# Patient Record
Sex: FEMALE | Race: WHITE | NOT HISPANIC OR LATINO | URBAN - METROPOLITAN AREA
[De-identification: names, ages, dates, MRNs, and addresses within clinical notes are randomized per-mention and may not be internally consistent; named-entity substitution may affect disease eponyms.]

---

## 2017-03-13 DIAGNOSIS — K21.00 GASTROESOPHAGEAL REFLUX DISEASE WITH ESOPHAGITIS: ICD-10-CM

## 2017-03-13 RX ORDER — PANTOPRAZOLE SODIUM 40 MG/1
TABLET, DELAYED RELEASE ORAL
Qty: 30 TABLET | Refills: 5 | Status: SHIPPED | OUTPATIENT
Start: 2017-03-13 | End: 2018-03-14 | Stop reason: SDUPTHER

## 2017-05-24 ENCOUNTER — OFFICE (OUTPATIENT)
Dept: URBAN - METROPOLITAN AREA CLINIC 64 | Facility: CLINIC | Age: 62
End: 2017-05-24

## 2017-05-24 VITALS
WEIGHT: 188 LBS | HEART RATE: 88 BPM | HEIGHT: 64 IN | DIASTOLIC BLOOD PRESSURE: 88 MMHG | SYSTOLIC BLOOD PRESSURE: 165 MMHG

## 2017-05-24 DIAGNOSIS — Z86.010 PERSONAL HISTORY OF COLONIC POLYPS: ICD-10-CM

## 2017-05-24 DIAGNOSIS — K59.00 CONSTIPATION, UNSPECIFIED: ICD-10-CM

## 2017-05-24 DIAGNOSIS — K21.9 GASTRO-ESOPHAGEAL REFLUX DISEASE WITHOUT ESOPHAGITIS: ICD-10-CM

## 2017-05-24 PROCEDURE — 99213 OFFICE O/P EST LOW 20 MIN: CPT | Performed by: INTERNAL MEDICINE

## 2017-05-24 RX ORDER — POLYETHYLENE GLYCOL 3350 17 G/17G
POWDER, FOR SOLUTION ORAL
Qty: 1 | Refills: 11 | Status: ACTIVE
Start: 2017-05-24

## 2017-05-24 RX ORDER — CALCIUM CARBONATE/VITAMIN D3 600 MG-10
TABLET ORAL
Qty: 60 | Refills: 3 | Status: ACTIVE
Start: 2017-05-24

## 2017-05-24 RX ORDER — ONDANSETRON HYDROCHLORIDE 4 MG/1
TABLET, ORALLY DISINTEGRATING ORAL
Qty: 20 | Refills: 3 | Status: ACTIVE
Start: 2017-05-24

## 2017-09-11 ENCOUNTER — OFFICE VISIT (OUTPATIENT)
Dept: ENDOCRINOLOGY | Age: 62
End: 2017-09-11

## 2017-09-11 VITALS
DIASTOLIC BLOOD PRESSURE: 78 MMHG | OXYGEN SATURATION: 96 % | SYSTOLIC BLOOD PRESSURE: 142 MMHG | HEIGHT: 64 IN | BODY MASS INDEX: 32.03 KG/M2 | HEART RATE: 73 BPM | WEIGHT: 187.6 LBS

## 2017-09-11 DIAGNOSIS — M81.0 OSTEOPOROSIS: ICD-10-CM

## 2017-09-11 DIAGNOSIS — E11.9 TYPE 2 DIABETES MELLITUS WITHOUT COMPLICATION, WITHOUT LONG-TERM CURRENT USE OF INSULIN (HCC): Primary | ICD-10-CM

## 2017-09-11 DIAGNOSIS — Z86.2 HISTORY OF ANEMIA: ICD-10-CM

## 2017-09-11 DIAGNOSIS — I10 ESSENTIAL HYPERTENSION: ICD-10-CM

## 2017-09-11 DIAGNOSIS — K76.0 HEPATIC STEATOSIS: ICD-10-CM

## 2017-09-11 DIAGNOSIS — E11.42 TYPE 2 DIABETES MELLITUS WITH PERIPHERAL NEUROPATHY (HCC): ICD-10-CM

## 2017-09-11 DIAGNOSIS — E78.5 HYPERLIPIDEMIA, UNSPECIFIED HYPERLIPIDEMIA TYPE: ICD-10-CM

## 2017-09-11 PROCEDURE — 99214 OFFICE O/P EST MOD 30 MIN: CPT | Performed by: INTERNAL MEDICINE

## 2017-09-11 NOTE — PROGRESS NOTES
Subjective   Marge Mcghee is a 62 y.o. female.     HPI Comments: F/u for dm2, hyperlipidemia, hypertension/ testing bs  2-3 times week  / last dm eye exam 3/3/17 with dr Hinton / last dm foot exam today with dr Gilliam     Diabetes   Associated symptoms include fatigue.   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        She has known diabetes mellitus since 1970. She has been on Glucovance 1.25/250 mg  2 tablets a day and Victoza 1.2 mg/day. . - 200.   She has few hypoglycemic episodes.  She is not compliant with her diet. She eats mostly salads.  She has been seen at the diabetic program at [facility].  Her last meal was 11 AM.       Her last eye examination was in . She has a cataract on the left eye and retinopathy in the right eye.  She has received intraocular injections on the right eye from Dr. Hinton.  She has burning sensation and tingling in her feet which has improved with Cymbalta. She has used Neurontin and Lyrica in the past which caused blurred vision. Creatine clearance done in 2/15 was 95 mL per minute. 24-hour urine protein was low.      She has hyperlipidemia that has not been taking Lipitor 20 mg per day regularly.  She takes it about 3 times a week.  She denies myalgia.      She has hypertension and has been on Lasix 20 mg once a day and Diovan 160 mg once a day. She has a right adrenal mass. She had a follow-up MRI in 2012 which showed a right adrenal adenoma measuring 3.7 cm which is unchanged from 2009. She has mild hepatic steatosis on MRI. Lab studies done in 2015: Normal 24 urine VMA, metanephrines, and catecholamines. Plasma renin is 14.11 ng per mL per hour. Aldosterone was normal.      She is  6 para 1. She had total hysterectomy and bilateral salpingo-oophorectomy in her 40s. She was never on estrogen replacement therapy. She had a follow-up bone density in  and PCP [facility] which showed  "osteoporosis.  She is on Os-Addison 500+ D one tablet once a day.  Elham Singer NP is considering using Prolia.    She has been complaining of right sided lower back pain for 3 months.    She was hospitalized at [place] [address] for erosive esophagitis.  She has been taking Protonix 40 mg once a day and Carafate as needed.  She has a history of anemia.  She denies melena or hematochezia.  She is following with GI Dr. Nicole.    The following portions of the patient's history were reviewed and updated as appropriate: allergies, current medications, past family history, past medical history, past social history, past surgical history and problem list.    Review of Systems   Constitutional: Positive for fatigue.   HENT: Negative.    Eyes: Negative.    Respiratory: Negative.    Cardiovascular: Negative.    Gastrointestinal: Negative.    Endocrine: Negative.    Genitourinary: Negative.    Musculoskeletal: Negative.    Skin: Negative.    Allergic/Immunologic: Negative.    Neurological: Positive for numbness ( feet ).   Hematological: Negative.    Psychiatric/Behavioral: Negative.        Objective      Vitals:    09/11/17 1527   BP: 142/78   BP Location: Right arm   Patient Position: Sitting   Cuff Size: Large Adult   Pulse: 73   SpO2: 96%   Weight: 187 lb 9.6 oz (85.1 kg)   Height: 64\" (162.6 cm)     Physical Exam   Constitutional: She is oriented to person, place, and time. She appears well-developed and well-nourished. No distress.   HENT:   Head: Normocephalic.   Nose: Nose normal.   Mouth/Throat: No oropharyngeal exudate.   Eyes: Conjunctivae and EOM are normal. Right eye exhibits no discharge. Left eye exhibits no discharge. No scleral icterus.   Neck: Neck supple. No JVD present. No tracheal deviation present. No thyromegaly present.   Cardiovascular: Normal rate, regular rhythm, normal heart sounds and intact distal pulses.  Exam reveals no gallop and no friction rub.    No murmur heard.  Pulmonary/Chest: Effort " normal and breath sounds normal. No respiratory distress. She has no wheezes. She has no rales. She exhibits no tenderness.   Abdominal: Soft. Bowel sounds are normal. She exhibits no distension and no mass. There is no tenderness.   Musculoskeletal: Normal range of motion. She exhibits no edema, tenderness or deformity.   Lymphadenopathy:     She has no cervical adenopathy.   Neurological: She is alert and oriented to person, place, and time. She has normal reflexes. She displays normal reflexes. Coordination normal.   Intact light touch   Skin: Skin is warm and dry. No rash noted. No erythema. No pallor.   Psychiatric: She has a normal mood and affect. Her behavior is normal.     Hospital Outpatient Visit on 02/03/2015   Component Date Value Ref Range Status   • Creatinine, Arterial 02/03/2015 0.8  0.6 - 1.3 mg/dL Final     Assessment/Plan   Marge was seen today for diabetes, hyperlipidemia and hypertension.    Diagnoses and all orders for this visit:    Type 2 diabetes mellitus without complication, without long-term current use of insulin  -     Comprehensive Metabolic Panel  -     Hemoglobin A1c  -     TSH  -     T4, Free  -     Microalbumin / Creatinine Urine Ratio    Type 2 diabetes mellitus with peripheral neuropathy    Hyperlipidemia, unspecified hyperlipidemia type  -     Lipid Panel  -     TSH  -     T4, Free    Hepatic steatosis    Essential hypertension    Osteoporosis  -     Vitamin D 25 Hydroxy  -     Osteocalcin  -     C-Telopeptide, Serum    History of anemia  -     CBC & Differential  -     Iron Profile  -     Vitamin B12  -     Folate      Continue Glucovance and Victoza.  Consider increasing Victoza 1.8 mg once a day.  Check lipid profile.  Take Lipitor regularly.  Will defer blood pressure control to primary care provider  Check vitamin D, osteocalcin C-telopeptide.  Check CBC.   Flu vaccine next month.   Advised to get shingles vaccine.     Send copy of my notes and labs to Elham Singer NP and  Dr. Gaspar.     RTC 4 mos.

## 2017-09-12 LAB
GLUCOSE UR QL: NORMAL
KETONES UR QL STRIP: NORMAL
LEUKOCYTE ESTERASE UR QL STRIP: NORMAL
Lab: NORMAL
PH UR STRIP: NORMAL [PH]
PROT UR QL STRIP: NORMAL
SP GR UR: NORMAL
SPECIMEN STATUS: NORMAL

## 2017-09-14 LAB
25(OH)D3+25(OH)D2 SERPL-MCNC: 15.3 NG/ML (ref 30–100)
ALBUMIN SERPL-MCNC: 4 G/DL (ref 3.6–4.8)
ALBUMIN/CREAT UR: 13 MG/G CREAT (ref 0–30)
ALBUMIN/GLOB SERPL: 1.5 {RATIO} (ref 1.2–2.2)
ALP SERPL-CCNC: 67 IU/L (ref 39–117)
ALT SERPL-CCNC: 13 IU/L (ref 0–32)
APPEARANCE UR: CLEAR
AST SERPL-CCNC: 19 IU/L (ref 0–40)
BASOPHILS # BLD AUTO: 0.1 X10E3/UL (ref 0–0.2)
BASOPHILS NFR BLD AUTO: 1 %
BILIRUB SERPL-MCNC: 0.2 MG/DL (ref 0–1.2)
BILIRUB UR QL STRIP: NEGATIVE
BUN SERPL-MCNC: 23 MG/DL (ref 8–27)
BUN/CREAT SERPL: 26 (ref 12–28)
CALCIUM SERPL-MCNC: 9.4 MG/DL (ref 8.7–10.3)
CHLORIDE SERPL-SCNC: 97 MMOL/L (ref 96–106)
CHOLEST SERPL-MCNC: 231 MG/DL (ref 100–199)
CO2 SERPL-SCNC: 24 MMOL/L (ref 18–29)
COLLAGEN CTX SERPL-MCNC: 71 PG/ML
COLOR UR: YELLOW
CREAT SERPL-MCNC: 0.87 MG/DL (ref 0.57–1)
CREAT UR-MCNC: 42.3 MG/DL
EOSINOPHIL # BLD AUTO: 0.3 X10E3/UL (ref 0–0.4)
EOSINOPHIL NFR BLD AUTO: 4 %
ERYTHROCYTE [DISTWIDTH] IN BLOOD BY AUTOMATED COUNT: 14.2 % (ref 12.3–15.4)
FOLATE SERPL-MCNC: 7.9 NG/ML
GLOBULIN SER CALC-MCNC: 2.7 G/DL (ref 1.5–4.5)
GLUCOSE SERPL-MCNC: 239 MG/DL (ref 65–99)
GLUCOSE UR QL: ABNORMAL
HBA1C MFR BLD: 9.4 % (ref 4.8–5.6)
HCT VFR BLD AUTO: 37.1 % (ref 34–46.6)
HDLC SERPL-MCNC: 45 MG/DL
HGB BLD-MCNC: 11.8 G/DL (ref 11.1–15.9)
HGB UR QL STRIP: NEGATIVE
IMM GRANULOCYTES # BLD: 0 X10E3/UL (ref 0–0.1)
IMM GRANULOCYTES NFR BLD: 0 %
IRON SATN MFR SERPL: 23 % (ref 15–55)
IRON SERPL-MCNC: 62 UG/DL (ref 27–139)
KETONES UR QL STRIP: NEGATIVE
LDLC SERPL CALC-MCNC: 129 MG/DL (ref 0–99)
LEUKOCYTE ESTERASE UR QL STRIP: NEGATIVE
LYMPHOCYTES # BLD AUTO: 3.4 X10E3/UL (ref 0.7–3.1)
LYMPHOCYTES NFR BLD AUTO: 40 %
MCH RBC QN AUTO: 28 PG (ref 26.6–33)
MCHC RBC AUTO-ENTMCNC: 31.8 G/DL (ref 31.5–35.7)
MCV RBC AUTO: 88 FL (ref 79–97)
MICRO URNS: ABNORMAL
MICROALBUMIN UR-MCNC: 5.5 UG/ML
MONOCYTES # BLD AUTO: 0.9 X10E3/UL (ref 0.1–0.9)
MONOCYTES NFR BLD AUTO: 10 %
NEUTROPHILS # BLD AUTO: 3.8 X10E3/UL (ref 1.4–7)
NEUTROPHILS NFR BLD AUTO: 45 %
NITRITE UR QL STRIP: NEGATIVE
PH UR STRIP: 6 [PH] (ref 5–7.5)
PLATELET # BLD AUTO: 270 X10E3/UL (ref 150–379)
POTASSIUM SERPL-SCNC: 4.2 MMOL/L (ref 3.5–5.2)
PROT SERPL-MCNC: 6.7 G/DL (ref 6–8.5)
PROT UR QL STRIP: NEGATIVE
RBC # BLD AUTO: 4.21 X10E6/UL (ref 3.77–5.28)
SODIUM SERPL-SCNC: 138 MMOL/L (ref 134–144)
SP GR UR: 1.01 (ref 1–1.03)
SPECIMEN STATUS: NORMAL
T4 FREE SERPL-MCNC: 1.08 NG/DL (ref 0.82–1.77)
TIBC SERPL-MCNC: 272 UG/DL (ref 250–450)
TRIGL SERPL-MCNC: 283 MG/DL (ref 0–149)
TSH SERPL DL<=0.005 MIU/L-ACNC: 2.68 UIU/ML (ref 0.45–4.5)
UIBC SERPL-MCNC: 210 UG/DL (ref 118–369)
UROBILINOGEN UR STRIP-MCNC: 0.2 MG/DL (ref 0.2–1)
VIT B12 SERPL-MCNC: 412 PG/ML (ref 211–946)
VLDLC SERPL CALC-MCNC: 57 MG/DL (ref 5–40)
WBC # BLD AUTO: 8.4 X10E3/UL (ref 3.4–10.8)

## 2017-09-15 RX ORDER — LIRAGLUTIDE 6 MG/ML
INJECTION SUBCUTANEOUS
Refills: 2
Start: 2017-09-15 | End: 2017-10-05 | Stop reason: SDUPTHER

## 2017-09-15 RX ORDER — GLYBURIDE-METFORMIN HYDROCHLORIDE 5; 500 MG/1; MG/1
1 TABLET ORAL 2 TIMES DAILY WITH MEALS
Refills: 2
Start: 2017-09-15 | End: 2019-01-25

## 2017-10-05 RX ORDER — LIRAGLUTIDE 6 MG/ML
INJECTION SUBCUTANEOUS
Qty: 27 ML | Refills: 3 | Status: SHIPPED | OUTPATIENT
Start: 2017-10-05 | End: 2019-01-25

## 2017-10-19 ENCOUNTER — OFFICE (OUTPATIENT)
Dept: URBAN - METROPOLITAN AREA CLINIC 64 | Facility: CLINIC | Age: 62
End: 2017-10-19

## 2017-10-19 VITALS
SYSTOLIC BLOOD PRESSURE: 135 MMHG | WEIGHT: 186 LBS | HEART RATE: 80 BPM | HEIGHT: 64 IN | DIASTOLIC BLOOD PRESSURE: 85 MMHG

## 2017-10-19 DIAGNOSIS — B96.81 HELICOBACTER PYLORI [H. PYLORI] AS THE CAUSE OF DISEASES CLA: ICD-10-CM

## 2017-10-19 DIAGNOSIS — K59.00 CONSTIPATION, UNSPECIFIED: ICD-10-CM

## 2017-10-19 DIAGNOSIS — Z86.010 PERSONAL HISTORY OF COLONIC POLYPS: ICD-10-CM

## 2017-10-19 DIAGNOSIS — K21.9 GASTRO-ESOPHAGEAL REFLUX DISEASE WITHOUT ESOPHAGITIS: ICD-10-CM

## 2017-10-19 DIAGNOSIS — R13.10 DYSPHAGIA, UNSPECIFIED: ICD-10-CM

## 2017-10-19 PROCEDURE — 99213 OFFICE O/P EST LOW 20 MIN: CPT | Performed by: INTERNAL MEDICINE

## 2017-10-19 RX ORDER — ONDANSETRON HYDROCHLORIDE 4 MG/1
8 TABLET, ORALLY DISINTEGRATING ORAL
Qty: 30 | Refills: 0 | Status: ACTIVE
Start: 2017-10-19

## 2018-02-23 ENCOUNTER — HOSPITAL ENCOUNTER (OUTPATIENT)
Dept: PREOP | Facility: HOSPITAL | Age: 63
Setting detail: HOSPITAL OUTPATIENT SURGERY
Discharge: HOME | End: 2018-02-23
Attending: INTERNAL MEDICINE | Admitting: INTERNAL MEDICINE

## 2018-02-23 ENCOUNTER — ON CAMPUS - OUTPATIENT (OUTPATIENT)
Dept: URBAN - METROPOLITAN AREA HOSPITAL 85 | Facility: HOSPITAL | Age: 63
End: 2018-02-23

## 2018-02-23 DIAGNOSIS — K44.9 DIAPHRAGMATIC HERNIA WITHOUT OBSTRUCTION OR GANGRENE: ICD-10-CM

## 2018-02-23 DIAGNOSIS — K59.00 CONSTIPATION, UNSPECIFIED: ICD-10-CM

## 2018-02-23 DIAGNOSIS — K64.4 RESIDUAL HEMORRHOIDAL SKIN TAGS: ICD-10-CM

## 2018-02-23 DIAGNOSIS — K21.9 GASTRO-ESOPHAGEAL REFLUX DISEASE WITHOUT ESOPHAGITIS: ICD-10-CM

## 2018-02-23 DIAGNOSIS — K64.8 OTHER HEMORRHOIDS: ICD-10-CM

## 2018-02-23 DIAGNOSIS — R11.0 NAUSEA: ICD-10-CM

## 2018-02-23 DIAGNOSIS — D12.2 BENIGN NEOPLASM OF ASCENDING COLON: ICD-10-CM

## 2018-02-23 DIAGNOSIS — R13.10 DYSPHAGIA, UNSPECIFIED: ICD-10-CM

## 2018-02-23 DIAGNOSIS — K22.2 ESOPHAGEAL OBSTRUCTION: ICD-10-CM

## 2018-02-23 DIAGNOSIS — Z86.010 PERSONAL HISTORY OF COLONIC POLYPS: ICD-10-CM

## 2018-02-23 LAB
GLUCOSE BLD-MCNC: 139 MG/DL (ref 70–105)
GLUCOSE BLD-MCNC: 150 MG/DL (ref 70–105)

## 2018-02-23 PROCEDURE — 43450 DILATE ESOPHAGUS 1/MULT PASS: CPT | Performed by: INTERNAL MEDICINE

## 2018-02-23 PROCEDURE — 43235 EGD DIAGNOSTIC BRUSH WASH: CPT | Performed by: INTERNAL MEDICINE

## 2018-02-23 PROCEDURE — 45380 COLONOSCOPY AND BIOPSY: CPT | Performed by: INTERNAL MEDICINE

## 2018-03-14 DIAGNOSIS — K21.00 GASTROESOPHAGEAL REFLUX DISEASE WITH ESOPHAGITIS: ICD-10-CM

## 2018-03-14 RX ORDER — PANTOPRAZOLE SODIUM 40 MG/1
TABLET, DELAYED RELEASE ORAL
Qty: 30 TABLET | Refills: 5 | Status: SHIPPED | OUTPATIENT
Start: 2018-03-14 | End: 2019-01-25

## 2019-01-25 ENCOUNTER — OFFICE VISIT (OUTPATIENT)
Dept: ENDOCRINOLOGY | Age: 64
End: 2019-01-25

## 2019-01-25 VITALS
BODY MASS INDEX: 29.5 KG/M2 | OXYGEN SATURATION: 98 % | DIASTOLIC BLOOD PRESSURE: 66 MMHG | HEIGHT: 64 IN | HEART RATE: 98 BPM | WEIGHT: 172.8 LBS | SYSTOLIC BLOOD PRESSURE: 124 MMHG

## 2019-01-25 DIAGNOSIS — K21.00 GASTROESOPHAGEAL REFLUX DISEASE WITH ESOPHAGITIS: ICD-10-CM

## 2019-01-25 DIAGNOSIS — K76.0 HEPATIC STEATOSIS: ICD-10-CM

## 2019-01-25 DIAGNOSIS — M81.0 OSTEOPOROSIS, UNSPECIFIED OSTEOPOROSIS TYPE, UNSPECIFIED PATHOLOGICAL FRACTURE PRESENCE: ICD-10-CM

## 2019-01-25 DIAGNOSIS — I10 ESSENTIAL HYPERTENSION: ICD-10-CM

## 2019-01-25 DIAGNOSIS — E55.9 VITAMIN D DEFICIENCY: ICD-10-CM

## 2019-01-25 DIAGNOSIS — E27.8 ADRENAL NODULE (HCC): ICD-10-CM

## 2019-01-25 DIAGNOSIS — E53.8 VITAMIN B12 DEFICIENCY: ICD-10-CM

## 2019-01-25 DIAGNOSIS — E11.42 TYPE 2 DIABETES MELLITUS WITH PERIPHERAL NEUROPATHY (HCC): ICD-10-CM

## 2019-01-25 DIAGNOSIS — E11.9 TYPE 2 DIABETES MELLITUS WITHOUT COMPLICATION, WITHOUT LONG-TERM CURRENT USE OF INSULIN (HCC): Primary | ICD-10-CM

## 2019-01-25 DIAGNOSIS — E78.5 HYPERLIPIDEMIA, UNSPECIFIED HYPERLIPIDEMIA TYPE: ICD-10-CM

## 2019-01-25 PROCEDURE — 99214 OFFICE O/P EST MOD 30 MIN: CPT | Performed by: INTERNAL MEDICINE

## 2019-01-25 RX ORDER — BIOTIN 10 MG
1 TABLET ORAL
COMMUNITY
End: 2021-09-08

## 2019-01-25 RX ORDER — ONDANSETRON 4 MG/1
4 TABLET, FILM COATED ORAL EVERY 8 HOURS PRN
Qty: 20 TABLET | Refills: 0 | Status: SHIPPED | OUTPATIENT
Start: 2019-01-25 | End: 2019-06-14 | Stop reason: ALTCHOICE

## 2019-01-25 NOTE — PROGRESS NOTES
Subjective   Marge Mcghee is a 63 y.o. female.     F/u for dm 2, hyperlipidemia., hypertension/ testing bs 2-3 times week / last dm eye exam 1/9/19 with dr Hinton/ last dm foot exam today with dr Alonzo / flu vaccine @ work and pneumonia       Diabetes   She presents for her follow-up diabetic visit. She has type 2 diabetes mellitus. Associated symptoms include fatigue.   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 She has been on metformin 500 mg BID and Trulicity 1.5 mg weekly.  Victoza was discontinued because of formulary.  BS .   She checks her blood sugars 1-2 times a week.   She has few hypoglycemic episodes.  She is not compliant with her diet.  She has been seen at the diabetic program at [facility].  she has lost 15 pounds since September 2017.  Her last meal was 11 AM.       Her last eye examination was in 12/18. She has retinopathy and had previous retinal laser treatment.  She has received intraocular injections on the both eyes from Dr. Hinton.  She had left cataract surgery in December 2018.  She has burning sensation and tingling in her feet which has improved with Cymbalta. She has used Neurontin and Lyrica in the past which caused blurred vision. Creatinine clearance done in 2/15 was 95 mL per minute. 24-hour urine protein was low.      She has hyperlipidemia and has been taking Lipitor 20 mg per day regularly.  She denies myalgia.      She has hypertension and has been on Lasix 20 mg once a day and Diovan 160 mg BID.     She has a right adrenal mass. She had a follow-up MRI in January 2012 which showed a right adrenal adenoma measuring 3.7 cm which is unchanged from July 2009. She has mild hepatic steatosis on MRI. Lab studies done in February 2015: Normal 24 urine VMA, metanephrines, and catecholamines. Plasma renin is 14.11 ng per mL per hour. Aldosterone was normal.    She  "had CT of the abdomen and pelvis done at [facility] in 2018 which showed a 4 cm right adrenal mass with attenuation value is consistent with an adenoma.  She has mild hepatic steatosis.    She denies headaches, diaphoresis, palpitations, weakness, easy bruising, and increased increased facial or body hair.      She is  6 para 1. She had total hysterectomy and bilateral salpingo-oophorectomy in her 40s. She was never on estrogen replacement therapy. She had a follow-up bone density in  which showed osteoporosis.  She is on Os-Addison 500+ D one tablet once a day.  She will start on Prolia.     She was hospitalized at [facility]  for erosive esophagitis.  Her last EGD was in 2018.  She has been taking Dexilant once a day and Carafate as needed.  She has a history of anemia.  She denies melena or hematochezia.  She is following with GI Dr. Nicole.    She has vitamin D deficiency and is on vitamin D 1000 units per day.  She has vitamin B12 deficiency and takes sublingual vitamin B12     The following portions of the patient's history were reviewed and updated as appropriate: allergies, current medications, past family history, past medical history, past social history, past surgical history and problem list.    Review of Systems   Constitutional: Positive for fatigue.   HENT: Negative.    Eyes: Negative.    Respiratory: Negative.    Cardiovascular: Negative.    Gastrointestinal: Negative.    Endocrine: Negative.    Genitourinary: Negative.    Musculoskeletal: Positive for back pain (right side ).   Skin: Negative.    Allergic/Immunologic: Negative.    Neurological: Negative.    Hematological: Negative.    Psychiatric/Behavioral: Negative.        Objective      Vitals:    19 1304   BP: 124/66   BP Location: Right arm   Patient Position: Sitting   Cuff Size: Large Adult   Pulse: 98   SpO2: 98%   Weight: 78.4 kg (172 lb 12.8 oz)   Height: 162.6 cm (64\")     Physical Exam   Constitutional: " She is oriented to person, place, and time. She appears well-developed and well-nourished. No distress.   HENT:   Head: Normocephalic.   Nose: Nose normal.   Mouth/Throat: No oropharyngeal exudate.   Eyes: Conjunctivae and EOM are normal. Right eye exhibits no discharge. Left eye exhibits no discharge. No scleral icterus.   Neck: Normal range of motion. No JVD present. No tracheal deviation present. No thyromegaly present.   Cardiovascular: Normal rate, regular rhythm, normal heart sounds and intact distal pulses. Exam reveals no gallop and no friction rub.   No murmur heard.  Pulmonary/Chest: Effort normal and breath sounds normal. No stridor. No respiratory distress. She has no wheezes. She has no rales. She exhibits no tenderness.   Abdominal: Soft. Bowel sounds are normal. She exhibits no distension and no mass. There is no tenderness. There is no rebound and no guarding.   No prominent striae   Musculoskeletal: She exhibits no edema, tenderness or deformity.   Lymphadenopathy:     She has no cervical adenopathy.   Neurological: She is alert and oriented to person, place, and time. She displays normal reflexes.   Intact light touch in both lower extremities   Skin: Skin is warm and dry. No rash noted. No erythema. No pallor.   Psychiatric: She has a normal mood and affect. Her behavior is normal.     Office Visit on 09/11/2017   Component Date Value Ref Range Status   • Glucose 09/11/2017 239* 65 - 99 mg/dL Final   • BUN 09/11/2017 23  8 - 27 mg/dL Final   • Creatinine 09/11/2017 0.87  0.57 - 1.00 mg/dL Final   • eGFR Non  Am 09/11/2017 71  >59 mL/min/1.73 Final   • eGFR African Am 09/11/2017 81  >59 mL/min/1.73 Final   • BUN/Creatinine Ratio 09/11/2017 26  12 - 28 Final   • Sodium 09/11/2017 138  134 - 144 mmol/L Final   • Potassium 09/11/2017 4.2  3.5 - 5.2 mmol/L Final   • Chloride 09/11/2017 97  96 - 106 mmol/L Final   • Total CO2 09/11/2017 24  18 - 29 mmol/L Final   • Calcium 09/11/2017 9.4  8.7 -  10.3 mg/dL Final   • Total Protein 09/11/2017 6.7  6.0 - 8.5 g/dL Final   • Albumin 09/11/2017 4.0  3.6 - 4.8 g/dL Final   • Globulin 09/11/2017 2.7  1.5 - 4.5 g/dL Final   • A/G Ratio 09/11/2017 1.5  1.2 - 2.2 Final   • Total Bilirubin 09/11/2017 0.2  0.0 - 1.2 mg/dL Final   • Alkaline Phosphatase 09/11/2017 67  39 - 117 IU/L Final   • AST (SGOT) 09/11/2017 19  0 - 40 IU/L Final   • ALT (SGPT) 09/11/2017 13  0 - 32 IU/L Final   • Total Cholesterol 09/11/2017 231* 100 - 199 mg/dL Final   • Triglycerides 09/11/2017 283* 0 - 149 mg/dL Final   • HDL Cholesterol 09/11/2017 45  >39 mg/dL Final   • VLDL Cholesterol 09/11/2017 57* 5 - 40 mg/dL Final   • LDL Cholesterol  09/11/2017 129* 0 - 99 mg/dL Final   • Hemoglobin A1C 09/11/2017 9.4* 4.8 - 5.6 % Final    Comment:          Pre-diabetes: 5.7 - 6.4           Diabetes: >6.4           Glycemic control for adults with diabetes: <7.0     • TSH 09/11/2017 2.680  0.450 - 4.500 uIU/mL Final   • Free T4 09/11/2017 1.08  0.82 - 1.77 ng/dL Final   • WBC 09/11/2017 8.4  3.4 - 10.8 x10E3/uL Final   • RBC 09/11/2017 4.21  3.77 - 5.28 x10E6/uL Final   • Hemoglobin 09/11/2017 11.8  11.1 - 15.9 g/dL Final   • Hematocrit 09/11/2017 37.1  34.0 - 46.6 % Final   • MCV 09/11/2017 88  79 - 97 fL Final   • MCH 09/11/2017 28.0  26.6 - 33.0 pg Final   • MCHC 09/11/2017 31.8  31.5 - 35.7 g/dL Final   • RDW 09/11/2017 14.2  12.3 - 15.4 % Final   • Platelets 09/11/2017 270  150 - 379 x10E3/uL Final   • Neutrophil Rel % 09/11/2017 45  % Final   • Lymphocyte Rel % 09/11/2017 40  % Final   • Monocyte Rel % 09/11/2017 10  % Final   • Eosinophil Rel % 09/11/2017 4  % Final   • Basophil Rel % 09/11/2017 1  % Final   • Neutrophils Absolute 09/11/2017 3.8  1.4 - 7.0 x10E3/uL Final   • Lymphocytes Absolute 09/11/2017 3.4* 0.7 - 3.1 x10E3/uL Final   • Monocytes Absolute 09/11/2017 0.9  0.1 - 0.9 x10E3/uL Final   • Eosinophils Absolute 09/11/2017 0.3  0.0 - 0.4 x10E3/uL Final   • Basophils Absolute 09/11/2017  0.1  0.0 - 0.2 x10E3/uL Final   • Immature Granulocyte Rel % 09/11/2017 0  % Final   • Immature Grans Absolute 09/11/2017 0.0  0.0 - 0.1 x10E3/uL Final   • TIBC 09/11/2017 272  250 - 450 ug/dL Final   • UIBC 09/11/2017 210  118 - 369 ug/dL Final   • Iron 09/11/2017 62  27 - 139 ug/dL Final   • Iron Saturation 09/11/2017 23  15 - 55 % Final   • 25 Hydroxy, Vitamin D 09/11/2017 15.3* 30.0 - 100.0 ng/mL Final    Comment: Vitamin D deficiency has been defined by the [place] of  [employer]icine and an Endocrine Society practice guideline as a  level of serum 25-OH vitamin D less than 20 ng/mL (1,2).  The Endocrine Society went on to further define vitamin D  insufficiency as a level between 21 and 29 ng/mL (2).  1. IOM ([place] [employer]). 2010. Dietary reference     intakes for calcium and D. [place]: The     National Academies Press.  2. Torrey MF, Keily NC, Estelle RUANO, et al.     Evaluation, treatment, and prevention of vitamin D     deficiency: an Endocrine Society clinical practice     guideline. JCEM. 2011 Jul; 96(7):1911-30.     • Creatinine, Urine 09/11/2017 42.3  Not Estab. mg/dL Final   • Microalbumin, Urine 09/11/2017 5.5  Not Estab. ug/mL Final   • Microalbumin/Creatinine Ratio Urine 09/11/2017 13.0  0.0 - 30.0 mg/g creat Final   • C-Telopeptides 09/11/2017 71  pg/mL Final    Comment: Reference Range:  Premenopausal Women: 34 - 635  Postmenopausal Women: 34 - 1037     • Vitamin B-12 09/11/2017 412  211 - 946 pg/mL Final   • Folate 09/11/2017 7.9  >3.0 ng/mL Final    Comment: A serum folate concentration of less than 3.1 ng/mL is  considered to represent clinical deficiency.     • Specific Gravity, UA 09/11/2017 CANCELED   Final-Edited    Comment: Test not performed    Result canceled by the ancillary     • pH, UA 09/11/2017 CANCELED   Final-Edited    Comment: Test not performed    Result canceled by the ancillary     • Leukocytes, UA 09/11/2017 CANCELED   Final-Edited    Comment: Please refer  to the following specimen for additional lab results.  [id number]    Result canceled by the ancillary     • Protein 09/11/2017 CANCELED   Final-Edited    Comment: Test not performed    Result canceled by the ancillary     • Glucose, UA 09/11/2017 CANCELED   Final-Edited    Comment: Test not performed    Result canceled by the ancillary     • Ketones 09/11/2017 CANCELED   Final-Edited    Comment: Test not performed    Result canceled by the ancillary     • Specimen Status 09/11/2017 CANCELED   Final-Edited    Comment: Please refer to the following specimen for additional lab results.        TEST:  [id number]  Urinalysis, Routine  [id number]    Result canceled by the ancillary     • Please note 09/11/2017 Comment   Final    Comment: The date and/or time of collection was not indicated on the  requisition as required by state and federal law.  The date of  receipt of the specimen was used as the collection date if not  supplied.     • Specific Gravity, UA 09/11/2017 1.013  1.005 - 1.030 Final   • pH, UA 09/11/2017 6.0  5.0 - 7.5 Final   • Color, UA 09/11/2017 Yellow  Yellow Final   • Appearance, UA 09/11/2017 Clear  Clear Final   • Leukocytes, UA 09/11/2017 Negative  Negative Final   • Protein 09/11/2017 Negative  Negative/Trace Final   • Glucose, UA 09/11/2017 2+* Negative Final   • Ketones 09/11/2017 Negative  Negative Final   • Blood, UA 09/11/2017 Negative  Negative Final   • Bilirubin, UA 09/11/2017 Negative  Negative Final   • Urobilinogen, UA 09/11/2017 0.2  0.2 - 1.0 mg/dL Final   • Nitrite, UA 09/11/2017 Negative  Negative Final   • Microscopic Examination 09/11/2017 Comment   Final    Microscopic not indicated and not performed.   • Specimen Status 09/11/2017 CANCELED   Final-Edited    Comment: [facility] specimen for the following  test(s) and is providing the patient with recollection instructions.        TEST:  [id number]  Osteocalcin, Serum    Result canceled by the  ancillary       Assessment/Plan   Marge was seen today for diabetes, hyperlipidemia and hypertension.    Diagnoses and all orders for this visit:    Type 2 diabetes mellitus without complication, without long-term current use of insulin (CMS/[facility])  -     Comprehensive Metabolic Panel  -     Lipid Panel  -     Hemoglobin A1c  -     TSH  -     T4, Free  -     Urinalysis With Culture If Indicated - Urine, Clean Catch  -     Microalbumin / Creatinine Urine Ratio - Urine, Clean Catch    Type 2 diabetes mellitus with peripheral neuropathy (CMS/HCC)  -     Comprehensive Metabolic Panel  -     Lipid Panel  -     Hemoglobin A1c  -     TSH  -     T4, Free  -     Urinalysis With Culture If Indicated - Urine, Clean Catch  -     Microalbumin / Creatinine Urine Ratio - Urine, Clean Catch    Essential hypertension  -     Comprehensive Metabolic Panel    Hyperlipidemia, unspecified hyperlipidemia type  -     Comprehensive Metabolic Panel  -     Lipid Panel  -     TSH  -     T4, Free    Hepatic steatosis  -     Comprehensive Metabolic Panel    Gastroesophageal reflux disease with esophagitis  -     Comprehensive Metabolic Panel    Vitamin D deficiency  -     Comprehensive Metabolic Panel  -     Vitamin D 25 Hydroxy    Adrenal nodule (CMS/HCC)  -     Comprehensive Metabolic Panel    Osteoporosis, unspecified osteoporosis type, unspecified pathological fracture presence  -     Comprehensive Metabolic Panel  -     Vitamin D 25 Hydroxy    Vitamin B12 deficiency  -     Comprehensive Metabolic Panel  -     Intrinsic Factor Ab  -     Vitamin B12  -     CBC & Differential    Other orders  -     ondansetron (ZOFRAN) 4 MG tablet; Take 1 tablet by mouth Every 8 (Eight) Hours As Needed for Nausea or Vomiting.      Continue metformin and Trulicity.  Consider discontinuing Trulicity if acid reflux does not improve with Dexilant.  Continue Cymbalta.  Continue Lipitor 20 mg per day.  Continue Lasix and Diovan.  Continue vitamin D 1000 units per  day.  Continue Os-Addison 500+ D one tablet once a day.  Adjust follow-up bone density.  Continue vitamin B12.  Appointment with Dr. Kaplan wit regards to adrenal nodule..    Send copy of my note to Traci Townsend NP and Dr. Kaplan.    RTC 4 mos

## 2019-01-27 LAB
25(OH)D3+25(OH)D2 SERPL-MCNC: 18.8 NG/ML (ref 30–100)
ALBUMIN SERPL-MCNC: 4.3 G/DL (ref 3.5–5.2)
ALBUMIN/CREAT UR: <5.1 MG/G CREAT (ref 0–30)
ALBUMIN/GLOB SERPL: 1.6 G/DL
ALP SERPL-CCNC: 56 U/L (ref 39–117)
ALT SERPL-CCNC: 16 U/L (ref 1–33)
AST SERPL-CCNC: 18 U/L (ref 1–32)
BASOPHILS # BLD AUTO: 0.09 10*3/MM3 (ref 0–0.2)
BASOPHILS NFR BLD AUTO: 0.9 % (ref 0–1.5)
BILIRUB SERPL-MCNC: 0.2 MG/DL (ref 0.1–1.2)
BUN SERPL-MCNC: 25 MG/DL (ref 8–23)
BUN/CREAT SERPL: 22.1 (ref 7–25)
CALCIUM SERPL-MCNC: 10.1 MG/DL (ref 8.6–10.5)
CHLORIDE SERPL-SCNC: 96 MMOL/L (ref 98–107)
CHOLEST SERPL-MCNC: 263 MG/DL (ref 0–200)
CO2 SERPL-SCNC: 30.7 MMOL/L (ref 22–29)
CREAT SERPL-MCNC: 1.13 MG/DL (ref 0.57–1)
CREAT UR-MCNC: 59 MG/DL
EOSINOPHIL # BLD AUTO: 0.3 10*3/MM3 (ref 0–0.7)
EOSINOPHIL NFR BLD AUTO: 3.1 % (ref 0.3–6.2)
ERYTHROCYTE [DISTWIDTH] IN BLOOD BY AUTOMATED COUNT: 13.2 % (ref 11.7–13)
GLOBULIN SER CALC-MCNC: 2.7 GM/DL
GLUCOSE SERPL-MCNC: 252 MG/DL (ref 65–99)
HBA1C MFR BLD: 10.4 % (ref 4.8–5.6)
HCT VFR BLD AUTO: 40.9 % (ref 35.6–45.5)
HDLC SERPL-MCNC: 49 MG/DL (ref 40–60)
HGB BLD-MCNC: 12.9 G/DL (ref 11.9–15.5)
IF BLOCK AB SER-ACNC: 1 AU/ML (ref 0–1.1)
IMM GRANULOCYTES # BLD AUTO: 0.01 10*3/MM3 (ref 0–0.03)
IMM GRANULOCYTES NFR BLD AUTO: 0.1 % (ref 0–0.5)
INTERPRETATION: NORMAL
LDLC SERPL CALC-MCNC: 150 MG/DL (ref 0–100)
LYMPHOCYTES # BLD AUTO: 3.8 10*3/MM3 (ref 0.9–4.8)
LYMPHOCYTES NFR BLD AUTO: 39.4 % (ref 19.6–45.3)
Lab: NORMAL
MCH RBC QN AUTO: 29.1 PG (ref 26.9–32)
MCHC RBC AUTO-ENTMCNC: 31.5 G/DL (ref 32.4–36.3)
MCV RBC AUTO: 92.3 FL (ref 80.5–98.2)
MICROALBUMIN UR-MCNC: <3 UG/ML
MONOCYTES # BLD AUTO: 0.77 10*3/MM3 (ref 0.2–1.2)
MONOCYTES NFR BLD AUTO: 8 % (ref 5–12)
NEUTROPHILS # BLD AUTO: 4.69 10*3/MM3 (ref 1.9–8.1)
NEUTROPHILS NFR BLD AUTO: 48.6 % (ref 42.7–76)
PLATELET # BLD AUTO: 303 10*3/MM3 (ref 140–500)
POTASSIUM SERPL-SCNC: 4.2 MMOL/L (ref 3.5–5.2)
PROT SERPL-MCNC: 7 G/DL (ref 6–8.5)
RBC # BLD AUTO: 4.43 10*6/MM3 (ref 3.9–5.2)
SODIUM SERPL-SCNC: 141 MMOL/L (ref 136–145)
T4 FREE SERPL-MCNC: 1.15 NG/DL (ref 0.93–1.7)
TRIGL SERPL-MCNC: 320 MG/DL (ref 0–150)
TSH SERPL DL<=0.005 MIU/L-ACNC: 2.4 MIU/ML (ref 0.27–4.2)
VIT B12 SERPL-MCNC: 1826 PG/ML (ref 211–946)
VLDLC SERPL CALC-MCNC: 64 MG/DL (ref 5–40)
WBC # BLD AUTO: 9.65 10*3/MM3 (ref 4.5–10.7)

## 2019-01-28 RX ORDER — ATORVASTATIN CALCIUM 40 MG/1
40 TABLET, FILM COATED ORAL NIGHTLY
Qty: 90 TABLET | Refills: 1 | Status: SHIPPED | OUTPATIENT
Start: 2019-01-28 | End: 2019-08-07

## 2019-06-13 NOTE — PROGRESS NOTES
Subjective   Marge Mcghee is a 64 y.o. female.     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 She has been on metformin 1000 mg BID and Trulicity 1.5 mg weekly.  Victoza was discontinued because of formulary.  BS .   She checks her blood sugars 2-3 times a week.  -130.   She denies hypoglycemic episodes.  She has lost 2 pounds since January 2019.   She has been seen at the diabetic program at [facility].  she has lost 15 pounds since September 2017.  Her last meal was 11 AM.       Her last eye examination was in 5/19. She has retinopathy and had previous retinal laser treatment.  She will have vitrectomy next week on her right eye.  She has received intraocular injections on the both eyes from Dr. Hinton.  She had left cataract surgery in December 2018.  She has burning sensation and tingling in her feet which has improved with Cymbalta. She has used Neurontin and Lyrica in the past which caused blurred vision. Creatinine clearance done in 2/15 was 95 mL per minute. 24-hour urine protein was low.      She has hyperlipidemia and has been taking Lipitor 40 mg per day regularly.  She denies myalgia.      She has hypertension and has been on Lasix 20 mg once a day and losartan 50 mg/day.      She has a right adrenal mass. She had a follow-up MRI in January 2012 which showed a right adrenal adenoma measuring 3.7 cm which is unchanged from July 2009. She has mild hepatic steatosis on MRI. Lab studies done in February 2015: Normal 24 urine VMA, metanephrines, and catecholamines. Plasma renin is 14.11 ng per mL per hour. Aldosterone was normal.       She had CT of the abdomen and pelvis done at [facility] in August 2018 which showed a 4 cm right adrenal mass with attenuation value is  consistent with an adenoma.  She has mild hepatic steatosis.  He follows with Dr. Ireland.     She denies headaches, diaphoresis, palpitations, weakness, easy bruising, and increased increased facial or body hair.      She is  6 para 1. She had total hysterectomy and bilateral salpingo-oophorectomy in her 40s. She was never on estrogen replacement therapy. She had a follow-up bone density in  which showed osteoporosis.  She is on Os-Addison 500+ D one tablet once a day.  She is on Prolia.  Osteoporosis is being managed by her PCP Barbara Levin NP     She was hospitalized at [facility]  for erosive esophagitis.  Her last EGD was in 2018.  She is on Carafate as needed.  She has stopped using Dexilant because of cost.  She is having intermittent nausea and vomiting, not improved with Zofran.  She has a history of anemia.  She denies melena or hematochezia.  She will have an EGD and colonoscopy to be done by Dr. Gray in 2019.  She denies melena, hematemesis, or hematochezia.     She has vitamin D deficiency and is on unknown dose of vitamin D   She has vitamin B12 deficiency and takes sublingual vitamin B12    She will have left breast biopsy in  at [facility].       The following portions of the patient's history were reviewed and updated as appropriate: allergies, current medications, past family history, past medical history, past social history, past surgical history and problem list.    Review of Systems   Constitutional: Negative.    Eyes: Negative.    Respiratory: Negative.    Cardiovascular: Negative.    Gastrointestinal: Positive for abdominal distention (bloating ), diarrhea and nausea.   Endocrine: Negative.    Genitourinary: Negative.    Musculoskeletal: Negative.    Skin: Negative.    Allergic/Immunologic: Negative.    Neurological: Positive for headaches.   Hematological: Negative.    Psychiatric/Behavioral: Negative.        Objective      Vitals:    19 1347   BP: 134/62  "  BP Location: Right arm   Patient Position: Sitting   Cuff Size: Large Adult   Pulse: 98   SpO2: 98%   Weight: 77.2 kg (170 lb 3.2 oz)   Height: 162.6 cm (64.02\")     Physical Exam   Constitutional: She is oriented to person, place, and time. She appears well-developed and well-nourished. No distress.   HENT:   Head: Normocephalic.   Right Ear: External ear normal.   Left Ear: External ear normal.   Nose: Nose normal.   Mouth/Throat: Oropharynx is clear and moist. No oropharyngeal exudate.   Eyes: Conjunctivae and EOM are normal. Right eye exhibits no discharge. Left eye exhibits no discharge. No scleral icterus.   Neck: Neck supple. No JVD present. No tracheal deviation present. No thyromegaly present.   Cardiovascular: Normal rate, regular rhythm, normal heart sounds and intact distal pulses. Exam reveals no gallop and no friction rub.   No murmur heard.  Pulmonary/Chest: Effort normal and breath sounds normal. No stridor. No respiratory distress. She has no wheezes. She has no rales. She exhibits no tenderness.   Abdominal: She exhibits no distension and no mass. There is no tenderness. There is no rebound and no guarding.   Musculoskeletal: Normal range of motion. She exhibits no edema, tenderness or deformity.   Lymphadenopathy:     She has no cervical adenopathy.   Neurological: She is alert and oriented to person, place, and time. She displays normal reflexes. No cranial nerve deficit or sensory deficit. She exhibits normal muscle tone. Coordination normal.   Intact light touch   Skin: Skin is warm and dry. No erythema. No pallor.   Psychiatric: She has a normal mood and affect. Her behavior is normal.     Office Visit on 01/25/2019   Component Date Value Ref Range Status   • Glucose 01/25/2019 252* 65 - 99 mg/dL Final   • BUN 01/25/2019 25* 8 - 23 mg/dL Final   • Creatinine 01/25/2019 1.13* 0.57 - 1.00 mg/dL Final   • eGFR Non African Am 01/25/2019 48* >60 mL/min/1.73 Final   • eGFR African Am 01/25/2019 " 59* >60 mL/min/1.73 Final   • BUN/Creatinine Ratio 01/25/2019 22.1  7.0 - 25.0 Final   • Sodium 01/25/2019 141  136 - 145 mmol/L Final   • Potassium 01/25/2019 4.2  3.5 - 5.2 mmol/L Final   • Chloride 01/25/2019 96* 98 - 107 mmol/L Final   • Total CO2 01/25/2019 30.7* 22.0 - 29.0 mmol/L Final   • Calcium 01/25/2019 10.1  8.6 - 10.5 mg/dL Final   • Total Protein 01/25/2019 7.0  6.0 - 8.5 g/dL Final   • Albumin 01/25/2019 4.30  3.50 - 5.20 g/dL Final   • Globulin 01/25/2019 2.7  gm/dL Final   • A/G Ratio 01/25/2019 1.6  g/dL Final   • Total Bilirubin 01/25/2019 0.2  0.1 - 1.2 mg/dL Final   • Alkaline Phosphatase 01/25/2019 56  39 - 117 U/L Final   • AST (SGOT) 01/25/2019 18  1 - 32 U/L Final   • ALT (SGPT) 01/25/2019 16  1 - 33 U/L Final   • Total Cholesterol 01/25/2019 263* 0 - 200 mg/dL Final   • Triglycerides 01/25/2019 320* 0 - 150 mg/dL Final   • HDL Cholesterol 01/25/2019 49  40 - 60 mg/dL Final   • VLDL Cholesterol 01/25/2019 64* 5 - 40 mg/dL Final   • LDL Cholesterol  01/25/2019 150* 0 - 100 mg/dL Final   • Hemoglobin A1C 01/25/2019 10.40* 4.80 - 5.60 % Final    Comment: Hemoglobin A1C Ranges:  Increased Risk for Diabetes  5.7% to 6.4%  Diabetes                     >= 6.5%  Diabetic Goal                < 7.0%     • TSH 01/25/2019 2.400  0.270 - 4.200 mIU/mL Final   • Free T4 01/25/2019 1.15  0.93 - 1.70 ng/dL Final   • Creatinine, Urine 01/25/2019 59.0  Not Estab. mg/dL Final   • Microalbumin, Urine 01/25/2019 <3.0  Not Estab. ug/mL Final   • Microalbumin/Creatinine Ratio 01/25/2019 <5.1  0.0 - 30.0 mg/g creat Final    Comment:                      Normal:                0.0 -  30.0                       Albuminuria:          31.0 - 300.0                       Clinical albuminuria:       >300.0     • Intrinsic Factor Antibodies 01/25/2019 1.0  0.0 - 1.1 AU/mL Final   • 25 Hydroxy, Vitamin D 01/25/2019 18.8* 30.0 - 100.0 ng/ml Final    Comment: Reference Range for Total Vitamin D 25(OH)  Deficiency <20.0  ng/mL  Insufficiency 21-29 ng/mL  Sufficiency  ng/mL  Toxicity >100 ng/ml     • Vitamin B-12 01/25/2019 1,826* 211 - 946 pg/mL Final   • WBC 01/25/2019 9.65  4.50 - 10.70 10*3/mm3 Final   • RBC 01/25/2019 4.43  3.90 - 5.20 10*6/mm3 Final   • Hemoglobin 01/25/2019 12.9  11.9 - 15.5 g/dL Final   • Hematocrit 01/25/2019 40.9  35.6 - 45.5 % Final   • MCV 01/25/2019 92.3  80.5 - 98.2 fL Final   • MCH 01/25/2019 29.1  26.9 - 32.0 pg Final   • MCHC 01/25/2019 31.5* 32.4 - 36.3 g/dL Final   • RDW 01/25/2019 13.2* 11.7 - 13.0 % Final   • Platelets 01/25/2019 303  140 - 500 10*3/mm3 Final   • Neutrophil Rel % 01/25/2019 48.6  42.7 - 76.0 % Final   • Lymphocyte Rel % 01/25/2019 39.4  19.6 - 45.3 % Final   • Monocyte Rel % 01/25/2019 8.0  5.0 - 12.0 % Final   • Eosinophil Rel % 01/25/2019 3.1  0.3 - 6.2 % Final   • Basophil Rel % 01/25/2019 0.9  0.0 - 1.5 % Final   • Neutrophils Absolute 01/25/2019 4.69  1.90 - 8.10 10*3/mm3 Final   • Lymphocytes Absolute 01/25/2019 3.80  0.90 - 4.80 10*3/mm3 Final   • Monocytes Absolute 01/25/2019 0.77  0.20 - 1.20 10*3/mm3 Final   • Eosinophils Absolute 01/25/2019 0.30  0.00 - 0.70 10*3/mm3 Final   • Basophils Absolute 01/25/2019 0.09  0.00 - 0.20 10*3/mm3 Final   • Immature Granulocyte Rel % 01/25/2019 0.1  0.0 - 0.5 % Final   • Immature Grans Absolute 01/25/2019 0.01  0.00 - 0.03 10*3/mm3 Final   • Interpretation 01/25/2019 Note   Final    Supplemental report is available.   • PDF Image 01/25/2019 Not applicable   Final     Assessment/Plan   Marge was seen today for diabetes, hyperlipidemia and hypertension.    Diagnoses and all orders for this visit:    Type 2 diabetes mellitus without complication, without long-term current use of insulin (CMS/[facility])  -     Comprehensive Metabolic Panel  -     Hemoglobin A1c    Essential hypertension  -     Comprehensive Metabolic Panel    Hyperlipidemia, unspecified hyperlipidemia type  -     Comprehensive Metabolic Panel  -     Lipid  Panel    Hepatic steatosis  -     Comprehensive Metabolic Panel    Gastroesophageal reflux disease with esophagitis    Vitamin D deficiency  -     Vitamin D 25 Hydroxy    Age-related osteoporosis without current pathological fracture  -     Comprehensive Metabolic Panel  -     Vitamin D 25 Hydroxy    Vitamin B12 deficiency  -     Vitamin B12  -     CBC & Differential    Other orders  -     pantoprazole (PROTONIX) 40 MG EC tablet; Take 1 tablet by mouth Daily.      Discontinue Trulicity which may be contributing to her nausea and vomiting and reflux esophagitis.  Continue Metformin 1000 mg twice a day.  Continue Cymbalta.  Continue Lipitor 40 mg/day.  Continue Lasix and losartan.  Start Protonix 40 mg/day.  Continue Carafate  Continue Os-Addison 500+ D1 tablet once a day.  Continue vitamin D supplements.  Continue Prolia every 6 months per PCP.  Suggests follow-up bone density.    Copy of my note sent to Barbara Townsend NP, Dr. Hinton, and Dr. Gray.    RTC 4 mos

## 2019-06-14 ENCOUNTER — OFFICE VISIT (OUTPATIENT)
Dept: ENDOCRINOLOGY | Age: 64
End: 2019-06-14

## 2019-06-14 VITALS
HEIGHT: 64 IN | BODY MASS INDEX: 29.06 KG/M2 | DIASTOLIC BLOOD PRESSURE: 62 MMHG | HEART RATE: 98 BPM | SYSTOLIC BLOOD PRESSURE: 134 MMHG | OXYGEN SATURATION: 98 % | WEIGHT: 170.2 LBS

## 2019-06-14 DIAGNOSIS — E55.9 VITAMIN D DEFICIENCY: ICD-10-CM

## 2019-06-14 DIAGNOSIS — K76.0 HEPATIC STEATOSIS: ICD-10-CM

## 2019-06-14 DIAGNOSIS — E53.8 VITAMIN B12 DEFICIENCY: ICD-10-CM

## 2019-06-14 DIAGNOSIS — I10 ESSENTIAL HYPERTENSION: ICD-10-CM

## 2019-06-14 DIAGNOSIS — M81.0 AGE-RELATED OSTEOPOROSIS WITHOUT CURRENT PATHOLOGICAL FRACTURE: ICD-10-CM

## 2019-06-14 DIAGNOSIS — E11.9 TYPE 2 DIABETES MELLITUS WITHOUT COMPLICATION, WITHOUT LONG-TERM CURRENT USE OF INSULIN (HCC): Primary | ICD-10-CM

## 2019-06-14 DIAGNOSIS — K21.00 GASTROESOPHAGEAL REFLUX DISEASE WITH ESOPHAGITIS: ICD-10-CM

## 2019-06-14 DIAGNOSIS — E78.5 HYPERLIPIDEMIA, UNSPECIFIED HYPERLIPIDEMIA TYPE: ICD-10-CM

## 2019-06-14 PROCEDURE — 99214 OFFICE O/P EST MOD 30 MIN: CPT | Performed by: INTERNAL MEDICINE

## 2019-06-14 RX ORDER — PROMETHAZINE HYDROCHLORIDE 25 MG/1
25 TABLET ORAL EVERY 6 HOURS PRN
COMMUNITY
Start: 2019-06-14 | End: 2020-04-13 | Stop reason: HOSPADM

## 2019-06-14 RX ORDER — OMEPRAZOLE 40 MG/1
40 CAPSULE, DELAYED RELEASE ORAL DAILY
COMMUNITY
Start: 2019-03-05 | End: 2019-06-14 | Stop reason: ALTCHOICE

## 2019-06-14 RX ORDER — PANTOPRAZOLE SODIUM 40 MG/1
40 TABLET, DELAYED RELEASE ORAL DAILY
Qty: 90 TABLET | Refills: 1 | Status: SHIPPED | OUTPATIENT
Start: 2019-06-14 | End: 2021-06-03

## 2019-06-14 RX ORDER — NEOMYCIN SULFATE, POLYMYXIN B SULFATE, AND DEXAMETHASONE 3.5; 10000; 1 MG/G; [USP'U]/G; MG/G
OINTMENT OPHTHALMIC
COMMUNITY
Start: 2019-06-13 | End: 2019-06-14 | Stop reason: ALTCHOICE

## 2019-06-14 RX ORDER — LOSARTAN POTASSIUM 50 MG/1
50 TABLET ORAL DAILY
COMMUNITY
End: 2019-10-18 | Stop reason: SDUPTHER

## 2019-06-14 RX ORDER — SUCRALFATE 1 G/1
1 TABLET ORAL 2 TIMES DAILY PRN
COMMUNITY
End: 2021-06-03

## 2019-06-15 LAB
BASOPHILS # BLD AUTO: 0.09 10*3/MM3 (ref 0–0.2)
BASOPHILS NFR BLD AUTO: 1.1 % (ref 0–1.5)
EOSINOPHIL # BLD AUTO: 0.3 10*3/MM3 (ref 0–0.4)
EOSINOPHIL NFR BLD AUTO: 3.5 % (ref 0.3–6.2)
ERYTHROCYTE [DISTWIDTH] IN BLOOD BY AUTOMATED COUNT: 12.7 % (ref 12.3–15.4)
HCT VFR BLD AUTO: 39.2 % (ref 34–46.6)
HGB BLD-MCNC: 12.1 G/DL (ref 12–15.9)
IMM GRANULOCYTES # BLD AUTO: 0.03 10*3/MM3 (ref 0–0.05)
IMM GRANULOCYTES NFR BLD AUTO: 0.4 % (ref 0–0.5)
LYMPHOCYTES # BLD AUTO: 3.2 10*3/MM3 (ref 0.7–3.1)
LYMPHOCYTES NFR BLD AUTO: 37.8 % (ref 19.6–45.3)
MCH RBC QN AUTO: 28.6 PG (ref 26.6–33)
MCHC RBC AUTO-ENTMCNC: 30.9 G/DL (ref 31.5–35.7)
MCV RBC AUTO: 92.7 FL (ref 79–97)
MONOCYTES # BLD AUTO: 0.79 10*3/MM3 (ref 0.1–0.9)
MONOCYTES NFR BLD AUTO: 9.3 % (ref 5–12)
NEUTROPHILS # BLD AUTO: 4.05 10*3/MM3 (ref 1.7–7)
NEUTROPHILS NFR BLD AUTO: 47.9 % (ref 42.7–76)
NRBC BLD AUTO-RTO: 0 /100 WBC (ref 0–0.2)
PLATELET # BLD AUTO: 280 10*3/MM3 (ref 140–450)
RBC # BLD AUTO: 4.23 10*6/MM3 (ref 3.77–5.28)
WBC # BLD AUTO: 8.46 10*3/MM3 (ref 3.4–10.8)

## 2019-08-07 RX ORDER — GLIMEPIRIDE 2 MG/1
2 TABLET ORAL EVERY MORNING
Qty: 90 TABLET | Refills: 1 | Status: SHIPPED | OUTPATIENT
Start: 2019-08-07 | End: 2020-03-26 | Stop reason: HOSPADM

## 2019-08-07 RX ORDER — ATORVASTATIN CALCIUM 80 MG/1
80 TABLET, FILM COATED ORAL NIGHTLY
Qty: 90 TABLET | Refills: 1 | Status: SHIPPED | OUTPATIENT
Start: 2019-08-07 | End: 2020-07-22 | Stop reason: SDUPTHER

## 2019-10-18 ENCOUNTER — OFFICE VISIT (OUTPATIENT)
Dept: ENDOCRINOLOGY | Age: 64
End: 2019-10-18

## 2019-10-18 VITALS
BODY MASS INDEX: 30.77 KG/M2 | SYSTOLIC BLOOD PRESSURE: 160 MMHG | HEART RATE: 86 BPM | WEIGHT: 180.2 LBS | OXYGEN SATURATION: 98 % | HEIGHT: 64 IN | DIASTOLIC BLOOD PRESSURE: 80 MMHG

## 2019-10-18 DIAGNOSIS — E11.9 TYPE 2 DIABETES MELLITUS WITHOUT COMPLICATION, WITHOUT LONG-TERM CURRENT USE OF INSULIN (HCC): Primary | ICD-10-CM

## 2019-10-18 DIAGNOSIS — E11.42 TYPE 2 DIABETES MELLITUS WITH PERIPHERAL NEUROPATHY (HCC): ICD-10-CM

## 2019-10-18 DIAGNOSIS — E55.9 VITAMIN D DEFICIENCY: ICD-10-CM

## 2019-10-18 DIAGNOSIS — K76.0 HEPATIC STEATOSIS: ICD-10-CM

## 2019-10-18 DIAGNOSIS — E78.5 HYPERLIPIDEMIA, UNSPECIFIED HYPERLIPIDEMIA TYPE: ICD-10-CM

## 2019-10-18 DIAGNOSIS — K21.00 GASTROESOPHAGEAL REFLUX DISEASE WITH ESOPHAGITIS: ICD-10-CM

## 2019-10-18 DIAGNOSIS — M81.0 AGE-RELATED OSTEOPOROSIS WITHOUT CURRENT PATHOLOGICAL FRACTURE: ICD-10-CM

## 2019-10-18 DIAGNOSIS — E04.1 THYROID NODULE: ICD-10-CM

## 2019-10-18 DIAGNOSIS — I10 ESSENTIAL HYPERTENSION: ICD-10-CM

## 2019-10-18 PROCEDURE — 99214 OFFICE O/P EST MOD 30 MIN: CPT | Performed by: INTERNAL MEDICINE

## 2019-10-18 RX ORDER — OXYCODONE HYDROCHLORIDE AND ACETAMINOPHEN 5; 325 MG/1; MG/1
TABLET ORAL
Refills: 0 | COMMUNITY
Start: 2019-07-11 | End: 2019-10-18

## 2019-10-18 RX ORDER — ERGOCALCIFEROL 1.25 MG/1
50000 CAPSULE ORAL WEEKLY
COMMUNITY
End: 2021-09-08

## 2019-10-18 RX ORDER — ALBUTEROL SULFATE 90 UG/1
1-2 AEROSOL, METERED RESPIRATORY (INHALATION)
COMMUNITY
Start: 2019-09-30 | End: 2019-10-30

## 2019-10-18 RX ORDER — LOSARTAN POTASSIUM 100 MG/1
100 TABLET ORAL DAILY
Qty: 90 TABLET | Refills: 2 | Status: SHIPPED | OUTPATIENT
Start: 2019-10-18 | End: 2021-06-09 | Stop reason: SDUPTHER

## 2019-10-18 NOTE — PROGRESS NOTES
Subjective   Marge Mcghee is a 64 y.o. female.     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 She has been on metformin 1000 mg BID and glimepiride 2 mg once a day.  Trulicity was discontinued in June 2019 because she was having nausea, vomiting and increased reflux symptoms.  She has gained 10 pounds since.  Victoza was discontinued because of formulary.  She has ran out of strips for 1 month.  She denies hypoglycemic episodes.  She has lost 2 pounds since January 2019.  Her last meal was 1 PM.       Her last eye examination was in 5/19. She has retinopathy and had previous retinal laser treatment.  She had vitrectomy in the right eye..  She is getting intraocular injections on the both eyes from Dr. Hinton.  She had left cataract surgery in December 2018.  She has burning sensation and tingling in her feet which has improved with Cymbalta. She has used Neurontin and Lyrica in the past which caused blurred vision. Creatinine clearance done in 2/15 was 95 mL per minute. 24-hour urine protein was low.      She has hyperlipidemia and has been taking Lipitor 80 mg per day regularly.  She denies myalgia.      She has hypertension and has been on Lasix 20 mg once a day and losartan 50 mg/day.      She has a right adrenal mass. She had a follow-up MRI in January 2012 which showed a right adrenal adenoma measuring 3.7 cm which is unchanged from July 2009. She has mild hepatic steatosis on MRI. Lab studies done in February 2015: Normal 24 urine VMA, metanephrines, and catecholamines. Plasma renin is 14.11 ng per mL per hour. Aldosterone was normal.       She had CT of the abdomen and pelvis done at [facility] in August 2018 which showed a 4 cm right adrenal mass with attenuation value  is consistent with an adenoma.  She has mild hepatic steatosis.  He follows with Dr. Ireland.     She denies headaches, diaphoresis, palpitations, weakness, easy bruising, and increased increased facial or body hair.      She is  6 para 1. She had total hysterectomy and bilateral salpingo-oophorectomy in her 40s. She was never on estrogen replacement therapy. She had a follow-up bone density in  which showed osteoporosis.  She is on Os-Addison 500+ D one tablet once a day.  She is on Prolia.  Osteoporosis is being managed by her PCP Traci Townsend NP     She was hospitalized at [facility] 2016 for erosive esophagitis.  She is on Protonix 40 mg/day and Carafate as needed.  She has stopped using Dexilant because of cost.  She is no longer nauseated or vomiting.  She has a history of anemia.  She denies melena or hematochezia.  She denies melena, hematemesis, or hematochezia.  She had colonoscopy and EGD done by Dr. Gray in  but does not know the results.  She had a hyperplastic polyp removed from her descending colon..  She was advised repeat colonoscopy in .  EGD was normal.     She has vitamin D deficiency and is on vit D from PCP.  25 hydroxy vitamin D done in 2019 was low at 19 ng/mL.  She has vitamin B12 deficiency and takes sublingual vitamin B12.  Vitamin B12 done in 2019 is normal at 806 pg/mL.    She had a thyroid ultrasound done at the [facility] in 2019 and was told that she has an 0.9 cm mixed cystic and solid nodule.    She has squamous cell carcinoma of the skin removed on the right side of the neck in .    The following portions of the patient's history were reviewed and updated as appropriate: allergies, current medications, past family history, past medical history, past social history, past surgical history and problem list.    Review of Systems   Constitutional: Positive for fatigue. Negative for appetite change and fever.   HENT: Negative.   "  Eyes: Negative.    Respiratory: Negative for chest tightness and shortness of breath.    Cardiovascular: Negative for chest pain, palpitations and leg swelling.   Gastrointestinal: Negative.    Endocrine: Negative for cold intolerance, heat intolerance, polydipsia, polyphagia and polyuria.   Genitourinary: Negative for dysuria, hematuria and urgency.   Musculoskeletal: Negative for back pain, myalgias and neck pain.   Neurological: Negative for dizziness, seizures, weakness and numbness.   Hematological: Does not bruise/bleed easily.     ROS reviewed again  Objective      Vitals:    10/18/19 1445 10/18/19 1603   BP: (!) 182/90 160/80   BP Location: Right arm    Patient Position: Sitting    Cuff Size: Large Adult    Pulse: 86    SpO2: 98%    Weight: 81.7 kg (180 lb 3.2 oz)    Height: 162.6 cm (64.02\")      Physical Exam   Constitutional: She is oriented to person, place, and time. She appears well-developed and well-nourished. No distress.   HENT:   Head: Normocephalic.   Right Ear: External ear normal.   Left Ear: External ear normal.   Nose: Nose normal.   Mouth/Throat: Oropharynx is clear and moist. No oropharyngeal exudate.   Eyes: Conjunctivae and EOM are normal. Right eye exhibits no discharge. Left eye exhibits no discharge. No scleral icterus.   Neck: Neck supple. No JVD present. No tracheal deviation present. No thyromegaly present.   Cardiovascular: Normal rate, regular rhythm, normal heart sounds and intact distal pulses. Exam reveals no gallop and no friction rub.   No murmur heard.  Pulmonary/Chest: Effort normal and breath sounds normal. No stridor. No respiratory distress. She has no wheezes. She has no rales. She exhibits no tenderness.   Abdominal: Soft. Bowel sounds are normal. She exhibits no distension and no mass. There is no tenderness. There is no guarding.   Musculoskeletal: Normal range of motion. She exhibits no edema, tenderness or deformity.   Lymphadenopathy:     She has no cervical " adenopathy.   Neurological: She is alert and oriented to person, place, and time. She displays normal reflexes.   Skin: Skin is warm and dry. No erythema. No pallor.   Psychiatric: She has a normal mood and affect. Her behavior is normal.     Office Visit on 06/14/2019   Component Date Value Ref Range Status   • WBC 06/14/2019 8.46  3.40 - 10.80 10*3/mm3 Final   • RBC 06/14/2019 4.23  3.77 - 5.28 10*6/mm3 Final   • Hemoglobin 06/14/2019 12.1  12.0 - 15.9 g/dL Final   • Hematocrit 06/14/2019 39.2  34.0 - 46.6 % Final   • MCV 06/14/2019 92.7  79.0 - 97.0 fL Final   • MCH 06/14/2019 28.6  26.6 - 33.0 pg Final   • MCHC 06/14/2019 30.9* 31.5 - 35.7 g/dL Final   • RDW 06/14/2019 12.7  12.3 - 15.4 % Final   • Platelets 06/14/2019 280  140 - 450 10*3/mm3 Final   • Neutrophil Rel % 06/14/2019 47.9  42.7 - 76.0 % Final   • Lymphocyte Rel % 06/14/2019 37.8  19.6 - 45.3 % Final   • Monocyte Rel % 06/14/2019 9.3  5.0 - 12.0 % Final   • Eosinophil Rel % 06/14/2019 3.5  0.3 - 6.2 % Final   • Basophil Rel % 06/14/2019 1.1  0.0 - 1.5 % Final   • Neutrophils Absolute 06/14/2019 4.05  1.70 - 7.00 10*3/mm3 Final   • Lymphocytes Absolute 06/14/2019 3.20* 0.70 - 3.10 10*3/mm3 Final   • Monocytes Absolute 06/14/2019 0.79  0.10 - 0.90 10*3/mm3 Final   • Eosinophils Absolute 06/14/2019 0.30  0.00 - 0.40 10*3/mm3 Final   • Basophils Absolute 06/14/2019 0.09  0.00 - 0.20 10*3/mm3 Final   • Immature Granulocyte Rel % 06/14/2019 0.4  0.0 - 0.5 % Final   • Immature Grans Absolute 06/14/2019 0.03  0.00 - 0.05 10*3/mm3 Final   • nRBC 06/14/2019 0.0  0.0 - 0.2 /100 WBC Final     Assessment/Plan   Marge was seen today for diabetes, hyperlipidemia and hypertension.    Diagnoses and all orders for this visit:    Type 2 diabetes mellitus without complication, without long-term current use of insulin (CMS/[facility])    Type 2 diabetes mellitus with peripheral neuropathy (CMS/[facility])    Essential hypertension    Hyperlipidemia, unspecified hyperlipidemia  type    Hepatic steatosis    Gastroesophageal reflux disease with esophagitis    Vitamin D deficiency    Age-related osteoporosis without current pathological fracture    Thyroid nodule      Continue metformin 1000 mg twice daily and glimepiride 2 mg/day.  Continue Lipitor 80 mg/day.  Continue no concentrated sweet low-fat diet.  Increase losartan to 100 mg/day.  Continue Lasix 20 mg/day.  BP checks at home and follow-up with Traci Townsend NP for blood pressure management  Repeat thyroid ultrasound after February 2019.  Check vitamin D levels  Continue Prolia per PCP.  Continue Protonix 40 mg/day and Carafate as needed.  Follow-up with Dr. Gray    Copy of my note sent to Traci Townsend NP and and Dr. Gray    RTC 4 mos

## 2019-10-19 LAB
25(OH)D3+25(OH)D2 SERPL-MCNC: 26.4 NG/ML (ref 30–100)
ALBUMIN SERPL-MCNC: 4.6 G/DL (ref 3.5–5.2)
ALBUMIN/GLOB SERPL: 1.6 G/DL
ALP SERPL-CCNC: 52 U/L (ref 39–117)
ALT SERPL-CCNC: 11 U/L (ref 1–33)
AST SERPL-CCNC: 20 U/L (ref 1–32)
BILIRUB SERPL-MCNC: 0.3 MG/DL (ref 0.2–1.2)
BUN SERPL-MCNC: 20 MG/DL (ref 8–23)
BUN/CREAT SERPL: 21.3 (ref 7–25)
CALCIUM SERPL-MCNC: 9.3 MG/DL (ref 8.6–10.5)
CHLORIDE SERPL-SCNC: 102 MMOL/L (ref 98–107)
CHOLEST SERPL-MCNC: 211 MG/DL (ref 0–200)
CO2 SERPL-SCNC: 27.9 MMOL/L (ref 22–29)
CREAT SERPL-MCNC: 0.94 MG/DL (ref 0.57–1)
GLOBULIN SER CALC-MCNC: 2.8 GM/DL
GLUCOSE SERPL-MCNC: 85 MG/DL (ref 65–99)
HBA1C MFR BLD: 10 % (ref 4.8–5.6)
HDLC SERPL-MCNC: 70 MG/DL (ref 40–60)
INTERPRETATION: NORMAL
LDLC SERPL CALC-MCNC: 109 MG/DL (ref 0–100)
Lab: NORMAL
POTASSIUM SERPL-SCNC: 4 MMOL/L (ref 3.5–5.2)
PROT SERPL-MCNC: 7.4 G/DL (ref 6–8.5)
SODIUM SERPL-SCNC: 141 MMOL/L (ref 136–145)
T4 FREE SERPL-MCNC: 1 NG/DL (ref 0.93–1.7)
TRIGL SERPL-MCNC: 158 MG/DL (ref 0–150)
TSH SERPL DL<=0.005 MIU/L-ACNC: 1.1 UIU/ML (ref 0.27–4.2)
VLDLC SERPL CALC-MCNC: 31.6 MG/DL

## 2019-10-22 RX ORDER — CHOLECALCIFEROL (VITAMIN D3) 25 MCG
1000 CAPSULE ORAL DAILY
Qty: 60 CAPSULE
Start: 2019-10-22 | End: 2021-09-08

## 2020-03-13 ENCOUNTER — OFFICE VISIT (OUTPATIENT)
Dept: ENDOCRINOLOGY | Age: 65
End: 2020-03-13

## 2020-03-13 VITALS
SYSTOLIC BLOOD PRESSURE: 170 MMHG | WEIGHT: 179.6 LBS | BODY MASS INDEX: 30.66 KG/M2 | DIASTOLIC BLOOD PRESSURE: 94 MMHG | HEIGHT: 64 IN | HEART RATE: 82 BPM | OXYGEN SATURATION: 97 %

## 2020-03-13 DIAGNOSIS — R20.0 NUMBNESS AND TINGLING OF RIGHT FACE: ICD-10-CM

## 2020-03-13 DIAGNOSIS — E11.42 TYPE 2 DIABETES MELLITUS WITH PERIPHERAL NEUROPATHY (HCC): ICD-10-CM

## 2020-03-13 DIAGNOSIS — M81.0 AGE-RELATED OSTEOPOROSIS WITHOUT CURRENT PATHOLOGICAL FRACTURE: ICD-10-CM

## 2020-03-13 DIAGNOSIS — R20.2 NUMBNESS AND TINGLING OF RIGHT FACE: ICD-10-CM

## 2020-03-13 DIAGNOSIS — K21.00 GASTROESOPHAGEAL REFLUX DISEASE WITH ESOPHAGITIS: ICD-10-CM

## 2020-03-13 DIAGNOSIS — E04.1 THYROID NODULE: ICD-10-CM

## 2020-03-13 DIAGNOSIS — I10 ESSENTIAL HYPERTENSION: ICD-10-CM

## 2020-03-13 DIAGNOSIS — E55.9 VITAMIN D DEFICIENCY: ICD-10-CM

## 2020-03-13 DIAGNOSIS — E11.9 TYPE 2 DIABETES MELLITUS WITHOUT COMPLICATION, WITHOUT LONG-TERM CURRENT USE OF INSULIN (HCC): Primary | ICD-10-CM

## 2020-03-13 DIAGNOSIS — K76.0 HEPATIC STEATOSIS: ICD-10-CM

## 2020-03-13 DIAGNOSIS — E78.5 HYPERLIPIDEMIA, UNSPECIFIED HYPERLIPIDEMIA TYPE: ICD-10-CM

## 2020-03-13 PROCEDURE — 99214 OFFICE O/P EST MOD 30 MIN: CPT | Performed by: INTERNAL MEDICINE

## 2020-03-13 RX ORDER — AZELASTINE 1 MG/ML
1-2 SPRAY, METERED NASAL
COMMUNITY
Start: 2019-10-22 | End: 2020-03-13

## 2020-03-13 RX ORDER — ALBUTEROL SULFATE 90 UG/1
1-2 AEROSOL, METERED RESPIRATORY (INHALATION)
COMMUNITY
Start: 2019-09-30 | End: 2020-03-13

## 2020-03-13 RX ORDER — OMEPRAZOLE 40 MG/1
40 CAPSULE, DELAYED RELEASE ORAL DAILY
COMMUNITY
Start: 2020-02-10 | End: 2020-03-26 | Stop reason: HOSPADM

## 2020-03-13 RX ORDER — LANCETS 33 GAUGE
EACH MISCELLANEOUS
Qty: 100 EACH | Refills: 1 | Status: ON HOLD | OUTPATIENT
Start: 2020-03-13 | End: 2020-03-23

## 2020-03-13 NOTE — PROGRESS NOTES
Subjective   Marge Mcghee is a 64 y.o. female.     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 She has been on metformin 1000 mg BID, Jardiance 10 mg/day and glimepiride 4 mg once a day.  Trulicity was discontinued in June 2019 because she was having nausea, vomiting and increased reflux symptoms.  She has gained 10 pounds since.  Victoza was discontinued because of formulary.  She checks her blood sugars once a day.  -140.  She denies hypoglycemic episodes.  She has no significant weight change since October 2019.  Her last meal was at 6:30 AM.      Her last eye examination was in 8/19. She has retinopathy and had previous retinal laser treatment.  She had vitrectomy in the right eye..  She is getting intraocular injections on the both eyes from Dr. Hinton.  She had left cataract surgery in December 2018.  She has burning sensation and tingling in her feet which has improved with Cymbalta. She has used Neurontin and Lyrica in the past which caused blurred vision. Creatinine clearance done in 2/15 was 95 mL per minute. 24-hour urine protein was low.      She has hyperlipidemia and has been taking Lipitor 80 mg per day regularly.  She denies myalgia.      She has hypertension and has been on Lasix 20 mg once a day and losartan 100 mg/day.  She did not take losartan last night.   She has a right adrenal mass. She had a follow-up MRI in January 2012 which showed a right adrenal adenoma measuring 3.7 cm which is unchanged from July 2009. She has mild hepatic steatosis on MRI. Lab studies done in February 2015: Normal 24 urine VMA, metanephrines, and catecholamines. Plasma renin is 14.11 ng per mL per hour. Aldosterone was normal.       She had CT of the abdomen and pelvis done at [facility] in 2018 which showed a 4 cm right adrenal mass with attenuation value is consistent with an adenoma.  She has mild hepatic steatosis.  He follows with Dr. Ireland.     She denies headaches, diaphoresis, palpitations, weakness, easy bruising, and increased increased facial or body hair.      She is  6 para 1. She had total hysterectomy and bilateral salpingo-oophorectomy in her 40s. She was never on estrogen replacement therapy. She had a follow-up bone density in  which showed osteoporosis.  She is on Os-Addison 500+ D one tablet once a day and vit D weekly.  She is on Prolia and her last dose was in .  Osteoporosis is being managed by her PCP Traci Townsend NP     She was hospitalized at [place] [address] for erosive esophagitis.  She is on Protonix 40 mg/day and Carafate as needed.  She has stopped using Dexilant because of cost.  She is no longer nauseated or vomiting.  She denies melena or hematochezia or heartburn.  She had colonoscopy and EGD done by Dr. Gray in  but does not know the results.  She had a hyperplastic polyp removed from her descending colon..  She was advised repeat colonoscopy in .  EGD was normal.     She has vitamin B12 deficiency and takes sublingual vitamin B12.  Vitamin B12 done in 2019 is normal at 806 pg/mL.     She had a thyroid ultrasound done at the [facility] in 2019 and was told that she has an 0.9 cm mixed cystic and solid nodule.     She has squamous cell carcinoma of the skin removed on the right side of the neck in .    She had an episode of tongue and right sided facial numbness 5 days ago which lasted 30 minutes and resolve spontaneously.  She had sweats and vomited once during that episode.         The following portions of the patient's history were reviewed and updated as appropriate: allergies, current medications, past family history, past medical history, past social history, past surgical history  "and problem list.    Review of Systems   Constitutional: Negative.    HENT: Negative.    Eyes: Negative.    Respiratory: Negative.    Cardiovascular: Negative.    Gastrointestinal: Negative.    Endocrine: Negative.    Genitourinary: Negative.    Musculoskeletal: Negative.    Neurological: Negative for dizziness and numbness.        Intact light touch on face and lower extremities   Hematological: Negative for adenopathy. Does not bruise/bleed easily.     Objective      Vitals:    03/13/20 1117   BP: 170/94   BP Location: Right arm   Patient Position: Sitting   Cuff Size: Large Adult   Pulse: 82   SpO2: 97%   Weight: 81.5 kg (179 lb 9.6 oz)   Height: 162.6 cm (64.02\")     Physical Exam   Constitutional: She is oriented to person, place, and time. She appears well-developed and well-nourished.   HENT:   Head: Normocephalic.   Right Ear: External ear normal.   Left Ear: External ear normal.   Mouth/Throat: Oropharynx is clear and moist. No oropharyngeal exudate.   Eyes: Conjunctivae and EOM are normal. Right eye exhibits no discharge. Left eye exhibits no discharge. No scleral icterus.   Neck: Neck supple. No JVD present. No tracheal deviation present. No thyromegaly present.   Cardiovascular: Normal rate, regular rhythm, normal heart sounds and intact distal pulses. Exam reveals no gallop and no friction rub.   No murmur heard.  Pulmonary/Chest: Effort normal and breath sounds normal. No stridor. No respiratory distress. She has no wheezes. She has no rales.   Abdominal: Soft. Bowel sounds are normal. She exhibits no distension. There is no tenderness. There is no guarding.   Musculoskeletal: Normal range of motion.   No plantar ulcers.   Lymphadenopathy:     She has no cervical adenopathy.   Neurological: She is alert and oriented to person, place, and time.   Intact light touch in face and lower extremities     Office Visit on 10/18/2019   Component Date Value Ref Range Status   • Glucose 10/18/2019 85  65 - 99 " mg/dL Final   • BUN 10/18/2019 20  8 - 23 mg/dL Final   • Creatinine 10/18/2019 0.94  0.57 - 1.00 mg/dL Final   • eGFR Non African Am 10/18/2019 60* >60 mL/min/1.73 Final   • eGFR African Am 10/18/2019 72  >60 mL/min/1.73 Final   • BUN/Creatinine Ratio 10/18/2019 21.3  7.0 - 25.0 Final   • Sodium 10/18/2019 141  136 - 145 mmol/L Final   • Potassium 10/18/2019 4.0  3.5 - 5.2 mmol/L Final   • Chloride 10/18/2019 102  98 - 107 mmol/L Final   • Total CO2 10/18/2019 27.9  22.0 - 29.0 mmol/L Final   • Calcium 10/18/2019 9.3  8.6 - 10.5 mg/dL Final   • Total Protein 10/18/2019 7.4  6.0 - 8.5 g/dL Final   • Albumin 10/18/2019 4.60  3.50 - 5.20 g/dL Final   • Globulin 10/18/2019 2.8  gm/dL Final   • A/G Ratio 10/18/2019 1.6  g/dL Final   • Total Bilirubin 10/18/2019 0.3  0.2 - 1.2 mg/dL Final   • Alkaline Phosphatase 10/18/2019 52  39 - 117 U/L Final   • AST (SGOT) 10/18/2019 20  1 - 32 U/L Final   • ALT (SGPT) 10/18/2019 11  1 - 33 U/L Final   • Total Cholesterol 10/18/2019 211* 0 - 200 mg/dL Final   • Triglycerides 10/18/2019 158* 0 - 150 mg/dL Final   • HDL Cholesterol 10/18/2019 70* 40 - 60 mg/dL Final   • VLDL Cholesterol 10/18/2019 31.6  mg/dL Final   • LDL Cholesterol  10/18/2019 109* 0 - 100 mg/dL Final   • Hemoglobin A1C 10/18/2019 10.00* 4.80 - 5.60 % Final    Comment: Hemoglobin A1C Ranges:  Increased Risk for Diabetes  5.7% to 6.4%  Diabetes                     >= 6.5%  Diabetic Goal                < 7.0%     • TSH 10/18/2019 1.100  0.270 - 4.200 uIU/mL Final   • Free T4 10/18/2019 1.00  0.93 - 1.70 ng/dL Final   • 25 Hydroxy, Vitamin D 10/18/2019 26.4* 30.0 - 100.0 ng/ml Final    Comment: Reference Range for Total Vitamin D 25(OH)  Deficiency <20.0 ng/mL  Insufficiency 21-29 ng/mL  Sufficiency  ng/mL  Toxicity >100 ng/ml     • Interpretation 10/18/2019 Note   Final    Supplemental report is available.   • PDF Image 10/18/2019 Not applicable   Final     Assessment/Plan   Marge was seen today for diabetes,  hyperlipidemia and hypertension.    Diagnoses and all orders for this visit:    Type 2 diabetes mellitus without complication, without long-term current use of insulin (CMS/[facility])  -     Comprehensive Metabolic Panel  -     Lipid Panel  -     Hemoglobin A1c  -     Urinalysis With Microscopic If Indicated (No Culture) - Urine, Clean Catch  -     Microalbumin / Creatinine Urine Ratio - Urine, Clean Catch    Type 2 diabetes mellitus with peripheral neuropathy (CMS/HCC)  -     Comprehensive Metabolic Panel  -     Lipid Panel  -     Hemoglobin A1c  -     Urinalysis With Microscopic If Indicated (No Culture) - Urine, Clean Catch  -     Microalbumin / Creatinine Urine Ratio - Urine, Clean Catch    Essential hypertension  -     Comprehensive Metabolic Panel    Hyperlipidemia, unspecified hyperlipidemia type  -     Comprehensive Metabolic Panel  -     Lipid Panel    Hepatic steatosis  -     Comprehensive Metabolic Panel    Gastroesophageal reflux disease with esophagitis    Vitamin D deficiency  -     Comprehensive Metabolic Panel    Thyroid nodule  -     Comprehensive Metabolic Panel  -     TSH  -     US Thyroid    Age-related osteoporosis without current pathological fracture  -     Comprehensive Metabolic Panel    Numbness and tingling of right face  -     Duplex Carotid Ultrasound CAR    Other orders  -     glucose blood (ONE TOUCH ULTRA TEST) test strip; Dx code E11.9 testing bs 1 x day  -     ONETOUCH DELICA LANCETS 33G misc; Testing bs 1 x day      Continue metformin, glimepiride, and Jardiance.  Continue Lipitor 80 mg/day.  Take Lasix and losartan regularly.  Schedule carotid ultrasound and thyroid ultrasound.  Continue calcium and vitamin D and Prolia per PCP.  Continue Protonix and Carafate.  Continue vitamin B12.    Copy of my note sent to Barbara Townsend NP    RTC 4 mos.

## 2020-03-14 LAB
25(OH)D3+25(OH)D2 SERPL-MCNC: 17.4 NG/ML (ref 30–100)
ALBUMIN SERPL-MCNC: 4.3 G/DL (ref 3.5–5.2)
ALBUMIN/CREAT UR: 65 MG/G CREAT (ref 0–29)
ALBUMIN/GLOB SERPL: 1.5 G/DL
ALP SERPL-CCNC: 57 U/L (ref 39–117)
ALT SERPL-CCNC: 16 U/L (ref 1–33)
APPEARANCE UR: CLEAR
AST SERPL-CCNC: 16 U/L (ref 1–32)
BILIRUB SERPL-MCNC: 0.4 MG/DL (ref 0.2–1.2)
BILIRUB UR QL STRIP: NEGATIVE
BUN SERPL-MCNC: 18 MG/DL (ref 8–23)
BUN/CREAT SERPL: 19.6 (ref 7–25)
CALCIUM SERPL-MCNC: 9.3 MG/DL (ref 8.6–10.5)
CHLORIDE SERPL-SCNC: 95 MMOL/L (ref 98–107)
CHOLEST SERPL-MCNC: 285 MG/DL (ref 0–200)
CO2 SERPL-SCNC: 26.1 MMOL/L (ref 22–29)
COLOR UR: YELLOW
CREAT SERPL-MCNC: 0.92 MG/DL (ref 0.57–1)
CREAT UR-MCNC: 34.9 MG/DL
GLOBULIN SER CALC-MCNC: 2.9 GM/DL
GLUCOSE SERPL-MCNC: 271 MG/DL (ref 65–99)
GLUCOSE UR QL: ABNORMAL
HBA1C MFR BLD: 12.4 % (ref 4.8–5.6)
HDLC SERPL-MCNC: 57 MG/DL (ref 40–60)
HGB UR QL STRIP: NEGATIVE
INTERPRETATION: NORMAL
KETONES UR QL STRIP: NEGATIVE
LDLC SERPL CALC-MCNC: 173 MG/DL (ref 0–100)
LEUKOCYTE ESTERASE UR QL STRIP: NEGATIVE
Lab: NORMAL
MICROALBUMIN UR-MCNC: 22.8 UG/ML
NITRITE UR QL STRIP: NEGATIVE
PH UR STRIP: 5.5 [PH] (ref 5–8)
POTASSIUM SERPL-SCNC: 4 MMOL/L (ref 3.5–5.2)
PROT SERPL-MCNC: 7.2 G/DL (ref 6–8.5)
PROT UR QL STRIP: NEGATIVE
SODIUM SERPL-SCNC: 133 MMOL/L (ref 136–145)
SP GR UR: ABNORMAL (ref 1–1.03)
TRIGL SERPL-MCNC: 274 MG/DL (ref 0–150)
TSH SERPL DL<=0.005 MIU/L-ACNC: 1.46 UIU/ML (ref 0.27–4.2)
UROBILINOGEN UR STRIP-MCNC: ABNORMAL MG/DL
VIT B12 SERPL-MCNC: 1044 PG/ML (ref 211–946)
VLDLC SERPL CALC-MCNC: 54.8 MG/DL

## 2020-03-20 ENCOUNTER — HOSPITAL ENCOUNTER (OUTPATIENT)
Dept: ULTRASOUND IMAGING | Facility: HOSPITAL | Age: 65
Discharge: HOME | End: 2020-03-20
Admitting: INTERNAL MEDICINE

## 2020-03-20 ENCOUNTER — HOSPITAL ENCOUNTER (OUTPATIENT)
Dept: CARDIOLOGY | Facility: HOSPITAL | Age: 65
Discharge: HOME | End: 2020-03-20

## 2020-03-20 DIAGNOSIS — E04.2 MULTIPLE THYROID NODULES: Primary | ICD-10-CM

## 2020-03-20 LAB
BH CV XLRA MEAS LEFT DIST CCA EDV: -16.8 CM/SEC
BH CV XLRA MEAS LEFT DIST CCA PSV: -65.9 CM/SEC
BH CV XLRA MEAS LEFT DIST ICA EDV: -15 CM/SEC
BH CV XLRA MEAS LEFT DIST ICA PSV: -47.4 CM/SEC
BH CV XLRA MEAS LEFT ICA/CCA RATIO: 0.7
BH CV XLRA MEAS LEFT PROX CCA EDV: 14 CM/SEC
BH CV XLRA MEAS LEFT PROX CCA PSV: 71.5 CM/SEC
BH CV XLRA MEAS LEFT PROX ECA PSV: -99.5 CM/SEC
BH CV XLRA MEAS LEFT PROX ICA EDV: -17.6 CM/SEC
BH CV XLRA MEAS LEFT PROX ICA PSV: -39.5 CM/SEC
BH CV XLRA MEAS LEFT PROX SCLA PSV: -109 CM/SEC
BH CV XLRA MEAS LEFT VERTEBRAL A PSV: -49.9 CM/SEC
BH CV XLRA MEAS RIGHT DIST CCA EDV: -16.8 CM/SEC
BH CV XLRA MEAS RIGHT DIST CCA PSV: -62.7 CM/SEC
BH CV XLRA MEAS RIGHT DIST ICA EDV: -27.3 CM/SEC
BH CV XLRA MEAS RIGHT DIST ICA PSV: -88.2 CM/SEC
BH CV XLRA MEAS RIGHT ICA/CCA RATIO: 1.4
BH CV XLRA MEAS RIGHT PROX CCA EDV: -13.7 CM/SEC
BH CV XLRA MEAS RIGHT PROX CCA PSV: -65.2 CM/SEC
BH CV XLRA MEAS RIGHT PROX ECA PSV: -103 CM/SEC
BH CV XLRA MEAS RIGHT PROX ICA EDV: -19.9 CM/SEC
BH CV XLRA MEAS RIGHT PROX ICA PSV: -66.5 CM/SEC
BH CV XLRA MEAS RIGHT PROX SCLA PSV: -120 CM/SEC
BH CV XLRA MEAS RIGHT VERTEBRAL A PSV: -50.9 CM/SEC

## 2020-03-20 PROCEDURE — 93880 EXTRACRANIAL BILAT STUDY: CPT

## 2020-03-20 PROCEDURE — 76536 US EXAM OF HEAD AND NECK: CPT

## 2020-03-23 ENCOUNTER — APPOINTMENT (OUTPATIENT)
Dept: CT IMAGING | Facility: HOSPITAL | Age: 65
End: 2020-03-23

## 2020-03-23 ENCOUNTER — HOSPITAL ENCOUNTER (INPATIENT)
Facility: HOSPITAL | Age: 65
LOS: 1 days | Discharge: HOME | End: 2020-03-26
Attending: INTERNAL MEDICINE | Admitting: INTERNAL MEDICINE

## 2020-03-23 DIAGNOSIS — R10.9 ABDOMINAL PAIN, UNSPECIFIED ABDOMINAL LOCATION: ICD-10-CM

## 2020-03-23 DIAGNOSIS — R73.9 HYPERGLYCEMIA: ICD-10-CM

## 2020-03-23 DIAGNOSIS — N39.0 URINARY TRACT INFECTION WITHOUT HEMATURIA, SITE UNSPECIFIED: Primary | ICD-10-CM

## 2020-03-23 DIAGNOSIS — N17.9 AKI (ACUTE KIDNEY INJURY) (HCC): ICD-10-CM

## 2020-03-23 PROBLEM — E04.1 THYROID NODULE: Chronic | Status: ACTIVE | Noted: 2019-10-18

## 2020-03-23 PROBLEM — E86.0 DEHYDRATION: Status: ACTIVE | Noted: 2020-03-23

## 2020-03-23 PROBLEM — E83.42 HYPOMAGNESEMIA: Status: ACTIVE | Noted: 2020-03-23

## 2020-03-23 PROBLEM — R11.10 VOMITING: Status: ACTIVE | Noted: 2020-03-23

## 2020-03-23 PROBLEM — E66.811 CLASS 1 OBESITY IN ADULT: Chronic | Status: ACTIVE | Noted: 2020-03-23

## 2020-03-23 PROBLEM — E66.9 CLASS 1 OBESITY IN ADULT: Chronic | Status: ACTIVE | Noted: 2020-03-23

## 2020-03-23 PROBLEM — E55.9 VITAMIN D DEFICIENCY: Chronic | Status: ACTIVE | Noted: 2019-01-25

## 2020-03-23 PROBLEM — E04.1 THYROID NODULE: Status: RESOLVED | Noted: 2019-10-18 | Resolved: 2020-03-23

## 2020-03-23 LAB
ACETONE BLD QL: NEGATIVE
ALBUMIN SERPL-MCNC: 3.8 G/DL (ref 3.5–5.2)
ALBUMIN/GLOB SERPL: 1.3 G/DL
ALP SERPL-CCNC: 55 U/L (ref 39–117)
ALT SERPL W P-5'-P-CCNC: 12 U/L (ref 1–33)
ANION GAP SERPL CALCULATED.3IONS-SCNC: 14 MMOL/L (ref 5–15)
AST SERPL-CCNC: 16 U/L (ref 1–32)
BACTERIA UR QL AUTO: ABNORMAL /HPF
BACTERIA UR QL AUTO: ABNORMAL /HPF
BASOPHILS # BLD AUTO: 0 10*3/MM3 (ref 0–0.2)
BASOPHILS NFR BLD AUTO: 0.8 % (ref 0–1.5)
BILIRUB SERPL-MCNC: 0.3 MG/DL (ref 0.2–1.2)
BILIRUB UR QL STRIP: NEGATIVE
BILIRUB UR QL STRIP: NEGATIVE
BUN BLD-MCNC: 28 MG/DL (ref 8–23)
BUN/CREAT SERPL: 16.4 (ref 7–25)
CALCIUM SPEC-SCNC: 8.4 MG/DL (ref 8.6–10.5)
CHLORIDE SERPL-SCNC: 98 MMOL/L (ref 98–107)
CLARITY UR: ABNORMAL
CLARITY UR: CLEAR
CO2 SERPL-SCNC: 22 MMOL/L (ref 22–29)
COLOR UR: YELLOW
COLOR UR: YELLOW
CREAT BLD-MCNC: 1.71 MG/DL (ref 0.57–1)
DEPRECATED RDW RBC AUTO: 44.2 FL (ref 37–54)
EOSINOPHIL # BLD AUTO: 0.2 10*3/MM3 (ref 0–0.4)
EOSINOPHIL NFR BLD AUTO: 3.2 % (ref 0.3–6.2)
ERYTHROCYTE [DISTWIDTH] IN BLOOD BY AUTOMATED COUNT: 14.1 % (ref 12.3–15.4)
GFR SERPL CREATININE-BSD FRML MDRD: 30 ML/MIN/1.73
GLOBULIN UR ELPH-MCNC: 2.9 GM/DL
GLUCOSE BLD-MCNC: 514 MG/DL (ref 65–99)
GLUCOSE BLDC GLUCOMTR-MCNC: 124 MG/DL (ref 70–105)
GLUCOSE BLDC GLUCOMTR-MCNC: 220 MG/DL (ref 70–105)
GLUCOSE BLDC GLUCOMTR-MCNC: 300 MG/DL (ref 70–105)
GLUCOSE BLDC GLUCOMTR-MCNC: 304 MG/DL (ref 70–105)
GLUCOSE BLDC GLUCOMTR-MCNC: 98 MG/DL (ref 70–105)
GLUCOSE UR STRIP-MCNC: ABNORMAL MG/DL
GLUCOSE UR STRIP-MCNC: ABNORMAL MG/DL
HBA1C MFR BLD: 12.1 % (ref 3.5–5.6)
HCT VFR BLD AUTO: 36.8 % (ref 34–46.6)
HGB BLD-MCNC: 12.1 G/DL (ref 12–15.9)
HGB UR QL STRIP.AUTO: ABNORMAL
HGB UR QL STRIP.AUTO: NEGATIVE
HOLD SPECIMEN: NORMAL
HYALINE CASTS UR QL AUTO: ABNORMAL /LPF
HYALINE CASTS UR QL AUTO: ABNORMAL /LPF
KETONES UR QL STRIP: NEGATIVE
KETONES UR QL STRIP: NEGATIVE
LEUKOCYTE ESTERASE UR QL STRIP.AUTO: ABNORMAL
LEUKOCYTE ESTERASE UR QL STRIP.AUTO: NEGATIVE
LIPASE SERPL-CCNC: 56 U/L (ref 13–60)
LYMPHOCYTES # BLD AUTO: 1.5 10*3/MM3 (ref 0.7–3.1)
LYMPHOCYTES NFR BLD AUTO: 28 % (ref 19.6–45.3)
MAGNESIUM SERPL-MCNC: 1.5 MG/DL (ref 1.6–2.4)
MCH RBC QN AUTO: 29.1 PG (ref 26.6–33)
MCHC RBC AUTO-ENTMCNC: 33 G/DL (ref 31.5–35.7)
MCV RBC AUTO: 88.1 FL (ref 79–97)
MONOCYTES # BLD AUTO: 0.8 10*3/MM3 (ref 0.1–0.9)
MONOCYTES NFR BLD AUTO: 14 % (ref 5–12)
NEUTROPHILS # BLD AUTO: 3 10*3/MM3 (ref 1.7–7)
NEUTROPHILS NFR BLD AUTO: 54 % (ref 42.7–76)
NITRITE UR QL STRIP: NEGATIVE
NITRITE UR QL STRIP: NEGATIVE
NRBC BLD AUTO-RTO: 0 /100 WBC (ref 0–0.2)
OSMOLALITY SERPL: 306 MOSM/KG (ref 280–300)
PH UR STRIP.AUTO: <=5 [PH] (ref 5–8)
PH UR STRIP.AUTO: <=5 [PH] (ref 5–8)
PHOSPHATE SERPL-MCNC: 2.9 MG/DL (ref 2.5–4.5)
PLATELET # BLD AUTO: 207 10*3/MM3 (ref 140–450)
PMV BLD AUTO: 7.1 FL (ref 6–12)
POTASSIUM BLD-SCNC: 4 MMOL/L (ref 3.5–5.2)
PROT SERPL-MCNC: 6.7 G/DL (ref 6–8.5)
PROT UR QL STRIP: NEGATIVE
PROT UR QL STRIP: NEGATIVE
RBC # BLD AUTO: 4.18 10*6/MM3 (ref 3.77–5.28)
RBC # UR: ABNORMAL /HPF
RBC # UR: ABNORMAL /HPF
REF LAB TEST METHOD: ABNORMAL
REF LAB TEST METHOD: ABNORMAL
SODIUM BLD-SCNC: 134 MMOL/L (ref 136–145)
SP GR UR STRIP: 1.01 (ref 1–1.03)
SP GR UR STRIP: 1.02 (ref 1–1.03)
SQUAMOUS #/AREA URNS HPF: ABNORMAL /HPF
SQUAMOUS #/AREA URNS HPF: ABNORMAL /HPF
UROBILINOGEN UR QL STRIP: ABNORMAL
UROBILINOGEN UR QL STRIP: ABNORMAL
WBC NRBC COR # BLD: 5.5 10*3/MM3 (ref 3.4–10.8)
WBC UR QL AUTO: ABNORMAL /HPF
WBC UR QL AUTO: ABNORMAL /HPF
WHOLE BLOOD HOLD SPECIMEN: NORMAL

## 2020-03-23 PROCEDURE — 74176 CT ABD & PELVIS W/O CONTRAST: CPT

## 2020-03-23 PROCEDURE — 83735 ASSAY OF MAGNESIUM: CPT | Performed by: PHYSICIAN ASSISTANT

## 2020-03-23 PROCEDURE — 82962 GLUCOSE BLOOD TEST: CPT

## 2020-03-23 PROCEDURE — 83930 ASSAY OF BLOOD OSMOLALITY: CPT | Performed by: PHYSICIAN ASSISTANT

## 2020-03-23 PROCEDURE — G0378 HOSPITAL OBSERVATION PER HR: HCPCS

## 2020-03-23 PROCEDURE — 25010000002 CEFTRIAXONE PER 250 MG: Performed by: EMERGENCY MEDICINE

## 2020-03-23 PROCEDURE — 25010000002 ENOXAPARIN PER 10 MG: Performed by: STUDENT IN AN ORGANIZED HEALTH CARE EDUCATION/TRAINING PROGRAM

## 2020-03-23 PROCEDURE — 99220 PR INITIAL OBSERVATION CARE/DAY 70 MINUTES: CPT | Performed by: INTERNAL MEDICINE

## 2020-03-23 PROCEDURE — 84100 ASSAY OF PHOSPHORUS: CPT | Performed by: PHYSICIAN ASSISTANT

## 2020-03-23 PROCEDURE — 87086 URINE CULTURE/COLONY COUNT: CPT | Performed by: INTERNAL MEDICINE

## 2020-03-23 PROCEDURE — 81001 URINALYSIS AUTO W/SCOPE: CPT | Performed by: PHYSICIAN ASSISTANT

## 2020-03-23 PROCEDURE — 81001 URINALYSIS AUTO W/SCOPE: CPT | Performed by: STUDENT IN AN ORGANIZED HEALTH CARE EDUCATION/TRAINING PROGRAM

## 2020-03-23 PROCEDURE — 83036 HEMOGLOBIN GLYCOSYLATED A1C: CPT | Performed by: STUDENT IN AN ORGANIZED HEALTH CARE EDUCATION/TRAINING PROGRAM

## 2020-03-23 PROCEDURE — 99284 EMERGENCY DEPT VISIT MOD MDM: CPT

## 2020-03-23 PROCEDURE — 83690 ASSAY OF LIPASE: CPT | Performed by: PHYSICIAN ASSISTANT

## 2020-03-23 PROCEDURE — 82009 KETONE BODYS QUAL: CPT | Performed by: PHYSICIAN ASSISTANT

## 2020-03-23 PROCEDURE — 25010000002 ONDANSETRON PER 1 MG: Performed by: PHYSICIAN ASSISTANT

## 2020-03-23 PROCEDURE — 63710000001 INSULIN LISPRO (HUMAN) PER 5 UNITS: Performed by: STUDENT IN AN ORGANIZED HEALTH CARE EDUCATION/TRAINING PROGRAM

## 2020-03-23 PROCEDURE — 85025 COMPLETE CBC W/AUTO DIFF WBC: CPT | Performed by: PHYSICIAN ASSISTANT

## 2020-03-23 PROCEDURE — 25010000002 MORPHINE PER 10 MG: Performed by: EMERGENCY MEDICINE

## 2020-03-23 PROCEDURE — 63710000001 INSULIN REGULAR HUMAN PER 5 UNITS: Performed by: PHYSICIAN ASSISTANT

## 2020-03-23 PROCEDURE — 63710000001 PROMETHAZINE PER 25 MG: Performed by: STUDENT IN AN ORGANIZED HEALTH CARE EDUCATION/TRAINING PROGRAM

## 2020-03-23 PROCEDURE — 80053 COMPREHEN METABOLIC PANEL: CPT | Performed by: PHYSICIAN ASSISTANT

## 2020-03-23 PROCEDURE — 25010000002 ONDANSETRON PER 1 MG: Performed by: EMERGENCY MEDICINE

## 2020-03-23 RX ORDER — PROMETHAZINE HYDROCHLORIDE 25 MG/1
25 TABLET ORAL EVERY 6 HOURS PRN
Status: DISCONTINUED | OUTPATIENT
Start: 2020-03-23 | End: 2020-03-26 | Stop reason: HOSPADM

## 2020-03-23 RX ORDER — MAGNESIUM SULFATE HEPTAHYDRATE 40 MG/ML
2 INJECTION, SOLUTION INTRAVENOUS AS NEEDED
Status: DISCONTINUED | OUTPATIENT
Start: 2020-03-23 | End: 2020-03-26 | Stop reason: HOSPADM

## 2020-03-23 RX ORDER — ALUMINA, MAGNESIA, AND SIMETHICONE 2400; 2400; 240 MG/30ML; MG/30ML; MG/30ML
15 SUSPENSION ORAL EVERY 6 HOURS PRN
Status: DISCONTINUED | OUTPATIENT
Start: 2020-03-23 | End: 2020-03-26 | Stop reason: HOSPADM

## 2020-03-23 RX ORDER — SODIUM CHLORIDE 0.9 % (FLUSH) 0.9 %
10 SYRINGE (ML) INJECTION EVERY 12 HOURS SCHEDULED
Status: DISCONTINUED | OUTPATIENT
Start: 2020-03-23 | End: 2020-03-26 | Stop reason: HOSPADM

## 2020-03-23 RX ORDER — MAGNESIUM SULFATE 1 G/100ML
1 INJECTION INTRAVENOUS AS NEEDED
Status: DISCONTINUED | OUTPATIENT
Start: 2020-03-23 | End: 2020-03-26 | Stop reason: HOSPADM

## 2020-03-23 RX ORDER — SUCRALFATE 1 G/1
1 TABLET ORAL
Status: DISCONTINUED | OUTPATIENT
Start: 2020-03-23 | End: 2020-03-26 | Stop reason: HOSPADM

## 2020-03-23 RX ORDER — PANTOPRAZOLE SODIUM 40 MG/1
40 TABLET, DELAYED RELEASE ORAL EVERY MORNING
Status: DISCONTINUED | OUTPATIENT
Start: 2020-03-24 | End: 2020-03-26 | Stop reason: HOSPADM

## 2020-03-23 RX ORDER — POTASSIUM CHLORIDE 20 MEQ/1
40 TABLET, EXTENDED RELEASE ORAL AS NEEDED
Status: DISCONTINUED | OUTPATIENT
Start: 2020-03-23 | End: 2020-03-26 | Stop reason: HOSPADM

## 2020-03-23 RX ORDER — SODIUM CHLORIDE 0.9 % (FLUSH) 0.9 %
10 SYRINGE (ML) INJECTION AS NEEDED
Status: DISCONTINUED | OUTPATIENT
Start: 2020-03-23 | End: 2020-03-26 | Stop reason: HOSPADM

## 2020-03-23 RX ORDER — ONDANSETRON 2 MG/ML
4 INJECTION INTRAMUSCULAR; INTRAVENOUS EVERY 6 HOURS PRN
Status: DISCONTINUED | OUTPATIENT
Start: 2020-03-23 | End: 2020-03-26 | Stop reason: HOSPADM

## 2020-03-23 RX ORDER — ATORVASTATIN CALCIUM 40 MG/1
80 TABLET, FILM COATED ORAL NIGHTLY
Status: DISCONTINUED | OUTPATIENT
Start: 2020-03-23 | End: 2020-03-26 | Stop reason: HOSPADM

## 2020-03-23 RX ORDER — MORPHINE SULFATE 4 MG/ML
2 INJECTION, SOLUTION INTRAMUSCULAR; INTRAVENOUS ONCE
Status: COMPLETED | OUTPATIENT
Start: 2020-03-23 | End: 2020-03-23

## 2020-03-23 RX ORDER — ONDANSETRON 2 MG/ML
4 INJECTION INTRAMUSCULAR; INTRAVENOUS ONCE
Status: COMPLETED | OUTPATIENT
Start: 2020-03-23 | End: 2020-03-23

## 2020-03-23 RX ORDER — SODIUM CHLORIDE 9 MG/ML
100 INJECTION, SOLUTION INTRAVENOUS CONTINUOUS
Status: DISCONTINUED | OUTPATIENT
Start: 2020-03-23 | End: 2020-03-26 | Stop reason: HOSPADM

## 2020-03-23 RX ORDER — MULTIVITAMIN,THERAPEUTIC
1 TABLET ORAL DAILY
Status: DISCONTINUED | OUTPATIENT
Start: 2020-03-24 | End: 2020-03-26 | Stop reason: HOSPADM

## 2020-03-23 RX ORDER — ACETAMINOPHEN 160 MG/5ML
650 SOLUTION ORAL EVERY 4 HOURS PRN
Status: DISCONTINUED | OUTPATIENT
Start: 2020-03-23 | End: 2020-03-26 | Stop reason: HOSPADM

## 2020-03-23 RX ORDER — NICOTINE POLACRILEX 4 MG
15 LOZENGE BUCCAL
Status: DISCONTINUED | OUTPATIENT
Start: 2020-03-23 | End: 2020-03-26 | Stop reason: HOSPADM

## 2020-03-23 RX ORDER — LANOLIN ALCOHOL/MO/W.PET/CERES
1000 CREAM (GRAM) TOPICAL DAILY
Status: DISCONTINUED | OUTPATIENT
Start: 2020-03-23 | End: 2020-03-26 | Stop reason: HOSPADM

## 2020-03-23 RX ORDER — AMLODIPINE BESYLATE 5 MG/1
5 TABLET ORAL
Status: DISCONTINUED | OUTPATIENT
Start: 2020-03-23 | End: 2020-03-23

## 2020-03-23 RX ORDER — BISACODYL 10 MG
10 SUPPOSITORY, RECTAL RECTAL DAILY PRN
Status: DISCONTINUED | OUTPATIENT
Start: 2020-03-23 | End: 2020-03-26 | Stop reason: HOSPADM

## 2020-03-23 RX ORDER — DULOXETIN HYDROCHLORIDE 30 MG/1
60 CAPSULE, DELAYED RELEASE ORAL DAILY
Status: DISCONTINUED | OUTPATIENT
Start: 2020-03-23 | End: 2020-03-26 | Stop reason: HOSPADM

## 2020-03-23 RX ORDER — DEXTROSE MONOHYDRATE 25 G/50ML
25 INJECTION, SOLUTION INTRAVENOUS
Status: DISCONTINUED | OUTPATIENT
Start: 2020-03-23 | End: 2020-03-26 | Stop reason: HOSPADM

## 2020-03-23 RX ORDER — ACETAMINOPHEN 650 MG/1
650 SUPPOSITORY RECTAL EVERY 4 HOURS PRN
Status: DISCONTINUED | OUTPATIENT
Start: 2020-03-23 | End: 2020-03-26 | Stop reason: HOSPADM

## 2020-03-23 RX ORDER — LIDOCAINE 50 MG/G
1 PATCH TOPICAL NIGHTLY
Status: DISCONTINUED | OUTPATIENT
Start: 2020-03-23 | End: 2020-03-26 | Stop reason: HOSPADM

## 2020-03-23 RX ORDER — ACETAMINOPHEN 325 MG/1
650 TABLET ORAL EVERY 4 HOURS PRN
Status: DISCONTINUED | OUTPATIENT
Start: 2020-03-23 | End: 2020-03-26 | Stop reason: HOSPADM

## 2020-03-23 RX ORDER — ONDANSETRON 4 MG/1
4 TABLET, FILM COATED ORAL EVERY 6 HOURS PRN
Status: DISCONTINUED | OUTPATIENT
Start: 2020-03-23 | End: 2020-03-26 | Stop reason: HOSPADM

## 2020-03-23 RX ORDER — CHOLECALCIFEROL (VITAMIN D3) 125 MCG
5 CAPSULE ORAL NIGHTLY PRN
Status: DISCONTINUED | OUTPATIENT
Start: 2020-03-23 | End: 2020-03-26 | Stop reason: HOSPADM

## 2020-03-23 RX ADMIN — SODIUM CHLORIDE 1000 ML: 900 INJECTION, SOLUTION INTRAVENOUS at 08:15

## 2020-03-23 RX ADMIN — LIDOCAINE 1 PATCH: 50 PATCH TOPICAL at 23:08

## 2020-03-23 RX ADMIN — METOPROLOL TARTRATE 12.5 MG: 25 TABLET, FILM COATED ORAL at 16:47

## 2020-03-23 RX ADMIN — MORPHINE SULFATE 2 MG: 4 INJECTION INTRAVENOUS at 08:15

## 2020-03-23 RX ADMIN — INSULIN HUMAN 8 UNITS: 100 INJECTION, SOLUTION PARENTERAL at 10:10

## 2020-03-23 RX ADMIN — KETAMINE HYDROCHLORIDE 20 MG: 50 INJECTION, SOLUTION INTRAMUSCULAR; INTRAVENOUS at 14:16

## 2020-03-23 RX ADMIN — WATER 1 G: 1000 INJECTION, SOLUTION INTRAVENOUS at 10:30

## 2020-03-23 RX ADMIN — INSULIN LISPRO 4 UNITS: 100 INJECTION, SOLUTION INTRAVENOUS; SUBCUTANEOUS at 13:08

## 2020-03-23 RX ADMIN — PROMETHAZINE HYDROCHLORIDE 25 MG: 25 TABLET ORAL at 14:26

## 2020-03-23 RX ADMIN — SODIUM CHLORIDE 100 ML/HR: 900 INJECTION, SOLUTION INTRAVENOUS at 23:10

## 2020-03-23 RX ADMIN — Medication 10 ML: at 20:40

## 2020-03-23 RX ADMIN — MORPHINE SULFATE 2 MG: 4 INJECTION INTRAVENOUS at 10:29

## 2020-03-23 RX ADMIN — SUCRALFATE 1 G: 1 TABLET ORAL at 16:47

## 2020-03-23 RX ADMIN — PROMETHAZINE HYDROCHLORIDE 25 MG: 25 TABLET ORAL at 22:20

## 2020-03-23 RX ADMIN — ONDANSETRON 4 MG: 2 INJECTION INTRAMUSCULAR; INTRAVENOUS at 08:15

## 2020-03-23 RX ADMIN — ENOXAPARIN SODIUM 40 MG: 40 INJECTION SUBCUTANEOUS at 14:26

## 2020-03-23 RX ADMIN — ONDANSETRON 4 MG: 2 INJECTION INTRAMUSCULAR; INTRAVENOUS at 10:29

## 2020-03-23 RX ADMIN — SODIUM CHLORIDE 100 ML/HR: 900 INJECTION, SOLUTION INTRAVENOUS at 13:09

## 2020-03-23 RX ADMIN — ATORVASTATIN CALCIUM 80 MG: 40 TABLET, FILM COATED ORAL at 20:40

## 2020-03-23 RX ADMIN — Medication 10 ML: at 13:41

## 2020-03-23 RX ADMIN — PHENOL 5 SPRAY: 1.5 LIQUID ORAL at 17:05

## 2020-03-24 LAB
ANION GAP SERPL CALCULATED.3IONS-SCNC: 11 MMOL/L (ref 5–15)
B PERT DNA SPEC QL NAA+PROBE: NOT DETECTED
BASOPHILS # BLD AUTO: 0.1 10*3/MM3 (ref 0–0.2)
BASOPHILS NFR BLD AUTO: 1.1 % (ref 0–1.5)
BUN BLD-MCNC: 14 MG/DL (ref 8–23)
BUN/CREAT SERPL: 16.9 (ref 7–25)
C PNEUM DNA NPH QL NAA+NON-PROBE: NOT DETECTED
CALCIUM SPEC-SCNC: 7.9 MG/DL (ref 8.6–10.5)
CHLORIDE SERPL-SCNC: 107 MMOL/L (ref 98–107)
CK SERPL-CCNC: 73 U/L (ref 20–180)
CO2 SERPL-SCNC: 24 MMOL/L (ref 22–29)
CREAT BLD-MCNC: 0.83 MG/DL (ref 0.57–1)
DEPRECATED RDW RBC AUTO: 41.6 FL (ref 37–54)
EOSINOPHIL # BLD AUTO: 0.2 10*3/MM3 (ref 0–0.4)
EOSINOPHIL NFR BLD AUTO: 5 % (ref 0.3–6.2)
ERYTHROCYTE [DISTWIDTH] IN BLOOD BY AUTOMATED COUNT: 13.6 % (ref 12.3–15.4)
FLUAV H1 2009 PAND RNA NPH QL NAA+PROBE: NOT DETECTED
FLUAV H1 HA GENE NPH QL NAA+PROBE: NOT DETECTED
FLUAV H3 RNA NPH QL NAA+PROBE: NOT DETECTED
FLUAV SUBTYP SPEC NAA+PROBE: NOT DETECTED
FLUBV RNA ISLT QL NAA+PROBE: NOT DETECTED
GFR SERPL CREATININE-BSD FRML MDRD: 69 ML/MIN/1.73
GLUCOSE BLD-MCNC: 127 MG/DL (ref 65–99)
GLUCOSE BLDC GLUCOMTR-MCNC: 119 MG/DL (ref 70–105)
GLUCOSE BLDC GLUCOMTR-MCNC: 148 MG/DL (ref 70–105)
GLUCOSE BLDC GLUCOMTR-MCNC: 150 MG/DL (ref 70–105)
GLUCOSE BLDC GLUCOMTR-MCNC: 197 MG/DL (ref 70–105)
HADV DNA SPEC NAA+PROBE: NOT DETECTED
HCOV 229E RNA SPEC QL NAA+PROBE: NOT DETECTED
HCOV HKU1 RNA SPEC QL NAA+PROBE: NOT DETECTED
HCOV NL63 RNA SPEC QL NAA+PROBE: NOT DETECTED
HCOV OC43 RNA SPEC QL NAA+PROBE: NOT DETECTED
HCT VFR BLD AUTO: 35.9 % (ref 34–46.6)
HGB BLD-MCNC: 11.9 G/DL (ref 12–15.9)
HMPV RNA NPH QL NAA+NON-PROBE: NOT DETECTED
HPIV1 RNA SPEC QL NAA+PROBE: NOT DETECTED
HPIV2 RNA SPEC QL NAA+PROBE: NOT DETECTED
HPIV3 RNA NPH QL NAA+PROBE: NOT DETECTED
HPIV4 P GENE NPH QL NAA+PROBE: NOT DETECTED
LYMPHOCYTES # BLD AUTO: 2.4 10*3/MM3 (ref 0.7–3.1)
LYMPHOCYTES NFR BLD AUTO: 48.7 % (ref 19.6–45.3)
M PNEUMO IGG SER IA-ACNC: NOT DETECTED
MAGNESIUM SERPL-MCNC: 1.6 MG/DL (ref 1.6–2.4)
MCH RBC QN AUTO: 28.6 PG (ref 26.6–33)
MCHC RBC AUTO-ENTMCNC: 33.1 G/DL (ref 31.5–35.7)
MCV RBC AUTO: 86.4 FL (ref 79–97)
MONOCYTES # BLD AUTO: 0.7 10*3/MM3 (ref 0.1–0.9)
MONOCYTES NFR BLD AUTO: 14.4 % (ref 5–12)
NEUTROPHILS # BLD AUTO: 1.5 10*3/MM3 (ref 1.7–7)
NEUTROPHILS NFR BLD AUTO: 30.8 % (ref 42.7–76)
NRBC BLD AUTO-RTO: 0.2 /100 WBC (ref 0–0.2)
PLATELET # BLD AUTO: 194 10*3/MM3 (ref 140–450)
PMV BLD AUTO: 7 FL (ref 6–12)
POTASSIUM BLD-SCNC: 3.6 MMOL/L (ref 3.5–5.2)
RBC # BLD AUTO: 4.15 10*6/MM3 (ref 3.77–5.28)
RHINOVIRUS RNA SPEC NAA+PROBE: NOT DETECTED
RSV RNA NPH QL NAA+NON-PROBE: NOT DETECTED
SODIUM BLD-SCNC: 142 MMOL/L (ref 136–145)
WBC NRBC COR # BLD: 4.9 10*3/MM3 (ref 3.4–10.8)

## 2020-03-24 PROCEDURE — U0002 COVID-19 LAB TEST NON-CDC: HCPCS | Performed by: INTERNAL MEDICINE

## 2020-03-24 PROCEDURE — 25010000002 ENOXAPARIN PER 10 MG: Performed by: STUDENT IN AN ORGANIZED HEALTH CARE EDUCATION/TRAINING PROGRAM

## 2020-03-24 PROCEDURE — 82550 ASSAY OF CK (CPK): CPT | Performed by: INTERNAL MEDICINE

## 2020-03-24 PROCEDURE — 82962 GLUCOSE BLOOD TEST: CPT

## 2020-03-24 PROCEDURE — 80048 BASIC METABOLIC PNL TOTAL CA: CPT | Performed by: STUDENT IN AN ORGANIZED HEALTH CARE EDUCATION/TRAINING PROGRAM

## 2020-03-24 PROCEDURE — 83735 ASSAY OF MAGNESIUM: CPT | Performed by: STUDENT IN AN ORGANIZED HEALTH CARE EDUCATION/TRAINING PROGRAM

## 2020-03-24 PROCEDURE — 63710000001 PROMETHAZINE PER 25 MG: Performed by: STUDENT IN AN ORGANIZED HEALTH CARE EDUCATION/TRAINING PROGRAM

## 2020-03-24 PROCEDURE — G0378 HOSPITAL OBSERVATION PER HR: HCPCS

## 2020-03-24 PROCEDURE — 63710000001 INSULIN LISPRO (HUMAN) PER 5 UNITS: Performed by: STUDENT IN AN ORGANIZED HEALTH CARE EDUCATION/TRAINING PROGRAM

## 2020-03-24 PROCEDURE — 99226 PR SBSQ OBSERVATION CARE/DAY 35 MINUTES: CPT | Performed by: INTERNAL MEDICINE

## 2020-03-24 PROCEDURE — 25010000002 CEFTRIAXONE PER 250 MG: Performed by: STUDENT IN AN ORGANIZED HEALTH CARE EDUCATION/TRAINING PROGRAM

## 2020-03-24 PROCEDURE — 0099U HC BIOFIRE FILMARRAY RESP PANEL 1: CPT | Performed by: INTERNAL MEDICINE

## 2020-03-24 PROCEDURE — 85025 COMPLETE CBC W/AUTO DIFF WBC: CPT | Performed by: STUDENT IN AN ORGANIZED HEALTH CARE EDUCATION/TRAINING PROGRAM

## 2020-03-24 RX ORDER — LOSARTAN POTASSIUM 50 MG/1
50 TABLET ORAL
Status: DISCONTINUED | OUTPATIENT
Start: 2020-03-24 | End: 2020-03-26

## 2020-03-24 RX ADMIN — INSULIN LISPRO 2 UNITS: 100 INJECTION, SOLUTION INTRAVENOUS; SUBCUTANEOUS at 12:52

## 2020-03-24 RX ADMIN — CYANOCOBALAMIN TAB 1000 MCG 1000 MCG: 1000 TAB at 08:36

## 2020-03-24 RX ADMIN — SUCRALFATE 1 G: 1 TABLET ORAL at 08:37

## 2020-03-24 RX ADMIN — CEFTRIAXONE SODIUM 1 G: 1 INJECTION, POWDER, FOR SOLUTION INTRAMUSCULAR; INTRAVENOUS at 10:46

## 2020-03-24 RX ADMIN — THERA TABS 1 TABLET: TAB at 08:36

## 2020-03-24 RX ADMIN — SUCRALFATE 1 G: 1 TABLET ORAL at 10:46

## 2020-03-24 RX ADMIN — SUCRALFATE 1 G: 1 TABLET ORAL at 16:23

## 2020-03-24 RX ADMIN — PROMETHAZINE HYDROCHLORIDE 25 MG: 25 TABLET ORAL at 22:39

## 2020-03-24 RX ADMIN — LOSARTAN POTASSIUM 50 MG: 50 TABLET, FILM COATED ORAL at 14:04

## 2020-03-24 RX ADMIN — MELATONIN TAB 5 MG 5 MG: 5 TAB at 22:39

## 2020-03-24 RX ADMIN — METOPROLOL TARTRATE 12.5 MG: 25 TABLET, FILM COATED ORAL at 08:37

## 2020-03-24 RX ADMIN — LIDOCAINE 1 PATCH: 50 PATCH TOPICAL at 22:40

## 2020-03-24 RX ADMIN — ATORVASTATIN CALCIUM 80 MG: 40 TABLET, FILM COATED ORAL at 22:39

## 2020-03-24 RX ADMIN — ENOXAPARIN SODIUM 40 MG: 40 INJECTION SUBCUTANEOUS at 16:19

## 2020-03-24 RX ADMIN — PANTOPRAZOLE SODIUM 40 MG: 40 TABLET, DELAYED RELEASE ORAL at 08:36

## 2020-03-24 RX ADMIN — PROMETHAZINE HYDROCHLORIDE 25 MG: 25 TABLET ORAL at 16:22

## 2020-03-24 RX ADMIN — KETAMINE HYDROCHLORIDE 20 MG: 50 INJECTION, SOLUTION INTRAMUSCULAR; INTRAVENOUS at 16:17

## 2020-03-24 RX ADMIN — INSULIN LISPRO 2 UNITS: 100 INJECTION, SOLUTION INTRAVENOUS; SUBCUTANEOUS at 22:39

## 2020-03-24 RX ADMIN — DULOXETINE HYDROCHLORIDE 60 MG: 30 CAPSULE, DELAYED RELEASE ORAL at 08:37

## 2020-03-24 RX ADMIN — Medication 10 ML: at 08:38

## 2020-03-24 RX ADMIN — METOPROLOL TARTRATE 12.5 MG: 25 TABLET, FILM COATED ORAL at 22:39

## 2020-03-24 RX ADMIN — SODIUM CHLORIDE 100 ML/HR: 900 INJECTION, SOLUTION INTRAVENOUS at 16:20

## 2020-03-25 ENCOUNTER — INPATIENT HOSPITAL (OUTPATIENT)
Dept: URBAN - METROPOLITAN AREA HOSPITAL 84 | Facility: HOSPITAL | Age: 65
End: 2020-03-25

## 2020-03-25 ENCOUNTER — APPOINTMENT (OUTPATIENT)
Dept: GENERAL RADIOLOGY | Facility: HOSPITAL | Age: 65
End: 2020-03-25

## 2020-03-25 DIAGNOSIS — N17.9 ACUTE KIDNEY FAILURE, UNSPECIFIED: ICD-10-CM

## 2020-03-25 DIAGNOSIS — K21.0 GASTRO-ESOPHAGEAL REFLUX DISEASE WITH ESOPHAGITIS: ICD-10-CM

## 2020-03-25 DIAGNOSIS — R11.0 NAUSEA: ICD-10-CM

## 2020-03-25 DIAGNOSIS — R10.9 UNSPECIFIED ABDOMINAL PAIN: ICD-10-CM

## 2020-03-25 LAB
D DIMER PPP FEU-MCNC: 0.43 MCGFEU/ML (ref 0.17–0.59)
GLUCOSE BLDC GLUCOMTR-MCNC: 132 MG/DL (ref 70–105)
GLUCOSE BLDC GLUCOMTR-MCNC: 141 MG/DL (ref 70–105)
GLUCOSE BLDC GLUCOMTR-MCNC: 147 MG/DL (ref 70–105)
GLUCOSE BLDC GLUCOMTR-MCNC: 189 MG/DL (ref 70–105)
GLUCOSE BLDC GLUCOMTR-MCNC: 208 MG/DL (ref 70–105)
MAGNESIUM SERPL-MCNC: 1.6 MG/DL (ref 1.6–2.4)
POTASSIUM BLD-SCNC: 3.6 MMOL/L (ref 3.5–5.2)
TROPONIN T SERPL-MCNC: <0.01 NG/ML (ref 0–0.03)

## 2020-03-25 PROCEDURE — G0378 HOSPITAL OBSERVATION PER HR: HCPCS

## 2020-03-25 PROCEDURE — 85379 FIBRIN DEGRADATION QUANT: CPT | Performed by: INTERNAL MEDICINE

## 2020-03-25 PROCEDURE — 99226 PR SBSQ OBSERVATION CARE/DAY 35 MINUTES: CPT | Performed by: INTERNAL MEDICINE

## 2020-03-25 PROCEDURE — 83735 ASSAY OF MAGNESIUM: CPT | Performed by: STUDENT IN AN ORGANIZED HEALTH CARE EDUCATION/TRAINING PROGRAM

## 2020-03-25 PROCEDURE — 99231 SBSQ HOSP IP/OBS SF/LOW 25: CPT | Performed by: INTERNAL MEDICINE

## 2020-03-25 PROCEDURE — 84132 ASSAY OF SERUM POTASSIUM: CPT | Performed by: STUDENT IN AN ORGANIZED HEALTH CARE EDUCATION/TRAINING PROGRAM

## 2020-03-25 PROCEDURE — 82962 GLUCOSE BLOOD TEST: CPT

## 2020-03-25 PROCEDURE — 25010000002 CEFTRIAXONE PER 250 MG: Performed by: STUDENT IN AN ORGANIZED HEALTH CARE EDUCATION/TRAINING PROGRAM

## 2020-03-25 PROCEDURE — 63710000001 PROMETHAZINE PER 25 MG: Performed by: STUDENT IN AN ORGANIZED HEALTH CARE EDUCATION/TRAINING PROGRAM

## 2020-03-25 PROCEDURE — 63710000001 INSULIN LISPRO (HUMAN) PER 5 UNITS: Performed by: STUDENT IN AN ORGANIZED HEALTH CARE EDUCATION/TRAINING PROGRAM

## 2020-03-25 PROCEDURE — 84484 ASSAY OF TROPONIN QUANT: CPT | Performed by: INTERNAL MEDICINE

## 2020-03-25 PROCEDURE — 25010000002 ENOXAPARIN PER 10 MG: Performed by: STUDENT IN AN ORGANIZED HEALTH CARE EDUCATION/TRAINING PROGRAM

## 2020-03-25 PROCEDURE — 71045 X-RAY EXAM CHEST 1 VIEW: CPT

## 2020-03-25 RX ADMIN — ENOXAPARIN SODIUM 40 MG: 40 INJECTION SUBCUTANEOUS at 15:24

## 2020-03-25 RX ADMIN — LIDOCAINE 1 PATCH: 50 PATCH TOPICAL at 21:36

## 2020-03-25 RX ADMIN — INSULIN LISPRO 4 UNITS: 100 INJECTION, SOLUTION INTRAVENOUS; SUBCUTANEOUS at 12:37

## 2020-03-25 RX ADMIN — SODIUM CHLORIDE 100 ML/HR: 900 INJECTION, SOLUTION INTRAVENOUS at 10:59

## 2020-03-25 RX ADMIN — ACETAMINOPHEN 650 MG: 325 TABLET ORAL at 09:15

## 2020-03-25 RX ADMIN — DULOXETINE HYDROCHLORIDE 60 MG: 30 CAPSULE, DELAYED RELEASE ORAL at 09:13

## 2020-03-25 RX ADMIN — PANTOPRAZOLE SODIUM 40 MG: 40 TABLET, DELAYED RELEASE ORAL at 06:06

## 2020-03-25 RX ADMIN — SUCRALFATE 1 G: 1 TABLET ORAL at 10:59

## 2020-03-25 RX ADMIN — Medication 10 ML: at 22:12

## 2020-03-25 RX ADMIN — METOPROLOL TARTRATE 12.5 MG: 25 TABLET, FILM COATED ORAL at 21:37

## 2020-03-25 RX ADMIN — KETAMINE HYDROCHLORIDE 20 MG: 50 INJECTION, SOLUTION INTRAMUSCULAR; INTRAVENOUS at 09:00

## 2020-03-25 RX ADMIN — PROMETHAZINE HYDROCHLORIDE 25 MG: 25 TABLET ORAL at 09:15

## 2020-03-25 RX ADMIN — LOSARTAN POTASSIUM 50 MG: 50 TABLET, FILM COATED ORAL at 09:13

## 2020-03-25 RX ADMIN — CYANOCOBALAMIN TAB 1000 MCG 1000 MCG: 1000 TAB at 09:13

## 2020-03-25 RX ADMIN — SODIUM CHLORIDE 100 ML/HR: 900 INJECTION, SOLUTION INTRAVENOUS at 21:42

## 2020-03-25 RX ADMIN — SUCRALFATE 1 G: 1 TABLET ORAL at 17:40

## 2020-03-25 RX ADMIN — ATORVASTATIN CALCIUM 80 MG: 40 TABLET, FILM COATED ORAL at 21:37

## 2020-03-25 RX ADMIN — CEFTRIAXONE SODIUM 1 G: 1 INJECTION, POWDER, FOR SOLUTION INTRAMUSCULAR; INTRAVENOUS at 10:59

## 2020-03-25 RX ADMIN — SUCRALFATE 1 G: 1 TABLET ORAL at 06:05

## 2020-03-25 RX ADMIN — METOPROLOL TARTRATE 12.5 MG: 25 TABLET, FILM COATED ORAL at 09:13

## 2020-03-25 RX ADMIN — Medication 10 ML: at 09:12

## 2020-03-25 RX ADMIN — THERA TABS 1 TABLET: TAB at 09:13

## 2020-03-26 VITALS
HEART RATE: 66 BPM | SYSTOLIC BLOOD PRESSURE: 163 MMHG | WEIGHT: 173.6 LBS | OXYGEN SATURATION: 95 % | RESPIRATION RATE: 16 BRPM | BODY MASS INDEX: 29.64 KG/M2 | TEMPERATURE: 97.5 F | HEIGHT: 64 IN | DIASTOLIC BLOOD PRESSURE: 98 MMHG

## 2020-03-26 LAB
BACTERIA SPEC AEROBE CULT: NORMAL
GLUCOSE BLDC GLUCOMTR-MCNC: 131 MG/DL (ref 70–105)
MAGNESIUM SERPL-MCNC: 1.6 MG/DL (ref 1.6–2.4)
POTASSIUM BLD-SCNC: 3.6 MMOL/L (ref 3.5–5.2)

## 2020-03-26 PROCEDURE — 84132 ASSAY OF SERUM POTASSIUM: CPT | Performed by: STUDENT IN AN ORGANIZED HEALTH CARE EDUCATION/TRAINING PROGRAM

## 2020-03-26 PROCEDURE — 99239 HOSP IP/OBS DSCHRG MGMT >30: CPT | Performed by: INTERNAL MEDICINE

## 2020-03-26 PROCEDURE — 82962 GLUCOSE BLOOD TEST: CPT

## 2020-03-26 PROCEDURE — 83735 ASSAY OF MAGNESIUM: CPT | Performed by: STUDENT IN AN ORGANIZED HEALTH CARE EDUCATION/TRAINING PROGRAM

## 2020-03-26 RX ORDER — LOSARTAN POTASSIUM 50 MG/1
100 TABLET ORAL
Status: DISCONTINUED | OUTPATIENT
Start: 2020-03-27 | End: 2020-03-26 | Stop reason: HOSPADM

## 2020-03-26 RX ORDER — ISOPROPYL ALCOHOL 700 MG/ML
1 CLOTH TOPICAL DAILY
Qty: 100 EACH | Refills: 0 | Status: SHIPPED | OUTPATIENT
Start: 2020-03-26 | End: 2020-04-12

## 2020-03-26 RX ORDER — CEFDINIR 300 MG/1
300 CAPSULE ORAL 2 TIMES DAILY
Qty: 6 CAPSULE | Refills: 0 | Status: SHIPPED | OUTPATIENT
Start: 2020-03-26 | End: 2020-03-29

## 2020-03-26 RX ORDER — SACCHAROMYCES BOULARDII 250 MG
250 CAPSULE ORAL 2 TIMES DAILY
Qty: 14 CAPSULE | Refills: 0 | Status: SHIPPED | OUTPATIENT
Start: 2020-03-26 | End: 2020-04-02

## 2020-03-26 RX ORDER — LANCETS 30 GAUGE
1 EACH MISCELLANEOUS DAILY
Qty: 100 EACH | Refills: 0 | Status: SHIPPED | OUTPATIENT
Start: 2020-03-26 | End: 2020-04-12

## 2020-03-26 RX ADMIN — LOSARTAN POTASSIUM 50 MG: 50 TABLET, FILM COATED ORAL at 08:28

## 2020-03-26 RX ADMIN — DULOXETINE HYDROCHLORIDE 60 MG: 30 CAPSULE, DELAYED RELEASE ORAL at 08:29

## 2020-03-26 RX ADMIN — THERA TABS 1 TABLET: TAB at 08:29

## 2020-03-26 RX ADMIN — METOPROLOL TARTRATE 12.5 MG: 25 TABLET, FILM COATED ORAL at 08:28

## 2020-03-26 RX ADMIN — Medication 10 ML: at 08:29

## 2020-03-26 RX ADMIN — SUCRALFATE 1 G: 1 TABLET ORAL at 08:29

## 2020-03-26 RX ADMIN — CYANOCOBALAMIN TAB 1000 MCG 1000 MCG: 1000 TAB at 08:29

## 2020-03-26 RX ADMIN — ACETAMINOPHEN 650 MG: 325 TABLET ORAL at 04:41

## 2020-03-26 RX ADMIN — PANTOPRAZOLE SODIUM 40 MG: 40 TABLET, DELAYED RELEASE ORAL at 08:28

## 2020-03-27 ENCOUNTER — READMISSION MANAGEMENT (OUTPATIENT)
Dept: CALL CENTER | Facility: HOSPITAL | Age: 65
End: 2020-03-27

## 2020-03-27 LAB
REF LAB TEST METHOD: NORMAL
SARS-COV-2 RNA RESP QL NAA+PROBE: NOT DETECTED

## 2020-03-27 NOTE — OUTREACH NOTE
Prep Survey      Responses   [facility] patient discharged from?  [facility]   Is LACE score < 7 ?  No   Eligibility  Readm Mgmt   Discharge diagnosis  Acute right flank pain, UTI, REINA, Hyperglycemia, vomiting, dehydration, Obesity, thyroid nodule, Vit D def. , DM II with peripheral neuropathy, osteoporosis, essential HTN, GERd with esophagitis, HLD   Does the patient have one of the following disease processes/diagnoses(primary or secondary)?  Other   Does the patient have Home health ordered?  No   Is there a DME ordered?  No   Comments regarding appointments  Pt to schedule F/U appointments   Prep survey completed?  Yes          Ivelisse Bella RN

## 2020-03-31 ENCOUNTER — READMISSION MANAGEMENT (OUTPATIENT)
Dept: CALL CENTER | Facility: HOSPITAL | Age: 65
End: 2020-03-31

## 2020-03-31 NOTE — OUTREACH NOTE
Medical Week 1 Survey      Responses   [facility] patient discharged from?  [facility]   Does the patient have one of the following disease processes/diagnoses(primary or secondary)?  Other   Is there a successful TCM telephone encounter documented?  No   Week 1 attempt successful?  No   Revoke  Decline to participate          Karina Sheikh LPN

## 2020-04-03 RX ORDER — GLIMEPIRIDE 4 MG/1
4 TABLET ORAL 2 TIMES DAILY
Qty: 180 TABLET | Refills: 1 | Status: SHIPPED | OUTPATIENT
Start: 2020-04-03 | End: 2020-07-22 | Stop reason: SDUPTHER

## 2020-04-12 ENCOUNTER — HOSPITAL ENCOUNTER (OUTPATIENT)
Facility: HOSPITAL | Age: 65
Setting detail: OBSERVATION
Discharge: HOME | End: 2020-04-13
Attending: EMERGENCY MEDICINE | Admitting: EMERGENCY MEDICINE

## 2020-04-12 ENCOUNTER — APPOINTMENT (OUTPATIENT)
Dept: GENERAL RADIOLOGY | Facility: HOSPITAL | Age: 65
End: 2020-04-12

## 2020-04-12 ENCOUNTER — APPOINTMENT (OUTPATIENT)
Dept: CT IMAGING | Facility: HOSPITAL | Age: 65
End: 2020-04-12

## 2020-04-12 DIAGNOSIS — M54.2 NECK PAIN: ICD-10-CM

## 2020-04-12 DIAGNOSIS — G43.109 COMPLICATED MIGRAINE: ICD-10-CM

## 2020-04-12 DIAGNOSIS — R47.02 DYSPHASIA: ICD-10-CM

## 2020-04-12 DIAGNOSIS — R51.9 ACUTE NONINTRACTABLE HEADACHE, UNSPECIFIED HEADACHE TYPE: Primary | ICD-10-CM

## 2020-04-12 PROBLEM — R06.00 DYSPNEA: Status: ACTIVE | Noted: 2020-04-12

## 2020-04-12 PROBLEM — R47.9 DIFFICULTY SPEAKING: Status: ACTIVE | Noted: 2020-04-12

## 2020-04-12 LAB
ABO GROUP BLD: NORMAL
ALBUMIN SERPL-MCNC: 4.1 G/DL (ref 3.5–5.2)
ALBUMIN/GLOB SERPL: 1.3 G/DL
ALP SERPL-CCNC: 50 U/L (ref 39–117)
ALT SERPL W P-5'-P-CCNC: 13 U/L (ref 1–33)
ANION GAP SERPL CALCULATED.3IONS-SCNC: 14 MMOL/L (ref 5–15)
APTT PPP: 25.4 SECONDS (ref 24–31)
AST SERPL-CCNC: 25 U/L (ref 1–32)
BASOPHILS # BLD AUTO: 0.1 10*3/MM3 (ref 0–0.2)
BASOPHILS NFR BLD AUTO: 1.1 % (ref 0–1.5)
BILIRUB SERPL-MCNC: 0.4 MG/DL (ref 0.2–1.2)
BLD GP AB SCN SERPL QL: NEGATIVE
BUN BLD-MCNC: 19 MG/DL (ref 8–23)
BUN/CREAT SERPL: 18.3 (ref 7–25)
CA-I SERPL ISE-MCNC: 1.21 MMOL/L (ref 1.2–1.3)
CALCIUM SPEC-SCNC: 9.5 MG/DL (ref 8.6–10.5)
CHLORIDE SERPL-SCNC: 100 MMOL/L (ref 98–107)
CHOLEST SERPL-MCNC: 177 MG/DL (ref 0–200)
CO2 SERPL-SCNC: 24 MMOL/L (ref 22–29)
CREAT BLD-MCNC: 1.04 MG/DL (ref 0.57–1)
DEPRECATED RDW RBC AUTO: 41.1 FL (ref 37–54)
EOSINOPHIL # BLD AUTO: 0.2 10*3/MM3 (ref 0–0.4)
EOSINOPHIL NFR BLD AUTO: 2.6 % (ref 0.3–6.2)
ERYTHROCYTE [DISTWIDTH] IN BLOOD BY AUTOMATED COUNT: 13.6 % (ref 12.3–15.4)
GFR SERPL CREATININE-BSD FRML MDRD: 53 ML/MIN/1.73
GLOBULIN UR ELPH-MCNC: 3.1 GM/DL
GLUCOSE BLD-MCNC: 119 MG/DL (ref 65–99)
GLUCOSE BLDC GLUCOMTR-MCNC: 120 MG/DL (ref 70–105)
GLUCOSE BLDC GLUCOMTR-MCNC: 124 MG/DL (ref 70–105)
GLUCOSE BLDC GLUCOMTR-MCNC: 173 MG/DL (ref 70–105)
GLUCOSE BLDC GLUCOMTR-MCNC: 60 MG/DL (ref 70–105)
GLUCOSE BLDC GLUCOMTR-MCNC: 84 MG/DL (ref 70–105)
GLUCOSE BLDC GLUCOMTR-MCNC: 99 MG/DL (ref 70–105)
HCT VFR BLD AUTO: 40.6 % (ref 34–46.6)
HDLC SERPL-MCNC: 51 MG/DL (ref 40–60)
HGB BLD-MCNC: 13.5 G/DL (ref 12–15.9)
HOLD SPECIMEN: NORMAL
HOLD SPECIMEN: NORMAL
INR PPP: 1.09 (ref 0.9–1.1)
LDLC SERPL CALC-MCNC: 90 MG/DL (ref 0–100)
LDLC/HDLC SERPL: 1.77 {RATIO}
LYMPHOCYTES # BLD AUTO: 2.7 10*3/MM3 (ref 0.7–3.1)
LYMPHOCYTES NFR BLD AUTO: 30.7 % (ref 19.6–45.3)
MAGNESIUM SERPL-MCNC: 1.5 MG/DL (ref 1.6–2.4)
MCH RBC QN AUTO: 28.5 PG (ref 26.6–33)
MCHC RBC AUTO-ENTMCNC: 33.2 G/DL (ref 31.5–35.7)
MCV RBC AUTO: 85.9 FL (ref 79–97)
MONOCYTES # BLD AUTO: 0.7 10*3/MM3 (ref 0.1–0.9)
MONOCYTES NFR BLD AUTO: 7.4 % (ref 5–12)
NEUTROPHILS # BLD AUTO: 5.1 10*3/MM3 (ref 1.7–7)
NEUTROPHILS NFR BLD AUTO: 58.2 % (ref 42.7–76)
NRBC BLD AUTO-RTO: 0.2 /100 WBC (ref 0–0.2)
PLATELET # BLD AUTO: 313 10*3/MM3 (ref 140–450)
PMV BLD AUTO: 7.1 FL (ref 6–12)
POTASSIUM BLD-SCNC: 4.1 MMOL/L (ref 3.5–5.2)
PROT SERPL-MCNC: 7.2 G/DL (ref 6–8.5)
PROTHROMBIN TIME: 11.2 SECONDS (ref 9.6–11.7)
RBC # BLD AUTO: 4.72 10*6/MM3 (ref 3.77–5.28)
RH BLD: POSITIVE
SARS-COV-2 RNA RESP QL NAA+PROBE: NOT DETECTED
SODIUM BLD-SCNC: 138 MMOL/L (ref 136–145)
T&S EXPIRATION DATE: NORMAL
TRIGL SERPL-MCNC: 179 MG/DL (ref 0–150)
TROPONIN T SERPL-MCNC: <0.01 NG/ML (ref 0–0.03)
TSH SERPL DL<=0.05 MIU/L-ACNC: 0.74 UIU/ML (ref 0.27–4.2)
VIT B12 BLD-MCNC: 782 PG/ML (ref 211–946)
VLDLC SERPL-MCNC: 35.8 MG/DL
WBC NRBC COR # BLD: 8.8 10*3/MM3 (ref 3.4–10.8)
WHOLE BLOOD HOLD SPECIMEN: NORMAL
WHOLE BLOOD HOLD SPECIMEN: NORMAL

## 2020-04-12 PROCEDURE — G0378 HOSPITAL OBSERVATION PER HR: HCPCS

## 2020-04-12 PROCEDURE — 71045 X-RAY EXAM CHEST 1 VIEW: CPT

## 2020-04-12 PROCEDURE — 96375 TX/PRO/DX INJ NEW DRUG ADDON: CPT

## 2020-04-12 PROCEDURE — 83036 HEMOGLOBIN GLYCOSYLATED A1C: CPT | Performed by: NURSE PRACTITIONER

## 2020-04-12 PROCEDURE — 0042T HC CT CEREBRAL PERFUSION W/WO CONTRAST: CPT

## 2020-04-12 PROCEDURE — 86901 BLOOD TYPING SEROLOGIC RH(D): CPT

## 2020-04-12 PROCEDURE — 25010000002 ONDANSETRON PER 1 MG: Performed by: EMERGENCY MEDICINE

## 2020-04-12 PROCEDURE — 93005 ELECTROCARDIOGRAM TRACING: CPT | Performed by: EMERGENCY MEDICINE

## 2020-04-12 PROCEDURE — 82962 GLUCOSE BLOOD TEST: CPT

## 2020-04-12 PROCEDURE — 86850 RBC ANTIBODY SCREEN: CPT | Performed by: EMERGENCY MEDICINE

## 2020-04-12 PROCEDURE — 99220 PR INITIAL OBSERVATION CARE/DAY 70 MINUTES: CPT | Performed by: INTERNAL MEDICINE

## 2020-04-12 PROCEDURE — 86900 BLOOD TYPING SEROLOGIC ABO: CPT

## 2020-04-12 PROCEDURE — 83735 ASSAY OF MAGNESIUM: CPT | Performed by: INTERNAL MEDICINE

## 2020-04-12 PROCEDURE — 84484 ASSAY OF TROPONIN QUANT: CPT | Performed by: EMERGENCY MEDICINE

## 2020-04-12 PROCEDURE — 99284 EMERGENCY DEPT VISIT MOD MDM: CPT

## 2020-04-12 PROCEDURE — 25010000002 KETOROLAC TROMETHAMINE PER 15 MG: Performed by: EMERGENCY MEDICINE

## 2020-04-12 PROCEDURE — 85610 PROTHROMBIN TIME: CPT | Performed by: EMERGENCY MEDICINE

## 2020-04-12 PROCEDURE — 82330 ASSAY OF CALCIUM: CPT | Performed by: INTERNAL MEDICINE

## 2020-04-12 PROCEDURE — 84443 ASSAY THYROID STIM HORMONE: CPT | Performed by: NURSE PRACTITIONER

## 2020-04-12 PROCEDURE — 70450 CT HEAD/BRAIN W/O DYE: CPT

## 2020-04-12 PROCEDURE — 85025 COMPLETE CBC W/AUTO DIFF WBC: CPT | Performed by: EMERGENCY MEDICINE

## 2020-04-12 PROCEDURE — 86901 BLOOD TYPING SEROLOGIC RH(D): CPT | Performed by: EMERGENCY MEDICINE

## 2020-04-12 PROCEDURE — 86900 BLOOD TYPING SEROLOGIC ABO: CPT | Performed by: EMERGENCY MEDICINE

## 2020-04-12 PROCEDURE — 85730 THROMBOPLASTIN TIME PARTIAL: CPT | Performed by: EMERGENCY MEDICINE

## 2020-04-12 PROCEDURE — 87635 SARS-COV-2 COVID-19 AMP PRB: CPT | Performed by: NURSE PRACTITIONER

## 2020-04-12 PROCEDURE — 70498 CT ANGIOGRAPHY NECK: CPT

## 2020-04-12 PROCEDURE — 82607 VITAMIN B-12: CPT | Performed by: PSYCHIATRY & NEUROLOGY

## 2020-04-12 PROCEDURE — 96374 THER/PROPH/DIAG INJ IV PUSH: CPT

## 2020-04-12 PROCEDURE — 80061 LIPID PANEL: CPT | Performed by: NURSE PRACTITIONER

## 2020-04-12 PROCEDURE — 70496 CT ANGIOGRAPHY HEAD: CPT

## 2020-04-12 PROCEDURE — 96361 HYDRATE IV INFUSION ADD-ON: CPT

## 2020-04-12 PROCEDURE — 0 IOPAMIDOL PER 1 ML: Performed by: EMERGENCY MEDICINE

## 2020-04-12 PROCEDURE — 92610 EVALUATE SWALLOWING FUNCTION: CPT

## 2020-04-12 PROCEDURE — 96376 TX/PRO/DX INJ SAME DRUG ADON: CPT

## 2020-04-12 PROCEDURE — 99214 OFFICE O/P EST MOD 30 MIN: CPT | Performed by: NURSE PRACTITIONER

## 2020-04-12 PROCEDURE — 80053 COMPREHEN METABOLIC PANEL: CPT | Performed by: EMERGENCY MEDICINE

## 2020-04-12 PROCEDURE — 25010000002 ONDANSETRON PER 1 MG: Performed by: NURSE PRACTITIONER

## 2020-04-12 RX ORDER — LOSARTAN POTASSIUM 50 MG/1
100 TABLET ORAL DAILY
Status: DISCONTINUED | OUTPATIENT
Start: 2020-04-12 | End: 2020-04-13 | Stop reason: HOSPADM

## 2020-04-12 RX ORDER — DULOXETIN HYDROCHLORIDE 30 MG/1
60 CAPSULE, DELAYED RELEASE ORAL DAILY
Status: DISCONTINUED | OUTPATIENT
Start: 2020-04-12 | End: 2020-04-13 | Stop reason: HOSPADM

## 2020-04-12 RX ORDER — CLOPIDOGREL BISULFATE 75 MG/1
75 TABLET ORAL DAILY
Status: DISCONTINUED | OUTPATIENT
Start: 2020-04-12 | End: 2020-04-13

## 2020-04-12 RX ORDER — SODIUM CHLORIDE 0.9 % (FLUSH) 0.9 %
10 SYRINGE (ML) INJECTION AS NEEDED
Status: DISCONTINUED | OUTPATIENT
Start: 2020-04-12 | End: 2020-04-13 | Stop reason: HOSPADM

## 2020-04-12 RX ORDER — GLIPIZIDE 5 MG/1
5 TABLET ORAL
Status: DISCONTINUED | OUTPATIENT
Start: 2020-04-13 | End: 2020-04-13 | Stop reason: HOSPADM

## 2020-04-12 RX ORDER — POTASSIUM CHLORIDE 20 MEQ/1
20 TABLET, EXTENDED RELEASE ORAL DAILY
Status: DISCONTINUED | OUTPATIENT
Start: 2020-04-12 | End: 2020-04-13 | Stop reason: HOSPADM

## 2020-04-12 RX ORDER — MULTIVITAMIN,THERAPEUTIC
1 TABLET ORAL DAILY
Status: DISCONTINUED | OUTPATIENT
Start: 2020-04-12 | End: 2020-04-13 | Stop reason: HOSPADM

## 2020-04-12 RX ORDER — BISACODYL 10 MG
10 SUPPOSITORY, RECTAL RECTAL DAILY PRN
Status: DISCONTINUED | OUTPATIENT
Start: 2020-04-12 | End: 2020-04-13 | Stop reason: HOSPADM

## 2020-04-12 RX ORDER — SODIUM CHLORIDE 9 MG/ML
75 INJECTION, SOLUTION INTRAVENOUS CONTINUOUS
Status: DISCONTINUED | OUTPATIENT
Start: 2020-04-12 | End: 2020-04-13 | Stop reason: HOSPADM

## 2020-04-12 RX ORDER — SUCRALFATE 1 G/1
1 TABLET ORAL
Status: DISCONTINUED | OUTPATIENT
Start: 2020-04-12 | End: 2020-04-13 | Stop reason: HOSPADM

## 2020-04-12 RX ORDER — NICOTINE POLACRILEX 4 MG
15 LOZENGE BUCCAL
Status: DISCONTINUED | OUTPATIENT
Start: 2020-04-12 | End: 2020-04-13 | Stop reason: HOSPADM

## 2020-04-12 RX ORDER — ASPIRIN 325 MG
325 TABLET ORAL DAILY
Status: DISCONTINUED | OUTPATIENT
Start: 2020-04-12 | End: 2020-04-13

## 2020-04-12 RX ORDER — GLIPIZIDE 5 MG/1
10 TABLET ORAL
Status: DISCONTINUED | OUTPATIENT
Start: 2020-04-12 | End: 2020-04-12

## 2020-04-12 RX ORDER — PANTOPRAZOLE SODIUM 40 MG/1
40 TABLET, DELAYED RELEASE ORAL
Status: DISCONTINUED | OUTPATIENT
Start: 2020-04-12 | End: 2020-04-13 | Stop reason: HOSPADM

## 2020-04-12 RX ORDER — ONDANSETRON 2 MG/ML
4 INJECTION INTRAMUSCULAR; INTRAVENOUS EVERY 6 HOURS PRN
Status: DISCONTINUED | OUTPATIENT
Start: 2020-04-12 | End: 2020-04-13 | Stop reason: HOSPADM

## 2020-04-12 RX ORDER — MORPHINE SULFATE 4 MG/ML
2 INJECTION, SOLUTION INTRAMUSCULAR; INTRAVENOUS ONCE
Status: COMPLETED | OUTPATIENT
Start: 2020-04-12 | End: 2020-04-13

## 2020-04-12 RX ORDER — DEXTROSE MONOHYDRATE 25 G/50ML
25 INJECTION, SOLUTION INTRAVENOUS
Status: DISCONTINUED | OUTPATIENT
Start: 2020-04-12 | End: 2020-04-13 | Stop reason: HOSPADM

## 2020-04-12 RX ORDER — KETOROLAC TROMETHAMINE 15 MG/ML
15 INJECTION, SOLUTION INTRAMUSCULAR; INTRAVENOUS ONCE
Status: COMPLETED | OUTPATIENT
Start: 2020-04-12 | End: 2020-04-12

## 2020-04-12 RX ORDER — ATORVASTATIN CALCIUM 40 MG/1
80 TABLET, FILM COATED ORAL NIGHTLY
Status: DISCONTINUED | OUTPATIENT
Start: 2020-04-12 | End: 2020-04-13 | Stop reason: HOSPADM

## 2020-04-12 RX ORDER — ONDANSETRON 2 MG/ML
4 INJECTION INTRAMUSCULAR; INTRAVENOUS ONCE
Status: COMPLETED | OUTPATIENT
Start: 2020-04-12 | End: 2020-04-12

## 2020-04-12 RX ORDER — ASPIRIN 300 MG/1
300 SUPPOSITORY RECTAL DAILY
Status: DISCONTINUED | OUTPATIENT
Start: 2020-04-12 | End: 2020-04-13

## 2020-04-12 RX ORDER — SODIUM CHLORIDE 0.9 % (FLUSH) 0.9 %
10 SYRINGE (ML) INJECTION EVERY 12 HOURS SCHEDULED
Status: DISCONTINUED | OUTPATIENT
Start: 2020-04-12 | End: 2020-04-13 | Stop reason: HOSPADM

## 2020-04-12 RX ORDER — LANOLIN ALCOHOL/MO/W.PET/CERES
1000 CREAM (GRAM) TOPICAL DAILY
Status: DISCONTINUED | OUTPATIENT
Start: 2020-04-12 | End: 2020-04-13 | Stop reason: HOSPADM

## 2020-04-12 RX ORDER — PROMETHAZINE HYDROCHLORIDE 25 MG/1
25 TABLET ORAL EVERY 6 HOURS PRN
Status: DISCONTINUED | OUTPATIENT
Start: 2020-04-12 | End: 2020-04-13 | Stop reason: HOSPADM

## 2020-04-12 RX ADMIN — ONDANSETRON 4 MG: 2 INJECTION INTRAMUSCULAR; INTRAVENOUS at 22:22

## 2020-04-12 RX ADMIN — DEXTROSE MONOHYDRATE 25 G: 500 INJECTION PARENTERAL at 15:48

## 2020-04-12 RX ADMIN — Medication 10 ML: at 09:52

## 2020-04-12 RX ADMIN — KETOROLAC TROMETHAMINE 15 MG: 15 INJECTION, SOLUTION INTRAMUSCULAR; INTRAVENOUS at 03:29

## 2020-04-12 RX ADMIN — IOPAMIDOL 150 ML: 755 INJECTION, SOLUTION INTRAVENOUS at 02:32

## 2020-04-12 RX ADMIN — ASPIRIN 300 MG: 300 SUPPOSITORY RECTAL at 09:50

## 2020-04-12 RX ADMIN — ONDANSETRON 4 MG: 2 INJECTION INTRAMUSCULAR; INTRAVENOUS at 03:29

## 2020-04-12 RX ADMIN — SODIUM CHLORIDE 75 ML/HR: 900 INJECTION, SOLUTION INTRAVENOUS at 21:57

## 2020-04-12 NOTE — THERAPY EVALUATION
Acute Care - Speech Language Pathology   Swallow Initial Evaluation  [facility]     Patient Name: Marge Mcghee  : 1955  MRN: [id number]  Today's Date: 2020               Admit Date: 2020    Visit Dx:     ICD-10-CM ICD-9-CM   1. Acute nonintractable headache, unspecified headache type R51 784.0   2. Neck pain M54.2 723.1   3. Dysphasia R47.02 784.59     Patient Active Problem List   Diagnosis   • Type 2 diabetes mellitus with peripheral neuropathy (CMS/HCC)   • Age-related osteoporosis without current pathological fracture   • Essential hypertension   • Hyperlipidemia   • Hepatic steatosis   • Gastroesophageal reflux disease with esophagitis   • Adrenal nodule (CMS/HCC)   • Vitamin D deficiency   • Thyroid nodule   • Urinary tract infection without hematuria   • REINA (acute kidney injury) (CMS/HCC)   • Hyperglycemia   • Acute right flank pain   • Vomiting   • Hypomagnesemia   • Dehydration   • Class 1 obesity in adult   • Acute nonintractable headache   • Difficulty speaking   • Dyspnea     Past Medical History:   Diagnosis Date   • Adrenal nodule (CMS/HCC) 2016    FINDINGS: Mild hepatic steatosis may be present. Cholecystectomy. No biliary ductal dilatation. Negative pancreas, spleen, left adrenal gland, and kidneys. The right adrenal presumed adenoma has increased slightly, measuring 3 x 4 cm in diameter,  compared to 2 x 3.5 cm on the previous exam. The attenuation values are consistent with an adenoma. Done [facility] in 2018   • Age-related osteoporosis without current pathological fracture 2016   • Essential hypertension 2016   • Gastroesophageal reflux disease with esophagitis 2016   • Hepatic steatosis 2016   • Hyperlipidemia 2016   • Thyroid nodule 10/18/2019   • Type 2 diabetes mellitus with peripheral neuropathy (CMS/HCC) 2016   • Vitamin D deficiency 2019     Past Surgical History:   Procedure Laterality Date   • BREAST  BIOPSY Left     7/2019   • CATARACT EXTRACTION WITH INTRAOCULAR LENS IMPLANT Bilateral    • CHOLECYSTECTOMY     • COLONOSCOPY     • KNEE ARTHROSCOPY Bilateral    • TOTAL ABDOMINAL HYSTERECTOMY     • UPPER GASTROINTESTINAL ENDOSCOPY  08/2016        SWALLOW EVALUATION (last 72 hours)      SLP Adult Swallow Evaluation     Row Name 04/12/20 1500          Document Type  evaluation  -CP    Subjective Information  complains of;weakness headache  -CP    Patient Observations  alert;cooperative  -CP    Patient/Family Observations  Pt alert but c/oheadache. Her voice was noted to be weak. Pt able to follow all commands and answer questions appropriately  -CP    Patient Effort  adequate  -CP    Comment  Gloves, gown, eye shield and mask worn for duration of session  due to increased risk of pt coughing and expectorating secretions.   -CP          Patient Profile Reviewed  yes  -CP    Pertinent History Of Current Problem  Pt admitted with L sided weakness and intractible headache. Pt being w/u for CVA. pt failed the swallow screen.  -CP    Current Method of Nutrition  NPO  -CP    Prior Level of Function-Communication  WFL  -CP    Prior Level of Function-Swallowing  regular textures;thin liquids;safe, efficient swallowing in all situations  -CP    Plans/Goals Discussed with  patient  -CP    Barriers to Rehab  none identified  -CP          Additional Documentation  Pain Scale: Numbers Pre/Post-Treatment (Group)  -CP          Pain Scale: Numbers, Pretreatment  4/10  -CP    Pain Scale: Numbers, Post-Treatment  4/10  -CP    Pre/Post Treatment Pain Comment  Headache, RN aware  -CP          Dentition Assessment  natural, present and adequate  -CP    Secretion Management  WNL/WFL  -CP    Mucosal Quality  dry  -CP          Oral Motor General Assessment  generalized oral motor weakness;oral labial or buccal impairment  -CP    Oral Labial or Buccal Impairment, Detail, Cranial Nerve VII (Facial):  left labial droop;other (see comments)  during smile only, no obvious facial droop)  -CP    Lingual Impairment, Detail. Cranial Nerves IX, XII (Glossopharyngeal and Hypoglossal)  reduced lingual ROM;reduced strength;bilaterally  -CP          Respiratory Support Currently in Use  room air  -CP    Eating/Swallowing Skills  fed by SLP  -CP    Positioning During Eating  upright 90 degree;upright in bed  -CP    Utensils Used  spoon  -CP    Consistencies Trialed  thin liquids;pureed  -CP          Clinical Swallow Evaluation Summary  Pt seen fo clinical swallow eval. Pt given trials of ice chips, water by spoon and applesauce only. Pt exhibited weak swallow with increaing wet vocal quality and cough after each water trial. Pt had multiple weak swallows with suspected pharyngeal residue. pt c/o feeling globus sensation in the mid-throat area. It is rec that pt be NPO for now. Further eval of swallow with VFSS was rec prior to pt initiating PO. ST will follow up with recs from [facility].   -CP          SLP Swallowing Diagnosis  moderate;oral dysfunction;suspected pharyngeal dysfunction  -CP    Functional Impact  risk of aspiration/pneumonia;risk of malnutrition  -CP    Rehab Potential/Prognosis, Swallowing  good, to achieve stated therapy goals  -CP    Swallow Criteria for Skilled Therapeutic Interventions Met  demonstrates skilled criteria  -CP          Therapy Frequency (Swallow)  PRN  -CP    Predicted Duration Therapy Intervention (Days)  until discharge  -CP    SLP Diet Recommendation  NPO  -CP    Recommended Diagnostics  reassess via [facility] ([facility]  -CP    SLP Rec. for Method of Medication Administration  meds via alternate route  -CP          Oral Nutrition/Hydration Goal Selection (SLP)  oral nutrition/hydration, SLP goal 1;oral nutrition/hydration, SLP goal 2  -CP          Oral Nutrition/Hydration Goal 1, SLP  Pt will have instrumental swallow eval with 24-48 hours  -CP    Time Frame (Oral Nutrition/Hydration Goal 1, SLP)  1 day;2 days  -CP          Oral  Nutrition/Hydration Goal 2, SLP  Pt will tolerate L/R diet/liquids with no complications of aspiration.   -CP    Time Frame (Oral Nutrition/Hydration Goal 2, SLP)  by discharge  -CP      User Key  (r) = Recorded By, (t) = Taken By, (c) = Cosigned By    Initials Name Effective Dates    Sandra Gleason SLP 03/01/19 -           EDUCATION  The patient has been educated in the following areas:   NPO rationale.    SLP Recommendation and Plan  SLP Swallowing Diagnosis: moderate, oral dysfunction, suspected pharyngeal dysfunction  SLP Diet Recommendation: NPO     SLP Rec. for Method of Medication Administration: meds via alternate route        Recommended Diagnostics: reassess via VFSS ([facility])  Swallow Criteria for Skilled Therapeutic Interventions Met: demonstrates skilled criteria     Rehab Potential/Prognosis, Swallowing: good, to achieve stated therapy goals  Therapy Frequency (Swallow): PRN  Predicted Duration Therapy Intervention (Days): until discharge            SLP GOALS     Row Name 04/12/20 1500             Oral Nutrition/Hydration Goal 1 (SLP)    Oral Nutrition/Hydration Goal 1, SLP  Pt will have instrumental swallow eval with 24-48 hours  -CP      Time Frame (Oral Nutrition/Hydration Goal 1, SLP)  1 day;2 days  -CP         Oral Nutrition/Hydration Goal 2 (SLP)    Oral Nutrition/Hydration Goal 2, SLP  Pt will tolerate L/R diet/liquids with no complications of aspiration.   -CP      Time Frame (Oral Nutrition/Hydration Goal 2, SLP)  by discharge  -CP        User Key  (r) = Recorded By, (t) = Taken By, (c) = Cosigned By    Initials Name Provider Type    Sandra Gleason, SLP Speech and Language Pathologist             Time Calculation:                URIEL Burton  4/12/2020

## 2020-04-12 NOTE — PLAN OF CARE
Problem: Patient Care Overview  Goal: Plan of Care Review  Outcome: Ongoing (interventions implemented as appropriate)  Note:   Pt seen fo clinical swallow eval. Pt given trials of ice chips, water by spoon and applesauce only. Pt exhibited weak swallow with increaing wet vocal quality and cough after each water trial. Pt had multiple weak swallows with suspected pharyngeal residue. pt c/o feeling globus sensation in the mid-throat area. It is rec that pt be NPO for now. Further eval of swallow with [facility] was rec prior to pt initiating PO. ST will follow up with recs from [facility].

## 2020-04-12 NOTE — PLAN OF CARE
Note:   Pt transferred to [facility] around 1600 from PCU after covid resulted negative. NPO, video swallow tomorrow 4/13. Vitals stable. Plan of care discussed. Will continue to monitor.

## 2020-04-12 NOTE — ED PROVIDER NOTES
Subjective   65-year-old female presents with severe left-sided headache radiating to left anterior neck down left upper extremity with associated difficulty speaking.  History is gathered from patient and  over the phone.   states patient cooks supper and they had a good meal and then developed the above-mentioned symptoms acutely at 8 PM.  Patient attempted to lay down and take a nap and took an oxycodone 5/325 mg at 11 PM.  Patient symptoms were not improving per  and encouraged her to come to the hospital.  Symptoms are moderate to severe, no clear aggravating alleviating factors.  Patient has had some vomiting and diarrhea for the past several days but otherwise is felt well and denies any fever, cough, congestion, chest pain or shortness of air.          Review of Systems   Gastrointestinal: Positive for diarrhea, nausea and vomiting.   Musculoskeletal:        As per HPI, otherwise negative   Neurological: Positive for headaches.        As per HPI, otherwise negative   All other systems reviewed and are negative.      Past Medical History:   Diagnosis Date   • Adrenal nodule (CMS/HCC) 11/16/2016    FINDINGS: Mild hepatic steatosis may be present. Cholecystectomy. No biliary ductal dilatation. Negative pancreas, spleen, left adrenal gland, and kidneys. The right adrenal presumed adenoma has increased slightly, measuring 3 x 4 cm in diameter,  compared to 2 x 3.5 cm on the previous exam. The attenuation values are consistent with an adenoma. Done at [facility] in August 2018   • Age-related osteoporosis without current pathological fracture 11/16/2016   • Essential hypertension 11/16/2016   • Gastroesophageal reflux disease with esophagitis 11/16/2016   • Hepatic steatosis 11/16/2016   • Hyperlipidemia 11/16/2016   • Thyroid nodule 10/18/2019   • Type 2 diabetes mellitus with peripheral neuropathy (CMS/HCC) 11/16/2016   • Vitamin D deficiency 1/25/2019       Allergies   Allergen  Reactions   • Zofran [Ondansetron Hcl] Nausea And Vomiting     Does the opposite of its purpose   • Prochlorperazine Unknown - Low Severity   • Hydromorphone Rash   • Meperidine Itching and Swelling   • Prochlorperazine Maleate Unknown - Low Severity       Past Surgical History:   Procedure Laterality Date   • BREAST BIOPSY Left     7/2019   • CATARACT EXTRACTION WITH INTRAOCULAR LENS IMPLANT Bilateral    • CHOLECYSTECTOMY     • COLONOSCOPY     • KNEE ARTHROSCOPY Bilateral    • TOTAL ABDOMINAL HYSTERECTOMY     • UPPER GASTROINTESTINAL ENDOSCOPY  08/2016       Family History   Problem Relation Age of Onset   • Diabetes Mother    • COPD Mother    • Hypertension Mother    • Kidney disease Mother    • Obesity Mother    • Thyroid disease Mother    • Diabetes Sister    • Diabetes Sister    • Diabetes Sister    • Diabetes Sister        Social History     Socioeconomic History   • Marital status:      Spouse name: Not on file   • Number of children: Not on file   • Years of education: Not on file   • Highest education level: Not on file   Tobacco Use   • Smoking status: Never Smoker   • Smokeless tobacco: Never Used   Substance and Sexual Activity   • Alcohol use: Yes     Comment: socially    • Drug use: Never   • Sexual activity: Defer           Objective   Physical Exam   Constitutional: She is oriented to person, place, and time. She appears well-developed and well-nourished.   HENT:   Head: Normocephalic and atraumatic.   Mouth/Throat: Oropharynx is clear and moist.   Eyes: Pupils are equal, round, and reactive to light. Conjunctivae and EOM are normal.   Neck:   Left lateral neck pain with full range of motion, no meningismus, no carotid bruit, carotid symmetric bilaterally   Cardiovascular: Normal rate, regular rhythm, normal heart sounds and intact distal pulses.   Pulmonary/Chest: Effort normal and breath sounds normal.   Abdominal: Soft. Bowel sounds are normal. She exhibits no distension. There is no  tenderness.   Musculoskeletal: Normal range of motion. She exhibits no edema or tenderness.   Neurological: She is alert and oriented to person, place, and time.   Cranial nerves intact with exception of subjective decreased touch left facial.  No pronator drift, 4-5 strength left upper extremity, subjective decreased touch left upper extremity, remainder of extremities with intact sensation and motor.  Patient's speech is clear discernible and appropriate   Skin: Capillary refill takes less than 2 seconds.   Psychiatric: She has a normal mood and affect. Her behavior is normal.       Procedures           ED Course                                           MDM  Number of Diagnoses or Management Options  Acute nonintractable headache, unspecified headache type:   Dysphasia:   Neck pain:   Diagnosis management comments: Code stroke was initiated, case discussed with on-call neurologist, Dr. Moran, patient's window was thought to be 2 hours but when onset of symptoms were clarified to be at 8 PM, patient is deemed not to be a TPA candidate, work-up is ongoing.    Results for orders placed or performed during the hospital encounter of 04/12/20  -Comprehensive Metabolic Panel       Result                      Value             Ref Range           Glucose                     119 (H)           65 - 99 mg/dL       BUN                         19                8 - 23 mg/dL        Creatinine                  1.04 (H)          0.57 - 1.00 *       Sodium                      138               136 - 145 mm*       Potassium                   4.1               3.5 - 5.2 mm*       Chloride                    100               98 - 107 mmo*       CO2                         24.0              22.0 - 29.0 *       Calcium                     9.5               8.6 - 10.5 m*       Total Protein               7.2               6.0 - 8.5 g/*       Albumin                     4.10              3.50 - 5.20 *       ALT (SGPT)                   13                1 - 33 U/L          AST (SGOT)                  25                1 - 32 U/L          Alkaline Phosphatase        50                39 - 117 U/L        Total Bilirubin             0.4               0.2 - 1.2 mg*       eGFR Non  Amer       53 (L)            >60 mL/min/1*       Globulin                    3.1               gm/dL               A/G Ratio                   1.3               g/dL                BUN/Creatinine Ratio        18.3              7.0 - 25.0          Anion Gap                   14.0              5.0 - 15.0 m*  -Protime-INR       Result                      Value             Ref Range           Protime                     11.2              9.6 - 11.7 S*       INR                         1.09              0.90 - 1.10    -aPTT       Result                      Value             Ref Range           PTT                         25.4              24.0 - 31.0 *  -Troponin       Result                      Value             Ref Range           Troponin T                  <0.010            0.000 - 0.03*  -CBC Auto Differential       Result                      Value             Ref Range           WBC                         8.80              3.40 - 10.80*       RBC                         4.72              3.77 - 5.28 *       Hemoglobin                  13.5              12.0 - 15.9 *       Hematocrit                  40.6              34.0 - 46.6 %       MCV                         85.9              79.0 - 97.0 *       MCH                         28.5              26.6 - 33.0 *       MCHC                        33.2              31.5 - 35.7 *       RDW                         13.6              12.3 - 15.4 %       RDW-SD                      41.1              37.0 - 54.0 *       MPV                         7.1               6.0 - 12.0 fL       Platelets                   313               140 - 450 10*       Neutrophil %                58.2              42.7 - 76.0 %        Lymphocyte %                30.7              19.6 - 45.3 %       Monocyte %                  7.4               5.0 - 12.0 %        Eosinophil %                2.6               0.3 - 6.2 %         Basophil %                  1.1               0.0 - 1.5 %         Neutrophils, Absolute       5.10              1.70 - 7.00 *       Lymphocytes, Absolute       2.70              0.70 - 3.10 *       Monocytes, Absolute         0.70              0.10 - 0.90 *       Eosinophils, Absolute       0.20              0.00 - 0.40 *       Basophils, Absolute         0.10              0.00 - 0.20 *       nRBC                        0.2               0.0 - 0.2 /1*  -POC Glucose Once       Result                      Value             Ref Range           Glucose                     120 (H)           70 - 105 mg/*  -Light Blue Top       Result                      Value             Ref Range           Extra Tube                                                    hold for add-on  -Green Top (Gel)       Result                      Value             Ref Range           Extra Tube                                                    Hold for add-ons.  -Lavender Top       Result                      Value             Ref Range           Extra Tube                                                    hold for add-on  -Gold Top - SST       Result                      Value             Ref Range           Extra Tube                                                    Hold for add-ons.  Ct Angiogram Head    Result Date: 4/12/2020  1. Atherosclerosis. Minor stenosis proximal left P1 segment. No other significant stenosis or occlusion of the major arteries of the head and neck. Artifact on earlier noncontrast CT 2. MRI is more sensitive for the detection of acute nonhemorrhagic infarct. 3. Coronary artery disease. Report called to DR. GOLDMAN at 4/12/2020 2:51 AM Electronically signed by:  Donald Martini M.D.  4/12/2020 12:57 AM    Ct Angiogram  Neck    Result Date: 4/12/2020  1. Atherosclerosis. Minor stenosis proximal left P1 segment. No other significant stenosis or occlusion of the major arteries of the head and neck. Artifact on earlier noncontrast CT 2. MRI is more sensitive for the detection of acute nonhemorrhagic infarct. 3. Coronary artery disease. Report called to DR. GOLDMAN at 4/12/2020 2:51 AM Electronically signed by:  Donald Martini M.D.  4/12/2020 12:57 AM    Xr Chest 1 View    Result Date: 4/12/2020  No acute infiltrate is appreciated.  Electronically Signed By-Dr. Gonsalo Mahoney MD On:4/12/2020 2:57 AM This report was finalized on [id number] by Dr. Gonsalo Mahoney MD.    Ct Head Without Contrast Stroke Protocol    Result Date: 4/12/2020  1. Question slight hyperdensity of the distal left M1/proximal M2 segments. Artifact versus thrombus. Recommend CTA head. 2. No acute intracranial hemorrhage. MRI is more sensitive for the detection of acute nonhemorrhagic infarct. 2. Minor changes small vessel ischemic disease of indeterminate age, presumably mostly chronic. Volume loss. Atherosclerosis. Report called to DR. GOLDMAN at 4/12/2020 2:10 AM Electronically signed by:  Donald Martini M.D.  4/12/2020 12:15 AM    Ct Cerebral Perfusion With & Without Contrast    Result Date: 4/12/2020  1. Unremarkable.  Electronically signed by:  Donald Martini M.D.  4/12/2020 12:47 AM      Reevaluated, exam unchanged.  Findings on CT CTA CT perfusion discussed with Dr. Coley, agrees with Toradol for pain, requests MRI to rule out acute stroke.  Patient outside of the window for TPA and no significant stenosis or aneurysm or dissection on CTA.  No fever, cough, shortness of air to support diagnostic suspicion of coronavirus infection.       Amount and/or Complexity of Data Reviewed  Clinical lab tests: reviewed  Tests in the radiology section of CPT®: reviewed    Risk of Complications, Morbidity, and/or Mortality  Presenting problems:  high  Diagnostic procedures: high  Management options: high    Critical Care  Total time providing critical care: 30-74 minutes    Patient Progress  Patient progress: stable      Final diagnoses:   Acute nonintractable headache, unspecified headache type   Neck pain   Dysphasia            Mikhail Barbour MD  04/12/20 0332

## 2020-04-12 NOTE — PROGRESS NOTES
Case Management Readmission Assessment Note       Case Management Readmission Assessment (all recorded)      Readmission Interview     Row Name 04/12/20 0352             Readmission Indications    Is this hospitalization related to the prior hospital diagnosis?  No      What was the reason you were admitted?  Tonight came in for Headache and neurologic symptoms      Row Name 04/12/20 0352             Recommendation for rehospitalization    Did you speak with your physician prior to coming to the hospital  -- Spouse called EMS      Who recommended you return to the hospital?  -- Spouse      Did you seek care elsewhere prior to coming to the hospital?  No      Row Name 04/12/20 0352             Follow up appointment    Do you have a PCP?  -- Unknown      Row Name 04/12/20 0352             Medications    Did you have newly prescribed medications at discharge?  -- Unknown      Row Name 04/12/20 0352             Index discharge location/services    Where did you go upon discharge?  Home      Do you have supportive family or friends in the home?  Yes Lives with spouse      Row Name 04/12/20 0352             Discharge Readiness    On a scale of 1-5 (5 being well prepared), how ready were you for discharge  -- Patient unable to give info. GCS 13 and NIH of 4 Dysphasia.

## 2020-04-12 NOTE — CONSULTS
Primary Care Provider: Provider, No Known     Consult requested by: Dr. Barbour    Reason for Consultation: Neurological evaluation, stroke evaluation    History taken from: patient chart RN    Chief complaint: Left-sided headache and difficulty speaking       SUBJECTIVE:    History of present illness:  65 year old female presented to ED with complaints of left side parietal moderate to severe headache that radiated to the left side of her posterior neck and down her left arm with paresthesia. She is awake , alert and good historian.She reports she had difficulty speaking and in answering she is hesitant before she speaks but then answers appropriately. She initially was reported to have symptoms at 8 pm tonight and tried to sleep and awakened at  11 pm and  advised her to come to ED. Code stroke was called in ED .     Portions of the above HPI have been copied from previous encounters and edited as appropriate.     Per Dr. Pierce, no complaints of headache at this time. Patient feels that she continues to have difficulty with her speech. No slurred speech of visual changes.     Review of Systems   Constitutional: Negative for fatigue.   Eyes: Negative for visual disturbance.   Cardiovascular: Negative.    Gastrointestinal: Positive for diarrhea and nausea.   Neurological: Positive for speech difficulty and headaches. Negative for dizziness, tremors, seizures, syncope, facial asymmetry, weakness, light-headedness and numbness.          PATIENT HISTORY:  Past Medical History:   Diagnosis Date   • Adrenal nodule (CMS/HCC) 11/16/2016    FINDINGS: Mild hepatic steatosis may be present. Cholecystectomy. No biliary ductal dilatation. Negative pancreas, spleen, left adrenal gland, and kidneys. The right adrenal presumed adenoma has increased slightly, measuring 3 x 4 cm in diameter,  compared to 2 x 3.5 cm on the previous exam. The attenuation values are consistent with an adenoma. Done at [facility] in  August 2018   • Age-related osteoporosis without current pathological fracture 11/16/2016   • Essential hypertension 11/16/2016   • Gastroesophageal reflux disease with esophagitis 11/16/2016   • Hepatic steatosis 11/16/2016   • Hyperlipidemia 11/16/2016   • Thyroid nodule 10/18/2019   • Type 2 diabetes mellitus with peripheral neuropathy (CMS/HCC) 11/16/2016   • Vitamin D deficiency 1/25/2019   , Past Surgical History:   Procedure Laterality Date   • BREAST BIOPSY Left     7/2019   • CATARACT EXTRACTION WITH INTRAOCULAR LENS IMPLANT Bilateral    • CHOLECYSTECTOMY     • COLONOSCOPY     • KNEE ARTHROSCOPY Bilateral    • TOTAL ABDOMINAL HYSTERECTOMY     • UPPER GASTROINTESTINAL ENDOSCOPY  08/2016   , Family History   Problem Relation Age of Onset   • Diabetes Mother    • COPD Mother    • Hypertension Mother    • Kidney disease Mother    • Obesity Mother    • Thyroid disease Mother    • Diabetes Sister    • Diabetes Sister    • Diabetes Sister    • Diabetes Sister    , Social History     Tobacco Use   • Smoking status: Never Smoker   • Smokeless tobacco: Never Used   Substance Use Topics   • Alcohol use: Yes     Comment: socially    • Drug use: Never   , Medications Prior to Admission   Medication Sig Dispense Refill Last Dose   • atorvastatin (LIPITOR) 80 MG tablet Take 1 tablet by mouth Every Night. 90 tablet 1 Taking   • Cholecalciferol (VITAMIN D-3) 25 MCG (1000 UT) capsule Take 1,000 Units by mouth Daily. 60 capsule  Taking   • Cyanocobalamin (VITAMIN B-12) 3000 MCG sublingual tablet Place 1 tablet/day under the tongue.   Taking   • denosumab (PROLIA) 60 MG/ML solution syringe Inject 60 mg under the skin into the appropriate area as directed Every 6 (Six) Months. Nov 2019   Unknown at Unknown time   • DULoxetine (CYMBALTA) 60 MG capsule Take 60 mg by mouth Daily.   Taking   • glimepiride (AMARYL) 4 MG tablet Take 1 tablet by mouth 2 (Two) Times a Day. 180 tablet 1    • losartan (COZAAR) 100 MG tablet Take 1 tablet  by mouth Daily. 90 tablet 2 Taking   • metFORMIN (GLUCOPHAGE) 1000 MG tablet Take 1 tablet by mouth 2 (Two) Times a Day With Meals. 180 tablet 1 Taking   • Multiple Vitamin (MULTI-VITAMIN PO) Take 1 tablet by mouth Daily.   Taking   • pantoprazole (PROTONIX) 40 MG EC tablet Take 1 tablet by mouth Daily. 90 tablet 1 Unknown at Unknown time   • potassium chloride (K-DUR,KLOR-CON) 20 MEQ CR tablet Take 20 mEq by mouth Daily.   Taking   • promethazine (PHENERGAN) 25 MG tablet Take 25 mg by mouth Every 6 (Six) Hours As Needed for Nausea or Vomiting.   Taking   • sucralfate (CARAFATE) 1 g tablet Take 1 g by mouth 2 (Two) Times a Day.   Unknown at Unknown time   • vitamin D (ERGOCALCIFEROL) 10531 units capsule capsule Take 50,000 Units by mouth 1 (One) Time Per Week. Monday   Unknown at Unknown time   , Scheduled Meds:    aspirin 325 mg Oral Daily   Or      aspirin 300 mg Rectal Daily   atorvastatin 80 mg Oral Nightly   DULoxetine 60 mg Oral Daily   glipizide 10 mg Oral QAM AC   insulin lispro 0-9 Units Subcutaneous 4x Daily With Meals & Nightly   losartan 100 mg Oral Daily   pantoprazole 40 mg Oral Q AM   potassium chloride 20 mEq Oral Daily   sodium chloride 10 mL Intravenous Q12H   sucralfate 1 g Oral BID AC   Thera 1 tablet Oral Daily   vitamin B-12 1,000 mcg Oral Daily   vitamin D3 5,000 Units Oral Daily   , Continuous Infusions:    sodium chloride 75 mL/hr Last Rate: 75 mL/hr (04/12/20 0738)   , PRN Meds:  bisacodyl  •  dextrose  •  dextrose  •  glucagon (human recombinant)  •  insulin lispro **AND** insulin lispro  •  ondansetron  •  promethazine  •  sodium chloride  •  sodium chloride, Allergies:  Zofran [ondansetron hcl]; Prochlorperazine; Hydromorphone; Meperidine; and Prochlorperazine maleate    ________________________________________________________        OBJECTIVE:    Due to patient's pending Covid test and limited PPE during the Covid 19 pandemic,  exam is witnessed outside the room using Dr. Pierce's exam.   Patient is awake and alert, oriented x3.  No complaints of headache.  Slight drift on the left lower extremity. No dysarthria. Pt states she has difficulty with her words but no obvious aphasia.  Detailed exam to follow pending Covid testing.     ________________________________________________________   RESULTS REVIEW:    VITAL SIGNS:   Temp:  [98.5 °F (36.9 °C)-98.7 °F (37.1 °C)] 98.7 °F (37.1 °C)  Heart Rate:  [78-92] 82  Resp:  [12-18] 12  BP: (143-169)/(77-93) 143/89     LABS:  WBC   Date Value Ref Range Status   04/12/2020 8.80 3.40 - 10.80 10*3/mm3 Final     RBC   Date Value Ref Range Status   04/12/2020 4.72 3.77 - 5.28 10*6/mm3 Final     Hemoglobin   Date Value Ref Range Status   04/12/2020 13.5 12.0 - 15.9 g/dL Final     Hematocrit   Date Value Ref Range Status   04/12/2020 40.6 34.0 - 46.6 % Final     MCV   Date Value Ref Range Status   04/12/2020 85.9 79.0 - 97.0 fL Final     MCH   Date Value Ref Range Status   04/12/2020 28.5 26.6 - 33.0 pg Final     MCHC   Date Value Ref Range Status   04/12/2020 33.2 31.5 - 35.7 g/dL Final     RDW   Date Value Ref Range Status   04/12/2020 13.6 12.3 - 15.4 % Final     RDW-SD   Date Value Ref Range Status   04/12/2020 41.1 37.0 - 54.0 fl Final     MPV   Date Value Ref Range Status   04/12/2020 7.1 6.0 - 12.0 fL Final     Platelets   Date Value Ref Range Status   04/12/2020 313 140 - 450 10*3/mm3 Final     Neutrophil %   Date Value Ref Range Status   04/12/2020 58.2 42.7 - 76.0 % Final     Lymphocyte %   Date Value Ref Range Status   04/12/2020 30.7 19.6 - 45.3 % Final     Monocyte %   Date Value Ref Range Status   04/12/2020 7.4 5.0 - 12.0 % Final     Eosinophil %   Date Value Ref Range Status   04/12/2020 2.6 0.3 - 6.2 % Final     Basophil %   Date Value Ref Range Status   04/12/2020 1.1 0.0 - 1.5 % Final     Neutrophils, Absolute   Date Value Ref Range Status   04/12/2020 5.10 1.70 - 7.00 10*3/mm3 Final     Lymphocytes, Absolute   Date Value Ref Range Status    04/12/2020 2.70 0.70 - 3.10 10*3/mm3 Final     Monocytes, Absolute   Date Value Ref Range Status   04/12/2020 0.70 0.10 - 0.90 10*3/mm3 Final     Eosinophils, Absolute   Date Value Ref Range Status   04/12/2020 0.20 0.00 - 0.40 10*3/mm3 Final     Basophils, Absolute   Date Value Ref Range Status   04/12/2020 0.10 0.00 - 0.20 10*3/mm3 Final     nRBC   Date Value Ref Range Status   04/12/2020 0.2 0.0 - 0.2 /100 WBC Final     Glucose   Date Value Ref Range Status   04/12/2020 119 (H) 65 - 99 mg/dL Final     BUN   Date Value Ref Range Status   04/12/2020 19 8 - 23 mg/dL Final     Creatinine   Date Value Ref Range Status   04/12/2020 1.04 (H) 0.57 - 1.00 mg/dL Final     Sodium   Date Value Ref Range Status   04/12/2020 138 136 - 145 mmol/L Final     Potassium   Date Value Ref Range Status   04/12/2020 4.1 3.5 - 5.2 mmol/L Final     Chloride   Date Value Ref Range Status   04/12/2020 100 98 - 107 mmol/L Final     CO2   Date Value Ref Range Status   04/12/2020 24.0 22.0 - 29.0 mmol/L Final     Calcium   Date Value Ref Range Status   04/12/2020 9.5 8.6 - 10.5 mg/dL Final     Total Protein   Date Value Ref Range Status   04/12/2020 7.2 6.0 - 8.5 g/dL Final     Albumin   Date Value Ref Range Status   04/12/2020 4.10 3.50 - 5.20 g/dL Final     ALT (SGPT)   Date Value Ref Range Status   04/12/2020 13 1 - 33 U/L Final     AST (SGOT)   Date Value Ref Range Status   04/12/2020 25 1 - 32 U/L Final     Comment:     Specimen hemolyzed.  Results may be affected.     Alkaline Phosphatase   Date Value Ref Range Status   04/12/2020 50 39 - 117 U/L Final     Total Bilirubin   Date Value Ref Range Status   04/12/2020 0.4 0.2 - 1.2 mg/dL Final     eGFR Non  Amer   Date Value Ref Range Status   04/12/2020 53 (L) >60 mL/min/1.73 Final     BUN/Creatinine Ratio   Date Value Ref Range Status   04/12/2020 18.3 7.0 - 25.0 Final     Anion Gap   Date Value Ref Range Status   04/12/2020 14.0 5.0 - 15.0 mmol/L Final       Lab Results    Component Value Date    TSH 0.738 04/12/2020    LDL 90 04/12/2020    HGBA1C 12.1 (H) 03/23/2020    FIAYCGVQ38 1,044 (H) 03/13/2020         IMAGING STUDIES:  Ct Angiogram Head    Result Date: 4/12/2020  1. Atherosclerosis. Minor stenosis proximal left P1 segment. No other significant stenosis or occlusion of the major arteries of the head and neck. Artifact on earlier noncontrast CT 2. MRI is more sensitive for the detection of acute nonhemorrhagic infarct. 3. Coronary artery disease. Report called to DR. GOLDMAN at 4/12/2020 2:51 AM Electronically signed by:  Donald Martini M.D.  4/12/2020 12:57 AM    Ct Angiogram Neck    Result Date: 4/12/2020  1. Atherosclerosis. Minor stenosis proximal left P1 segment. No other significant stenosis or occlusion of the major arteries of the head and neck. Artifact on earlier noncontrast CT 2. MRI is more sensitive for the detection of acute nonhemorrhagic infarct. 3. Coronary artery disease. Report called to DR. GOLDMAN at 4/12/2020 2:51 AM Electronically signed by:  Donald Martini M.D.  4/12/2020 12:57 AM    Xr Chest 1 View    Result Date: 4/12/2020  No acute infiltrate is appreciated.  Electronically Signed By-Dr. Gonsalo Mahoney MD On:4/12/2020 2:57 AM This report was finalized on [id number] by Dr. Gonsalo Mahoney MD.    Ct Head Without Contrast Stroke Protocol    Result Date: 4/12/2020  1. Question slight hyperdensity of the distal left M1/proximal M2 segments. Artifact versus thrombus. Recommend CTA head. 2. No acute intracranial hemorrhage. MRI is more sensitive for the detection of acute nonhemorrhagic infarct. 2. Minor changes small vessel ischemic disease of indeterminate age, presumably mostly chronic. Volume loss. Atherosclerosis. Report called to DR. GOLDMAN at 4/12/2020 2:10 AM Electronically signed by:  Donald Martini M.D.  4/12/2020 12:15 AM    Ct Cerebral Perfusion With & Without Contrast    Result Date: 4/12/2020  1. Unremarkable.   Electronically signed by:  Donald Martini M.D.  4/12/2020 12:47 AM      I reviewed the patient's new clinical results.      ________________________________________________________     PROBLEM LIST:    Acute nonintractable headache    Difficulty speaking    Dyspnea        Assessment/Plan   ASSESSMENT/PLAN:    1. Left facial droop and difficulty with speech. Stroke work up pending. Covid rule out pending.   - CT head: No acute hemorrhage or acute findings.  - MRI brain once Covid test is negative   - CTA head and neck: Atherosclerosis involving the left proximal M1 segment, no other significant stenosis or occlusion.  - Echo pending  - EKG: Sinus rhythm, rate 82, TN interval 171  - Labs: A1C: 12.1, B12: P, LDL:  90, TSH: 0738  - Antithrombotics: Patient on ASA at home, refuses TN dose here in hospital until ST clears here. Will add Plavix 75 mg daily once able to take PO meds.   - Statin: Lipitor 80  - PT/OT/ST as appropriate, Neuro checks per protocol, DVT prophylaxis, Stroke education    2. Headache, resolved  - Tylenol as needed     3. Recent dry cough, diarrhea, nausea  -Covid test pending  -Management per primary    4. Type 2 Diabetes Mellitus, uncontrolled  - A1C: 12.1  - Strict glycemic control, SSI, diabetic diet, diabetes educator    5. Hypertension  - Continue home medications  - BP goal 130/90, avoid hypotension    6. Vitamin B12 deficiency  -Continue sublingual tablet home dose    7. Modification of stroke risk factors:   - Blood pressure should be less than 130/80 outpatient, HbA1c less than 6.5, LDL less than 70; b12>500 and smoking cessation if applicable. We would be grateful if the primary team / primary care physician would keep a close watch on the above targets.  - Stroke education  - Follow up with neurologist of choice      I discussed the patient's findings and my recommendations with nursing staff and primary care team.     Nidia Uribe, APRN  04/12/20  08:55

## 2020-04-12 NOTE — PLAN OF CARE
Patient is resting, patient received stroke booklet, discussed:  Types of Strokes, Risk factors, and additional testing such as CT, MRI, ECHO, Carotid Dopplers

## 2020-04-12 NOTE — ED NOTES
Patient states [facility]WK 1.5 hours ago. States started having difficulty getting words out then started to have HA to left side that radiated down whole left side of body.      Liliana Wynne RN  04/12/20 [id number]

## 2020-04-12 NOTE — H&P
"      [facility]      Patient Name: Marge Mcghee  : 1955  MRN: [id number]  Primary Care Physician: Traci Townsend APRN  Date of admission: 2020    Patient Care Team:  Traci Townsend APRN as PCP - General (Nurse Practitioner)  Drake Hinton MD PhD as Consulting Physician (Ophthalmology)  Fausto Gilliam MD as Consulting Physician (Endocrinology)  Saloni Ch MD (Ophthalmology)  Mikhail Kaplan Jr., MD as Consulting Physician (Urology)          Subjective   History Present Illness     Chief Complaint:   Chief Complaint   Patient presents with   • Headache                65 year old female presented to ED with complaints of left side parietal moderate to severe headache that radiated to the left side of her posterior neck and down her left arm with paresthesia. She is awake , alert and good historian.She reports she had difficulty speaking and in answering she is hesitant before she speaks but then answers appropriately. She initially was reported to have symptoms at 8 pm tonight and tried to sleep and awakened at  11 pm and  advised her to come to ED. Code stroke was called in ED . CT head per radiology :      \"1. Unremarkable.\"    CTA head and neck per radiology:  \"IMPRESSION:  1. Question slight hyperdensity of the distal left M1/proximal M2 segments. Artifact versus thrombus. Recommend CTA head.  2. No acute intracranial hemorrhage. MRI is more sensitive for the detection of acute nonhemorrhagic infarct.  2. Minor changes small vessel ischemic disease of indeterminate age, presumably mostly chronic. Volume loss. Atherosclerosis.\"    Dr Moran of neurology was consulted from ED and it was determined she was past the window for TPa but she will be admitted for MRI in am .     She also complains of \" feeling like shit\". She reports weakness, diarrhea for 2 days, dyspnea and dry cough\" She denies fever and is afebrile here. 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 She reports \" they are very sick\" Review of records shows she was admitted here 3/23/20 for UTI REINA, Flank pain and possible gastroenteritis. She had a Covid-19 test on 3/24/20 which resulted as negative but has been back to work since that test was performed. She also had an abnormal CT abdomen at that time which showed stranding of mesenteric lymph nodes and was advised to follow up with GI for evaluation for possible lymphoma. She has a PMH of diabetes mellitus 2 well controlled , osteoporosis, essential hypertension  with BP on admission of 157/93, HLD and GERD. Troponin was negative . Given dyspnea and cough and possible exposure to Covid -19 ( she reports she wore appropriate PPE), an additional Covid-19 test has been ordered and pending . She will be further evaluated by neurology as well. UA ordered and pending .         Review of Systems   Constitution: Positive for malaise/fatigue.   Eyes: Negative.    Cardiovascular: Negative.    Respiratory: Positive for cough and shortness of breath.    Endocrine: Negative.    Hematologic/Lymphatic: Negative.    Skin: Negative.    Musculoskeletal: Positive for back pain.   Gastrointestinal: Positive for diarrhea.   Genitourinary: Positive for flank pain.   Neurological: Positive for aphonia, headaches, paresthesias and weakness.   Psychiatric/Behavioral: Negative.    Allergic/Immunologic: Negative.            Personal History     Past Medical History:   Past Medical History:   Diagnosis Date   • Adrenal nodule (CMS/HCC) 11/16/2016    FINDINGS: Mild hepatic steatosis may be present. Cholecystectomy. No biliary ductal dilatation. Negative pancreas, spleen, left adrenal gland, and kidneys. The right adrenal presumed adenoma has increased slightly, measuring 3 x 4 cm in diameter,  compared to 2 x 3.5 cm on the previous exam. The attenuation values are consistent with an adenoma. Done at [facility] in August 2018   • Age-related osteoporosis without current pathological fracture 11/16/2016   • Essential hypertension 11/16/2016   • Gastroesophageal reflux disease with esophagitis 11/16/2016   • Hepatic steatosis 11/16/2016   • Hyperlipidemia 11/16/2016   • Thyroid nodule 10/18/2019   • Type 2 diabetes mellitus with peripheral neuropathy (CMS/HCC) 11/16/2016   • Vitamin D deficiency 1/25/2019       Surgical History:      Past Surgical History:   Procedure Laterality Date   • BREAST BIOPSY Left     7/2019   • CATARACT EXTRACTION WITH INTRAOCULAR LENS IMPLANT Bilateral    • CHOLECYSTECTOMY     • COLONOSCOPY     • KNEE ARTHROSCOPY Bilateral    • TOTAL ABDOMINAL HYSTERECTOMY     • UPPER GASTROINTESTINAL ENDOSCOPY  08/2016           Family History: family history includes COPD in her mother; Diabetes in her mother, sister, sister, sister, and sister; Hypertension in her mother; Kidney disease in her mother; Obesity in her mother; Thyroid disease in her mother. Otherwise pertinent FHx was reviewed and unremarkable.     Social History:  reports that she has never smoked. She has never used smokeless tobacco. She reports that she drinks alcohol. She reports that she does not use drugs.      Medications:  Prior to Admission medications    Medication Sig Start Date End Date Taking? Authorizing Provider   atorvastatin (LIPITOR) 80 MG tablet Take 1 tablet by mouth Every Night. 8/7/19  Yes Fausto Gilliam MD   Cholecalciferol (VITAMIN D-3) 25 MCG (1000 UT) capsule Take 1,000 Units by mouth Daily. 10/22/19  Yes Fausto Gilliam MD   Cyanocobalamin (VITAMIN B-12) 3000 MCG sublingual tablet Place 1 tablet/day under the tongue.   Yes ProviderBi MD   denosumab (PROLIA) 60 MG/ML solution syringe Inject 60 mg under the skin into the appropriate area as directed Every 6 (Six) Months. Nov 2019   Yes ProviderBi MD   DULoxetine (CYMBALTA) 60 MG capsule Take 60 mg by mouth Daily. 8/26/16  Yes  Bi Haque MD   glimepiride (AMARYL) 4 MG tablet Take 1 tablet by mouth 2 (Two) Times a Day. 4/3/20 4/3/21 Yes Fausto Gilliam MD   losartan (COZAAR) 100 MG tablet Take 1 tablet by mouth Daily. 10/18/19  Yes Fausto Gilliam MD   metFORMIN (GLUCOPHAGE) 1000 MG tablet Take 1 tablet by mouth 2 (Two) Times a Day With Meals. 10/4/19  Yes Fausto Gilliam MD   Multiple Vitamin (MULTI-VITAMIN PO) Take 1 tablet by mouth Daily.   Yes Bi Haque MD   pantoprazole (PROTONIX) 40 MG EC tablet Take 1 tablet by mouth Daily. 6/14/19  Yes Fausto Gilliam MD   potassium chloride (K-DUR,KLOR-CON) 20 MEQ CR tablet Take 20 mEq by mouth Daily.   Yes Bi Haque MD   promethazine (PHENERGAN) 25 MG tablet Take 25 mg by mouth Every 6 (Six) Hours As Needed for Nausea or Vomiting. 6/14/19  Yes Bi Haque MD   sucralfate (CARAFATE) 1 g tablet Take 1 g by mouth 2 (Two) Times a Day.   Yes iB Haque MD   vitamin D (ERGOCALCIFEROL) 41694 units capsule capsule Take 50,000 Units by mouth 1 (One) Time Per Week. Monday   Yes Bi Haque MD   furosemide (LASIX) 20 MG tablet Take 20 mg by mouth Daily. 1/13/15 4/12/20  Bi Haque MD   glucose blood (OneTouch Verio) test strip 1 each by Other route Daily. Use as instructed 3/26/20 4/12/20  Zafar Pierce MD   Isopropyl Alcohol (ALCOHOL WIPES) 70 % misc Apply 1 each topically Daily. 3/26/20 4/12/20  Zafar Pierce MD   Lancets (ONETOUCH DELICA PLUS [id number]) misc 1 each Daily. 3/26/20 4/12/20  Zafar Pierce MD       Allergies:    Allergies   Allergen Reactions   • Zofran [Ondansetron Hcl] Nausea And Vomiting     Does the opposite of its purpose   • Prochlorperazine Unknown - Low Severity   • Hydromorphone Rash   • Meperidine Itching and Swelling   • Prochlorperazine Maleate Unknown - Low Severity       Objective   Objective     Vital Signs  Temp:  [98.5 °F (36.9  °C)] 98.5 °F (36.9 °C)  Heart Rate:  [78-92] 78  Resp:  [18] 18  BP: (157-169)/(77-93) 165/77  SpO2:  [96 %-98 %] 96 %  on   ;      Body mass index is 28.29 kg/m².    Physical Exam   Constitutional: She is oriented to person, place, and time. She appears well-developed and well-nourished.   HENT:   Head: Normocephalic and atraumatic.   Eyes: EOM are normal.   Neck: Normal range of motion. Neck supple.   Cardiovascular: Normal rate, regular rhythm and normal heart sounds.   Pulmonary/Chest: Effort normal and breath sounds normal.   Abdominal: Soft. Bowel sounds are normal.   Genitourinary:   Genitourinary Comments: deferred   Musculoskeletal: Normal range of motion.   Neurological: She is alert and oriented to person, place, and time.   Skin: Skin is warm and dry.   Psychiatric: She has a normal mood and affect. Her behavior is normal. Judgment and thought content normal.   Vitals reviewed.      Results Review:  I have personally reviewed most recent radiology images and interpretations and agree with findings, most notably: CTA, CT.    Results from last 7 days   Lab Units 04/12/20  0156   WBC 10*3/mm3 8.80   HEMOGLOBIN g/dL 13.5   HEMATOCRIT % 40.6   PLATELETS 10*3/mm3 313   INR  1.09     Results from last 7 days   Lab Units 04/12/20  0156   SODIUM mmol/L 138   POTASSIUM mmol/L 4.1   CHLORIDE mmol/L 100   CO2 mmol/L 24.0   BUN mg/dL 19   CREATININE mg/dL 1.04*   GLUCOSE mg/dL 119*   CALCIUM mg/dL 9.5   ALT (SGPT) U/L 13   AST (SGOT) U/L 25   TROPONIN T ng/mL <0.010     Estimated Creatinine Clearance: 53.9 mL/min (A) (by C-G formula based on SCr of 1.04 mg/dL (H)).  Brief Urine Lab Results  (Last result in the past 365 days)      Color   Clarity   Blood   Leuk Est   Nitrite   Protein   CREAT   Urine HCG        03/23/20 1620 Yellow Clear Large (3+) Negative Negative Negative               Microbiology Results (last 10 days)     ** No results found for the last 240 hours. **          ECG/EMG Results (most recent)      Procedure Component Value Units Date/Time    ECG 12 Lead [[id number]] Collected:  04/12/20 0229     Updated:  04/12/20 0231    Narrative:       HEART RATE= 82  bpm  RR Interval= 728  ms  TX Interval= 171  ms  P Horizontal Axis= 0  deg  P Front Axis= 36  deg  QRSD Interval= 84  ms  QT Interval= 379  ms  QRS Axis= -17  deg  T Wave Axis= 38  deg  - ABNORMAL ECG -  Sinus rhythm  Inferior infarct, old  Electronically Signed By:   Date and Time of Study: 2020-04-12 02:29:32          Results for orders placed in visit on 03/13/20   Duplex Carotid Ultrasound CAR    Narrative · Right internal carotid artery is normal.  · Left internal carotid artery is normal.               Ct Angiogram Head    Result Date: 4/12/2020  1. Atherosclerosis. Minor stenosis proximal left P1 segment. No other significant stenosis or occlusion of the major arteries of the head and neck. Artifact on earlier noncontrast CT 2. MRI is more sensitive for the detection of acute nonhemorrhagic infarct. 3. Coronary artery disease. Report called to DR. GOLDMAN at 4/12/2020 2:51 AM Electronically signed by:  Donald Martini M.D.  4/12/2020 12:57 AM    Ct Angiogram Neck    Result Date: 4/12/2020  1. Atherosclerosis. Minor stenosis proximal left P1 segment. No other significant stenosis or occlusion of the major arteries of the head and neck. Artifact on earlier noncontrast CT 2. MRI is more sensitive for the detection of acute nonhemorrhagic infarct. 3. Coronary artery disease. Report called to DR. GOLDMAN at 4/12/2020 2:51 AM Electronically signed by:  Donald Martini M.D.  4/12/2020 12:57 AM    Xr Chest 1 View    Result Date: 4/12/2020  No acute infiltrate is appreciated.  Electronically Signed By-Dr. Gonsalo Mahoney MD On:4/12/2020 2:57 AM This report was finalized on [id number] by Dr. Gonsalo Mahoney MD.    Ct Head Without Contrast Stroke Protocol    Result Date: 4/12/2020  1. Question slight hyperdensity of the distal left M1/proximal M2  "segments. Artifact versus thrombus. Recommend CTA head. 2. No acute intracranial hemorrhage. MRI is more sensitive for the detection of acute nonhemorrhagic infarct. 2. Minor changes small vessel ischemic disease of indeterminate age, presumably mostly chronic. Volume loss. Atherosclerosis. Report called to DR. GOLDMAN at 4/12/2020 2:10 AM Electronically signed by:  Donald Martini M.D.  4/12/2020 12:15 AM    Ct Cerebral Perfusion With & Without Contrast    Result Date: 4/12/2020  1. Unremarkable.  Electronically signed by:  Donald Martini M.D.  4/12/2020 12:47 AM        Estimated Creatinine Clearance: 53.9 mL/min (A) (by C-G formula based on SCr of 1.04 mg/dL (H)).    Assessment/Plan   Assessment/Plan       Active Hospital Problems    Diagnosis  POA   • Acute nonintractable headache [R51]  Yes   • Difficulty speaking [R47.9]  Unknown   • Dyspnea [R06.00]  Unknown      Resolved Hospital Problems   No resolved problems to display.     Acute non intractable left sided parietal headache/ left posterior neck pain, left arm paresthesia , troponin negative, denies chest pain - difficulty \" finding words\", R/O TIA CVA- neurology consulted from ED past window for TPa, CTA head \" question slight hyperdensity distal left [id number] M2 segment\"= neurology following, MRI in am , stroke protocol orders, on statin and aspirin     Dyspnea, dry cough, afebrile but reports exposure to Covid-19 patients at work - given symptoms- Covid-19 in house test ordered. Covid-19 test on 3/23/20 was negative     Diarrhea- maybe secondary to above or recent diagnosis 3/23/20 of gastroenteritis, wbc normal , IVF, continue to monitor     Mild renal injury Cr 1.04- IVF and monitor renal function , continue Lasix for now unless deteriorates     Essential HTN- moderate control on losartan  And Lasix , add IV prn meds if needed, monitor     HLD- on statin     Diabetes Mellitus 2- controlled- on glimepiride, hold metformin for now for " diagnostics, add ssi prn and accuchecks achs     Depression - on Cymbalta    GERD- on PPI, Carafate , on phenergan     Osteoporosis - on Prolia not ordered     Diet supp- on K, Vit D3, B12,    Obesity BMI 28- encourage lifestyle management       VTE Prophylaxis -   Mechanical Order History:      Ordered        04/12/20 0331  Place Sequential Compression Device  Once         04/12/20 0331  Maintain Sequential Compression Device  Continuous                 Pharmalogical Order History:     None          CODE STATUS:    Code Status and Medical Interventions:   Ordered at: 04/12/20 0331     Code Status:    CPR     Medical Interventions (Level of Support Prior to Arrest):    Full       This patient has been examined wearing 95 Mask and gloves as patient was not considered for Covid-19 testing until history and physical .     I discussed the patient's findings and my recommendations with patient.        Electronically signed by CANDIDO Garcia, 04/12/20, 3:32 AM.  [facility] Hospitalist Team

## 2020-04-13 ENCOUNTER — APPOINTMENT (OUTPATIENT)
Dept: CARDIOLOGY | Facility: HOSPITAL | Age: 65
End: 2020-04-13

## 2020-04-13 ENCOUNTER — APPOINTMENT (OUTPATIENT)
Dept: MRI IMAGING | Facility: HOSPITAL | Age: 65
End: 2020-04-13

## 2020-04-13 ENCOUNTER — APPOINTMENT (OUTPATIENT)
Dept: GENERAL RADIOLOGY | Facility: HOSPITAL | Age: 65
End: 2020-04-13

## 2020-04-13 ENCOUNTER — READMISSION MANAGEMENT (OUTPATIENT)
Dept: CALL CENTER | Facility: HOSPITAL | Age: 65
End: 2020-04-13

## 2020-04-13 VITALS
TEMPERATURE: 98.5 F | WEIGHT: 172.84 LBS | HEIGHT: 67 IN | HEART RATE: 88 BPM | RESPIRATION RATE: 15 BRPM | OXYGEN SATURATION: 95 % | BODY MASS INDEX: 27.13 KG/M2 | DIASTOLIC BLOOD PRESSURE: 56 MMHG | SYSTOLIC BLOOD PRESSURE: 148 MMHG

## 2020-04-13 PROBLEM — R06.00 DYSPNEA: Status: RESOLVED | Noted: 2020-04-12 | Resolved: 2020-04-13

## 2020-04-13 PROBLEM — Z79.899 POLYPHARMACY: Chronic | Status: ACTIVE | Noted: 2020-04-13

## 2020-04-13 PROBLEM — R47.9 DIFFICULTY SPEAKING: Status: RESOLVED | Noted: 2020-04-12 | Resolved: 2020-04-13

## 2020-04-13 PROBLEM — G43.109 COMPLICATED MIGRAINE: Status: ACTIVE | Noted: 2020-04-12

## 2020-04-13 PROBLEM — M54.81 OCCIPITAL NEURALGIA OF LEFT SIDE: Status: ACTIVE | Noted: 2020-04-13

## 2020-04-13 LAB
ANION GAP SERPL CALCULATED.3IONS-SCNC: 10 MMOL/L (ref 5–15)
BASOPHILS # BLD AUTO: 0.1 10*3/MM3 (ref 0–0.2)
BASOPHILS NFR BLD AUTO: 1.3 % (ref 0–1.5)
BH CV ECHO MEAS - ACS: 1.8 CM
BH CV ECHO MEAS - AO MAX PG (FULL): 5 MMHG
BH CV ECHO MEAS - AO MAX PG: 7.5 MMHG
BH CV ECHO MEAS - AO MEAN PG (FULL): 2.6 MMHG
BH CV ECHO MEAS - AO MEAN PG: 3.9 MMHG
BH CV ECHO MEAS - AO ROOT AREA (BSA CORRECTED): 1.6
BH CV ECHO MEAS - AO ROOT AREA: 7 CM^2
BH CV ECHO MEAS - AO ROOT DIAM: 3 CM
BH CV ECHO MEAS - AO V2 MAX: 136.6 CM/SEC
BH CV ECHO MEAS - AO V2 MEAN: 94.3 CM/SEC
BH CV ECHO MEAS - AO V2 VTI: 27.5 CM
BH CV ECHO MEAS - AORTIC HR: 206.1 BPM
BH CV ECHO MEAS - AORTIC R-R: 0.29 SEC
BH CV ECHO MEAS - ASC AORTA: 3.1 CM
BH CV ECHO MEAS - AVA(I,A): 2.3 CM^2
BH CV ECHO MEAS - AVA(I,D): 2.3 CM^2
BH CV ECHO MEAS - AVA(V,A): 2.1 CM^2
BH CV ECHO MEAS - AVA(V,D): 2.1 CM^2
BH CV ECHO MEAS - BSA(HAYCOCK): 1.9 M^2
BH CV ECHO MEAS - BSA: 1.9 M^2
BH CV ECHO MEAS - BZI_BMI: 26.9 KILOGRAMS/M^2
BH CV ECHO MEAS - BZI_METRIC_HEIGHT: 170.2 CM
BH CV ECHO MEAS - BZI_METRIC_WEIGHT: 78 KG
BH CV ECHO MEAS - CI(AO): 21 L/MIN/M^2
BH CV ECHO MEAS - CI(LVOT): 6.9 L/MIN/M^2
BH CV ECHO MEAS - CO(AO): 39.7 L/MIN
BH CV ECHO MEAS - CO(LVOT): 13.1 L/MIN
BH CV ECHO MEAS - EDV(CUBED): 21.1 ML
BH CV ECHO MEAS - EDV(MOD-SP2): 55.1 ML
BH CV ECHO MEAS - EDV(MOD-SP4): 75.1 ML
BH CV ECHO MEAS - EDV(TEICH): 28.6 ML
BH CV ECHO MEAS - EF(CUBED): 69.4 %
BH CV ECHO MEAS - EF(MOD-BP): 60 %
BH CV ECHO MEAS - EF(MOD-SP2): 62.7 %
BH CV ECHO MEAS - EF(MOD-SP4): 57.9 %
BH CV ECHO MEAS - EF(TEICH): 62.9 %
BH CV ECHO MEAS - ESV(CUBED): 6.5 ML
BH CV ECHO MEAS - ESV(MOD-SP2): 20.5 ML
BH CV ECHO MEAS - ESV(MOD-SP4): 31.6 ML
BH CV ECHO MEAS - ESV(TEICH): 10.6 ML
BH CV ECHO MEAS - FS: 32.6 %
BH CV ECHO MEAS - IVS/LVPW: 1.1
BH CV ECHO MEAS - IVSD: 1.6 CM
BH CV ECHO MEAS - LA DIMENSION(2D): 2.8 CM
BH CV ECHO MEAS - LV DIASTOLIC VOL/BSA (35-75): 39.6 ML/M^2
BH CV ECHO MEAS - LV MASS(C)D: 148.7 GRAMS
BH CV ECHO MEAS - LV MASS(C)DI: 78.4 GRAMS/M^2
BH CV ECHO MEAS - LV MAX PG: 2.5 MMHG
BH CV ECHO MEAS - LV MEAN PG: 1.3 MMHG
BH CV ECHO MEAS - LV SYSTOLIC VOL/BSA (12-30): 16.7 ML/M^2
BH CV ECHO MEAS - LV V1 MAX: 78.6 CM/SEC
BH CV ECHO MEAS - LV V1 MEAN: 54.7 CM/SEC
BH CV ECHO MEAS - LV V1 VTI: 17.5 CM
BH CV ECHO MEAS - LVIDD: 2.8 CM
BH CV ECHO MEAS - LVIDS: 1.9 CM
BH CV ECHO MEAS - LVOT AREA: 3.6 CM^2
BH CV ECHO MEAS - LVOT DIAM: 2.2 CM
BH CV ECHO MEAS - LVPWD: 1.5 CM
BH CV ECHO MEAS - MV A MAX VEL: 80.1 CM/SEC
BH CV ECHO MEAS - MV DEC SLOPE: 220.1 CM/SEC^2
BH CV ECHO MEAS - MV DEC TIME: 0.27 SEC
BH CV ECHO MEAS - MV E MAX VEL: 58.7 CM/SEC
BH CV ECHO MEAS - MV E/A: 0.73
BH CV ECHO MEAS - MV MAX PG: 3.4 MMHG
BH CV ECHO MEAS - MV MEAN PG: 1.2 MMHG
BH CV ECHO MEAS - MV V2 MAX: 92.6 CM/SEC
BH CV ECHO MEAS - MV V2 MEAN: 49.2 CM/SEC
BH CV ECHO MEAS - MV V2 VTI: 23.9 CM
BH CV ECHO MEAS - MVA(VTI): 2.7 CM^2
BH CV ECHO MEAS - PA ACC TIME: 0.06 SEC
BH CV ECHO MEAS - PA MAX PG (FULL): 0.33 MMHG
BH CV ECHO MEAS - PA MAX PG: 1.7 MMHG
BH CV ECHO MEAS - PA PR(ACCEL): 51.6 MMHG
BH CV ECHO MEAS - PA V2 MAX: 64.4 CM/SEC
BH CV ECHO MEAS - PVA(V,A): 5.3 CM^2
BH CV ECHO MEAS - PVA(V,D): 5.3 CM^2
BH CV ECHO MEAS - QP/QS: 1.2
BH CV ECHO MEAS - RAP SYSTOLE: 8 MMHG
BH CV ECHO MEAS - RV MAX PG: 1.3 MMHG
BH CV ECHO MEAS - RV MEAN PG: 0.74 MMHG
BH CV ECHO MEAS - RV V1 MAX: 57.6 CM/SEC
BH CV ECHO MEAS - RV V1 MEAN: 41.3 CM/SEC
BH CV ECHO MEAS - RV V1 VTI: 12.6 CM
BH CV ECHO MEAS - RVDD: 2.9 CM
BH CV ECHO MEAS - RVOT AREA: 5.9 CM^2
BH CV ECHO MEAS - RVOT DIAM: 2.7 CM
BH CV ECHO MEAS - RVSP: 24.2 MMHG
BH CV ECHO MEAS - SI(AO): 101.7 ML/M^2
BH CV ECHO MEAS - SI(CUBED): 7.7 ML/M^2
BH CV ECHO MEAS - SI(LVOT): 33.6 ML/M^2
BH CV ECHO MEAS - SI(MOD-SP2): 18.2 ML/M^2
BH CV ECHO MEAS - SI(MOD-SP4): 22.9 ML/M^2
BH CV ECHO MEAS - SI(TEICH): 9.5 ML/M^2
BH CV ECHO MEAS - SV(AO): 192.8 ML
BH CV ECHO MEAS - SV(CUBED): 14.6 ML
BH CV ECHO MEAS - SV(LVOT): 63.7 ML
BH CV ECHO MEAS - SV(MOD-SP2): 34.5 ML
BH CV ECHO MEAS - SV(MOD-SP4): 43.5 ML
BH CV ECHO MEAS - SV(RVOT): 74.2 ML
BH CV ECHO MEAS - SV(TEICH): 18 ML
BH CV ECHO MEAS - TR MAX VEL: 201.1 CM/SEC
BUN BLD-MCNC: 15 MG/DL (ref 8–23)
BUN/CREAT SERPL: 19 (ref 7–25)
CALCIUM SPEC-SCNC: 8.3 MG/DL (ref 8.6–10.5)
CHLORIDE SERPL-SCNC: 105 MMOL/L (ref 98–107)
CO2 SERPL-SCNC: 27 MMOL/L (ref 22–29)
CREAT BLD-MCNC: 0.79 MG/DL (ref 0.57–1)
DEPRECATED RDW RBC AUTO: 41.1 FL (ref 37–54)
EOSINOPHIL # BLD AUTO: 0.2 10*3/MM3 (ref 0–0.4)
EOSINOPHIL NFR BLD AUTO: 4.1 % (ref 0.3–6.2)
ERYTHROCYTE [DISTWIDTH] IN BLOOD BY AUTOMATED COUNT: 13.5 % (ref 12.3–15.4)
GFR SERPL CREATININE-BSD FRML MDRD: 73 ML/MIN/1.73
GLUCOSE BLD-MCNC: 107 MG/DL (ref 65–99)
GLUCOSE BLDC GLUCOMTR-MCNC: 161 MG/DL (ref 70–105)
GLUCOSE BLDC GLUCOMTR-MCNC: 49 MG/DL (ref 70–105)
GLUCOSE BLDC GLUCOMTR-MCNC: 73 MG/DL (ref 70–105)
GLUCOSE BLDC GLUCOMTR-MCNC: 89 MG/DL (ref 70–105)
HBA1C MFR BLD: 10.9 % (ref 3.5–5.6)
HCT VFR BLD AUTO: 34.6 % (ref 34–46.6)
HGB BLD-MCNC: 11.9 G/DL (ref 12–15.9)
LYMPHOCYTES # BLD AUTO: 2.8 10*3/MM3 (ref 0.7–3.1)
LYMPHOCYTES NFR BLD AUTO: 48.7 % (ref 19.6–45.3)
MCH RBC QN AUTO: 29.7 PG (ref 26.6–33)
MCHC RBC AUTO-ENTMCNC: 34.5 G/DL (ref 31.5–35.7)
MCV RBC AUTO: 86.1 FL (ref 79–97)
MONOCYTES # BLD AUTO: 0.6 10*3/MM3 (ref 0.1–0.9)
MONOCYTES NFR BLD AUTO: 10.5 % (ref 5–12)
NEUTROPHILS # BLD AUTO: 2 10*3/MM3 (ref 1.7–7)
NEUTROPHILS NFR BLD AUTO: 35.4 % (ref 42.7–76)
NRBC BLD AUTO-RTO: 0.1 /100 WBC (ref 0–0.2)
PLATELET # BLD AUTO: 259 10*3/MM3 (ref 140–450)
PMV BLD AUTO: 7.3 FL (ref 6–12)
POTASSIUM BLD-SCNC: 3.6 MMOL/L (ref 3.5–5.2)
RBC # BLD AUTO: 4.02 10*6/MM3 (ref 3.77–5.28)
SODIUM BLD-SCNC: 142 MMOL/L (ref 136–145)
WBC NRBC COR # BLD: 5.7 10*3/MM3 (ref 3.4–10.8)

## 2020-04-13 PROCEDURE — 80048 BASIC METABOLIC PNL TOTAL CA: CPT | Performed by: INTERNAL MEDICINE

## 2020-04-13 PROCEDURE — 82962 GLUCOSE BLOOD TEST: CPT

## 2020-04-13 PROCEDURE — 25010000002 KETOROLAC TROMETHAMINE PER 15 MG: Performed by: NURSE PRACTITIONER

## 2020-04-13 PROCEDURE — 92611 MOTION FLUOROSCOPY/SWALLOW: CPT

## 2020-04-13 PROCEDURE — 93306 TTE W/DOPPLER COMPLETE: CPT | Performed by: INTERNAL MEDICINE

## 2020-04-13 PROCEDURE — 25010000002 MORPHINE PER 10 MG: Performed by: NURSE PRACTITIONER

## 2020-04-13 PROCEDURE — G0378 HOSPITAL OBSERVATION PER HR: HCPCS

## 2020-04-13 PROCEDURE — 85025 COMPLETE CBC W/AUTO DIFF WBC: CPT | Performed by: INTERNAL MEDICINE

## 2020-04-13 PROCEDURE — 97161 PT EVAL LOW COMPLEX 20 MIN: CPT

## 2020-04-13 PROCEDURE — A9579 GAD-BASE MR CONTRAST NOS,1ML: HCPCS | Performed by: INTERNAL MEDICINE

## 2020-04-13 PROCEDURE — 96361 HYDRATE IV INFUSION ADD-ON: CPT

## 2020-04-13 PROCEDURE — 70553 MRI BRAIN STEM W/O & W/DYE: CPT

## 2020-04-13 PROCEDURE — 99213 OFFICE O/P EST LOW 20 MIN: CPT | Performed by: NURSE PRACTITIONER

## 2020-04-13 PROCEDURE — 99217 PR OBSERVATION CARE DISCHARGE MANAGEMENT: CPT | Performed by: INTERNAL MEDICINE

## 2020-04-13 PROCEDURE — 96375 TX/PRO/DX INJ NEW DRUG ADDON: CPT

## 2020-04-13 PROCEDURE — 96376 TX/PRO/DX INJ SAME DRUG ADON: CPT

## 2020-04-13 PROCEDURE — 74230 X-RAY XM SWLNG FUNCJ C+: CPT

## 2020-04-13 PROCEDURE — 25010000002 GADOTERIDOL PER 1 ML: Performed by: INTERNAL MEDICINE

## 2020-04-13 PROCEDURE — 93306 TTE W/DOPPLER COMPLETE: CPT

## 2020-04-13 RX ORDER — BACLOFEN 10 MG/1
10 TABLET ORAL 2 TIMES DAILY PRN
Status: DISCONTINUED | OUTPATIENT
Start: 2020-04-13 | End: 2020-04-13 | Stop reason: HOSPADM

## 2020-04-13 RX ORDER — BUTALBITAL, ACETAMINOPHEN AND CAFFEINE 50; 325; 40 MG/1; MG/1; MG/1
1 TABLET ORAL 3 TIMES DAILY PRN
Status: DISCONTINUED | OUTPATIENT
Start: 2020-04-13 | End: 2020-04-13 | Stop reason: HOSPADM

## 2020-04-13 RX ORDER — DEXTROSE AND SODIUM CHLORIDE 5; .45 G/100ML; G/100ML
50 INJECTION, SOLUTION INTRAVENOUS CONTINUOUS
Status: DISCONTINUED | OUTPATIENT
Start: 2020-04-13 | End: 2020-04-13 | Stop reason: HOSPADM

## 2020-04-13 RX ORDER — ASPIRIN 81 MG/1
81 TABLET, CHEWABLE ORAL DAILY
Status: DISCONTINUED | OUTPATIENT
Start: 2020-04-13 | End: 2020-04-13 | Stop reason: HOSPADM

## 2020-04-13 RX ORDER — BACLOFEN 10 MG/1
10 TABLET ORAL 2 TIMES DAILY PRN
Qty: 30 TABLET | Refills: 0 | Status: SHIPPED | OUTPATIENT
Start: 2020-04-13 | End: 2020-04-28

## 2020-04-13 RX ORDER — KETOROLAC TROMETHAMINE 30 MG/ML
30 INJECTION, SOLUTION INTRAMUSCULAR; INTRAVENOUS ONCE
Status: COMPLETED | OUTPATIENT
Start: 2020-04-13 | End: 2020-04-13

## 2020-04-13 RX ORDER — ASPIRIN 81 MG/1
81 TABLET, CHEWABLE ORAL DAILY
Start: 2020-04-14 | End: 2020-11-14 | Stop reason: HOSPADM

## 2020-04-13 RX ORDER — LANCETS 33 GAUGE
1 EACH MISCELLANEOUS
Qty: 100 EACH | Refills: 2 | Status: SHIPPED | OUTPATIENT
Start: 2020-04-13 | End: 2021-05-02

## 2020-04-13 RX ORDER — BUTALBITAL, ACETAMINOPHEN AND CAFFEINE 50; 325; 40 MG/1; MG/1; MG/1
1 TABLET ORAL 3 TIMES DAILY PRN
Qty: 20 TABLET | Refills: 0 | Status: SHIPPED | OUTPATIENT
Start: 2020-04-13 | End: 2020-11-06

## 2020-04-13 RX ORDER — MORPHINE SULFATE 4 MG/ML
1 INJECTION, SOLUTION INTRAMUSCULAR; INTRAVENOUS ONCE
Status: COMPLETED | OUTPATIENT
Start: 2020-04-13 | End: 2020-04-13

## 2020-04-13 RX ORDER — ACETAMINOPHEN 500 MG
500 TABLET ORAL EVERY 6 HOURS PRN
Status: DISCONTINUED | OUTPATIENT
Start: 2020-04-13 | End: 2020-04-13 | Stop reason: HOSPADM

## 2020-04-13 RX ADMIN — BARIUM SULFATE 50 ML: 400 SUSPENSION ORAL at 12:15

## 2020-04-13 RX ADMIN — ASPIRIN 81 MG 81 MG: 81 TABLET ORAL at 13:28

## 2020-04-13 RX ADMIN — GADOTERIDOL 16 ML: 279.3 INJECTION, SOLUTION INTRAVENOUS at 10:30

## 2020-04-13 RX ADMIN — MORPHINE SULFATE 1 MG: 4 INJECTION INTRAVENOUS at 06:25

## 2020-04-13 RX ADMIN — KETOROLAC TROMETHAMINE 30 MG: 30 INJECTION, SOLUTION INTRAMUSCULAR at 13:28

## 2020-04-13 RX ADMIN — DEXTROSE MONOHYDRATE 25 G: 500 INJECTION PARENTERAL at 00:35

## 2020-04-13 RX ADMIN — BARIUM SULFATE 135 ML: 980 POWDER, FOR SUSPENSION ORAL at 12:15

## 2020-04-13 RX ADMIN — MORPHINE SULFATE 2 MG: 4 INJECTION INTRAVENOUS at 00:35

## 2020-04-13 RX ADMIN — DEXTROSE AND SODIUM CHLORIDE 50 ML/HR: 5; 450 INJECTION, SOLUTION INTRAVENOUS at 01:18

## 2020-04-13 NOTE — THERAPY EVALUATION
Patient Name: Marge Mcghee  : 1955    MRN: [id number]                              Today's Date: 2020       Admit Date: 2020    Visit Dx:     ICD-10-CM ICD-9-CM   1. Acute nonintractable headache, unspecified headache type R51 784.0   2. Neck pain M54.2 723.1   3. Dysphasia R47.02 784.59   4. Complicated migraine G43.109 346.00     Patient Active Problem List   Diagnosis   • Type 2 diabetes mellitus with peripheral neuropathy (CMS/HCC)   • Age-related osteoporosis without current pathological fracture   • Essential hypertension   • Hyperlipidemia   • Hepatic steatosis   • Gastroesophageal reflux disease with esophagitis   • Adrenal nodule (CMS/HCC)   • Vitamin D deficiency   • Thyroid nodule   • Urinary tract infection without hematuria   • REINA (acute kidney injury) (CMS/HCC)   • Hyperglycemia   • Acute right flank pain   • Vomiting   • Hypomagnesemia   • Dehydration   • Class 1 obesity in adult   • Complicated migraine   • Occipital neuralgia of left side   • Polypharmacy     Past Medical History:   Diagnosis Date   • Adrenal nodule (CMS/HCC) 2016    FINDINGS: Mild hepatic steatosis may be present. Cholecystectomy. No biliary ductal dilatation. Negative pancreas, spleen, left adrenal gland, and kidneys. The right adrenal presumed adenoma has increased slightly, measuring 3 x 4 cm in diameter,  compared to 2 x 3.5 cm on the previous exam. The attenuation values are consistent with an adenoma. Done at [facility] in 2018   • Age-related osteoporosis without current pathological fracture 2016   • Essential hypertension 2016   • Gastroesophageal reflux disease with esophagitis 2016   • Hepatic steatosis 2016   • Hyperlipidemia 2016   • Thyroid nodule 10/18/2019   • Type 2 diabetes mellitus with peripheral neuropathy (CMS/HCC) 2016   • Vitamin D deficiency 2019     Past Surgical History:   Procedure Laterality Date   • BREAST BIOPSY Left      7/2019   • CATARACT EXTRACTION WITH INTRAOCULAR LENS IMPLANT Bilateral    • CHOLECYSTECTOMY     • COLONOSCOPY     • KNEE ARTHROSCOPY Bilateral    • TOTAL ABDOMINAL HYSTERECTOMY     • UPPER GASTROINTESTINAL ENDOSCOPY  08/2016     General Information     Row Name 04/13/20 1519          PT Evaluation Time/Intention    Document Type  evaluation  -CM     Mode of Treatment  physical therapy  -     Row Name 04/13/20 1519          General Information    Patient Profile Reviewed?  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  -CM     Existing Precautions/Restrictions  no known precautions/restrictions  -CM     Barriers to Rehab  none identified  -CM     Row Name 04/13/20 1519          Relationship/Environment    Lives With  spouse spouse works; pt reports she plans to return to work on 4/17/2020.  -CM     Row Name 04/13/20 1519          Resource/Environmental Concerns    Current Living Arrangements  home/apartment/condo  -     Row Name 04/13/20 1519          Home Main Entrance    Number of Stairs, Main Entrance  three  -CM     Stair Railings, Main Entrance  railings safe and in good condition  -     Row Name 04/13/20 1519          Stairs Within Home, Primary    Stairs, Within Home, Primary  one flight of stairs to upstairs bed/bath  -     Number of Stairs, Within Home, Primary  three;ten  -CM     Stair Railings, Within Home, Primary  railings safe and in good condition  -     Row Name 04/13/20 1519          Cognitive Assessment/Intervention- PT/OT    Orientation Status (Cognition)  oriented x 4  -CM     Row Name 04/13/20 1519          Safety Issues, Functional Mobility    Impairments Affecting Function (Mobility)  strength;pain  -CM       User Key  (r) = Recorded By, (t) = Taken By, (c) = Cosigned By    Initials Name Provider Type    CM Ebony Gomez, PT Physical Therapist        Mobility      Row Name 04/13/20 1523          Bed Mobility Assessment/Treatment    Bed Mobility Assessment/Treatment  bed mobility (all) activities  -CM     [place] Level (Bed Mobility)  independent  -CM     Row Name 04/13/20 1523          Bed-Chair Transfer    Bed-Chair [place] (Transfers)  not tested  -CM     Row Name 04/13/20 1523          Sit-Stand Transfer    Sit-Stand [place] (Transfers)  independent no loss of balance w/ coming to standing  -CM     Row Name 04/13/20 1523          Gait/Stairs Assessment/Training    Gait/Stairs Assessment/Training  gait/ambulation independence  -CM     [place] Level (Gait)  independent  -CM     Pattern (Gait)  swing-through  -CM     Negotiation (Stairs)  stairs independence  -CM     [place] Level (Stairs)  conditional independence;other (see comments) uses one handrail  -CM       User Key  (r) = Recorded By, (t) = Taken By, (c) = Cosigned By    Initials Name Provider Type    Ebony Fernandez, PT Physical Therapist        Obj/Interventions     Row Name 04/13/20 1527          General ROM    GENERAL ROM COMMENTS  wfl; has decreased ROM in L shoulder of 50%; reports hx of rotator cuff injury  -     Row Name 04/13/20 1527          MMT (Manual Muscle Testing)    General MMT Comments  RUE & RLE wnl; LUE 3+/5; LLE 4-/5; some breakaway weakness noted; has L sided facial droop (mild) w/ no activity on L w/ smile; has R tongue deviation  -CM     Row Name 04/13/20 1527          Static Sitting Balance    Level of [place] (Unsupported Sitting, Static Balance)  independent  -CM     Sitting Position (Unsupported Sitting, Static Balance)  sitting on edge of bed  -CM     Time Able to Maintain Position (Unsupported Sitting, Static Balance)  more than 5 minutes  -CM     Row Name 04/13/20 1527          Dynamic Sitting Balance    Level of [place], Reaches Outside Midline (Sitting, Dynamic Balance)  independent  -CM     Sitting Position, Reaches Outside Midline (Sitting,  Dynamic Balance)  sitting on edge of bed  -CM     Row Name 04/13/20 1527          Static Standing Balance    Level of [place] (Supported Standing, Static Balance)  independent  -CM     Row Name 04/13/20 1527          Dynamic Standing Balance    Level of [place], Reaches Outside Midline (Standing, Dynamic Balance)  independent  -CM       User Key  (r) = Recorded By, (t) = Taken By, (c) = Cosigned By    Initials Name Provider Type    Ebony Fernandez, PT Physical Therapist        Goals/Plan    No documentation.       Clinical Impression     Row Name 04/13/20 1529          Pain Scale: Numbers Pre/Post-Treatment    Pain Scale: Numbers, Pretreatment  6/10  -CM     Pain Scale: Numbers, Post-Treatment  6/10  -CM     Pre/Post Treatment Pain Comment  headache  -CM     Row Name 04/13/20 1529          Plan of Care Review    Plan of Care Reviewed With  patient  -CM     Outcome Summary  66 yo female adm for onset of L sided weakness, facial droop, paresthesias, headache, cervical pain. MRI (-) for acute changes. Pt has decreased strength on L side. Works 3 jobs. Neurology dx as complicated migraine. Recommend home w/ OP PT (possible telehealth). Reviewed cva signs/symptoms w/ pt, and gave handout for this. Recommended to pt to use meditation apps on phone/computer to assist in stress management. Pt amenable to this. Hospital d/c pending.  -     Row Name 04/13/20 1536 04/13/20 1529       Physical Therapy Clinical Impression    Patient/Family Goals Statement (PT Clinical Impression)  PPE WORN: GLOVES, MASK  -  --    Criteria for Skilled Interventions Met (PT Clinical Impression)  --  other (see comments);yes;treatment indicated hospital d/c pending  -    Row Name 04/13/20 1529          Vital Signs    Recovery Time  vital signs wnl throughout rx  -CM     Row Name 04/13/20 1529          Positioning and Restraints    Pre-Treatment Position  in bed  -CM     Post Treatment Position  chair  -CM     In Chair  notified  nsg;sitting;call light within reach;encouraged to call for assist  -CM       User Key  (r) = Recorded By, (t) = Taken By, (c) = Cosigned By    Initials Name Provider Type    Ebony Fernandez, PT Physical Therapist        Outcome Measures     Row Name 04/13/20 1535          Modified [place] Scale    Pre-Stroke Modified [place] Scale  0 - No Symptoms at all.  -CM     Modified [place] Scale  2 - Slight disability.  Unable to carry out all previous activities but able to look after own affairs without assistance.  -CM     Row Name 04/13/20 1535          Functional Assessment    Outcome Measure Options  Modified [place]  -CM       User Key  (r) = Recorded By, (t) = Taken By, (c) = Cosigned By    Initials Name Provider Type    Ebony Fernandez, PT Physical Therapist          PT Recommendation and Plan     Outcome Summary/Treatment Plan (PT)  Anticipated Discharge Disposition (PT): home with OP services(telehealth)  Plan of Care Reviewed With: patient  Outcome Summary: 64 yo female adm for onset of L sided weakness, facial droop, paresthesias, headache, cervical pain. MRI (-) for acute changes. Pt has decreased strength on L side. Works 3 jobs. Neurology dx as complicated migraine. Recommend home w/ OP PT (possible telehealth). Reviewed cva signs/symptoms w/ pt, and gave handout for this. Recommended to pt to use meditation apps on phone/computer to assist in stress management. Pt amenable to this. Hospital d/c pending.     Time Calculation:   PT Charges     Row Name 04/13/20 1536             Time Calculation    Start Time  1443  -CM      Stop Time  1515  -CM      Time Calculation (min)  32 min  -CM      PT Received On  04/13/20  -CM         Time Calculation- PT    Total Timed Code Minutes- PT  0 minute(s)  -CM        User Key  (r) = Recorded By, (t) = Taken By, (c) = Cosigned By    Initials Name Provider Type    Ebony Fernandez, PT Physical Therapist        Therapy Charges for Today     Code Description Service  Date Service Provider Modifiers Qty    71391915690  PT EVAL LOW COMPLEXITY 3 4/13/2020 Ebony Gomez, PT GP 1          PT G-Codes  Outcome Measure Options: Modified [place]  Modified [place] Scale: 2 - Slight disability.  Unable to carry out all previous activities but able to look after own affairs without assistance.    Ebony Gomez, PT  4/13/2020

## 2020-04-13 NOTE — MBS/VFSS/FEES
Acute Care - Speech Language Pathology   Swallow Initial Evaluation  [place]     Patient Name: Marge Mcghee  : 1955  MRN: [id number]  Today's Date: 2020               Admit Date: 2020    Visit Dx:     ICD-10-CM ICD-9-CM   1. Acute nonintractable headache, unspecified headache type R51 784.0   2. Neck pain M54.2 723.1   3. Dysphasia R47.02 784.59   4. Complicated migraine G43.109 346.00     Patient Active Problem List   Diagnosis   • Type 2 diabetes mellitus with peripheral neuropathy (CMS/HCC)   • Age-related osteoporosis without current pathological fracture   • Essential hypertension   • Hyperlipidemia   • Hepatic steatosis   • Gastroesophageal reflux disease with esophagitis   • Adrenal nodule (CMS/HCC)   • Vitamin D deficiency   • Thyroid nodule   • Urinary tract infection without hematuria   • REINA (acute kidney injury) (CMS/HCC)   • Hyperglycemia   • Acute right flank pain   • Vomiting   • Hypomagnesemia   • Dehydration   • Class 1 obesity in adult   • Complicated migraine   • Occipital neuralgia of left side   • Polypharmacy     Past Medical History:   Diagnosis Date   • Adrenal nodule (CMS/HCC) 2016    FINDINGS: Mild hepatic steatosis may be present. Cholecystectomy. No biliary ductal dilatation. Negative pancreas, spleen, left adrenal gland, and kidneys. The right adrenal presumed adenoma has increased slightly, measuring 3 x 4 cm in diameter,  compared to 2 x 3.5 cm on the previous exam. The attenuation values are consistent with an adenoma. Done at [facility] in 2018   • Age-related osteoporosis without current pathological fracture 2016   • Essential hypertension 2016   • Gastroesophageal reflux disease with esophagitis 2016   • Hepatic steatosis 2016   • Hyperlipidemia 2016   • Thyroid nodule 10/18/2019   • Type 2 diabetes mellitus with peripheral neuropathy (CMS/HCC) 2016   • Vitamin D deficiency 2019     Past Surgical  History:   Procedure Laterality Date   • BREAST BIOPSY Left     7/2019   • CATARACT EXTRACTION WITH INTRAOCULAR LENS IMPLANT Bilateral    • CHOLECYSTECTOMY     • COLONOSCOPY     • KNEE ARTHROSCOPY Bilateral    • TOTAL ABDOMINAL HYSTERECTOMY     • UPPER GASTROINTESTINAL ENDOSCOPY  08/2016        SWALLOW EVALUATION (last 72 hours)      SLP Adult Swallow Evaluation     Row Name 04/13/20 1400       Rehab Evaluation    Document Type  re-evaluation  -MM    Subjective Information  no complaints  -MM    Patient Observations  alert;cooperative;poorly cooperative  -MM    Patient/Family Observations  --    Patient Effort  good  -MM    Comment  Patient seen with mask and gloves on.  Patient participated in VFSS.    -MM       General Information    Patient Profile Reviewed  yes  -MM    Pertinent History Of Current Problem  presented to ED with complaints of left side parietal moderate to severe headache that radiated to the left side of her posterior neck and down her left arm with paresthesia. She is awake , alert and good historian.She reports she had difficulty speaking and in answering she is hesitant before she speaks but then answers appropriately.  Patients speech issues have since resolved.  CT and MRI are both negative for acute stroke.    -MM    Current Method of Nutrition  NPO  -MM    Prior Level of Function-Communication  WFL  -MM    Prior Level of Function-Swallowing  no diet consistency restrictions  -MM    Plans/Goals Discussed with  patient  -MM    Barriers to Rehab  none identified  -MM          Oral Motor and Function    Dentition Assessment  natural, present and adequate  -MM    Secretion Management  WNL/WFL  -MM    Mucosal Quality  --       Oral Musculature and Cranial Nerve Assessment    Oral Motor General Assessment  generalized oral motor weakness;oral labial or buccal impairment  -MM    Oral Labial or Buccal Impairment, Detail, Cranial Nerve VII (Facial):  left labial droop;other (see comments) noted when  smiling  -MM    Lingual Impairment, Detail. Cranial Nerves IX, XII (Glossopharyngeal and Hypoglossal)  reduced lingual ROM;reduced strength;bilaterally  -MM       General Eating/Swallowing Observations    Respiratory Support Currently in Use  room air  -MM    Eating/Swallowing Skills  self-fed  -MM    Positioning During Eating  upright 90 degree;upright in chair  -MM    Utensils Used  --    Consistencies Trialed  --       Respiratory    Respiratory Status  WFL  -MM       Clinical Swallow Eval    Clinical Swallow Evaluation Summary  --       MBS/VFSS    Utensils Used  spoon;cup;straw  -MM    Consistencies Trialed  regular textures;pureed;thin liquids;nectar/syrup-thick liquids  -MM       MBS/VFSS Interpretation    Oral Prep Phase  WFL  -MM    Oral Transit Phase  WFL  -MM    Oral Residue  WFL  -MM    VFSS Summary  Patient given NTL by spoon 1x, by cup 2x, thin by cup 3x, thin by straw for consecutive sips 1x, applesauce 2x, peaches 2x and cracker 1x.  Patient presents with functional oral and pharyngeal stages of swallow.  Mastication is timely with formation of cohesive bolus formation prior to timely swallow.  No penetration, aspiration or pharyngeal residue noted with any consistency.  Recommend a regular and thin liquid diet  -MM       Initiation of Pharyngeal Swallow    Initiation of Pharyngeal Swallow  WFL  -MM    Pharyngeal Phase  functional pharyngeal phase of swallowing  -MM       Clinical Impression    SLP Swallowing Diagnosis  functional oral phase;functional pharyngeal phase  -MM    Functional Impact  no impact on function  -MM    Rehab Potential/Prognosis, Swallowing  good, to achieve stated therapy goals  -MM    Swallow Criteria for Skilled Therapeutic Interventions Met  demonstrates skilled criteria  -MM       Recommendations    Therapy Frequency (Swallow)  PRN  -MM    Predicted Duration Therapy Intervention (Days)  until discharge  -MM    SLP Diet Recommendation  regular textures;thin liquids  -MM     Recommended Diagnostics  --    SLP Rec. for Method of Medication Administration  meds whole;with thin liquids  -MM       Swallow Goals (SLP)    Oral Nutrition/Hydration Goal Selection (SLP)  oral nutrition/hydration, SLP goal 1;oral nutrition/hydration, SLP goal 2;oral nutrition/hydration, SLP goal (free text)  -MM       Oral Nutrition/Hydration Goal 1 (SLP)    Oral Nutrition/Hydration Goal 1, SLP  Pt will have instrumental swallow eval with 24-48 hours  -MM    Time Frame (Oral Nutrition/Hydration Goal 1, SLP)  1 day;2 days  -MM    Barriers (Oral Nutrition/Hydration Goal 1, SLP)  see above note  -MM    Progress/Outcomes (Oral Nutrition/Hydration Goal 1, SLP)  goal met  -MM       Oral Nutrition/Hydration Goal 2 (SLP)    Oral Nutrition/Hydration Goal 2, SLP  Pt will tolerate L/R diet/liquids with no complications of aspiration.   -MM    Time Frame (Oral Nutrition/Hydration Goal 2, SLP)  by discharge  -MM    Barriers (Oral Nutrition/Hydration Goal 2, SLP)  Patient to initiate a regular diet  -MM       Oral Nutrition/Hydration Goal (SLP)    Oral Nutrition/Hydration Goal, SLP  Patient will be seen at a meal within 24-48 hours to assess tolerance of current diet with further recommendations to be given as indicated  -MM      User Key  (r) = Recorded By, (t) = Taken By, (c) = Cosigned By    Initials Name Effective Dates    CP Sandra Enriquez, URIEL 03/01/19 -     MM Paola Byrd, URIEL 03/01/19 -           EDUCATION  The patient has been educated in the following areas:   safe swallow strategies.    SLP Recommendation and Plan  SLP Swallowing Diagnosis: functional oral phase, functional pharyngeal phase  SLP Diet Recommendation: regular textures, thin liquids     SLP Rec. for Method of Medication Administration: meds whole, with thin liquids           Swallow Criteria for Skilled Therapeutic Interventions Met: demonstrates skilled criteria     Rehab Potential/Prognosis, Swallowing: good, to achieve stated therapy  goals  Therapy Frequency (Swallow): PRN  Predicted Duration Therapy Intervention (Days): until discharge            SLP GOALS     Row Name 04/13/20 1400 04/12/20 1500          Oral Nutrition/Hydration Goal 1 (SLP)    Oral Nutrition/Hydration Goal 1, SLP  Pt will have instrumental swallow eval with 24-48 hours  -MM  Pt will have instrumental swallow eval with 24-48 hours  -CP     Time Frame (Oral Nutrition/Hydration Goal 1, SLP)  1 day;2 days  -MM  1 day;2 days  -CP     Barriers (Oral Nutrition/Hydration Goal 1, SLP)  see above note  -MM  --     Progress/Outcomes (Oral Nutrition/Hydration Goal 1, SLP)  goal met  -MM  --        Oral Nutrition/Hydration Goal 2 (SLP)    Oral Nutrition/Hydration Goal 2, SLP  Pt will tolerate L/R diet/liquids with no complications of aspiration.   -MM  Pt will tolerate L/R diet/liquids with no complications of aspiration.   -CP     Time Frame (Oral Nutrition/Hydration Goal 2, SLP)  by discharge  -MM  by discharge  -CP     Barriers (Oral Nutrition/Hydration Goal 2, SLP)  Patient to initiate a regular diet  -MM  --        Oral Nutrition/Hydration Goal (SLP)    Oral Nutrition/Hydration Goal, SLP  Patient will be seen at a meal within 24-48 hours to assess tolerance of current diet with further recommendations to be given as indicated  -MM  --       User Key  (r) = Recorded By, (t) = Taken By, (c) = Cosigned By    Initials Name Provider Type    CP Sandra Enriquez, SLP Speech and Language Pathologist    Paola Varma SLP Speech and Language Pathologist             Time Calculation:       Therapy Charges for Today     Code Description Service Date Service Provider Modifiers Qty    14292437861 HC ST MOTION FLUORO EVAL SWALLOW 5 4/13/2020 Paola Byrd SLP GN 1               URIEL العلي  4/13/2020

## 2020-04-13 NOTE — PLAN OF CARE
Note:   Patients MRI came back negative for stroke. Patient has been started on new Migraine medications possible discharge today will continue to monitor.

## 2020-04-13 NOTE — PROGRESS NOTES
Case Management Discharge Note      Final Note: Discharged prior to being seen per CM                      Final Discharge Disposition Code: 01 - home or self-care

## 2020-04-13 NOTE — PROGRESS NOTES
LOS: 0 days     Chief Complaint:  Left-sided headache and difficulty speaking       SUBJECTIVE:  History taken from: patient chart RN    Interval History: 65 year old female presented to ED with complaints of left side parietal moderate to severe headache that radiated to the left side of her posterior neck and down her left arm with paresthesia. She is awake , alert and good historian.She reports she had difficulty speaking and in answering she is hesitant before she speaks but then answers appropriately. She initially was reported to have symptoms at 8 pm tonight and tried to sleep and awakened at  11 pm and  advised her to come to ED. Code stroke was called in ED .   - Portions of the above HPI were copied from previous encounters and edited as appropriate.    Patient Complaints: Pt continues to c/o a headache which is more bifrontal today. She also c/o some tenderness and pain in the left occipital region. Pt feels her left-sided weakness has improved, but has not resolved.     Review of Systems   Constitutional: Negative for chills, diaphoresis and fatigue.   Eyes: Positive for photophobia. Negative for visual disturbance.   Neurological: Positive for speech difficulty and weakness. Negative for dizziness, tremors, seizures, syncope, facial asymmetry, light-headedness, numbness and headaches.   All other systems reviewed and are negative.         Pertinent PMH:  has a past medical history of Adrenal nodule (CMS/HCC) (11/16/2016), Age-related osteoporosis without current pathological fracture (11/16/2016), Essential hypertension (11/16/2016), Gastroesophageal reflux disease with esophagitis (11/16/2016), Hepatic steatosis (11/16/2016), Hyperlipidemia (11/16/2016), Thyroid nodule (10/18/2019), Type 2 diabetes mellitus with peripheral neuropathy (CMS/HCC) (11/16/2016), and Vitamin D deficiency (1/25/2019).   ________________________________________________     OBJECTIVE:  Pt is awake and alert. Appears  uncomfortable, lying in dark room with eyes covered. Functional weakness observed during exam.       Neurologic Exam    PHYSICAL EXAM:    CONSTITUTIONAL: The patient is in no apparent distress, bright awake and alert. There is no shortness of breath.     HEENT: There is no tenderness over the temporal arteries bilaterally. Normocephalic, atraumatic. TMJ open symmetrically without tenderness.    NECK: ROM normal, supple. Occipital tenderness on the left.     RESPIRATORY: Breathing unlabored, Breath sounds are normal    CARDIAC: Regular rate and rhythm.     EXTREMITIES:  No clubbing, cyanosis or edema.    PSYCHIATRIC: Mood/affect normal, judgement normal, appropriate    NEUROLOGICAL:    Cognition:   Fully oriented.  Fund of knowledge excellent.  Concentration and attention normal.   Language normal with normal comprehension, fluent speech, intact repetition and naming.   Short and long term memory appears intact    Cranial nerves;    II - pupils bilaterally equal reacting to light,  No new Visual field deficits;  Fundoscopic exam- Not able to be done, non-dilated exam  III,IV,VI: EOMI with no diplopia  V: Normal facial sensations  VII: No facial asymmetry,  VIII: No New hearing abnormality  IX, X, XI: normal gag and shoulder shrug;  XII: tongue is in the midline.    Sensory:  Intact to light touch in all extremities.     Motor: Strength 5/5 bilaterally upper and lower extremities. Giveway weakness on the left, resolves on repeated attempts with encouragement. No involuntary movements present. Normal tone and bulk.  Deep tendon reflexes: 1/4 and symmetrical in biceps, brachioradialis, triceps, bilateral 1/4 knees and ankles. Both plantars are upgoing    Cerebellar: Finger to nose and mirror movements normal bilaterally.    Gait and balance: normal      ________________________________________________   RESULTS REVIEW    VITAL SIGNS:  Temp:  [97.4 °F (36.3 °C)-98.4 °F (36.9 °C)] 97.9 °F (36.6 °C)  Heart Rate:  [61-79]  66  Resp:  [10-12] 11  BP: (133-162)/(62-84) 162/84    LABS:   Lab Results   Component Value Date    WBC 5.70 04/13/2020    HGB 11.9 (L) 04/13/2020    HCT 34.6 04/13/2020    MCV 86.1 04/13/2020     04/13/2020     Lab Results   Component Value Date    GLUCOSE 107 (H) 04/13/2020    BUN 15 04/13/2020    CREATININE 0.79 04/13/2020    EGFRIFNONA 73 04/13/2020    EGFRIFAFRI 74 03/13/2020    BCR 19.0 04/13/2020    K 3.6 04/13/2020    CO2 27.0 04/13/2020    CALCIUM 8.3 (L) 04/13/2020    PROTENTOTREF 7.2 03/13/2020    ALBUMIN 4.10 04/12/2020    LABIL2 1.5 03/13/2020    AST 25 04/12/2020    ALT 13 04/12/2020       Lab Results   Component Value Date    TSH 0.738 04/12/2020    LDL 90 04/12/2020    HGBA1C 10.9 (H) 04/12/2020    ZEPIAUEV24 782 04/12/2020         IMAGING STUDIES:  Ct Angiogram Head    Result Date: 4/12/2020  1. Atherosclerosis. Minor stenosis proximal left P1 segment. No other significant stenosis or occlusion of the major arteries of the head and neck. Artifact on earlier noncontrast CT 2. MRI is more sensitive for the detection of acute nonhemorrhagic infarct. 3. Coronary artery disease. Report called to DR. GOLDMAN at 4/12/2020 2:51 AM Electronically signed by:  Donald Martini M.D.  4/12/2020 12:57 AM    Ct Angiogram Neck    Result Date: 4/12/2020  1. Atherosclerosis. Minor stenosis proximal left P1 segment. No other significant stenosis or occlusion of the major arteries of the head and neck. Artifact on earlier noncontrast CT 2. MRI is more sensitive for the detection of acute nonhemorrhagic infarct. 3. Coronary artery disease. Report called to DR. GOLDMAN at 4/12/2020 2:51 AM Electronically signed by:  Donald Martini M.D.  4/12/2020 12:57 AM    Mri Brain With & Without Contrast    Result Date: 4/13/2020   1. No acute intracranial pathology. 2. Changes of mild microvascular ischemic disease   Electronically Signed By-Blair Gonsalves On:4/13/2020 11:32 AM This report was finalized on  [id number] by  Blair Gonsalves, .    Xr Chest 1 View    Result Date: 4/12/2020  No acute infiltrate is appreciated.  Electronically Signed By-Dr. Gonsalo Mahoney MD On:4/12/2020 2:57 AM This report was finalized on [id number] by Dr. Gonsalo Mahoney MD.    Ct Head Without Contrast Stroke Protocol    Result Date: 4/12/2020  1. Question slight hyperdensity of the distal left M1/proximal M2 segments. Artifact versus thrombus. Recommend CTA head. 2. No acute intracranial hemorrhage. MRI is more sensitive for the detection of acute nonhemorrhagic infarct. 2. Minor changes small vessel ischemic disease of indeterminate age, presumably mostly chronic. Volume loss. Atherosclerosis. Report called to DR. GOLDMAN at 4/12/2020 2:10 AM Electronically signed by:  Donald Martini M.D.  4/12/2020 12:15 AM    Ct Cerebral Perfusion With & Without Contrast    Result Date: 4/12/2020  1. Unremarkable.  Electronically signed by:  Donald Martini M.D.  4/12/2020 12:47 AM      I reviewed the patient's new clinical results.    ________________________________________________      PROBLEM LIST:    Complicated migraine    Difficulty speaking    Dyspnea    Occipital neuralgia of left side        Assessment/Plan   ASSESSMENT/PLAN:  1. Headache with left sided weakness and difficulty with speech, Probable complicated migraine. No acute stroke. Symptoms persistent >24 hours is not consistent with TIA.   - MRI brain: No acute intracranial pathology. Changes of mild microvascular ischemic disease. Images personally reviewed, no acute stroke appreciated.   - Echo: Report pending  - Labs: A1C: 10.9, B12: 782, LDL:  90, TSH: 0.738  - Antithrombotics: Patient on ASA at home, continue.   - Statin: Lipitor 80  - Pt reports allergy to compazine  - Toradol x1 dose now  - Fioricet TID PRN    2. Occipital neuralgia on the left  - Pt sees pain management outpt. Recommend to discuss occipital nerve block in the future if pain becomes more severe  -  Baclofen 10mg BID PRN  - Recommend massage therapy and therapeutic stretching.      3. Recent dry cough, diarrhea, nausea  - Covid-19 negative  -Management per primary      **Please refer to previous notes for further details and recommendations.     If headache resolves, okay to discharge from neuro standpoint.     I discussed the patients findings and my recommendations with patient, nursing staff and consulting provider    Manju Bloom, CANDIDO  04/13/20  13:02

## 2020-04-13 NOTE — CONSULTS
"Diabetes Education  Assessment/Teaching    Patient Name:  Marge Mcghee  YOB: 1955  MRN: [id number]  Admit Date:  4/12/2020      Assessment Date:  4/13/2020    Most Recent Value   General Information    Referral From:  A1c [Received consult due to pt's A1c >7%. 4/12/20 A1c result 10.9%]   Height  170.2 cm (67\")   Height Method  Stated   Weight  78.4 kg (172 lb 13.5 oz)   Weight Method  Bed scale   Pregnancy Assessment   Diabetes History   What type of diabetes do you have?  Type 2   Length of Diabetes Diagnosis  -- [Pt states was dx at age 18 years.]   Current DM knowledge  good   Have you had diabetes education/teaching in the past?  yes   When and where was your diabetes education?  Pt received diabetes education during past visit to [facility] on 3/23/2020. A1c on that date was 12.1%. Pt sees endocrinologist routinely.    Do you test your blood sugar at home?  yes   Frequency of checks  Pt states has been checking 3 times/day   Meter type  One Touch Verio Flex   Who performs the test?  self   Typical readings  <150 mg/dl since d/c from hospital in March 2020   How would you rate your diabetes control?  good   Education Preferences   What areas of diabetes would you like to learn about?  avoiding high blood sugar, diabetes complications, testing my blood sugar at home   Nutrition Information   Assessment Topics   Problem Solving - Assessment  Needs education   Reducing Risk - Assessment  Needs education   Monitoring - Assessment  Needs education   DM Goals   Problem Solving - Goal  Today   Reducing Risk - Goal  Today   Monitoring - Goal  Today            Most Recent Value   DM Education Needs   Meter  Has own   Meter Type  Accuchek   Frequency of Testing  3 times a day   Blood Glucose Target Range  Discussed pt's A1c result this adm of 10.9%. This is a 1.2% decrease from last A1c of 12.1% on 3/23/2020. Pt states she has been following meal plan and bs has been <150 mg/dl. Reviewed healthy bs range and " healthy A1c target.    Medication  Oral [Pt takes glimepiride 4 mg bid and metformin 1000 mg bid. Pt states she takes her meds as prescribed.]   Problem Solving  Hyperglycemia, Signs, Symptoms, Treatment   Reducing Risks  A1C testing   Motivation  Engaged   Teaching Method  Explanation, Discussion   Patient Response  Verbalized understanding            Other Comments:  Education done per phone conversation. Pt was seen by diabetes educator during past visit to [facility] on 3/23/2020. Pt states she has been following meal plan since she has been at home and has not been working 3 jobs. She states she will try to continue with this meal plan when she returns to work. Reviewed goal A1c of <7% and explained if bs continues running <150, she should get A1c result <7% on next A1c check. Discussed importance of notifying her endocrinologist if bs begins running >150 mg/dl. Pt states she is needing rx for the One Touch Verio test strips and the One Touch Delica Plus lancets. Rxs started in Epic. Pt states she has been using the One Touch Verio Flex bs meter given to her during last education session but she purchased her own test strips. She states rxs were not sent to pharmacy. Pt states she does not need any additional diabetes education at this time.         Electronically signed by:  Zoe Ma RN  04/13/20 14:01

## 2020-04-13 NOTE — SIGNIFICANT NOTE
Pt's MRI negative. Vitals stable. Will d/c home this afternoon. Family will provide transportation. PT is recommending outpatient rehab at this time.

## 2020-04-13 NOTE — PLAN OF CARE
Problem: Patient Care Overview  Goal: Plan of Care Review  Outcome: Ongoing (interventions implemented as appropriate)  Flowsheets (Taken 4/13/2020 0401)  Progress: no change  Plan of Care Reviewed With: patient  Note:   Patient's vitals have been stable during shift.  NIH was an 8.  Patient had an episode of hypoglycemia and now has D5 with half normal saline running.  No complaints per patient.  All needs met. RN will continue to monitor.

## 2020-04-13 NOTE — DISCHARGE SUMMARY
"  Date of Admission: 4/12/2020    Date of Discharge:  4/13/2020    Length of stay:  LOS: 0 days     Patient was examined with relevant and adequate PPE keeping in mind the current coronavirus pandemic.      Presenting Problem/History of Present Illness   Present on Admission:  • Complicated migraine  • Type 2 diabetes mellitus with peripheral neuropathy (CMS/HCC)  • Age-related osteoporosis without current pathological fracture  • Essential hypertension  • Hyperlipidemia        Hospital Course    Chief Complaint   Patient presents with   • Headache       Marge Mcghee 65 y.o. female.    65 year old female presented to ED with complaints of left side parietal moderate to severe headache that radiated to the left side of her posterior neck and down her left arm with paresthesia. She is awake , alert and good historian.She reports she had difficulty speaking and in answering she is hesitant before she speaks but then answers appropriately. She initially was reported to have symptoms at 8 pm tonight and tried to sleep and awakened at  11 pm and  advised her to come to ED. Code stroke was called in ED . CT head per radiology :      \"1. Unremarkable.\"     CTA head and neck per radiology:  \"IMPRESSION:  1. Question slight hyperdensity of the distal left M1/proximal M2 segments. Artifact versus thrombus. Recommend CTA head.  2. No acute intracranial hemorrhage. MRI is more sensitive for the detection of acute nonhemorrhagic infarct.  2. Minor changes small vessel ischemic disease of indeterminate age, presumably mostly chronic. Volume loss. Atherosclerosis.\"     Dr Moran of neurology was consulted from ED and it was determined she was past the window for TPa but she will be admitted for MRI in am .      She also complains of \" feeling like shit\". She reports weakness, diarrhea for 2 days, dyspnea and dry cough\" She denies fever and is afebrile here. 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 She reports \" they are very sick\" Review of records shows she was admitted here 3/23/20 for UTI REINA, Flank pain and possible gastroenteritis. She had a Covid-19 test on 3/24/20 which resulted as negative but has been back to work since that test was performed. She also had an abnormal CT abdomen at that time which showed stranding of mesenteric lymph nodes and was advised to follow up with GI for evaluation for possible lymphoma. She has a PMH of diabetes mellitus 2 well controlled , osteoporosis, essential hypertension  with BP on admission of 157/93, HLD and GERD. Troponin was negative . Given dyspnea and cough and possible exposure to Covid -19 ( she reports she wore appropriate PPE), an additional Covid-19 test has been ordered and pending . She will be further evaluated by neurology as well. UA ordered and pending .    Patient seen in consult by neurology.  Extensive work-up negative for any ischemic stroke.  Or any other neurologic deficit.  Patient appears anxious does not want to go home today.  Examination patient tainted by her anxiety and large subjective component.  Her perception that something is really wrong.  Detailed work-up has been done and she was here 3 weeks ago as well.  States works 3 jobs.  Defer to the PCP to eliminate unnecessary medications.  Polypharmacy  Diabetes mellitus is completely uncontrolled.  I would advise her to follow-up with endocrinology or risk serious adverse events including cardiovascular and neurologic.  She needs to be on insulin.      Review of Systems   Constitution: Negative.   HENT: Negative.    Eyes: Negative.    Cardiovascular: Negative.    Respiratory: Negative.    Endocrine: Negative.    Hematologic/Lymphatic: Negative.    Skin: Negative.    Musculoskeletal: Negative.    Gastrointestinal: Positive for abdominal pain.   Genitourinary: Negative.    Neurological: Negative.    Psychiatric/Behavioral: Negative.    Allergic/Immunologic: Negative.  "   All other systems reviewed and are negative.      Family History   Problem Relation Age of Onset   • Diabetes Mother    • COPD Mother    • Hypertension Mother    • Kidney disease Mother    • Obesity Mother    • Thyroid disease Mother    • Diabetes Sister    • Diabetes Sister    • Diabetes Sister    • Diabetes Sister         Past Medical History:   Diagnosis Date   • Adrenal nodule (CMS/HCC) 11/16/2016    FINDINGS: Mild hepatic steatosis may be present. Cholecystectomy. No biliary ductal dilatation. Negative pancreas, spleen, left adrenal gland, and kidneys. The right adrenal presumed adenoma has increased slightly, measuring 3 x 4 cm in diameter,  compared to 2 x 3.5 cm on the previous exam. The attenuation values are consistent with an adenoma. Done at [facility] in August 2018   • Age-related osteoporosis without current pathological fracture 11/16/2016   • Essential hypertension 11/16/2016   • Gastroesophageal reflux disease with esophagitis 11/16/2016   • Hepatic steatosis 11/16/2016   • Hyperlipidemia 11/16/2016   • Thyroid nodule 10/18/2019   • Type 2 diabetes mellitus with peripheral neuropathy (CMS/HCC) 11/16/2016   • Vitamin D deficiency 1/25/2019       Past Surgical History:   Procedure Laterality Date   • BREAST BIOPSY Left     7/2019   • CATARACT EXTRACTION WITH INTRAOCULAR LENS IMPLANT Bilateral    • CHOLECYSTECTOMY     • COLONOSCOPY     • KNEE ARTHROSCOPY Bilateral    • TOTAL ABDOMINAL HYSTERECTOMY     • UPPER GASTROINTESTINAL ENDOSCOPY  08/2016       Social History     Socioeconomic History   • Marital status:      Spouse name: Not on file   • Number of children: Not on file   • Years of education: Not on file   • Highest education level: Not on file   Tobacco Use   • Smoking status: Never Smoker   • Smokeless tobacco: Never Used   Substance and Sexual Activity   • Alcohol use: Yes     Comment: socially    • Drug use: Never   • Sexual activity: Defer       Vital Signs  Temp:  [97.4 °F  (36.3 °C)-98.5 °F (36.9 °C)] 98.5 °F (36.9 °C)  Heart Rate:  [61-88] 88  Resp:  [10-15] 15  BP: (133-162)/(56-84) 148/56    Physical Exam:  Physical Exam   Constitutional: She is oriented to person, place, and time. She appears well-developed and well-nourished. No distress.   HENT:   Head: Normocephalic and atraumatic.   Right Ear: External ear normal.   Left Ear: External ear normal.   Nose: Nose normal.   Mouth/Throat: Oropharynx is clear and moist. No oropharyngeal exudate.   Eyes: Pupils are equal, round, and reactive to light. Conjunctivae and EOM are normal. Right eye exhibits no discharge. Left eye exhibits no discharge. No scleral icterus.   Neck: Normal range of motion. No JVD present. No tracheal deviation present. No thyromegaly present.   Cardiovascular: Normal rate, regular rhythm, normal heart sounds and intact distal pulses. Exam reveals no gallop and no friction rub.   No murmur heard.  Pulmonary/Chest: Effort normal and breath sounds normal. No stridor. No respiratory distress. She has no wheezes. She has no rales. She exhibits no tenderness.   Abdominal: Soft. Bowel sounds are normal. She exhibits no distension and no mass. There is no tenderness. There is no rebound and no guarding. No hernia.   Musculoskeletal: Normal range of motion. She exhibits no edema, tenderness or deformity.   Lymphadenopathy:     She has no cervical adenopathy.   Neurological: She is alert and oriented to person, place, and time. No cranial nerve deficit or sensory deficit. She exhibits normal muscle tone. Coordination normal.   No deficit but tongue deviation to the right appears intentional.  Completely normal swallowing and speaking.   Skin: Skin is warm and dry. No rash noted. She is not diaphoretic. No erythema.   Psychiatric: She has a normal mood and affect. Her behavior is normal.   Nursing note and vitals reviewed.              Discharge Diagnosis:     Active Hospital Problems    Diagnosis  POA   • **Complicated  migraine [G43.109]  Yes     Priority: High   • Type 2 diabetes mellitus with peripheral neuropathy (CMS/HCC) [E11.42]  Yes     Priority: Medium   • Occipital neuralgia of left side [M54.81]  Clinically Undetermined   • Polypharmacy [Z79.899]  Not Applicable   • Age-related osteoporosis without current pathological fracture [M81.0]  Yes   • Essential hypertension [I10]  Yes   • Hyperlipidemia [E78.5]  Yes      Resolved Hospital Problems    Diagnosis Date Resolved POA   • Difficulty speaking [R47.9] 04/13/2020 Unknown   • Dyspnea [R06.00] 04/13/2020 Unknown       Estimated Creatinine Clearance: 73.6 mL/min (by C-G formula based on SCr of 0.79 mg/dL).    Discharge Disposition    Destination      Coordination has not been started for this encounter.      Durable Medical Equipment      Coordination has not been started for this encounter.      Dialysis/Infusion      Coordination has not been started for this encounter.      Home Medical Care      Coordination has not been started for this encounter.      Therapy      Coordination has not been started for this encounter.      Community Resources      Coordination has not been started for this encounter.              PT Recommendation and Plan          Home or Self Care           Discharge Medications      New Medications      Instructions Start Date   aspirin 81 MG chewable tablet   81 mg, Oral, Daily   Start Date:  April 14, 2020     baclofen 10 MG tablet  Commonly known as:  LIORESAL   10 mg, Oral, 2 Times Daily PRN      butalbital-acetaminophen-caffeine -40 MG per tablet  Commonly known as:  FIORICET, ESGIC   1 tablet, Oral, 3 Times Daily PRN      glucose blood test strip  Commonly known as:  OneTouch Verio   1 each, Other, Daily With Breakfast, Lunch & Dinner, Dx: E11.65. Use as instructed      OneTouch Delica Plus [id number] misc   1 each, Does not apply, 3 Times Daily Before Meals, Dx: E11.65         Continue These Medications      Instructions Start Date    atorvastatin 80 MG tablet  Commonly known as:  LIPITOR   80 mg, Oral, Nightly      Carafate 1 g tablet  Generic drug:  sucralfate   1 g, Oral, 2 Times Daily      denosumab 60 MG/ML solution syringe  Commonly known as:  PROLIA   60 mg, Subcutaneous, Every 6 Months, Nov 2019      DULoxetine 60 MG capsule  Commonly known as:  CYMBALTA   60 mg, Oral, Daily      glimepiride 4 MG tablet  Commonly known as:  Amaryl   4 mg, Oral, 2 Times Daily      losartan 100 MG tablet  Commonly known as:  COZAAR   100 mg, Oral, Daily      metFORMIN 1000 MG tablet  Commonly known as:  GLUCOPHAGE   1,000 mg, Oral, 2 Times Daily With Meals      MULTI-VITAMIN PO   1 tablet, Oral, Daily      pantoprazole 40 MG EC tablet  Commonly known as:  PROTONIX   40 mg, Oral, Daily      potassium chloride 20 MEQ CR tablet  Commonly known as:  K-DUR,KLOR-CON   20 mEq, Oral, Daily      Vitamin B-12 3000 MCG sublingual tablet   1 tablet/day, Sublingual      vitamin D 1.25 MG (57780 UT) capsule capsule  Commonly known as:  ERGOCALCIFEROL   50,000 Units, Oral, Weekly, Monday      Vitamin D-3 25 MCG (1000 UT) capsule   1,000 Units, Oral, Daily         Stop These Medications    promethazine 25 MG tablet  Commonly known as:  PHENERGAN          Discharge medications personally reviewed by me and [facility] rec done by me personally.  04/13/20, 3:03 PM        Consults:   Consults     Date and Time Order Name Status Description    4/12/2020 0331 Inpatient Neurology Consult Stroke      4/12/2020 0313 Inpatient Hospitalist Consult Completed     4/12/2020 0200 Inpatient Neurology Consult Stroke Completed     4/12/2020 0200 Inpatient Neurology Consult Stroke Completed     3/25/2020 1043 Inpatient Gastroenterology Consult Completed     3/23/2020 1017 Hospitalist (on-call MD unless specified) Completed           Procedures Performed:         Pertinent Test Results:   Results from last 7 days   Lab Units 04/13/20  0459 04/12/20  0156   WBC 10*3/mm3 5.70 8.80   HEMOGLOBIN g/dL  11.9* 13.5   HEMATOCRIT % 34.6 40.6   MCV fL 86.1 85.9   MCH pg 29.7 28.5   PLATELETS 10*3/mm3 259 313     Results from last 7 days   Lab Units 04/13/20  0459 04/12/20  0156   SODIUM mmol/L 142 138   POTASSIUM mmol/L 3.6 4.1   CHLORIDE mmol/L 105 100   CO2 mmol/L 27.0 24.0   BUN mg/dL 15 19   CREATININE mg/dL 0.79 1.04*   CALCIUM mg/dL 8.3* 9.5   BILIRUBIN mg/dL  --  0.4   ALK PHOS U/L  --  50   ALT (SGPT) U/L  --  13   AST (SGOT) U/L  --  25   GLUCOSE mg/dL 107* 119*     Results from last 7 days   Lab Units 04/12/20  1318   MAGNESIUM mg/dL 1.5*     Lab Results   Component Value Date    CALCIUM 8.3 (L) 04/13/2020    PHOS 2.9 03/23/2020     Hemoglobin A1C   Date Value Ref Range Status   04/12/2020 10.9 (H) 3.5 - 5.6 % Final     Lab Results   Component Value Date    CHOL 177 04/12/2020    CHLPL 285 (H) 03/13/2020    TRIG 179 (H) 04/12/2020    HDL 51 04/12/2020    LDL 90 04/12/2020     Lab Results   Component Value Date    LIPASE 56 03/23/2020           No results found for: INTRAOP, PREDX, FINALDX, COMDX  COVID19   Date Value Ref Range Status   04/12/2020 Not Detected Not Detected Final        Microbiology Results (last 10 days)     Procedure Component Value - Date/Time    SARS-CoV-2, PCR (IN-HOUSE), NP Swab in Transport Media - Swab, Nasopharynx [[id number]]  (Normal) Collected:  04/12/20 0437    Lab Status:  Final result Specimen:  Swab from Nasopharynx Updated:  04/12/20 1344     COVID19 Not Detected          ECG/EMG Results (most recent)     Procedure Component Value Units Date/Time    ECG 12 Lead [[id number]] Collected:  04/12/20 0229     Updated:  04/12/20 0231    Narrative:       HEART RATE= 82  bpm  RR Interval= 728  ms  DC Interval= 171  ms  P Horizontal Axis= 0  deg  P Front Axis= 36  deg  QRSD Interval= 84  ms  QT Interval= 379  ms  QRS Axis= -17  deg  T Wave Axis= 38  deg  - ABNORMAL ECG -  Sinus rhythm  Inferior infarct, old  Electronically Signed By:   Date and Time of Study: 2020-04-12 02:29:32    Adult  Transthoracic Echo Complete W/ Cont if Necessary Per Protocol (With Agitated Saline) [[id number]] Collected:  04/13/20 0915     Updated:  04/13/20 1010     BSA 1.9 m^2      RVIDd 2.9 cm      IVSd 1.6 cm      LVIDd 2.8 cm      LVIDs 1.9 cm      LVPWd 1.5 cm      IVS/LVPW 1.1     FS 32.6 %      EDV(Teich) 28.6 ml      ESV(Teich) 10.6 ml      EF(Teich) 62.9 %      EDV(cubed) 21.1 ml      ESV(cubed) 6.5 ml      EF(cubed) 69.4 %      LV mass(C)d 148.7 grams      LV mass(C)dI 78.4 grams/m^2      SV(Teich) 18.0 ml      SI(Teich) 9.5 ml/m^2      SV(cubed) 14.6 ml      SI(cubed) 7.7 ml/m^2      Ao root diam 3.0 cm      Ao root area 7.0 cm^2      ACS 1.8 cm      asc Aorta Diam 3.1 cm      LVOT diam 2.2 cm      LVOT area 3.6 cm^2      RVOT diam 2.7 cm      RVOT area 5.9 cm^2      EDV(MOD-sp4) 75.1 ml      ESV(MOD-sp4) 31.6 ml      EF(MOD-sp4) 57.9 %      EDV(MOD-sp2) 55.1 ml      ESV(MOD-sp2) 20.5 ml      EF(MOD-sp2) 62.7 %      SV(MOD-sp4) 43.5 ml      SI(MOD-sp4) 22.9 ml/m^2      SV(MOD-sp2) 34.5 ml      SI(MOD-sp2) 18.2 ml/m^2      Ao root area (BSA corrected) 1.6     LV Osorio Vol (BSA corrected) 39.6 ml/m^2      LV Sys Vol (BSA corrected) 16.7 ml/m^2      Aortic R-R 0.29 sec      Aortic .1 BPM      MV E max juan 58.7 cm/sec      MV A max juan 80.1 cm/sec      MV E/A 0.73     MV V2 max 92.6 cm/sec      MV max PG 3.4 mmHg      MV V2 mean 49.2 cm/sec      MV mean PG 1.2 mmHg      MV V2 VTI 23.9 cm      MVA(VTI) 2.7 cm^2      MV dec slope 220.1 cm/sec^2      MV dec time 0.27 sec      Ao pk juan 136.6 cm/sec      Ao max PG 7.5 mmHg      Ao max PG (full) 5.0 mmHg      Ao V2 mean 94.3 cm/sec      Ao mean PG 3.9 mmHg      Ao mean PG (full) 2.6 mmHg      Ao V2 VTI 27.5 cm      GLORIA(I,A) 2.3 cm^2      GLORIA(I,D) 2.3 cm^2      GLORIA(V,A) 2.1 cm^2      GLORIA(V,D) 2.1 cm^2      LV V1 max PG 2.5 mmHg      LV V1 mean PG 1.3 mmHg      LV V1 max 78.6 cm/sec      LV V1 mean 54.7 cm/sec      LV V1 VTI 17.5 cm      CO(Ao) 39.7 l/min      CI(Ao)  21.0 l/min/m^2      SV(Ao) 192.8 ml      SI(Ao) 101.7 ml/m^2      CO(LVOT) 13.1 l/min      CI(LVOT) 6.9 l/min/m^2      SV(LVOT) 63.7 ml      SV(RVOT) 74.2 ml      SI(LVOT) 33.6 ml/m^2      PA V2 max 64.4 cm/sec      PA max PG 1.7 mmHg      PA max PG (full) 0.33 mmHg       CV ECHO JAY - PVA(V,A) 5.3 cm^2       CV ECHO JAY - PVA(V,D) 5.3 cm^2      PA acc time 0.06 sec      RV V1 max PG 1.3 mmHg      RV V1 mean PG 0.74 mmHg      RV V1 max 57.6 cm/sec      RV V1 mean 41.3 cm/sec      RV V1 VTI 12.6 cm      TR max juan 201.1 cm/sec      RVSP(TR) 24.2 mmHg      RAP systole 8.0 mmHg      PA pr(Accel) 51.6 mmHg      Qp/Qs 1.2      CV ECHO JAY - BZI_BMI 26.9 kilograms/m^2       CV ECHO JAY - BSA(HAYCOCK) 1.9 m^2       CV ECHO JAY - BZI_METRIC_WEIGHT 78.0 kg       CV ECHO JAY - BZI_METRIC_HEIGHT 170.2 cm      EF(MOD-bp) 60.0 %      LA dimension(2D) 2.8 cm           Results for orders placed in visit on 03/13/20   Duplex Carotid Ultrasound CAR    Narrative · Right internal carotid artery is normal.  · Left internal carotid artery is normal.               Ct Angiogram Head    Result Date: 4/12/2020  1. Atherosclerosis. Minor stenosis proximal left P1 segment. No other significant stenosis or occlusion of the major arteries of the head and neck. Artifact on earlier noncontrast CT 2. MRI is more sensitive for the detection of acute nonhemorrhagic infarct. 3. Coronary artery disease. Report called to DR. GOLDMAN at 4/12/2020 2:51 AM Electronically signed by:  Donald Martini M.D.  4/12/2020 12:57 AM    Ct Angiogram Neck    Result Date: 4/12/2020  1. Atherosclerosis. Minor stenosis proximal left P1 segment. No other significant stenosis or occlusion of the major arteries of the head and neck. Artifact on earlier noncontrast CT 2. MRI is more sensitive for the detection of acute nonhemorrhagic infarct. 3. Coronary artery disease. Report called to DR. GOLDMAN at 4/12/2020 2:51 AM Electronically signed by:  Donald  Tad Martini M.D.  4/12/2020 12:57 AM    Mri Brain With & Without Contrast    Result Date: 4/13/2020   1. No acute intracranial pathology. 2. Changes of mild microvascular ischemic disease   Electronically Signed By-Blair Gonsalves On:4/13/2020 11:32 AM This report was finalized on [id number] by  Blair Gonsalves, .    Fl Video Swallow With Speech Single Contrast    Result Date: 4/13/2020  No evidence of aspiration or penetration.  Electronically Signed By-Blair Gonsalves On:4/13/2020 2:46 PM This report was finalized on [id number] by  Blair Gonsalves, .    Xr Chest 1 View    Result Date: 4/12/2020  No acute infiltrate is appreciated.  Electronically Signed By-Dr. Gonsalo Mahoney MD On:4/12/2020 2:57 AM This report was finalized on [id number] by Dr. Gonsalo Mahoney MD.    Ct Head Without Contrast Stroke Protocol    Result Date: 4/12/2020  1. Question slight hyperdensity of the distal left M1/proximal M2 segments. Artifact versus thrombus. Recommend CTA head. 2. No acute intracranial hemorrhage. MRI is more sensitive for the detection of acute nonhemorrhagic infarct. 2. Minor changes small vessel ischemic disease of indeterminate age, presumably mostly chronic. Volume loss. Atherosclerosis. Report called to DR. GOLDMAN at 4/12/2020 2:10 AM Electronically signed by:  Donald Martini M.D.  4/12/2020 12:15 AM    Ct Cerebral Perfusion With & Without Contrast    Result Date: 4/12/2020  1. Unremarkable.  Electronically signed by:  Donald Martini M.D.  4/12/2020 12:47 AM      Xrays, labs reviewed personally by me.  04/13/20  3:03 PM      Condition on Discharge:    Stable    Discharge Diet:   Dietary Orders (From admission, onward)     Start     Ordered    04/13/20 1136  Diet Regular, Diabetic/Consistent Carbs; Diabetic - Consistent Carb  Diet Effective Now     Question Answer Comment   Diet / Texture / Consistency Regular    Diet / Texture / Consistency Diabetic/Consistent Carbs    Select Type: Diabetic -  Consistent Carb        04/13/20 1135                Activity at Discharge:   Activity Instructions     Activity as Tolerated            Follow-up Appointments  Future Appointments   Date Time Provider Department Center   5/5/2020  1:00 PM [facility] US 1 [facility] US [facility]   7/22/2020 10:00 AM Fausto Gilliam MD [facility] None     Additional Instructions for the Follow-ups that You Need to Schedule     Discharge Follow-up with PCP   As directed       Currently Documented PCP:    Traci Townsend APRN    PCP Phone Number:    [phone]     Follow Up Details:  If no PCP, call MD finder at [phone]         Discharge Follow-up with Specified Provider: Gastroenterology; 1 Month   As directed      To:  Gastroenterology    Follow Up:  1 Month    Follow Up Details:  Follow-up for mesenteric lymphadenopathy         Discharge Follow-up with Specified Provider: Neurology; 1 Month   As directed      To:  Neurology    Follow Up:  1 Month    Follow Up Details:  Migraine               Test Results Pending at Discharge       Risk for Readmission (LACE) Score: 6 (4/13/2020  6:00 AM)          Ishmael Nails MD  04/13/20  15:10

## 2020-04-13 NOTE — OUTREACH NOTE
Prep Survey      Responses   [facility] facility patient discharged from?  [facility]   Is LACE score < 7 ?  No   Eligibility  Readm Mgmt   Discharge diagnosis  neg CovidComplicated migraine   COVID-19 Test Status  Negative   Does the patient have one of the following disease processes/diagnoses(primary or secondary)?  Other   Does the patient have Home health ordered?  No   Is there a DME ordered?  No   Prep survey completed?  Yes          Iris Finch RN

## 2020-04-13 NOTE — PLAN OF CARE
Patient seen for a VFSS on this date.  Patient given NTL by spoon 1x, by cup 2x, thin by cup 3x, thin by straw for consecutive sips 1x, applesauce 2x, peaches 2x and cracker 1x.  Patient presents with functional oral and pharyngeal stages of swallow.  Mastication is timely with formation of cohesive bolus formation prior to timely swallow.  No penetration, aspiration or pharyngeal residue noted with any consistency.  Recommend a regular and thin liquid diet

## 2020-04-14 ENCOUNTER — READMISSION MANAGEMENT (OUTPATIENT)
Dept: CALL CENTER | Facility: HOSPITAL | Age: 65
End: 2020-04-14

## 2020-04-14 NOTE — OUTREACH NOTE
Medical Week 1 Survey      Responses   [facility] patient discharged from?  [facility]   COVID-19 Test Status  Negative   Does the patient have one of the following disease processes/diagnoses(primary or secondary)?  Other   Is there a successful TCM telephone encounter documented?  No   Week 1 attempt successful?  No   Unsuccessful attempts  Attempt 1          Sunshine Covington RN

## 2020-04-15 ENCOUNTER — READMISSION MANAGEMENT (OUTPATIENT)
Dept: CALL CENTER | Facility: HOSPITAL | Age: 65
End: 2020-04-15

## 2020-04-15 NOTE — OUTREACH NOTE
Medical Week 1 Survey      Responses   [facility] patient discharged from?  [facility]   COVID-19 Test Status  Negative   Does the patient have one of the following disease processes/diagnoses(primary or secondary)?  Other   Is there a successful TCM telephone encounter documented?  No   Week 1 attempt successful?  No   Unsuccessful attempts  Attempt 2          Maikol Holland RN

## 2020-04-16 ENCOUNTER — READMISSION MANAGEMENT (OUTPATIENT)
Dept: CALL CENTER | Facility: HOSPITAL | Age: 65
End: 2020-04-16

## 2020-04-16 NOTE — OUTREACH NOTE
Medical Week 1 Survey      Responses   [facility] patient discharged from?  [facility]   COVID-19 Test Status  Negative   Does the patient have one of the following disease processes/diagnoses(primary or secondary)?  Other   Is there a successful TCM telephone encounter documented?  No   Week 1 attempt successful?  No   Unsuccessful attempts  Attempt 3          Sherrie Martinez RN

## 2020-04-22 ENCOUNTER — READMISSION MANAGEMENT (OUTPATIENT)
Dept: CALL CENTER | Facility: HOSPITAL | Age: 65
End: 2020-04-22

## 2020-04-22 NOTE — OUTREACH NOTE
Medical Week 2 Survey      Responses   [facility] patient discharged from?  [facility]   COVID-19 Test Status  Negative   Does the patient have one of the following disease processes/diagnoses(primary or secondary)?  Other   Week 2 attempt successful?  No   Unsuccessful attempts  Attempt 1          Kyung Flowers LPN

## 2020-04-23 ENCOUNTER — READMISSION MANAGEMENT (OUTPATIENT)
Dept: CALL CENTER | Facility: HOSPITAL | Age: 65
End: 2020-04-23

## 2020-04-24 ENCOUNTER — READMISSION MANAGEMENT (OUTPATIENT)
Dept: CALL CENTER | Facility: HOSPITAL | Age: 65
End: 2020-04-24

## 2020-04-24 NOTE — OUTREACH NOTE
Medical Week 2 Survey      Responses   [facility] [place] patient discharged from?  [facility]   COVID-19 Test Status  Negative   Does the patient have one of the following disease processes/diagnoses(primary or secondary)?  Other   Week 2 attempt successful?  No   Unsuccessful attempts  Attempt 2   Rescheduled  Revoked   Revoke  Decline to participate          Shanita Nichole, RN

## 2020-05-05 ENCOUNTER — HOSPITAL ENCOUNTER (OUTPATIENT)
Dept: ULTRASOUND IMAGING | Facility: HOSPITAL | Age: 65
End: 2020-05-05

## 2020-05-08 ENCOUNTER — HOSPITAL ENCOUNTER (OUTPATIENT)
Dept: ULTRASOUND IMAGING | Facility: HOSPITAL | Age: 65
Discharge: HOME | End: 2020-05-08
Admitting: RADIOLOGY

## 2020-05-08 DIAGNOSIS — E04.2 MULTIPLE THYROID NODULES: ICD-10-CM

## 2020-05-08 PROCEDURE — 25010000003 LIDOCAINE 1 % SOLUTION: Performed by: INTERNAL MEDICINE

## 2020-05-08 PROCEDURE — 88173 CYTOPATH EVAL FNA REPORT: CPT | Performed by: INTERNAL MEDICINE

## 2020-05-08 PROCEDURE — 88305 TISSUE EXAM BY PATHOLOGIST: CPT | Performed by: INTERNAL MEDICINE

## 2020-05-08 RX ORDER — LIDOCAINE HYDROCHLORIDE 10 MG/ML
5 INJECTION, SOLUTION INFILTRATION; PERINEURAL ONCE
Status: COMPLETED | OUTPATIENT
Start: 2020-05-08 | End: 2020-05-08

## 2020-05-08 RX ADMIN — LIDOCAINE HYDROCHLORIDE 5 ML: 10 INJECTION, SOLUTION INFILTRATION; PERINEURAL at 14:00

## 2020-05-11 LAB
LAB AP CASE REPORT: NORMAL
PATH REPORT.FINAL DX SPEC: NORMAL
PATH REPORT.GROSS SPEC: NORMAL

## 2020-06-18 PROBLEM — E11.3513 PROLIFERATIVE DIABETIC RETINOPATHY OF BOTH EYES WITH MACULAR EDEMA ASSOCIATED WITH TYPE 2 DIABETES MELLITUS: Status: ACTIVE | Noted: 2020-06-18

## 2020-07-14 NOTE — PROGRESS NOTES
Subjective   Marge Mcghee is a 65 y.o. female.     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 She has been on metformin 1000 mg BID, and glimepiride 4 mg BID.  She was taken off Jardiance 10 mg in April 2020 when she was admitted for pyelonephritis.  Trulicity was discontinued in June 2019 because she was having nausea, vomiting and increased reflux symptoms.  She has no weight change since March 2020.  Victoza was discontinued because of formulary.  She checks her blood sugars once a day.  BS  since she went off Jardiance.  She has few hypoglycemic episodes.  Her last meal was at 5:30 AM.      Her last eye examination was in 7/20. She has retinopathy and had previous retinal laser treatment.  She had vitrectomy in the right eye..  She is getting intraocular injections on the both eyes from Dr. Hinton.  She had left cataract surgery in December 2018.  She has burning sensation and tingling in her feet which has improved with Cymbalta. She has used Neurontin and Lyrica in the past which caused blurred vision. Creatinine clearance done in 2/15 was 95 mL per minute. 24-hour urine protein was low.      She has hyperlipidemia and has been taking Lipitor 80 mg per day regularly.  She misses a dose about once a week.  She denies myalgia.      She has hypertension and has been on Lasix 20 mg once a day and losartan 100 mg/day.     She has a right adrenal mass. She had a follow-up MRI in January 2012 which showed a right adrenal adenoma measuring 3.7 cm which is unchanged from July 2009. She has mild hepatic steatosis on MRI. Lab studies done in February 2015: Normal 24 urine VMA, metanephrines, and catecholamines. Plasma renin is 14.11 ng per mL per hour. Aldosterone was normal.       She had CT of the  abdomen and pelvis done at [facility] in 2018 which showed a 4 cm right adrenal mass with attenuation value is consistent with an adenoma.  She has mild hepatic steatosis.  He follows with Dr. Ireland.     She denies headaches, diaphoresis, palpitations, weakness, easy bruising, and increased increased facial or body hair.      She is  6 para 1. She had total hysterectomy and bilateral salpingo-oophorectomy in her 40s. She was never on estrogen replacement therapy. She had a follow-up bone density in  which showed osteoporosis.  She is on Os-Addison 500+ D one tablet once a day and vit D weekly.  She is on Prolia and her last dose was in .  Osteoporosis is being managed by her PCP Traci Townsend NP     She was hospitalized at [facility]  for erosive esophagitis.  She is on Protonix 40 mg/day and Carafate as needed.  She has stopped using Dexilant because of cost.  She is no longer nauseated or vomiting.  She denies melena or hematochezia or heartburn.  She had colonoscopy and EGD done by Dr. Gray in .  She had a hyperplastic polyp removed from her descending colon..  She was advised repeat colonoscopy in .  EGD was normal.     She has vitamin B12 deficiency and takes sublingual vitamin B12.  Vitamin B12 done in 2019 is normal at 806 pg/mL.     She had a thyroid ultrasound done at the [facility] in 2019 and was told that she has an 0.9 cm mixed cystic and solid nodule.  She had a fine-needle biopsy of left dominant solid nodule done at [place] in May 2020 which was read as benign follicular nodule.     .  The following portions of the patient's history were reviewed and updated as appropriate: allergies, current medications, past family history, past medical history, past social history, past surgical history and problem list.    Review of Systems   Constitutional: Negative.  Negative for chills, fatigue and fever.   HENT: Negative.    Eyes: Negative.  "   Respiratory: Negative.  Negative for chest tightness, shortness of breath and wheezing.    Cardiovascular: Negative.  Negative for chest pain, palpitations and leg swelling.   Gastrointestinal: Negative.  Negative for abdominal distention, abdominal pain, anal bleeding, blood in stool, constipation and diarrhea.   Endocrine: Negative.  Negative for cold intolerance, heat intolerance and polyuria.   Genitourinary: Negative.  Negative for difficulty urinating, frequency and urgency.   Musculoskeletal: Negative.  Negative for back pain, joint swelling and neck pain.   Skin: Negative.  Negative for color change, rash and wound.   Neurological: Negative.  Negative for dizziness, tremors, seizures, light-headedness, numbness and headaches.   Hematological: Negative.  Negative for adenopathy. Does not bruise/bleed easily.   Psychiatric/Behavioral: Negative.  Negative for agitation and confusion. The patient is not nervous/anxious.      Objective      Vitals:    07/22/20 1010   BP: 102/60   BP Location: Right arm   Patient Position: Sitting   Cuff Size: Thigh Adult   Pulse: 78   SpO2: 98%   Weight: 81.2 kg (179 lb)   Height: 170.2 cm (67.01\")     Physical Exam   Constitutional: She is oriented to person, place, and time. She appears well-developed and well-nourished. No distress.   HENT:   Head: Normocephalic.   Right Ear: External ear normal.   Left Ear: External ear normal.   Mouth/Throat: No oropharyngeal exudate.   Eyes: Conjunctivae and EOM are normal. Right eye exhibits no discharge. Left eye exhibits no discharge. No scleral icterus.   Neck: Normal range of motion. No JVD present. No tracheal deviation present. No thyromegaly present.   Cardiovascular: Normal rate, regular rhythm, normal heart sounds and intact distal pulses. Exam reveals no gallop and no friction rub.   No murmur heard.  Pulmonary/Chest: Effort normal and breath sounds normal. No stridor. No respiratory distress. She has no wheezes. She has no " rales.   Abdominal: Soft. Bowel sounds are normal. She exhibits no distension and no mass. There is no tenderness. There is no guarding.   Musculoskeletal: Normal range of motion. She exhibits no edema, tenderness or deformity.   Lymphadenopathy:     She has no cervical adenopathy.   Neurological: She is alert and oriented to person, place, and time. She displays normal reflexes.   Skin: Skin is warm and dry.   Psychiatric: She has a normal mood and affect. Her behavior is normal.     Hospital Outpatient Visit on 05/08/2020   Component Date Value Ref Range Status   • Case Report 05/08/2020    Final                    Value:Medical Cytology Report                           Case: [id number]                                  Authorizing Provider:  Paolo Thompson MD         Collected:           05/08/2020 01:15 PM          Ordering Location:     [facility] Received:            05/08/2020 01:21 PM                                 SOUND                                                                        Pathologist:           Maikol May MD                                                            Specimen:    Thyroid                                                                                   • Final Diagnosis 05/08/2020    Final                    Value:This result contains rich text formatting which cannot be displayed here.   • Gross Description 05/08/2020    Final                    Value:This result contains rich text formatting which cannot be displayed here.     Assessment/Plan   Marge was seen today for diabetes, hyperlipidemia and hypertension.    Diagnoses and all orders for this visit:    Type 2 diabetes mellitus with peripheral neuropathy (CMS/HCC)  -     Comprehensive Metabolic Panel  -     Lipid Panel  -     Hemoglobin A1c    Thyroid nodule    Essential hypertension  -     Comprehensive Metabolic Panel    Hyperlipidemia, unspecified hyperlipidemia type  -     Comprehensive  Metabolic Panel  -     Lipid Panel    Hepatic steatosis  -     Comprehensive Metabolic Panel    Age-related osteoporosis without current pathological fracture  -     Comprehensive Metabolic Panel    Adrenal nodule (CMS/HCC)  -     Comprehensive Metabolic Panel    Other orders  -     metFORMIN (GLUCOPHAGE) 1000 MG tablet; Take 1 tablet by mouth 2 (Two) Times a Day With Meals.  -     glimepiride (AMARYL) 4 MG tablet; Take 1 tablet by mouth 2 (Two) Times a Day.  -     DULoxetine (CYMBALTA) 60 MG capsule; Take 1 capsule by mouth Daily.  -     atorvastatin (LIPITOR) 80 MG tablet; Take 1 tablet by mouth Every Night.      Continue glimepiride, and metformin.  Consider DPP 4 inhibitors and GLP-1 agonist  Follow-up with Dr. Hinton as scheduled.  Continue Cymbalta  Continue Lipitor 80 mg/day.  Continue losartan 100 mg/day and Lasix 20 mg once a day.  Will defer treatment of osteoporosis to Traci Townsend NP  Flu vaccine this fall    Copy of my note sent to Traci Townsend NP,  Dr. Hinton, and Dr. Gaspar    RTC 4 mos

## 2020-07-22 ENCOUNTER — OFFICE VISIT (OUTPATIENT)
Dept: ENDOCRINOLOGY | Age: 65
End: 2020-07-22

## 2020-07-22 VITALS
SYSTOLIC BLOOD PRESSURE: 102 MMHG | HEART RATE: 78 BPM | BODY MASS INDEX: 28.09 KG/M2 | HEIGHT: 67 IN | DIASTOLIC BLOOD PRESSURE: 60 MMHG | OXYGEN SATURATION: 98 % | WEIGHT: 179 LBS

## 2020-07-22 DIAGNOSIS — M81.0 AGE-RELATED OSTEOPOROSIS WITHOUT CURRENT PATHOLOGICAL FRACTURE: Chronic | ICD-10-CM

## 2020-07-22 DIAGNOSIS — K76.0 HEPATIC STEATOSIS: Chronic | ICD-10-CM

## 2020-07-22 DIAGNOSIS — E11.42 TYPE 2 DIABETES MELLITUS WITH PERIPHERAL NEUROPATHY (HCC): Primary | Chronic | ICD-10-CM

## 2020-07-22 DIAGNOSIS — E27.8 ADRENAL NODULE (HCC): ICD-10-CM

## 2020-07-22 DIAGNOSIS — E78.5 HYPERLIPIDEMIA, UNSPECIFIED HYPERLIPIDEMIA TYPE: Chronic | ICD-10-CM

## 2020-07-22 DIAGNOSIS — E04.1 THYROID NODULE: ICD-10-CM

## 2020-07-22 DIAGNOSIS — I10 ESSENTIAL HYPERTENSION: Chronic | ICD-10-CM

## 2020-07-22 PROCEDURE — 99214 OFFICE O/P EST MOD 30 MIN: CPT | Performed by: INTERNAL MEDICINE

## 2020-07-22 RX ORDER — EMPAGLIFLOZIN 10 MG/1
TABLET, FILM COATED ORAL
COMMUNITY
Start: 2020-04-24 | End: 2020-07-22

## 2020-07-22 RX ORDER — PIMECROLIMUS 10 MG/G
CREAM TOPICAL
COMMUNITY
Start: 2020-05-18 | End: 2020-07-22

## 2020-07-22 RX ORDER — DULOXETIN HYDROCHLORIDE 60 MG/1
60 CAPSULE, DELAYED RELEASE ORAL DAILY
Qty: 90 CAPSULE | Refills: 1 | Status: SHIPPED | OUTPATIENT
Start: 2020-07-22 | End: 2021-07-12

## 2020-07-22 RX ORDER — GLIMEPIRIDE 4 MG/1
4 TABLET ORAL 2 TIMES DAILY
Qty: 180 TABLET | Refills: 1 | Status: SHIPPED | OUTPATIENT
Start: 2020-07-22 | End: 2021-05-11 | Stop reason: HOSPADM

## 2020-07-22 RX ORDER — ATORVASTATIN CALCIUM 80 MG/1
80 TABLET, FILM COATED ORAL NIGHTLY
Qty: 90 TABLET | Refills: 1 | Status: SHIPPED | OUTPATIENT
Start: 2020-07-22 | End: 2021-06-03 | Stop reason: SDUPTHER

## 2020-07-22 RX ORDER — ROPINIROLE 0.25 MG/1
0.25 TABLET, FILM COATED ORAL EVERY EVENING
COMMUNITY
Start: 2020-05-13 | End: 2021-06-03

## 2020-07-22 RX ORDER — FUROSEMIDE 20 MG/1
20 TABLET ORAL DAILY
COMMUNITY
End: 2021-05-11 | Stop reason: HOSPADM

## 2020-07-23 LAB
ALBUMIN SERPL-MCNC: 4.1 G/DL (ref 3.5–5.2)
ALBUMIN/GLOB SERPL: 1.4 G/DL
ALP SERPL-CCNC: 53 U/L (ref 39–117)
ALT SERPL-CCNC: 7 U/L (ref 1–33)
AST SERPL-CCNC: 13 U/L (ref 1–32)
BILIRUB SERPL-MCNC: 0.3 MG/DL (ref 0–1.2)
BUN SERPL-MCNC: 31 MG/DL (ref 8–23)
BUN/CREAT SERPL: 30.4 (ref 7–25)
CALCIUM SERPL-MCNC: 9.9 MG/DL (ref 8.6–10.5)
CHLORIDE SERPL-SCNC: 101 MMOL/L (ref 98–107)
CHOLEST SERPL-MCNC: 266 MG/DL (ref 0–200)
CO2 SERPL-SCNC: 28 MMOL/L (ref 22–29)
CREAT SERPL-MCNC: 1.02 MG/DL (ref 0.57–1)
GLOBULIN SER CALC-MCNC: 2.9 GM/DL
GLUCOSE SERPL-MCNC: 266 MG/DL (ref 65–99)
HBA1C MFR BLD: 7.9 % (ref 4.8–5.6)
HDLC SERPL-MCNC: 47 MG/DL (ref 40–60)
INTERPRETATION: NORMAL
LDLC SERPL CALC-MCNC: 140 MG/DL (ref 0–100)
Lab: NORMAL
POTASSIUM SERPL-SCNC: 4.5 MMOL/L (ref 3.5–5.2)
PROT SERPL-MCNC: 7 G/DL (ref 6–8.5)
SODIUM SERPL-SCNC: 140 MMOL/L (ref 136–145)
TRIGL SERPL-MCNC: 396 MG/DL (ref 0–150)
VLDLC SERPL CALC-MCNC: 79.2 MG/DL

## 2020-08-05 ENCOUNTER — HOSPITAL ENCOUNTER (EMERGENCY)
Facility: HOSPITAL | Age: 65
Discharge: HOME | End: 2020-08-05
Admitting: EMERGENCY MEDICINE

## 2020-08-05 ENCOUNTER — APPOINTMENT (OUTPATIENT)
Dept: GENERAL RADIOLOGY | Facility: HOSPITAL | Age: 65
End: 2020-08-05

## 2020-08-05 VITALS
WEIGHT: 179 LBS | HEART RATE: 82 BPM | BODY MASS INDEX: 31.71 KG/M2 | OXYGEN SATURATION: 100 % | RESPIRATION RATE: 16 BRPM | TEMPERATURE: 97.6 F | SYSTOLIC BLOOD PRESSURE: 172 MMHG | DIASTOLIC BLOOD PRESSURE: 90 MMHG | HEIGHT: 63 IN

## 2020-08-05 DIAGNOSIS — S92.241A DISPLACED FRACTURE OF MEDIAL CUNEIFORM OF RIGHT FOOT, INITIAL ENCOUNTER FOR CLOSED FRACTURE: Primary | ICD-10-CM

## 2020-08-05 LAB — GLUCOSE BLDC GLUCOMTR-MCNC: 53 MG/DL (ref 70–105)

## 2020-08-05 PROCEDURE — 73630 X-RAY EXAM OF FOOT: CPT

## 2020-08-05 PROCEDURE — 82962 GLUCOSE BLOOD TEST: CPT

## 2020-08-05 PROCEDURE — 99283 EMERGENCY DEPT VISIT LOW MDM: CPT

## 2020-08-05 RX ORDER — HYDROCODONE BITARTRATE AND ACETAMINOPHEN 5; 325 MG/1; MG/1
1 TABLET ORAL EVERY 4 HOURS PRN
Qty: 10 TABLET | Refills: 0 | Status: SHIPPED | OUTPATIENT
Start: 2020-08-05 | End: 2020-11-14 | Stop reason: HOSPADM

## 2020-08-05 RX ORDER — HYDROCODONE BITARTRATE AND ACETAMINOPHEN 5; 325 MG/1; MG/1
1 TABLET ORAL ONCE AS NEEDED
Status: DISCONTINUED | OUTPATIENT
Start: 2020-08-05 | End: 2020-08-05 | Stop reason: HOSPADM

## 2020-08-05 RX ADMIN — HYDROCODONE BITARTRATE AND ACETAMINOPHEN 1 TABLET: 5; 325 TABLET ORAL at 19:38

## 2020-08-05 NOTE — ED PROVIDER NOTES
Subjective   Patient is a 65-year-old white female with history of diabetes, hypertension presents today with complaints of right foot pain.  She states she was walking down the sidewalk.  She states she stepped up onto the curb and twisted her right foot.  She reports immediate pain to the medial aspect of the right foot.  She also complains of swelling to the area.  She denies any numbness tingling or weakness distal to the injury.  She denies any other injury or complaint.          Review of Systems   Musculoskeletal:        Right foot pain and swelling   Neurological: Negative for weakness and numbness.       Past Medical History:   Diagnosis Date   • Adrenal nodule (CMS/HCC) 11/16/2016    FINDINGS: Mild hepatic steatosis may be present. Cholecystectomy. No biliary ductal dilatation. Negative pancreas, spleen, left adrenal gland, and kidneys. The right adrenal presumed adenoma has increased slightly, measuring 3 x 4 cm in diameter,  compared to 2 x 3.5 cm on the previous exam. The attenuation values are consistent with an adenoma. Done at [facility] in August 2018   • Age-related osteoporosis without current pathological fracture 11/16/2016   • Essential hypertension 11/16/2016   • Gastroesophageal reflux disease with esophagitis 11/16/2016   • Hepatic steatosis 11/16/2016   • Hyperlipidemia 11/16/2016   • Squamous cell skin cancer 2014    Excised by Dr. James   • Thyroid nodule 10/18/2019   • Type 2 diabetes mellitus with peripheral neuropathy (CMS/HCC) 11/16/2016   • Vitamin D deficiency 1/25/2019       Allergies   Allergen Reactions   • Zofran [Ondansetron Hcl] Nausea And Vomiting     Does the opposite of its purpose   • Prochlorperazine Unknown - Low Severity   • Hydromorphone Rash   • Meperidine Itching and Swelling   • Prochlorperazine Maleate Unknown - Low Severity       Past Surgical History:   Procedure Laterality Date   • BREAST BIOPSY Left     7/2019   • CATARACT EXTRACTION WITH INTRAOCULAR  LENS IMPLANT Bilateral    • CHOLECYSTECTOMY     • COLONOSCOPY     • KNEE ARTHROSCOPY Bilateral    • TOTAL ABDOMINAL HYSTERECTOMY     • UPPER GASTROINTESTINAL ENDOSCOPY  08/2016   • US GUIDED FINE NEEDLE ASPIRATION  5/8/2020       Family History   Problem Relation Age of Onset   • Diabetes Mother    • COPD Mother    • Hypertension Mother    • Kidney disease Mother    • Obesity Mother    • Thyroid disease Mother    • Diabetes Sister    • Diabetes Sister    • Diabetes Sister    • Diabetes Sister        Social History     Socioeconomic History   • Marital status:      Spouse name: Not on file   • Number of children: Not on file   • Years of education: Not on file   • Highest education level: Not on file   Tobacco Use   • Smoking status: Never Smoker   • Smokeless tobacco: Never Used   Substance and Sexual Activity   • Alcohol use: Yes     Comment: socially    • Drug use: Never   • Sexual activity: Defer           Objective   Physical Exam  Vital signs and triage nurse note reviewed.  Constitutional: Awake, alert; well-developed and well-nourished. No acute distress is noted.  Cardiovascular: Regular rate and rhythm  Pulmonary: Respiratory effort regular nonlabored  Musculoskeletal: Independent range of motion of all extremities.  Tenderness over the dorsal medial aspect of the right foot.  There is mild edema at the site.  There is no ecchymosis or erythema.  No open wound.  No crepitus.  Distal neurovascular motor intact.  Neuro: Alert oriented x3, speech is clear and appropriate, GCS 15.    Skin: Flesh tone, warm, dry, intact; no erythematous or petechial rash or lesion.      Procedures           ED Course            Labs Reviewed   POCT GLUCOSE FINGERSTICK - Abnormal; Notable for the following components:       Result Value    Glucose 53 (*)     All other components within normal limits     Xr Foot 3+ View Right    Result Date: 8/5/2020  Mildly displaced fracture of the medial cortex of the medial cuneiform.   Electronically Signed By-Agnieszka Lujan On:8/5/2020 7:08 PM This report was finalized on [id number] by  Agnieszka Lujan, .    Medications   HYDROcodone-acetaminophen (NORCO) 5-325 MG per tablet 1 tablet (has no administration in time range)                                       MDM  Number of Diagnoses or Management Options  Displaced fracture of medial cuneiform of right foot, initial encounter for closed fracture:   Diagnosis management comments: Comorbidities: Hypertension, diabetes  Differentials: Fracture, dislocation, sprain;this list is not all inclusive and does not constitute the entirety of considered causes  Discussion with provider:  Radiology interpretation: X-rays reviewed by me and interpreted by radiologist: As above  Lab interpretation: Labs viewed by me significant for: Not warranted    Patient had x-rays obtained.  She was given 1 Norco for pain.    The patient had a cam walker boot splint applied.  Distal motor, sensory and vascular status intact after application. Crutches were provided to the patient. Education was provided to the patient regarding splint care, crutch walking use, as well as safety when climbing stairs, inclines and declines. Instructed on cautions of bearing weight on axilla to avoid neural damage. The patient voices understanding of these precautions as given.    Diagnosis and treatment plan discussed with patient.  Patient agreeable to plan.   I discussed findings with patient who voices understanding of discharge instructions, signs and symptoms requiring return to ED; discharged improved and in stable condition with follow up for re-evaluation.  Prescription for Norco.  Inspect reviewed.           Amount and/or Complexity of Data Reviewed  Clinical lab tests: reviewed and ordered  Tests in the radiology section of CPT®: reviewed and ordered    Patient Progress  Patient progress: stable      Final diagnoses:   Displaced fracture of medial cuneiform of right foot, initial  encounter for closed fracture            Felicity Rose, APRN  08/05/20 1926

## 2020-08-05 NOTE — DISCHARGE INSTRUCTIONS
Wear boot and use crutches until follow-up with orthopedics.  Take pain medication as prescribed.  Use caution as it can make you drowsy.  Elevate your right foot as much as possible.  Ice 20 minutes at a time several times a day for the next 2 days.  Follow-up with orthopedic specialist or podiatrist.  Call tomorrow for appointment.  Return for new or worsening symptoms.

## 2020-08-20 ENCOUNTER — TRANSCRIBE ORDERS (OUTPATIENT)
Dept: ADMINISTRATIVE | Facility: HOSPITAL | Age: 65
End: 2020-08-20

## 2020-08-20 DIAGNOSIS — M79.671 RIGHT FOOT PAIN: Primary | ICD-10-CM

## 2020-08-24 ENCOUNTER — HOSPITAL ENCOUNTER (OUTPATIENT)
Dept: CT IMAGING | Facility: HOSPITAL | Age: 65
Discharge: HOME | End: 2020-08-24
Admitting: SPECIALIST

## 2020-08-24 DIAGNOSIS — M79.671 RIGHT FOOT PAIN: ICD-10-CM

## 2020-08-24 PROCEDURE — 73700 CT LOWER EXTREMITY W/O DYE: CPT

## 2020-09-28 ENCOUNTER — TRANSCRIBE ORDERS (OUTPATIENT)
Dept: ADMINISTRATIVE | Facility: HOSPITAL | Age: 65
End: 2020-09-28

## 2020-09-28 DIAGNOSIS — M79.671 RIGHT FOOT PAIN: Primary | ICD-10-CM

## 2020-10-02 ENCOUNTER — HOSPITAL ENCOUNTER (OUTPATIENT)
Dept: CT IMAGING | Facility: HOSPITAL | Age: 65
Discharge: HOME | End: 2020-10-02
Admitting: SPECIALIST

## 2020-10-02 DIAGNOSIS — M79.671 RIGHT FOOT PAIN: ICD-10-CM

## 2020-10-02 PROCEDURE — 73700 CT LOWER EXTREMITY W/O DYE: CPT

## 2020-11-02 ENCOUNTER — TRANSCRIBE ORDERS (OUTPATIENT)
Dept: PREADMISSION TESTING | Facility: HOSPITAL | Age: 65
End: 2020-11-02

## 2020-11-02 DIAGNOSIS — Z01.818 OTHER SPECIFIED PRE-OPERATIVE EXAMINATION: Primary | ICD-10-CM

## 2020-11-06 ENCOUNTER — APPOINTMENT (OUTPATIENT)
Dept: PREADMISSION TESTING | Facility: HOSPITAL | Age: 65
End: 2020-11-06

## 2020-11-06 VITALS
OXYGEN SATURATION: 98 % | RESPIRATION RATE: 16 BRPM | DIASTOLIC BLOOD PRESSURE: 83 MMHG | SYSTOLIC BLOOD PRESSURE: 155 MMHG | TEMPERATURE: 97.9 F | HEIGHT: 64 IN | HEART RATE: 105 BPM | BODY MASS INDEX: 30.05 KG/M2 | WEIGHT: 176 LBS

## 2020-11-06 LAB — QT INTERVAL: 367 MS

## 2020-11-06 PROCEDURE — 93005 ELECTROCARDIOGRAM TRACING: CPT

## 2020-11-06 PROCEDURE — 93010 ELECTROCARDIOGRAM REPORT: CPT | Performed by: INTERNAL MEDICINE

## 2020-11-06 NOTE — DISCHARGE INSTRUCTIONS
Take the following medications the morning of surgery: HYDROCODONE      If you are on prescription narcotic pain medication to control your pain you may also take that medication the morning of surgery.    General Instructions:  • Do not eat solid food after midnight the night before surgery.  • You may drink clear liquids day of surgery but must stop at least one hour before your hospital arrival time.  • It is beneficial for you to have a clear drink that contains carbohydrates the day of surgery.  We suggest a 12 to 20 ounce bottle of Gatorade or Powerade for non-diabetic patients or a 12 to 20 ounce bottle of G2 or Powerade Zero for diabetic patients.     Clear liquids are liquids you can see through.  Nothing red in color.     Plain water                               Sports drinks  Sodas                                   Gelatin (Jell-O)  Fruit juices without pulp such as white grape juice and apple juice  Popsicles that contain no fruit or yogurt  Tea or coffee (no cream or milk added)  Gatorade / Powerade  G2 / Powerade Zero    • Bring any papers given to you in the doctor’s office.  • Wear clean comfortable clothes.  • Do not wear contact lenses, false eyelashes or make-up.  Bring a case for your glasses.   • Remove all piercings.  Leave jewelry and any other valuables at home.  • The Pre-Admission Testing nurse will instruct you to bring medications if unable to obtain an accurate list in Pre-Admission Testing.            Preventing a Surgical Site Infection:  • For 2 to 3 days before surgery, avoid shaving with a razor because the razor can irritate skin and make it easier to develop an infection.    • Any areas of open skin can increase the risk of a post-operative wound infection by allowing bacteria to enter and travel throughout the body.  Notify your surgeon if you have any skin wounds / rashes even if it is not near the expected surgical site.  The area will need assessed to determine if surgery  should be delayed until it is healed.  • The night prior to surgery shower using a fresh bar of anti-bacterial soap (such as Dial) and clean washcloth.  Sleep in a clean bed with clean clothing.  Do not allow pets to sleep with you.  • Shower on the morning of surgery using a fresh bar of anti-bacterial soap (such as Dial) and clean washcloth.  Dry with a clean towel and dress in clean clothing.  • Ask your surgeon if you will be receiving antibiotics prior to surgery.  • Make sure you, your family, and all healthcare providers clean their hands with soap and water or an alcohol based hand  before caring for you or your wound.    Day of surgery: 11/13/2020. 1 PM ARRIVAL TIME . [facility].     Your arrival time is approximately two hours before your scheduled surgery time.  Upon arrival, a Pre-op nurse and Anesthesiologist will review your health history, obtain vital signs, and answer questions you may have.  The only belongings needed at this time will be a list of your home medications and if applicable your C-PAP/BI-PAP machine.  If you are staying overnight your family can leave the rest of your belongings in the car and bring them to your room later.  A Pre-op nurse will start an IV and you may receive medication in preparation for surgery, including something to help you relax.  While you are in surgery your family should notify the waiting room  if they leave the waiting room area and provide a contact phone number.    Please be aware that surgery does come with discomfort.  We want to make every effort to control your discomfort so please discuss any uncontrolled symptoms with your nurse.   Your doctor will most likely have prescribed pain medications.      If you are going home after surgery you will receive individualized written care instructions before being discharged.  A responsible adult must drive you to and from the hospital on the day of your surgery and stay with you for 24  hours.    If you are staying overnight following surgery, you will be transported to your hospital room following the recovery period.  [facility] has all private rooms.    If you have any questions please call Pre-Admission Testing at [phone].  Deductibles and co-payments are collected on the day of service. Please be prepared to pay the required co-pay, deductible or deposit on the day of service as defined by your plan.    Patient Education for Self-Quarantine Process    Following your COVID testing, we strongly recommend that you do not leave your home after you have been tested for COVID except to get medical care. This includes not going to work, school or to public areas.  If this is not possible for you to do please limit your activities to only required outings.  Be sure to wear a mask when you are with other people, practice social distancing and wash your hands frequently.      The following items provide additional details to keep you safe.  • Wash your hands with soap and water frequently for at least 20 seconds.   • Avoid touching your eyes, nose and mouth with unwashed hands.  • Do not share anything - utensils, towels, food from the same bowl.   • Have your own utensils, drinking glass, dishes, towels and bedding.   • Do not have visitors.   • Do use FaceTime to stay in touch with family and friends.  • You should stay in a specific room away from others if possible.   • Stay at least 6 feet away from others in the home if you cannot have a dedicated room to yourself.   • Do not snuggle with your pet. While the CDC says there is no evidence that pets can spread COVID-19 or be infected from humans, it is probably best to avoid “petting, snuggling, being kissed or licked and sharing food (during self-quarantine)”, according to the CDC.   • Sanitize household surfaces daily. Include all high touch areas (door handles, light switches, phones, countertops, etc.)  • Do not share a  bathroom with others, if possible.   • Wear a mask around others in your home if you are unable to stay in a separate room or 6 feet apart. If  you are unable to wear a mask, have your family member wear a mask if they must be within 6 feet of you.   Call your surgeon immediately if you experience any of the following symptoms:  • Sore Throat  • Shortness of Breath or difficulty breathing  • Cough  • Chills  • Body soreness or muscle pain  • Headache  • Fever  • New loss of taste or smell  • Do not arrive for your surgery ill.  Your procedure will need to be rescheduled to another time.  You will need to call your physician before the day of surgery to avoid any unnecessary exposure to hospital staff as well as other patients.

## 2020-11-11 ENCOUNTER — PREP FOR SURGERY (OUTPATIENT)
Dept: OTHER | Facility: HOSPITAL | Age: 65
End: 2020-11-11

## 2020-11-11 ENCOUNTER — LAB (OUTPATIENT)
Dept: LAB | Facility: HOSPITAL | Age: 65
End: 2020-11-11

## 2020-11-11 DIAGNOSIS — S93.326A LISFRANC DISLOCATION: Primary | ICD-10-CM

## 2020-11-11 DIAGNOSIS — Z01.818 OTHER SPECIFIED PRE-OPERATIVE EXAMINATION: ICD-10-CM

## 2020-11-11 PROCEDURE — U0004 COV-19 TEST NON-CDC HGH THRU: HCPCS

## 2020-11-11 PROCEDURE — C9803 HOPD COVID-19 SPEC COLLECT: HCPCS

## 2020-11-11 RX ORDER — CEFAZOLIN SODIUM 2 G/100ML
2 INJECTION, SOLUTION INTRAVENOUS ONCE
Status: CANCELLED | OUTPATIENT
Start: 2020-11-13 | End: 2020-11-11

## 2020-11-12 LAB — SARS-COV-2 RNA RESP QL NAA+PROBE: NOT DETECTED

## 2020-11-13 ENCOUNTER — HOSPITAL ENCOUNTER (OUTPATIENT)
Facility: HOSPITAL | Age: 65
Discharge: NURSING, REHAB OR LONG-TERM CARE | End: 2020-11-16
Attending: ORTHOPAEDIC SURGERY | Admitting: ORTHOPAEDIC SURGERY

## 2020-11-13 ENCOUNTER — ANESTHESIA (OUTPATIENT)
Dept: PERIOP | Facility: HOSPITAL | Age: 65
End: 2020-11-13

## 2020-11-13 ENCOUNTER — ANESTHESIA EVENT (OUTPATIENT)
Dept: PERIOP | Facility: HOSPITAL | Age: 65
End: 2020-11-13

## 2020-11-13 ENCOUNTER — APPOINTMENT (OUTPATIENT)
Dept: GENERAL RADIOLOGY | Facility: HOSPITAL | Age: 65
End: 2020-11-13

## 2020-11-13 DIAGNOSIS — S93.326A LISFRANC DISLOCATION: ICD-10-CM

## 2020-11-13 LAB — GLUCOSE BLDC GLUCOMTR-MCNC: 150 MG/DL (ref 70–130)

## 2020-11-13 PROCEDURE — C1769 GUIDE WIRE: HCPCS | Performed by: ORTHOPAEDIC SURGERY

## 2020-11-13 PROCEDURE — C1713 ANCHOR/SCREW BN/BN,TIS/BN: HCPCS | Performed by: ORTHOPAEDIC SURGERY

## 2020-11-13 PROCEDURE — 82962 GLUCOSE BLOOD TEST: CPT

## 2020-11-13 PROCEDURE — 73630 X-RAY EXAM OF FOOT: CPT

## 2020-11-13 PROCEDURE — 25010000002 FENTANYL CITRATE (PF) 100 MCG/2ML SOLUTION: Performed by: STUDENT IN AN ORGANIZED HEALTH CARE EDUCATION/TRAINING PROGRAM

## 2020-11-13 PROCEDURE — 63710000001 PROMETHAZINE PER 12.5 MG: Performed by: ORTHOPAEDIC SURGERY

## 2020-11-13 PROCEDURE — 25010000002 ROPIVACAINE PER 1 MG: Performed by: STUDENT IN AN ORGANIZED HEALTH CARE EDUCATION/TRAINING PROGRAM

## 2020-11-13 PROCEDURE — 25010000002 PROPOFOL 10 MG/ML EMULSION: Performed by: NURSE ANESTHETIST, CERTIFIED REGISTERED

## 2020-11-13 PROCEDURE — 25010000003 CEFAZOLIN IN DEXTROSE 2-4 GM/100ML-% SOLUTION: Performed by: ORTHOPAEDIC SURGERY

## 2020-11-13 PROCEDURE — 76000 FLUOROSCOPY <1 HR PHYS/QHP: CPT

## 2020-11-13 PROCEDURE — 25010000003 CEFAZOLIN IN DEXTROSE 2-4 GM/100ML-% SOLUTION: Performed by: NURSE PRACTITIONER

## 2020-11-13 PROCEDURE — 25010000002 DIPHENHYDRAMINE PER 50 MG: Performed by: STUDENT IN AN ORGANIZED HEALTH CARE EDUCATION/TRAINING PROGRAM

## 2020-11-13 PROCEDURE — 25010000002 MIDAZOLAM PER 1 MG: Performed by: STUDENT IN AN ORGANIZED HEALTH CARE EDUCATION/TRAINING PROGRAM

## 2020-11-13 PROCEDURE — 25010000002 DEXAMETHASONE PER 1 MG: Performed by: STUDENT IN AN ORGANIZED HEALTH CARE EDUCATION/TRAINING PROGRAM

## 2020-11-13 PROCEDURE — 25010000002 PHENYLEPHRINE PER 1 ML: Performed by: STUDENT IN AN ORGANIZED HEALTH CARE EDUCATION/TRAINING PROGRAM

## 2020-11-13 DEVICE — IMPLANTABLE DEVICE: Type: IMPLANTABLE DEVICE | Site: FOOT | Status: FUNCTIONAL

## 2020-11-13 DEVICE — SCREW, HEADED, 4.0 X 30MM
Type: IMPLANTABLE DEVICE | Site: FOOT | Status: FUNCTIONAL
Brand: MEDLINE UNITE

## 2020-11-13 DEVICE — STPL BONE DYNACLIP PRELD NITNL 18X18X18MM 1P/U STRL: Type: IMPLANTABLE DEVICE | Site: FOOT | Status: FUNCTIONAL

## 2020-11-13 DEVICE — STPL BONE DYNACLIP PRELD NITNL 20X18X18MM STRL: Type: IMPLANTABLE DEVICE | Site: FOOT | Status: FUNCTIONAL

## 2020-11-13 DEVICE — INJ BONE AUG 3CC: Type: IMPLANTABLE DEVICE | Site: FOOT | Status: FUNCTIONAL

## 2020-11-13 DEVICE — SCREW, HEADED, 4.0 X 34MM
Type: IMPLANTABLE DEVICE | Site: FOOT | Status: FUNCTIONAL
Brand: MEDLINE UNITE

## 2020-11-13 DEVICE — ALLOGRFT FIBR OSTEOAMP SELECT 2.5CC: Type: IMPLANTABLE DEVICE | Site: FOOT | Status: FUNCTIONAL

## 2020-11-13 RX ORDER — DIPHENHYDRAMINE HCL 25 MG
25 CAPSULE ORAL
Status: DISCONTINUED | OUTPATIENT
Start: 2020-11-13 | End: 2020-11-13

## 2020-11-13 RX ORDER — NALOXONE HCL 0.4 MG/ML
0.4 VIAL (ML) INJECTION
Status: DISCONTINUED | OUTPATIENT
Start: 2020-11-13 | End: 2020-11-16 | Stop reason: HOSPADM

## 2020-11-13 RX ORDER — ROPIVACAINE HYDROCHLORIDE 5 MG/ML
INJECTION, SOLUTION EPIDURAL; INFILTRATION; PERINEURAL
Status: COMPLETED | OUTPATIENT
Start: 2020-11-13 | End: 2020-11-13

## 2020-11-13 RX ORDER — OXYCODONE AND ACETAMINOPHEN 7.5; 325 MG/1; MG/1
1 TABLET ORAL EVERY 4 HOURS PRN
Qty: 40 TABLET | Refills: 0 | Status: SHIPPED | OUTPATIENT
Start: 2020-11-13 | End: 2020-12-31 | Stop reason: HOSPADM

## 2020-11-13 RX ORDER — OXYCODONE AND ACETAMINOPHEN 7.5; 325 MG/1; MG/1
2 TABLET ORAL EVERY 4 HOURS PRN
Status: DISCONTINUED | OUTPATIENT
Start: 2020-11-13 | End: 2020-11-16 | Stop reason: HOSPADM

## 2020-11-13 RX ORDER — EPHEDRINE SULFATE 50 MG/ML
5 INJECTION, SOLUTION INTRAVENOUS ONCE AS NEEDED
Status: DISCONTINUED | OUTPATIENT
Start: 2020-11-13 | End: 2020-11-13

## 2020-11-13 RX ORDER — GLIPIZIDE 10 MG/1
10 TABLET ORAL
Status: DISCONTINUED | OUTPATIENT
Start: 2020-11-14 | End: 2020-11-16 | Stop reason: HOSPADM

## 2020-11-13 RX ORDER — PROMETHAZINE HYDROCHLORIDE 12.5 MG/1
12.5 TABLET ORAL EVERY 6 HOURS PRN
Status: DISCONTINUED | OUTPATIENT
Start: 2020-11-13 | End: 2020-11-16 | Stop reason: HOSPADM

## 2020-11-13 RX ORDER — PANTOPRAZOLE SODIUM 40 MG/1
40 TABLET, DELAYED RELEASE ORAL DAILY PRN
Status: DISCONTINUED | OUTPATIENT
Start: 2020-11-13 | End: 2020-11-16 | Stop reason: HOSPADM

## 2020-11-13 RX ORDER — LOSARTAN POTASSIUM 100 MG/1
100 TABLET ORAL DAILY
Status: DISCONTINUED | OUTPATIENT
Start: 2020-11-13 | End: 2020-11-16 | Stop reason: HOSPADM

## 2020-11-13 RX ORDER — DIPHENHYDRAMINE HYDROCHLORIDE 50 MG/ML
12.5 INJECTION INTRAMUSCULAR; INTRAVENOUS
Status: DISCONTINUED | OUTPATIENT
Start: 2020-11-13 | End: 2020-11-13

## 2020-11-13 RX ORDER — GLYCOPYRROLATE 0.2 MG/ML
INJECTION INTRAMUSCULAR; INTRAVENOUS AS NEEDED
Status: DISCONTINUED | OUTPATIENT
Start: 2020-11-13 | End: 2020-11-13 | Stop reason: SURG

## 2020-11-13 RX ORDER — FENTANYL CITRATE 50 UG/ML
50 INJECTION, SOLUTION INTRAMUSCULAR; INTRAVENOUS
Status: DISCONTINUED | OUTPATIENT
Start: 2020-11-13 | End: 2020-11-13 | Stop reason: HOSPADM

## 2020-11-13 RX ORDER — DULOXETIN HYDROCHLORIDE 60 MG/1
60 CAPSULE, DELAYED RELEASE ORAL EVERY EVENING
Status: DISCONTINUED | OUTPATIENT
Start: 2020-11-13 | End: 2020-11-16 | Stop reason: HOSPADM

## 2020-11-13 RX ORDER — DEXAMETHASONE SODIUM PHOSPHATE 10 MG/ML
INJECTION INTRAMUSCULAR; INTRAVENOUS AS NEEDED
Status: DISCONTINUED | OUTPATIENT
Start: 2020-11-13 | End: 2020-11-13

## 2020-11-13 RX ORDER — PROPOFOL 10 MG/ML
VIAL (ML) INTRAVENOUS AS NEEDED
Status: DISCONTINUED | OUTPATIENT
Start: 2020-11-13 | End: 2020-11-13 | Stop reason: SURG

## 2020-11-13 RX ORDER — OXYCODONE AND ACETAMINOPHEN 7.5; 325 MG/1; MG/1
1 TABLET ORAL ONCE AS NEEDED
Status: DISCONTINUED | OUTPATIENT
Start: 2020-11-13 | End: 2020-11-13

## 2020-11-13 RX ORDER — MIDAZOLAM HYDROCHLORIDE 1 MG/ML
1 INJECTION INTRAMUSCULAR; INTRAVENOUS
Status: DISCONTINUED | OUTPATIENT
Start: 2020-11-13 | End: 2020-11-13 | Stop reason: HOSPADM

## 2020-11-13 RX ORDER — MAGNESIUM HYDROXIDE 1200 MG/15ML
LIQUID ORAL AS NEEDED
Status: DISCONTINUED | OUTPATIENT
Start: 2020-11-13 | End: 2020-11-13 | Stop reason: HOSPADM

## 2020-11-13 RX ORDER — LABETALOL HYDROCHLORIDE 5 MG/ML
5 INJECTION, SOLUTION INTRAVENOUS
Status: DISCONTINUED | OUTPATIENT
Start: 2020-11-13 | End: 2020-11-13

## 2020-11-13 RX ORDER — DIPHENHYDRAMINE HYDROCHLORIDE 50 MG/ML
INJECTION INTRAMUSCULAR; INTRAVENOUS AS NEEDED
Status: DISCONTINUED | OUTPATIENT
Start: 2020-11-13 | End: 2020-11-13 | Stop reason: SURG

## 2020-11-13 RX ORDER — HYDROCODONE BITARTRATE AND ACETAMINOPHEN 7.5; 325 MG/1; MG/1
1 TABLET ORAL ONCE AS NEEDED
Status: DISCONTINUED | OUTPATIENT
Start: 2020-11-13 | End: 2020-11-13

## 2020-11-13 RX ORDER — HYDRALAZINE HYDROCHLORIDE 20 MG/ML
5 INJECTION INTRAMUSCULAR; INTRAVENOUS
Status: DISCONTINUED | OUTPATIENT
Start: 2020-11-13 | End: 2020-11-13

## 2020-11-13 RX ORDER — CEFAZOLIN SODIUM 2 G/100ML
2 INJECTION, SOLUTION INTRAVENOUS EVERY 8 HOURS
Status: COMPLETED | OUTPATIENT
Start: 2020-11-13 | End: 2020-11-14

## 2020-11-13 RX ORDER — ASPIRIN 325 MG
325 TABLET, DELAYED RELEASE (ENTERIC COATED) ORAL DAILY
Status: DISCONTINUED | OUTPATIENT
Start: 2020-11-14 | End: 2020-11-16 | Stop reason: HOSPADM

## 2020-11-13 RX ORDER — CEFAZOLIN SODIUM 2 G/100ML
2 INJECTION, SOLUTION INTRAVENOUS ONCE
Status: COMPLETED | OUTPATIENT
Start: 2020-11-13 | End: 2020-11-13

## 2020-11-13 RX ORDER — SODIUM CHLORIDE 0.9 % (FLUSH) 0.9 %
10 SYRINGE (ML) INJECTION EVERY 12 HOURS SCHEDULED
Status: DISCONTINUED | OUTPATIENT
Start: 2020-11-13 | End: 2020-11-16 | Stop reason: HOSPADM

## 2020-11-13 RX ORDER — LIDOCAINE HYDROCHLORIDE 20 MG/ML
INJECTION, SOLUTION INFILTRATION; PERINEURAL AS NEEDED
Status: DISCONTINUED | OUTPATIENT
Start: 2020-11-13 | End: 2020-11-13 | Stop reason: SURG

## 2020-11-13 RX ORDER — SODIUM CHLORIDE 0.9 % (FLUSH) 0.9 %
3 SYRINGE (ML) INJECTION EVERY 12 HOURS SCHEDULED
Status: DISCONTINUED | OUTPATIENT
Start: 2020-11-13 | End: 2020-11-13 | Stop reason: HOSPADM

## 2020-11-13 RX ORDER — GLYCOPYRROLATE 0.2 MG/ML
INJECTION INTRAMUSCULAR; INTRAVENOUS AS NEEDED
Status: DISCONTINUED | OUTPATIENT
Start: 2020-11-13 | End: 2020-11-13

## 2020-11-13 RX ORDER — DEXAMETHASONE SODIUM PHOSPHATE 10 MG/ML
INJECTION INTRAMUSCULAR; INTRAVENOUS AS NEEDED
Status: DISCONTINUED | OUTPATIENT
Start: 2020-11-13 | End: 2020-11-13 | Stop reason: SURG

## 2020-11-13 RX ORDER — OXYCODONE AND ACETAMINOPHEN 7.5; 325 MG/1; MG/1
1 TABLET ORAL EVERY 4 HOURS PRN
Status: DISCONTINUED | OUTPATIENT
Start: 2020-11-13 | End: 2020-11-16 | Stop reason: HOSPADM

## 2020-11-13 RX ORDER — SODIUM CHLORIDE, SODIUM LACTATE, POTASSIUM CHLORIDE, CALCIUM CHLORIDE 600; 310; 30; 20 MG/100ML; MG/100ML; MG/100ML; MG/100ML
9 INJECTION, SOLUTION INTRAVENOUS CONTINUOUS
Status: DISCONTINUED | OUTPATIENT
Start: 2020-11-13 | End: 2020-11-16 | Stop reason: HOSPADM

## 2020-11-13 RX ORDER — NALOXONE HCL 0.4 MG/ML
0.2 VIAL (ML) INJECTION AS NEEDED
Status: DISCONTINUED | OUTPATIENT
Start: 2020-11-13 | End: 2020-11-13

## 2020-11-13 RX ORDER — ONDANSETRON 2 MG/ML
4 INJECTION INTRAMUSCULAR; INTRAVENOUS ONCE AS NEEDED
Status: DISCONTINUED | OUTPATIENT
Start: 2020-11-13 | End: 2020-11-13

## 2020-11-13 RX ORDER — ROPINIROLE 0.5 MG/1
0.25 TABLET, FILM COATED ORAL EVERY EVENING
Status: DISCONTINUED | OUTPATIENT
Start: 2020-11-13 | End: 2020-11-16 | Stop reason: HOSPADM

## 2020-11-13 RX ORDER — ONDANSETRON 4 MG/1
4 TABLET, FILM COATED ORAL EVERY 8 HOURS PRN
Qty: 20 TABLET | Refills: 0 | Status: SHIPPED | OUTPATIENT
Start: 2020-11-13 | End: 2021-05-02

## 2020-11-13 RX ORDER — ONDANSETRON 2 MG/ML
4 INJECTION INTRAMUSCULAR; INTRAVENOUS EVERY 6 HOURS PRN
Status: DISCONTINUED | OUTPATIENT
Start: 2020-11-13 | End: 2020-11-14

## 2020-11-13 RX ORDER — LIDOCAINE HYDROCHLORIDE 10 MG/ML
0.5 INJECTION, SOLUTION EPIDURAL; INFILTRATION; INTRACAUDAL; PERINEURAL ONCE AS NEEDED
Status: DISCONTINUED | OUTPATIENT
Start: 2020-11-13 | End: 2020-11-13 | Stop reason: HOSPADM

## 2020-11-13 RX ORDER — POTASSIUM CHLORIDE 750 MG/1
20 TABLET, FILM COATED, EXTENDED RELEASE ORAL DAILY
Status: DISCONTINUED | OUTPATIENT
Start: 2020-11-13 | End: 2020-11-16 | Stop reason: HOSPADM

## 2020-11-13 RX ORDER — ASPIRIN 325 MG
325 TABLET, DELAYED RELEASE (ENTERIC COATED) ORAL DAILY
Qty: 30 TABLET | Refills: 0 | Status: SHIPPED | OUTPATIENT
Start: 2020-11-14 | End: 2021-05-11 | Stop reason: HOSPADM

## 2020-11-13 RX ORDER — FUROSEMIDE 20 MG/1
20 TABLET ORAL DAILY
Status: DISCONTINUED | OUTPATIENT
Start: 2020-11-13 | End: 2020-11-16 | Stop reason: HOSPADM

## 2020-11-13 RX ORDER — FENTANYL CITRATE 50 UG/ML
50 INJECTION, SOLUTION INTRAMUSCULAR; INTRAVENOUS
Status: DISCONTINUED | OUTPATIENT
Start: 2020-11-13 | End: 2020-11-13

## 2020-11-13 RX ORDER — SODIUM CHLORIDE 0.9 % (FLUSH) 0.9 %
10 SYRINGE (ML) INJECTION AS NEEDED
Status: DISCONTINUED | OUTPATIENT
Start: 2020-11-13 | End: 2020-11-16 | Stop reason: HOSPADM

## 2020-11-13 RX ORDER — HYDROMORPHONE HYDROCHLORIDE 1 MG/ML
0.5 INJECTION, SOLUTION INTRAMUSCULAR; INTRAVENOUS; SUBCUTANEOUS
Status: DISCONTINUED | OUTPATIENT
Start: 2020-11-13 | End: 2020-11-13

## 2020-11-13 RX ORDER — SODIUM CHLORIDE 0.9 % (FLUSH) 0.9 %
3-10 SYRINGE (ML) INJECTION AS NEEDED
Status: DISCONTINUED | OUTPATIENT
Start: 2020-11-13 | End: 2020-11-13 | Stop reason: HOSPADM

## 2020-11-13 RX ORDER — FLUMAZENIL 0.1 MG/ML
0.2 INJECTION INTRAVENOUS AS NEEDED
Status: DISCONTINUED | OUTPATIENT
Start: 2020-11-13 | End: 2020-11-13

## 2020-11-13 RX ADMIN — DIPHENHYDRAMINE HYDROCHLORIDE 6 MG: 50 INJECTION INTRAMUSCULAR; INTRAVENOUS at 16:16

## 2020-11-13 RX ADMIN — DULOXETINE HYDROCHLORIDE 60 MG: 60 CAPSULE, DELAYED RELEASE ORAL at 22:22

## 2020-11-13 RX ADMIN — CEFAZOLIN SODIUM 2 G: 2 INJECTION, SOLUTION INTRAVENOUS at 22:26

## 2020-11-13 RX ADMIN — METFORMIN HYDROCHLORIDE 1000 MG: 1000 TABLET ORAL at 22:21

## 2020-11-13 RX ADMIN — FENTANYL CITRATE 50 MCG: 50 INJECTION, SOLUTION INTRAMUSCULAR; INTRAVENOUS at 14:14

## 2020-11-13 RX ADMIN — PHENYLEPHRINE HYDROCHLORIDE 100 MCG: 10 INJECTION INTRAVENOUS at 16:43

## 2020-11-13 RX ADMIN — PHENYLEPHRINE HYDROCHLORIDE 100 MCG: 10 INJECTION INTRAVENOUS at 16:49

## 2020-11-13 RX ADMIN — PROPOFOL 170 MG: 10 INJECTION, EMULSION INTRAVENOUS at 15:20

## 2020-11-13 RX ADMIN — SODIUM CHLORIDE, PRESERVATIVE FREE 10 ML: 5 INJECTION INTRAVENOUS at 22:22

## 2020-11-13 RX ADMIN — OXYCODONE HYDROCHLORIDE AND ACETAMINOPHEN 1 TABLET: 7.5; 325 TABLET ORAL at 23:08

## 2020-11-13 RX ADMIN — DEXAMETHASONE SODIUM PHOSPHATE 8 MG: 10 INJECTION INTRAMUSCULAR; INTRAVENOUS at 16:05

## 2020-11-13 RX ADMIN — PHENYLEPHRINE HYDROCHLORIDE 100 MCG: 10 INJECTION INTRAVENOUS at 16:16

## 2020-11-13 RX ADMIN — MIDAZOLAM 2 MG: 1 INJECTION INTRAMUSCULAR; INTRAVENOUS at 14:14

## 2020-11-13 RX ADMIN — PHENYLEPHRINE HYDROCHLORIDE 100 MCG: 10 INJECTION INTRAVENOUS at 16:19

## 2020-11-13 RX ADMIN — CEFAZOLIN SODIUM 2 G: 2 INJECTION, SOLUTION INTRAVENOUS at 15:23

## 2020-11-13 RX ADMIN — PROMETHAZINE HYDROCHLORIDE 12.5 MG: 12.5 TABLET ORAL at 23:08

## 2020-11-13 RX ADMIN — GLYCOPYRROLATE 0.2 MG: 0.2 INJECTION INTRAMUSCULAR; INTRAVENOUS at 16:05

## 2020-11-13 RX ADMIN — SODIUM CHLORIDE, POTASSIUM CHLORIDE, SODIUM LACTATE AND CALCIUM CHLORIDE 9 ML/HR: 600; 310; 30; 20 INJECTION, SOLUTION INTRAVENOUS at 14:36

## 2020-11-13 RX ADMIN — LIDOCAINE HYDROCHLORIDE 60 MG: 20 INJECTION, SOLUTION INFILTRATION; PERINEURAL at 15:20

## 2020-11-13 RX ADMIN — LOSARTAN POTASSIUM 100 MG: 100 TABLET, FILM COATED ORAL at 22:21

## 2020-11-13 RX ADMIN — ROPIVACAINE HYDROCHLORIDE 50 ML: 5 INJECTION, SOLUTION EPIDURAL; INFILTRATION; PERINEURAL at 14:30

## 2020-11-13 RX ADMIN — PHENYLEPHRINE HYDROCHLORIDE 100 MCG: 10 INJECTION INTRAVENOUS at 16:33

## 2020-11-13 RX ADMIN — ROPINIROLE HYDROCHLORIDE 0.25 MG: 0.5 TABLET, FILM COATED ORAL at 22:21

## 2020-11-13 NOTE — ANESTHESIA POSTPROCEDURE EVALUATION
Patient: Marge Mcghee    Procedure Summary     Date: 11/13/20 Room / Location:  [facility]  /  [facility]    Anesthesia Start: 1513 Anesthesia Stop: 1710    Procedure: RIGHT OPEN TREATMENT LISFRANC INJURY, OPEN REDUCTION INTERNAL FIXATION MEDIAL/MIDDLE CUNEIFORM FRACTURE AND 2ND/3RD METATARSAL FRACTURE 1ST 2ND POSSIBLE 3RD TARSOMETATARSAL ARTHRODESIS INTERCUNEIFORM ARTHRODESIS CALCANEAL BONE GRAFT (Right Foot) Diagnosis:     Surgeon: Mikhail Banks Jr., MD Provider: Nelson Yi MD    Anesthesia Type: general with block ASA Status: 3          Anesthesia Type: general with block    Vitals  Vitals Value Taken Time   /87 11/13/20 1711   Temp     Pulse 84 11/13/20 1715   Resp     SpO2 100 % 11/13/20 1715   Vitals shown include unvalidated device data.        Post Anesthesia Care and Evaluation    Patient location during evaluation: bedside  Patient participation: complete - patient participated  Level of consciousness: awake and alert  Pain management: adequate  Airway patency: patent  Anesthetic complications: No anesthetic complications  PONV Status: controlled  Cardiovascular status: blood pressure returned to baseline and acceptable  Respiratory status: acceptable  Hydration status: acceptable

## 2020-11-13 NOTE — DISCHARGE INSTRUCTIONS
"Leave splint in place, clean and dry, until follow up    No weightbearing on operative leg    Elevate as much as possible (\"toes above the nose\")    Try to get up and move around at least once per hour while awake to help minimize risk of blood clots    Follow up in 2-3 weeks with Dr. Banks at the [facility] (call [phone] for appointment)    "

## 2020-11-13 NOTE — PLAN OF CARE
Goal Outcome Evaluation:  Plan of Care Reviewed With: patient  Progress: improving  Outcome Summary: S/P R foot surgery. VSS, bp increased. Denies pain, block in effect. Sleeping between care. Splint c/d/i. DTV 0110. Discussed bp med and monitoring r/t HTN. Plans to d/c home tomorrow with assist from family.

## 2020-11-13 NOTE — ANESTHESIA PREPROCEDURE EVALUATION
Anesthesia Evaluation     Patient summary reviewed and Nursing notes reviewed   no history of anesthetic complications:  NPO Solid Status: > 8 hours  NPO Liquid Status: > 2 hours           Airway   Mallampati: II  TM distance: >3 FB  Neck ROM: full  No difficulty expected  Dental - normal exam     Pulmonary     breath sounds clear to auscultation  Cardiovascular   Exercise tolerance: good (4-7 METS)    ECG reviewed  Rhythm: regular  Rate: normal    (+) hypertension, hyperlipidemia,     ROS comment: Echo reviewed    Neuro/Psych  GI/Hepatic/Renal/Endo    (+) obesity,   liver disease fatty liver disease, renal disease CRI, diabetes mellitus,     Musculoskeletal     Abdominal    Substance History      OB/GYN          Other                        Anesthesia Plan    ASA 3     general with block     intravenous induction     Anesthetic plan, all risks, benefits, and alternatives have been provided, discussed and informed consent has been obtained with: patient.    Plan discussed with CRNA.

## 2020-11-13 NOTE — ANESTHESIA PROCEDURE NOTES
Peripheral Block    Pre-sedation assessment completed: 11/13/2020 2:13 PM    Patient reassessed immediately prior to procedure    Patient location during procedure: pre-op  Start time: 11/13/2020 2:14 PM  Stop time: 11/13/2020 2:26 PM  Reason for block: at surgeon's request and post-op pain management  Performed by  Anesthesiologist: Nelson Yi MD  Preanesthetic Checklist  Completed: patient identified, site marked, surgical consent, pre-op evaluation, timeout performed, IV checked, risks and benefits discussed and monitors and equipment checked  Prep:  Pt Position: supine  Sterile barriers:mask, gloves and cap  Prep: ChloraPrep  Patient monitoring: blood pressure monitoring, continuous pulse oximetry and EKG  Procedure  Sedation:yes  Performed under: local infiltration  Guidance:ultrasound guided  ULTRASOUND INTERPRETATION. Using ultrasound guidance a 21 G gauge needle was placed in close proximity to the nerve, at which point, under ultrasound guidance anesthetic was injected in the area of the nerve and spread of the anesthesia was seen on ultrasound in close proximity thereto.  There were no abnormalities seen on ultrasound; a digital image was taken; and the patient tolerated the procedure with no complications. Images:still images obtained, printed/placed on chart    Laterality:right  Block Type:popliteal and adductor canal block  Injection Technique:single-shot  Needle Type:echogenic and Tuohy  Needle Gauge:21 G  Resistance on Injection: none    Medications Used: ropivacaine (NAROPIN) 0.5 % injection, 50 mL      Post Assessment  Injection Assessment: negative aspiration for heme, no paresthesia on injection and incremental injection  Patient Tolerance:comfortable throughout block  Complications:no

## 2020-11-14 LAB — GLUCOSE BLDC GLUCOMTR-MCNC: 269 MG/DL (ref 70–130)

## 2020-11-14 PROCEDURE — 97162 PT EVAL MOD COMPLEX 30 MIN: CPT | Performed by: PHYSICAL THERAPIST

## 2020-11-14 PROCEDURE — 97116 GAIT TRAINING THERAPY: CPT | Performed by: PHYSICAL THERAPIST

## 2020-11-14 PROCEDURE — 94799 UNLISTED PULMONARY SVC/PX: CPT

## 2020-11-14 PROCEDURE — G0378 HOSPITAL OBSERVATION PER HR: HCPCS

## 2020-11-14 PROCEDURE — 25010000003 CEFAZOLIN IN DEXTROSE 2-4 GM/100ML-% SOLUTION: Performed by: ORTHOPAEDIC SURGERY

## 2020-11-14 PROCEDURE — 63710000001 PROMETHAZINE PER 12.5 MG: Performed by: ORTHOPAEDIC SURGERY

## 2020-11-14 PROCEDURE — 82962 GLUCOSE BLOOD TEST: CPT

## 2020-11-14 RX ORDER — PROMETHAZINE HYDROCHLORIDE 12.5 MG/1
12.5 TABLET ORAL EVERY 6 HOURS PRN
Qty: 20 TABLET | Refills: 0 | Status: SHIPPED | OUTPATIENT
Start: 2020-11-14 | End: 2021-07-12

## 2020-11-14 RX ADMIN — CEFAZOLIN SODIUM 2 G: 2 INJECTION, SOLUTION INTRAVENOUS at 17:28

## 2020-11-14 RX ADMIN — LOSARTAN POTASSIUM 100 MG: 100 TABLET, FILM COATED ORAL at 08:52

## 2020-11-14 RX ADMIN — OXYCODONE HYDROCHLORIDE AND ACETAMINOPHEN 1 TABLET: 7.5; 325 TABLET ORAL at 20:15

## 2020-11-14 RX ADMIN — FUROSEMIDE 20 MG: 20 TABLET ORAL at 08:52

## 2020-11-14 RX ADMIN — PROMETHAZINE HYDROCHLORIDE 12.5 MG: 12.5 TABLET ORAL at 06:55

## 2020-11-14 RX ADMIN — METFORMIN HYDROCHLORIDE 1000 MG: 1000 TABLET ORAL at 08:53

## 2020-11-14 RX ADMIN — POTASSIUM CHLORIDE 20 MEQ: 750 TABLET, EXTENDED RELEASE ORAL at 08:52

## 2020-11-14 RX ADMIN — GLIPIZIDE 10 MG: 10 TABLET ORAL at 08:53

## 2020-11-14 RX ADMIN — METFORMIN HYDROCHLORIDE 1000 MG: 1000 TABLET ORAL at 17:49

## 2020-11-14 RX ADMIN — CEFAZOLIN SODIUM 2 G: 2 INJECTION, SOLUTION INTRAVENOUS at 05:48

## 2020-11-14 RX ADMIN — ASPIRIN 325 MG: 325 TABLET, COATED ORAL at 08:53

## 2020-11-14 RX ADMIN — SODIUM CHLORIDE, PRESERVATIVE FREE 10 ML: 5 INJECTION INTRAVENOUS at 08:56

## 2020-11-14 RX ADMIN — PROMETHAZINE HYDROCHLORIDE 12.5 MG: 12.5 TABLET ORAL at 20:15

## 2020-11-14 RX ADMIN — DULOXETINE HYDROCHLORIDE 60 MG: 60 CAPSULE, DELAYED RELEASE ORAL at 22:16

## 2020-11-14 RX ADMIN — OXYCODONE HYDROCHLORIDE AND ACETAMINOPHEN 1 TABLET: 7.5; 325 TABLET ORAL at 06:55

## 2020-11-14 RX ADMIN — ROPINIROLE HYDROCHLORIDE 0.25 MG: 0.5 TABLET, FILM COATED ORAL at 17:49

## 2020-11-14 RX ADMIN — PROMETHAZINE HYDROCHLORIDE 12.5 MG: 12.5 TABLET ORAL at 14:35

## 2020-11-14 RX ADMIN — OXYCODONE HYDROCHLORIDE AND ACETAMINOPHEN 1 TABLET: 7.5; 325 TABLET ORAL at 14:35

## 2020-11-14 NOTE — OP NOTE
Procedure Note    Marge Mcghee  11/13/2020    Pre-op Diagnosis:   1.  Right midfoot chronic Lisfranc fracture dislocation  2.  Right first, second, third tarsometatarsal joint subluxation/dislocation  3.  Right intercuneiform instability/subluxation  4.  Right medial and middle cuneiform fractures  5.  Right second and third metatarsal base fractures       Post-Op Diagnosis Codes:  Same    Procedure:  1.  Open treatment of tarsometatarsal joint subluxation/dislocation, first, second, third tarsometatarsal joints  2.  Open reduction internal fixation, right medial intercuneiform joint subluxation/dislocation  3.  Primary arthrodesis, first tarsometatarsal joint   4.  Primary arthrodesis, second and third tarsometatarsal joints, separate incision   5.  Open reduction internal fixation, right medial and middle cuneiform fractures  6.  Open reduction internal fixation, right second and third metatarsal base fractures  7.  Calcaneal bone graft, large, separate incision      Surgeon(s):  Mikhail Banks Jr., MD    Assistants:  None    Anesthesia: Regional plus LMA    Estimated Blood Loss: minimal    Specimens:                None     Drains: * No LDAs found *    Complications:   None apparent    Disposition: Stable to PACU for recovery    Indications for procedure:  The patient is a pleasant 65-year-old female who suffered a severe closed right midfoot fracture dislocation earlier this year.  This was managed nonoperatively by another provider.  She had persistent pain and CT showed subluxation/instability throughout the midfoot as well as delayed healing of multiple fractures of the cuneiforms and metatarsals.  Operative intervention was recommended.  The risks/benefits of surgery, including pain, infection, wound healing problems, need for future procedures, mal/nonunion, DVT/PE, cardiac event, and/or death were discussed, and the patient elected to proceed with surgery.    Procedure in detail:  The correct patient  was identified in preoperative holding.  All risks and benefits of surgery were again discussed in detail, and the patient agreed to proceed with surgery.  The operative extremity was confirmed and marked.  Operative consent reviewed and confirmed to be signed.    At this time, the patient was wheeled to the operative theatre and placed supine on the OR table.  Anesthesia was induced smoothly by our anesthesia colleagues.  The right lower extremity was prepped and draped in standard sterile fashion.  Appropriate presurgical timeout was performed, confirming correct patient, correct extremity, correct procedure, availability of sterile instruments/implants, and the administration of intravenous antibiotics within one hour of skin incision.      Fluoroscopy is used on AP, oblique, and lateral views of the foot along with manual applied adduction-abduction, rotational, and piano key stress to identify instability patterns to the midfoot injury.  There was obvious gross instability of tarsometatarsal joints 1 through 3 as well as the medial intercuneiform joint.  Fluoroscopy was then used to accurately candace out the intended incisions. A short, longitudinal, dorsal incision is made centered over the first tarsometatarsal joint.  Dissection was carried down bluntly taking care to avoid protect the EHL and neurovascular structures.  First tarsometatarsal joint is exposed. Joint inspected with a Breckenridge elevator with gross instability noted at the 1st TMT and medial intercuneiform joints.     A joint distractor is placed at the first tarsometatarsal joint, which was then carefully prepared for arthrodesis via residual cartilage removal and exposure of subchondral bone via drilling technique. Care is taken to prepare the entire joint from plantar to dorsal.      A small oblique incision was made at the lateral heel and dissection carried down to the lateral wall of the calcaneus, avoiding any branches of the sural nerve.  A  significant amount of cancellous, calcaneal bone graft was harvested via a unicortical window with the AcCanonicald bone graft harvesting reamer system.  A piece of gelfoam sponge was placed in the harvest site of the calcaneus and this incision was closed with 3.0 Biosyn in the subcutaneous tissue and interrupted Nylon stitches in the skin.     Bone graft was then mixed with Augment and Osteoamp and placed at the first tarsometatarsal joint, which was subluxed and unstable.  Joint distractor removed.  The unstable first tarsometatarsal joint was carefully reduced and pinned.  I then placed a Medshape Forte nitinol 4-legcompression staple with excellent compression and stability at the arthrodesis site.   A wire from the Medline 4.0 mm headed partially-threaded screw set was placed in retrograde fashion from the medial base of the first metatarsal across the first tarsometatarsal joint and into the middle cuneiform.  Ideal wire placement was confirmed on multiple views of fluoroscopy.  This was measured, drilled, and appropriate length 4.0 mm headed partially-threaded screw placed with excellent compression.  The first tarsometatarsal joint remained well reduced without recurrent subluxation.    Attention was then turned to the medial cuneiform fracture and intercuneiform joint dislocation/instability.  The medial cuneiform fracture was reduced with a small periarticular clamp.  The unstable intercuneiform joint was also reduced and held with a clamp.  At this time, a wire from the Medline 4.0 mm headed partially-threaded screw set was placed across the medial cuneiform fracture and across the unstable intercuneiform joint.  This was measured, drilled, and appropriate length screw placed across the unstable intercuneiform joint from the medial cuneiform to the middle cuneiform with excellent maintained stability and reduction at the intercuneiform joint.  The medial cuneiform fracture remained well reduced.    A second,  separate longitudinal incision was made centered over the second and third metatarsal bases, making sure to leave an adequate skin bridge from the medial incision.   Dissection was carried down to the middle midfoot column, taking care to avoid and protect any relevant tendinous and neurovascular structures.  The second and third tarsometatarsal joints were exposed which were unstable and subluxed laterally       A joint distractor is placed at the second tarsometatarsal joint, which was dislocated/unstable.  The joint was then carefully prepared for arthrodesis via residual cartilage removal and exposure of subchondral bone via drilling technique. Care is taken to prepare the entire joint from plantar to dorsal.     Bone graft was then placed at the second tarsometatarsal joint.     I used a large bone reduction clamp around the base of the second metatarsal and percutaneously at the medial cuneiform to reduce the dislocated second tarsometatarsal joint.  Fluoroscopy confirmed excellent reduction of the base of the second metatarsal and reduction of the Lisfranc interval.  I then placed a wire from the Medline 4.0 mm cannulated screw set from the base of the second metatarsal to the medial cuneiform.  This was then measured, drilled, and appropriate length screw placed with excellent purchase.  The Lisfranc interval remained reduced, and the base of the second metatarsal remained well aligned on the middle cuneiform on all views.        I then confirmed that the second metatarsal base fracture and the middle cuneiform fracture were both reduced and in good position.   I then placed a Medshape Dynaclip nitinol 2-leg [id number] staple with excellent compression and stability at the second TMT arthrodesis site.        A joint distractor is placed at the third tarsometatarsal joint, which was grossly unstable.  The joint was then carefully prepared for arthrodesis via residual cartilage removal and exposure of subchondral  "bone via drilling technique. Care is taken to prepare the entire joint from plantar to dorsal.       Bone graft was then placed at the third tarsometatarsal joint.  The unstable third tarsometatarsal joint was carefully reduced and pinned. I then confirmed that the third metatarsal base fracture was reduced and in good position  I then placed a Dynaclip nitinol 2-leg Elite [id number] staple with excellent compression and stability at the arthrodesis site.          Stress exam on direct inspection and fluoroscopy reveals no residual instability patterns. Final fluoroscopic AP, oblique, and lateral views of the foot reveal anatomic reduction and appropriate hardware placement/positioning without complication. All wounds are thoroughly irrigated out and closed in layered fashion with 000 Vicryl and then Nylon on the skin. Sterile dressings applied and a well-padded short leg splint was placed with the ankle/foot in neutral position. Tourniquet was deflated and brisk capillary refill returned to the toes.  The patient was awoken from anesthesia without apparent complication and taken to PACU for recovery     Postoperative Plan:  Weightbearing status: Non-weightbearing in the splint  DVT Prophylaxis: Aspirin 325mg daily;  Patient will be instructed to elevate as much as possible (\"toes above the nose\") and to get up and move around at least once per hour while awake to help minimize risk of blood clots.            Mikhail Banks Jr, MD     Date: 11/13/2020  Time: 19:05 EST        "

## 2020-11-14 NOTE — THERAPY EVALUATION
Patient Name: Marge Mcghee  : 1955    MRN: [id number]                              Today's Date: 2020       Admit Date: 2020    Visit Dx:     ICD-10-CM ICD-9-CM   1. Lisfranc dislocation  S93.326A 838.03     Patient Active Problem List   Diagnosis   • Type 2 diabetes mellitus with peripheral neuropathy (CMS/HCC)   • Age-related osteoporosis without current pathological fracture   • Essential hypertension   • Hyperlipidemia   • Hepatic steatosis   • Gastroesophageal reflux disease with esophagitis   • Adrenal nodule (CMS/HCC)   • Vitamin D deficiency   • Thyroid nodule   • Urinary tract infection without hematuria   • REINA (acute kidney injury) (CMS/HCC)   • Hyperglycemia   • Acute right flank pain   • Vomiting   • Hypomagnesemia   • Dehydration   • Class 1 obesity in adult   • Complicated migraine   • Occipital neuralgia of left side   • Polypharmacy   • 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 Cholecystectomy. No biliary ductal dilatation. Negative pancreas, spleen, left adrenal gland, and kidneys. The right adrenal presumed adenoma has increased slightly, measuring 3 x 4 cm in diameter,  compared to 2 x 3.5 cm on the previous exam. The attenuation values are consistent with an adenoma. [facility] in 2018   • Age-related osteoporosis without current pathological fracture 2016   • Arthritis    • Essential hypertension 2016   • Gastroesophageal reflux disease with esophagitis 2016   • Hepatic steatosis 2016   • History of anemia    • History of sepsis     FROM UTI   • Hyperlipidemia 2016   • Lisfranc's dislocation     LEFT WITH FRACTURES NONHEALING FOOT   • RLS (restless legs syndrome)    • Squamous cell skin  cancer 2014    Excised by Dr. James   • Thyroid nodule 10/18/2019    BENIGN   • Type 2 diabetes mellitus with peripheral neuropathy (CMS/HCC) 11/16/2016   • Vitamin D deficiency 1/25/2019     Past Surgical History:   Procedure Laterality Date   • BREAST BIOPSY Left     7/2019. BENIGN   • CATARACT EXTRACTION WITH INTRAOCULAR LENS IMPLANT Bilateral    • CHOLECYSTECTOMY     • COLONOSCOPY     • KNEE ARTHROSCOPY Bilateral    • TOTAL ABDOMINAL HYSTERECTOMY     • UPPER GASTROINTESTINAL ENDOSCOPY  08/2016   • US GUIDED FINE NEEDLE ASPIRATION  5/8/2020     General Information     Row Name 11/14/20 1135          Physical Therapy Time and Intention    Document Type  evaluation  -     Mode of Treatment  individual therapy;physical therapy  -     Row Name 11/14/20 1135          General Information    Patient Profile Reviewed  yes  -     Prior Level of Function  independent:;min assist:;all household mobility;community mobility;gait;transfer;bed mobility  -     Existing Precautions/Restrictions  fall;non-weight bearing NWB RLE  -     Row Name 11/14/20 1135          Living Environment    Lives With  child(nara), adult  -     Row Name 11/14/20 1135          Cognition    Orientation Status (Cognition)  oriented x 4  -     Row Name 11/14/20 1135          Safety Issues, Functional Mobility    Impairments Affecting Function (Mobility)  balance;strength;pain;endurance/activity tolerance  -       User Key  (r) = Recorded By, (t) = Taken By, (c) = Cosigned By    Initials Name Provider Type     Robert Garg, PT DPT Physical Therapist        Mobility     Row Name 11/14/20 1136          Bed Mobility    Bed Mobility  bed mobility (all) activities  -     All Activities, [place] (Bed Mobility)  contact guard assist  -     Assistive Device (Bed Mobility)  head of bed elevated  -     Row Name 11/14/20 1136          Transfers    Comment (Transfers)  maintained NWB  -     Row Name 11/14/20 1136          Sit-Stand  Transfer    Sit-Stand [place] (Transfers)  contact guard  -LC     Assistive Device (Sit-Stand Transfers)  walker, front-wheeled  -LC     Row Name 11/14/20 1136          Gait/Stairs (Locomotion)    [place] Level (Gait)  contact guard  -LC     Assistive Device (Gait)  walker, front-wheeled  -LC     Distance in Feet (Gait)  8, hopping on LLE to maintain RLE NWB  -LC     Deviations/Abnormal Patterns (Gait)  left sided deviations;antalgic;other (see comments) hopping  -LC     Row Name 11/14/20 1136          Mobility    Extremity Weight-bearing Status  (S) right lower extremity  -LC     Right Lower Extremity (Weight-bearing Status)  (S) non weight-bearing (NWB)  -LC       User Key  (r) = Recorded By, (t) = Taken By, (c) = Cosigned By    Initials Name Provider Type    Robert Payne, PT DPT Physical Therapist        Obj/Interventions     Row Name 11/14/20 1137          Range of Motion Comprehensive    Comment, General Range of Motion  BLE ArOM WFL except R ankle and foot impaired 75% due to pain  -LC     Row Name 11/14/20 1137          Strength Comprehensive (MMT)    Comment, General Manual Muscle Testing (MMT) Assessment  LLE MMT grossly 4+/5; RLE MMT grossly 3+/5 due to pain  -LC     Row Name 11/14/20 1137          Balance    Balance Assessment  sitting static balance;standing static balance;sitting dynamic balance;standing dynamic balance  -LC     Static Sitting Balance  WNL  -LC     Dynamic Sitting Balance  WNL  -LC     Static Standing Balance  mild impairment  -LC     Dynamic Standing Balance  mild impairment  -LC       User Key  (r) = Recorded By, (t) = Taken By, (c) = Cosigned By    Initials Name Provider Type    Robert Payne, PT DPT Physical Therapist        Goals/Plan     Row Name 11/14/20 1140          Bed Mobility Goal 1 (PT)    Activity/Assistive Device (Bed Mobility Goal 1, PT)  bed mobility activities, all  -     [place] Level/Cues Needed (Bed Mobility Goal 1, PT)  independent  -      Time Frame (Bed Mobility Goal 1, PT)  1 week  -     Row Name 11/14/20 1140          Transfer Goal 1 (PT)    Activity/Assistive Device (Transfer Goal 1, PT)  transfers, all;walker, rolling  -LC     [place] Level/Cues Needed (Transfer Goal 1, PT)  supervision required  -LC     Time Frame (Transfer Goal 1, PT)  1 week  -     Row Name 11/14/20 1140          Gait Training Goal 1 (PT)    Activity/Assistive Device (Gait Training Goal 1, PT)  gait (walking locomotion);walker, rolling  -LC     [place] Level (Gait Training Goal 1, PT)  standby assist  -LC     Distance (Gait Training Goal 1, PT)  50  -LC     Time Frame (Gait Training Goal 1, PT)  1 week  -       User Key  (r) = Recorded By, (t) = Taken By, (c) = Cosigned By    Initials Name Provider Type    Robert Payne, PT DPT Physical Therapist        Clinical Impression     Row Name 11/14/20 1138          Pain    Additional Documentation  Pain Scale: Numbers Pre/Post-Treatment (Group)  -     Row Name 11/14/20 1138          Pain Scale: Numbers Pre/Post-Treatment    Pretreatment Pain Rating  5/10  -LC     Posttreatment Pain Rating  5/10  -     Pain Location - Side  Right  -LC     Pain Location  foot  -LC     Pain Intervention(s)  Medication (See MAR);MD notified (Comment)  -     Row Name 11/14/20 7439          Plan of Care Review    Plan of Care Reviewed With  patient  -LC     Outcome Summary  PT EVAL completed. Pt AO x 4. Pt NWB RLE. Pt transferred supine to sit with HOB elevated and CGA x 1. Pt transferred sit to stand with CGA x 1 and RW. Pt ambulated 8 ft by hopping on LLE and maintaining RLE NWB with CGA x 1 and RW. Returned and transferred sit to supine with CGA x 1. Pt requires skilled therapy at this time due to decreased strength, decreased transfers, and decreased ambulation. Recommend DC home with assist.  -     Row Name 11/14/20 9974          Therapy Assessment/Plan (PT)    Patient/Family Therapy Goals Statement (PT)  home with  assist  -     Rehab Potential (PT)  good, to achieve stated therapy goals  -     Criteria for Skilled Interventions Met (PT)  yes;skilled treatment is necessary  -     Predicted Duration of Therapy Intervention (PT)  1 week  -     Row Name 11/14/20 1138          Vital Signs    O2 Delivery Pre Treatment  room air  -LC     O2 Delivery Intra Treatment  room air  -LC     O2 Delivery Post Treatment  room air  -LC     Pre Patient Position  Supine  -LC     Intra Patient Position  Standing  -LC     Post Patient Position  Supine  -LC     Row Name 11/14/20 1138          Positioning and Restraints    Pre-Treatment Position  in bed  -LC     Post Treatment Position  bed  -LC     In Bed  notified nsg;supine;call light within reach;encouraged to call for assist;exit alarm on  -       User Key  (r) = Recorded By, (t) = Taken By, (c) = Cosigned By    Initials Name Provider Type    Robert Payne, PT DPT Physical Therapist        Outcome Measures     Row Name 11/14/20 1141          How much help from another person do you currently need...    Turning from your back to your side while in flat bed without using bedrails?  4  -LC     Moving from lying on back to sitting on the side of a flat bed without bedrails?  3  -LC     Moving to and from a bed to a chair (including a wheelchair)?  3  -LC     Standing up from a chair using your arms (e.g., wheelchair, bedside chair)?  3  -LC     Climbing 3-5 steps with a railing?  2  -LC     To walk in hospital room?  3  -LC     AM-PAC 6 Clicks Score (PT)  18  -     Row Name 11/14/20 1141          Functional Assessment    Outcome Measure Options  AM-PAC 6 Clicks Basic Mobility (PT)  -       User Key  (r) = Recorded By, (t) = Taken By, (c) = Cosigned By    Initials Name Provider Type    Robert Payne, PT DPT Physical Therapist        Physical Therapy Education                 Title: PT OT SLP Therapies (Done)     Topic: Physical Therapy (Done)     Point: Mobility training  (Done)     Learning Progress Summary           Patient Acceptance, E,D, VU,DU by  at 11/14/2020 1141    Comment: NWB during transfers and ambulation                               User Key     Initials Effective Dates Name Provider Type Discipline     07/02/20 -  Robert Garg, PT DPT Physical Therapist PT              PT Recommendation and Plan     Plan of Care Reviewed With: patient  Outcome Summary: PT EVAL completed. Pt AO x 4. Pt NWB RLE. Pt transferred supine to sit with HOB elevated and CGA x 1. Pt transferred sit to stand with CGA x 1 and RW. Pt ambulated 8 ft by hopping on LLE and maintaining RLE NWB with CGA x 1 and RW. Returned and transferred sit to supine with CGA x 1. Pt requires skilled therapy at this time due to decreased strength, decreased transfers, and decreased ambulation. Recommend DC home with assist.     Time Calculation:   PT Charges     Row Name 11/14/20 1142             Time Calculation    Start Time  1115  -      Stop Time  1145  -      Time Calculation (min)  30 min  -      PT Received On  11/14/20  -      PT - Next Appointment  11/15/20  -      PT Goal Re-Cert Due Date  11/21/20  -         Time Calculation- PT    Total Timed Code Minutes- PT  30 minute(s)  -         Timed Charges    59263 - Gait Training Minutes   15  -        User Key  (r) = Recorded By, (t) = Taken By, (c) = Cosigned By    Initials Name Provider Type     Robert Garg, PT DPT Physical Therapist        Therapy Charges for Today     Code Description Service Date Service Provider Modifiers Qty    88262888873 HC GAIT TRAINING EA 15 MIN 11/14/2020 Robert Garg, PT DPT GP 1    39335023087 HC PT EVAL MOD COMPLEXITY 1 11/14/2020 Robert Garg, PT DPT GP 1          PT G-Codes  Outcome Measure Options: AM-PAC 6 Clicks Basic Mobility (PT)  AM-PAC 6 Clicks Score (PT): 18    NEHAL ZavalaT  11/14/2020

## 2020-11-14 NOTE — PLAN OF CARE
PT EVAL completed. Pt AO x 4. Pt NWB RLE. Pt transferred supine to sit with HOB elevated and CGA x 1. Pt transferred sit to stand with CGA x 1 and RW. Pt ambulated 8 ft by hopping on LLE and maintaining RLE NWB with CGA x 1 and RW. Returned and transferred sit to supine with CGA x 1. Pt requires skilled therapy at this time due to decreased strength, decreased transfers, and decreased ambulation. Recommend DC home with assist.    PPE: Patient was placed in face mask in first look. Patient was wearing facemask when I entered the room and throughout our encounter. I wore full protective equipment throughout this patient encounter including a face mask, and gloves. Hand hygiene was performed before donning protective equipment and after removal when leaving the room.

## 2020-11-14 NOTE — PLAN OF CARE
Goal Outcome Evaluation:  Plan of Care Reviewed With: patient  Progress: improving  Outcome Summary: Pt POD1 Right foot ORIF, VSS, afebrile, pain controlled with po pain medication, continue to reinforce to pt of NWB status, worked with theapy today, medicated for n/v, [facility].

## 2020-11-14 NOTE — PLAN OF CARE
Goal Outcome Evaluation:  Plan of Care Reviewed With: patient  Progress: improving  Outcome Summary: VSS during shift. BP decreased after home BP medication was given. Block still in effect. Numbness to RLE and can not wiggle toes. Toes are warm to touch with good cap refill. Splint in place. NWB. Voiding per BSC.

## 2020-11-14 NOTE — PROGRESS NOTES
"Orthopaedic Surgery  Daily Progress Note    /83 (BP Location: Left arm, Patient Position: Lying)   Pulse 92   Temp 97 °F (36.1 °C) (Oral)   Resp 16   Ht 162.6 cm (64.02\")   Wt 79.8 kg (175 lb 14.8 oz)   SpO2 96%   BMI 30.18 kg/m²     Lab Results (last 24 hours)     Procedure Component Value Units Date/Time    POC Glucose Once [[id number]]  (Abnormal) Collected: 11/13/20 1348    Specimen: Blood Updated: 11/13/20 1349     Glucose 150 mg/dL           Imaging Results (Last 24 Hours)     Procedure Component Value Units Date/Time    XR Foot 3+ View Right [[id number]] Collected: 11/13/20 1726     Updated: 11/13/20 1846    Narrative:      THREE-VIEW PORTABLE RIGHT FOOT IN OR     HISTORY: Localization imaging during surgical fusion.     FINDINGS: Imaging in the operating room was performed at the time of  surgical fusion at the 1st and 2nd tarsometatarsal joints with screws  and staples. Three images were obtained and the fluoroscopy time  measures 48 seconds.     This report was finalized on 11/13/2020 6:43 PM by Dr. Bryon Georges M.D.       FL C Arm During Surgery [[id number]] Resulted: 11/13/20 1706     Updated: 11/13/20 1706    Narrative:      This procedure was auto-finalized with no dictation required.          Patient Care Team:  Traci Townsend APRN as PCP - General (Nurse Practitioner)  Drake Hinton MD PhD as Consulting Physician (Ophthalmology)  Fausto Gilliam MD as Consulting Physician (Endocrinology)  Saloni Ch MD (Ophthalmology)  Mikhail Kaplan Jr., MD as Consulting Physician (Urology)    SUBJECTIVE  Pain controlled. Nerve block still in effect. Voiding without difficulty per BSC. Complains of post-op N/V and diarrhea, which she believes is related to post-op ABX. PT to eval.    PHYSICAL EXAM  Resting in NAD  Splint clean, dry, intact  Toes warm, perfused, brisk capillary refill  Unable to flexes/extends toes s/p nerve block  SILT over right knee and thigh; no sensation " over right toes s/p nerve block  No pain with passive stretch           Lisfranc dislocation      PLAN / DISPOSITION:  POD 1 s/p ORIF Lisfranc injury, doing well  1. Pain control: Orals  2.  Antibiotics: Periop Ancef to end today  3.  PT: NWB in splint; PT to eval  4. DVT: ASA 325mg daily plus ALIRIO/SCDs, mobilization  5. Post-op N/V/diarrhea: continue with Phenergan PRN. Will continue to monitor now that post-op ABX are complete. If no relief in symptoms by late afternoon, will consult [facility].  6. Dispo: Patient request discharge tomorrow due to post-op N/V and diarrhea. Follow up 2-3 weeks with me at [facility] ([phone] to make appointment)        Raven Cota, CANDIDO  11/14/20  09:44 EST

## 2020-11-15 LAB
GLUCOSE BLDC GLUCOMTR-MCNC: 163 MG/DL (ref 70–130)
GLUCOSE BLDC GLUCOMTR-MCNC: 47 MG/DL (ref 70–130)
GLUCOSE BLDC GLUCOMTR-MCNC: 54 MG/DL (ref 70–130)
GLUCOSE BLDC GLUCOMTR-MCNC: 83 MG/DL (ref 70–130)

## 2020-11-15 PROCEDURE — 82962 GLUCOSE BLOOD TEST: CPT

## 2020-11-15 PROCEDURE — G0378 HOSPITAL OBSERVATION PER HR: HCPCS

## 2020-11-15 PROCEDURE — 63710000001 PROMETHAZINE PER 12.5 MG: Performed by: ORTHOPAEDIC SURGERY

## 2020-11-15 PROCEDURE — 97116 GAIT TRAINING THERAPY: CPT | Performed by: PHYSICAL THERAPIST

## 2020-11-15 RX ORDER — SULFAMETHOXAZOLE AND TRIMETHOPRIM 400; 80 MG/1; MG/1
1 TABLET ORAL EVERY 12 HOURS SCHEDULED
Status: DISCONTINUED | OUTPATIENT
Start: 2020-11-15 | End: 2020-11-16 | Stop reason: DRUGHIGH

## 2020-11-15 RX ORDER — SULFAMETHOXAZOLE AND TRIMETHOPRIM 400; 80 MG/1; MG/1
1 TABLET ORAL EVERY 12 HOURS SCHEDULED
Qty: 14 TABLET | Refills: 0 | Status: SHIPPED | OUTPATIENT
Start: 2020-11-15 | End: 2020-11-16 | Stop reason: HOSPADM

## 2020-11-15 RX ADMIN — METFORMIN HYDROCHLORIDE 1000 MG: 1000 TABLET ORAL at 18:23

## 2020-11-15 RX ADMIN — PROMETHAZINE HYDROCHLORIDE 12.5 MG: 12.5 TABLET ORAL at 20:42

## 2020-11-15 RX ADMIN — OXYCODONE HYDROCHLORIDE AND ACETAMINOPHEN 2 TABLET: 7.5; 325 TABLET ORAL at 12:36

## 2020-11-15 RX ADMIN — ROPINIROLE HYDROCHLORIDE 0.25 MG: 0.5 TABLET, FILM COATED ORAL at 20:42

## 2020-11-15 RX ADMIN — PROMETHAZINE HYDROCHLORIDE 12.5 MG: 12.5 TABLET ORAL at 04:08

## 2020-11-15 RX ADMIN — OXYCODONE HYDROCHLORIDE AND ACETAMINOPHEN 2 TABLET: 7.5; 325 TABLET ORAL at 16:49

## 2020-11-15 RX ADMIN — SULFAMETHOXAZOLE AND TRIMETHOPRIM 1 TABLET: 400; 80 TABLET ORAL at 20:42

## 2020-11-15 RX ADMIN — FUROSEMIDE 20 MG: 20 TABLET ORAL at 11:31

## 2020-11-15 RX ADMIN — POTASSIUM CHLORIDE 20 MEQ: 750 TABLET, EXTENDED RELEASE ORAL at 11:31

## 2020-11-15 RX ADMIN — PROMETHAZINE HYDROCHLORIDE 12.5 MG: 12.5 TABLET ORAL at 10:54

## 2020-11-15 RX ADMIN — ASPIRIN 325 MG: 325 TABLET, COATED ORAL at 11:31

## 2020-11-15 RX ADMIN — OXYCODONE HYDROCHLORIDE AND ACETAMINOPHEN 2 TABLET: 7.5; 325 TABLET ORAL at 08:35

## 2020-11-15 RX ADMIN — SODIUM CHLORIDE, PRESERVATIVE FREE 10 ML: 5 INJECTION INTRAVENOUS at 22:16

## 2020-11-15 RX ADMIN — LOSARTAN POTASSIUM 100 MG: 100 TABLET, FILM COATED ORAL at 11:31

## 2020-11-15 RX ADMIN — GLIPIZIDE 10 MG: 10 TABLET ORAL at 06:33

## 2020-11-15 RX ADMIN — SULFAMETHOXAZOLE AND TRIMETHOPRIM 1 TABLET: 400; 80 TABLET ORAL at 12:36

## 2020-11-15 RX ADMIN — OXYCODONE HYDROCHLORIDE AND ACETAMINOPHEN 2 TABLET: 7.5; 325 TABLET ORAL at 20:42

## 2020-11-15 RX ADMIN — OXYCODONE HYDROCHLORIDE AND ACETAMINOPHEN 2 TABLET: 7.5; 325 TABLET ORAL at 04:08

## 2020-11-15 RX ADMIN — DULOXETINE HYDROCHLORIDE 60 MG: 60 CAPSULE, DELAYED RELEASE ORAL at 20:42

## 2020-11-15 NOTE — PLAN OF CARE
Goal Outcome Evaluation:  Plan of Care Reviewed With: patient  Progress: no change  Outcome Summary: Percocet for pain and phenergan for nausea. IVFs at kvo. RLE elevated on pillows and with ice packs. SCD to left leg. Up with assist, bed alarm for safety. Slept on and off. VSS. Will continue to monitor.

## 2020-11-15 NOTE — PROGRESS NOTES
Discharge Planning Assessment  [facility]     Patient Name: Marge Mcghee  MRN: [id number]  Today's Date: 11/15/2020    Admit Date: 11/13/2020    Discharge Needs Assessment     Row Name 11/15/20 1633       Living Environment    Lives With  spouse    Current Living Arrangements  home/apartment/condo    Primary Care Provided by  self    Provides Primary Care For  no one    Family Caregiver if Needed  spouse    Quality of Family Relationships  helpful;involved;supportive    Able to Return to Prior Arrangements  yes       Transition Planning    Patient/Family Anticipates Transition to  inpatient rehabilitation facility    Patient/Family Anticipated Services at Transition      Transportation Anticipated  health plan transportation       Discharge Needs Assessment    Readmission Within the Last 30 Days  no previous admission in last 30 days    Equipment Currently Used at Home  other (see comments) crutches, scooter    Outpatient/Agency/Support Group Needs  skilled nursing facility    Discharge Facility/Level of Care Needs  nursing facility, skilled    Provided Post Acute Provider List?  Yes    Post Acute Provider List  Nursing Home SNF    Provided Post Acute Provider Quality & Resource List?  Yes    Post Acute Provider Quality and Resource List  Nursing Home SNF    Delivered To  Patient        Discharge Plan     Row Name 11/15/20 7548       Plan    Plan  SNF -- Referrals Pending    Patient/Family in Agreement with Plan  yes    Plan Comments  Spoke with the patient, verified current information and CCP role explained. Patient lives with her /Tino and states she has family support at home. She's IADL and used both crutches and a scooter PTA. She has no history with SNF/RH/HH. Patient plans to d/c to a SNF. CMS Compare SNF List was reviewed with the patient who requests referrals to [facility] SNFs. Referrals sent in [facility]. Plan is for the patient to d/c to a SNF, and pt will need  a wheelchair van to transport at d/c. Await SNF determinations. CCP following.        Continued Care and Services - Admitted Since 11/13/2020     Destination     Service Provider Request Status Selected Services Address Phone Fax    [facility]  Pending - [facility] N[facility] [address] [place] [address] [phone]    [facility]  Pending - [facility] N[facility]A [address] [place] [address] [phone]              Expected Discharge Date and Time     Expected Discharge Date Expected Discharge Time    Nov 15, 2020         Demographic Summary     Row Name 11/15/20 1708       General Information    Admission Type  observation    Reason for Consult  discharge planning    Preferred Language  English     Used During This Interaction  no       Contact Information    Permission Granted to Share Info With  ;family/designee        Functional Status     Row Name 11/15/20 1708       Functional Status    Usual Activity Tolerance  moderate    Current Activity Tolerance  moderate       Functional Status, IADL    Medications  assistive equipment    Meal Preparation  assistive equipment    Housekeeping  assistive equipment    Laundry  assistive equipment    Shopping  assistive equipment       Mental Status Summary    Recent Changes in Mental Status/Cognitive Functioning  no changes        Psychosocial     Row Name 11/15/20 1709       Intellectual Performance WDL    Level of Consciousness  Alert       Coping/Stress    Patient Personal Strengths  able to adapt    Sources of Support  spouse    Reaction to Health Status  accepting    Understanding of Condition and Treatment  adequate understanding of medical condition       Developmental Stage (Eriksson's)    Developmental Stage  Stage 8 (65 years-death/Late Adulthood) Integrity vs. Despair        Abuse/Neglect    No documentation.       Legal    No documentation.       Substance Abuse    No documentation.        Patient Forms    No documentation.           Barbara Riley RN

## 2020-11-15 NOTE — THERAPY TREATMENT NOTE
Patient Name: Marge Mcghee  : 1955    MRN: [id number]                              Today's Date: 11/15/2020       Admit Date: 2020    Visit Dx:     ICD-10-CM ICD-9-CM   1. Lisfranc dislocation  S93.326A 838.03     Patient Active Problem List   Diagnosis   • Type 2 diabetes mellitus with peripheral neuropathy (CMS/HCC)   • Age-related osteoporosis without current pathological fracture   • Essential hypertension   • Hyperlipidemia   • Hepatic steatosis   • Gastroesophageal reflux disease with esophagitis   • Adrenal nodule (CMS/HCC)   • Vitamin D deficiency   • Thyroid nodule   • Urinary tract infection without hematuria   • REINA (acute kidney injury) (CMS/HCC)   • Hyperglycemia   • Acute right flank pain   • Vomiting   • Hypomagnesemia   • Dehydration   • Class 1 obesity in adult   • Complicated migraine   • Occipital neuralgia of left side   • Polypharmacy   • 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 Cholecystectomy. No biliary ductal dilatation. Negative pancreas, spleen, left adrenal gland, and kidneys. The right adrenal presumed adenoma has increased slightly, measuring 3 x 4 cm in diameter,  compared to 2 x 3.5 cm on the previous exam. The attenuation values are consistent with an adenoma. Done at [facility] in 2018   • Age-related osteoporosis without current pathological fracture 2016   • Arthritis    • Essential hypertension 2016   • Gastroesophageal reflux disease with esophagitis 2016   • Hepatic steatosis 2016   • History of anemia    • History of sepsis     FROM UTI   • Hyperlipidemia 2016   • Lisfranc's dislocation     LEFT WITH FRACTURES NONHEALING FOOT   • RLS (restless legs syndrome)    • Squamous cell skin  cancer 2014    Excised by Dr. James   • Thyroid nodule 10/18/2019    BENIGN   • Type 2 diabetes mellitus with peripheral neuropathy (CMS/HCC) 11/16/2016   • Vitamin D deficiency 1/25/2019     Past Surgical History:   Procedure Laterality Date   • BREAST BIOPSY Left     7/2019. BENIGN   • CATARACT EXTRACTION WITH INTRAOCULAR LENS IMPLANT Bilateral    • CHOLECYSTECTOMY     • COLONOSCOPY     • KNEE ARTHROSCOPY Bilateral    • TOTAL ABDOMINAL HYSTERECTOMY     • UPPER GASTROINTESTINAL ENDOSCOPY  08/2016   • US GUIDED FINE NEEDLE ASPIRATION  5/8/2020     General Information     Row Name 11/15/20 0944          Physical Therapy Time and Intention    Document Type  therapy note (daily note)  -     Mode of Treatment  individual therapy;physical therapy  -       User Key  (r) = Recorded By, (t) = Taken By, (c) = Cosigned By    Initials Name Provider Type    Stephania Johnson, PT Physical Therapist        Mobility     Row Name 11/15/20 0944          Bed Mobility    Bed Mobility  bed mobility (all) activities  -     All Activities, [place] (Bed Mobility)  modified independence  -     Assistive Device (Bed Mobility)  bed rails  -     Row Name 11/15/20 0944          Sit-Stand Transfer    Sit-Stand [place] (Transfers)  contact guard  -     Assistive Device (Sit-Stand Transfers)  walker, front-wheeled  -KH     Row Name 11/15/20 0944          Gait/Stairs (Locomotion)    [place] Level (Gait)  contact guard  -     Assistive Device (Gait)  walker, front-wheeled  -MARY     Distance in Feet (Gait)  5ft x 3, heavy cueing needed to remain NWB today  -     Deviations/Abnormal Patterns (Gait)  left sided deviations;antalgic;other (see comments)  -     [place] Level (Stairs)  minimum assist (75% patient effort)  -     Assistive Device (Stairs)  walker, front-wheeled  -KH     Handrail Location (Stairs)  none  -     Number of Steps (Stairs)  2  -     Ascending Technique (Stairs)  step-to-step   -     Descending Technique (Stairs)  step-to-step  -     Comment (Gait/Stairs)  difficulty maintaining NWB so not able to complete 4 stairs  -     Row Name 11/15/20 0944          Mobility    Extremity Weight-bearing Status  right lower extremity  -     Right Lower Extremity (Weight-bearing Status)  non weight-bearing (NWB)  -       User Key  (r) = Recorded By, (t) = Taken By, (c) = Cosigned By    Initials Name Provider Type    Stephania Johnson, PT Physical Therapist        Obj/Interventions    No documentation.       Goals/Plan    No documentation.       Clinical Impression     Row Name 11/15/20 0946          Pain Scale: Numbers Pre/Post-Treatment    Pretreatment Pain Rating  5/10  -KH     Posttreatment Pain Rating  7/10  -     Pain Location - Side  Right  -     Pain Location  foot  -     Pain Intervention(s)  Cold applied;Repositioned  -     Row Name 11/15/20 0946          Plan of Care Review    Outcome Summary  Pt reports incrased pain today as block has worn off. Pt hopped 5ft x3. She fatigues quickly and had difficulty maintaing NWB after the second 5 ft. She attempted stairs, but was unable to maintain NWB when ascending stairs. She did well descending stairs. She reports she has a w/c at home and  can pull her up the stairs backwards. Pt has a knee scooter at home she intends to use for mobility. If  is capable of pulling her up the stairs in the wheelchait then she is safe to d/c home.  -     Row Name 11/15/20 0946          Positioning and Restraints    Pre-Treatment Position  in bed  -     Post Treatment Position  bed  -KH     In Bed  notified nsg;supine;call light within reach;encouraged to call for assist;exit alarm on  -       User Key  (r) = Recorded By, (t) = Taken By, (c) = Cosigned By    Initials Name Provider Type    Stephania Johnson, PT Physical Therapist        Outcome Measures     Row Name 11/15/20 0959          How much help from another  person do you currently need...    Turning from your back to your side while in flat bed without using bedrails?  4  -KH     Moving from lying on back to sitting on the side of a flat bed without bedrails?  4  -KH     Moving to and from a bed to a chair (including a wheelchair)?  3  -KH     Standing up from a chair using your arms (e.g., wheelchair, bedside chair)?  3  -KH     Climbing 3-5 steps with a railing?  3  -KH     To walk in hospital room?  3  -KH     AM-PAC 6 Clicks Score (PT)  20  -KH     Row Name 11/15/20 0950          Functional Assessment    Outcome Measure Options  AM-PAC 6 Clicks Basic Mobility (PT)  -       User Key  (r) = Recorded By, (t) = Taken By, (c) = Cosigned By    Initials Name Provider Type    Stephania Johnson, NEHAL Physical Therapist        Physical Therapy Education                 Title: PT OT SLP Therapies (Done)     Topic: Physical Therapy (Done)     Point: Mobility training (Done)     Learning Progress Summary           Patient Acceptance, E,TB, VU by MARY at 11/15/2020 0950    Acceptance, E,D, VU,DU by SOCORRO at 11/14/2020 1141    Comment: NWB during transfers and ambulation                               User Key     Initials Effective Dates Name Provider Type Discipline    MARY 02/05/19 -  Stephania Serrano, PT Physical Therapist PT    SOCORRO 07/02/20 -  Robert Garg, PT DPT Physical Therapist PT              PT Recommendation and Plan     Outcome Summary: Pt reports incrased pain today as block has worn off. Pt hopped 5ft x3. She fatigues quickly and had difficulty maintaing NWB after the second 5 ft. She attempted stairs, but was unable to maintain NWB when ascending stairs. She did well descending stairs. She reports she has a w/c at home and  can pull her up the stairs backwards. Pt has a knee scooter at home she intends to use for mobility. If  is capable of pulling her up the stairs in the wheelchait then she is safe to d/c home.     Time Calculation:   PT  Charges     Row Name 11/15/20 0951             Time Calculation    Start Time  0920  -      Stop Time  0945  -      Time Calculation (min)  25 min  -         Time Calculation- PT    Total Timed Code Minutes- PT  25 minute(s)  -MARY        User Key  (r) = Recorded By, (t) = Taken By, (c) = Cosigned By    Initials Name Provider Type    Stephania Johnson, PT Physical Therapist        Therapy Charges for Today     Code Description Service Date Service Provider Modifiers Qty    29742087152 HC GAIT TRAINING EA 15 MIN 11/15/2020 Stephania Serrano, PT GP 2          PT G-Codes  Outcome Measure Options: AM-PAC 6 Clicks Basic Mobility (PT)  AM-PAC 6 Clicks Score (PT): 20    Stephania Serrano, PT  11/15/2020

## 2020-11-15 NOTE — PROGRESS NOTES
Orthopaedic & Hand Surgery  Daily Progress Note  Dr. Miguel Gonzalez  [phone]    DEMOGRAPHICS:   · Marge Mcghee   · Age:65 y.o.   · MRN:[id number]  · Admitted: 11/13/2020    CC: Right ankle pain    PROCEDURE: 2 Days Post-Op s/p urinary tract infection treatment RIGHT OPEN TREATMENT LISFRANC INJURY, OPEN REDUCTION INTERNAL FIXATION MEDIAL/MIDDLE CUNEIFORM FRACTURE AND 2ND/3RD METATARSAL FRACTURE 1ST 2ND POSSIBLE 3RD TARSOMETATARSAL ARTHRODESIS INTERCUNEIFORM ARTHRODESIS CALCANEAL BONE GRAFT     HOSPITAL PROGRESS  · Patient Issues: Notes some nausea from the narcotic pain medicine otherwise denies fevers, chills, chest pain shortness of breath.  Notes that the block is worn off and the pain is starting to be more severe but is currently controlled with ice, elevation and narcotic pain medicine.  Notes that she has some symptoms of a urinary tract infection.  Has been previously hospitalized with sepsis for this if it waits too long.  · Ambulation/Activity: Ambulating minimally with PT/Nursing.  They note that she would require the assistance of her  pulling her up the stairs with a wheelchair to maintain nonweightbearing.  Currently she does not think that he will be able to manage this with her.  She states she does not feel safe to return home under these conditions.    VITALS:  Vitals:    11/15/20 0459 11/15/20 0527 11/15/20 0528 11/15/20 0825   BP: 150/78   102/65   BP Location: Right arm   Right arm   Patient Position: Lying   Lying   Pulse: 79   87   Resp: 18      Temp: 97.4 °F (36.3 °C)      TempSrc: Oral      SpO2: 96% (!) 88% 96% 97%   Weight:       Height:           PHYSICAL EXAM:  · LUNGS: Equal chest rise, no shortness of air  · CARDIOVASCULAR: brisk capillary refill intact  · WOUND: Dressings clean, dry, and intact  · EXTREMITY: Operative Limb  · Pulses: brisk capillary refill intact  · Sensation: Intact to light touch in the toes  · Motor: Fires EHL/FHL    LABS:   No results found  for: WBC, HGB, PLT  Lab Results   Component Value Date    GLUCOSE 107 (H) 04/13/2020    CALCIUM 9.8 11/03/2020     11/03/2020    K 3.9 11/03/2020    CO2 27 11/03/2020     11/03/2020    BUN 26 (H) 11/03/2020    CREATININE 1.1 11/03/2020    EGFRIFAFRI 66 07/22/2020    EGFRIFNONA 54 (L) 07/22/2020    BCR 23.0 (H) 11/03/2020    ANIONGAP 10.0 04/13/2020     ASSESSMENT: Patient is a 65 y.o. female who is 2 Days Post-Op s/p Procedure(s):  RIGHT OPEN TREATMENT LISFRANC INJURY, OPEN REDUCTION INTERNAL FIXATION MEDIAL/MIDDLE CUNEIFORM FRACTURE AND 2ND/3RD METATARSAL FRACTURE 1ST 2ND POSSIBLE 3RD TARSOMETATARSAL ARTHRODESIS INTERCUNEIFORM ARTHRODESIS CALCANEAL BONE GRAFT     Pain is controlled, however, somewhat slow to mobilize.  May require inpatient rehab if she is not able to adequately maintain nonweightbearing at home with the assistance of her  can provide.  We would like for her to stay an additional day to get this sorted out.  In addition, she has some concerns for UTI.  We will get a urinary analysis and start Bactrim.    PLAN:   · Weight Bearing: Right Lower Extremity: Non Weight Bearing  · Labs: Above lab values reviewed. Plan: Urinalysis with reflex  · PT/OT: For mobilization and strengthening while maintaining nonweightbearing precautions  · Initiate Bactrim for suspected UTI after urinalysis obtained  · DVT PPX: ASA 325mg Daily for 30 days  · Dispo: Acute, will hold for 1 more day to see how she does with therapy.  She may require rehab, versus home with home health if she is able to better navigate stairs.    Miguel Gonzalez MD, PhD  Orthopaedic & Hand Surgery  [facility]  [phone] - [place] Office  [phone] - [place] Office

## 2020-11-15 NOTE — DISCHARGE PLACEMENT REQUEST
"Mawxell Moreno (65 y.o. Female)     Date of Birth Social Security Number Address Home Phone MRN    1955  [address] [address] [phone] [id number]    Jewish Marital Status          None        Admission Date Admission Type Admitting Provider Attending Provider Department, Room/Bed    11/13/20 Elective Mikhail Banks Jr., MD Lewis, John S Jr., MD [address], P682/1    Discharge Date Discharge Disposition Discharge Destination         Home or Self Care              Attending Provider: Mikhail Banks Jr., MD    Allergies: Zofran [Ondansetron Hcl], Prochlorperazine, Meperidine, Prochlorperazine Maleate    Isolation: None   Infection: None   Code Status: CPR    Ht: 162.6 cm (64.02\")   Wt: 79.8 kg (175 lb 14.8 oz)    Admission Cmt: None   Principal Problem: None                Active Insurance as of 11/13/2020     Primary Coverage     Payor Plan Insurance Group Employer/Plan Group    MEDICARE MEDICARE A & B      Payor Plan Address Payor Plan Phone Number Payor Plan Fax Number Effective Dates    [address] [phone]  3/1/2018 - None Entered    [facility] [address]       Subscriber Name Subscriber Birth Date Member ID       MAXWELL MORENO VINEET 1955 [id number]           Secondary Coverage     Payor Plan Insurance Group Employer/Plan Group    G[employer] LIFE [employer] SUP      Payor Plan Address Payor Plan Phone Number Payor Plan Fax Number Effective Dates    [address]   1/1/2019 - None Entered    [facility] [address]       Subscriber Name Subscriber Birth Date Member ID       MAXWELL MORENO VINEET 1955 [id number]                 Emergency Contacts      (Rel.) Home Phone Work Phone Mobile Phone    Tonia Rockwell (Friend) [phone] -- --    SILVIO MORENO (Spouse) -- -- [phone]              "

## 2020-11-15 NOTE — PLAN OF CARE
Problem: Adult Inpatient Plan of Care  Goal: Plan of Care Review  Recent Flowsheet Documentation  Taken 11/15/2020 0946 by Stephania Serrano, PT  Outcome Summary: Pt reports incrased pain today as block has worn off. Pt hopped 5ft x3. She fatigues quickly and had difficulty maintaing NWB after the second 5 ft. She attempted stairs, but was unable to maintain NWB when ascending stairs. She did well descending stairs. She reports she has a w/c at home and  can pull her up the stairs backwards. Pt has a knee scooter at home she intends to use for mobility. If  is capable of pulling her up the stairs in the wheelchait then she is safe to d/c home.

## 2020-11-16 VITALS
OXYGEN SATURATION: 94 % | RESPIRATION RATE: 18 BRPM | SYSTOLIC BLOOD PRESSURE: 118 MMHG | BODY MASS INDEX: 30.04 KG/M2 | WEIGHT: 175.93 LBS | HEIGHT: 64 IN | TEMPERATURE: 97.7 F | DIASTOLIC BLOOD PRESSURE: 73 MMHG | HEART RATE: 92 BPM

## 2020-11-16 LAB
GLUCOSE BLDC GLUCOMTR-MCNC: 63 MG/DL (ref 70–130)
GLUCOSE BLDC GLUCOMTR-MCNC: 64 MG/DL (ref 70–130)
GLUCOSE BLDC GLUCOMTR-MCNC: 81 MG/DL (ref 70–130)

## 2020-11-16 PROCEDURE — G0378 HOSPITAL OBSERVATION PER HR: HCPCS

## 2020-11-16 PROCEDURE — 82962 GLUCOSE BLOOD TEST: CPT

## 2020-11-16 PROCEDURE — 63710000001 PROMETHAZINE PER 12.5 MG: Performed by: ORTHOPAEDIC SURGERY

## 2020-11-16 RX ORDER — SULFAMETHOXAZOLE AND TRIMETHOPRIM 800; 160 MG/1; MG/1
1 TABLET ORAL EVERY 12 HOURS SCHEDULED
Qty: 6 TABLET | Refills: 0 | Status: SHIPPED | OUTPATIENT
Start: 2020-11-16 | End: 2020-11-19

## 2020-11-16 RX ORDER — SULFAMETHOXAZOLE AND TRIMETHOPRIM 800; 160 MG/1; MG/1
1 TABLET ORAL EVERY 12 HOURS SCHEDULED
Status: DISCONTINUED | OUTPATIENT
Start: 2020-11-16 | End: 2020-11-16 | Stop reason: HOSPADM

## 2020-11-16 RX ADMIN — SODIUM CHLORIDE, PRESERVATIVE FREE 10 ML: 5 INJECTION INTRAVENOUS at 09:23

## 2020-11-16 RX ADMIN — OXYCODONE HYDROCHLORIDE AND ACETAMINOPHEN 1 TABLET: 7.5; 325 TABLET ORAL at 16:40

## 2020-11-16 RX ADMIN — POTASSIUM CHLORIDE 20 MEQ: 750 TABLET, EXTENDED RELEASE ORAL at 09:21

## 2020-11-16 RX ADMIN — OXYCODONE HYDROCHLORIDE AND ACETAMINOPHEN 2 TABLET: 7.5; 325 TABLET ORAL at 09:20

## 2020-11-16 RX ADMIN — OXYCODONE HYDROCHLORIDE AND ACETAMINOPHEN 2 TABLET: 7.5; 325 TABLET ORAL at 05:21

## 2020-11-16 RX ADMIN — ASPIRIN 325 MG: 325 TABLET, COATED ORAL at 09:21

## 2020-11-16 RX ADMIN — OXYCODONE HYDROCHLORIDE AND ACETAMINOPHEN 1 TABLET: 7.5; 325 TABLET ORAL at 16:44

## 2020-11-16 RX ADMIN — FUROSEMIDE 20 MG: 20 TABLET ORAL at 09:21

## 2020-11-16 RX ADMIN — OXYCODONE HYDROCHLORIDE AND ACETAMINOPHEN 2 TABLET: 7.5; 325 TABLET ORAL at 00:59

## 2020-11-16 RX ADMIN — SULFAMETHOXAZOLE AND TRIMETHOPRIM 1 TABLET: 400; 80 TABLET ORAL at 09:21

## 2020-11-16 RX ADMIN — PROMETHAZINE HYDROCHLORIDE 12.5 MG: 12.5 TABLET ORAL at 16:44

## 2020-11-16 RX ADMIN — LOSARTAN POTASSIUM 100 MG: 100 TABLET, FILM COATED ORAL at 09:20

## 2020-11-16 RX ADMIN — PROMETHAZINE HYDROCHLORIDE 12.5 MG: 12.5 TABLET ORAL at 05:23

## 2020-11-16 NOTE — PROGRESS NOTES
Continued Stay Note  [facility]     Patient Name: Marge Mcghee  MRN: [id number]  Today's Date: 11/16/2020    Admit Date: 11/13/2020    Discharge Plan     Row Name 11/16/20 1253       Plan    Plan  [facility]    Plan Comments  Received VM from Edvin with [facility] stating can accpet patient at [facility]. Spoke with patient to inform of 2 accepting facilities. Patient called and spoke with spouse. Patient wanting to go to [facility]. Will inform Edvin with [facility]    Row Name 11/16/20 1230       Plan    Plan  [facility]    Plan Comments  Transportation scheduled with [facility] for 5pm Auth # [id number]. DC summary faxed Packedt given to RN    Row Name 11/16/20 1223       Plan    Plan  [facility]    Plan Comments  Per Emilia with [facility] they are able to accept and bed available today.  Inform patient who is in agreement.    Row Name 11/16/20 1155       Plan    Plan  Pending    Plan Comments  Met with patient who requested [facility]. Referral placed in EPIC. Informend Emilia with [facility] of referral and DC today. Stated she would review and inform CCP of decision.    Row Name 11/16/20 1149       Plan    Plan  Pending    Plan Comments  Per Nupur with [facility] no subacute beds available.    Row Name 11/16/20 1141       Plan    Plan  Pending    Plan Comments  Spoke to Edvin with[facility] who stated he is still waiting to see if bed available. No return call from [facility] with [facility]. Met with patient and [place] [address]. Patient requested [facility]. Referral placed in EPIC. Informed Nupur with [facility] of referral and DC today. Stated she would review and inform CCP of decision.    Row Name 11/16/20 1005       Plan    Plan  Pending    Plan Comments  Spoke with Edvin with [facility] regarding referral to [facility]. Stated he is checking on bed availbality. Will inform CCP        Discharge Codes    No documentation.       Expected Discharge Date and Time     Expected  Discharge Date Expected Discharge Time    Nov 16, 2020             Cindy Mccord RN

## 2020-11-16 NOTE — PROGRESS NOTES
Continued Stay Note  [facility]     Patient Name: Marge Mcghee  MRN: [id number]  Today's Date: 11/16/2020    Admit Date: 11/13/2020    Discharge Plan     Row Name 11/16/20 1005       Plan    Plan  Pending    Plan Comments  Spoke with Edvin with [facility] regarding referral to [facility]. Stated he is checking on bed availbality. Will inform CCP    Row Name 11/16/20 0926       Plan    Plan  Pending    Plan Comments  Called [facility] at [phone] and spoke with Nkechi who stated admission is not in today and that the DON was in meeting .Took name and number. Awaitnig retunrn call.    Row Name 11/16/20 0814       Plan    Plan  Pending    Plan Comments  VM left for Edvin with [facility] regarding referral to [facility].        Discharge Codes    No documentation.       Expected Discharge Date and Time     Expected Discharge Date Expected Discharge Time    Nov 16, 2020             Cindy Mccord RN

## 2020-11-16 NOTE — PROGRESS NOTES
Continued Stay Note  [facility]     Patient Name: Marge Mcghee  MRN: [id number]  Today's Date: 11/16/2020    Admit Date: 11/13/2020    Discharge Plan     Row Name 11/16/20 0924       Plan    Plan  Pending    Plan Comments  Called Hug[facility]av[facility] Kim at [phone] and spoke with Nkechi who stated admission is not in today and that the DON was in meeting .Took name and number. Awaitnig retunrn call.    Row Name 11/16/20 0814       Plan    Plan  Pending    Plan Comments  VM left for Edvin with [facility] regarding referral to [facility].        Discharge Codes    No documentation.       Expected Discharge Date and Time     Expected Discharge Date Expected Discharge Time    Nov 16, 2020             Cindy Mccord RN

## 2020-11-16 NOTE — PROGRESS NOTES
Continued Stay Note  [facility]     Patient Name: Marge Mcghee  MRN: [id number]  Today's Date: 11/16/2020    Admit Date: 11/13/2020    Discharge Plan     Row Name 11/16/20 0814       Plan    Plan  Pending    Plan Comments   left for Edvin with [facility] regarding referral to [facility].        Discharge Codes    No documentation.       Expected Discharge Date and Time     Expected Discharge Date Expected Discharge Time    Nov 16, 2020             Cindy Mccord RN

## 2020-11-16 NOTE — PROGRESS NOTES
Continued Stay Note  [facility]     Patient Name: Marge Mcghee  MRN: [id number]  Today's Date: 11/16/2020    Admit Date: 11/13/2020    Discharge Plan     Row Name 11/16/20 1141       Plan    Plan  Pending    Plan Comments  Spoke to Edvin with [facility] who stated he is still waiting to see if bed available. No return call from DON with [facility]. Met with patient and reviewed medicare.gov for [address]. Patient requested [facility]. Referral placed in EPIC. Informed Nupur with [facility] of referral and DC today. Stated she would review and inform CCP of decision.    Row Name 11/16/20 1005       Plan    Plan  Pending    Plan Comments  Spoke with Edvin with [facility] regarding referral to [facility]. Stated he is checking on bed availbality. Will inform CCP    Row Name 11/16/20 0632       Plan    Plan  Pending    Plan Comments  Called [facility] at [phone] and spoke with Nkechi who stated admission is not in today and that the DON was in meeting .Took name and number. Awaitnig retunrn call.        Discharge Codes    No documentation.       Expected Discharge Date and Time     Expected Discharge Date Expected Discharge Time    Nov 16, 2020             Cindy Mccord, RN

## 2020-11-16 NOTE — DISCHARGE SUMMARY
Orthopedic Discharge Summary      Patient: Marge Mcghee      YOB: 1955    Medical Record Number: [id number]    Attending Physician: Mikhail Banks Jr., MD  Consulting Physician(s):   Date of Admission: 11/13/2020 12:50 PM  Date of Discharge: 11/16/2020      Patient Active Problem List   Diagnosis   • Type 2 diabetes mellitus with peripheral neuropathy (CMS/HCC)   • Age-related osteoporosis without current pathological fracture   • Essential hypertension   • Hyperlipidemia   • Hepatic steatosis   • Gastroesophageal reflux disease with esophagitis   • Adrenal nodule (CMS/HCC)   • Vitamin D deficiency   • Thyroid nodule   • Urinary tract infection without hematuria   • REINA (acute kidney injury) (CMS/HCC)   • Hyperglycemia   • Acute right flank pain   • Vomiting   • Hypomagnesemia   • Dehydration   • Class 1 obesity in adult   • Complicated migraine   • Occipital neuralgia of left side   • Polypharmacy   • 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 (CMS/HCC)   • Lisfranc dislocation     Status Post: LEFT OPEN TREATMENT LISFRANC INJURY, OPEN REDUCTION INTERNAL FIXATION MEDIAL/MIDDLE CUNEIFORM FRACTURE AND 2ND/3RD METATARSAL FRACTURE 1ST 2ND POSSIBLE 3RD TRASOMETATARSAL SRTHRODESIS INTERCUNEIFORM ARTHRODESIS CALCANEAL BONE GRAFT      Allergies   Allergen Reactions   • Zofran [Ondansetron Hcl] Nausea And Vomiting     Does the opposite of its purpose   • Prochlorperazine Other (See Comments)     CAUSED SEIZURE   • Meperidine Itching and Swelling   • Prochlorperazine Maleate Other (See Comments)     CAUSES SEIZURE       Current Medications:     Discharge Medications      New Medications      Instructions Start Date   aspirin  MG tablet  Replaces: aspirin 81 MG chewable tablet   325 mg, Oral, Daily      ondansetron 4 MG tablet  Commonly known as: Zofran   4 mg, Oral, Every 8 Hours PRN      oxyCODONE-acetaminophen 7.5-325 MG per  tablet  Commonly known as: Percocet   1 tablet, Oral, Every 4 Hours PRN      promethazine 12.5 MG tablet  Commonly known as: PHENERGAN   12.5 mg, Oral, Every 6 Hours PRN      sulfamethoxazole-trimethoprim 400-80 MG tablet  Commonly known as: BACTRIM,SEPTRA   1 tablet, Oral, Every 12 Hours Scheduled         Changes to Medications      Instructions Start Date   DULoxetine 60 MG capsule  Commonly known as: CYMBALTA  What changed: when to take this   60 mg, Oral, Daily      pantoprazole 40 MG EC tablet  Commonly known as: PROTONIX  What changed:   · when to take this  · reasons to take this   40 mg, Oral, Daily      SITagliptin 100 MG tablet  Commonly known as: Januvia  What changed: additional instructions   100 mg, Oral, Daily         Continue These Medications      Instructions Start Date   atorvastatin 80 MG tablet  Commonly known as: LIPITOR   80 mg, Oral, Nightly      Carafate 1 g tablet  Generic drug: sucralfate   1 g, Oral, 2 Times Daily PRN      denosumab 60 MG/ML solution syringe  Commonly known as: PROLIA   60 mg, Subcutaneous, Every 6 Months, Nov 2019/ May 2020      furosemide 20 MG tablet  Commonly known as: LASIX   20 mg, Oral, Daily      glimepiride 4 MG tablet  Commonly known as: AMARYL   4 mg, Oral, 2 Times Daily      glucose blood test strip  Commonly known as: OneTouch Verio   1 each, Other, Daily With Breakfast, Lunch & Dinner, Dx: E11.65. Use as instructed      losartan 100 MG tablet  Commonly known as: COZAAR   100 mg, Oral, Daily      metFORMIN 1000 MG tablet  Commonly known as: GLUCOPHAGE   1,000 mg, Oral, 2 Times Daily With Meals      multivitamin tablet tablet  Commonly known as: THERAGRAN   1 tablet, Oral, Daily      OneTouch Delica Plus Imbuco18Y misc   1 each, Does not apply, 3 Times Daily Before Meals, Dx: E11.65      potassium chloride 20 MEQ CR tablet  Commonly known as: K-DUR,KLOR-CON   20 mEq, Oral, Daily      rOPINIRole 0.25 MG tablet  Commonly known as: REQUIP   0.25 mg, Oral, Every  Evening      Vitamin B-12 3000 MCG sublingual tablet   1 tablet/day, Sublingual      vitamin D 1.25 MG (47950 UT) capsule capsule  Commonly known as: ERGOCALCIFEROL   50,000 Units, Oral, Weekly, Monday      Vitamin D-3 25 MCG (1000 UT) capsule   1,000 Units, Oral, Daily         Stop These Medications    aspirin 81 MG chewable tablet  Replaced by: aspirin  MG tablet     HYDROcodone-acetaminophen 5-325 MG per tablet  Commonly known as: NORCO                Past Medical History:   Diagnosis Date   • Adrenal nodule (CMS/HCC) 11/16/2016    FINDINGS: Mild hepatic steatosis may be present. Cholecystectomy. No biliary ductal dilatation. Negative pancreas, spleen, left adrenal gland, and kidneys. The right adrenal presumed adenoma has increased slightly, measuring 3 x 4 cm in diameter,  compared to 2 x 3.5 cm on the previous exam. The attenuation values are consistent with an adenoma. Done at [facility] in August 2018   • Age-related osteoporosis without current pathological fracture 11/16/2016   • Arthritis    • Essential hypertension 11/16/2016   • Gastroesophageal reflux disease with esophagitis 11/16/2016   • Hepatic steatosis 11/16/2016   • History of anemia    • History of sepsis     FROM UTI   • Hyperlipidemia 11/16/2016   • Lisfranc's dislocation     LEFT WITH FRACTURES NONHEALING FOOT   • RLS (restless legs syndrome)    • Squamous cell skin cancer 2014    Excised by Dr. James   • Thyroid nodule 10/18/2019    BENIGN   • Type 2 diabetes mellitus with peripheral neuropathy (CMS/HCC) 11/16/2016   • Vitamin D deficiency 1/25/2019     Past Surgical History:   Procedure Laterality Date   • BREAST BIOPSY Left     7/2019. BENIGN   • CATARACT EXTRACTION WITH INTRAOCULAR LENS IMPLANT Bilateral    • CHOLECYSTECTOMY     • COLONOSCOPY     • KNEE ARTHROSCOPY Bilateral    • TOTAL ABDOMINAL HYSTERECTOMY     • UPPER GASTROINTESTINAL ENDOSCOPY  08/2016   • US GUIDED FINE NEEDLE ASPIRATION  5/8/2020     Social History      Occupational History   • Not on file   Tobacco Use   • Smoking status: Never Smoker   • Smokeless tobacco: Never Used   Substance and Sexual Activity   • Alcohol use: Yes     Comment: socially    • Drug use: Never   • Sexual activity: Defer      Social History     Social History Narrative   • Not on file     Family History   Problem Relation Age of Onset   • Diabetes Mother    • COPD Mother    • Hypertension Mother    • Kidney disease Mother    • Obesity Mother    • Thyroid disease Mother    • Diabetes Sister    • Diabetes Sister    • Diabetes Sister    • Diabetes Sister    • Malig Hyperthermia Neg Hx          Physical Exam: 65 y.o. female  General Appearance:    Alert, cooperative, in no acute distress                      Vitals:    11/15/20 1054 11/15/20 1732 11/15/20 2115 11/16/20 0517   BP: 132/81 111/63 119/71 141/80   BP Location: Right arm Right arm Right arm Right arm   Patient Position: Lying Sitting Sitting Sitting   Pulse: 83 91 82 87   Resp: 18 18 18 18   Temp: 97.1 °F (36.2 °C) 97.5 °F (36.4 °C) 97.3 °F (36.3 °C) 98 °F (36.7 °C)   TempSrc: Oral Oral Oral Oral   SpO2: 96% 99% 98% 98%   Weight:       Height:                On the day of discharge:     Physical Exam:  Resting in NAD  Splint clean, dry, intact  Toes warm, perfused, brisk capillary refill  Able to flexes/extends toes s/p nerve block  SILT over right knee and thigh; light sensation over right toes; baseline from neuropathy  No pain with passive stretch      POD 3 s/p ORIF Lisfranc injury, doing well  1. Pain control: Orals  2.  Antibiotics: Periop ABX completed; PO Bactrim for UIT  3.  PT: NWB in splint; PT to eval  4. DVT: ASA 325mg daily plus ALIRIO/SCDs, mobilization  5. Dispo: Discharge today to home or rehab. Patient requesting rehab placement. Follow up 2-3 weeks with me at [facility] ([phone] to make appointment)    Leave splint in place, clean and dry, until follow up    No weightbearing on operative  "leg    Elevate as much as possible (\"toes above the nose\")    Try to get up and move around at least once per hour while awake to help minimize risk of blood clots    Follow up in 2-3 weeks with Dr. Banks at the [facility] (call [phone] for appointment)                                                                       Hospital Course:  65 y.o. female admitted to [facility] [place], operated by [facility] to services of Mikhail Banks Jr., MD with 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]  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] on 11/13/2020 and underwent LEFT OPEN TREATMENT LISFRANC INJURY, OPEN REDUCTION INTERNAL FIXATION MEDIAL/MIDDLE CUNEIFORM FRACTURE AND 2ND/3RD METATARSAL FRACTURE 1ST 2ND POSSIBLE 3RD TRASOMETATARSAL SRTHRODESIS INTERCUNEIFORM ARTHRODESIS CALCANEAL BONE GRAFT  Per Mikhail Banks Jr., MD. Antibiotic prophylaxis were per SCIP protocol.  For full details regarding this procedure, please refer to the operative note.    The patient tolerated this procedure well and was transferred to the floor postoperatively.  Pain was initially controlled with oral pain medication with IV for breakthrough. Aspirin 325mg daily was initiated for DVT propylaxis.  The patient underwent mobilization therapy with physical therapy remaining NWB on the operative extremity.  Opioids were titrated to achieve appropriate pain management to allow for participation in mobilization exercises. Vital signs are now stable. The splint remains intact without signs or symptoms of infection. Operative extremity neurovascular status remains intact. No transfusions of blood products were necessary postoperatively.    Appropriate education re: incision care, activity levels, medications, and follow up visits was completed and all questions were answered.  The patient was tolerating a regular diet, pain was controlled with oral pain medication, and cleared by physical therapy.    The patient is now deemed stable for discharge to rehab " "today.      DIAGNOSTIC TESTS:     Admission on 11/13/2020   Component Date Value Ref Range Status   • Glucose 11/13/2020 150* 70 - 130 mg/dL Final   • Glucose 11/14/2020 269* 70 - 130 mg/dL Final   • Glucose 11/15/2020 47* 70 - 130 mg/dL Final   • Glucose 11/15/2020 54* 70 - 130 mg/dL Final   • Glucose 11/15/2020 83  70 - 130 mg/dL Final   • Glucose 11/15/2020 163* 70 - 130 mg/dL Final   • Glucose 11/16/2020 64* 70 - 130 mg/dL Final   • Glucose 11/16/2020 63* 70 - 130 mg/dL Final       No results found.    Discharge and Follow up Instructions:     Remain NWB on the operative extremity.    Keep splint clean and dry at all times.    Elevate the extremity as much as possible (\"toes above the level of the nose\").    Try to get up at least once per hour while awake to help prevent blood clots.    Follow up in 2-3 weeks with Dr. Banks at [facility] ([phone]).    Date: 11/16/2020    Raven Cota, APRN      Time: Discharge 30 min       "

## 2020-11-16 NOTE — PLAN OF CARE
Goal Outcome Evaluation:  Plan of Care Reviewed With: patient  Progress: improving  Outcome Summary: VSS. Percocet given Q4H  for pain. Up with assist to the BR with walker, voided freely. RLE elevated. Slept at long intervals. IV line reinserted.

## 2020-11-16 NOTE — PLAN OF CARE
Goal Outcome Evaluation:  Plan of Care Reviewed With: patient  Progress: no change  Outcome Summary: VSS. IV fluids at [facility]. Complained of pain to right foot, Percocet given. Need urinalysis, started on Bactrim today for suspected UTI. Up with walker. Fall precautions maintained. Waiting for possible rehab placement. Phenergan given for nausea.

## 2020-11-16 NOTE — PROGRESS NOTES
Continued Stay Note  [facility]     Patient Name: Marge Mcghee  MRN: [id number]  Today's Date: 11/16/2020    Admit Date: 11/13/2020    Discharge Plan     Row Name 11/16/20 1155       Plan    Plan  Pending    Plan Comments  Met with patient who requested [facility]. Referral placed in EPIC. Informend Emilia with [facility] of referral and DC today. Stated she would review and inform CCP of decision.    Row Name 11/16/20 1149       Plan    Plan  Pending    Plan Comments  Per Nupur with [facility] no subacute beds available.    Row Name 11/16/20 1141       Plan    Plan  Pending    Plan Comments  Spoke to Edvin with[facility] who stated he is still waiting to see if bed available. No return call from DON with [facility]. Met with patient and [place] [address]. Patient requested [facility]. Referral placed in EPIC. Informed Nupur with [facility] of referral and DC today. Stated she would review and inform CCP of decision.    Row Name 11/16/20 1005       Plan    Plan  Pending    Plan Comments  Spoke with Edvin with [facility] regarding referral to [facility]. Stated he is checking on bed availbality. Will inform CCP    Row Name 11/16/20 1462       Plan    Plan  Pending    Plan Comments  Called [facility] at [phone] and spoke with Nkechi who stated admission is not in today and that the ANA LAURA was in meeting .Took name and number. Awaitnig retunrn call.        Discharge Codes    No documentation.       Expected Discharge Date and Time     Expected Discharge Date Expected Discharge Time    Nov 16, 2020             Cindy Mccord RN

## 2020-11-16 NOTE — PROGRESS NOTES
Continued Stay Note  [facility]     Patient Name: Marge Mcghee  MRN: [id number]  Today's Date: 11/16/2020    Admit Date: 11/13/2020    Discharge Plan     Row Name 11/16/20 1253       Plan    Plan  [facility]    Plan Comments  Received VM from Edvin with [facility] stating can accpet patient at [place] [facility]. Spoke with patient to inform of 2 accepting facilities. Patient called and spoke with spouse. Patient wanting to go to [facility]. Will inform Edvin with [facility]    Row Name 11/16/20 1223       Plan    Plan  [facility]    Plan Comments  Per Emilia with [facility] they are able to accept and bed available today.  Inform patient who is in agreement.    Row Name 11/16/20 1155       Plan    Plan  Pending    Plan Comments  Met with patient who requested [facility]. Referral placed in EPIC. Informend Emilia with [facility] of referral and DC today. Stated she would review and inform CCP of decision.    Row Name 11/16/20 1149       Plan    Plan  Pending    Plan Comments  Per Nupur with [facility] no subacute beds available.    Row Name 11/16/20 1141       Plan    Plan  Pending    Plan Comments  Spoke to Edvin with[facility] who stated he is still waiting to see if bed available. No return call from [facility] with [facility]. Met with patient and reviewed medicare.gov fro [address]. Patient requested [facility]. Referral placed in EPIC. Informed Nupur with [facility] of referral and DC today. Stated she would review and inform CCP of decision.    Row Name 11/16/20 1005       Plan    Plan  Pending    Plan Comments  Spoke with Edvin with [facility] regarding referral to [place]. Stated he is checking on bed availbality. Will inform CCP        Discharge Codes    No documentation.       Expected Discharge Date and Time     Expected Discharge Date Expected Discharge Time    Nov 16, 2020             Cindy Mccord RN

## 2020-11-16 NOTE — PROGRESS NOTES
Case Management Discharge Note      Final Note: Discharged to [facility]. Cindy Mccord RN    Provided Post Acute Provider List?: Yes  Post Acute Provider List: Nursing Home(SNF)  Provided Post Acute Provider Quality & Resource List?: Yes  Post Acute Provider Quality and Resource List: Nursing Home(SNF)  Delivered To: Patient    Selected Continued Care - Discharged on 11/16/2020 Admission date: 11/13/2020 - Discharge disposition: Skilled Nursing Facility (DC - External)    Destination Coordination complete    Service Provider Selected Services Address Phone Fax    Count includes the [facility]  Skilled Nursing [address] [facility] [address] [phone]         Transportation Services  W/C Van: [facility]    Final Discharge Disposition Code: 03 - skilled nursing facility (SNF)

## 2020-11-16 NOTE — PROGRESS NOTES
Continued Stay Note  [facility]     Patient Name: Marge Mcghee  MRN: [id number]  Today's Date: 11/16/2020    Admit Date: 11/13/2020    Discharge Plan     Row Name 11/16/20 1223       Plan    Plan  [facility]    Plan Comments  Per Emilia with [facility] they are able to accept and bed available today.  Inform patient who is in agreement.    Row Name 11/16/20 1155       Plan    Plan  Pending    Plan Comments  Met with patient who requested [facility]. Referral placed in EPIC. Informend Emilia with [facility] of referral and DC today. Stated she would review and inform CCP of decision.    Row Name 11/16/20 1149       Plan    Plan  Pending    Plan Comments  Per Nupur with [facility] no subacute beds available.    Row Name 11/16/20 1141       Plan    Plan  Pending    Plan Comments  Spoke to Edvin with[facility] who stated he is still waiting to see if bed available. No return call from [facility] with [facility]. Met with patient and reviewed [place] [address]. Patient requested [facility]. Referral placed in EPIC. Informed Nupur with [facility] of referral and DC today. Stated she would review and inform CCP of decision.    Row Name 11/16/20 1005       Plan    Plan  Pending    Plan Comments  Spoke with Edvin with [facility] regarding referral to [facility]. Stated he is checking on bed availbality. Will inform CCP        Discharge Codes    No documentation.       Expected Discharge Date and Time     Expected Discharge Date Expected Discharge Time    Nov 16, 2020             Cindy Mccord RN

## 2020-11-16 NOTE — PROGRESS NOTES
Continued Stay Note  [facility]     Patient Name: Marge Mcghee  MRN: [id number]  Today's Date: 11/16/2020    Admit Date: 11/13/2020    Discharge Plan     Row Name 11/16/20 1253       Plan    Plan  [facility]    Plan Comments  Transportation schedulded with [facility] van for 5pm. DC summary faxed and packet given to RN. Updated patient    Row Name 11/16/20 1223       Plan    Plan  [facility]    Plan Comments  Per Emilia with [facility] they are able to accept and bed available today.  Inform patient who is in agreement.    Row Name 11/16/20 1155       Plan    Plan  Pending    Plan Comments  Met with patient who requested [facility]. Referral placed in EPIC. Informend Emilia with [facility] of referral and DC today. Stated she would review and inform CCP of decision.    Row Name 11/16/20 1149       Plan    Plan  Pending    Plan Comments  Per Nupur with [facility] no subacute beds available.    Row Name 11/16/20 1141       Plan    Plan  Pending    Plan Comments  Spoke to Edvin with[facility] who stated he is still waiting to see if bed available. No return call from [facility] with [facility]. Met with patient and [place] [address]. Patient requested [facility]. Referral placed in EPIC. Informed Nupur with [facility] of referral and DC today. Stated she would review and inform CCP of decision.    Row Name 11/16/20 1005       Plan    Plan  Pending    Plan Comments  Spoke with Edvin with [facility] regarding referral to [facility]. Stated he is checking on bed availbality. Will inform CCP        Discharge Codes    No documentation.       Expected Discharge Date and Time     Expected Discharge Date Expected Discharge Time    Nov 16, 2020             Cindy Mccord, RN

## 2020-11-16 NOTE — PROGRESS NOTES
Continued Stay Note  [facility]     Patient Name: Marge Mcghee  MRN: [id number]  Today's Date: 11/16/2020    Admit Date: 11/13/2020    Discharge Plan     Row Name 11/16/20 1149       Plan    Plan  Pending    Plan Comments  Per Nupur with [facility] no subacute beds available.    Row Name 11/16/20 1141       Plan    Plan  Pending    Plan Comments  Spoke to Edvin with[facility] who stated he is still waiting to see if bed available. No return call from DON with [facility]. Met with patient and reviewed [place]are.gov fro [address]. Patient requested [facility]. Referral placed in EPIC. Informed Nupur with [facility] of referral and DC today. Stated she would review and inform CCP of decision.    Row Name 11/16/20 1005       Plan    Plan  Pending    Plan Comments  Spoke with Edvin with [facility] regarding referral to [facility]. Stated he is checking on bed availbality. Will inform CCP    Row Name 11/16/20 5418       Plan    Plan  Pending    Plan Comments  Called [facility] at [phone] and spoke with Nkechi who stated admission is not in today and that the DON was in meeting .Took name and number. Awaitnig retunrn call.        Discharge Codes    No documentation.       Expected Discharge Date and Time     Expected Discharge Date Expected Discharge Time    Nov 16, 2020             Cindy Mccord RN

## 2020-12-13 ENCOUNTER — LAB REQUISITION (OUTPATIENT)
Dept: LAB | Facility: HOSPITAL | Age: 65
End: 2020-12-13

## 2020-12-13 DIAGNOSIS — Z00.00 ENCOUNTER FOR GENERAL ADULT MEDICAL EXAMINATION WITHOUT ABNORMAL FINDINGS: ICD-10-CM

## 2020-12-13 LAB
ANION GAP SERPL CALCULATED.3IONS-SCNC: 9 MMOL/L (ref 5–15)
BASOPHILS # BLD AUTO: 0.1 10*3/MM3 (ref 0–0.2)
BASOPHILS NFR BLD AUTO: 1 % (ref 0–1.5)
BUN SERPL-MCNC: 18 MG/DL (ref 8–23)
BUN/CREAT SERPL: 17.5 (ref 7–25)
CALCIUM SPEC-SCNC: 9 MG/DL (ref 8.6–10.5)
CHLORIDE SERPL-SCNC: 103 MMOL/L (ref 98–107)
CO2 SERPL-SCNC: 29 MMOL/L (ref 22–29)
CREAT SERPL-MCNC: 1.03 MG/DL (ref 0.57–1)
DEPRECATED RDW RBC AUTO: 42 FL (ref 37–54)
EOSINOPHIL # BLD AUTO: 0.7 10*3/MM3 (ref 0–0.4)
EOSINOPHIL NFR BLD AUTO: 7.7 % (ref 0.3–6.2)
ERYTHROCYTE [DISTWIDTH] IN BLOOD BY AUTOMATED COUNT: 13.5 % (ref 12.3–15.4)
GFR SERPL CREATININE-BSD FRML MDRD: 53 ML/MIN/1.73
GLUCOSE SERPL-MCNC: 82 MG/DL (ref 65–99)
HCT VFR BLD AUTO: 31.8 % (ref 34–46.6)
HGB BLD-MCNC: 10.5 G/DL (ref 12–15.9)
LYMPHOCYTES # BLD AUTO: 2.8 10*3/MM3 (ref 0.7–3.1)
LYMPHOCYTES NFR BLD AUTO: 31.5 % (ref 19.6–45.3)
MCH RBC QN AUTO: 29.3 PG (ref 26.6–33)
MCHC RBC AUTO-ENTMCNC: 32.9 G/DL (ref 31.5–35.7)
MCV RBC AUTO: 88.8 FL (ref 79–97)
MONOCYTES # BLD AUTO: 0.6 10*3/MM3 (ref 0.1–0.9)
MONOCYTES NFR BLD AUTO: 6.8 % (ref 5–12)
NEUTROPHILS NFR BLD AUTO: 4.8 10*3/MM3 (ref 1.7–7)
NEUTROPHILS NFR BLD AUTO: 53 % (ref 42.7–76)
NRBC BLD AUTO-RTO: 0 /100 WBC (ref 0–0.2)
PLATELET # BLD AUTO: 306 10*3/MM3 (ref 140–450)
PMV BLD AUTO: 6.7 FL (ref 6–12)
POTASSIUM SERPL-SCNC: 4.3 MMOL/L (ref 3.5–5.2)
RBC # BLD AUTO: 3.58 10*6/MM3 (ref 3.77–5.28)
SODIUM SERPL-SCNC: 141 MMOL/L (ref 136–145)
WBC # BLD AUTO: 9 10*3/MM3 (ref 3.4–10.8)

## 2020-12-13 PROCEDURE — 85025 COMPLETE CBC W/AUTO DIFF WBC: CPT | Performed by: FAMILY MEDICINE

## 2020-12-13 PROCEDURE — 80048 BASIC METABOLIC PNL TOTAL CA: CPT | Performed by: FAMILY MEDICINE

## 2020-12-14 ENCOUNTER — TRANSCRIBE ORDERS (OUTPATIENT)
Dept: ADMINISTRATIVE | Facility: HOSPITAL | Age: 65
End: 2020-12-14

## 2020-12-14 DIAGNOSIS — S93.324D LISFRANC'S DISLOCATION, RIGHT, SUBSEQUENT ENCOUNTER: Primary | ICD-10-CM

## 2020-12-14 DIAGNOSIS — M79.671 FOOT PAIN, RIGHT: ICD-10-CM

## 2020-12-16 ENCOUNTER — HOSPITAL ENCOUNTER (OUTPATIENT)
Dept: CT IMAGING | Facility: HOSPITAL | Age: 65
End: 2020-12-16

## 2020-12-18 ENCOUNTER — APPOINTMENT (OUTPATIENT)
Dept: CT IMAGING | Facility: HOSPITAL | Age: 65
End: 2020-12-18

## 2020-12-28 ENCOUNTER — HOSPITAL ENCOUNTER (INPATIENT)
Facility: HOSPITAL | Age: 65
LOS: 3 days | Discharge: NURSING, REHAB OR LONG-TERM CARE | End: 2020-12-31
Attending: ORTHOPAEDIC SURGERY | Admitting: ORTHOPAEDIC SURGERY

## 2020-12-28 ENCOUNTER — APPOINTMENT (OUTPATIENT)
Dept: CARDIOLOGY | Facility: HOSPITAL | Age: 65
End: 2020-12-28

## 2020-12-28 DIAGNOSIS — E11.3513 PROLIFERATIVE DIABETIC RETINOPATHY OF BOTH EYES WITH MACULAR EDEMA ASSOCIATED WITH TYPE 2 DIABETES MELLITUS (HCC): ICD-10-CM

## 2020-12-28 DIAGNOSIS — T81.89XA DELAYED SURGICAL WOUND HEALING, INITIAL ENCOUNTER: Primary | ICD-10-CM

## 2020-12-28 LAB
ALBUMIN SERPL-MCNC: 4.1 G/DL (ref 3.5–5.2)
ALBUMIN/GLOB SERPL: 1.2 G/DL
ALP SERPL-CCNC: 88 U/L (ref 39–117)
ALT SERPL W P-5'-P-CCNC: 15 U/L (ref 1–33)
ANION GAP SERPL CALCULATED.3IONS-SCNC: 10.9 MMOL/L (ref 5–15)
AST SERPL-CCNC: 23 U/L (ref 1–32)
BASOPHILS # BLD AUTO: 0.13 10*3/MM3 (ref 0–0.2)
BASOPHILS NFR BLD AUTO: 1.2 % (ref 0–1.5)
BH CV LOWER VASCULAR LEFT COMMON FEMORAL AUGMENT: NORMAL
BH CV LOWER VASCULAR LEFT COMMON FEMORAL COMPETENT: NORMAL
BH CV LOWER VASCULAR LEFT COMMON FEMORAL COMPRESS: NORMAL
BH CV LOWER VASCULAR LEFT COMMON FEMORAL PHASIC: NORMAL
BH CV LOWER VASCULAR LEFT COMMON FEMORAL SPONT: NORMAL
BH CV LOWER VASCULAR RIGHT COMMON FEMORAL AUGMENT: NORMAL
BH CV LOWER VASCULAR RIGHT COMMON FEMORAL COMPETENT: NORMAL
BH CV LOWER VASCULAR RIGHT COMMON FEMORAL COMPRESS: NORMAL
BH CV LOWER VASCULAR RIGHT COMMON FEMORAL PHASIC: NORMAL
BH CV LOWER VASCULAR RIGHT COMMON FEMORAL SPONT: NORMAL
BH CV LOWER VASCULAR RIGHT DISTAL FEMORAL COMPRESS: NORMAL
BH CV LOWER VASCULAR RIGHT GASTRONEMIUS COMPRESS: NORMAL
BH CV LOWER VASCULAR RIGHT GREATER SAPH AK COMPRESS: NORMAL
BH CV LOWER VASCULAR RIGHT GREATER SAPH BK COMPRESS: NORMAL
BH CV LOWER VASCULAR RIGHT LESSER SAPH COMPRESS: NORMAL
BH CV LOWER VASCULAR RIGHT MID FEMORAL AUGMENT: NORMAL
BH CV LOWER VASCULAR RIGHT MID FEMORAL COMPETENT: NORMAL
BH CV LOWER VASCULAR RIGHT MID FEMORAL COMPRESS: NORMAL
BH CV LOWER VASCULAR RIGHT MID FEMORAL PHASIC: NORMAL
BH CV LOWER VASCULAR RIGHT MID FEMORAL SPONT: NORMAL
BH CV LOWER VASCULAR RIGHT PERONEAL COMPRESS: NORMAL
BH CV LOWER VASCULAR RIGHT POPLITEAL AUGMENT: NORMAL
BH CV LOWER VASCULAR RIGHT POPLITEAL COMPETENT: NORMAL
BH CV LOWER VASCULAR RIGHT POPLITEAL COMPRESS: NORMAL
BH CV LOWER VASCULAR RIGHT POPLITEAL PHASIC: NORMAL
BH CV LOWER VASCULAR RIGHT POPLITEAL SPONT: NORMAL
BH CV LOWER VASCULAR RIGHT POSTERIOR TIBIAL COMPRESS: NORMAL
BH CV LOWER VASCULAR RIGHT PROFUNDA FEMORAL COMPRESS: NORMAL
BH CV LOWER VASCULAR RIGHT PROXIMAL FEMORAL COMPRESS: NORMAL
BH CV LOWER VASCULAR RIGHT SAPHENOFEMORAL JUNCTION COMPRESS: NORMAL
BILIRUB SERPL-MCNC: 0.2 MG/DL (ref 0–1.2)
BUN SERPL-MCNC: 26 MG/DL (ref 8–23)
BUN/CREAT SERPL: 23.2 (ref 7–25)
CALCIUM SPEC-SCNC: 9.4 MG/DL (ref 8.6–10.5)
CHLORIDE SERPL-SCNC: 102 MMOL/L (ref 98–107)
CO2 SERPL-SCNC: 27.1 MMOL/L (ref 22–29)
CREAT SERPL-MCNC: 1.12 MG/DL (ref 0.57–1)
CRP SERPL-MCNC: 0.09 MG/DL (ref 0–0.5)
DEPRECATED RDW RBC AUTO: 43.9 FL (ref 37–54)
EOSINOPHIL # BLD AUTO: 1.03 10*3/MM3 (ref 0–0.4)
EOSINOPHIL NFR BLD AUTO: 9.3 % (ref 0.3–6.2)
ERYTHROCYTE [DISTWIDTH] IN BLOOD BY AUTOMATED COUNT: 13.1 % (ref 12.3–15.4)
ERYTHROCYTE [SEDIMENTATION RATE] IN BLOOD: 52 MM/HR (ref 0–30)
GFR SERPL CREATININE-BSD FRML MDRD: 49 ML/MIN/1.73
GLOBULIN UR ELPH-MCNC: 3.4 GM/DL
GLUCOSE BLDC GLUCOMTR-MCNC: 156 MG/DL (ref 70–130)
GLUCOSE BLDC GLUCOMTR-MCNC: 88 MG/DL (ref 70–130)
GLUCOSE SERPL-MCNC: 162 MG/DL (ref 65–99)
HCT VFR BLD AUTO: 36 % (ref 34–46.6)
HGB BLD-MCNC: 11.4 G/DL (ref 12–15.9)
IMM GRANULOCYTES # BLD AUTO: 0.03 10*3/MM3 (ref 0–0.05)
IMM GRANULOCYTES NFR BLD AUTO: 0.3 % (ref 0–0.5)
LYMPHOCYTES # BLD AUTO: 3.26 10*3/MM3 (ref 0.7–3.1)
LYMPHOCYTES NFR BLD AUTO: 29.6 % (ref 19.6–45.3)
MCH RBC QN AUTO: 29 PG (ref 26.6–33)
MCHC RBC AUTO-ENTMCNC: 31.7 G/DL (ref 31.5–35.7)
MCV RBC AUTO: 91.6 FL (ref 79–97)
MONOCYTES # BLD AUTO: 0.83 10*3/MM3 (ref 0.1–0.9)
MONOCYTES NFR BLD AUTO: 7.5 % (ref 5–12)
NEUTROPHILS NFR BLD AUTO: 5.74 10*3/MM3 (ref 1.7–7)
NEUTROPHILS NFR BLD AUTO: 52.1 % (ref 42.7–76)
NRBC BLD AUTO-RTO: 0 /100 WBC (ref 0–0.2)
PLATELET # BLD AUTO: 268 10*3/MM3 (ref 140–450)
PMV BLD AUTO: 9 FL (ref 6–12)
POTASSIUM SERPL-SCNC: 4.3 MMOL/L (ref 3.5–5.2)
PROT SERPL-MCNC: 7.5 G/DL (ref 6–8.5)
RBC # BLD AUTO: 3.93 10*6/MM3 (ref 3.77–5.28)
SARS-COV-2 RNA RESP QL NAA+PROBE: NOT DETECTED
SODIUM SERPL-SCNC: 140 MMOL/L (ref 136–145)
WBC # BLD AUTO: 11.02 10*3/MM3 (ref 3.4–10.8)

## 2020-12-28 PROCEDURE — 25010000002 HYDROMORPHONE PER 4 MG: Performed by: NURSE PRACTITIONER

## 2020-12-28 PROCEDURE — 25010000002 PIPERACILLIN SOD-TAZOBACTAM PER 1 G: Performed by: NURSE PRACTITIONER

## 2020-12-28 PROCEDURE — 82962 GLUCOSE BLOOD TEST: CPT

## 2020-12-28 PROCEDURE — 86140 C-REACTIVE PROTEIN: CPT | Performed by: NURSE PRACTITIONER

## 2020-12-28 PROCEDURE — 85025 COMPLETE CBC W/AUTO DIFF WBC: CPT | Performed by: NURSE PRACTITIONER

## 2020-12-28 PROCEDURE — 85652 RBC SED RATE AUTOMATED: CPT | Performed by: NURSE PRACTITIONER

## 2020-12-28 PROCEDURE — 25010000002 VANCOMYCIN 10 G RECONSTITUTED SOLUTION: Performed by: NURSE PRACTITIONER

## 2020-12-28 PROCEDURE — 80053 COMPREHEN METABOLIC PANEL: CPT | Performed by: NURSE PRACTITIONER

## 2020-12-28 PROCEDURE — 93971 EXTREMITY STUDY: CPT

## 2020-12-28 PROCEDURE — U0003 INFECTIOUS AGENT DETECTION BY NUCLEIC ACID (DNA OR RNA); SEVERE ACUTE RESPIRATORY SYNDROME CORONAVIRUS 2 (SARS-COV-2) (CORONAVIRUS DISEASE [COVID-19]), AMPLIFIED PROBE TECHNIQUE, MAKING USE OF HIGH THROUGHPUT TECHNOLOGIES AS DESCRIBED BY CMS-2020-01-R: HCPCS | Performed by: NURSE PRACTITIONER

## 2020-12-28 PROCEDURE — 63710000001 PROMETHAZINE PER 12.5 MG: Performed by: ORTHOPAEDIC SURGERY

## 2020-12-28 RX ORDER — OXYCODONE HYDROCHLORIDE AND ACETAMINOPHEN 5; 325 MG/1; MG/1
2 TABLET ORAL EVERY 4 HOURS PRN
Status: DISCONTINUED | OUTPATIENT
Start: 2020-12-28 | End: 2020-12-31 | Stop reason: HOSPADM

## 2020-12-28 RX ORDER — ACETAMINOPHEN 325 MG/1
325 TABLET ORAL EVERY 6 HOURS PRN
Status: DISCONTINUED | OUTPATIENT
Start: 2020-12-28 | End: 2020-12-31 | Stop reason: HOSPADM

## 2020-12-28 RX ORDER — POTASSIUM CHLORIDE 750 MG/1
20 TABLET, FILM COATED, EXTENDED RELEASE ORAL DAILY
Status: DISCONTINUED | OUTPATIENT
Start: 2020-12-29 | End: 2020-12-31 | Stop reason: HOSPADM

## 2020-12-28 RX ORDER — PROMETHAZINE HYDROCHLORIDE 25 MG/1
25 SUPPOSITORY RECTAL EVERY 4 HOURS PRN
Status: DISCONTINUED | OUTPATIENT
Start: 2020-12-28 | End: 2020-12-28

## 2020-12-28 RX ORDER — ROPINIROLE 0.5 MG/1
0.25 TABLET, FILM COATED ORAL EVERY EVENING
Status: DISCONTINUED | OUTPATIENT
Start: 2020-12-28 | End: 2020-12-31 | Stop reason: HOSPADM

## 2020-12-28 RX ORDER — FUROSEMIDE 20 MG/1
20 TABLET ORAL DAILY
Status: DISCONTINUED | OUTPATIENT
Start: 2020-12-29 | End: 2020-12-31 | Stop reason: HOSPADM

## 2020-12-28 RX ORDER — ASPIRIN 325 MG
325 TABLET, DELAYED RELEASE (ENTERIC COATED) ORAL DAILY
Status: DISCONTINUED | OUTPATIENT
Start: 2020-12-28 | End: 2020-12-31 | Stop reason: HOSPADM

## 2020-12-28 RX ORDER — DIPHENOXYLATE HYDROCHLORIDE AND ATROPINE SULFATE 2.5; .025 MG/1; MG/1
1 TABLET ORAL DAILY
Status: DISCONTINUED | OUTPATIENT
Start: 2020-12-29 | End: 2020-12-31 | Stop reason: HOSPADM

## 2020-12-28 RX ORDER — PANTOPRAZOLE SODIUM 40 MG/1
40 TABLET, DELAYED RELEASE ORAL DAILY
Status: DISCONTINUED | OUTPATIENT
Start: 2020-12-28 | End: 2020-12-31 | Stop reason: HOSPADM

## 2020-12-28 RX ORDER — ATORVASTATIN CALCIUM 20 MG/1
80 TABLET, FILM COATED ORAL NIGHTLY
Status: DISCONTINUED | OUTPATIENT
Start: 2020-12-28 | End: 2020-12-31 | Stop reason: HOSPADM

## 2020-12-28 RX ORDER — DULOXETIN HYDROCHLORIDE 60 MG/1
60 CAPSULE, DELAYED RELEASE ORAL DAILY
Status: DISCONTINUED | OUTPATIENT
Start: 2020-12-28 | End: 2020-12-31 | Stop reason: HOSPADM

## 2020-12-28 RX ORDER — HYDROMORPHONE HYDROCHLORIDE 1 MG/ML
0.5 INJECTION, SOLUTION INTRAMUSCULAR; INTRAVENOUS; SUBCUTANEOUS
Status: DISCONTINUED | OUTPATIENT
Start: 2020-12-28 | End: 2020-12-31 | Stop reason: HOSPADM

## 2020-12-28 RX ORDER — GLIPIZIDE 5 MG/1
5 TABLET ORAL
Status: DISCONTINUED | OUTPATIENT
Start: 2020-12-29 | End: 2020-12-31 | Stop reason: HOSPADM

## 2020-12-28 RX ORDER — SUCRALFATE 1 G/1
1 TABLET ORAL 2 TIMES DAILY PRN
Status: DISCONTINUED | OUTPATIENT
Start: 2020-12-28 | End: 2020-12-31 | Stop reason: HOSPADM

## 2020-12-28 RX ORDER — LOSARTAN POTASSIUM 100 MG/1
100 TABLET ORAL DAILY
Status: DISCONTINUED | OUTPATIENT
Start: 2020-12-29 | End: 2020-12-31 | Stop reason: HOSPADM

## 2020-12-28 RX ORDER — PROMETHAZINE HYDROCHLORIDE 12.5 MG/1
12.5 TABLET ORAL EVERY 6 HOURS PRN
Status: DISCONTINUED | OUTPATIENT
Start: 2020-12-28 | End: 2020-12-31 | Stop reason: HOSPADM

## 2020-12-28 RX ORDER — OXYCODONE HYDROCHLORIDE AND ACETAMINOPHEN 5; 325 MG/1; MG/1
1 TABLET ORAL EVERY 4 HOURS PRN
Status: DISCONTINUED | OUTPATIENT
Start: 2020-12-28 | End: 2020-12-31 | Stop reason: HOSPADM

## 2020-12-28 RX ORDER — ERGOCALCIFEROL 1.25 MG/1
50000 CAPSULE ORAL WEEKLY
Status: DISCONTINUED | OUTPATIENT
Start: 2020-12-28 | End: 2020-12-31 | Stop reason: HOSPADM

## 2020-12-28 RX ADMIN — DULOXETINE HYDROCHLORIDE 60 MG: 60 CAPSULE, DELAYED RELEASE ORAL at 20:46

## 2020-12-28 RX ADMIN — OXYCODONE HYDROCHLORIDE AND ACETAMINOPHEN 2 TABLET: 5; 325 TABLET ORAL at 17:12

## 2020-12-28 RX ADMIN — TAZOBACTAM SODIUM AND PIPERACILLIN SODIUM 3.38 G: 375; 3 INJECTION, SOLUTION INTRAVENOUS at 18:15

## 2020-12-28 RX ADMIN — ROPINIROLE HYDROCHLORIDE 0.25 MG: 0.5 TABLET, FILM COATED ORAL at 20:48

## 2020-12-28 RX ADMIN — VANCOMYCIN HYDROCHLORIDE 1500 MG: 10 INJECTION, POWDER, LYOPHILIZED, FOR SOLUTION INTRAVENOUS at 20:56

## 2020-12-28 RX ADMIN — PROMETHAZINE HYDROCHLORIDE 12.5 MG: 12.5 TABLET ORAL at 17:12

## 2020-12-28 RX ADMIN — LINAGLIPTIN 5 MG: 5 TABLET, FILM COATED ORAL at 20:48

## 2020-12-28 RX ADMIN — ATORVASTATIN CALCIUM 80 MG: 20 TABLET, FILM COATED ORAL at 20:46

## 2020-12-28 RX ADMIN — OXYCODONE HYDROCHLORIDE AND ACETAMINOPHEN 2 TABLET: 5; 325 TABLET ORAL at 22:22

## 2020-12-28 RX ADMIN — ASPIRIN 325 MG: 325 TABLET, COATED ORAL at 17:12

## 2020-12-28 RX ADMIN — TAZOBACTAM SODIUM AND PIPERACILLIN SODIUM 3.38 G: 375; 3 INJECTION, SOLUTION INTRAVENOUS at 20:49

## 2020-12-28 RX ADMIN — METFORMIN HYDROCHLORIDE 1000 MG: 1000 TABLET ORAL at 20:46

## 2020-12-28 RX ADMIN — PANTOPRAZOLE SODIUM 40 MG: 40 TABLET, DELAYED RELEASE ORAL at 20:48

## 2020-12-28 RX ADMIN — ERGOCALCIFEROL 50000 UNITS: 1.25 CAPSULE ORAL at 20:51

## 2020-12-28 RX ADMIN — HYDROMORPHONE HYDROCHLORIDE 0.5 MG: 1 INJECTION, SOLUTION INTRAMUSCULAR; INTRAVENOUS; SUBCUTANEOUS at 20:42

## 2020-12-28 RX ADMIN — HYDROMORPHONE HYDROCHLORIDE 0.5 MG: 1 INJECTION, SOLUTION INTRAMUSCULAR; INTRAVENOUS; SUBCUTANEOUS at 18:41

## 2020-12-29 ENCOUNTER — APPOINTMENT (OUTPATIENT)
Dept: CT IMAGING | Facility: HOSPITAL | Age: 65
End: 2020-12-29

## 2020-12-29 LAB
CREAT SERPL-MCNC: 0.96 MG/DL (ref 0.57–1)
GFR SERPL CREATININE-BSD FRML MDRD: 58 ML/MIN/1.73
GLUCOSE BLDC GLUCOMTR-MCNC: 125 MG/DL (ref 70–130)
GLUCOSE BLDC GLUCOMTR-MCNC: 167 MG/DL (ref 70–130)
GLUCOSE BLDC GLUCOMTR-MCNC: 54 MG/DL (ref 70–130)
GLUCOSE BLDC GLUCOMTR-MCNC: 74 MG/DL (ref 70–130)
GLUCOSE BLDC GLUCOMTR-MCNC: 81 MG/DL (ref 70–130)
GLUCOSE BLDC GLUCOMTR-MCNC: 93 MG/DL (ref 70–130)

## 2020-12-29 PROCEDURE — 25010000002 VANCOMYCIN 750 MG RECONSTITUTED SOLUTION: Performed by: NURSE PRACTITIONER

## 2020-12-29 PROCEDURE — 25010000002 HYDROMORPHONE PER 4 MG: Performed by: NURSE PRACTITIONER

## 2020-12-29 PROCEDURE — 73700 CT LOWER EXTREMITY W/O DYE: CPT

## 2020-12-29 PROCEDURE — 25010000002 PIPERACILLIN SOD-TAZOBACTAM PER 1 G: Performed by: NURSE PRACTITIONER

## 2020-12-29 PROCEDURE — 82565 ASSAY OF CREATININE: CPT | Performed by: NURSE PRACTITIONER

## 2020-12-29 PROCEDURE — 63710000001 PROMETHAZINE PER 12.5 MG: Performed by: ORTHOPAEDIC SURGERY

## 2020-12-29 PROCEDURE — 82962 GLUCOSE BLOOD TEST: CPT

## 2020-12-29 RX ADMIN — DULOXETINE HYDROCHLORIDE 60 MG: 60 CAPSULE, DELAYED RELEASE ORAL at 08:50

## 2020-12-29 RX ADMIN — OXYCODONE HYDROCHLORIDE AND ACETAMINOPHEN 2 TABLET: 5; 325 TABLET ORAL at 22:18

## 2020-12-29 RX ADMIN — TAZOBACTAM SODIUM AND PIPERACILLIN SODIUM 3.38 G: 375; 3 INJECTION, SOLUTION INTRAVENOUS at 05:37

## 2020-12-29 RX ADMIN — SODIUM CHLORIDE 750 MG: 900 INJECTION, SOLUTION INTRAVENOUS at 08:51

## 2020-12-29 RX ADMIN — OXYCODONE HYDROCHLORIDE AND ACETAMINOPHEN 2 TABLET: 5; 325 TABLET ORAL at 02:59

## 2020-12-29 RX ADMIN — HYDROMORPHONE HYDROCHLORIDE 0.5 MG: 1 INJECTION, SOLUTION INTRAMUSCULAR; INTRAVENOUS; SUBCUTANEOUS at 15:46

## 2020-12-29 RX ADMIN — OXYCODONE HYDROCHLORIDE AND ACETAMINOPHEN 2 TABLET: 5; 325 TABLET ORAL at 18:11

## 2020-12-29 RX ADMIN — LOSARTAN POTASSIUM 100 MG: 100 TABLET, FILM COATED ORAL at 08:50

## 2020-12-29 RX ADMIN — Medication 1 TABLET: at 08:50

## 2020-12-29 RX ADMIN — FUROSEMIDE 20 MG: 20 TABLET ORAL at 08:50

## 2020-12-29 RX ADMIN — TAZOBACTAM SODIUM AND PIPERACILLIN SODIUM 3.38 G: 375; 3 INJECTION, SOLUTION INTRAVENOUS at 20:30

## 2020-12-29 RX ADMIN — ATORVASTATIN CALCIUM 80 MG: 20 TABLET, FILM COATED ORAL at 20:28

## 2020-12-29 RX ADMIN — PANTOPRAZOLE SODIUM 40 MG: 40 TABLET, DELAYED RELEASE ORAL at 08:50

## 2020-12-29 RX ADMIN — SODIUM CHLORIDE 750 MG: 900 INJECTION, SOLUTION INTRAVENOUS at 18:10

## 2020-12-29 RX ADMIN — POTASSIUM CHLORIDE 20 MEQ: 750 TABLET, EXTENDED RELEASE ORAL at 08:50

## 2020-12-29 RX ADMIN — PROMETHAZINE HYDROCHLORIDE 12.5 MG: 12.5 TABLET ORAL at 18:18

## 2020-12-29 RX ADMIN — TAZOBACTAM SODIUM AND PIPERACILLIN SODIUM 3.38 G: 375; 3 INJECTION, SOLUTION INTRAVENOUS at 13:13

## 2020-12-29 RX ADMIN — OXYCODONE HYDROCHLORIDE AND ACETAMINOPHEN 2 TABLET: 5; 325 TABLET ORAL at 12:51

## 2020-12-29 RX ADMIN — PROMETHAZINE HYDROCHLORIDE 12.5 MG: 12.5 TABLET ORAL at 08:59

## 2020-12-29 RX ADMIN — ROPINIROLE HYDROCHLORIDE 0.25 MG: 0.5 TABLET, FILM COATED ORAL at 18:12

## 2020-12-29 RX ADMIN — ASPIRIN 325 MG: 325 TABLET, COATED ORAL at 08:50

## 2020-12-29 RX ADMIN — OXYCODONE HYDROCHLORIDE AND ACETAMINOPHEN 1 TABLET: 5; 325 TABLET ORAL at 08:50

## 2020-12-30 ENCOUNTER — APPOINTMENT (OUTPATIENT)
Dept: GENERAL RADIOLOGY | Facility: HOSPITAL | Age: 65
End: 2020-12-30

## 2020-12-30 LAB
CREAT SERPL-MCNC: 0.88 MG/DL (ref 0.57–1)
GFR SERPL CREATININE-BSD FRML MDRD: 64 ML/MIN/1.73
GLUCOSE BLDC GLUCOMTR-MCNC: 170 MG/DL (ref 70–130)
GLUCOSE BLDC GLUCOMTR-MCNC: 212 MG/DL (ref 70–130)
GLUCOSE BLDC GLUCOMTR-MCNC: 91 MG/DL (ref 70–130)
VANCOMYCIN TROUGH SERPL-MCNC: 12.3 MCG/ML (ref 5–20)

## 2020-12-30 PROCEDURE — 80202 ASSAY OF VANCOMYCIN: CPT | Performed by: NURSE PRACTITIONER

## 2020-12-30 PROCEDURE — 82962 GLUCOSE BLOOD TEST: CPT

## 2020-12-30 PROCEDURE — 25010000002 HYDROMORPHONE PER 4 MG: Performed by: NURSE PRACTITIONER

## 2020-12-30 PROCEDURE — 25010000002 PIPERACILLIN SOD-TAZOBACTAM PER 1 G: Performed by: NURSE PRACTITIONER

## 2020-12-30 PROCEDURE — 82565 ASSAY OF CREATININE: CPT | Performed by: NURSE PRACTITIONER

## 2020-12-30 PROCEDURE — 25010000002 VANCOMYCIN 750 MG RECONSTITUTED SOLUTION: Performed by: NURSE PRACTITIONER

## 2020-12-30 PROCEDURE — 71046 X-RAY EXAM CHEST 2 VIEWS: CPT

## 2020-12-30 PROCEDURE — 63710000001 PROMETHAZINE PER 12.5 MG: Performed by: ORTHOPAEDIC SURGERY

## 2020-12-30 RX ORDER — BISACODYL 10 MG
10 SUPPOSITORY, RECTAL RECTAL DAILY PRN
Status: DISCONTINUED | OUTPATIENT
Start: 2020-12-30 | End: 2020-12-31 | Stop reason: HOSPADM

## 2020-12-30 RX ORDER — POLYETHYLENE GLYCOL 3350 17 G/17G
17 POWDER, FOR SOLUTION ORAL DAILY
Status: DISCONTINUED | OUTPATIENT
Start: 2020-12-30 | End: 2020-12-31 | Stop reason: HOSPADM

## 2020-12-30 RX ORDER — DOCUSATE SODIUM 100 MG/1
100 CAPSULE, LIQUID FILLED ORAL 2 TIMES DAILY
Status: DISCONTINUED | OUTPATIENT
Start: 2020-12-30 | End: 2020-12-31 | Stop reason: HOSPADM

## 2020-12-30 RX ADMIN — TAZOBACTAM SODIUM AND PIPERACILLIN SODIUM 3.38 G: 375; 3 INJECTION, SOLUTION INTRAVENOUS at 21:34

## 2020-12-30 RX ADMIN — DOCUSATE SODIUM 100 MG: 100 CAPSULE, LIQUID FILLED ORAL at 21:34

## 2020-12-30 RX ADMIN — DOCUSATE SODIUM 100 MG: 100 CAPSULE, LIQUID FILLED ORAL at 08:58

## 2020-12-30 RX ADMIN — FUROSEMIDE 20 MG: 20 TABLET ORAL at 08:58

## 2020-12-30 RX ADMIN — LOSARTAN POTASSIUM 100 MG: 100 TABLET, FILM COATED ORAL at 08:58

## 2020-12-30 RX ADMIN — ATORVASTATIN CALCIUM 80 MG: 20 TABLET, FILM COATED ORAL at 21:34

## 2020-12-30 RX ADMIN — DULOXETINE HYDROCHLORIDE 60 MG: 60 CAPSULE, DELAYED RELEASE ORAL at 08:58

## 2020-12-30 RX ADMIN — OXYCODONE HYDROCHLORIDE AND ACETAMINOPHEN 2 TABLET: 5; 325 TABLET ORAL at 13:08

## 2020-12-30 RX ADMIN — GLIPIZIDE 5 MG: 5 TABLET ORAL at 06:41

## 2020-12-30 RX ADMIN — TAZOBACTAM SODIUM AND PIPERACILLIN SODIUM 3.38 G: 375; 3 INJECTION, SOLUTION INTRAVENOUS at 13:08

## 2020-12-30 RX ADMIN — METFORMIN HYDROCHLORIDE 1000 MG: 1000 TABLET ORAL at 18:47

## 2020-12-30 RX ADMIN — SODIUM CHLORIDE 750 MG: 900 INJECTION, SOLUTION INTRAVENOUS at 18:47

## 2020-12-30 RX ADMIN — Medication 1 TABLET: at 08:58

## 2020-12-30 RX ADMIN — PROMETHAZINE HYDROCHLORIDE 12.5 MG: 12.5 TABLET ORAL at 21:34

## 2020-12-30 RX ADMIN — OXYCODONE HYDROCHLORIDE AND ACETAMINOPHEN 2 TABLET: 5; 325 TABLET ORAL at 04:28

## 2020-12-30 RX ADMIN — ASPIRIN 325 MG: 325 TABLET, COATED ORAL at 09:03

## 2020-12-30 RX ADMIN — TAZOBACTAM SODIUM AND PIPERACILLIN SODIUM 3.38 G: 375; 3 INJECTION, SOLUTION INTRAVENOUS at 04:30

## 2020-12-30 RX ADMIN — OXYCODONE HYDROCHLORIDE AND ACETAMINOPHEN 2 TABLET: 5; 325 TABLET ORAL at 22:13

## 2020-12-30 RX ADMIN — OXYCODONE HYDROCHLORIDE AND ACETAMINOPHEN 2 TABLET: 5; 325 TABLET ORAL at 08:58

## 2020-12-30 RX ADMIN — SODIUM CHLORIDE 750 MG: 900 INJECTION, SOLUTION INTRAVENOUS at 09:03

## 2020-12-30 RX ADMIN — POTASSIUM CHLORIDE 20 MEQ: 750 TABLET, EXTENDED RELEASE ORAL at 08:58

## 2020-12-30 RX ADMIN — PROMETHAZINE HYDROCHLORIDE 12.5 MG: 12.5 TABLET ORAL at 13:08

## 2020-12-30 RX ADMIN — HYDROMORPHONE HYDROCHLORIDE 0.5 MG: 1 INJECTION, SOLUTION INTRAMUSCULAR; INTRAVENOUS; SUBCUTANEOUS at 20:34

## 2020-12-30 RX ADMIN — PANTOPRAZOLE SODIUM 40 MG: 40 TABLET, DELAYED RELEASE ORAL at 08:58

## 2020-12-30 RX ADMIN — OXYCODONE HYDROCHLORIDE AND ACETAMINOPHEN 2 TABLET: 5; 325 TABLET ORAL at 17:47

## 2020-12-30 RX ADMIN — ROPINIROLE HYDROCHLORIDE 0.25 MG: 0.5 TABLET, FILM COATED ORAL at 17:46

## 2020-12-30 RX ADMIN — PROMETHAZINE HYDROCHLORIDE 12.5 MG: 12.5 TABLET ORAL at 00:39

## 2020-12-30 RX ADMIN — PROMETHAZINE HYDROCHLORIDE 12.5 MG: 12.5 TABLET ORAL at 06:40

## 2020-12-30 RX ADMIN — POLYETHYLENE GLYCOL 3350 17 G: 17 POWDER, FOR SOLUTION ORAL at 08:58

## 2020-12-31 ENCOUNTER — READMISSION MANAGEMENT (OUTPATIENT)
Dept: CALL CENTER | Facility: HOSPITAL | Age: 65
End: 2020-12-31

## 2020-12-31 VITALS
RESPIRATION RATE: 18 BRPM | BODY MASS INDEX: 29.88 KG/M2 | HEART RATE: 88 BPM | TEMPERATURE: 98.1 F | SYSTOLIC BLOOD PRESSURE: 159 MMHG | DIASTOLIC BLOOD PRESSURE: 88 MMHG | HEIGHT: 64 IN | OXYGEN SATURATION: 93 % | WEIGHT: 175 LBS

## 2020-12-31 LAB
CREAT SERPL-MCNC: 0.92 MG/DL (ref 0.57–1)
GFR SERPL CREATININE-BSD FRML MDRD: 61 ML/MIN/1.73
GLUCOSE BLDC GLUCOMTR-MCNC: 140 MG/DL (ref 70–130)
GLUCOSE BLDC GLUCOMTR-MCNC: 158 MG/DL (ref 70–130)
GLUCOSE BLDC GLUCOMTR-MCNC: 55 MG/DL (ref 70–130)
GLUCOSE BLDC GLUCOMTR-MCNC: 71 MG/DL (ref 70–130)

## 2020-12-31 PROCEDURE — 25010000002 PIPERACILLIN SOD-TAZOBACTAM PER 1 G: Performed by: NURSE PRACTITIONER

## 2020-12-31 PROCEDURE — 82962 GLUCOSE BLOOD TEST: CPT

## 2020-12-31 PROCEDURE — 25010000002 HYDROMORPHONE PER 4 MG: Performed by: NURSE PRACTITIONER

## 2020-12-31 PROCEDURE — 82565 ASSAY OF CREATININE: CPT | Performed by: NURSE PRACTITIONER

## 2020-12-31 PROCEDURE — 63710000001 PROMETHAZINE PER 12.5 MG: Performed by: ORTHOPAEDIC SURGERY

## 2020-12-31 PROCEDURE — 25010000002 VANCOMYCIN 750 MG RECONSTITUTED SOLUTION: Performed by: NURSE PRACTITIONER

## 2020-12-31 RX ORDER — OXYCODONE HYDROCHLORIDE AND ACETAMINOPHEN 5; 325 MG/1; MG/1
1 TABLET ORAL EVERY 4 HOURS PRN
Qty: 40 TABLET | Refills: 0 | Status: SHIPPED | OUTPATIENT
Start: 2020-12-31 | End: 2021-01-12 | Stop reason: HOSPADM

## 2020-12-31 RX ORDER — SULFAMETHOXAZOLE AND TRIMETHOPRIM 800; 160 MG/1; MG/1
1 TABLET ORAL 2 TIMES DAILY
Qty: 14 TABLET | Refills: 0 | Status: SHIPPED | OUTPATIENT
Start: 2020-12-31 | End: 2021-01-12 | Stop reason: HOSPADM

## 2020-12-31 RX ADMIN — DULOXETINE HYDROCHLORIDE 60 MG: 60 CAPSULE, DELAYED RELEASE ORAL at 08:15

## 2020-12-31 RX ADMIN — PANTOPRAZOLE SODIUM 40 MG: 40 TABLET, DELAYED RELEASE ORAL at 08:15

## 2020-12-31 RX ADMIN — GLIPIZIDE 5 MG: 5 TABLET ORAL at 06:51

## 2020-12-31 RX ADMIN — HYDROMORPHONE HYDROCHLORIDE 0.5 MG: 1 INJECTION, SOLUTION INTRAMUSCULAR; INTRAVENOUS; SUBCUTANEOUS at 06:47

## 2020-12-31 RX ADMIN — OXYCODONE HYDROCHLORIDE AND ACETAMINOPHEN 2 TABLET: 5; 325 TABLET ORAL at 02:49

## 2020-12-31 RX ADMIN — PROMETHAZINE HYDROCHLORIDE 12.5 MG: 12.5 TABLET ORAL at 09:49

## 2020-12-31 RX ADMIN — ASPIRIN 325 MG: 325 TABLET, COATED ORAL at 08:15

## 2020-12-31 RX ADMIN — LOSARTAN POTASSIUM 100 MG: 100 TABLET, FILM COATED ORAL at 08:15

## 2020-12-31 RX ADMIN — FUROSEMIDE 20 MG: 20 TABLET ORAL at 08:15

## 2020-12-31 RX ADMIN — METFORMIN HYDROCHLORIDE 1000 MG: 1000 TABLET ORAL at 08:15

## 2020-12-31 RX ADMIN — DOCUSATE SODIUM 100 MG: 100 CAPSULE, LIQUID FILLED ORAL at 08:15

## 2020-12-31 RX ADMIN — POLYETHYLENE GLYCOL 3350 17 G: 17 POWDER, FOR SOLUTION ORAL at 08:14

## 2020-12-31 RX ADMIN — PROMETHAZINE HYDROCHLORIDE 12.5 MG: 12.5 TABLET ORAL at 03:41

## 2020-12-31 RX ADMIN — SODIUM CHLORIDE 750 MG: 900 INJECTION, SOLUTION INTRAVENOUS at 06:24

## 2020-12-31 RX ADMIN — Medication 1 TABLET: at 08:15

## 2020-12-31 RX ADMIN — TAZOBACTAM SODIUM AND PIPERACILLIN SODIUM 3.38 G: 375; 3 INJECTION, SOLUTION INTRAVENOUS at 05:19

## 2020-12-31 RX ADMIN — OXYCODONE HYDROCHLORIDE AND ACETAMINOPHEN 2 TABLET: 5; 325 TABLET ORAL at 08:49

## 2020-12-31 RX ADMIN — LINAGLIPTIN 5 MG: 5 TABLET, FILM COATED ORAL at 08:15

## 2020-12-31 RX ADMIN — POTASSIUM CHLORIDE 20 MEQ: 750 TABLET, EXTENDED RELEASE ORAL at 08:14

## 2020-12-31 NOTE — OUTREACH NOTE
Prep Survey      Responses   [place] facility patient discharged from?  [facility]   Is LACE score < 7 ?  No   Emergency Room discharge w/ pulse ox?  No   Eligibility  Not Eligible   What are the reasons patient is not eligible?  [facility], [facility]   Does the patient have one of the following disease processes/diagnoses(primary or secondary)?  Other   Prep survey completed?  Yes          Iris Finch RN

## 2021-01-08 ENCOUNTER — ANESTHESIA (OUTPATIENT)
Dept: PERIOP | Facility: HOSPITAL | Age: 66
End: 2021-01-08

## 2021-01-08 ENCOUNTER — ANESTHESIA EVENT (OUTPATIENT)
Dept: PERIOP | Facility: HOSPITAL | Age: 66
End: 2021-01-08

## 2021-01-08 ENCOUNTER — HOSPITAL ENCOUNTER (INPATIENT)
Facility: HOSPITAL | Age: 66
LOS: 4 days | Discharge: NURSING, REHAB OR LONG-TERM CARE | End: 2021-01-12
Attending: ORTHOPAEDIC SURGERY | Admitting: ORTHOPAEDIC SURGERY

## 2021-01-08 DIAGNOSIS — L08.9 RIGHT FOOT INFECTION: ICD-10-CM

## 2021-01-08 LAB
ANION GAP SERPL CALCULATED.3IONS-SCNC: 9.2 MMOL/L (ref 5–15)
BUN SERPL-MCNC: 22 MG/DL (ref 8–23)
BUN/CREAT SERPL: 19.6 (ref 7–25)
CALCIUM SPEC-SCNC: 8.9 MG/DL (ref 8.6–10.5)
CHLORIDE SERPL-SCNC: 101 MMOL/L (ref 98–107)
CO2 SERPL-SCNC: 25.8 MMOL/L (ref 22–29)
CREAT SERPL-MCNC: 1.12 MG/DL (ref 0.57–1)
CRP SERPL-MCNC: 1.17 MG/DL (ref 0–0.5)
DEPRECATED RDW RBC AUTO: 42.4 FL (ref 37–54)
ERYTHROCYTE [DISTWIDTH] IN BLOOD BY AUTOMATED COUNT: 13 % (ref 12.3–15.4)
ERYTHROCYTE [SEDIMENTATION RATE] IN BLOOD: 60 MM/HR (ref 0–30)
GFR SERPL CREATININE-BSD FRML MDRD: 49 ML/MIN/1.73
GLUCOSE BLDC GLUCOMTR-MCNC: 126 MG/DL (ref 70–130)
GLUCOSE BLDC GLUCOMTR-MCNC: 86 MG/DL (ref 70–130)
GLUCOSE BLDC GLUCOMTR-MCNC: 86 MG/DL (ref 70–130)
GLUCOSE SERPL-MCNC: 130 MG/DL (ref 65–99)
HBA1C MFR BLD: 7.1 % (ref 4.8–5.6)
HCT VFR BLD AUTO: 32.7 % (ref 34–46.6)
HGB BLD-MCNC: 10.7 G/DL (ref 12–15.9)
MCH RBC QN AUTO: 29.6 PG (ref 26.6–33)
MCHC RBC AUTO-ENTMCNC: 32.7 G/DL (ref 31.5–35.7)
MCV RBC AUTO: 90.3 FL (ref 79–97)
PLATELET # BLD AUTO: 259 10*3/MM3 (ref 140–450)
PMV BLD AUTO: 8.8 FL (ref 6–12)
POTASSIUM SERPL-SCNC: 4.6 MMOL/L (ref 3.5–5.2)
RBC # BLD AUTO: 3.62 10*6/MM3 (ref 3.77–5.28)
SARS-COV-2 RNA RESP QL NAA+PROBE: NOT DETECTED
SODIUM SERPL-SCNC: 136 MMOL/L (ref 136–145)
WBC # BLD AUTO: 8.68 10*3/MM3 (ref 3.4–10.8)

## 2021-01-08 PROCEDURE — 0SPK04Z REMOVAL OF INTERNAL FIXATION DEVICE FROM RIGHT TARSOMETATARSAL JOINT, OPEN APPROACH: ICD-10-PCS | Performed by: ORTHOPAEDIC SURGERY

## 2021-01-08 PROCEDURE — 0QDN0ZZ EXTRACTION OF RIGHT METATARSAL, OPEN APPROACH: ICD-10-PCS | Performed by: ORTHOPAEDIC SURGERY

## 2021-01-08 PROCEDURE — 82962 GLUCOSE BLOOD TEST: CPT

## 2021-01-08 PROCEDURE — 25010000002 VANCOMYCIN 1 G RECONSTITUTED SOLUTION: Performed by: ORTHOPAEDIC SURGERY

## 2021-01-08 PROCEDURE — 87176 TISSUE HOMOGENIZATION CULTR: CPT | Performed by: ORTHOPAEDIC SURGERY

## 2021-01-08 PROCEDURE — 25010000002 FENTANYL CITRATE (PF) 100 MCG/2ML SOLUTION: Performed by: ANESTHESIOLOGY

## 2021-01-08 PROCEDURE — 76942 ECHO GUIDE FOR BIOPSY: CPT | Performed by: ORTHOPAEDIC SURGERY

## 2021-01-08 PROCEDURE — 25010000002 PIPERACILLIN SOD-TAZOBACTAM PER 1 G: Performed by: ORTHOPAEDIC SURGERY

## 2021-01-08 PROCEDURE — U0003 INFECTIOUS AGENT DETECTION BY NUCLEIC ACID (DNA OR RNA); SEVERE ACUTE RESPIRATORY SYNDROME CORONAVIRUS 2 (SARS-COV-2) (CORONAVIRUS DISEASE [COVID-19]), AMPLIFIED PROBE TECHNIQUE, MAKING USE OF HIGH THROUGHPUT TECHNOLOGIES AS DESCRIBED BY CMS-2020-01-R: HCPCS | Performed by: ORTHOPAEDIC SURGERY

## 2021-01-08 PROCEDURE — 25010000002 FENTANYL CITRATE (PF) 100 MCG/2ML SOLUTION: Performed by: NURSE ANESTHETIST, CERTIFIED REGISTERED

## 2021-01-08 PROCEDURE — 0HBMXZZ EXCISION OF RIGHT FOOT SKIN, EXTERNAL APPROACH: ICD-10-PCS | Performed by: ORTHOPAEDIC SURGERY

## 2021-01-08 PROCEDURE — 25010000002 ROPIVACAINE PER 1 MG: Performed by: ANESTHESIOLOGY

## 2021-01-08 PROCEDURE — 87070 CULTURE OTHR SPECIMN AEROBIC: CPT | Performed by: ORTHOPAEDIC SURGERY

## 2021-01-08 PROCEDURE — 87205 SMEAR GRAM STAIN: CPT | Performed by: ORTHOPAEDIC SURGERY

## 2021-01-08 PROCEDURE — 25010000002 HYDROMORPHONE PER 4 MG: Performed by: NURSE ANESTHETIST, CERTIFIED REGISTERED

## 2021-01-08 PROCEDURE — 63710000001 PROMETHAZINE PER 25 MG: Performed by: ANESTHESIOLOGY

## 2021-01-08 PROCEDURE — 25010000003 CEFAZOLIN IN DEXTROSE 2-4 GM/100ML-% SOLUTION: Performed by: NURSE ANESTHETIST, CERTIFIED REGISTERED

## 2021-01-08 PROCEDURE — 25010000002 PROPOFOL 10 MG/ML EMULSION: Performed by: NURSE ANESTHETIST, CERTIFIED REGISTERED

## 2021-01-08 PROCEDURE — 25010000002 MIDAZOLAM PER 1 MG: Performed by: ANESTHESIOLOGY

## 2021-01-08 PROCEDURE — 86140 C-REACTIVE PROTEIN: CPT | Performed by: ORTHOPAEDIC SURGERY

## 2021-01-08 PROCEDURE — 85652 RBC SED RATE AUTOMATED: CPT | Performed by: ORTHOPAEDIC SURGERY

## 2021-01-08 PROCEDURE — 85027 COMPLETE CBC AUTOMATED: CPT | Performed by: ORTHOPAEDIC SURGERY

## 2021-01-08 PROCEDURE — 83036 HEMOGLOBIN GLYCOSYLATED A1C: CPT | Performed by: ORTHOPAEDIC SURGERY

## 2021-01-08 PROCEDURE — 80048 BASIC METABOLIC PNL TOTAL CA: CPT | Performed by: ORTHOPAEDIC SURGERY

## 2021-01-08 RX ORDER — GLIPIZIDE 10 MG/1
10 TABLET ORAL
Status: DISCONTINUED | OUTPATIENT
Start: 2021-01-09 | End: 2021-01-12 | Stop reason: HOSPADM

## 2021-01-08 RX ORDER — ONDANSETRON 2 MG/ML
4 INJECTION INTRAMUSCULAR; INTRAVENOUS EVERY 6 HOURS PRN
Status: DISCONTINUED | OUTPATIENT
Start: 2021-01-08 | End: 2021-01-12 | Stop reason: HOSPADM

## 2021-01-08 RX ORDER — OXYCODONE AND ACETAMINOPHEN 7.5; 325 MG/1; MG/1
1 TABLET ORAL EVERY 4 HOURS PRN
Status: DISCONTINUED | OUTPATIENT
Start: 2021-01-08 | End: 2021-01-12 | Stop reason: HOSPADM

## 2021-01-08 RX ORDER — ASPIRIN 325 MG
325 TABLET, DELAYED RELEASE (ENTERIC COATED) ORAL DAILY
Status: DISCONTINUED | OUTPATIENT
Start: 2021-01-08 | End: 2021-01-12 | Stop reason: HOSPADM

## 2021-01-08 RX ORDER — SUCRALFATE 1 G/1
1 TABLET ORAL 2 TIMES DAILY PRN
Status: DISCONTINUED | OUTPATIENT
Start: 2021-01-08 | End: 2021-01-12 | Stop reason: HOSPADM

## 2021-01-08 RX ORDER — NALOXONE HCL 0.4 MG/ML
0.4 VIAL (ML) INJECTION
Status: DISCONTINUED | OUTPATIENT
Start: 2021-01-08 | End: 2021-01-12 | Stop reason: HOSPADM

## 2021-01-08 RX ORDER — ROPINIROLE 0.5 MG/1
0.25 TABLET, FILM COATED ORAL EVERY EVENING
Status: DISCONTINUED | OUTPATIENT
Start: 2021-01-08 | End: 2021-01-12 | Stop reason: HOSPADM

## 2021-01-08 RX ORDER — PROPOFOL 10 MG/ML
VIAL (ML) INTRAVENOUS AS NEEDED
Status: DISCONTINUED | OUTPATIENT
Start: 2021-01-08 | End: 2021-01-08 | Stop reason: SURG

## 2021-01-08 RX ORDER — SODIUM CHLORIDE 0.9 % (FLUSH) 0.9 %
3-10 SYRINGE (ML) INJECTION AS NEEDED
Status: DISCONTINUED | OUTPATIENT
Start: 2021-01-08 | End: 2021-01-08 | Stop reason: HOSPADM

## 2021-01-08 RX ORDER — NALOXONE HCL 0.4 MG/ML
0.2 VIAL (ML) INJECTION AS NEEDED
Status: DISCONTINUED | OUTPATIENT
Start: 2021-01-08 | End: 2021-01-08 | Stop reason: HOSPADM

## 2021-01-08 RX ORDER — VANCOMYCIN HYDROCHLORIDE 1 G/20ML
INJECTION, POWDER, LYOPHILIZED, FOR SOLUTION INTRAVENOUS AS NEEDED
Status: DISCONTINUED | OUTPATIENT
Start: 2021-01-08 | End: 2021-01-08 | Stop reason: HOSPADM

## 2021-01-08 RX ORDER — FUROSEMIDE 20 MG/1
20 TABLET ORAL DAILY
Status: DISCONTINUED | OUTPATIENT
Start: 2021-01-08 | End: 2021-01-12 | Stop reason: HOSPADM

## 2021-01-08 RX ORDER — FENTANYL CITRATE 50 UG/ML
INJECTION, SOLUTION INTRAMUSCULAR; INTRAVENOUS
Status: COMPLETED | OUTPATIENT
Start: 2021-01-08 | End: 2021-01-08

## 2021-01-08 RX ORDER — SODIUM CHLORIDE 0.9 % (FLUSH) 0.9 %
3 SYRINGE (ML) INJECTION EVERY 12 HOURS SCHEDULED
Status: DISCONTINUED | OUTPATIENT
Start: 2021-01-08 | End: 2021-01-08 | Stop reason: HOSPADM

## 2021-01-08 RX ORDER — SODIUM CHLORIDE 0.9 % (FLUSH) 0.9 %
10 SYRINGE (ML) INJECTION AS NEEDED
Status: DISCONTINUED | OUTPATIENT
Start: 2021-01-08 | End: 2021-01-12 | Stop reason: HOSPADM

## 2021-01-08 RX ORDER — ROPIVACAINE HYDROCHLORIDE 5 MG/ML
INJECTION, SOLUTION EPIDURAL; INFILTRATION; PERINEURAL
Status: COMPLETED | OUTPATIENT
Start: 2021-01-08 | End: 2021-01-08

## 2021-01-08 RX ORDER — PROMETHAZINE HYDROCHLORIDE 25 MG/1
25 TABLET ORAL ONCE AS NEEDED
Status: COMPLETED | OUTPATIENT
Start: 2021-01-08 | End: 2021-01-08

## 2021-01-08 RX ORDER — ACETAMINOPHEN 650 MG
TABLET, EXTENDED RELEASE ORAL AS NEEDED
Status: DISCONTINUED | OUTPATIENT
Start: 2021-01-08 | End: 2021-01-08 | Stop reason: HOSPADM

## 2021-01-08 RX ORDER — AMOXICILLIN 250 MG
2 CAPSULE ORAL 2 TIMES DAILY
Status: DISCONTINUED | OUTPATIENT
Start: 2021-01-08 | End: 2021-01-11

## 2021-01-08 RX ORDER — CEFAZOLIN SODIUM 2 G/100ML
INJECTION, SOLUTION INTRAVENOUS AS NEEDED
Status: DISCONTINUED | OUTPATIENT
Start: 2021-01-08 | End: 2021-01-08 | Stop reason: SURG

## 2021-01-08 RX ORDER — HYDROCODONE BITARTRATE AND ACETAMINOPHEN 7.5; 325 MG/1; MG/1
1 TABLET ORAL ONCE AS NEEDED
Status: DISCONTINUED | OUTPATIENT
Start: 2021-01-08 | End: 2021-01-08 | Stop reason: HOSPADM

## 2021-01-08 RX ORDER — LIDOCAINE HYDROCHLORIDE 10 MG/ML
0.5 INJECTION, SOLUTION EPIDURAL; INFILTRATION; INTRACAUDAL; PERINEURAL ONCE AS NEEDED
Status: DISCONTINUED | OUTPATIENT
Start: 2021-01-08 | End: 2021-01-08 | Stop reason: HOSPADM

## 2021-01-08 RX ORDER — DIPHENHYDRAMINE HCL 25 MG
25 CAPSULE ORAL
Status: DISCONTINUED | OUTPATIENT
Start: 2021-01-08 | End: 2021-01-08 | Stop reason: HOSPADM

## 2021-01-08 RX ORDER — PANTOPRAZOLE SODIUM 40 MG/1
40 TABLET, DELAYED RELEASE ORAL DAILY PRN
Status: DISCONTINUED | OUTPATIENT
Start: 2021-01-08 | End: 2021-01-12 | Stop reason: HOSPADM

## 2021-01-08 RX ORDER — SODIUM CHLORIDE 0.9 % (FLUSH) 0.9 %
10 SYRINGE (ML) INJECTION EVERY 12 HOURS SCHEDULED
Status: DISCONTINUED | OUTPATIENT
Start: 2021-01-08 | End: 2021-01-12 | Stop reason: HOSPADM

## 2021-01-08 RX ORDER — FENTANYL CITRATE 50 UG/ML
50 INJECTION, SOLUTION INTRAMUSCULAR; INTRAVENOUS
Status: COMPLETED | OUTPATIENT
Start: 2021-01-08 | End: 2021-01-08

## 2021-01-08 RX ORDER — FAMOTIDINE 10 MG/ML
20 INJECTION, SOLUTION INTRAVENOUS ONCE
Status: COMPLETED | OUTPATIENT
Start: 2021-01-08 | End: 2021-01-08

## 2021-01-08 RX ORDER — FENTANYL CITRATE 50 UG/ML
INJECTION, SOLUTION INTRAMUSCULAR; INTRAVENOUS AS NEEDED
Status: DISCONTINUED | OUTPATIENT
Start: 2021-01-08 | End: 2021-01-08 | Stop reason: SURG

## 2021-01-08 RX ORDER — FENTANYL CITRATE 50 UG/ML
50 INJECTION, SOLUTION INTRAMUSCULAR; INTRAVENOUS
Status: DISCONTINUED | OUTPATIENT
Start: 2021-01-08 | End: 2021-01-08 | Stop reason: HOSPADM

## 2021-01-08 RX ORDER — DIPHENHYDRAMINE HYDROCHLORIDE 50 MG/ML
12.5 INJECTION INTRAMUSCULAR; INTRAVENOUS
Status: DISCONTINUED | OUTPATIENT
Start: 2021-01-08 | End: 2021-01-08 | Stop reason: HOSPADM

## 2021-01-08 RX ORDER — FLUMAZENIL 0.1 MG/ML
0.2 INJECTION INTRAVENOUS AS NEEDED
Status: DISCONTINUED | OUTPATIENT
Start: 2021-01-08 | End: 2021-01-08 | Stop reason: HOSPADM

## 2021-01-08 RX ORDER — ATORVASTATIN CALCIUM 20 MG/1
80 TABLET, FILM COATED ORAL NIGHTLY
Status: DISCONTINUED | OUTPATIENT
Start: 2021-01-08 | End: 2021-01-12 | Stop reason: HOSPADM

## 2021-01-08 RX ORDER — SODIUM CHLORIDE, SODIUM LACTATE, POTASSIUM CHLORIDE, CALCIUM CHLORIDE 600; 310; 30; 20 MG/100ML; MG/100ML; MG/100ML; MG/100ML
100 INJECTION, SOLUTION INTRAVENOUS CONTINUOUS
Status: DISCONTINUED | OUTPATIENT
Start: 2021-01-08 | End: 2021-01-12 | Stop reason: HOSPADM

## 2021-01-08 RX ORDER — LOSARTAN POTASSIUM 100 MG/1
100 TABLET ORAL DAILY
Status: DISCONTINUED | OUTPATIENT
Start: 2021-01-08 | End: 2021-01-12 | Stop reason: HOSPADM

## 2021-01-08 RX ORDER — LIDOCAINE HYDROCHLORIDE 20 MG/ML
INJECTION, SOLUTION INFILTRATION; PERINEURAL AS NEEDED
Status: DISCONTINUED | OUTPATIENT
Start: 2021-01-08 | End: 2021-01-08 | Stop reason: SURG

## 2021-01-08 RX ORDER — OXYCODONE AND ACETAMINOPHEN 7.5; 325 MG/1; MG/1
2 TABLET ORAL EVERY 4 HOURS PRN
Status: DISCONTINUED | OUTPATIENT
Start: 2021-01-08 | End: 2021-01-12 | Stop reason: HOSPADM

## 2021-01-08 RX ORDER — LABETALOL HYDROCHLORIDE 5 MG/ML
5 INJECTION, SOLUTION INTRAVENOUS
Status: DISCONTINUED | OUTPATIENT
Start: 2021-01-08 | End: 2021-01-08 | Stop reason: HOSPADM

## 2021-01-08 RX ORDER — MAGNESIUM HYDROXIDE 1200 MG/15ML
LIQUID ORAL AS NEEDED
Status: DISCONTINUED | OUTPATIENT
Start: 2021-01-08 | End: 2021-01-08 | Stop reason: HOSPADM

## 2021-01-08 RX ORDER — MIDAZOLAM HYDROCHLORIDE 1 MG/ML
1 INJECTION INTRAMUSCULAR; INTRAVENOUS
Status: DISCONTINUED | OUTPATIENT
Start: 2021-01-08 | End: 2021-01-08 | Stop reason: HOSPADM

## 2021-01-08 RX ORDER — DULOXETIN HYDROCHLORIDE 60 MG/1
60 CAPSULE, DELAYED RELEASE ORAL EVERY EVENING
Status: DISCONTINUED | OUTPATIENT
Start: 2021-01-08 | End: 2021-01-12 | Stop reason: HOSPADM

## 2021-01-08 RX ORDER — HYDROMORPHONE HYDROCHLORIDE 1 MG/ML
0.5 INJECTION, SOLUTION INTRAMUSCULAR; INTRAVENOUS; SUBCUTANEOUS
Status: DISCONTINUED | OUTPATIENT
Start: 2021-01-08 | End: 2021-01-08 | Stop reason: HOSPADM

## 2021-01-08 RX ORDER — EPHEDRINE SULFATE 50 MG/ML
5 INJECTION, SOLUTION INTRAVENOUS ONCE AS NEEDED
Status: DISCONTINUED | OUTPATIENT
Start: 2021-01-08 | End: 2021-01-08 | Stop reason: HOSPADM

## 2021-01-08 RX ORDER — POTASSIUM CHLORIDE 750 MG/1
20 TABLET, FILM COATED, EXTENDED RELEASE ORAL DAILY
Status: DISCONTINUED | OUTPATIENT
Start: 2021-01-08 | End: 2021-01-12 | Stop reason: HOSPADM

## 2021-01-08 RX ORDER — OXYCODONE AND ACETAMINOPHEN 7.5; 325 MG/1; MG/1
1 TABLET ORAL ONCE AS NEEDED
Status: COMPLETED | OUTPATIENT
Start: 2021-01-08 | End: 2021-01-08

## 2021-01-08 RX ORDER — SODIUM CHLORIDE, SODIUM LACTATE, POTASSIUM CHLORIDE, CALCIUM CHLORIDE 600; 310; 30; 20 MG/100ML; MG/100ML; MG/100ML; MG/100ML
9 INJECTION, SOLUTION INTRAVENOUS CONTINUOUS
Status: DISCONTINUED | OUTPATIENT
Start: 2021-01-08 | End: 2021-01-12 | Stop reason: HOSPADM

## 2021-01-08 RX ADMIN — FAMOTIDINE 20 MG: 10 INJECTION INTRAVENOUS at 14:57

## 2021-01-08 RX ADMIN — ROPIVACAINE HYDROCHLORIDE 30 ML: 5 INJECTION, SOLUTION EPIDURAL; INFILTRATION; PERINEURAL at 15:25

## 2021-01-08 RX ADMIN — POTASSIUM CHLORIDE 20 MEQ: 750 TABLET, EXTENDED RELEASE ORAL at 21:11

## 2021-01-08 RX ADMIN — CEFAZOLIN SODIUM 2 G: 2 INJECTION, SOLUTION INTRAVENOUS at 16:30

## 2021-01-08 RX ADMIN — FENTANYL CITRATE 50 MCG: 50 INJECTION INTRAMUSCULAR; INTRAVENOUS at 17:08

## 2021-01-08 RX ADMIN — FENTANYL CITRATE 50 MCG: 50 INJECTION INTRAMUSCULAR; INTRAVENOUS at 15:22

## 2021-01-08 RX ADMIN — FENTANYL CITRATE 50 MCG: 50 INJECTION INTRAMUSCULAR; INTRAVENOUS at 16:24

## 2021-01-08 RX ADMIN — PROPOFOL 200 MG: 10 INJECTION, EMULSION INTRAVENOUS at 16:28

## 2021-01-08 RX ADMIN — FENTANYL CITRATE 50 MCG: 50 INJECTION, SOLUTION INTRAMUSCULAR; INTRAVENOUS at 15:14

## 2021-01-08 RX ADMIN — HYDROMORPHONE HYDROCHLORIDE 0.5 MG: 1 INJECTION, SOLUTION INTRAMUSCULAR; INTRAVENOUS; SUBCUTANEOUS at 18:05

## 2021-01-08 RX ADMIN — FENTANYL CITRATE 50 MCG: 50 INJECTION, SOLUTION INTRAMUSCULAR; INTRAVENOUS at 14:57

## 2021-01-08 RX ADMIN — OXYCODONE HYDROCHLORIDE AND ACETAMINOPHEN 1 TABLET: 7.5; 325 TABLET ORAL at 18:15

## 2021-01-08 RX ADMIN — LINAGLIPTIN 5 MG: 5 TABLET, FILM COATED ORAL at 21:11

## 2021-01-08 RX ADMIN — SODIUM CHLORIDE, PRESERVATIVE FREE 10 ML: 5 INJECTION INTRAVENOUS at 21:21

## 2021-01-08 RX ADMIN — ROPINIROLE HYDROCHLORIDE 0.25 MG: 0.5 TABLET, FILM COATED ORAL at 21:13

## 2021-01-08 RX ADMIN — MIDAZOLAM 2 MG: 1 INJECTION INTRAMUSCULAR; INTRAVENOUS at 15:14

## 2021-01-08 RX ADMIN — DULOXETINE HYDROCHLORIDE 60 MG: 60 CAPSULE, DELAYED RELEASE ORAL at 21:12

## 2021-01-08 RX ADMIN — LABETALOL HYDROCHLORIDE 5 MG: 5 INJECTION, SOLUTION INTRAVENOUS at 17:51

## 2021-01-08 RX ADMIN — SODIUM CHLORIDE, POTASSIUM CHLORIDE, SODIUM LACTATE AND CALCIUM CHLORIDE 9 ML/HR: 600; 310; 30; 20 INJECTION, SOLUTION INTRAVENOUS at 14:57

## 2021-01-08 RX ADMIN — SODIUM CHLORIDE, POTASSIUM CHLORIDE, SODIUM LACTATE AND CALCIUM CHLORIDE 100 ML/HR: 600; 310; 30; 20 INJECTION, SOLUTION INTRAVENOUS at 17:49

## 2021-01-08 RX ADMIN — ATORVASTATIN CALCIUM 80 MG: 20 TABLET, FILM COATED ORAL at 21:13

## 2021-01-08 RX ADMIN — ASPIRIN 325 MG: 325 TABLET, COATED ORAL at 21:14

## 2021-01-08 RX ADMIN — LOSARTAN POTASSIUM 100 MG: 100 TABLET, FILM COATED ORAL at 21:14

## 2021-01-08 RX ADMIN — ROPIVACAINE HYDROCHLORIDE 20 ML: 5 INJECTION, SOLUTION EPIDURAL; INFILTRATION; PERINEURAL at 15:25

## 2021-01-08 RX ADMIN — TAZOBACTAM SODIUM AND PIPERACILLIN SODIUM 3.38 G: 375; 3 INJECTION, SOLUTION INTRAVENOUS at 20:04

## 2021-01-08 RX ADMIN — DOCUSATE SODIUM 50MG AND SENNOSIDES 8.6MG 2 TABLET: 8.6; 5 TABLET, FILM COATED ORAL at 21:12

## 2021-01-08 RX ADMIN — PROMETHAZINE HYDROCHLORIDE 25 MG: 25 TABLET ORAL at 18:05

## 2021-01-08 RX ADMIN — LIDOCAINE HYDROCHLORIDE 50 MG: 20 INJECTION, SOLUTION INFILTRATION; PERINEURAL at 16:28

## 2021-01-08 RX ADMIN — METFORMIN HYDROCHLORIDE 1000 MG: 1000 TABLET ORAL at 21:11

## 2021-01-08 NOTE — ANESTHESIA PROCEDURE NOTES
Peripheral Block    Pre-sedation assessment completed: 1/8/2021 3:15 PM    Patient reassessed immediately prior to procedure    Patient location during procedure: holding area  Start time: 1/8/2021 3:15 PM  Stop time: 1/8/2021 3:25 PM  Reason for block: at surgeon's request and post-op pain management  Performed by  Anesthesiologist: Porfirio Hollins MD  Preanesthetic Checklist  Completed: patient identified, site marked, surgical consent, pre-op evaluation, timeout performed, IV checked, risks and benefits discussed and monitors and equipment checked  Prep:  Pt Position: supine  Sterile barriers:cap, gloves, mask and alcohol skin prep  Prep: ChloraPrep  Patient monitoring: blood pressure monitoring, continuous pulse oximetry and EKG  Procedure  Sedation:yes  Performed under: local infiltration  Guidance:ultrasound guided  ULTRASOUND INTERPRETATION.  Using ultrasound guidance a 22 G (22G needle for placement of 3cc 2%lido to site prior to start of procedure.) gauge needle was placed in close proximity to the femoral nerve, at which point, under ultrasound guidance anesthetic was injected in the area of the nerve and spread of the anesthesia was seen on ultrasound in close proximity thereto.  There were no abnormalities seen on ultrasound; a digital image was taken; and the patient tolerated the procedure with no complications. Images:still images obtained, printed/placed on chart    Laterality:right  Block Type:adductor canal block  Injection Technique:single-shot  Needle Type:echogenic  Needle Gauge:22 G  Resistance on Injection: none    Medications Used: ropivacaine (NAROPIN) 0.5 % injection, 20 mL  Med admintered at 1/8/2021 3:25 PM      Post Assessment  Injection Assessment: negative aspiration for heme, no paresthesia on injection and incremental injection  Patient Tolerance:comfortable throughout block  Complications:no

## 2021-01-08 NOTE — ADDENDUM NOTE
Addendum  created 01/08/21 1840 by Brian Crenshaw MD    Attestation recorded in Intraprocedure, Intraprocedure Attestations filed

## 2021-01-08 NOTE — PERIOPERATIVE NURSING NOTE
Called [facility] [place] and spoke with Alysha Shin RN to verify patient medications.

## 2021-01-08 NOTE — ANESTHESIA PROCEDURE NOTES
Peripheral Block    Pre-sedation assessment completed: 1/8/2021 3:15 PM    Patient reassessed immediately prior to procedure    Patient location during procedure: holding area  Start time: 1/8/2021 3:15 PM  Stop time: 1/8/2021 3:25 PM  Reason for block: at surgeon's request and post-op pain management  Performed by  Anesthesiologist: Porfirio Hollins MD  Preanesthetic Checklist  Completed: patient identified, site marked, surgical consent, pre-op evaluation, timeout performed, IV checked, risks and benefits discussed and monitors and equipment checked  Prep:  Pt Position: left lateral decubitus  Sterile barriers:cap, gloves, mask and alcohol skin prep  Prep: ChloraPrep  Patient monitoring: blood pressure monitoring, continuous pulse oximetry and EKG  Procedure  Sedation:yes  Performed under: local infiltration  Guidance:ultrasound guided  ULTRASOUND INTERPRETATION.  Using ultrasound guidance a 22 G (22G needle for placement of 3cc 2%lido to site prior to start of procedure.) gauge needle was placed in close proximity to the sciatic nerve, at which point, under ultrasound guidance anesthetic was injected in the area of the nerve and spread of the anesthesia was seen on ultrasound in close proximity thereto.  There were no abnormalities seen on ultrasound; a digital image was taken; and the patient tolerated the procedure with no complications. Images:still images obtained, printed/placed on chart    Laterality:right  Block Type:sciatic  Injection Technique:single-shot  Needle Type:echogenic  Needle Gauge:22 G  Resistance on Injection: none    Medications Used: ropivacaine (NAROPIN) 0.5 % injection, 30 mL  Med admintered at 1/8/2021 3:25 PM      Post Assessment  Injection Assessment: negative aspiration for heme, no paresthesia on injection and incremental injection  Patient Tolerance:comfortable throughout block  Complications:no

## 2021-01-08 NOTE — H&P
Patient Care Team:  Traci Townsend APRN as PCP - General (Nurse Practitioner)  Drake Hinton MD PhD as Consulting Physician (Ophthalmology)  Fausto Gilliam MD as Consulting Physician (Endocrinology)  Saloni Ch MD (Ophthalmology)  Mikhail Kaplan Jr., MD as Consulting Physician (Urology)    Chief complaint right foot wound dehiscence    Subjective     History of Present Illness     The patient is a pleasant 65-year-old female who is approximately 6 weeks status post right foot arthrodesis.  She has had delayed wound healing and local cellulitis.  She presents today for planned formal irrigation, debridement, possible closure.  Please refer to office H&P from yesterday for full details.  No significant change in history, exam, plan.    Review of Systems   Review of Systems:  Constitutional: Negative  HENT: Negative  Eyes: Negative  Respiratory: Negative; no shortness of breath  Cardiovascular: Negative; no current chest pain  Gastrointestinal: Negative  : Negative  Skin: Negative except as listed in HPI  Neurological: Negative, no numbness or deficits  Hematological: Negative  Muscoloskeletal: Per HPI                   Past History:  Medical History: has a past medical history of Adrenal nodule (CMS/HCC) (11/16/2016), Age-related osteoporosis without current pathological fracture (11/16/2016), Arthritis, Delayed surgical wound healing, Essential hypertension (11/16/2016), Gastroesophageal reflux disease with esophagitis (11/16/2016), Hepatic steatosis (11/16/2016), History of anemia, History of sepsis, Hyperlipidemia (11/16/2016), Lisfranc's dislocation, RLS (restless legs syndrome), Squamous cell skin cancer (2014), Thyroid nodule (10/18/2019), Type 2 diabetes mellitus with peripheral neuropathy (CMS/HCC) (11/16/2016), and Vitamin D deficiency (1/25/2019).   Surgical History: has a past surgical history that includes Knee arthroscopy (Bilateral); Cholecystectomy; Colonoscopy; Total  abdominal hysterectomy; Upper gastrointestinal endoscopy (08/2016); Cataract extraction w/  intraocular lens implant (Bilateral); US Guided Fine Needle Aspiration (5/8/2020); Breast biopsy (Left); and Foot Fusion (Right, 11/13/2020).   Family History: family history includes COPD in her mother; Diabetes in her mother, sister, sister, sister, and sister; Hypertension in her mother; Kidney disease in her mother; Obesity in her mother; Thyroid disease in her mother.   Social History: reports that she has never smoked. She has never used smokeless tobacco. She reports current alcohol use. She reports that she does not use drugs.    Medications Prior to Admission   Medication Sig Dispense Refill Last Dose   • aspirin  MG tablet Take 1 tablet by mouth Daily. 30 tablet 0 1/7/2021 at 0700   • atorvastatin (LIPITOR) 80 MG tablet Take 1 tablet by mouth Every Night. 90 tablet 1 1/7/2021 at 2000   • Cholecalciferol (VITAMIN D-3) 25 MCG (1000 UT) capsule Take 1,000 Units by mouth Daily. 60 capsule  1/7/2021 at 0800   • Cyanocobalamin (VITAMIN B-12) 3000 MCG sublingual tablet Place 1 tablet/day under the tongue.   1/7/2021 at 0800   • DULoxetine (CYMBALTA) 60 MG capsule Take 1 capsule by mouth Daily. (Patient taking differently: Take 60 mg by mouth Every Evening.) 90 capsule 1 1/7/2021 at 0800   • furosemide (LASIX) 20 MG tablet Take 20 mg by mouth Daily.   1/7/2021 at 0800   • glimepiride (AMARYL) 4 MG tablet Take 1 tablet by mouth 2 (Two) Times a Day. 180 tablet 1 1/7/2021 at 0800   • glucose blood (OneTouch Verio) test strip 1 each by Other route Daily With Breakfast, Lunch & Dinner. Dx: E11.65. Use as instructed 90 each 2    • Lancets (ONETOUCH DELICA PLUS [id number]) misc 1 each 3 (Three) Times a Day Before Meals. Dx: E11.65 100 each 2    • linagliptin (TRADJENTA) 5 MG tablet tablet Take 5 mg by mouth Daily.   1/7/2021 at 0800   • losartan (COZAAR) 100 MG tablet Take 1 tablet by mouth Daily. 90 tablet 2 1/7/2021 at 0700    • metFORMIN (GLUCOPHAGE) 1000 MG tablet Take 1 tablet by mouth 2 (Two) Times a Day With Meals. 180 tablet 1 2021 at 0700   • Multiple Vitamin (MULTI-VITAMIN PO) Take 1 tablet by mouth Daily.   2021 at 0800   • ondansetron (Zofran) 4 MG tablet Take 1 tablet by mouth Every 8 (Eight) Hours As Needed for Nausea or Vomiting. 20 tablet 0    • oxyCODONE-acetaminophen (Percocet) 5-325 MG per tablet Take 1 tablet by mouth Every 4 (Four) Hours As Needed for Moderate Pain  (1 tab pain 3-6/10, 2 tab pain 7-10/10). 40 tablet 0 2021 at 0800   • pantoprazole (PROTONIX) 40 MG EC tablet Take 1 tablet by mouth Daily. (Patient taking differently: Take 40 mg by mouth Daily As Needed (S/S OF REFLUX).) 90 tablet 1 2021 at 0800   • potassium chloride (K-DUR,KLOR-CON) 20 MEQ CR tablet Take 20 mEq by mouth Daily.   2021 at 0800   • promethazine (PHENERGAN) 12.5 MG tablet Take 1 tablet by mouth Every 6 (Six) Hours As Needed for Nausea or Vomiting. 20 tablet 0 2021 at 0800   • rOPINIRole (REQUIP) 0.25 MG tablet Take 0.25 mg by mouth Every Evening.   2021 at 2000   • sucralfate (CARAFATE) 1 g tablet Take 1 g by mouth 2 (Two) Times a Day As Needed (S/S REFLUX).   2021 at    • [] sulfamethoxazole-trimethoprim (Bactrim DS) 800-160 MG per tablet Take 1 tablet by mouth 2 (Two) Times a Day for 7 days. 14 tablet 0    • vitamin D (ERGOCALCIFEROL) 80954 units capsule capsule Take 50,000 Units by mouth 1 (One) Time Per Week. 2021 at 0800   • denosumab (PROLIA) 60 MG/ML solution syringe Inject 60 mg under the skin into the appropriate area as directed Every 6 (Six) Months. 2019/ May 2020   More than a month at Unknown time      Allergies: Zofran [ondansetron hcl], Prochlorperazine, Meperidine, and Prochlorperazine maleate    Objective      Vital Signs  Temp:  [98.1 °F (36.7 °C)] 98.1 °F (36.7 °C)  Heart Rate:  [87-94] 91  Resp:  [20] 20  BP: (123-171)/() 171/119    Physical  Exam  Physical Exam:  Vital signs reviewed.  Constitutional:  Appears well-developed, well nourished.  HEENT:  Head: normocephalic, atraumatic  Eyes: EOMI, grossly normal.  No scleral icterus.  Neck: Normal range of motion.  Supple, no tracheal deviation.  Cardiovascular: Regular rate.  Pulmonary: Effort normal, symmetric chest expansion, no labored breathing.  Abdominal: Soft, non distended  Neurological: No gross deficits, oriented to person, place, and time.  Skin: Warm and dry  Psychiatric: Normal mood and affect  Musculoskeletal:  Right foot: Mild wound dehiscence, dorsal medial foot.      Active ankle dorsiflexion and plantarflexion.  Sensation is intact to light touch in sural, saphenous, deep and superficial peroneal, tibial nerve distribution.  Toes are warm and well perfused with brisk capillary refill.        Assessment/Plan     Right foot postoperative wound dehiscence    Assessment & Plan    The patient is a pleasant 65-year-old female who is approximately 6 weeks status post right foot arthrodesis.  She has had delayed wound healing and local cellulitis.  She presents today for planned formal irrigation, debridement, possible closure.  Please refer to office H&P from yesterday for full details.  No significant change in history, exam, plan.    The risks/benefits of surgery, including pain, infection, wound healing problems, need for future procedures, mal/nonunion, DVT/PE, cardiac event, and/or death were discussed, and the patient elected to proceed with surgery.      Mikhail Banks Jr, MD  01/08/21  15:21 EST

## 2021-01-08 NOTE — ANESTHESIA PROCEDURE NOTES
Airway  Urgency: elective    Date/Time: 1/8/2021 4:29 PM  Airway not difficult    General Information and Staff    Patient location during procedure: OR  Anesthesiologist: Brian Crenshaw MD  CRNA: Barbara Lucero CRNA    Indications and Patient Condition  Indications for airway management: airway protection    Preoxygenated: yes  MILS maintained throughout  Mask difficulty assessment: 0 - not attempted    Final Airway Details  Final airway type: supraglottic airway      Successful airway: classic  Size 4    Number of attempts at approach: 1  Assessment: lips, teeth, and gum same as pre-op    Additional Comments  Placed with ease. Vent with ease. Teeth as pre-op. Secured to face.

## 2021-01-08 NOTE — OP NOTE
INCISION AND DRAINAGE LOWER EXTREMITY  Progress Note    Marge Mcghee  1/8/2021    Pre-op Diagnosis:   1.  Right foot postoperative wound dehiscence  2.  Retained hardware, right foot        Post-Op Diagnosis Codes:  Same    Procedure:  1.  Right foot irrigation, debridement, removal of hardware  2.  Complex secondary closure, postoperative wound dehiscence, right foot    Surgeon(s):  Mikhail Banks Jr., MD    Anesthesia: Regional    Estimated Blood Loss: minimal    Specimens:                Specimens     ID Source Type Tests Collected By Collected At Frozen?      1 Foot, Right Wound · WOUND CULTURE   Mikhail Banks Jr., MD 1/8/21 1645      Description: SWAB OF RIGHT SCREW TRACT    2 Foot, Right Tissue · TISSUE / BONE CULTURE   Mikhail Banks Jr., MD 1/8/21 1646      Description: RIGHT FOOT TISSUE          Drains: * No LDAs found *    Complications:   None apparent    Disposition:  Stable to PACU for recovery    Indications for procedure:  The patient is a very pleasant 65-year-old female who has had  persistently delayed wound healing and late wound dehiscence following complex right midfoot arthrodesis for prior trauma.  The above listed procedures were recommended.  The risks/benefits of surgery, including pain, infection, wound healing problems, need for future procedures, mal/nonunion, DVT/PE, cardiac event, and/or death were discussed, and the patient elected to proceed with surgery.    Procedure in detail:    The correct patient was identified in preoperative holding.  All risks and benefits of surgery were again discussed in detail, and the patient agreed to proceed with surgery.  The operative extremity was confirmed and marked by myself.  Operative consent reviewed and confirmed to be signed by the patient and myself.    At this time, the patient was wheeled to the operative theatre and placed supine on the OR table.  ALIRIO/SCD placed on nonoperative leg. Anesthesia was induced smoothly by our anesthesia  colleagues.   Well padded nonsterile tourniquet placed on the upper thigh. The right lower extremity was prepped and draped in standard sterile fashion.  Appropriate presurgical timeout was performed, confirming correct patient, correct extremity, correct procedure, availability of sterile instruments/implants, and the administration of intravenous antibiotics in the form of Ancef within one hour of skin incision.    At this time, the foot was exsanguinated with an Esmarch.    The foot was examined.  Her prior surgical wound had dehisced a width of 1 cm along the length of approximately 3 cm.  Her incision was extended both proximally and distally.  The dehisced portion was sharply ellipsed out.  The granulation tissue in the center was sharply debrided with a knife and a rongeur.    The retrograde 4.0 mm headed cannulated screw  was fairly prominent in the center of this wound.  I thus felt it prudent to remove the screw.  This was removed with the appropriate screwdriver without difficulty.    I used a swab to obtain a culture from the screw tract.    The wound bed was examined.  There was no gross purulence.  There did appear to be a covering of soft tissue over the dorsal staple at the first tarsometatarsal joint.  CT 8 days ago showed only partial bony healing at the first TMT arthrodesis, I thus elected to leave the staple in place.    I used a curette to aggressively debride the dorsal aspect of the first metatarsal.   A sample was sent for culture.  Any infected or nonviable appearing tissue was sharply debrided.  The EHL tendon was exposed.  It appeared to be healthy and intact.    Once I was satisfied all nonviable tissue had been removed, the wound was copiously irrigated with sterile saline impregnated with bacitracin.  Dried vancomycin was placed directly in the wound bed.  Attention was then turned to complex secondary closure of the dehisced wound.  I used a knife to sharply debride the skin edges  back to healthy bleeding margin.  I used a fresh knife blade to carefully develop local flaps both medially and laterally to ensure a tension-free closure.  The wound was then closed carefully in layers using 3-0 antibiotic coated Vicryl and 3-0 nylon.    Xeroform, sterile gauze and a well-padded soft dressing were applied.  Tourniquet let down.  Brisk capillary refill returned to all toes.  Patient was then placed in a well-padded short leg plaster splint with the ankle in neutral position.  Patient was awoken from anesthesia without apparent complication and taken to PACU for recovery.        Mikhail Banks Jr, MD     Date: 1/8/2021  Time: 17:16 EST

## 2021-01-08 NOTE — ANESTHESIA PREPROCEDURE EVALUATION
Anesthesia Evaluation     Patient summary reviewed and Nursing notes reviewed   no history of anesthetic complications:  NPO Solid Status: > 8 hours  NPO Liquid Status: > 2 hours           Airway   Mallampati: II  TM distance: >3 FB  Neck ROM: full  no difficulty expected  Dental - normal exam     Pulmonary - negative pulmonary ROS and normal exam   (-) COPD, asthma, not a smoker, lung cancer  Cardiovascular - normal exam  Exercise tolerance: good (4-7 METS)    ECG reviewed  Rhythm: regular  Rate: normal    (+) hypertension well controlled 2 medications or greater, hyperlipidemia,   (-) valvular problems/murmurs, past MI, CAD, dysrhythmias, angina, CHF, cardiac stents, CABG      Neuro/Psych  (+) headaches, numbness,     (-) seizures, TIA, CVA  GI/Hepatic/Renal/Endo    (+) obesity,   liver disease fatty liver disease, renal disease, diabetes mellitus type 2 poorly controlled,     Musculoskeletal     (+) neck pain,   Abdominal  - normal exam   Substance History - negative use     OB/GYN          Other   arthritis,    history of cancer remission                    Anesthesia Plan    ASA 3     general with block   (GA w/ AC and SNB for POPC.)  intravenous induction     Anesthetic plan, all risks, benefits, and alternatives have been provided, discussed and informed consent has been obtained with: patient.    Plan discussed with CRNA and attending.

## 2021-01-08 NOTE — ANESTHESIA POSTPROCEDURE EVALUATION
"Patient: Marge Mcghee    Procedure Summary     Date: 01/08/21 Room / Location: [facility] OR [address] [place] [address] MAIN OR    Anesthesia Start: 1618 Anesthesia Stop: 1717    Procedure: RIGHT IRRIGATION AND DEBRIDEMENT OF FOOT WITH SECONDARY CLOSURE AND HARDWARE REMOVAL (Right ) Diagnosis:     Surgeon: Mikhail Banks Jr., MD Provider: Brian Crenshaw MD    Anesthesia Type: general with block ASA Status: 3          Anesthesia Type: general with block    Vitals  Vitals Value Taken Time   /89 01/08/21 1815   Temp 36.8 °C (98.2 °F) 01/08/21 1712   Pulse 77 01/08/21 1820   Resp 16 01/08/21 1745   SpO2 98 % 01/08/21 1820   Vitals shown include unvalidated device data.        Post Anesthesia Care and Evaluation    Patient location during evaluation: bedside  Patient participation: complete - patient participated  Level of consciousness: awake and alert  Pain score: 0  Pain management: adequate  Airway patency: patent  Anesthetic complications: No anesthetic complications  PONV Status: none  Cardiovascular status: acceptable and hemodynamically stable  Respiratory status: acceptable and spontaneous ventilation  Hydration status: acceptable    Comments: BP (!) 184/94   Pulse 83   Temp 36.8 °C (98.2 °F) (Oral)   Resp 16   Ht 162.6 cm (64\")   Wt 85 kg (187 lb 6.3 oz)   SpO2 97%   BMI 32.17 kg/m²         "

## 2021-01-09 ENCOUNTER — APPOINTMENT (OUTPATIENT)
Dept: CT IMAGING | Facility: HOSPITAL | Age: 66
End: 2021-01-09

## 2021-01-09 LAB
GLUCOSE BLDC GLUCOMTR-MCNC: 103 MG/DL (ref 70–130)
GLUCOSE BLDC GLUCOMTR-MCNC: 129 MG/DL (ref 70–130)
GLUCOSE BLDC GLUCOMTR-MCNC: 144 MG/DL (ref 70–130)
GLUCOSE BLDC GLUCOMTR-MCNC: 91 MG/DL (ref 70–130)

## 2021-01-09 PROCEDURE — 93010 ELECTROCARDIOGRAM REPORT: CPT | Performed by: INTERNAL MEDICINE

## 2021-01-09 PROCEDURE — 25010000002 VANCOMYCIN 750 MG RECONSTITUTED SOLUTION: Performed by: ORTHOPAEDIC SURGERY

## 2021-01-09 PROCEDURE — 0 IOPAMIDOL PER 1 ML: Performed by: ORTHOPAEDIC SURGERY

## 2021-01-09 PROCEDURE — 71275 CT ANGIOGRAPHY CHEST: CPT

## 2021-01-09 PROCEDURE — 93005 ELECTROCARDIOGRAM TRACING: CPT | Performed by: ORTHOPAEDIC SURGERY

## 2021-01-09 PROCEDURE — 25010000002 PIPERACILLIN SOD-TAZOBACTAM PER 1 G: Performed by: ORTHOPAEDIC SURGERY

## 2021-01-09 PROCEDURE — 82962 GLUCOSE BLOOD TEST: CPT

## 2021-01-09 RX ADMIN — SODIUM CHLORIDE, PRESERVATIVE FREE 10 ML: 5 INJECTION INTRAVENOUS at 20:24

## 2021-01-09 RX ADMIN — TAZOBACTAM SODIUM AND PIPERACILLIN SODIUM 3.38 G: 375; 3 INJECTION, SOLUTION INTRAVENOUS at 02:10

## 2021-01-09 RX ADMIN — DULOXETINE HYDROCHLORIDE 60 MG: 60 CAPSULE, DELAYED RELEASE ORAL at 17:15

## 2021-01-09 RX ADMIN — OXYCODONE HYDROCHLORIDE AND ACETAMINOPHEN 2 TABLET: 7.5; 325 TABLET ORAL at 21:19

## 2021-01-09 RX ADMIN — METFORMIN HYDROCHLORIDE 1000 MG: 1000 TABLET ORAL at 08:58

## 2021-01-09 RX ADMIN — ASPIRIN 325 MG: 325 TABLET, COATED ORAL at 08:58

## 2021-01-09 RX ADMIN — TAZOBACTAM SODIUM AND PIPERACILLIN SODIUM 3.38 G: 375; 3 INJECTION, SOLUTION INTRAVENOUS at 20:18

## 2021-01-09 RX ADMIN — ROPINIROLE HYDROCHLORIDE 0.25 MG: 0.5 TABLET, FILM COATED ORAL at 17:15

## 2021-01-09 RX ADMIN — POTASSIUM CHLORIDE 20 MEQ: 750 TABLET, EXTENDED RELEASE ORAL at 08:59

## 2021-01-09 RX ADMIN — IOPAMIDOL 95 ML: 755 INJECTION, SOLUTION INTRAVENOUS at 19:29

## 2021-01-09 RX ADMIN — ATORVASTATIN CALCIUM 80 MG: 20 TABLET, FILM COATED ORAL at 20:27

## 2021-01-09 RX ADMIN — SODIUM CHLORIDE, PRESERVATIVE FREE 10 ML: 5 INJECTION INTRAVENOUS at 08:59

## 2021-01-09 RX ADMIN — SODIUM CHLORIDE 750 MG: 900 INJECTION, SOLUTION INTRAVENOUS at 17:16

## 2021-01-09 RX ADMIN — GLIPIZIDE 10 MG: 10 TABLET ORAL at 08:58

## 2021-01-09 RX ADMIN — SODIUM CHLORIDE 750 MG: 900 INJECTION, SOLUTION INTRAVENOUS at 05:37

## 2021-01-09 RX ADMIN — TAZOBACTAM SODIUM AND PIPERACILLIN SODIUM 3.38 G: 375; 3 INJECTION, SOLUTION INTRAVENOUS at 11:08

## 2021-01-09 RX ADMIN — DOCUSATE SODIUM 50MG AND SENNOSIDES 8.6MG 2 TABLET: 8.6; 5 TABLET, FILM COATED ORAL at 08:58

## 2021-01-09 RX ADMIN — METFORMIN HYDROCHLORIDE 1000 MG: 1000 TABLET ORAL at 17:15

## 2021-01-09 NOTE — PLAN OF CARE
Goal Outcome Evaluation:  Plan of Care Reviewed With: patient  Progress: improving  Outcome Summary: POD#1 OF I&D OF RIGHT FOOT AND HARDWARE REMOVAL . VSS.  NUMBNESS TO RIGHT FOOT AT THIS TIME. ZERO COMPLAIN  OF PAIN.ACE WRAP IN PLACE. NWB TO RIGHT FOOT.  VOIDING FINE PER [facility]. BM X1. EDUCATION PROVIDED ON BLOOD SUGAR MONITORING. PATIENT VERBALIZED UNDERSTANDING.

## 2021-01-09 NOTE — CONSULTS
CONSULT NOTE    Infectious Diseases - Giancarlo Moreira MD  [facility]       Patient Identification:  Name: Marge Mcghee  Age: 65 y.o.  Sex: female  :  1955  MRN: [id number]             Date of Consultation: 2021      Primary Care Physician: Traci Townsend APRN                               Requesting Physician: Dr. Banks  Reason for Consultation: Right foot subsequent infected hardware status post I&D removal of hardware and secondary closure.    Impression: Patient is a 65-year-old female who fractured her right foot when she twisted her foot while walking.  This occurred in 2020.  Since then patient was initially attempted to be managed conservatively with a cast but after being in the cast for some time her pain and discomfort got worse and repeat x-rays suggested further deviation of the fracture component in the foot along with nonhealing.  Patient was then subsequently referred to Dr. Banks and she underwent right open treatment for Lisfranc injury consisting of open reduction internal fixation of medial/middle cuneiform and second and third metatarsal fracture along with arthrodesis of first second and third metatarsal and cuneiform with containing bone graft.  This was performed on 2020.  Patient recalls having uneventful postoperative course and couple weeks later had her stitches removed.  According to the patient few days later the wound dehisced with redness around it resulting in prescription of antibiotics that she took for 2 weeks.  It resulted in no significant improvement and eventually required her to be hospitalized for 3 days and was treated with empiric IV antibiotic therapy.  She was noted to have improvement in in the erythema of the right foot and was discharged home on Bactrim.  Despite these interventions her wound remained dehisced with continued local cellulitis and was brought in today for formal debridement, removal of hardware and complex  closure.  Patient denies any systemic symptoms of fever and chills.  Infectious disease service is consulted.  Throughout this timeframe from November 2020 till now there is no obvious pathogen documented contributing to this continued wound dehiscence.  This presentation is consistent with:  1-postop right foot surgical site infection following complex fracture repair and corrective surgery in November 2020 presenting as wound dehiscence and cellulitis with transient response with oral antibiotics and IV antibiotics of short duration and likely associated with  2-deep infection with probable infected hardware and contiguous focus osteomyelitis.  3-other diagnoses include   diabetes mellitus with peripheral neuropathy  Osteoporosis  Hypertension      Recommendations/Discussions:  At this juncture I agree with the care plan consisting of empiric broad-spectrum antibiotics directed towards pathogens that can cause smoldering low-grade surgical site infection with involvement of hardware and not responding to antibiotic treatment.  This represents could range from Corynebacterium species to propionobacterium species to coag negative staph.  Would recommend vancomycin and Zosyn for now while awaiting operative cultures.  Explained to the patient that the operative cultures are negative then the selection of antibiotic would be empiric coverage for the pathogens listed above.  Duration of antibiotic treatment would be 6 weeks from last definitive surgery with close monitoring of antibiotic toxicity and side effects.  Thank you Dr. Banks for letting me be the part of your patient care please see above impression and recommendations.      History of Present Illness:   Patient is a 65-year-old female who fractured her right foot when she twisted her foot while walking.  This occurred in August 2020.  Since then patient was initially attempted to be managed conservatively with a cast but after being in the cast for some time  her pain and discomfort got worse and repeat x-rays suggested further deviation of the fracture component in the foot along with nonhealing.  Patient was then subsequently referred to Dr. Banks and she underwent right open treatment for Lisfranc injury consisting of open reduction internal fixation of medial/middle cuneiform and second and third metatarsal fracture along with arthrodesis of first second and third metatarsal and cuneiform with containing bone graft.  This was performed on 11/13/2020.  Patient recalls having uneventful postoperative course and couple weeks later had her stitches removed.  According to the patient few days later the wound dehisced with redness around it resulting in prescription of antibiotics that she took for 2 weeks.  It resulted in no significant improvement and eventually required her to be hospitalized for 3 days and was treated with empiric IV antibiotic therapy.  She was noted to have improvement in in the erythema of the right foot and was discharged home on Bactrim.  Despite these interventions her wound remained dehisced with continued local cellulitis and was brought in today for formal debridement, removal of hardware and complex closure.  Patient denies any systemic symptoms of fever and chills.  Infectious disease service is consulted.  Throughout this timeframe from November 2020 till now there is no obvious pathogen documented contributing to this continued wound dehiscence.      Past Medical History:  Past Medical History:   Diagnosis Date   • Adrenal nodule (CMS/HCC) 11/16/2016    FINDINGS: Mild hepatic steatosis may be present. Cholecystectomy. No biliary ductal dilatation. Negative pancreas, spleen, left adrenal gland, and kidneys. The right adrenal presumed adenoma has increased slightly, measuring 3 x 4 cm in diameter,  compared to 2 x 3.5 cm on the previous exam. The attenuation values are consistent with an adenoma. [facility] 2018   •  Age-related osteoporosis without current pathological fracture 11/16/2016   • Arthritis    • Delayed surgical wound healing    • Essential hypertension 11/16/2016   • Gastroesophageal reflux disease with esophagitis 11/16/2016   • Hepatic steatosis 11/16/2016   • History of anemia    • History of sepsis     FROM UTI   • Hyperlipidemia 11/16/2016   • Lisfranc's dislocation     LEFT WITH FRACTURES NONHEALING FOOT   • RLS (restless legs syndrome)    • Squamous cell skin cancer 2014    Excised by Dr. James   • Thyroid nodule 10/18/2019    BENIGN   • Type 2 diabetes mellitus with peripheral neuropathy (CMS/HCC) 11/16/2016   • Vitamin D deficiency 1/25/2019     Past Surgical History:  Past Surgical History:   Procedure Laterality Date   • BREAST BIOPSY Left     7/2019. BENIGN   • CATARACT EXTRACTION WITH INTRAOCULAR LENS IMPLANT Bilateral    • CHOLECYSTECTOMY     • COLONOSCOPY     • FOOT FUSION Right 11/13/2020    Procedure: RIGHT OPEN TREATMENT LISFRANC INJURY, OPEN REDUCTION INTERNAL FIXATION MEDIAL/MIDDLE CUNEIFORM FRACTURE AND 2ND/3RD METATARSAL FRACTURE 1ST 2ND POSSIBLE 3RD TARSOMETATARSAL ARTHRODESIS INTERCUNEIFORM ARTHRODESIS CALCANEAL BONE GRAFT;  Surgeon: Mikhail Banks Jr., MD;  Location: [facility] OR [facility];  Service: Orthopedics;  Laterality: Right;   • KNEE ARTHROSCOPY Bilateral    • TOTAL ABDOMINAL HYSTERECTOMY     • UPPER GASTROINTESTINAL ENDOSCOPY  08/2016   • US GUIDED FINE NEEDLE ASPIRATION  5/8/2020      Home Meds:  Medications Prior to Admission   Medication Sig Dispense Refill Last Dose   • aspirin  MG tablet Take 1 tablet by mouth Daily. 30 tablet 0 1/7/2021 at 0700   • atorvastatin (LIPITOR) 80 MG tablet Take 1 tablet by mouth Every Night. 90 tablet 1 1/7/2021 at 2000   • Cholecalciferol (VITAMIN D-3) 25 MCG (1000 UT) capsule Take 1,000 Units by mouth Daily. 60 capsule  1/7/2021 at 0800   • Cyanocobalamin (VITAMIN B-12) 3000 MCG sublingual tablet Place 1 tablet/day under the tongue.   1/7/2021 at  0800   • DULoxetine (CYMBALTA) 60 MG capsule Take 1 capsule by mouth Daily. (Patient taking differently: Take 60 mg by mouth Every Evening.) 90 capsule 1 1/7/2021 at 0800   • furosemide (LASIX) 20 MG tablet Take 20 mg by mouth Daily.   1/7/2021 at 0800   • glimepiride (AMARYL) 4 MG tablet Take 1 tablet by mouth 2 (Two) Times a Day. 180 tablet 1 1/7/2021 at 0800   • glucose blood (OneTouch Verio) test strip 1 each by Other route Daily With Breakfast, Lunch & Dinner. Dx: E11.65. Use as instructed 90 each 2    • Lancets (ONETOUCH DELICA PLUS [id number]) misc 1 each 3 (Three) Times a Day Before Meals. Dx: E11.65 100 each 2    • linagliptin (TRADJENTA) 5 MG tablet tablet Take 5 mg by mouth Daily.   1/7/2021 at 0800   • losartan (COZAAR) 100 MG tablet Take 1 tablet by mouth Daily. 90 tablet 2 1/7/2021 at 0700   • metFORMIN (GLUCOPHAGE) 1000 MG tablet Take 1 tablet by mouth 2 (Two) Times a Day With Meals. 180 tablet 1 1/7/2021 at 0700   • Multiple Vitamin (MULTI-VITAMIN PO) Take 1 tablet by mouth Daily.   1/7/2021 at 0800   • ondansetron (Zofran) 4 MG tablet Take 1 tablet by mouth Every 8 (Eight) Hours As Needed for Nausea or Vomiting. 20 tablet 0    • oxyCODONE-acetaminophen (Percocet) 5-325 MG per tablet Take 1 tablet by mouth Every 4 (Four) Hours As Needed for Moderate Pain  (1 tab pain 3-6/10, 2 tab pain 7-10/10). 40 tablet 0 1/8/2021 at 0800   • pantoprazole (PROTONIX) 40 MG EC tablet Take 1 tablet by mouth Daily. (Patient taking differently: Take 40 mg by mouth Daily As Needed (S/S OF REFLUX).) 90 tablet 1 1/7/2021 at 0800   • potassium chloride (K-DUR,KLOR-CON) 20 MEQ CR tablet Take 20 mEq by mouth Daily.   1/7/2021 at 0800   • promethazine (PHENERGAN) 12.5 MG tablet Take 1 tablet by mouth Every 6 (Six) Hours As Needed for Nausea or Vomiting. 20 tablet 0 1/8/2021 at 0800   • rOPINIRole (REQUIP) 0.25 MG tablet Take 0.25 mg by mouth Every Evening.   1/7/2021 at 2000   • sucralfate (CARAFATE) 1 g tablet Take 1 g by  mouth 2 (Two) Times a Day As Needed (S/S REFLUX).   2021 at 2000   • [] sulfamethoxazole-trimethoprim (Bactrim DS) 800-160 MG per tablet Take 1 tablet by mouth 2 (Two) Times a Day for 7 days. 14 tablet 0    • vitamin D (ERGOCALCIFEROL) 66900 units capsule capsule Take 50,000 Units by mouth 1 (One) Time Per Week. 2021 at 0800   • denosumab (PROLIA) 60 MG/ML solution syringe Inject 60 mg under the skin into the appropriate area as directed Every 6 (Six) Months. 2019/ May 2020   More than a month at Unknown time     Current Meds:     Current Facility-Administered Medications:   •  aspirin EC tablet 325 mg, 325 mg, Oral, Daily, Mikhail Banks Jr., MD  •  atorvastatin (LIPITOR) tablet 80 mg, 80 mg, Oral, Nightly, Mikhail Banks Jr., MD  •  DULoxetine (CYMBALTA) DR capsule 60 mg, 60 mg, Oral, Q PM, Mikhail Banks Jr., MD  •  furosemide (LASIX) tablet 20 mg, 20 mg, Oral, Daily, Mikhail Banks Jr., MD  •  [START ON 2021] glipizide (GLUCOTROL) tablet 10 mg, 10 mg, Oral, QAM AC, Mikhail Banks Jr., MD  •  HYDROmorphone (DILAUDID) injection 1 mg, 1 mg, Intravenous, Q4H PRN **AND** naloxone (NARCAN) injection 0.4 mg, 0.4 mg, Intravenous, Q5 Min PRN, Mikhail Banks Jr., MD  •  lactated ringers infusion, 9 mL/hr, Intravenous, Continuous, Mikhail Banks Jr., MD, Last Rate: 9 mL/hr at 21 1457, Currently Infusing at 21 1620  •  lactated ringers infusion, 100 mL/hr, Intravenous, Continuous, Mikhail Banks Jr., MD, Last Rate: 100 mL/hr at 21 1749, 100 mL/hr at 21 1749  •  linagliptin (TRADJENTA) tablet 5 mg, 5 mg, Oral, Daily, Mikhail Banks Jr., MD  •  losartan (COZAAR) tablet 100 mg, 100 mg, Oral, Daily, Mikhail Banks Jr., MD  •  magnesium hydroxide (MILK OF MAGNESIA) suspension 2400 mg/10mL 10 mL, 10 mL, Oral, Daily PRN, Mikhail Banks Jr., MD  •  metFORMIN (GLUCOPHAGE) tablet 1,000 mg, 1,000 mg, Oral, BID With Meals, Mikhail Banks Jr., MD  •  ondansetron (ZOFRAN) injection 4 mg, 4 mg,  Intravenous, Q6H PRN, Mikhail Banks Jr., MD  •  oxyCODONE-acetaminophen (PERCOCET) 7.5-325 MG per tablet 1 tablet, 1 tablet, Oral, Q4H PRN **OR** oxyCODONE-acetaminophen (PERCOCET) 7.5-325 MG per tablet 2 tablet, 2 tablet, Oral, Q4H PRN, Mikhail Banks Jr., MD  •  pantoprazole (PROTONIX) EC tablet 40 mg, 40 mg, Oral, Daily PRN, Mikhail Banks Jr., MD  •  Pharmacy to dose vancomycin, , Does not apply, Continuous PRN, Mikhail Banks Jr., MD  •  piperacillin-tazobactam (ZOSYN) 3.375 g in iso-osmotic dextrose 50 ml (premix), 3.375 g, Intravenous, Once, Mikhail Banks Jr., MD, 3.375 g at 01/08/21 2004  •  [START ON 1/9/2021] piperacillin-tazobactam (ZOSYN) 3.375 g in iso-osmotic dextrose 50 ml (premix), 3.375 g, Intravenous, Q8H, Mikhail Banks Jr., MD  •  potassium chloride (K-DUR,KLOR-CON) ER tablet 20 mEq, 20 mEq, Oral, Daily, Mikhail Banks Jr., MD  •  rOPINIRole (REQUIP) tablet 0.25 mg, 0.25 mg, Oral, Q PM, Mikhail Banks Jr., MD  •  sennosides-docusate (PERICOLACE) 8.6-50 MG per tablet 2 tablet, 2 tablet, Oral, BID, Mikhail Banks Jr., MD  •  sodium chloride 0.9 % flush 10 mL, 10 mL, Intravenous, Q12H, Mikhail Banks Jr., MD  •  sodium chloride 0.9 % flush 10 mL, 10 mL, Intravenous, PRN, Mikhail Banks Jr., MD  •  sucralfate (CARAFATE) tablet 1 g, 1 g, Oral, BID PRN, Mikhail Banks Jr., MD  •  [START ON 1/9/2021] vancomycin 750 mg/250 mL 0.9% NS add-vantage, 10 mg/kg, Intravenous, Q12H, Mikhail Banks Jr., MD  Allergies:  Allergies   Allergen Reactions   • Zofran [Ondansetron Hcl] Nausea And Vomiting     Does the opposite of its purpose   • Prochlorperazine Other (See Comments)     CAUSED SEIZURE   • Meperidine Itching and Swelling   • Prochlorperazine Maleate Other (See Comments)     CAUSES SEIZURE     Social History:   Social History     Tobacco Use   • Smoking status: Never Smoker   • Smokeless tobacco: Never Used   Substance Use Topics   • Alcohol use: Yes     Comment: socially       Family History:  Family History  "  Problem Relation Age of Onset   • Diabetes Mother    • COPD Mother    • Hypertension Mother    • Kidney disease Mother    • Obesity Mother    • Thyroid disease Mother    • Diabetes Sister    • Diabetes Sister    • Diabetes Sister    • Diabetes Sister    • Malig Hyperthermia Neg Hx           Review of Systems  See history of present illness and past medical history.    Constitutional: Remarkable for no fever or chills  Cardiovascular: Remarkable for no chest pain or shortness of breath  GI: Remarkable for no nausea vomiting or diarrhea  : Remarkable for no burning in urination frequency urgency  Musculoskeletal: Remarkable for what has been described in the history of presenting illness  Neurological: Remarkable for no loss of consciousness or continence.      Vitals:   /63 (BP Location: Left arm, Patient Position: Lying)   Pulse 83   Temp 98.1 °F (36.7 °C) (Skin)   Resp 16   Ht 162.6 cm (64\")   Wt 85 kg (187 lb 6.3 oz)   SpO2 96%   BMI 32.17 kg/m²   I/O:     Intake/Output Summary (Last 24 hours) at 1/8/2021 2012  Last data filed at 1/8/2021 1800  Gross per 24 hour   Intake 1000 ml   Output --   Net 1000 ml     Exam:  Patient is examined using the personal protective equipment as per guidelines from infection control for this particular patient as enacted.  Hand washing was performed before and after patient interaction.  General Appearance:    Alert, cooperative, no distress, appears stated age   Head:    Normocephalic, without obvious abnormality, atraumatic   Eyes:    PERRL, conjunctivae/corneas clear, EOM's intact, both eyes   Ears:    Normal external ear canals, both ears   Nose:   Nares normal, septum midline, mucosa normal, no drainage    or sinus tenderness   Throat:   Lips, tongue, gums normal; oral mucosa pink and moist   Neck:   Supple, symmetrical, trachea midline, no adenopathy;     thyroid:  no enlargement/tenderness/nodules; no carotid    bruit or JVD   Back:     Symmetric, no " curvature, ROM normal, no CVA tenderness   Lungs:     Clear to auscultation bilaterally, respirations unlabored   Chest Wall:    No tenderness or deformity    Heart:    Regular rate and rhythm, S1 and S2 normal, no murmur, rub   or gallop   Abdomen:     Soft, non-tender, bowel sounds active all four quadrants,     no masses, no hepatomegaly, no splenomegaly   Extremities:  Right foot and leg dressed after surgery.   Pulses:   Pulses palpable in all extremities; symmetric all extremities   Skin:   Skin color normal, Skin is warm and dry,  no rashes or palpable lesions   Neurologic:  Grossly nonfocal       Data Review:    I reviewed the patient's new clinical results.  Results from last 7 days   Lab Units 01/08/21  1422   WBC 10*3/mm3 8.68   HEMOGLOBIN g/dL 10.7*   PLATELETS 10*3/mm3 259     Results from last 7 days   Lab Units 01/08/21  1422   SODIUM mmol/L 136   POTASSIUM mmol/L 4.6   CHLORIDE mmol/L 101   CO2 mmol/L 25.8   BUN mg/dL 22   CREATININE mg/dL 1.12*   CALCIUM mg/dL 8.9   GLUCOSE mg/dL 130*     Microbiology Results (last 10 days)     Procedure Component Value - Date/Time    COVID PRE-OP / PRE-PROCEDURE SCREENING ORDER (NO ISOLATION) - Swab, Nasopharynx [[id number]]  (Normal) Collected: 01/08/21 1328    Lab Status: Final result Specimen: Swab from Nasopharynx Updated: 01/08/21 1432    Narrative:      The following orders were created for panel order COVID PRE-OP / PRE-PROCEDURE SCREENING ORDER (NO ISOLATION) - Swab, Nasopharynx.  Procedure                               Abnormality         Status                     ---------                               -----------         ------                     COVID-19,[facility]...[[id number]]  Normal              Final result                 Please view results for these tests on the individual orders.    COVID-19,[facility] CEPHEID, NP SWAB IN TRANSPORT MEDIA 8-12 HR TAT - Swab, Nasopharynx [[id number]]  (Normal) Collected: 01/08/21 1328    Lab Status:  Final result Specimen: Swab from Nasopharynx Updated: 01/08/21 1432     COVID19 Not Detected    Narrative:      Fact sheet for providers: https://www.fda.gov/media/020190/download     Fact sheet for patients: https://www.fda.gov/media/205998/download            Assessment:  Active Hospital Problems    Diagnosis  POA   • Right foot infection [L08.9]  Yes      Resolved Hospital Problems   No resolved problems to display.         Plan:  See above  Giancarlo Galicia MD   1/8/2021  20:12 EST    Much of this encounter note is an electronic transcription/translation of spoken language to printed text. The electronic translation of spoken language may permit erroneous, or at times, nonsensical words or phrases to be inadvertently transcribed; Although I have reviewed the note for such errors, some may still exist

## 2021-01-09 NOTE — PROGRESS NOTES
"Orthopaedic Surgery  Daily Progress Note    /73 (BP Location: Left arm, Patient Position: Lying)   Pulse 90   Temp 96.6 °F (35.9 °C) (Skin)   Resp 16   Ht 162.6 cm (64\")   Wt 85 kg (187 lb 6.3 oz)   SpO2 93%   BMI 32.17 kg/m²     Lab Results (last 24 hours)     Procedure Component Value Units Date/Time    POC Glucose Once [[id number]]  (Normal) Collected: 01/09/21 0624    Specimen: Blood Updated: 01/09/21 0630     Glucose 129 mg/dL     Tissue / Bone Culture - Tissue, Foot, Right [[id number]] Collected: 01/08/21 1646    Specimen: Tissue from Foot, Right Updated: 01/09/21 0105     Gram Stain No WBCs or organisms seen    Wound Culture - Wound, Foot, Right [[id number]] Collected: 01/08/21 1645    Specimen: Wound from Foot, Right Updated: 01/09/21 0103     Gram Stain No WBCs or organisms seen    Hemoglobin A1c [[id number]]  (Abnormal) Collected: 01/08/21 2036    Specimen: Blood Updated: 01/08/21 2234     Hemoglobin A1C 7.10 %     Narrative:      Hemoglobin A1C Ranges:    Increased Risk for Diabetes  5.7% to 6.4%  Diabetes                     >= 6.5%  Diabetic Goal                < 7.0%    Sedimentation Rate [[id number]]  (Abnormal) Collected: 01/08/21 2036    Specimen: Blood Updated: 01/08/21 2202     Sed Rate 60 mm/hr     POC Glucose Once [[id number]]  (Normal) Collected: 01/08/21 2121    Specimen: Blood Updated: 01/08/21 2122     Glucose 86 mg/dL     C-reactive Protein [[id number]]  (Abnormal) Collected: 01/08/21 1422    Specimen: Blood Updated: 01/08/21 1929     C-Reactive Protein 1.17 mg/dL     POC Glucose Once [[id number]]  (Normal) Collected: 01/08/21 1720    Specimen: Blood Updated: 01/08/21 1723     Glucose 86 mg/dL     Basic Metabolic Panel [[id number]]  (Abnormal) Collected: 01/08/21 1422    Specimen: Blood Updated: 01/08/21 1455     Glucose 130 mg/dL      BUN 22 mg/dL      Creatinine 1.12 mg/dL      Sodium 136 mmol/L      Potassium 4.6 mmol/L      Chloride 101 mmol/L      CO2 25.8 mmol/L      Calcium 8.9 " mg/dL      eGFR Non African Amer 49 mL/min/1.73      BUN/Creatinine Ratio 19.6     Anion Gap 9.2 mmol/L     Narrative:      GFR Normal >60  Chronic Kidney Disease <60  Kidney Failure <15      CBC (No Diff) [[id number]]  (Abnormal) Collected: 01/08/21 1422    Specimen: Blood Updated: 01/08/21 1435     WBC 8.68 10*3/mm3      RBC 3.62 10*6/mm3      Hemoglobin 10.7 g/dL      Hematocrit 32.7 %      MCV 90.3 fL      MCH 29.6 pg      MCHC 32.7 g/dL      RDW 13.0 %      RDW-SD 42.4 fl      MPV 8.8 fL      Platelets 259 10*3/mm3     COVID PRE-OP / PRE-PROCEDURE SCREENING ORDER (NO ISOLATION) - Swab, Nasopharynx [[id number]]  (Normal) Collected: 01/08/21 1328    Specimen: Swab from Nasopharynx Updated: 01/08/21 1432    Narrative:      The following orders were created for panel order COVID PRE-OP / PRE-PROCEDURE SCREENING ORDER (NO ISOLATION) - Swab, Nasopharynx.  Procedure                               Abnormality         Status                     ---------                               -----------         ------                     COVID-19,[facility]...[[id number]]  Normal              Final result                 Please view results for these tests on the individual orders.    COVID-19,[facility] CEPHEID, NP SWAB IN TRANSPORT MEDIA 8-12 HR TAT - Swab, Nasopharynx [[id number]]  (Normal) Collected: 01/08/21 1328    Specimen: Swab from Nasopharynx Updated: 01/08/21 1432     COVID19 Not Detected    Narrative:      Fact sheet for providers: https://www.fda.gov/media/261778/download     Fact sheet for patients: https://www.fda.gov/media/461727/download    POC Glucose Once [[id number]]  (Normal) Collected: 01/08/21 1306    Specimen: Blood Updated: 01/08/21 1307     Glucose 126 mg/dL           Imaging Results (Last 24 Hours)     ** No results found for the last 24 hours. **          Patient Care Team:  Traci Townsend APRN as PCP - General (Nurse Practitioner)  Drake Hinton MD PhD as Consulting Physician  (Ophthalmology)  Fausto Gilliam MD as Consulting Physician (Endocrinology)  Saloni Ch MD (Ophthalmology)  Mikhail Kaplan Jr., MD as Consulting Physician (Urology)    SUBJECTIVE  Pain controlled.  Nerve block still in effect.  Voiding without difficulty.  Tolerating diet.  Denies any nausea, vomiting, diarrhea, rash from the antibiotics.  Compliant with nonweightbearing restrictions.  Operative cultures showing no growth to date. Dr. Galicia following.     PHYSICAL EXAM  Resting in NAD  Splint clean, dry, intact  Toes warm, perfused, brisk capillary refill  Unable to flexes/extends toes s/p nerve block  Decreased over toes s/p nerve block; SILT over right thigh and knee  No pain with passive stretch           Right foot infection      PLAN / DISPOSITION:  POD 1 s/p I&D right foot wound, doing well  1. Pain control: Orals  2.  Antibiotics: Currently on Zosyn and vancomycin per infectious disease.  Operative cultures currently negative.    3. PT: NWB in splint; PT to still eval  4. DVT: ASA 325mg daily plus ALIRIO/SCDs, mobilization  5. Dispo: Pending, follow up 2-3 weeks with me at [facility] ([phone] to make appointment)    Thank you Dr. Galicia for the consultation and recommendations.      CANDIDO Sylvester  01/09/21  09:29 EST

## 2021-01-09 NOTE — PROGRESS NOTES
[facility]  Clinical Pharmacy Department     Vancomycin Pharmacokinetic Note    Marge Mcghee is a 65 y.o. female who is on day 1 of pharmacy to dose vancomycin for bone and/or joint infection.    Target level: AUC24 400-600  Consulting Provider:  Dr Mikhail Banks Jr  Planned Duration of Therapy: 3 days  Other Antimicrobials: Zosyn    Allergies  Allergies as of 01/07/2021 - Reviewed 01/07/2021   Allergen Reaction Noted   • Zofran [ondansetron hcl] Nausea And Vomiting 04/12/2020   • Prochlorperazine Other (See Comments) 11/16/2016   • Meperidine Itching and Swelling 11/16/2016   • Prochlorperazine maleate Other (See Comments) 11/16/2016       Microbiology:  Microbiology Results (last 10 days)       Procedure Component Value - Date/Time    COVID PRE-OP / PRE-PROCEDURE SCREENING ORDER (NO ISOLATION) - Swab, Nasopharynx [[id number]]  (Normal) Collected: 01/08/21 1328    Lab Status: Final result Specimen: Swab from Nasopharynx Updated: 01/08/21 1432    Narrative:      The following orders were created for panel order COVID PRE-OP / PRE-PROCEDURE SCREENING ORDER (NO ISOLATION) - Swab, Nasopharynx.  Procedure                               Abnormality         Status                     ---------                               -----------         ------                     COVID-19,[facility]...[[id number]]  Normal              Final result                 Please view results for these tests on the individual orders.    COVID-19,[facility] CEPHEID, NP SWAB IN TRANSPORT MEDIA 8-12 HR TAT - Swab, Nasopharynx [[id number]]  (Normal) Collected: 01/08/21 1328    Lab Status: Final result Specimen: Swab from Nasopharynx Updated: 01/08/21 1432     COVID19 Not Detected    Narrative:      Fact sheet for providers: https://www.fda.gov/media/775032/download     Fact sheet for patients: https://www.fda.gov/media/354038/download            Radiology/Imaging:  Radiology Results (last 21 days)    Procedure Component Value Units  Date/Time   Peripheral Block [[id number]] Carlos as Reviewed   Order Status: Completed Resulted: 01/08/21 1529    Updated: 01/08/21 1529   Narrative:     Porfirio Hollins MD     1/8/2021  3:29 PM   Peripheral Block     Pre-sedation assessment completed: 1/8/2021 3:15 PM     Patient reassessed immediately prior to procedure     Patient location during procedure: holding area   Start time: 1/8/2021 3:15 PM   Stop time: 1/8/2021 3:25 PM   Reason for block: at surgeon's request and post-op pain management   Performed by   Anesthesiologist: Porfirio Hollins MD   Preanesthetic Checklist   Completed: patient identified, site marked, surgical consent, pre-op   evaluation, timeout performed, IV checked, risks and benefits discussed   and monitors and equipment checked   Prep:   Pt Position: left lateral decubitus   Sterile barriers:cap, gloves, mask and alcohol skin prep   Prep: ChloraPrep   Patient monitoring: blood pressure monitoring, continuous pulse oximetry   and EKG   Procedure   Sedation:yes   Performed under: local infiltration   Guidance:ultrasound guided   ULTRASOUND INTERPRETATION.  Using ultrasound guidance a 22 G (22G needle   for placement of 3cc 2%lido to site prior to start of procedure.) gauge   needle was placed in close proximity to the sciatic nerve, at which point,   under ultrasound guidance anesthetic was injected in the area of the nerve   and spread of the anesthesia was seen on ultrasound in close proximity   thereto.  There were no abnormalities seen on ultrasound; a digital image   was taken; and the patient tolerated the procedure with no complications.   Images:still images obtained, printed/placed on chart     Laterality:right   Block Type:sciatic   Injection Technique:single-shot   Needle Type:echogenic   Needle Gauge:22 G   Resistance on Injection: none     Medications Used: ropivacaine (NAROPIN) 0.5 % injection, 30 mL   Med admintered at 1/8/2021 3:25 PM       Post Assessment   Injection  Assessment: negative aspiration for heme, no paresthesia on   injection and incremental injection   Patient Tolerance:comfortable throughout block   Complications:no    Peripheral Block [[id number]] Carlos as Reviewed   Order Status: Completed Resulted: 01/08/21 1528    Updated: 01/08/21 1528   Narrative:     Porfirio Hollins MD     1/8/2021  3:28 PM   Peripheral Block     Pre-sedation assessment completed: 1/8/2021 3:15 PM     Patient reassessed immediately prior to procedure     Patient location during procedure: holding area   Start time: 1/8/2021 3:15 PM   Stop time: 1/8/2021 3:25 PM   Reason for block: at surgeon's request and post-op pain management   Performed by   Anesthesiologist: Porfirio Hollins MD   Preanesthetic Checklist   Completed: patient identified, site marked, surgical consent, pre-op   evaluation, timeout performed, IV checked, risks and benefits discussed   and monitors and equipment checked   Prep:   Pt Position: supine   Sterile barriers:cap, gloves, mask and alcohol skin prep   Prep: ChloraPrep   Patient monitoring: blood pressure monitoring, continuous pulse oximetry   and EKG   Procedure   Sedation:yes   Performed under: local infiltration   Guidance:ultrasound guided   ULTRASOUND INTERPRETATION.  Using ultrasound guidance a 22 G (22G needle   for placement of 3cc 2%lido to site prior to start of procedure.) gauge   needle was placed in close proximity to the femoral nerve, at which point,   under ultrasound guidance anesthetic was injected in the area of the nerve   and spread of the anesthesia was seen on ultrasound in close proximity   thereto.  There were no abnormalities seen on ultrasound; a digital image   was taken; and the patient tolerated the procedure with no complications.   Images:still images obtained, printed/placed on chart     Laterality:right   Block Type:adductor canal block   Injection Technique:single-shot   Needle Type:echogenic   Needle Gauge:22 G   Resistance on  Injection: none     Medications Used: ropivacaine (NAROPIN) 0.5 % injection, 20 mL   Med admintered at 1/8/2021 3:25 PM       Post Assessment   Injection Assessment: negative aspiration for heme, no paresthesia on   injection and incremental injection   Patient Tolerance:comfortable throughout block   Complications:no    XR Chest PA & Lateral [[id number]] Carlos as Reviewed   Order Status: Completed Collected: 12/30/20 1231    Updated: 12/30/20 1235   Narrative:     XR CHEST PA AND LATERAL-       HISTORY: Female who is 65 years-old,  cough       TECHNIQUE: Frontal and lateral views of the chest       COMPARISON:  image from CT from 05/27/2011       FINDINGS: Heart, mediastinum and pulmonary vasculature are unremarkable.   No focal pulmonary consolidation, pleural effusion, or pneumothorax. Old   granulomatous disease is apparent. No acute osseous process.       Impression:     No focal pulmonary consolidation. Follow-up/further   evaluation can be obtained as clinical indications persist.       This report was finalized on 12/30/2020 12:32 PM by Dr. Zafar Romero M.D.       CT Lower Extremity Right Without Contrast [[id number]] Carlos as Reviewed   Order Status: Completed Collected: 12/29/20 1006    Updated: 12/29/20 1111   Narrative:     CT RIGHT FOOT WITHOUT CONTRAST       HISTORY: Right foot arthrodesis, pain and redness. Evaluate for abscess.       TECHNIQUE: CT includes axial imaging from the distal tibial shaft   through the toes, and this data was reconstructed in coronal and   sagittal planes.       FINDINGS: There has been midfoot fusion with placement of 3 dorsal   staples transfixing 1st, 2nd, 3rd TMT joints. Three cannulated screws   transfix Lisfranc joint, 1st TMT joint, medial/middle cuneiform   articulation. There is associated beam hardening artifact limiting   evaluation. There appears to be partial bony fusion at the 1st TMT   joint. There is bony overgrowth along the plantar aspect of the  1st TMT   joint with spur formation. The proximal prong of the medial dorsal   staple extends into the medial cuneiform ossification that does not   appear to be fused with the distal aspect of the medial cuneiform and   there is bony deformity of the medial cuneiform. There are small bone   fragments extending plantar to the middle cuneiform and 2nd TMT joint.   There is potential bony fusion along the dorsal aspect of the   arthrodesis, though evaluation of this is limited by the degree of beam   hardening artifact. There is a 5 mm calcification dorsal lateral to the   base of the 3rd metatarsal. Generalized edema is present in the   subcutaneous fat about the midfoot and forefoot. There is a calcaneal   donor site. Degenerative plantar calcaneal spur is present.       Impression:     Midfoot arthrodesis with staples and cannulated screws transfixing 1st,   2nd, 3rd TMT joints, Lisfranc joint, medial-middle cuneiform   articulation. Evaluation for degree of fusion is very limited due to   beam hardening artifact though there is potential fusion dorsally. There   appears to be fusion along the dorsal aspect of the 1st TMT joint,   though there are chronic bone fragments or foci of ossification along   the plantar distal margin of the medial cuneiform and middle cuneiform.   There is a question of abscess and evaluation for abscess or infection   is very limited, though there is no CT evidence for drainable   collection. There is generalized edema within the subcutaneous fat about   the foot and this could be associated with cellulitis.       Radiation dose reduction techniques were utilized, including automated   exposure control and exposure modulation based on body size.       This report was finalized on 12/29/2020 11:08 AM by Dr. William Emanuel M.D.       Duplex Venous Lower Extremity - Right CAR [[id number]] Carlos as Reviewed   Order Status: Completed Resulted: 12/28/20 1713    Updated: 12/28/20 1714     "Right Common Femoral Spont Y    Right Common Femoral Phasic Y    Right Common Femoral Augment Y    Right Common Femoral Competent Y    Right Common Femoral Compress C    Right Saphenofemoral Junction Compress C    Right Profunda Femoral Compress C    Right Proximal Femoral Compress C    Right Mid Femoral Spont Y    Right Mid Femoral Phasic Y    Right Mid Femoral Augment Y    Right Mid Femoral Competent Y    Right Mid Femoral Compress C    Right Distal Femoral Compress C    Right Popliteal Spont Y    Right Popliteal Phasic Y    Right Popliteal Augment Y    Right Popliteal Competent Y    Right Popliteal Compress C    Right Posterior Tibial Compress C    Right Peroneal Compress C    Right GastronemiusSoleal Compress C    Right Greater Saph AK Compress C    Right Greater Saph BK Compress C    Right Lesser Saph Compress C    Left Common Femoral Spont Y    Left Common Femoral Phasic Y    Left Common Femoral Augment Y    Left Common Femoral Competent Y    Left Common Femoral Compress C   Narrative:     · Normal right lower extremity venous duplex scan.           Vitals/Labs/I&O  162.6 cm (64\")  85 kg (187 lb 6.3 oz)  Body mass index is 32.17 kg/m².   [unfilled]    Results from last 7 days   Lab Units 01/08/21  1422   WBC 10*3/mm3 8.68                           Estimated Creatinine Clearance: 51.4 mL/min (A) (by C-G formula based on SCr of 1.12 mg/dL (H)).  Results from last 7 days   Lab Units 01/08/21  1422   BUN mg/dL 22   CREATININE mg/dL 1.12*     Intake & Output (last 3 days)         01/06 0701 - 01/07 0700 01/07 0701 - 01/08 0700 01/08 0701 - 01/09 0700    P.O.   100    I.V. (mL/kg)   900 (10.6)    Total Intake(mL/kg)   1000 (11.8)    Net   +1000                   Vancomycin Dosing and Level History:  Last Dose Received in the ED/Outside Facility: 1000 mg  Is Patient on Dialysis or Renal Replacement: no  Inpatient Dosing History (date/time): 1/8/21 at 1704                  Assessment/Plan:    Assessment:  Bayesian " "analysis of the most recent level(s) using nooked provides the following patient-specific pharmacokinetic parameters:   CL: 2.74 L/h   Vd: 70.9 L   T1/2: 18.3 h  If the predicted trough on this regimen is not within what was previously considered a \"target trough range\", the AUC24 (a stronger predictor of vancomycin efficacy) is predicted to achieve the accepted target of 400-600 mg*h/L. To best balance efficacy and toxicity, we will be targeting AUC24 in this patient rather than steady-state trough levels.     Initiating the regimen of 750 mg (10mg/kg) IV every 12 hours gives a predicted steady-state trough of 17.7 mg/L and AUC24 of 515 mg*h/L based upon population pharmacokinetics and this patient's specific parameters.    Recommendations/Plan:  -Initiate vancomycin 750 mg (10 mg/kg) IV every 12 hours   -Obtain vancomycin level on 1/10/21 at 1630 prior to the 5th dose or sooner if acute changes   -Continue to monitor SCr    Thank you for involving pharmacy in this patient's care. Please contact pharmacy with any questions or concerns.                           Jac Wilhelm [facility]  Clinical Pharmacist  01/08/21 19:22 EST        "

## 2021-01-09 NOTE — PLAN OF CARE
Problem: Adult Inpatient Plan of Care  Goal: Plan of Care Review  Outcome: Ongoing, Progressing  Flowsheets (Taken 1/9/2021 1732)  Progress: improving  Plan of Care Reviewed With: patient   Goal Outcome Evaluation:  Plan of Care Reviewed With: patient  Progress: improving       Pt hasn't had any pain or nausea all day. She had a block to her right leg/foot. Pt is compliant most of the time when up out of bed that she doesn't put any weight on her foot. She uses the walker and hops on her left leg. She is still getting IV vanc and zosyn. Pt has complained of mild chest pain that radiates to her back. STAT ekg ordered, results pending. Educated pain that if she doesn't stay off her foot that she is going to make things worse for herself. VSS, Will continue to monitor.

## 2021-01-10 ENCOUNTER — APPOINTMENT (OUTPATIENT)
Dept: CARDIOLOGY | Facility: HOSPITAL | Age: 66
End: 2021-01-10

## 2021-01-10 LAB
BH CV ECHO MEAS - ACS: 1.8 CM
BH CV ECHO MEAS - AO MAX PG (FULL): 1.5 MMHG
BH CV ECHO MEAS - AO MAX PG: 4 MMHG
BH CV ECHO MEAS - AO MEAN PG (FULL): 1 MMHG
BH CV ECHO MEAS - AO MEAN PG: 2 MMHG
BH CV ECHO MEAS - AO ROOT AREA (BSA CORRECTED): 1.4
BH CV ECHO MEAS - AO ROOT AREA: 5.7 CM^2
BH CV ECHO MEAS - AO ROOT DIAM: 2.7 CM
BH CV ECHO MEAS - AO V2 MAX: 100 CM/SEC
BH CV ECHO MEAS - AO V2 MEAN: 59.7 CM/SEC
BH CV ECHO MEAS - AO V2 VTI: 20.6 CM
BH CV ECHO MEAS - ASC AORTA: 3.2 CM
BH CV ECHO MEAS - AVA(I,A): 2.7 CM^2
BH CV ECHO MEAS - AVA(I,D): 2.7 CM^2
BH CV ECHO MEAS - AVA(V,A): 2.7 CM^2
BH CV ECHO MEAS - AVA(V,D): 2.7 CM^2
BH CV ECHO MEAS - BSA(HAYCOCK): 2 M^2
BH CV ECHO MEAS - BSA: 1.9 M^2
BH CV ECHO MEAS - BZI_BMI: 32.1 KILOGRAMS/M^2
BH CV ECHO MEAS - BZI_METRIC_HEIGHT: 162.6 CM
BH CV ECHO MEAS - BZI_METRIC_WEIGHT: 84.8 KG
BH CV ECHO MEAS - EDV(CUBED): 68.9 ML
BH CV ECHO MEAS - EDV(MOD-SP2): 55 ML
BH CV ECHO MEAS - EDV(MOD-SP4): 67 ML
BH CV ECHO MEAS - EDV(TEICH): 74.2 ML
BH CV ECHO MEAS - EF(CUBED): 74.5 %
BH CV ECHO MEAS - EF(MOD-BP): 59.5 %
BH CV ECHO MEAS - EF(MOD-SP2): 61.8 %
BH CV ECHO MEAS - EF(MOD-SP4): 56.7 %
BH CV ECHO MEAS - EF(TEICH): 66.8 %
BH CV ECHO MEAS - ESV(CUBED): 17.6 ML
BH CV ECHO MEAS - ESV(MOD-SP2): 21 ML
BH CV ECHO MEAS - ESV(MOD-SP4): 29 ML
BH CV ECHO MEAS - ESV(TEICH): 24.6 ML
BH CV ECHO MEAS - FS: 36.6 %
BH CV ECHO MEAS - IVS/LVPW: 1
BH CV ECHO MEAS - IVSD: 0.9 CM
BH CV ECHO MEAS - LAT PEAK E' VEL: 4.2 CM/SEC
BH CV ECHO MEAS - LV DIASTOLIC VOL/BSA (35-75): 35.2 ML/M^2
BH CV ECHO MEAS - LV MASS(C)D: 114.1 GRAMS
BH CV ECHO MEAS - LV MASS(C)DI: 60 GRAMS/M^2
BH CV ECHO MEAS - LV MAX PG: 2.5 MMHG
BH CV ECHO MEAS - LV MEAN PG: 1 MMHG
BH CV ECHO MEAS - LV SYSTOLIC VOL/BSA (12-30): 15.3 ML/M^2
BH CV ECHO MEAS - LV V1 MAX: 78.3 CM/SEC
BH CV ECHO MEAS - LV V1 MEAN: 52.6 CM/SEC
BH CV ECHO MEAS - LV V1 VTI: 16.1 CM
BH CV ECHO MEAS - LVIDD: 4.1 CM
BH CV ECHO MEAS - LVIDS: 2.6 CM
BH CV ECHO MEAS - LVLD AP2: 7.4 CM
BH CV ECHO MEAS - LVLD AP4: 7.4 CM
BH CV ECHO MEAS - LVLS AP2: 6.8 CM
BH CV ECHO MEAS - LVLS AP4: 6.7 CM
BH CV ECHO MEAS - LVOT AREA (M): 3.5 CM^2
BH CV ECHO MEAS - LVOT AREA: 3.5 CM^2
BH CV ECHO MEAS - LVOT DIAM: 2.1 CM
BH CV ECHO MEAS - LVPWD: 0.9 CM
BH CV ECHO MEAS - MED PEAK E' VEL: 4.5 CM/SEC
BH CV ECHO MEAS - MV A DUR: 0.16 SEC
BH CV ECHO MEAS - MV A MAX VEL: 102 CM/SEC
BH CV ECHO MEAS - MV DEC SLOPE: 409 CM/SEC^2
BH CV ECHO MEAS - MV DEC TIME: 227 SEC
BH CV ECHO MEAS - MV E MAX VEL: 66 CM/SEC
BH CV ECHO MEAS - MV E/A: 0.65
BH CV ECHO MEAS - MV MAX PG: 3.9 MMHG
BH CV ECHO MEAS - MV MEAN PG: 1 MMHG
BH CV ECHO MEAS - MV P1/2T MAX VEL: 79.6 CM/SEC
BH CV ECHO MEAS - MV P1/2T: 57 MSEC
BH CV ECHO MEAS - MV V2 MAX: 99.2 CM/SEC
BH CV ECHO MEAS - MV V2 MEAN: 53.6 CM/SEC
BH CV ECHO MEAS - MV V2 VTI: 22 CM
BH CV ECHO MEAS - MVA P1/2T LCG: 2.8 CM^2
BH CV ECHO MEAS - MVA(P1/2T): 3.9 CM^2
BH CV ECHO MEAS - MVA(VTI): 2.5 CM^2
BH CV ECHO MEAS - PULM A REVS DUR: 0.13 SEC
BH CV ECHO MEAS - PULM A REVS VEL: 20.9 CM/SEC
BH CV ECHO MEAS - PULM DIAS VEL: 36.2 CM/SEC
BH CV ECHO MEAS - PULM S/D: 1.1
BH CV ECHO MEAS - PULM SYS VEL: 40.5 CM/SEC
BH CV ECHO MEAS - RAP SYSTOLE: 3 MMHG
BH CV ECHO MEAS - SI(AO): 62 ML/M^2
BH CV ECHO MEAS - SI(CUBED): 27 ML/M^2
BH CV ECHO MEAS - SI(LVOT): 29.3 ML/M^2
BH CV ECHO MEAS - SI(MOD-SP2): 17.9 ML/M^2
BH CV ECHO MEAS - SI(MOD-SP4): 20 ML/M^2
BH CV ECHO MEAS - SI(TEICH): 26.1 ML/M^2
BH CV ECHO MEAS - SV(AO): 117.9 ML
BH CV ECHO MEAS - SV(CUBED): 51.3 ML
BH CV ECHO MEAS - SV(LVOT): 55.8 ML
BH CV ECHO MEAS - SV(MOD-SP2): 34 ML
BH CV ECHO MEAS - SV(MOD-SP4): 38 ML
BH CV ECHO MEAS - SV(TEICH): 49.6 ML
BH CV ECHO MEAS - TAPSE (>1.6): 1.7 CM
BH CV ECHO MEASUREMENTS AVERAGE E/E' RATIO: 15.17
BH CV VAS BP RIGHT ARM: NORMAL MMHG
BH CV XLRA - RV BASE: 2.8 CM
BH CV XLRA - RV LENGTH: 5.8 CM
BH CV XLRA - RV MID: 2.2 CM
BH CV XLRA - TDI S': 12.9 CM/SEC
CREAT SERPL-MCNC: 0.89 MG/DL (ref 0.57–1)
GFR SERPL CREATININE-BSD FRML MDRD: 63 ML/MIN/1.73
GLUCOSE BLDC GLUCOMTR-MCNC: 111 MG/DL (ref 70–130)
LEFT ATRIUM VOLUME INDEX: 17 ML/M2
MAXIMAL PREDICTED HEART RATE: 153 BPM
QT INTERVAL: 336 MS
STRESS TARGET HR: 130 BPM
TROPONIN T SERPL-MCNC: 0.01 NG/ML (ref 0–0.03)

## 2021-01-10 PROCEDURE — 63710000001 PROMETHAZINE PER 12.5 MG: Performed by: ORTHOPAEDIC SURGERY

## 2021-01-10 PROCEDURE — 82962 GLUCOSE BLOOD TEST: CPT

## 2021-01-10 PROCEDURE — 25010000002 PIPERACILLIN SOD-TAZOBACTAM PER 1 G: Performed by: ORTHOPAEDIC SURGERY

## 2021-01-10 PROCEDURE — 25010000002 VANCOMYCIN 750 MG RECONSTITUTED SOLUTION: Performed by: ORTHOPAEDIC SURGERY

## 2021-01-10 PROCEDURE — 99222 1ST HOSP IP/OBS MODERATE 55: CPT | Performed by: INTERNAL MEDICINE

## 2021-01-10 PROCEDURE — 82565 ASSAY OF CREATININE: CPT | Performed by: ORTHOPAEDIC SURGERY

## 2021-01-10 PROCEDURE — 84484 ASSAY OF TROPONIN QUANT: CPT | Performed by: INTERNAL MEDICINE

## 2021-01-10 PROCEDURE — 93306 TTE W/DOPPLER COMPLETE: CPT

## 2021-01-10 PROCEDURE — 93306 TTE W/DOPPLER COMPLETE: CPT | Performed by: INTERNAL MEDICINE

## 2021-01-10 RX ORDER — PROMETHAZINE HYDROCHLORIDE 12.5 MG/1
12.5 TABLET ORAL EVERY 6 HOURS PRN
Status: DISCONTINUED | OUTPATIENT
Start: 2021-01-10 | End: 2021-01-12 | Stop reason: HOSPADM

## 2021-01-10 RX ADMIN — TAZOBACTAM SODIUM AND PIPERACILLIN SODIUM 3.38 G: 375; 3 INJECTION, SOLUTION INTRAVENOUS at 19:06

## 2021-01-10 RX ADMIN — TAZOBACTAM SODIUM AND PIPERACILLIN SODIUM 3.38 G: 375; 3 INJECTION, SOLUTION INTRAVENOUS at 09:15

## 2021-01-10 RX ADMIN — OXYCODONE HYDROCHLORIDE AND ACETAMINOPHEN 2 TABLET: 7.5; 325 TABLET ORAL at 05:13

## 2021-01-10 RX ADMIN — PROMETHAZINE HYDROCHLORIDE 12.5 MG: 12.5 TABLET ORAL at 23:06

## 2021-01-10 RX ADMIN — OXYCODONE HYDROCHLORIDE AND ACETAMINOPHEN 2 TABLET: 7.5; 325 TABLET ORAL at 15:50

## 2021-01-10 RX ADMIN — LINAGLIPTIN 5 MG: 5 TABLET, FILM COATED ORAL at 09:08

## 2021-01-10 RX ADMIN — DOCUSATE SODIUM 50MG AND SENNOSIDES 8.6MG 2 TABLET: 8.6; 5 TABLET, FILM COATED ORAL at 21:59

## 2021-01-10 RX ADMIN — PROMETHAZINE HYDROCHLORIDE 12.5 MG: 12.5 TABLET ORAL at 16:48

## 2021-01-10 RX ADMIN — PROMETHAZINE HYDROCHLORIDE 12.5 MG: 12.5 TABLET ORAL at 09:13

## 2021-01-10 RX ADMIN — DULOXETINE HYDROCHLORIDE 60 MG: 60 CAPSULE, DELAYED RELEASE ORAL at 16:49

## 2021-01-10 RX ADMIN — SODIUM CHLORIDE 750 MG: 900 INJECTION, SOLUTION INTRAVENOUS at 05:09

## 2021-01-10 RX ADMIN — ASPIRIN 325 MG: 325 TABLET, COATED ORAL at 09:07

## 2021-01-10 RX ADMIN — SODIUM CHLORIDE 750 MG: 900 INJECTION, SOLUTION INTRAVENOUS at 16:49

## 2021-01-10 RX ADMIN — METOPROLOL TARTRATE 12.5 MG: 25 TABLET, FILM COATED ORAL at 09:08

## 2021-01-10 RX ADMIN — METOPROLOL TARTRATE 12.5 MG: 25 TABLET, FILM COATED ORAL at 21:59

## 2021-01-10 RX ADMIN — SODIUM CHLORIDE, PRESERVATIVE FREE 10 ML: 5 INJECTION INTRAVENOUS at 09:09

## 2021-01-10 RX ADMIN — POTASSIUM CHLORIDE 20 MEQ: 750 TABLET, EXTENDED RELEASE ORAL at 09:08

## 2021-01-10 RX ADMIN — ROPINIROLE HYDROCHLORIDE 0.25 MG: 0.5 TABLET, FILM COATED ORAL at 16:48

## 2021-01-10 RX ADMIN — OXYCODONE HYDROCHLORIDE AND ACETAMINOPHEN 2 TABLET: 7.5; 325 TABLET ORAL at 23:06

## 2021-01-10 RX ADMIN — LOSARTAN POTASSIUM 100 MG: 100 TABLET, FILM COATED ORAL at 09:08

## 2021-01-10 RX ADMIN — OXYCODONE HYDROCHLORIDE AND ACETAMINOPHEN 2 TABLET: 7.5; 325 TABLET ORAL at 09:08

## 2021-01-10 RX ADMIN — FUROSEMIDE 20 MG: 20 TABLET ORAL at 09:08

## 2021-01-10 RX ADMIN — GLIPIZIDE 10 MG: 10 TABLET ORAL at 09:08

## 2021-01-10 RX ADMIN — ATORVASTATIN CALCIUM 80 MG: 20 TABLET, FILM COATED ORAL at 21:59

## 2021-01-10 RX ADMIN — OXYCODONE HYDROCHLORIDE AND ACETAMINOPHEN 2 TABLET: 7.5; 325 TABLET ORAL at 01:10

## 2021-01-10 RX ADMIN — SODIUM CHLORIDE 750 MG: 900 INJECTION, SOLUTION INTRAVENOUS at 16:48

## 2021-01-10 RX ADMIN — TAZOBACTAM SODIUM AND PIPERACILLIN SODIUM 3.38 G: 375; 3 INJECTION, SOLUTION INTRAVENOUS at 01:09

## 2021-01-10 NOTE — PROGRESS NOTES
"  Infectious Diseases Progress Note    Giancarlo Galicia MD     [facility]  Los: 1 day  Patient Identification:  Name: Marge Mcghee  Age: 65 y.o.  Sex: female  :  1955  MRN: [id number]         Primary Care Physician: Traci Townsend APRN            Subjective: Feels about the same, frustrated  Interval History: See consultation note    Objective:    Scheduled Meds:aspirin EC, 325 mg, Oral, Daily  atorvastatin, 80 mg, Oral, Nightly  DULoxetine, 60 mg, Oral, Q PM  furosemide, 20 mg, Oral, Daily  glipizide, 10 mg, Oral, QAM AC  linagliptin, 5 mg, Oral, Daily  losartan, 100 mg, Oral, Daily  metFORMIN, 1,000 mg, Oral, BID With Meals  piperacillin-tazobactam, 3.375 g, Intravenous, Q8H  potassium chloride, 20 mEq, Oral, Daily  rOPINIRole, 0.25 mg, Oral, Q PM  senna-docusate sodium, 2 tablet, Oral, BID  sodium chloride, 10 mL, Intravenous, Q12H  vancomycin, 10 mg/kg, Intravenous, Q12H      Continuous Infusions:lactated ringers, 9 mL/hr, Last Rate: 9 mL/hr (21 1457)  lactated ringers, 100 mL/hr, Last Rate: 100 mL/hr (21 1749)  Pharmacy to dose vancomycin,         Vital signs in last 24 hours:  Temp:  [96.6 °F (35.9 °C)-98.1 °F (36.7 °C)] 97.9 °F (36.6 °C)  Heart Rate:  [86-97] 95  Resp:  [16] 16  BP: (135-187)/(68-98) 171/85    Intake/Output:    Intake/Output Summary (Last 24 hours) at 2021  Last data filed at 2021 1715  Gross per 24 hour   Intake 1286 ml   Output 1200 ml   Net 86 ml       Exam:  /85 (BP Location: Left arm, Patient Position: Lying)   Pulse 95   Temp 97.9 °F (36.6 °C) (Oral)   Resp 16   Ht 162.6 cm (64\")   Wt 85 kg (187 lb 6.3 oz)   SpO2 96%   BMI 32.17 kg/m²   Patient is examined using the personal protective equipment as per guidelines from infection control for this particular patient as enacted.  Hand washing was performed before and after patient interaction.  General Appearance:    Alert, cooperative, no distress, AAOx3                         "  Head:    Normocephalic, without obvious abnormality, atraumatic                           Eyes:    PERRL, conjunctivae/corneas clear, EOM's intact, both eyes                         Throat:   Lips, tongue, gums normal; oral mucosa pink and moist                           Neck:   Supple, symmetrical, trachea midline, no JVD                         Lungs:    Clear to auscultation bilaterally, respirations unlabored                 Chest Wall:    No tenderness or deformity                          Heart:    Regular rate and rhythm, S1 and S2 normal, no murmur, no rub                                         or gallop                  Abdomen:     Soft, non-tender, bowel sounds active, no masses, no                                                        organomegaly                  Extremities: Right foot dressed                        Pulses:   Pulses palpable in all extremities                            Skin:   Skin is warm and dry,  no rashes or palpable lesions                  Neurologic: Grossly nonfocal       Data Review:    I reviewed the patient's new clinical results.  Results from last 7 days   Lab Units 01/08/21  1422   WBC 10*3/mm3 8.68   HEMOGLOBIN g/dL 10.7*   PLATELETS 10*3/mm3 259     Results from last 7 days   Lab Units 01/08/21  1422   SODIUM mmol/L 136   POTASSIUM mmol/L 4.6   CHLORIDE mmol/L 101   CO2 mmol/L 25.8   BUN mg/dL 22   CREATININE mg/dL 1.12*   CALCIUM mg/dL 8.9   GLUCOSE mg/dL 130*     Microbiology Results (last 10 days)     Procedure Component Value - Date/Time    Tissue / Bone Culture - Tissue, Foot, Right [[id number]] Collected: 01/08/21 1646    Lab Status: Preliminary result Specimen: Tissue from Foot, Right Updated: 01/09/21 1016     Tissue Culture No growth     Gram Stain No WBCs or organisms seen    Wound Culture - Wound, Foot, Right [[id number]] Collected: 01/08/21 1645    Lab Status: Preliminary result Specimen: Wound from Foot, Right Updated: 01/09/21 1016     Wound Culture No  growth     Gram Stain No WBCs or organisms seen    COVID PRE-OP / PRE-PROCEDURE SCREENING ORDER (NO ISOLATION) - Swab, Nasopharynx [[id number]]  (Normal) Collected: 01/08/21 1328    Lab Status: Final result Specimen: Swab from Nasopharynx Updated: 01/08/21 1432    Narrative:      The following orders were created for panel order COVID PRE-OP / PRE-PROCEDURE SCREENING ORDER (NO ISOLATION) - Swab, Nasopharynx.  Procedure                               Abnormality         Status                     ---------                               -----------         ------                     COVID-19,[facility]...[[id number]]  Normal              Final result                 Please view results for these tests on the individual orders.    COVID-19,[facility] CEPHEID, NP SWAB IN TRANSPORT MEDIA 8-12 HR TAT - Swab, Nasopharynx [[id number]]  (Normal) Collected: 01/08/21 1328    Lab Status: Final result Specimen: Swab from Nasopharynx Updated: 01/08/21 1432     COVID19 Not Detected    Narrative:      Fact sheet for providers: https://www.fda.gov/media/962173/download     Fact sheet for patients: https://www.fda.gov/media/287039/download            Assessment:    Right foot infection  1-postop right foot surgical site infection following complex fracture repair and corrective surgery in November 2020 presenting as wound dehiscence and cellulitis with transient response with oral antibiotics and IV antibiotics of short duration and likely associated with  2-deep infection with probable infected hardware and contiguous focus osteomyelitis.  3-other diagnoses include   diabetes mellitus with peripheral neuropathy  Osteoporosis  Hypertension        Recommendations/Discussions:  · Continue empiric broad-spectrum antibiotics directed towards pathogens that can cause smoldering low-grade surgical site infection with involvement of hardware and not responding to antibiotic treatment.  This represents could range from Corynebacterium  species to propionobacterium species to coag negative staph.  · Would recommend vancomycin and Zosyn for now while awaiting operative cultures.  · Explained to the patient that if operative cultures are negative then the selection of antibiotic would be empiric coverage for the pathogens listed above.  · Duration of antibiotic treatment would be 6 weeks from last definitive surgery with close monitoring of antibiotic toxicity and side effects.      Giancarlo Galicia MD  1/9/2021  21:20 EST    Much of this encounter note is an electronic transcription/translation of spoken language to printed text. The electronic translation of spoken language may permit erroneous, or at times, nonsensical words or phrases to be inadvertently transcribed; Although I have reviewed the note for such errors, some may still exist

## 2021-01-10 NOTE — PROGRESS NOTES
"  Infectious Diseases Progress Note    Giancarlo Galicia MD     [facility]  [place]: 2 days  Patient Identification:  Name: Marge Mcghee  Age: 65 y.o.  Sex: female  :  1955  MRN: [id number]         Primary Care Physician: Traci Townsend APRN            Subjective: Has nausea chest pain is somewhat better.  Interval History: See consultation note    Objective:    Scheduled Meds:aspirin EC, 325 mg, Oral, Daily  atorvastatin, 80 mg, Oral, Nightly  DULoxetine, 60 mg, Oral, Q PM  furosemide, 20 mg, Oral, Daily  glipizide, 10 mg, Oral, QAM AC  linagliptin, 5 mg, Oral, Daily  losartan, 100 mg, Oral, Daily  metFORMIN, 1,000 mg, Oral, BID With Meals  metoprolol tartrate, 12.5 mg, Oral, Q12H  piperacillin-tazobactam, 3.375 g, Intravenous, Q8H  potassium chloride, 20 mEq, Oral, Daily  rOPINIRole, 0.25 mg, Oral, Q PM  senna-docusate sodium, 2 tablet, Oral, BID  sodium chloride, 10 mL, Intravenous, Q12H  vancomycin, 10 mg/kg, Intravenous, Q12H      Continuous Infusions:lactated ringers, 9 mL/hr, Last Rate: 9 mL/hr (21 1457)  lactated ringers, 100 mL/hr, Last Rate: 100 mL/hr (21 1749)  Pharmacy to dose vancomycin,         Vital signs in last 24 hours:  Temp:  [96.9 °F (36.1 °C)-99.1 °F (37.3 °C)] 97.1 °F (36.2 °C)  Heart Rate:  [83-97] 83  Resp:  [16] 16  BP: (140-187)/(69-98) 156/85    Intake/Output:    Intake/Output Summary (Last 24 hours) at 1/10/2021 0971  Last data filed at 1/10/2021 0509  Gross per 24 hour   Intake 853 ml   Output 300 ml   Net 553 ml       Exam:  /85 (BP Location: Left arm, Patient Position: Lying)   Pulse 83   Temp 97.1 °F (36.2 °C) (Skin)   Resp 16   Ht 162.6 cm (64\")   Wt 85 kg (187 lb 6.3 oz)   SpO2 98%   BMI 32.17 kg/m²   Patient is examined using the personal protective equipment as per guidelines from infection control for this particular patient as enacted.  Hand washing was performed before and after patient interaction.  General Appearance:    Alert, " cooperative, no distress, AAOx3                          Head:    Normocephalic, without obvious abnormality, atraumatic                           Eyes:    PERRL, conjunctivae/corneas clear, EOM's intact, both eyes                         Throat:   Lips, tongue, gums normal; oral mucosa pink and moist                           Neck:   Supple, symmetrical, trachea midline, no JVD                         Lungs:    Clear to auscultation bilaterally, respirations unlabored                 Chest Wall:    No tenderness or deformity                          Heart:    Regular rate and rhythm, S1 and S2 normal, no murmur, no rub                                         or gallop                  Abdomen:     Soft, non-tender, bowel sounds active, no masses, no                                                        organomegaly                  Extremities: Right foot dressed                        Pulses:   Pulses palpable in all extremities                            Skin:   Skin is warm and dry,  no rashes or palpable lesions                  Neurologic: Grossly nonfocal       Data Review:    I reviewed the patient's new clinical results.  Results from last 7 days   Lab Units 01/08/21  1422   WBC 10*3/mm3 8.68   HEMOGLOBIN g/dL 10.7*   PLATELETS 10*3/mm3 259     Results from last 7 days   Lab Units 01/10/21  0519 01/08/21  1422   SODIUM mmol/L  --  136   POTASSIUM mmol/L  --  4.6   CHLORIDE mmol/L  --  101   CO2 mmol/L  --  25.8   BUN mg/dL  --  22   CREATININE mg/dL 0.89 1.12*   CALCIUM mg/dL  --  8.9   GLUCOSE mg/dL  --  130*     Microbiology Results (last 10 days)     Procedure Component Value - Date/Time    Tissue / Bone Culture - Tissue, Foot, Right [[id number]] Collected: 01/08/21 1646    Lab Status: Preliminary result Specimen: Tissue from Foot, Right Updated: 01/10/21 0852     Tissue Culture No growth at 2 days     Gram Stain No WBCs or organisms seen    Wound Culture - Wound, Foot, Right [[id number]] Collected:  01/08/21 1645    Lab Status: Preliminary result Specimen: Wound from Foot, Right Updated: 01/10/21 0852     Wound Culture No growth at 2 days     Gram Stain No WBCs or organisms seen    COVID PRE-OP / PRE-PROCEDURE SCREENING ORDER (NO ISOLATION) - Swab, Nasopharynx [[id number]]  (Normal) Collected: 01/08/21 1328    Lab Status: Final result Specimen: Swab from Nasopharynx Updated: 01/08/21 1432    Narrative:      The following orders were created for panel order COVID PRE-OP / PRE-PROCEDURE SCREENING ORDER (NO ISOLATION) - Swab, Nasopharynx.  Procedure                               Abnormality         Status                     ---------                               -----------         ------                     COVID-19,[facility]...[[id number]]  Normal              Final result                 Please view results for these tests on the individual orders.    COVID-19,[facility] CEPHEID, NP SWAB IN TRANSPORT MEDIA 8-12 HR TAT - Swab, Nasopharynx [[id number]]  (Normal) Collected: 01/08/21 1328    Lab Status: Final result Specimen: Swab from Nasopharynx Updated: 01/08/21 1432     COVID19 Not Detected    Narrative:      Fact sheet for providers: https://www.fda.gov/media/028547/download     Fact sheet for patients: https://www.fda.gov/media/922654/download            Assessment:    Right foot infection  1-postop right foot surgical site infection following complex fracture repair and corrective surgery in November 2020 presenting as wound dehiscence and cellulitis with transient response with oral antibiotics and IV antibiotics of short duration and likely associated with  2-deep infection with probable infected hardware and contiguous focus osteomyelitis.  3-other diagnoses include   diabetes mellitus with peripheral neuropathy  Osteoporosis  Hypertension        Recommendations/Discussions:  · Continue empiric broad-spectrum antibiotics directed towards pathogens that can cause smoldering low-grade surgical  site infection with involvement of hardware and not responding to antibiotic treatment.  This represents could range from Corynebacterium species to propionobacterium species to coag negative staph.  · Would recommend vancomycin and Zosyn for now while awaiting operative cultures.  · Explained to the patient that if operative cultures are negative then the selection of antibiotic would be empiric coverage for the pathogens listed above.  · Duration of antibiotic treatment would be 6 weeks from last definitive surgery with close monitoring of antibiotic toxicity and side effects.      Giancarlo Galicia MD  1/10/2021  09:45 EST    Much of this encounter note is an electronic transcription/translation of spoken language to printed text. The electronic translation of spoken language may permit erroneous, or at times, nonsensical words or phrases to be inadvertently transcribed; Although I have reviewed the note for such errors, some may still exist

## 2021-01-10 NOTE — PROGRESS NOTES
"  Orthopaedic Surgery   Daily Progress Note  Dr. Jac Boone Sr  [phone]  DEMOGRAPHICS:   · Marge Mcghee   · Age:65 y.o.   · MRN:[id number]  · Admitted: 1/8/2021    PROCEDURE: 2 Days Post-Op s/p Procedure(s):  RIGHT IRRIGATION AND DEBRIDEMENT OF FOOT WITH SECONDARY CLOSURE AND HARDWARE REMOVAL     /85 (BP Location: Left arm, Patient Position: Lying)   Pulse 83   Temp 97.1 °F (36.2 °C) (Skin)   Resp 16   Ht 162.6 cm (64\")   Wt 85 kg (187 lb 6.3 oz)   SpO2 98%   BMI 32.17 kg/m²     Lab Results (last 24 hours)     Procedure Component Value Units Date/Time    Tissue / Bone Culture - Tissue, Foot, Right [[id number]] Collected: 01/08/21 1646    Specimen: Tissue from Foot, Right Updated: 01/10/21 0852     Tissue Culture No growth at 2 days     Gram Stain No WBCs or organisms seen    Wound Culture - Wound, Foot, Right [[id number]] Collected: 01/08/21 1645    Specimen: Wound from Foot, Right Updated: 01/10/21 0852     Wound Culture No growth at 2 days     Gram Stain No WBCs or organisms seen    Troponin [[id number]]  (Normal) Collected: 01/10/21 0519    Specimen: Blood Updated: 01/10/21 0740     Troponin T 0.013 ng/mL     Narrative:      Troponin T Reference Range:  <= 0.03 ng/mL-   Negative for AMI  >0.03 ng/mL-     Abnormal for myocardial necrosis.  Clinicians would have to utilize clinical acumen, EKG, Troponin and serial changes to determine if it is an Acute Myocardial Infarction or myocardial injury due to an underlying chronic condition.       Results may be falsely decreased if patient taking Biotin.      Creatinine, Serum [[id number]]  (Normal) Collected: 01/10/21 0519    Specimen: Blood Updated: 01/10/21 0619     Creatinine 0.89 mg/dL      eGFR Non African Amer 63 mL/min/1.73     Narrative:      GFR Normal >60  Chronic Kidney Disease <60  Kidney Failure <15      POC Glucose Once [[id number]]  (Normal) Collected: 01/10/21 0610    Specimen: Blood Updated: 01/10/21 0612     Glucose 111 mg/dL     " POC Glucose Once [[id number]]  (Abnormal) Collected: 01/09/21 2117    Specimen: Blood Updated: 01/09/21 2119     Glucose 144 mg/dL     POC Glucose Once [[id number]]  (Normal) Collected: 01/09/21 1607    Specimen: Blood Updated: 01/09/21 1609     Glucose 103 mg/dL     POC Glucose Once [[id number]]  (Normal) Collected: 01/09/21 1119    Specimen: Blood Updated: 01/09/21 1121     Glucose 91 mg/dL           Imaging Results (Last 24 Hours)     Procedure Component Value Units Date/Time    CT Angiogram Chest [[id number]] Collected: 01/09/21 1943     Updated: 01/1955    Narrative:      CT ANGIOGRAM OF THE CHEST     HISTORY: Cough     COMPARISON: None available.     TECHNIQUE: Axial CT imaging was obtained through the thorax. IV contrast  was administered. Three-D reformatted images were obtained.     FINDINGS:  No acute pulmonary thromboembolus is seen. There are coronary artery  calcifications. The thoracic aorta is normal in caliber. There is no  evidence of dissection. There is mild atelectasis. Mediastinal lymph  nodes do not appear pathologically enlarged. Images through the upper  abdomen demonstrate a right adrenal nodule, which has been previously  characterized as an adenoma. The thyroid gland and trachea appear within  normal limits. There is a small hiatal hernia. No acute osseous  abnormalities are seen.       Impression:      No acute intrathoracic findings.     Radiation dose reduction techniques were utilized, including automated  exposure control and exposure modulation based on body size.     This report was finalized on 1/9/2021 7:50 PM by Dr. Cheryl Giles M.D.             Patient Care Team:  Traci Townsend APRN as PCP - General (Nurse Practitioner)  Drake Hinton MD PhD as Consulting Physician (Ophthalmology)  Fausto Gilliam MD as Consulting Physician (Endocrinology)  Saloni Ch MD (Ophthalmology)  Mikhail Kaplan Jr., MD as Consulting Physician (Urology)    SUBJECTIVE  She  had chest pain yesterday.  This is resolved now.  Nausea with pain medication    PHYSICAL EXAM  Dressing intact     ASSESSMENT: Patient is a 65 y.o. female who is 2 Days Post-Op s/p Procedure(s):  RIGHT IRRIGATION AND DEBRIDEMENT OF FOOT WITH SECONDARY CLOSURE AND HARDWARE REMOVAL     PLAN / DISPOSITION:  Her CT to rule out pulmonary embolus was negative.  Cardiology saw her and there is evidently no cardiac cause of her chest pain.  Phenergan for nausea  Continue with IV antibiotics.    Jac Boone Sr, MD  Orthopaedic Surgery  [facility]  [phone]

## 2021-01-10 NOTE — CONSULTS
[facility] Consult Note       Encounter Date:01/10/21  Patient:Marge Mcghee  :1955  MRN:[id number]    Date of Admission: 2021  Date of Encounter Visit: 01/10/21  Encounter Provider: Darci Holt MD  Referring Provider: Mikhail Banks Jr., MD  Place of Service: [facility]  Patient Care Team:  Traci Townsend APRN as PCP - General (Nurse Practitioner)  Drake Hinton MD PhD as Consulting Physician (Ophthalmology)  Fausto Gilliam MD as Consulting Physician (Endocrinology)  Saloni Ch MD (Ophthalmology)  Mikhail Kaplan Jr., MD as Consulting Physician (Urology)      Consulted for: Chest pain     Chief Complaint: Incision and drainage lower extremity      History of Presenting Illness:       Ms. Mcghee is a 65 y.o. woman with a history of hypertension, diabetes, hyperlipidemia, and GERD who was admitted to the hospital on 2021 for incision and drainage of right lower extremity. She had a right foot arthrodesis six weeks ago with delayed wound healing and local cellulitis.     Yesterday she started to complain of chest ache that was across her chest and radiating to her back.  This would last for 3 to 5 minutes.  Typically it would resolve on its own.  There are no inciting or alleviating factors.  She did describe some associated diaphoresis with these episodes.  She had a few of these throughout the day yesterday and into the evening.  She has had no further episodes.  An EKG was performed during 1 of these episodes and she was noted to have sinus tachycardia at 101 bpm but no acute changes.  She has never had any prior chest pain and overall has been fairly healthy up until her orthopedic issues.  She did have a CTA yesterday which excluded pulmonary emboli.      Review of Systems:  Review of Systems   Constitution: Positive for diaphoresis.   HENT: Negative.    Eyes: Negative.    Cardiovascular: Positive for chest pain.   Endocrine:  Negative.    Hematologic/Lymphatic: Negative.    Skin: Negative.    Musculoskeletal: Positive for joint pain.   Gastrointestinal: Negative.    Genitourinary: Negative.    Neurological: Negative.    Psychiatric/Behavioral: Negative.    Allergic/Immunologic: Negative.        Medications:    Current Facility-Administered Medications:   •  aspirin EC tablet 325 mg, 325 mg, Oral, Daily, Mikhail Banks Jr., MD, 325 mg at 01/09/21 0858  •  atorvastatin (LIPITOR) tablet 80 mg, 80 mg, Oral, Nightly, Mikhail Banks Jr., MD, 80 mg at 01/09/21 2027  •  DULoxetine (CYMBALTA) DR capsule 60 mg, 60 mg, Oral, Q PM, Mikhail Banks Jr., MD, 60 mg at 01/09/21 1715  •  furosemide (LASIX) tablet 20 mg, 20 mg, Oral, Daily, Mikhail Banks Jr., MD  •  glipizide (GLUCOTROL) tablet 10 mg, 10 mg, Oral, QAM AC, Mikhail Banks Jr., MD, 10 mg at 01/09/21 0858  •  HYDROmorphone (DILAUDID) injection 1 mg, 1 mg, Intravenous, Q4H PRN **AND** naloxone (NARCAN) injection 0.4 mg, 0.4 mg, Intravenous, Q5 Min PRN, Mikhail Banks Jr., MD  •  lactated ringers infusion, 9 mL/hr, Intravenous, Continuous, Mikhail Banks Jr., MD, Last Rate: 9 mL/hr at 01/08/21 1457, Currently Infusing at 01/08/21 1620  •  lactated ringers infusion, 100 mL/hr, Intravenous, Continuous, Mikhial Banks Jr., MD, Last Rate: 100 mL/hr at 01/08/21 1749, 100 mL/hr at 01/08/21 1749  •  linagliptin (TRADJENTA) tablet 5 mg, 5 mg, Oral, Daily, Mikhail Banks Jr., MD, 5 mg at 01/08/21 2111  •  losartan (COZAAR) tablet 100 mg, 100 mg, Oral, Daily, Mikhail Banks Jr., MD, 100 mg at 01/08/21 2114  •  magnesium hydroxide (MILK OF MAGNESIA) suspension 2400 mg/10mL 10 mL, 10 mL, Oral, Daily PRN, Mikhail Banks Jr., MD  •  metFORMIN (GLUCOPHAGE) tablet 1,000 mg, 1,000 mg, Oral, BID With Meals, Mikhail Banks Jr., MD, 1,000 mg at 01/09/21 1715  •  ondansetron (ZOFRAN) injection 4 mg, 4 mg, Intravenous, Q6H PRN, Mikhail Banks Jr., MD  •  oxyCODONE-acetaminophen (PERCOCET) 7.5-325 MG per tablet 1 tablet, 1  tablet, Oral, Q4H PRN **OR** oxyCODONE-acetaminophen (PERCOCET) 7.5-325 MG per tablet 2 tablet, 2 tablet, Oral, Q4H PRN, Mikhail Banks Jr., MD, 2 tablet at 01/10/21 0513  •  pantoprazole (PROTONIX) EC tablet 40 mg, 40 mg, Oral, Daily PRN, Mikhail Banks Jr., MD  •  Pharmacy to dose vancomycin, , Does not apply, Continuous PRN, Mikhail Banks Jr., MD  •  piperacillin-tazobactam (ZOSYN) 3.375 g in iso-osmotic dextrose 50 ml (premix), 3.375 g, Intravenous, Q8H, Mikhail Banks Jr., MD, 3.375 g at 01/10/21 0109  •  potassium chloride (K-DUR,KLOR-CON) ER tablet 20 mEq, 20 mEq, Oral, Daily, Mikhail Banks Jr., MD, 20 mEq at 01/09/21 0859  •  rOPINIRole (REQUIP) tablet 0.25 mg, 0.25 mg, Oral, Q PM, Mikhail Banks Jr., MD, 0.25 mg at 01/09/21 1715  •  sennosides-docusate (PERICOLACE) 8.6-50 MG per tablet 2 tablet, 2 tablet, Oral, BID, Mikhail Banks Jr., MD, 2 tablet at 01/09/21 0858  •  sodium chloride 0.9 % flush 10 mL, 10 mL, Intravenous, Q12H, Mikhail Banks Jr., MD, 10 mL at 01/09/21 2024  •  sodium chloride 0.9 % flush 10 mL, 10 mL, Intravenous, PRN, Mikhail Banks Jr., MD  •  sucralfate (CARAFATE) tablet 1 g, 1 g, Oral, BID PRN, Mikhail Banks Jr., MD  •  vancomycin 750 mg/250 mL 0.9% NS add-vantage, 10 mg/kg, Intravenous, Q12H, Mikhail Banks Jr., MD, 750 mg at 01/10/21 0509    Allergies   Allergen Reactions   • Zofran [Ondansetron Hcl] Nausea And Vomiting     Does the opposite of its purpose   • Prochlorperazine Other (See Comments)     CAUSED SEIZURE   • Meperidine Itching and Swelling   • Prochlorperazine Maleate Other (See Comments)     CAUSES SEIZURE       Past Medical History:   Diagnosis Date   • Adrenal nodule (CMS/HCC) 11/16/2016    FINDINGS: Mild hepatic steatosis may be present. Cholecystectomy. No biliary ductal dilatation. Negative pancreas, spleen, left adrenal gland, and kidneys. The right adrenal presumed adenoma has increased slightly, measuring 3 x 4 cm in diameter,  compared to 2 x 3.5 cm on the previous  exam. The attenuation values are consistent with an adenoma. Done at [facility] in August 2018   • Age-related osteoporosis without current pathological fracture 11/16/2016   • Arthritis    • Delayed surgical wound healing    • Essential hypertension 11/16/2016   • Gastroesophageal reflux disease with esophagitis 11/16/2016   • Hepatic steatosis 11/16/2016   • History of anemia    • History of sepsis     FROM UTI   • Hyperlipidemia 11/16/2016   • Lisfranc's dislocation     LEFT WITH FRACTURES NONHEALING FOOT   • RLS (restless legs syndrome)    • Squamous cell skin cancer 2014    Excised by Dr. James   • Thyroid nodule 10/18/2019    BENIGN   • Type 2 diabetes mellitus with peripheral neuropathy (CMS/HCC) 11/16/2016   • Vitamin D deficiency 1/25/2019       Past Surgical History:   Procedure Laterality Date   • BREAST BIOPSY Left     7/2019. BENIGN   • CATARACT EXTRACTION WITH INTRAOCULAR LENS IMPLANT Bilateral    • CHOLECYSTECTOMY     • COLONOSCOPY     • FOOT FUSION Right 11/13/2020    Procedure: RIGHT OPEN TREATMENT LISFRANC INJURY, OPEN REDUCTION INTERNAL FIXATION MEDIAL/MIDDLE CUNEIFORM FRACTURE AND 2ND/3RD METATARSAL FRACTURE 1ST 2ND POSSIBLE 3RD TARSOMETATARSAL ARTHRODESIS INTERCUNEIFORM ARTHRODESIS CALCANEAL BONE GRAFT;  Surgeon: Mikhail Banks Jr., MD;  Location: [facility];  Service: Orthopedics;  Laterality: Right;   • KNEE ARTHROSCOPY Bilateral    • TOTAL ABDOMINAL HYSTERECTOMY     • UPPER GASTROINTESTINAL ENDOSCOPY  08/2016   • US GUIDED FINE NEEDLE ASPIRATION  5/8/2020       Social History     Socioeconomic History   • Marital status:      Spouse name: Not on file   • Number of children: Not on file   • Years of education: Not on file   • Highest education level: Not on file   Tobacco Use   • Smoking status: Never Smoker   • Smokeless tobacco: Never Used   Substance and Sexual Activity   • Alcohol use: Yes     Comment: socially    • Drug use: Never   • Sexual activity: Defer       Family  History   Problem Relation Age of Onset   • Diabetes Mother    • COPD Mother    • Hypertension Mother    • Kidney disease Mother    • Obesity Mother    • Thyroid disease Mother    • Diabetes Sister    • Diabetes Sister    • Diabetes Sister    • Diabetes Sister    • Malig Hyperthermia Neg Hx        The following portions of the patient's history were reviewed and updated as appropriate: allergies, current medications, past family history, past medical history, past social history, past surgical history and problem list.         Objective:      Temp:  [96.6 °F (35.9 °C)-99.1 °F (37.3 °C)] 96.9 °F (36.1 °C)  Heart Rate:  [88-97] 88  Resp:  [16] 16  BP: (140-187)/(69-98) 176/82     Intake/Output Summary (Last 24 hours) at 1/10/2021 0544  Last data filed at 1/10/2021 0509  Gross per 24 hour   Intake 1889 ml   Output 700 ml   Net 1189 ml     Body mass index is 32.17 kg/m².      01/08/21  1355   Weight: 85 kg (187 lb 6.3 oz)           Physical Exam:  Constitutional: Well appearing, well developed, no acute distress   HENT: Oropharynx clear and membrane moist  Eyes: Normal conjunctiva, no sclera icterus.  Neck: Supple, no carotid bruit bilaterally.  Cardiovascular: Regular and Tachycardia rate and rhythm, No Murmur, No bilateral lower extremity edema.  Pulmonary: Normal respiratory effort, normal lung sounds, no wheezing.  Abdominal: Soft, nontender, no hepatosplenomegaly, liver is non-pulsatile.  Neurological: Alert and orient x 3.   Skin: Warm, dry, no ecchymosis, no rash.  Right leg wrapped in Ace bandage  Psych: Appropriate mood and affect. Normal judgment and insight.         Lab Review:   Results from last 7 days   Lab Units 01/08/21  1422   SODIUM mmol/L 136   POTASSIUM mmol/L 4.6   CHLORIDE mmol/L 101   CO2 mmol/L 25.8   BUN mg/dL 22   CREATININE mg/dL 1.12*   GLUCOSE mg/dL 130*   CALCIUM mg/dL 8.9         Results from last 7 days   Lab Units 01/08/21  1422   WBC 10*3/mm3 8.68   HEMOGLOBIN g/dL 10.7*   HEMATOCRIT %  32.7*   PLATELETS 10*3/mm3 259                   Invalid input(s): LDLCALC               EKG 1/9/2021      Prior EKG 11/6/2020      I reviewed her EKGs above which demonstrate no acute ischemic changes and borderline tachycardia.    Echo 4/13/2020:  · Normal LV size and contractility EF of 60 to 65%  · Normal RV size  · Normal atrial size  · Aortic valve, mitral valve, tricuspid valve appears structurally normal, no significant regurgitation seen.  · No pericardial effusion seen.  · Proximal aorta appears normal in size.    Stress Test 10/22/2012:  · Asymptomatic stress test without ST segment evidence for ischemia.       Assessment:           Right foot infection         Plan:       Ms. Mcghee is a 65 y.o. woman with a history of hypertension, diabetes, hyperlipidemia, and GERD who was admitted to the hospital on 1/8/2021 for incision and drainage of right lower extremity. She had a right foot arthrodesis six weeks ago with delayed wound healing and local cellulitis who developed chest pain yesterday.  Overall her chest pain is somewhat atypical given that it occurred at rest.  She was not having any issues when walking with a walker.  She was wondering if using her walker more might be giving her some musculoskeletal type chest pain.  Her pain is not reproducible on palpation.  General relatively hard to pin down the exact nature of her symptoms but would recommend that we continue to trend out troponins today as her add-on troponin this morning was indeterminate.  I would recommend an echocardiogram and start a low-dose beta-blocker to help out with her heart rate.  If she continues to have any recurrent symptoms or for troponins become significant then I think further ischemic evaluation is needed.  If she is feeling better with initiating beta-blocker therapy then I would continue to monitor her clinically.      Chest pain:  · Unclear etiology  · We will trend troponin today  · If troponin rises or recurrent  chest pain would consider further ischemic evaluation with stress testing or cardiac catheterization  · Will order echocardiogram  · We will monitor response to starting beta-blocker    Hypertension:  · Continue current medication regimen and monitor response to starting metoprolol    Mixed hyperlipidemia:  · Continue statin therapy        Thank you for allowing me to participate in the care of Marge Mcghee. Feel free to contact me directly with any further questions or concerns.    Darci Holt MD  [facility]  01/10/21  05:44 EST

## 2021-01-10 NOTE — PLAN OF CARE
Goal Outcome Evaluation:  Plan of Care Reviewed With: patient  Progress: improving  Outcome Summary: POD#2 OF RIGHT FOOT I&D PLUS HARDWARE REMOVAL. VSS. PO PAIN MEDICATION FOR PAIN. ACE WRAP TO RIGHT FOOT.  NWD TO RIGHT FOOT. VOIDING FINE PER BRP.  COMPLAIN OF CHEST PAIN. EKG AND CHEST CT DONE. CARDIOLOGY CONSULTED. NUMBNESS TO RIGHT FOOT. EDUCATION PROVIDED ON BP AND BLOOD SUGAR MONITORING. PATIENT VERBALIZED UNDERSTANDING.

## 2021-01-10 NOTE — PLAN OF CARE
Problem: Adult Inpatient Plan of Care  Goal: Plan of Care Review  Outcome: Ongoing, Progressing  Flowsheets (Taken 1/10/2021 1439)  Progress: improving  Plan of Care Reviewed With: patient   Goal Outcome Evaluation:  Plan of Care Reviewed With: patient  Progress: improving       Pt hasn't complained of any chest pain today. Pt states she thinks it might have been the way she was using her walker and the muscles in her chest or possibly anxiety, after she was educated of different signs and symptoms of anxiety. Pt has started to feel pain in her right foot and has been requesting pain medication and phenergan. Pt states she takes phenergan at home to help her sleep. Pt has hopped to the bathroom multiple times a day. Pt has kept her right foot propped up with ice on/off most of the day. She is still getting IV antibiotics along with some IV fluids. Pt was started on metoprolol and losartan and her metformin was held due to possible contrast being used during the next few days pending on what the ECHO says. Pt blood pressure and heart rate are slightly better, will continue to monitor.

## 2021-01-11 LAB
BACTERIA SPEC AEROBE CULT: NORMAL
BACTERIA SPEC AEROBE CULT: NORMAL
CREAT SERPL-MCNC: 0.95 MG/DL (ref 0.57–1)
GFR SERPL CREATININE-BSD FRML MDRD: 59 ML/MIN/1.73
GRAM STN SPEC: NORMAL
GRAM STN SPEC: NORMAL
TROPONIN T SERPL-MCNC: 0.02 NG/ML (ref 0–0.03)
VANCOMYCIN TROUGH SERPL-MCNC: 6 MCG/ML (ref 5–20)

## 2021-01-11 PROCEDURE — 99232 SBSQ HOSP IP/OBS MODERATE 35: CPT | Performed by: NURSE PRACTITIONER

## 2021-01-11 PROCEDURE — 25010000002 VANCOMYCIN: Performed by: INTERNAL MEDICINE

## 2021-01-11 PROCEDURE — 84484 ASSAY OF TROPONIN QUANT: CPT | Performed by: INTERNAL MEDICINE

## 2021-01-11 PROCEDURE — 63710000001 PROMETHAZINE PER 12.5 MG: Performed by: ORTHOPAEDIC SURGERY

## 2021-01-11 PROCEDURE — 80202 ASSAY OF VANCOMYCIN: CPT | Performed by: ORTHOPAEDIC SURGERY

## 2021-01-11 PROCEDURE — 25010000002 VANCOMYCIN 10 G RECONSTITUTED SOLUTION: Performed by: INTERNAL MEDICINE

## 2021-01-11 PROCEDURE — 82565 ASSAY OF CREATININE: CPT | Performed by: ORTHOPAEDIC SURGERY

## 2021-01-11 PROCEDURE — 25010000002 PIPERACILLIN SOD-TAZOBACTAM PER 1 G: Performed by: INTERNAL MEDICINE

## 2021-01-11 PROCEDURE — 25010000002 PIPERACILLIN SOD-TAZOBACTAM PER 1 G: Performed by: ORTHOPAEDIC SURGERY

## 2021-01-11 PROCEDURE — 97110 THERAPEUTIC EXERCISES: CPT

## 2021-01-11 PROCEDURE — 97161 PT EVAL LOW COMPLEX 20 MIN: CPT

## 2021-01-11 RX ORDER — OXYCODONE HYDROCHLORIDE AND ACETAMINOPHEN 5; 325 MG/1; MG/1
1 TABLET ORAL EVERY 4 HOURS PRN
Qty: 40 TABLET | Refills: 0 | Status: SHIPPED | OUTPATIENT
Start: 2021-01-11 | End: 2021-05-02

## 2021-01-11 RX ORDER — SACCHAROMYCES BOULARDII 250 MG
250 CAPSULE ORAL 2 TIMES DAILY
Status: DISCONTINUED | OUTPATIENT
Start: 2021-01-11 | End: 2021-01-12 | Stop reason: HOSPADM

## 2021-01-11 RX ADMIN — Medication 250 MG: at 21:15

## 2021-01-11 RX ADMIN — SODIUM CHLORIDE, PRESERVATIVE FREE 10 ML: 5 INJECTION INTRAVENOUS at 21:37

## 2021-01-11 RX ADMIN — TAZOBACTAM SODIUM AND PIPERACILLIN SODIUM 3.38 G: 375; 3 INJECTION, SOLUTION INTRAVENOUS at 18:24

## 2021-01-11 RX ADMIN — METOPROLOL TARTRATE 12.5 MG: 25 TABLET, FILM COATED ORAL at 08:00

## 2021-01-11 RX ADMIN — TAZOBACTAM SODIUM AND PIPERACILLIN SODIUM 3.38 G: 375; 3 INJECTION, SOLUTION INTRAVENOUS at 10:51

## 2021-01-11 RX ADMIN — METOPROLOL TARTRATE 25 MG: 25 TABLET, FILM COATED ORAL at 22:57

## 2021-01-11 RX ADMIN — LINAGLIPTIN 5 MG: 5 TABLET, FILM COATED ORAL at 08:01

## 2021-01-11 RX ADMIN — FUROSEMIDE 20 MG: 20 TABLET ORAL at 08:01

## 2021-01-11 RX ADMIN — SODIUM CHLORIDE, POTASSIUM CHLORIDE, SODIUM LACTATE AND CALCIUM CHLORIDE 100 ML/HR: 600; 310; 30; 20 INJECTION, SOLUTION INTRAVENOUS at 21:36

## 2021-01-11 RX ADMIN — TAZOBACTAM SODIUM AND PIPERACILLIN SODIUM 3.38 G: 375; 3 INJECTION, SOLUTION INTRAVENOUS at 02:11

## 2021-01-11 RX ADMIN — PROMETHAZINE HYDROCHLORIDE 12.5 MG: 12.5 TABLET ORAL at 08:00

## 2021-01-11 RX ADMIN — LOSARTAN POTASSIUM 100 MG: 100 TABLET, FILM COATED ORAL at 08:01

## 2021-01-11 RX ADMIN — GLIPIZIDE 10 MG: 10 TABLET ORAL at 08:00

## 2021-01-11 RX ADMIN — DULOXETINE HYDROCHLORIDE 60 MG: 60 CAPSULE, DELAYED RELEASE ORAL at 18:24

## 2021-01-11 RX ADMIN — PROMETHAZINE HYDROCHLORIDE 12.5 MG: 12.5 TABLET ORAL at 18:24

## 2021-01-11 RX ADMIN — VANCOMYCIN HYDROCHLORIDE 1250 MG: 10 INJECTION, POWDER, LYOPHILIZED, FOR SOLUTION INTRAVENOUS at 23:39

## 2021-01-11 RX ADMIN — OXYCODONE HYDROCHLORIDE AND ACETAMINOPHEN 2 TABLET: 7.5; 325 TABLET ORAL at 12:08

## 2021-01-11 RX ADMIN — VANCOMYCIN HYDROCHLORIDE 1750 MG: 1 INJECTION, POWDER, LYOPHILIZED, FOR SOLUTION INTRAVENOUS at 09:38

## 2021-01-11 RX ADMIN — OXYCODONE HYDROCHLORIDE AND ACETAMINOPHEN 2 TABLET: 7.5; 325 TABLET ORAL at 18:24

## 2021-01-11 RX ADMIN — ASPIRIN 325 MG: 325 TABLET, COATED ORAL at 08:01

## 2021-01-11 RX ADMIN — OXYCODONE HYDROCHLORIDE AND ACETAMINOPHEN 2 TABLET: 7.5; 325 TABLET ORAL at 08:00

## 2021-01-11 RX ADMIN — POTASSIUM CHLORIDE 20 MEQ: 750 TABLET, EXTENDED RELEASE ORAL at 08:00

## 2021-01-11 RX ADMIN — ATORVASTATIN CALCIUM 80 MG: 20 TABLET, FILM COATED ORAL at 21:12

## 2021-01-11 RX ADMIN — METOPROLOL TARTRATE 12.5 MG: 25 TABLET ORAL at 10:51

## 2021-01-11 RX ADMIN — ROPINIROLE HYDROCHLORIDE 0.25 MG: 0.5 TABLET, FILM COATED ORAL at 18:24

## 2021-01-11 NOTE — THERAPY EVALUATION
Patient Name: Marge Mcghee  : 1955    MRN: [id number]                              Today's Date: 2021       Admit Date: 2021    Visit Dx:     ICD-10-CM ICD-9-CM   1. Right foot infection  L08.9 686.9     Patient Active Problem List   Diagnosis   • Type 2 diabetes mellitus with peripheral neuropathy (CMS/HCC)   • Age-related osteoporosis without current pathological fracture   • Essential hypertension   • Hyperlipidemia   • Hepatic steatosis   • Gastroesophageal reflux disease with esophagitis   • Adrenal nodule (CMS/HCC)   • Vitamin D deficiency   • Thyroid nodule   • Urinary tract infection without hematuria   • REINA (acute kidney injury) (CMS/HCC)   • Hyperglycemia   • Acute right flank pain   • Vomiting   • Hypomagnesemia   • Dehydration   • Class 1 obesity in adult   • Complicated migraine   • Occipital neuralgia of left side   • Polypharmacy   • 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 Cholecystectomy. No biliary ductal dilatation. Negative pancreas, spleen, left adrenal gland, and kidneys. The right adrenal presumed adenoma has increased slightly, measuring 3 x 4 cm in diameter,  compared to 2 x 3.5 cm on the previous exam. The attenuation values are consistent with an adenoma. [facility] in 2018   • Age-related osteoporosis without current pathological fracture 2016   • Arthritis    • Delayed surgical wound healing    • Essential hypertension 2016   • Gastroesophageal reflux disease with esophagitis 2016   • Hepatic steatosis 2016   • History of anemia    • History of sepsis     FROM UTI   • Hyperlipidemia 2016   • Lisfranc's dislocation     LEFT  WITH FRACTURES NONHEALING FOOT   • RLS (restless legs syndrome)    • Squamous cell skin cancer 2014    Excised by Dr. James   • Thyroid nodule 10/18/2019    BENIGN   • Type 2 diabetes mellitus with peripheral neuropathy (CMS/HCC) 11/16/2016   • Vitamin D deficiency 1/25/2019     Past Surgical History:   Procedure Laterality Date   • BREAST BIOPSY Left     7/2019. BENIGN   • CATARACT EXTRACTION WITH INTRAOCULAR LENS IMPLANT Bilateral    • CHOLECYSTECTOMY     • COLONOSCOPY     • FOOT FUSION Right 11/13/2020    Procedure: RIGHT OPEN TREATMENT LISFRANC INJURY, OPEN REDUCTION INTERNAL FIXATION MEDIAL/MIDDLE CUNEIFORM FRACTURE AND 2ND/3RD METATARSAL FRACTURE 1ST 2ND POSSIBLE 3RD TARSOMETATARSAL ARTHRODESIS INTERCUNEIFORM ARTHRODESIS CALCANEAL BONE GRAFT;  Surgeon: Mikhail Banks Jr., MD;  Location: [facility];  Service: Orthopedics;  Laterality: Right;   • KNEE ARTHROSCOPY Bilateral    • TOTAL ABDOMINAL HYSTERECTOMY     • UPPER GASTROINTESTINAL ENDOSCOPY  08/2016   • US GUIDED FINE NEEDLE ASPIRATION  5/8/2020     General Information     Row Name 01/11/21 0843          Physical Therapy Time and Intention    Document Type  evaluation  -EE     Mode of Treatment  individual therapy;physical therapy  -EE     Row Name 01/11/21 0843          General Information    Prior Level of Function  independent:;min assist:;bed mobility;transfer;w/c or scooter admitted from SNF, has been NWB R LE since original surgery  -EE     Existing Precautions/Restrictions  fall;non-weight bearing;right  -EE     Barriers to Rehab  none identified  -EE     Row Name 01/11/21 0843          Living Environment    Lives With  other (see comments) has been in SNF for 6 months, however, lives at home with spouse at baseline  -EE     Row Name 01/11/21 0843          Home Main Entrance    Number of Stairs, Main Entrance  two  -EE     Row Name 01/11/21 0843          Cognition    Orientation Status (Cognition)  oriented x 3  -EE     Row Name 01/11/21 0843           Safety Issues, Functional Mobility    Impairments Affecting Function (Mobility)  endurance/activity tolerance;balance;strength;pain  -EE       User Key  (r) = Recorded By, (t) = Taken By, (c) = Cosigned By    Initials Name Provider Type    EE Rere Chacon PT Physical Therapist        Mobility     Row Name 01/11/21 0844          Bed Mobility    Bed Mobility  supine-sit;sit-supine;scooting/bridging  -EE     Scooting/Bridging [place] (Bed Mobility)  modified independence  -EE     Supine-Sit [place] (Bed Mobility)  modified independence  -EE     Sit-Supine [place] (Bed Mobility)  modified independence  -EE     Assistive Device (Bed Mobility)  bed rails;head of bed elevated  -EE     Row Name 01/11/21 0844          Sit-Stand Transfer    Sit-Stand [place] (Transfers)  contact guard;verbal cues  -EE     Assistive Device (Sit-Stand Transfers)  walker, front-wheeled  -EE     Row Name 01/11/21 0844          Gait/Stairs (Locomotion)    [place] Level (Gait)  contact guard;verbal cues  -EE     Assistive Device (Gait)  walker, front-wheeled  -EE     Distance in Feet (Gait)  30'  -EE     Deviations/Abnormal Patterns (Gait)  [place] decreased  -EE     Bilateral Gait Deviations  forward flexed posture  -EE     Comment (Gait/Stairs)  One standing rest break due to fatigue, pt requires vc's to keep R foot off floor while resting  -EE     Row Name 01/11/21 0844          Mobility    Extremity Weight-bearing Status  right lower extremity  -EE     Right Lower Extremity (Weight-bearing Status)  non weight-bearing (NWB)  -EE       User Key  (r) = Recorded By, (t) = Taken By, (c) = Cosigned By    Initials Name Provider Type    EE Rere Chacon PT Physical Therapist        Obj/Interventions     Row Name 01/11/21 0845          Range of Motion Comprehensive    General Range of Motion  other (see comments)  -EE     Comment, General Range of Motion  R foot/ankle deferred due to splint, all other LE ROM WFL  -EE     Row  Name 01/11/21 0845          Strength Comprehensive (MMT)    General Manual Muscle Testing (MMT) Assessment  lower extremity strength deficits identified  -EE     Comment, General Manual Muscle Testing (MMT) Assessment  R hip/knee grossly 3+/5, L LE grossly 4/5  -EE     Row Name 01/11/21 0845          Motor Skills    Therapeutic Exercise  -- Seated B LAQ x 10 reps, L ankle pumps x 10 reps. Supine R SLR and R hip abd/add x 10 reps  -EE     Row Name 01/11/21 0845          Balance    Balance Assessment  sitting static balance;sitting dynamic balance;standing static balance;standing dynamic balance  -EE     Static Sitting Balance  WNL;sitting, edge of bed  -EE     Dynamic Sitting Balance  WNL;sitting, edge of bed  -EE     Static Standing Balance  mild impairment;standing CGA with rwx  -EE     Dynamic Standing Balance  mild impairment;standing CGA with rwx  -EE       User Key  (r) = Recorded By, (t) = Taken By, (c) = Cosigned By    Initials Name Provider Type    EE Rere Chacon, PT Physical Therapist        Goals/Plan     Row Name 01/11/21 0848          Transfer Goal 1 (PT)    Activity/Assistive Device (Transfer Goal 1, PT)  sit-to-stand/stand-to-sit;bed-to-chair/chair-to-bed;walker, rolling  -EE     [place] Level/Cues Needed (Transfer Goal 1, PT)  standby assist  -EE     Time Frame (Transfer Goal 1, PT)  5 days  -EE     Progress/Outcome (Transfer Goal 1, PT)  goal ongoing  -EE     Row Name 01/11/21 0848          Gait Training Goal 1 (PT)    Activity/Assistive Device (Gait Training Goal 1, PT)  gait (walking locomotion);walker, rolling  -EE     [place] Level (Gait Training Goal 1, PT)  standby assist  -EE     Distance (Gait Training Goal 1, PT)  50'  -EE     Time Frame (Gait Training Goal 1, PT)  5 days  -EE     Progress/Outcome (Gait Training Goal 1, PT)  goal ongoing  -EE       User Key  (r) = Recorded By, (t) = Taken By, (c) = Cosigned By    Initials Name Provider Type    EE Rere Chacon, PT Physical  Therapist        Clinical Impression     Row Name 01/11/21 0846          Pain    Additional Documentation  Pain Scale: Numbers Pre/Post-Treatment (Group)  -EE     Row Name 01/11/21 0846          Pain Scale: Numbers Pre/Post-Treatment    Pretreatment Pain Rating  4/10  -EE     Posttreatment Pain Rating  5/10  -EE     Pain Location - Side  Right  -EE     Pain Location - Orientation  distal  -EE     Pain Location  foot  -EE     Pain Intervention(s)  Repositioned;Rest  -EE     Row Name 01/11/21 0846          Plan of Care Review    Outcome Summary  Pt is a 66 yo female who presents s/p I&D of R foot with removal of hardware. Upon exam, pt demonstrates post op pain, LE weakness, impaired balance, and decreased endurance limiting mobility. Pt is NWB R LE, able to maintain with occasional cues. Pt able to ambulate short distance with CGA and rwx. Completed LE ther ex with supervision. Pt will continue to benefit from PT to address impairments and increase independence with functional mobility. Pt plans to return to SNF for continued rehab at [place].  -EE     Row Name 01/11/21 0846          Therapy Assessment/Plan (PT)    Rehab Potential (PT)  good, to achieve stated therapy goals  -EE     Criteria for Skilled Interventions Met (PT)  yes;meets criteria;skilled treatment is necessary  -EE     Row Name 01/11/21 0846          Positioning and Restraints    Pre-Treatment Position  in bed  -EE     Post Treatment Position  bed  -EE     In Bed  fowlers;call light within reach;encouraged to call for assist;exit alarm on;RLE elevated;notified nsg  -EE       User Key  (r) = Recorded By, (t) = Taken By, (c) = Cosigned By    Initials Name Provider Type    EE Rere Chacon, PT Physical Therapist        Outcome Measures     Row Name 01/11/21 0849          How much help from another person do you currently need...    Turning from your back to your side while in flat bed without using bedrails?  4  -EE     Moving from lying on back to sitting on  the side of a flat bed without bedrails?  4  -EE     Moving to and from a bed to a chair (including a wheelchair)?  3  -EE     Standing up from a chair using your arms (e.g., wheelchair, bedside chair)?  3  -EE     Climbing 3-5 steps with a railing?  2  -EE     To walk in hospital room?  3  -EE     AM-PAC 6 Clicks Score (PT)  19  -EE     Row Name 01/11/21 0849          Functional Assessment    Outcome Measure Options  AM-PAC 6 Clicks Basic Mobility (PT)  -EE       User Key  (r) = Recorded By, (t) = Taken By, (c) = Cosigned By    Initials Name Provider Type    EE Rere Chacon, NEHAL Physical Therapist        Physical Therapy Education                 Title: PT OT SLP Therapies (Done)     Topic: Physical Therapy (Done)     Point: Mobility training (Done)     Learning Progress Summary           Patient Acceptance, E,TB, VU,NR by EE at 1/11/2021 0849                   Point: Home exercise program (Done)     Learning Progress Summary           Patient Acceptance, E,TB, VU,NR by EE at 1/11/2021 0849                   Point: Body mechanics (Done)     Learning Progress Summary           Patient Acceptance, E,TB, VU,NR by EE at 1/11/2021 0849                   Point: Precautions (Done)     Learning Progress Summary           Patient Acceptance, E,TB, VU,NR by EE at 1/11/2021 0849                               User Key     Initials Effective Dates Name Provider Type Discipline    EE 04/03/18 -  Rere Chacon, NEHAL Physical Therapist PT              PT Recommendation and Plan  Planned Therapy Interventions (PT): balance training, bed mobility training, gait training, home exercise program, patient/family education, ROM (range of motion), strengthening, transfer training  Outcome Summary: Pt is a 66 yo female who presents s/p I&D of R foot with removal of hardware. Upon exam, pt demonstrates post op pain, LE weakness, impaired balance, and decreased endurance limiting mobility. Pt is NWB R LE, able to maintain with occasional cues.  Pt able to ambulate short distance with CGA and rwx. Completed LE ther ex with supervision. Pt will continue to benefit from PT to address impairments and increase independence with functional mobility. Pt plans to return to SNF for continued rehab at [place].     Time Calculation:   PT Charges     Row Name 01/11/21 0849             Time Calculation    Start Time  0831  -EE      Stop Time  0842  -EE      Time Calculation (min)  11 min  -EE      PT Received On  01/11/21  -EE      PT - Next Appointment  01/12/21  -EE      PT Goal Re-Cert Due Date  01/16/21  -EE         Time Calculation- PT    Total Timed Code Minutes- PT  8 minute(s)  -EE        User Key  (r) = Recorded By, (t) = Taken By, (c) = Cosigned By    Initials Name Provider Type    Rere Can PT Physical Therapist        Therapy Charges for Today     Code Description Service Date Service Provider Modifiers Qty    36727890540 HC PT EVAL LOW COMPLEXITY 1 1/11/2021 Rere Chacon, PT GP 1    10444185352 HC PT THER PROC EA 15 MIN 1/11/2021 Rere Chacon, PT GP 1          PT G-Codes  Outcome Measure Options: AM-PAC 6 Clicks Basic Mobility (PT)  AM-PAC 6 Clicks Score (PT): 19     Patient was intermittently wearing a face mask during this therapy encounter. Therapist used appropriate personal protective equipment including eye protection, mask, and gloves.  Mask used was standard procedure mask. Appropriate PPE was worn during the entire therapy session. Hand hygiene was completed before and after therapy session. Patient is not in enhanced droplet precautions.       Rere Chacon PT  1/11/2021

## 2021-01-11 NOTE — PROGRESS NOTES
Hospital Follow Up    LOS:  LOS: 3 days   Patient Name: Marge Mcghee  Age/Sex: 65 y.o. female  : 1955  MRN: [id number]    Day of Service: 21   Length of Stay: 3  Encounter Provider: CANDIDO Dela Cruz  Place of Service: [facility] CARDIOLOGY  Patient Care Team:  Traci Townsend APRN as PCP - General (Nurse Practitioner)  Drake Hinton MD PhD as Consulting Physician (Ophthalmology)  Fausto Gilliam MD as Consulting Physician (Endocrinology)  Saloni Ch MD (Ophthalmology)  Mikhail Kaplan Jr., MD as Consulting Physician (Urology)    Subjective:     Chief Complaint: chest pain    Interval History: Complains of chest pain overnight since yesterday described as a tightness across the chest.    Objective:     Objective:  Temp:  [97.1 °F (36.2 °C)-98.4 °F (36.9 °C)] 97.1 °F (36.2 °C)  Heart Rate:  [73-83] 76  Resp:  [16] 16  BP: (128-186)/(75-89) 186/88     Intake/Output Summary (Last 24 hours) at 2021 1047  Last data filed at 2021 0708  Gross per 24 hour   Intake 600 ml   Output --   Net 600 ml     Body mass index is 32.17 kg/m².      21  1355   Weight: 85 kg (187 lb 6.3 oz)     Weight change:     Physical Exam:   General Appearance:    Awake alert and oriented in no acute distress.   Color:  Skin:  Neuro:  HEENT:    Lungs:     Pink  Warm and dry  No focal, motor or sensory deficits  Neck supple, pupils equal, round and reactive. No JVD, No Bruit  Clear to auscultation,respirations regular, even and                  unlabored    Heart:    Regular rate and rhythm, S1 and S2, no murmur, no gallop, no rub. No edema, DP/PT pulses are 2+   Chest Wall:    No abnormalities observed   Abdomen:     Normal bowel sounds, no masses, no organomegaly, soft        non-tender, non-distended, no guarding, no ascites noted   Extremities:   Moves all extremities well, no edema, no cyanosis, no redness       Lab Review:   Results from last 7 days    Lab Units 01/11/21  0515 01/10/21  0519 01/08/21  1422   SODIUM mmol/L  --   --  136   POTASSIUM mmol/L  --   --  4.6   CHLORIDE mmol/L  --   --  101   CO2 mmol/L  --   --  25.8   BUN mg/dL  --   --  22   CREATININE mg/dL 0.95 0.89 1.12*   GLUCOSE mg/dL  --   --  130*   CALCIUM mg/dL  --   --  8.9     Results from last 7 days   Lab Units 01/11/21  0515 01/10/21  0519   TROPONIN T ng/mL 0.018 0.013     Results from last 7 days   Lab Units 01/08/21  1422   WBC 10*3/mm3 8.68   HEMOGLOBIN g/dL 10.7*   HEMATOCRIT % 32.7*   PLATELETS 10*3/mm3 259                   Invalid input(s): LDLCALC          I reviewed the patient's new clinical results.  I personally viewed and interpreted the patient's EKG  Current Medications:   Scheduled Meds:aspirin EC, 325 mg, Oral, Daily  atorvastatin, 80 mg, Oral, Nightly  DULoxetine, 60 mg, Oral, Q PM  furosemide, 20 mg, Oral, Daily  glipizide, 10 mg, Oral, QAM AC  linagliptin, 5 mg, Oral, Daily  losartan, 100 mg, Oral, Daily  metFORMIN, 1,000 mg, Oral, BID With Meals  metoprolol tartrate, 12.5 mg, Oral, Once  metoprolol tartrate, 25 mg, Oral, Q12H  piperacillin-tazobactam, 3.375 g, Intravenous, Q8H  potassium chloride, 20 mEq, Oral, Daily  rOPINIRole, 0.25 mg, Oral, Q PM  senna-docusate sodium, 2 tablet, Oral, BID  sodium chloride, 10 mL, Intravenous, Q12H  vancomycin, 1,250 mg, Intravenous, Q12H  vancomycin, 20 mg/kg, Intravenous, Once      Continuous Infusions:lactated ringers, 9 mL/hr, Last Rate: 9 mL/hr (01/08/21 1457)  lactated ringers, 100 mL/hr, Last Rate: 100 mL/hr (01/08/21 1869)  Pharmacy to dose vancomycin,         Allergies:  Allergies   Allergen Reactions   • Zofran [Ondansetron Hcl] Nausea And Vomiting     Does the opposite of its purpose   • Prochlorperazine Other (See Comments)     CAUSED SEIZURE   • Meperidine Itching and Swelling   • Prochlorperazine Maleate Other (See Comments)     CAUSES SEIZURE       Assessment:       Right foot infection  1. Chest pain- unclear  etiology  2. Essential HTN  3. Mixed HLD  4. Right foot infection with complex fracture repair/osteomyelitis  5. Diabetes mellitus- neuropathy      Plan:   No bump in troponin of clinical significance.  We will plan on stress test tomorrow.  Echocardiogram was stable without any significant LV dysfunction or wall motion abnormality.        CANDIDO Dela Cruz  01/11/21  10:47 EST  Electronically signed by CANDIDO Dela Cruz, 01/11/21, 10:47 AM EST.

## 2021-01-11 NOTE — DISCHARGE INSTRUCTIONS
"Leave splint in place, clean and dry, until follow up    No weightbearing on operative leg    Elevate as much as possible (\"toes above the nose\")    Try to get up and move around at least once per hour while awake to help minimize risk of blood clots    Follow up in 2-3 weeks with Dr. Banks at the [facility] (call [phone] for appointment)    "

## 2021-01-11 NOTE — PROGRESS NOTES
"Orthopaedic Surgery  Daily Progress Note    /76 (BP Location: Right arm, Patient Position: Lying)   Pulse 73   Temp 97.3 °F (36.3 °C) (Skin)   Resp 16   Ht 162.6 cm (64\")   Wt 85 kg (187 lb 6.3 oz)   SpO2 97%   BMI 32.17 kg/m²     Lab Results (last 24 hours)     Procedure Component Value Units Date/Time    Creatinine, Serum [[id number]] Collected: 01/11/21 0515    Specimen: Blood Updated: 01/11/21 0624    Troponin [[id number]] Collected: 01/11/21 0515    Specimen: Blood Updated: 01/11/21 0624    Vancomycin, Trough Vancomycin dose due at 0600. Please make sure Vancomycin IV is not infusing before drawing the vancomycin level. Hold vancomycin dose if level is >21 mcg/mL. [[id number]] Collected: 01/11/21 0515    Specimen: Blood Updated: 01/11/21 0623    Wound Culture - Wound, Foot, Right [[id number]] Collected: 01/08/21 1645    Specimen: Wound from Foot, Right Updated: 01/11/21 0621     Wound Culture No growth at 3 days     Gram Stain No WBCs or organisms seen    Tissue / Bone Culture - Tissue, Foot, Right [[id number]] Collected: 01/08/21 1646    Specimen: Tissue from Foot, Right Updated: 01/11/21 0621     Tissue Culture No growth at 3 days     Gram Stain No WBCs or organisms seen    Troponin [[id number]]  (Normal) Collected: 01/10/21 0519    Specimen: Blood Updated: 01/10/21 0740     Troponin T 0.013 ng/mL     Narrative:      Troponin T Reference Range:  <= 0.03 ng/mL-   Negative for AMI  >0.03 ng/mL-     Abnormal for myocardial necrosis.  Clinicians would have to utilize clinical acumen, EKG, Troponin and serial changes to determine if it is an Acute Myocardial Infarction or myocardial injury due to an underlying chronic condition.       Results may be falsely decreased if patient taking Biotin.            Imaging Results (Last 24 Hours)     ** No results found for the last 24 hours. **          Patient Care Team:  Traci Townsend APRN as PCP - General (Nurse Practitioner)  Drake Hinton MD PhD as " Consulting Physician (Ophthalmology)  Fausto Gilliam MD as Consulting Physician (Endocrinology)  Saloni Ch MD (Ophthalmology)  Mikhail Kaplan Jr., MD as Consulting Physician (Urology)    SUBJECTIVE   patient had some chest pain over the weekend.  Troponins were negative.  Cardiology following.    PHYSICAL EXAM  Resting in NAD  Splint clean, dry, intact  Toes warm, perfused, brisk capillary refill  Flexes/extends toes   SILT over toes  No pain with passive stretch           Right foot infection      PLAN / DISPOSITION:  POD  3 status post I&D with closure of right foot, doing well.    Operative cultures showed no growth, final.    However I agree with Dr. Galicia patient would benefit from 6 weeks of empiric antibiotics given the hardware in place.    1. Pain control: Orals  2.  Antibiotics: Continue IV Vanco/Zosyn per infectious disease  3.  PT: NWB in splint  4. DVT: ASA 325mg daily plus ALIRIO/SCDs, mobilization  5. Dispo:  To rehab as soon as final antibiotic plan in place, follow up 2-3 weeks with me at [facility] ([phone] to make appointment)        Mikhail Banks Jr, MD  01/11/21  06:41 EST

## 2021-01-11 NOTE — PROGRESS NOTES
Continued Stay Note  [facility]     Patient Name: Marge Mcghee  MRN: [id number]  Today's Date: 1/11/2021    Admit Date: 1/8/2021    Discharge Plan     Row Name 01/11/21 1555       Plan    Plan  [facility]    Patient/Family in Agreement with Plan  yes    Plan Comments  Cardiology's plans for a stress test tomorrow noted. Spoke with Fabio who states she can accept the patient tomorrow and the pt's 1/8/2021 negative Covid-19 test is appropriate for d/c tomorrow as well. Pt will need a wheelchair van to transport. No other needs identified.        Discharge Codes    No documentation.       Expected Discharge Date and Time     Expected Discharge Date Expected Discharge Time    Jan 11, 2021             Barbara Riley RN

## 2021-01-11 NOTE — PLAN OF CARE
Goal Outcome Evaluation:  Plan of Care Reviewed With: patient  Progress: improving  Outcome Summary: Pt is a post op day 3 of an I&D with debridement of rt foot. Dressing is clean dry and intact. Pt continues with the cast and ACE wrap. Only top of toes visible. No feeling to toes as of this time. Toes are warn to the touch. Pt continues with IV antibiotics. Tolerating well. Pt continues with PO pain meds that provide relief. Pt is NWB tothe RLE, but is not always compliant. Pt taking phenergan with pain meds to prevent nausea. Pt educated on the importance of monitoring blood pressures related to comorbidity of HTN. Pt voiced understanding. Pt is resting at this time, will continue to monitor.

## 2021-01-11 NOTE — PROGRESS NOTES
Continued Stay Note  [facility]     Patient Name: Marge Mcghee  MRN: [id number]  Today's Date: 1/11/2021    Admit Date: 1/8/2021    Discharge Plan     Row Name 01/11/21 1004       Plan    Plan Comments  Spoke with Fabio and updated her that the patient may require IV antibiotics at d/c. Patient is currently recieving Vancomycin and Zosyn. Fabio is agreeable and will await ID's final d/c IV antbiotic plan. CCP offered to fax ID's IV antbiotic d/c orders, but Fabio states that is not necessary since she has access to [facility]. Await ID's plan.    Row Name 01/11/21 0921       Plan    Plan  [facility] -- Accepted    Patient/Family in Agreement with Plan  yes    Plan Comments  Spoke with the patient, verified currenti information and CCP role explained. Patient states she has good family support. She arrived to [facility] from [facility]. She's IADL, but uses nursing staff at [facility] to assist with some ADLs. She uses a walker, and has no history with HH. Patient plans to return to [facility]. Referral sent in [facility]. Spoke with Fabio who stated she has accepted the patient, the patient's 1/8/2021 negative Covid-19 test is appropriate for d/c, and that the patient is a bedhold therefore she may return to [facility] anytime. Spoke with the patient's nurse/EDGARDO Tadeo who states the patient will likely need IV antibiotics at d/c. Called and left a message for Fabio to update her on d/c plans. Await ID's plan today and Fabio's call back. Patient will need a wheelchair van to transport her at d/c. CCP following.        Discharge Codes    No documentation.       Expected Discharge Date and Time     Expected Discharge Date Expected Discharge Time    Jan 11, 2021             Barbara Riley RN

## 2021-01-11 NOTE — PROGRESS NOTES
Vancomycin Pharmacokinetic Note    Marge Mcghee is a 65 y.o. female who is on day 3 pharmacy to dose vancomycin for postop right foot surgical site infection with involvement of hardware.  Target level: AUC24 400-600  Consulting Provider: Mikhail Banks Jr., MD  Current Vancomycin Dose: 750 mg (~9 mg/kg) IV every 12 hours   Other Antimicrobials: Zosyn    Allergies  Allergies as of 01/07/2021 - Reviewed 01/07/2021   Allergen Reaction Noted   • Zofran [ondansetron hcl] Nausea And Vomiting 04/12/2020   • Prochlorperazine Other (See Comments) 11/16/2016   • Meperidine Itching and Swelling 11/16/2016   • Prochlorperazine maleate Other (See Comments) 11/16/2016       Microbiology  Microbiology Results (last 10 days)     Procedure Component Value - Date/Time    Tissue / Bone Culture - Tissue, Foot, Right [[id number]] Collected: 01/08/21 1646    Lab Status: Final result Specimen: Tissue from Foot, Right Updated: 01/11/21 0621     Tissue Culture No growth at 3 days     Gram Stain No WBCs or organisms seen    Wound Culture - Wound, Foot, Right [[id number]] Collected: 01/08/21 1645    Lab Status: Final result Specimen: Wound from Foot, Right Updated: 01/11/21 0621     Wound Culture No growth at 3 days     Gram Stain No WBCs or organisms seen    COVID PRE-OP / PRE-PROCEDURE SCREENING ORDER (NO ISOLATION) - Swab, Nasopharynx [[id number]]  (Normal) Collected: 01/08/21 1328    Lab Status: Final result Specimen: Swab from Nasopharynx Updated: 01/08/21 1432    Narrative:      The following orders were created for panel order COVID PRE-OP / PRE-PROCEDURE SCREENING ORDER (NO ISOLATION) - Swab, Nasopharynx.  Procedure                               Abnormality         Status                     ---------                               -----------         ------                     COVID-19,[facility]...[[id number]]  Normal              Final result                 Please view results for these tests on the individual orders.     "COVID-19, [facility] CEPHEID, NP SWAB IN TRANSPORT MEDIA 8-12 HR TAT - Swab, Nasopharynx [[id number]]  (Normal) Collected: 01/08/21 1328    Lab Status: Final result Specimen: Swab from Nasopharynx Updated: 01/08/21 1432     COVID19 Not Detected    Narrative:      Fact sheet for providers: https://www.fda.gov/media/846298/download     Fact sheet for patients: https://www.fda.gov/media/604811/download        Vitals/Labs/I&O  162.6 cm (64\")  85 kg (187 lb 6.3 oz)  Body mass index is 32.17 kg/m².   Temp:  [97.3 °F (36.3 °C)-98.4 °F (36.9 °C)] 97.3 °F (36.3 °C)  Heart Rate:  [73-83] 75  Resp:  [16] 16  BP: (128-165)/(75-89) 165/89    Results from last 7 days   Lab Units 01/08/21  1422   WBC 10*3/mm3 8.68     Results from last 7 days   Lab Units 01/08/21  1422   CRP mg/dL 1.17*       Results from last 7 days   Lab Units 01/08/21  2036   SED RATE mm/hr 60*      Estimated Creatinine Clearance: 60.6 mL/min (by C-G formula based on SCr of 0.95 mg/dL).  Results from last 7 days   Lab Units 01/11/21  0515 01/10/21  0519 01/08/21  1422   BUN mg/dL  --   --  22   CREATININE mg/dL 0.95 0.89 1.12*     Intake & Output (last 3 days)       01/08 0701 - 01/09 0700 01/09 0701 - 01/10 0700 01/10 0701 - 01/11 0700 01/11 0701 - 01/12 0700    P.O. 1060  240     I.V. (mL/kg) 1456 (17.1) 603 (7.1)      IV Piggyback  250      Total Intake(mL/kg) 2516 (29.6) 853 (10) 240 (2.8)     Urine (mL/kg/hr) 900 700 (0.3)      Stool 0       Total Output 900 700      Net +1616 +153 +240             Urine Unmeasured Occurrence  1 x 3 x 1 x    Stool Unmeasured Occurrence 1 x             Vancomycin Dosing & Level History  750 mg (~9 mg/kg) IV every 12 hours   01/09 0537   01/09 1716   01/10 0509   01/10 1648    Trough 01/11 0515: 6 mg/L (~12.5h after last dose)           Results from last 7 days   Lab Units 01/11/21  0515   VANCOMYCIN TR mcg/mL 6.00     Assessment:  Bayesian analysis of the most recent level(s) using [employer] provides the following " "patient-specific pharmacokinetic parameters:   CL: 4.86 L/h   Vd: 52.5 L   T1/2: 7.79 h   If the predicted trough on this regimen is not within what was previously considered a \"target trough range\", the AUC24 (a stronger predictor of vancomycin efficacy) is predicted to achieve the accepted target of 400-600 mg*h/L. To best balance efficacy and toxicity, we will be targeting AUC24 in this patient rather than steady-state trough levels.     Increasing the regimen to 1250 mg (15 mg/kg) IV every 12 hours gives a predicted steady-state trough of 14.1 mg/L and AUC24 of 511 mg*h/L.    This is NOT including the initial vancomycin 1000 mg order since this was for the antibiotic beads given in OR and not an IV vancomycin dose.    Recommendations/Plan:  - Give loading dose of vancomycin 1750 mg (~20 mg/kg) IV x1 now and increase vancomycin maintenance regimen to 1250 mg (15 mg/kg) IV every 12 hours  - Patient due to discharge today. Would recommend checking vancomycin trough 30 minutes prior to either the evening dose on Tues 01/12 (4th overall dose) or the morning dose on Wed 01/13 (5th overall dose).  - Continue to monitor SCr daily until therapeutic steady-state vancomycin trough is achieved, then every 48 hours thereafter.     Thank you for involving pharmacy in this patient's care. Please contact pharmacy with any questions or concerns.           Lee Rodriguez, Reese, JULIETH, BCPS  01/11/21 07:51 EST    "

## 2021-01-11 NOTE — PROGRESS NOTES
Discharge Planning Assessment  [facility]     Patient Name: Marge Mcghee  MRN: [id number]  Today's Date: 1/11/2021    Admit Date: 1/8/2021    Discharge Needs Assessment     Row Name 01/11/21 0920       Living Environment    Lives With  spouse    Unique Family Situation  Staying at [facility].    Current Living Arrangements  home/apartment/condo    Primary Care Provided by  self    Provides Primary Care For  no one    Family Caregiver if Needed  friend(s);spouse    Quality of Family Relationships  helpful    Able to Return to Prior Arrangements  yes       Resource/Environmental Concerns    Transportation Concerns  car, none       Transition Planning    Patient/Family Anticipates Transition to  inpatient rehabilitation facility    Patient/Family Anticipated Services at Transition      Transportation Anticipated  health plan transportation       Discharge Needs Assessment    Readmission Within the Last 30 Days  no previous admission in last 30 days    Equipment Currently Used at Home  walker, rolling    Discharge Facility/Level of Care Needs  nursing facility, skilled    Provided Post Acute Provider List?  Refused    Refused Provider List Comment  Requests [facility].        Discharge Plan     Row Name 01/11/21 0921       Plan    Plan  [facility] -- Accepted    Patient/Family in Agreement with Plan  yes    Plan Comments  Spoke with the patient, verified currenti information and CCP role explained. Patient states she has good family support. She arrived to [facility] from [facility]. She's IADL, but uses nursing staff at [facility] to assist with some ADLs. She uses a walker, and has no history with . Patient plans to return to [facility]. Referral sent in [employer]. Spoke with Fabio who stated she has accepted the patient, the patient's 1/8/2021 negative Covid-19 test is appropriate for d/c, and that the patient is a  bedhold therefore she may return to [facility] anytime. Spoke with the patient's nurse/EDGARDO Tadeo who states the patient will likely need IV antibiotics at d/c. Called and left a message for Fabio to update her on d/c plans. Await ID's plan today and Fabio's call back. Patient will need a wheelchair van to transport her at d/c. CCP following.        Continued Care and Services - Admitted Since 1/8/2021     Destination     Service Provider Request Status Selected Services Address Phone Fax Patient Preferred    [facility]  Pending - Request Sent N/A [address] [facility] [place] [address] [phone] --            Selected Continued Care - Prior Encounters Includes selections from prior encounters from 10/10/2020 to 1/11/2021    Discharged on 12/31/2020 Admission date: 12/28/2020 - Discharge disposition: Skilled Nursing Facility (DC - External)    Destination     Service Provider Selected Services Address Phone Fax Patient Preferred    [facility]  Skilled Nursing [address] [facility] [place] [address] [phone] --                Discharged on 11/16/2020 Admission date: 11/13/2020 - Discharge disposition: Skilled Nursing Facility (DC - External)    Destination     Service Provider Selected Services Address Phone Fax Patient Preferred    [facility]  Skilled Nursing [address] [facility] [place] [address] [phone] --                    Expected Discharge Date and Time     Expected Discharge Date Expected Discharge Time    Jan 11, 2021         Demographic Summary     Row Name 01/11/21 0918       General Information    Admission Type  inpatient    Reason for Consult  discharge planning    Preferred Language  English     Used During This Interaction  no       Contact Information    Permission Granted to Share Info With  ;family/designee        Functional Status      Row Name 01/11/21 0920       Functional Status    Usual Activity Tolerance  moderate    Current Activity Tolerance  moderate       Functional Status, IADL    Medications  independent    Meal Preparation  assistive equipment    Housekeeping  assistive equipment    Laundry  assistive equipment    Shopping  assistive equipment       Mental Status Summary    Recent Changes in Mental Status/Cognitive Functioning  no changes        Psychosocial     Row Name 01/11/21 0920       Intellectual Performance WDL    Level of Consciousness  Alert       Coping/Stress    Patient Personal Strengths  able to adapt    Sources of Support  spouse    Reaction to Health Status  accepting    Understanding of Condition and Treatment  adequate understanding of medical condition       Developmental Stage (Eriksson's)    Developmental Stage  Stage 8 (65 years-death/Late Adulthood) Integrity vs. Despair        Abuse/Neglect    No documentation.       Legal    No documentation.       Substance Abuse    No documentation.       Patient Forms    No documentation.           Barbaar Riley RN

## 2021-01-11 NOTE — DISCHARGE PLACEMENT REQUEST
"Maxwell Moreno (65 y.o. Female)     Date of Birth Social Security Number Address Home Phone MRN    1955  [id number] [address] [phone] [id number]    Mu-ism Marital Status          None        Admission Date Admission Type Admitting Provider Attending Provider Department, Room/Bed    1/8/21 Elective Mikhail Banks Jr., MD Lewis, John S Jr., MD [address], P889/1    Discharge Date Discharge Disposition Discharge Destination         Home or Self Care              Attending Provider: Mikhail Banks Jr., MD    Allergies: Zofran [Ondansetron Hcl], Prochlorperazine, Meperidine, Prochlorperazine Maleate    Isolation: None   Infection: None   Code Status: CPR    Ht: 162.6 cm (64\")   Wt: 85 kg (187 lb 6.3 oz)    Admission Cmt: None   Principal Problem: None                Active Insurance as of 1/8/2021     Primary Coverage     Payor Plan Insurance Group Employer/Plan Group    MEDICARE MEDICARE A & B      Payor Plan Address Payor Plan Phone Number Payor Plan Fax Number Effective Dates    [address] [phone]  3/1/2018 - None Entered    [facility] [id number]       Subscriber Name Subscriber Birth Date Member ID       MAXWELL MORENO VINEET 1955 [id number]           Secondary Coverage     Payor Plan Insurance Group Employer/Plan Group    GPM LIFE G[employer] LIFE MC SUP      Payor Plan Address Payor Plan Phone Number Payor Plan Fax Number Effective Dates    [address]   1/1/2019 - None Entered    [facility] [id number]       Subscriber Name Subscriber Birth Date Member ID       MAXWELL MORENO VINEET 1955 [id number]                 Emergency Contacts      (Rel.) Home Phone Work Phone Mobile Phone    Tonia Rockwell (Friend) [phone] -- --    SILVIO MORENO (Spouse) -- -- [phone]              "

## 2021-01-12 ENCOUNTER — APPOINTMENT (OUTPATIENT)
Dept: NUCLEAR MEDICINE | Facility: HOSPITAL | Age: 66
End: 2021-01-12

## 2021-01-12 ENCOUNTER — READMISSION MANAGEMENT (OUTPATIENT)
Dept: CALL CENTER | Facility: HOSPITAL | Age: 66
End: 2021-01-12

## 2021-01-12 VITALS
SYSTOLIC BLOOD PRESSURE: 112 MMHG | BODY MASS INDEX: 31.99 KG/M2 | HEIGHT: 64 IN | DIASTOLIC BLOOD PRESSURE: 67 MMHG | TEMPERATURE: 97.1 F | WEIGHT: 187.39 LBS | RESPIRATION RATE: 16 BRPM | HEART RATE: 70 BPM | OXYGEN SATURATION: 95 %

## 2021-01-12 LAB
BH CV STRESS COMMENTS STAGE 1: NORMAL
BH CV STRESS DOSE REGADENOSON STAGE 1: 0.4
BH CV STRESS DURATION MIN STAGE 1: 0
BH CV STRESS DURATION SEC STAGE 1: 10
BH CV STRESS PROTOCOL 1: NORMAL
BH CV STRESS RECOVERY BP: NORMAL MMHG
BH CV STRESS RECOVERY HR: 84 BPM
BH CV STRESS STAGE 1: 1
CREAT SERPL-MCNC: 0.94 MG/DL (ref 0.57–1)
GFR SERPL CREATININE-BSD FRML MDRD: 59 ML/MIN/1.73
LV EF NUC BP: 70 %
MAXIMAL PREDICTED HEART RATE: 153 BPM
PERCENT MAX PREDICTED HR: 55.56 %
STRESS BASELINE BP: NORMAL MMHG
STRESS BASELINE HR: 70 BPM
STRESS PERCENT HR: 65 %
STRESS POST PEAK BP: NORMAL MMHG
STRESS POST PEAK HR: 85 BPM
STRESS TARGET HR: 130 BPM

## 2021-01-12 PROCEDURE — 93017 CV STRESS TEST TRACING ONLY: CPT

## 2021-01-12 PROCEDURE — C1751 CATH, INF, PER/CENT/MIDLINE: HCPCS

## 2021-01-12 PROCEDURE — 78452 HT MUSCLE IMAGE SPECT MULT: CPT | Performed by: INTERNAL MEDICINE

## 2021-01-12 PROCEDURE — A9500 TC99M SESTAMIBI: HCPCS | Performed by: ORTHOPAEDIC SURGERY

## 2021-01-12 PROCEDURE — 82565 ASSAY OF CREATININE: CPT | Performed by: ORTHOPAEDIC SURGERY

## 2021-01-12 PROCEDURE — 25010000002 REGADENOSON 0.4 MG/5ML SOLUTION: Performed by: ORTHOPAEDIC SURGERY

## 2021-01-12 PROCEDURE — 93016 CV STRESS TEST SUPVJ ONLY: CPT | Performed by: INTERNAL MEDICINE

## 2021-01-12 PROCEDURE — 25010000002 HYDROMORPHONE 1 MG/ML SOLUTION: Performed by: ORTHOPAEDIC SURGERY

## 2021-01-12 PROCEDURE — 25010000002 PIPERACILLIN SOD-TAZOBACTAM PER 1 G: Performed by: INTERNAL MEDICINE

## 2021-01-12 PROCEDURE — 78452 HT MUSCLE IMAGE SPECT MULT: CPT

## 2021-01-12 PROCEDURE — 0 TECHNETIUM SESTAMIBI: Performed by: ORTHOPAEDIC SURGERY

## 2021-01-12 PROCEDURE — 63710000001 PROMETHAZINE PER 12.5 MG: Performed by: ORTHOPAEDIC SURGERY

## 2021-01-12 PROCEDURE — 93018 CV STRESS TEST I&R ONLY: CPT | Performed by: INTERNAL MEDICINE

## 2021-01-12 PROCEDURE — 25010000002 VANCOMYCIN 10 G RECONSTITUTED SOLUTION: Performed by: INTERNAL MEDICINE

## 2021-01-12 RX ORDER — SODIUM CHLORIDE 0.9 % (FLUSH) 0.9 %
20 SYRINGE (ML) INJECTION AS NEEDED
Status: DISCONTINUED | OUTPATIENT
Start: 2021-01-12 | End: 2021-01-12 | Stop reason: HOSPADM

## 2021-01-12 RX ORDER — SODIUM CHLORIDE 0.9 % (FLUSH) 0.9 %
10 SYRINGE (ML) INJECTION EVERY 12 HOURS SCHEDULED
Status: DISCONTINUED | OUTPATIENT
Start: 2021-01-12 | End: 2021-01-12 | Stop reason: HOSPADM

## 2021-01-12 RX ORDER — SACCHAROMYCES BOULARDII 250 MG
250 CAPSULE ORAL 2 TIMES DAILY
Qty: 84 CAPSULE | Refills: 0 | Status: SHIPPED | OUTPATIENT
Start: 2021-01-12 | End: 2021-05-02

## 2021-01-12 RX ORDER — SODIUM CHLORIDE 0.9 % (FLUSH) 0.9 %
10 SYRINGE (ML) INJECTION AS NEEDED
Status: DISCONTINUED | OUTPATIENT
Start: 2021-01-12 | End: 2021-01-12 | Stop reason: HOSPADM

## 2021-01-12 RX ADMIN — OXYCODONE HYDROCHLORIDE AND ACETAMINOPHEN 2 TABLET: 7.5; 325 TABLET ORAL at 00:27

## 2021-01-12 RX ADMIN — TAZOBACTAM SODIUM AND PIPERACILLIN SODIUM 3.38 G: 375; 3 INJECTION, SOLUTION INTRAVENOUS at 13:14

## 2021-01-12 RX ADMIN — PROMETHAZINE HYDROCHLORIDE 12.5 MG: 12.5 TABLET ORAL at 17:38

## 2021-01-12 RX ADMIN — HYDROMORPHONE HYDROCHLORIDE 1 MG: 1 INJECTION, SOLUTION INTRAMUSCULAR; INTRAVENOUS; SUBCUTANEOUS at 11:34

## 2021-01-12 RX ADMIN — PROMETHAZINE HYDROCHLORIDE 12.5 MG: 12.5 TABLET ORAL at 00:26

## 2021-01-12 RX ADMIN — TECHNETIUM TC 99M SESTAMIBI 1 DOSE: 1 INJECTION INTRAVENOUS at 11:15

## 2021-01-12 RX ADMIN — SODIUM CHLORIDE, PRESERVATIVE FREE 10 ML: 5 INJECTION INTRAVENOUS at 13:22

## 2021-01-12 RX ADMIN — Medication 250 MG: at 13:15

## 2021-01-12 RX ADMIN — PROMETHAZINE HYDROCHLORIDE 12.5 MG: 12.5 TABLET ORAL at 13:21

## 2021-01-12 RX ADMIN — TAZOBACTAM SODIUM AND PIPERACILLIN SODIUM 3.38 G: 375; 3 INJECTION, SOLUTION INTRAVENOUS at 01:56

## 2021-01-12 RX ADMIN — OXYCODONE HYDROCHLORIDE AND ACETAMINOPHEN 2 TABLET: 7.5; 325 TABLET ORAL at 06:27

## 2021-01-12 RX ADMIN — REGADENOSON 0.4 MG: 0.08 INJECTION, SOLUTION INTRAVENOUS at 11:15

## 2021-01-12 RX ADMIN — ASPIRIN 325 MG: 325 TABLET, COATED ORAL at 13:15

## 2021-01-12 RX ADMIN — LINAGLIPTIN 5 MG: 5 TABLET, FILM COATED ORAL at 13:15

## 2021-01-12 RX ADMIN — FUROSEMIDE 20 MG: 20 TABLET ORAL at 13:15

## 2021-01-12 RX ADMIN — LOSARTAN POTASSIUM 100 MG: 100 TABLET, FILM COATED ORAL at 13:14

## 2021-01-12 RX ADMIN — TECHNETIUM TC 99M SESTAMIBI 1 DOSE: 1 INJECTION INTRAVENOUS at 06:25

## 2021-01-12 RX ADMIN — GLIPIZIDE 10 MG: 10 TABLET ORAL at 13:15

## 2021-01-12 RX ADMIN — PROMETHAZINE HYDROCHLORIDE 12.5 MG: 12.5 TABLET ORAL at 07:01

## 2021-01-12 RX ADMIN — VANCOMYCIN HYDROCHLORIDE 1250 MG: 10 INJECTION, POWDER, LYOPHILIZED, FOR SOLUTION INTRAVENOUS at 15:37

## 2021-01-12 RX ADMIN — OXYCODONE HYDROCHLORIDE AND ACETAMINOPHEN 2 TABLET: 7.5; 325 TABLET ORAL at 13:16

## 2021-01-12 RX ADMIN — OXYCODONE HYDROCHLORIDE AND ACETAMINOPHEN 2 TABLET: 7.5; 325 TABLET ORAL at 17:37

## 2021-01-12 RX ADMIN — METOPROLOL TARTRATE 25 MG: 25 TABLET, FILM COATED ORAL at 13:15

## 2021-01-12 RX ADMIN — POTASSIUM CHLORIDE 20 MEQ: 750 TABLET, EXTENDED RELEASE ORAL at 13:16

## 2021-01-12 NOTE — NURSING NOTE
Spoke with a nurse for the cardiologist who states that stress test was normal and patient is OK for discharge from their standpoint. May follow up in office if CP continues.

## 2021-01-12 NOTE — PROGRESS NOTES
"  Infectious Diseases Progress Note    Giancarlo Galicia MD     [facility]  Los: 3 days  Patient Identification:  Name: Marge Mcghee  Age: 65 y.o.  Sex: female  :  1955  MRN: [id number]         Primary Care Physician: Traci Townsend APRN            Subjective: No significant change. Just  frustrated.  Going for stress test in the morning.  Interval History: See consultation note    Objective:    Scheduled Meds:aspirin EC, 325 mg, Oral, Daily  atorvastatin, 80 mg, Oral, Nightly  DULoxetine, 60 mg, Oral, Q PM  furosemide, 20 mg, Oral, Daily  glipizide, 10 mg, Oral, QAM AC  linagliptin, 5 mg, Oral, Daily  losartan, 100 mg, Oral, Daily  metFORMIN, 1,000 mg, Oral, BID With Meals  metoprolol tartrate, 25 mg, Oral, Q12H  piperacillin-tazobactam, 3.375 g, Intravenous, Q8H  potassium chloride, 20 mEq, Oral, Daily  rOPINIRole, 0.25 mg, Oral, Q PM  saccharomyces boulardii, 250 mg, Oral, BID  sodium chloride, 10 mL, Intravenous, Q12H  vancomycin, 1,250 mg, Intravenous, Q12H      Continuous Infusions:lactated ringers, 9 mL/hr, Last Rate: 9 mL/hr (21 1457)  lactated ringers, 100 mL/hr, Last Rate: 100 mL/hr (216)  Pharmacy to dose vancomycin,         Vital signs in last 24 hours:  Temp:  [97.1 °F (36.2 °C)-99.1 °F (37.3 °C)] 98 °F (36.7 °C)  Heart Rate:  [65-76] 65  Resp:  [16] 16  BP: (135-186)/(74-89) 135/74    Intake/Output:    Intake/Output Summary (Last 24 hours) at 2021 2334  Last data filed at 2021 1516  Gross per 24 hour   Intake 720 ml   Output --   Net 720 ml       Exam:  /74 (BP Location: Right arm, Patient Position: Lying)   Pulse 65   Temp 98 °F (36.7 °C) (Oral)   Resp 16   Ht 162.6 cm (64\")   Wt 85 kg (187 lb 6.3 oz)   SpO2 94%   BMI 32.17 kg/m²   Patient is examined using the personal protective equipment as per guidelines from infection control for this particular patient as enacted.  Hand washing was performed before and after patient " interaction.  General Appearance:    Alert, cooperative, no distress, AAOx3                          Head:    Normocephalic, without obvious abnormality, atraumatic                           Eyes:    PERRL, conjunctivae/corneas clear, EOM's intact, both eyes                         Throat:   Lips, tongue, gums normal; oral mucosa pink and moist                           Neck:   Supple, symmetrical, trachea midline, no JVD                         Lungs:    Clear to auscultation bilaterally, respirations unlabored                 Chest Wall:    No tenderness or deformity                          Heart:    Regular rate and rhythm, S1 and S2 normal, no murmur, no rub                                         or gallop                  Abdomen:     Soft, non-tender, bowel sounds active, no masses, no                                                        organomegaly                  Extremities: Right foot dressed                        Pulses:   Pulses palpable in all extremities                            Skin:   Skin is warm and dry,  no rashes or palpable lesions                  Neurologic: Grossly nonfocal       Data Review:    I reviewed the patient's new clinical results.  Results from last 7 days   Lab Units 01/08/21  1422   WBC 10*3/mm3 8.68   HEMOGLOBIN g/dL 10.7*   PLATELETS 10*3/mm3 259     Results from last 7 days   Lab Units 01/11/21  0515 01/10/21  0519 01/08/21  1422   SODIUM mmol/L  --   --  136   POTASSIUM mmol/L  --   --  4.6   CHLORIDE mmol/L  --   --  101   CO2 mmol/L  --   --  25.8   BUN mg/dL  --   --  22   CREATININE mg/dL 0.95 0.89 1.12*   CALCIUM mg/dL  --   --  8.9   GLUCOSE mg/dL  --   --  130*     Microbiology Results (last 10 days)     Procedure Component Value - Date/Time    Tissue / Bone Culture - Tissue, Foot, Right [[id number]] Collected: 01/08/21 1646    Lab Status: Final result Specimen: Tissue from Foot, Right Updated: 01/11/21 0621     Tissue Culture No growth at 3 days     Gram Stain  No WBCs or organisms seen    Wound Culture - Wound, Foot, Right [[id number]] Collected: 01/08/21 1645    Lab Status: Final result Specimen: Wound from Foot, Right Updated: 01/11/21 0621     Wound Culture No growth at 3 days     Gram Stain No WBCs or organisms seen    COVID PRE-OP / PRE-PROCEDURE SCREENING ORDER (NO ISOLATION) - Swab, Nasopharynx [[id number]]  (Normal) Collected: 01/08/21 1328    Lab Status: Final result Specimen: Swab from Nasopharynx Updated: 01/08/21 1432    Narrative:      The following orders were created for panel order COVID PRE-OP / PRE-PROCEDURE SCREENING ORDER (NO ISOLATION) - Swab, Nasopharynx.  Procedure                               Abnormality         Status                     ---------                               -----------         ------                     COVID-19,[facility] IN[facility]HOUSE...[[id number]]  Normal              Final result                 Please view results for these tests on the individual orders.    COVID-19,[facility]HOU[facility] CEPHEID, NP SWAB IN TRANSPORT MEDIA 8-12 HR TAT - Swab, Nasopharynx [[id number]]  (Normal) Collected: 01/08/21 1328    Lab Status: Final result Specimen: Swab from Nasopharynx Updated: 01/08/21 1432     COVID19 Not Detected    Narrative:      Fact sheet for providers: https://www.fda.gov/media/791265/download     Fact sheet for patients: https://www.fda.gov/media/693490/download            Assessment:    Right foot infection  1-postop right foot surgical site infection following complex fracture repair and corrective surgery in November 2020 presenting as wound dehiscence and cellulitis with transient response with oral antibiotics and IV antibiotics of short duration and likely associated with  2-deep infection with probable infected hardware and contiguous focus osteomyelitis.  3-other diagnoses include   diabetes mellitus with peripheral neuropathy  Osteoporosis  Hypertension        Recommendations/Discussions:  · Continue empiric broad-spectrum  antibiotics directed towards pathogens that can cause smoldering low-grade surgical site infection with involvement of hardware and not responding to antibiotic treatment.  This represents could range from Corynebacterium species to propionobacterium species to coag negative staph.  · Would recommend vancomycin and Zosyn for now while awaiting operative cultures.  · Explained to the patient that if operative cultures are negative then the selection of antibiotic would be empiric coverage for the pathogens listed above.  · Duration of antibiotic treatment would be 6 weeks from last definitive surgery with close monitoring of antibiotic toxicity and side effects.  · PICC line ordered and will be placed in the morning.  · On request CCP for arrangement of outpatient antibiotics with plans to check labs on a weekly basis for monitoring of toxicity and antibiotic effectiveness.      Giancarlo Galicia MD  1/11/2021  23:34 EST    Much of this encounter note is an electronic transcription/translation of spoken language to printed text. The electronic translation of spoken language may permit erroneous, or at times, nonsensical words or phrases to be inadvertently transcribed; Although I have reviewed the note for such errors, some may still exist

## 2021-01-12 NOTE — PLAN OF CARE
Goal Outcome Evaluation:  Plan of Care Reviewed With: patient  Progress: improving  Outcome Summary: good pain control with PO, ambulating assist of 1 with walker, NWB RE, plan for stress test in am, PICC line tomorrow, DC [facility] tomorrow, NVI, educated on bp meds and monitoring

## 2021-01-12 NOTE — PLAN OF CARE
Goal Outcome Evaluation:  Plan of Care Reviewed With: patient  Progress: improving  Outcome Summary: pt aox4, pain well controlled, ambulated with walker to bathroom, NWB right leg/foot, NPO at midnight initiated for stress test today, plan to place picc today as well.vitals stable.

## 2021-01-12 NOTE — PROGRESS NOTES
Continued Stay Note  [facility]     Patient Name: Marge Mcghee  MRN: [id number]  Today's Date: 1/12/2021    Admit Date: 1/8/2021    Discharge Plan     Row Name 01/12/21 1546       Plan    Plan  [facility] -- Accepted    Patient/Family in Agreement with Plan  yes    Plan Comments  Spoke with Conrad who is agreeable to both the d/c plan & 1800 transport time today, and states the patient's 1/8/2021 negative Covid-19 test is appropriate for d/c today. Faxed Conrad Galicia's d/c IV Antbiotic/Lab orders to the fax number provided by Emilia: [phone]. Updated the patient and nurse/EDGARDO Tadeo who are both agreeable with the d/c plan. The patient asked CCP not to call her /Tino since she updated him already. No other needs identified.    Final Discharge Disposition Code  03 - skilled nursing facility (SNF)    Final Note  Pt to d/c to [facility]. [facility] to transport.    Row Name 01/12/21 1342       Plan    Plan  [facility] -- Accepted    Patient/Family in Agreement with Plan  yes    Plan Comments  Spoke with Emilia Fan/Sohan Hackett who has accepted the patient, has a SNF bed for her today and IV antibiotics (Vancomycin & Zosyn) are ok. Spoke with Ivelisse/Geno Gonzales and scheduled a [facility] Van (with elevated leg extender) to transport the at 1800 (confirmation #: [id number]). Conrad will call back with her fax # for CCP to fax Dr. Galicia's IV antibiotic/lab orders.        Discharge Codes    No documentation.       Expected Discharge Date and Time     Expected Discharge Date Expected Discharge Time    Jan 11, 2021             Barbara Riley RN

## 2021-01-12 NOTE — PROGRESS NOTES
"Orthopaedic Surgery  Daily Progress Note    /85 (BP Location: Right arm, Patient Position: Lying)   Pulse 65   Temp 98 °F (36.7 °C) (Oral)   Resp 16   Ht 162.6 cm (64\")   Wt 85 kg (187 lb 6.3 oz)   SpO2 97%   BMI 32.17 kg/m²     Lab Results (last 24 hours)     Procedure Component Value Units Date/Time    Creatinine, Serum [[id number]] Collected: 01/12/21 0514    Specimen: Blood Updated: 01/12/21 0708          Imaging Results (Last 24 Hours)     ** No results found for the last 24 hours. **          Patient Care Team:  Traci Townsend APRN as PCP - General (Nurse Practitioner)  Drake Hinton MD PhD as Consulting Physician (Ophthalmology)  Fausto Gilliam MD as Consulting Physician (Endocrinology)  Saloni Ch MD (Ophthalmology)  Mikhail Kaplan Jr., MD as Consulting Physician (Urology)    SUBJECTIVE  Pain controlled. Tolerating diet. Voiding without difficulty.  Tolerating IV antibiotics without nausea, vomiting, diarrhea or rash.  Was having some chest pain over the weekend.  Cardiology consult did a cardiac workup has been negative so far.  Patient is going for a stress test today.    PHYSICAL EXAM  Resting in NAD  Splint clean, dry, intact  Toes warm, perfused, brisk capillary refill  Flexes/extends toes   SILT over toes  No pain with passive stretch           Right foot infection      PLAN / DISPOSITION:  POD 3 s/p I&D right foot wound, doing well  1. Pain control: Orals  2. Antibiotics: Periop Ancef to end today  3. PT: NWB in splint  4. DVT: ASA 325mg daily plus ALIRIO/SCDs, mobilization  5. Dispo: to rehab when ok ID and cardio, follow up 2-3 weeks with me at [facility] ([phone] to make appointment)        CANDIDO Sylvester  01/12/21  08:04 EST      "

## 2021-01-12 NOTE — SIGNIFICANT NOTE
Tip in SVC.       01/12/21 1546   PICC Single Lumen 01/12/21 Right Basilic   Placement Date/Time: 01/12/21 1545   Hand Hygiene Completed: Yes  Size (Fr): 4  Description (optional): LOT# [id number] exp 09/30/21  Length (cm): 39 cm  Orientation: Right  Location: Basilic  Site Prep: Chlorhexidine isopropyl alcohol  All 5 Sterile Ba...   Site Assessment Clean;Dry;Intact   #1 Lumen Status Blood return noted;Capped;Flushed;Normal saline locked   Length candace (cm) 0 cm   Extremity Circumference (cm) 32 cm   Dressing Type Border Dressing;Transparent;Antimicrobial dressing/disc;Securing device   Dressing Status Clean;Dry;Intact   Dressing Intervention New dressing   Liquid Adhesive Contraindicated (comment)   Dressing Change Due 01/19/21

## 2021-01-12 NOTE — NURSING NOTE
Iv team consulted to place a picc line for 8 weeks IV ABT, Nilda primary RN spoke with pt and she is anxious and requested to wait until tomorrow. Recommended that she may need to have something ordered to help with her anxiety 30 min prior to picc line being placed due to Shwetha the day time RN mentioned pt is very anxious about picc placement as well. Iv team will follow up with line placement tomorrow.

## 2021-01-12 NOTE — PROGRESS NOTES
Vancomycin Pharmacokinetic Note     Marge Mcghee is a 65 y.o. female who is on day 4 pharmacy to dose vancomycin for postop right foot surgical site infection with involvement of hardware.  Target level: AUC24 400-600  Consulting Provider: Mikhail Banks Jr., MD  Current Vancomycin Dose: 750 mg (~9 mg/kg) IV every 12 hours   Other Antimicrobials: Zosyn     Allergies        Allergies as of 01/07/2021 - Reviewed 01/07/2021   Allergen Reaction Noted    Zofran [ondansetron hcl] Nausea And Vomiting 04/12/2020    Prochlorperazine Other (See Comments) 11/16/2016    Meperidine Itching and Swelling 11/16/2016    Prochlorperazine maleate Other (See Comments) 11/16/2016         Microbiology           Microbiology Results (last 10 days)      Procedure Component Value - Date/Time     Tissue / Bone Culture - Tissue, Foot, Right [[id number]] Collected: 01/08/21 1646     Lab Status: Final result Specimen: Tissue from Foot, Right Updated: 01/11/21 0621       Tissue Culture No growth at 3 days       Gram Stain No WBCs or organisms seen     Wound Culture - Wound, Foot, Right [[id number]] Collected: 01/08/21 1645     Lab Status: Final result Specimen: Wound from Foot, Right Updated: 01/11/21 0621       Wound Culture No growth at 3 days       Gram Stain No WBCs or organisms seen     COVID PRE-OP / PRE-PROCEDURE SCREENING ORDER (NO ISOLATION) - Swab, Nasopharynx [[id number]]  (Normal) Collected: 01/08/21 1328     Lab Status: Final result Specimen: Swab from Nasopharynx Updated: 01/08/21 1432     Narrative:       The following orders were created for panel order COVID PRE-OP / PRE-PROCEDURE SCREENING ORDER (NO ISOLATION) - Swab, Nasopharynx.  Procedure                               Abnormality         Status                     ---------                               -----------         ------                     COVID-19, [facility]...[[id number]]  Normal              Final result                  Please view results for these tests  "on the individual orders.     COVID-19, [facility] CEPHEID, NP SWAB IN TRANSPORT MEDIA 8-12 HR TAT - Swab, Nasopharynx [[id number]]  (Normal) Collected: 01/08/21 1328     Lab Status: Final result Specimen: Swab from Nasopharynx Updated: 01/08/21 1432       COVID19 Not Detected     Narrative:       Fact sheet for providers: https://www.fda.gov/media/424903/download      Fact sheet for patients: https://www.fda.gov/media/388882/download          Vitals/Labs/I&O  162.6 cm (64\")  85 kg (187 lb 6.3 oz)  Body mass index is 32.17 kg/m².   Temp:  [97.3 °F (36.3 °C)-98.4 °F (36.9 °C)] 97.3 °F (36.3 °C)  Heart Rate:  [73-83] 75  Resp:  [16] 16  BP: (128-165)/(75-89) 165/89          Results from last 7 days   Lab Units 01/08/21  1422   WBC 10*3/mm3 8.68           Results from last 7 days   Lab Units 01/08/21  1422   CRP mg/dL 1.17*            Results from last 7 days   Lab Units 01/08/21  2036   SED RATE mm/hr 60*      Estimated Creatinine Clearance: 60.6 mL/min (by C-G formula based on SCr of 0.95 mg/dL).         Results from last 7 days   Lab Units 01/11/21  0515 01/10/21  0519 01/08/21  1422   BUN mg/dL  --   --  22   CREATININE mg/dL 0.95 0.89 1.12*               Intake & Output (last 3 days)        01/08 0701 - 01/09 0700 01/09 0701 - 01/10 0700 01/10 0701 - 01/11 0700 01/11 0701 - 01/12 0700     P.O. 1060   240       I.V. (mL/kg) 1456 (17.1) 603 (7.1)         IV Piggyback   250         Total Intake(mL/kg) 2516 (29.6) 853 (10) 240 (2.8)       Urine (mL/kg/hr) 900 700 (0.3)         Stool 0           Total Output 900 700         Net +1616 +153 +240                     Urine Unmeasured Occurrence   1 x 3 x 1 x     Stool Unmeasured Occurrence 1 x                   Vancomycin Dosing & Level History  750 mg (~9 mg/kg) IV every 12 hours              01/09 0537              01/09 1716              01/10 0509              01/10 1648                          Trough 01/11 0515: 6 mg/L (~12.5h after last dose)              " "  Results from last 7 days   Lab Units 01/11/21  0515   VANCOMYCIN TR mcg/mL 6.00      Assessment:  Bayesian analysis of the most recent level(s) using [address] provides the following patient-specific pharmacokinetic parameters:              CL:       4.86 L/h              Vd:       52.5 L              T1/2:    7.79 h   If the predicted trough on this regimen is not within what was previously considered a \"target trough range\", the AUC24 (a stronger predictor of vancomycin efficacy) is predicted to achieve the accepted target of 400-600 mg*h/L. To best balance efficacy and toxicity, we will be targeting AUC24 in this patient rather than steady-state trough levels.      Increasing the regimen to 1250 mg (15 mg/kg) IV every 12 hours gives a predicted steady-state trough of 14.1 mg/L and AUC24 of 511 mg*h/L.     This is NOT including the initial vancomycin 1000 mg order since this was for the antibiotic beads given in OR and not an IV vancomycin dose.     Recommendations/Plan:  Patient currently is on 1250 mg (15 mg/kg) IV every 12 hours  Patient is in NUC MED this am therefore scheduled administration time was missed. I will adjust the time for next dose due to be at 1200. Will check trough level tomorrow prior to the 1200 dose.  Continue to monitor SCr daily until therapeutic steady-state vancomycin trough is achieved, then every 48 hours thereafter.      Thank you for involving pharmacy in this patient's care. Please contact pharmacy with any questions or concerns.            Gonsalo Ayers [facility]  "

## 2021-01-12 NOTE — PROGRESS NOTES
Continued Stay Note  [facility]     Patient Name: Marge Mcghee  MRN: [id number]  Today's Date: 1/12/2021    Admit Date: 1/8/2021    Discharge Plan     Row Name 01/12/21 1342       Plan    Plan  [facility] -- Accepted    Patient/Family in Agreement with Plan  yes    Plan Comments  Spoke with [facility] who has [facility] the patient, has a SNF bed for her today and IV antibiotics (Vancomycin & Zosyn) are ok. Spoke with Nita Gonzales and scheduled a [facility] Wheelchair Van (with elevated leg extender) to transport the at 1800 (confirmation #: [id number]). Conrad will call back with her fax # for CCP to fax Dr. Galicia's IV antibiotic/lab orders.        Discharge Codes    No documentation.       Expected Discharge Date and Time     Expected Discharge Date Expected Discharge Time    Jan 11, 2021             Barbara Riley RN

## 2021-01-12 NOTE — PROGRESS NOTES
"  Infectious Diseases Progress Note    Giancarlo Galicia MD     [facility]  [place]: 4 days  Patient Identification:  Name: Marge Mcghee  Age: 65 y.o.  Sex: female  :  1955  MRN: [id number]         Primary Care Physician: Traci Townsend APRN            Subjective: No new complaints tired from the stress test.  Interval History: See consultation note    Objective:    Scheduled Meds:aspirin EC, 325 mg, Oral, Daily  atorvastatin, 80 mg, Oral, Nightly  DULoxetine, 60 mg, Oral, Q PM  furosemide, 20 mg, Oral, Daily  glipizide, 10 mg, Oral, QAM AC  linagliptin, 5 mg, Oral, Daily  losartan, 100 mg, Oral, Daily  metFORMIN, 1,000 mg, Oral, BID With Meals  metoprolol tartrate, 25 mg, Oral, Q12H  piperacillin-tazobactam, 3.375 g, Intravenous, Q8H  potassium chloride, 20 mEq, Oral, Daily  rOPINIRole, 0.25 mg, Oral, Q PM  saccharomyces boulardii, 250 mg, Oral, BID  sodium chloride, 10 mL, Intravenous, Q12H  vancomycin, 1,250 mg, Intravenous, Q12H      Continuous Infusions:lactated ringers, 9 mL/hr, Last Rate: 9 mL/hr (21 1457)  lactated ringers, 100 mL/hr, Last Rate: 100 mL/hr (21)  Pharmacy to dose vancomycin,         Vital signs in last 24 hours:  Temp:  [97.7 °F (36.5 °C)-99.1 °F (37.3 °C)] 98 °F (36.7 °C)  Heart Rate:  [65-71] 65  Resp:  [16] 16  BP: (135-172)/(74-86) 150/85    Intake/Output:    Intake/Output Summary (Last 24 hours) at 2021 1232  Last data filed at 2021 0900  Gross per 24 hour   Intake 850 ml   Output --   Net 850 ml       Exam:  /85 (BP Location: Right arm, Patient Position: Lying)   Pulse 65   Temp 98 °F (36.7 °C) (Oral)   Resp 16   Ht 162.6 cm (64\")   Wt 85 kg (187 lb 6.3 oz)   SpO2 97%   BMI 32.17 kg/m²   Patient is examined using the personal protective equipment as per guidelines from infection control for this particular patient as enacted.  Hand washing was performed before and after patient interaction.  General Appearance:    Alert, " cooperative, no distress, AAOx3                          Head:    Normocephalic, without obvious abnormality, atraumatic                           Eyes:    PERRL, conjunctivae/corneas clear, EOM's intact, both eyes                         Throat:   Lips, tongue, gums normal; oral mucosa pink and moist                           Neck:   Supple, symmetrical, trachea midline, no JVD                         Lungs:    Clear to auscultation bilaterally, respirations unlabored                 Chest Wall:    No tenderness or deformity                          Heart:    Regular rate and rhythm, S1 and S2 normal, no murmur, no rub                                         or gallop                  Abdomen:     Soft, non-tender, bowel sounds active, no masses, no                                                        organomegaly                  Extremities: Right foot dressed                        Pulses:   Pulses palpable in all extremities                            Skin:   Skin is warm and dry,  no rashes or palpable lesions                  Neurologic: Grossly nonfocal       Data Review:    I reviewed the patient's new clinical results.  Results from last 7 days   Lab Units 01/08/21  1422   WBC 10*3/mm3 8.68   HEMOGLOBIN g/dL 10.7*   PLATELETS 10*3/mm3 259     Results from last 7 days   Lab Units 01/12/21  0514 01/11/21  0515 01/10/21  0519 01/08/21  1422   SODIUM mmol/L  --   --   --  136   POTASSIUM mmol/L  --   --   --  4.6   CHLORIDE mmol/L  --   --   --  101   CO2 mmol/L  --   --   --  25.8   BUN mg/dL  --   --   --  22   CREATININE mg/dL 0.94 0.95 0.89 1.12*   CALCIUM mg/dL  --   --   --  8.9   GLUCOSE mg/dL  --   --   --  130*     Microbiology Results (last 10 days)     Procedure Component Value - Date/Time    Tissue / Bone Culture - Tissue, Foot, Right [[id number]] Collected: 01/08/21 1646    Lab Status: Final result Specimen: Tissue from Foot, Right Updated: 01/11/21 0621     Tissue Culture No growth at 3 days      Gram Stain No WBCs or organisms seen    Wound Culture - Wound, Foot, Right [[id number]] Collected: 01/08/21 1645    Lab Status: Final result Specimen: Wound from Foot, Right Updated: 01/11/21 0621     Wound Culture No growth at 3 days     Gram Stain No WBCs or organisms seen    COVID PRE-OP / PRE-PROCEDURE SCREENING ORDER (NO ISOLATION) - Swab, Nasopharynx [[id number]]  (Normal) Collected: 01/08/21 1328    Lab Status: Final result Specimen: Swab from Nasopharynx Updated: 01/08/21 1432    Narrative:      The following orders were created for panel order COVID PRE-OP / PRE-PROCEDURE SCREENING ORDER (NO ISOLATION) - Swab, Nasopharynx.  Procedure                               Abnormality         Status                     ---------                               -----------         ------                     COVID-19,[facility]...[[id number]]  Normal              Final result                 Please view results for these tests on the individual orders.    COVID-19,[facility]HOU[facility] CEPHEID, NP SWAB IN TRANSPORT MEDIA 8-12 HR TAT - Swab, Nasopharynx [[id number]]  (Normal) Collected: 01/08/21 1328    Lab Status: Final result Specimen: Swab from Nasopharynx Updated: 01/08/21 1432     COVID19 Not Detected    Narrative:      Fact sheet for providers: https://www.fda.gov/media/843547/download     Fact sheet for patients: https://www.fda.gov/media/653474/download            Assessment:    Right foot infection  1-postop right foot surgical site infection following complex fracture repair and corrective surgery in November 2020 presenting as wound dehiscence and cellulitis with transient response with oral antibiotics and IV antibiotics of short duration and likely associated with  2-deep infection with probable infected hardware and contiguous focus osteomyelitis.  3-other diagnoses include   diabetes mellitus with peripheral neuropathy  Osteoporosis  Hypertension        Recommendations/Discussions:  · See CCP orders.  · PICC  line later today.      Giancarlo Galicia MD  1/12/2021  12:32 EST    Much of this encounter note is an electronic transcription/translation of spoken language to printed text. The electronic translation of spoken language may permit erroneous, or at times, nonsensical words or phrases to be inadvertently transcribed; Although I have reviewed the note for such errors, some may still exist

## 2021-01-13 NOTE — OUTREACH NOTE
Prep Survey      Responses   [facility] facility patient discharged from?  [place]   Is LACE score < 7 ?  No   Emergency Room discharge w/ pulse ox?  No   Eligibility  Not Eligible   What are the reasons patient is not eligible?  [facility] [ [facility]]   Does the patient have one of the following disease processes/diagnoses(primary or secondary)?  Other   Prep survey completed?  Yes          Inga Cornelius RN

## 2021-04-06 ENCOUNTER — OFFICE VISIT (OUTPATIENT)
Dept: ENDOCRINOLOGY | Age: 66
End: 2021-04-06

## 2021-04-06 DIAGNOSIS — Z53.21 PATIENT LEFT WITHOUT BEING SEEN: Primary | ICD-10-CM

## 2021-05-01 ENCOUNTER — HOSPITAL ENCOUNTER (INPATIENT)
Facility: HOSPITAL | Age: 66
LOS: 9 days | Discharge: NURSING, REHAB OR LONG-TERM CARE | End: 2021-05-11
Attending: EMERGENCY MEDICINE | Admitting: INTERNAL MEDICINE

## 2021-05-01 ENCOUNTER — APPOINTMENT (OUTPATIENT)
Dept: GENERAL RADIOLOGY | Facility: HOSPITAL | Age: 66
End: 2021-05-01

## 2021-05-01 DIAGNOSIS — I20.8 STABLE ANGINA PECTORIS (HCC): ICD-10-CM

## 2021-05-01 DIAGNOSIS — R73.9 HYPERGLYCEMIA: ICD-10-CM

## 2021-05-01 DIAGNOSIS — Z95.1 S/P CABG X 3: ICD-10-CM

## 2021-05-01 DIAGNOSIS — R07.9 CHEST PAIN, UNSPECIFIED TYPE: Primary | ICD-10-CM

## 2021-05-01 LAB
BASOPHILS # BLD AUTO: 0.1 10*3/MM3 (ref 0–0.2)
BASOPHILS NFR BLD AUTO: 1.2 % (ref 0–1.5)
BILIRUB UR QL STRIP: NEGATIVE
CLARITY UR: CLEAR
COLOR UR: YELLOW
D DIMER PPP FEU-MCNC: 0.6 MG/L (FEU) (ref 0–0.59)
DEPRECATED RDW RBC AUTO: 42.4 FL (ref 37–54)
EOSINOPHIL # BLD AUTO: 0.2 10*3/MM3 (ref 0–0.4)
EOSINOPHIL NFR BLD AUTO: 2.1 % (ref 0.3–6.2)
ERYTHROCYTE [DISTWIDTH] IN BLOOD BY AUTOMATED COUNT: 13.7 % (ref 12.3–15.4)
GLUCOSE UR STRIP-MCNC: ABNORMAL MG/DL
HCT VFR BLD AUTO: 33.8 % (ref 34–46.6)
HGB BLD-MCNC: 11.2 G/DL (ref 12–15.9)
HGB UR QL STRIP.AUTO: NEGATIVE
INR PPP: 0.96 (ref 0.93–1.1)
KETONES UR QL STRIP: NEGATIVE
LEUKOCYTE ESTERASE UR QL STRIP.AUTO: NEGATIVE
LYMPHOCYTES # BLD AUTO: 2.7 10*3/MM3 (ref 0.7–3.1)
LYMPHOCYTES NFR BLD AUTO: 32 % (ref 19.6–45.3)
MCH RBC QN AUTO: 29.6 PG (ref 26.6–33)
MCHC RBC AUTO-ENTMCNC: 33.2 G/DL (ref 31.5–35.7)
MCV RBC AUTO: 89.1 FL (ref 79–97)
MONOCYTES # BLD AUTO: 0.9 10*3/MM3 (ref 0.1–0.9)
MONOCYTES NFR BLD AUTO: 10.9 % (ref 5–12)
NEUTROPHILS NFR BLD AUTO: 4.5 10*3/MM3 (ref 1.7–7)
NEUTROPHILS NFR BLD AUTO: 53.8 % (ref 42.7–76)
NITRITE UR QL STRIP: NEGATIVE
NRBC BLD AUTO-RTO: 0.1 /100 WBC (ref 0–0.2)
PH UR STRIP.AUTO: 5.5 [PH] (ref 5–8)
PLATELET # BLD AUTO: 255 10*3/MM3 (ref 140–450)
PMV BLD AUTO: 7.4 FL (ref 6–12)
PROT UR QL STRIP: NEGATIVE
PROTHROMBIN TIME: 10.6 SECONDS (ref 9.6–11.7)
QT INTERVAL: 403 MS
RBC # BLD AUTO: 3.79 10*6/MM3 (ref 3.77–5.28)
SP GR UR STRIP: 1.03 (ref 1–1.03)
UROBILINOGEN UR QL STRIP: ABNORMAL
WBC # BLD AUTO: 8.4 10*3/MM3 (ref 3.4–10.8)

## 2021-05-01 PROCEDURE — 84484 ASSAY OF TROPONIN QUANT: CPT | Performed by: EMERGENCY MEDICINE

## 2021-05-01 PROCEDURE — 85025 COMPLETE CBC W/AUTO DIFF WBC: CPT | Performed by: EMERGENCY MEDICINE

## 2021-05-01 PROCEDURE — 99285 EMERGENCY DEPT VISIT HI MDM: CPT

## 2021-05-01 PROCEDURE — 81003 URINALYSIS AUTO W/O SCOPE: CPT | Performed by: EMERGENCY MEDICINE

## 2021-05-01 PROCEDURE — 85379 FIBRIN DEGRADATION QUANT: CPT | Performed by: EMERGENCY MEDICINE

## 2021-05-01 PROCEDURE — 71045 X-RAY EXAM CHEST 1 VIEW: CPT

## 2021-05-01 PROCEDURE — 85730 THROMBOPLASTIN TIME PARTIAL: CPT | Performed by: EMERGENCY MEDICINE

## 2021-05-01 PROCEDURE — 93005 ELECTROCARDIOGRAM TRACING: CPT | Performed by: EMERGENCY MEDICINE

## 2021-05-01 PROCEDURE — 85610 PROTHROMBIN TIME: CPT | Performed by: EMERGENCY MEDICINE

## 2021-05-01 PROCEDURE — 80053 COMPREHEN METABOLIC PANEL: CPT | Performed by: EMERGENCY MEDICINE

## 2021-05-01 RX ORDER — NITROGLYCERIN 0.4 MG/1
0.4 TABLET SUBLINGUAL
Status: DISCONTINUED | OUTPATIENT
Start: 2021-05-01 | End: 2021-05-02 | Stop reason: SDUPTHER

## 2021-05-01 RX ORDER — ASPIRIN 81 MG/1
324 TABLET, CHEWABLE ORAL ONCE
Status: COMPLETED | OUTPATIENT
Start: 2021-05-01 | End: 2021-05-01

## 2021-05-01 RX ADMIN — ASPIRIN 324 MG: 81 TABLET, CHEWABLE ORAL at 23:30

## 2021-05-02 ENCOUNTER — APPOINTMENT (OUTPATIENT)
Dept: CT IMAGING | Facility: HOSPITAL | Age: 66
End: 2021-05-02

## 2021-05-02 ENCOUNTER — APPOINTMENT (OUTPATIENT)
Dept: NUCLEAR MEDICINE | Facility: HOSPITAL | Age: 66
End: 2021-05-02

## 2021-05-02 ENCOUNTER — ANESTHESIA EVENT (OUTPATIENT)
Dept: PERIOP | Facility: HOSPITAL | Age: 66
End: 2021-05-02

## 2021-05-02 PROBLEM — E66.9 OBESITY (BMI 30-39.9): Status: ACTIVE | Noted: 2020-03-23

## 2021-05-02 PROBLEM — I20.8 STABLE ANGINA PECTORIS (HCC): Status: ACTIVE | Noted: 2021-05-01

## 2021-05-02 PROBLEM — R07.9 CHEST PAIN: Status: ACTIVE | Noted: 2021-05-02

## 2021-05-02 PROBLEM — K21.9 GERD (GASTROESOPHAGEAL REFLUX DISEASE): Status: ACTIVE | Noted: 2021-05-02

## 2021-05-02 PROBLEM — I20.89 STABLE ANGINA PECTORIS: Status: ACTIVE | Noted: 2021-05-01

## 2021-05-02 LAB
ABO GROUP BLD: NORMAL
ALBUMIN SERPL-MCNC: 3.7 G/DL (ref 3.5–5.2)
ALBUMIN/GLOB SERPL: 1.5 G/DL
ALP SERPL-CCNC: 84 U/L (ref 39–117)
ALT SERPL W P-5'-P-CCNC: 14 U/L (ref 1–33)
ANION GAP SERPL CALCULATED.3IONS-SCNC: 12 MMOL/L (ref 5–15)
ANION GAP SERPL CALCULATED.3IONS-SCNC: 13 MMOL/L (ref 5–15)
APTT PPP: 24 SECONDS (ref 61–76.5)
APTT PPP: <20 SECONDS (ref 24–31)
ARTERIAL PATENCY WRIST A: POSITIVE
AST SERPL-CCNC: 20 U/L (ref 1–32)
ATMOSPHERIC PRESS: ABNORMAL MM[HG]
BASE EXCESS BLDA CALC-SCNC: -2 MMOL/L (ref 0–3)
BASOPHILS # BLD AUTO: 0.1 10*3/MM3 (ref 0–0.2)
BASOPHILS # BLD AUTO: 0.1 10*3/MM3 (ref 0–0.2)
BASOPHILS NFR BLD AUTO: 1.2 % (ref 0–1.5)
BASOPHILS NFR BLD AUTO: 1.3 % (ref 0–1.5)
BDY SITE: ABNORMAL
BH CV REST NUCLEAR ISOTOPE DOSE: 7.2 MCI
BH CV STRESS BP STAGE 1: NORMAL
BH CV STRESS BP STAGE 2: NORMAL
BH CV STRESS BP STAGE 3: NORMAL
BH CV STRESS BP STAGE 4: NORMAL
BH CV STRESS BP STAGE 5: NORMAL
BH CV STRESS BP STAGE 6: NORMAL
BH CV STRESS COMMENTS STAGE 1: NORMAL
BH CV STRESS COMMENTS STAGE 2: NORMAL
BH CV STRESS DOSE REGADENOSON STAGE 1: 0.4
BH CV STRESS DURATION MIN STAGE 1: 0
BH CV STRESS DURATION MIN STAGE 2: 4
BH CV STRESS DURATION SEC STAGE 1: 10
BH CV STRESS DURATION SEC STAGE 2: 0
BH CV STRESS HR STAGE 1: 69
BH CV STRESS HR STAGE 2: 86
BH CV STRESS HR STAGE 3: 91
BH CV STRESS HR STAGE 4: 90
BH CV STRESS HR STAGE 5: 89
BH CV STRESS HR STAGE 6: 89
BH CV STRESS NUCLEAR ISOTOPE DOSE: 21.9 MCI
BH CV STRESS PROTOCOL 1: NORMAL
BH CV STRESS RECOVERY BP: NORMAL MMHG
BH CV STRESS RECOVERY HR: 86 BPM
BH CV STRESS STAGE 1: 1
BH CV STRESS STAGE 2: 2
BH CV STRESS STAGE 3: 3
BH CV STRESS STAGE 4: 4
BH CV STRESS STAGE 5: 5
BH CV STRESS STAGE 6: 6
BILIRUB SERPL-MCNC: 0.2 MG/DL (ref 0–1.2)
BLD GP AB SCN SERPL QL: NEGATIVE
BUN SERPL-MCNC: 25 MG/DL (ref 8–23)
BUN SERPL-MCNC: 25 MG/DL (ref 8–23)
BUN/CREAT SERPL: 24.3 (ref 7–25)
BUN/CREAT SERPL: 26.3 (ref 7–25)
CALCIUM SPEC-SCNC: 8.8 MG/DL (ref 8.6–10.5)
CALCIUM SPEC-SCNC: 8.9 MG/DL (ref 8.6–10.5)
CHLORIDE SERPL-SCNC: 101 MMOL/L (ref 98–107)
CHLORIDE SERPL-SCNC: 95 MMOL/L (ref 98–107)
CLOSE TME COLL+ADP + EPINEP PNL BLD: 94 % (ref 86–100)
CO2 BLDA-SCNC: 24.4 MMOL/L (ref 22–29)
CO2 SERPL-SCNC: 22 MMOL/L (ref 22–29)
CO2 SERPL-SCNC: 23 MMOL/L (ref 22–29)
CREAT SERPL-MCNC: 0.95 MG/DL (ref 0.57–1)
CREAT SERPL-MCNC: 1.03 MG/DL (ref 0.57–1)
DEPRECATED RDW RBC AUTO: 43.3 FL (ref 37–54)
DEPRECATED RDW RBC AUTO: 43.3 FL (ref 37–54)
EOSINOPHIL # BLD AUTO: 0.2 10*3/MM3 (ref 0–0.4)
EOSINOPHIL # BLD AUTO: 0.3 10*3/MM3 (ref 0–0.4)
EOSINOPHIL NFR BLD AUTO: 2.8 % (ref 0.3–6.2)
EOSINOPHIL NFR BLD AUTO: 3.3 % (ref 0.3–6.2)
ERYTHROCYTE [DISTWIDTH] IN BLOOD BY AUTOMATED COUNT: 13.7 % (ref 12.3–15.4)
ERYTHROCYTE [DISTWIDTH] IN BLOOD BY AUTOMATED COUNT: 14 % (ref 12.3–15.4)
GFR SERPL CREATININE-BSD FRML MDRD: 53 ML/MIN/1.73
GFR SERPL CREATININE-BSD FRML MDRD: 58 ML/MIN/1.73
GLOBULIN UR ELPH-MCNC: 2.5 GM/DL
GLUCOSE BLDC GLUCOMTR-MCNC: 257 MG/DL (ref 70–105)
GLUCOSE BLDC GLUCOMTR-MCNC: 268 MG/DL (ref 70–105)
GLUCOSE BLDC GLUCOMTR-MCNC: 271 MG/DL (ref 70–105)
GLUCOSE BLDC GLUCOMTR-MCNC: 273 MG/DL (ref 70–105)
GLUCOSE BLDC GLUCOMTR-MCNC: 291 MG/DL (ref 70–105)
GLUCOSE SERPL-MCNC: 284 MG/DL (ref 65–99)
GLUCOSE SERPL-MCNC: 533 MG/DL (ref 65–99)
HCO3 BLDA-SCNC: 23.2 MMOL/L (ref 21–28)
HCT VFR BLD AUTO: 29.3 % (ref 34–46.6)
HCT VFR BLD AUTO: 30.1 % (ref 34–46.6)
HEMODILUTION: NO
HGB BLD-MCNC: 10.1 G/DL (ref 12–15.9)
HGB BLD-MCNC: 9.8 G/DL (ref 12–15.9)
HOLD SPECIMEN: NORMAL
INR PPP: 0.99 (ref 0.93–1.1)
LV EF NUC BP: 63 %
LYMPHOCYTES # BLD AUTO: 2.5 10*3/MM3 (ref 0.7–3.1)
LYMPHOCYTES # BLD AUTO: 2.9 10*3/MM3 (ref 0.7–3.1)
LYMPHOCYTES NFR BLD AUTO: 33 % (ref 19.6–45.3)
LYMPHOCYTES NFR BLD AUTO: 34.8 % (ref 19.6–45.3)
MAXIMAL PREDICTED HEART RATE: 152 BPM
MCH RBC QN AUTO: 29.6 PG (ref 26.6–33)
MCH RBC QN AUTO: 29.9 PG (ref 26.6–33)
MCHC RBC AUTO-ENTMCNC: 33.5 G/DL (ref 31.5–35.7)
MCHC RBC AUTO-ENTMCNC: 33.6 G/DL (ref 31.5–35.7)
MCV RBC AUTO: 88.3 FL (ref 79–97)
MCV RBC AUTO: 88.9 FL (ref 79–97)
MODALITY: ABNORMAL
MONOCYTES # BLD AUTO: 0.7 10*3/MM3 (ref 0.1–0.9)
MONOCYTES # BLD AUTO: 0.9 10*3/MM3 (ref 0.1–0.9)
MONOCYTES NFR BLD AUTO: 10.6 % (ref 5–12)
MONOCYTES NFR BLD AUTO: 9.5 % (ref 5–12)
NEUTROPHILS NFR BLD AUTO: 4 10*3/MM3 (ref 1.7–7)
NEUTROPHILS NFR BLD AUTO: 4.2 10*3/MM3 (ref 1.7–7)
NEUTROPHILS NFR BLD AUTO: 50 % (ref 42.7–76)
NEUTROPHILS NFR BLD AUTO: 53.5 % (ref 42.7–76)
NRBC BLD AUTO-RTO: 0.1 /100 WBC (ref 0–0.2)
NRBC BLD AUTO-RTO: 0.3 /100 WBC (ref 0–0.2)
PCO2 BLDA: 40.4 MM HG (ref 35–48)
PERCENT MAX PREDICTED HR: 59.87 %
PH BLDA: 7.37 PH UNITS (ref 7.35–7.45)
PLATELET # BLD AUTO: 212 10*3/MM3 (ref 140–450)
PLATELET # BLD AUTO: 232 10*3/MM3 (ref 140–450)
PMV BLD AUTO: 7.2 FL (ref 6–12)
PMV BLD AUTO: 7.3 FL (ref 6–12)
PO2 BLDA: 75.7 MM HG (ref 83–108)
POTASSIUM SERPL-SCNC: 3.9 MMOL/L (ref 3.5–5.2)
POTASSIUM SERPL-SCNC: 4.3 MMOL/L (ref 3.5–5.2)
PROT SERPL-MCNC: 6.2 G/DL (ref 6–8.5)
PROTHROMBIN TIME: 10.9 SECONDS (ref 9.6–11.7)
QT INTERVAL: 345 MS
QT INTERVAL: 412 MS
QT INTERVAL: 438 MS
QT INTERVAL: 440 MS
QT INTERVAL: 479 MS
RBC # BLD AUTO: 3.32 10*6/MM3 (ref 3.77–5.28)
RBC # BLD AUTO: 3.39 10*6/MM3 (ref 3.77–5.28)
RH BLD: POSITIVE
SAO2 % BLDCOA: 94.5 % (ref 94–98)
SARS-COV-2 RNA PNL SPEC NAA+PROBE: NOT DETECTED
SODIUM SERPL-SCNC: 131 MMOL/L (ref 136–145)
SODIUM SERPL-SCNC: 135 MMOL/L (ref 136–145)
STRESS BASELINE BP: NORMAL MMHG
STRESS BASELINE HR: 72 BPM
STRESS PERCENT HR: 70 %
STRESS POST PEAK BP: NORMAL MMHG
STRESS POST PEAK HR: 91 BPM
STRESS TARGET HR: 129 BPM
T&S EXPIRATION DATE: NORMAL
TROPONIN T SERPL-MCNC: <0.01 NG/ML (ref 0–0.03)
TROPONIN T SERPL-MCNC: <0.01 NG/ML (ref 0–0.03)
WBC # BLD AUTO: 7.5 10*3/MM3 (ref 3.4–10.8)
WBC # BLD AUTO: 8.4 10*3/MM3 (ref 3.4–10.8)

## 2021-05-02 PROCEDURE — 93318 ECHO TRANSESOPHAGEAL INTRAOP: CPT | Performed by: ANESTHESIOLOGY

## 2021-05-02 PROCEDURE — C1713 ANCHOR/SCREW BN/BN,TIS/BN: HCPCS | Performed by: THORACIC SURGERY (CARDIOTHORACIC VASCULAR SURGERY)

## 2021-05-02 PROCEDURE — 82803 BLOOD GASES ANY COMBINATION: CPT

## 2021-05-02 PROCEDURE — 93017 CV STRESS TEST TRACING ONLY: CPT

## 2021-05-02 PROCEDURE — 84484 ASSAY OF TROPONIN QUANT: CPT | Performed by: NURSE PRACTITIONER

## 2021-05-02 PROCEDURE — 63710000001 INSULIN LISPRO (HUMAN) PER 5 UNITS: Performed by: INTERNAL MEDICINE

## 2021-05-02 PROCEDURE — 84132 ASSAY OF SERUM POTASSIUM: CPT

## 2021-05-02 PROCEDURE — 25010000002 HYDRALAZINE PER 20 MG: Performed by: INTERNAL MEDICINE

## 2021-05-02 PROCEDURE — C1751 CATH, INF, PER/CENT/MIDLINE: HCPCS | Performed by: ANESTHESIOLOGY

## 2021-05-02 PROCEDURE — 25010000002 FENTANYL CITRATE (PF) 100 MCG/2ML SOLUTION: Performed by: INTERNAL MEDICINE

## 2021-05-02 PROCEDURE — 85025 COMPLETE CBC W/AUTO DIFF WBC: CPT | Performed by: INTERNAL MEDICINE

## 2021-05-02 PROCEDURE — 86900 BLOOD TYPING SEROLOGIC ABO: CPT | Performed by: THORACIC SURGERY (CARDIOTHORACIC VASCULAR SURGERY)

## 2021-05-02 PROCEDURE — 4A023N7 MEASUREMENT OF CARDIAC SAMPLING AND PRESSURE, LEFT HEART, PERCUTANEOUS APPROACH: ICD-10-PCS | Performed by: INTERNAL MEDICINE

## 2021-05-02 PROCEDURE — B2111ZZ FLUOROSCOPY OF MULTIPLE CORONARY ARTERIES USING LOW OSMOLAR CONTRAST: ICD-10-PCS | Performed by: INTERNAL MEDICINE

## 2021-05-02 PROCEDURE — A9500 TC99M SESTAMIBI: HCPCS | Performed by: INTERNAL MEDICINE

## 2021-05-02 PROCEDURE — C1889 IMPLANT/INSERT DEVICE, NOC: HCPCS | Performed by: THORACIC SURGERY (CARDIOTHORACIC VASCULAR SURGERY)

## 2021-05-02 PROCEDURE — 25010000002 HEPARIN (PORCINE) 25000-0.45 UT/250ML-% SOLUTION: Performed by: INTERNAL MEDICINE

## 2021-05-02 PROCEDURE — 0 IOPAMIDOL PER 1 ML: Performed by: INTERNAL MEDICINE

## 2021-05-02 PROCEDURE — 33533 CABG ARTERIAL SINGLE: CPT | Performed by: THORACIC SURGERY (CARDIOTHORACIC VASCULAR SURGERY)

## 2021-05-02 PROCEDURE — 85347 COAGULATION TIME ACTIVATED: CPT

## 2021-05-02 PROCEDURE — 71275 CT ANGIOGRAPHY CHEST: CPT

## 2021-05-02 PROCEDURE — 86923 COMPATIBILITY TEST ELECTRIC: CPT

## 2021-05-02 PROCEDURE — 93458 L HRT ARTERY/VENTRICLE ANGIO: CPT | Performed by: INTERNAL MEDICINE

## 2021-05-02 PROCEDURE — A4648 IMPLANTABLE TISSUE MARKER: HCPCS | Performed by: THORACIC SURGERY (CARDIOTHORACIC VASCULAR SURGERY)

## 2021-05-02 PROCEDURE — 86900 BLOOD TYPING SEROLOGIC ABO: CPT

## 2021-05-02 PROCEDURE — 25010000002 DIPHENHYDRAMINE PER 50 MG: Performed by: INTERNAL MEDICINE

## 2021-05-02 PROCEDURE — 36600 WITHDRAWAL OF ARTERIAL BLOOD: CPT

## 2021-05-02 PROCEDURE — 93016 CV STRESS TEST SUPVJ ONLY: CPT | Performed by: INTERNAL MEDICINE

## 2021-05-02 PROCEDURE — 33518 CABG ARTERY-VEIN TWO: CPT | Performed by: PHYSICIAN ASSISTANT

## 2021-05-02 PROCEDURE — C1769 GUIDE WIRE: HCPCS | Performed by: INTERNAL MEDICINE

## 2021-05-02 PROCEDURE — 82330 ASSAY OF CALCIUM: CPT

## 2021-05-02 PROCEDURE — 25010000002 MORPHINE PER 10 MG: Performed by: INTERNAL MEDICINE

## 2021-05-02 PROCEDURE — 93005 ELECTROCARDIOGRAM TRACING: CPT | Performed by: NURSE PRACTITIONER

## 2021-05-02 PROCEDURE — 84295 ASSAY OF SERUM SODIUM: CPT

## 2021-05-02 PROCEDURE — 0 TECHNETIUM SESTAMIBI: Performed by: INTERNAL MEDICINE

## 2021-05-02 PROCEDURE — 25010000002 HEPARIN (PORCINE) PER 1000 UNITS: Performed by: INTERNAL MEDICINE

## 2021-05-02 PROCEDURE — 86901 BLOOD TYPING SEROLOGIC RH(D): CPT | Performed by: THORACIC SURGERY (CARDIOTHORACIC VASCULAR SURGERY)

## 2021-05-02 PROCEDURE — 33508 ENDOSCOPIC VEIN HARVEST: CPT | Performed by: THORACIC SURGERY (CARDIOTHORACIC VASCULAR SURGERY)

## 2021-05-02 PROCEDURE — 33533 CABG ARTERIAL SINGLE: CPT | Performed by: PHYSICIAN ASSISTANT

## 2021-05-02 PROCEDURE — 80048 BASIC METABOLIC PNL TOTAL CA: CPT | Performed by: NURSE PRACTITIONER

## 2021-05-02 PROCEDURE — 85014 HEMATOCRIT: CPT

## 2021-05-02 PROCEDURE — U0003 INFECTIOUS AGENT DETECTION BY NUCLEIC ACID (DNA OR RNA); SEVERE ACUTE RESPIRATORY SYNDROME CORONAVIRUS 2 (SARS-COV-2) (CORONAVIRUS DISEASE [COVID-19]), AMPLIFIED PROBE TECHNIQUE, MAKING USE OF HIGH THROUGHPUT TECHNOLOGIES AS DESCRIBED BY CMS-2020-01-R: HCPCS | Performed by: EMERGENCY MEDICINE

## 2021-05-02 PROCEDURE — 25010000002 MORPHINE PER 10 MG

## 2021-05-02 PROCEDURE — 86850 RBC ANTIBODY SCREEN: CPT | Performed by: THORACIC SURGERY (CARDIOTHORACIC VASCULAR SURGERY)

## 2021-05-02 PROCEDURE — 25010000002 PAPAVERINE PER 60 MG: Performed by: THORACIC SURGERY (CARDIOTHORACIC VASCULAR SURGERY)

## 2021-05-02 PROCEDURE — 78452 HT MUSCLE IMAGE SPECT MULT: CPT | Performed by: INTERNAL MEDICINE

## 2021-05-02 PROCEDURE — 99152 MOD SED SAME PHYS/QHP 5/>YRS: CPT | Performed by: INTERNAL MEDICINE

## 2021-05-02 PROCEDURE — 93005 ELECTROCARDIOGRAM TRACING: CPT | Performed by: INTERNAL MEDICINE

## 2021-05-02 PROCEDURE — 021109W BYPASS CORONARY ARTERY, TWO ARTERIES FROM AORTA WITH AUTOLOGOUS VENOUS TISSUE, OPEN APPROACH: ICD-10-PCS | Performed by: THORACIC SURGERY (CARDIOTHORACIC VASCULAR SURGERY)

## 2021-05-02 PROCEDURE — 85576 BLOOD PLATELET AGGREGATION: CPT | Performed by: THORACIC SURGERY (CARDIOTHORACIC VASCULAR SURGERY)

## 2021-05-02 PROCEDURE — 25010000002 MIDAZOLAM PER 1 MG: Performed by: ANESTHESIOLOGY

## 2021-05-02 PROCEDURE — 82962 GLUCOSE BLOOD TEST: CPT

## 2021-05-02 PROCEDURE — 5A1221Z PERFORMANCE OF CARDIAC OUTPUT, CONTINUOUS: ICD-10-PCS | Performed by: THORACIC SURGERY (CARDIOTHORACIC VASCULAR SURGERY)

## 2021-05-02 PROCEDURE — 02100Z9 BYPASS CORONARY ARTERY, ONE ARTERY FROM LEFT INTERNAL MAMMARY, OPEN APPROACH: ICD-10-PCS | Performed by: THORACIC SURGERY (CARDIOTHORACIC VASCULAR SURGERY)

## 2021-05-02 PROCEDURE — 63710000001 INSULIN GLARGINE PER 5 UNITS: Performed by: INTERNAL MEDICINE

## 2021-05-02 PROCEDURE — 25010000002 MIDAZOLAM PER 1 MG: Performed by: INTERNAL MEDICINE

## 2021-05-02 PROCEDURE — 93018 CV STRESS TEST I&R ONLY: CPT | Performed by: INTERNAL MEDICINE

## 2021-05-02 PROCEDURE — P9016 RBC LEUKOCYTES REDUCED: HCPCS

## 2021-05-02 PROCEDURE — 33518 CABG ARTERY-VEIN TWO: CPT | Performed by: THORACIC SURGERY (CARDIOTHORACIC VASCULAR SURGERY)

## 2021-05-02 PROCEDURE — 36430 TRANSFUSION BLD/BLD COMPNT: CPT

## 2021-05-02 PROCEDURE — 85730 THROMBOPLASTIN TIME PARTIAL: CPT | Performed by: INTERNAL MEDICINE

## 2021-05-02 PROCEDURE — B2151ZZ FLUOROSCOPY OF LEFT HEART USING LOW OSMOLAR CONTRAST: ICD-10-PCS | Performed by: INTERNAL MEDICINE

## 2021-05-02 PROCEDURE — 78452 HT MUSCLE IMAGE SPECT MULT: CPT

## 2021-05-02 PROCEDURE — 25010000002 REGADENOSON 0.4 MG/5ML SOLUTION: Performed by: INTERNAL MEDICINE

## 2021-05-02 PROCEDURE — 0 IOPAMIDOL PER 1 ML: Performed by: EMERGENCY MEDICINE

## 2021-05-02 PROCEDURE — 25010000002 HEPARIN (PORCINE) PER 1000 UNITS: Performed by: ANESTHESIOLOGY

## 2021-05-02 PROCEDURE — 85610 PROTHROMBIN TIME: CPT | Performed by: INTERNAL MEDICINE

## 2021-05-02 PROCEDURE — 99222 1ST HOSP IP/OBS MODERATE 55: CPT | Performed by: INTERNAL MEDICINE

## 2021-05-02 PROCEDURE — 33967 INSERT I-AORT PERCUT DEVICE: CPT | Performed by: INTERNAL MEDICINE

## 2021-05-02 PROCEDURE — 82947 ASSAY GLUCOSE BLOOD QUANT: CPT

## 2021-05-02 PROCEDURE — 63710000001 PROMETHAZINE PER 12.5 MG: Performed by: INTERNAL MEDICINE

## 2021-05-02 PROCEDURE — C1894 INTRO/SHEATH, NON-LASER: HCPCS | Performed by: INTERNAL MEDICINE

## 2021-05-02 PROCEDURE — 25010000002 KETOROLAC TROMETHAMINE PER 15 MG: Performed by: NURSE PRACTITIONER

## 2021-05-02 PROCEDURE — 63710000001 INSULIN REGULAR HUMAN PER 5 UNITS: Performed by: EMERGENCY MEDICINE

## 2021-05-02 PROCEDURE — 06BQ4ZZ EXCISION OF LEFT SAPHENOUS VEIN, PERCUTANEOUS ENDOSCOPIC APPROACH: ICD-10-PCS | Performed by: THORACIC SURGERY (CARDIOTHORACIC VASCULAR SURGERY)

## 2021-05-02 PROCEDURE — 25010000002 HEPARIN (PORCINE) PER 1000 UNITS: Performed by: THORACIC SURGERY (CARDIOTHORACIC VASCULAR SURGERY)

## 2021-05-02 PROCEDURE — 99220 PR INITIAL OBSERVATION CARE/DAY 70 MINUTES: CPT | Performed by: INTERNAL MEDICINE

## 2021-05-02 PROCEDURE — 33508 ENDOSCOPIC VEIN HARVEST: CPT | Performed by: PHYSICIAN ASSISTANT

## 2021-05-02 PROCEDURE — U0005 INFEC AGEN DETEC AMPLI PROBE: HCPCS | Performed by: EMERGENCY MEDICINE

## 2021-05-02 PROCEDURE — 85025 COMPLETE CBC W/AUTO DIFF WBC: CPT | Performed by: NURSE PRACTITIONER

## 2021-05-02 PROCEDURE — 63710000001 INSULIN REGULAR HUMAN PER 5 UNITS: Performed by: ANESTHESIOLOGY

## 2021-05-02 PROCEDURE — 99153 MOD SED SAME PHYS/QHP EA: CPT | Performed by: INTERNAL MEDICINE

## 2021-05-02 PROCEDURE — 5A02210 ASSISTANCE WITH CARDIAC OUTPUT USING BALLOON PUMP, CONTINUOUS: ICD-10-PCS | Performed by: INTERNAL MEDICINE

## 2021-05-02 DEVICE — DEV CONTRL TISS STRATAFIX SPIRAL MNCRYL UD 3/0 PLS 30CM: Type: IMPLANTABLE DEVICE | Site: CHEST | Status: FUNCTIONAL

## 2021-05-02 DEVICE — DEV CONTRL TISS STRATAFIXSPIRALMNCRYL PLSPS2 REV3/0 15CM: Type: IMPLANTABLE DEVICE | Site: LEG | Status: FUNCTIONAL

## 2021-05-02 DEVICE — SS SUTURE, 3 PER SLEEVE
Type: IMPLANTABLE DEVICE | Site: STERNUM | Status: FUNCTIONAL
Brand: MYO/WIRE II

## 2021-05-02 DEVICE — SS SUTURE, 4 PER SLEEVE
Type: IMPLANTABLE DEVICE | Site: STERNUM | Status: FUNCTIONAL
Brand: MYO/WIRE II

## 2021-05-02 RX ORDER — SUCRALFATE 1 G/1
1 TABLET ORAL 2 TIMES DAILY PRN
Status: DISCONTINUED | OUTPATIENT
Start: 2021-05-02 | End: 2021-05-11 | Stop reason: HOSPADM

## 2021-05-02 RX ORDER — DIPHENOXYLATE HYDROCHLORIDE AND ATROPINE SULFATE 2.5; .025 MG/1; MG/1
1 TABLET ORAL DAILY
Status: DISCONTINUED | OUTPATIENT
Start: 2021-05-02 | End: 2021-05-11 | Stop reason: HOSPADM

## 2021-05-02 RX ORDER — INSULIN LISPRO 100 [IU]/ML
0-7 INJECTION, SOLUTION INTRAVENOUS; SUBCUTANEOUS AS NEEDED
Status: DISCONTINUED | OUTPATIENT
Start: 2021-05-02 | End: 2021-05-04

## 2021-05-02 RX ORDER — ONDANSETRON 4 MG/1
4 TABLET, FILM COATED ORAL EVERY 6 HOURS PRN
Status: DISCONTINUED | OUTPATIENT
Start: 2021-05-02 | End: 2021-05-02

## 2021-05-02 RX ORDER — LIDOCAINE HYDROCHLORIDE 20 MG/ML
INJECTION, SOLUTION INFILTRATION; PERINEURAL AS NEEDED
Status: DISCONTINUED | OUTPATIENT
Start: 2021-05-02 | End: 2021-05-02 | Stop reason: HOSPADM

## 2021-05-02 RX ORDER — ATORVASTATIN CALCIUM 40 MG/1
80 TABLET, FILM COATED ORAL NIGHTLY
Status: DISCONTINUED | OUTPATIENT
Start: 2021-05-02 | End: 2021-05-03

## 2021-05-02 RX ORDER — LABETALOL HYDROCHLORIDE 5 MG/ML
20 INJECTION, SOLUTION INTRAVENOUS ONCE
Status: COMPLETED | OUTPATIENT
Start: 2021-05-02 | End: 2021-05-02

## 2021-05-02 RX ORDER — KETOROLAC TROMETHAMINE 15 MG/ML
15 INJECTION, SOLUTION INTRAMUSCULAR; INTRAVENOUS EVERY 6 HOURS PRN
Status: DISCONTINUED | OUTPATIENT
Start: 2021-05-02 | End: 2021-05-02

## 2021-05-02 RX ORDER — LABETALOL 200 MG/1
200 TABLET, FILM COATED ORAL ONCE
Status: COMPLETED | OUTPATIENT
Start: 2021-05-02 | End: 2021-05-02

## 2021-05-02 RX ORDER — HYDRALAZINE HYDROCHLORIDE 20 MG/ML
10 INJECTION INTRAMUSCULAR; INTRAVENOUS EVERY 4 HOURS PRN
Status: DISCONTINUED | OUTPATIENT
Start: 2021-05-02 | End: 2021-05-03

## 2021-05-02 RX ORDER — PANTOPRAZOLE SODIUM 40 MG/1
40 TABLET, DELAYED RELEASE ORAL DAILY PRN
Status: DISCONTINUED | OUTPATIENT
Start: 2021-05-02 | End: 2021-05-11 | Stop reason: HOSPADM

## 2021-05-02 RX ORDER — METOPROLOL TARTRATE 5 MG/5ML
5 INJECTION INTRAVENOUS ONCE
Status: COMPLETED | OUTPATIENT
Start: 2021-05-02 | End: 2021-05-02

## 2021-05-02 RX ORDER — ACETAMINOPHEN 650 MG/1
650 SUPPOSITORY RECTAL EVERY 4 HOURS PRN
Status: DISCONTINUED | OUTPATIENT
Start: 2021-05-02 | End: 2021-05-03 | Stop reason: SDUPTHER

## 2021-05-02 RX ORDER — FENTANYL CITRATE 50 UG/ML
INJECTION, SOLUTION INTRAMUSCULAR; INTRAVENOUS AS NEEDED
Status: DISCONTINUED | OUTPATIENT
Start: 2021-05-02 | End: 2021-05-03 | Stop reason: SURG

## 2021-05-02 RX ORDER — FUROSEMIDE 20 MG/1
20 TABLET ORAL DAILY
Status: DISCONTINUED | OUTPATIENT
Start: 2021-05-02 | End: 2021-05-04

## 2021-05-02 RX ORDER — ALBUMIN, HUMAN INJ 5% 5 %
SOLUTION INTRAVENOUS
Status: DISPENSED
Start: 2021-05-02 | End: 2021-05-03

## 2021-05-02 RX ORDER — MORPHINE SULFATE 4 MG/ML
2 INJECTION, SOLUTION INTRAMUSCULAR; INTRAVENOUS ONCE
Status: COMPLETED | OUTPATIENT
Start: 2021-05-02 | End: 2021-05-02

## 2021-05-02 RX ORDER — POTASSIUM CHLORIDE 7.45 MG/ML
INJECTION INTRAVENOUS
Status: COMPLETED
Start: 2021-05-02 | End: 2021-05-03

## 2021-05-02 RX ORDER — POTASSIUM CHLORIDE 20 MEQ/1
20 TABLET, EXTENDED RELEASE ORAL DAILY
Status: DISCONTINUED | OUTPATIENT
Start: 2021-05-02 | End: 2021-05-06

## 2021-05-02 RX ORDER — AMINOCAPROIC ACID 250 MG/ML
INJECTION, SOLUTION INTRAVENOUS AS NEEDED
Status: DISCONTINUED | OUTPATIENT
Start: 2021-05-02 | End: 2021-05-03 | Stop reason: SURG

## 2021-05-02 RX ORDER — MIDAZOLAM HYDROCHLORIDE 1 MG/ML
INJECTION INTRAMUSCULAR; INTRAVENOUS AS NEEDED
Status: DISCONTINUED | OUTPATIENT
Start: 2021-05-02 | End: 2021-05-03 | Stop reason: SURG

## 2021-05-02 RX ORDER — NITROGLYCERIN 20 MG/100ML
5-200 INJECTION INTRAVENOUS
Status: DISCONTINUED | OUTPATIENT
Start: 2021-05-02 | End: 2021-05-03

## 2021-05-02 RX ORDER — MORPHINE SULFATE 4 MG/ML
INJECTION, SOLUTION INTRAMUSCULAR; INTRAVENOUS
Status: COMPLETED
Start: 2021-05-02 | End: 2021-05-02

## 2021-05-02 RX ORDER — NITROGLYCERIN 0.4 MG/1
0.4 TABLET SUBLINGUAL
Status: DISCONTINUED | OUTPATIENT
Start: 2021-05-02 | End: 2021-05-03

## 2021-05-02 RX ORDER — INSULIN LISPRO 100 [IU]/ML
0-7 INJECTION, SOLUTION INTRAVENOUS; SUBCUTANEOUS EVERY 6 HOURS SCHEDULED
Status: DISCONTINUED | OUTPATIENT
Start: 2021-05-02 | End: 2021-05-04

## 2021-05-02 RX ORDER — SODIUM CHLORIDE 0.9 % (FLUSH) 0.9 %
10 SYRINGE (ML) INJECTION AS NEEDED
Status: DISCONTINUED | OUTPATIENT
Start: 2021-05-02 | End: 2021-05-11 | Stop reason: HOSPADM

## 2021-05-02 RX ORDER — ACETAMINOPHEN 325 MG/1
650 TABLET ORAL EVERY 4 HOURS PRN
Status: DISCONTINUED | OUTPATIENT
Start: 2021-05-02 | End: 2021-05-03 | Stop reason: SDUPTHER

## 2021-05-02 RX ORDER — SODIUM CHLORIDE 9 MG/ML
250 INJECTION, SOLUTION INTRAVENOUS ONCE AS NEEDED
Status: DISCONTINUED | OUTPATIENT
Start: 2021-05-02 | End: 2021-05-05

## 2021-05-02 RX ORDER — LABETALOL 200 MG/1
200 TABLET, FILM COATED ORAL EVERY 8 HOURS SCHEDULED
Status: DISCONTINUED | OUTPATIENT
Start: 2021-05-02 | End: 2021-05-03

## 2021-05-02 RX ORDER — ONDANSETRON 2 MG/ML
4 INJECTION INTRAMUSCULAR; INTRAVENOUS EVERY 6 HOURS PRN
Status: DISCONTINUED | OUTPATIENT
Start: 2021-05-02 | End: 2021-05-02

## 2021-05-02 RX ORDER — SODIUM CHLORIDE 9 MG/ML
INJECTION, SOLUTION INTRAVENOUS CONTINUOUS PRN
Status: DISCONTINUED | OUTPATIENT
Start: 2021-05-02 | End: 2021-05-03 | Stop reason: SURG

## 2021-05-02 RX ORDER — PROMETHAZINE HYDROCHLORIDE 12.5 MG/1
12.5 TABLET ORAL ONCE
Status: COMPLETED | OUTPATIENT
Start: 2021-05-02 | End: 2021-05-02

## 2021-05-02 RX ORDER — MAGNESIUM SULFATE 1 G/100ML
1 INJECTION INTRAVENOUS AS NEEDED
Status: DISCONTINUED | OUTPATIENT
Start: 2021-05-02 | End: 2021-05-03

## 2021-05-02 RX ORDER — LORAZEPAM 0.5 MG/1
0.5 TABLET ORAL EVERY 6 HOURS PRN
Status: DISCONTINUED | OUTPATIENT
Start: 2021-05-02 | End: 2021-05-02

## 2021-05-02 RX ORDER — ACETAMINOPHEN 160 MG/5ML
650 SOLUTION ORAL EVERY 4 HOURS PRN
Status: DISCONTINUED | OUTPATIENT
Start: 2021-05-02 | End: 2021-05-03 | Stop reason: SDUPTHER

## 2021-05-02 RX ORDER — MORPHINE SULFATE 4 MG/ML
4 INJECTION, SOLUTION INTRAMUSCULAR; INTRAVENOUS
Status: DISCONTINUED | OUTPATIENT
Start: 2021-05-02 | End: 2021-05-03

## 2021-05-02 RX ORDER — HEPARIN SODIUM 10000 [USP'U]/100ML
12 INJECTION, SOLUTION INTRAVENOUS
Status: DISCONTINUED | OUTPATIENT
Start: 2021-05-02 | End: 2021-05-03

## 2021-05-02 RX ORDER — LOSARTAN POTASSIUM 50 MG/1
100 TABLET ORAL DAILY
Status: DISCONTINUED | OUTPATIENT
Start: 2021-05-02 | End: 2021-05-03

## 2021-05-02 RX ORDER — ASPIRIN 325 MG
325 TABLET, DELAYED RELEASE (ENTERIC COATED) ORAL DAILY
Status: DISCONTINUED | OUTPATIENT
Start: 2021-05-02 | End: 2021-05-02

## 2021-05-02 RX ORDER — CEFAZOLIN SODIUM 1 G/3ML
INJECTION, POWDER, FOR SOLUTION INTRAMUSCULAR; INTRAVENOUS AS NEEDED
Status: DISCONTINUED | OUTPATIENT
Start: 2021-05-02 | End: 2021-05-03 | Stop reason: SURG

## 2021-05-02 RX ORDER — CARVEDILOL 3.12 MG/1
3.12 TABLET ORAL
Status: ACTIVE | OUTPATIENT
Start: 2021-05-03 | End: 2021-05-04

## 2021-05-02 RX ORDER — SODIUM CHLORIDE 0.9 % (FLUSH) 0.9 %
10 SYRINGE (ML) INJECTION EVERY 12 HOURS SCHEDULED
Status: DISCONTINUED | OUTPATIENT
Start: 2021-05-02 | End: 2021-05-11 | Stop reason: HOSPADM

## 2021-05-02 RX ORDER — NICOTINE POLACRILEX 4 MG
15 LOZENGE BUCCAL
Status: DISCONTINUED | OUTPATIENT
Start: 2021-05-02 | End: 2021-05-05

## 2021-05-02 RX ORDER — HEPARIN SODIUM 1000 [USP'U]/ML
2000 INJECTION, SOLUTION INTRAVENOUS; SUBCUTANEOUS ONCE
Status: CANCELLED | OUTPATIENT
Start: 2021-05-02 | End: 2021-05-02

## 2021-05-02 RX ORDER — ETOMIDATE 2 MG/ML
INJECTION INTRAVENOUS AS NEEDED
Status: DISCONTINUED | OUTPATIENT
Start: 2021-05-02 | End: 2021-05-03 | Stop reason: SURG

## 2021-05-02 RX ORDER — ASPIRIN 81 MG/1
81 TABLET ORAL DAILY
Status: DISCONTINUED | OUTPATIENT
Start: 2021-05-03 | End: 2021-05-03 | Stop reason: SDUPTHER

## 2021-05-02 RX ORDER — CHOLECALCIFEROL (VITAMIN D3) 125 MCG
5 CAPSULE ORAL NIGHTLY PRN
Status: DISCONTINUED | OUTPATIENT
Start: 2021-05-02 | End: 2021-05-11 | Stop reason: HOSPADM

## 2021-05-02 RX ORDER — POTASSIUM CHLORIDE 7.45 MG/ML
10 INJECTION INTRAVENOUS
Status: DISCONTINUED | OUTPATIENT
Start: 2021-05-02 | End: 2021-05-11 | Stop reason: HOSPADM

## 2021-05-02 RX ORDER — SODIUM CHLORIDE 0.9 % (FLUSH) 0.9 %
10 SYRINGE (ML) INJECTION AS NEEDED
Status: DISCONTINUED | OUTPATIENT
Start: 2021-05-02 | End: 2021-05-03 | Stop reason: HOSPADM

## 2021-05-02 RX ORDER — INSULIN GLARGINE 100 [IU]/ML
15 INJECTION, SOLUTION SUBCUTANEOUS EVERY MORNING
Status: DISCONTINUED | OUTPATIENT
Start: 2021-05-02 | End: 2021-05-03

## 2021-05-02 RX ORDER — CHLORHEXIDINE GLUCONATE 0.12 MG/ML
15 RINSE ORAL ONCE
Status: DISCONTINUED | OUTPATIENT
Start: 2021-05-03 | End: 2021-05-03

## 2021-05-02 RX ORDER — POTASSIUM CHLORIDE 1.5 G/1.77G
40 POWDER, FOR SOLUTION ORAL AS NEEDED
Status: DISCONTINUED | OUTPATIENT
Start: 2021-05-02 | End: 2021-05-11 | Stop reason: HOSPADM

## 2021-05-02 RX ORDER — METOPROLOL TARTRATE 5 MG/5ML
INJECTION INTRAVENOUS
Status: COMPLETED
Start: 2021-05-02 | End: 2021-05-02

## 2021-05-02 RX ORDER — CHLORHEXIDINE GLUCONATE 0.12 MG/ML
15 RINSE ORAL EVERY 12 HOURS SCHEDULED
Status: COMPLETED | OUTPATIENT
Start: 2021-05-02 | End: 2021-05-03

## 2021-05-02 RX ORDER — HYDRALAZINE HYDROCHLORIDE 20 MG/ML
10 INJECTION INTRAMUSCULAR; INTRAVENOUS ONCE
Status: COMPLETED | OUTPATIENT
Start: 2021-05-02 | End: 2021-05-02

## 2021-05-02 RX ORDER — DIPHENHYDRAMINE HYDROCHLORIDE 50 MG/ML
INJECTION INTRAMUSCULAR; INTRAVENOUS AS NEEDED
Status: DISCONTINUED | OUTPATIENT
Start: 2021-05-02 | End: 2021-05-02 | Stop reason: HOSPADM

## 2021-05-02 RX ORDER — HEPARIN SODIUM 1000 [USP'U]/ML
INJECTION, SOLUTION INTRAVENOUS; SUBCUTANEOUS AS NEEDED
Status: DISCONTINUED | OUTPATIENT
Start: 2021-05-02 | End: 2021-05-02 | Stop reason: HOSPADM

## 2021-05-02 RX ORDER — LORAZEPAM 0.5 MG/1
0.5 TABLET ORAL EVERY 6 HOURS PRN
Status: DISCONTINUED | OUTPATIENT
Start: 2021-05-02 | End: 2021-05-03

## 2021-05-02 RX ORDER — CHLORHEXIDINE GLUCONATE 500 MG/1
1 CLOTH TOPICAL EVERY 12 HOURS
Status: COMPLETED | OUTPATIENT
Start: 2021-05-02 | End: 2021-05-03

## 2021-05-02 RX ORDER — ROPINIROLE 0.25 MG/1
0.25 TABLET, FILM COATED ORAL EVERY EVENING
Status: DISCONTINUED | OUTPATIENT
Start: 2021-05-02 | End: 2021-05-11 | Stop reason: HOSPADM

## 2021-05-02 RX ORDER — MAGNESIUM SULFATE HEPTAHYDRATE 40 MG/ML
2 INJECTION, SOLUTION INTRAVENOUS AS NEEDED
Status: DISCONTINUED | OUTPATIENT
Start: 2021-05-02 | End: 2021-05-03

## 2021-05-02 RX ORDER — DEXTROSE MONOHYDRATE 25 G/50ML
25 INJECTION, SOLUTION INTRAVENOUS
Status: DISCONTINUED | OUTPATIENT
Start: 2021-05-02 | End: 2021-05-05

## 2021-05-02 RX ORDER — POTASSIUM CHLORIDE 20 MEQ/1
40 TABLET, EXTENDED RELEASE ORAL AS NEEDED
Status: DISCONTINUED | OUTPATIENT
Start: 2021-05-02 | End: 2021-05-11 | Stop reason: HOSPADM

## 2021-05-02 RX ORDER — METOPROLOL SUCCINATE 25 MG/1
25 TABLET, EXTENDED RELEASE ORAL
Status: DISCONTINUED | OUTPATIENT
Start: 2021-05-02 | End: 2021-05-03

## 2021-05-02 RX ORDER — LORAZEPAM 2 MG/ML
0.5 INJECTION INTRAMUSCULAR EVERY 6 HOURS PRN
Status: DISCONTINUED | OUTPATIENT
Start: 2021-05-02 | End: 2021-05-03

## 2021-05-02 RX ORDER — DIPHENHYDRAMINE HCL 25 MG
25 CAPSULE ORAL NIGHTLY PRN
Status: DISCONTINUED | OUTPATIENT
Start: 2021-05-02 | End: 2021-05-11 | Stop reason: HOSPADM

## 2021-05-02 RX ORDER — FENTANYL CITRATE 50 UG/ML
INJECTION, SOLUTION INTRAMUSCULAR; INTRAVENOUS AS NEEDED
Status: DISCONTINUED | OUTPATIENT
Start: 2021-05-02 | End: 2021-05-02 | Stop reason: HOSPADM

## 2021-05-02 RX ORDER — MAGNESIUM HYDROXIDE 1200 MG/15ML
LIQUID ORAL AS NEEDED
Status: DISCONTINUED | OUTPATIENT
Start: 2021-05-02 | End: 2021-05-03 | Stop reason: HOSPADM

## 2021-05-02 RX ORDER — PHENYLEPHRINE HCL IN 0.9% NACL 1 MG/10 ML
SYRINGE (ML) INTRAVENOUS AS NEEDED
Status: DISCONTINUED | OUTPATIENT
Start: 2021-05-02 | End: 2021-05-03 | Stop reason: SURG

## 2021-05-02 RX ORDER — ALPRAZOLAM 0.25 MG/1
0.25 TABLET ORAL EVERY 8 HOURS PRN
Status: DISPENSED | OUTPATIENT
Start: 2021-05-02 | End: 2021-05-09

## 2021-05-02 RX ORDER — MIDAZOLAM HYDROCHLORIDE 1 MG/ML
INJECTION INTRAMUSCULAR; INTRAVENOUS AS NEEDED
Status: DISCONTINUED | OUTPATIENT
Start: 2021-05-02 | End: 2021-05-02 | Stop reason: HOSPADM

## 2021-05-02 RX ORDER — NITROGLYCERIN 0.4 MG/1
0.4 TABLET SUBLINGUAL
Status: DISCONTINUED | OUTPATIENT
Start: 2021-05-02 | End: 2021-05-02 | Stop reason: SDUPTHER

## 2021-05-02 RX ORDER — HEPARIN SODIUM 1000 [USP'U]/ML
INJECTION, SOLUTION INTRAVENOUS; SUBCUTANEOUS AS NEEDED
Status: DISCONTINUED | OUTPATIENT
Start: 2021-05-02 | End: 2021-05-03 | Stop reason: SURG

## 2021-05-02 RX ORDER — TEMAZEPAM 15 MG/1
15 CAPSULE ORAL NIGHTLY PRN
Status: DISCONTINUED | OUTPATIENT
Start: 2021-05-02 | End: 2021-05-03

## 2021-05-02 RX ORDER — LABETALOL HYDROCHLORIDE 5 MG/ML
INJECTION, SOLUTION INTRAVENOUS
Status: COMPLETED
Start: 2021-05-02 | End: 2021-05-02

## 2021-05-02 RX ORDER — ALUMINA, MAGNESIA, AND SIMETHICONE 2400; 2400; 240 MG/30ML; MG/30ML; MG/30ML
15 SUSPENSION ORAL EVERY 6 HOURS PRN
Status: DISCONTINUED | OUTPATIENT
Start: 2021-05-02 | End: 2021-05-05

## 2021-05-02 RX ORDER — DULOXETIN HYDROCHLORIDE 30 MG/1
60 CAPSULE, DELAYED RELEASE ORAL EVERY EVENING
Status: DISCONTINUED | OUTPATIENT
Start: 2021-05-02 | End: 2021-05-11 | Stop reason: HOSPADM

## 2021-05-02 RX ORDER — ACETAMINOPHEN 325 MG/1
650 TABLET ORAL EVERY 4 HOURS PRN
Status: DISCONTINUED | OUTPATIENT
Start: 2021-05-02 | End: 2021-05-04 | Stop reason: SDUPTHER

## 2021-05-02 RX ORDER — SODIUM CHLORIDE 0.9 % (FLUSH) 0.9 %
10 SYRINGE (ML) INJECTION EVERY 12 HOURS SCHEDULED
Status: DISCONTINUED | OUTPATIENT
Start: 2021-05-02 | End: 2021-05-03 | Stop reason: HOSPADM

## 2021-05-02 RX ORDER — CHLORHEXIDINE GLUCONATE 500 MG/1
1 CLOTH TOPICAL EVERY 12 HOURS PRN
Status: DISCONTINUED | OUTPATIENT
Start: 2021-05-02 | End: 2021-05-03

## 2021-05-02 RX ORDER — ROCURONIUM BROMIDE 10 MG/ML
INJECTION, SOLUTION INTRAVENOUS AS NEEDED
Status: DISCONTINUED | OUTPATIENT
Start: 2021-05-02 | End: 2021-05-03 | Stop reason: SURG

## 2021-05-02 RX ADMIN — IOPAMIDOL 100 ML: 755 INJECTION, SOLUTION INTRAVENOUS at 00:43

## 2021-05-02 RX ADMIN — Medication 200 MCG: at 22:49

## 2021-05-02 RX ADMIN — INSULIN LISPRO 4 UNITS: 100 INJECTION, SOLUTION INTRAVENOUS; SUBCUTANEOUS at 19:21

## 2021-05-02 RX ADMIN — ROCURONIUM BROMIDE 100 MG: 10 INJECTION INTRAVENOUS at 22:28

## 2021-05-02 RX ADMIN — FENTANYL CITRATE 200 MCG: 0.05 INJECTION, SOLUTION INTRAMUSCULAR; INTRAVENOUS at 23:04

## 2021-05-02 RX ADMIN — FUROSEMIDE 20 MG: 20 TABLET ORAL at 16:02

## 2021-05-02 RX ADMIN — SODIUM CHLORIDE 2 UNITS/HR: 900 INJECTION INTRAVENOUS at 22:55

## 2021-05-02 RX ADMIN — LABETALOL HYDROCHLORIDE 20 MG: 5 INJECTION, SOLUTION INTRAVENOUS at 18:52

## 2021-05-02 RX ADMIN — NITROGLYCERIN 0.4 MG: 0.4 TABLET SUBLINGUAL at 02:03

## 2021-05-02 RX ADMIN — ROPINIROLE HYDROCHLORIDE 0.25 MG: 0.25 TABLET, FILM COATED ORAL at 16:02

## 2021-05-02 RX ADMIN — Medication 100 MCG: at 22:55

## 2021-05-02 RX ADMIN — NITROGLYCERIN 1 INCH: 20 OINTMENT TOPICAL at 00:03

## 2021-05-02 RX ADMIN — INSULIN GLARGINE 15 UNITS: 100 INJECTION, SOLUTION SUBCUTANEOUS at 09:58

## 2021-05-02 RX ADMIN — TECHNETIUM TC 99M SESTAMIBI 1 DOSE: 1 INJECTION INTRAVENOUS at 12:32

## 2021-05-02 RX ADMIN — Medication 100 MCG: at 22:53

## 2021-05-02 RX ADMIN — NITROGLYCERIN 5 MCG/MIN: 20 INJECTION INTRAVENOUS at 15:42

## 2021-05-02 RX ADMIN — DULOXETINE HYDROCHLORIDE 60 MG: 30 CAPSULE, DELAYED RELEASE ORAL at 16:02

## 2021-05-02 RX ADMIN — NITROGLYCERIN 0.4 MG: 0.4 TABLET SUBLINGUAL at 00:53

## 2021-05-02 RX ADMIN — HYDRALAZINE HYDROCHLORIDE 10 MG: 20 INJECTION INTRAMUSCULAR; INTRAVENOUS at 17:16

## 2021-05-02 RX ADMIN — CHLORHEXIDINE GLUCONATE 1 APPLICATION: 500 CLOTH TOPICAL at 19:23

## 2021-05-02 RX ADMIN — Medication 10 ML: at 09:14

## 2021-05-02 RX ADMIN — TECHNETIUM TC 99M SESTAMIBI 1 DOSE: 1 INJECTION INTRAVENOUS at 10:15

## 2021-05-02 RX ADMIN — HEPARIN SODIUM 27000 UNITS: 1000 INJECTION, SOLUTION INTRAVENOUS; SUBCUTANEOUS at 22:54

## 2021-05-02 RX ADMIN — CHLORHEXIDINE GLUCONATE 15 ML: 1.2 SOLUTION ORAL at 19:21

## 2021-05-02 RX ADMIN — MORPHINE SULFATE 4 MG: 4 INJECTION INTRAVENOUS at 20:12

## 2021-05-02 RX ADMIN — LOSARTAN POTASSIUM 100 MG: 50 TABLET, FILM COATED ORAL at 11:33

## 2021-05-02 RX ADMIN — MORPHINE SULFATE 2 MG: 4 INJECTION INTRAVENOUS at 18:48

## 2021-05-02 RX ADMIN — MORPHINE SULFATE 2 MG: 4 INJECTION INTRAVENOUS at 18:49

## 2021-05-02 RX ADMIN — POTASSIUM CHLORIDE 20 MEQ: 1500 TABLET, EXTENDED RELEASE ORAL at 09:14

## 2021-05-02 RX ADMIN — ETOMIDATE 20 MG: 40 INJECTION, SOLUTION INTRAVENOUS at 22:28

## 2021-05-02 RX ADMIN — LABETALOL 20 MG/4 ML (5 MG/ML) INTRAVENOUS SYRINGE 20 MG: at 15:42

## 2021-05-02 RX ADMIN — MUPIROCIN 1 APPLICATION: 20 OINTMENT TOPICAL at 19:21

## 2021-05-02 RX ADMIN — CEFAZOLIN 2 G: 1 INJECTION, POWDER, FOR SOLUTION INTRAMUSCULAR; INTRAVENOUS at 22:16

## 2021-05-02 RX ADMIN — LABETALOL 20 MG/4 ML (5 MG/ML) INTRAVENOUS SYRINGE 20 MG: at 18:52

## 2021-05-02 RX ADMIN — FENTANYL CITRATE 50 MCG: 0.05 INJECTION, SOLUTION INTRAMUSCULAR; INTRAVENOUS at 22:17

## 2021-05-02 RX ADMIN — ROCURONIUM BROMIDE 30 MG: 10 INJECTION INTRAVENOUS at 23:46

## 2021-05-02 RX ADMIN — Medication 10 ML: at 20:12

## 2021-05-02 RX ADMIN — EPHEDRINE SULFATE 5 MG: 50 INJECTION INTRAVENOUS at 23:32

## 2021-05-02 RX ADMIN — FENTANYL CITRATE 250 MCG: 0.05 INJECTION, SOLUTION INTRAMUSCULAR; INTRAVENOUS at 22:35

## 2021-05-02 RX ADMIN — ASPIRIN 325 MG: 325 TABLET, COATED ORAL at 09:14

## 2021-05-02 RX ADMIN — MORPHINE SULFATE 2 MG: 4 INJECTION INTRAVENOUS at 18:51

## 2021-05-02 RX ADMIN — REGADENOSON 0.4 MG: 0.08 INJECTION, SOLUTION INTRAVENOUS at 12:31

## 2021-05-02 RX ADMIN — PROMETHAZINE HYDROCHLORIDE 12.5 MG: 12.5 TABLET ORAL at 19:21

## 2021-05-02 RX ADMIN — SODIUM CHLORIDE: 0.9 INJECTION, SOLUTION INTRAVENOUS at 21:42

## 2021-05-02 RX ADMIN — LABETALOL HYDROCHLORIDE 200 MG: 200 TABLET, FILM COATED ORAL at 17:15

## 2021-05-02 RX ADMIN — METOPROLOL TARTRATE 5 MG: 5 INJECTION INTRAVENOUS at 18:50

## 2021-05-02 RX ADMIN — SODIUM CHLORIDE 1000 ML: 9 INJECTION, SOLUTION INTRAVENOUS at 00:49

## 2021-05-02 RX ADMIN — AMINOCAPROIC ACID 10 G: 250 INJECTION, SOLUTION INTRAVENOUS at 22:23

## 2021-05-02 RX ADMIN — FENTANYL CITRATE 100 MCG: 0.05 INJECTION, SOLUTION INTRAMUSCULAR; INTRAVENOUS at 23:52

## 2021-05-02 RX ADMIN — INSULIN LISPRO 4 UNITS: 100 INJECTION, SOLUTION INTRAVENOUS; SUBCUTANEOUS at 11:33

## 2021-05-02 RX ADMIN — THERA TABS 1 TABLET: TAB at 09:14

## 2021-05-02 RX ADMIN — INSULIN HUMAN 8 UNITS: 100 INJECTION, SOLUTION PARENTERAL at 00:49

## 2021-05-02 RX ADMIN — KETOROLAC TROMETHAMINE 15 MG: 15 INJECTION, SOLUTION INTRAMUSCULAR; INTRAVENOUS at 05:40

## 2021-05-02 RX ADMIN — HYDRALAZINE HYDROCHLORIDE 10 MG: 20 INJECTION INTRAMUSCULAR; INTRAVENOUS at 19:47

## 2021-05-02 RX ADMIN — ACETAMINOPHEN 650 MG: 325 TABLET, FILM COATED ORAL at 10:47

## 2021-05-02 RX ADMIN — MIDAZOLAM 2 MG: 1 INJECTION INTRAMUSCULAR; INTRAVENOUS at 22:08

## 2021-05-02 RX ADMIN — MIDAZOLAM 2 MG: 1 INJECTION INTRAMUSCULAR; INTRAVENOUS at 22:19

## 2021-05-02 RX ADMIN — Medication 5 MG: at 04:33

## 2021-05-02 RX ADMIN — FENTANYL CITRATE 100 MCG: 0.05 INJECTION, SOLUTION INTRAMUSCULAR; INTRAVENOUS at 22:28

## 2021-05-02 RX ADMIN — SUCRALFATE 1 G: 1 TABLET ORAL at 04:30

## 2021-05-02 RX ADMIN — FENTANYL CITRATE 50 MCG: 0.05 INJECTION, SOLUTION INTRAMUSCULAR; INTRAVENOUS at 22:20

## 2021-05-02 RX ADMIN — MORPHINE SULFATE 2 MG: 4 INJECTION, SOLUTION INTRAMUSCULAR; INTRAVENOUS at 18:48

## 2021-05-02 RX ADMIN — HEPARIN SODIUM 12 UNITS/KG/HR: 10000 INJECTION, SOLUTION INTRAVENOUS at 19:47

## 2021-05-02 RX ADMIN — FENTANYL CITRATE 250 MCG: 0.05 INJECTION, SOLUTION INTRAMUSCULAR; INTRAVENOUS at 23:06

## 2021-05-02 NOTE — ANESTHESIA PREPROCEDURE EVALUATION
Anesthesia Evaluation     Patient summary reviewed and Nursing notes reviewed   no history of anesthetic complications:  NPO Solid Status: > 8 hours  NPO Liquid Status: > 2 hours           Airway   Mallampati: II  TM distance: >3 FB  Neck ROM: full  no difficulty expected  Dental - normal exam     Pulmonary - negative pulmonary ROS and normal exam   (-) COPD, asthma, not a smoker, lung cancer  Cardiovascular - normal exam  Exercise tolerance: good (4-7 METS)    ECG reviewed  Rhythm: regular  Rate: normal    (+) hypertension well controlled 2 medications or greater, CAD, CABG, hyperlipidemia,   (-) valvular problems/murmurs, past MI, dysrhythmias, angina, CHF, cardiac stents      Neuro/Psych  (+) headaches, numbness,     (-) seizures, TIA, CVA  GI/Hepatic/Renal/Endo    (+) obesity,   liver disease fatty liver disease, renal disease, diabetes mellitus type 2 poorly controlled,     Musculoskeletal     (+) neck pain,   Abdominal  - normal exam   Substance History - negative use     OB/GYN          Other   arthritis,    history of cancer remission    ROS/Med Hx Other: IABP and multivessel disease.       I was consulted today by Dr. Esteban after the patient was admitted with chest pain through the emergency room.  She had a CTA of the chest at that time that was negative.  She had a stress test that was positive and a catheterization was performed.  This shows a tubular left main stenosis about 90%.  There is no thrombus.  The right has a 60% lesion.  She has been having chest discomfort for several days and I think is probably been going on longer than that.  She had a wound repaired at [facility] by Dr. Mikhail Banks in January with a sciatic block and tolerated this well.  This was a wound dehiscence of the right foot and no organisms were grown at the time of surgery.  It is well-healed now.    I am seeing her now in the intensive care unit she has an intra-aortic balloon pump placed in the right groin.  She is  pain-free.  We will plan urgent CABG in the morning with probably a four-vessel bypass.  I discussed all this with the patient.  Her family is not here at this time.  She understands and wishes to proceed.  She had to dye loads today and has a history of acute kidney injury before.  She says he has an intolerance to Lopressor which causes a cough.  We will plan surgery in the morning.  She is on heparin and a balloon pump tonight.           Anesthesia Plan    ASA 4     general   (GA w/ PA cath, Art line and EMILIA probe placement and monitoring.)  intravenous induction     Anesthetic plan, all risks, benefits, and alternatives have been provided, discussed and informed consent has been obtained with: patient.

## 2021-05-03 ENCOUNTER — ANESTHESIA (OUTPATIENT)
Dept: PERIOP | Facility: HOSPITAL | Age: 66
End: 2021-05-03

## 2021-05-03 ENCOUNTER — APPOINTMENT (OUTPATIENT)
Dept: GENERAL RADIOLOGY | Facility: HOSPITAL | Age: 66
End: 2021-05-03

## 2021-05-03 ENCOUNTER — APPOINTMENT (OUTPATIENT)
Dept: CARDIOLOGY | Facility: HOSPITAL | Age: 66
End: 2021-05-03

## 2021-05-03 LAB
ACT BLD: 114 SECONDS (ref 89–137)
ACT BLD: 131 SECONDS (ref 89–137)
ACT BLD: 444 SECONDS (ref 89–137)
ACT BLD: 461 SECONDS (ref 89–137)
ACT BLD: 472 SECONDS (ref 89–137)
ALBUMIN SERPL-MCNC: 3.8 G/DL (ref 3.5–5.2)
ALBUMIN SERPL-MCNC: 4 G/DL (ref 3.5–5.2)
ANION GAP SERPL CALCULATED.3IONS-SCNC: 12 MMOL/L (ref 5–15)
ANION GAP SERPL CALCULATED.3IONS-SCNC: 13 MMOL/L (ref 5–15)
APTT PPP: 27.3 SECONDS (ref 24–31)
APTT PPP: 27.6 SECONDS (ref 61–76.5)
ARTERIAL PATENCY WRIST A: ABNORMAL
ATMOSPHERIC PRESS: ABNORMAL MM[HG]
BASE DEFICIT: ABNORMAL
BASE EXCESS BLDA CALC-SCNC: -2.9 MMOL/L (ref 0–3)
BASE EXCESS BLDA CALC-SCNC: -2.9 MMOL/L (ref 0–3)
BASE EXCESS BLDA CALC-SCNC: -3.1 MMOL/L (ref 0–3)
BASE EXCESS BLDA CALC-SCNC: -3.2 MMOL/L (ref 0–3)
BASE EXCESS BLDA CALC-SCNC: -3.3 MMOL/L (ref 0–3)
BASE EXCESS BLDA CALC-SCNC: -3.3 MMOL/L (ref 0–3)
BASE EXCESS BLDA CALC-SCNC: -3.4 MMOL/L (ref 0–3)
BASE EXCESS BLDA CALC-SCNC: -3.4 MMOL/L (ref 0–3)
BASE EXCESS BLDA CALC-SCNC: -3.5 MMOL/L (ref 0–3)
BASE EXCESS BLDA CALC-SCNC: -3.5 MMOL/L (ref 0–3)
BASE EXCESS BLDA CALC-SCNC: -3.8 MMOL/L (ref 0–3)
BASE EXCESS BLDA CALC-SCNC: -4.1 MMOL/L (ref 0–3)
BASE EXCESS BLDA CALC-SCNC: 0 MMOL/L (ref 0–3)
BASE EXCESS BLDA CALC-SCNC: 3 MMOL/L (ref 0–3)
BASE EXCESS BLDA CALC-SCNC: <0 MMOL/L (ref 0–3)
BASE EXCESS BLDA CALC-SCNC: <0 MMOL/L (ref 0–3)
BASE EXCESS BLDV CALC-SCNC: ABNORMAL MMOL/L
BASOPHILS # BLD AUTO: 0.1 10*3/MM3 (ref 0–0.2)
BASOPHILS NFR BLD AUTO: 0.6 % (ref 0–1.5)
BDY SITE: ABNORMAL
BUN SERPL-MCNC: 21 MG/DL (ref 8–23)
BUN SERPL-MCNC: 23 MG/DL (ref 8–23)
BUN/CREAT SERPL: 16.3 (ref 7–25)
BUN/CREAT SERPL: 17.2 (ref 7–25)
CA-I BLDA-SCNC: 0.72 MMOL/L (ref 1.15–1.33)
CA-I BLDA-SCNC: 0.91 MMOL/L (ref 1.15–1.33)
CA-I BLDA-SCNC: 1.04 MMOL/L (ref 1.12–1.32)
CA-I BLDA-SCNC: 1.06 MMOL/L (ref 1.15–1.33)
CA-I BLDA-SCNC: 1.07 MMOL/L (ref 1.15–1.33)
CA-I BLDA-SCNC: 1.09 MMOL/L (ref 1.12–1.32)
CA-I BLDA-SCNC: 1.09 MMOL/L (ref 1.12–1.32)
CA-I BLDA-SCNC: 1.11 MMOL/L (ref 1.15–1.33)
CA-I BLDA-SCNC: 1.19 MMOL/L (ref 1.15–1.33)
CA-I BLDA-SCNC: 1.23 MMOL/L (ref 1.12–1.32)
CA-I BLDA-SCNC: 1.26 MMOL/L (ref 1.15–1.33)
CA-I BLDA-SCNC: 1.26 MMOL/L (ref 1.15–1.33)
CA-I BLDA-SCNC: 1.27 MMOL/L (ref 1.15–1.33)
CA-I BLDA-SCNC: 1.28 MMOL/L (ref 1.15–1.33)
CA-I BLDA-SCNC: 1.29 MMOL/L (ref 1.15–1.33)
CA-I BLDA-SCNC: 1.3 MMOL/L (ref 1.12–1.32)
CA-I BLDA-SCNC: 1.3 MMOL/L (ref 1.15–1.33)
CA-I SERPL ISE-MCNC: 1.23 MMOL/L (ref 1.2–1.3)
CALCIUM SPEC-SCNC: 8.9 MG/DL (ref 8.6–10.5)
CALCIUM SPEC-SCNC: 9.4 MG/DL (ref 8.6–10.5)
CHLORIDE SERPL-SCNC: 107 MMOL/L (ref 98–107)
CHLORIDE SERPL-SCNC: 107 MMOL/L (ref 98–107)
CHOLEST SERPL-MCNC: 172 MG/DL (ref 0–200)
CO2 BLDA-SCNC: 20.4 MMOL/L (ref 22–29)
CO2 BLDA-SCNC: 20.7 MMOL/L (ref 22–29)
CO2 BLDA-SCNC: 20.7 MMOL/L (ref 22–29)
CO2 BLDA-SCNC: 21.6 MMOL/L (ref 22–29)
CO2 BLDA-SCNC: 22.6 MMOL/L (ref 22–29)
CO2 BLDA-SCNC: 22.6 MMOL/L (ref 22–29)
CO2 BLDA-SCNC: 23.2 MMOL/L (ref 22–29)
CO2 BLDA-SCNC: 23.4 MMOL/L (ref 22–29)
CO2 BLDA-SCNC: 23.5 MMOL/L (ref 22–29)
CO2 BLDA-SCNC: 23.8 MMOL/L (ref 22–29)
CO2 BLDA-SCNC: 25 MMOL/L (ref 23–27)
CO2 BLDA-SCNC: 25.5 MMOL/L (ref 22–29)
CO2 BLDA-SCNC: 26 MMOL/L (ref 22–29)
CO2 BLDA-SCNC: 27 MMOL/L (ref 23–27)
CO2 BLDA-SCNC: 27 MMOL/L (ref 23–27)
CO2 BLDA-SCNC: 31 MMOL/L (ref 23–27)
CO2 CONTENT VENOUS: ABNORMAL
CO2 SERPL-SCNC: 21 MMOL/L (ref 22–29)
CO2 SERPL-SCNC: 21 MMOL/L (ref 22–29)
CREAT SERPL-MCNC: 1.29 MG/DL (ref 0.57–1)
CREAT SERPL-MCNC: 1.34 MG/DL (ref 0.57–1)
DEPRECATED RDW RBC AUTO: 42.9 FL (ref 37–54)
DEPRECATED RDW RBC AUTO: 43.3 FL (ref 37–54)
EOSINOPHIL # BLD AUTO: 0.2 10*3/MM3 (ref 0–0.4)
EOSINOPHIL NFR BLD AUTO: 1.8 % (ref 0.3–6.2)
ERYTHROCYTE [DISTWIDTH] IN BLOOD BY AUTOMATED COUNT: 13.9 % (ref 12.3–15.4)
ERYTHROCYTE [DISTWIDTH] IN BLOOD BY AUTOMATED COUNT: 14.1 % (ref 12.3–15.4)
FIBRINOGEN PPP-MCNC: 226 MG/DL (ref 210–450)
GFR SERPL CREATININE-BSD FRML MDRD: 39 ML/MIN/1.73
GFR SERPL CREATININE-BSD FRML MDRD: 41 ML/MIN/1.73
GLUCOSE BLDC GLUCOMTR-MCNC: 106 MG/DL (ref 70–105)
GLUCOSE BLDC GLUCOMTR-MCNC: 109 MG/DL (ref 70–105)
GLUCOSE BLDC GLUCOMTR-MCNC: 117 MG/DL (ref 70–105)
GLUCOSE BLDC GLUCOMTR-MCNC: 120 MG/DL (ref 70–105)
GLUCOSE BLDC GLUCOMTR-MCNC: 121 MG/DL (ref 70–105)
GLUCOSE BLDC GLUCOMTR-MCNC: 122 MG/DL (ref 70–105)
GLUCOSE BLDC GLUCOMTR-MCNC: 124 MG/DL (ref 70–105)
GLUCOSE BLDC GLUCOMTR-MCNC: 124 MG/DL (ref 70–105)
GLUCOSE BLDC GLUCOMTR-MCNC: 124 MG/DL (ref 74–100)
GLUCOSE BLDC GLUCOMTR-MCNC: 124 MG/DL (ref 74–100)
GLUCOSE BLDC GLUCOMTR-MCNC: 125 MG/DL (ref 70–105)
GLUCOSE BLDC GLUCOMTR-MCNC: 127 MG/DL (ref 70–105)
GLUCOSE BLDC GLUCOMTR-MCNC: 129 MG/DL (ref 70–105)
GLUCOSE BLDC GLUCOMTR-MCNC: 129 MG/DL (ref 74–100)
GLUCOSE BLDC GLUCOMTR-MCNC: 129 MG/DL (ref 74–100)
GLUCOSE BLDC GLUCOMTR-MCNC: 130 MG/DL (ref 70–105)
GLUCOSE BLDC GLUCOMTR-MCNC: 131 MG/DL (ref 74–100)
GLUCOSE BLDC GLUCOMTR-MCNC: 131 MG/DL (ref 74–100)
GLUCOSE BLDC GLUCOMTR-MCNC: 132 MG/DL (ref 74–100)
GLUCOSE BLDC GLUCOMTR-MCNC: 132 MG/DL (ref 74–100)
GLUCOSE BLDC GLUCOMTR-MCNC: 135 MG/DL (ref 74–100)
GLUCOSE BLDC GLUCOMTR-MCNC: 135 MG/DL (ref 74–100)
GLUCOSE BLDC GLUCOMTR-MCNC: 136 MG/DL (ref 70–105)
GLUCOSE BLDC GLUCOMTR-MCNC: 142 MG/DL (ref 74–100)
GLUCOSE BLDC GLUCOMTR-MCNC: 142 MG/DL (ref 74–100)
GLUCOSE BLDC GLUCOMTR-MCNC: 143 MG/DL (ref 74–100)
GLUCOSE BLDC GLUCOMTR-MCNC: 143 MG/DL (ref 74–100)
GLUCOSE BLDC GLUCOMTR-MCNC: 144 MG/DL (ref 74–100)
GLUCOSE BLDC GLUCOMTR-MCNC: 144 MG/DL (ref 74–100)
GLUCOSE BLDC GLUCOMTR-MCNC: 152 MG/DL (ref 70–105)
GLUCOSE BLDC GLUCOMTR-MCNC: 153 MG/DL (ref 74–100)
GLUCOSE BLDC GLUCOMTR-MCNC: 153 MG/DL (ref 74–100)
GLUCOSE BLDC GLUCOMTR-MCNC: 158 MG/DL (ref 74–100)
GLUCOSE BLDC GLUCOMTR-MCNC: 158 MG/DL (ref 74–100)
GLUCOSE BLDC GLUCOMTR-MCNC: 164 MG/DL (ref 74–100)
GLUCOSE BLDC GLUCOMTR-MCNC: 164 MG/DL (ref 74–100)
GLUCOSE BLDC GLUCOMTR-MCNC: 169 MG/DL (ref 74–100)
GLUCOSE BLDC GLUCOMTR-MCNC: 169 MG/DL (ref 74–100)
GLUCOSE BLDC GLUCOMTR-MCNC: 191 MG/DL (ref 70–105)
GLUCOSE BLDC GLUCOMTR-MCNC: 193 MG/DL (ref 70–105)
GLUCOSE BLDC GLUCOMTR-MCNC: 195 MG/DL (ref 70–105)
GLUCOSE BLDC GLUCOMTR-MCNC: 197 MG/DL (ref 70–105)
GLUCOSE BLDC GLUCOMTR-MCNC: 248 MG/DL (ref 70–105)
GLUCOSE SERPL-MCNC: 131 MG/DL (ref 65–99)
GLUCOSE SERPL-MCNC: 151 MG/DL (ref 65–99)
HCO3 BLDA-SCNC: 19.5 MMOL/L (ref 21–28)
HCO3 BLDA-SCNC: 19.8 MMOL/L (ref 21–28)
HCO3 BLDA-SCNC: 19.8 MMOL/L (ref 21–28)
HCO3 BLDA-SCNC: 20.6 MMOL/L (ref 21–28)
HCO3 BLDA-SCNC: 21.4 MMOL/L (ref 21–28)
HCO3 BLDA-SCNC: 21.6 MMOL/L (ref 21–28)
HCO3 BLDA-SCNC: 21.9 MMOL/L (ref 21–28)
HCO3 BLDA-SCNC: 22.2 MMOL/L (ref 21–28)
HCO3 BLDA-SCNC: 22.3 MMOL/L (ref 21–28)
HCO3 BLDA-SCNC: 22.5 MMOL/L (ref 21–28)
HCO3 BLDA-SCNC: 23.1 MMOL/L (ref 22–26)
HCO3 BLDA-SCNC: 24 MMOL/L (ref 21–28)
HCO3 BLDA-SCNC: 24.4 MMOL/L (ref 21–28)
HCO3 BLDA-SCNC: 25.4 MMOL/L (ref 22–26)
HCO3 BLDA-SCNC: 25.4 MMOL/L (ref 22–26)
HCO3 BLDA-SCNC: 29 MMOL/L (ref 22–26)
HCO3 BLDV-SCNC: 23.4 MMOL/L (ref 23–28)
HCT VFR BLD AUTO: 30.8 % (ref 34–46.6)
HCT VFR BLD AUTO: 32.7 % (ref 34–46.6)
HCT VFR BLDA CALC: 21 % (ref 38–51)
HCT VFR BLDA CALC: 23 % (ref 38–51)
HCT VFR BLDA CALC: 25 % (ref 38–51)
HCT VFR BLDA CALC: 26 % (ref 38–51)
HCT VFR BLDA CALC: 26 % (ref 38–51)
HCT VFR BLDA CALC: 32 % (ref 38–51)
HCT VFR BLDA CALC: 33 % (ref 38–51)
HCT VFR BLDA CALC: 34 % (ref 38–51)
HCT VFR BLDA CALC: 34 % (ref 38–51)
HDLC SERPL-MCNC: 37 MG/DL (ref 40–60)
HEMODILUTION: YES
HGB BLD-MCNC: 10.4 G/DL (ref 12–15.9)
HGB BLD-MCNC: 11.1 G/DL (ref 12–15.9)
HGB BLDA-MCNC: 10.9 G/DL (ref 12–17)
HGB BLDA-MCNC: 11 G/DL (ref 12–17)
HGB BLDA-MCNC: 11 G/DL (ref 12–17)
HGB BLDA-MCNC: 11.1 G/DL (ref 12–17)
HGB BLDA-MCNC: 11.1 G/DL (ref 12–17)
HGB BLDA-MCNC: 11.2 G/DL (ref 12–17)
HGB BLDA-MCNC: 11.2 G/DL (ref 12–17)
HGB BLDA-MCNC: 11.3 G/DL (ref 12–17)
HGB BLDA-MCNC: 11.4 G/DL (ref 12–17)
HGB BLDA-MCNC: 11.6 G/DL (ref 12–17)
HGB BLDA-MCNC: 7.1 G/DL (ref 12–17)
HGB BLDA-MCNC: 7.8 G/DL (ref 12–17)
HGB BLDA-MCNC: 8.5 G/DL (ref 12–17)
HGB BLDA-MCNC: 8.8 G/DL (ref 12–17)
HGB BLDA-MCNC: 8.8 G/DL (ref 12–17)
INHALED O2 CONCENTRATION: 40 %
INHALED O2 CONCENTRATION: 50 %
INHALED O2 CONCENTRATION: 70 %
INR PPP: 1.09 (ref 0.93–1.1)
LDLC SERPL CALC-MCNC: 105 MG/DL (ref 0–100)
LDLC/HDLC SERPL: 2.74 {RATIO}
LYMPHOCYTES # BLD AUTO: 2.4 10*3/MM3 (ref 0.7–3.1)
LYMPHOCYTES NFR BLD AUTO: 21.3 % (ref 19.6–45.3)
MAGNESIUM SERPL-MCNC: 2.5 MG/DL (ref 1.6–2.4)
MAGNESIUM SERPL-MCNC: 2.6 MG/DL (ref 1.6–2.4)
MCH RBC QN AUTO: 29.7 PG (ref 26.6–33)
MCH RBC QN AUTO: 29.8 PG (ref 26.6–33)
MCHC RBC AUTO-ENTMCNC: 33.9 G/DL (ref 31.5–35.7)
MCHC RBC AUTO-ENTMCNC: 34 G/DL (ref 31.5–35.7)
MCV RBC AUTO: 87.4 FL (ref 79–97)
MCV RBC AUTO: 88.1 FL (ref 79–97)
MODALITY: ABNORMAL
MONOCYTES # BLD AUTO: 0.7 10*3/MM3 (ref 0.1–0.9)
MONOCYTES NFR BLD AUTO: 6.1 % (ref 5–12)
NEUTROPHILS NFR BLD AUTO: 7.7 10*3/MM3 (ref 1.7–7)
NEUTROPHILS NFR BLD AUTO: 70.2 % (ref 42.7–76)
NRBC BLD AUTO-RTO: 0.1 /100 WBC (ref 0–0.2)
PCO2 BLDA: 28.3 MM HG (ref 35–48)
PCO2 BLDA: 28.6 MM HG (ref 35–48)
PCO2 BLDA: 29.1 MM HG (ref 35–48)
PCO2 BLDA: 32.3 MM HG (ref 35–48)
PCO2 BLDA: 35.4 MM HG (ref 35–48)
PCO2 BLDA: 37.4 MM HG (ref 35–48)
PCO2 BLDA: 39.8 MM HG (ref 35–48)
PCO2 BLDA: 41.8 MM HG (ref 35–48)
PCO2 BLDA: 42.4 MM HG (ref 35–48)
PCO2 BLDA: 44.7 MM HG (ref 35–48)
PCO2 BLDA: 46.2 MM HG (ref 35–45)
PCO2 BLDA: 51.2 MM HG (ref 35–45)
PCO2 BLDA: 51.6 MM HG (ref 35–48)
PCO2 BLDA: 52.8 MM HG (ref 35–45)
PCO2 BLDA: 52.9 MM HG (ref 35–48)
PCO2 BLDA: 55.5 MM HG (ref 35–45)
PCO2 BLDV: 51.1 MM HG (ref 41–51)
PEEP RESPIRATORY: 0 CM[H2O]
PEEP RESPIRATORY: 0 CM[H2O]
PEEP RESPIRATORY: 5 CM[H2O]
PEEP RESPIRATORY: 5 CM[H2O]
PEEP RESPIRATORY: 8 CM[H2O]
PH BLDA: 7.26 PH UNITS (ref 7.35–7.45)
PH BLDA: 7.27 PH UNITS (ref 7.35–7.45)
PH BLDA: 7.28 PH UNITS (ref 7.35–7.45)
PH BLDA: 7.29 PH UNITS (ref 7.35–7.45)
PH BLDA: 7.31 PH UNITS (ref 7.35–7.45)
PH BLDA: 7.32 PH UNITS (ref 7.35–7.45)
PH BLDA: 7.33 PH UNITS (ref 7.35–7.45)
PH BLDA: 7.33 PH UNITS (ref 7.35–7.45)
PH BLDA: 7.35 PH UNITS (ref 7.35–7.45)
PH BLDA: 7.35 PH UNITS (ref 7.35–7.45)
PH BLDA: 7.37 PH UNITS (ref 7.35–7.45)
PH BLDA: 7.39 PH UNITS (ref 7.35–7.45)
PH BLDA: 7.41 PH UNITS (ref 7.35–7.45)
PH BLDA: 7.44 PH UNITS (ref 7.35–7.45)
PH BLDA: 7.45 PH UNITS (ref 7.35–7.45)
PH BLDA: 7.45 PH UNITS (ref 7.35–7.45)
PH BLDV: 7.27 PH UNITS (ref 7.31–7.41)
PHOSPHATE SERPL-MCNC: 2.2 MG/DL (ref 2.5–4.5)
PHOSPHATE SERPL-MCNC: 3.8 MG/DL (ref 2.5–4.5)
PLATELET # BLD AUTO: 133 10*3/MM3 (ref 140–450)
PLATELET # BLD AUTO: 146 10*3/MM3 (ref 140–450)
PMV BLD AUTO: 7.2 FL (ref 6–12)
PMV BLD AUTO: 7.2 FL (ref 6–12)
PO2 BLDA: 304 MM HG (ref 80–105)
PO2 BLDA: 459 MM HG (ref 80–105)
PO2 BLDA: 464 MM HG (ref 80–105)
PO2 BLDA: 494 MM HG (ref 80–105)
PO2 BLDA: 70.6 MM HG (ref 83–108)
PO2 BLDA: 71.8 MM HG (ref 83–108)
PO2 BLDA: 71.8 MM HG (ref 83–108)
PO2 BLDA: 72.6 MM HG (ref 83–108)
PO2 BLDA: 73.9 MM HG (ref 83–108)
PO2 BLDA: 75.1 MM HG (ref 83–108)
PO2 BLDA: 75.8 MM HG (ref 83–108)
PO2 BLDA: 77 MM HG (ref 83–108)
PO2 BLDA: 78 MM HG (ref 83–108)
PO2 BLDA: 92.1 MM HG (ref 83–108)
PO2 BLDA: 92.9 MM HG (ref 83–108)
PO2 BLDA: 93.9 MM HG (ref 83–108)
PO2 BLDV: 48 MM HG (ref 35–42)
POTASSIUM BLDA-SCNC: 3 MMOL/L (ref 3.5–4.5)
POTASSIUM BLDA-SCNC: 3.1 MMOL/L (ref 3.5–4.5)
POTASSIUM BLDA-SCNC: 3.1 MMOL/L (ref 3.5–4.5)
POTASSIUM BLDA-SCNC: 3.3 MMOL/L (ref 3.5–4.5)
POTASSIUM BLDA-SCNC: 3.4 MMOL/L (ref 3.5–4.5)
POTASSIUM BLDA-SCNC: 3.7 MMOL/L (ref 3.5–4.5)
POTASSIUM BLDA-SCNC: 3.8 MMOL/L (ref 3.5–4.5)
POTASSIUM BLDA-SCNC: 3.9 MMOL/L (ref 3.5–4.9)
POTASSIUM BLDA-SCNC: 3.9 MMOL/L (ref 3.5–4.9)
POTASSIUM BLDA-SCNC: 4 MMOL/L (ref 3.5–4.9)
POTASSIUM BLDA-SCNC: 4.5 MMOL/L (ref 3.5–4.9)
POTASSIUM BLDA-SCNC: 5 MMOL/L (ref 3.5–4.9)
POTASSIUM SERPL-SCNC: 3.4 MMOL/L (ref 3.5–5.2)
POTASSIUM SERPL-SCNC: 3.6 MMOL/L (ref 3.5–5.2)
POTASSIUM SERPL-SCNC: 3.9 MMOL/L (ref 3.5–5.2)
PROTHROMBIN TIME: 11.9 SECONDS (ref 9.6–11.7)
PSV: 10 CMH2O
PSV: 10 CMH2O
PSV: 12 CMH2O
QT INTERVAL: 466 MS
RBC # BLD AUTO: 3.49 10*6/MM3 (ref 3.77–5.28)
RBC # BLD AUTO: 3.75 10*6/MM3 (ref 3.77–5.28)
RESPIRATORY RATE: 12
RESPIRATORY RATE: 14
RESPIRATORY RATE: 16
RESPIRATORY RATE: 18
SAO2 % BLDCOA: 100 % (ref 95–98)
SAO2 % BLDCOA: 93.5 % (ref 94–98)
SAO2 % BLDCOA: 93.6 % (ref 94–98)
SAO2 % BLDCOA: 93.8 % (ref 94–98)
SAO2 % BLDCOA: 94 % (ref 94–98)
SAO2 % BLDCOA: 94.3 % (ref 94–98)
SAO2 % BLDCOA: 94.5 % (ref 94–98)
SAO2 % BLDCOA: 95.1 % (ref 94–98)
SAO2 % BLDCOA: 95.4 % (ref 94–98)
SAO2 % BLDCOA: 95.4 % (ref 94–98)
SAO2 % BLDCOA: 95.7 % (ref 94–98)
SAO2 % BLDCOA: 96 % (ref 94–98)
SAO2 % BLDCOA: 97.7 % (ref 94–98)
SAO2 % BLDCOV: 25 % (ref 95–99)
SODIUM BLD-SCNC: 135 MMOL/L (ref 138–146)
SODIUM BLD-SCNC: 136 MMOL/L (ref 138–146)
SODIUM BLD-SCNC: 136 MMOL/L (ref 138–146)
SODIUM BLD-SCNC: 137 MMOL/L (ref 138–146)
SODIUM BLD-SCNC: 138 MMOL/L (ref 138–146)
SODIUM BLD-SCNC: 138 MMOL/L (ref 138–146)
SODIUM BLD-SCNC: 140 MMOL/L (ref 138–146)
SODIUM BLD-SCNC: 141 MMOL/L (ref 138–146)
SODIUM BLD-SCNC: 142 MMOL/L (ref 138–146)
SODIUM BLD-SCNC: 143 MMOL/L (ref 138–146)
SODIUM BLD-SCNC: 143 MMOL/L (ref 138–146)
SODIUM SERPL-SCNC: 140 MMOL/L (ref 136–145)
SODIUM SERPL-SCNC: 141 MMOL/L (ref 136–145)
TRIGL SERPL-MCNC: 168 MG/DL (ref 0–150)
VENTILATOR MODE: ABNORMAL
VLDLC SERPL-MCNC: 30 MG/DL (ref 5–40)
VT ON VENT VENT: 500 ML
VT ON VENT VENT: 550 ML
VT ON VENT VENT: 650 ML
WBC # BLD AUTO: 11 10*3/MM3 (ref 3.4–10.8)
WBC # BLD AUTO: 11.4 10*3/MM3 (ref 3.4–10.8)

## 2021-05-03 PROCEDURE — 84295 ASSAY OF SERUM SODIUM: CPT

## 2021-05-03 PROCEDURE — 25010000003 POTASSIUM CHLORIDE 10 MEQ/100ML SOLUTION: Performed by: THORACIC SURGERY (CARDIOTHORACIC VASCULAR SURGERY)

## 2021-05-03 PROCEDURE — 85384 FIBRINOGEN ACTIVITY: CPT | Performed by: THORACIC SURGERY (CARDIOTHORACIC VASCULAR SURGERY)

## 2021-05-03 PROCEDURE — 25010000002 MAGNESIUM SULFATE PER 500 MG OF MAGNESIUM: Performed by: ANESTHESIOLOGY

## 2021-05-03 PROCEDURE — 85347 COAGULATION TIME ACTIVATED: CPT

## 2021-05-03 PROCEDURE — 94799 UNLISTED PULMONARY SVC/PX: CPT

## 2021-05-03 PROCEDURE — 80061 LIPID PANEL: CPT | Performed by: THORACIC SURGERY (CARDIOTHORACIC VASCULAR SURGERY)

## 2021-05-03 PROCEDURE — 82330 ASSAY OF CALCIUM: CPT | Performed by: THORACIC SURGERY (CARDIOTHORACIC VASCULAR SURGERY)

## 2021-05-03 PROCEDURE — 82962 GLUCOSE BLOOD TEST: CPT

## 2021-05-03 PROCEDURE — 85027 COMPLETE CBC AUTOMATED: CPT | Performed by: THORACIC SURGERY (CARDIOTHORACIC VASCULAR SURGERY)

## 2021-05-03 PROCEDURE — 63710000001 INSULIN REGULAR HUMAN PER 5 UNITS: Performed by: THORACIC SURGERY (CARDIOTHORACIC VASCULAR SURGERY)

## 2021-05-03 PROCEDURE — 82330 ASSAY OF CALCIUM: CPT

## 2021-05-03 PROCEDURE — 80051 ELECTROLYTE PANEL: CPT

## 2021-05-03 PROCEDURE — 82803 BLOOD GASES ANY COMBINATION: CPT

## 2021-05-03 PROCEDURE — 25010000002 MAGNESIUM SULFATE IN D5W 1G/100ML (PREMIX) 1-5 GM/100ML-% SOLUTION: Performed by: THORACIC SURGERY (CARDIOTHORACIC VASCULAR SURGERY)

## 2021-05-03 PROCEDURE — 25010000002 METOCLOPRAMIDE PER 10 MG: Performed by: THORACIC SURGERY (CARDIOTHORACIC VASCULAR SURGERY)

## 2021-05-03 PROCEDURE — 25010000003 POTASSIUM CHLORIDE 10 MEQ/100ML SOLUTION

## 2021-05-03 PROCEDURE — 99232 SBSQ HOSP IP/OBS MODERATE 35: CPT | Performed by: INTERNAL MEDICINE

## 2021-05-03 PROCEDURE — 25010000002 MORPHINE PER 10 MG: Performed by: THORACIC SURGERY (CARDIOTHORACIC VASCULAR SURGERY)

## 2021-05-03 PROCEDURE — 80069 RENAL FUNCTION PANEL: CPT | Performed by: THORACIC SURGERY (CARDIOTHORACIC VASCULAR SURGERY)

## 2021-05-03 PROCEDURE — 82947 ASSAY GLUCOSE BLOOD QUANT: CPT

## 2021-05-03 PROCEDURE — 85025 COMPLETE CBC W/AUTO DIFF WBC: CPT | Performed by: THORACIC SURGERY (CARDIOTHORACIC VASCULAR SURGERY)

## 2021-05-03 PROCEDURE — 71045 X-RAY EXAM CHEST 1 VIEW: CPT

## 2021-05-03 PROCEDURE — 99024 POSTOP FOLLOW-UP VISIT: CPT | Performed by: NURSE PRACTITIONER

## 2021-05-03 PROCEDURE — 85610 PROTHROMBIN TIME: CPT | Performed by: THORACIC SURGERY (CARDIOTHORACIC VASCULAR SURGERY)

## 2021-05-03 PROCEDURE — 25010000003 CEFAZOLIN PER 500 MG: Performed by: ANESTHESIOLOGY

## 2021-05-03 PROCEDURE — 85018 HEMOGLOBIN: CPT

## 2021-05-03 PROCEDURE — 84132 ASSAY OF SERUM POTASSIUM: CPT

## 2021-05-03 PROCEDURE — 83735 ASSAY OF MAGNESIUM: CPT | Performed by: THORACIC SURGERY (CARDIOTHORACIC VASCULAR SURGERY)

## 2021-05-03 PROCEDURE — 85014 HEMATOCRIT: CPT

## 2021-05-03 PROCEDURE — 85730 THROMBOPLASTIN TIME PARTIAL: CPT | Performed by: THORACIC SURGERY (CARDIOTHORACIC VASCULAR SURGERY)

## 2021-05-03 PROCEDURE — 25010000002 CEFAZOLIN PER 500 MG: Performed by: THORACIC SURGERY (CARDIOTHORACIC VASCULAR SURGERY)

## 2021-05-03 PROCEDURE — 84132 ASSAY OF SERUM POTASSIUM: CPT | Performed by: THORACIC SURGERY (CARDIOTHORACIC VASCULAR SURGERY)

## 2021-05-03 PROCEDURE — 94002 VENT MGMT INPAT INIT DAY: CPT

## 2021-05-03 PROCEDURE — 25010000002 FENTANYL CITRATE (PF) 250 MCG/5ML SOLUTION: Performed by: ANESTHESIOLOGY

## 2021-05-03 RX ORDER — CYCLOBENZAPRINE HCL 10 MG
10 TABLET ORAL EVERY 8 HOURS PRN
Status: DISCONTINUED | OUTPATIENT
Start: 2021-05-04 | End: 2021-05-04

## 2021-05-03 RX ORDER — PHENYLEPHRINE HCL IN 0.9% NACL 0.5 MG/5ML
.2-3 SYRINGE (ML) INTRAVENOUS CONTINUOUS PRN
Status: DISCONTINUED | OUTPATIENT
Start: 2021-05-03 | End: 2021-05-03

## 2021-05-03 RX ORDER — ACETAMINOPHEN 325 MG/1
650 TABLET ORAL EVERY 4 HOURS
Status: COMPLETED | OUTPATIENT
Start: 2021-05-03 | End: 2021-05-03

## 2021-05-03 RX ORDER — MILRINONE LACTATE 0.2 MG/ML
.25-.75 INJECTION, SOLUTION INTRAVENOUS CONTINUOUS PRN
Status: DISCONTINUED | OUTPATIENT
Start: 2021-05-03 | End: 2021-05-03

## 2021-05-03 RX ORDER — DOPAMINE HYDROCHLORIDE 160 MG/100ML
2-20 INJECTION, SOLUTION INTRAVENOUS CONTINUOUS PRN
Status: DISCONTINUED | OUTPATIENT
Start: 2021-05-03 | End: 2021-05-03

## 2021-05-03 RX ORDER — NOREPINEPHRINE BIT/0.9 % NACL 8 MG/250ML
.02-.3 INFUSION BOTTLE (ML) INTRAVENOUS CONTINUOUS PRN
Status: DISCONTINUED | OUTPATIENT
Start: 2021-05-03 | End: 2021-05-03

## 2021-05-03 RX ORDER — MORPHINE SULFATE 4 MG/ML
4 INJECTION, SOLUTION INTRAMUSCULAR; INTRAVENOUS
Status: DISCONTINUED | OUTPATIENT
Start: 2021-05-03 | End: 2021-05-03

## 2021-05-03 RX ORDER — HYDROCODONE BITARTRATE AND ACETAMINOPHEN 10; 325 MG/1; MG/1
1 TABLET ORAL EVERY 4 HOURS PRN
Status: DISCONTINUED | OUTPATIENT
Start: 2021-05-03 | End: 2021-05-04

## 2021-05-03 RX ORDER — MAGNESIUM SULFATE HEPTAHYDRATE 40 MG/ML
2 INJECTION, SOLUTION INTRAVENOUS AS NEEDED
Status: DISCONTINUED | OUTPATIENT
Start: 2021-05-03 | End: 2021-05-11 | Stop reason: HOSPADM

## 2021-05-03 RX ORDER — OXYCODONE HYDROCHLORIDE 5 MG/1
10 TABLET ORAL EVERY 4 HOURS PRN
Status: DISCONTINUED | OUTPATIENT
Start: 2021-05-03 | End: 2021-05-03

## 2021-05-03 RX ORDER — DEXMEDETOMIDINE HYDROCHLORIDE 4 UG/ML
.2-1.5 INJECTION, SOLUTION INTRAVENOUS
Status: DISCONTINUED | OUTPATIENT
Start: 2021-05-03 | End: 2021-05-04

## 2021-05-03 RX ORDER — EPHEDRINE SULFATE 50 MG/ML
INJECTION INTRAVENOUS AS NEEDED
Status: DISCONTINUED | OUTPATIENT
Start: 2021-05-02 | End: 2021-05-03 | Stop reason: SURG

## 2021-05-03 RX ORDER — ATORVASTATIN CALCIUM 40 MG/1
40 TABLET, FILM COATED ORAL NIGHTLY
Status: DISCONTINUED | OUTPATIENT
Start: 2021-05-03 | End: 2021-05-03

## 2021-05-03 RX ORDER — MAGNESIUM SULFATE HEPTAHYDRATE 40 MG/ML
4 INJECTION, SOLUTION INTRAVENOUS AS NEEDED
Status: DISCONTINUED | OUTPATIENT
Start: 2021-05-03 | End: 2021-05-11 | Stop reason: HOSPADM

## 2021-05-03 RX ORDER — POLYETHYLENE GLYCOL 3350 17 G/17G
17 POWDER, FOR SOLUTION ORAL DAILY PRN
Status: DISCONTINUED | OUTPATIENT
Start: 2021-05-03 | End: 2021-05-05

## 2021-05-03 RX ORDER — BISACODYL 10 MG
10 SUPPOSITORY, RECTAL RECTAL DAILY PRN
Status: DISCONTINUED | OUTPATIENT
Start: 2021-05-04 | End: 2021-05-11 | Stop reason: HOSPADM

## 2021-05-03 RX ORDER — NITROGLYCERIN 20 MG/100ML
5-200 INJECTION INTRAVENOUS
Status: DISCONTINUED | OUTPATIENT
Start: 2021-05-03 | End: 2021-05-03

## 2021-05-03 RX ORDER — MORPHINE SULFATE 4 MG/ML
1 INJECTION, SOLUTION INTRAMUSCULAR; INTRAVENOUS EVERY 4 HOURS PRN
Status: DISCONTINUED | OUTPATIENT
Start: 2021-05-03 | End: 2021-05-04

## 2021-05-03 RX ORDER — POTASSIUM CHLORIDE 7.45 MG/ML
10 INJECTION INTRAVENOUS
Status: DISCONTINUED | OUTPATIENT
Start: 2021-05-03 | End: 2021-05-11 | Stop reason: HOSPADM

## 2021-05-03 RX ORDER — SODIUM CHLORIDE 9 MG/ML
30 INJECTION, SOLUTION INTRAVENOUS CONTINUOUS PRN
Status: DISCONTINUED | OUTPATIENT
Start: 2021-05-03 | End: 2021-05-05

## 2021-05-03 RX ORDER — ACETAMINOPHEN 325 MG/1
650 TABLET ORAL EVERY 4 HOURS PRN
Status: DISCONTINUED | OUTPATIENT
Start: 2021-05-03 | End: 2021-05-11 | Stop reason: HOSPADM

## 2021-05-03 RX ORDER — AMOXICILLIN 250 MG
2 CAPSULE ORAL 2 TIMES DAILY
Status: DISCONTINUED | OUTPATIENT
Start: 2021-05-03 | End: 2021-05-11 | Stop reason: HOSPADM

## 2021-05-03 RX ORDER — ACETAMINOPHEN 650 MG/1
650 SUPPOSITORY RECTAL EVERY 4 HOURS PRN
Status: DISCONTINUED | OUTPATIENT
Start: 2021-05-03 | End: 2021-05-11 | Stop reason: HOSPADM

## 2021-05-03 RX ORDER — PANTOPRAZOLE SODIUM 40 MG/1
40 TABLET, DELAYED RELEASE ORAL EVERY MORNING
Status: DISCONTINUED | OUTPATIENT
Start: 2021-05-04 | End: 2021-05-03

## 2021-05-03 RX ORDER — ACETAMINOPHEN 160 MG/5ML
650 SOLUTION ORAL EVERY 4 HOURS
Status: COMPLETED | OUTPATIENT
Start: 2021-05-03 | End: 2021-05-03

## 2021-05-03 RX ORDER — ALBUMIN, HUMAN INJ 5% 5 %
1500 SOLUTION INTRAVENOUS AS NEEDED
Status: ACTIVE | OUTPATIENT
Start: 2021-05-03 | End: 2021-05-04

## 2021-05-03 RX ORDER — INSULIN GLARGINE 100 [IU]/ML
15 INJECTION, SOLUTION SUBCUTANEOUS EVERY MORNING
Status: DISCONTINUED | OUTPATIENT
Start: 2021-05-05 | End: 2021-05-06

## 2021-05-03 RX ORDER — PANTOPRAZOLE SODIUM 40 MG/10ML
40 INJECTION, POWDER, LYOPHILIZED, FOR SOLUTION INTRAVENOUS ONCE
Status: COMPLETED | OUTPATIENT
Start: 2021-05-03 | End: 2021-05-03

## 2021-05-03 RX ORDER — NICARDIPINE HYDROCHLORIDE 2.5 MG/ML
INJECTION INTRAVENOUS AS NEEDED
Status: DISCONTINUED | OUTPATIENT
Start: 2021-05-03 | End: 2021-05-03 | Stop reason: SURG

## 2021-05-03 RX ORDER — ASPIRIN 81 MG/1
81 TABLET ORAL DAILY
Status: DISCONTINUED | OUTPATIENT
Start: 2021-05-04 | End: 2021-05-11 | Stop reason: HOSPADM

## 2021-05-03 RX ORDER — MAGNESIUM SULFATE 1 G/100ML
1 INJECTION INTRAVENOUS EVERY 8 HOURS
Status: COMPLETED | OUTPATIENT
Start: 2021-05-03 | End: 2021-05-04

## 2021-05-03 RX ORDER — METOCLOPRAMIDE HYDROCHLORIDE 5 MG/ML
10 INJECTION INTRAMUSCULAR; INTRAVENOUS EVERY 6 HOURS
Status: COMPLETED | OUTPATIENT
Start: 2021-05-03 | End: 2021-05-03

## 2021-05-03 RX ORDER — ACETAMINOPHEN 650 MG/1
650 SUPPOSITORY RECTAL EVERY 4 HOURS
Status: COMPLETED | OUTPATIENT
Start: 2021-05-03 | End: 2021-05-03

## 2021-05-03 RX ORDER — CHLORHEXIDINE GLUCONATE 0.12 MG/ML
15 RINSE ORAL EVERY 12 HOURS
Status: DISCONTINUED | OUTPATIENT
Start: 2021-05-03 | End: 2021-05-11 | Stop reason: HOSPADM

## 2021-05-03 RX ORDER — BISACODYL 5 MG/1
10 TABLET, DELAYED RELEASE ORAL DAILY PRN
Status: DISCONTINUED | OUTPATIENT
Start: 2021-05-03 | End: 2021-05-06

## 2021-05-03 RX ORDER — NITROGLYCERIN 10 MG/100ML
INJECTION INTRAVENOUS CONTINUOUS PRN
Status: DISCONTINUED | OUTPATIENT
Start: 2021-05-03 | End: 2021-05-03 | Stop reason: SURG

## 2021-05-03 RX ORDER — ONDANSETRON 2 MG/ML
4 INJECTION INTRAMUSCULAR; INTRAVENOUS EVERY 6 HOURS PRN
Status: DISCONTINUED | OUTPATIENT
Start: 2021-05-03 | End: 2021-05-11 | Stop reason: HOSPADM

## 2021-05-03 RX ORDER — ACETAMINOPHEN 160 MG/5ML
650 SOLUTION ORAL EVERY 4 HOURS PRN
Status: DISCONTINUED | OUTPATIENT
Start: 2021-05-03 | End: 2021-05-11 | Stop reason: HOSPADM

## 2021-05-03 RX ORDER — SODIUM CHLORIDE 0.9 % (FLUSH) 0.9 %
30 SYRINGE (ML) INJECTION ONCE AS NEEDED
Status: DISCONTINUED | OUTPATIENT
Start: 2021-05-03 | End: 2021-05-05

## 2021-05-03 RX ORDER — NALOXONE HCL 0.4 MG/ML
0.4 VIAL (ML) INJECTION
Status: DISCONTINUED | OUTPATIENT
Start: 2021-05-03 | End: 2021-05-04

## 2021-05-03 RX ADMIN — Medication 10 ML: at 08:08

## 2021-05-03 RX ADMIN — FENTANYL CITRATE 100 MCG: 0.05 INJECTION, SOLUTION INTRAMUSCULAR; INTRAVENOUS at 02:02

## 2021-05-03 RX ADMIN — MORPHINE SULFATE 1 MG: 4 INJECTION INTRAVENOUS at 11:28

## 2021-05-03 RX ADMIN — NICARDIPINE HYDROCHLORIDE 1 MG: 2.5 INJECTION INTRAVENOUS at 01:23

## 2021-05-03 RX ADMIN — MAGNESIUM SULFATE HEPTAHYDRATE 2 G: 500 INJECTION, SOLUTION INTRAMUSCULAR; INTRAVENOUS at 00:39

## 2021-05-03 RX ADMIN — NITROGLYCERIN 20 MCG/MIN: 20 INJECTION INTRAVENOUS at 02:42

## 2021-05-03 RX ADMIN — FENTANYL CITRATE 100 MCG: 0.05 INJECTION, SOLUTION INTRAMUSCULAR; INTRAVENOUS at 01:23

## 2021-05-03 RX ADMIN — DEXMEDETOMIDINE HYDROCHLORIDE 1 MCG/KG/HR: 100 INJECTION, SOLUTION INTRAVENOUS at 04:20

## 2021-05-03 RX ADMIN — HYDROCODONE BITARTRATE AND ACETAMINOPHEN 1 TABLET: 10; 325 TABLET ORAL at 15:28

## 2021-05-03 RX ADMIN — ACETAMINOPHEN ORAL SOLUTION 650 MG: 650 SOLUTION ORAL at 03:21

## 2021-05-03 RX ADMIN — MUPIROCIN 1 APPLICATION: 20 OINTMENT TOPICAL at 05:03

## 2021-05-03 RX ADMIN — SODIUM CHLORIDE 10 MG/HR: 9 INJECTION, SOLUTION INTRAVENOUS at 02:36

## 2021-05-03 RX ADMIN — SODIUM CHLORIDE 2.4 UNITS/HR: 9 INJECTION, SOLUTION INTRAVENOUS at 21:56

## 2021-05-03 RX ADMIN — NITROGLYCERIN 5 MCG/KG/MIN: 10 INJECTION INTRAVENOUS at 01:41

## 2021-05-03 RX ADMIN — POTASSIUM CHLORIDE 10 MEQ: 7.46 INJECTION, SOLUTION INTRAVENOUS at 03:15

## 2021-05-03 RX ADMIN — METOPROLOL TARTRATE 12.5 MG: 25 TABLET, FILM COATED ORAL at 21:56

## 2021-05-03 RX ADMIN — MAGNESIUM SULFATE HEPTAHYDRATE 1 G: 1 INJECTION, SOLUTION INTRAVENOUS at 08:04

## 2021-05-03 RX ADMIN — CEFAZOLIN 2 G: 1 INJECTION, POWDER, FOR SOLUTION INTRAMUSCULAR; INTRAVENOUS at 00:47

## 2021-05-03 RX ADMIN — ROPINIROLE HYDROCHLORIDE 0.25 MG: 0.25 TABLET, FILM COATED ORAL at 21:55

## 2021-05-03 RX ADMIN — Medication 10 ML: at 21:58

## 2021-05-03 RX ADMIN — AMINOCAPROIC ACID 10 G: 250 INJECTION, SOLUTION INTRAVENOUS at 00:53

## 2021-05-03 RX ADMIN — HYDROCODONE BITARTRATE AND ACETAMINOPHEN 1 TABLET: 10; 325 TABLET ORAL at 11:22

## 2021-05-03 RX ADMIN — POTASSIUM CHLORIDE 10 MEQ: 7.46 INJECTION, SOLUTION INTRAVENOUS at 04:21

## 2021-05-03 RX ADMIN — METOCLOPRAMIDE HYDROCHLORIDE 10 MG: 5 INJECTION INTRAMUSCULAR; INTRAVENOUS at 15:28

## 2021-05-03 RX ADMIN — ROCURONIUM BROMIDE 20 MG: 10 INJECTION INTRAVENOUS at 01:28

## 2021-05-03 RX ADMIN — SODIUM CHLORIDE 5 MG/HR: 9 INJECTION, SOLUTION INTRAVENOUS at 21:56

## 2021-05-03 RX ADMIN — SODIUM CHLORIDE 2 UNITS/HR: 9 INJECTION, SOLUTION INTRAVENOUS at 02:43

## 2021-05-03 RX ADMIN — HYDROCODONE BITARTRATE AND ACETAMINOPHEN 1 TABLET: 10; 325 TABLET ORAL at 21:56

## 2021-05-03 RX ADMIN — METOCLOPRAMIDE HYDROCHLORIDE 10 MG: 5 INJECTION INTRAMUSCULAR; INTRAVENOUS at 03:21

## 2021-05-03 RX ADMIN — DULOXETINE HYDROCHLORIDE 60 MG: 30 CAPSULE, DELAYED RELEASE ORAL at 21:55

## 2021-05-03 RX ADMIN — MORPHINE SULFATE 4 MG: 4 INJECTION INTRAVENOUS at 06:56

## 2021-05-03 RX ADMIN — MUPIROCIN 1 APPLICATION: 20 OINTMENT TOPICAL at 21:54

## 2021-05-03 RX ADMIN — METOCLOPRAMIDE HYDROCHLORIDE 10 MG: 5 INJECTION INTRAMUSCULAR; INTRAVENOUS at 21:55

## 2021-05-03 RX ADMIN — CEFAZOLIN SODIUM 2 G: 10 INJECTION, POWDER, FOR SOLUTION INTRAVENOUS at 17:03

## 2021-05-03 RX ADMIN — DOCUSATE SODIUM 50 MG AND SENNOSIDES 8.6 MG 2 TABLET: 8.6; 5 TABLET, FILM COATED ORAL at 21:56

## 2021-05-03 RX ADMIN — ACETAMINOPHEN 325 MG: 325 TABLET, FILM COATED ORAL at 11:22

## 2021-05-03 RX ADMIN — MUPIROCIN: 20 OINTMENT TOPICAL at 09:34

## 2021-05-03 RX ADMIN — MAGNESIUM SULFATE HEPTAHYDRATE 1 G: 1 INJECTION, SOLUTION INTRAVENOUS at 16:14

## 2021-05-03 RX ADMIN — METOCLOPRAMIDE HYDROCHLORIDE 10 MG: 5 INJECTION INTRAMUSCULAR; INTRAVENOUS at 12:35

## 2021-05-03 RX ADMIN — ACETAMINOPHEN 650 MG: 325 TABLET, FILM COATED ORAL at 09:35

## 2021-05-03 RX ADMIN — CHLORHEXIDINE GLUCONATE 15 ML: 1.2 SOLUTION ORAL at 05:03

## 2021-05-03 RX ADMIN — DEXMEDETOMIDINE HYDROCHLORIDE 0.4 MCG/KG/HR: 100 INJECTION, SOLUTION INTRAVENOUS at 00:27

## 2021-05-03 RX ADMIN — SODIUM BICARBONATE 50 MEQ: 84 INJECTION, SOLUTION INTRAVENOUS at 20:30

## 2021-05-03 RX ADMIN — SODIUM CHLORIDE 5 MG/HR: 9 INJECTION, SOLUTION INTRAVENOUS at 07:31

## 2021-05-03 RX ADMIN — ACETAMINOPHEN 325 MG: 325 TABLET, FILM COATED ORAL at 17:04

## 2021-05-03 RX ADMIN — CEFAZOLIN SODIUM 2 G: 10 INJECTION, POWDER, FOR SOLUTION INTRAVENOUS at 08:04

## 2021-05-03 RX ADMIN — POTASSIUM CHLORIDE 10 MEQ: 7.46 INJECTION, SOLUTION INTRAVENOUS at 05:34

## 2021-05-03 RX ADMIN — ALPRAZOLAM 0.25 MG: 0.25 TABLET ORAL at 15:28

## 2021-05-03 RX ADMIN — MORPHINE SULFATE 1 MG: 4 INJECTION INTRAVENOUS at 23:50

## 2021-05-03 RX ADMIN — SODIUM CHLORIDE 5 MG/HR: 9 INJECTION, SOLUTION INTRAVENOUS at 06:16

## 2021-05-03 RX ADMIN — CHLORHEXIDINE GLUCONATE 15 ML: 1.2 SOLUTION ORAL at 08:05

## 2021-05-03 RX ADMIN — DEXMEDETOMIDINE HYDROCHLORIDE 1 MCG/KG/HR: 100 INJECTION, SOLUTION INTRAVENOUS at 02:42

## 2021-05-03 RX ADMIN — CHLORHEXIDINE GLUCONATE 15 ML: 1.2 SOLUTION ORAL at 21:55

## 2021-05-03 RX ADMIN — CHLORHEXIDINE GLUCONATE 1 APPLICATION: 500 CLOTH TOPICAL at 05:01

## 2021-05-03 RX ADMIN — POTASSIUM CHLORIDE 10 MEQ: 7.46 INJECTION, SOLUTION INTRAVENOUS at 06:37

## 2021-05-03 RX ADMIN — PANTOPRAZOLE SODIUM 40 MG: 40 INJECTION, POWDER, FOR SOLUTION INTRAVENOUS at 03:20

## 2021-05-03 NOTE — ANESTHESIA PROCEDURE NOTES
Central Line      Patient reassessed immediately prior to procedure    Patient location during procedure: OR  Indications: central pressure monitoring and vascular access  Staff  Anesthesiologist: Donte Puente MD  Preanesthetic Checklist  Completed: patient identified, IV checked, site marked, risks and benefits discussed, surgical consent, monitors and equipment checked, pre-op evaluation and timeout performed  Central Line Prep  Sterile Tech:cap, gloves, gown, mask and sterile barriers  Prep: chloraprep  Patient monitoring: blood pressure monitoring, continuous pulse oximetry and EKG  Central Line Procedure  Laterality:right  Location:internal jugular  Catheter Type:Cordis  Catheter Size:9 Fr  Guidance:landmark technique and palpation techniqueImages: still images not obtained  Assessment  Post procedure:biopatch applied, line sutured and occlusive dressing applied  Assessement:blood return through all ports  Complications:no  Patient Tolerance:patient tolerated the procedure well with no apparent complications

## 2021-05-03 NOTE — ANESTHESIA PROCEDURE NOTES
Central Line      Patient reassessed immediately prior to procedure    Patient location during procedure: OR  Indications: central pressure monitoring  Staff  Anesthesiologist: Donte Puente MD  Preanesthetic Checklist  Completed: patient identified, IV checked, site marked, risks and benefits discussed, surgical consent, monitors and equipment checked, pre-op evaluation and timeout performed  Central Line Prep  Sterile Tech:gloves, cap, gown, mask and sterile barriers  Prep: chloraprep  Patient monitoring: blood pressure monitoring, continuous pulse oximetry and EKG  Central Line Procedure  Laterality:right  Catheter Type:[place]  Assessment  Post procedure:biopatch applied, line sutured and occlusive dressing applied  Assessement:blood return through all ports, free fluid flow and chest x-ray ordered  Complications:no  Patient Tolerance:patient tolerated the procedure well with no apparent complications

## 2021-05-03 NOTE — ANESTHESIA PROCEDURE NOTES
Airway  Urgency: elective    Date/Time: 5/2/2021 10:31 PM  Airway not difficult    General Information and Staff    Patient location during procedure: OR  Anesthesiologist: Donte Puente MD    Indications and Patient Condition  Indications for airway management: airway protection and cardiovascular instability    Preoxygenated: yes  MILS not maintained throughout  Mask difficulty assessment: 1 - vent by mask    Final Airway Details  Final airway type: endotracheal airway      Successful airway: ETT  Cuffed: yes   Successful intubation technique: video laryngoscopy  Facilitating devices/methods: intubating stylet  Endotracheal tube insertion site: oral  Blade: Glidescope  ETT size (mm): 7.0  Cormack-Lehane Classification: grade IIa - partial view of glottis  Placement verified by: capnometry   Measured from: gums  ETT/EBT to gums (cm): 22  Number of attempts at approach: 2  Assessment: lips, teeth, and gum same as pre-op and atraumatic intubation

## 2021-05-03 NOTE — ANESTHESIA POSTPROCEDURE EVALUATION
Patient: Marge Mcghee    Procedure Summary     Date: 05/02/21 Room / Location:  [facility] 02 /  [facility]    Anesthesia Start: 2142 Anesthesia Stop: 05/03/21 0221    Procedures:       CORONARY ARTERY BYPASS WITH INTERNAL MAMMARY ARTERY GRAFT (N/A Chest)      TRANSESOPHAGEAL ECHOCARDIOGRAM (N/A Chest) Diagnosis:       Stable angina pectoris (CMS/HCC)      (Stable angina pectoris (CMS/HCC) [I20.8])    Surgeons: Jr Rocael Alexander MD Provider: Donte Puente MD    Anesthesia Type: general ASA Status: 4          Anesthesia Type: general    Vitals  Vitals Value Taken Time   /35 05/02/21 2154   Temp     Pulse 80 05/03/21 0231   Resp     SpO2 99 % 05/03/21 0231   Vitals shown include unvalidated device data.        Post Anesthesia Care and Evaluation    Patient location during evaluation: ICU  Patient participation: complete - patient cannot participate  Level of consciousness: obtunded/minimal responses  Pain scale: See nurse's notes for pain score.  Pain management: adequate  Airway patency: patent  Anesthetic complications: No anesthetic complications  PONV Status: none  Cardiovascular status: acceptable  Respiratory status: acceptable and ventilator  Hydration status: acceptable    Comments: Patient seen and examined postoperatively; vital signs stable; SpO2 greater than or equal to 90%; cardiopulmonary status stable; nausea/vomiting adequately controlled; pain adequately controlled; no apparent anesthesia complications; patient discharged from anesthesia care when discharge criteria were met

## 2021-05-03 NOTE — ANESTHESIA PROCEDURE NOTES
Preanesthesia Checklist:  Patient identified, IV assessed, risks and benefits discussed, monitors and equipment assessed, procedure being performed at surgeon's request and anesthesia consent obtained.    General Procedure Information  EMILIA Placed for monitoring purposes only -- This is not a diagnostic EMILIA

## 2021-05-03 NOTE — ANESTHESIA PROCEDURE NOTES
Arterial Line      Patient reassessed immediately prior to procedure    Patient location during procedure: OR   Line placed for hemodynamic monitoring.  Performed By   Anesthesiologist: Donte Puente MD  Preanesthetic Checklist  Completed: patient identified, IV checked, site marked, risks and benefits discussed, surgical consent, monitors and equipment checked, pre-op evaluation and timeout performed  Arterial Line Prep   Sterile Tech: cap, gloves and sterile barriers  Prep: ChloraPrep  Patient monitoring: EKG, continuous pulse oximetry and blood pressure monitoring  Arterial Line Procedure   Laterality:left  Location:  radial artery  Catheter size: 20 G   Guidance: landmark technique and palpation technique  Number of attempts: 1  Successful placement: yes  Post Assessment   Dressing Type: wrist guard applied, secured with tape and occlusive dressing applied.   Complications no  Circ/Move/Sens Assessment: normal and unchanged.   Patient Tolerance: patient tolerated the procedure well with no apparent complications

## 2021-05-04 ENCOUNTER — APPOINTMENT (OUTPATIENT)
Dept: GENERAL RADIOLOGY | Facility: HOSPITAL | Age: 66
End: 2021-05-04

## 2021-05-04 ENCOUNTER — APPOINTMENT (OUTPATIENT)
Dept: CARDIOLOGY | Facility: HOSPITAL | Age: 66
End: 2021-05-04

## 2021-05-04 ENCOUNTER — APPOINTMENT (OUTPATIENT)
Dept: CT IMAGING | Facility: HOSPITAL | Age: 66
End: 2021-05-04

## 2021-05-04 LAB
ALBUMIN SERPL-MCNC: 3.7 G/DL (ref 3.5–5.2)
ANION GAP SERPL CALCULATED.3IONS-SCNC: 10 MMOL/L (ref 5–15)
APTT PPP: 28.8 SECONDS (ref 61–76.5)
ARTERIAL PATENCY WRIST A: POSITIVE
ATMOSPHERIC PRESS: ABNORMAL MM[HG]
BASE EXCESS BLDA CALC-SCNC: -3.8 MMOL/L (ref 0–3)
BDY SITE: ABNORMAL
BH BB BLOOD EXPIRATION DATE: NORMAL
BH BB BLOOD EXPIRATION DATE: NORMAL
BH BB BLOOD TYPE BARCODE: 6200
BH BB BLOOD TYPE BARCODE: 6200
BH BB DISPENSE STATUS: NORMAL
BH BB DISPENSE STATUS: NORMAL
BH BB PRODUCT CODE: NORMAL
BH BB PRODUCT CODE: NORMAL
BH BB UNIT NUMBER: NORMAL
BH BB UNIT NUMBER: NORMAL
BUN SERPL-MCNC: 24 MG/DL (ref 8–23)
BUN/CREAT SERPL: 19.8 (ref 7–25)
CA-I SERPL ISE-MCNC: 1.25 MMOL/L (ref 1.2–1.3)
CALCIUM SPEC-SCNC: 9.1 MG/DL (ref 8.6–10.5)
CHLORIDE SERPL-SCNC: 105 MMOL/L (ref 98–107)
CO2 BLDA-SCNC: 23.4 MMOL/L (ref 22–29)
CO2 SERPL-SCNC: 23 MMOL/L (ref 22–29)
CREAT SERPL-MCNC: 1.21 MG/DL (ref 0.57–1)
CROSSMATCH INTERPRETATION: NORMAL
CROSSMATCH INTERPRETATION: NORMAL
DEPRECATED RDW RBC AUTO: 45.5 FL (ref 37–54)
ERYTHROCYTE [DISTWIDTH] IN BLOOD BY AUTOMATED COUNT: 14.6 % (ref 12.3–15.4)
GFR SERPL CREATININE-BSD FRML MDRD: 44 ML/MIN/1.73
GLUCOSE BLDC GLUCOMTR-MCNC: 107 MG/DL (ref 70–105)
GLUCOSE BLDC GLUCOMTR-MCNC: 116 MG/DL (ref 70–105)
GLUCOSE BLDC GLUCOMTR-MCNC: 119 MG/DL (ref 70–105)
GLUCOSE BLDC GLUCOMTR-MCNC: 119 MG/DL (ref 70–105)
GLUCOSE BLDC GLUCOMTR-MCNC: 124 MG/DL (ref 70–105)
GLUCOSE BLDC GLUCOMTR-MCNC: 131 MG/DL (ref 70–105)
GLUCOSE BLDC GLUCOMTR-MCNC: 140 MG/DL (ref 70–105)
GLUCOSE BLDC GLUCOMTR-MCNC: 144 MG/DL (ref 70–105)
GLUCOSE BLDC GLUCOMTR-MCNC: 150 MG/DL (ref 70–105)
GLUCOSE BLDC GLUCOMTR-MCNC: 151 MG/DL (ref 70–105)
GLUCOSE BLDC GLUCOMTR-MCNC: 151 MG/DL (ref 70–105)
GLUCOSE SERPL-MCNC: 129 MG/DL (ref 65–99)
HCO3 BLDA-SCNC: 22.1 MMOL/L (ref 21–28)
HCT VFR BLD AUTO: 35.1 % (ref 34–46.6)
HEMODILUTION: NO
HGB BLD-MCNC: 11.8 G/DL (ref 12–15.9)
INHALED O2 CONCENTRATION: <21 %
INR PPP: 1.04 (ref 0.93–1.1)
MAGNESIUM SERPL-MCNC: 2.9 MG/DL (ref 1.6–2.4)
MCH RBC QN AUTO: 30.2 PG (ref 26.6–33)
MCHC RBC AUTO-ENTMCNC: 33.6 G/DL (ref 31.5–35.7)
MCV RBC AUTO: 89.9 FL (ref 79–97)
MODALITY: ABNORMAL
PCO2 BLDA: 42.8 MM HG (ref 35–48)
PH BLDA: 7.32 PH UNITS (ref 7.35–7.45)
PHOSPHATE SERPL-MCNC: 5.4 MG/DL (ref 2.5–4.5)
PLATELET # BLD AUTO: 123 10*3/MM3 (ref 140–450)
PMV BLD AUTO: 7.7 FL (ref 6–12)
PO2 BLDA: 82.1 MM HG (ref 83–108)
POTASSIUM SERPL-SCNC: 4 MMOL/L (ref 3.5–5.2)
PROTHROMBIN TIME: 11.4 SECONDS (ref 9.6–11.7)
RBC # BLD AUTO: 3.9 10*6/MM3 (ref 3.77–5.28)
SAO2 % BLDCOA: 95 % (ref 94–98)
SODIUM SERPL-SCNC: 138 MMOL/L (ref 136–145)
UNIT  ABO: NORMAL
UNIT  ABO: NORMAL
UNIT  RH: NORMAL
UNIT  RH: NORMAL
WBC # BLD AUTO: 13.6 10*3/MM3 (ref 3.4–10.8)

## 2021-05-04 PROCEDURE — 25010000002 ONDANSETRON PER 1 MG: Performed by: THORACIC SURGERY (CARDIOTHORACIC VASCULAR SURGERY)

## 2021-05-04 PROCEDURE — 82803 BLOOD GASES ANY COMBINATION: CPT

## 2021-05-04 PROCEDURE — 83735 ASSAY OF MAGNESIUM: CPT | Performed by: THORACIC SURGERY (CARDIOTHORACIC VASCULAR SURGERY)

## 2021-05-04 PROCEDURE — 94799 UNLISTED PULMONARY SVC/PX: CPT

## 2021-05-04 PROCEDURE — 85730 THROMBOPLASTIN TIME PARTIAL: CPT | Performed by: THORACIC SURGERY (CARDIOTHORACIC VASCULAR SURGERY)

## 2021-05-04 PROCEDURE — 93880 EXTRACRANIAL BILAT STUDY: CPT

## 2021-05-04 PROCEDURE — 97163 PT EVAL HIGH COMPLEX 45 MIN: CPT

## 2021-05-04 PROCEDURE — 25010000002 MAGNESIUM SULFATE IN D5W 1G/100ML (PREMIX) 1-5 GM/100ML-% SOLUTION: Performed by: THORACIC SURGERY (CARDIOTHORACIC VASCULAR SURGERY)

## 2021-05-04 PROCEDURE — 36600 WITHDRAWAL OF ARTERIAL BLOOD: CPT

## 2021-05-04 PROCEDURE — 25010000002 CEFAZOLIN PER 500 MG: Performed by: THORACIC SURGERY (CARDIOTHORACIC VASCULAR SURGERY)

## 2021-05-04 PROCEDURE — 82330 ASSAY OF CALCIUM: CPT | Performed by: THORACIC SURGERY (CARDIOTHORACIC VASCULAR SURGERY)

## 2021-05-04 PROCEDURE — 85610 PROTHROMBIN TIME: CPT | Performed by: THORACIC SURGERY (CARDIOTHORACIC VASCULAR SURGERY)

## 2021-05-04 PROCEDURE — 97530 THERAPEUTIC ACTIVITIES: CPT

## 2021-05-04 PROCEDURE — 71045 X-RAY EXAM CHEST 1 VIEW: CPT

## 2021-05-04 PROCEDURE — 85027 COMPLETE CBC AUTOMATED: CPT | Performed by: NURSE PRACTITIONER

## 2021-05-04 PROCEDURE — 99232 SBSQ HOSP IP/OBS MODERATE 35: CPT | Performed by: INTERNAL MEDICINE

## 2021-05-04 PROCEDURE — 93005 ELECTROCARDIOGRAM TRACING: CPT | Performed by: THORACIC SURGERY (CARDIOTHORACIC VASCULAR SURGERY)

## 2021-05-04 PROCEDURE — 25010000002 FUROSEMIDE PER 20 MG: Performed by: NURSE PRACTITIONER

## 2021-05-04 PROCEDURE — 25010000002 ENOXAPARIN PER 10 MG: Performed by: THORACIC SURGERY (CARDIOTHORACIC VASCULAR SURGERY)

## 2021-05-04 PROCEDURE — 82962 GLUCOSE BLOOD TEST: CPT

## 2021-05-04 PROCEDURE — 70450 CT HEAD/BRAIN W/O DYE: CPT

## 2021-05-04 PROCEDURE — 80069 RENAL FUNCTION PANEL: CPT | Performed by: THORACIC SURGERY (CARDIOTHORACIC VASCULAR SURGERY)

## 2021-05-04 RX ORDER — TRAMADOL HYDROCHLORIDE 50 MG/1
50 TABLET ORAL EVERY 6 HOURS PRN
Status: DISCONTINUED | OUTPATIENT
Start: 2021-05-04 | End: 2021-05-11 | Stop reason: HOSPADM

## 2021-05-04 RX ORDER — NALOXONE HCL 0.4 MG/ML
0.4 VIAL (ML) INJECTION
Status: DISCONTINUED | OUTPATIENT
Start: 2021-05-04 | End: 2021-05-11 | Stop reason: HOSPADM

## 2021-05-04 RX ORDER — FUROSEMIDE 10 MG/ML
40 INJECTION INTRAMUSCULAR; INTRAVENOUS ONCE
Status: COMPLETED | OUTPATIENT
Start: 2021-05-04 | End: 2021-05-04

## 2021-05-04 RX ORDER — INSULIN LISPRO 100 [IU]/ML
0-7 INJECTION, SOLUTION INTRAVENOUS; SUBCUTANEOUS AS NEEDED
Status: DISCONTINUED | OUTPATIENT
Start: 2021-05-04 | End: 2021-05-05

## 2021-05-04 RX ORDER — POTASSIUM CHLORIDE 20 MEQ/1
20 TABLET, EXTENDED RELEASE ORAL ONCE
Status: COMPLETED | OUTPATIENT
Start: 2021-05-04 | End: 2021-05-04

## 2021-05-04 RX ORDER — SCOLOPAMINE TRANSDERMAL SYSTEM 1 MG/1
1 PATCH, EXTENDED RELEASE TRANSDERMAL
Status: DISCONTINUED | OUTPATIENT
Start: 2021-05-04 | End: 2021-05-11 | Stop reason: HOSPADM

## 2021-05-04 RX ORDER — INSULIN LISPRO 100 [IU]/ML
0-7 INJECTION, SOLUTION INTRAVENOUS; SUBCUTANEOUS
Status: DISCONTINUED | OUTPATIENT
Start: 2021-05-04 | End: 2021-05-05

## 2021-05-04 RX ORDER — AMLODIPINE BESYLATE 5 MG/1
5 TABLET ORAL
Status: DISCONTINUED | OUTPATIENT
Start: 2021-05-04 | End: 2021-05-11 | Stop reason: HOSPADM

## 2021-05-04 RX ORDER — MORPHINE SULFATE 4 MG/ML
2 INJECTION, SOLUTION INTRAMUSCULAR; INTRAVENOUS EVERY 4 HOURS PRN
Status: DISCONTINUED | OUTPATIENT
Start: 2021-05-04 | End: 2021-05-05

## 2021-05-04 RX ORDER — HYDROCODONE BITARTRATE AND ACETAMINOPHEN 5; 325 MG/1; MG/1
1 TABLET ORAL EVERY 4 HOURS PRN
Status: DISCONTINUED | OUTPATIENT
Start: 2021-05-04 | End: 2021-05-11 | Stop reason: HOSPADM

## 2021-05-04 RX ORDER — LOSARTAN POTASSIUM 25 MG/1
25 TABLET ORAL
Status: DISCONTINUED | OUTPATIENT
Start: 2021-05-04 | End: 2021-05-04

## 2021-05-04 RX ORDER — CYCLOBENZAPRINE HCL 10 MG
5 TABLET ORAL EVERY 8 HOURS PRN
Status: DISCONTINUED | OUTPATIENT
Start: 2021-05-04 | End: 2021-05-11 | Stop reason: HOSPADM

## 2021-05-04 RX ADMIN — DULOXETINE HYDROCHLORIDE 60 MG: 30 CAPSULE, DELAYED RELEASE ORAL at 16:00

## 2021-05-04 RX ADMIN — ENOXAPARIN SODIUM 40 MG: 40 INJECTION SUBCUTANEOUS at 15:58

## 2021-05-04 RX ADMIN — SODIUM CHLORIDE 5 MG/HR: 9 INJECTION, SOLUTION INTRAVENOUS at 05:26

## 2021-05-04 RX ADMIN — POTASSIUM CHLORIDE 20 MEQ: 1500 TABLET, EXTENDED RELEASE ORAL at 10:29

## 2021-05-04 RX ADMIN — ACETAMINOPHEN 650 MG: 325 TABLET, FILM COATED ORAL at 07:34

## 2021-05-04 RX ADMIN — MUPIROCIN 1 APPLICATION: 20 OINTMENT TOPICAL at 06:15

## 2021-05-04 RX ADMIN — MAGNESIUM SULFATE HEPTAHYDRATE 1 G: 1 INJECTION, SOLUTION INTRAVENOUS at 00:37

## 2021-05-04 RX ADMIN — Medication 10 ML: at 09:15

## 2021-05-04 RX ADMIN — CEFAZOLIN SODIUM 2 G: 10 INJECTION, POWDER, FOR SOLUTION INTRAVENOUS at 16:00

## 2021-05-04 RX ADMIN — THERA TABS 1 TABLET: TAB at 10:36

## 2021-05-04 RX ADMIN — ASPIRIN 81 MG: 81 TABLET, COATED ORAL at 09:14

## 2021-05-04 RX ADMIN — ONDANSETRON 4 MG: 2 INJECTION INTRAMUSCULAR; INTRAVENOUS at 09:15

## 2021-05-04 RX ADMIN — HYDROCODONE BITARTRATE AND ACETAMINOPHEN 1 TABLET: 10; 325 TABLET ORAL at 02:17

## 2021-05-04 RX ADMIN — METOPROLOL TARTRATE 25 MG: 25 TABLET, FILM COATED ORAL at 21:57

## 2021-05-04 RX ADMIN — AMLODIPINE BESYLATE 5 MG: 5 TABLET ORAL at 10:29

## 2021-05-04 RX ADMIN — CHLORHEXIDINE GLUCONATE 15 ML: 1.2 SOLUTION ORAL at 09:15

## 2021-05-04 RX ADMIN — ALPRAZOLAM 0.25 MG: 0.25 TABLET ORAL at 00:36

## 2021-05-04 RX ADMIN — ROPINIROLE HYDROCHLORIDE 0.25 MG: 0.25 TABLET, FILM COATED ORAL at 16:01

## 2021-05-04 RX ADMIN — METOPROLOL TARTRATE 25 MG: 25 TABLET, FILM COATED ORAL at 10:29

## 2021-05-04 RX ADMIN — CHLORHEXIDINE GLUCONATE 15 ML: 1.2 SOLUTION ORAL at 21:57

## 2021-05-04 RX ADMIN — FUROSEMIDE 40 MG: 10 INJECTION, SOLUTION INTRAMUSCULAR; INTRAVENOUS at 10:29

## 2021-05-04 RX ADMIN — Medication 10 ML: at 21:57

## 2021-05-04 RX ADMIN — DOCUSATE SODIUM 50 MG AND SENNOSIDES 8.6 MG 2 TABLET: 8.6; 5 TABLET, FILM COATED ORAL at 21:57

## 2021-05-04 RX ADMIN — MUPIROCIN 1 APPLICATION: 20 OINTMENT TOPICAL at 18:39

## 2021-05-04 RX ADMIN — CEFAZOLIN SODIUM 2 G: 10 INJECTION, POWDER, FOR SOLUTION INTRAVENOUS at 09:15

## 2021-05-04 RX ADMIN — SCOPALAMINE 1 PATCH: 1 PATCH, EXTENDED RELEASE TRANSDERMAL at 10:29

## 2021-05-04 RX ADMIN — CEFAZOLIN SODIUM 2 G: 10 INJECTION, POWDER, FOR SOLUTION INTRAVENOUS at 00:36

## 2021-05-04 RX ADMIN — CYCLOBENZAPRINE HYDROCHLORIDE 5 MG: 10 TABLET, FILM COATED ORAL at 12:18

## 2021-05-04 RX ADMIN — DOCUSATE SODIUM 50 MG AND SENNOSIDES 8.6 MG 2 TABLET: 8.6; 5 TABLET, FILM COATED ORAL at 09:14

## 2021-05-05 ENCOUNTER — APPOINTMENT (OUTPATIENT)
Dept: GENERAL RADIOLOGY | Facility: HOSPITAL | Age: 66
End: 2021-05-05

## 2021-05-05 LAB
ANION GAP SERPL CALCULATED.3IONS-SCNC: 11 MMOL/L (ref 5–15)
APTT PPP: 32.8 SECONDS (ref 61–76.5)
BH CV XLRA MEAS LEFT BULB PSV: -58.1 CM/SEC
BH CV XLRA MEAS LEFT CCA RATIO VEL: 69.4 CM/SEC
BH CV XLRA MEAS LEFT DIST CCA EDV: -12.3 CM/SEC
BH CV XLRA MEAS LEFT DIST CCA PSV: -66.3 CM/SEC
BH CV XLRA MEAS LEFT DIST ICA EDV: 20.6 CM/SEC
BH CV XLRA MEAS LEFT DIST ICA PSV: 62.3 CM/SEC
BH CV XLRA MEAS LEFT ICA RATIO VEL: 62.3 CM/SEC
BH CV XLRA MEAS LEFT ICA/CCA RATIO: 0.9
BH CV XLRA MEAS LEFT PROX CCA EDV: 14.9 CM/SEC
BH CV XLRA MEAS LEFT PROX CCA PSV: 69.4 CM/SEC
BH CV XLRA MEAS LEFT PROX ECA PSV: -102 CM/SEC
BH CV XLRA MEAS LEFT PROX ICA EDV: -18.9 CM/SEC
BH CV XLRA MEAS LEFT PROX ICA PSV: -59.3 CM/SEC
BH CV XLRA MEAS LEFT PROX SCLA PSV: 102 CM/SEC
BH CV XLRA MEAS LEFT VERTEBRAL A PSV: -59.3 CM/SEC
BH CV XLRA MEAS RIGHT BULB PSV: -56 CM/SEC
BH CV XLRA MEAS RIGHT CCA RATIO VEL: 56.2 CM/SEC
BH CV XLRA MEAS RIGHT DIST CCA EDV: -15.4 CM/SEC
BH CV XLRA MEAS RIGHT DIST CCA PSV: -49.2 CM/SEC
BH CV XLRA MEAS RIGHT ICA RATIO VEL: -68 CM/SEC
BH CV XLRA MEAS RIGHT ICA/CCA RATIO: -1.2
BH CV XLRA MEAS RIGHT PROX CCA EDV: 12.3 CM/SEC
BH CV XLRA MEAS RIGHT PROX CCA PSV: 56.2 CM/SEC
BH CV XLRA MEAS RIGHT PROX ECA PSV: -114 CM/SEC
BH CV XLRA MEAS RIGHT PROX ICA EDV: -19.2 CM/SEC
BH CV XLRA MEAS RIGHT PROX ICA PSV: -68 CM/SEC
BH CV XLRA MEAS RIGHT PROX SCLA PSV: 105 CM/SEC
BH CV XLRA MEAS RIGHT VERTEBRAL A PSV: -44.3 CM/SEC
BUN SERPL-MCNC: 27 MG/DL (ref 8–23)
BUN/CREAT SERPL: 24.5 (ref 7–25)
CALCIUM SPEC-SCNC: 9.2 MG/DL (ref 8.6–10.5)
CHLORIDE SERPL-SCNC: 99 MMOL/L (ref 98–107)
CO2 SERPL-SCNC: 22 MMOL/L (ref 22–29)
CREAT SERPL-MCNC: 1.1 MG/DL (ref 0.57–1)
DEPRECATED RDW RBC AUTO: 45.9 FL (ref 37–54)
ERYTHROCYTE [DISTWIDTH] IN BLOOD BY AUTOMATED COUNT: 14.6 % (ref 12.3–15.4)
GFR SERPL CREATININE-BSD FRML MDRD: 49 ML/MIN/1.73
GLUCOSE BLDC GLUCOMTR-MCNC: 102 MG/DL (ref 70–105)
GLUCOSE BLDC GLUCOMTR-MCNC: 105 MG/DL (ref 70–105)
GLUCOSE BLDC GLUCOMTR-MCNC: 113 MG/DL (ref 70–105)
GLUCOSE BLDC GLUCOMTR-MCNC: 115 MG/DL (ref 70–105)
GLUCOSE BLDC GLUCOMTR-MCNC: 130 MG/DL (ref 70–105)
GLUCOSE BLDC GLUCOMTR-MCNC: 273 MG/DL (ref 70–105)
GLUCOSE BLDC GLUCOMTR-MCNC: 289 MG/DL (ref 70–105)
GLUCOSE BLDC GLUCOMTR-MCNC: 329 MG/DL (ref 70–105)
GLUCOSE BLDC GLUCOMTR-MCNC: 368 MG/DL (ref 70–105)
GLUCOSE SERPL-MCNC: 109 MG/DL (ref 65–99)
HCT VFR BLD AUTO: 35.8 % (ref 34–46.6)
HGB BLD-MCNC: 11.8 G/DL (ref 12–15.9)
MAXIMAL PREDICTED HEART RATE: 152 BPM
MCH RBC QN AUTO: 29.5 PG (ref 26.6–33)
MCHC RBC AUTO-ENTMCNC: 33 G/DL (ref 31.5–35.7)
MCV RBC AUTO: 89.5 FL (ref 79–97)
PLATELET # BLD AUTO: 140 10*3/MM3 (ref 140–450)
PMV BLD AUTO: 8 FL (ref 6–12)
POTASSIUM SERPL-SCNC: 4 MMOL/L (ref 3.5–5.2)
RBC # BLD AUTO: 3.99 10*6/MM3 (ref 3.77–5.28)
RIGHT ARM BP: NORMAL MMHG
SODIUM SERPL-SCNC: 132 MMOL/L (ref 136–145)
STRESS TARGET HR: 129 BPM
WBC # BLD AUTO: 12.3 10*3/MM3 (ref 3.4–10.8)

## 2021-05-05 PROCEDURE — 93005 ELECTROCARDIOGRAM TRACING: CPT | Performed by: NURSE PRACTITIONER

## 2021-05-05 PROCEDURE — 85027 COMPLETE CBC AUTOMATED: CPT | Performed by: THORACIC SURGERY (CARDIOTHORACIC VASCULAR SURGERY)

## 2021-05-05 PROCEDURE — 82962 GLUCOSE BLOOD TEST: CPT

## 2021-05-05 PROCEDURE — 85730 THROMBOPLASTIN TIME PARTIAL: CPT | Performed by: THORACIC SURGERY (CARDIOTHORACIC VASCULAR SURGERY)

## 2021-05-05 PROCEDURE — 99024 POSTOP FOLLOW-UP VISIT: CPT | Performed by: THORACIC SURGERY (CARDIOTHORACIC VASCULAR SURGERY)

## 2021-05-05 PROCEDURE — 93005 ELECTROCARDIOGRAM TRACING: CPT | Performed by: THORACIC SURGERY (CARDIOTHORACIC VASCULAR SURGERY)

## 2021-05-05 PROCEDURE — 25010000002 FUROSEMIDE PER 20 MG: Performed by: NURSE PRACTITIONER

## 2021-05-05 PROCEDURE — 71045 X-RAY EXAM CHEST 1 VIEW: CPT

## 2021-05-05 PROCEDURE — 63710000001 INSULIN LISPRO (HUMAN) PER 5 UNITS: Performed by: INTERNAL MEDICINE

## 2021-05-05 PROCEDURE — 97116 GAIT TRAINING THERAPY: CPT

## 2021-05-05 PROCEDURE — 93010 ELECTROCARDIOGRAM REPORT: CPT | Performed by: INTERNAL MEDICINE

## 2021-05-05 PROCEDURE — 99232 SBSQ HOSP IP/OBS MODERATE 35: CPT | Performed by: INTERNAL MEDICINE

## 2021-05-05 PROCEDURE — 94799 UNLISTED PULMONARY SVC/PX: CPT

## 2021-05-05 PROCEDURE — 25010000002 ENOXAPARIN PER 10 MG: Performed by: THORACIC SURGERY (CARDIOTHORACIC VASCULAR SURGERY)

## 2021-05-05 PROCEDURE — 97535 SELF CARE MNGMENT TRAINING: CPT

## 2021-05-05 PROCEDURE — 97166 OT EVAL MOD COMPLEX 45 MIN: CPT

## 2021-05-05 PROCEDURE — 80048 BASIC METABOLIC PNL TOTAL CA: CPT | Performed by: THORACIC SURGERY (CARDIOTHORACIC VASCULAR SURGERY)

## 2021-05-05 PROCEDURE — 63710000001 INSULIN GLARGINE PER 5 UNITS: Performed by: NURSE PRACTITIONER

## 2021-05-05 PROCEDURE — 63710000001 INSULIN LISPRO (HUMAN) PER 5 UNITS: Performed by: THORACIC SURGERY (CARDIOTHORACIC VASCULAR SURGERY)

## 2021-05-05 RX ORDER — INSULIN LISPRO 100 [IU]/ML
0-24 INJECTION, SOLUTION INTRAVENOUS; SUBCUTANEOUS
Status: DISCONTINUED | OUTPATIENT
Start: 2021-05-06 | End: 2021-05-06

## 2021-05-05 RX ORDER — INSULIN LISPRO 100 [IU]/ML
0-24 INJECTION, SOLUTION INTRAVENOUS; SUBCUTANEOUS
Status: DISCONTINUED | OUTPATIENT
Start: 2021-05-05 | End: 2021-05-05

## 2021-05-05 RX ORDER — INSULIN LISPRO 100 [IU]/ML
0-24 INJECTION, SOLUTION INTRAVENOUS; SUBCUTANEOUS AS NEEDED
Status: DISCONTINUED | OUTPATIENT
Start: 2021-05-05 | End: 2021-05-05

## 2021-05-05 RX ORDER — GUAIFENESIN 600 MG/1
600 TABLET, EXTENDED RELEASE ORAL EVERY 12 HOURS SCHEDULED
Status: DISCONTINUED | OUTPATIENT
Start: 2021-05-05 | End: 2021-05-07

## 2021-05-05 RX ORDER — POTASSIUM CHLORIDE 20 MEQ/1
20 TABLET, EXTENDED RELEASE ORAL ONCE
Status: COMPLETED | OUTPATIENT
Start: 2021-05-05 | End: 2021-05-05

## 2021-05-05 RX ORDER — NICOTINE POLACRILEX 4 MG
15 LOZENGE BUCCAL
Status: DISCONTINUED | OUTPATIENT
Start: 2021-05-05 | End: 2021-05-11 | Stop reason: HOSPADM

## 2021-05-05 RX ORDER — POLYETHYLENE GLYCOL 3350 17 G/17G
17 POWDER, FOR SOLUTION ORAL 2 TIMES DAILY
Status: DISCONTINUED | OUTPATIENT
Start: 2021-05-05 | End: 2021-05-11 | Stop reason: HOSPADM

## 2021-05-05 RX ORDER — DEXTROSE MONOHYDRATE 25 G/50ML
25 INJECTION, SOLUTION INTRAVENOUS
Status: DISCONTINUED | OUTPATIENT
Start: 2021-05-05 | End: 2021-05-11 | Stop reason: HOSPADM

## 2021-05-05 RX ORDER — INSULIN LISPRO 100 [IU]/ML
0-24 INJECTION, SOLUTION INTRAVENOUS; SUBCUTANEOUS
Status: DISCONTINUED | OUTPATIENT
Start: 2021-05-06 | End: 2021-05-05

## 2021-05-05 RX ORDER — FUROSEMIDE 10 MG/ML
40 INJECTION INTRAMUSCULAR; INTRAVENOUS ONCE
Status: COMPLETED | OUTPATIENT
Start: 2021-05-05 | End: 2021-05-05

## 2021-05-05 RX ADMIN — ENOXAPARIN SODIUM 40 MG: 40 INJECTION SUBCUTANEOUS at 17:21

## 2021-05-05 RX ADMIN — CHLORHEXIDINE GLUCONATE 15 ML: 1.2 SOLUTION ORAL at 08:01

## 2021-05-05 RX ADMIN — FUROSEMIDE 40 MG: 10 INJECTION, SOLUTION INTRAMUSCULAR; INTRAVENOUS at 08:54

## 2021-05-05 RX ADMIN — METOPROLOL TARTRATE 25 MG: 25 TABLET, FILM COATED ORAL at 08:01

## 2021-05-05 RX ADMIN — POTASSIUM CHLORIDE 20 MEQ: 1500 TABLET, EXTENDED RELEASE ORAL at 08:01

## 2021-05-05 RX ADMIN — INSULIN GLARGINE 15 UNITS: 100 INJECTION, SOLUTION SUBCUTANEOUS at 08:00

## 2021-05-05 RX ADMIN — GUAIFENESIN 600 MG: 600 TABLET, EXTENDED RELEASE ORAL at 21:42

## 2021-05-05 RX ADMIN — ALPRAZOLAM 0.25 MG: 0.25 TABLET ORAL at 02:05

## 2021-05-05 RX ADMIN — ROPINIROLE HYDROCHLORIDE 0.25 MG: 0.25 TABLET, FILM COATED ORAL at 17:22

## 2021-05-05 RX ADMIN — GUAIFENESIN 600 MG: 600 TABLET, EXTENDED RELEASE ORAL at 08:54

## 2021-05-05 RX ADMIN — INSULIN LISPRO 10 UNITS: 100 INJECTION, SOLUTION INTRAVENOUS; SUBCUTANEOUS at 18:39

## 2021-05-05 RX ADMIN — POLYETHYLENE GLYCOL 3350 17 G: 17 POWDER, FOR SOLUTION ORAL at 08:58

## 2021-05-05 RX ADMIN — HYDROCODONE BITARTRATE AND ACETAMINOPHEN 1 TABLET: 5; 325 TABLET ORAL at 08:01

## 2021-05-05 RX ADMIN — CHLORHEXIDINE GLUCONATE 15 ML: 1.2 SOLUTION ORAL at 21:43

## 2021-05-05 RX ADMIN — THERA TABS 1 TABLET: TAB at 08:01

## 2021-05-05 RX ADMIN — ACETAMINOPHEN 650 MG: 325 TABLET, FILM COATED ORAL at 02:05

## 2021-05-05 RX ADMIN — DULOXETINE HYDROCHLORIDE 60 MG: 30 CAPSULE, DELAYED RELEASE ORAL at 17:21

## 2021-05-05 RX ADMIN — HYDROCODONE BITARTRATE AND ACETAMINOPHEN 1 TABLET: 5; 325 TABLET ORAL at 21:42

## 2021-05-05 RX ADMIN — ACETAMINOPHEN 325 MG: 325 TABLET, FILM COATED ORAL at 08:02

## 2021-05-05 RX ADMIN — INSULIN LISPRO 4 UNITS: 100 INJECTION, SOLUTION INTRAVENOUS; SUBCUTANEOUS at 12:17

## 2021-05-05 RX ADMIN — AMLODIPINE BESYLATE 5 MG: 5 TABLET ORAL at 08:02

## 2021-05-05 RX ADMIN — MUPIROCIN 1 APPLICATION: 20 OINTMENT TOPICAL at 17:21

## 2021-05-05 RX ADMIN — MUPIROCIN 1 APPLICATION: 20 OINTMENT TOPICAL at 05:32

## 2021-05-05 RX ADMIN — METOPROLOL TARTRATE 25 MG: 25 TABLET, FILM COATED ORAL at 21:43

## 2021-05-05 RX ADMIN — Medication 10 ML: at 21:47

## 2021-05-05 RX ADMIN — Medication 10 ML: at 08:02

## 2021-05-05 RX ADMIN — INSULIN LISPRO 10 UNITS: 100 INJECTION, SOLUTION INTRAVENOUS; SUBCUTANEOUS at 17:42

## 2021-05-05 RX ADMIN — ASPIRIN 81 MG: 81 TABLET, COATED ORAL at 08:02

## 2021-05-05 RX ADMIN — DOCUSATE SODIUM 50 MG AND SENNOSIDES 8.6 MG 2 TABLET: 8.6; 5 TABLET, FILM COATED ORAL at 08:01

## 2021-05-05 RX ADMIN — HYDROCODONE BITARTRATE AND ACETAMINOPHEN 1 TABLET: 5; 325 TABLET ORAL at 17:22

## 2021-05-05 RX ADMIN — POTASSIUM CHLORIDE 20 MEQ: 1500 TABLET, EXTENDED RELEASE ORAL at 08:54

## 2021-05-06 ENCOUNTER — APPOINTMENT (OUTPATIENT)
Dept: GENERAL RADIOLOGY | Facility: HOSPITAL | Age: 66
End: 2021-05-06

## 2021-05-06 LAB
ANION GAP SERPL CALCULATED.3IONS-SCNC: 11 MMOL/L (ref 5–15)
BUN SERPL-MCNC: 25 MG/DL (ref 8–23)
BUN/CREAT SERPL: 28.1 (ref 7–25)
CALCIUM SPEC-SCNC: 8.9 MG/DL (ref 8.6–10.5)
CHLORIDE SERPL-SCNC: 98 MMOL/L (ref 98–107)
CO2 SERPL-SCNC: 22 MMOL/L (ref 22–29)
CREAT SERPL-MCNC: 0.89 MG/DL (ref 0.57–1)
DEPRECATED RDW RBC AUTO: 45.1 FL (ref 37–54)
ERYTHROCYTE [DISTWIDTH] IN BLOOD BY AUTOMATED COUNT: 14.3 % (ref 12.3–15.4)
GFR SERPL CREATININE-BSD FRML MDRD: 63 ML/MIN/1.73
GLUCOSE BLDC GLUCOMTR-MCNC: 217 MG/DL (ref 70–105)
GLUCOSE BLDC GLUCOMTR-MCNC: 218 MG/DL (ref 70–105)
GLUCOSE BLDC GLUCOMTR-MCNC: 291 MG/DL (ref 70–105)
GLUCOSE BLDC GLUCOMTR-MCNC: 326 MG/DL (ref 70–105)
GLUCOSE SERPL-MCNC: 222 MG/DL (ref 65–99)
HCT VFR BLD AUTO: 35.8 % (ref 34–46.6)
HGB BLD-MCNC: 12.2 G/DL (ref 12–15.9)
MCH RBC QN AUTO: 30.7 PG (ref 26.6–33)
MCHC RBC AUTO-ENTMCNC: 34 G/DL (ref 31.5–35.7)
MCV RBC AUTO: 90.3 FL (ref 79–97)
PLATELET # BLD AUTO: 168 10*3/MM3 (ref 140–450)
PMV BLD AUTO: 7.8 FL (ref 6–12)
POTASSIUM SERPL-SCNC: 4.2 MMOL/L (ref 3.5–5.2)
RBC # BLD AUTO: 3.96 10*6/MM3 (ref 3.77–5.28)
SODIUM SERPL-SCNC: 131 MMOL/L (ref 136–145)
WBC # BLD AUTO: 10 10*3/MM3 (ref 3.4–10.8)

## 2021-05-06 PROCEDURE — 99024 POSTOP FOLLOW-UP VISIT: CPT | Performed by: NURSE PRACTITIONER

## 2021-05-06 PROCEDURE — 71045 X-RAY EXAM CHEST 1 VIEW: CPT

## 2021-05-06 PROCEDURE — 97535 SELF CARE MNGMENT TRAINING: CPT

## 2021-05-06 PROCEDURE — 63710000001 INSULIN GLARGINE PER 5 UNITS: Performed by: NURSE PRACTITIONER

## 2021-05-06 PROCEDURE — 63710000001 INSULIN LISPRO (HUMAN) PER 5 UNITS: Performed by: NURSE PRACTITIONER

## 2021-05-06 PROCEDURE — 99232 SBSQ HOSP IP/OBS MODERATE 35: CPT | Performed by: INTERNAL MEDICINE

## 2021-05-06 PROCEDURE — 97116 GAIT TRAINING THERAPY: CPT

## 2021-05-06 PROCEDURE — 85027 COMPLETE CBC AUTOMATED: CPT | Performed by: THORACIC SURGERY (CARDIOTHORACIC VASCULAR SURGERY)

## 2021-05-06 PROCEDURE — 82962 GLUCOSE BLOOD TEST: CPT

## 2021-05-06 PROCEDURE — 80048 BASIC METABOLIC PNL TOTAL CA: CPT | Performed by: THORACIC SURGERY (CARDIOTHORACIC VASCULAR SURGERY)

## 2021-05-06 PROCEDURE — 94799 UNLISTED PULMONARY SVC/PX: CPT

## 2021-05-06 PROCEDURE — 25010000002 ENOXAPARIN PER 10 MG: Performed by: THORACIC SURGERY (CARDIOTHORACIC VASCULAR SURGERY)

## 2021-05-06 PROCEDURE — 25010000002 HYDRALAZINE PER 20 MG: Performed by: THORACIC SURGERY (CARDIOTHORACIC VASCULAR SURGERY)

## 2021-05-06 RX ORDER — INSULIN LISPRO 100 [IU]/ML
0-24 INJECTION, SOLUTION INTRAVENOUS; SUBCUTANEOUS AS NEEDED
Status: DISCONTINUED | OUTPATIENT
Start: 2021-05-06 | End: 2021-05-10

## 2021-05-06 RX ORDER — ATORVASTATIN CALCIUM 20 MG/1
20 TABLET, FILM COATED ORAL NIGHTLY
Status: DISCONTINUED | OUTPATIENT
Start: 2021-05-06 | End: 2021-05-11 | Stop reason: HOSPADM

## 2021-05-06 RX ORDER — POTASSIUM CHLORIDE 750 MG/1
10 TABLET, FILM COATED, EXTENDED RELEASE ORAL DAILY
Status: DISCONTINUED | OUTPATIENT
Start: 2021-05-07 | End: 2021-05-07

## 2021-05-06 RX ORDER — HYDRALAZINE HYDROCHLORIDE 20 MG/ML
20 INJECTION INTRAMUSCULAR; INTRAVENOUS ONCE
Status: COMPLETED | OUTPATIENT
Start: 2021-05-06 | End: 2021-05-06

## 2021-05-06 RX ORDER — FUROSEMIDE 40 MG/1
40 TABLET ORAL DAILY
Status: DISCONTINUED | OUTPATIENT
Start: 2021-05-06 | End: 2021-05-07

## 2021-05-06 RX ORDER — INSULIN GLARGINE 100 [IU]/ML
15 INJECTION, SOLUTION SUBCUTANEOUS EVERY 12 HOURS SCHEDULED
Status: DISCONTINUED | OUTPATIENT
Start: 2021-05-06 | End: 2021-05-10

## 2021-05-06 RX ORDER — LOSARTAN POTASSIUM 50 MG/1
100 TABLET ORAL
Status: DISCONTINUED | OUTPATIENT
Start: 2021-05-06 | End: 2021-05-11 | Stop reason: HOSPADM

## 2021-05-06 RX ORDER — ACETAMINOPHEN 650 MG
TABLET, EXTENDED RELEASE ORAL 2 TIMES DAILY
Status: DISCONTINUED | OUTPATIENT
Start: 2021-05-06 | End: 2021-05-11 | Stop reason: HOSPADM

## 2021-05-06 RX ORDER — INSULIN LISPRO 100 [IU]/ML
0-24 INJECTION, SOLUTION INTRAVENOUS; SUBCUTANEOUS
Status: DISCONTINUED | OUTPATIENT
Start: 2021-05-06 | End: 2021-05-10

## 2021-05-06 RX ADMIN — MUPIROCIN 1 APPLICATION: 20 OINTMENT TOPICAL at 17:26

## 2021-05-06 RX ADMIN — MUPIROCIN 1 APPLICATION: 20 OINTMENT TOPICAL at 20:22

## 2021-05-06 RX ADMIN — HYDROCODONE BITARTRATE AND ACETAMINOPHEN 1 TABLET: 5; 325 TABLET ORAL at 17:26

## 2021-05-06 RX ADMIN — ATORVASTATIN CALCIUM 20 MG: 20 TABLET, FILM COATED ORAL at 20:22

## 2021-05-06 RX ADMIN — THERA TABS 1 TABLET: TAB at 08:15

## 2021-05-06 RX ADMIN — ROPINIROLE HYDROCHLORIDE 0.25 MG: 0.25 TABLET, FILM COATED ORAL at 17:26

## 2021-05-06 RX ADMIN — INSULIN LISPRO 16 UNITS: 100 INJECTION, SOLUTION INTRAVENOUS; SUBCUTANEOUS at 20:18

## 2021-05-06 RX ADMIN — INSULIN LISPRO 8 UNITS: 100 INJECTION, SOLUTION INTRAVENOUS; SUBCUTANEOUS at 11:50

## 2021-05-06 RX ADMIN — CHLORHEXIDINE GLUCONATE 15 ML: 1.2 SOLUTION ORAL at 20:23

## 2021-05-06 RX ADMIN — INSULIN LISPRO 12 UNITS: 100 INJECTION, SOLUTION INTRAVENOUS; SUBCUTANEOUS at 17:26

## 2021-05-06 RX ADMIN — POLYETHYLENE GLYCOL 3350 17 G: 17 POWDER, FOR SOLUTION ORAL at 20:22

## 2021-05-06 RX ADMIN — ASPIRIN 81 MG: 81 TABLET, COATED ORAL at 08:15

## 2021-05-06 RX ADMIN — GUAIFENESIN 600 MG: 600 TABLET, EXTENDED RELEASE ORAL at 08:15

## 2021-05-06 RX ADMIN — HYDRALAZINE HYDROCHLORIDE 20 MG: 20 INJECTION INTRAMUSCULAR; INTRAVENOUS at 00:10

## 2021-05-06 RX ADMIN — INSULIN LISPRO 8 UNITS: 100 INJECTION, SOLUTION INTRAVENOUS; SUBCUTANEOUS at 08:16

## 2021-05-06 RX ADMIN — Medication 10 ML: at 20:23

## 2021-05-06 RX ADMIN — DULOXETINE HYDROCHLORIDE 60 MG: 30 CAPSULE, DELAYED RELEASE ORAL at 17:26

## 2021-05-06 RX ADMIN — FUROSEMIDE 40 MG: 40 TABLET ORAL at 08:55

## 2021-05-06 RX ADMIN — GUAIFENESIN 600 MG: 600 TABLET, EXTENDED RELEASE ORAL at 20:22

## 2021-05-06 RX ADMIN — DOCUSATE SODIUM 50 MG AND SENNOSIDES 8.6 MG 2 TABLET: 8.6; 5 TABLET, FILM COATED ORAL at 20:19

## 2021-05-06 RX ADMIN — DOCUSATE SODIUM 50 MG AND SENNOSIDES 8.6 MG 2 TABLET: 8.6; 5 TABLET, FILM COATED ORAL at 08:15

## 2021-05-06 RX ADMIN — POVIDONE-IODINE: 10 SOLUTION TOPICAL at 10:53

## 2021-05-06 RX ADMIN — METOPROLOL TARTRATE 37.5 MG: 25 TABLET, FILM COATED ORAL at 20:21

## 2021-05-06 RX ADMIN — CHLORHEXIDINE GLUCONATE 15 ML: 1.2 SOLUTION ORAL at 08:15

## 2021-05-06 RX ADMIN — LOSARTAN POTASSIUM 100 MG: 50 TABLET, FILM COATED ORAL at 07:42

## 2021-05-06 RX ADMIN — INSULIN GLARGINE 15 UNITS: 100 INJECTION, SOLUTION SUBCUTANEOUS at 20:24

## 2021-05-06 RX ADMIN — HYDROCODONE BITARTRATE AND ACETAMINOPHEN 1 TABLET: 5; 325 TABLET ORAL at 11:51

## 2021-05-06 RX ADMIN — ENOXAPARIN SODIUM 40 MG: 40 INJECTION SUBCUTANEOUS at 17:26

## 2021-05-06 RX ADMIN — METOPROLOL TARTRATE 25 MG: 25 TABLET, FILM COATED ORAL at 08:16

## 2021-05-06 RX ADMIN — HYDROCODONE BITARTRATE AND ACETAMINOPHEN 1 TABLET: 5; 325 TABLET ORAL at 07:42

## 2021-05-06 RX ADMIN — POVIDONE-IODINE 1 APPLICATION: 10 SOLUTION TOPICAL at 20:23

## 2021-05-06 RX ADMIN — Medication 10 ML: at 08:17

## 2021-05-06 RX ADMIN — POTASSIUM CHLORIDE 20 MEQ: 1500 TABLET, EXTENDED RELEASE ORAL at 08:15

## 2021-05-06 RX ADMIN — TRAMADOL HYDROCHLORIDE 50 MG: 50 TABLET, FILM COATED ORAL at 08:55

## 2021-05-06 RX ADMIN — METOPROLOL TARTRATE 12.5 MG: 25 TABLET, FILM COATED ORAL at 08:55

## 2021-05-06 RX ADMIN — MUPIROCIN 1 APPLICATION: 20 OINTMENT TOPICAL at 06:01

## 2021-05-06 RX ADMIN — ACETAMINOPHEN 325 MG: 325 TABLET, FILM COATED ORAL at 10:53

## 2021-05-06 RX ADMIN — AMLODIPINE BESYLATE 5 MG: 5 TABLET ORAL at 08:16

## 2021-05-06 RX ADMIN — INSULIN GLARGINE 15 UNITS: 100 INJECTION, SOLUTION SUBCUTANEOUS at 06:01

## 2021-05-07 ENCOUNTER — APPOINTMENT (OUTPATIENT)
Dept: GENERAL RADIOLOGY | Facility: HOSPITAL | Age: 66
End: 2021-05-07

## 2021-05-07 LAB
ANION GAP SERPL CALCULATED.3IONS-SCNC: 10 MMOL/L (ref 5–15)
BUN SERPL-MCNC: 25 MG/DL (ref 8–23)
BUN/CREAT SERPL: 23.4 (ref 7–25)
CALCIUM SPEC-SCNC: 8.9 MG/DL (ref 8.6–10.5)
CHLORIDE SERPL-SCNC: 97 MMOL/L (ref 98–107)
CO2 SERPL-SCNC: 25 MMOL/L (ref 22–29)
CREAT SERPL-MCNC: 1.07 MG/DL (ref 0.57–1)
DEPRECATED RDW RBC AUTO: 45.5 FL (ref 37–54)
ERYTHROCYTE [DISTWIDTH] IN BLOOD BY AUTOMATED COUNT: 14.4 % (ref 12.3–15.4)
GFR SERPL CREATININE-BSD FRML MDRD: 51 ML/MIN/1.73
GLUCOSE BLDC GLUCOMTR-MCNC: 163 MG/DL (ref 70–105)
GLUCOSE BLDC GLUCOMTR-MCNC: 167 MG/DL (ref 70–105)
GLUCOSE BLDC GLUCOMTR-MCNC: 314 MG/DL (ref 70–105)
GLUCOSE BLDC GLUCOMTR-MCNC: 360 MG/DL (ref 70–105)
GLUCOSE BLDC GLUCOMTR-MCNC: 363 MG/DL (ref 70–105)
GLUCOSE SERPL-MCNC: 134 MG/DL (ref 65–99)
HCT VFR BLD AUTO: 35.5 % (ref 34–46.6)
HGB BLD-MCNC: 11.6 G/DL (ref 12–15.9)
MCH RBC QN AUTO: 29.5 PG (ref 26.6–33)
MCHC RBC AUTO-ENTMCNC: 32.7 G/DL (ref 31.5–35.7)
MCV RBC AUTO: 90.2 FL (ref 79–97)
PLATELET # BLD AUTO: 227 10*3/MM3 (ref 140–450)
PMV BLD AUTO: 7 FL (ref 6–12)
POTASSIUM SERPL-SCNC: 4.2 MMOL/L (ref 3.5–5.2)
RBC # BLD AUTO: 3.94 10*6/MM3 (ref 3.77–5.28)
SODIUM SERPL-SCNC: 132 MMOL/L (ref 136–145)
WBC # BLD AUTO: 9 10*3/MM3 (ref 3.4–10.8)

## 2021-05-07 PROCEDURE — 97530 THERAPEUTIC ACTIVITIES: CPT

## 2021-05-07 PROCEDURE — 63710000001 INSULIN LISPRO (HUMAN) PER 5 UNITS: Performed by: NURSE PRACTITIONER

## 2021-05-07 PROCEDURE — 97535 SELF CARE MNGMENT TRAINING: CPT

## 2021-05-07 PROCEDURE — 87070 CULTURE OTHR SPECIMN AEROBIC: CPT | Performed by: NURSE PRACTITIONER

## 2021-05-07 PROCEDURE — 94799 UNLISTED PULMONARY SVC/PX: CPT

## 2021-05-07 PROCEDURE — 85027 COMPLETE CBC AUTOMATED: CPT | Performed by: THORACIC SURGERY (CARDIOTHORACIC VASCULAR SURGERY)

## 2021-05-07 PROCEDURE — 99232 SBSQ HOSP IP/OBS MODERATE 35: CPT | Performed by: INTERNAL MEDICINE

## 2021-05-07 PROCEDURE — 63710000001 INSULIN GLARGINE PER 5 UNITS: Performed by: NURSE PRACTITIONER

## 2021-05-07 PROCEDURE — 80048 BASIC METABOLIC PNL TOTAL CA: CPT | Performed by: THORACIC SURGERY (CARDIOTHORACIC VASCULAR SURGERY)

## 2021-05-07 PROCEDURE — 99024 POSTOP FOLLOW-UP VISIT: CPT | Performed by: NURSE PRACTITIONER

## 2021-05-07 PROCEDURE — 94640 AIRWAY INHALATION TREATMENT: CPT

## 2021-05-07 PROCEDURE — 87205 SMEAR GRAM STAIN: CPT | Performed by: NURSE PRACTITIONER

## 2021-05-07 PROCEDURE — 25010000002 ENOXAPARIN PER 10 MG: Performed by: THORACIC SURGERY (CARDIOTHORACIC VASCULAR SURGERY)

## 2021-05-07 PROCEDURE — 82962 GLUCOSE BLOOD TEST: CPT

## 2021-05-07 PROCEDURE — 97116 GAIT TRAINING THERAPY: CPT

## 2021-05-07 PROCEDURE — 71046 X-RAY EXAM CHEST 2 VIEWS: CPT

## 2021-05-07 RX ORDER — CEFDINIR 300 MG/1
300 CAPSULE ORAL EVERY 12 HOURS SCHEDULED
Status: DISCONTINUED | OUTPATIENT
Start: 2021-05-07 | End: 2021-05-11 | Stop reason: HOSPADM

## 2021-05-07 RX ORDER — GUAIFENESIN 600 MG/1
600 TABLET, EXTENDED RELEASE ORAL ONCE
Status: COMPLETED | OUTPATIENT
Start: 2021-05-07 | End: 2021-05-07

## 2021-05-07 RX ORDER — FUROSEMIDE 40 MG/1
40 TABLET ORAL
Status: DISCONTINUED | OUTPATIENT
Start: 2021-05-08 | End: 2021-05-11 | Stop reason: HOSPADM

## 2021-05-07 RX ORDER — METOPROLOL TARTRATE 5 MG/5ML
5 INJECTION INTRAVENOUS EVERY 6 HOURS PRN
Status: CANCELLED | OUTPATIENT
Start: 2021-05-07

## 2021-05-07 RX ORDER — POTASSIUM CHLORIDE 750 MG/1
10 TABLET, FILM COATED, EXTENDED RELEASE ORAL 2 TIMES DAILY WITH MEALS
Status: DISCONTINUED | OUTPATIENT
Start: 2021-05-07 | End: 2021-05-11 | Stop reason: HOSPADM

## 2021-05-07 RX ORDER — IPRATROPIUM BROMIDE AND ALBUTEROL SULFATE 2.5; .5 MG/3ML; MG/3ML
3 SOLUTION RESPIRATORY (INHALATION)
Status: DISCONTINUED | OUTPATIENT
Start: 2021-05-07 | End: 2021-05-11

## 2021-05-07 RX ORDER — BUMETANIDE 0.25 MG/ML
1 INJECTION INTRAMUSCULAR; INTRAVENOUS ONCE
Status: COMPLETED | OUTPATIENT
Start: 2021-05-07 | End: 2021-05-07

## 2021-05-07 RX ORDER — GUAIFENESIN 600 MG/1
1200 TABLET, EXTENDED RELEASE ORAL EVERY 12 HOURS SCHEDULED
Status: DISCONTINUED | OUTPATIENT
Start: 2021-05-07 | End: 2021-05-11 | Stop reason: HOSPADM

## 2021-05-07 RX ORDER — IPRATROPIUM BROMIDE AND ALBUTEROL SULFATE 2.5; .5 MG/3ML; MG/3ML
3 SOLUTION RESPIRATORY (INHALATION) EVERY 4 HOURS PRN
Status: DISCONTINUED | OUTPATIENT
Start: 2021-05-07 | End: 2021-05-11 | Stop reason: HOSPADM

## 2021-05-07 RX ORDER — POTASSIUM CHLORIDE 20 MEQ/1
20 TABLET, EXTENDED RELEASE ORAL ONCE
Status: COMPLETED | OUTPATIENT
Start: 2021-05-07 | End: 2021-05-07

## 2021-05-07 RX ADMIN — HYDROCODONE BITARTRATE AND ACETAMINOPHEN 1 TABLET: 5; 325 TABLET ORAL at 03:56

## 2021-05-07 RX ADMIN — MUPIROCIN 1 APPLICATION: 20 OINTMENT TOPICAL at 17:01

## 2021-05-07 RX ADMIN — METOPROLOL TARTRATE 37.5 MG: 25 TABLET, FILM COATED ORAL at 18:49

## 2021-05-07 RX ADMIN — INSULIN GLARGINE 15 UNITS: 100 INJECTION, SOLUTION SUBCUTANEOUS at 08:11

## 2021-05-07 RX ADMIN — GUAIFENESIN 1200 MG: 600 TABLET, EXTENDED RELEASE ORAL at 20:29

## 2021-05-07 RX ADMIN — INSULIN GLARGINE 15 UNITS: 100 INJECTION, SOLUTION SUBCUTANEOUS at 20:30

## 2021-05-07 RX ADMIN — METOPROLOL TARTRATE 37.5 MG: 25 TABLET, FILM COATED ORAL at 08:09

## 2021-05-07 RX ADMIN — GUAIFENESIN 600 MG: 600 TABLET, EXTENDED RELEASE ORAL at 08:09

## 2021-05-07 RX ADMIN — INSULIN LISPRO 4 UNITS: 100 INJECTION, SOLUTION INTRAVENOUS; SUBCUTANEOUS at 11:53

## 2021-05-07 RX ADMIN — POTASSIUM CHLORIDE 10 MEQ: 750 TABLET, EXTENDED RELEASE ORAL at 08:09

## 2021-05-07 RX ADMIN — IPRATROPIUM BROMIDE AND ALBUTEROL SULFATE 3 ML: 2.5; .5 SOLUTION RESPIRATORY (INHALATION) at 19:12

## 2021-05-07 RX ADMIN — LINAGLIPTIN 5 MG: 5 TABLET, FILM COATED ORAL at 11:52

## 2021-05-07 RX ADMIN — AMLODIPINE BESYLATE 5 MG: 5 TABLET ORAL at 08:09

## 2021-05-07 RX ADMIN — DULOXETINE HYDROCHLORIDE 60 MG: 30 CAPSULE, DELAYED RELEASE ORAL at 17:00

## 2021-05-07 RX ADMIN — IPRATROPIUM BROMIDE AND ALBUTEROL SULFATE 3 ML: 2.5; .5 SOLUTION RESPIRATORY (INHALATION) at 13:16

## 2021-05-07 RX ADMIN — Medication 10 ML: at 20:29

## 2021-05-07 RX ADMIN — ROPINIROLE HYDROCHLORIDE 0.25 MG: 0.25 TABLET, FILM COATED ORAL at 17:00

## 2021-05-07 RX ADMIN — POTASSIUM CHLORIDE 20 MEQ: 1500 TABLET, EXTENDED RELEASE ORAL at 15:16

## 2021-05-07 RX ADMIN — INSULIN LISPRO 20 UNITS: 100 INJECTION, SOLUTION INTRAVENOUS; SUBCUTANEOUS at 20:34

## 2021-05-07 RX ADMIN — Medication 10 ML: at 08:11

## 2021-05-07 RX ADMIN — POVIDONE-IODINE: 10 SOLUTION TOPICAL at 08:10

## 2021-05-07 RX ADMIN — INSULIN LISPRO 4 UNITS: 100 INJECTION, SOLUTION INTRAVENOUS; SUBCUTANEOUS at 08:16

## 2021-05-07 RX ADMIN — HYDROCODONE BITARTRATE AND ACETAMINOPHEN 1 TABLET: 5; 325 TABLET ORAL at 23:59

## 2021-05-07 RX ADMIN — HYDROCODONE BITARTRATE AND ACETAMINOPHEN 1 TABLET: 5; 325 TABLET ORAL at 11:52

## 2021-05-07 RX ADMIN — DOCUSATE SODIUM 50 MG AND SENNOSIDES 8.6 MG 2 TABLET: 8.6; 5 TABLET, FILM COATED ORAL at 08:09

## 2021-05-07 RX ADMIN — GUAIFENESIN 600 MG: 600 TABLET, EXTENDED RELEASE ORAL at 11:52

## 2021-05-07 RX ADMIN — BUMETANIDE 1 MG: 0.25 INJECTION INTRAMUSCULAR; INTRAVENOUS at 15:16

## 2021-05-07 RX ADMIN — POVIDONE-IODINE: 10 SOLUTION TOPICAL at 20:35

## 2021-05-07 RX ADMIN — CEFDINIR 300 MG: 300 CAPSULE ORAL at 20:29

## 2021-05-07 RX ADMIN — ATORVASTATIN CALCIUM 20 MG: 20 TABLET, FILM COATED ORAL at 20:29

## 2021-05-07 RX ADMIN — ENOXAPARIN SODIUM 40 MG: 40 INJECTION SUBCUTANEOUS at 15:16

## 2021-05-07 RX ADMIN — LOSARTAN POTASSIUM 100 MG: 50 TABLET, FILM COATED ORAL at 08:10

## 2021-05-07 RX ADMIN — HYDROCODONE BITARTRATE AND ACETAMINOPHEN 1 TABLET: 5; 325 TABLET ORAL at 20:35

## 2021-05-07 RX ADMIN — POTASSIUM CHLORIDE 10 MEQ: 750 TABLET, EXTENDED RELEASE ORAL at 17:00

## 2021-05-07 RX ADMIN — FUROSEMIDE 40 MG: 40 TABLET ORAL at 08:10

## 2021-05-07 RX ADMIN — MUPIROCIN 1 APPLICATION: 20 OINTMENT TOPICAL at 06:22

## 2021-05-07 RX ADMIN — THERA TABS 1 TABLET: TAB at 08:10

## 2021-05-07 RX ADMIN — CHLORHEXIDINE GLUCONATE 15 ML: 1.2 SOLUTION ORAL at 08:10

## 2021-05-07 RX ADMIN — HYDROCODONE BITARTRATE AND ACETAMINOPHEN 1 TABLET: 5; 325 TABLET ORAL at 08:09

## 2021-05-07 RX ADMIN — ASPIRIN 81 MG: 81 TABLET, COATED ORAL at 08:10

## 2021-05-07 RX ADMIN — DOCUSATE SODIUM 50 MG AND SENNOSIDES 8.6 MG 2 TABLET: 8.6; 5 TABLET, FILM COATED ORAL at 20:29

## 2021-05-07 RX ADMIN — SCOPALAMINE 1 PATCH: 1 PATCH, EXTENDED RELEASE TRANSDERMAL at 10:02

## 2021-05-07 RX ADMIN — HYDROCODONE BITARTRATE AND ACETAMINOPHEN 1 TABLET: 5; 325 TABLET ORAL at 00:02

## 2021-05-07 RX ADMIN — HYDROCODONE BITARTRATE AND ACETAMINOPHEN 1 TABLET: 5; 325 TABLET ORAL at 17:00

## 2021-05-07 RX ADMIN — INSULIN LISPRO 16 UNITS: 100 INJECTION, SOLUTION INTRAVENOUS; SUBCUTANEOUS at 17:00

## 2021-05-07 RX ADMIN — IPRATROPIUM BROMIDE AND ALBUTEROL SULFATE 3 ML: 2.5; .5 SOLUTION RESPIRATORY (INHALATION) at 15:49

## 2021-05-08 LAB
ANION GAP SERPL CALCULATED.3IONS-SCNC: 9 MMOL/L (ref 5–15)
BUN SERPL-MCNC: 20 MG/DL (ref 8–23)
BUN/CREAT SERPL: 22 (ref 7–25)
CALCIUM SPEC-SCNC: 9.4 MG/DL (ref 8.6–10.5)
CHLORIDE SERPL-SCNC: 101 MMOL/L (ref 98–107)
CO2 SERPL-SCNC: 27 MMOL/L (ref 22–29)
CREAT SERPL-MCNC: 0.91 MG/DL (ref 0.57–1)
GFR SERPL CREATININE-BSD FRML MDRD: 61 ML/MIN/1.73
GLUCOSE BLDC GLUCOMTR-MCNC: 125 MG/DL (ref 70–105)
GLUCOSE BLDC GLUCOMTR-MCNC: 169 MG/DL (ref 70–105)
GLUCOSE BLDC GLUCOMTR-MCNC: 333 MG/DL (ref 70–105)
GLUCOSE BLDC GLUCOMTR-MCNC: 356 MG/DL (ref 70–105)
GLUCOSE BLDC GLUCOMTR-MCNC: 358 MG/DL (ref 70–105)
GLUCOSE SERPL-MCNC: 130 MG/DL (ref 65–99)
POTASSIUM SERPL-SCNC: 4 MMOL/L (ref 3.5–5.2)
SODIUM SERPL-SCNC: 137 MMOL/L (ref 136–145)

## 2021-05-08 PROCEDURE — 99232 SBSQ HOSP IP/OBS MODERATE 35: CPT | Performed by: INTERNAL MEDICINE

## 2021-05-08 PROCEDURE — 63710000001 INSULIN GLARGINE PER 5 UNITS: Performed by: NURSE PRACTITIONER

## 2021-05-08 PROCEDURE — 99024 POSTOP FOLLOW-UP VISIT: CPT | Performed by: THORACIC SURGERY (CARDIOTHORACIC VASCULAR SURGERY)

## 2021-05-08 PROCEDURE — 25010000002 ENOXAPARIN PER 10 MG: Performed by: THORACIC SURGERY (CARDIOTHORACIC VASCULAR SURGERY)

## 2021-05-08 PROCEDURE — 63710000001 DIPHENHYDRAMINE PER 50 MG: Performed by: THORACIC SURGERY (CARDIOTHORACIC VASCULAR SURGERY)

## 2021-05-08 PROCEDURE — 94799 UNLISTED PULMONARY SVC/PX: CPT

## 2021-05-08 PROCEDURE — 25010000002 HYDRALAZINE PER 20 MG: Performed by: NURSE PRACTITIONER

## 2021-05-08 PROCEDURE — 82962 GLUCOSE BLOOD TEST: CPT

## 2021-05-08 PROCEDURE — 63710000001 DIPHENHYDRAMINE PER 50 MG: Performed by: NURSE PRACTITIONER

## 2021-05-08 PROCEDURE — 97116 GAIT TRAINING THERAPY: CPT

## 2021-05-08 PROCEDURE — 63710000001 INSULIN LISPRO (HUMAN) PER 5 UNITS: Performed by: NURSE PRACTITIONER

## 2021-05-08 PROCEDURE — 80048 BASIC METABOLIC PNL TOTAL CA: CPT | Performed by: THORACIC SURGERY (CARDIOTHORACIC VASCULAR SURGERY)

## 2021-05-08 RX ORDER — DIPHENHYDRAMINE HCL 25 MG
25 CAPSULE ORAL ONCE
Status: COMPLETED | OUTPATIENT
Start: 2021-05-08 | End: 2021-05-08

## 2021-05-08 RX ORDER — METOPROLOL TARTRATE 50 MG/1
50 TABLET, FILM COATED ORAL EVERY 12 HOURS SCHEDULED
Status: DISCONTINUED | OUTPATIENT
Start: 2021-05-09 | End: 2021-05-10

## 2021-05-08 RX ORDER — HYDRALAZINE HYDROCHLORIDE 20 MG/ML
10 INJECTION INTRAMUSCULAR; INTRAVENOUS EVERY 6 HOURS PRN
Status: DISCONTINUED | OUTPATIENT
Start: 2021-05-08 | End: 2021-05-08

## 2021-05-08 RX ADMIN — POVIDONE-IODINE: 10 SOLUTION TOPICAL at 19:56

## 2021-05-08 RX ADMIN — DIPHENHYDRAMINE HYDROCHLORIDE 25 MG: 25 CAPSULE ORAL at 23:24

## 2021-05-08 RX ADMIN — HYDROCODONE BITARTRATE AND ACETAMINOPHEN 1 TABLET: 5; 325 TABLET ORAL at 04:47

## 2021-05-08 RX ADMIN — ASPIRIN 81 MG: 81 TABLET, COATED ORAL at 08:13

## 2021-05-08 RX ADMIN — HYDROCODONE BITARTRATE AND ACETAMINOPHEN 1 TABLET: 5; 325 TABLET ORAL at 23:29

## 2021-05-08 RX ADMIN — METOPROLOL TARTRATE 12.5 MG: 25 TABLET, FILM COATED ORAL at 20:24

## 2021-05-08 RX ADMIN — MUPIROCIN 1 APPLICATION: 20 OINTMENT TOPICAL at 18:18

## 2021-05-08 RX ADMIN — METOPROLOL TARTRATE 37.5 MG: 25 TABLET, FILM COATED ORAL at 19:57

## 2021-05-08 RX ADMIN — DIPHENHYDRAMINE HYDROCHLORIDE 25 MG: 25 CAPSULE ORAL at 18:17

## 2021-05-08 RX ADMIN — DULOXETINE HYDROCHLORIDE 60 MG: 30 CAPSULE, DELAYED RELEASE ORAL at 17:27

## 2021-05-08 RX ADMIN — POVIDONE-IODINE: 10 SOLUTION TOPICAL at 08:15

## 2021-05-08 RX ADMIN — ENOXAPARIN SODIUM 40 MG: 40 INJECTION SUBCUTANEOUS at 15:24

## 2021-05-08 RX ADMIN — CEFDINIR 300 MG: 300 CAPSULE ORAL at 19:58

## 2021-05-08 RX ADMIN — CHLORHEXIDINE GLUCONATE 15 ML: 1.2 SOLUTION ORAL at 19:57

## 2021-05-08 RX ADMIN — Medication 10 ML: at 19:58

## 2021-05-08 RX ADMIN — HYDROCODONE BITARTRATE AND ACETAMINOPHEN 1 TABLET: 5; 325 TABLET ORAL at 19:58

## 2021-05-08 RX ADMIN — HYDROCODONE BITARTRATE AND ACETAMINOPHEN 1 TABLET: 5; 325 TABLET ORAL at 15:23

## 2021-05-08 RX ADMIN — FUROSEMIDE 40 MG: 40 TABLET ORAL at 08:13

## 2021-05-08 RX ADMIN — INSULIN LISPRO 20 UNITS: 100 INJECTION, SOLUTION INTRAVENOUS; SUBCUTANEOUS at 20:41

## 2021-05-08 RX ADMIN — INSULIN GLARGINE 15 UNITS: 100 INJECTION, SOLUTION SUBCUTANEOUS at 19:58

## 2021-05-08 RX ADMIN — LOSARTAN POTASSIUM 100 MG: 50 TABLET, FILM COATED ORAL at 08:14

## 2021-05-08 RX ADMIN — IPRATROPIUM BROMIDE AND ALBUTEROL SULFATE 3 ML: 2.5; .5 SOLUTION RESPIRATORY (INHALATION) at 07:49

## 2021-05-08 RX ADMIN — Medication 10 ML: at 08:15

## 2021-05-08 RX ADMIN — INSULIN LISPRO 20 UNITS: 100 INJECTION, SOLUTION INTRAVENOUS; SUBCUTANEOUS at 17:26

## 2021-05-08 RX ADMIN — INSULIN GLARGINE 15 UNITS: 100 INJECTION, SOLUTION SUBCUTANEOUS at 08:12

## 2021-05-08 RX ADMIN — GUAIFENESIN 1200 MG: 600 TABLET, EXTENDED RELEASE ORAL at 08:13

## 2021-05-08 RX ADMIN — IPRATROPIUM BROMIDE AND ALBUTEROL SULFATE 3 ML: 2.5; .5 SOLUTION RESPIRATORY (INHALATION) at 11:37

## 2021-05-08 RX ADMIN — POTASSIUM CHLORIDE 10 MEQ: 750 TABLET, EXTENDED RELEASE ORAL at 08:14

## 2021-05-08 RX ADMIN — AMLODIPINE BESYLATE 5 MG: 5 TABLET ORAL at 08:13

## 2021-05-08 RX ADMIN — DOCUSATE SODIUM 50 MG AND SENNOSIDES 8.6 MG 2 TABLET: 8.6; 5 TABLET, FILM COATED ORAL at 08:13

## 2021-05-08 RX ADMIN — ACETAMINOPHEN 650 MG: 325 TABLET, FILM COATED ORAL at 08:50

## 2021-05-08 RX ADMIN — HYDRALAZINE HYDROCHLORIDE 10 MG: 20 INJECTION INTRAMUSCULAR; INTRAVENOUS at 01:09

## 2021-05-08 RX ADMIN — THERA TABS 1 TABLET: TAB at 08:14

## 2021-05-08 RX ADMIN — POLYETHYLENE GLYCOL 3350 17 G: 17 POWDER, FOR SOLUTION ORAL at 19:56

## 2021-05-08 RX ADMIN — CEFDINIR 300 MG: 300 CAPSULE ORAL at 08:14

## 2021-05-08 RX ADMIN — DOCUSATE SODIUM 50 MG AND SENNOSIDES 8.6 MG 2 TABLET: 8.6; 5 TABLET, FILM COATED ORAL at 19:57

## 2021-05-08 RX ADMIN — IPRATROPIUM BROMIDE AND ALBUTEROL SULFATE 3 ML: 2.5; .5 SOLUTION RESPIRATORY (INHALATION) at 15:45

## 2021-05-08 RX ADMIN — INSULIN LISPRO 4 UNITS: 100 INJECTION, SOLUTION INTRAVENOUS; SUBCUTANEOUS at 12:19

## 2021-05-08 RX ADMIN — HYDRALAZINE HYDROCHLORIDE 10 MG: 20 INJECTION INTRAMUSCULAR; INTRAVENOUS at 17:48

## 2021-05-08 RX ADMIN — HYDROCODONE BITARTRATE AND ACETAMINOPHEN 1 TABLET: 5; 325 TABLET ORAL at 08:50

## 2021-05-08 RX ADMIN — ATORVASTATIN CALCIUM 20 MG: 20 TABLET, FILM COATED ORAL at 19:58

## 2021-05-08 RX ADMIN — ROPINIROLE HYDROCHLORIDE 0.25 MG: 0.25 TABLET, FILM COATED ORAL at 17:27

## 2021-05-08 RX ADMIN — LINAGLIPTIN 5 MG: 5 TABLET, FILM COATED ORAL at 08:14

## 2021-05-08 RX ADMIN — CHLORHEXIDINE GLUCONATE 15 ML: 1.2 SOLUTION ORAL at 08:14

## 2021-05-08 RX ADMIN — POTASSIUM CHLORIDE 10 MEQ: 750 TABLET, EXTENDED RELEASE ORAL at 17:26

## 2021-05-08 RX ADMIN — IPRATROPIUM BROMIDE AND ALBUTEROL SULFATE 3 ML: 2.5; .5 SOLUTION RESPIRATORY (INHALATION) at 18:43

## 2021-05-08 RX ADMIN — POLYETHYLENE GLYCOL 3350 17 G: 17 POWDER, FOR SOLUTION ORAL at 08:14

## 2021-05-08 RX ADMIN — METOPROLOL TARTRATE 37.5 MG: 25 TABLET, FILM COATED ORAL at 08:14

## 2021-05-08 RX ADMIN — FUROSEMIDE 40 MG: 40 TABLET ORAL at 17:27

## 2021-05-08 RX ADMIN — MUPIROCIN 1 APPLICATION: 20 OINTMENT TOPICAL at 19:57

## 2021-05-08 RX ADMIN — GUAIFENESIN 1200 MG: 600 TABLET, EXTENDED RELEASE ORAL at 19:58

## 2021-05-08 NOTE — PROGRESS NOTES
ICU Daily Progress Note    Multidisciplinary rounds: No acute events overnight.  Sitter at bedside for patient safety.  Tolerating room air.  Mild OQUENDO.  Postsurgical chest discomfort is well controlled except when coughing.  Reporting cough with thick, green expectoration    Assessment:  S/p Fall in bathroom, no head trauma no PTX  Status post emergency CABG x3 2/2 critical multivessel coronary artery disease      Obstructive sleep apnea  Diabetes mellitus  Hypertension  GERD    Recommendations:  Recommend CPAP use.  Patient can use home machine if she has it  Bronchodilators  Hemodynamic support  Diuresis per CV surgery    Sputum culture pending per CV surgery  Omnicef started empirically 5/7/21 x7 days  Continue Mucinex and duo nebs    DVT ppy lovenox  PUD ppy not indicated    Plan on rehab at discharge at the discretion of primary                     LOS: 6 days         Vital signs for last 24 hours:  Vitals:    05/08/21 1137 05/08/21 1141 05/08/21 1210 05/08/21 1340   BP:   146/53 139/57   Pulse: 77 79 79 79   Resp: 16 16     Temp:       TempSrc:       SpO2: 97% 100% 98% 95%   Weight:       Height:           Intake/Output last 3 shifts:  I/O last 3 completed shifts:  In: 1220 [P.O.:1220]  Out: 2200 [Urine:2200]  Intake/Output this shift:  I/O this shift:  In: 240 [P.O.:240]  Out: -     Vent settings for last 24 hours:       Hemodynamic parameters for last 24 hours:       Radiology  Imaging Results (Last 24 Hours)     ** No results found for the last 24 hours. **          Labs:  Results from last 7 days   Lab Units 05/07/21  0414   WBC 10*3/mm3 9.00   HEMOGLOBIN g/dL 11.6*   HEMATOCRIT % 35.5   PLATELETS 10*3/mm3 227     Results from last 7 days   Lab Units 05/08/21  0407 05/01/21  2313   SODIUM mmol/L 137 131*   POTASSIUM mmol/L 4.0 4.3   CHLORIDE mmol/L 101 95*   CO2 mmol/L 27.0 23.0   BUN mg/dL 20 25*   CREATININE mg/dL 0.91 1.03*   CALCIUM mg/dL 9.4 8.9   BILIRUBIN mg/dL  --  0.2   ALK PHOS U/L  --  84   ALT  (SGPT) U/L  --  14   AST (SGOT) U/L  --  20   GLUCOSE mg/dL 130* 533*     Results from last 7 days   Lab Units 05/04/21  1528   PH, ARTERIAL pH units 7.321*   PO2 ART mm Hg 82.1*   PCO2, ARTERIAL mm Hg 42.8   HCO3 ART mmol/L 22.1     Results from last 7 days   Lab Units 05/04/21  0403 05/03/21  0608 05/03/21  0229   ALBUMIN g/dL 3.70 4.00 3.80     Results from last 7 days   Lab Units 05/02/21  0610 05/01/21  2313   TROPONIN T ng/mL <0.010 <0.010         Results from last 7 days   Lab Units 05/04/21  0403   MAGNESIUM mg/dL 2.9*     Results from last 7 days   Lab Units 05/05/21  0517 05/04/21  0403 05/03/21  0608 05/03/21  0229 05/02/21  1932   INR   --  1.04  --  1.09 0.99   APTT seconds 32.8* 28.8* 27.6* 27.3 24.0*               Meds:   SCHEDULE  amLODIPine, 5 mg, Oral, Q24H  aspirin, 81 mg, Oral, Daily  atorvastatin, 20 mg, Oral, Nightly  cefdinir, 300 mg, Oral, Q12H  chlorhexidine, 15 mL, Mouth/Throat, Q12H  DULoxetine, 60 mg, Oral, Q PM  enoxaparin, 40 mg, Subcutaneous, Daily  furosemide, 40 mg, Oral, BID  guaiFENesin, 1,200 mg, Oral, Q12H  insulin glargine, 15 Units, Subcutaneous, Q12H  insulin lispro, 0-24 Units, Subcutaneous, 4x Daily With Meals & Nightly  ipratropium-albuterol, 3 mL, Nebulization, 4x Daily - RT  linagliptin, 5 mg, Oral, Daily  losartan, 100 mg, Oral, Q24H  metoprolol tartrate, 37.5 mg, Oral, Q12H  multivitamin, 1 tablet, Oral, Daily  mupirocin, 1 application, Each Nare, Q12H  polyethylene glycol, 17 g, Oral, BID  potassium chloride, 10 mEq, Oral, BID With Meals  povidone-iodine, , Topical, BID  rOPINIRole, 0.25 mg, Oral, Q PM  Scopolamine, 1 patch, Transdermal, Q72H  senna-docusate sodium, 2 tablet, Oral, BID  sodium chloride, 10 mL, Intravenous, Q12H      Infusions     PRNs  •  acetaminophen **OR** acetaminophen **OR** acetaminophen  •  ALPRAZolam  •  bisacodyl  •  cyclobenzaprine  •  dextrose  •  dextrose  •  diphenhydrAMINE  •  glucagon (human recombinant)  •  hydrALAZINE  •   HYDROcodone-acetaminophen  •  insulin lispro **AND** insulin lispro  •  ipratropium-albuterol  •  magnesium hydroxide  •  magnesium sulfate **OR** magnesium sulfate **OR** magnesium sulfate  •  melatonin  •  [DISCONTINUED] Morphine **AND** naloxone  •  ondansetron  •  pantoprazole  •  potassium chloride  •  potassium chloride **OR** potassium chloride **OR** potassium chloride  •  potassium chloride **OR** potassium chloride  •  sodium chloride  •  sucralfate  •  traMADol    Physical Exam:  Physical Exam  HENT:      Head: Normocephalic and atraumatic.   Eyes:      Pupils: Pupils are equal, round, and reactive to light.   Cardiovascular:      Rate and Rhythm: Normal rate and regular rhythm.      Heart sounds: No murmur heard.     Pulmonary:      Breath sounds: Rhonchi present. No wheezing or rales.   Abdominal:      General: Bowel sounds are normal. There is no distension.      Palpations: Abdomen is soft.      Tenderness: There is no abdominal tenderness. There is no guarding.   Musculoskeletal:      Right lower leg: No edema.      Left lower leg: No edema.   Skin:     General: Skin is warm and dry.      Comments: Sternal incision well approximated   Neurological:      Mental Status: She is alert and oriented to person, place, and time. Mental status is at baseline.         ROS  Review of Systems   Constitutional: Negative for activity change.   Respiratory: Positive for cough. Negative for shortness of breath.         Cough productive of green secretions  Mild OQUENDO   Gastrointestinal: Negative for abdominal pain, nausea and vomiting.         Critical Care Time greater than: 45 Minutes  Total time spent with patient greater than: 45 Minutes

## 2021-05-08 NOTE — CASE MANAGEMENT/SOCIAL WORK
Discharge Planning Assessment   [facility]     Patient Name: Marge Mcghee  MRN: [id number]  Today's Date: 5/8/2021    Admit Date: 5/1/2021      Discharge Plan     Row Name 05/08/21 1253       Plan    Plan  [facility] bed available Sunday, if medically stable    Plan Comments  [facility] liaison stated a bed is available Sunday, if Pt is medically stable.        Continued Care and Services - Admitted Since 5/1/2021     Destination Coordination complete    Service Provider Request Status Selected Services Address Phone Fax Patient Preferred    [facility]   Selected Inpatient Rehabilitation [facility] [facility] [address] [phone] --              Janet GARVINW, LSW  Weekend   Care Management Dept  Cell [phone]  Weekday Department [phone]

## 2021-05-08 NOTE — PROGRESS NOTES
Postop day #6 CABG  Stable  Has significant chest pain and cough, initial expectoration  Labs noted  Sputum culture sent  On Omnicef  Slow progress, plan for rehab soon

## 2021-05-08 NOTE — PLAN OF CARE
Subjective: Pt agreeable to therapeutic plan of care. Patient stated she just didn't feel well today but agreed to walk in room only.    Objective:     Bed mobility - CGA and vc's for sternal prec  Transfers - CGA and with rolling walker  Ambulation - 40 feet CGA and with rolling walker    Pain: c/o sternal pain charan when coughing.    Education: Provided education on importance of mobility and skilled verbal / tactile cueing throughout intervention.     Assessment: Marge Mcghee presents with functional mobility impairments which indicate the need for skilled intervention. Tolerating session today without incident. Slow moving, sl guarded. No LOB noted but cont to req vc's for sternal prec.  Will continue to follow and progress as tolerated.     Plan/Recommendations:   Pt would benefit from Inpatient Rehabilitation placement at discharge from facility and requires no DME at discharge.   Pt desires Inpatient Rehabilitation placement at discharge. Pt cooperative; agreeable to therapeutic recommendations and plan of care.     Basic Mobility 6-click:  Rollin = Total, A lot = 2, A little = 3; 4 = None  Supine>Sit:   1 = Total, A lot = 2, A little = 3; 4 = None   Sit>Stand with arms:  1 = Total, A lot = 2, A little = 3; 4 = None  Bed>Chair:   1 = Total, A lot = 2, A little = 3; 4 = None  Ambulate in room:  1 = Total, A lot = 2, A little = 3; 4 = None  3-5 Steps with railin = Total, A lot = 2, A little = 3; 4 = None  Score: 17      Post-Tx Position: Up in Chair, Alarms activated and Call light and personal items within reach  PPE: gloves, surgical mask, eyewear protection

## 2021-05-08 NOTE — PROGRESS NOTES
Cardiology Progress Note      Admiting Physician:  Jr Rocael Alexander MD   LOS: 6 days       Reason For Followup:  Coronary artery disease status post CABG      Subjective:    Interval History:  Seen and examined.  Chart labs reviewed.  Patient reporting some right upper chest discomfort with cough and deep breath.  Patient with elevated heart rate and blood pressure.  Had adverse reaction to hydralazine earlier today.    Review of Systems:  A complete review of systems was undertaken with the pertinent cardiovascular findings listed in history of present illness and all other systems being negative.     Assessment & Plan    Impressions:  Coronary artery disease status post three-vessel CABG  Hypertension  Diabetes mellitus  Hyperlipidemia    Recommendations:  Continue with routine postoperative care  Increase activity as tolerated  Monitor rhythm and electrolytes  Increase beta-blocker therapy        Objective:    Medication Review:   Scheduled Meds:amLODIPine, 5 mg, Oral, Q24H  aspirin, 81 mg, Oral, Daily  atorvastatin, 20 mg, Oral, Nightly  cefdinir, 300 mg, Oral, Q12H  chlorhexidine, 15 mL, Mouth/Throat, Q12H  DULoxetine, 60 mg, Oral, Q PM  enoxaparin, 40 mg, Subcutaneous, Daily  furosemide, 40 mg, Oral, BID  guaiFENesin, 1,200 mg, Oral, Q12H  insulin glargine, 15 Units, Subcutaneous, Q12H  insulin lispro, 0-24 Units, Subcutaneous, 4x Daily With Meals & Nightly  ipratropium-albuterol, 3 mL, Nebulization, 4x Daily - RT  linagliptin, 5 mg, Oral, Daily  losartan, 100 mg, Oral, Q24H  metoprolol tartrate, 37.5 mg, Oral, Q12H  multivitamin, 1 tablet, Oral, Daily  mupirocin, 1 application, Each Nare, Q12H  polyethylene glycol, 17 g, Oral, BID  potassium chloride, 10 mEq, Oral, BID With Meals  povidone-iodine, , Topical, BID  rOPINIRole, 0.25 mg, Oral, Q PM  Scopolamine, 1 patch, Transdermal, Q72H  senna-docusate sodium, 2 tablet, Oral, BID  sodium chloride, 10 mL, Intravenous, Q12H      Continuous Infusions:   PRN  Meds:.  acetaminophen **OR** acetaminophen **OR** acetaminophen    ALPRAZolam    bisacodyl    cyclobenzaprine    dextrose    dextrose    diphenhydrAMINE    glucagon (human recombinant)    hydrALAZINE    HYDROcodone-acetaminophen    insulin lispro **AND** insulin lispro    ipratropium-albuterol    magnesium hydroxide    magnesium sulfate **OR** magnesium sulfate **OR** magnesium sulfate    melatonin    [DISCONTINUED] Morphine **AND** naloxone    ondansetron    pantoprazole    potassium chloride    potassium chloride **OR** potassium chloride **OR** potassium chloride    potassium chloride **OR** potassium chloride    sodium chloride    sucralfate    traMADol    Patient Active Problem List   Diagnosis    Type 2 diabetes mellitus with peripheral neuropathy (CMS/HCC)    Age-related osteoporosis without current pathological fracture    Essential hypertension    Mixed hyperlipidemia    Hepatic steatosis    Gastroesophageal reflux disease with esophagitis    Adrenal nodule (CMS/HCC)    Vitamin D deficiency    Thyroid nodule    Urinary tract infection without hematuria    REINA (acute kidney injury) (CMS/HCC)    Hyperglycemia    Acute right flank pain    Vomiting    Hypomagnesemia    Dehydration    Obesity (BMI 30-39.9)    Complicated migraine    Occipital neuralgia of left side    Polypharmacy    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 (CMS/HCC)    Lisfranc dislocation    Delayed surgical wound healing    Right foot infection    Chest pain    GERD (gastroesophageal reflux disease)    Stable angina pectoris (CMS/HCC)         Physical Exam:    General: Alert, cooperative, no distress, appears stated age  Head:  Normocephalic, atraumatic, mucous membranes moist  Eyes:  Conjunctiva/corneas clear, EOM's intact     Neck:  Supple,  no bruit  Lungs: Clear to auscultation bilaterally, no wheezes rhonchi rales are noted  Chest wall: Tender at  incision  Heart::  Regular rate and rhythm, S1 and S2 normal, 1/6 holosystolic murmur.  No rub or gallop  Abdomen: Soft, non-tender, nondistended bowel sounds active  Extremities: No cyanosis, clubbing, trace edema  Pulses: 2+ and symmetric all extremities  Skin:  No rashes or lesions.  Incisions clean dry and intact  Neuro/psych: A&O x3. CN II through XII are grossly intact with appropriate affect    Vital Signs:  Vitals:    05/08/21 1137 05/08/21 1141 05/08/21 1210 05/08/21 1340   BP:   146/53 139/57   Pulse: 77 79 79 79   Resp: 16 16     Temp:       TempSrc:       SpO2: 97% 100% 98% 95%   Weight:       Height:         Wt Readings from Last 1 Encounters:   05/08/21 89.1 kg (196 lb 6.9 oz)       Intake/Output Summary (Last 24 hours) at 5/8/2021 1355  Last data filed at 5/8/2021 1000  Gross per 24 hour   Intake 720 ml   Output 700 ml   Net 20 ml         Results Review:     CBC    Results from last 7 days   Lab Units 05/07/21  0414 05/06/21  0429 05/05/21  0517 05/04/21  1045 05/03/21  2023 05/03/21  1835 05/03/21  1513 05/03/21  0608 05/03/21  0229 05/02/21  1932   WBC 10*3/mm3 9.00 10.00 12.30* 13.60*  --   --   --  11.40* 11.00* 7.50   HEMOGLOBIN g/dL 11.6* 12.2 11.8* 11.8*  --   --   --  11.1* 10.4* 10.1*   HEMOGLOBIN, POC g/dL  --   --   --   --  11.4* 11.6* 11.1*  --   --   --    PLATELETS 10*3/mm3 227 168 140 123*  --   --   --  146 133* 212     Cr Clearance Estimated Creatinine Clearance: 64 mL/min (by C-G formula based on SCr of 0.91 mg/dL).  Coag   Results from last 7 days   Lab Units 05/05/21  0517 05/04/21  0403 05/03/21  0608 05/03/21  0229 05/02/21  1932 05/01/21  2313   INR   --  1.04  --  1.09 0.99 0.96   APTT seconds 32.8* 28.8* 27.6* 27.3 24.0* <20.0*     HbA1C   Lab Results   Component Value Date    HGBA1C 8.0 (H) 04/06/2021    HGBA1C 7.10 (H) 01/08/2021    HGBA1C 7.1 (H) 11/03/2020     Blood Glucose   Glucose   Date/Time Value Ref Range Status   05/08/2021 1135 169 (H) 70 - 105 mg/dL Final      Comment:     Serial Number: [id number]:  [id number]   05/08/2021 0734 125 (H) 70 - 105 mg/dL Final     Comment:     Serial Number: [id number]:  [id number]   05/07/2021 2028 363 (H) 70 - 105 mg/dL Final     Comment:     Serial Number: [id number]:  [id number]   05/07/2021 1955 360 (H) 70 - 105 mg/dL Final     Comment:     Serial Number: [id number]:  [id number]   05/07/2021 1608 314 (H) 70 - 105 mg/dL Final     Comment:     Serial Number: [id number]:  [id number]   05/07/2021 1107 167 (H) 70 - 105 mg/dL Final     Comment:     Serial Number: [id number]:  [id number]   05/07/2021 0738 163 (H) 70 - 105 mg/dL Final     Comment:     Serial Number: [id number]:  [id number]   05/06/2021 1950 326 (H) 70 - 105 mg/dL Final     Comment:     Serial Number: [id number]:  [id number]     Infection   Results from last 7 days   Lab Units 05/07/21  1059   RESPCX  Light growth (2+) Normal Respiratory Tammy: NO S.aureus/MRSA or Pseudomonas aeruginosa     CMP   Results from last 7 days   Lab Units 05/08/21  0407 05/07/21  0414 05/06/21  0429 05/05/21  0517 05/04/21  0403 05/03/21  0759 05/03/21  0608 05/03/21  0229 05/01/21  2313   SODIUM mmol/L 137 132* 131* 132* 138  --  141 140 131*   POTASSIUM mmol/L 4.0 4.2 4.2 4.0 4.0 3.9 3.4* 3.6 4.3   CHLORIDE mmol/L 101 97* 98 99 105  --  107 107 95*   CO2 mmol/L 27.0 25.0 22.0 22.0 23.0  --  21.0* 21.0* 23.0   BUN mg/dL 20 25* 25* 27* 24*  --  23 21 25*   CREATININE mg/dL 0.91 1.07* 0.89 1.10* 1.21*  --  1.34* 1.29* 1.03*   GLUCOSE mg/dL 130* 134* 222* 109* 129*  --  131* 151* 533*   ALBUMIN g/dL  --   --   --   --  3.70  --  4.00 3.80 3.70   BILIRUBIN mg/dL  --   --   --   --   --   --   --   --  0.2   ALK PHOS U/L  --   --   --   --   --   --   --   --  84   AST (SGOT) U/L  --   --   --   --   --   --   --   --  20   ALT (SGPT) U/L  --   --   --   --   --   --   --   --  14     ABG    Results from last 7 days   Lab Units 05/04/21  1528 05/03/21 2023  05/03/21  1835 05/03/21  1513 05/03/21  1122 05/03/21  1010 05/03/21  0850   PH, ARTERIAL pH units 7.321* 7.320* 7.275* 7.272* 7.366 7.309* 7.334*   PCO2, ARTERIAL mm Hg 42.8 42.4 51.6* 52.9* 37.4 44.7 41.8   PO2 ART mm Hg 82.1* 78.0* 93.9 92.1 75.1* 77.0* 73.9*   O2 SATURATION ART % 95.0 94.3 96.0 95.7 94.5 93.8* 93.6*   BASE EXCESS ART mmol/L -3.8* -4.1* -3.2* -2.9* -3.5* -3.8* -3.4*     UA    Results from last 7 days   Lab Units 05/01/21  2337   NITRITE UA  Negative     WILLIE  No results found for: POCMETH, POCAMPHET, POCBARBITUR, POCBENZO, POCCOCAINE, POCOPIATES, POCOXYCODO, POCPHENCYC, POCPROPOXY, POCTHC, POCTRICYC  Lysis Labs   Results from last 7 days   Lab Units 05/08/21  0407 05/07/21  0414 05/06/21  0429 05/05/21  0517 05/04/21  1045 05/04/21  0403 05/03/21 2023 05/03/21  1835 05/03/21  1513 05/03/21  0608 05/03/21  0229 05/02/21  1932 05/01/21  2350 05/01/21  2313   INR   --   --   --   --   --  1.04  --   --   --   --  1.09 0.99  --  0.96   APTT seconds  --   --   --  32.8*  --  28.8*  --   --   --  27.6* 27.3 24.0*  --  <20.0*   FIBRINOGEN mg/dL  --   --   --   --   --   --   --   --   --   --  226  --   --   --    HEMOGLOBIN g/dL  --  11.6* 12.2 11.8* 11.8*  --   --   --   --  11.1* 10.4* 10.1*  --   --    HEMOGLOBIN, POC g/dL  --   --   --   --   --   --  11.4* 11.6* 11.1*  --   --   --    < >  --    PLATELETS 10*3/mm3  --  227 168 140 123*  --   --   --   --  146 133* 212  --   --    CREATININE mg/dL 0.91 1.07* 0.89 1.10*  --  1.21*  --   --   --  1.34* 1.29*  --    < > 1.03*    < > = values in this interval not displayed.     Radiology(recent) XR Chest PA & Lateral    Result Date: 5/7/2021  Stable appearance of small to moderate right and small left pleural effusions with underlying atelectasis and/or pneumonia.   Electronically Signed By-Kacey Antunez MD On:5/7/2021 12:26 PM This report was finalized on [id number] by  Kacey Antunez MD.        Results from last 7 days   Lab Units 05/02/21  0610    TROPONIN T ng/mL <0.010       Imaging Results (Last 24 Hours)       ** No results found for the last 24 hours. **            Cardiac Studies:  Echo- Results for orders placed in visit on 05/02/21    Emergent/Open-Heart Anesthesia EMILIA    Narrative  Preanesthesia Checklist:  Patient identified, IV assessed, risks and benefits discussed, monitors and equipment assessed, procedure being performed at surgeon's request and anesthesia consent obtained.    General Procedure Information  EMILIA Placed for monitoring purposes only -- This is not a diagnostic EMILIA    Stress Myoview-  Cath-        Cassius Ordaz DO  05/08/21  13:55 EDT

## 2021-05-09 LAB
ANION GAP SERPL CALCULATED.3IONS-SCNC: 10 MMOL/L (ref 5–15)
BACTERIA SPEC RESP CULT: NORMAL
BUN SERPL-MCNC: 18 MG/DL (ref 8–23)
BUN/CREAT SERPL: 17 (ref 7–25)
CALCIUM SPEC-SCNC: 9.3 MG/DL (ref 8.6–10.5)
CHLORIDE SERPL-SCNC: 99 MMOL/L (ref 98–107)
CO2 SERPL-SCNC: 29 MMOL/L (ref 22–29)
CREAT SERPL-MCNC: 1.06 MG/DL (ref 0.57–1)
GFR SERPL CREATININE-BSD FRML MDRD: 52 ML/MIN/1.73
GLUCOSE BLDC GLUCOMTR-MCNC: 126 MG/DL (ref 70–105)
GLUCOSE BLDC GLUCOMTR-MCNC: 161 MG/DL (ref 70–105)
GLUCOSE BLDC GLUCOMTR-MCNC: 255 MG/DL (ref 70–105)
GLUCOSE BLDC GLUCOMTR-MCNC: 336 MG/DL (ref 70–105)
GLUCOSE SERPL-MCNC: 95 MG/DL (ref 65–99)
GRAM STN SPEC: NORMAL
GRAM STN SPEC: NORMAL
POTASSIUM SERPL-SCNC: 4.3 MMOL/L (ref 3.5–5.2)
SODIUM SERPL-SCNC: 138 MMOL/L (ref 136–145)

## 2021-05-09 PROCEDURE — 63710000001 INSULIN GLARGINE PER 5 UNITS: Performed by: NURSE PRACTITIONER

## 2021-05-09 PROCEDURE — 94799 UNLISTED PULMONARY SVC/PX: CPT

## 2021-05-09 PROCEDURE — 63710000001 DIPHENHYDRAMINE PER 50 MG: Performed by: THORACIC SURGERY (CARDIOTHORACIC VASCULAR SURGERY)

## 2021-05-09 PROCEDURE — 80048 BASIC METABOLIC PNL TOTAL CA: CPT | Performed by: THORACIC SURGERY (CARDIOTHORACIC VASCULAR SURGERY)

## 2021-05-09 PROCEDURE — 25010000002 ENOXAPARIN PER 10 MG: Performed by: THORACIC SURGERY (CARDIOTHORACIC VASCULAR SURGERY)

## 2021-05-09 PROCEDURE — 99024 POSTOP FOLLOW-UP VISIT: CPT | Performed by: THORACIC SURGERY (CARDIOTHORACIC VASCULAR SURGERY)

## 2021-05-09 PROCEDURE — 82962 GLUCOSE BLOOD TEST: CPT

## 2021-05-09 PROCEDURE — 63710000001 INSULIN LISPRO (HUMAN) PER 5 UNITS: Performed by: NURSE PRACTITIONER

## 2021-05-09 PROCEDURE — 99232 SBSQ HOSP IP/OBS MODERATE 35: CPT | Performed by: INTERNAL MEDICINE

## 2021-05-09 RX ADMIN — AMLODIPINE BESYLATE 5 MG: 5 TABLET ORAL at 08:45

## 2021-05-09 RX ADMIN — METOPROLOL TARTRATE 50 MG: 50 TABLET, FILM COATED ORAL at 21:10

## 2021-05-09 RX ADMIN — HYDROCODONE BITARTRATE AND ACETAMINOPHEN 1 TABLET: 5; 325 TABLET ORAL at 06:41

## 2021-05-09 RX ADMIN — Medication 5 MG: at 23:51

## 2021-05-09 RX ADMIN — MUPIROCIN 1 APPLICATION: 20 OINTMENT TOPICAL at 17:35

## 2021-05-09 RX ADMIN — ACETAMINOPHEN 650 MG: 325 TABLET, FILM COATED ORAL at 17:34

## 2021-05-09 RX ADMIN — DIPHENHYDRAMINE HYDROCHLORIDE 25 MG: 25 CAPSULE ORAL at 21:21

## 2021-05-09 RX ADMIN — IPRATROPIUM BROMIDE AND ALBUTEROL SULFATE 3 ML: 2.5; .5 SOLUTION RESPIRATORY (INHALATION) at 15:15

## 2021-05-09 RX ADMIN — DOCUSATE SODIUM 50 MG AND SENNOSIDES 8.6 MG 2 TABLET: 8.6; 5 TABLET, FILM COATED ORAL at 08:45

## 2021-05-09 RX ADMIN — LOSARTAN POTASSIUM 100 MG: 50 TABLET, FILM COATED ORAL at 08:46

## 2021-05-09 RX ADMIN — HYDROCODONE BITARTRATE AND ACETAMINOPHEN 1 TABLET: 5; 325 TABLET ORAL at 10:44

## 2021-05-09 RX ADMIN — FUROSEMIDE 40 MG: 40 TABLET ORAL at 08:45

## 2021-05-09 RX ADMIN — ATORVASTATIN CALCIUM 20 MG: 20 TABLET, FILM COATED ORAL at 21:10

## 2021-05-09 RX ADMIN — DULOXETINE HYDROCHLORIDE 60 MG: 30 CAPSULE, DELAYED RELEASE ORAL at 16:06

## 2021-05-09 RX ADMIN — POTASSIUM CHLORIDE 10 MEQ: 750 TABLET, EXTENDED RELEASE ORAL at 17:34

## 2021-05-09 RX ADMIN — MUPIROCIN 1 APPLICATION: 20 OINTMENT TOPICAL at 05:12

## 2021-05-09 RX ADMIN — IPRATROPIUM BROMIDE AND ALBUTEROL SULFATE 3 ML: 2.5; .5 SOLUTION RESPIRATORY (INHALATION) at 07:35

## 2021-05-09 RX ADMIN — INSULIN LISPRO 4 UNITS: 100 INJECTION, SOLUTION INTRAVENOUS; SUBCUTANEOUS at 12:37

## 2021-05-09 RX ADMIN — GUAIFENESIN 1200 MG: 600 TABLET, EXTENDED RELEASE ORAL at 08:46

## 2021-05-09 RX ADMIN — ASPIRIN 81 MG: 81 TABLET, COATED ORAL at 08:46

## 2021-05-09 RX ADMIN — DOCUSATE SODIUM 50 MG AND SENNOSIDES 8.6 MG 2 TABLET: 8.6; 5 TABLET, FILM COATED ORAL at 21:10

## 2021-05-09 RX ADMIN — HYDROCODONE BITARTRATE AND ACETAMINOPHEN 1 TABLET: 5; 325 TABLET ORAL at 16:02

## 2021-05-09 RX ADMIN — POLYETHYLENE GLYCOL 3350 17 G: 17 POWDER, FOR SOLUTION ORAL at 21:10

## 2021-05-09 RX ADMIN — ACETAMINOPHEN 650 MG: 325 TABLET, FILM COATED ORAL at 05:17

## 2021-05-09 RX ADMIN — POVIDONE-IODINE: 10 SOLUTION TOPICAL at 08:47

## 2021-05-09 RX ADMIN — POVIDONE-IODINE: 10 SOLUTION TOPICAL at 21:12

## 2021-05-09 RX ADMIN — INSULIN LISPRO 12 UNITS: 100 INJECTION, SOLUTION INTRAVENOUS; SUBCUTANEOUS at 17:30

## 2021-05-09 RX ADMIN — CYCLOBENZAPRINE HYDROCHLORIDE 5 MG: 10 TABLET, FILM COATED ORAL at 17:41

## 2021-05-09 RX ADMIN — POLYETHYLENE GLYCOL 3350 17 G: 17 POWDER, FOR SOLUTION ORAL at 08:46

## 2021-05-09 RX ADMIN — ROPINIROLE HYDROCHLORIDE 0.25 MG: 0.25 TABLET, FILM COATED ORAL at 16:06

## 2021-05-09 RX ADMIN — GUAIFENESIN 1200 MG: 600 TABLET, EXTENDED RELEASE ORAL at 21:10

## 2021-05-09 RX ADMIN — INSULIN GLARGINE 15 UNITS: 100 INJECTION, SOLUTION SUBCUTANEOUS at 21:11

## 2021-05-09 RX ADMIN — CHLORHEXIDINE GLUCONATE 15 ML: 1.2 SOLUTION ORAL at 21:10

## 2021-05-09 RX ADMIN — HYDROCODONE BITARTRATE AND ACETAMINOPHEN 1 TABLET: 5; 325 TABLET ORAL at 21:10

## 2021-05-09 RX ADMIN — IPRATROPIUM BROMIDE AND ALBUTEROL SULFATE 3 ML: 2.5; .5 SOLUTION RESPIRATORY (INHALATION) at 19:53

## 2021-05-09 RX ADMIN — INSULIN GLARGINE 15 UNITS: 100 INJECTION, SOLUTION SUBCUTANEOUS at 08:54

## 2021-05-09 RX ADMIN — CEFDINIR 300 MG: 300 CAPSULE ORAL at 08:45

## 2021-05-09 RX ADMIN — Medication 10 ML: at 08:47

## 2021-05-09 RX ADMIN — FUROSEMIDE 40 MG: 40 TABLET ORAL at 17:35

## 2021-05-09 RX ADMIN — CEFDINIR 300 MG: 300 CAPSULE ORAL at 21:10

## 2021-05-09 RX ADMIN — ENOXAPARIN SODIUM 40 MG: 40 INJECTION SUBCUTANEOUS at 16:05

## 2021-05-09 RX ADMIN — INSULIN LISPRO 12 UNITS: 100 INJECTION, SOLUTION INTRAVENOUS; SUBCUTANEOUS at 21:11

## 2021-05-09 RX ADMIN — THERA TABS 1 TABLET: TAB at 08:47

## 2021-05-09 RX ADMIN — POTASSIUM CHLORIDE 10 MEQ: 750 TABLET, EXTENDED RELEASE ORAL at 08:46

## 2021-05-09 RX ADMIN — HYDROCODONE BITARTRATE AND ACETAMINOPHEN 1 TABLET: 5; 325 TABLET ORAL at 03:06

## 2021-05-09 RX ADMIN — METOPROLOL TARTRATE 50 MG: 50 TABLET, FILM COATED ORAL at 08:45

## 2021-05-09 RX ADMIN — ACETAMINOPHEN 650 MG: 325 TABLET, FILM COATED ORAL at 08:54

## 2021-05-09 RX ADMIN — LINAGLIPTIN 5 MG: 5 TABLET, FILM COATED ORAL at 08:46

## 2021-05-09 RX ADMIN — CHLORHEXIDINE GLUCONATE 15 ML: 1.2 SOLUTION ORAL at 08:46

## 2021-05-09 RX ADMIN — Medication 10 ML: at 21:11

## 2021-05-09 NOTE — PROGRESS NOTES
Postoperative day #7 CABG  Vitals stable  Still coughing greenish sputum, on antibiotics  Complains of upper right chest discomfort with coughing  Lungs are clear, cor is regular, incisions are clean.  Tenderness in the second and third rib anteriorly.  Overall doing well, continue antibiotics per ID, will do a chest x-ray in the morning to rule out rib fracture  Rehab soon

## 2021-05-09 NOTE — PROGRESS NOTES
Cardiology Progress Note      Admiting Physician:  Jr Rocael Alexander MD   LOS: 7 days       Reason For Followup:  Coronary artery disease status post CABG      Subjective:    Interval History:  Seen and examined.  Chart labs reviewed.  Patient reports her breathing is stable but not much improved.  Still reports some chest soreness.  Heart rate better.    Review of Systems:  A complete review of systems was undertaken with the pertinent cardiovascular findings listed in history of present illness and all other systems being negative.     Assessment & Plan    Impressions:  Coronary artery disease status post three-vessel CABG  Hypertension  Diabetes mellitus  Hyperlipidemia    Recommendations:  Continue with routine postoperative care  Increase activity as tolerated  Monitor rhythm and electrolytes  Continue with angiotensin receptor blocker, beta-blocker, aspirin and statin therapy.  Antimicrobials as per admitting service.        Objective:    Medication Review:   Scheduled Meds:amLODIPine, 5 mg, Oral, Q24H  aspirin, 81 mg, Oral, Daily  atorvastatin, 20 mg, Oral, Nightly  cefdinir, 300 mg, Oral, Q12H  chlorhexidine, 15 mL, Mouth/Throat, Q12H  DULoxetine, 60 mg, Oral, Q PM  enoxaparin, 40 mg, Subcutaneous, Daily  furosemide, 40 mg, Oral, BID  guaiFENesin, 1,200 mg, Oral, Q12H  insulin glargine, 15 Units, Subcutaneous, Q12H  insulin lispro, 0-24 Units, Subcutaneous, 4x Daily With Meals & Nightly  ipratropium-albuterol, 3 mL, Nebulization, 4x Daily - RT  linagliptin, 5 mg, Oral, Daily  losartan, 100 mg, Oral, Q24H  metoprolol tartrate, 50 mg, Oral, Q12H  multivitamin, 1 tablet, Oral, Daily  mupirocin, 1 application, Each Nare, Q12H  polyethylene glycol, 17 g, Oral, BID  potassium chloride, 10 mEq, Oral, BID With Meals  povidone-iodine, , Topical, BID  rOPINIRole, 0.25 mg, Oral, Q PM  Scopolamine, 1 patch, Transdermal, Q72H  senna-docusate sodium, 2 tablet, Oral, BID  sodium chloride, 10 mL, Intravenous,  Q12H      Continuous Infusions:   PRN Meds:.  acetaminophen **OR** acetaminophen **OR** acetaminophen    ALPRAZolam    bisacodyl    cyclobenzaprine    dextrose    dextrose    diphenhydrAMINE    glucagon (human recombinant)    HYDROcodone-acetaminophen    insulin lispro **AND** insulin lispro    ipratropium-albuterol    magnesium hydroxide    magnesium sulfate **OR** magnesium sulfate **OR** magnesium sulfate    melatonin    [DISCONTINUED] Morphine **AND** naloxone    ondansetron    pantoprazole    potassium chloride    potassium chloride **OR** potassium chloride **OR** potassium chloride    potassium chloride **OR** potassium chloride    sodium chloride    sucralfate    traMADol    Patient Active Problem List   Diagnosis    Type 2 diabetes mellitus with peripheral neuropathy (CMS/HCC)    Age-related osteoporosis without current pathological fracture    Essential hypertension    Mixed hyperlipidemia    Hepatic steatosis    Gastroesophageal reflux disease with esophagitis    Adrenal nodule (CMS/HCC)    Vitamin D deficiency    Thyroid nodule    Urinary tract infection without hematuria    REINA (acute kidney injury) (CMS/HCC)    Hyperglycemia    Acute right flank pain    Vomiting    Hypomagnesemia    Dehydration    Obesity (BMI 30-39.9)    Complicated migraine    Occipital neuralgia of left side    Polypharmacy    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 (CMS/HCC)    Lisfranc dislocation    Delayed surgical wound healing    Right foot infection    Chest pain    GERD (gastroesophageal reflux disease)    Stable angina pectoris (CMS/HCC)         Physical Exam:    General: Alert, cooperative, no distress, appears stated age  Head:  Normocephalic, atraumatic, mucous membranes moist  Eyes:  Conjunctiva/corneas clear, EOM's intact     Neck:  Supple,  no bruit  Lungs: Clear to auscultation bilaterally, no wheezes rhonchi rales are noted  Chest wall: Tender at  incision  Heart::  Regular rate and rhythm, S1 and S2 normal, 1/6 holosystolic murmur.  No rub or gallop  Abdomen: Soft, non-tender, nondistended bowel sounds active  Extremities: No cyanosis, clubbing, trace edema  Pulses: 2+ and symmetric all extremities  Skin:  No rashes or lesions.  Incisions clean dry and intact  Neuro/psych: A&O x3. CN II through XII are grossly intact with appropriate affect    Vital Signs:  Vitals:    05/09/21 0735 05/09/21 0740 05/09/21 0800 05/09/21 0845   BP:    153/55   BP Location:       Patient Position:       Pulse: 86 85  88   Resp: 18 18     Temp:   98.2 °F (36.8 °C)    TempSrc:   Oral    SpO2: 99% 100%     Weight:       Height:         Wt Readings from Last 1 Encounters:   05/09/21 87.6 kg (193 lb 2 oz)       Intake/Output Summary (Last 24 hours) at 5/9/2021 0949  Last data filed at 5/9/2021 0510  Gross per 24 hour   Intake 960 ml   Output 650 ml   Net 310 ml         Results Review:     CBC    Results from last 7 days   Lab Units 05/07/21  0414 05/06/21  0429 05/05/21  0517 05/04/21  1045 05/03/21  2023 05/03/21  1835 05/03/21  1513 05/03/21  0608 05/03/21  0229 05/02/21  1932   WBC 10*3/mm3 9.00 10.00 12.30* 13.60*  --   --   --  11.40* 11.00* 7.50   HEMOGLOBIN g/dL 11.6* 12.2 11.8* 11.8*  --   --   --  11.1* 10.4* 10.1*   HEMOGLOBIN, POC g/dL  --   --   --   --  11.4* 11.6* 11.1*  --   --   --    PLATELETS 10*3/mm3 227 168 140 123*  --   --   --  146 133* 212     Cr Clearance Estimated Creatinine Clearance: 54.4 mL/min (A) (by C-G formula based on SCr of 1.06 mg/dL (H)).  Coag   Results from last 7 days   Lab Units 05/05/21  0517 05/04/21  0403 05/03/21  0608 05/03/21  0229 05/02/21  1932   INR   --  1.04  --  1.09 0.99   APTT seconds 32.8* 28.8* 27.6* 27.3 24.0*     HbA1C   Lab Results   Component Value Date    HGBA1C 8.0 (H) 04/06/2021    HGBA1C 7.10 (H) 01/08/2021    HGBA1C 7.1 (H) 11/03/2020     Blood Glucose   Glucose   Date/Time Value Ref Range Status   05/09/2021 0829 126  (H) 70 - 105 mg/dL Final     Comment:     Serial Number: [id number]:  [id number]   05/08/2021 2038 356 (H) 70 - 105 mg/dL Final     Comment:     Serial Number: [id number]:  [id number]   05/08/2021 1719 358 (H) 70 - 105 mg/dL Final     Comment:     Serial Number: [id number]:  [id number]   05/08/2021 1631 333 (H) 70 - 105 mg/dL Final     Comment:     Serial Number: [id number]:  [id number]   05/08/2021 1135 169 (H) 70 - 105 mg/dL Final     Comment:     Serial Number: [id number]:  [id number]   05/08/2021 0734 125 (H) 70 - 105 mg/dL Final     Comment:     Serial Number: [id number]:  [id number]   05/07/2021 2028 363 (H) 70 - 105 mg/dL Final     Comment:     Serial Number: [id number]:  [id number]   05/07/2021 1955 360 (H) 70 - 105 mg/dL Final     Comment:     Serial Number: [id number]:  [id number]     Infection   Results from last 7 days   Lab Units 05/07/21  1059   RESPCX  Light growth (2+) Normal Respiratory Tammy: NO S.aureus/MRSA or Pseudomonas aeruginosa     CMP   Results from last 7 days   Lab Units 05/09/21  0304 05/08/21  0407 05/07/21  0414 05/06/21  0429 05/05/21  0517 05/04/21  0403 05/03/21  0759 05/03/21  0608 05/03/21  0229   SODIUM mmol/L 138 137 132* 131* 132* 138  --  141 140   POTASSIUM mmol/L 4.3 4.0 4.2 4.2 4.0 4.0 3.9 3.4* 3.6   CHLORIDE mmol/L 99 101 97* 98 99 105  --  107 107   CO2 mmol/L 29.0 27.0 25.0 22.0 22.0 23.0  --  21.0* 21.0*   BUN mg/dL 18 20 25* 25* 27* 24*  --  23 21   CREATININE mg/dL 1.06* 0.91 1.07* 0.89 1.10* 1.21*  --  1.34* 1.29*   GLUCOSE mg/dL 95 130* 134* 222* 109* 129*  --  131* 151*   ALBUMIN g/dL  --   --   --   --   --  3.70  --  4.00 3.80     ABG    Results from last 7 days   Lab Units 05/04/21  1528 05/03/21  2023 05/03/21  1835 05/03/21  1513 05/03/21  1122 05/03/21  1010 05/03/21  0850   PH, ARTERIAL pH units 7.321* 7.320* 7.275* 7.272* 7.366 7.309* 7.334*   PCO2, ARTERIAL mm Hg 42.8 42.4 51.6* 52.9* 37.4 44.7 41.8   PO2 ART  mm Hg 82.1* 78.0* 93.9 92.1 75.1* 77.0* 73.9*   O2 SATURATION ART % 95.0 94.3 96.0 95.7 94.5 93.8* 93.6*   BASE EXCESS ART mmol/L -3.8* -4.1* -3.2* -2.9* -3.5* -3.8* -3.4*     UA        WILLIE  No results found for: POCMETH, POCAMPHET, POCBARBITUR, POCBENZO, POCCOCAINE, POCOPIATES, POCOXYCODO, POCPHENCYC, POCPROPOXY, POCTHC, POCTRICYC  Lysis Labs   Results from last 7 days   Lab Units 05/09/21  0304 05/08/21  0407 05/07/21  0414 05/06/21  0429 05/05/21  0517 05/04/21  1045 05/04/21  0403 05/03/21  2023 05/03/21  1835 05/03/21  1513 05/03/21  0608 05/03/21  0229 05/02/21  2250 05/02/21  1932   INR   --   --   --   --   --   --  1.04  --   --   --   --  1.09  --  0.99   APTT seconds  --   --   --   --  32.8*  --  28.8*  --   --   --  27.6* 27.3  --  24.0*   FIBRINOGEN mg/dL  --   --   --   --   --   --   --   --   --   --   --  226  --   --    HEMOGLOBIN g/dL  --   --  11.6* 12.2 11.8* 11.8*  --   --   --   --  11.1* 10.4*  --  10.1*   HEMOGLOBIN, POC g/dL  --   --   --   --   --   --   --  11.4* 11.6* 11.1*  --   --   --   --    PLATELETS 10*3/mm3  --   --  227 168 140 123*  --   --   --   --  146 133*  --  212   CREATININE mg/dL 1.06* 0.91 1.07* 0.89 1.10*  --  1.21*  --   --   --  1.34* 1.29*   < >  --     < > = values in this interval not displayed.     Radiology(recent) XR Chest PA & Lateral    Result Date: 5/7/2021  Stable appearance of small to moderate right and small left pleural effusions with underlying atelectasis and/or pneumonia.   Electronically Signed By-Kacey Antunez MD On:5/7/2021 12:26 PM This report was finalized on [id number] by  Kacey Antunez MD.              Imaging Results (Last 24 Hours)       ** No results found for the last 24 hours. **            Cardiac Studies:  Echo- Results for orders placed in visit on 05/02/21    Emergent/Open-Heart Anesthesia EMILIA    Narrative  Preanesthesia Checklist:  Patient identified, IV assessed, risks and benefits discussed, monitors and equipment assessed,  procedure being performed at surgeon's request and anesthesia consent obtained.    General Procedure Information  EMILIA Placed for monitoring purposes only -- This is not a diagnostic EMILIA    Stress Myoview-  Cath-    NOTE: The following portions of the patient's note were reviewed, confirmed and/or updated this visit as appropriate: History of present illness/Interval history, physical examination, assessment & plan, allergies, current medications, past family history, past medical history, past social history, past surgical history and problem list.    Cassius Ordaz DO  05/09/21  09:49 EDT

## 2021-05-09 NOTE — PROGRESS NOTES
ICU Daily Progress Note    Multidisciplinary rounds: No acute events overnight.  Shortness of air improving.  Tolerating room air.  Postsurgical chest discomfort is well controlled except when coughing.  Reporting cough with thick, green expectoration.  Patient reports she is anxious about getting discharged    Assessment:  S/p Fall in bathroom, no head trauma no PTX  Status post emergency CABG x3 2/2 critical multivessel coronary artery disease      Obstructive sleep apnea  Diabetes mellitus  Hypertension  GERD    Recommendations:  Recommend CPAP use.  Patient can use home machine if she has it  Bronchodilators  Hemodynamic support  Diuresis per CV surgery    Sputum culture pending per CV surgery  Omnicef started empirically 5/7/21 x7 days  Continue Mucinex and duo nebs    DVT ppy lovenox  PUD ppy not indicated    Plan on rehab at discharge at the discretion of primary, probably tomorrow                     LOS: 7 days         Vital signs for last 24 hours:  Vitals:    05/09/21 0845 05/09/21 1000 05/09/21 1200 05/09/21 1332   BP: 153/55   148/52   BP Location:       Patient Position:       Pulse: 88 93 75 81   Resp:       Temp:   98.4 °F (36.9 °C)    TempSrc:   Oral    SpO2:  92% 92% 90%   Weight:       Height:           Intake/Output last 3 shifts:  I/O last 3 completed shifts:  In: 960 [P.O.:960]  Out: 1050 [Urine:1050]  Intake/Output this shift:  I/O this shift:  In: 740 [P.O.:720; I.V.:20]  Out: 400 [Urine:400]    Vent settings for last 24 hours:       Hemodynamic parameters for last 24 hours:       Radiology  Imaging Results (Last 24 Hours)     ** No results found for the last 24 hours. **          Labs:  Results from last 7 days   Lab Units 05/07/21  0414   WBC 10*3/mm3 9.00   HEMOGLOBIN g/dL 11.6*   HEMATOCRIT % 35.5   PLATELETS 10*3/mm3 227     Results from last 7 days   Lab Units 05/09/21  0304   SODIUM mmol/L 138   POTASSIUM mmol/L 4.3   CHLORIDE mmol/L 99   CO2 mmol/L 29.0   BUN mg/dL 18   CREATININE  mg/dL 1.06*   CALCIUM mg/dL 9.3   GLUCOSE mg/dL 95     Results from last 7 days   Lab Units 05/04/21  1528   PH, ARTERIAL pH units 7.321*   PO2 ART mm Hg 82.1*   PCO2, ARTERIAL mm Hg 42.8   HCO3 ART mmol/L 22.1     Results from last 7 days   Lab Units 05/04/21  0403 05/03/21  0608 05/03/21  0229   ALBUMIN g/dL 3.70 4.00 3.80             Results from last 7 days   Lab Units 05/04/21  0403   MAGNESIUM mg/dL 2.9*     Results from last 7 days   Lab Units 05/05/21  0517 05/04/21  0403 05/03/21  0608 05/03/21  0229 05/02/21  1932   INR   --  1.04  --  1.09 0.99   APTT seconds 32.8* 28.8* 27.6* 27.3 24.0*               Meds:   SCHEDULE  amLODIPine, 5 mg, Oral, Q24H  aspirin, 81 mg, Oral, Daily  atorvastatin, 20 mg, Oral, Nightly  cefdinir, 300 mg, Oral, Q12H  chlorhexidine, 15 mL, Mouth/Throat, Q12H  DULoxetine, 60 mg, Oral, Q PM  enoxaparin, 40 mg, Subcutaneous, Daily  furosemide, 40 mg, Oral, BID  guaiFENesin, 1,200 mg, Oral, Q12H  insulin glargine, 15 Units, Subcutaneous, Q12H  insulin lispro, 0-24 Units, Subcutaneous, 4x Daily With Meals & Nightly  ipratropium-albuterol, 3 mL, Nebulization, 4x Daily - RT  linagliptin, 5 mg, Oral, Daily  losartan, 100 mg, Oral, Q24H  metoprolol tartrate, 50 mg, Oral, Q12H  multivitamin, 1 tablet, Oral, Daily  mupirocin, 1 application, Each Nare, Q12H  polyethylene glycol, 17 g, Oral, BID  potassium chloride, 10 mEq, Oral, BID With Meals  povidone-iodine, , Topical, BID  rOPINIRole, 0.25 mg, Oral, Q PM  Scopolamine, 1 patch, Transdermal, Q72H  senna-docusate sodium, 2 tablet, Oral, BID  sodium chloride, 10 mL, Intravenous, Q12H      Infusions     PRNs  •  acetaminophen **OR** acetaminophen **OR** acetaminophen  •  ALPRAZolam  •  bisacodyl  •  cyclobenzaprine  •  dextrose  •  dextrose  •  diphenhydrAMINE  •  glucagon (human recombinant)  •  HYDROcodone-acetaminophen  •  insulin lispro **AND** insulin lispro  •  ipratropium-albuterol  •  magnesium hydroxide  •  magnesium sulfate **OR**  magnesium sulfate **OR** magnesium sulfate  •  melatonin  •  [DISCONTINUED] Morphine **AND** naloxone  •  ondansetron  •  pantoprazole  •  potassium chloride  •  potassium chloride **OR** potassium chloride **OR** potassium chloride  •  potassium chloride **OR** potassium chloride  •  sodium chloride  •  sucralfate  •  traMADol    Physical Exam:  Physical Exam  HENT:      Head: Normocephalic and atraumatic.   Eyes:      Pupils: Pupils are equal, round, and reactive to light.   Cardiovascular:      Rate and Rhythm: Normal rate and regular rhythm.      Heart sounds: No murmur heard.     Pulmonary:      Breath sounds: Rhonchi present. No wheezing or rales.   Abdominal:      General: Bowel sounds are normal. There is no distension.      Palpations: Abdomen is soft.      Tenderness: There is no abdominal tenderness. There is no guarding.   Musculoskeletal:      Right lower leg: No edema.      Left lower leg: No edema.   Skin:     General: Skin is warm and dry.      Comments: Sternal incision well approximated   Neurological:      Mental Status: She is alert and oriented to person, place, and time. Mental status is at baseline.         ROS  Review of Systems   Constitutional: Negative for activity change.   Respiratory: Positive for cough. Negative for shortness of breath.         Cough productive of green secretions  Mild OQUENDO   Gastrointestinal: Negative for abdominal pain, nausea and vomiting.         Critical Care Time greater than: 45 Minutes  Total time spent with patient greater than: 45 Minutes

## 2021-05-10 ENCOUNTER — APPOINTMENT (OUTPATIENT)
Dept: GENERAL RADIOLOGY | Facility: HOSPITAL | Age: 66
End: 2021-05-10

## 2021-05-10 ENCOUNTER — APPOINTMENT (OUTPATIENT)
Dept: INTERVENTIONAL RADIOLOGY/VASCULAR | Facility: HOSPITAL | Age: 66
End: 2021-05-10

## 2021-05-10 LAB
ANION GAP SERPL CALCULATED.3IONS-SCNC: 11 MMOL/L (ref 5–15)
BUN SERPL-MCNC: 20 MG/DL (ref 8–23)
BUN/CREAT SERPL: 18.7 (ref 7–25)
CALCIUM SPEC-SCNC: 9.4 MG/DL (ref 8.6–10.5)
CHLORIDE SERPL-SCNC: 101 MMOL/L (ref 98–107)
CO2 SERPL-SCNC: 28 MMOL/L (ref 22–29)
CREAT SERPL-MCNC: 1.07 MG/DL (ref 0.57–1)
DEPRECATED RDW RBC AUTO: 45.1 FL (ref 37–54)
ERYTHROCYTE [DISTWIDTH] IN BLOOD BY AUTOMATED COUNT: 14.4 % (ref 12.3–15.4)
GFR SERPL CREATININE-BSD FRML MDRD: 51 ML/MIN/1.73
GLUCOSE BLDC GLUCOMTR-MCNC: 115 MG/DL (ref 70–105)
GLUCOSE BLDC GLUCOMTR-MCNC: 173 MG/DL (ref 70–105)
GLUCOSE BLDC GLUCOMTR-MCNC: 246 MG/DL (ref 70–105)
GLUCOSE BLDC GLUCOMTR-MCNC: 263 MG/DL (ref 70–105)
GLUCOSE SERPL-MCNC: 109 MG/DL (ref 65–99)
HCT VFR BLD AUTO: 36.7 % (ref 34–46.6)
HGB BLD-MCNC: 12.1 G/DL (ref 12–15.9)
MCH RBC QN AUTO: 29.5 PG (ref 26.6–33)
MCHC RBC AUTO-ENTMCNC: 32.9 G/DL (ref 31.5–35.7)
MCV RBC AUTO: 89.5 FL (ref 79–97)
PLATELET # BLD AUTO: 346 10*3/MM3 (ref 140–450)
PMV BLD AUTO: 6.7 FL (ref 6–12)
POTASSIUM SERPL-SCNC: 4.4 MMOL/L (ref 3.5–5.2)
RBC # BLD AUTO: 4.1 10*6/MM3 (ref 3.77–5.28)
SODIUM SERPL-SCNC: 140 MMOL/L (ref 136–145)
WBC # BLD AUTO: 10.3 10*3/MM3 (ref 3.4–10.8)

## 2021-05-10 PROCEDURE — 94799 UNLISTED PULMONARY SVC/PX: CPT

## 2021-05-10 PROCEDURE — 99024 POSTOP FOLLOW-UP VISIT: CPT | Performed by: NURSE PRACTITIONER

## 2021-05-10 PROCEDURE — 25010000003 LIDOCAINE 1 % SOLUTION: Performed by: RADIOLOGY

## 2021-05-10 PROCEDURE — 94760 N-INVAS EAR/PLS OXIMETRY 1: CPT

## 2021-05-10 PROCEDURE — 71045 X-RAY EXAM CHEST 1 VIEW: CPT

## 2021-05-10 PROCEDURE — 80048 BASIC METABOLIC PNL TOTAL CA: CPT | Performed by: THORACIC SURGERY (CARDIOTHORACIC VASCULAR SURGERY)

## 2021-05-10 PROCEDURE — 63710000001 INSULIN LISPRO (HUMAN) PER 5 UNITS: Performed by: INTERNAL MEDICINE

## 2021-05-10 PROCEDURE — 63710000001 INSULIN GLARGINE PER 5 UNITS: Performed by: INTERNAL MEDICINE

## 2021-05-10 PROCEDURE — 76942 ECHO GUIDE FOR BIOPSY: CPT

## 2021-05-10 PROCEDURE — 63710000001 INSULIN GLARGINE PER 5 UNITS: Performed by: NURSE PRACTITIONER

## 2021-05-10 PROCEDURE — 97110 THERAPEUTIC EXERCISES: CPT

## 2021-05-10 PROCEDURE — 82962 GLUCOSE BLOOD TEST: CPT

## 2021-05-10 PROCEDURE — 97116 GAIT TRAINING THERAPY: CPT

## 2021-05-10 PROCEDURE — 99232 SBSQ HOSP IP/OBS MODERATE 35: CPT | Performed by: INTERNAL MEDICINE

## 2021-05-10 PROCEDURE — C1729 CATH, DRAINAGE: HCPCS

## 2021-05-10 PROCEDURE — 0W993ZZ DRAINAGE OF RIGHT PLEURAL CAVITY, PERCUTANEOUS APPROACH: ICD-10-PCS | Performed by: RADIOLOGY

## 2021-05-10 PROCEDURE — 99222 1ST HOSP IP/OBS MODERATE 55: CPT | Performed by: INTERNAL MEDICINE

## 2021-05-10 PROCEDURE — 85027 COMPLETE CBC AUTOMATED: CPT | Performed by: THORACIC SURGERY (CARDIOTHORACIC VASCULAR SURGERY)

## 2021-05-10 PROCEDURE — 63710000001 INSULIN LISPRO (HUMAN) PER 5 UNITS: Performed by: NURSE PRACTITIONER

## 2021-05-10 RX ORDER — INSULIN LISPRO 100 [IU]/ML
0-12 INJECTION, SOLUTION INTRAVENOUS; SUBCUTANEOUS
Status: DISCONTINUED | OUTPATIENT
Start: 2021-05-10 | End: 2021-05-11 | Stop reason: HOSPADM

## 2021-05-10 RX ORDER — LIDOCAINE HYDROCHLORIDE 10 MG/ML
INJECTION, SOLUTION INFILTRATION; PERINEURAL
Status: COMPLETED | OUTPATIENT
Start: 2021-05-10 | End: 2021-05-10

## 2021-05-10 RX ORDER — METFORMIN HYDROCHLORIDE 500 MG/1
500 TABLET, EXTENDED RELEASE ORAL 2 TIMES DAILY WITH MEALS
Status: DISCONTINUED | OUTPATIENT
Start: 2021-05-10 | End: 2021-05-11 | Stop reason: HOSPADM

## 2021-05-10 RX ORDER — INSULIN GLARGINE 100 [IU]/ML
15 INJECTION, SOLUTION SUBCUTANEOUS NIGHTLY
Status: DISCONTINUED | OUTPATIENT
Start: 2021-05-10 | End: 2021-05-11 | Stop reason: HOSPADM

## 2021-05-10 RX ADMIN — FUROSEMIDE 40 MG: 40 TABLET ORAL at 08:20

## 2021-05-10 RX ADMIN — CEFDINIR 300 MG: 300 CAPSULE ORAL at 21:43

## 2021-05-10 RX ADMIN — MUPIROCIN 1 APPLICATION: 20 OINTMENT TOPICAL at 05:07

## 2021-05-10 RX ADMIN — METFORMIN HYDROCHLORIDE 500 MG: 500 TABLET, EXTENDED RELEASE ORAL at 17:21

## 2021-05-10 RX ADMIN — FUROSEMIDE 40 MG: 40 TABLET ORAL at 17:21

## 2021-05-10 RX ADMIN — GUAIFENESIN 1200 MG: 600 TABLET, EXTENDED RELEASE ORAL at 08:20

## 2021-05-10 RX ADMIN — POLYETHYLENE GLYCOL 3350 17 G: 17 POWDER, FOR SOLUTION ORAL at 08:20

## 2021-05-10 RX ADMIN — HYDROCODONE BITARTRATE AND ACETAMINOPHEN 1 TABLET: 5; 325 TABLET ORAL at 23:42

## 2021-05-10 RX ADMIN — POTASSIUM CHLORIDE 10 MEQ: 750 TABLET, EXTENDED RELEASE ORAL at 08:20

## 2021-05-10 RX ADMIN — POVIDONE-IODINE: 10 SOLUTION TOPICAL at 21:44

## 2021-05-10 RX ADMIN — ASPIRIN 81 MG: 81 TABLET, COATED ORAL at 08:19

## 2021-05-10 RX ADMIN — HYDROCODONE BITARTRATE AND ACETAMINOPHEN 1 TABLET: 5; 325 TABLET ORAL at 01:22

## 2021-05-10 RX ADMIN — INSULIN LISPRO 4 UNITS: 100 INJECTION, SOLUTION INTRAVENOUS; SUBCUTANEOUS at 11:51

## 2021-05-10 RX ADMIN — HYDROCODONE BITARTRATE AND ACETAMINOPHEN 1 TABLET: 5; 325 TABLET ORAL at 12:00

## 2021-05-10 RX ADMIN — ATORVASTATIN CALCIUM 20 MG: 20 TABLET, FILM COATED ORAL at 21:43

## 2021-05-10 RX ADMIN — HYDROCODONE BITARTRATE AND ACETAMINOPHEN 1 TABLET: 5; 325 TABLET ORAL at 05:55

## 2021-05-10 RX ADMIN — Medication 10 ML: at 21:45

## 2021-05-10 RX ADMIN — DOCUSATE SODIUM 50 MG AND SENNOSIDES 8.6 MG 2 TABLET: 8.6; 5 TABLET, FILM COATED ORAL at 21:43

## 2021-05-10 RX ADMIN — THERA TABS 1 TABLET: TAB at 08:19

## 2021-05-10 RX ADMIN — AMLODIPINE BESYLATE 5 MG: 5 TABLET ORAL at 08:20

## 2021-05-10 RX ADMIN — GUAIFENESIN 1200 MG: 600 TABLET, EXTENDED RELEASE ORAL at 21:43

## 2021-05-10 RX ADMIN — MUPIROCIN 1 APPLICATION: 20 OINTMENT TOPICAL at 17:22

## 2021-05-10 RX ADMIN — IPRATROPIUM BROMIDE AND ALBUTEROL SULFATE 3 ML: 2.5; .5 SOLUTION RESPIRATORY (INHALATION) at 06:32

## 2021-05-10 RX ADMIN — CEFDINIR 300 MG: 300 CAPSULE ORAL at 08:19

## 2021-05-10 RX ADMIN — HYDROCODONE BITARTRATE AND ACETAMINOPHEN 1 TABLET: 5; 325 TABLET ORAL at 19:41

## 2021-05-10 RX ADMIN — INSULIN LISPRO 6 UNITS: 100 INJECTION, SOLUTION INTRAVENOUS; SUBCUTANEOUS at 21:44

## 2021-05-10 RX ADMIN — METOPROLOL TARTRATE 50 MG: 50 TABLET, FILM COATED ORAL at 08:20

## 2021-05-10 RX ADMIN — IPRATROPIUM BROMIDE AND ALBUTEROL SULFATE 3 ML: 2.5; .5 SOLUTION RESPIRATORY (INHALATION) at 19:05

## 2021-05-10 RX ADMIN — DOCUSATE SODIUM 50 MG AND SENNOSIDES 8.6 MG 2 TABLET: 8.6; 5 TABLET, FILM COATED ORAL at 08:20

## 2021-05-10 RX ADMIN — INSULIN GLARGINE 15 UNITS: 100 INJECTION, SOLUTION SUBCUTANEOUS at 08:20

## 2021-05-10 RX ADMIN — IPRATROPIUM BROMIDE AND ALBUTEROL SULFATE 3 ML: 2.5; .5 SOLUTION RESPIRATORY (INHALATION) at 14:32

## 2021-05-10 RX ADMIN — ROPINIROLE HYDROCHLORIDE 0.25 MG: 0.25 TABLET, FILM COATED ORAL at 17:21

## 2021-05-10 RX ADMIN — INSULIN LISPRO 4 UNITS: 100 INJECTION, SOLUTION INTRAVENOUS; SUBCUTANEOUS at 17:21

## 2021-05-10 RX ADMIN — POVIDONE-IODINE: 10 SOLUTION TOPICAL at 08:19

## 2021-05-10 RX ADMIN — DULOXETINE HYDROCHLORIDE 60 MG: 30 CAPSULE, DELAYED RELEASE ORAL at 17:21

## 2021-05-10 RX ADMIN — Medication 10 ML: at 08:22

## 2021-05-10 RX ADMIN — INSULIN GLARGINE 15 UNITS: 100 INJECTION, SOLUTION SUBCUTANEOUS at 21:46

## 2021-05-10 RX ADMIN — SCOPALAMINE 1 PATCH: 1 PATCH, EXTENDED RELEASE TRANSDERMAL at 09:30

## 2021-05-10 RX ADMIN — POLYETHYLENE GLYCOL 3350 17 G: 17 POWDER, FOR SOLUTION ORAL at 21:44

## 2021-05-10 RX ADMIN — LINAGLIPTIN 5 MG: 5 TABLET, FILM COATED ORAL at 08:20

## 2021-05-10 RX ADMIN — LOSARTAN POTASSIUM 100 MG: 50 TABLET, FILM COATED ORAL at 08:19

## 2021-05-10 RX ADMIN — POTASSIUM CHLORIDE 10 MEQ: 750 TABLET, EXTENDED RELEASE ORAL at 17:21

## 2021-05-10 RX ADMIN — LIDOCAINE HYDROCHLORIDE 7 ML: 10 INJECTION, SOLUTION INFILTRATION; PERINEURAL at 15:16

## 2021-05-10 RX ADMIN — METOPROLOL TARTRATE 75 MG: 25 TABLET, FILM COATED ORAL at 21:44

## 2021-05-10 NOTE — THERAPY TREATMENT NOTE
Subjective: Pt agreeable to therapeutic plan of care.    Objective:     Bed mobility - Min-A VC for sternal precautions  Transfers - CGA  Ambulation - 65 feet CGA and with rolling walker with multiple standing rest breaks    Pain: 7 VAS sternal incision  Education: Provided education on importance of mobility and skilled verbal / tactile cueing throughout intervention.     Assessment: Marge Mcghee presents with functional mobility impairments which indicate the need for skilled intervention. Tolerating session today without incident. Pt with minimal increase in ambulation distance this date. Pt is self limiting ambulation distance due to fatigue this date. Will continue to follow and progress as tolerated.     Plan/Recommendations:   Pt would benefit from Inpatient Rehabilitation placement at discharge from facility and requires no DME at discharge.   Pt desires Inpatient Rehabilitation placement at discharge. Pt cooperative; agreeable to therapeutic recommendations and plan of care.     Basic Mobility 6-click:  Rollin = Total, A lot = 2, A little = 3; 4 = None  Supine>Sit:   1 = Total, A lot = 2, A little = 3; 4 = None   Sit>Stand with arms:  1 = Total, A lot = 2, A little = 3; 4 = None  Bed>Chair:   1 = Total, A lot = 2, A little = 3; 4 = None  Ambulate in room:  1 = Total, A lot = 2, A little = 3; 4 = None  3-5 Steps with railin = Total, A lot = 2, A little = 3; 4 = None  Score: 16      Post-Tx Position: Up in Chair, Alarms activated and Call light and personal items within reach  PPE: gloves, surgical mask, eyewear protection

## 2021-05-10 NOTE — PROGRESS NOTES
"PULMONARY CRITICAL CARE Progress  NOTE      PATIENT IDENTIFICATION:  Name: Marge Mcghee  MRN: [id number]  :  1955     Age: 66 y.o.  Sex: female    DATE OF Note:  5/10/2021   Referring Physician: David Moya MD                  Subjective:   on RA  no SOB no chest pain, no nausea or vomiting, no change in bowel habit, no dysuria,  no new  skin rash or itching.      Objective:  tMax 24 hrs: Temp (24hrs), Av °F (36.7 °C), Min:97.5 °F (36.4 °C), Max:98.5 °F (36.9 °C)      Vitals Ranges:   Temp:  [97.5 °F (36.4 °C)-98.5 °F (36.9 °C)] 98.5 °F (36.9 °C)  Heart Rate:  [75-97] 79  Resp:  [16-18] 18  BP: (125-174)/(40-77) 132/63    Intake and Output Last 3 Shifts:   I/O last 3 completed shifts:  In: 1100 [P.O.:1080; I.V.:20]  Out: 2400 [Urine:2400]    Exam:  /63   Pulse 79   Temp 98.5 °F (36.9 °C) (Oral)   Resp 18   Ht 162.6 cm (64\")   Wt 87.8 kg (193 lb 9 oz)   SpO2 (!) 88%   Breastfeeding No   BMI 33.23 kg/m²     General Appearance:   AAox3  HEENT:  Normocephalic, without obvious abnormality, Conjunctiva/corneas clear,.  Normal external ear canals, Nares normal, no drainage     Neck:  Supple, symmetrical, trachea midline. No JVD.  Lungs /Chest wall:   Bilateral basal rhonchi, respirations unlabored symmetrical wall movement.     Heart:  Regular rate and rhythm, systolic murmur PMI left sternal border  Abdomen: Soft, non-tender, no masses, no organomegaly.    Extremities: Trace edema no clubbing or Cyanosis        Medications:    Current Facility-Administered Medications:   •  acetaminophen (TYLENOL) tablet 650 mg, 650 mg, Oral, Q4H PRN, 650 mg at 21 1734 **OR** acetaminophen (TYLENOL) 160 MG/5ML solution 650 mg, 650 mg, Oral, Q4H PRN **OR** acetaminophen (TYLENOL) suppository 650 mg, 650 mg, Rectal, Q4H PRN, Jr Rocael Alexander MD  •  amLODIPine (NORVASC) tablet 5 mg, 5 mg, Oral, Q24H, Sariah Rivera, CANDIDO, 5 mg at 05/10/21 0820  •  aspirin EC tablet 81 mg, 81 mg, Oral, Daily, " Jr Rocael Alexander MD, 81 mg at 05/10/21 0819  •  atorvastatin (LIPITOR) tablet 20 mg, 20 mg, Oral, Nightly, Chacho Esteban MD, 20 mg at 05/09/21 2110  •  bisacodyl (DULCOLAX) suppository 10 mg, 10 mg, Rectal, Daily PRN, Jr Rocael Alexander MD  •  cefdinir (OMNICEF) capsule 300 mg, 300 mg, Oral, Q12H, Artem Germanitha SHANTI, APRN, 300 mg at 05/10/21 0819  •  chlorhexidine (PERIDEX) 0.12 % solution 15 mL, 15 mL, Mouth/Throat, Q12H, Jr Rocael Alexander MD, 15 mL at 05/09/21 2110  •  cyclobenzaprine (FLEXERIL) tablet 5 mg, 5 mg, Oral, Q8H PRN, Sariah Rivera, APRN, 5 mg at 05/09/21 1741  •  dextrose (D50W) 25 g/ 50mL Intravenous Solution 25 g, 25 g, Intravenous, Q15 Min PRN, Shanell Tucker MD  •  dextrose (GLUTOSE) oral gel 15 g, 15 g, Oral, Q15 Min PRN, Shanell Tucker MD  •  diphenhydrAMINE (BENADRYL) capsule 25 mg, 25 mg, Oral, Nightly PRN, Jr Rocael Alexander MD, 25 mg at 05/09/21 2121  •  DULoxetine (CYMBALTA) DR capsule 60 mg, 60 mg, Oral, Q PM, Jr Rocael Alexander MD, 60 mg at 05/09/21 1606  •  enoxaparin (LOVENOX) syringe 40 mg, 40 mg, Subcutaneous, Daily, Jr Rocael Alexander MD, 40 mg at 05/09/21 1605  •  furosemide (LASIX) tablet 40 mg, 40 mg, Oral, BID, Monserrat Roula L, APRN, 40 mg at 05/10/21 0820  •  glucagon (human recombinant) (GLUCAGEN DIAGNOSTIC) injection 1 mg, 1 mg, Subcutaneous, Q15 Min PRN, Shanell Tucker MD  •  guaiFENesin (MUCINEX) 12 hr tablet 1,200 mg, 1,200 mg, Oral, Q12H, Roula German APRN, 1,200 mg at 05/10/21 0820  •  HYDROcodone-acetaminophen (NORCO) 5-325 MG per tablet 1 tablet, 1 tablet, Oral, Q4H PRN, Sariah Rivera, APRN, 1 tablet at 05/10/21 0555  •  insulin glargine (LANTUS, SEMGLEE) injection 15 Units, 15 Units, Subcutaneous, Q12H, Roula German, APRN, 15 Units at 05/10/21 0820  •  insulin lispro (ADMELOG) injection 0-24 Units, 0-24 Units, Subcutaneous, 4x Daily With Meals & Nightly, 4 Units at 05/10/21 1151 **AND** insulin lispro  (ADMELOG) injection 0-24 Units, 0-24 Units, Subcutaneous, PRN, Satterly-Vinay, Roula L, APRN  •  ipratropium-albuterol (DUO-NEB) nebulizer solution 3 mL, 3 mL, Nebulization, 4x Daily - RT, Satterly-Vinay, Roula L, APRN, 3 mL at 05/10/21 0632  •  ipratropium-albuterol (DUO-NEB) nebulizer solution 3 mL, 3 mL, Nebulization, Q4H PRN, Satterly-Vinay, Roula L, APRN  •  linagliptin (TRADJENTA) tablet 5 mg, 5 mg, Oral, Daily, Satterly-Vinay, Roula L, APRN, 5 mg at 05/10/21 0820  •  losartan (COZAAR) tablet 100 mg, 100 mg, Oral, Q24H, Rodríguez Ulrich MD, 100 mg at 05/10/21 0819  •  magnesium hydroxide (MILK OF MAGNESIA) suspension 2400 mg/10mL 10 mL, 10 mL, Oral, Daily PRN, Jr Rocael Alexander MD  •  Magnesium Sulfate 2 gram Bolus, followed by 8 gram infusion (total Mg dose 10 grams)- Mg less than or equal to 1mg/dL, 2 g, Intravenous, PRN **OR** Magnesium Sulfate 2 gram / 50mL Infusion (GIVE X 3 BAGS TO EQUAL 6GM TOTAL DOSE) - Mg 1.1 - 1.5 mg/dl, 2 g, Intravenous, PRN **OR** Magnesium Sulfate 4 gram infusion- Mg 1.6-1.9 mg/dL, 4 g, Intravenous, PRN, Jr Rocael Alexander MD  •  melatonin tablet 5 mg, 5 mg, Oral, Nightly PRN, Jr Rocael Alexander MD, 5 mg at 05/09/21 2351  •  metoprolol tartrate (LOPRESSOR) tablet 50 mg, 50 mg, Oral, Q12H, Cassius Ordaz DO, 50 mg at 05/10/21 0820  •  multivitamin (THERAGRAN) tablet 1 tablet, 1 tablet, Oral, Daily, Jr Rocael Alexander MD, 1 tablet at 05/10/21 0819  •  mupirocin (BACTROBAN) 2 % nasal ointment 1 application, 1 application, Each Nare, Q12H, Jr Rocael Alexander MD, 1 application at 05/10/21 0507  •  [DISCONTINUED] Morphine sulfate (PF) injection 2 mg, 2 mg, Intravenous, Q4H PRN **AND** naloxone (NARCAN) injection 0.4 mg, 0.4 mg, Intravenous, Q5 Min PRN, Sariah Rivera APRN  •  ondansetron (ZOFRAN) injection 4 mg, 4 mg, Intravenous, Q6H PRN, Jr Rocael Alexander MD, 4 mg at 05/04/21 0915  •  pantoprazole (PROTONIX) EC tablet 40 mg, 40 mg,  Oral, Daily PRN, Jr Rocael Alexander MD  •  polyethylene glycol (MIRALAX) packet 17 g, 17 g, Oral, BID, Roula German APRN, 17 g at 05/10/21 0820  •  potassium chloride (K-DUR,KLOR-CON) CR tablet 40 mEq, 40 mEq, Oral, PRN, Jr Rocael Alexander MD  •  potassium chloride (K-DUR,KLOR-CON) CR tablet 40 mEq, 40 mEq, Oral, PRN **OR** potassium chloride (KLOR-CON) packet 40 mEq, 40 mEq, Oral, PRN **OR** potassium chloride 10 mEq in 100 mL IVPB, 10 mEq, Intravenous, Q1H PRN, Jr Rocael Alexander MD  •  potassium chloride (K-DUR,KLOR-CON) ER tablet 10 mEq, 10 mEq, Oral, BID With Meals, Roula German, APRN, 10 mEq at 05/10/21 0820  •  potassium chloride 10 mEq in 100 mL IVPB, 10 mEq, Intravenous, Q1H PRN **OR** potassium chloride 10 mEq in 100 mL IVPB, 10 mEq, Intravenous, Q1H PRN, Jr Rocael Alexander MD, Last Rate: 100 mL/hr at 05/03/21 0637, 10 mEq at 05/03/21 0637  •  povidone-iodine (BETADINE) external solution, , Topical, BID, Roula German APRN, Given at 05/10/21 0819  •  rOPINIRole (REQUIP) tablet 0.25 mg, 0.25 mg, Oral, Q PM, Jr Rocael Alexander MD, 0.25 mg at 05/09/21 1606  •  Scopolamine (TRANSDERM-SCOP) 1.5 MG/3DAYS patch 1 patch, 1 patch, Transdermal, Q72H, Sariah Rivera APRN, 1 patch at 05/10/21 0930  •  sennosides-docusate (PERICOLACE) 8.6-50 MG per tablet 2 tablet, 2 tablet, Oral, BID, Jr Rocael Alexander MD, 2 tablet at 05/10/21 0820  •  sodium chloride 0.9 % flush 10 mL, 10 mL, Intravenous, Q12H, Jr Rocael Alexander MD, 10 mL at 05/10/21 0822  •  sodium chloride 0.9 % flush 10 mL, 10 mL, Intravenous, PRN, Jr Rocael Alexander MD  •  sucralfate (CARAFATE) tablet 1 g, 1 g, Oral, BID PRN, Jr Rocael Alexander MD, 1 g at 05/02/21 0430  •  traMADol (ULTRAM) tablet 50 mg, 50 mg, Oral, Q6H PRN, Mi Pacheco APRN, 50 mg at 05/06/21 0855    Data Review:  All labs (24hrs):   Recent Results (from the past 24 hour(s))   POC Glucose Once    Collection Time:  05/09/21 12:28 PM    Specimen: Blood   Result Value Ref Range    Glucose 161 (H) 70 - 105 mg/dL   POC Glucose Once    Collection Time: 05/09/21  5:07 PM    Specimen: Blood   Result Value Ref Range    Glucose 255 (H) 70 - 105 mg/dL   POC Glucose Once    Collection Time: 05/09/21  8:05 PM    Specimen: Blood   Result Value Ref Range    Glucose 336 (H) 70 - 105 mg/dL   Basic Metabolic Panel    Collection Time: 05/10/21  6:28 AM    Specimen: Blood   Result Value Ref Range    Glucose 109 (H) 65 - 99 mg/dL    BUN 20 8 - 23 mg/dL    Creatinine 1.07 (H) 0.57 - 1.00 mg/dL    Sodium 140 136 - 145 mmol/L    Potassium 4.4 3.5 - 5.2 mmol/L    Chloride 101 98 - 107 mmol/L    CO2 28.0 22.0 - 29.0 mmol/L    Calcium 9.4 8.6 - 10.5 mg/dL    eGFR Non African Amer 51 (L) >60 mL/min/1.73    BUN/Creatinine Ratio 18.7 7.0 - 25.0    Anion Gap 11.0 5.0 - 15.0 mmol/L   CBC (No Diff)    Collection Time: 05/10/21  6:28 AM    Specimen: Blood   Result Value Ref Range    WBC 10.30 3.40 - 10.80 10*3/mm3    RBC 4.10 3.77 - 5.28 10*6/mm3    Hemoglobin 12.1 12.0 - 15.9 g/dL    Hematocrit 36.7 34.0 - 46.6 %    MCV 89.5 79.0 - 97.0 fL    MCH 29.5 26.6 - 33.0 pg    MCHC 32.9 31.5 - 35.7 g/dL    RDW 14.4 12.3 - 15.4 %    RDW-SD 45.1 37.0 - 54.0 fl    MPV 6.7 6.0 - 12.0 fL    Platelets 346 140 - 450 10*3/mm3   POC Glucose Once    Collection Time: 05/10/21  7:57 AM    Specimen: Blood   Result Value Ref Range    Glucose 115 (H) 70 - 105 mg/dL   POC Glucose Once    Collection Time: 05/10/21 11:30 AM    Specimen: Blood   Result Value Ref Range    Glucose 173 (H) 70 - 105 mg/dL        Imaging:  XR Chest 1 View  Narrative:    DATE OF EXAM:   5/10/2021 5:06 AM     PROCEDURE:   XR CHEST 1 VW-     INDICATIONS:   PNA; R07.9-Chest pain, unspecified; R73.9-Hyperglycemia, unspecified;  I20.8-Other forms of angina pectoris     COMPARISON:  5/7/2021     TECHNIQUE:   Portable chest radiograph.     FINDINGS:    Sternotomy wires noted. There is opacity at the right lung  base likely  representing small right pleural effusion with right basilar airspace  disease. There is a small left effusion. No pneumothorax. Osseous  structures intact. Screws noted at the left humeral head.     Impression: 1. Moderate right and small left pleural effusion with bibasilar  airspace disease likely atelectasis and/or pneumonia, unchanged.     Electronically Signed By-Jas Salguero MD On:5/10/2021 7:25 AM  This report was finalized on [id number] by  Jas Salguero MD.       ASSESSMENT:  Acute resp distress  Pleural effusion   Chest pain    Type 2 diabetes mellitus with peripheral neuropathy (CMS/HCC)    Age-related osteoporosis without current pathological fracture    Essential hypertension    Mixed hyperlipidemia    Gastroesophageal reflux disease with esophagitis    Obesity (BMI 30-39.9)    GERD (gastroesophageal reflux disease)    Stable angina pectoris (CMS/HCC)     PLAN:  Thoracentesis today  eval for JANET as out pt   Bronchodilator  Inhaled corticosteroids  Electrolytes/ glycemic control  DVT and GI prophylaxis.    Total Critical care time in direct medical management (   ) minutes  David Moya MD. D, ABSM.     5/10/2021  12:23 EDT

## 2021-05-10 NOTE — PLAN OF CARE
Problem: Adult Inpatient Plan of Care  Goal: Plan of Care Review  5/10/2021 0150 by Barbara García RN  Outcome: Ongoing, Progressing  Flowsheets (Taken 5/10/2021 0150)  Outcome Summary: Pt is on room air and no continuous drips. VSS, plan is to go to [facility] tomorrow. Will continue to monitor.  5/10/2021 0150 by Barbara García RN  Outcome: Ongoing, Progressing  Flowsheets (Taken 5/10/2021 0150)  Outcome Summary: Pt is on room air and no continuous drips. VSS, plan is to go to [facility] tomorrow. Will continue to monitor.  Goal: Patient-Specific Goal (Individualized)  5/10/2021 0150 by Barbara García RN  Outcome: Ongoing, Progressing  5/10/2021 0150 by Barbara García RN  Outcome: Ongoing, Progressing  Goal: Absence of Hospital-Acquired Illness or Injury  5/10/2021 0150 by Barbara García RN  Outcome: Ongoing, Progressing  5/10/2021 0150 by Barbara García RN  Outcome: Ongoing, Progressing  Intervention: Identify and Manage Fall Risk  Recent Flowsheet Documentation  Taken 5/10/2021 0100 by Barbara García RN  Safety Promotion/Fall Prevention: safety round/check completed  Taken 5/10/2021 0000 by Barbara García RN  Safety Promotion/Fall Prevention:   activity supervised   assistive device/personal items within reach   clutter free environment maintained   fall prevention program maintained   lighting adjusted   muscle strengthening facilitated   nonskid shoes/slippers when out of bed   room organization consistent   safety round/check completed   toileting scheduled  Taken 5/9/2021 2300 by Barbara García RN  Safety Promotion/Fall Prevention: safety round/check completed  Taken 5/9/2021 2200 by Barbara García RN  Safety Promotion/Fall Prevention: safety round/check completed  Taken 5/9/2021 2100 by Barbara García RN  Safety Promotion/Fall Prevention: safety round/check completed  Taken 5/9/2021 2000 by Barbara García RN  Safety Promotion/Fall Prevention:   activity supervised   assistive  device/personal items within reach   clutter free environment maintained   fall prevention program maintained   lighting adjusted   mobility aid in reach   muscle strengthening facilitated   nonskid shoes/slippers when out of bed   room organization consistent   safety round/check completed   toileting scheduled  Taken 5/9/2021 1900 by Barbara García RN  Safety Promotion/Fall Prevention: safety round/check completed  Intervention: Prevent Skin Injury  Recent Flowsheet Documentation  Taken 5/10/2021 0100 by Barbara García RN  Body Position: position changed independently  Taken 5/10/2021 0000 by Barbara García RN  Skin Protection:   adhesive use limited   electrode sites changed   incontinence pads utilized   pulse oximeter probe site changed   silicone foam dressing in place   skin-to-device areas padded   skin-to-skin areas padded   transparent dressing maintained   tubing/devices free from skin contact  Taken 5/9/2021 2300 by Barbara García RN  Body Position: position changed independently  Taken 5/9/2021 2100 by Barbara García RN  Body Position: other (see comments)  Taken 5/9/2021 2000 by Barbara García RN  Skin Protection:   adhesive use limited   electrode sites changed   incontinence pads utilized   pulse oximeter probe site changed   silicone foam dressing in place   skin-to-device areas padded   skin-to-skin areas padded  Taken 5/9/2021 1900 by Barbara García RN  Body Position: position changed independently  Intervention: Prevent and Manage VTE (venous thromboembolism) Risk  Recent Flowsheet Documentation  Taken 5/9/2021 2000 by Barbara García RN  VTE Prevention/Management: (lovenox)   bilateral   dorsiflexion/plantar flexion performed  Goal: Optimal Comfort and Wellbeing  5/10/2021 0150 by Barbara García RN  Outcome: Ongoing, Progressing  5/10/2021 0150 by Barbara García RN  Outcome: Ongoing, Progressing  Intervention: Provide Person-Centered Care  Recent Flowsheet  Documentation  Taken 5/10/2021 0000 by Barbara García RN  Trust Relationship/Rapport:   care explained   choices provided   emotional support provided   empathic listening provided   questions answered   reassurance provided   thoughts/feelings acknowledged  Taken 5/9/2021 2000 by Barbara García RN  Trust Relationship/Rapport:   care explained   choices provided   emotional support provided   empathic listening provided   questions answered   reassurance provided   thoughts/feelings acknowledged  Goal: Readiness for Transition of Care  5/10/2021 0150 by Barbara García RN  Outcome: Ongoing, Progressing  5/10/2021 0150 by Barbara García RN  Outcome: Ongoing, Progressing     Problem: Diabetes Comorbidity  Goal: Blood Glucose Level Within Desired Range  5/10/2021 0150 by Barbara García RN  Outcome: Ongoing, Progressing  5/10/2021 0150 by Barbara García RN  Outcome: Ongoing, Progressing  Intervention: Maintain Glycemic Control  Recent Flowsheet Documentation  Taken 5/9/2021 2000 by Barbara García RN  Glycemic Management: blood glucose monitoring     Problem: Hypertension Comorbidity  Goal: Blood Pressure in Desired Range  5/10/2021 0150 by Barbara García RN  Outcome: Ongoing, Progressing  5/10/2021 0150 by Barbara García RN  Outcome: Ongoing, Progressing  Intervention: Maintain Hypertension-Management Strategies  Recent Flowsheet Documentation  Taken 5/10/2021 0000 by Barbara García RN  Medication Review/Management: medications reviewed  Taken 5/9/2021 2000 by Barbara García RN  Medication Review/Management: medications reviewed     Problem: Obstructive Sleep Apnea Risk or Actual (Comorbidity Management)  Goal: Unobstructed Breathing During Sleep  5/10/2021 0150 by Barbara García RN  Outcome: Ongoing, Progressing  5/10/2021 0150 by Barbara García RN  Outcome: Ongoing, Progressing     Problem: Pain Chronic (Persistent) (Comorbidity Management)  Goal: Acceptable Pain Control and  Functional Ability  5/10/2021 0150 by Barbara García RN  Outcome: Ongoing, Progressing  5/10/2021 0150 by Barbara García RN  Outcome: Ongoing, Progressing  Intervention: Manage Persistent Pain  Recent Flowsheet Documentation  Taken 5/10/2021 0000 by Barbara García RN  Medication Review/Management: medications reviewed  Taken 5/9/2021 2000 by Barbara García RN  Bowel Elimination Promotion:   adequate fluid intake promoted   ambulation promoted   privacy promoted  Sleep/Rest Enhancement:   consistent schedule promoted   awakenings minimized   regular sleep/rest pattern promoted   relaxation techniques promoted   room darkened  Medication Review/Management: medications reviewed  Intervention: Optimize Psychosocial Wellbeing  Recent Flowsheet Documentation  Taken 5/10/2021 0000 by Barbara García RN  Supportive Measures:   active listening utilized   goal setting facilitated   positive reinforcement provided   self-care encouraged  Diversional Activities: smartphone  Family/Support System Care:   support provided   involvement promoted  Taken 5/9/2021 2000 by Barbara García RN  Supportive Measures:   active listening utilized   positive reinforcement provided   relaxation techniques promoted   self-care encouraged  Diversional Activities: television  Family/Support System Care:   self-care encouraged   support provided     Problem: Skin Injury Risk Increased  Goal: Skin Health and Integrity  5/10/2021 0150 by Barbara García RN  Outcome: Ongoing, Progressing  5/10/2021 0150 by Barbara García RN  Outcome: Ongoing, Progressing  Intervention: Optimize Skin Protection  Recent Flowsheet Documentation  Taken 5/10/2021 0000 by Barbara García RN  Pressure Reduction Techniques:   frequent weight shift encouraged   positioned off wounds   pressure points protected   weight shift assistance provided  Pressure Reduction Devices:   positioning supports utilized   pressure-redistributing mattress  utilized  Skin Protection:   adhesive use limited   electrode sites changed   incontinence pads utilized   pulse oximeter probe site changed   silicone foam dressing in place   skin-to-device areas padded   skin-to-skin areas padded   transparent dressing maintained   tubing/devices free from skin contact  Taken 5/9/2021 2000 by Barbara García RN  Pressure Reduction Techniques:   frequent weight shift encouraged   positioned off wounds   pressure points protected   weight shift assistance provided  Pressure Reduction Devices:   positioning supports utilized   pressure-redistributing mattress utilized  Skin Protection:   adhesive use limited   electrode sites changed   incontinence pads utilized   pulse oximeter probe site changed   silicone foam dressing in place   skin-to-device areas padded   skin-to-skin areas padded     Problem: Fall Injury Risk  Goal: Absence of Fall and Fall-Related Injury  5/10/2021 0150 by Barbara García RN  Outcome: Ongoing, Progressing  5/10/2021 0150 by Barbara García RN  Outcome: Ongoing, Progressing  Intervention: Identify and Manage Contributors to Fall Injury Risk  Recent Flowsheet Documentation  Taken 5/10/2021 0000 by Barbara García RN  Medication Review/Management: medications reviewed  Taken 5/9/2021 2000 by Barbara García RN  Medication Review/Management: medications reviewed  Intervention: Promote Injury-Free Environment  Recent Flowsheet Documentation  Taken 5/10/2021 0100 by Barbara García RN  Safety Promotion/Fall Prevention: safety round/check completed  Taken 5/10/2021 0000 by Barbara García RN  Safety Promotion/Fall Prevention:   activity supervised   assistive device/personal items within reach   clutter free environment maintained   fall prevention program maintained   lighting adjusted   muscle strengthening facilitated   nonskid shoes/slippers when out of bed   room organization consistent   safety round/check completed   toileting scheduled  Taken  5/9/2021 2300 by Barbara García RN  Safety Promotion/Fall Prevention: safety round/check completed  Taken 5/9/2021 2200 by Barbara García RN  Safety Promotion/Fall Prevention: safety round/check completed  Taken 5/9/2021 2100 by Barbara García RN  Safety Promotion/Fall Prevention: safety round/check completed  Taken 5/9/2021 2000 by Barbara García RN  Safety Promotion/Fall Prevention:   activity supervised   assistive device/personal items within reach   clutter free environment maintained   fall prevention program maintained   lighting adjusted   mobility aid in reach   muscle strengthening facilitated   nonskid shoes/slippers when out of bed   room organization consistent   safety round/check completed   toileting scheduled  Taken 5/9/2021 1900 by Barbara García RN  Safety Promotion/Fall Prevention: safety round/check completed     Problem: Activity Intolerance (Cardiovascular Surgery)  Goal: Improved Activity Tolerance  5/10/2021 0150 by Barbara García RN  Outcome: Ongoing, Progressing  5/10/2021 0150 by Barbara García RN  Outcome: Ongoing, Progressing  Intervention: Optimize Tolerance for Activity  Recent Flowsheet Documentation  Taken 5/10/2021 0000 by Barbara García RN  Environmental Support:   calm environment promoted   distractions minimized  Taken 5/9/2021 2000 by Barbara García RN  Environmental Support: calm environment promoted  Goal: Improved Activity Tolerance  5/10/2021 0150 by Barbara García RN  Outcome: Ongoing, Progressing  5/10/2021 0150 by Barbara García RN  Outcome: Ongoing, Progressing  Intervention: Optimize Tolerance for Activity  Recent Flowsheet Documentation  Taken 5/10/2021 0000 by Barbara García RN  Environmental Support:   calm environment promoted   distractions minimized  Taken 5/9/2021 2000 by Barbara García RN  Environmental Support: calm environment promoted     Problem: Adjustment to Surgery (Cardiovascular Surgery)  Goal: Optimal Coping  with Heart Surgery  5/10/2021 0150 by Barbara García RN  Outcome: Ongoing, Progressing  5/10/2021 0150 by Barbara García RN  Outcome: Ongoing, Progressing  Intervention: Support Patient/Family Psychosocial Response to Major Surgery  Recent Flowsheet Documentation  Taken 5/10/2021 0000 by Barbara García RN  Supportive Measures:   active listening utilized   goal setting facilitated   positive reinforcement provided   self-care encouraged  Family/Support System Care:   support provided   involvement promoted  Taken 5/9/2021 2000 by Barbara García RN  Supportive Measures:   active listening utilized   positive reinforcement provided   relaxation techniques promoted   self-care encouraged  Family/Support System Care:   self-care encouraged   support provided  Goal: Optimal Coping with Heart Surgery  5/10/2021 0150 by Barbara García RN  Outcome: Ongoing, Progressing  5/10/2021 0150 by Barbara García RN  Outcome: Ongoing, Progressing  Intervention: Support Patient/Family Psychosocial Response to Major Surgery  Recent Flowsheet Documentation  Taken 5/10/2021 0000 by Barbara García RN  Supportive Measures:   active listening utilized   goal setting facilitated   positive reinforcement provided   self-care encouraged  Family/Support System Care:   support provided   involvement promoted  Taken 5/9/2021 2000 by Barbara García RN  Supportive Measures:   active listening utilized   positive reinforcement provided   relaxation techniques promoted   self-care encouraged  Family/Support System Care:   self-care encouraged   support provided     Problem: Bleeding (Cardiovascular Surgery)  Goal: Absence of Bleeding  5/10/2021 0150 by Barbara García RN  Outcome: Ongoing, Progressing  5/10/2021 0150 by Barbara García RN  Outcome: Ongoing, Progressing  Goal: Absence of Bleeding  5/10/2021 0150 by Barbara García RN  Outcome: Ongoing, Progressing  5/10/2021 0150 by Barbara García RN  Outcome: Ongoing,  Progressing     Problem: Bowel Elimination Impaired (Cardiovascular Surgery)  Goal: Effective Bowel Elimination  5/10/2021 0150 by Barbara García RN  Outcome: Ongoing, Progressing  5/10/2021 0150 by Barbara García RN  Outcome: Ongoing, Progressing  Intervention: Promote Effective Bowel Elimination  Recent Flowsheet Documentation  Taken 5/9/2021 2000 by Barbara García RN  Bowel Elimination Promotion:   adequate fluid intake promoted   ambulation promoted   privacy promoted  Goal: Effective Bowel Elimination  5/10/2021 0150 by Barbara García RN  Outcome: Ongoing, Progressing  5/10/2021 0150 by Barbara García RN  Outcome: Ongoing, Progressing  Intervention: Promote Effective Bowel Elimination  Recent Flowsheet Documentation  Taken 5/9/2021 2000 by Barbara García RN  Bowel Elimination Promotion:   adequate fluid intake promoted   ambulation promoted   privacy promoted     Problem: Cardiac Function Impaired (Cardiovascular Surgery)  Goal: Effective Cardiac Function  5/10/2021 0150 by Barbara García RN  Outcome: Ongoing, Progressing  5/10/2021 0150 by Barbara García RN  Outcome: Ongoing, Progressing  Goal: Effective Cardiac Function  5/10/2021 0150 by Barbara García RN  Outcome: Ongoing, Progressing  5/10/2021 0150 by Barbara García RN  Outcome: Ongoing, Progressing     Problem: Cerebral Tissue Perfusion Risk (Cardiovascular Surgery)  Goal: Effective Cerebral Perfusion  5/10/2021 0150 by Barbara García RN  Outcome: Ongoing, Progressing  5/10/2021 0150 by Barbara García RN  Outcome: Ongoing, Progressing  Intervention: Protect and Optimize Cerebral Perfusion  Recent Flowsheet Documentation  Taken 5/10/2021 0000 by Barbara García RN  Sensory Stimulation Regulation:   care clustered   lighting decreased   quiet environment promoted  Taken 5/9/2021 2000 by Barbara García RN  Glycemic Management: blood glucose monitoring  Sensory Stimulation Regulation: care clustered  Goal: Effective  Cerebral Perfusion  5/10/2021 0150 by Barbara García RN  Outcome: Ongoing, Progressing  5/10/2021 0150 by Barbara García RN  Outcome: Ongoing, Progressing  Intervention: Protect and Optimize Cerebral Perfusion  Recent Flowsheet Documentation  Taken 5/10/2021 0000 by Barbara García RN  Sensory Stimulation Regulation:   care clustered   lighting decreased   quiet environment promoted  Taken 5/9/2021 2000 by Barbara García RN  Glycemic Management: blood glucose monitoring  Sensory Stimulation Regulation: care clustered     Problem: Fluid Imbalance (Cardiovascular Surgery)  Goal: Fluid Balance  5/10/2021 0150 by Barbara García RN  Outcome: Ongoing, Progressing  5/10/2021 0150 by Barbara García RN  Outcome: Ongoing, Progressing  Intervention: Monitor and Manage Fluid Balance  Recent Flowsheet Documentation  Taken 5/9/2021 2000 by Barbara García RN  Fluid/Electrolyte Management: fluids provided  Goal: Fluid Balance  5/10/2021 0150 by Barbara García RN  Outcome: Ongoing, Progressing  5/10/2021 0150 by Barbara García RN  Outcome: Ongoing, Progressing  Intervention: Monitor and Manage Fluid Balance  Recent Flowsheet Documentation  Taken 5/9/2021 2000 by Barbara García RN  Fluid/Electrolyte Management: fluids provided     Problem: Infection (Cardiovascular Surgery)  Goal: Absence of Infection Signs and Symptoms  5/10/2021 0150 by Barbara García RN  Outcome: Ongoing, Progressing  5/10/2021 0150 by Barbara García RN  Outcome: Ongoing, Progressing  Intervention: Prevent or Manage Infection  Recent Flowsheet Documentation  Taken 5/9/2021 2000 by Barbara García RN  Glycemic Management: blood glucose monitoring  Goal: Absence of Infection Signs and Symptoms  5/10/2021 0150 by Barbara García RN  Outcome: Ongoing, Progressing  5/10/2021 0150 by Barbara García RN  Outcome: Ongoing, Progressing  Intervention: Prevent or Manage Infection  Recent Flowsheet Documentation  Taken 5/9/2021 2000 by  Barbara García RN  Glycemic Management: blood glucose monitoring     Problem: Ongoing Anesthesia Effects (Cardiovascular Surgery)  Goal: Anesthesia/Sedation Recovery  5/10/2021 0150 by Barbara García RN  Outcome: Ongoing, Progressing  5/10/2021 0150 by Barbara García RN  Outcome: Ongoing, Progressing  Intervention: Optimize Anesthesia Recovery  Recent Flowsheet Documentation  Taken 5/10/2021 0100 by Barbara García RN  Safety Promotion/Fall Prevention: safety round/check completed  Taken 5/10/2021 0000 by Barbara García RN  Safety Promotion/Fall Prevention:   activity supervised   assistive device/personal items within reach   clutter free environment maintained   fall prevention program maintained   lighting adjusted   muscle strengthening facilitated   nonskid shoes/slippers when out of bed   room organization consistent   safety round/check completed   toileting scheduled  Reorientation Measures: clock in view  Taken 5/9/2021 2300 by Barbara García RN  Safety Promotion/Fall Prevention: safety round/check completed  Taken 5/9/2021 2200 by Barbara García RN  Safety Promotion/Fall Prevention: safety round/check completed  Taken 5/9/2021 2100 by Barbara García RN  Safety Promotion/Fall Prevention: safety round/check completed  Taken 5/9/2021 2000 by Barbara García RN  Safety Promotion/Fall Prevention:   activity supervised   assistive device/personal items within reach   clutter free environment maintained   fall prevention program maintained   lighting adjusted   mobility aid in reach   muscle strengthening facilitated   nonskid shoes/slippers when out of bed   room organization consistent   safety round/check completed   toileting scheduled  Reorientation Measures: clock in view  Taken 5/9/2021 1900 by Barbara García RN  Safety Promotion/Fall Prevention: safety round/check completed  Goal: Anesthesia/Sedation Recovery  5/10/2021 0150 by Barbara García RN  Outcome: Ongoing,  Progressing  5/10/2021 0150 by Barbara García RN  Outcome: Ongoing, Progressing  Intervention: Optimize Anesthesia Recovery  Recent Flowsheet Documentation  Taken 5/10/2021 0100 by Barbara García RN  Safety Promotion/Fall Prevention: safety round/check completed  Taken 5/10/2021 0000 by Barbara García RN  Safety Promotion/Fall Prevention:   activity supervised   assistive device/personal items within reach   clutter free environment maintained   fall prevention program maintained   lighting adjusted   muscle strengthening facilitated   nonskid shoes/slippers when out of bed   room organization consistent   safety round/check completed   toileting scheduled  Reorientation Measures: clock in view  Taken 5/9/2021 2300 by Barbara García RN  Safety Promotion/Fall Prevention: safety round/check completed  Taken 5/9/2021 2200 by Barbara García RN  Safety Promotion/Fall Prevention: safety round/check completed  Taken 5/9/2021 2100 by Barbara García RN  Safety Promotion/Fall Prevention: safety round/check completed  Taken 5/9/2021 2000 by Barbara García RN  Safety Promotion/Fall Prevention:   activity supervised   assistive device/personal items within reach   clutter free environment maintained   fall prevention program maintained   lighting adjusted   mobility aid in reach   muscle strengthening facilitated   nonskid shoes/slippers when out of bed   room organization consistent   safety round/check completed   toileting scheduled  Reorientation Measures: clock in view  Taken 5/9/2021 1900 by Barbara García RN  Safety Promotion/Fall Prevention: safety round/check completed     Problem: Pain (Cardiovascular Surgery)  Goal: Acceptable Pain Control  5/10/2021 0150 by Barbara García RN  Outcome: Ongoing, Progressing  5/10/2021 0150 by Barbara García RN  Outcome: Ongoing, Progressing  Goal: Acceptable Pain Control  5/10/2021 0150 by Barbara García RN  Outcome: Ongoing, Progressing  5/10/2021 0150  by Barbara García RN  Outcome: Ongoing, Progressing     Problem: Postoperative Nausea and Vomiting (Cardiovascular Surgery)  Goal: Nausea and Vomiting Relief  5/10/2021 0150 by Barbara García RN  Outcome: Ongoing, Progressing  5/10/2021 0150 by Barbara García RN  Outcome: Ongoing, Progressing  Goal: Nausea and Vomiting Relief  5/10/2021 0150 by Barbara García RN  Outcome: Ongoing, Progressing  5/10/2021 0150 by Barbara Gracía RN  Outcome: Ongoing, Progressing     Problem: Postoperative Urinary Retention (Cardiovascular Surgery)  Goal: Effective Urinary Elimination  5/10/2021 0150 by Barbara García RN  Outcome: Ongoing, Progressing  5/10/2021 0150 by Barbara García RN  Outcome: Ongoing, Progressing  Intervention: Monitor and Manage Urinary Retention  Recent Flowsheet Documentation  Taken 5/9/2021 2000 by Barbara García RN  Urinary Elimination Promotion:   toileting scheduled   toileting offered  Goal: Effective Urinary Elimination  5/10/2021 0150 by Barbara García RN  Outcome: Ongoing, Progressing  5/10/2021 0150 by Barbara García RN  Outcome: Ongoing, Progressing  Intervention: Monitor and Manage Urinary Retention  Recent Flowsheet Documentation  Taken 5/9/2021 2000 by Barbara García RN  Urinary Elimination Promotion:   toileting scheduled   toileting offered     Problem: Respiratory Compromise (Cardiovascular Surgery)  Goal: Effective Oxygenation and Ventilation  5/10/2021 0150 by Barbara García RN  Outcome: Ongoing, Progressing  5/10/2021 0150 by Barbara García RN  Outcome: Ongoing, Progressing  Intervention: Promote Airway Secretion Clearance  Recent Flowsheet Documentation  Taken 5/9/2021 2000 by Barbara García RN  Cough And Deep Breathing: done independently per patient  Goal: Effective Oxygenation and Ventilation  5/10/2021 0150 by Barbara García RN  Outcome: Ongoing, Progressing  5/10/2021 0150 by Barbara García RN  Outcome: Ongoing, Progressing  Intervention:  Promote Airway Secretion Clearance  Recent Flowsheet Documentation  Taken 5/9/2021 2000 by Barbara García RN  Cough And Deep Breathing: done independently per patient     Problem: Communication Impairment (Mechanical Ventilation, Invasive)  Goal: Effective Communication  5/10/2021 0150 by Barbara García RN  Outcome: Ongoing, Progressing  5/10/2021 0150 by Barbara García RN  Outcome: Ongoing, Progressing  Intervention: Ensure Effective Communication  Recent Flowsheet Documentation  Taken 5/10/2021 0000 by Barbara García RN  Communication Enhancement Strategies: call light answered in person  Taken 5/9/2021 2000 by Barbara García RN  Communication Enhancement Strategies: call light answered in person     Problem: Device-Related Complication Risk (Mechanical Ventilation, Invasive)  Goal: Optimal Device Function  5/10/2021 0150 by Barbara García RN  Outcome: Ongoing, Progressing  5/10/2021 0150 by Barbara García RN  Outcome: Ongoing, Progressing     Problem: Inability to Wean (Mechanical Ventilation, Invasive)  Goal: Mechanical Ventilation Liberation  5/10/2021 0150 by Barbara García RN  Outcome: Ongoing, Progressing  5/10/2021 0150 by Barbara García RN  Outcome: Ongoing, Progressing  Intervention: Promote Extubation and Mechanical Ventilation Liberation  Recent Flowsheet Documentation  Taken 5/10/2021 0000 by Barbara García RN  Environmental Support:   calm environment promoted   distractions minimized  Medication Review/Management: medications reviewed  Taken 5/9/2021 2000 by Barbara García RN  Environmental Support: calm environment promoted  Sleep/Rest Enhancement:   consistent schedule promoted   awakenings minimized   regular sleep/rest pattern promoted   relaxation techniques promoted   room darkened  Medication Review/Management: medications reviewed     Problem: Nutrition Impairment (Mechanical Ventilation, Invasive)  Goal: Optimal Nutrition Delivery  5/10/2021 0150 by Ricky  EDGARDO Jimenez  Outcome: Ongoing, Progressing  5/10/2021 0150 by Barbara García RN  Outcome: Ongoing, Progressing     Problem: Skin and Tissue Injury (Mechanical Ventilation, Invasive)  Goal: Absence of Device-Related Skin and Tissue Injury  5/10/2021 0150 by Barbara García RN  Outcome: Ongoing, Progressing  5/10/2021 0150 by Barbara García RN  Outcome: Ongoing, Progressing  Intervention: Maintain Skin and Tissue Health  Recent Flowsheet Documentation  Taken 5/10/2021 0000 by Barbara García RN  Device Skin Pressure Protection:   absorbent pad utilized/changed   adhesive use limited   positioning supports utilized   pressure points protected   skin-to-skin areas padded   skin-to-device areas padded  Taken 5/9/2021 2000 by Barbara García RN  Device Skin Pressure Protection:   absorbent pad utilized/changed   adhesive use limited   positioning supports utilized   pressure points protected   skin-to-device areas padded   skin-to-skin areas padded     Problem: Ventilator-Induced Lung Injury (Mechanical Ventilation, Invasive)  Goal: Absence of Ventilator-Induced Lung Injury  5/10/2021 0150 by Barbara García RN  Outcome: Ongoing, Progressing  5/10/2021 0150 by Barbara García RN  Outcome: Ongoing, Progressing     Problem: Respiratory Compromise  Goal: Optimal Oxygenation and Ventilation  5/10/2021 0150 by Barbara García RN  Outcome: Ongoing, Progressing  5/10/2021 0150 by Barbara García RN  Outcome: Ongoing, Progressing   Goal Outcome Evaluation:

## 2021-05-10 NOTE — PROGRESS NOTES
LOS: 8 days   Admiting Physician- Jr Rocael Alexander MD    Reason For Followup:    Left main disease  CAD  CABG x3  Hypertension    Subjective     Patient had slight cough.  Complain of shortness of breath.    Objective     Hemodynamics are stable    Review of Systems:   Review of Systems   Constitutional: Negative for chills and fever.   HENT: Negative for ear discharge and nosebleeds.    Eyes: Negative for discharge and redness.   Cardiovascular: Negative for chest pain, orthopnea, palpitations, paroxysmal nocturnal dyspnea and syncope.   Respiratory: Positive for shortness of breath. Negative for cough and wheezing.    Endocrine: Negative for heat intolerance.   Skin: Negative for rash.   Musculoskeletal: Negative for arthritis and myalgias.   Gastrointestinal: Negative for abdominal pain, melena, nausea and vomiting.   Genitourinary: Negative for dysuria and hematuria.   Neurological: Negative for dizziness, light-headedness, numbness and tremors.   Psychiatric/Behavioral: Negative for depression. The patient is not nervous/anxious.          Vital Signs  Vitals:    05/10/21 1643 05/10/21 1726 05/10/21 1905 05/10/21 1909   BP: 155/66      Pulse: 84 89 95 92   Resp:   16 16   Temp:       TempSrc:       SpO2: 94% 96% 94% 93%   Weight:       Height:         Wt Readings from Last 1 Encounters:   05/10/21 87.8 kg (193 lb 9 oz)       Intake/Output Summary (Last 24 hours) at 5/10/2021 1918  Last data filed at 5/10/2021 1704  Gross per 24 hour   Intake 840 ml   Output 3465 ml   Net -2625 ml     Physical Exam:  Constitutional:       Appearance: Well-developed.   Eyes:      General: No scleral icterus.        Right eye: No discharge.   HENT:      Head: Normocephalic and atraumatic.   Neck:      Thyroid: No thyromegaly.      Lymphadenopathy: No cervical adenopathy.   Pulmonary:      Effort: Pulmonary effort is normal. No respiratory distress.      Breath sounds: Decreased breath sounds present. No wheezing. No rales.    Cardiovascular:      Normal rate. Regular rhythm.      No gallop.   Edema:     Peripheral edema absent.   Abdominal:      Tenderness: There is no abdominal tenderness.   Skin:     Findings: No erythema or rash.   Neurological:      Mental Status: Alert and oriented to person, place, and time.         Results Review:   Lab Results (last 24 hours)     Procedure Component Value Units Date/Time    POC Glucose Once [[id number]]  (Abnormal) Collected: 05/10/21 1634    Specimen: Blood Updated: 05/10/21 1647     Glucose 246 mg/dL      Comment: Serial Number: [id number]:  [id number]       POC Glucose Once [[id number]]  (Abnormal) Collected: 05/10/21 1130    Specimen: Blood Updated: 05/10/21 1215     Glucose 173 mg/dL      Comment: Serial Number: [id number]:  [id number]       POC Glucose Once [[id number]]  (Abnormal) Collected: 05/10/21 0757    Specimen: Blood Updated: 05/10/21 0827     Glucose 115 mg/dL      Comment: Serial Number: [id number]:  [id number]       Basic Metabolic Panel [[id number]]  (Abnormal) Collected: 05/10/21 0628    Specimen: Blood Updated: 05/10/21 0716     Glucose 109 mg/dL      BUN 20 mg/dL      Creatinine 1.07 mg/dL      Sodium 140 mmol/L      Potassium 4.4 mmol/L      Chloride 101 mmol/L      CO2 28.0 mmol/L      Calcium 9.4 mg/dL      eGFR Non African Amer 51 mL/min/1.73      BUN/Creatinine Ratio 18.7     Anion Gap 11.0 mmol/L     Narrative:      GFR Normal >60  Chronic Kidney Disease <60  Kidney Failure <15      CBC (No Diff) [[id number]]  (Normal) Collected: 05/10/21 0628    Specimen: Blood Updated: 05/10/21 0656     WBC 10.30 10*3/mm3      RBC 4.10 10*6/mm3      Hemoglobin 12.1 g/dL      Hematocrit 36.7 %      MCV 89.5 fL      MCH 29.5 pg      MCHC 32.9 g/dL      RDW 14.4 %      RDW-SD 45.1 fl      MPV 6.7 fL      Platelets 346 10*3/mm3         Imaging Results (Last 72 Hours)     Procedure Component Value Units Date/Time    XR Chest 1 View [[id number]] Collected: 05/10/21 1538      Updated: 05/10/21 1541    Narrative:      DATE OF EXAM:  5/10/2021 3:35 PM     PROCEDURE:  XR CHEST 1 VW-     INDICATIONS:  s/p right thoracentesis; R07.9-Chest pain, unspecified;  R73.9-Hyperglycemia, unspecified; I20.8-Other forms of angina pectoris     COMPARISON:  05/10/2021 at 5:11 AM     TECHNIQUE:   Single radiographic AP view of the chest was obtained.     FINDINGS:  Examination is performed in expiration. There are postoperative changes  of prior CABG. There is mild right basilar atelectasis. The right  pleural effusion has completely been drained. There is no pneumothorax  identified.        Impression:      Interim drainage of the right-sided pleural effusion with no  pneumothorax. Mild right basilar atelectasis.     Electronically Signed By-Mikhail Mitchell MD On:5/10/2021 3:38 PM  This report was finalized on [id number] by  Mikhail Mitchell MD.    US Thoracentesis [[id number]] Collected: 05/10/21 1536    Specimen: Body Fluid Updated: 05/10/21 1540    Narrative:         DATE OF EXAM:   5/10/2021 3:04 PM     PROCEDURE:   US THORACENTESIS-     INDICATIONS:   Moderate Pleural Effusion; R07.9-Chest pain, unspecified;  R73.9-Hyperglycemia, unspecified; I20.8-Other forms of angina pectoris     COMPARISON:  Chest radiograph dated 05/10/2021 at 5:08 AM     TECHNIQUE:   The procedure, risks and options and alternatives were discussed with  the patient and informed written consent was obtained. The patient was  placed in the seated position and ultrasound performed showing a small  right-sided dependent pleural effusion. The skin was marked prepped and  draped and subsequently anesthetized 1% Xylocaine. A 5 Bulgarian Yueh  needle and catheter was then inserted in the right pleural space. A  total of 315 cc of straw-colored fluid was removed. The catheter was  removed and hemostasis achieved. A dressing was placed over the site.  The procedure was very well tolerated. Postprocedure chest radiograph  shows no pneumothorax           Impression:      IMPRESSION :   Technically successful right thoracentesis under ultrasound guidance     Electronically Signed By-Mikhail Mitchell MD On:5/10/2021 3:38 PM  This report was finalized on [id number] by  Mikhail Mitchell MD.    XR Chest 1 View [[id number]] Collected: 05/10/21 0724     Updated: 05/10/21 0728    Narrative:         DATE OF EXAM:   5/10/2021 5:06 AM     PROCEDURE:   XR CHEST 1 VW-     INDICATIONS:   PNA; R07.9-Chest pain, unspecified; R73.9-Hyperglycemia, unspecified;  I20.8-Other forms of angina pectoris     COMPARISON:  5/7/2021     TECHNIQUE:   Portable chest radiograph.     FINDINGS:    Sternotomy wires noted. There is opacity at the right lung base likely  representing small right pleural effusion with right basilar airspace  disease. There is a small left effusion. No pneumothorax. Osseous  structures intact. Screws noted at the left humeral head.       Impression:      1. Moderate right and small left pleural effusion with bibasilar  airspace disease likely atelectasis and/or pneumonia, unchanged.     Electronically Signed By-Jas Salguero MD On:5/10/2021 7:25 AM  This report was finalized on [id number] by  Jas Salguero MD.        ECG/EMG Results (most recent)     Procedure Component Value Units Date/Time    ECG 12 Lead [[id number]] Collected: 05/02/21 0325     Updated: 05/02/21 0326     QT Interval 412 ms     Narrative:      HEART RATE= 82  bpm  RR Interval= 728  ms  [place] Interval= 176  ms  P Horizontal Axis= -50  deg  P Front Axis= 44  deg  QRSD Interval= 82  ms  QT Interval= 412  ms  QRS Axis= -3  deg  T Wave Axis= 49  deg  - ABNORMAL ECG -  Sinus rhythm  Anteroseptal infarct, age indeterminate  Borderline ST depression, anterolateral leads  Electronically Signed By:   Date and Time of Study: 2021-05-02 03:25:00    ECG 12 Lead [[id number]] Collected: 05/01/21 2339     Updated: 05/02/21 0511     QT Interval 403 ms     Narrative:      HEART RATE= 98  bpm  RR Interval= 616  ms  OH  Interval= 167  ms  P Horizontal Axis= -2  deg  P Front Axis= 52  deg  QRSD Interval= 80  ms  QT Interval= 403  ms  QRS Axis= -3  deg  T Wave Axis= 170  deg  - ABNORMAL ECG -  Sinus rhythm  Atrial premature complex  Nonspecific repol abnormality, diffuse leads  Prolonged QT interval  Electronically Signed By:   Date and Time of Study: 2021-05-01 23:39:21    ECG 12 Lead [[id number]] Collected: 05/01/21 2233     Updated: 05/02/21 0825     QT Interval 345 ms     Narrative:      HEART RATE= 97  bpm  RR Interval= 616  ms  KS Interval= 166  ms  P Horizontal Axis= 9  deg  P Front Axis= 45  deg  QRSD Interval= 77  ms  QT Interval= 345  ms  QRS Axis= -11  deg  T Wave Axis= 110  deg  - ABNORMAL ECG -  Sinus rhythm  Inferior infarct, old  Nonspecific T abnormalities, lateral leads  When compared with ECG of 09-Jan-2021 17:37:56,  No significant change  Electronically Signed By: Mikhail Barbour (Lawson) 02-May-2021 08:23:48  Date and Time of Study: 2021-05-01 22:33:47    ECG 12 Lead [[id number]] Collected: 05/02/21 0920     Updated: 05/02/21 0922     QT Interval 479 ms     Narrative:      HEART RATE= 72  bpm  RR Interval= 828  ms  KS Interval= 173  ms  P Horizontal Axis= -48  deg  P Front Axis= 39  deg  QRSD Interval= 82  ms  QT Interval= 479  ms  QRS Axis= -10  deg  T Wave Axis= 38  deg  - ABNORMAL ECG -  Sinus rhythm  Consider anteroseptal infarct  Prolonged QT interval  Electronically Signed By:   Date and Time of Study: 2021-05-02 09:20:36    ECG 12 Lead [[id number]] Collected: 05/02/21 1804     Updated: 05/02/21 1853     QT Interval 438 ms     Narrative:      HEART RATE= 77  bpm  RR Interval= 780  ms  KS Interval= 173  ms  P Horizontal Axis= 9  deg  P Front Axis= 46  deg  QRSD Interval= 81  ms  QT Interval= 438  ms  QRS Axis= 5  deg  T Wave Axis= -57  deg  - ABNORMAL ECG -  Sinus rhythm  Nonspecific repol abnormality, diffuse leads  Electronically Signed By:   Date and Time of Study: 2021-05-02 18:04:07    ECG 12 Lead [[id number]]  Collected: 05/02/21 1907     Updated: 05/02/21 1910     QT Interval 440 ms     Narrative:      HEART RATE= 72  bpm  RR Interval= 832  ms  [place] Interval= 167  ms  P Horizontal Axis= 5  deg  P Front Axis= 35  deg  QRSD Interval= 82  ms  QT Interval= 440  ms  QRS Axis= -2  deg  T Wave Axis= 51  deg  - ABNORMAL ECG -  Sinus rhythm  Nonspecific repol abnormality, diffuse leads  Electronically Signed By:   Date and Time of Study: 2021-05-02 19:07:22    ECG 12 Lead [[id number]] Collected: 05/03/21 0247     Updated: 05/03/21 0249     QT Interval 466 ms     Narrative:      HEART RATE= 60  bpm  RR Interval= 1004  ms  [place] Interval= 205  ms  P Horizontal Axis= 9  deg  P Front Axis= 3  deg  QRSD Interval= 141  ms  QT Interval= 466  ms  QRS Axis= 86  deg  T Wave Axis= 10  deg  - ABNORMAL ECG -  Sinus rhythm  Ventricular premature complex  Right bundle branch block  Electronically Signed By:   Date and Time of Study: 2021-05-03 02:47:56    ECG 12 Lead [[id number]] Collected: 05/04/21 0230     Updated: 05/04/21 0231     QT Interval 410 ms     Narrative:      HEART RATE= 75  bpm  RR Interval= 800  ms  [place] Interval= 179  ms  P Horizontal Axis= 5  deg  P Front Axis= 26  deg  QRSD Interval= 132  ms  QT Interval= 410  ms  QRS Axis= 66  deg  T Wave Axis= -9  deg  - ABNORMAL ECG -  Sinus rhythm  Right bundle branch block  Electronically Signed By:   Date and Time of Study: 2021-05-04 02:30:15    ECG 12 Lead [[id number]] Collected: 05/05/21 0453     Updated: 05/05/21 0455     QT Interval 405 ms     Narrative:      HEART RATE= 73  bpm  RR Interval= 820  ms  [place] Interval= 168  ms  P Horizontal Axis= -15  deg  P Front Axis= 36  deg  QRSD Interval= 133  ms  QT Interval= 405  ms  QRS Axis= 61  deg  T Wave Axis= -10  deg  - ABNORMAL ECG -  Sinus rhythm  Right bundle branch block  Electronically Signed By:   Date and Time of Study: 2021-05-05 04:53:40    ECG 12 Lead [[id number]] Collected: 05/05/21 1828     Updated: 05/05/21 1832     QT Interval 389 ms      Narrative:      HEART RATE= 80  bpm  RR Interval= 748  ms  [place] Interval= 170  ms  P Horizontal Axis= 5  deg  P Front Axis= 38  deg  QRSD Interval= 130  ms  QT Interval= 389  ms  QRS Axis= 68  deg  T Wave Axis= -4  deg  - ABNORMAL ECG -  Sinus rhythm  Right bundle branch block  Electronically Signed By:   Date and Time of Study: 2021-05-05 18:28:10        CBC    Results from last 7 days   Lab Units 05/10/21  0628 05/07/21  0414 05/06/21  0429 05/05/21  0517 05/04/21  1045 05/03/21 2023   WBC 10*3/mm3 10.30 9.00 10.00 12.30* 13.60*  --    HEMOGLOBIN g/dL 12.1 11.6* 12.2 11.8* 11.8*  --    HEMOGLOBIN, POC g/dL  --   --   --   --   --  11.4*   PLATELETS 10*3/mm3 346 227 168 140 123*  --      BMP   Results from last 7 days   Lab Units 05/10/21  0628 05/09/21  0304 05/08/21  0407 05/07/21  0414 05/06/21  0429 05/05/21  0517 05/04/21  0403   SODIUM mmol/L 140 138 137 132* 131* 132* 138   POTASSIUM mmol/L 4.4 4.3 4.0 4.2 4.2 4.0 4.0   CHLORIDE mmol/L 101 99 101 97* 98 99 105   CO2 mmol/L 28.0 29.0 27.0 25.0 22.0 22.0 23.0   BUN mg/dL 20 18 20 25* 25* 27* 24*   CREATININE mg/dL 1.07* 1.06* 0.91 1.07* 0.89 1.10* 1.21*   GLUCOSE mg/dL 109* 95 130* 134* 222* 109* 129*   MAGNESIUM mg/dL  --   --   --   --   --   --  2.9*   PHOSPHORUS mg/dL  --   --   --   --   --   --  5.4*     CMP   Results from last 7 days   Lab Units 05/10/21  0628 05/09/21  0304 05/08/21  0407 05/07/21  0414 05/06/21  0429 05/05/21  0517 05/04/21  0403   SODIUM mmol/L 140 138 137 132* 131* 132* 138   POTASSIUM mmol/L 4.4 4.3 4.0 4.2 4.2 4.0 4.0   CHLORIDE mmol/L 101 99 101 97* 98 99 105   CO2 mmol/L 28.0 29.0 27.0 25.0 22.0 22.0 23.0   BUN mg/dL 20 18 20 25* 25* 27* 24*   CREATININE mg/dL 1.07* 1.06* 0.91 1.07* 0.89 1.10* 1.21*   GLUCOSE mg/dL 109* 95 130* 134* 222* 109* 129*   ALBUMIN g/dL  --   --   --   --   --   --  3.70     Cardiac Studies:  Echo- Results for orders placed in visit on 05/02/21    Emergent/Open-Heart Anesthesia  EMILIA    Narrative  Preanesthesia Checklist:  Patient identified, IV assessed, risks and benefits discussed, monitors and equipment assessed, procedure being performed at surgeon's request and anesthesia consent obtained.    General Procedure Information  EMILIA Placed for monitoring purposes only -- This is not a diagnostic EMILIA    Stress Myoview-  Cath-      Medication Review:   Scheduled Meds:amLODIPine, 5 mg, Oral, Q24H  aspirin, 81 mg, Oral, Daily  atorvastatin, 20 mg, Oral, Nightly  cefdinir, 300 mg, Oral, Q12H  chlorhexidine, 15 mL, Mouth/Throat, Q12H  DULoxetine, 60 mg, Oral, Q PM  enoxaparin, 40 mg, Subcutaneous, Daily  furosemide, 40 mg, Oral, BID  guaiFENesin, 1,200 mg, Oral, Q12H  insulin glargine, 15 Units, Subcutaneous, Nightly  insulin lispro, 0-12 Units, Subcutaneous, 4x Daily With Meals & Nightly  ipratropium-albuterol, 3 mL, Nebulization, 4x Daily - RT  linagliptin, 5 mg, Oral, Daily  losartan, 100 mg, Oral, Q24H  metFORMIN ER, 500 mg, Oral, BID With Meals  metoprolol tartrate, 75 mg, Oral, Q12H  multivitamin, 1 tablet, Oral, Daily  mupirocin, 1 application, Each Nare, Q12H  polyethylene glycol, 17 g, Oral, BID  potassium chloride, 10 mEq, Oral, BID With Meals  povidone-iodine, , Topical, BID  rOPINIRole, 0.25 mg, Oral, Q PM  Scopolamine, 1 patch, Transdermal, Q72H  senna-docusate sodium, 2 tablet, Oral, BID  sodium chloride, 10 mL, Intravenous, Q12H      Continuous Infusions:   PRN Meds:.•  acetaminophen **OR** acetaminophen **OR** acetaminophen  •  bisacodyl  •  cyclobenzaprine  •  dextrose  •  dextrose  •  diphenhydrAMINE  •  glucagon (human recombinant)  •  HYDROcodone-acetaminophen  •  ipratropium-albuterol  •  magnesium hydroxide  •  magnesium sulfate **OR** magnesium sulfate **OR** magnesium sulfate  •  melatonin  •  [DISCONTINUED] Morphine **AND** naloxone  •  ondansetron  •  pantoprazole  •  potassium chloride  •  potassium chloride **OR** potassium chloride **OR** potassium chloride  •   potassium chloride **OR** potassium chloride  •  sodium chloride  •  sucralfate  •  traMADol      Assessment/Plan   Patient Active Problem List   Diagnosis   • Type 2 diabetes mellitus with peripheral neuropathy (CMS/HCC)   • Age-related osteoporosis without current pathological fracture   • Essential hypertension   • Mixed hyperlipidemia   • Hepatic steatosis   • Gastroesophageal reflux disease with esophagitis   • Adrenal nodule (CMS/HCC)   • Vitamin D deficiency   • Thyroid nodule   • Urinary tract infection without hematuria   • REINA (acute kidney injury) (CMS/HCC)   • Hyperglycemia   • Acute right flank pain   • Vomiting   • Hypomagnesemia   • Dehydration   • Obesity (BMI 30-39.9)   • Complicated migraine   • Occipital neuralgia of left side   • Polypharmacy   • 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  CAD/left main disease:    Patient is feeling much better.  She was able to ambulate.  No shortness of breath.  Patient complain of mild cough.    2.  Hypertension:    Blood pressure is elevated I would recommend to increase Lopressor to 75 mg twice daily    3.  Diabetes mellitus:    Patient has been started on insulin and Tradjenta.  Continue current treatment    4.  Dyslipdemia:  On statin    Continue current Rx and supportive care.   Encouraged IS and activity  CXR moderate right and small left pleural effusion with bibasilar airspace disease  Additional recommendations per Dr. Esteban    Patient is seen and examined and findings are verified.  Patient has mild shortness of breath because of pleural effusion.    Blood pressure is slightly elevated.    Normal S1 and S2.  No pericardial rub or murmur.  Decreased breath sounds on the right lower  base.  No leg edema noted.    Patient underwent right thoracentesis and more than 300 cc of fluid was removed.  I would continue gentle diuresis.  Patient is on Lasix 40 mg twice daily.  Her blood pressure is elevated I would recommend to increase Lopressor to 75 mg twice daily.    Chacho Esteban MD  05/10/21  19:18 EDT

## 2021-05-10 NOTE — PLAN OF CARE
Objective:   Bed mobility - Min-A VC for sternal precautions  Transfers - CGA  Ambulation - 65 feet CGA and with rolling walker with multiple standing rest breaks    Assessment: Marge Mcghee presents with functional mobility impairments which indicate the need for skilled intervention. Tolerating session today without incident. Pt with minimal increase in ambulation distance this date. Pt is self limiting ambulation distance due to fatigue this date. Will continue to follow and progress as tolerated.

## 2021-05-10 NOTE — THERAPY TREATMENT NOTE
Subjective: Pt agreeable to therapeutic plan of care.  Cognition: oriented to Person, Place, Time and Situation    Objective:     Bed Mobility: Min-A and Mod-A  Functional Transfers: N/A or Not attempted.  Functional Ambulation: N/A or Not attempted.    In-bed treatment completed this date due to pt awaiting procedure this PM. OT provided and reviewed Cardiac Rehab HEP. Pt completes 1 set x 10 reps of each exercise with fair understanding. Encouraged to complete 3x/daily to facilitate increased activity tolerance. Pt will require additional education to ensure carryover.     Pain: 4 VAS  Education: Provided education on importance of mobility and skilled verbal / tactile cueing throughout intervention.     Assessment: Marge Mcghee presents with ADL impairments below baseline abilities which indicate the need for continued skilled intervention while inpatient.  OT provided and reviewed Cardiac Rehab HEP. Pt completes 1 set x 10 reps of each exercise with fair understanding. Encouraged to complete 3x/daily to facilitate increased activity tolerance. Pt will require additional education to ensure carryover. Tolerating session today without incident. Will continue to follow and progress as tolerated.     Plan/Recommendations:   Pt would benefit from Inpatient Rehabilitation placement at discharge from facility.   Pt desires Inpatient Rehabilitation placement at discharge. Pt cooperative; agreeable to therapeutic recommendations and plan of care.     Post-Tx Position: Supine with HOB Elevated, Alarms activated and Call light and personal items within reach  PPE: gloves, surgical mask, eyewear protection

## 2021-05-10 NOTE — NURSING NOTE
MD places needle and aspirates fluid.  Catheter connected to vacutainer.  Fluid is clear and sherwin.

## 2021-05-10 NOTE — CONSULTS
Inpatient Endocrine Consult  Consultation requested by Dr. Faulkner for uncontrolled type 2 diabetes  Patient Care Team:  Traci Townsend APRN as PCP - General (Nurse Practitioner)  Drake Hinton MD PhD as Consulting Physician (Ophthalmology)  Fausto Gilliam MD as Consulting Physician (Endocrinology)  Saloni Ch MD (Ophthalmology)  Mikhail Kaplan Jr., MD as Consulting Physician (Urology)    Chief Complaint: Uncontrolled type 2 diabetes    HPI: 66-year-old female with history of type 2 diabetes for long duration, hypertension, hyperlipidemia, CAD status post CABG this admission.  A consultation is requested for diabetes.  Patient tells me that she was at home taking oral agents Metformin and blood sugar controls were okay.  She is eating fairly well at this time.  Currently on Lantus twice a day along with Humalog sliding scale as well as Tradjenta 5 mg p.o. daily.  No new complaints at this time.    Past Medical History:   Diagnosis Date   • Adrenal nodule (CMS/HCC) 11/16/2016    FINDINGS: Mild hepatic steatosis may be present. Cholecystectomy. No biliary ductal dilatation. Negative pancreas, spleen, left adrenal gland, and kidneys. The right adrenal presumed adenoma has increased slightly, measuring 3 x 4 cm in diameter,  compared to 2 x 3.5 cm on the previous exam. The attenuation values are consistent with an adenoma. Done at [facility] in August 2018   • Age-related osteoporosis without current pathological fracture 11/16/2016   • Arthritis    • Delayed surgical wound healing    • Essential hypertension 11/16/2016   • Gastroesophageal reflux disease with esophagitis 11/16/2016   • Hepatic steatosis 11/16/2016   • History of anemia    • History of sepsis     FROM UTI   • Hyperlipidemia 11/16/2016   • Lisfranc's dislocation     LEFT WITH FRACTURES NONHEALING FOOT   • Osteomyelitis of left foot (CMS/HCC) 11/2020   • RLS (restless legs syndrome)    • Squamous cell skin cancer  2014    Excised by Dr. James   • Thyroid nodule 10/18/2019    BENIGN   • Type 2 diabetes mellitus with peripheral neuropathy (CMS/HCC) 11/16/2016   • Vitamin D deficiency 1/25/2019       Social History     Socioeconomic History   • Marital status:      Spouse name: Not on file   • Number of children: Not on file   • Years of education: Not on file   • Highest education level: Not on file   Tobacco Use   • Smoking status: Never Smoker   • Smokeless tobacco: Never Used   Vaping Use   • Vaping Use: Never used   Substance and Sexual Activity   • Alcohol use: Yes     Comment: socially    • Drug use: Never   • Sexual activity: Defer       Family History   Problem Relation Age of Onset   • Diabetes Mother    • COPD Mother    • Hypertension Mother    • Kidney disease Mother    • Obesity Mother    • Thyroid disease Mother    • Diabetes Sister    • Diabetes Sister    • Diabetes Sister    • Diabetes Sister    • Malig Hyperthermia Neg Hx        Allergies   Allergen Reactions   • Zofran [Ondansetron Hcl] Nausea And Vomiting     Does the opposite of its purpose   • Prochlorperazine Other (See Comments)     CAUSED SEIZURE   • Hydralazine Hcl Itching and Rash   • Meperidine Itching and Swelling   • Prochlorperazine Maleate Other (See Comments)     CAUSES SEIZURE       ROS:   Constitutional:  Denies fatigue, tiredness.    Eyes:  Denies change in visual acuity   HENT:  Denies nasal congestion or sore throat   Respiratory: denies cough, shortness of breath.   Cardiovascular:  denies chest pain, edema   GI:  Denies abdominal pain, nausea, vomiting.   :  Denies Polyuria and Polydipsia  Musculoskeletal:  Denies back pain or joint pain   Integument:  Denies dry skin, rash   Neurologic:  Denies headache, focal weakness or sensory changes   Endocrine:  Denies polyuria or polydipsia   Psychiatric:  Denies depression or anxiety      Vitals:    05/10/21 1515   BP: 156/71   Pulse: 87   Resp: 17   Temp:    SpO2: 92%      Body mass  index is 33.23 kg/m².     Physical Exam:  GEN: NAD, conversant  EYES: EOMI, PERRL, no conjunctival erythema  NECK: no thyromegaly, full ROM   CV: RRR, no murmurs/rubs/gallops, no peripheral edema  LUNG: CTAB, no wheezes/rales/ronchi  SKIN: no rashes, no acanthosis  MSK: no deformities, full ROM of all extremities  NEURO: no tremors, DTR normal  PSYCH: AOX3, appropriate mood, affect normal      Results Review:     I reviewed the patient's new clinical results.    Lab Results   Component Value Date    GLUCOSE 109 (H) 05/10/2021    BUN 20 05/10/2021    CREATININE 1.07 (H) 05/10/2021    EGFRIFNONA 51 (L) 05/10/2021    EGFRIFAFRI 66 07/22/2020    BCR 18.7 05/10/2021    K 4.4 05/10/2021    CO2 28.0 05/10/2021    CALCIUM 9.4 05/10/2021    PROTENTOTREF 7.0 07/22/2020    ALBUMIN 3.70 05/04/2021    LABIL2 1.1 04/06/2021    AST 20 05/01/2021    ALT 14 05/01/2021       Lab Results   Component Value Date    HGBA1C 8.0 (H) 04/06/2021    HGBA1C 7.10 (H) 01/08/2021    HGBA1C 7.1 (H) 11/03/2020     Lab Results   Component Value Date    MICROALBUR 22.8 03/13/2020    CREATININE 1.07 (H) 05/10/2021     Results from last 7 days   Lab Units 05/10/21  1130 05/10/21  0757 05/09/21 2005 05/09/21  1707 05/09/21  1228 05/09/21  0829   GLUCOSE mg/dL 173* 115* 336* 255* 161* 126*       Medication Review: Reviewed.       Current Facility-Administered Medications:   •  acetaminophen (TYLENOL) tablet 650 mg, 650 mg, Oral, Q4H PRN, 650 mg at 05/09/21 1734 **OR** acetaminophen (TYLENOL) 160 MG/5ML solution 650 mg, 650 mg, Oral, Q4H PRN **OR** acetaminophen (TYLENOL) suppository 650 mg, 650 mg, Rectal, Q4H PRN, Jr Rocael Alexander MD  •  amLODIPine (NORVASC) tablet 5 mg, 5 mg, Oral, Q24H, Sariah Rivera APRN, 5 mg at 05/10/21 0820  •  aspirin EC tablet 81 mg, 81 mg, Oral, Daily, Jr Rocael Alexander MD, 81 mg at 05/10/21 0819  •  atorvastatin (LIPITOR) tablet 20 mg, 20 mg, Oral, Nightly, Chacho Esteban MD, 20 mg at 05/09/21 2110  •  bisacodyl  (DULCOLAX) suppository 10 mg, 10 mg, Rectal, Daily PRN, Jr Rocael Alexander MD  •  cefdinir (OMNICEF) capsule 300 mg, 300 mg, Oral, Q12H, Monserrat Roula L, APRN, 300 mg at 05/10/21 0819  •  chlorhexidine (PERIDEX) 0.12 % solution 15 mL, 15 mL, Mouth/Throat, Q12H, Jr Rocael Alexander MD, 15 mL at 05/09/21 2110  •  cyclobenzaprine (FLEXERIL) tablet 5 mg, 5 mg, Oral, Q8H PRN, Sariah Rivera, APRN, 5 mg at 05/09/21 1741  •  dextrose (D50W) 25 g/ 50mL Intravenous Solution 25 g, 25 g, Intravenous, Q15 Min PRN, Shanell Tucker MD  •  dextrose (GLUTOSE) oral gel 15 g, 15 g, Oral, Q15 Min PRN, Shanell Tucker MD  •  diphenhydrAMINE (BENADRYL) capsule 25 mg, 25 mg, Oral, Nightly PRN, Jr Rocael Alexander MD, 25 mg at 05/09/21 2121  •  DULoxetine (CYMBALTA) DR capsule 60 mg, 60 mg, Oral, Q PM, Jr Rocael Alexander MD, 60 mg at 05/09/21 1606  •  enoxaparin (LOVENOX) syringe 40 mg, 40 mg, Subcutaneous, Daily, Jr Rocael Alexander MD, 40 mg at 05/09/21 1605  •  furosemide (LASIX) tablet 40 mg, 40 mg, Oral, BID, Monserrat Roula L, APRN, 40 mg at 05/10/21 0820  •  glucagon (human recombinant) (GLUCAGEN DIAGNOSTIC) injection 1 mg, 1 mg, Subcutaneous, Q15 Min PRN, Shanell Tucker MD  •  guaiFENesin (MUCINEX) 12 hr tablet 1,200 mg, 1,200 mg, Oral, Q12H, Satterleobardo-Vinay Roula L, APRN, 1,200 mg at 05/10/21 0820  •  HYDROcodone-acetaminophen (NORCO) 5-325 MG per tablet 1 tablet, 1 tablet, Oral, Q4H PRN, Monserrat, Roula L, APRN, 1 tablet at 05/10/21 1200  •  insulin glargine (LANTUS, SEMGLEE) injection 15 Units, 15 Units, Subcutaneous, Q12H, Odell-Vinay, Roula L, APRN, 15 Units at 05/10/21 0820  •  insulin lispro (ADMELOG) injection 0-24 Units, 0-24 Units, Subcutaneous, 4x Daily With Meals & Nightly, 4 Units at 05/10/21 1151 **AND** insulin lispro (ADMELOG) injection 0-24 Units, 0-24 Units, Subcutaneous, PRN, Odell-Vinay, Roula L, APRN  •  ipratropium-albuterol (DUO-NEB) nebulizer solution 3  mL, 3 mL, Nebulization, 4x Daily - RT, Satterleobardo-Vinay, Roula L, APRN, 3 mL at 05/10/21 1432  •  ipratropium-albuterol (DUO-NEB) nebulizer solution 3 mL, 3 mL, Nebulization, Q4H PRN, Satterly-Vinay, Roula L, APRN  •  linagliptin (TRADJENTA) tablet 5 mg, 5 mg, Oral, Daily, Satterly-Vinay, Roula L, APRN, 5 mg at 05/10/21 0820  •  losartan (COZAAR) tablet 100 mg, 100 mg, Oral, Q24H, Rodríguez Ulrich MD, 100 mg at 05/10/21 0819  •  magnesium hydroxide (MILK OF MAGNESIA) suspension 2400 mg/10mL 10 mL, 10 mL, Oral, Daily PRN, Jr Rocael Alexander MD  •  Magnesium Sulfate 2 gram Bolus, followed by 8 gram infusion (total Mg dose 10 grams)- Mg less than or equal to 1mg/dL, 2 g, Intravenous, PRN **OR** Magnesium Sulfate 2 gram / 50mL Infusion (GIVE X 3 BAGS TO EQUAL 6GM TOTAL DOSE) - Mg 1.1 - 1.5 mg/dl, 2 g, Intravenous, PRN **OR** Magnesium Sulfate 4 gram infusion- Mg 1.6-1.9 mg/dL, 4 g, Intravenous, PRN, Jr Rocael Alexander MD  •  melatonin tablet 5 mg, 5 mg, Oral, Nightly PRN, Jr Rocael Alexander MD, 5 mg at 05/09/21 2351  •  metoprolol tartrate (LOPRESSOR) tablet 50 mg, 50 mg, Oral, Q12H, Cassius Ordaz DO, 50 mg at 05/10/21 0820  •  multivitamin (THERAGRAN) tablet 1 tablet, 1 tablet, Oral, Daily, Jr Rocael Alexander MD, 1 tablet at 05/10/21 0819  •  mupirocin (BACTROBAN) 2 % nasal ointment 1 application, 1 application, Each Nare, Q12H, Jr Rocael Alexander MD, 1 application at 05/10/21 0507  •  [DISCONTINUED] Morphine sulfate (PF) injection 2 mg, 2 mg, Intravenous, Q4H PRN **AND** naloxone (NARCAN) injection 0.4 mg, 0.4 mg, Intravenous, Q5 Min PRN, Sariah Rivera, CANDIDO  •  ondansetron (ZOFRAN) injection 4 mg, 4 mg, Intravenous, Q6H PRN, Jr Rocael Alexander MD, 4 mg at 05/04/21 0915  •  pantoprazole (PROTONIX) EC tablet 40 mg, 40 mg, Oral, Daily PRN, Jr Rocael Alexander MD  •  polyethylene glycol (MIRALAX) packet 17 g, 17 g, Oral, BID, Roula German, APRN, 17 g at  05/10/21 0820  •  potassium chloride (K-DUR,KLOR-CON) CR tablet 40 mEq, 40 mEq, Oral, PRN, Jr Rocael Alexander MD  •  potassium chloride (K-DUR,KLOR-CON) CR tablet 40 mEq, 40 mEq, Oral, PRN **OR** potassium chloride (KLOR-CON) packet 40 mEq, 40 mEq, Oral, PRN **OR** potassium chloride 10 mEq in 100 mL IVPB, 10 mEq, Intravenous, Q1H PRN, Jr Rocael Alexander MD  •  potassium chloride (K-DUR,KLOR-CON) ER tablet 10 mEq, 10 mEq, Oral, BID With Meals, Roula German APRN, 10 mEq at 05/10/21 0820  •  potassium chloride 10 mEq in 100 mL IVPB, 10 mEq, Intravenous, Q1H PRN **OR** potassium chloride 10 mEq in 100 mL IVPB, 10 mEq, Intravenous, Q1H PRN, Jr Rocael Alexander MD, Last Rate: 100 mL/hr at 05/03/21 0637, 10 mEq at 05/03/21 0637  •  povidone-iodine (BETADINE) external solution, , Topical, BID, Roula German APRN, Given at 05/10/21 0819  •  rOPINIRole (REQUIP) tablet 0.25 mg, 0.25 mg, Oral, Q PM, Jr Rocael Alexander MD, 0.25 mg at 05/09/21 1606  •  Scopolamine (TRANSDERM-SCOP) 1.5 MG/3DAYS patch 1 patch, 1 patch, Transdermal, Q72H, Sariah Rivera APRN, 1 patch at 05/10/21 0930  •  sennosides-docusate (PERICOLACE) 8.6-50 MG per tablet 2 tablet, 2 tablet, Oral, BID, Jr Rocael Alexander MD, 2 tablet at 05/10/21 0820  •  sodium chloride 0.9 % flush 10 mL, 10 mL, Intravenous, Q12H, Jr Rocael Alexander MD, 10 mL at 05/10/21 0822  •  sodium chloride 0.9 % flush 10 mL, 10 mL, Intravenous, PRN, Jr Rocael Alexander MD  •  sucralfate (CARAFATE) tablet 1 g, 1 g, Oral, BID PRN, Jr Rocael Alexander MD, 1 g at 05/02/21 0430  •  traMADol (ULTRAM) tablet 50 mg, 50 mg, Oral, Q6H PRN, Roula German APRN, 50 mg at 05/06/21 0855    Assessment/Plan   1.  Diabetes mellitus type 2: Uncontrolled, at this time I will change Lantus to 15 units subcu daily and add Metformin extended release 5 mg twice a day and continue Tradjenta 5 mg p.o. daily.  I will continue Humalog sliding scale.   She does not want to use Lantus on a long-term basis.  If needed we can consider Jardiance as well.  For now I will continue Metformin with Tradjenta and Lantus 1 time a day and follow blood sugars and make further recommendations.  She is advised to continue to work on diet and activity.  2.  Hypertension: Well-controlled  3.  CAD: Status post CABG this admission.  4.  Hyperlipidemia: On atorvastatin.    Thank you very much for the consultation.             Alison Farley MD FACE.

## 2021-05-10 NOTE — PLAN OF CARE
Assessment: Marge Mcghee presents with ADL impairments below baseline abilities which indicate the need for continued skilled intervention while inpatient.  OT provided and reviewed Cardiac Rehab HEP. Pt completes 1 set x 10 reps of each exercise with fair understanding. Encouraged to complete 3x/daily to facilitate increased activity tolerance. Pt will require additional education to ensure carryover. Tolerating session today without incident. Will continue to follow and progress as tolerated.

## 2021-05-10 NOTE — CASE MANAGEMENT/SOCIAL WORK
Continued Stay Note  ELSY Woo     Patient Name: Marge Mcghee  MRN: [id number]  Today's Date: 5/10/2021    Admit Date: 5/1/2021    Discharge Plan     Row Name 05/10/21 1205       Plan    Plan  D/C Plan: [facility] accepted pending bed availability. No PASRR or precert required.    Plan Comments  CM updated [facility] liaison of patient's progress and not d/c ready today. Barrier to D/C: POD#8 CABG, pending thoracentesis, endocrinology consult.          Expected Discharge Date and Time     Expected Discharge Date Expected Discharge Time    May 12, 2021             Terese Mendez

## 2021-05-10 NOTE — CONSULTS
Diabetes Education    Patient Name:  Marge Mcghee  YOB: 1955  MRN: [id number]  Admit Date:  5/1/2021        Follow-up with patient per patient request for review of information from visit on 5/5/2021.  Discussed and reviewed A1c results, A1c target range, and healthy blood sugar range. Patient stated she is recovering from heart surgery and pneumonia.  Discussed with patient the importance of good blood sugar control in the healing process. Also discussed drinking unsweetened beverages and regular exercise are ways to naturally lower blood sugar. Patient has no further questions or concerns related to diabetes at this time.      Electronically signed by:  Ivelisse Owens RN  05/10/21 11:55 EDT

## 2021-05-10 NOTE — PROGRESS NOTES
"S/P POD# 8 ER CABG x3 with LIMA--[place]  EF 55-60% (cath)    Subjective:  Reports she still doesn't feel well--\"I feel like s**t, it hurts when I cough\"--pointing along incision line, sternum stable    Noted increased glucoses over the weekend--endo consulted  Wt up 2.5 kgs  CXR:  Right effusion--remains moderate, left effusion small        Intake/Output Summary (Last 24 hours) at 5/10/2021 1019  Last data filed at 5/10/2021 0851  Gross per 24 hour   Intake 620 ml   Output 1750 ml   Net -1130 ml     Temp:  [97.5 °F (36.4 °C)-98.4 °F (36.9 °C)] 98.1 °F (36.7 °C)  Heart Rate:  [75-97] 88  Resp:  [16-18] 18  BP: (125-174)/(40-77) 166/73      Results from last 7 days   Lab Units 05/10/21  0628 05/07/21  0414 05/04/21  0403   WBC 10*3/mm3 10.30 9.00  --    HEMOGLOBIN g/dL 12.1 11.6*  --    HEMATOCRIT % 36.7 35.5  --    PLATELETS 10*3/mm3 346 227  --    INR   --   --  1.04     Results from last 7 days   Lab Units 05/10/21  0628 05/04/21  0403   CREATININE mg/dL 1.07* 1.21*   POTASSIUM mmol/L 4.4 4.0   SODIUM mmol/L 140 138   MAGNESIUM mg/dL  --  2.9*   PHOSPHORUS mg/dL  --  5.4*       Physical Exam:  Neuro intact, nad, resting in bed   Tele:  SR 80s  Left sided expiratory wheezes, 95% RA  Sternotomy/SVHS healing well, distal sternotomy slight bruising noted  Benign abd, + BM  No edema    Assessment/Plan:  Principal Problem:    Chest pain  Active Problems:    Type 2 diabetes mellitus with peripheral neuropathy (CMS/HCC)    Age-related osteoporosis without current pathological fracture    Essential hypertension    Mixed hyperlipidemia    Gastroesophageal reflux disease with esophagitis    Obesity (BMI 30-39.9)    GERD (gastroesophageal reflux disease)    Stable angina pectoris (CMS/HCC)    CAD, EF 55-60% (cath)--s/p ER CABG x3 with LIMA ([place])  DM, type 2 with peripheral neuropathy--a1c 8 preop, SSI/Lantus  HTN--bb, arb  Dyslipidemia--statin  JANET--will check with pt about CPAP use  GERD with esophagitis--Carafate " prn/ppi preop  Obesity, stage 1--BMI 33.3  Severe right midfoot closed fracture/dislocation (2020)--uses cane to walk per nsg  Postop upper resp infection--Omnicef, sputum culture negative  Postop moderate right pleural effusion--consider thoracentesis, cont diuretics    POD#8.  Improving.  Remains with moderate pleural effusion--d/w Dr. Moya about thoracentesis.  Pt is on asa/statin/bb/arb.  Remains on Lasix po BID. SIR[facility] at discharge.  Endo consulted for hyperglycemia.    Addendum:  IR consulted per pulm for thoracentesis    Routine care--as above  D/w pt, nsg, Dr. Alexander, Dr. Moya, care [facility]  Discharge plan--after fall, her daughter is asking for [facility] referral    Roula German, APRN  5/10/2021  10:19 EDT

## 2021-05-11 ENCOUNTER — APPOINTMENT (OUTPATIENT)
Dept: GENERAL RADIOLOGY | Facility: HOSPITAL | Age: 66
End: 2021-05-11

## 2021-05-11 VITALS
RESPIRATION RATE: 17 BRPM | HEART RATE: 80 BPM | BODY MASS INDEX: 31.99 KG/M2 | DIASTOLIC BLOOD PRESSURE: 65 MMHG | WEIGHT: 187.39 LBS | HEIGHT: 64 IN | OXYGEN SATURATION: 94 % | SYSTOLIC BLOOD PRESSURE: 135 MMHG | TEMPERATURE: 97.6 F

## 2021-05-11 PROBLEM — Z95.1 S/P CABG X 3: Status: ACTIVE | Noted: 2021-05-11

## 2021-05-11 LAB
ANION GAP SERPL CALCULATED.3IONS-SCNC: 10 MMOL/L (ref 5–15)
BUN SERPL-MCNC: 20 MG/DL (ref 8–23)
BUN/CREAT SERPL: 20.8 (ref 7–25)
CALCIUM SPEC-SCNC: 9.6 MG/DL (ref 8.6–10.5)
CHLORIDE SERPL-SCNC: 98 MMOL/L (ref 98–107)
CO2 SERPL-SCNC: 29 MMOL/L (ref 22–29)
CREAT SERPL-MCNC: 0.96 MG/DL (ref 0.57–1)
DEPRECATED RDW RBC AUTO: 43.8 FL (ref 37–54)
ERYTHROCYTE [DISTWIDTH] IN BLOOD BY AUTOMATED COUNT: 13.9 % (ref 12.3–15.4)
GFR SERPL CREATININE-BSD FRML MDRD: 58 ML/MIN/1.73
GLUCOSE BLDC GLUCOMTR-MCNC: 127 MG/DL (ref 70–105)
GLUCOSE BLDC GLUCOMTR-MCNC: 177 MG/DL (ref 70–105)
GLUCOSE SERPL-MCNC: 126 MG/DL (ref 65–99)
HCT VFR BLD AUTO: 37.4 % (ref 34–46.6)
HGB BLD-MCNC: 12.2 G/DL (ref 12–15.9)
MCH RBC QN AUTO: 29.1 PG (ref 26.6–33)
MCHC RBC AUTO-ENTMCNC: 32.5 G/DL (ref 31.5–35.7)
MCV RBC AUTO: 89.8 FL (ref 79–97)
PLATELET # BLD AUTO: 388 10*3/MM3 (ref 140–450)
PMV BLD AUTO: 6.8 FL (ref 6–12)
POTASSIUM SERPL-SCNC: 4.2 MMOL/L (ref 3.5–5.2)
RBC # BLD AUTO: 4.17 10*6/MM3 (ref 3.77–5.28)
SODIUM SERPL-SCNC: 137 MMOL/L (ref 136–145)
WBC # BLD AUTO: 11.8 10*3/MM3 (ref 3.4–10.8)

## 2021-05-11 PROCEDURE — 97110 THERAPEUTIC EXERCISES: CPT

## 2021-05-11 PROCEDURE — 71045 X-RAY EXAM CHEST 1 VIEW: CPT

## 2021-05-11 PROCEDURE — 63710000001 INSULIN LISPRO (HUMAN) PER 5 UNITS: Performed by: INTERNAL MEDICINE

## 2021-05-11 PROCEDURE — 82962 GLUCOSE BLOOD TEST: CPT

## 2021-05-11 PROCEDURE — 97530 THERAPEUTIC ACTIVITIES: CPT

## 2021-05-11 PROCEDURE — 99232 SBSQ HOSP IP/OBS MODERATE 35: CPT | Performed by: INTERNAL MEDICINE

## 2021-05-11 PROCEDURE — 94799 UNLISTED PULMONARY SVC/PX: CPT

## 2021-05-11 PROCEDURE — 85027 COMPLETE CBC AUTOMATED: CPT | Performed by: THORACIC SURGERY (CARDIOTHORACIC VASCULAR SURGERY)

## 2021-05-11 PROCEDURE — 80048 BASIC METABOLIC PNL TOTAL CA: CPT | Performed by: THORACIC SURGERY (CARDIOTHORACIC VASCULAR SURGERY)

## 2021-05-11 RX ORDER — ACETAMINOPHEN 325 MG/1
650 TABLET ORAL EVERY 4 HOURS PRN
Start: 2021-05-11 | End: 2021-07-12

## 2021-05-11 RX ORDER — METFORMIN HYDROCHLORIDE 500 MG/1
500 TABLET, EXTENDED RELEASE ORAL 2 TIMES DAILY WITH MEALS
Qty: 60 TABLET | Refills: 3 | Status: SHIPPED | OUTPATIENT
Start: 2021-05-11 | End: 2022-02-17

## 2021-05-11 RX ORDER — INSULIN LISPRO 100 [IU]/ML
0-12 INJECTION, SOLUTION INTRAVENOUS; SUBCUTANEOUS
Qty: 10 ML | Refills: 12 | Status: SHIPPED | OUTPATIENT
Start: 2021-05-11 | End: 2021-06-03

## 2021-05-11 RX ORDER — ASPIRIN 81 MG/1
81 TABLET ORAL DAILY
Qty: 30 TABLET | Refills: 3 | Status: SHIPPED | OUTPATIENT
Start: 2021-05-12 | End: 2021-06-03 | Stop reason: SDUPTHER

## 2021-05-11 RX ORDER — IPRATROPIUM BROMIDE AND ALBUTEROL SULFATE 2.5; .5 MG/3ML; MG/3ML
3 SOLUTION RESPIRATORY (INHALATION)
Status: DISCONTINUED | OUTPATIENT
Start: 2021-05-11 | End: 2021-05-11 | Stop reason: HOSPADM

## 2021-05-11 RX ORDER — ACETAMINOPHEN 650 MG
TABLET, EXTENDED RELEASE ORAL 2 TIMES DAILY
Qty: 1 EACH | Refills: 0 | Status: SHIPPED | OUTPATIENT
Start: 2021-05-11 | End: 2021-07-12

## 2021-05-11 RX ORDER — FUROSEMIDE 40 MG/1
TABLET ORAL
Qty: 40 TABLET | Refills: 0 | Status: SHIPPED | OUTPATIENT
Start: 2021-05-11 | End: 2021-06-03

## 2021-05-11 RX ORDER — CHOLECALCIFEROL (VITAMIN D3) 125 MCG
5 CAPSULE ORAL NIGHTLY PRN
Start: 2021-05-11 | End: 2021-07-12

## 2021-05-11 RX ORDER — ALBUTEROL SULFATE 90 UG/1
2 AEROSOL, METERED RESPIRATORY (INHALATION) EVERY 4 HOURS PRN
Qty: 8 G | Refills: 3 | Status: SHIPPED | OUTPATIENT
Start: 2021-05-11 | End: 2021-06-03 | Stop reason: SDUPTHER

## 2021-05-11 RX ORDER — METOPROLOL TARTRATE 75 MG/1
75 TABLET, FILM COATED ORAL EVERY 12 HOURS SCHEDULED
Qty: 60 TABLET | Refills: 3 | Status: SHIPPED | OUTPATIENT
Start: 2021-05-11 | End: 2021-06-03 | Stop reason: SDUPTHER

## 2021-05-11 RX ORDER — POTASSIUM CHLORIDE 750 MG/1
TABLET, FILM COATED, EXTENDED RELEASE ORAL
Qty: 40 TABLET | Refills: 0 | Status: SHIPPED | OUTPATIENT
Start: 2021-05-11 | End: 2021-06-03

## 2021-05-11 RX ORDER — AMLODIPINE BESYLATE 5 MG/1
5 TABLET ORAL
Qty: 30 TABLET | Refills: 3 | Status: SHIPPED | OUTPATIENT
Start: 2021-05-12 | End: 2021-06-03

## 2021-05-11 RX ORDER — HYDROCODONE BITARTRATE AND ACETAMINOPHEN 5; 325 MG/1; MG/1
1 TABLET ORAL EVERY 4 HOURS PRN
Qty: 30 TABLET | Refills: 0 | Status: SHIPPED | OUTPATIENT
Start: 2021-05-11 | End: 2021-05-17

## 2021-05-11 RX ORDER — CYCLOBENZAPRINE HCL 5 MG
5 TABLET ORAL EVERY 8 HOURS PRN
Qty: 15 TABLET | Refills: 0 | Status: SHIPPED | OUTPATIENT
Start: 2021-05-11 | End: 2021-07-12

## 2021-05-11 RX ORDER — GUAIFENESIN 600 MG/1
1200 TABLET, EXTENDED RELEASE ORAL EVERY 12 HOURS SCHEDULED
Qty: 28 TABLET | Refills: 0 | Status: SHIPPED | OUTPATIENT
Start: 2021-05-11 | End: 2021-06-03

## 2021-05-11 RX ORDER — CEFDINIR 300 MG/1
300 CAPSULE ORAL EVERY 12 HOURS SCHEDULED
Qty: 6 CAPSULE | Refills: 0 | Status: SHIPPED | OUTPATIENT
Start: 2021-05-11 | End: 2021-05-14

## 2021-05-11 RX ADMIN — HYDROCODONE BITARTRATE AND ACETAMINOPHEN 1 TABLET: 5; 325 TABLET ORAL at 10:32

## 2021-05-11 RX ADMIN — FUROSEMIDE 40 MG: 40 TABLET ORAL at 08:05

## 2021-05-11 RX ADMIN — ASPIRIN 81 MG: 81 TABLET, COATED ORAL at 08:05

## 2021-05-11 RX ADMIN — LINAGLIPTIN 5 MG: 5 TABLET, FILM COATED ORAL at 08:05

## 2021-05-11 RX ADMIN — CHLORHEXIDINE GLUCONATE 15 ML: 1.2 SOLUTION ORAL at 08:05

## 2021-05-11 RX ADMIN — POVIDONE-IODINE: 10 SOLUTION TOPICAL at 08:06

## 2021-05-11 RX ADMIN — HYDROCODONE BITARTRATE AND ACETAMINOPHEN 1 TABLET: 5; 325 TABLET ORAL at 14:07

## 2021-05-11 RX ADMIN — POTASSIUM CHLORIDE 10 MEQ: 750 TABLET, EXTENDED RELEASE ORAL at 08:05

## 2021-05-11 RX ADMIN — THERA TABS 1 TABLET: TAB at 08:04

## 2021-05-11 RX ADMIN — TRAMADOL HYDROCHLORIDE 50 MG: 50 TABLET, FILM COATED ORAL at 08:04

## 2021-05-11 RX ADMIN — POLYETHYLENE GLYCOL 3350 17 G: 17 POWDER, FOR SOLUTION ORAL at 08:05

## 2021-05-11 RX ADMIN — SUCRALFATE 1 G: 1 TABLET ORAL at 12:03

## 2021-05-11 RX ADMIN — MUPIROCIN 1 APPLICATION: 20 OINTMENT TOPICAL at 06:27

## 2021-05-11 RX ADMIN — METFORMIN HYDROCHLORIDE 500 MG: 500 TABLET, EXTENDED RELEASE ORAL at 08:05

## 2021-05-11 RX ADMIN — IPRATROPIUM BROMIDE AND ALBUTEROL SULFATE 3 ML: 2.5; .5 SOLUTION RESPIRATORY (INHALATION) at 11:07

## 2021-05-11 RX ADMIN — GUAIFENESIN 1200 MG: 600 TABLET, EXTENDED RELEASE ORAL at 08:05

## 2021-05-11 RX ADMIN — IPRATROPIUM BROMIDE AND ALBUTEROL SULFATE 3 ML: 2.5; .5 SOLUTION RESPIRATORY (INHALATION) at 06:33

## 2021-05-11 RX ADMIN — AMLODIPINE BESYLATE 5 MG: 5 TABLET ORAL at 08:05

## 2021-05-11 RX ADMIN — CEFDINIR 300 MG: 300 CAPSULE ORAL at 08:05

## 2021-05-11 RX ADMIN — TRAMADOL HYDROCHLORIDE 50 MG: 50 TABLET, FILM COATED ORAL at 13:40

## 2021-05-11 RX ADMIN — HYDROCODONE BITARTRATE AND ACETAMINOPHEN 1 TABLET: 5; 325 TABLET ORAL at 06:27

## 2021-05-11 RX ADMIN — METOPROLOL TARTRATE 75 MG: 25 TABLET, FILM COATED ORAL at 08:05

## 2021-05-11 RX ADMIN — INSULIN LISPRO 2 UNITS: 100 INJECTION, SOLUTION INTRAVENOUS; SUBCUTANEOUS at 11:56

## 2021-05-11 RX ADMIN — DOCUSATE SODIUM 50 MG AND SENNOSIDES 8.6 MG 2 TABLET: 8.6; 5 TABLET, FILM COATED ORAL at 08:05

## 2021-05-11 RX ADMIN — Medication 10 ML: at 08:06

## 2021-05-11 RX ADMIN — LOSARTAN POTASSIUM 100 MG: 50 TABLET, FILM COATED ORAL at 08:05

## 2021-05-11 NOTE — PLAN OF CARE
Objective:   Bed mobility - N/A or Not attempted.  Transfers - CGA  Ambulation - 3x30 feet CGA    Assessment: Marge Mcghee presents with functional mobility impairments which indicate the need for skilled intervention. Tolerating session today without incident. Pt ambulated in room this date with CGA and RWx, requested to stay near bathroom. Pt instructed and assisted with seated therapeutic exercises as well as sit to standing. Pt continues to require consistent cueing for sternal precautions.  Will continue to follow and progress as tolerated.

## 2021-05-11 NOTE — PROGRESS NOTES
Daily Progress Note    Patient Care Team:  Traci Townsend APRN as PCP - General (Nurse Practitioner)  Drake Hinton MD PhD as Consulting Physician (Ophthalmology)  Fausto Gilliam MD as Consulting Physician (Endocrinology)  Saloni Ch MD (Ophthalmology)  Mikhail Kaplan Jr., MD as Consulting Physician (Urology)    Chief Complaint: Follow-up type 2 diabetes    HPI: Patient seen and examined today.  Blood sugars are improving.  Eating well.  Currently on Lantus 15 units subcu nightly with Metformin 500 twice a day and Tradjenta 5 mg p.o. daily.  She would prefer not to take insulin.    ROS:   Constitutional:  Denies fatigue, tiredness.    Eyes:  Denies change in visual acuity   HENT:  Denies nasal congestion or sore throat   Respiratory: denies cough, shortness of breath.   Cardiovascular:  denies chest pain, edema   GI:  Denies abdominal pain, nausea, vomiting.   :  Denies polyuria and polydipsia  Musculoskeletal:  Denies back pain or joint pain   Integument:  Denies rash   Neurologic:  Denies headache, focal weakness or sensory changes   Endocrine:  Denies polyuria or polydipsia   Psychiatric:  Denies depression or anxiety       Vitals:    05/11/21 1200   BP: 135/65   Pulse: 80   Resp: 17   Temp:    SpO2: 94%     Body mass index is 32.17 kg/m².    Physical Exam:  GEN: NAD, conversant  EYES: EOMI, PERRL, no conjunctival erythema  NECK: no thyromegaly, full ROM   CV: RRR, no murmurs/rubs/gallops, no peripheral edema  LUNG: CTAB, no wheezes/rales/ronchi  SKIN: no rashes, no acanthosis  MSK: no deformities, full ROM of all extremities  NEURO: no tremors, DTR normal  PSYCH: AOX3, appropriate mood, affect normal      Results Review:     I reviewed the patient's new clinical results.    Glucose   Date Value Ref Range Status   05/11/2021 126 (H) 65 - 99 mg/dL Final     Sodium   Date Value Ref Range Status   05/11/2021 137 136 - 145 mmol/L Final     Potassium   Date Value Ref Range Status    05/11/2021 4.2 3.5 - 5.2 mmol/L Final     CO2   Date Value Ref Range Status   05/11/2021 29.0 22.0 - 29.0 mmol/L Final     Chloride   Date Value Ref Range Status   05/11/2021 98 98 - 107 mmol/L Final     Anion Gap   Date Value Ref Range Status   05/11/2021 10.0 5.0 - 15.0 mmol/L Final     Creatinine   Date Value Ref Range Status   05/11/2021 0.96 0.57 - 1.00 mg/dL Final     BUN   Date Value Ref Range Status   05/11/2021 20 8 - 23 mg/dL Final     BUN/Creatinine Ratio   Date Value Ref Range Status   05/11/2021 20.8 7.0 - 25.0 Final     Calcium   Date Value Ref Range Status   05/11/2021 9.6 8.6 - 10.5 mg/dL Final     eGFR Non  Amer   Date Value Ref Range Status   05/11/2021 58 (L) >60 mL/min/1.73 Final     Lab Results   Component Value Date    HGBA1C 8.0 (H) 04/06/2021    HGBA1C 7.10 (H) 01/08/2021    HGBA1C 7.1 (H) 11/03/2020     Lab Results   Component Value Date    MICROALBUR 22.8 03/13/2020     Results from last 7 days   Lab Units 05/11/21  1153 05/11/21  0724 05/10/21  2032 05/10/21  1634 05/10/21  1130 05/10/21  0757   GLUCOSE mg/dL 177* 127* 263* 246* 173* 115*             Medication Review: Reviewed.     amLODIPine, 5 mg, Oral, Q24H  aspirin, 81 mg, Oral, Daily  atorvastatin, 20 mg, Oral, Nightly  cefdinir, 300 mg, Oral, Q12H  chlorhexidine, 15 mL, Mouth/Throat, Q12H  DULoxetine, 60 mg, Oral, Q PM  enoxaparin, 40 mg, Subcutaneous, Daily  furosemide, 40 mg, Oral, BID  guaiFENesin, 1,200 mg, Oral, Q12H  insulin glargine, 15 Units, Subcutaneous, Nightly  insulin lispro, 0-12 Units, Subcutaneous, 4x Daily With Meals & Nightly  ipratropium-albuterol, 3 mL, Nebulization, Q6H While Awake - RT  linagliptin, 5 mg, Oral, Daily  losartan, 100 mg, Oral, Q24H  metFORMIN ER, 500 mg, Oral, BID With Meals  metoprolol tartrate, 75 mg, Oral, Q12H  multivitamin, 1 tablet, Oral, Daily  mupirocin, 1 application, Each Nare, Q12H  polyethylene glycol, 17 g, Oral, BID  potassium chloride, 10 mEq, Oral, BID With  Meals  povidone-iodine, , Topical, BID  rOPINIRole, 0.25 mg, Oral, Q PM  Scopolamine, 1 patch, Transdermal, Q72H  senna-docusate sodium, 2 tablet, Oral, BID  sodium chloride, 10 mL, Intravenous, Q12H          Assessment/Plan   1.  Diabetes mellitus type 2: Uncontrolled, blood sugars better.  We will continue current management for now including Lantus with Metformin and Tradjenta.  2.  Hypertension: Well-controlled  3.  CAD status post CABG this admission  4.  Hyperlipidemia: On atorvastatin.             Alison Farley MD. FACE

## 2021-05-11 NOTE — THERAPY TREATMENT NOTE
Subjective: Pt agreeable to therapeutic plan of care.  Cognition: oriented to Person, Place, Time and Situation. Recalls 2/3 sternal precautions and requires max verbal cues for compliance.     Objective:     Bed Mobility: N/A or Not attempted.Up in chair upon OT arrival.  Functional Transfers: Min-A  Functional Ambulation: Min-A    Lower Body Dressing: Mod-A  ADL Position: supported sitting and supported standing  ADL Comments: Max verbal cuing for sternal precautions    Upper Body Dressing: Min-A  ADL Position: supported sitting  ADL Comments: A for heart hugger    OT reviewed Cardiac Rehab HEP. Pt completes 1 set x 10 reps of each exercise with fair understanding. Encouraged to complete 3x/daily to facilitate increased activity tolerance. Pt will require additional education to ensure carryover.     Pain: 7 VAS  Education: Provided education on importance of mobility and skilled verbal / tactile cueing throughout intervention.     Assessment: Marge Mcghee presents with ADL impairments below baseline abilities which indicate the need for continued skilled intervention while inpatient. Pt up in chair upon OT arrival. Recalls 2/3 sternal precautions and requires max verbal cues for compliance. Completes 5 sit<>stand transfers with Min A. Requires Mod A for LB ADLs. Becomes SOB with minimal activity, and requires max encouragement to complete tasks at highest level of independence. Tolerating session today without incident.  Will continue to follow and progress as tolerated.     Plan/Recommendations:   Pt would benefit from Inpatient Rehabilitation placement at discharge from facility.   Pt desires Inpatient Rehabilitation placement at discharge. Pt cooperative; agreeable to therapeutic recommendations and plan of care.     Post-Tx Position: Up in Chair, Alarms activated and Call light and personal items within reach  PPE: gloves, surgical mask, eyewear protection

## 2021-05-11 NOTE — PROGRESS NOTES
LOS: 9 days   Admiting Physician- Jr Rocael Alexander MD    Reason For Followup:    Left main disease  CAD  S/p CABG  Hypertension    Subjective     Patient is chest pain-free.  Patient is feeling better.  Shortness of breath is improved    Objective     Hemodynamics better blood pressure is improved    Review of Systems:   Review of Systems   Constitutional: Negative for chills and fever.   HENT: Negative for ear discharge and nosebleeds.    Eyes: Negative for discharge and redness.   Cardiovascular: Negative for chest pain, orthopnea, palpitations, paroxysmal nocturnal dyspnea and syncope.   Respiratory: Negative for cough, shortness of breath and wheezing.    Endocrine: Negative for heat intolerance.   Skin: Negative for rash.   Musculoskeletal: Negative for arthritis and myalgias.   Gastrointestinal: Negative for abdominal pain, melena, nausea and vomiting.   Genitourinary: Negative for dysuria and hematuria.   Neurological: Negative for dizziness, light-headedness, numbness and tremors.   Psychiatric/Behavioral: Negative for depression. The patient is not nervous/anxious.          Vital Signs  Vitals:    05/11/21 1107 05/11/21 1112 05/11/21 1200 05/11/21 1229   BP:   135/65    BP Location:       Patient Position:       Pulse: 78 78 80    Resp: 18 18 17    Temp:    97.6 °F (36.4 °C)   TempSrc:    Oral   SpO2: 90% 90% 94%    Weight:       Height:         Wt Readings from Last 1 Encounters:   05/11/21 85 kg (187 lb 6.3 oz)       Intake/Output Summary (Last 24 hours) at 5/11/2021 1341  Last data filed at 5/11/2021 1200  Gross per 24 hour   Intake 240 ml   Output 3715 ml   Net -3475 ml     Physical Exam:  Constitutional:       Appearance: Well-developed.   Eyes:      General: No scleral icterus.        Right eye: No discharge.   HENT:      Head: Normocephalic and atraumatic.   Neck:      Thyroid: No thyromegaly.      Lymphadenopathy: No cervical adenopathy.   Pulmonary:      Effort: Pulmonary effort is normal.  No respiratory distress.      Breath sounds: Normal breath sounds. No wheezing. No rales.   Cardiovascular:      Normal rate. Regular rhythm.      No gallop.   Edema:     Peripheral edema absent.   Abdominal:      Tenderness: There is no abdominal tenderness.   Skin:     Findings: No erythema or rash.   Neurological:      Mental Status: Alert and oriented to person, place, and time.         Results Review:   Lab Results (last 24 hours)     Procedure Component Value Units Date/Time    POC Glucose Once [[id number]]  (Abnormal) Collected: 05/11/21 1153    Specimen: Blood Updated: 05/11/21 1210     Glucose 177 mg/dL      Comment: Serial Number: [id number]:  [id number]       POC Glucose Once [[id number]]  (Abnormal) Collected: 05/11/21 0724    Specimen: Blood Updated: 05/11/21 0732     Glucose 127 mg/dL      Comment: Serial Number: [id number]:  [id number]       Basic Metabolic Panel [[id number]]  (Abnormal) Collected: 05/11/21 0324    Specimen: Blood Updated: 05/11/21 0356     Glucose 126 mg/dL      BUN 20 mg/dL      Creatinine 0.96 mg/dL      Sodium 137 mmol/L      Potassium 4.2 mmol/L      Chloride 98 mmol/L      CO2 29.0 mmol/L      Calcium 9.6 mg/dL      eGFR Non African Amer 58 mL/min/1.73      BUN/Creatinine Ratio 20.8     Anion Gap 10.0 mmol/L     Narrative:      GFR Normal >60  Chronic Kidney Disease <60  Kidney Failure <15      CBC (No Diff) [[id number]]  (Abnormal) Collected: 05/11/21 0324    Specimen: Blood Updated: 05/11/21 0344     WBC 11.80 10*3/mm3      RBC 4.17 10*6/mm3      Hemoglobin 12.2 g/dL      Hematocrit 37.4 %      MCV 89.8 fL      MCH 29.1 pg      MCHC 32.5 g/dL      RDW 13.9 %      RDW-SD 43.8 fl      MPV 6.8 fL      Platelets 388 10*3/mm3     POC Glucose Once [[id number]]  (Abnormal) Collected: 05/10/21 2032    Specimen: Blood Updated: 05/10/21 2046     Glucose 263 mg/dL      Comment: Serial Number: [id number]:  [id number]           Imaging Results (Last 72 Hours)     Procedure  Component Value Units Date/Time    XR Chest 1 View [[id number]] Collected: 05/11/21 1023     Updated: 05/11/21 1026    Narrative:      Examination: XR CHEST 1 VW-     Date of Exam: 5/11/2021 10:05 AM     Indication: Postop; R07.9-Chest pain, unspecified; R73.9-Hyperglycemia,  unspecified; I20.8-Other forms of angina pectoris.     Comparison: Radiograph 05/10/2021, 05/07/2021     Technique: 1 view of the chest      Findings:  The lung volumes are low. There is a probable small left-sided pleural  effusion. No pneumothorax. The heart size is enlarged. Median sternotomy  wires are present.. The pulmonary vasculature appears within normal  limits. No acute osseous abnormality identified.       Impression:      Interval improvement in bibasilar airspace disease. There is a probable  small left-sided pleural effusion. The lung volumes are low.     Electronically Signed By-Agnieszka Lujan MD On:5/11/2021 10:24 AM  This report was finalized on [id number] by  Agnieszka Lujan MD.    XR Chest 1 View [[id number]] Collected: 05/10/21 1538     Updated: 05/10/21 1541    Narrative:      DATE OF EXAM:  5/10/2021 3:35 PM     PROCEDURE:  XR CHEST 1 VW-     INDICATIONS:  s/p right thoracentesis; R07.9-Chest pain, unspecified;  R73.9-Hyperglycemia, unspecified; I20.8-Other forms of angina pectoris     COMPARISON:  05/10/2021 at 5:11 AM     TECHNIQUE:   Single radiographic AP view of the chest was obtained.     FINDINGS:  Examination is performed in expiration. There are postoperative changes  of prior CABG. There is mild right basilar atelectasis. The right  pleural effusion has completely been drained. There is no pneumothorax  identified.        Impression:      Interim drainage of the right-sided pleural effusion with no  pneumothorax. Mild right basilar atelectasis.     Electronically Signed By-Mikhail Mitchell MD On:5/10/2021 3:38 PM  This report was finalized on [id number] by  Mikhail Mitchell MD.    US Thoracentesis [[id number]] Collected:  05/10/21 1536    Specimen: Body Fluid Updated: 05/10/21 1540    Narrative:         DATE OF EXAM:   5/10/2021 3:04 PM     PROCEDURE:   US THORACENTESIS-     INDICATIONS:   Moderate Pleural Effusion; R07.9-Chest pain, unspecified;  R73.9-Hyperglycemia, unspecified; I20.8-Other forms of angina pectoris     COMPARISON:  Chest radiograph dated 05/10/2021 at 5:08 AM     TECHNIQUE:   The procedure, risks and options and alternatives were discussed with  the patient and informed written consent was obtained. The patient was  placed in the seated position and ultrasound performed showing a small  right-sided dependent pleural effusion. The skin was marked prepped and  draped and subsequently anesthetized 1% Xylocaine. A 5 [place]ames[place] Yueh  needle and catheter was then inserted in the right pleural space. A  total of 315 cc of straw-colored fluid was removed. The catheter was  removed and hemostasis achieved. A dressing was placed over the site.  The procedure was very well tolerated. Postprocedure chest radiograph  shows no pneumothorax          Impression:      IMPRESSION :   Technically successful right thoracentesis under ultrasound guidance     Electronically Signed By-Mikhail Mitchell MD On:5/10/2021 3:38 PM  This report was finalized on [id number] by  Mikhail Mitchell MD.    XR Chest 1 View [[id number]] Collected: 05/10/21 0724     Updated: 05/10/21 0728    Narrative:         DATE OF EXAM:   5/10/2021 5:06 AM     PROCEDURE:   XR CHEST 1 VW-     INDICATIONS:   PNA; R07.9-Chest pain, unspecified; R73.9-Hyperglycemia, unspecified;  I20.8-Other forms of angina pectoris     COMPARISON:  5/7/2021     TECHNIQUE:   Portable chest radiograph.     FINDINGS:    Sternotomy wires noted. There is opacity at the right lung base likely  representing small right pleural effusion with right basilar airspace  disease. There is a small left effusion. No pneumothorax. Osseous  structures intact. Screws noted at the left humeral head.       Impression:       1. Moderate right and small left pleural effusion with bibasilar  airspace disease likely atelectasis and/or pneumonia, unchanged.     Electronically Signed By-Jas Salguero MD On:5/10/2021 7:25 AM  This report was finalized on [id number] by  Jas Salguero MD.        ECG/EMG Results (most recent)     Procedure Component Value Units Date/Time    ECG 12 Lead [[id number]] Collected: 05/02/21 0325     Updated: 05/02/21 0326     QT Interval 412 ms     Narrative:      HEART RATE= 82  bpm  RR Interval= 728  ms  MO Interval= 176  ms  P Horizontal Axis= -50  deg  P Front Axis= 44  deg  QRSD Interval= 82  ms  QT Interval= 412  ms  QRS Axis= -3  deg  T Wave Axis= 49  deg  - ABNORMAL ECG -  Sinus rhythm  Anteroseptal infarct, age indeterminate  Borderline ST depression, anterolateral leads  Electronically Signed By:   Date and Time of Study: 2021-05-02 03:25:00    ECG 12 Lead [[id number]] Collected: 05/01/21 2339     Updated: 05/02/21 0511     QT Interval 403 ms     Narrative:      HEART RATE= 98  bpm  RR Interval= 616  ms  MO Interval= 167  ms  P Horizontal Axis= -2  deg  P Front Axis= 52  deg  QRSD Interval= 80  ms  QT Interval= 403  ms  QRS Axis= -3  deg  T Wave Axis= 170  deg  - ABNORMAL ECG -  Sinus rhythm  Atrial premature complex  Nonspecific repol abnormality, diffuse leads  Prolonged QT interval  Electronically Signed By:   Date and Time of Study: 2021-05-01 23:39:21    ECG 12 Lead [[id number]] Collected: 05/01/21 2233     Updated: 05/02/21 0825     QT Interval 345 ms     Narrative:      HEART RATE= 97  bpm  RR Interval= 616  ms  MO Interval= 166  ms  P Horizontal Axis= 9  deg  P Front Axis= 45  deg  QRSD Interval= 77  ms  QT Interval= 345  ms  QRS Axis= -11  deg  T Wave Axis= 110  deg  - ABNORMAL ECG -  Sinus rhythm  Inferior infarct, old  Nonspecific T abnormalities, lateral leads  When compared with ECG of 09-Jan-2021 17:37:56,  No significant change  Electronically Signed By: Mikhail Barbour (Lawson) 02-May-2021  08:23:48  Date and Time of Study: 2021-05-01 22:33:47    ECG 12 Lead [[id number]] Collected: 05/02/21 0920     Updated: 05/02/21 0922     QT Interval 479 ms     Narrative:      HEART RATE= 72  bpm  RR Interval= 828  ms  OR Interval= 173  ms  P Horizontal Axis= -48  deg  P Front Axis= 39  deg  QRSD Interval= 82  ms  QT Interval= 479  ms  QRS Axis= -10  deg  T Wave Axis= 38  deg  - ABNORMAL ECG -  Sinus rhythm  Consider anteroseptal infarct  Prolonged QT interval  Electronically Signed By:   Date and Time of Study: 2021-05-02 09:20:36    ECG 12 Lead [[id number]] Collected: 05/02/21 1804     Updated: 05/02/21 1853     QT Interval 438 ms     Narrative:      HEART RATE= 77  bpm  RR Interval= 780  ms  OR Interval= 173  ms  P Horizontal Axis= 9  deg  P Front Axis= 46  deg  QRSD Interval= 81  ms  QT Interval= 438  ms  QRS Axis= 5  deg  T Wave Axis= -57  deg  - ABNORMAL ECG -  Sinus rhythm  Nonspecific repol abnormality, diffuse leads  Electronically Signed By:   Date and Time of Study: 2021-05-02 18:04:07    ECG 12 Lead [[id number]] Collected: 05/02/21 1907     Updated: 05/02/21 1910     QT Interval 440 ms     Narrative:      HEART RATE= 72  bpm  RR Interval= 832  ms  OR Interval= 167  ms  P Horizontal Axis= 5  deg  P Front Axis= 35  deg  QRSD Interval= 82  ms  QT Interval= 440  ms  QRS Axis= -2  deg  T Wave Axis= 51  deg  - ABNORMAL ECG -  Sinus rhythm  Nonspecific repol abnormality, diffuse leads  Electronically Signed By:   Date and Time of Study: 2021-05-02 19:07:22    ECG 12 Lead [[id number]] Collected: 05/03/21 0247     Updated: 05/03/21 0249     QT Interval 466 ms     Narrative:      HEART RATE= 60  bpm  RR Interval= 1004  ms  OR Interval= 205  ms  P Horizontal Axis= 9  deg  P Front Axis= 3  deg  QRSD Interval= 141  ms  QT Interval= 466  ms  QRS Axis= 86  deg  T Wave Axis= 10  deg  - ABNORMAL ECG -  Sinus rhythm  Ventricular premature complex  Right bundle branch block  Electronically Signed By:   Date and Time of Study:  2021-05-03 02:47:56    ECG 12 Lead [[id number]] Collected: 05/04/21 0230     Updated: 05/04/21 0231     QT Interval 410 ms     Narrative:      HEART RATE= 75  bpm  RR Interval= 800  ms  ME Interval= 179  ms  P Horizontal Axis= 5  deg  P Front Axis= 26  deg  QRSD Interval= 132  ms  QT Interval= 410  ms  QRS Axis= 66  deg  T Wave Axis= -9  deg  - ABNORMAL ECG -  Sinus rhythm  Right bundle branch block  Electronically Signed By:   Date and Time of Study: 2021-05-04 02:30:15    ECG 12 Lead [[id number]] Collected: 05/05/21 0453     Updated: 05/05/21 0455     QT Interval 405 ms     Narrative:      HEART RATE= 73  bpm  RR Interval= 820  ms  ME Interval= 168  ms  P Horizontal Axis= -15  deg  P Front Axis= 36  deg  QRSD Interval= 133  ms  QT Interval= 405  ms  QRS Axis= 61  deg  T Wave Axis= -10  deg  - ABNORMAL ECG -  Sinus rhythm  Right bundle branch block  Electronically Signed By:   Date and Time of Study: 2021-05-05 04:53:40    ECG 12 Lead [[id number]] Collected: 05/05/21 1828     Updated: 05/05/21 1832     QT Interval 389 ms     Narrative:      HEART RATE= 80  bpm  RR Interval= 748  ms  ME Interval= 170  ms  P Horizontal Axis= 5  deg  P Front Axis= 38  deg  QRSD Interval= 130  ms  QT Interval= 389  ms  QRS Axis= 68  deg  T Wave Axis= -4  deg  - ABNORMAL ECG -  Sinus rhythm  Right bundle branch block  Electronically Signed By:   Date and Time of Study: 2021-05-05 18:28:10        CBC    Results from last 7 days   Lab Units 05/11/21  0324 05/10/21  0628 05/07/21  0414 05/06/21  0429 05/05/21  0517   WBC 10*3/mm3 11.80* 10.30 9.00 10.00 12.30*   HEMOGLOBIN g/dL 12.2 12.1 11.6* 12.2 11.8*   PLATELETS 10*3/mm3 388 346 227 168 140     BMP   Results from last 7 days   Lab Units 05/11/21  0324 05/10/21  0628 05/09/21  0304 05/08/21  0407 05/07/21  0414 05/06/21  0429 05/05/21  0517   SODIUM mmol/L 137 140 138 137 132* 131* 132*   POTASSIUM mmol/L 4.2 4.4 4.3 4.0 4.2 4.2 4.0   CHLORIDE mmol/L 98 101 99 101 97* 98 99   CO2 mmol/L  29.0 28.0 29.0 27.0 25.0 22.0 22.0   BUN mg/dL 20 20 18 20 25* 25* 27*   CREATININE mg/dL 0.96 1.07* 1.06* 0.91 1.07* 0.89 1.10*   GLUCOSE mg/dL 126* 109* 95 130* 134* 222* 109*     CMP   Results from last 7 days   Lab Units 05/11/21  0324 05/10/21  0628 05/09/21  0304 05/08/21  0407 05/07/21  0414 05/06/21  0429 05/05/21  0517   SODIUM mmol/L 137 140 138 137 132* 131* 132*   POTASSIUM mmol/L 4.2 4.4 4.3 4.0 4.2 4.2 4.0   CHLORIDE mmol/L 98 101 99 101 97* 98 99   CO2 mmol/L 29.0 28.0 29.0 27.0 25.0 22.0 22.0   BUN mg/dL 20 20 18 20 25* 25* 27*   CREATININE mg/dL 0.96 1.07* 1.06* 0.91 1.07* 0.89 1.10*   GLUCOSE mg/dL 126* 109* 95 130* 134* 222* 109*     Cardiac Studies:  Echo- Results for orders placed in visit on 05/02/21    Emergent/Open-Heart Anesthesia EMILIA    Narrative  Preanesthesia Checklist:  Patient identified, IV assessed, risks and benefits discussed, monitors and equipment assessed, procedure being performed at surgeon's request and anesthesia consent obtained.    General Procedure Information  EMILIA Placed for monitoring purposes only -- This is not a diagnostic EMILIA    Stress Myoview-  Cath-      Medication Review:   Scheduled Meds:amLODIPine, 5 mg, Oral, Q24H  aspirin, 81 mg, Oral, Daily  atorvastatin, 20 mg, Oral, Nightly  cefdinir, 300 mg, Oral, Q12H  chlorhexidine, 15 mL, Mouth/Throat, Q12H  DULoxetine, 60 mg, Oral, Q PM  enoxaparin, 40 mg, Subcutaneous, Daily  furosemide, 40 mg, Oral, BID  guaiFENesin, 1,200 mg, Oral, Q12H  insulin glargine, 15 Units, Subcutaneous, Nightly  insulin lispro, 0-12 Units, Subcutaneous, 4x Daily With Meals & Nightly  ipratropium-albuterol, 3 mL, Nebulization, Q6H While Awake - RT  linagliptin, 5 mg, Oral, Daily  losartan, 100 mg, Oral, Q24H  metFORMIN ER, 500 mg, Oral, BID With Meals  metoprolol tartrate, 75 mg, Oral, Q12H  multivitamin, 1 tablet, Oral, Daily  mupirocin, 1 application, Each Nare, Q12H  polyethylene glycol, 17 g, Oral, BID  potassium chloride, 10 mEq, Oral,  BID With Meals  povidone-iodine, , Topical, BID  rOPINIRole, 0.25 mg, Oral, Q PM  Scopolamine, 1 patch, Transdermal, Q72H  senna-docusate sodium, 2 tablet, Oral, BID  sodium chloride, 10 mL, Intravenous, Q12H      Continuous Infusions:   PRN Meds:.•  acetaminophen **OR** acetaminophen **OR** acetaminophen  •  bisacodyl  •  cyclobenzaprine  •  dextrose  •  dextrose  •  diphenhydrAMINE  •  glucagon (human recombinant)  •  HYDROcodone-acetaminophen  •  ipratropium-albuterol  •  magnesium hydroxide  •  magnesium sulfate **OR** magnesium sulfate **OR** magnesium sulfate  •  melatonin  •  [DISCONTINUED] Morphine **AND** naloxone  •  ondansetron  •  pantoprazole  •  potassium chloride  •  potassium chloride **OR** potassium chloride **OR** potassium chloride  •  potassium chloride **OR** potassium chloride  •  sodium chloride  •  sucralfate  •  traMADol      Assessment/Plan   Patient Active Problem List   Diagnosis   • Type 2 diabetes mellitus with peripheral neuropathy (CMS/HCC)   • Age-related osteoporosis without current pathological fracture   • Essential hypertension   • Mixed hyperlipidemia   • Hepatic steatosis   • Gastroesophageal reflux disease with esophagitis   • Adrenal nodule (CMS/HCC)   • Vitamin D deficiency   • Thyroid nodule   • Urinary tract infection without hematuria   • REINA (acute kidney injury) (CMS/HCC)   • Hyperglycemia   • Acute right flank pain   • Vomiting   • Hypomagnesemia   • Dehydration   • Obesity (BMI 30-39.9)   • Complicated migraine   • Occipital neuralgia of left side   • Polypharmacy   • 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  CAD:    Patient underwent cardiac catheterization because of ongoing chest  pain and abnormal stress test.  Patient was found to have critical left main disease.  Patient underwent CABG.    2.  CABG:    Patient underwent CABG x3.  Patient did well.    3.  Pleural effusion:    Patient underwent thoracentesis and after that she is feeling better.  Shortness of breath is improved    4.  Hypertension:    Blood pressure is much better.    Chacho Esteban MD  05/11/21  13:41 EDT

## 2021-05-11 NOTE — PLAN OF CARE
Goal Outcome Evaluation:  Plan of Care Reviewed With: patient  Progress: no change  Outcome Summary: patient on room air; states Norco 5 does not help relief pain, but patient rests easily after medication administered; states she doesn't feel any better or differentafter thoracentesis;VSS; continues to be slightly hypertensive when nearing time for medication administration; afebrile; forgetful at times and is easily reoriented; chest incisions approximated, scabbed over and appearing to be healing well; expected to go to [facility] upon discharge; will continue to monitor.

## 2021-05-11 NOTE — CASE MANAGEMENT/SOCIAL WORK
Discharge Planning Assessment   [place]     Patient Name: Marge Mcghee  MRN: [id number]  Today's Date: 5/11/2021    Admit Date: 5/1/2021      Discharge Plan     Row Name 05/11/21 1158       Plan    Patient/Family in Agreement with Plan  yes    Plan Comments   spoke to patient at bedside wearing mask and goggles and keeping distance greater than 6 feet and spent less than 15 minutes in room. [facility] letter reviewed with patient, verbal consent obtained and copy left at bedside. Patient is agreeable to d/c plan to [facility].    Row Name 05/11/21 1012       Plan    Plan  D/C Plan: [facility] Acute accepted. Bed available 5/11. No PASRR or precert required.    Plan Comments  CM confirmed with [facility] liaison that bed is available today. CM updated bedside RN and BREANN Crain. Barrier to D/C: POD#9 CABG, repeat CXR pending.        Expected Discharge Date and Time     Expected Discharge Date Expected Discharge Time    May 11, 2021           Patient Forms     Row Name 05/11/21 1159       Patient Forms    Important Message from Medicare ([facility])  Delivered 5/11/21    Delivered to  Patient    Method of delivery  In person            Terese Mendez

## 2021-05-11 NOTE — CASE MANAGEMENT/SOCIAL WORK
Case Management Discharge Note      Final Note: [facility]    Provided Post Acute Provider List?: N/A  Provided Post Acute Provider Quality & Resource List?: N/A    Selected Continued Care - Discharged on 5/11/2021 Admission date: 5/1/2021 - Discharge disposition: Rehab Facility or Unit (DC - External)    Destination Coordination complete    Service Provider Selected Services Address Phone Fax Patient [place]    [facility] [address] [address] [phone] --                    Final Discharge Disposition Code: 62 - inpatient rehab facility

## 2021-05-11 NOTE — THERAPY TREATMENT NOTE
Subjective: Pt agreeable to therapeutic plan of care.    Objective:     Bed mobility - N/A or Not attempted.  Transfers - CGA  Ambulation - 3x30 feet CGA    Pain: 6 VAS  Education: Provided education on importance of mobility and skilled verbal / tactile cueing throughout intervention.     Assessment: Marge Mcghee presents with functional mobility impairments which indicate the need for skilled intervention. Tolerating session today without incident. Pt ambulated in room this date with CGA and RWx, requested to stay near bathroom. Pt instructed and assisted with seated therapeutic exercises as well as sit to standing. Pt continues to require consistent cueing for sternal precautions.  Will continue to follow and progress as tolerated.     Plan/Recommendations:   Pt would benefit from Inpatient Rehabilitation placement at discharge from facility and requires no DME at discharge.   Pt desires Inpatient Rehabilitation placement at discharge. Pt cooperative; agreeable to therapeutic recommendations and plan of care.     Basic Mobility 6-click:  Rollin = Total, A lot = 2, A little = 3; 4 = None  Supine>Sit:   1 = Total, A lot = 2, A little = 3; 4 = None   Sit>Stand with arms:  1 = Total, A lot = 2, A little = 3; 4 = None  Bed>Chair:   1 = Total, A lot = 2, A little = 3; 4 = None  Ambulate in room:  1 = Total, A lot = 2, A little = 3; 4 = None  3-5 Steps with railin = Total, A lot = 2, A little = 3; 4 = None  Score: 16    Post-Tx Position: Up in Chair, Alarms activated and Call light and personal items within reach  PPE: gloves, surgical mask, eyewear protection

## 2021-05-11 NOTE — PROGRESS NOTES
"PULMONARY CRITICAL CARE Progress  NOTE      PATIENT IDENTIFICATION:  Name: Marge Mcghee  MRN: [id number]  :  1955     Age: 66 y.o.  Sex: female    DATE OF Note:  2021   Referring Physician: David Moya MD                  Subjective:   S/p thoracentesis 315 cc out  on RA  no SOB no chest pain, no nausea or vomiting, no change in bowel habit, no dysuria,  no new  skin rash or itching.      Objective:  tMax 24 hrs: Temp (24hrs), Av.4 °F (36.9 °C), Min:98.3 °F (36.8 °C), Max:98.5 °F (36.9 °C)      Vitals Ranges:   Temp:  [98.3 °F (36.8 °C)-98.5 °F (36.9 °C)] 98.3 °F (36.8 °C)  Heart Rate:  [] 78  Resp:  [16-20] 20  BP: (132-200)/(55-83) 138/58    Intake and Output Last 3 Shifts:   I/O last 3 completed shifts:  In: 840 [P.O.:840]  Out: 4765 [Urine:4450; Other:315]    Exam:  /58 (BP Location: Right arm, Patient Position: Lying)   Pulse 78   Temp 98.3 °F (36.8 °C) (Oral)   Resp 20   Ht 162.6 cm (64\")   Wt 85 kg (187 lb 6.3 oz)   SpO2 99%   Breastfeeding No   BMI 32.17 kg/m²     General Appearance:   AAox3  HEENT:  Normocephalic, without obvious abnormality, Conjunctiva/corneas clear,.  Normal external ear canals, Nares normal, no drainage     Neck:  Supple, symmetrical, trachea midline. No JVD.  Lungs /Chest wall:   Bilateral basal rhonchi, respirations unlabored symmetrical wall movement.     Heart:  Regular rate and rhythm, systolic murmur PMI left sternal border  Abdomen: Soft, non-tender, no masses, no organomegaly.    Extremities: Trace edema no clubbing or Cyanosis        Medications:    Current Facility-Administered Medications:   •  acetaminophen (TYLENOL) tablet 650 mg, 650 mg, Oral, Q4H PRN, 650 mg at 21 5344 **OR** acetaminophen (TYLENOL) 160 MG/5ML solution 650 mg, 650 mg, Oral, Q4H PRN **OR** acetaminophen (TYLENOL) suppository 650 mg, 650 mg, Rectal, Q4H PRN, Jr Rocael Alexander MD  •  amLODIPine (NORVASC) tablet 5 mg, 5 mg, Oral, Q24H, Sariah Rivera, " APRN, 5 mg at 05/11/21 0805  •  aspirin EC tablet 81 mg, 81 mg, Oral, Daily, Jr Rocael Alexander MD, 81 mg at 05/11/21 0805  •  atorvastatin (LIPITOR) tablet 20 mg, 20 mg, Oral, Nightly, Chacho Esteban MD, 20 mg at 05/10/21 2143  •  bisacodyl (DULCOLAX) suppository 10 mg, 10 mg, Rectal, Daily PRN, Jr Rocael Alexander MD  •  cefdinir (OMNICEF) capsule 300 mg, 300 mg, Oral, Q12H, Monserrat Roula L, APRN, 300 mg at 05/11/21 0805  •  chlorhexidine (PERIDEX) 0.12 % solution 15 mL, 15 mL, Mouth/Throat, Q12H, Jr Rocael Alexander MD, 15 mL at 05/11/21 0805  •  cyclobenzaprine (FLEXERIL) tablet 5 mg, 5 mg, Oral, Q8H PRN, Sariah Rivera, APRN, 5 mg at 05/09/21 1741  •  dextrose (D50W) 25 g/ 50mL Intravenous Solution 25 g, 25 g, Intravenous, Q15 Min PRN, Shanell Tucker MD  •  dextrose (GLUTOSE) oral gel 15 g, 15 g, Oral, Q15 Min PRN, Shanell Tucker MD  •  diphenhydrAMINE (BENADRYL) capsule 25 mg, 25 mg, Oral, Nightly PRN, Jr Rocael Alexander MD, 25 mg at 05/09/21 2121  •  DULoxetine (CYMBALTA) DR capsule 60 mg, 60 mg, Oral, Q PM, Jr Rocael Alexander MD, 60 mg at 05/10/21 1721  •  enoxaparin (LOVENOX) syringe 40 mg, 40 mg, Subcutaneous, Daily, Jr Rocael Alexander MD, 40 mg at 05/09/21 1605  •  furosemide (LASIX) tablet 40 mg, 40 mg, Oral, BID, Satterleobardo-Vinay, Roula L, APRN, 40 mg at 05/11/21 0805  •  glucagon (human recombinant) (GLUCAGEN DIAGNOSTIC) injection 1 mg, 1 mg, Subcutaneous, Q15 Min PRN, DrawShanell MD  •  guaiFENesin (MUCINEX) 12 hr tablet 1,200 mg, 1,200 mg, Oral, Q12H, Roula German APRN, 1,200 mg at 05/11/21 0805  •  HYDROcodone-acetaminophen (NORCO) 5-325 MG per tablet 1 tablet, 1 tablet, Oral, Q4H PRN, Roula German APRN, 1 tablet at 05/11/21 0627  •  insulin glargine (LANTUS, SEMGLEE) injection 15 Units, 15 Units, Subcutaneous, Nightly, Alison Farley MD, 15 Units at 05/10/21 2146  •  insulin lispro (ADMELOG) injection 0-12 Units, 0-12 Units, Subcutaneous, 4x  Daily With Meals & Nightly, 6 Units at 05/10/21 2144 **AND** [DISCONTINUED] insulin lispro (ADMELOG) injection 0-24 Units, 0-24 Units, Subcutaneous, PRN, Satterly-Vinay, Roula L, APRN  •  ipratropium-albuterol (DUO-NEB) nebulizer solution 3 mL, 3 mL, Nebulization, 4x Daily - RT, Satterly-Vinay, Roula L, APRN, 3 mL at 05/11/21 0633  •  ipratropium-albuterol (DUO-NEB) nebulizer solution 3 mL, 3 mL, Nebulization, Q4H PRN, Satterly-Vinay, Roula L, APRN  •  linagliptin (TRADJENTA) tablet 5 mg, 5 mg, Oral, Daily, Satterly-Vinay, Roula L, APRN, 5 mg at 05/11/21 0805  •  losartan (COZAAR) tablet 100 mg, 100 mg, Oral, Q24H, Rodríguez Ulrich MD, 100 mg at 05/11/21 0805  •  magnesium hydroxide (MILK OF MAGNESIA) suspension 2400 mg/10mL 10 mL, 10 mL, Oral, Daily PRN, Jr Rocael Alexander MD  •  Magnesium Sulfate 2 gram Bolus, followed by 8 gram infusion (total Mg dose 10 grams)- Mg less than or equal to 1mg/dL, 2 g, Intravenous, PRN **OR** Magnesium Sulfate 2 gram / 50mL Infusion (GIVE X 3 BAGS TO EQUAL 6GM TOTAL DOSE) - Mg 1.1 - 1.5 mg/dl, 2 g, Intravenous, PRN **OR** Magnesium Sulfate 4 gram infusion- Mg 1.6-1.9 mg/dL, 4 g, Intravenous, PRN, Jr Rocael Alexander MD  •  melatonin tablet 5 mg, 5 mg, Oral, Nightly PRN, Jr Rocael Alexander MD, 5 mg at 05/09/21 2351  •  metFORMIN ER (GLUCOPHAGE-XR) 24 hr tablet 500 mg, 500 mg, Oral, BID With Meals, Alison Farley MD, 500 mg at 05/11/21 0805  •  metoprolol tartrate (LOPRESSOR) tablet 75 mg, 75 mg, Oral, Q12H, Chacho Esteban MD, 75 mg at 05/11/21 0805  •  multivitamin (THERAGRAN) tablet 1 tablet, 1 tablet, Oral, Daily, Jr Rocael Alexander MD, 1 tablet at 05/11/21 0804  •  mupirocin (BACTROBAN) 2 % nasal ointment 1 application, 1 application, Each Nare, Q12H, Jr Rocael Alexander MD, 1 application at 05/11/21 0627  •  [DISCONTINUED] Morphine sulfate (PF) injection 2 mg, 2 mg, Intravenous, Q4H PRN **AND** naloxone (NARCAN) injection 0.4 mg, 0.4 mg, Intravenous,  Q5 Min PRN, Sariah Rivera APRN  •  ondansetron (ZOFRAN) injection 4 mg, 4 mg, Intravenous, Q6H PRN, Jr Rocael Alexander MD, 4 mg at 05/04/21 0915  •  pantoprazole (PROTONIX) EC tablet 40 mg, 40 mg, Oral, Daily PRN, Jr Rocael Alexander MD  •  polyethylene glycol (MIRALAX) packet 17 g, 17 g, Oral, BID, Roula German APRN, 17 g at 05/11/21 0805  •  potassium chloride (K-DUR,KLOR-CON) CR tablet 40 mEq, 40 mEq, Oral, PRN, Jr Rocael Alexander MD  •  potassium chloride (K-DUR,KLOR-CON) CR tablet 40 mEq, 40 mEq, Oral, PRN **OR** potassium chloride (KLOR-CON) packet 40 mEq, 40 mEq, Oral, PRN **OR** potassium chloride 10 mEq in 100 mL IVPB, 10 mEq, Intravenous, Q1H PRN, Jr Rocael Alexander MD  •  potassium chloride (K-DUR,KLOR-CON) ER tablet 10 mEq, 10 mEq, Oral, BID With Meals, Roula German APRN, 10 mEq at 05/11/21 0805  •  potassium chloride 10 mEq in 100 mL IVPB, 10 mEq, Intravenous, Q1H PRN **OR** potassium chloride 10 mEq in 100 mL IVPB, 10 mEq, Intravenous, Q1H PRN, Jr Rocael Alexander MD, Last Rate: 100 mL/hr at 05/03/21 0637, 10 mEq at 05/03/21 0637  •  povidone-iodine (BETADINE) external solution, , Topical, BID, Roula German APRN, Given at 05/11/21 0806  •  rOPINIRole (REQUIP) tablet 0.25 mg, 0.25 mg, Oral, Q PM, Jr Rocael Alexander MD, 0.25 mg at 05/10/21 1721  •  Scopolamine (TRANSDERM-SCOP) 1.5 MG/3DAYS patch 1 patch, 1 patch, Transdermal, Q72H, Sariah Rivera APRN, 1 patch at 05/10/21 0930  •  sennosides-docusate (PERICOLACE) 8.6-50 MG per tablet 2 tablet, 2 tablet, Oral, BID, Jr Rocael Alexander MD, 2 tablet at 05/11/21 0805  •  sodium chloride 0.9 % flush 10 mL, 10 mL, Intravenous, Q12H, Jr Rocael Alexander MD, 10 mL at 05/11/21 0806  •  sodium chloride 0.9 % flush 10 mL, 10 mL, Intravenous, PRN, Jr Rocael Alexander MD  •  sucralfate (CARAFATE) tablet 1 g, 1 g, Oral, BID PRN, Jr Rocael Alexander MD, 1 g at 05/02/21 0430  •  traMADol  (ULTRAM) tablet 50 mg, 50 mg, Oral, Q6H PRN, Satarmand-Vinay, Roula L, APRN, 50 mg at 05/11/21 0804    Data Review:  All labs (24hrs):   Recent Results (from the past 24 hour(s))   POC Glucose Once    Collection Time: 05/10/21 11:30 AM    Specimen: Blood   Result Value Ref Range    Glucose 173 (H) 70 - 105 mg/dL   POC Glucose Once    Collection Time: 05/10/21  4:34 PM    Specimen: Blood   Result Value Ref Range    Glucose 246 (H) 70 - 105 mg/dL   POC Glucose Once    Collection Time: 05/10/21  8:32 PM    Specimen: Blood   Result Value Ref Range    Glucose 263 (H) 70 - 105 mg/dL   Basic Metabolic Panel    Collection Time: 05/11/21  3:24 AM    Specimen: Blood   Result Value Ref Range    Glucose 126 (H) 65 - 99 mg/dL    BUN 20 8 - 23 mg/dL    Creatinine 0.96 0.57 - 1.00 mg/dL    Sodium 137 136 - 145 mmol/L    Potassium 4.2 3.5 - 5.2 mmol/L    Chloride 98 98 - 107 mmol/L    CO2 29.0 22.0 - 29.0 mmol/L    Calcium 9.6 8.6 - 10.5 mg/dL    eGFR Non African Amer 58 (L) >60 mL/min/1.73    BUN/Creatinine Ratio 20.8 7.0 - 25.0    Anion Gap 10.0 5.0 - 15.0 mmol/L   CBC (No Diff)    Collection Time: 05/11/21  3:24 AM    Specimen: Blood   Result Value Ref Range    WBC 11.80 (H) 3.40 - 10.80 10*3/mm3    RBC 4.17 3.77 - 5.28 10*6/mm3    Hemoglobin 12.2 12.0 - 15.9 g/dL    Hematocrit 37.4 34.0 - 46.6 %    MCV 89.8 79.0 - 97.0 fL    MCH 29.1 26.6 - 33.0 pg    MCHC 32.5 31.5 - 35.7 g/dL    RDW 13.9 12.3 - 15.4 %    RDW-SD 43.8 37.0 - 54.0 fl    MPV 6.8 6.0 - 12.0 fL    Platelets 388 140 - 450 10*3/mm3   POC Glucose Once    Collection Time: 05/11/21  7:24 AM    Specimen: Blood   Result Value Ref Range    Glucose 127 (H) 70 - 105 mg/dL        Imaging:  XR Chest 1 View  Narrative: DATE OF EXAM:  5/10/2021 3:35 PM     PROCEDURE:  XR CHEST 1 VW-     INDICATIONS:  s/p right thoracentesis; R07.9-Chest pain, unspecified;  R73.9-Hyperglycemia, unspecified; I20.8-Other forms of angina pectoris     COMPARISON:  05/10/2021 at 5:11 AM      TECHNIQUE:   Single radiographic AP view of the chest was obtained.     FINDINGS:  Examination is performed in expiration. There are postoperative changes  of prior CABG. There is mild right basilar atelectasis. The right  pleural effusion has completely been drained. There is no pneumothorax  identified.      Impression: Interim drainage of the right-sided pleural effusion with no  pneumothorax. Mild right basilar atelectasis.     Electronically Signed By-Mikhail Mitchell MD On:5/10/2021 3:38 PM  This report was finalized on [id number] by  Mikhail Mitchell MD.  US Thoracentesis  Narrative:    DATE OF EXAM:   5/10/2021 3:04 PM     PROCEDURE:   US THORACENTESIS-     INDICATIONS:   Moderate Pleural Effusion; R07.9-Chest pain, unspecified;  R73.9-Hyperglycemia, unspecified; I20.8-Other forms of angina pectoris     COMPARISON:  Chest radiograph dated 05/10/2021 at 5:08 AM     TECHNIQUE:   The procedure, risks and options and alternatives were discussed with  the patient and informed written consent was obtained. The patient was  placed in the seated position and ultrasound performed showing a small  right-sided dependent pleural effusion. The skin was marked prepped and  draped and subsequently anesthetized 1% Xylocaine. A 5 [place] Yueh  needle and catheter was then inserted in the right pleural space. A  total of 315 cc of straw-colored fluid was removed. The catheter was  removed and hemostasis achieved. A dressing was placed over the site.  The procedure was very well tolerated. Postprocedure chest radiograph  shows no pneumothorax        Impression: IMPRESSION :   Technically successful right thoracentesis under ultrasound guidance     Electronically Signed By-Mikhail Mitchell MD On:5/10/2021 3:38 PM  This report was finalized on [id number] by  Mikhail Mitchell MD.  XR Chest 1 View  Narrative:    DATE OF EXAM:   5/10/2021 5:06 AM     PROCEDURE:   XR CHEST 1 VW-     INDICATIONS:   PNA; R07.9-Chest pain, unspecified;  R73.9-Hyperglycemia, unspecified;  I20.8-Other forms of angina pectoris     COMPARISON:  5/7/2021     TECHNIQUE:   Portable chest radiograph.     FINDINGS:    Sternotomy wires noted. There is opacity at the right lung base likely  representing small right pleural effusion with right basilar airspace  disease. There is a small left effusion. No pneumothorax. Osseous  structures intact. Screws noted at the left humeral head.     Impression: 1. Moderate right and small left pleural effusion with bibasilar  airspace disease likely atelectasis and/or pneumonia, unchanged.     Electronically Signed By-Jas Salguero MD On:5/10/2021 7:25 AM  This report was finalized on [id number] by  Jas Salguero MD.       ASSESSMENT:  Acute resp distress  Pleural effusion   Chest pain    Type 2 diabetes mellitus with peripheral neuropathy (CMS/HCC)    Age-related osteoporosis without current pathological fracture    Essential hypertension    Mixed hyperlipidemia    Gastroesophageal reflux disease with esophagitis    Obesity (BMI 30-39.9)    GERD (gastroesophageal reflux disease)    Stable angina pectoris (CMS/HCC)     PLAN:   eval for JANET as out pt   Bronchodilator  Inhaled corticosteroids  Electrolytes/ glycemic control  DVT and GI prophylaxis.    Total Critical care time in direct medical management (   ) minutes  David Moya MD. D, ABSM.     5/11/2021  10:19 EDT

## 2021-05-11 NOTE — CASE MANAGEMENT/SOCIAL WORK
Continued Stay Note   [facility]     Patient Name: Marge Mcghee  MRN: [id number]  Today's Date: 5/11/2021    Admit Date: 5/1/2021    Discharge Plan     Row Name 05/11/21 1012       Plan    Plan  D/C Plan: [facility] Acute accepted. Bed available 5/11. No PASRR or precert required.    Plan Comments  CM confirmed with [facility] liaison that bed is available today. CM updated bedside RN and BREANN Crain. Barrier to D/C: POD#9 CABG, repeat CXR pending.          Expected Discharge Date and Time     Expected Discharge Date Expected Discharge Time    May 11, 2021             Terese Mendez

## 2021-05-11 NOTE — PLAN OF CARE
Marge Mcghee presents with ADL impairments below baseline abilities which indicate the need for continued skilled intervention while inpatient. Pt up in chair upon OT arrival. Recalls 2/3 sternal precautions and requires max verbal cues for compliance. Completes 5 sit<>stand transfers with Min A. Requires Mod A for LB ADLs. Becomes SOB with minimal activity, and requires max encouragement to complete tasks at highest level of independence. Tolerating session today without incident.  Will continue to follow and progress as tolerated.

## 2021-05-25 ENCOUNTER — TELEPHONE (OUTPATIENT)
Dept: CARDIAC REHAB | Facility: HOSPITAL | Age: 66
End: 2021-05-25

## 2021-05-25 NOTE — TELEPHONE ENCOUNTER
F/u call after CABG to determine if patient interested in participating in cardiac rehab. Patient is interested, however had not made f/u appt with [facility] yet. Patient advised that cardiac rehab order would need to be placed by Dr Esteban at follow up visit. Patient plans to call Dr Esteban office today to make f/u appt. Patient currently staying in [place] with family. Not sure which location she would like to go to at this time. Will await CR order

## 2021-06-03 ENCOUNTER — OFFICE VISIT (OUTPATIENT)
Dept: CARDIAC SURGERY | Facility: CLINIC | Age: 66
End: 2021-06-03

## 2021-06-03 VITALS
HEART RATE: 108 BPM | DIASTOLIC BLOOD PRESSURE: 90 MMHG | HEIGHT: 64 IN | SYSTOLIC BLOOD PRESSURE: 150 MMHG | WEIGHT: 182.5 LBS | BODY MASS INDEX: 31.16 KG/M2 | TEMPERATURE: 97.1 F | RESPIRATION RATE: 20 BRPM | OXYGEN SATURATION: 96 %

## 2021-06-03 DIAGNOSIS — Z95.1 S/P CABG X 3: Primary | ICD-10-CM

## 2021-06-03 DIAGNOSIS — Z09 POSTOPERATIVE FOLLOW-UP: ICD-10-CM

## 2021-06-03 PROCEDURE — 99024 POSTOP FOLLOW-UP VISIT: CPT | Performed by: NURSE PRACTITIONER

## 2021-06-03 RX ORDER — NYSTATIN 100000 U/G
CREAM TOPICAL 3 TIMES DAILY
COMMUNITY
Start: 2021-06-02 | End: 2021-06-03

## 2021-06-03 RX ORDER — ALBUTEROL SULFATE 90 UG/1
2 AEROSOL, METERED RESPIRATORY (INHALATION) EVERY 4 HOURS PRN
Qty: 8 G | Refills: 3 | Status: SHIPPED | OUTPATIENT
Start: 2021-06-03 | End: 2021-07-12

## 2021-06-03 RX ORDER — ATORVASTATIN CALCIUM 80 MG/1
80 TABLET, FILM COATED ORAL NIGHTLY
Qty: 90 TABLET | Refills: 1 | Status: SHIPPED | OUTPATIENT
Start: 2021-06-03 | End: 2021-07-12 | Stop reason: SDUPTHER

## 2021-06-03 RX ORDER — FLUCONAZOLE 150 MG/1
150 TABLET ORAL DAILY
COMMUNITY
Start: 2021-06-02 | End: 2021-06-03

## 2021-06-03 RX ORDER — POTASSIUM CHLORIDE 750 MG/1
10 TABLET, EXTENDED RELEASE ORAL
COMMUNITY
Start: 2021-06-02 | End: 2021-06-03

## 2021-06-03 RX ORDER — ASPIRIN 81 MG/1
81 TABLET ORAL DAILY
Qty: 30 TABLET | Refills: 3 | Status: SHIPPED | OUTPATIENT
Start: 2021-06-03 | End: 2022-02-17

## 2021-06-03 NOTE — PROGRESS NOTES
"CARDIOVASCULAR SURGERY FOLLOW-UP PROGRESS NOTE  Chief Complaint: Post-op Follow Up        HPI:   Dear Traci Lazar APRN and colleagues:    It was nice to see Marge Mcghee in follow up today after cardiac surgery.  As you know, she is a 66 y.o. female with CAD, DM II, HLD who underwent CABG at [facility] by Dr. Alexander on 5/2/21. She did well postoperatively and continues to do well. She comes in today complaining of incisional pain and fatigue.  Her activity level has been good. She reports only occasional use of Tylenol for pain and reports pain is mostly soreness. I reviewed her medications and discovered she was only taking Losartan and her diabetic medication regimen. She reports despite being discharged from the hospital on appropriate medical therapy, her rehab only continued a few medications at discharge. I instructed her to resume her aspirin, statin, and beta blocker and sent prescriptions to her preferred pharmacy. Her blood pressure and heart rate were noted to be elevated for today's appointment. She reports monitoring her blood pressure at home and that it is normal higher. I instructed her to continue to monitor her blood pressure and heart rate after resuming her beta blocker. She should keep her scheduled appointment with Dr. Esteban next week and will return for a wound check with us the following week. She agrees to contact our office if any concerns or complaints arise prior to scheduled appointment.    Physical Exam:         /90 (BP Location: Left arm, Patient Position: Sitting, Cuff Size: Adult)   Pulse 108   Temp 97.1 °F (36.2 °C) (Oral)   Resp 20   Ht 162.6 cm (64\")   Wt 82.8 kg (182 lb 8 oz)   SpO2 96%   Breastfeeding No   BMI 31.33 kg/m²   Heart:  regular rate and rhythm, S1, S2 normal, no murmur, click, rub or gallop  Lungs:  clear to auscultation bilaterally  Extremities:  no edema  Incision(s):  mid chest healing well, no significant drainage, no significant " erythema, scabbing at distal end, left leg healing well, no significant drainage, no dehiscence, no significant erythema    Assessment/Plan:     S/P CABG. Overall, she is doing well.    Post-op pleural effusion  Post-op Upper Respiratory Infection    No heavy lifting > 10 pounds for 2 more weeks  Keep incisions clean and dry  OK to drive if not taking narcotic pain medicine  OK to begin cardiac rehab  Follow-up as scheduled with cardiology  Return to clinic in 2 week(s) with no new studies      Thank you for allowing me to participate in the care of your patient.    Regards,  ELOISE MCCLURE, APRN

## 2021-06-04 LAB — QT INTERVAL: 410 MS

## 2021-06-08 NOTE — PROGRESS NOTES
Date of Office Visit: 2021  Encounter Provider: Dr. Chacho Esteban  Place of Service: [facility] CARDIOLOGY [place]  Patient Name: Marge Mcghee  :1955  Traci Townsend APRN    Chief Complaint   Patient presents with   • Hypertension     post heart cath/cartlon/stress/echo   • Chest Pain   • Diabetes     History of Present Illness:    I am pleased to see Mrs. Mcghee in my office today as a follow-up.    As you know, patient is 66-year-old white female whose past medical history significant for hypertension, hyperlipidemia, diabetes mellitus, who came today for follow-up.    In May 2021, patient was admitted at [facility] with symptom of unstable angina.  She ruled out for myocardial infarction.  She underwent stress test which was abnormal and subsequent cardiac catheterization showed high-grade stenosis of left main up to 99%.  Patient underwent urgent CABG x3.  Patient subsequently was discharged home.    Since the discharge, patient is overall feeling better.  No shortness of breath.  No palpitation or recurrence of atrial fibrillation.  Patient complained of dizziness and his blood pressure is noted to be low.  I would recommend to proceed with decreasing dose of losartan.  Patient denies any orthopnea PND no syncope or presyncope.    Patient was recently diagnosed with left main disease and underwent CABG.  Blood pressure is low.  I would recommend decrease losartan to 25 mg daily blood pressure monitoring is recommended.  Patient is advised to call my office with updated list of medication that she is taking at home.  Patient is advised to call my office with blood pressure readings in 1 month.  I would refer the patient to cardiac rehab        Past Medical History:   Diagnosis Date   • Adrenal nodule (CMS/HCC) 2016    FINDINGS: Mild hepatic steatosis may be present. Cholecystectomy. No biliary ductal dilatation. Negative pancreas, spleen, left adrenal gland, and kidneys.  The right adrenal presumed adenoma has increased slightly, measuring 3 x 4 cm in diameter,  compared to 2 x 3.5 cm on the previous exam. The attenuation values are consistent with an adenoma. Done at [facility] in August 2018   • Age-related osteoporosis without current pathological fracture 11/16/2016   • Arthritis    • Delayed surgical wound healing    • Essential hypertension 11/16/2016   • Gastroesophageal reflux disease with esophagitis 11/16/2016   • Hepatic steatosis 11/16/2016   • History of anemia    • History of sepsis     FROM UTI   • Hyperlipidemia 11/16/2016   • Lisfranc's dislocation     LEFT WITH FRACTURES NONHEALING FOOT   • Osteomyelitis of left foot (CMS/HCC) 11/2020   • RLS (restless legs syndrome)    • Squamous cell skin cancer 2014    Excised by Dr. James   • Thyroid nodule 10/18/2019    BENIGN   • Type 2 diabetes mellitus with peripheral neuropathy (CMS/HCC) 11/16/2016   • Vitamin D deficiency 1/25/2019         Past Surgical History:   Procedure Laterality Date   • BREAST BIOPSY Left     7/2019. BENIGN   • CARDIAC CATHETERIZATION Left 5/2/2021    Procedure: Cardiac Catheterization/Vascular Study;  Surgeon: Chacho Esteban MD;  Location: [facility] CATH INVASIVE LOCATION;  Service: Cardiovascular;  Laterality: Left;   • CARDIAC CATHETERIZATION N/A 5/2/2021    Procedure: Intra-Aortic Baloon Pump Insertion;  Surgeon: Chacho Esteban MD;  Location: [facility] CATH INVASIVE LOCATION;  Service: Cardiovascular;  Laterality: N/A;   • CATARACT EXTRACTION WITH INTRAOCULAR LENS IMPLANT Bilateral    • CHOLECYSTECTOMY     • COLONOSCOPY     • CORONARY ARTERY BYPASS GRAFT N/A 5/2/2021    Procedure: CORONARY ARTERY BYPASS WITH INTERNAL MAMMARY ARTERY GRAFT;  Surgeon: Jr Rocael Alexander MD;  Location: [facility] CVOR;  Service: Cardiothoracic;  Laterality: N/A;   • FOOT FUSION Right 11/13/2020    Procedure: RIGHT OPEN TREATMENT LISFRANC INJURY, OPEN REDUCTION INTERNAL FIXATION MEDIAL/MIDDLE CUNEIFORM FRACTURE AND  2ND/3RD METATARSAL FRACTURE 1ST 2ND POSSIBLE 3RD TARSOMETATARSAL ARTHRODESIS INTERCUNEIFORM ARTHRODESIS CALCANEAL BONE GRAFT;  Surgeon: Mikhail Bansk Jr., MD;  Location: [facility] OR [facility];  Service: Orthopedics;  Laterality: Right;   • INCISION AND DRAINAGE LEG Right 1/8/2021    Procedure: RIGHT IRRIGATION AND DEBRIDEMENT OF FOOT WITH SECONDARY CLOSURE AND HARDWARE REMOVAL;  Surgeon: Mikhail Banks Jr., MD;  Location: [facility] MAIN OR;  Service: Orthopedics;  Laterality: Right;   • KNEE ARTHROSCOPY Bilateral    • TOTAL ABDOMINAL HYSTERECTOMY     • TRANSESOPHAGEAL ECHOCARDIOGRAM (EMILIA) N/A 5/2/2021    Procedure: TRANSESOPHAGEAL ECHOCARDIOGRAM;  Surgeon: Jr Rocael Alexander MD;  Location: [facility];  Service: Cardiothoracic;  Laterality: N/A;   • UPPER GASTROINTESTINAL ENDOSCOPY  08/2016   • US GUIDED FINE NEEDLE ASPIRATION  5/8/2020           Current Outpatient Medications:   •  acetaminophen (TYLENOL) 325 MG tablet, Take 2 tablets by mouth Every 4 (Four) Hours As Needed for Mild Pain ., Disp: , Rfl:   •  albuterol sulfate  (90 Base) MCG/ACT inhaler, Inhale 2 puffs Every 4 (Four) Hours As Needed for Wheezing., Disp: 8 g, Rfl: 3  •  aspirin 81 MG EC tablet, Take 1 tablet by mouth Daily., Disp: 30 tablet, Rfl: 3  •  atorvastatin (LIPITOR) 80 MG tablet, Take 1 tablet by mouth Every Night., Disp: 90 tablet, Rfl: 1  •  Cholecalciferol (VITAMIN D-3) 25 MCG (1000 UT) capsule, Take 1,000 Units by mouth Daily., Disp: 60 capsule, Rfl:   •  Cyanocobalamin (VITAMIN B-12) 3000 MCG sublingual tablet, Place 1 tablet/day under the tongue., Disp: , Rfl:   •  cyclobenzaprine (FLEXERIL) 5 MG tablet, Take 1 tablet by mouth Every 8 (Eight) Hours As Needed for Muscle Spasms., Disp: 15 tablet, Rfl: 0  •  denosumab (PROLIA) 60 MG/ML solution syringe, Inject 60 mg under the skin into the appropriate area as directed Every 6 (Six) Months. Nov 2019/ May 2020, Disp: , Rfl:   •  DULoxetine (CYMBALTA) 60 MG capsule, Take 1 capsule by mouth  Daily. (Patient taking differently: Take 60 mg by mouth Every Evening.), Disp: 90 capsule, Rfl: 1  •  linagliptin (TRADJENTA) 5 MG tablet tablet, Take 5 mg by mouth Daily., Disp: , Rfl:   •  losartan (COZAAR) 25 MG tablet, Take 1 tablet by mouth Daily., Disp: 90 tablet, Rfl: 0  •  melatonin 5 MG tablet tablet, Take 1 tablet by mouth At Night As Needed (Sleep)., Disp: , Rfl:   •  metFORMIN ER (GLUCOPHAGE-XR) 500 MG 24 hr tablet, Take 1 tablet by mouth 2 (Two) Times a Day With Meals., Disp: 60 tablet, Rfl: 3  •  metoprolol tartrate (LOPRESSOR) 25 MG tablet, Take 2 tablets by mouth 2 (Two) Times a Day., Disp: 60 tablet, Rfl: 3  •  Multiple Vitamin (MULTI-VITAMIN PO), Take 1 tablet by mouth Daily., Disp: , Rfl:   •  povidone-iodine (BETADINE) 10 % external solution, Apply  topically to the appropriate area as directed 2 (Two) Times a Day. Clean incisions daily then paint with Betadine., Disp: 1 each, Rfl: 0  •  promethazine (PHENERGAN) 12.5 MG tablet, Take 1 tablet by mouth Every 6 (Six) Hours As Needed for Nausea or Vomiting., Disp: 20 tablet, Rfl: 0  •  vitamin D (ERGOCALCIFEROL) 86777 units capsule capsule, Take 50,000 Units by mouth 1 (One) Time Per Week. Monday, Disp: , Rfl:       Social History     Socioeconomic History   • Marital status:      Spouse name: Not on file   • Number of children: Not on file   • Years of education: Not on file   • Highest education level: Not on file   Tobacco Use   • Smoking status: Never Smoker   • Smokeless tobacco: Never Used   Vaping Use   • Vaping Use: Never used   Substance and Sexual Activity   • Alcohol use: Yes     Comment: socially    • Drug use: Never   • Sexual activity: Defer         Review of Systems   Constitutional: Negative for chills and fever.   HENT: Negative for ear discharge and nosebleeds.    Eyes: Negative for discharge and redness.   Cardiovascular: Negative for chest pain, orthopnea, palpitations, paroxysmal nocturnal dyspnea and syncope.  "  Respiratory: Negative for cough, shortness of breath and wheezing.    Endocrine: Negative for heat intolerance.   Skin: Negative for rash.   Musculoskeletal: Negative for arthritis and myalgias.   Gastrointestinal: Negative for abdominal pain, melena, nausea and vomiting.   Genitourinary: Negative for dysuria and hematuria.   Neurological: Positive for dizziness. Negative for light-headedness, numbness and tremors.   Psychiatric/Behavioral: Negative for depression. The patient is not nervous/anxious.        Procedures    Procedures    No orders to display           Objective:    BP 92/58   Pulse 86   Ht 162.6 cm (64.02\")   Wt 78.5 kg (173 lb)   BMI 29.68 kg/m²         Constitutional:       Appearance: Well-developed.   Eyes:      General: No scleral icterus.        Right eye: No discharge.   HENT:      Head: Normocephalic and atraumatic.   Neck:      Thyroid: No thyromegaly.      Lymphadenopathy: No cervical adenopathy.   Pulmonary:      Effort: Pulmonary effort is normal. No respiratory distress.      Breath sounds: Normal breath sounds. No wheezing. No rales.   Cardiovascular:      Normal rate. Regular rhythm.      No gallop.   Abdominal:      Tenderness: There is no abdominal tenderness.   Skin:     Findings: No erythema or rash.   Neurological:      Mental Status: Alert and oriented to person, place, and time.             Assessment:       Diagnosis Plan   1. Type 2 diabetes mellitus with peripheral neuropathy (CMS/HCC)  losartan (COZAAR) 25 MG tablet   2. Essential hypertension  losartan (COZAAR) 25 MG tablet   3. Mixed hyperlipidemia  losartan (COZAAR) 25 MG tablet   4. Unstable angina pectoris (CMS/HCC)  losartan (COZAAR) 25 MG tablet            Plan:       MDM:    1.  Dizziness:    I would recommend that patient should decrease losartan to 25 mg daily.  Blood pressure monitoring is recommended    2.  Hypotension:    Decrease losartan and monitor the blood pressure.  Take precautions against fall.    3. "  Hyperlipidemia:    Continue Lipitor    4.  Diabetes mellitus:    Diet modification and exercise and weight loss emphasized    5.  CAD:    Patient is feeling much better.  No symptom of angina pectoris proceed with cardiac rehab

## 2021-06-09 ENCOUNTER — OFFICE VISIT (OUTPATIENT)
Dept: CARDIOLOGY | Facility: CLINIC | Age: 66
End: 2021-06-09

## 2021-06-09 VITALS
WEIGHT: 173 LBS | DIASTOLIC BLOOD PRESSURE: 58 MMHG | HEART RATE: 86 BPM | SYSTOLIC BLOOD PRESSURE: 92 MMHG | HEIGHT: 64 IN | BODY MASS INDEX: 29.53 KG/M2

## 2021-06-09 DIAGNOSIS — E78.2 MIXED HYPERLIPIDEMIA: ICD-10-CM

## 2021-06-09 DIAGNOSIS — I20.0 UNSTABLE ANGINA PECTORIS (HCC): ICD-10-CM

## 2021-06-09 DIAGNOSIS — E11.42 TYPE 2 DIABETES MELLITUS WITH PERIPHERAL NEUROPATHY (HCC): Primary | Chronic | ICD-10-CM

## 2021-06-09 DIAGNOSIS — I10 ESSENTIAL HYPERTENSION: Chronic | ICD-10-CM

## 2021-06-09 PROCEDURE — 99214 OFFICE O/P EST MOD 30 MIN: CPT | Performed by: INTERNAL MEDICINE

## 2021-06-09 RX ORDER — LOSARTAN POTASSIUM 25 MG/1
25 TABLET ORAL DAILY
Qty: 90 TABLET | Refills: 0 | Status: SHIPPED | OUTPATIENT
Start: 2021-06-09 | End: 2021-09-08

## 2021-06-10 ENCOUNTER — TELEPHONE (OUTPATIENT)
Dept: CARDIOLOGY | Facility: CLINIC | Age: 66
End: 2021-06-10

## 2021-06-10 NOTE — TELEPHONE ENCOUNTER
Her blood pressure is running low  Her last two days were 100/68 86/52  Heart rate 74 and 90  Her  losartan was decreased to 75 mg June 9th She is also taking Metoprolol 1/1/2 tablet 1 x daily

## 2021-06-11 LAB
QT INTERVAL: 389 MS
QT INTERVAL: 405 MS

## 2021-06-14 ENCOUNTER — TELEPHONE (OUTPATIENT)
Dept: CARDIAC REHAB | Facility: HOSPITAL | Age: 66
End: 2021-06-14

## 2021-06-14 DIAGNOSIS — Z95.1 S/P CABG (CORONARY ARTERY BYPASS GRAFT): Primary | ICD-10-CM

## 2021-06-14 NOTE — TELEPHONE ENCOUNTER
Left voice message  Second attempt to reach pt regarding cardiac rehab insurance benefits and which location she would prefer to participate at.

## 2021-07-12 ENCOUNTER — OFFICE VISIT (OUTPATIENT)
Dept: CARDIAC SURGERY | Facility: CLINIC | Age: 66
End: 2021-07-12

## 2021-07-12 VITALS
TEMPERATURE: 97.1 F | SYSTOLIC BLOOD PRESSURE: 161 MMHG | BODY MASS INDEX: 32.43 KG/M2 | OXYGEN SATURATION: 97 % | RESPIRATION RATE: 20 BRPM | DIASTOLIC BLOOD PRESSURE: 93 MMHG | WEIGHT: 183 LBS | HEART RATE: 93 BPM | HEIGHT: 63 IN

## 2021-07-12 DIAGNOSIS — Z09 POSTOPERATIVE FOLLOW-UP: ICD-10-CM

## 2021-07-12 DIAGNOSIS — Z95.1 S/P CABG X 3: Primary | ICD-10-CM

## 2021-07-12 PROCEDURE — 99024 POSTOP FOLLOW-UP VISIT: CPT | Performed by: NURSE PRACTITIONER

## 2021-07-12 RX ORDER — ATORVASTATIN CALCIUM 80 MG/1
80 TABLET, FILM COATED ORAL NIGHTLY
Qty: 90 TABLET | Refills: 1 | Status: SHIPPED | OUTPATIENT
Start: 2021-07-12 | End: 2022-02-17

## 2021-07-12 NOTE — PROGRESS NOTES
"CARDIOVASCULAR SURGERY FOLLOW-UP PROGRESS NOTE  Chief Complaint: Post-op Follow Up        HPI:   Dear Traci Lazar APRN and colleagues:    It was nice to see Marge Mcghee in follow up today after cardiac surgery.  As you know, she is a 66 y.o. female with CAD, DM II, HLD who underwent Emergent CABG at [facility] by Dr. Alexander on 5/2/21. She did well postoperatively and continues to do well. She comes in today complaining of incisional pain when coughing. I explained that this was expected pain that should resolve with time. She should continue to splint her chest when coughing and sneezing. Her surgical incisions are healing well without redness or drainage.  Her activity level has been good. She is not enrolled in cardiac rehab and has no interest in starting. Patient reports she stopped taking her beta blocker and Losartan due to \"low blood pressure and dizziness.\" She occasionally takes her aspirin. Her blood pressure at today's appointment is 161/93. I advised her to restart her Lopressor at 25mg BID and continue to take her blood pressure. She should stop her Losartan for now. I explained the benefits of both aspirin and beta blockers in recovery from cardiac surgery. She was agreeable. I sent prescriptions for Lipitor and Metoprolol to her preferred pharmacy. She denies any additional concerns or complaints. She agrees to contact our office should any arise. We will otherwise follow with her as needed.    Physical Exam:         /93 (BP Location: Left arm, Patient Position: Sitting, Cuff Size: Adult)   Pulse 93   Temp 97.1 °F (36.2 °C) (Oral)   Resp 20   Ht 160 cm (63\")   Wt 83 kg (183 lb)   SpO2 97%   Breastfeeding No   BMI 32.42 kg/m²   Heart:  regular rate and rhythm, S1, S2 normal, no murmur, click, rub or gallop  Lungs:  clear to auscultation bilaterally  Extremities:  no edema  Incision(s):  mid chest healing well, no significant drainage, no dehiscence, no significant erythema, " left leg healing well, no significant drainage, no dehiscence, no significant erythema    Assessment/Plan:     S/P Emergent CABG. Overall, she is doing well.    Post-op pleural effusion  Post-op Upper Respiratory Infection    OK to drive if not taking narcotic pain medicine  OK to begin cardiac rehab  Follow-up as scheduled with cardiology  Follow-up with CT surgery prn    No restrictions of activity.      Thank you for allowing me to participate in the care of your patient.    Regards,  ELOISE MCCLURE, APRN

## 2021-08-30 ENCOUNTER — TRANSCRIBE ORDERS (OUTPATIENT)
Dept: ADMINISTRATIVE | Facility: HOSPITAL | Age: 66
End: 2021-08-30

## 2021-08-30 DIAGNOSIS — Z12.31 SCREENING MAMMOGRAM, ENCOUNTER FOR: Primary | ICD-10-CM

## 2021-09-07 NOTE — PROGRESS NOTES
Date of Office Visit: 2021  Encounter Provider: Dr. Chacho Esteban  Place of Service: [facility] CARDIOLOGY [place]  Patient Name: Marge Mcghee  :1955  Traci Townsend APRN    Chief Complaint   Patient presents with   • Hypertension     3 month follow up    • Hyperlipidemia   • Chest Pain   • Diabetes     History of Present Illness    I am pleased to see Mrs. Mcghee in my office today as a follow-up.    As you know, patient is 66-year-old white female whose past medical history significant for hypertension, hyperlipidemia, diabetes mellitus, who came today for follow-up.    In May 2021, patient was admitted at [facility] with symptom of unstable angina.  She ruled out for myocardial infarction.  She underwent stress test which was abnormal and subsequent cardiac catheterization showed high-grade stenosis of left main up to 99%.  Patient underwent urgent CABG x3.  Patient subsequently was discharged home.    Since the previous visit, overall patient is doing well.  Due to unknown reason, patient has stopped taking losartan and Toprol.  She believes that it is giving her dizziness.  On previous visit his blood pressure was low I advised her to decrease losartan.  At present her blood pressure is very high.  Patient denies any symptom of chest pain.  Patient does have palpitation.  No leg edema noted.    EKG showed sinus rhythm with heart rate of 99 bpm.    I would like to start Toprol-XL 25 mg daily.  I would start losartan 25 mg daily.  I educated the patient about the need of these medicines.        Past Medical History:   Diagnosis Date   • Adrenal nodule (CMS/HCC) 2016    FINDINGS: Mild hepatic steatosis may be present. Cholecystectomy. No biliary ductal dilatation. Negative pancreas, spleen, left adrenal gland, and kidneys. The right adrenal presumed adenoma has increased slightly, measuring 3 x 4 cm in diameter,  compared to 2 x 3.5 cm on the previous exam. The  attenuation values are consistent with an adenoma. Done at [facility] in August 2018   • Age-related osteoporosis without current pathological fracture 11/16/2016   • Arthritis    • Delayed surgical wound healing    • Essential hypertension 11/16/2016   • Gastroesophageal reflux disease with esophagitis 11/16/2016   • Hepatic steatosis 11/16/2016   • History of anemia    • History of sepsis     FROM UTI   • Hyperlipidemia 11/16/2016   • Lisfranc's dislocation     LEFT WITH FRACTURES NONHEALING FOOT   • Osteomyelitis of left foot (CMS/HCC) 11/2020   • RLS (restless legs syndrome)    • Squamous cell skin cancer 2014    Excised by Dr. James   • Thyroid nodule 10/18/2019    BENIGN   • Type 2 diabetes mellitus with peripheral neuropathy (CMS/HCC) 11/16/2016   • Vitamin D deficiency 1/25/2019         Past Surgical History:   Procedure Laterality Date   • BREAST BIOPSY Left     7/2019. BENIGN   • CARDIAC CATHETERIZATION Left 5/2/2021    Procedure: Cardiac Catheterization/Vascular Study;  Surgeon: Chacho Esteban MD;  Location: [facility] CATH INVASIVE LOCATION;  Service: Cardiovascular;  Laterality: Left;   • CARDIAC CATHETERIZATION N/A 5/2/2021    Procedure: Intra-Aortic Baloon Pump Insertion;  Surgeon: Chacho Esteban MD;  Location: [facility] CATH INVASIVE LOCATION;  Service: Cardiovascular;  Laterality: N/A;   • CATARACT EXTRACTION WITH INTRAOCULAR LENS IMPLANT Bilateral    • CHOLECYSTECTOMY     • COLONOSCOPY     • CORONARY ARTERY BYPASS GRAFT N/A 5/2/2021    Procedure: CORONARY ARTERY BYPASS WITH INTERNAL MAMMARY ARTERY GRAFT;  Surgeon: Jr Rocael Alexander MD;  Location: [facility] CVOR;  Service: Cardiothoracic;  Laterality: N/A;   • FOOT FUSION Right 11/13/2020    Procedure: RIGHT OPEN TREATMENT LISFRANC INJURY, OPEN REDUCTION INTERNAL FIXATION MEDIAL/MIDDLE CUNEIFORM FRACTURE AND 2ND/3RD METATARSAL FRACTURE 1ST 2ND POSSIBLE 3RD TARSOMETATARSAL ARTHRODESIS INTERCUNEIFORM ARTHRODESIS CALCANEAL BONE GRAFT;  Surgeon: Darren  Mikhail MALDONADO Jr., MD;  Location: [facility] OR [facility];  Service: Orthopedics;  Laterality: Right;   • INCISION AND DRAINAGE LEG Right 1/8/2021    Procedure: RIGHT IRRIGATION AND DEBRIDEMENT OF FOOT WITH SECONDARY CLOSURE AND HARDWARE REMOVAL;  Surgeon: Mikhail Banks Jr., MD;  Location: [facility] MAIN OR;  Service: Orthopedics;  Laterality: Right;   • KNEE ARTHROSCOPY Bilateral    • TOTAL ABDOMINAL HYSTERECTOMY     • TRANSESOPHAGEAL ECHOCARDIOGRAM (EMILIA) N/A 5/2/2021    Procedure: TRANSESOPHAGEAL ECHOCARDIOGRAM;  Surgeon: Jr Rocael Alexander MD;  Location: [facility];  Service: Cardiothoracic;  Laterality: N/A;   • UPPER GASTROINTESTINAL ENDOSCOPY  08/2016   • US GUIDED FINE NEEDLE ASPIRATION  5/8/2020           Current Outpatient Medications:   •  aspirin 81 MG EC tablet, Take 1 tablet by mouth Daily., Disp: 30 tablet, Rfl: 3  •  atorvastatin (LIPITOR) 80 MG tablet, Take 1 tablet by mouth Every Night., Disp: 90 tablet, Rfl: 1  •  denosumab (PROLIA) 60 MG/ML solution syringe, Inject 60 mg under the skin into the appropriate area as directed Every 6 (Six) Months. Nov 2019/ May 2020, Disp: , Rfl:   •  metFORMIN ER (GLUCOPHAGE-XR) 500 MG 24 hr tablet, Take 1 tablet by mouth 2 (Two) Times a Day With Meals., Disp: 60 tablet, Rfl: 3  •  Multiple Vitamin (MULTI-VITAMIN PO), Take 1 tablet by mouth Daily., Disp: , Rfl:   •  losartan (Cozaar) 25 MG tablet, Take 1 tablet by mouth Daily., Disp: 90 tablet, Rfl: 1  •  metoprolol succinate XL (TOPROL-XL) 25 MG 24 hr tablet, Take 1 tablet by mouth Daily., Disp: 90 tablet, Rfl: 3      Social History     Socioeconomic History   • Marital status:      Spouse name: Not on file   • Number of children: Not on file   • Years of education: Not on file   • Highest education level: Not on file   Tobacco Use   • Smoking status: Never Smoker   • Smokeless tobacco: Never Used   Vaping Use   • Vaping Use: Never used   Substance and Sexual Activity   • Alcohol use: Yes     Comment: socially    • Drug  "use: Never   • Sexual activity: Defer         Review of Systems   Constitutional: Negative for chills and fever.   HENT: Negative for ear discharge and nosebleeds.    Eyes: Negative for discharge and redness.   Cardiovascular: Negative for chest pain, orthopnea, palpitations, paroxysmal nocturnal dyspnea and syncope.   Respiratory: Positive for shortness of breath. Negative for cough and wheezing.    Endocrine: Negative for heat intolerance.   Skin: Negative for rash.   Musculoskeletal: Positive for arthritis and joint pain. Negative for myalgias.   Gastrointestinal: Negative for abdominal pain, melena, nausea and vomiting.   Genitourinary: Negative for dysuria and hematuria.   Neurological: Negative for dizziness, light-headedness, numbness and tremors.   Psychiatric/Behavioral: Negative for depression. The patient is not nervous/anxious.        Procedures      ECG 12 Lead    Date/Time: 9/8/2021 12:30 PM  Performed by: Chacho Esteban MD  Authorized by: Chacho Esteban MD   Comparison: compared with previous ECG   Similar to previous ECG  Rhythm: sinus rhythm  Other findings: poor R wave progression    Clinical impression: normal ECG            ECG 12 Lead    (Results Pending)           Objective:    /100   Pulse 99   Ht 160 cm (62.99\")   Wt 78.9 kg (174 lb)   BMI 30.83 kg/m²         Constitutional:       Appearance: Well-developed.   Eyes:      General: No scleral icterus.        Right eye: No discharge.   HENT:      Head: Normocephalic and atraumatic.   Neck:      Thyroid: No thyromegaly.      Lymphadenopathy: No cervical adenopathy.   Pulmonary:      Effort: Pulmonary effort is normal. No respiratory distress.      Breath sounds: Normal breath sounds. No wheezing. No rales.   Cardiovascular:      Normal rate. Regular rhythm.      No gallop.   Abdominal:      Tenderness: There is no abdominal tenderness.   Skin:     Findings: No erythema or rash.   Neurological:      Mental Status: Alert and oriented to " person, place, and time.             Assessment:       Diagnosis Plan   1. Type 2 diabetes mellitus with peripheral neuropathy (CMS/HCC)  ECG 12 Lead    metoprolol succinate XL (TOPROL-XL) 25 MG 24 hr tablet    losartan (Cozaar) 25 MG tablet   2. Essential hypertension  ECG 12 Lead    metoprolol succinate XL (TOPROL-XL) 25 MG 24 hr tablet    losartan (Cozaar) 25 MG tablet   3. Mixed hyperlipidemia  ECG 12 Lead    metoprolol succinate XL (TOPROL-XL) 25 MG 24 hr tablet    losartan (Cozaar) 25 MG tablet   4. Unstable angina pectoris (CMS/HCC)  ECG 12 Lead    metoprolol succinate XL (TOPROL-XL) 25 MG 24 hr tablet    losartan (Cozaar) 25 MG tablet            Plan:       MDM:    1.  Hypertension:    Her blood pressure is elevated I would recommend to start Toprol-XL 25 mg daily and losartan.    2.  CAD:    Patient is doing well.  No symptom of angina pectoris    3.  Hyperlipidemia:    Patient is on Lipitor.    4.  Diabetes mellitus    Patient is on Metformin

## 2021-09-08 ENCOUNTER — OFFICE VISIT (OUTPATIENT)
Dept: CARDIOLOGY | Facility: CLINIC | Age: 66
End: 2021-09-08

## 2021-09-08 VITALS
HEIGHT: 63 IN | WEIGHT: 174 LBS | BODY MASS INDEX: 30.83 KG/M2 | SYSTOLIC BLOOD PRESSURE: 168 MMHG | DIASTOLIC BLOOD PRESSURE: 100 MMHG | HEART RATE: 99 BPM

## 2021-09-08 DIAGNOSIS — I10 ESSENTIAL HYPERTENSION: Chronic | ICD-10-CM

## 2021-09-08 DIAGNOSIS — E78.2 MIXED HYPERLIPIDEMIA: ICD-10-CM

## 2021-09-08 DIAGNOSIS — I20.0 UNSTABLE ANGINA PECTORIS (HCC): ICD-10-CM

## 2021-09-08 DIAGNOSIS — E11.42 TYPE 2 DIABETES MELLITUS WITH PERIPHERAL NEUROPATHY (HCC): Primary | Chronic | ICD-10-CM

## 2021-09-08 PROCEDURE — 99214 OFFICE O/P EST MOD 30 MIN: CPT | Performed by: INTERNAL MEDICINE

## 2021-09-08 PROCEDURE — 93000 ELECTROCARDIOGRAM COMPLETE: CPT | Performed by: INTERNAL MEDICINE

## 2021-09-08 RX ORDER — LOSARTAN POTASSIUM 25 MG/1
25 TABLET ORAL DAILY
Qty: 90 TABLET | Refills: 1 | Status: SHIPPED | OUTPATIENT
Start: 2021-09-08 | End: 2022-02-17

## 2021-09-08 RX ORDER — METOPROLOL SUCCINATE 25 MG/1
25 TABLET, EXTENDED RELEASE ORAL DAILY
Qty: 90 TABLET | Refills: 3 | Status: SHIPPED | OUTPATIENT
Start: 2021-09-08 | End: 2022-02-17

## 2022-02-17 ENCOUNTER — OFFICE VISIT (OUTPATIENT)
Dept: CARDIOLOGY | Facility: CLINIC | Age: 67
End: 2022-02-17

## 2022-02-17 VITALS
BODY MASS INDEX: 29.06 KG/M2 | HEIGHT: 63 IN | DIASTOLIC BLOOD PRESSURE: 102 MMHG | HEART RATE: 104 BPM | SYSTOLIC BLOOD PRESSURE: 166 MMHG | WEIGHT: 164 LBS

## 2022-02-17 DIAGNOSIS — E78.2 MIXED HYPERLIPIDEMIA: ICD-10-CM

## 2022-02-17 DIAGNOSIS — I20.0 UNSTABLE ANGINA PECTORIS: ICD-10-CM

## 2022-02-17 DIAGNOSIS — I10 ESSENTIAL HYPERTENSION: Primary | Chronic | ICD-10-CM

## 2022-02-17 DIAGNOSIS — E11.42 TYPE 2 DIABETES MELLITUS WITH PERIPHERAL NEUROPATHY: Chronic | ICD-10-CM

## 2022-02-17 PROCEDURE — 93000 ELECTROCARDIOGRAM COMPLETE: CPT | Performed by: INTERNAL MEDICINE

## 2022-02-17 PROCEDURE — 99214 OFFICE O/P EST MOD 30 MIN: CPT | Performed by: INTERNAL MEDICINE

## 2022-02-17 RX ORDER — ATORVASTATIN CALCIUM 40 MG/1
40 TABLET, FILM COATED ORAL DAILY
Qty: 90 TABLET | Refills: 3 | Status: ON HOLD | OUTPATIENT
Start: 2022-02-17 | End: 2022-09-26

## 2022-02-17 RX ORDER — ASPIRIN 81 MG/1
81 TABLET ORAL DAILY
Qty: 200 TABLET | Refills: 2 | Status: SHIPPED | OUTPATIENT
Start: 2022-02-17

## 2022-02-17 NOTE — PROGRESS NOTES
Date of Office Visit: 2022  Encounter Provider: Dr. Chacho Esteban  Place of Service: [facility]ARDIOLOG[facility] [place]  Patient Name: Marge Mcghee  :1955  Traci Townsend APRN    Chief Complaint   Patient presents with   • Hypertension     4 month follow up   • Hyperlipidemia   • Diabetes     History of Present Illness    I am pleased to see Mrs. Mcghee in my office today as a follow-up.    As you know, patient is 66-year-old white female whose past medical history significant for hypertension, hyperlipidemia, diabetes mellitus, who came today for follow-up.    In May 2021, patient was admitted at [facility] with symptom of unstable angina.  She ruled out for myocardial infarction.  She underwent stress test which was abnormal and subsequent cardiac catheterization showed high-grade stenosis of left main up to 99%.  Patient underwent urgent CABG x3.  Patient subsequently was discharged home.    This is 4-month appointment for the patient.  Patient is slightly depressed today.  She lost her sister.  She is grieving.  It is unfortunate that from last few weeks patient has stopped taking all the medication.  Patient denies any symptom of chest pain or tightness or heaviness.  No orthopnea PND no syncope or presyncope.  No leg edema noted.    EKG showed sinus rhythm with nonspecific changes.  Heart rate is 104 bpm    At this stage, I would recommend to start Lopressor 25 mg twice daily.  Aspirin and Lipitor would be started.  Patient is advised to be compliant with the medicine.      Past Medical History:   Diagnosis Date   • Adrenal nodule (HCC) 2016    FINDINGS: Mild hepatic steatosis may be present. Cholecystectomy. No biliary ductal dilatation. Negative pancreas, spleen, left adrenal gland, and kidneys. The right adrenal presumed adenoma has increased slightly, measuring 3 x 4 cm in diameter,  compared to 2 x 3.5 cm on the previous exam. The attenuation values are consistent  with an adenoma. Done at [facility] in August 2018   • Age-related osteoporosis without current pathological fracture 11/16/2016   • Arthritis    • Delayed surgical wound healing    • Essential hypertension 11/16/2016   • Gastroesophageal reflux disease with esophagitis 11/16/2016   • Hepatic steatosis 11/16/2016   • History of anemia    • History of sepsis     FROM UTI   • Hyperlipidemia 11/16/2016   • Lisfranc's dislocation     LEFT WITH FRACTURES NONHEALING FOOT   • Osteomyelitis of left foot (HCC) 11/2020   • RLS (restless legs syndrome)    • Squamous cell skin cancer 2014    Excised by Dr. James   • Thyroid nodule 10/18/2019    BENIGN   • Type 2 diabetes mellitus with peripheral neuropathy (HCC) 11/16/2016   • Vitamin D deficiency 1/25/2019         Past Surgical History:   Procedure Laterality Date   • BREAST BIOPSY Left     7/2019. BENIGN   • CARDIAC CATHETERIZATION Left 5/2/2021    Procedure: Cardiac Catheterization/Vascular Study;  Surgeon: Chacho Esteban MD;  Location: [facility] CATH INVASIVE LOCATION;  Service: Cardiovascular;  Laterality: Left;   • CARDIAC CATHETERIZATION N/A 5/2/2021    Procedure: Intra-Aortic Baloon Pump Insertion;  Surgeon: Chacho Esteban MD;  Location: [facility] CATH INVASIVE LOCATION;  Service: Cardiovascular;  Laterality: N/A;   • CATARACT EXTRACTION WITH INTRAOCULAR LENS IMPLANT Bilateral    • CHOLECYSTECTOMY     • COLONOSCOPY     • CORONARY ARTERY BYPASS GRAFT N/A 5/2/2021    Procedure: CORONARY ARTERY BYPASS WITH INTERNAL MAMMARY ARTERY GRAFT;  Surgeon: Jr Rocael Alexander MD;  Location: [facility];  Service: Cardiothoracic;  Laterality: N/A;   • FOOT FUSION Right 11/13/2020    Procedure: RIGHT OPEN TREATMENT LISFRANC INJURY, OPEN REDUCTION INTERNAL FIXATION MEDIAL/MIDDLE CUNEIFORM FRACTURE AND 2ND/3RD METATARSAL FRACTURE 1ST 2ND POSSIBLE 3RD TARSOMETATARSAL ARTHRODESIS INTERCUNEIFORM ARTHRODESIS CALCANEAL BONE GRAFT;  Surgeon: Mikhail Banks Jr., MD;  Location: [facility] OR [facility] [place];   Service: Orthopedics;  Laterality: Right;   • INCISION AND DRAINAGE LEG Right 1/8/2021    Procedure: RIGHT IRRIGATION AND DEBRIDEMENT OF FOOT WITH SECONDARY CLOSURE AND HARDWARE REMOVAL;  Surgeon: Mikhail Banks Jr., MD;  Location: [facility] OR;  Service: Orthopedics;  Laterality: Right;   • KNEE ARTHROSCOPY Bilateral    • TOTAL ABDOMINAL HYSTERECTOMY     • TRANSESOPHAGEAL ECHOCARDIOGRAM (EMILIA) N/A 5/2/2021    Procedure: TRANSESOPHAGEAL ECHOCARDIOGRAM;  Surgeon: Jr Rocael Alexander MD;  Location: [facility];  Service: Cardiothoracic;  Laterality: N/A;   • UPPER GASTROINTESTINAL ENDOSCOPY  08/2016   • US GUIDED FINE NEEDLE ASPIRATION  5/8/2020           Current Outpatient Medications:   •  aspirin (aspirin) 81 MG EC tablet, Take 1 tablet by mouth Daily., Disp: 200 tablet, Rfl: 2  •  atorvastatin (LIPITOR) 40 MG tablet, Take 1 tablet by mouth Daily., Disp: 90 tablet, Rfl: 3  •  metoprolol tartrate (LOPRESSOR) 25 MG tablet, Take 1 tablet by mouth 2 (Two) Times a Day., Disp: 180 tablet, Rfl: 3      Social History     Socioeconomic History   • Marital status:    Tobacco Use   • Smoking status: Never Smoker   • Smokeless tobacco: Never Used   Vaping Use   • Vaping Use: Never used   Substance and Sexual Activity   • Alcohol use: Yes     Comment: socially    • Drug use: Never   • Sexual activity: Defer         Review of Systems   Constitutional: Negative for chills and fever.   HENT: Negative for ear discharge and nosebleeds.    Eyes: Negative for discharge and redness.   Cardiovascular: Negative for chest pain, orthopnea, palpitations, paroxysmal nocturnal dyspnea and syncope.   Respiratory: Positive for shortness of breath. Negative for cough and wheezing.    Endocrine: Negative for heat intolerance.   Skin: Negative for rash.   Musculoskeletal: Positive for arthritis and joint pain. Negative for myalgias.   Gastrointestinal: Negative for abdominal pain, melena, nausea and vomiting.   Genitourinary: Negative for  "dysuria and hematuria.   Neurological: Negative for dizziness, light-headedness, numbness and tremors.   Psychiatric/Behavioral: Positive for depression. The patient is not nervous/anxious.        Procedures      ECG 12 Lead    Date/Time: 2/17/2022 4:19 PM  Performed by: Chacho Esteban MD  Authorized by: Chacho Esteban MD   Comparison: compared with previous ECG   Similar to previous ECG  Rhythm: sinus rhythm  Q waves: V1 and V2      Clinical impression: abnormal EKG            ECG 12 Lead    (Results Pending)           Objective:    BP (!) 166/102   Pulse 104   Ht 160 cm (62.99\")   Wt 74.4 kg (164 lb)   BMI 29.06 kg/m²         Constitutional:       Appearance: Well-developed.   Eyes:      General: No scleral icterus.        Right eye: No discharge.   HENT:      Head: Normocephalic and atraumatic.   Neck:      Thyroid: No thyromegaly.      Lymphadenopathy: No cervical adenopathy.   Pulmonary:      Effort: Pulmonary effort is normal. No respiratory distress.      Breath sounds: Normal breath sounds. No wheezing. No rales.   Cardiovascular:      Normal rate. Regular rhythm.      No gallop.   Abdominal:      Tenderness: There is no abdominal tenderness.   Skin:     Findings: No erythema or rash.   Neurological:      Mental Status: Alert and oriented to person, place, and time.             Assessment:       Diagnosis Plan   1. Essential hypertension  ECG 12 Lead    metoprolol tartrate (LOPRESSOR) 25 MG tablet    atorvastatin (LIPITOR) 40 MG tablet    aspirin (aspirin) 81 MG EC tablet   2. Mixed hyperlipidemia  ECG 12 Lead    metoprolol tartrate (LOPRESSOR) 25 MG tablet    atorvastatin (LIPITOR) 40 MG tablet    aspirin (aspirin) 81 MG EC tablet   3. Unstable angina pectoris (HCC)  ECG 12 Lead    metoprolol tartrate (LOPRESSOR) 25 MG tablet    atorvastatin (LIPITOR) 40 MG tablet    aspirin (aspirin) 81 MG EC tablet   4. Type 2 diabetes mellitus with peripheral neuropathy (HCC)  ECG 12 Lead    metoprolol tartrate " (LOPRESSOR) 25 MG tablet    atorvastatin (LIPITOR) 40 MG tablet    aspirin (aspirin) 81 MG EC tablet            Plan:       MDM:    1.  CAD:    Patient underwent CABG.  She is doing well.  Unfortunately patient is noncompliant with the medication    2.  Hypertension:    I would restart Lopressor 25 mg twice daily.  Blood pressure monitoring is recommended    3.  Hyperlipidemia:    I will restart Lipitor.  Repeat lipid panel in future    4.  Diabetes mellitus:    Patient was previously on Metformin.  She is not taking her medication.  I advised her to call family physician and start Metformin.

## 2022-05-13 PROCEDURE — 99283 EMERGENCY DEPT VISIT LOW MDM: CPT

## 2022-05-14 ENCOUNTER — APPOINTMENT (OUTPATIENT)
Dept: GENERAL RADIOLOGY | Facility: HOSPITAL | Age: 67
End: 2022-05-14

## 2022-05-14 ENCOUNTER — HOSPITAL ENCOUNTER (EMERGENCY)
Facility: HOSPITAL | Age: 67
Discharge: HOME | End: 2022-05-14
Attending: EMERGENCY MEDICINE | Admitting: EMERGENCY MEDICINE

## 2022-05-14 VITALS
RESPIRATION RATE: 17 BRPM | DIASTOLIC BLOOD PRESSURE: 86 MMHG | TEMPERATURE: 97.8 F | BODY MASS INDEX: 29.83 KG/M2 | WEIGHT: 179.01 LBS | HEIGHT: 65 IN | SYSTOLIC BLOOD PRESSURE: 173 MMHG | HEART RATE: 103 BPM | OXYGEN SATURATION: 95 %
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
## 2022-05-14 DIAGNOSIS — R07.89 CHEST WALL PAIN: Primary | ICD-10-CM

## 2022-05-14 PROCEDURE — 0 MORPHINE SULFATE 4 MG/ML SOLUTION: Performed by: PHYSICIAN ASSISTANT

## 2022-05-14 PROCEDURE — 71101 X-RAY EXAM UNILAT RIBS/CHEST: CPT

## 2022-05-14 PROCEDURE — 96372 THER/PROPH/DIAG INJ SC/IM: CPT

## 2022-05-14 RX ORDER — ONDANSETRON 4 MG/1
4 TABLET, ORALLY DISINTEGRATING ORAL ONCE
Status: DISCONTINUED | OUTPATIENT
Start: 2022-05-14 | End: 2022-05-14

## 2022-05-14 RX ORDER — LIDOCAINE 50 MG/G
1 PATCH TOPICAL ONCE
Status: DISCONTINUED | OUTPATIENT
Start: 2022-05-14 | End: 2022-05-14 | Stop reason: HOSPADM

## 2022-05-14 RX ORDER — METHOCARBAMOL 500 MG/1
500 TABLET, FILM COATED ORAL 3 TIMES DAILY PRN
Qty: 15 TABLET | Refills: 0 | Status: SHIPPED | OUTPATIENT
Start: 2022-05-14 | End: 2022-08-24

## 2022-05-14 RX ORDER — LIDOCAINE 50 MG/G
1 PATCH TOPICAL EVERY 24 HOURS
Qty: 6 PATCH | Refills: 0 | OUTPATIENT
Start: 2022-05-14 | End: 2022-08-24

## 2022-05-14 RX ORDER — MORPHINE SULFATE 4 MG/ML
4 INJECTION, SOLUTION INTRAMUSCULAR; INTRAVENOUS ONCE
Status: COMPLETED | OUTPATIENT
Start: 2022-05-14 | End: 2022-05-14

## 2022-05-14 RX ORDER — TRAMADOL HYDROCHLORIDE 50 MG/1
50 TABLET ORAL ONCE
Status: COMPLETED | OUTPATIENT
Start: 2022-05-14 | End: 2022-05-14

## 2022-05-14 RX ORDER — ACETAMINOPHEN AND CODEINE PHOSPHATE 300; 30 MG/1; MG/1
1 TABLET ORAL EVERY 6 HOURS PRN
Qty: 12 TABLET | Refills: 0 | Status: ON HOLD | OUTPATIENT
Start: 2022-05-14 | End: 2022-09-26

## 2022-05-14 RX ORDER — METOCLOPRAMIDE 10 MG/1
10 TABLET ORAL ONCE
Status: COMPLETED | OUTPATIENT
Start: 2022-05-14 | End: 2022-05-14

## 2022-05-14 RX ADMIN — MORPHINE SULFATE 4 MG: 4 INJECTION INTRAVENOUS at 02:22

## 2022-05-14 RX ADMIN — LIDOCAINE 1 PATCH: 50 PATCH TOPICAL at 01:01

## 2022-05-14 RX ADMIN — METOPROLOL TARTRATE 25 MG: 25 TABLET, FILM COATED ORAL at 02:22

## 2022-05-14 RX ADMIN — METOCLOPRAMIDE 10 MG: 10 TABLET ORAL at 01:13

## 2022-05-14 RX ADMIN — TRAMADOL HYDROCHLORIDE 50 MG: 50 TABLET, COATED ORAL at 01:01

## 2022-05-14 NOTE — DISCHARGE INSTRUCTIONS
Take Tylenol 3 as needed for pain.  Take Robaxin for muscle spasms, to help relax the muscles between your ribs that may help your pain  Use lidocaine patch for additional pain relief    Use your incentive spirometer that you have at home at least once an hour    Follow-up with primary care this week for recheck    Return to the ER for new or worsening symptoms

## 2022-05-14 NOTE — ED PROVIDER NOTES
"Subjective   Chief Complaint: Chest wall pain    Patient is a 67-year-old  female history of hypertension, hyperlipidemia, CAD presents to the ER with complaints of left-sided chest wall pain today.  Patient states she is at [employer] dinner, states that she was walking in the parking lot when she tripped over a pot, she tucked her left arm in front of her chest and landed on her arm.  Patient states she heard a \"pop\" sound.  She reports no pain at rest, she has increased pain in the left lower ribs with deep inspiration or with palpation.  She describes pain as a intermittent sharp stabbing and throbbing pain that she rates a 10/10 at its worst.  She denies hitting her head or LOC.  She denies any cough or hemoptysis.  She patient states she hit her left wrist and left knee but states that she has been able to move these well and denies any pain at this time.  No fever chills.  No lightheadedness dizziness or blurry vision.    Location: Chest wall, left ribs    Quality: Sharp, throbbing    Duration: Today    Timing: Intermittent    Severity: Moderate    Associated Symptoms: Shortness of breath    PCP: Traci Townsend      History provided by:  Patient      Review of Systems   Constitutional: Negative for chills and fever.   HENT: Negative for sore throat and trouble swallowing.    Eyes: Negative.    Respiratory: Negative for shortness of breath and wheezing.    Cardiovascular: Negative for chest pain.        Chest wall pain   Gastrointestinal: Negative for abdominal pain, diarrhea, nausea and vomiting.   Endocrine: Negative.    Genitourinary: Negative for dysuria.   Musculoskeletal: Positive for arthralgias. Negative for myalgias.   Skin: Negative for rash.   Allergic/Immunologic: Negative.    Neurological: Negative for weakness and headaches.   Psychiatric/Behavioral: Negative for behavioral problems.   All other systems reviewed and are negative.      Past Medical History:   Diagnosis Date   • Adrenal " nodule (HCC) 11/16/2016    FINDINGS: Mild hepatic steatosis may be present. Cholecystectomy. No biliary ductal dilatation. Negative pancreas, spleen, left adrenal gland, and kidneys. The right adrenal presumed adenoma has increased slightly, measuring 3 x 4 cm in diameter,  compared to 2 x 3.5 cm on the previous exam. The attenuation values are consistent with an adenoma. Done at [facility] in August 2018   • Age-related osteoporosis without current pathological fracture 11/16/2016   • Arthritis    • Delayed surgical wound healing    • Essential hypertension 11/16/2016   • Gastroesophageal reflux disease with esophagitis 11/16/2016   • Hepatic steatosis 11/16/2016   • History of anemia    • History of sepsis     FROM UTI   • Hyperlipidemia 11/16/2016   • Lisfranc's dislocation     LEFT WITH FRACTURES NONHEALING FOOT   • Osteomyelitis of left foot (HCC) 11/2020   • RLS (restless legs syndrome)    • Squamous cell skin cancer 2014    Excised by Dr. James   • Thyroid nodule 10/18/2019    BENIGN   • Type 2 diabetes mellitus with peripheral neuropathy (HCC) 11/16/2016   • Vitamin D deficiency 1/25/2019       Allergies   Allergen Reactions   • Zofran [Ondansetron Hcl] Nausea And Vomiting     Does the opposite of its purpose   • Prochlorperazine Other (See Comments)     CAUSED SEIZURE   • Hydralazine Hcl Itching and Rash   • Meperidine Itching and Swelling   • Prochlorperazine Maleate Other (See Comments)     CAUSES SEIZURE       Past Surgical History:   Procedure Laterality Date   • BREAST BIOPSY Left     7/2019. BENIGN   • CARDIAC CATHETERIZATION Left 5/2/2021    Procedure: Cardiac Catheterization/Vascular Study;  Surgeon: Chacho Esteban MD;  Location: [facility] CATH INVASIVE LOCATION;  Service: Cardiovascular;  Laterality: Left;   • CARDIAC CATHETERIZATION N/A 5/2/2021    Procedure: Intra-Aortic Baloon Pump Insertion;  Surgeon: Chacho Esteban MD;  Location: [facility] CATH INVASIVE LOCATION;  Service: Cardiovascular;   Laterality: N/A;   • CATARACT EXTRACTION WITH INTRAOCULAR LENS IMPLANT Bilateral    • CHOLECYSTECTOMY     • COLONOSCOPY     • CORONARY ARTERY BYPASS GRAFT N/A 5/2/2021    Procedure: CORONARY ARTERY BYPASS WITH INTERNAL MAMMARY ARTERY GRAFT;  Surgeon: Jr Rocael Alexander MD;  Location: [facility];  Service: Cardiothoracic;  Laterality: N/A;   • FOOT FUSION Right 11/13/2020    Procedure: RIGHT OPEN TREATMENT LISFRANC INJURY, OPEN REDUCTION INTERNAL FIXATION MEDIAL/MIDDLE CUNEIFORM FRACTURE AND 2ND/3RD METATARSAL FRACTURE 1ST 2ND POSSIBLE 3RD TARSOMETATARSAL ARTHRODESIS INTERCUNEIFORM ARTHRODESIS CALCANEAL BONE GRAFT;  Surgeon: Mikhail Banks Jr., MD;  Location: [facility];  Service: Orthopedics;  Laterality: Right;   • INCISION AND DRAINAGE LEG Right 1/8/2021    Procedure: RIGHT IRRIGATION AND DEBRIDEMENT OF FOOT WITH SECONDARY CLOSURE AND HARDWARE REMOVAL;  Surgeon: Mikhail Banks Jr., MD;  Location: [facility];  Service: Orthopedics;  Laterality: Right;   • KNEE ARTHROSCOPY Bilateral    • TOTAL ABDOMINAL HYSTERECTOMY     • TRANSESOPHAGEAL ECHOCARDIOGRAM (EMILIA) N/A 5/2/2021    Procedure: TRANSESOPHAGEAL ECHOCARDIOGRAM;  Surgeon: Jr Rocael Alexander MD;  Location: [facility];  Service: Cardiothoracic;  Laterality: N/A;   • UPPER GASTROINTESTINAL ENDOSCOPY  08/2016   • US GUIDED FINE NEEDLE ASPIRATION  5/8/2020       Family History   Problem Relation Age of Onset   • Diabetes Mother    • COPD Mother    • Hypertension Mother    • Kidney disease Mother    • Obesity Mother    • Thyroid disease Mother    • Diabetes Sister    • Diabetes Sister    • Diabetes Sister    • Diabetes Sister    • Malig Hyperthermia Neg Hx        Social History     Socioeconomic History   • Marital status:    Tobacco Use   • Smoking status: Never Smoker   • Smokeless tobacco: Never Used   Vaping Use   • Vaping Use: Never used   Substance and Sexual Activity   • Alcohol use: Yes     Comment: socially    • Drug use: Never   • Sexual  activity: Defer           Objective   Physical Exam  Vitals and nursing note reviewed.   Constitutional:       Appearance: Normal appearance. She is well-developed and normal weight. She is not ill-appearing or toxic-appearing.   HENT:      Head: Normocephalic and atraumatic.      Nose: Nose normal.      Mouth/Throat:      Mouth: Mucous membranes are moist.   Eyes:      Extraocular Movements: Extraocular movements intact.      Pupils: Pupils are equal, round, and reactive to light.   Cardiovascular:      Rate and Rhythm: Normal rate and regular rhythm.      Pulses: Normal pulses.      Heart sounds: Normal heart sounds. No murmur heard.  Pulmonary:      Effort: Pulmonary effort is normal. No respiratory distress.      Breath sounds: Normal breath sounds. No wheezing.      Comments: Left lower anterior chest wall pain, rib pain.  No crepitus or flail chest  Chest:      Chest wall: Tenderness present.   Abdominal:      General: Bowel sounds are normal. There is no distension.      Palpations: Abdomen is soft.      Tenderness: There is no abdominal tenderness.   Musculoskeletal:         General: Tenderness present. Normal range of motion.      Comments: Mild abrasion left anterior knee, no bleeding, appropriate range of motion, pedal pulses present 2+ but bilaterally   Skin:     General: Skin is warm and dry.      Capillary Refill: Capillary refill takes less than 2 seconds.      Findings: No rash.   Neurological:      General: No focal deficit present.      Mental Status: She is alert and oriented to person, place, and time.   Psychiatric:         Mood and Affect: Mood normal.         Behavior: Behavior normal.         Procedures           ED Course  ED Course as of 05/14/22 0431   Sat May 14, 2022   0158 BP(!): 208/103  Patient has not taken her second dose of metoprolol today.  This was ordered. [MM]      ED Course User Index  [MM] Ivelisse Husain PA    /86   Pulse 103   Temp 97.8 °F (36.6 °C) (Oral)    "Resp 17   Ht 163.8 cm (64.5\")   Wt 81.2 kg (179 lb 0.2 oz)   SpO2 95%   BMI 30.25 kg/m²   Labs Reviewed - No data to display  Medications   lidocaine (LIDODERM) 5 % 1 patch (1 patch Transdermal Medication Applied 5/14/22 0101)   traMADol (ULTRAM) tablet 50 mg (50 mg Oral Given 5/14/22 0101)   metoclopramide (REGLAN) tablet 10 mg (10 mg Oral Given 5/14/22 0113)   metoprolol tartrate (LOPRESSOR) tablet 25 mg (25 mg Oral Given 5/14/22 0222)   Morphine sulfate (PF) injection 4 mg (4 mg Subcutaneous Given 5/14/22 0222)     XR Ribs Left With PA Chest    Result Date: 5/14/2022  Impression: 1. No visualized fracture. Slot 63 Electronically signed by:  Moise Henry M.D.  5/13/2022 11:43 PM                                                 MDM  Number of Diagnoses or Management Options  Chest wall pain  Fall, initial encounter  Diagnosis management comments: MEDICAL DECISION  Epic Chart Review: No recent admissions  Comorbidities: CAD, hypertension  Differentials: Rib fracture, pneumothorax, hemothorax, costochondritis, contusion; this list is not all inclusive and does not constitute the entirety of considered causes  Radiology interpretation:  Images reviewed by me and interpreted by radiologist, as above  Lab interpretation:  Labs viewed by me significant for, not warranted    While in the EDappropriate PPE was worn during exam and throughout all encounters with the patient.  Patient given lidocaine patch, tramadol  for pain.  X-ray shows no acute osseous abnormality or evidence of fracture or pneumothorax.  Patient hypertensive while in the ER, she states that she has not taken her nightly dose of metoprolol.  Patient was given metoprolol while in the ER.  Patient symptoms like related to chest wall or rib contusion given history of fall and patient's pain is reproducible with palpation and worse with deep inspiration.  Inspect reviewed.  Patient discharged with admission for Tylenol 3, Robaxin and lidocaine patch.  " Patient to follow-up with primary care next week for recheck.  Patient states she has spirometry device at home that she knows how to use.    Discharge plan and instructions were discussed with the patient who verbalized understanding and is in agreement with the plan, all questions were answered at this time.  Patient is aware of signs symptoms that would require immediate return to the emergency room.  Patient understands importance of following up with primary care provider for further evaluation and worsening concerns as well as blood pressure recheck in the next 4 weeks.    Patient was discharged in improved stable condition with an upright steady gait.         Amount and/or Complexity of Data Reviewed  Tests in the radiology section of CPT®: reviewed and ordered    Patient Progress  Patient progress: stable      Final diagnoses:   Chest wall pain   Fall, initial encounter       ED Disposition  ED Disposition     ED Disposition   Discharge    Condition   Stable    Comment   --             Traci Townsend, APRN  [address] [place] [facility]  [phone]    Schedule an appointment as soon as possible for a visit in 2 days  As needed, If symptoms worsen         Medication List      New Prescriptions    acetaminophen-codeine 300-30 MG per tablet  Commonly known as: TYLENOL #3  Take 1 tablet by mouth Every 6 (Six) Hours As Needed for Moderate Pain .     lidocaine 5 %  Commonly known as: LIDODERM  Place 1 patch on the skin as directed by provider Daily. Remove & Discard patch within 12 hours or as directed by MD     methocarbamol 500 MG tablet  Commonly known as: ROBAXIN  Take 1 tablet by mouth 3 (Three) Times a Day As Needed for Muscle Spasms.           Where to Get Your Medications      These medications were sent to [facility] [address] [place] [address] - [address] [place] [address] - [phone] [facility] [phone] [address] [place] [address]     Phone: [phone]   · acetaminophen-codeine 300-30 MG per tablet  · lidocaine 5 %  · methocarbamol 500 MG tablet          Ivelisse Husain PA  05/14/22 1856

## 2022-07-25 NOTE — DISCHARGE SUMMARY
Orthopedic Discharge Summary      Patient: Marge Mcghee      YOB: 1955    Medical Record Number: [id number]    Attending Physician: Mikhail Banks Jr., MD  Consulting Physician(s):   Date of Admission: 1/8/2021 12:51 PM  Date of Discharge: 1/11/2021      Patient Active Problem List   Diagnosis   • Type 2 diabetes mellitus with peripheral neuropathy (CMS/HCC)   • Age-related osteoporosis without current pathological fracture   • Essential hypertension   • Hyperlipidemia   • Hepatic steatosis   • Gastroesophageal reflux disease with esophagitis   • Adrenal nodule (CMS/HCC)   • Vitamin D deficiency   • Thyroid nodule   • Urinary tract infection without hematuria   • REINA (acute kidney injury) (CMS/HCC)   • Hyperglycemia   • Acute right flank pain   • Vomiting   • Hypomagnesemia   • Dehydration   • Class 1 obesity in adult   • Complicated migraine   • Occipital neuralgia of left side   • Polypharmacy   • 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 (CMS/HCC)   • Lisfranc dislocation   • Delayed surgical wound healing   • Right foot infection     Status Post: RIGHT IRRIGATION AND DEBRIDEMENT OF FOOT WITH SECONDARY CLOSURE      Allergies   Allergen Reactions   • Zofran [Ondansetron Hcl] Nausea And Vomiting     Does the opposite of its purpose   • Prochlorperazine Other (See Comments)     CAUSED SEIZURE   • Meperidine Itching and Swelling   • Prochlorperazine Maleate Other (See Comments)     CAUSES SEIZURE       Current Medications:     Discharge Medications      Changes to Medications      Instructions Start Date   DULoxetine 60 MG capsule  Commonly known as: CYMBALTA  What changed: when to take this   60 mg, Oral, Daily      pantoprazole 40 MG EC tablet  Commonly known as: PROTONIX  What changed:   · when to take this  · reasons to take this   40 mg, Oral, Daily         Continue These Medications      Instructions Start Date   aspirin   Initial Contact Social Work Note - Ambulatory  7/25/2022      Date of referral: 7/12/22  Referral received from: [facility]  Reason for referral: Community Resource Assessment    Previous SW referral: No  If yes, brief summary of outcome:     Two Identifiers Verified: Yes    Insurance Provider: Medicare A + B, Medicare Extra Help    Support System:  Sibling(s) He Liu [phone]     Status: Child of [employer] Providers:  None Noted    ADL Assistance Needed: N/A Completes Own ADL's    Housing/Living Concerns or Home Modification Needs: Rents a room within a house     Transportation Concern: Drives, vehicle currently broken    Medication Cost Concern: Has Extra Help through [employer]     Medication Adherence Concern: No issues noted    Financial Concern(s): Limited resources    Income (only if applicable): N/A    Ability to Read/Write: Yes High School Graduate    Advance Care Plan:  Pt. declined to review/discuss    Other:  received a referral from [facility] requesting patient outreach for a community resource assessment.  had limited contact patient on 7/14 and 7/18/22 before reaching him today at his residence. Joelle Mas is a 47 y/o  male who has been disabled for approximately 10 years. His primary income is SSD. Patient rents out rooms in a private residence and stated he feels safe and secure in the environment. Patients primary insurance is  traditional Medicare (Parts A,B, D) . Based on interview ,patient is likely participating in the [facility] program where [place] pays his Part B premium. In addition, it appears he also participates in the Extra Help Prescription Medication program as he describes his prescription costs as fixed and his income falls within the eligibility guidelines.  Patient is aware he is eligible for SNAP benefits as a disabled person but expressed that he is not interested in all the work that is MG tablet   325 mg, Oral, Daily      atorvastatin 80 MG tablet  Commonly known as: LIPITOR   80 mg, Oral, Nightly      Carafate 1 g tablet  Generic drug: sucralfate   1 g, Oral, 2 Times Daily PRN      denosumab 60 MG/ML solution syringe  Commonly known as: PROLIA   60 mg, Subcutaneous, Every 6 Months, Nov 2019/ May 2020      furosemide 20 MG tablet  Commonly known as: LASIX   20 mg, Oral, Daily      glimepiride 4 MG tablet  Commonly known as: AMARYL   4 mg, Oral, 2 Times Daily      glucose blood test strip  Commonly known as: OneTouch Verio   1 each, Other, Daily With Breakfast, Lunch & Dinner, Dx: E11.65. Use as instructed      linagliptin 5 MG tablet tablet  Commonly known as: TRADJENTA   5 mg, Oral, Daily      losartan 100 MG tablet  Commonly known as: COZAAR   100 mg, Oral, Daily      metFORMIN 1000 MG tablet  Commonly known as: GLUCOPHAGE   1,000 mg, Oral, 2 Times Daily With Meals      multivitamin tablet tablet  Commonly known as: THERAGRAN   1 tablet, Oral, Daily      ondansetron 4 MG tablet  Commonly known as: Zofran   4 mg, Oral, Every 8 Hours PRN      OneTouch Delica Plus [id number] misc   1 each, Does not apply, 3 Times Daily Before Meals, Dx: E11.65      oxyCODONE-acetaminophen 5-325 MG per tablet  Commonly known as: Percocet   1 tablet, Oral, Every 4 Hours PRN      potassium chloride 20 MEQ CR tablet  Commonly known as: K-DUR,KLOR-CON   20 mEq, Oral, Daily      promethazine 12.5 MG tablet  Commonly known as: PHENERGAN   12.5 mg, Oral, Every 6 Hours PRN      rOPINIRole 0.25 MG tablet  Commonly known as: REQUIP   0.25 mg, Oral, Every Evening      Vitamin B-12 3000 MCG sublingual tablet   1 tablet/day, Sublingual      vitamin D 1.25 MG (65776 UT) capsule capsule  Commonly known as: ERGOCALCIFEROL   50,000 Units, Oral, Weekly, Monday      Vitamin D-3 25 MCG (1000 UT) capsule   1,000 Units, Oral, Daily         Stop These Medications    sulfamethoxazole-trimethoprim 800-160 MG per tablet  Commonly known as:  required to receive his 16 dollar monthly benefit. Patient still drives, however his vehicle is not currently functional.     discussed the [facility] program with the patient as a future option that would provide in home assistance and transportation assistance through the [place] program. Patient stated that he does not feel the need for any home aide assistance at this time. Program information will be sent to him for future reference. Patient will  also be sent information on the Medicare Premium Assistance programs  and the Extra Help Program. Patient will additionally be mailed information on rent, utility, and financial assistance as well as food assistance agencies in [facility]. Patient was given the opportunity to ask questions but did not have any at the time of the interview. The plan is for  to follow up with patient in several weeks to see if questions exist on the mailed resources. TERRANCE Orona will be advised of the outreach. Bactrim DS                Past Medical History:   Diagnosis Date   • Adrenal nodule (CMS/HCC) 11/16/2016    FINDINGS: Mild hepatic steatosis may be present. Cholecystectomy. No biliary ductal dilatation. Negative pancreas, spleen, left adrenal gland, and kidneys. The right adrenal presumed adenoma has increased slightly, measuring 3 x 4 cm in diameter,  compared to 2 x 3.5 cm on the previous exam. The attenuation values are consistent with an adenoma. Done at [facility] in August 2018   • Age-related osteoporosis without current pathological fracture 11/16/2016   • Arthritis    • Delayed surgical wound healing    • Essential hypertension 11/16/2016   • Gastroesophageal reflux disease with esophagitis 11/16/2016   • Hepatic steatosis 11/16/2016   • History of anemia    • History of sepsis     FROM UTI   • Hyperlipidemia 11/16/2016   • Lisfranc's dislocation     LEFT WITH FRACTURES NONHEALING FOOT   • RLS (restless legs syndrome)    • Squamous cell skin cancer 2014    Excised by Dr. James   • Thyroid nodule 10/18/2019    BENIGN   • Type 2 diabetes mellitus with peripheral neuropathy (CMS/HCC) 11/16/2016   • Vitamin D deficiency 1/25/2019     Past Surgical History:   Procedure Laterality Date   • BREAST BIOPSY Left     7/2019. BENIGN   • CATARACT EXTRACTION WITH INTRAOCULAR LENS IMPLANT Bilateral    • CHOLECYSTECTOMY     • COLONOSCOPY     • FOOT FUSION Right 11/13/2020    Procedure: RIGHT OPEN TREATMENT LISFRANC INJURY, OPEN REDUCTION INTERNAL FIXATION MEDIAL/MIDDLE CUNEIFORM FRACTURE AND 2ND/3RD METATARSAL FRACTURE 1ST 2ND POSSIBLE 3RD TARSOMETATARSAL ARTHRODESIS INTERCUNEIFORM ARTHRODESIS CALCANEAL BONE GRAFT;  Surgeon: Mikhail Banks Jr., MD;  [place] [facility] [place] [facility] [place]  Service: [place]opedics;  Laterality: Right;   • KNEE ARTHROSCOPY Bilateral    • TOTAL ABDOMINAL HYSTERECTOMY     • UPPER GASTROINTESTINAL ENDOSCOPY  08/2016   • US GUIDED FINE NEEDLE ASPIRATION  5/8/2020     Social History     Occupational  History   • Not on file   Tobacco Use   • Smoking status: Never Smoker   • Smokeless tobacco: Never Used   Substance and Sexual Activity   • Alcohol use: Yes     Comment: socially    • Drug use: Never   • Sexual activity: Defer      Social History     Social History Narrative   • Not on file     Family History   Problem Relation Age of Onset   • Diabetes Mother    • COPD Mother    • Hypertension Mother    • Kidney disease Mother    • Obesity Mother    • Thyroid disease Mother    • Diabetes Sister    • Diabetes Sister    • Diabetes Sister    • Diabetes Sister    • Malig Hyperthermia Neg Hx          Physical Exam: 65 y.o. female  General Appearance:    Alert, cooperative, in no acute distress                      Vitals:    01/10/21 1856 01/10/21 2247 01/11/21 0244 01/11/21 0708   BP: 133/82 152/79 159/76 165/89   BP Location: Left arm Right arm Right arm Right arm   Patient Position: Lying Lying Lying Lying   Pulse: 79 78 73 75   Resp: 16 16 16 16   Temp: 97.3 °F (36.3 °C) 97.3 °F (36.3 °C) 97.3 °F (36.3 °C) 97.3 °F (36.3 °C)   TempSrc: Skin Skin Skin Skin   SpO2: 95% 95% 97% 92%   Weight:       Height:                On the day of discharge:     SUBJECTIVE   patient had some chest pain over the weekend.  Troponins were negative.  Cardiology following.     PHYSICAL EXAM  Resting in NAD  Splint clean, dry, intact  Toes warm, perfused, brisk capillary refill  Flexes/extends toes   SILT over toes  No pain with passive stretch                                                               Hospital Course:  65 y.o. female admitted to [facility] [place] to services of Mikhail Banks Jr., MD with Right foot infection [L08.9] on 1/8/2021 and underwent RIGHT IRRIGATION AND DEBRIDEMENT OF FOOT WITH SECONDARY CLOSURE  Per Mikhail Banks Jr., MD. Antibiotic prophylaxis were per SCIP protocol.  For full details regarding this procedure, please refer to the operative note.    The patient tolerated this procedure well and was transferred  to the floor postoperatively.  Pain was initially controlled with oral pain medication with IV for breakthrough. Aspirin 325mg daily was initiated for DVT propylaxis.  The patient underwent mobilization therapy with physical therapy remaining NWB on the operative extremity.  Opioids were titrated to achieve appropriate pain management to allow for participation in mobilization exercises. Vital signs are now stable. The splint remains intact without signs or symptoms of infection. Operative extremity neurovascular status remains intact. No transfusions of blood products were necessary postoperatively.    Appropriate education re: incision care, activity levels, medications, and follow up visits was completed and all questions were answered.  The patient was tolerating a regular diet, pain was controlled with oral pain medication, and cleared by physical therapy.    The patient is now deemed stable for discharge to rehab.      DIAGNOSTIC TESTS:     Admission on 01/08/2021   Component Date Value Ref Range Status   • Glucose 01/08/2021 126  70 - 130 mg/dL Final   • Glucose 01/08/2021 130* 65 - 99 mg/dL Final   • BUN 01/08/2021 22  8 - 23 mg/dL Final   • Creatinine 01/08/2021 1.12* 0.57 - 1.00 mg/dL Final   • Sodium 01/08/2021 136  136 - 145 mmol/L Final   • Potassium 01/08/2021 4.6  3.5 - 5.2 mmol/L Final   • Chloride 01/08/2021 101  98 - 107 mmol/L Final   • CO2 01/08/2021 25.8  22.0 - 29.0 mmol/L Final   • Calcium 01/08/2021 8.9  8.6 - 10.5 mg/dL Final   • eGFR Non African Amer 01/08/2021 49* >60 mL/min/1.73 Final   • BUN/Creatinine Ratio 01/08/2021 19.6  7.0 - 25.0 Final   • Anion Gap 01/08/2021 9.2  5.0 - 15.0 mmol/L Final   • WBC 01/08/2021 8.68  3.40 - 10.80 10*3/mm3 Final   • RBC 01/08/2021 3.62* 3.77 - 5.28 10*6/mm3 Final   • Hemoglobin 01/08/2021 10.7* 12.0 - 15.9 g/dL Final   • Hematocrit 01/08/2021 32.7* 34.0 - 46.6 % Final   • MCV 01/08/2021 90.3  79.0 - 97.0 fL Final   • MCH 01/08/2021 29.6  26.6 - 33.0 pg  Final   • MCHC 01/08/2021 32.7  31.5 - 35.7 g/dL Final   • RDW 01/08/2021 13.0  12.3 - 15.4 % Final   • RDW-SD 01/08/2021 42.4  37.0 - 54.0 fl Final   • MPV 01/08/2021 8.8  6.0 - 12.0 fL Final   • Platelets 01/08/2021 259  140 - 450 10*3/mm3 Final   • COVID19 01/08/2021 Not Detected  Not Detected - Ref. Range Final   • Wound Culture 01/08/2021 No growth at 3 days   Final   • Gram Stain 01/08/2021 No WBCs or organisms seen   Final   • Tissue Culture 01/08/2021 No growth at 3 days   Final   • Gram Stain 01/08/2021 No WBCs or organisms seen   Final   • Glucose 01/08/2021 86  70 - 130 mg/dL Final   • Hemoglobin A1C 01/08/2021 7.10* 4.80 - 5.60 % Final   • Sed Rate 01/08/2021 60* 0 - 30 mm/hr Final   • C-Reactive Protein 01/08/2021 1.17* 0.00 - 0.50 mg/dL Final   • Glucose 01/08/2021 86  70 - 130 mg/dL Final   • Glucose 01/09/2021 129  70 - 130 mg/dL Final   • Glucose 01/09/2021 91  70 - 130 mg/dL Final   • Glucose 01/09/2021 103  70 - 130 mg/dL Final   • QT Interval 01/09/2021 336  ms Final   • Glucose 01/09/2021 144* 70 - 130 mg/dL Final   • Creatinine 01/10/2021 0.89  0.57 - 1.00 mg/dL Final   • eGFR Non  Amer 01/10/2021 63  >60 mL/min/1.73 Final   • Glucose 01/10/2021 111  70 - 130 mg/dL Final   • Troponin T 01/10/2021 0.013  0.000 - 0.030 ng/mL Final   • BSA 01/10/2021 1.9  m^2 Final   • IVSd 01/10/2021 0.9  cm Final   • LVIDd 01/10/2021 4.1  cm Final   • LVIDs 01/10/2021 2.6  cm Final   • LVPWd 01/10/2021 0.9  cm Final   • IVS/LVPW 01/10/2021 1.0   Final   • FS 01/10/2021 36.6  % Final   • EDV(Teich) 01/10/2021 74.2  ml Final   • ESV(Teich) 01/10/2021 24.6  ml Final   • EF(Teich) 01/10/2021 66.8  % Final   • EDV(cubed) 01/10/2021 68.9  ml Final   • ESV(cubed) 01/10/2021 17.6  ml Final   • EF(cubed) 01/10/2021 74.5  % Final   • LV mass(C)d 01/10/2021 114.1  grams Final   • LV mass(C)dI 01/10/2021 60.0  grams/m^2 Final   • SV(Teich) 01/10/2021 49.6  ml Final   • SI(Teich) 01/10/2021 26.1  ml/m^2 Final   •  SV(cubed) 01/10/2021 51.3  ml Final   • SI(cubed) 01/10/2021 27.0  ml/m^2 Final   • Ao root diam 01/10/2021 2.7  cm Final   • Ao root area 01/10/2021 5.7  cm^2 Final   • ACS 01/10/2021 1.8  cm Final   • asc Aorta Diam 01/10/2021 3.2  cm Final   • LVOT diam 01/10/2021 2.1  cm Final   • LVOT area 01/10/2021 3.5  cm^2 Final   • LVOT area(traced) 01/10/2021 3.5  cm^2 Final   • LVLd ap4 01/10/2021 7.4  cm Final   • EDV(MOD-sp4) 01/10/2021 67.0  ml Final   • LVLs ap4 01/10/2021 6.7  cm Final   • ESV(MOD-sp4) 01/10/2021 29.0  ml Final   • EF(MOD-sp4) 01/10/2021 56.7  % Final   • LVLd ap2 01/10/2021 7.4  cm Final   • EDV(MOD-sp2) 01/10/2021 55.0  ml Final   • LVLs ap2 01/10/2021 6.8  cm Final   • ESV(MOD-sp2) 01/10/2021 21.0  ml Final   • EF(MOD-sp2) 01/10/2021 61.8  % Final   • SV(MOD-sp4) 01/10/2021 38.0  ml Final   • SI(MOD-sp4) 01/10/2021 20.0  ml/m^2 Final   • SV(MOD-sp2) 01/10/2021 34.0  ml Final   • SI(MOD-sp2) 01/10/2021 17.9  ml/m^2 Final   • Ao root area (BSA corrected) 01/10/2021 1.4   Final   • LV Osorio Vol (BSA corrected) 01/10/2021 35.2  ml/m^2 Final   • LV Sys Vol (BSA corrected) 01/10/2021 15.3  ml/m^2 Final   • EF(MOD-bp) 01/10/2021 59.5  % Final   • MV A dur 01/10/2021 0.16  sec Final   • MV E max juan 01/10/2021 66.0  cm/sec Final   • MV A max juan 01/10/2021 102.0  cm/sec Final   • MV E/A 01/10/2021 0.65   Final   • MV V2 max 01/10/2021 99.2  cm/sec Final   • MV max PG 01/10/2021 3.9  mmHg Final   • MV V2 mean 01/10/2021 53.6  cm/sec Final   • MV mean PG 01/10/2021 1.0  mmHg Final   • MV V2 VTI 01/10/2021 22.0  cm Final   • MVA(VTI) 01/10/2021 2.5  cm^2 Final   • MV P1/2t max juan 01/10/2021 79.6  cm/sec Final   • MV P1/2t 01/10/2021 57.0  msec Final   • MVA(P1/2t) 01/10/2021 3.9  cm^2 Final   • MV dec slope 01/10/2021 409.0  cm/sec^2 Final   • MV dec time 01/10/2021 227  sec Final   • Ao pk juan 01/10/2021 100.0  cm/sec Final   • Ao max PG 01/10/2021 4.0  mmHg Final   • Ao max PG (full) 01/10/2021 1.5  mmHg  Final   • Ao V2 mean 01/10/2021 59.7  cm/sec Final   • Ao mean PG 01/10/2021 2.0  mmHg Final   • Ao mean PG (full) 01/10/2021 1.0  mmHg Final   • Ao V2 VTI 01/10/2021 20.6  cm Final   • GLORIA(I,A) 01/10/2021 2.7  cm^2 Final   • GLORIA(I,D) 01/10/2021 2.7  cm^2 Final   • LGORIA(V,A) 01/10/2021 2.7  cm^2 Final   • GLORIA(V,D) 01/10/2021 2.7  cm^2 Final   • LV V1 max PG 01/10/2021 2.5  mmHg Final   • LV V1 mean PG 01/10/2021 1.0  mmHg Final   • LV V1 max 01/10/2021 78.3  cm/sec Final   • LV V1 mean 01/10/2021 52.6  cm/sec Final   • LV V1 VTI 01/10/2021 16.1  cm Final   • SV(Ao) 01/10/2021 117.9  ml Final   • SI(Ao) 01/10/2021 62.0  ml/m^2 Final   • SV(LVOT) 01/10/2021 55.8  ml Final   • SI(LVOT) 01/10/2021 29.3  ml/m^2 Final   • RAP systole 01/10/2021 3.0  mmHg Final   • Pulm Sys Lukas 01/10/2021 40.5  cm/sec Final   • Pulm Osorio Lukas 01/10/2021 36.2  cm/sec Final   • Pulm S/D 01/10/2021 1.1   Final   • Pulm A Revs Dur 01/10/2021 0.13  sec Final   • Pulm A Revs Lukas 01/10/2021 20.9  cm/sec Final   • MVA P1/2T LCG 01/10/2021 2.8  cm^2 Final   • RV Base 01/10/2021 2.8  cm Final   • RV Length 01/10/2021 5.8  cm Final   • RV Mid 01/10/2021 2.2  cm Final   • Lat Peak E' Lukas 01/10/2021 4.2  cm/sec Final   • Med Peak E' Lukas 01/10/2021 4.5  cm/sec Final   • RV S' 01/10/2021 12.9  cm/sec Final   • BH CV ECHO JAY - BZI_BMI 01/10/2021 32.1  kilograms/m^2 Final   • BH CV ECHO JAY - BSA(HAYCOCK) 01/10/2021 2.0  m^2 Final   • BH CV ECHO JAY - BZI_METRIC_WEIGHT 01/10/2021 84.8  kg Final   • BH CV ECHO JAY - BZI_METRIC_HEIGHT 01/10/2021 162.6  cm Final   • Avg E/e' ratio 01/10/2021 15.17   Final   • Target HR (85%) 01/10/2021 130  bpm Final   • Max. Pred. HR (100%) 01/10/2021 153  bpm Final   • BH CV VAS BP RIGHT ARM 01/10/2021 128/75  mmHg Final   • LA Volume Index 01/10/2021 17.0  mL/m2 Final   • TAPSE (>1.6) 01/10/2021 1.70  cm Final   • Vancomycin Trough 01/11/2021 6.00  5.00 - 20.00 mcg/mL Final   • Creatinine 01/11/2021 0.95  0.57 - 1.00  "mg/dL Final   • eGFR Non African Amer 01/11/2021 59* >60 mL/min/1.73 Final   • Troponin T 01/11/2021 0.018  0.000 - 0.030 ng/mL Final       No results found.    Discharge and Follow up Instructions:     Remain NWB on the operative extremity.    Keep splint clean and dry at all times.    Elevate the extremity as much as possible (\"toes above the level of the nose\").    Try to get up at least once per hour while awake to help prevent blood clots.    Follow up in 2-3 weeks with Dr. Banks at [facility] ([phone]).    Discharge to rehab today once final ABX plans arranged with Dr. Galicia and PICC line is placed    Date: 1/11/2021    CANDIDO Sylvester      Time: Discharge 30 min       "

## 2022-08-02 ENCOUNTER — TRANSCRIBE ORDERS (OUTPATIENT)
Dept: ADMINISTRATIVE | Facility: HOSPITAL | Age: 67
End: 2022-08-02

## 2022-08-02 DIAGNOSIS — M79.671 RIGHT FOOT PAIN: Primary | ICD-10-CM

## 2022-08-11 ENCOUNTER — HOSPITAL ENCOUNTER (OUTPATIENT)
Dept: CT IMAGING | Facility: HOSPITAL | Age: 67
Discharge: HOME | End: 2022-08-11
Admitting: ORTHOPAEDIC SURGERY

## 2022-08-11 DIAGNOSIS — M79.671 RIGHT FOOT PAIN: ICD-10-CM

## 2022-08-11 PROCEDURE — 73700 CT LOWER EXTREMITY W/O DYE: CPT

## 2022-08-24 ENCOUNTER — APPOINTMENT (OUTPATIENT)
Dept: GENERAL RADIOLOGY | Facility: HOSPITAL | Age: 67
End: 2022-08-24

## 2022-08-24 ENCOUNTER — HOSPITAL ENCOUNTER (EMERGENCY)
Facility: HOSPITAL | Age: 67
Discharge: HOME | End: 2022-08-24
Attending: EMERGENCY MEDICINE | Admitting: EMERGENCY MEDICINE

## 2022-08-24 VITALS
OXYGEN SATURATION: 95 % | TEMPERATURE: 97.5 F | RESPIRATION RATE: 17 BRPM | BODY MASS INDEX: 28.98 KG/M2 | HEIGHT: 64 IN | SYSTOLIC BLOOD PRESSURE: 161 MMHG | WEIGHT: 169.75 LBS | HEART RATE: 79 BPM | DIASTOLIC BLOOD PRESSURE: 94 MMHG

## 2022-08-24 DIAGNOSIS — M54.32 SCIATICA WITHOUT LUMBAGO, LEFT: Primary | ICD-10-CM

## 2022-08-24 PROCEDURE — 72110 X-RAY EXAM L-2 SPINE 4/>VWS: CPT

## 2022-08-24 PROCEDURE — 63710000001 ONDANSETRON ODT 4 MG TABLET DISPERSIBLE: Performed by: NURSE PRACTITIONER

## 2022-08-24 PROCEDURE — 99284 EMERGENCY DEPT VISIT MOD MDM: CPT

## 2022-08-24 RX ORDER — METHYLPREDNISOLONE 4 MG/1
TABLET ORAL
Qty: 21 TABLET | Refills: 0 | Status: SHIPPED | OUTPATIENT
Start: 2022-08-24 | End: 2022-09-27 | Stop reason: HOSPADM

## 2022-08-24 RX ORDER — TIZANIDINE 2 MG/1
2 TABLET ORAL EVERY 8 HOURS PRN
Qty: 15 TABLET | Refills: 0 | Status: SHIPPED | OUTPATIENT
Start: 2022-08-24 | End: 2022-08-29

## 2022-08-24 RX ORDER — LIDOCAINE 50 MG/G
1 PATCH TOPICAL EVERY 24 HOURS
Qty: 5 PATCH | Refills: 0 | Status: ON HOLD | OUTPATIENT
Start: 2022-08-24 | End: 2022-09-26

## 2022-08-24 RX ORDER — ONDANSETRON 4 MG/1
4 TABLET, ORALLY DISINTEGRATING ORAL ONCE
Status: COMPLETED | OUTPATIENT
Start: 2022-08-24 | End: 2022-08-24

## 2022-08-24 RX ORDER — LIDOCAINE 50 MG/G
1 PATCH TOPICAL ONCE
Status: DISCONTINUED | OUTPATIENT
Start: 2022-08-24 | End: 2022-08-24 | Stop reason: HOSPADM

## 2022-08-24 RX ORDER — HYDROCODONE BITARTRATE AND ACETAMINOPHEN 5; 325 MG/1; MG/1
1 TABLET ORAL ONCE AS NEEDED
Status: COMPLETED | OUTPATIENT
Start: 2022-08-24 | End: 2022-08-24

## 2022-08-24 RX ADMIN — HYDROCODONE BITARTRATE AND ACETAMINOPHEN 1 TABLET: 5; 325 TABLET ORAL at 11:10

## 2022-08-24 RX ADMIN — LIDOCAINE 1 PATCH: 50 PATCH CUTANEOUS at 11:09

## 2022-08-24 RX ADMIN — ONDANSETRON 4 MG: 4 TABLET, ORALLY DISINTEGRATING ORAL at 11:10

## 2022-08-24 NOTE — DISCHARGE INSTRUCTIONS
Take medications as prescribed.  Make sure you are drinking at least 85 ounces of water daily.  Perform exercises per handout.  When Lidoderm patch is on, apply ice every 2 hours while awake on for 20 minutes.  When Lidoderm patch is off, apply heat every 2 hours while awake on for 20 minutes.  Schedule follow-up with primary care for recheck.  If symptoms persist, schedule follow-up with spine specialist.  Return to the ER for new or worsening symptoms.

## 2022-08-24 NOTE — ED PROVIDER NOTES
"Subjective   67-year-old female presents with a complaint of low lumbar pain that radiates to the left buttocks and thigh.  She reports that she did heavy lifting yesterday stating, \"I am a  and I picked up [employer]avonne on the bus.  Later that night I started having pain.\"  She denies urinary bowel incontinence nor saddle paresthesia.  She does report a history of degenerative disc disease.    1. Location: Lumbar  2. Quality: Sharp  3. Severity: Moderate  4. Worsening factors: Movement  5. Alleviating factors: Denies  6. Onset: Yesterday  7. Radiation: Left buttocks, left posterior thigh  8. Frequency: Intermittent  9. Co-morbidities: Past Medical History:  11/16/2016: Adrenal nodule (HCC)      Comment:  FINDINGS: Mild hepatic steatosis may be present.                Cholecystectomy. No biliary ductal dilatation. Negative                pancreas, spleen, left adrenal gland, and kidneys. The                right adrenal presumed adenoma has increased slightly,                measuring 3 x 4 cm in diameter,  compared to 2 x 3.5 cm                on the previous exam. The attenuation values are                consistent with an adenoma. Done at [facility] in                August 2018 11/16/2016: Age-related osteoporosis without current pathological   fracture  No date: Arthritis  No date: Delayed surgical wound healing  11/16/2016: Essential hypertension  11/16/2016: Gastroesophageal reflux disease with esophagitis  11/16/2016: Hepatic steatosis  No date: History of anemia  No date: History of sepsis      Comment:  FROM UTI  11/16/2016: Hyperlipidemia  No date: Lisfranc's dislocation      Comment:  LEFT WITH FRACTURES NONHEALING FOOT  11/2020: Osteomyelitis of left foot (HCC)  No date: RLS (restless legs syndrome)  2014: Squamous cell skin cancer      Comment:  Excised by Dr. James  10/18/2019: Thyroid nodule      Comment:  BENIGN  11/16/2016: Type 2 diabetes mellitus with peripheral neuropathy " ([facility])  1/25/2019: Vitamin D deficiency        History provided by:  Patient   used: No        Review of Systems   Genitourinary: Negative for enuresis.   Musculoskeletal: Positive for arthralgias and back pain. Negative for gait problem, myalgias and neck pain.   Skin: Negative for color change, pallor, rash and wound.   Neurological: Negative for weakness and numbness.   Hematological: Does not bruise/bleed easily.   All other systems reviewed and are negative.      Past Medical History:   Diagnosis Date   • Adrenal nodule (HCC) 11/16/2016    FINDINGS: Mild hepatic steatosis may be present. Cholecystectomy. No biliary ductal dilatation. Negative pancreas, spleen, left adrenal gland, and kidneys. The right adrenal presumed adenoma has increased slightly, measuring 3 x 4 cm in diameter,  compared to 2 x 3.5 cm on the previous exam. The attenuation values are consistent with an adenoma. Done at [facility] in August 2018   • Age-related osteoporosis without current pathological fracture 11/16/2016   • Arthritis    • Delayed surgical wound healing    • Essential hypertension 11/16/2016   • Gastroesophageal reflux disease with esophagitis 11/16/2016   • Hepatic steatosis 11/16/2016   • History of anemia    • History of sepsis     FROM UTI   • Hyperlipidemia 11/16/2016   • Lisfranc's dislocation     LEFT WITH FRACTURES NONHEALING FOOT   • Osteomyelitis of left foot ([facility]) 11/2020   • RLS (restless legs syndrome)    • Squamous cell skin cancer 2014    Excised by Dr. James   • Thyroid nodule 10/18/2019    BENIGN   • Type 2 diabetes mellitus with peripheral neuropathy ([facility]) 11/16/2016   • Vitamin D deficiency 1/25/2019       Allergies   Allergen Reactions   • Zofran [Ondansetron Hcl] Nausea And Vomiting     Does the opposite of its purpose   • Prochlorperazine Other (See Comments)     CAUSED SEIZURE   • Hydralazine Hcl Itching and Rash   • Meperidine Itching and Swelling   • Prochlorperazine  Maleate Other (See Comments)     CAUSES SEIZURE       Past Surgical History:   Procedure Laterality Date   • BREAST BIOPSY Left     7/2019. BENIGN   • CARDIAC CATHETERIZATION Left 5/2/2021    Procedure: Cardiac Catheterization/Vascular Study;  Surgeon: Chacho Esteban MD;  Location: [facility] CATH INVASIVE LOCATION;  Service: Cardiovascular;  Laterality: Left;   • CARDIAC CATHETERIZATION N/A 5/2/2021    Procedure: Intra-Aortic Baloon Pump Insertion;  Surgeon: Chacho Esteban MD;  Location: [facility] CATH INVASIVE LOCATION;  Service: Cardiovascular;  Laterality: N/A;   • CATARACT EXTRACTION WITH INTRAOCULAR LENS IMPLANT Bilateral    • CHOLECYSTECTOMY     • COLONOSCOPY     • CORONARY ARTERY BYPASS GRAFT N/A 5/2/2021    Procedure: CORONARY ARTERY BYPASS WITH INTERNAL MAMMARY ARTERY GRAFT;  Surgeon: Jr Rocael Alexander MD;  Location: [facility];  Service: Cardiothoracic;  Laterality: N/A;   • FOOT FUSION Right 11/13/2020    Procedure: RIGHT OPEN TREATMENT LISFRANC INJURY, OPEN REDUCTION INTERNAL FIXATION MEDIAL/MIDDLE CUNEIFORM FRACTURE AND 2ND/3RD METATARSAL FRACTURE 1ST 2ND POSSIBLE 3RD TARSOMETATARSAL ARTHRODESIS INTERCUNEIFORM ARTHRODESIS CALCANEAL BONE GRAFT;  Surgeon: Mikhail Banks Jr., MD;  Location: [facility];  Service: Orthopedics;  Laterality: Right;   • INCISION AND DRAINAGE LEG Right 1/8/2021    Procedure: RIGHT IRRIGATION AND DEBRIDEMENT OF FOOT WITH SECONDARY CLOSURE AND HARDWARE REMOVAL;  Surgeon: Mikhail Banks Jr., MD;  Location: [facility] OR;  Service: Orthopedics;  Laterality: Right;   • KNEE ARTHROSCOPY Bilateral    • TOTAL ABDOMINAL HYSTERECTOMY     • TRANSESOPHAGEAL ECHOCARDIOGRAM (EMILIA) N/A 5/2/2021    Procedure: TRANSESOPHAGEAL ECHOCARDIOGRAM;  Surgeon: Jr Rocael Alexander MD;  Location: [facility];  Service: Cardiothoracic;  Laterality: N/A;   • UPPER GASTROINTESTINAL ENDOSCOPY  08/2016   • US GUIDED FINE NEEDLE ASPIRATION  5/8/2020       Family History   Problem Relation Age of Onset   •  Diabetes Mother    • COPD Mother    • Hypertension Mother    • Kidney disease Mother    • Obesity Mother    • Thyroid disease Mother    • Diabetes Sister    • Diabetes Sister    • Diabetes Sister    • Diabetes Sister    • Malig Hyperthermia Neg Hx        Social History     Socioeconomic History   • Marital status:    Tobacco Use   • Smoking status: Never Smoker   • Smokeless tobacco: Never Used   Vaping Use   • Vaping Use: Never used   Substance and Sexual Activity   • Alcohol use: Yes     Comment: socially    • Drug use: Never   • Sexual activity: Defer           Objective   Physical Exam  Vitals and nursing note reviewed.   Constitutional:       General: She is awake. She is not in acute distress.     Appearance: Normal appearance. She is well-developed and normal weight.   Cardiovascular:      Pulses: Normal pulses.           Dorsalis pedis pulses are 2+ on the right side and 2+ on the left side.        Posterior tibial pulses are 2+ on the right side and 2+ on the left side.   Musculoskeletal:         General: Tenderness and signs of injury present. No swelling or deformity. Normal range of motion.      Cervical back: Normal.      Thoracic back: Normal.      Lumbar back: Tenderness and bony tenderness present. No swelling, edema, deformity, signs of trauma or spasms. Normal range of motion.        Back:    Skin:     General: Skin is warm and dry.      Capillary Refill: Capillary refill takes less than 2 seconds.      Coloration: Skin is not pale.   Neurological:      Mental Status: She is alert and oriented to person, place, and time.      GCS: GCS eye subscore is 4. GCS verbal subscore is 5. GCS motor subscore is 6.      Sensory: No sensory deficit.      Motor: No abnormal muscle tone.      Deep Tendon Reflexes:      Reflex Scores:       Patellar reflexes are 2+ on the right side and 2+ on the left side.  Psychiatric:         Mood and Affect: Mood normal.         Behavior: Behavior normal. Behavior is  cooperative.         Thought Content: Thought content normal.         Judgment: Judgment normal.         Procedures           ED Course    XR Spine Lumbar Complete 4+VW    Result Date: 8/24/2022   1. No acute fracture or dislocation. 2. Degenerative changes.  Electronically Signed By-Darci Okeefe MD On:8/24/2022 11:29 AM This report was finalized on [id number] by  Darci Okeefe MD.    Medications   HYDROcodone-acetaminophen (NORCO) 5-325 MG per tablet 1 tablet (1 tablet Oral Given 8/24/22 1110)   ondansetron ODT (ZOFRAN-ODT) disintegrating tablet 4 mg (4 mg Oral Given 8/24/22 1110)     Labs Reviewed - No data to display                                         MDM  Number of Diagnoses or Management Options  Sciatica without lumbago, left  Diagnosis management comments: Chart Review: 5/14/2022 patient was seen at this facility status post fall for rib contusion.  Comorbidity: Past Medical History:  11/16/2016: Adrenal nodule (HCC)      Comment:  FINDINGS: Mild hepatic steatosis may be present.                Cholecystectomy. No biliary ductal dilatation. Negative                pancreas, spleen, left adrenal gland, and kidneys. The                right adrenal presumed adenoma has increased slightly,                measuring 3 x 4 cm in diameter,  compared to 2 x 3.5 cm                on the previous exam. The attenuation values are                consistent with an adenoma. Done at [facility] in                August 2018 11/16/2016: Age-related osteoporosis without current pathological   fracture  No date: Arthritis  No date: Delayed surgical wound healing  11/16/2016: Essential hypertension  11/16/2016: Gastroesophageal reflux disease with esophagitis  11/16/2016: Hepatic steatosis  No date: History of anemia  No date: History of sepsis      Comment:  FROM UTI  11/16/2016: Hyperlipidemia  No date: Lisfranc's dislocation      Comment:  LEFT WITH FRACTURES NONHEALING FOOT  11/2020: Osteomyelitis of left foot  ([facility])  No date: RLS (restless legs syndrome)  2014: Squamous cell skin cancer      Comment:  Excised by Dr. James  10/18/2019: Thyroid nodule      Comment:  BENIGN  11/16/2016: Type 2 diabetes mellitus with peripheral neuropathy ([facility])  1/25/2019: Vitamin D deficiency  Imaging: Was interpreted by physician and reviewed by myself: XR Spine Lumbar Complete 4+VW   Final Result         1. No acute fracture or dislocation.    2. Degenerative changes.         Electronically Signed By-Darci Okeefe MD On:8/24/2022 11:29 AM    This report was finalized on [id number] by  Darci Okeefe MD.    Patient undressed and placed in gown for exam.  Appropriate PPE worn during patient exam.  Patient is alert and oriented x3.  She is in no acute distress. Lumbar back: Tenderness and bony tenderness present. No swelling, edema, overlying erythema, deformity, signs of trauma or spasms. Normal range of motion.  Ice pack applied.  Patient had a Lidoderm patch applied.  She was given Norco 5/325 p.o. x1, and Zofran 4 mg ODT.  X-ray of the lumbar spine obtained to the above findings.  X-ray showed no acute findings.  Patient was given prescriptions for Medrol Dosepak, tizanidine, and Lidoderm patches.  She is also given referral for orthospine as she has not seen them in the past for her DDD.  She also encouraged to rotate heat and ice every 2 hours while awake.  Upon reassessment, she reports relief with medications given.    Disposition/Treatment: Discussed results with patient, verbalized understanding.  Discussed reasons to return to the ER, patient verbalized understanding.  Agreeable with plan of care.  Patient was stable upon discharge.       Part of this note may be an electronic transcription/translation of spoken language to printed text using the Dragon Dictation System.            Amount and/or Complexity of Data Reviewed  Tests in the radiology section of CPT®: reviewed    Patient Progress  Patient progress: stable      Final  diagnoses:   Sciatica without lumbago, left       ED Disposition  ED Disposition     ED Disposition   Discharge    Condition   Stable    Comment   --             Traci Townsend, APRN  [address] [place] [address]  [phone]    Schedule an appointment as soon as possible for a visit       Tk Ritchie PA  [address]  [place] [address]  [phone]    Schedule an appointment as soon as possible for a visit       [facility] EMERGENCY DEPARTMENT  [address] [facility] [address] [phone]  Go to   If symptoms worsen         Medication List      New Prescriptions    methylPREDNISolone 4 MG dose pack  Commonly known as: MEDROL  Take as directed on package instructions.     tiZANidine 2 MG tablet  Commonly known as: ZANAFLEX  Take 1 tablet by mouth Every 8 (Eight) Hours As Needed for Muscle Spasms for up to 5 days.           Where to Get Your Medications      These medications were sent to [facility] [address] [place] [address] - [address] [place] [address] [phone] [facility] [phone] [address] [place] [facility]    Phone: [phone]   · lidocaine 5 %  · methylPREDNISolone 4 MG dose pack  · tiZANidine 2 MG tablet          Julienne Horta, APRN  08/24/22 [id number]

## 2022-09-25 ENCOUNTER — APPOINTMENT (OUTPATIENT)
Dept: CT IMAGING | Facility: HOSPITAL | Age: 67
End: 2022-09-25

## 2022-09-25 ENCOUNTER — HOSPITAL ENCOUNTER (INPATIENT)
Facility: HOSPITAL | Age: 67
LOS: 2 days | Discharge: NURSING, REHAB OR LONG-TERM CARE | End: 2022-09-27
Attending: EMERGENCY MEDICINE | Admitting: INTERNAL MEDICINE

## 2022-09-25 ENCOUNTER — APPOINTMENT (OUTPATIENT)
Dept: GENERAL RADIOLOGY | Facility: HOSPITAL | Age: 67
End: 2022-09-25

## 2022-09-25 ENCOUNTER — APPOINTMENT (OUTPATIENT)
Dept: MRI IMAGING | Facility: HOSPITAL | Age: 67
End: 2022-09-25

## 2022-09-25 DIAGNOSIS — I63.442 CEREBRAL INFARCTION DUE TO EMBOLISM OF LEFT CEREBELLAR ARTERY: ICD-10-CM

## 2022-09-25 DIAGNOSIS — R47.1 DYSARTHRIA: Primary | ICD-10-CM

## 2022-09-25 DIAGNOSIS — I63.9 CEREBROVASCULAR ACCIDENT (CVA), UNSPECIFIED MECHANISM: ICD-10-CM

## 2022-09-25 DIAGNOSIS — I63.49 CEREBRAL INFARCTION DUE TO EMBOLISM OF OTHER CEREBRAL ARTERY: ICD-10-CM

## 2022-09-25 DIAGNOSIS — R41.82 ALTERED MENTAL STATUS, UNSPECIFIED ALTERED MENTAL STATUS TYPE: ICD-10-CM

## 2022-09-25 DIAGNOSIS — I10 MALIGNANT HYPERTENSION: ICD-10-CM

## 2022-09-25 DIAGNOSIS — R73.9 HYPERGLYCEMIA: ICD-10-CM

## 2022-09-25 DIAGNOSIS — R93.89 ABNORMAL FINDINGS ON DIAGNOSTIC IMAGING OF OTHER SPECIFIED BODY STRUCTURES: ICD-10-CM

## 2022-09-25 PROBLEM — G93.41 ENCEPHALOPATHY, METABOLIC: Status: ACTIVE | Noted: 2022-09-25

## 2022-09-25 PROBLEM — I16.1 HYPERTENSIVE EMERGENCY: Status: ACTIVE | Noted: 2022-09-25

## 2022-09-25 LAB
ABO GROUP BLD: NORMAL
ALBUMIN SERPL-MCNC: 4.1 G/DL (ref 3.5–5.2)
ALBUMIN/GLOB SERPL: 1.1 G/DL
ALP SERPL-CCNC: 89 U/L (ref 39–117)
ALT SERPL W P-5'-P-CCNC: 9 U/L (ref 1–33)
ANION GAP SERPL CALCULATED.3IONS-SCNC: 23 MMOL/L (ref 5–15)
APTT PPP: 24.8 SECONDS (ref 24–31)
AST SERPL-CCNC: 20 U/L (ref 1–32)
BASOPHILS # BLD AUTO: 0.1 10*3/MM3 (ref 0–0.2)
BASOPHILS NFR BLD AUTO: 1 % (ref 0–1.5)
BILIRUB SERPL-MCNC: 0.5 MG/DL (ref 0–1.2)
BLD GP AB SCN SERPL QL: NEGATIVE
BUN SERPL-MCNC: 15 MG/DL (ref 8–23)
BUN/CREAT SERPL: 14 (ref 7–25)
CALCIUM SPEC-SCNC: 8.9 MG/DL (ref 8.6–10.5)
CHLORIDE SERPL-SCNC: 90 MMOL/L (ref 98–107)
CHOLEST SERPL-MCNC: 337 MG/DL (ref 0–200)
CO2 SERPL-SCNC: 19 MMOL/L (ref 22–29)
CREAT SERPL-MCNC: 1.07 MG/DL (ref 0.57–1)
CRP SERPL-MCNC: 0.4 MG/DL (ref 0–0.5)
DEPRECATED RDW RBC AUTO: 42.4 FL (ref 37–54)
EGFRCR SERPLBLD CKD-EPI 2021: 56.3 ML/MIN/1.73
EOSINOPHIL # BLD AUTO: 0 10*3/MM3 (ref 0–0.4)
EOSINOPHIL NFR BLD AUTO: 0.3 % (ref 0.3–6.2)
ERYTHROCYTE [DISTWIDTH] IN BLOOD BY AUTOMATED COUNT: 13.6 % (ref 12.3–15.4)
ERYTHROCYTE [SEDIMENTATION RATE] IN BLOOD: 68 MM/HR (ref 0–30)
GLOBULIN UR ELPH-MCNC: 3.7 GM/DL
GLUCOSE BLDC GLUCOMTR-MCNC: 346 MG/DL (ref 70–105)
GLUCOSE BLDC GLUCOMTR-MCNC: 420 MG/DL (ref 70–105)
GLUCOSE BLDC GLUCOMTR-MCNC: 477 MG/DL (ref 70–105)
GLUCOSE SERPL-MCNC: 438 MG/DL (ref 65–99)
HCT VFR BLD AUTO: 42 % (ref 34–46.6)
HDLC SERPL-MCNC: 53 MG/DL (ref 40–60)
HGB BLD-MCNC: 13.5 G/DL (ref 12–15.9)
HOLD SPECIMEN: NORMAL
HOLD SPECIMEN: NORMAL
INR PPP: 1.06 (ref 0.93–1.1)
LDLC SERPL CALC-MCNC: 218 MG/DL (ref 0–100)
LDLC/HDLC SERPL: 4.15 {RATIO}
LYMPHOCYTES # BLD AUTO: 1.6 10*3/MM3 (ref 0.7–3.1)
LYMPHOCYTES NFR BLD AUTO: 11.4 % (ref 19.6–45.3)
MCH RBC QN AUTO: 28.8 PG (ref 26.6–33)
MCHC RBC AUTO-ENTMCNC: 32.2 G/DL (ref 31.5–35.7)
MCV RBC AUTO: 89.4 FL (ref 79–97)
MONOCYTES # BLD AUTO: 0.6 10*3/MM3 (ref 0.1–0.9)
MONOCYTES NFR BLD AUTO: 4.3 % (ref 5–12)
NEUTROPHILS NFR BLD AUTO: 12 10*3/MM3 (ref 1.7–7)
NEUTROPHILS NFR BLD AUTO: 83 % (ref 42.7–76)
NRBC BLD AUTO-RTO: 0.1 /100 WBC (ref 0–0.2)
NT-PROBNP SERPL-MCNC: 511.3 PG/ML (ref 0–900)
PLATELET # BLD AUTO: 344 10*3/MM3 (ref 140–450)
PMV BLD AUTO: 7 FL (ref 6–12)
POTASSIUM SERPL-SCNC: 3.9 MMOL/L (ref 3.5–5.2)
PROCALCITONIN SERPL-MCNC: 0.05 NG/ML (ref 0–0.25)
PROT SERPL-MCNC: 7.8 G/DL (ref 6–8.5)
PROTHROMBIN TIME: 10.9 SECONDS (ref 9.6–11.7)
RBC # BLD AUTO: 4.69 10*6/MM3 (ref 3.77–5.28)
RH BLD: POSITIVE
SODIUM SERPL-SCNC: 132 MMOL/L (ref 136–145)
T&S EXPIRATION DATE: NORMAL
TRIGL SERPL-MCNC: 319 MG/DL (ref 0–150)
TROPONIN T SERPL-MCNC: <0.01 NG/ML (ref 0–0.03)
VLDLC SERPL-MCNC: 66 MG/DL (ref 5–40)
WBC NRBC COR # BLD: 14.4 10*3/MM3 (ref 3.4–10.8)
WHOLE BLOOD HOLD COAG: NORMAL
WHOLE BLOOD HOLD SPECIMEN: NORMAL

## 2022-09-25 PROCEDURE — 93005 ELECTROCARDIOGRAM TRACING: CPT | Performed by: EMERGENCY MEDICINE

## 2022-09-25 PROCEDURE — 99223 1ST HOSP IP/OBS HIGH 75: CPT | Performed by: PSYCHIATRY & NEUROLOGY

## 2022-09-25 PROCEDURE — 85652 RBC SED RATE AUTOMATED: CPT | Performed by: INTERNAL MEDICINE

## 2022-09-25 PROCEDURE — 70551 MRI BRAIN STEM W/O DYE: CPT

## 2022-09-25 PROCEDURE — 71045 X-RAY EXAM CHEST 1 VIEW: CPT

## 2022-09-25 PROCEDURE — 82962 GLUCOSE BLOOD TEST: CPT

## 2022-09-25 PROCEDURE — 63710000001 INSULIN REGULAR HUMAN PER 5 UNITS: Performed by: EMERGENCY MEDICINE

## 2022-09-25 PROCEDURE — 4A03X5D MEASUREMENT OF ARTERIAL FLOW, INTRACRANIAL, EXTERNAL APPROACH: ICD-10-PCS | Performed by: RADIOLOGY

## 2022-09-25 PROCEDURE — 70498 CT ANGIOGRAPHY NECK: CPT

## 2022-09-25 PROCEDURE — 63710000001 INSULIN LISPRO (HUMAN) PER 5 UNITS: Performed by: INTERNAL MEDICINE

## 2022-09-25 PROCEDURE — 86140 C-REACTIVE PROTEIN: CPT | Performed by: INTERNAL MEDICINE

## 2022-09-25 PROCEDURE — 99285 EMERGENCY DEPT VISIT HI MDM: CPT

## 2022-09-25 PROCEDURE — 80061 LIPID PANEL: CPT | Performed by: INTERNAL MEDICINE

## 2022-09-25 PROCEDURE — 80053 COMPREHEN METABOLIC PANEL: CPT | Performed by: EMERGENCY MEDICINE

## 2022-09-25 PROCEDURE — 85025 COMPLETE CBC W/AUTO DIFF WBC: CPT | Performed by: EMERGENCY MEDICINE

## 2022-09-25 PROCEDURE — 86900 BLOOD TYPING SEROLOGIC ABO: CPT | Performed by: EMERGENCY MEDICINE

## 2022-09-25 PROCEDURE — 84484 ASSAY OF TROPONIN QUANT: CPT | Performed by: EMERGENCY MEDICINE

## 2022-09-25 PROCEDURE — 0 IOPAMIDOL PER 1 ML: Performed by: EMERGENCY MEDICINE

## 2022-09-25 PROCEDURE — 70496 CT ANGIOGRAPHY HEAD: CPT

## 2022-09-25 PROCEDURE — 70450 CT HEAD/BRAIN W/O DYE: CPT

## 2022-09-25 PROCEDURE — 86850 RBC ANTIBODY SCREEN: CPT | Performed by: EMERGENCY MEDICINE

## 2022-09-25 PROCEDURE — 0042T HC CT CEREBRAL PERFUSION W/WO CONTRAST: CPT

## 2022-09-25 PROCEDURE — 85610 PROTHROMBIN TIME: CPT | Performed by: EMERGENCY MEDICINE

## 2022-09-25 PROCEDURE — 84145 PROCALCITONIN (PCT): CPT | Performed by: INTERNAL MEDICINE

## 2022-09-25 PROCEDURE — 83880 ASSAY OF NATRIURETIC PEPTIDE: CPT | Performed by: INTERNAL MEDICINE

## 2022-09-25 PROCEDURE — 25010000002 ENOXAPARIN PER 10 MG: Performed by: INTERNAL MEDICINE

## 2022-09-25 PROCEDURE — 63710000001 INSULIN GLARGINE PER 5 UNITS: Performed by: INTERNAL MEDICINE

## 2022-09-25 PROCEDURE — 85730 THROMBOPLASTIN TIME PARTIAL: CPT | Performed by: EMERGENCY MEDICINE

## 2022-09-25 PROCEDURE — 36415 COLL VENOUS BLD VENIPUNCTURE: CPT | Performed by: EMERGENCY MEDICINE

## 2022-09-25 PROCEDURE — 86901 BLOOD TYPING SEROLOGIC RH(D): CPT | Performed by: EMERGENCY MEDICINE

## 2022-09-25 RX ORDER — OLANZAPINE 10 MG/2ML
1 INJECTION, POWDER, LYOPHILIZED, FOR SOLUTION INTRAMUSCULAR
Status: DISCONTINUED | OUTPATIENT
Start: 2022-09-25 | End: 2022-09-27 | Stop reason: HOSPADM

## 2022-09-25 RX ORDER — POTASSIUM CHLORIDE 1.5 G/1.77G
40 POWDER, FOR SOLUTION ORAL AS NEEDED
Status: DISCONTINUED | OUTPATIENT
Start: 2022-09-25 | End: 2022-09-27 | Stop reason: HOSPADM

## 2022-09-25 RX ORDER — BISACODYL 10 MG
10 SUPPOSITORY, RECTAL RECTAL DAILY PRN
Status: DISCONTINUED | OUTPATIENT
Start: 2022-09-25 | End: 2022-09-27 | Stop reason: HOSPADM

## 2022-09-25 RX ORDER — POLYETHYLENE GLYCOL 3350 17 G/17G
17 POWDER, FOR SOLUTION ORAL DAILY PRN
Status: DISCONTINUED | OUTPATIENT
Start: 2022-09-25 | End: 2022-09-27 | Stop reason: HOSPADM

## 2022-09-25 RX ORDER — DEXTROSE MONOHYDRATE 25 G/50ML
25 INJECTION, SOLUTION INTRAVENOUS
Status: DISCONTINUED | OUTPATIENT
Start: 2022-09-25 | End: 2022-09-27 | Stop reason: HOSPADM

## 2022-09-25 RX ORDER — POTASSIUM CHLORIDE 20 MEQ/1
40 TABLET, EXTENDED RELEASE ORAL AS NEEDED
Status: DISCONTINUED | OUTPATIENT
Start: 2022-09-25 | End: 2022-09-27 | Stop reason: HOSPADM

## 2022-09-25 RX ORDER — HYDROCODONE BITARTRATE AND ACETAMINOPHEN 5; 325 MG/1; MG/1
1 TABLET ORAL EVERY 4 HOURS PRN
Status: DISCONTINUED | OUTPATIENT
Start: 2022-09-25 | End: 2022-09-27 | Stop reason: HOSPADM

## 2022-09-25 RX ORDER — INSULIN LISPRO 100 [IU]/ML
0-9 INJECTION, SOLUTION INTRAVENOUS; SUBCUTANEOUS EVERY 6 HOURS SCHEDULED
Status: DISCONTINUED | OUTPATIENT
Start: 2022-09-25 | End: 2022-09-27 | Stop reason: HOSPADM

## 2022-09-25 RX ORDER — AMOXICILLIN 250 MG
2 CAPSULE ORAL 2 TIMES DAILY
Status: DISCONTINUED | OUTPATIENT
Start: 2022-09-25 | End: 2022-09-27 | Stop reason: HOSPADM

## 2022-09-25 RX ORDER — ACETAMINOPHEN AND CODEINE PHOSPHATE 300; 30 MG/1; MG/1
1 TABLET ORAL EVERY 6 HOURS PRN
Status: DISCONTINUED | OUTPATIENT
Start: 2022-09-25 | End: 2022-09-26

## 2022-09-25 RX ORDER — POTASSIUM CHLORIDE 7.45 MG/ML
10 INJECTION INTRAVENOUS
Status: DISCONTINUED | OUTPATIENT
Start: 2022-09-25 | End: 2022-09-27 | Stop reason: HOSPADM

## 2022-09-25 RX ORDER — MAGNESIUM SULFATE HEPTAHYDRATE 40 MG/ML
2 INJECTION, SOLUTION INTRAVENOUS AS NEEDED
Status: DISCONTINUED | OUTPATIENT
Start: 2022-09-25 | End: 2022-09-27 | Stop reason: HOSPADM

## 2022-09-25 RX ORDER — METOPROLOL TARTRATE 50 MG/1
50 TABLET, FILM COATED ORAL 2 TIMES DAILY
Status: DISCONTINUED | OUTPATIENT
Start: 2022-09-25 | End: 2022-09-26

## 2022-09-25 RX ORDER — SODIUM CHLORIDE 0.9 % (FLUSH) 0.9 %
10 SYRINGE (ML) INJECTION EVERY 12 HOURS SCHEDULED
Status: DISCONTINUED | OUTPATIENT
Start: 2022-09-25 | End: 2022-09-27 | Stop reason: HOSPADM

## 2022-09-25 RX ORDER — NITROGLYCERIN 0.4 MG/1
0.4 TABLET SUBLINGUAL
Status: DISCONTINUED | OUTPATIENT
Start: 2022-09-25 | End: 2022-09-27 | Stop reason: HOSPADM

## 2022-09-25 RX ORDER — CALCIUM GLUCONATE 20 MG/ML
1 INJECTION, SOLUTION INTRAVENOUS AS NEEDED
Status: DISCONTINUED | OUTPATIENT
Start: 2022-09-25 | End: 2022-09-27 | Stop reason: HOSPADM

## 2022-09-25 RX ORDER — ONDANSETRON 2 MG/ML
4 INJECTION INTRAMUSCULAR; INTRAVENOUS EVERY 6 HOURS PRN
Status: DISCONTINUED | OUTPATIENT
Start: 2022-09-25 | End: 2022-09-27 | Stop reason: HOSPADM

## 2022-09-25 RX ORDER — ASPIRIN 81 MG/1
81 TABLET ORAL DAILY
Status: DISCONTINUED | OUTPATIENT
Start: 2022-09-26 | End: 2022-09-27 | Stop reason: HOSPADM

## 2022-09-25 RX ORDER — NICOTINE POLACRILEX 4 MG
15 LOZENGE BUCCAL
Status: DISCONTINUED | OUTPATIENT
Start: 2022-09-25 | End: 2022-09-27 | Stop reason: HOSPADM

## 2022-09-25 RX ORDER — ATORVASTATIN CALCIUM 40 MG/1
40 TABLET, FILM COATED ORAL DAILY
Status: DISCONTINUED | OUTPATIENT
Start: 2022-09-26 | End: 2022-09-26

## 2022-09-25 RX ORDER — SODIUM CHLORIDE 0.9 % (FLUSH) 0.9 %
10 SYRINGE (ML) INJECTION AS NEEDED
Status: DISCONTINUED | OUTPATIENT
Start: 2022-09-25 | End: 2022-09-27 | Stop reason: HOSPADM

## 2022-09-25 RX ORDER — ENOXAPARIN SODIUM 100 MG/ML
40 INJECTION SUBCUTANEOUS
Status: DISCONTINUED | OUTPATIENT
Start: 2022-09-25 | End: 2022-09-27 | Stop reason: HOSPADM

## 2022-09-25 RX ORDER — LIDOCAINE 50 MG/G
1 PATCH TOPICAL EVERY 24 HOURS
Status: DISCONTINUED | OUTPATIENT
Start: 2022-09-25 | End: 2022-09-26

## 2022-09-25 RX ORDER — ASPIRIN 300 MG/1
300 SUPPOSITORY RECTAL EVERY 6 HOURS PRN
Status: DISCONTINUED | OUTPATIENT
Start: 2022-09-25 | End: 2022-09-27 | Stop reason: HOSPADM

## 2022-09-25 RX ORDER — CHOLECALCIFEROL (VITAMIN D3) 125 MCG
5 CAPSULE ORAL NIGHTLY PRN
Status: DISCONTINUED | OUTPATIENT
Start: 2022-09-25 | End: 2022-09-27 | Stop reason: HOSPADM

## 2022-09-25 RX ORDER — MAGNESIUM SULFATE HEPTAHYDRATE 40 MG/ML
4 INJECTION, SOLUTION INTRAVENOUS AS NEEDED
Status: DISCONTINUED | OUTPATIENT
Start: 2022-09-25 | End: 2022-09-27 | Stop reason: HOSPADM

## 2022-09-25 RX ORDER — CALCIUM GLUCONATE 20 MG/ML
2 INJECTION, SOLUTION INTRAVENOUS AS NEEDED
Status: DISCONTINUED | OUTPATIENT
Start: 2022-09-25 | End: 2022-09-27 | Stop reason: HOSPADM

## 2022-09-25 RX ORDER — BISACODYL 5 MG/1
5 TABLET, DELAYED RELEASE ORAL DAILY PRN
Status: DISCONTINUED | OUTPATIENT
Start: 2022-09-25 | End: 2022-09-27 | Stop reason: HOSPADM

## 2022-09-25 RX ORDER — ACETAMINOPHEN 650 MG/1
650 SUPPOSITORY RECTAL EVERY 4 HOURS PRN
Status: DISCONTINUED | OUTPATIENT
Start: 2022-09-25 | End: 2022-09-27 | Stop reason: HOSPADM

## 2022-09-25 RX ADMIN — INSULIN GLARGINE 10 UNITS: 100 INJECTION, SOLUTION SUBCUTANEOUS at 22:20

## 2022-09-25 RX ADMIN — IOPAMIDOL 50 ML: 755 INJECTION, SOLUTION INTRAVENOUS at 14:25

## 2022-09-25 RX ADMIN — SODIUM CHLORIDE 5 MG/HR: 9 INJECTION, SOLUTION INTRAVENOUS at 14:34

## 2022-09-25 RX ADMIN — INSULIN HUMAN 6 UNITS: 100 INJECTION, SOLUTION PARENTERAL at 17:57

## 2022-09-25 RX ADMIN — IOPAMIDOL 100 ML: 755 INJECTION, SOLUTION INTRAVENOUS at 14:25

## 2022-09-25 RX ADMIN — ENOXAPARIN SODIUM 40 MG: 100 INJECTION SUBCUTANEOUS at 22:20

## 2022-09-25 RX ADMIN — INSULIN LISPRO 9 UNITS: 100 INJECTION, SOLUTION INTRAVENOUS; SUBCUTANEOUS at 18:06

## 2022-09-25 RX ADMIN — ACETAMINOPHEN 650 MG: 650 SUPPOSITORY RECTAL at 20:33

## 2022-09-26 ENCOUNTER — APPOINTMENT (OUTPATIENT)
Dept: CARDIOLOGY | Facility: HOSPITAL | Age: 67
End: 2022-09-26

## 2022-09-26 LAB
ANION GAP SERPL CALCULATED.3IONS-SCNC: 17 MMOL/L (ref 5–15)
ANION GAP SERPL CALCULATED.3IONS-SCNC: 18 MMOL/L (ref 5–15)
BH CV ECHO MEAS - ACS: 1.93 CM
BH CV ECHO MEAS - AO MAX PG: 6.1 MMHG
BH CV ECHO MEAS - AO MEAN PG: 3.3 MMHG
BH CV ECHO MEAS - AO ROOT DIAM: 3.1 CM
BH CV ECHO MEAS - AO V2 MAX: 123.6 CM/SEC
BH CV ECHO MEAS - AO V2 VTI: 23.3 CM
BH CV ECHO MEAS - AVA(I,D): 2.17 CM2
BH CV ECHO MEAS - EDV(CUBED): 33.4 ML
BH CV ECHO MEAS - EDV(MOD-SP4): 30.7 ML
BH CV ECHO MEAS - EF(MOD-BP): 55 %
BH CV ECHO MEAS - EF(MOD-SP4): 54.5 %
BH CV ECHO MEAS - ESV(CUBED): 17.9 ML
BH CV ECHO MEAS - ESV(MOD-SP4): 14 ML
BH CV ECHO MEAS - FS: 18.8 %
BH CV ECHO MEAS - IVS/LVPW: 1.09 CM
BH CV ECHO MEAS - IVSD: 1.58 CM
BH CV ECHO MEAS - LA DIMENSION: 4.2 CM
BH CV ECHO MEAS - LV DIASTOLIC VOL/BSA (35-75): 16.6 CM2
BH CV ECHO MEAS - LV MASS(C)D: 176.6 GRAMS
BH CV ECHO MEAS - LV MAX PG: 2.6 MMHG
BH CV ECHO MEAS - LV MEAN PG: 1.29 MMHG
BH CV ECHO MEAS - LV SYSTOLIC VOL/BSA (12-30): 7.6 CM2
BH CV ECHO MEAS - LV V1 MAX: 80.2 CM/SEC
BH CV ECHO MEAS - LV V1 VTI: 15.6 CM
BH CV ECHO MEAS - LVIDD: 3.2 CM
BH CV ECHO MEAS - LVIDS: 2.6 CM
BH CV ECHO MEAS - LVOT AREA: 3.2 CM2
BH CV ECHO MEAS - LVOT DIAM: 2.03 CM
BH CV ECHO MEAS - LVPWD: 1.45 CM
BH CV ECHO MEAS - MV A MAX VEL: 102.6 CM/SEC
BH CV ECHO MEAS - MV DEC SLOPE: 233 CM/SEC2
BH CV ECHO MEAS - MV DEC TIME: 0.26 MSEC
BH CV ECHO MEAS - MV E MAX VEL: 60.6 CM/SEC
BH CV ECHO MEAS - MV E/A: 0.59
BH CV ECHO MEAS - MV MAX PG: 4.7 MMHG
BH CV ECHO MEAS - MV MEAN PG: 1.71 MMHG
BH CV ECHO MEAS - MV V2 VTI: 16.8 CM
BH CV ECHO MEAS - MVA(VTI): 3 CM2
BH CV ECHO MEAS - PA ACC TIME: 0.09 SEC
BH CV ECHO MEAS - PA PR(ACCEL): 39.2 MMHG
BH CV ECHO MEAS - PA V2 MAX: 86.4 CM/SEC
BH CV ECHO MEAS - PULM A REVS DUR: 0.1 SEC
BH CV ECHO MEAS - PULM A REVS VEL: 20.6 CM/SEC
BH CV ECHO MEAS - PULM DIAS VEL: 29.2 CM/SEC
BH CV ECHO MEAS - PULM S/D: 1.95
BH CV ECHO MEAS - PULM SYS VEL: 57.1 CM/SEC
BH CV ECHO MEAS - RAP SYSTOLE: 3 MMHG
BH CV ECHO MEAS - RV MAX PG: 1.68 MMHG
BH CV ECHO MEAS - RV V1 MAX: 64.7 CM/SEC
BH CV ECHO MEAS - RV V1 VTI: 12.8 CM
BH CV ECHO MEAS - RVDD: 3.7 CM
BH CV ECHO MEAS - SI(MOD-SP4): 9.1 ML/M2
BH CV ECHO MEAS - SV(LVOT): 50.6 ML
BH CV ECHO MEAS - SV(MOD-SP4): 16.7 ML
BH CV XLRA MEAS LEFT DIST CCA EDV: -23.4 CM/SEC
BH CV XLRA MEAS LEFT DIST CCA PSV: -77.1 CM/SEC
BH CV XLRA MEAS LEFT DIST ICA EDV: -14.5 CM/SEC
BH CV XLRA MEAS LEFT DIST ICA PSV: -40.5 CM/SEC
BH CV XLRA MEAS LEFT ICA/CCA RATIO: 0.6
BH CV XLRA MEAS LEFT PROX CCA EDV: 17.3 CM/SEC
BH CV XLRA MEAS LEFT PROX CCA PSV: 65 CM/SEC
BH CV XLRA MEAS LEFT PROX ECA PSV: -86.6 CM/SEC
BH CV XLRA MEAS LEFT PROX ICA EDV: -19.7 CM/SEC
BH CV XLRA MEAS LEFT PROX ICA PSV: -48.2 CM/SEC
BH CV XLRA MEAS LEFT PROX SCLA PSV: -117 CM/SEC
BH CV XLRA MEAS LEFT VERTEBRAL A PSV: -49.1 CM/SEC
BH CV XLRA MEAS RIGHT DIST CCA EDV: -12.6 CM/SEC
BH CV XLRA MEAS RIGHT DIST CCA PSV: -52.1 CM/SEC
BH CV XLRA MEAS RIGHT DIST ICA EDV: -13 CM/SEC
BH CV XLRA MEAS RIGHT DIST ICA PSV: -38.1 CM/SEC
BH CV XLRA MEAS RIGHT ICA/CCA RATIO: 1
BH CV XLRA MEAS RIGHT PROX CCA EDV: -11.5 CM/SEC
BH CV XLRA MEAS RIGHT PROX CCA PSV: -53.8 CM/SEC
BH CV XLRA MEAS RIGHT PROX ECA PSV: -120 CM/SEC
BH CV XLRA MEAS RIGHT PROX ICA EDV: -17.5 CM/SEC
BH CV XLRA MEAS RIGHT PROX ICA PSV: -54.6 CM/SEC
BH CV XLRA MEAS RIGHT PROX SCLA PSV: -120 CM/SEC
BH CV XLRA MEAS RIGHT VERTEBRAL A PSV: -47.7 CM/SEC
BUN SERPL-MCNC: 22 MG/DL (ref 8–23)
BUN SERPL-MCNC: 23 MG/DL (ref 8–23)
BUN/CREAT SERPL: 19 (ref 7–25)
BUN/CREAT SERPL: 19.5 (ref 7–25)
CA-I SERPL ISE-MCNC: 1.22 MMOL/L (ref 1.2–1.3)
CALCIUM SPEC-SCNC: 8.9 MG/DL (ref 8.6–10.5)
CALCIUM SPEC-SCNC: 8.9 MG/DL (ref 8.6–10.5)
CHLORIDE SERPL-SCNC: 96 MMOL/L (ref 98–107)
CHLORIDE SERPL-SCNC: 98 MMOL/L (ref 98–107)
CHOLEST SERPL-MCNC: 302 MG/DL (ref 0–200)
CK SERPL-CCNC: 57 U/L (ref 20–180)
CO2 SERPL-SCNC: 19 MMOL/L (ref 22–29)
CO2 SERPL-SCNC: 22 MMOL/L (ref 22–29)
CREAT SERPL-MCNC: 1.16 MG/DL (ref 0.57–1)
CREAT SERPL-MCNC: 1.18 MG/DL (ref 0.57–1)
D-LACTATE SERPL-SCNC: 1 MMOL/L (ref 0.5–2)
EGFRCR SERPLBLD CKD-EPI 2021: 50.1 ML/MIN/1.73
EGFRCR SERPLBLD CKD-EPI 2021: 51.1 ML/MIN/1.73
GLUCOSE BLDC GLUCOMTR-MCNC: 191 MG/DL (ref 70–105)
GLUCOSE BLDC GLUCOMTR-MCNC: 256 MG/DL (ref 70–105)
GLUCOSE BLDC GLUCOMTR-MCNC: 278 MG/DL (ref 70–105)
GLUCOSE BLDC GLUCOMTR-MCNC: 362 MG/DL (ref 70–105)
GLUCOSE BLDC GLUCOMTR-MCNC: 400 MG/DL (ref 70–105)
GLUCOSE BLDC GLUCOMTR-MCNC: 416 MG/DL (ref 70–105)
GLUCOSE BLDC GLUCOMTR-MCNC: 428 MG/DL (ref 70–105)
GLUCOSE SERPL-MCNC: 354 MG/DL (ref 65–99)
GLUCOSE SERPL-MCNC: 371 MG/DL (ref 65–99)
HBA1C MFR BLD: 14.6 % (ref 3.5–5.6)
HDLC SERPL-MCNC: 55 MG/DL (ref 40–60)
LDLC SERPL CALC-MCNC: 210 MG/DL (ref 0–100)
LDLC/HDLC SERPL: 3.79 {RATIO}
LV EF 2D ECHO EST: 60 %
MAGNESIUM SERPL-MCNC: 1.8 MG/DL (ref 1.6–2.4)
MAGNESIUM SERPL-MCNC: 1.9 MG/DL (ref 1.6–2.4)
MAXIMAL PREDICTED HEART RATE: 151 BPM
MAXIMAL PREDICTED HEART RATE: 151 BPM
NT-PROBNP SERPL-MCNC: 1275 PG/ML (ref 0–900)
POTASSIUM SERPL-SCNC: 3.4 MMOL/L (ref 3.5–5.2)
POTASSIUM SERPL-SCNC: 3.5 MMOL/L (ref 3.5–5.2)
PROCALCITONIN SERPL-MCNC: 0.14 NG/ML (ref 0–0.25)
SODIUM SERPL-SCNC: 135 MMOL/L (ref 136–145)
SODIUM SERPL-SCNC: 135 MMOL/L (ref 136–145)
STRESS TARGET HR: 128 BPM
STRESS TARGET HR: 128 BPM
TRIGL SERPL-MCNC: 192 MG/DL (ref 0–150)
TSH SERPL DL<=0.05 MIU/L-ACNC: 0.38 UIU/ML (ref 0.27–4.2)
VIT B12 BLD-MCNC: 1415 PG/ML (ref 211–946)
VLDLC SERPL-MCNC: 37 MG/DL (ref 5–40)

## 2022-09-26 PROCEDURE — 97161 PT EVAL LOW COMPLEX 20 MIN: CPT

## 2022-09-26 PROCEDURE — 80048 BASIC METABOLIC PNL TOTAL CA: CPT | Performed by: INTERNAL MEDICINE

## 2022-09-26 PROCEDURE — 82962 GLUCOSE BLOOD TEST: CPT

## 2022-09-26 PROCEDURE — 82607 VITAMIN B-12: CPT | Performed by: INTERNAL MEDICINE

## 2022-09-26 PROCEDURE — 25010000002 ENOXAPARIN PER 10 MG: Performed by: INTERNAL MEDICINE

## 2022-09-26 PROCEDURE — 97166 OT EVAL MOD COMPLEX 45 MIN: CPT

## 2022-09-26 PROCEDURE — 92523 SPEECH SOUND LANG COMPREHEN: CPT

## 2022-09-26 PROCEDURE — 93880 EXTRACRANIAL BILAT STUDY: CPT

## 2022-09-26 PROCEDURE — 84145 PROCALCITONIN (PCT): CPT | Performed by: INTERNAL MEDICINE

## 2022-09-26 PROCEDURE — 84443 ASSAY THYROID STIM HORMONE: CPT | Performed by: INTERNAL MEDICINE

## 2022-09-26 PROCEDURE — 97535 SELF CARE MNGMENT TRAINING: CPT

## 2022-09-26 PROCEDURE — 83880 ASSAY OF NATRIURETIC PEPTIDE: CPT | Performed by: INTERNAL MEDICINE

## 2022-09-26 PROCEDURE — 82550 ASSAY OF CK (CPK): CPT | Performed by: INTERNAL MEDICINE

## 2022-09-26 PROCEDURE — 83036 HEMOGLOBIN GLYCOSYLATED A1C: CPT | Performed by: INTERNAL MEDICINE

## 2022-09-26 PROCEDURE — G0108 DIAB MANAGE TRN  PER INDIV: HCPCS

## 2022-09-26 PROCEDURE — 93306 TTE W/DOPPLER COMPLETE: CPT

## 2022-09-26 PROCEDURE — 82330 ASSAY OF CALCIUM: CPT | Performed by: INTERNAL MEDICINE

## 2022-09-26 PROCEDURE — 92610 EVALUATE SWALLOWING FUNCTION: CPT

## 2022-09-26 PROCEDURE — 83605 ASSAY OF LACTIC ACID: CPT | Performed by: INTERNAL MEDICINE

## 2022-09-26 PROCEDURE — 83735 ASSAY OF MAGNESIUM: CPT | Performed by: INTERNAL MEDICINE

## 2022-09-26 PROCEDURE — 93306 TTE W/DOPPLER COMPLETE: CPT | Performed by: INTERNAL MEDICINE

## 2022-09-26 PROCEDURE — 63710000001 INSULIN GLARGINE PER 5 UNITS: Performed by: INTERNAL MEDICINE

## 2022-09-26 PROCEDURE — 80061 LIPID PANEL: CPT | Performed by: INTERNAL MEDICINE

## 2022-09-26 PROCEDURE — 99233 SBSQ HOSP IP/OBS HIGH 50: CPT | Performed by: PSYCHIATRY & NEUROLOGY

## 2022-09-26 PROCEDURE — 63710000001 INSULIN LISPRO (HUMAN) PER 5 UNITS: Performed by: INTERNAL MEDICINE

## 2022-09-26 RX ORDER — ACETAMINOPHEN 325 MG/1
650 TABLET ORAL EVERY 6 HOURS PRN
Status: DISCONTINUED | OUTPATIENT
Start: 2022-09-26 | End: 2022-09-27 | Stop reason: HOSPADM

## 2022-09-26 RX ORDER — DULOXETIN HYDROCHLORIDE 60 MG/1
60 CAPSULE, DELAYED RELEASE ORAL DAILY
COMMUNITY

## 2022-09-26 RX ORDER — POTASSIUM CHLORIDE 20 MEQ/1
40 TABLET, EXTENDED RELEASE ORAL DAILY
Status: DISCONTINUED | OUTPATIENT
Start: 2022-09-26 | End: 2022-09-27 | Stop reason: HOSPADM

## 2022-09-26 RX ORDER — LOSARTAN POTASSIUM 50 MG/1
50 TABLET ORAL
Status: DISCONTINUED | OUTPATIENT
Start: 2022-09-26 | End: 2022-09-27 | Stop reason: HOSPADM

## 2022-09-26 RX ORDER — AMLODIPINE BESYLATE 5 MG/1
10 TABLET ORAL DAILY
Status: DISCONTINUED | OUTPATIENT
Start: 2022-09-27 | End: 2022-09-27 | Stop reason: HOSPADM

## 2022-09-26 RX ORDER — AMLODIPINE BESYLATE 5 MG/1
5 TABLET ORAL DAILY
COMMUNITY
End: 2022-09-27 | Stop reason: HOSPADM

## 2022-09-26 RX ORDER — DOXYCYCLINE HYCLATE 100 MG
100 TABLET ORAL 2 TIMES DAILY
COMMUNITY
Start: 2022-09-22 | End: 2022-09-28

## 2022-09-26 RX ORDER — AMLODIPINE BESYLATE 5 MG/1
5 TABLET ORAL DAILY
Status: DISCONTINUED | OUTPATIENT
Start: 2022-09-26 | End: 2022-09-26

## 2022-09-26 RX ORDER — DULOXETIN HYDROCHLORIDE 30 MG/1
60 CAPSULE, DELAYED RELEASE ORAL DAILY
Status: DISCONTINUED | OUTPATIENT
Start: 2022-09-26 | End: 2022-09-27 | Stop reason: HOSPADM

## 2022-09-26 RX ORDER — DOXYCYCLINE 100 MG/1
100 TABLET ORAL EVERY 12 HOURS SCHEDULED
Status: DISCONTINUED | OUTPATIENT
Start: 2022-09-26 | End: 2022-09-27 | Stop reason: HOSPADM

## 2022-09-26 RX ADMIN — ENOXAPARIN SODIUM 40 MG: 100 INJECTION SUBCUTANEOUS at 15:23

## 2022-09-26 RX ADMIN — AMLODIPINE BESYLATE 5 MG: 5 TABLET ORAL at 11:11

## 2022-09-26 RX ADMIN — INSULIN LISPRO 6 UNITS: 100 INJECTION, SOLUTION INTRAVENOUS; SUBCUTANEOUS at 05:43

## 2022-09-26 RX ADMIN — DULOXETINE HYDROCHLORIDE 60 MG: 30 CAPSULE, DELAYED RELEASE ORAL at 11:11

## 2022-09-26 RX ADMIN — INSULIN LISPRO 8 UNITS: 100 INJECTION, SOLUTION INTRAVENOUS; SUBCUTANEOUS at 18:07

## 2022-09-26 RX ADMIN — SENNOSIDES AND DOCUSATE SODIUM 2 TABLET: 50; 8.6 TABLET ORAL at 11:11

## 2022-09-26 RX ADMIN — INSULIN LISPRO 8 UNITS: 100 INJECTION, SOLUTION INTRAVENOUS; SUBCUTANEOUS at 00:46

## 2022-09-26 RX ADMIN — ASPIRIN 81 MG: 81 TABLET, COATED ORAL at 11:11

## 2022-09-26 RX ADMIN — Medication 10 ML: at 20:33

## 2022-09-26 RX ADMIN — DOXYCYCLINE 100 MG: 100 TABLET ORAL at 11:11

## 2022-09-26 RX ADMIN — ACETAMINOPHEN 650 MG: 325 TABLET, FILM COATED ORAL at 13:04

## 2022-09-26 RX ADMIN — INSULIN LISPRO 9 UNITS: 100 INJECTION, SOLUTION INTRAVENOUS; SUBCUTANEOUS at 20:32

## 2022-09-26 RX ADMIN — INSULIN LISPRO 6 UNITS: 100 INJECTION, SOLUTION INTRAVENOUS; SUBCUTANEOUS at 12:54

## 2022-09-26 RX ADMIN — DOXYCYCLINE 100 MG: 100 TABLET ORAL at 20:32

## 2022-09-26 RX ADMIN — HYDROCODONE BITARTRATE AND ACETAMINOPHEN 1 TABLET: 5; 325 TABLET ORAL at 20:34

## 2022-09-26 RX ADMIN — POTASSIUM CHLORIDE 40 MEQ: 1500 TABLET, EXTENDED RELEASE ORAL at 11:14

## 2022-09-26 RX ADMIN — SENNOSIDES AND DOCUSATE SODIUM 2 TABLET: 50; 8.6 TABLET ORAL at 20:32

## 2022-09-26 RX ADMIN — LOSARTAN POTASSIUM 50 MG: 50 TABLET, FILM COATED ORAL at 14:43

## 2022-09-26 RX ADMIN — Medication 10 ML: at 13:04

## 2022-09-26 RX ADMIN — INSULIN GLARGINE 20 UNITS: 100 INJECTION, SOLUTION SUBCUTANEOUS at 20:33

## 2022-09-27 VITALS
SYSTOLIC BLOOD PRESSURE: 123 MMHG | TEMPERATURE: 98.3 F | RESPIRATION RATE: 12 BRPM | OXYGEN SATURATION: 93 % | BODY MASS INDEX: 31.89 KG/M2 | WEIGHT: 180 LBS | HEIGHT: 63 IN | DIASTOLIC BLOOD PRESSURE: 57 MMHG | HEART RATE: 91 BPM

## 2022-09-27 LAB
ANION GAP SERPL CALCULATED.3IONS-SCNC: 10 MMOL/L (ref 5–15)
BUN SERPL-MCNC: 27 MG/DL (ref 8–23)
BUN/CREAT SERPL: 25 (ref 7–25)
CALCIUM SPEC-SCNC: 8.8 MG/DL (ref 8.6–10.5)
CHLORIDE SERPL-SCNC: 98 MMOL/L (ref 98–107)
CO2 SERPL-SCNC: 26 MMOL/L (ref 22–29)
CREAT SERPL-MCNC: 1.08 MG/DL (ref 0.57–1)
EGFRCR SERPLBLD CKD-EPI 2021: 55.7 ML/MIN/1.73
GLUCOSE BLDC GLUCOMTR-MCNC: 238 MG/DL (ref 70–105)
GLUCOSE BLDC GLUCOMTR-MCNC: 259 MG/DL (ref 70–105)
GLUCOSE BLDC GLUCOMTR-MCNC: 290 MG/DL (ref 70–105)
GLUCOSE BLDC GLUCOMTR-MCNC: 368 MG/DL (ref 70–105)
GLUCOSE SERPL-MCNC: 231 MG/DL (ref 65–99)
MAGNESIUM SERPL-MCNC: 1.7 MG/DL (ref 1.6–2.4)
POTASSIUM SERPL-SCNC: 3.6 MMOL/L (ref 3.5–5.2)
QT INTERVAL: 348 MS
SODIUM SERPL-SCNC: 134 MMOL/L (ref 136–145)

## 2022-09-27 PROCEDURE — 99221 1ST HOSP IP/OBS SF/LOW 40: CPT | Performed by: INTERNAL MEDICINE

## 2022-09-27 PROCEDURE — 82962 GLUCOSE BLOOD TEST: CPT

## 2022-09-27 PROCEDURE — 80048 BASIC METABOLIC PNL TOTAL CA: CPT | Performed by: INTERNAL MEDICINE

## 2022-09-27 PROCEDURE — 63710000001 INSULIN LISPRO (HUMAN) PER 5 UNITS: Performed by: INTERNAL MEDICINE

## 2022-09-27 PROCEDURE — 25010000002 ONDANSETRON PER 1 MG: Performed by: INTERNAL MEDICINE

## 2022-09-27 PROCEDURE — 84244 ASSAY OF RENIN: CPT | Performed by: INTERNAL MEDICINE

## 2022-09-27 PROCEDURE — 97530 THERAPEUTIC ACTIVITIES: CPT

## 2022-09-27 PROCEDURE — 83735 ASSAY OF MAGNESIUM: CPT | Performed by: INTERNAL MEDICINE

## 2022-09-27 PROCEDURE — 82088 ASSAY OF ALDOSTERONE: CPT | Performed by: INTERNAL MEDICINE

## 2022-09-27 PROCEDURE — 99233 SBSQ HOSP IP/OBS HIGH 50: CPT | Performed by: PSYCHIATRY & NEUROLOGY

## 2022-09-27 RX ORDER — ATORVASTATIN CALCIUM 40 MG/1
80 TABLET, FILM COATED ORAL NIGHTLY
Status: DISCONTINUED | OUTPATIENT
Start: 2022-09-27 | End: 2022-09-27 | Stop reason: HOSPADM

## 2022-09-27 RX ORDER — LANCETS 30 GAUGE
1 EACH MISCELLANEOUS
Qty: 100 EACH | Refills: 2 | Status: SHIPPED | OUTPATIENT
Start: 2022-09-27

## 2022-09-27 RX ORDER — PEN NEEDLE, DIABETIC 30 GX3/16"
1 NEEDLE, DISPOSABLE MISCELLANEOUS NIGHTLY
Qty: 100 EACH | Refills: 2 | Status: SHIPPED | OUTPATIENT
Start: 2022-09-27

## 2022-09-27 RX ORDER — BISACODYL 5 MG/1
5 TABLET, DELAYED RELEASE ORAL DAILY PRN
Qty: 30 TABLET | Refills: 0
Start: 2022-09-27

## 2022-09-27 RX ORDER — AMLODIPINE BESYLATE 10 MG/1
10 TABLET ORAL DAILY
Qty: 30 TABLET | Refills: 0 | Status: SHIPPED | OUTPATIENT
Start: 2022-09-28

## 2022-09-27 RX ORDER — ISOPROPYL ALCOHOL 700 MG/ML
1 CLOTH TOPICAL
Qty: 100 EACH | Refills: 2 | Status: SHIPPED | OUTPATIENT
Start: 2022-09-27

## 2022-09-27 RX ORDER — CLOPIDOGREL BISULFATE 75 MG/1
75 TABLET ORAL DAILY
Status: DISCONTINUED | OUTPATIENT
Start: 2022-09-27 | End: 2022-09-27 | Stop reason: HOSPADM

## 2022-09-27 RX ORDER — BISACODYL 10 MG
10 SUPPOSITORY, RECTAL RECTAL DAILY PRN
Qty: 30 EACH | Refills: 0
Start: 2022-09-27

## 2022-09-27 RX ORDER — INSULIN LISPRO 100 [IU]/ML
0-9 INJECTION, SOLUTION INTRAVENOUS; SUBCUTANEOUS EVERY 6 HOURS SCHEDULED
Qty: 10 ML | Refills: 12
Start: 2022-09-27

## 2022-09-27 RX ORDER — LOSARTAN POTASSIUM 50 MG/1
50 TABLET ORAL
Qty: 30 TABLET | Refills: 0
Start: 2022-09-28

## 2022-09-27 RX ORDER — METOPROLOL SUCCINATE 25 MG/1
25 TABLET, EXTENDED RELEASE ORAL
Status: DISCONTINUED | OUTPATIENT
Start: 2022-09-27 | End: 2022-09-27 | Stop reason: HOSPADM

## 2022-09-27 RX ORDER — METOPROLOL SUCCINATE 25 MG/1
25 TABLET, EXTENDED RELEASE ORAL
Qty: 30 TABLET | Refills: 0
Start: 2022-09-28

## 2022-09-27 RX ORDER — AMOXICILLIN 250 MG
2 CAPSULE ORAL 2 TIMES DAILY
Qty: 30 TABLET | Refills: 0
Start: 2022-09-27

## 2022-09-27 RX ORDER — ATORVASTATIN CALCIUM 80 MG/1
80 TABLET, FILM COATED ORAL NIGHTLY
Qty: 90 TABLET | Refills: 3 | Status: SHIPPED | OUTPATIENT
Start: 2022-09-27 | End: 2022-10-25 | Stop reason: SDUPTHER

## 2022-09-27 RX ORDER — POLYETHYLENE GLYCOL 3350 17 G/17G
17 POWDER, FOR SOLUTION ORAL DAILY PRN
Qty: 30 EACH | Refills: 0 | Status: SHIPPED | OUTPATIENT
Start: 2022-09-27

## 2022-09-27 RX ORDER — CLOPIDOGREL BISULFATE 75 MG/1
75 TABLET ORAL DAILY
Qty: 30 TABLET | Refills: 4
Start: 2022-09-28

## 2022-09-27 RX ADMIN — INSULIN LISPRO 4 UNITS: 100 INJECTION, SOLUTION INTRAVENOUS; SUBCUTANEOUS at 08:19

## 2022-09-27 RX ADMIN — CLOPIDOGREL BISULFATE 75 MG: 75 TABLET ORAL at 17:19

## 2022-09-27 RX ADMIN — METOPROLOL SUCCINATE 25 MG: 25 TABLET, FILM COATED, EXTENDED RELEASE ORAL at 15:17

## 2022-09-27 RX ADMIN — ONDANSETRON 4 MG: 2 INJECTION INTRAMUSCULAR; INTRAVENOUS at 08:46

## 2022-09-27 RX ADMIN — INSULIN LISPRO 6 UNITS: 100 INJECTION, SOLUTION INTRAVENOUS; SUBCUTANEOUS at 11:47

## 2022-09-27 RX ADMIN — LOSARTAN POTASSIUM 50 MG: 50 TABLET, FILM COATED ORAL at 08:18

## 2022-09-27 RX ADMIN — POTASSIUM CHLORIDE 40 MEQ: 1500 TABLET, EXTENDED RELEASE ORAL at 08:18

## 2022-09-27 RX ADMIN — DULOXETINE HYDROCHLORIDE 60 MG: 30 CAPSULE, DELAYED RELEASE ORAL at 08:18

## 2022-09-27 RX ADMIN — INSULIN LISPRO 6 UNITS: 100 INJECTION, SOLUTION INTRAVENOUS; SUBCUTANEOUS at 00:29

## 2022-09-27 RX ADMIN — AMLODIPINE BESYLATE 10 MG: 5 TABLET ORAL at 08:18

## 2022-09-27 RX ADMIN — DOXYCYCLINE 100 MG: 100 TABLET ORAL at 08:18

## 2022-09-27 RX ADMIN — Medication 10 ML: at 08:19

## 2022-09-27 RX ADMIN — ASPIRIN 81 MG: 81 TABLET, COATED ORAL at 08:18

## 2022-09-27 RX ADMIN — HYDROCODONE BITARTRATE AND ACETAMINOPHEN 1 TABLET: 5; 325 TABLET ORAL at 11:52

## 2022-09-27 RX ADMIN — INSULIN LISPRO 8 UNITS: 100 INJECTION, SOLUTION INTRAVENOUS; SUBCUTANEOUS at 17:19

## 2022-09-27 RX ADMIN — HYDROCODONE BITARTRATE AND ACETAMINOPHEN 1 TABLET: 5; 325 TABLET ORAL at 00:29

## 2022-09-27 RX ADMIN — SENNOSIDES AND DOCUSATE SODIUM 2 TABLET: 50; 8.6 TABLET ORAL at 08:18

## 2022-10-03 LAB
ALDOST SERPL-MCNC: 7.3 NG/DL (ref 0–30)
ALDOST/RENIN PLAS-RTO: 4.8 {RATIO} (ref 0–30)
RENIN PLAS-CCNC: 1.51 NG/ML/HR (ref 0.17–5.38)

## 2022-10-04 ENCOUNTER — APPOINTMENT (OUTPATIENT)
Dept: CARDIOLOGY | Facility: HOSPITAL | Age: 67
End: 2022-10-04

## 2022-10-09 NOTE — PROGRESS NOTES
Enter Query Response Below      Query Response:     No  Electronically signed by Lasha Galindo MD, 10/09/22, 7:11 PM EDT.          If applicable, please update the problem list.        Patient: Marge Mcghee        : 1955  Account: [id number]           Admit Date: 2022        Options to Respond to Query:    1. Access the Encounter     a. From the To-Do Side bar, click Respond With Note.     b. Click New Note     c. Answer query within the yellow box.                d. Update the Problem List if applicable.     Dr. Galindo:     67 Year old female admitted with altered mental status, acute CVA  History :  diabetes, hypertension  Clinical indicators: cr 1.08 (), 1.18 , 1.16 (), 1.07 ()  H&P, progress notes, summary include REINA  Treatments/monitoring- Daily bmp monitoring    After study, was REINA clinically supported during this admission?  Yes, please include additional clinical indicators:____________  No  Other- specify________  Unable to determine       By submitting this query, we are merely seeking further clarification of documentation to accurately reflect all conditions that you are monitoring, evaluating, treating or that extend the hospitalization or utilize additional resources of care. Please utilize your independent clinical judgment when addressing the question(s) above.     This query and your response, once completed, will be entered into the legal medical record.    Sincerely,  [email or web address]  Clinical Documentation Integrity Program

## 2022-10-10 ENCOUNTER — ANESTHESIA EVENT (OUTPATIENT)
Dept: CARDIOLOGY | Facility: HOSPITAL | Age: 67
End: 2022-10-10

## 2022-10-10 RX ORDER — SODIUM CHLORIDE 0.9 % (FLUSH) 0.9 %
10 SYRINGE (ML) INJECTION EVERY 12 HOURS SCHEDULED
Status: CANCELLED | OUTPATIENT
Start: 2022-10-10

## 2022-10-10 RX ORDER — SODIUM CHLORIDE 9 MG/ML
9 INJECTION, SOLUTION INTRAVENOUS CONTINUOUS PRN
Status: CANCELLED | OUTPATIENT
Start: 2022-10-10

## 2022-10-10 RX ORDER — SODIUM CHLORIDE 0.9 % (FLUSH) 0.9 %
10 SYRINGE (ML) INJECTION AS NEEDED
Status: CANCELLED | OUTPATIENT
Start: 2022-10-10

## 2022-10-11 ENCOUNTER — HOSPITAL ENCOUNTER (OUTPATIENT)
Dept: CARDIOLOGY | Facility: HOSPITAL | Age: 67
Discharge: HOME | End: 2022-10-11

## 2022-10-11 ENCOUNTER — ANESTHESIA (OUTPATIENT)
Dept: CARDIOLOGY | Facility: HOSPITAL | Age: 67
End: 2022-10-11

## 2022-10-11 VITALS
TEMPERATURE: 98.2 F | RESPIRATION RATE: 16 BRPM | BODY MASS INDEX: 30.22 KG/M2 | OXYGEN SATURATION: 93 % | DIASTOLIC BLOOD PRESSURE: 63 MMHG | SYSTOLIC BLOOD PRESSURE: 138 MMHG | HEIGHT: 64 IN | HEART RATE: 80 BPM | WEIGHT: 177 LBS

## 2022-10-11 VITALS — OXYGEN SATURATION: 100 % | DIASTOLIC BLOOD PRESSURE: 54 MMHG | SYSTOLIC BLOOD PRESSURE: 102 MMHG

## 2022-10-11 DIAGNOSIS — I63.9 CEREBROVASCULAR ACCIDENT (CVA), UNSPECIFIED MECHANISM: ICD-10-CM

## 2022-10-11 PROCEDURE — 93312 ECHO TRANSESOPHAGEAL: CPT | Performed by: INTERNAL MEDICINE

## 2022-10-11 PROCEDURE — 93320 DOPPLER ECHO COMPLETE: CPT

## 2022-10-11 PROCEDURE — 93312 ECHO TRANSESOPHAGEAL: CPT

## 2022-10-11 PROCEDURE — 93325 DOPPLER ECHO COLOR FLOW MAPG: CPT | Performed by: INTERNAL MEDICINE

## 2022-10-11 PROCEDURE — 25010000002 PROPOFOL 10 MG/ML EMULSION: Performed by: ANESTHESIOLOGY

## 2022-10-11 PROCEDURE — 93320 DOPPLER ECHO COMPLETE: CPT | Performed by: INTERNAL MEDICINE

## 2022-10-11 PROCEDURE — 93325 DOPPLER ECHO COLOR FLOW MAPG: CPT

## 2022-10-11 RX ORDER — PROPOFOL 10 MG/ML
VIAL (ML) INTRAVENOUS AS NEEDED
Status: DISCONTINUED | OUTPATIENT
Start: 2022-10-11 | End: 2022-10-11 | Stop reason: SURG

## 2022-10-11 RX ORDER — SODIUM CHLORIDE 9 MG/ML
INJECTION, SOLUTION INTRAVENOUS CONTINUOUS PRN
Status: DISCONTINUED | OUTPATIENT
Start: 2022-10-11 | End: 2022-10-11 | Stop reason: SURG

## 2022-10-11 RX ADMIN — PROPOFOL 200 MG: 10 INJECTION, EMULSION INTRAVENOUS at 11:21

## 2022-10-11 RX ADMIN — SODIUM CHLORIDE: 0.9 INJECTION, SOLUTION INTRAVENOUS at 11:19

## 2022-10-11 NOTE — ANESTHESIA PREPROCEDURE EVALUATION
Anesthesia Evaluation     Patient summary reviewed and Nursing notes reviewed   NPO Solid Status: > 8 hours  NPO Liquid Status: > 8 hours           Airway   Mallampati: II  TM distance: >3 FB  Neck ROM: full  No difficulty expected  Dental - normal exam     Pulmonary - normal exam   Cardiovascular - normal exam    ECG reviewed    (+) hypertension, CABG, hyperlipidemia,       Neuro/Psych  (+) headaches, numbness,    GI/Hepatic/Renal/Endo    (+) obesity,  GERD,  liver disease, renal disease, diabetes mellitus, thyroid problem hypothyroidism    Musculoskeletal     (+) neck pain,   Abdominal  - normal exam    Bowel sounds: normal.   Substance History      OB/GYN          Other   arthritis,    history of cancer    ROS/Med Hx Other: Impression  Structurally and functionally normal cardiac valves.  Left ventricular size and contractility is normal with ejection fraction of 60%.  Bubble study is negative for PFO.      Sinus tachycardia  Inferior infarct, old  Consider anteroseptal infarct  When compared with ECG of 05-May-2021 18:28:10,  Significant rate increase  New or worsened ischemia or infarction    FINDINGS:  The heart is mildly enlarged with postsurgical changes apparent. The  pulmonary vascular markings are normal. The lungs and pleural spaces are  clear.  The bony thorax is normal for age.     IMPRESSION:     1. Mild cardiomegaly.  2. No active pulmonary disease.                    Anesthesia Plan    ASA 3     MAC     intravenous induction     Anesthetic plan, risks, benefits, and alternatives have been provided, discussed and informed consent has been obtained with: patient.        CODE STATUS:

## 2022-10-11 NOTE — ANESTHESIA POSTPROCEDURE EVALUATION
Patient: Marge Mcghee    Procedure Summary     Date: 10/11/22 Room / Location: [facility]    Anesthesia Start: 1119 Anesthesia Stop: 1136    Procedure: ADULT TRANSESOPHAGEAL ECHO (EMILIA) W/ CONT IF NECESSARY PER PROTOCOL Diagnosis:       Cerebrovascular accident (CVA), unspecified mechanism (HCC)      (Cardiac Source of Emboli)    Scheduled Providers: Chacho Esteban MD Provider: Maverick Shannon MD    Anesthesia Type: MAC ASA Status: 3          Anesthesia Type: MAC    Vitals  Vitals Value Taken Time   /62 10/11/22 1316   Temp     Pulse 85 10/11/22 1318   Resp 16 10/11/22 1136   SpO2 98 % 10/11/22 1317   Vitals shown include unvalidated device data.        Post Anesthesia Care and Evaluation    Patient location during evaluation: PACU  Patient participation: complete - patient cannot participate  Level of consciousness: responsive to light touch, responsive to physical stimuli and responsive to verbal stimuli  Pain score: 0  Pain management: adequate    Airway patency: patent  Anesthetic complications: No anesthetic complications  PONV Status: controlled  Cardiovascular status: acceptable and hemodynamically stable  Respiratory status: acceptable  Hydration status: acceptable    Comments: Satisfactory progress.Patient seen and examined postoperatively; vital signs stable; SpO2 greater than or equal to 90%; cardiopulmonary status stable; nausea/vomiting adequately controlled; pain adequately controlled; no apparent anesthesia complications; patient discharged from anesthesia care when discharge criteria were met

## 2022-10-18 LAB
MAXIMAL PREDICTED HEART RATE: 151 BPM
STRESS TARGET HR: 128 BPM

## 2022-10-24 ENCOUNTER — TELEPHONE (OUTPATIENT)
Dept: DIABETES SERVICES | Facility: HOSPITAL | Age: 67
End: 2022-10-24

## 2022-10-24 NOTE — TELEPHONE ENCOUNTER
Pt states she has been discharged from rehab and was discharged on Lantus 25 units at . She states she received insulin pen device. She was not discharged on mealtime or sliding scale insulin. Pt states she has not been checking her bs because bs meter is broke. Discussed she needs to contact PCP to have office send in rx for meter and test supplies. Discussed importance of routine bs checks. Pt verbalized understanding. She states has no additional questions at this time.

## 2022-10-25 ENCOUNTER — OFFICE VISIT (OUTPATIENT)
Dept: CARDIOLOGY | Facility: CLINIC | Age: 67
End: 2022-10-25

## 2022-10-25 VITALS
SYSTOLIC BLOOD PRESSURE: 142 MMHG | RESPIRATION RATE: 18 BRPM | OXYGEN SATURATION: 97 % | HEIGHT: 64 IN | DIASTOLIC BLOOD PRESSURE: 96 MMHG | WEIGHT: 176 LBS | BODY MASS INDEX: 30.05 KG/M2 | HEART RATE: 87 BPM

## 2022-10-25 DIAGNOSIS — E78.2 MIXED HYPERLIPIDEMIA: ICD-10-CM

## 2022-10-25 DIAGNOSIS — I10 ESSENTIAL HYPERTENSION: ICD-10-CM

## 2022-10-25 DIAGNOSIS — Z95.1 S/P CABG X 3: ICD-10-CM

## 2022-10-25 DIAGNOSIS — I25.810 CORONARY ARTERY DISEASE INVOLVING CORONARY BYPASS GRAFT OF NATIVE HEART WITHOUT ANGINA PECTORIS: ICD-10-CM

## 2022-10-25 DIAGNOSIS — I63.9 CEREBROVASCULAR ACCIDENT (CVA), UNSPECIFIED MECHANISM: Primary | ICD-10-CM

## 2022-10-25 PROCEDURE — 99213 OFFICE O/P EST LOW 20 MIN: CPT | Performed by: NURSE PRACTITIONER

## 2022-10-25 PROCEDURE — 93000 ELECTROCARDIOGRAM COMPLETE: CPT | Performed by: NURSE PRACTITIONER

## 2022-10-25 RX ORDER — FAMOTIDINE 20 MG/1
TABLET, FILM COATED ORAL
COMMUNITY
Start: 2022-10-10

## 2022-10-25 RX ORDER — MECLIZINE HYDROCHLORIDE 25 MG/1
TABLET ORAL
COMMUNITY
Start: 2022-10-10

## 2022-10-25 RX ORDER — INSULIN GLARGINE 100 [IU]/ML
25 INJECTION, SOLUTION SUBCUTANEOUS DAILY
COMMUNITY
Start: 2022-09-27

## 2022-10-25 RX ORDER — TRAZODONE HYDROCHLORIDE 100 MG/1
TABLET ORAL
COMMUNITY
Start: 2022-10-12

## 2022-10-25 RX ORDER — TRAMADOL HYDROCHLORIDE 50 MG/1
TABLET ORAL
COMMUNITY
Start: 2022-10-10

## 2022-10-25 RX ORDER — ATORVASTATIN CALCIUM 80 MG/1
80 TABLET, FILM COATED ORAL NIGHTLY
Qty: 90 TABLET | Refills: 3 | Status: SHIPPED | OUTPATIENT
Start: 2022-10-25

## 2022-12-07 ENCOUNTER — APPOINTMENT (OUTPATIENT)
Dept: GENERAL RADIOLOGY | Facility: HOSPITAL | Age: 67
End: 2022-12-07

## 2022-12-07 ENCOUNTER — HOSPITAL ENCOUNTER (EMERGENCY)
Facility: HOSPITAL | Age: 67
Discharge: HOME | End: 2022-12-07
Attending: EMERGENCY MEDICINE | Admitting: EMERGENCY MEDICINE

## 2022-12-07 VITALS
HEART RATE: 84 BPM | SYSTOLIC BLOOD PRESSURE: 144 MMHG | DIASTOLIC BLOOD PRESSURE: 84 MMHG | BODY MASS INDEX: 30.41 KG/M2 | WEIGHT: 178.13 LBS | RESPIRATION RATE: 12 BRPM | HEIGHT: 64 IN | OXYGEN SATURATION: 98 % | TEMPERATURE: 98.4 F

## 2022-12-07 DIAGNOSIS — J20.8 ACUTE BRONCHITIS DUE TO OTHER SPECIFIED ORGANISMS: ICD-10-CM

## 2022-12-07 DIAGNOSIS — J10.1 TYPE A INFLUENZA: Primary | ICD-10-CM

## 2022-12-07 LAB
FLUAV SUBTYP SPEC NAA+PROBE: DETECTED
FLUBV RNA ISLT QL NAA+PROBE: NOT DETECTED
SARS-COV-2 RNA PNL SPEC NAA+PROBE: NOT DETECTED

## 2022-12-07 PROCEDURE — 87502 INFLUENZA DNA AMP PROBE: CPT | Performed by: EMERGENCY MEDICINE

## 2022-12-07 PROCEDURE — 71045 X-RAY EXAM CHEST 1 VIEW: CPT

## 2022-12-07 PROCEDURE — 99283 EMERGENCY DEPT VISIT LOW MDM: CPT

## 2022-12-07 PROCEDURE — 93005 ELECTROCARDIOGRAM TRACING: CPT | Performed by: EMERGENCY MEDICINE

## 2022-12-07 PROCEDURE — 87635 SARS-COV-2 COVID-19 AMP PRB: CPT | Performed by: EMERGENCY MEDICINE

## 2022-12-07 RX ORDER — GUAIFENESIN AND CODEINE PHOSPHATE 100; 10 MG/5ML; MG/5ML
5 SOLUTION ORAL 4 TIMES DAILY PRN
Qty: 60 ML | Refills: 0 | Status: SHIPPED | OUTPATIENT
Start: 2022-12-07

## 2022-12-07 RX ORDER — ONDANSETRON 8 MG/1
8 TABLET, ORALLY DISINTEGRATING ORAL EVERY 8 HOURS PRN
Qty: 10 TABLET | Refills: 0 | Status: SHIPPED | OUTPATIENT
Start: 2022-12-07

## 2022-12-07 RX ORDER — OSELTAMIVIR PHOSPHATE 75 MG/1
75 CAPSULE ORAL 2 TIMES DAILY
Qty: 10 CAPSULE | Refills: 0 | Status: SHIPPED | OUTPATIENT
Start: 2022-12-07

## 2022-12-07 NOTE — DISCHARGE INSTRUCTIONS
Rest, no exertion or work, next 3 days  Tylenol as needed for fever and discomfort  Plenty of fluids  Medication as directed  Follow-up with primary care provider or return for worsening symptoms

## 2022-12-07 NOTE — ED PROVIDER NOTES
Subjective   History of Present Illness  67-year-old female complaining of cough starting today.  She states that she has some muscle aches and pains last night.  She states that she has had subjective fever as well as shaking chills today.  She states her cough has been extensive but nonproductive.  She reports no joint swelling.  She denies orthopnea or lower extremity swelling.  The patient reports no recent night sweats or hemoptysis.  She states she has been vaccinated for COVID and influenza    Patient reports some pleuritic chest discomfort.  It does not radiate from the chest    Review of Systems   Constitutional: Positive for chills, fatigue and fever.   HENT: Positive for sinus pressure. Negative for sore throat, trouble swallowing and voice change.    Eyes: Negative for discharge.   Respiratory: Positive for cough, chest tightness and shortness of breath. Negative for wheezing and stridor.    Cardiovascular: Negative for palpitations and leg swelling.   Gastrointestinal: Negative for nausea.   Genitourinary: Negative for flank pain and pelvic pain.   Musculoskeletal: Positive for arthralgias and myalgias.   All other systems reviewed and are negative.      Past Medical History:   Diagnosis Date   • Adrenal nodule (HCC) 11/16/2016    FINDINGS: Mild hepatic steatosis may be present. Cholecystectomy. No biliary ductal dilatation. Negative pancreas, spleen, left adrenal gland, and kidneys. The right adrenal presumed adenoma has increased slightly, measuring 3 x 4 cm in diameter,  compared to 2 x 3.5 cm on the previous exam. The attenuation values are consistent with an adenoma. Done at [facility] in August 2018   • Age-related osteoporosis without current pathological fracture 11/16/2016   • Arthritis    • Delayed surgical wound healing    • Essential hypertension 11/16/2016   • Gastroesophageal reflux disease with esophagitis 11/16/2016   • Hepatic steatosis 11/16/2016   • History of anemia    •  History of sepsis     FROM UTI   • Hyperlipidemia 11/16/2016   • Lisfranc's dislocation     LEFT WITH FRACTURES NONHEALING FOOT   • Osteomyelitis of left foot (HCC) 11/2020   • RLS (restless legs syndrome)    • Squamous cell skin cancer 2014    Excised by Dr. James   • Thyroid nodule 10/18/2019    BENIGN   • Type 2 diabetes mellitus with peripheral neuropathy (HCC) 11/16/2016   • Vitamin D deficiency 1/25/2019       Allergies   Allergen Reactions   • Zofran [Ondansetron Hcl] Nausea And Vomiting     Does the opposite of its purpose   • Prochlorperazine Other (See Comments)     CAUSED SEIZURE   • Prochlorperazine Edisylate Hives   • Prochlorperazine Maleate Irritability   • Hydralazine Itching and Rash   • Hydralazine Hcl Itching and Rash   • Meperidine Itching and Swelling   • Prochlorperazine Maleate Other (See Comments)     CAUSES SEIZURE       Past Surgical History:   Procedure Laterality Date   • BREAST BIOPSY Left     7/2019. BENIGN   • CARDIAC CATHETERIZATION Left 5/2/2021    Procedure: Cardiac Catheterization/Vascular Study;  Surgeon: Chacho Esteban MD;  Location: [facility] CATH INVASIVE LOCATION;  Service: Cardiovascular;  Laterality: Left;   • CARDIAC CATHETERIZATION N/A 5/2/2021    Procedure: Intra-Aortic Baloon Pump Insertion;  Surgeon: Chacho Esteban MD;  Location: [facility] CATH INVASIVE LOCATION;  Service: Cardiovascular;  Laterality: N/A;   • CATARACT EXTRACTION WITH INTRAOCULAR LENS IMPLANT Bilateral    • CHOLECYSTECTOMY     • COLONOSCOPY     • CORONARY ARTERY BYPASS GRAFT N/A 5/2/2021    Procedure: CORONARY ARTERY BYPASS WITH INTERNAL MAMMARY ARTERY GRAFT;  Surgeon: Jr Rocael Alexander MD;  Location: [facility];  Service: Cardiothoracic;  Laterality: N/A;   • FOOT FUSION Right 11/13/2020    Procedure: RIGHT OPEN TREATMENT LISFRANC INJURY, OPEN REDUCTION INTERNAL FIXATION MEDIAL/MIDDLE CUNEIFORM FRACTURE AND 2ND/3RD METATARSAL FRACTURE 1ST 2ND POSSIBLE 3RD TARSOMETATARSAL ARTHRODESIS INTERCUNEIFORM  ARTHRODESIS CALCANEAL BONE GRAFT;  Surgeon: Mikhail Banks Jr., MD;  Location:  [facility];  Service: Orthopedics;  Laterality: Right;   • INCISION AND DRAINAGE LEG Right 1/8/2021    Procedure: RIGHT IRRIGATION AND DEBRIDEMENT OF FOOT WITH SECONDARY CLOSURE AND HARDWARE REMOVAL;  Surgeon: Mikhail Banks Jr., MD;  Location: [facility] MAIN OR;  Service: Orthopedics;  Laterality: Right;   • KNEE ARTHROSCOPY Bilateral    • TOTAL ABDOMINAL HYSTERECTOMY     • TRANSESOPHAGEAL ECHOCARDIOGRAM (EMILIA) N/A 5/2/2021    Procedure: TRANSESOPHAGEAL ECHOCARDIOGRAM;  Surgeon: Jr Rocael Alexander MD;  Location: [facility];  Service: Cardiothoracic;  Laterality: N/A;   • UPPER GASTROINTESTINAL ENDOSCOPY  08/2016   • US GUIDED FINE NEEDLE ASPIRATION  5/8/2020       Family History   Problem Relation Age of Onset   • Diabetes Mother    • COPD Mother    • Hypertension Mother    • Kidney disease Mother    • Obesity Mother    • Thyroid disease Mother    • Diabetes Sister    • Diabetes Sister    • Diabetes Sister    • Diabetes Sister    • Malig Hyperthermia Neg Hx        Social History     Socioeconomic History   • Marital status:    Tobacco Use   • Smoking status: Never   • Smokeless tobacco: Never   Vaping Use   • Vaping Use: Never used   Substance and Sexual Activity   • Alcohol use: Yes     Comment: socially    • Drug use: Never   • Sexual activity: Defer     Reports no unusual food water travel or activity states she is around a lot of children      Objective   Physical Exam  Alert Marcelino Coma Scale 15   HEENT: Pupils equal and reactive to light. Conjunctivae are not injected. Normal tympanic membranes. Oropharynx and nares are normal.   Neck: Supple. Midline trachea. No JVD. No goiter.   Chest: Extensive rhonchi are noted bilaterally and equal breath sounds bilaterally, regular rate and rhythm without murmur or rub.   Abdomen: Positive bowel sounds, nontender, nondistended. No rebound or peritoneal signs. No CVA tenderness.    Extremities no clubbing. cyanosis or edema. Motor sensory exam is normal. The full range of motion is intact   Skin: Warm and dry, no rashes or petechia.   Lymphatic: No regional lymphadenopathy. No calf pain, swelling or Homans sign    Procedures           ED Course            Labs Reviewed   INFLUENZA ANTIGEN, RAPID - Abnormal; Notable for the following components:       Result Value    Influenza A PCR Detected (*)     All other components within normal limits   COVID-19,CEPHEID/ROSA,COR/NILA/PAD/AMADA/MAD IN-HOUSE,NP SWAB IN TRANSPORT MEDIA 3-4 HR TAT, RT-PCR - Normal    Narrative:     Fact sheet for providers: https://www.fda.gov/media/095403/download     Fact sheet for patients: https://www.fda.gov/media/979840/download  Fact sheet for providers: https://www.fda.gov/media/488172/download    Fact sheet for patients: https://www.[employer].gov/media/129998/download    Test performed by PCR.     Medications - No data to display  XR Chest 1 View    Result Date: 12/7/2022  No active disease.  Electronically Signed By-Darci Okeefe MD On:12/7/2022 1:18 PM This report was finalized on [id number] by  Darci Okeefe MD.                                      MDM  Number of Diagnoses or Management Options     Amount and/or Complexity of Data Reviewed  Clinical lab tests: reviewed  Tests in the radiology section of CPT®: reviewed and ordered  Discuss the patient with other providers: yes  Independent visualization of images, tracings, or specimens: yes    Risk of Complications, Morbidity, and/or Mortality  Presenting problems: high  Diagnostic procedures: high  Management options: high  General comments: The patient will be placed on ondansetron, Tamiflu, Cheratussin.  She is encouraged to follow-up with the primary care provider.  The patient was stable at discharge and vocalized understanding of discharge instructions and warnings        Final diagnoses:   Type A influenza   Acute bronchitis due to other specified organisms        ED Disposition  ED Disposition     ED Disposition   Discharge    Condition   Stable    Comment   --             No follow-up provider specified.       Medication List      New Prescriptions    guaiFENesin-codeine 100-10 MG/5ML liquid  Commonly known as: GUAIFENESIN AC  Take 5 mL by mouth 4 (Four) Times a Day As Needed for Cough or Congestion (And chest discomfort).     ondansetron ODT 8 MG disintegrating tablet  Commonly known as: ZOFRAN-ODT  Place 1 tablet on the tongue Every 8 (Eight) Hours As Needed for Nausea or Vomiting.     oseltamivir 75 MG capsule  Commonly known as: TAMIFLU  Take 1 capsule by mouth 2 (Two) Times a Day.           Where to Get Your Medications      These medications were sent to [facility] [id number] - [place], [place] - [address] - [phone] [facility] [phone] [address] [place] [address]    Phone: [phone]   · guaiFENesin-codeine 100-10 MG/5ML liquid  · ondansetron ODT 8 MG disintegrating tablet  · oseltamivir 75 MG capsule          Nima Wynne MD  12/07/22 [id number]

## 2022-12-09 LAB — QT INTERVAL: 380 MS

## 2023-01-03 ENCOUNTER — TELEPHONE (OUTPATIENT)
Dept: CARDIOLOGY | Facility: CLINIC | Age: 68
End: 2023-01-03
Payer: MEDICARE

## 2023-01-03 NOTE — TELEPHONE ENCOUNTER
Patient is having some dental work can patient be cleared and come off his blood thinner   Does the patient needs to do anything special before

## 2023-01-16 ENCOUNTER — TELEPHONE (OUTPATIENT)
Dept: CARDIOLOGY | Facility: CLINIC | Age: 68
End: 2023-01-16

## 2023-01-16 NOTE — TELEPHONE ENCOUNTER
Caller: SHELTON DEVINE    Best call back number: [phone]    What form or medical record are you requesting: CARDIAC CLEARANCE FORM     Who is requesting this form or medical record from you: [employer]     How would you like to receive the form or medical records (pick-up, mail, fax):   If fax, what is the fax number: [phone]    Additional notes: SHELTON WITH [employer] CALLING FOR UPDATE ON CARDIAC CLEARANCE FORM. WOULD LIKE TO KNOW IF FORM CAN BE RE-FAXED TO THEM.

## 2023-02-05 ENCOUNTER — APPOINTMENT (OUTPATIENT)
Dept: GENERAL RADIOLOGY | Facility: HOSPITAL | Age: 68
End: 2023-02-05
Payer: MEDICARE

## 2023-02-05 ENCOUNTER — HOSPITAL ENCOUNTER (EMERGENCY)
Facility: HOSPITAL | Age: 68
Discharge: HOME | End: 2023-02-05
Admitting: EMERGENCY MEDICINE
Payer: MEDICARE

## 2023-02-05 VITALS
TEMPERATURE: 98.2 F | OXYGEN SATURATION: 96 % | SYSTOLIC BLOOD PRESSURE: 132 MMHG | HEIGHT: 64 IN | RESPIRATION RATE: 16 BRPM | BODY MASS INDEX: 30.63 KG/M2 | DIASTOLIC BLOOD PRESSURE: 77 MMHG | WEIGHT: 179.4 LBS | HEART RATE: 84 BPM

## 2023-02-05 DIAGNOSIS — M79.10 MYALGIA: ICD-10-CM

## 2023-02-05 DIAGNOSIS — N39.0 URINARY TRACT INFECTION WITHOUT HEMATURIA, SITE UNSPECIFIED: ICD-10-CM

## 2023-02-05 DIAGNOSIS — B34.8 RHINOVIRUS: Primary | ICD-10-CM

## 2023-02-05 DIAGNOSIS — R51.9 NONINTRACTABLE HEADACHE, UNSPECIFIED CHRONICITY PATTERN, UNSPECIFIED HEADACHE TYPE: ICD-10-CM

## 2023-02-05 LAB
ALBUMIN SERPL-MCNC: 3.8 G/DL (ref 3.5–5.2)
ALBUMIN/GLOB SERPL: 1.3 G/DL
ALP SERPL-CCNC: 92 U/L (ref 39–117)
ALT SERPL W P-5'-P-CCNC: 6 U/L (ref 1–33)
ANION GAP SERPL CALCULATED.3IONS-SCNC: 8 MMOL/L (ref 5–15)
AST SERPL-CCNC: 13 U/L (ref 1–32)
B PARAPERT DNA SPEC QL NAA+PROBE: NOT DETECTED
B PERT DNA SPEC QL NAA+PROBE: NOT DETECTED
BACTERIA UR QL AUTO: ABNORMAL /HPF
BASOPHILS # BLD AUTO: 0.1 10*3/MM3 (ref 0–0.2)
BASOPHILS NFR BLD AUTO: 1.4 % (ref 0–1.5)
BILIRUB SERPL-MCNC: 0.3 MG/DL (ref 0–1.2)
BILIRUB UR QL STRIP: NEGATIVE
BUN SERPL-MCNC: 17 MG/DL (ref 8–23)
BUN/CREAT SERPL: 15.7 (ref 7–25)
C PNEUM DNA NPH QL NAA+NON-PROBE: NOT DETECTED
CALCIUM SPEC-SCNC: 9 MG/DL (ref 8.6–10.5)
CHLORIDE SERPL-SCNC: 102 MMOL/L (ref 98–107)
CK SERPL-CCNC: 77 U/L (ref 20–180)
CLARITY UR: ABNORMAL
CO2 SERPL-SCNC: 28 MMOL/L (ref 22–29)
COLOR UR: YELLOW
CREAT SERPL-MCNC: 1.08 MG/DL (ref 0.57–1)
DEPRECATED RDW RBC AUTO: 42.4 FL (ref 37–54)
EGFRCR SERPLBLD CKD-EPI 2021: 55.7 ML/MIN/1.73
EOSINOPHIL # BLD AUTO: 0.3 10*3/MM3 (ref 0–0.4)
EOSINOPHIL NFR BLD AUTO: 3.5 % (ref 0.3–6.2)
ERYTHROCYTE [DISTWIDTH] IN BLOOD BY AUTOMATED COUNT: 13.4 % (ref 12.3–15.4)
FLUAV SUBTYP SPEC NAA+PROBE: NOT DETECTED
FLUBV RNA ISLT QL NAA+PROBE: NOT DETECTED
GLOBULIN UR ELPH-MCNC: 2.9 GM/DL
GLUCOSE SERPL-MCNC: 307 MG/DL (ref 65–99)
GLUCOSE UR STRIP-MCNC: ABNORMAL MG/DL
HADV DNA SPEC NAA+PROBE: NOT DETECTED
HCOV 229E RNA SPEC QL NAA+PROBE: NOT DETECTED
HCOV HKU1 RNA SPEC QL NAA+PROBE: NOT DETECTED
HCOV NL63 RNA SPEC QL NAA+PROBE: NOT DETECTED
HCOV OC43 RNA SPEC QL NAA+PROBE: NOT DETECTED
HCT VFR BLD AUTO: 38.4 % (ref 34–46.6)
HGB BLD-MCNC: 12.7 G/DL (ref 12–15.9)
HGB UR QL STRIP.AUTO: ABNORMAL
HMPV RNA NPH QL NAA+NON-PROBE: NOT DETECTED
HPIV1 RNA ISLT QL NAA+PROBE: NOT DETECTED
HPIV2 RNA SPEC QL NAA+PROBE: NOT DETECTED
HPIV3 RNA NPH QL NAA+PROBE: NOT DETECTED
HPIV4 P GENE NPH QL NAA+PROBE: NOT DETECTED
HYALINE CASTS UR QL AUTO: ABNORMAL /LPF
KETONES UR QL STRIP: NEGATIVE
LEUKOCYTE ESTERASE UR QL STRIP.AUTO: ABNORMAL
LYMPHOCYTES # BLD AUTO: 1.8 10*3/MM3 (ref 0.7–3.1)
LYMPHOCYTES NFR BLD AUTO: 25.3 % (ref 19.6–45.3)
M PNEUMO IGG SER IA-ACNC: NOT DETECTED
MCH RBC QN AUTO: 28.1 PG (ref 26.6–33)
MCHC RBC AUTO-ENTMCNC: 33 G/DL (ref 31.5–35.7)
MCV RBC AUTO: 85.2 FL (ref 79–97)
MONOCYTES # BLD AUTO: 0.6 10*3/MM3 (ref 0.1–0.9)
MONOCYTES NFR BLD AUTO: 8.2 % (ref 5–12)
NEUTROPHILS NFR BLD AUTO: 4.5 10*3/MM3 (ref 1.7–7)
NEUTROPHILS NFR BLD AUTO: 61.6 % (ref 42.7–76)
NITRITE UR QL STRIP: NEGATIVE
NRBC BLD AUTO-RTO: 0.1 /100 WBC (ref 0–0.2)
PH UR STRIP.AUTO: 5.5 [PH] (ref 5–8)
PLATELET # BLD AUTO: 285 10*3/MM3 (ref 140–450)
PMV BLD AUTO: 7.1 FL (ref 6–12)
POTASSIUM SERPL-SCNC: 4.7 MMOL/L (ref 3.5–5.2)
PROT SERPL-MCNC: 6.7 G/DL (ref 6–8.5)
PROT UR QL STRIP: ABNORMAL
RBC # BLD AUTO: 4.51 10*6/MM3 (ref 3.77–5.28)
RBC # UR STRIP: ABNORMAL /HPF
REF LAB TEST METHOD: ABNORMAL
RHINOVIRUS RNA SPEC NAA+PROBE: DETECTED
RSV RNA NPH QL NAA+NON-PROBE: NOT DETECTED
SARS-COV-2 RNA NPH QL NAA+NON-PROBE: NOT DETECTED
SODIUM SERPL-SCNC: 138 MMOL/L (ref 136–145)
SP GR UR STRIP: 1.02 (ref 1–1.03)
SQUAMOUS #/AREA URNS HPF: ABNORMAL /HPF
UROBILINOGEN UR QL STRIP: ABNORMAL
WBC # UR STRIP: ABNORMAL /HPF
WBC NRBC COR # BLD: 7.3 10*3/MM3 (ref 3.4–10.8)

## 2023-02-05 PROCEDURE — 0202U NFCT DS 22 TRGT SARS-COV-2: CPT | Performed by: PHYSICIAN ASSISTANT

## 2023-02-05 PROCEDURE — 87086 URINE CULTURE/COLONY COUNT: CPT | Performed by: PHYSICIAN ASSISTANT

## 2023-02-05 PROCEDURE — 99284 EMERGENCY DEPT VISIT MOD MDM: CPT

## 2023-02-05 PROCEDURE — 25010000002 CEFTRIAXONE PER 250 MG: Performed by: PHYSICIAN ASSISTANT

## 2023-02-05 PROCEDURE — 25010000002 DROPERIDOL PER 5 MG: Performed by: PHYSICIAN ASSISTANT

## 2023-02-05 PROCEDURE — 82550 ASSAY OF CK (CPK): CPT | Performed by: PHYSICIAN ASSISTANT

## 2023-02-05 PROCEDURE — 80053 COMPREHEN METABOLIC PANEL: CPT | Performed by: PHYSICIAN ASSISTANT

## 2023-02-05 PROCEDURE — 96375 TX/PRO/DX INJ NEW DRUG ADDON: CPT

## 2023-02-05 PROCEDURE — 87077 CULTURE AEROBIC IDENTIFY: CPT | Performed by: PHYSICIAN ASSISTANT

## 2023-02-05 PROCEDURE — 87186 SC STD MICRODIL/AGAR DIL: CPT | Performed by: PHYSICIAN ASSISTANT

## 2023-02-05 PROCEDURE — 85025 COMPLETE CBC W/AUTO DIFF WBC: CPT | Performed by: PHYSICIAN ASSISTANT

## 2023-02-05 PROCEDURE — 96365 THER/PROPH/DIAG IV INF INIT: CPT

## 2023-02-05 PROCEDURE — 71045 X-RAY EXAM CHEST 1 VIEW: CPT

## 2023-02-05 PROCEDURE — 81001 URINALYSIS AUTO W/SCOPE: CPT | Performed by: PHYSICIAN ASSISTANT

## 2023-02-05 RX ORDER — FAMOTIDINE 10 MG/ML
20 INJECTION, SOLUTION INTRAVENOUS ONCE
Status: COMPLETED | OUTPATIENT
Start: 2023-02-05 | End: 2023-02-05

## 2023-02-05 RX ORDER — ACETAMINOPHEN 500 MG
1000 TABLET ORAL ONCE
Status: COMPLETED | OUTPATIENT
Start: 2023-02-05 | End: 2023-02-05

## 2023-02-05 RX ORDER — DROPERIDOL 2.5 MG/ML
2.5 INJECTION, SOLUTION INTRAMUSCULAR; INTRAVENOUS ONCE
Status: COMPLETED | OUTPATIENT
Start: 2023-02-05 | End: 2023-02-05

## 2023-02-05 RX ORDER — SODIUM CHLORIDE 0.9 % (FLUSH) 0.9 %
10 SYRINGE (ML) INJECTION AS NEEDED
Status: DISCONTINUED | OUTPATIENT
Start: 2023-02-05 | End: 2023-02-05 | Stop reason: HOSPADM

## 2023-02-05 RX ORDER — BENZONATATE 200 MG/1
200 CAPSULE ORAL 3 TIMES DAILY PRN
Qty: 30 CAPSULE | Refills: 0 | Status: SHIPPED | OUTPATIENT
Start: 2023-02-05

## 2023-02-05 RX ORDER — METOCLOPRAMIDE 5 MG/1
5 TABLET ORAL 3 TIMES DAILY PRN
Qty: 10 TABLET | Refills: 0 | Status: SHIPPED | OUTPATIENT
Start: 2023-02-05

## 2023-02-05 RX ORDER — CEFDINIR 300 MG/1
300 CAPSULE ORAL 2 TIMES DAILY
Qty: 10 CAPSULE | Refills: 0 | Status: SHIPPED | OUTPATIENT
Start: 2023-02-05 | End: 2023-02-10

## 2023-02-05 RX ADMIN — FAMOTIDINE 20 MG: 10 INJECTION INTRAVENOUS at 15:06

## 2023-02-05 RX ADMIN — DROPERIDOL 2.5 MG: 2.5 INJECTION, SOLUTION INTRAMUSCULAR; INTRAVENOUS at 15:07

## 2023-02-05 RX ADMIN — ACETAMINOPHEN 1000 MG: 500 TABLET, FILM COATED ORAL at 13:15

## 2023-02-05 RX ADMIN — CEFTRIAXONE 2 G: 2 INJECTION, POWDER, FOR SOLUTION INTRAMUSCULAR; INTRAVENOUS at 14:01

## 2023-02-05 NOTE — ED PROVIDER NOTES
Subjective   History of Present Illness  Chief Complaint: Cough congestion, headache    Patient is a 67-year-old  female history of arthritis, hypertension, hyperlipidemia presents the ER with complaints of cough congestion body aches low back pain for 2 weeks.  Patient states 2 to 3 weeks ago she was diagnosed with bronchitis and influenza.  Patient states she has not felt any better since then.  She denies any chest pain but does report some intermittent shortness of breath.  She has mildly productive cough with green sputum.  No sore throat.  She reported headache that is throbbing that she rates an 8/10.  No thunderclap onset or worst headache of her life.  No blurry vision or dizziness.  No unilateral weakness or paresthesias.  Patient was complained of some nausea and lower back pain, right greater than left.  She denies any dysuria or hematuria.  No lower extremity swelling that is new.  She has a complaint of generalized body aches.  Denies any recent sick contacts.  No recent travel or surgery.  No history of PE.  No fever or chills.    PCP: Traci Townsend    History provided by:  Patient      Review of Systems   Constitutional: Positive for fatigue. Negative for chills and fever.   HENT: Negative for congestion, sore throat and trouble swallowing.    Eyes: Negative.    Respiratory: Positive for cough and shortness of breath. Negative for wheezing.    Cardiovascular: Negative for chest pain.   Gastrointestinal: Negative for abdominal pain, diarrhea, nausea and vomiting.   Genitourinary: Negative for difficulty urinating, dysuria, flank pain and hematuria.   Musculoskeletal: Positive for back pain and myalgias.   Skin: Negative for rash.   Allergic/Immunologic: Negative.    Neurological: Negative for headaches.   Psychiatric/Behavioral: Negative for behavioral problems.   All other systems reviewed and are negative.      Past Medical History:   Diagnosis Date   • Adrenal nodule (HCC) 11/16/2016     FINDINGS: Mild hepatic steatosis may be present. Cholecystectomy. No biliary ductal dilatation. Negative pancreas, spleen, left adrenal gland, and kidneys. The right adrenal presumed adenoma has increased slightly, measuring 3 x 4 cm in diameter,  compared to 2 x 3.5 cm on the previous exam. The attenuation values are consistent with an adenoma. Done at [facility] in August 2018   • Age-related osteoporosis without current pathological fracture 11/16/2016   • Arthritis    • Delayed surgical wound healing    • Essential hypertension 11/16/2016   • Gastroesophageal reflux disease with esophagitis 11/16/2016   • Hepatic steatosis 11/16/2016   • History of anemia    • History of sepsis     FROM UTI   • Hyperlipidemia 11/16/2016   • Lisfranc's dislocation     LEFT WITH FRACTURES NONHEALING FOOT   • Osteomyelitis of left foot (HCC) 11/2020   • RLS (restless legs syndrome)    • Squamous cell skin cancer 2014    Excised by Dr. James   • Thyroid nodule 10/18/2019    BENIGN   • Type 2 diabetes mellitus with peripheral neuropathy (HCC) 11/16/2016   • Vitamin D deficiency 1/25/2019       Allergies   Allergen Reactions   • Zofran [Ondansetron Hcl] Nausea And Vomiting     Does the opposite of its purpose   • Prochlorperazine Other (See Comments)     CAUSED SEIZURE   • Prochlorperazine Edisylate Hives   • Prochlorperazine Maleate Irritability   • Hydralazine Itching and Rash   • Hydralazine Hcl Itching and Rash   • Meperidine Itching and Swelling   • Prochlorperazine Maleate Other (See Comments)     CAUSES SEIZURE       Past Surgical History:   Procedure Laterality Date   • BREAST BIOPSY Left     7/2019. BENIGN   • CARDIAC CATHETERIZATION Left 5/2/2021    Procedure: Cardiac Catheterization/Vascular Study;  Surgeon: Chacho Esteban MD;  Location: [facility] INVAS[facility] LOC[facility];  Service: Cardiovascular;  Laterality: Left;   • CARDIAC CATHETERIZATION N/A 5/2/2021    Procedure: Intra-Aortic Baloon Pump Insertion;  Surgeon: Carlito  Chacho WERNER MD;  Location: [facility] CATH INVASIVE LOCATION;  Service: Cardiovascular;  Laterality: N/A;   • CATARACT EXTRACTION WITH INTRAOCULAR LENS IMPLANT Bilateral    • CHOLECYSTECTOMY     • COLONOSCOPY     • CORONARY ARTERY BYPASS GRAFT N/A 5/2/2021    Procedure: CORONARY ARTERY BYPASS WITH INTERNAL MAMMARY ARTERY GRAFT;  Surgeon: Jr Rocael Alexander MD;  Location: [facility];  Service: Cardiothoracic;  Laterality: N/A;   • FOOT FUSION Right 11/13/2020    Procedure: RIGHT OPEN TREATMENT LISFRANC INJURY, OPEN REDUCTION INTERNAL FIXATION MEDIAL/MIDDLE CUNEIFORM FRACTURE AND 2ND/3RD METATARSAL FRACTURE 1ST 2ND POSSIBLE 3RD TARSOMETATARSAL ARTHRODESIS INTERCUNEIFORM ARTHRODESIS CALCANEAL BONE GRAFT;  Surgeon: Mikhail Banks Jr., MD;  Location: [facility];  Service: Orthopedics;  Laterality: Right;   • INCISION AND DRAINAGE LEG Right 1/8/2021    Procedure: RIGHT IRRIGATION AND DEBRIDEMENT OF FOOT WITH SECONDARY CLOSURE AND HARDWARE REMOVAL;  Surgeon: Mikhail Banks Jr., MD;  Location: [facility] OR;  Service: Orthopedics;  Laterality: Right;   • KNEE ARTHROSCOPY Bilateral    • TOTAL ABDOMINAL HYSTERECTOMY     • TRANSESOPHAGEAL ECHOCARDIOGRAM (EMILIA) N/A 5/2/2021    Procedure: TRANSESOPHAGEAL ECHOCARDIOGRAM;  Surgeon: Jr Rocael Alexander MD;  Location: [facility];  Service: Cardiothoracic;  Laterality: N/A;   • UPPER GASTROINTESTINAL ENDOSCOPY  08/2016   • US GUIDED FINE NEEDLE ASPIRATION  5/8/2020       Family History   Problem Relation Age of Onset   • Diabetes Mother    • COPD Mother    • Hypertension Mother    • Kidney disease Mother    • Obesity Mother    • Thyroid disease Mother    • Diabetes Sister    • Diabetes Sister    • Diabetes Sister    • Diabetes Sister    • Malig Hyperthermia Neg Hx        Social History     Socioeconomic History   • Marital status:    Tobacco Use   • Smoking status: Never   • Smokeless tobacco: Never   Vaping Use   • Vaping Use: Never used   Substance and Sexual Activity   •  Alcohol use: Yes     Comment: socially    • Drug use: Never   • Sexual activity: Defer           Objective   Physical Exam  Vitals and nursing note reviewed.   Constitutional:       Appearance: Normal appearance. She is well-developed and normal weight. She is not ill-appearing or toxic-appearing.   HENT:      Head: Normocephalic and atraumatic.      Nose: Nose normal. No congestion.      Mouth/Throat:      Mouth: Mucous membranes are moist.      Pharynx: No oropharyngeal exudate.   Eyes:      Pupils: Pupils are equal, round, and reactive to light.   Cardiovascular:      Rate and Rhythm: Normal rate and regular rhythm.      Pulses: Normal pulses.      Heart sounds: Normal heart sounds. No murmur heard.  Pulmonary:      Effort: Pulmonary effort is normal. No respiratory distress.      Breath sounds: Normal breath sounds. No wheezing.   Abdominal:      General: Bowel sounds are normal. There is no distension.      Palpations: Abdomen is soft.      Tenderness: There is no abdominal tenderness. There is no right CVA tenderness or left CVA tenderness.   Musculoskeletal:         General: Normal range of motion.      Cervical back: Normal range of motion. No rigidity or tenderness.   Lymphadenopathy:      Cervical: No cervical adenopathy.   Skin:     General: Skin is warm and dry.      Capillary Refill: Capillary refill takes less than 2 seconds.      Findings: No rash.   Neurological:      General: No focal deficit present.      Mental Status: She is alert and oriented to person, place, and time.      Cranial Nerves: No cranial nerve deficit.      Motor: No weakness.   Psychiatric:         Mood and Affect: Mood normal.         Behavior: Behavior normal.         Procedures           ED Course  ED Course as of 02/05/23 1730   Sun Feb 05, 2023   1438 Patient reevaluated, still complaining of headache.  Droperidol ordered.  Patient was notified of lab work and imaging findings.  Questions answered.  Patient blood pressure  "slightly elevated 190/81.  Patient normally takes Norvasc but at nighttime.  No blurry vision or loss of vision.  Denies any chest pain or shortness of breath.  No unilateral weakness or paresthesias. []   1611 Patient reports feeling much better after droperidol.  Blood pressure improved.  Headache improved.  Patient felt stable for discharge. []      ED Course User Index  [] Ivelisse Archer PA    /77   Pulse 84   Temp 98.2 °F (36.8 °C) (Oral)   Resp 16   Ht 162.6 cm (64\")   Wt 81.4 kg (179 lb 6.4 oz)   SpO2 96%   BMI 30.79 kg/m²   Labs Reviewed   RESPIRATORY PANEL PCR W/ COVID-19 (SARS-COV-2) ALISSA/HEAVENLY/NILA/PAD/COR/MAD/NADEEM IN-HOUSE, NP SWAB IN [place], 3-4 HR TAT - Abnormal; Notable for the following components:       Result Value    Human Rhinovirus/Enterovirus Detected (*)     All other components within normal limits    Narrative:     In the setting of a positive respiratory panel with a viral infection PLUS a negative procalcitonin without other underlying concern for bacterial infection, consider observing off antibiotics or discontinuation of antibiotics and continue supportive care. If the respiratory panel is positive for atypical bacterial infection (Bordetella pertussis, Chlamydophila pneumoniae, or Mycoplasma pneumoniae), consider antibiotic de-escalation to target atypical bacterial infection.   COMPREHENSIVE METABOLIC PANEL - Abnormal; Notable for the following components:    Glucose 307 (*)     Creatinine 1.08 (*)     eGFR 55.7 (*)     All other components within normal limits    Narrative:     GFR Normal >60  Chronic Kidney Disease <60  Kidney Failure <15     URINALYSIS W/ CULTURE IF INDICATED - Abnormal; Notable for the following components:    Appearance, UA Cloudy (*)     Glucose, UA >=1000 mg/dL (3+) (*)     Blood, UA Trace (*)     Protein, UA Trace (*)     Leuk Esterase, UA Small (1+) (*)     All other components within normal limits    Narrative:     In absence of clinical " symptoms, the presence of pyuria, bacteria, and/or nitrites on the urinalysis result does not correlate with infection.   URINALYSIS, MICROSCOPIC ONLY - Abnormal; Notable for the following components:    RBC, UA 0-2 (*)     WBC, UA Too Numerous to Count (*)     Bacteria, UA 4+ (*)     Squamous Epithelial Cells, UA 3-6 (*)     All other components within normal limits   CBC WITH AUTO DIFFERENTIAL - Normal   CK - Normal   URINE CULTURE   CBC AND DIFFERENTIAL    Narrative:     The following orders were created for panel order CBC & Differential.  Procedure                               Abnormality         Status                     ---------                               -----------         ------                     CBC Auto Differential[[id number]]        Normal              Final result                 Please view results for these tests on the individual orders.     Medications   sodium chloride 0.9 % flush 10 mL (has no administration in time range)   acetaminophen (TYLENOL) tablet 1,000 mg (1,000 mg Oral Given 2/5/23 1315)   cefTRIAXone (ROCEPHIN) 2 g in sodium chloride 0.9 % 100 mL IVPB (0 g Intravenous Stopped 2/5/23 1446)   droperidol (INAPSINE) injection 2.5 mg (2.5 mg Intravenous Given 2/5/23 1507)   famotidine (PEPCID) injection 20 mg (20 mg Intravenous Given 2/5/23 1506)                                            Medical Decision Making  Differential Dx (Includes but not limited to): Viral syndrome, URI, UTI, electrolyte abnormality, dehydration, kidney stone  Medical Records Reviewed: Patient seen in the ER 12/7/2022, diagnosed with influenza and acute bronchitis.  Labs: My interpretation, urinalysis cloudy with TNTC WBCs 4+ bacteria suggestive of UTI.  Respiratory panel positive for rhinovirus.  Imaging: On my interpretation, chest x-ray shows no obvious pneumonia or pneumothorax.  Telemetry: N/A  Testing considered but not ordered: CT PE protocol, patient is not tachycardic, tachypneic or  hypoxic.  Nature of Complaint: Acute  Admission vs Discharge: Discharge  Discussion: While in the ED IV was placed and labs were obtained appropriate PPE was worn during exam and throughout all encounters with the patient.  Patient had the above evaluation.  IV established, lab work obtained.  Patient placed on continuous telemetry monitoring throughout ER stay.  Patient initially with Tylenol for her headache.  Patient given 1 g Rocephin for UTI.  Respiratory panel positive for rhinovirus, results discussed with patient at bedside.  Patient continued to complain of headache and upset stomach.  She was given droperidol and Pepcid.  She reports significant improvement, headache completely resolved, nausea resolved.  Blood pressure improved 132/77.  Patient is felt stable for discharge.  Patient discharged with prescription for cefdinir, Reglan, Tessalon Perles.  She is encouraged to follow-up with primary care for recheck.  Tylenol or ibuprofen as needed for fever.    Discharge plan and instructions were discussed with the patient who verbalized understanding and is in agreement with the plan, all questions were answered at this time.  Patient is aware of signs symptoms that would require immediate return to the emergency room.  Patient understands importance of following up with primary care provider for further evaluation and worsening concerns as well as blood pressure recheck in the next 4 weeks.    Patient was discharged in improved stable condition with an upright steady gait.    Patient is aware that discharge does not mean that nothing is wrong but indicates no emergencies present and they must continue care with follow-up as given below or physician of her choice.    Myalgia: acute illness or injury  Nonintractable headache, unspecified chronicity pattern, unspecified headache type: acute illness or injury  Rhinovirus: acute illness or injury with systemic symptoms  Urinary tract infection without hematuria,  site unspecified: acute illness or injury with systemic symptoms  Amount and/or Complexity of Data Reviewed  Labs: ordered. Decision-making details documented in ED Course.  Radiology: ordered. Decision-making details documented in ED Course.      Risk  OTC drugs.  Prescription drug management.          Final diagnoses:   Rhinovirus   Urinary tract infection without hematuria, site unspecified   Nonintractable headache, unspecified chronicity pattern, unspecified headache type   Myalgia       ED Disposition  ED Disposition     ED Disposition   Discharge    Condition   Stable    Comment   --             Traci Townsend, APRN  [address] [place] [id number] [phone]    Schedule an appointment as soon as possible for a visit in 2 days  As needed, If symptoms worsen         Medication List      New Prescriptions    benzonatate 200 MG capsule  Commonly known as: TESSALON  Take 1 capsule by mouth 3 (Three) Times a Day As Needed for Cough.     cefdinir 300 MG capsule  Commonly known as: OMNICEF  Take 1 capsule by mouth 2 (Two) Times a Day for 5 days.     metoclopramide 5 MG tablet  Commonly known as: REGLAN  Take 1 tablet by mouth 3 (Three) Times a Day As Needed (N/V).           Where to Get Your Medications      These medications were sent to [facility] [id number] [place], [place] - [facility] - [phone]  - [phone] [address] [place] [address]    Phone: [phone]   · benzonatate 200 MG capsule  · cefdinir 300 MG capsule  · metoclopramide 5 MG tablet          Ivelisse Archer PA  02/05/23 7055

## 2023-02-05 NOTE — DISCHARGE INSTRUCTIONS
Tylenol as needed for headache or fever.  Tessalon Perles as needed for cough.    Take cefdinir antibiotics twice daily for UTI symptoms.  Drink plenty of fluids    Follow-up with primary care for recheck    Return to the ER for new or worsening symptoms

## 2023-02-07 LAB — BACTERIA SPEC AEROBE CULT: ABNORMAL

## 2023-02-07 NOTE — PROGRESS NOTES
Patient urine culture resulted with E. coli. Susceptible to Cephalosporins (3rd gen). Patient was given Rx for cefdinir. Therapy is appropriate coverage. No further follow-up required.    Microbiology Results (last 10 days)       Procedure Component Value - Date/Time    Urine Culture - Urine, Urine, Clean Catch [[id number]]  (Abnormal)  (Susceptibility) Collected: 02/05/23 1259    Lab Status: Final result Specimen: Urine, Clean Catch Updated: 02/07/23 0813     Urine Culture >100,000 CFU/mL Escherichia coli    Narrative:      Colonization of the urinary tract without infection is common. Treatment is discouraged unless the patient is symptomatic, pregnant, or undergoing an invasive urologic procedure.      Susceptibility        Escherichia coli      NORMA      Ampicillin Resistant      Ampicillin + Sulbactam Intermediate      Cefazolin Susceptible      Cefepime Susceptible      Ceftazidime Susceptible      Ceftriaxone Susceptible      Gentamicin Susceptible      Levofloxacin Susceptible      Nitrofurantoin Susceptible      Piperacillin + Tazobactam Susceptible      Trimethoprim + Sulfamethoxazole Susceptible                           Respiratory Panel PCR w/COVID-19(SARS-CoV-2) ALISSA/HEAVENLY/NILA/PAD/COR/MAD/NADEEM In-House, NP Swab in UTM/VTM, 3-4 HR TAT - Swab, Nasopharynx [[id number]]  (Abnormal) Collected: 02/05/23 1228    Lab Status: Final result Specimen: Swab from Nasopharynx Updated: 02/05/23 1324     ADENOVIRUS, PCR Not Detected     Coronavirus 229E Not Detected     Coronavirus HKU1 Not Detected     Coronavirus NL63 Not Detected     Coronavirus OC43 Not Detected     COVID19 Not Detected     Human Metapneumovirus Not Detected     Human Rhinovirus/Enterovirus Detected     Influenza A PCR Not Detected     Influenza B PCR Not Detected     Parainfluenza Virus 1 Not Detected     Parainfluenza Virus 2 Not Detected     Parainfluenza Virus 3 Not Detected     Parainfluenza Virus 4 Not Detected     RSV, PCR Not Detected      Bordetella pertussis pcr Not Detected     Bordetella parapertussis PCR Not Detected     Chlamydophila pneumoniae PCR Not Detected     Mycoplasma pneumo by PCR Not Detected    Narrative:      In the setting of a positive respiratory panel with a viral infection PLUS a negative procalcitonin without other underlying concern for bacterial infection, consider observing off antibiotics or discontinuation of antibiotics and continue supportive care. If the respiratory panel is positive for atypical bacterial infection (Bordetella pertussis, Chlamydophila pneumoniae, or Mycoplasma pneumoniae), consider antibiotic de-escalation to target atypical bacterial infection.            Lorenza Burrell [facility]  2/7/2023 12:39 EST

## 2023-06-13 ENCOUNTER — TRANSCRIBE ORDERS (OUTPATIENT)
Dept: ADMINISTRATIVE | Facility: HOSPITAL | Age: 68
End: 2023-06-13
Payer: MEDICARE

## 2023-06-13 DIAGNOSIS — R26.89 BALANCE PROBLEMS: ICD-10-CM

## 2023-06-13 DIAGNOSIS — R42 DIZZINESS: Primary | ICD-10-CM

## 2023-06-26 NOTE — PROGRESS NOTES
Cardiology Office Follow Up Visit      Primary Care Provider:  Traci Townsend APRN    Reason for f/u:      CVA  Coronary artery disease  Previous CABG  Hypertension  Dyslipidemia      Subjective     CC:    Denies chest pain or worsening shortness of breath    History of Present Illness       Marge Mcghee is a 67 y.o. female.  Patient is a very pleasant 67-year-old female who is known to have coronary artery disease requiring three-vessel CABG in May 2021, hypertension, dyslipidemia, diabetes and preserved LV function.    Patient was recently admitted at [facility] after she developed speech impairment and aphasia.  She was brought to the emergency room at [facility].  She was outside of the tPA window.  Her blood pressure was elevated and she was placed on a Cardene drip.  MRI revealed an acute 11 mm lacunar infarct in the right aspect of the tomas.  2D echocardiogram with bubble study was negative.    The patient did not have any documented atrial fibrillation.    The patient returned to [facility] for EMILIA on October 11, 2022.  Ejection fraction was 60 to 65% there was mild concentric LVH, no significant valvular flow abnormalities.  Saline test results were negative.    Since her hospital discharge the patient reports that overall her neurologic symptoms have improved.  She does have someVery occasional word finding issues but otherwise no focal neurodeficits.    She denies any exertional chest pain or worsening dyspnea.    Reports compliance with medical therapy.  She denies any palpitations or feelings of her heart racing.          ASSESSMENT/PLAN:      Diagnoses and all orders for this visit:    1. Cerebrovascular accident (CVA), unspecified mechanism (HCC) (Primary)  Comments:  Recent CVA with 11 mm lacunar infarct in the right aspect of the tomas  Orders:  -     Ambulatory Referral to Neurology    2. Coronary artery disease involving coronary bypass graft of native heart  Error canceled   without angina pectoris  Comments:  No signs or symptoms to suggest unstable angina    3. S/P CABG x 3  Comments:  Three-vessel CABG May 2021    4. Mixed hyperlipidemia  Comments:  Lipids not at goal.  Reports frequently missing Lipitor    5. Essential hypertension  Comments:  Stable on current medications by review of home blood pressure log    Other orders  -     Cancel: Ambulatory Referral to Neurology  -     atorvastatin (LIPITOR) 80 MG tablet; Take 1 tablet by mouth Every Night.  Dispense: 90 tablet; Refill: 3            MEDICAL DECISION MAKING:      Patient appears to be well compensated.  Her  reports her blood pressures at home are typically in the 130s over 70s.    Compliance with medical therapy has been stressed.    I would repeat a lipid panel in approximately 3 months.  If her LDL is not at goal would consider Repatha.    She is not having any signs or symptoms to suggest unstable angina or heart failure.    Recent echocardiogram and EMILIA showed preserved LV function with no significant valvular flow abnormalities.    We continue dual antiplatelet therapy, statin, beta-blocker and losartan and amlodipine.    I made no changes in medical therapy.  We will continue the current treatment.  She will see Dr. Esteban for follow-up in 6 months if there are any new or worsening problems or change in her functional status have asked her to contact the office sooner    The patient was not referred to neurology for outpatient follow-up at discharge so I have put in a referral for neurology hospital follow-up visit    Past Medical History:   Diagnosis Date   • Adrenal nodule (HCC) 11/16/2016    FINDINGS: Mild hepatic steatosis may be present. Cholecystectomy. No biliary ductal dilatation. Negative pancreas, spleen, left adrenal gland, and kidneys. The right adrenal presumed adenoma has increased slightly, measuring 3 x 4 cm in diameter,  compared to 2 x 3.5 cm on the previous exam. The attenuation values are  consistent with an adenoma. Done at [facility] in August 2018   • Age-related osteoporosis without current pathological fracture 11/16/2016   • Arthritis    • Delayed surgical wound healing    • Essential hypertension 11/16/2016   • Gastroesophageal reflux disease with esophagitis 11/16/2016   • Hepatic steatosis 11/16/2016   • History of anemia    • History of sepsis     FROM UTI   • Hyperlipidemia 11/16/2016   • Lisfranc's dislocation     LEFT WITH FRACTURES NONHEALING FOOT   • Osteomyelitis of left foot (HCC) 11/2020   • RLS (restless legs syndrome)    • Squamous cell skin cancer 2014    Excised by Dr. James   • Thyroid nodule 10/18/2019    BENIGN   • Type 2 diabetes mellitus with peripheral neuropathy (HCC) 11/16/2016   • Vitamin D deficiency 1/25/2019       Past Surgical History:   Procedure Laterality Date   • BREAST BIOPSY Left     7/2019. BENIGN   • CARDIAC CATHETERIZATION Left 5/2/2021    Procedure: Cardiac Catheterization/Vascular Study;  Surgeon: Chacho Esteban MD;  Location: [facility] CATH INVASIVE LOCATION;  Service: Cardiovascular;  Laterality: Left;   • CARDIAC CATHETERIZATION N/A 5/2/2021    Procedure: Intra-Aortic Baloon Pump Insertion;  Surgeon: Chacho Esteban MD;  Location: [facility] CATH INVASIVE LOCATION;  Service: Cardiovascular;  Laterality: N/A;   • CATARACT EXTRACTION WITH INTRAOCULAR LENS IMPLANT Bilateral    • CHOLECYSTECTOMY     • COLONOSCOPY     • CORONARY ARTERY BYPASS GRAFT N/A 5/2/2021    Procedure: CORONARY ARTERY BYPASS WITH INTERNAL MAMMARY ARTERY GRAFT;  Surgeon: Jr Rocael Alexander MD;  Location: [facility];  Service: Cardiothoracic;  Laterality: N/A;   • FOOT FUSION Right 11/13/2020    Procedure: RIGHT OPEN TREATMENT LISFRANC INJURY, OPEN REDUCTION INTERNAL FIXATION MEDIAL/MIDDLE CUNEIFORM FRACTURE AND 2ND/3RD METATARSAL FRACTURE 1ST 2ND POSSIBLE 3RD TARSOMETATARSAL ARTHRODESIS INTERCUNEIFORM ARTHRODESIS CALCANEAL BONE GRAFT;  Surgeon: Mikhail Banks Jr., MD;  Location: [facility]  OR [facility];  Service: Orthopedics;  Laterality: Right;   • INCISION AND DRAINAGE LEG Right 1/8/2021    Procedure: RIGHT IRRIGATION AND DEBRIDEMENT OF FOOT WITH SECONDARY CLOSURE AND HARDWARE REMOVAL;  Surgeon: Mikhail Banks Jr., MD;  Location: [facility] MAIN OR;  Service: Orthopedics;  Laterality: Right;   • KNEE ARTHROSCOPY Bilateral    • TOTAL ABDOMINAL HYSTERECTOMY     • TRANSESOPHAGEAL ECHOCARDIOGRAM (EMILIA) N/A 5/2/2021    Procedure: TRANSESOPHAGEAL ECHOCARDIOGRAM;  Surgeon: Jr Rocael Alexander MD;  Location: [facility] CVOR;  Service: Cardiothoracic;  Laterality: N/A;   • UPPER GASTROINTESTINAL ENDOSCOPY  08/2016   • US GUIDED FINE NEEDLE ASPIRATION  5/8/2020         Current Outpatient Medications:   •  amLODIPine (NORVASC) 10 MG tablet, Take 1 tablet by mouth Daily., Disp: 30 tablet, Rfl: 0  •  aspirin (aspirin) 81 MG EC tablet, Take 1 tablet by mouth Daily., Disp: 200 tablet, Rfl: 2  •  atorvastatin (LIPITOR) 80 MG tablet, Take 1 tablet by mouth Every Night., Disp: 90 tablet, Rfl: 3  •  bisacodyl (DULCOLAX) 10 MG suppository, Insert 1 suppository into the rectum Daily As Needed for Constipation (Use if bisacodyl oral is ineffective)., Disp: 30 each, Rfl: 0  •  bisacodyl (DULCOLAX) 5 MG EC tablet, Take 1 tablet by mouth Daily As Needed for Constipation (Use if polyethylene glycol is ineffective)., Disp: 30 tablet, Rfl: 0  •  clopidogrel (PLAVIX) 75 MG tablet, Take 1 tablet by mouth Daily., Disp: 30 tablet, Rfl: 4  •  DULoxetine (CYMBALTA) 60 MG capsule, Take 60 mg by mouth Daily., Disp: , Rfl:   •  famotidine (PEPCID) 20 MG tablet, , Disp: , Rfl:   •  glucose blood test strip, 1 each by Other route 3 (Three) Times a Day Before Meals. Use as instructed Dx code: E11.65 Prescription for test strips for glucometer patient has at home. Patient needs to give name of glucometer., Disp: 100 each, Rfl: 2  •  insulin glargine (LANTUS, SEMGLEE) 100 UNIT/ML injection, Inject 25 Units under the skin into the appropriate area as  directed Every Night., Disp: 10 mL, Rfl: 0  •  insulin glargine (LANTUS, SEMGLEE) 100 UNIT/ML injection, Inject 25 Units under the skin into the appropriate area as directed Daily., Disp: , Rfl:   •  insulin lispro (ADMELOG) 100 UNIT/ML injection, Inject 0-9 Units under the skin into the appropriate area as directed Every 6 (Six) Hours., Disp: 10 mL, Rfl: 12  •  Insulin Pen Needle (Pen Needles) 32G X 4 MM misc, 1 each Every Night. Dx code: E11.65, Disp: 100 each, Rfl: 2  •  Isopropyl Alcohol (Alcohol Wipes) 70 % misc, Apply 1 each topically 3 (Three) Times a Day Before Meals. Dx code: E11.65, Disp: 100 each, Rfl: 2  •  Lancets misc, 1 each 3 (Three) Times a Day Before Meals. Prescription for whatever brand lancets patient currently using. Dx code: E11.65, Disp: 100 each, Rfl: 2  •  losartan (COZAAR) 50 MG tablet, Take 1 tablet by mouth Daily., Disp: 30 tablet, Rfl: 0  •  meclizine (ANTIVERT) 25 MG tablet, , Disp: , Rfl:   •  metoprolol succinate XL (TOPROL-XL) 25 MG 24 hr tablet, Take 1 tablet by mouth Daily., Disp: 30 tablet, Rfl: 0  •  polyethylene glycol (MIRALAX) 17 g packet, Take 17 g by mouth Daily As Needed (Use if senna-docusate is ineffective)., Disp: 30 each, Rfl: 0  •  sennosides-docusate (PERICOLACE) 8.6-50 MG per tablet, Take 2 tablets by mouth 2 (Two) Times a Day., Disp: 30 tablet, Rfl: 0  •  traMADol (ULTRAM) 50 MG tablet, , Disp: , Rfl:   •  traZODone (DESYREL) 100 MG tablet, , Disp: , Rfl:     Social History     Socioeconomic History   • Marital status:    Tobacco Use   • Smoking status: Never   • Smokeless tobacco: Never   Vaping Use   • Vaping Use: Never used   Substance and Sexual Activity   • Alcohol use: Yes     Comment: socially    • Drug use: Never   • Sexual activity: Defer       Family History   Problem Relation Age of Onset   • Diabetes Mother    • COPD Mother    • Hypertension Mother    • Kidney disease Mother    • Obesity Mother    • Thyroid disease Mother    • Diabetes Sister   "  • Diabetes Sister    • Diabetes Sister    • Diabetes Sister    • Malig Hyperthermia Neg Hx        The following portions of the patient's history were reviewed and updated as appropriate: allergies, current medications, past family history, past medical history, past social history, past surgical history and problem list.    Review of Systems   Constitutional: Negative for decreased appetite and diaphoresis.   HENT: Negative for congestion, hearing loss and nosebleeds.    Cardiovascular: Positive for dyspnea on exertion. Negative for chest pain, claudication, irregular heartbeat, leg swelling, near-syncope, orthopnea, palpitations, paroxysmal nocturnal dyspnea and syncope.   Respiratory: Negative for cough, shortness of breath and sleep disturbances due to breathing.    Endocrine: Negative for polyuria.   Hematologic/Lymphatic: Does not bruise/bleed easily.   Skin: Negative for itching and rash.   Musculoskeletal: Negative for back pain, muscle weakness and myalgias.   Gastrointestinal: Negative for abdominal pain, change in bowel habit and nausea.   Genitourinary: Negative for dysuria, flank pain, frequency and hesitancy.   Neurological: Positive for difficulty with concentration. Negative for dizziness, tremors and weakness.   Psychiatric/Behavioral: Negative for altered mental status. The patient does not have insomnia.      /96 (BP Location: Left arm, Patient Position: Sitting, Cuff Size: Large Adult)   Pulse 87   Resp 18   Ht 162.6 cm (64\")   Wt 79.8 kg (176 lb)   SpO2 97%   BMI 30.21 kg/m² .  Objective     Constitutional:       General: Not in acute distress.     Appearance: Normal appearance. Well-developed.   Eyes:      Pupils: Pupils are equal, round, and reactive to light.   HENT:      Head: Normocephalic and atraumatic.   Neck:      Vascular: No JVD.   Pulmonary:      Effort: Pulmonary effort is normal.      Breath sounds: Normal breath sounds.   Cardiovascular:      Normal rate. Regular " rhythm.   Pulses:     Intact distal pulses.   Edema:     Peripheral edema absent.   Abdominal:      General: There is no distension.      Palpations: Abdomen is soft.      Tenderness: There is no abdominal tenderness.   Musculoskeletal: Normal range of motion.      Cervical back: Normal range of motion and neck supple. Skin:     General: Skin is warm and dry.   Neurological:      Mental Status: Alert and oriented to person, place, and time.             ECG 12 Lead    Date/Time: 10/25/2022 10:43 AM  Performed by: Gladis Lang APRN  Authorized by: Gladis Lang APRN   Comparison: compared with previous ECG   Similar to previous ECG  Rhythm: sinus rhythm  BPM: 87  Other findings: non-specific ST-T wave changes and low voltage    Clinical impression: normal ECG            EKG ordered by and reviewed by me in office

## 2023-08-01 ENCOUNTER — APPOINTMENT (OUTPATIENT)
Dept: CT IMAGING | Facility: HOSPITAL | Age: 68
DRG: 063 | End: 2023-08-01
Payer: MEDICARE

## 2023-08-01 ENCOUNTER — APPOINTMENT (OUTPATIENT)
Dept: GENERAL RADIOLOGY | Facility: HOSPITAL | Age: 68
DRG: 063 | End: 2023-08-01
Payer: MEDICARE

## 2023-08-01 ENCOUNTER — HOSPITAL ENCOUNTER (INPATIENT)
Facility: HOSPITAL | Age: 68
LOS: 5 days | Discharge: NURSING, REHAB OR LONG-TERM CARE | DRG: 063 | End: 2023-08-06
Attending: EMERGENCY MEDICINE | Admitting: INTERNAL MEDICINE
Payer: MEDICARE

## 2023-08-01 DIAGNOSIS — E11.9 TYPE 2 DIABETES MELLITUS WITHOUT COMPLICATION, WITH LONG-TERM CURRENT USE OF INSULIN: ICD-10-CM

## 2023-08-01 DIAGNOSIS — Z79.4 TYPE 2 DIABETES MELLITUS WITHOUT COMPLICATION, WITH LONG-TERM CURRENT USE OF INSULIN: ICD-10-CM

## 2023-08-01 DIAGNOSIS — E11.3513 PROLIFERATIVE DIABETIC RETINOPATHY OF BOTH EYES WITH MACULAR EDEMA ASSOCIATED WITH TYPE 2 DIABETES MELLITUS: ICD-10-CM

## 2023-08-01 DIAGNOSIS — I63.9 ACUTE CVA (CEREBROVASCULAR ACCIDENT): Primary | ICD-10-CM

## 2023-08-01 PROBLEM — N39.0 UTI (URINARY TRACT INFECTION): Status: ACTIVE | Noted: 2018-08-29

## 2023-08-01 PROBLEM — I10 HTN, GOAL BELOW 130/80: Status: ACTIVE | Noted: 2021-06-02

## 2023-08-01 LAB
ABO GROUP BLD: NORMAL
ALBUMIN SERPL-MCNC: 3.9 G/DL (ref 3.5–5.2)
ALBUMIN/GLOB SERPL: 1.2 G/DL
ALP SERPL-CCNC: 58 U/L (ref 39–117)
ALT SERPL W P-5'-P-CCNC: 8 U/L (ref 1–33)
ANION GAP SERPL CALCULATED.3IONS-SCNC: 11 MMOL/L (ref 5–15)
APTT PPP: 27.2 SECONDS (ref 24–31)
AST SERPL-CCNC: 16 U/L (ref 1–32)
BASOPHILS # BLD AUTO: 0.1 10*3/MM3 (ref 0–0.2)
BASOPHILS NFR BLD AUTO: 1 % (ref 0–1.5)
BILIRUB SERPL-MCNC: 0.3 MG/DL (ref 0–1.2)
BLD GP AB SCN SERPL QL: NEGATIVE
BUN SERPL-MCNC: 22 MG/DL (ref 8–23)
BUN/CREAT SERPL: 23.9 (ref 7–25)
CALCIUM SPEC-SCNC: 9.7 MG/DL (ref 8.6–10.5)
CHLORIDE SERPL-SCNC: 103 MMOL/L (ref 98–107)
CHOLEST SERPL-MCNC: 165 MG/DL (ref 0–200)
CO2 SERPL-SCNC: 26 MMOL/L (ref 22–29)
CREAT SERPL-MCNC: 0.92 MG/DL (ref 0.57–1)
DEPRECATED RDW RBC AUTO: 44.2 FL (ref 37–54)
EGFRCR SERPLBLD CKD-EPI 2021: 67.1 ML/MIN/1.73
EOSINOPHIL # BLD AUTO: 0.2 10*3/MM3 (ref 0–0.4)
EOSINOPHIL NFR BLD AUTO: 2.8 % (ref 0.3–6.2)
ERYTHROCYTE [DISTWIDTH] IN BLOOD BY AUTOMATED COUNT: 14 % (ref 12.3–15.4)
GLOBULIN UR ELPH-MCNC: 3.3 GM/DL
GLUCOSE BLDC GLUCOMTR-MCNC: 155 MG/DL (ref 70–105)
GLUCOSE SERPL-MCNC: 169 MG/DL (ref 65–99)
HBA1C MFR BLD: 7.5 % (ref 4.8–5.6)
HCT VFR BLD AUTO: 37.9 % (ref 34–46.6)
HDLC SERPL-MCNC: 51 MG/DL (ref 40–60)
HGB BLD-MCNC: 12.5 G/DL (ref 12–15.9)
HOLD SPECIMEN: NORMAL
HOLD SPECIMEN: NORMAL
INR PPP: 1.04 (ref 0.93–1.1)
LDLC SERPL CALC-MCNC: 86 MG/DL (ref 0–100)
LDLC/HDLC SERPL: 1.6 {RATIO}
LYMPHOCYTES # BLD AUTO: 2.9 10*3/MM3 (ref 0.7–3.1)
LYMPHOCYTES NFR BLD AUTO: 34.7 % (ref 19.6–45.3)
MCH RBC QN AUTO: 28.5 PG (ref 26.6–33)
MCHC RBC AUTO-ENTMCNC: 33.1 G/DL (ref 31.5–35.7)
MCV RBC AUTO: 86.1 FL (ref 79–97)
MONOCYTES # BLD AUTO: 0.6 10*3/MM3 (ref 0.1–0.9)
MONOCYTES NFR BLD AUTO: 7.2 % (ref 5–12)
NEUTROPHILS NFR BLD AUTO: 4.5 10*3/MM3 (ref 1.7–7)
NEUTROPHILS NFR BLD AUTO: 54.3 % (ref 42.7–76)
NRBC BLD AUTO-RTO: 0.2 /100 WBC (ref 0–0.2)
PA ADP PRP-ACNC: 290 PRU (ref 194–418)
PLATELET # BLD AUTO: 318 10*3/MM3 (ref 140–450)
PMV BLD AUTO: 6.8 FL (ref 6–12)
POTASSIUM SERPL-SCNC: 4 MMOL/L (ref 3.5–5.2)
PROT SERPL-MCNC: 7.2 G/DL (ref 6–8.5)
PROTHROMBIN TIME: 11.1 SECONDS (ref 9.6–11.7)
RBC # BLD AUTO: 4.4 10*6/MM3 (ref 3.77–5.28)
RH BLD: POSITIVE
SODIUM SERPL-SCNC: 140 MMOL/L (ref 136–145)
T&S EXPIRATION DATE: NORMAL
T4 FREE SERPL-MCNC: 1.11 NG/DL (ref 0.93–1.7)
TRIGL SERPL-MCNC: 161 MG/DL (ref 0–150)
TROPONIN T SERPL HS-MCNC: 8 NG/L
TSH SERPL DL<=0.05 MIU/L-ACNC: 0.75 UIU/ML (ref 0.27–4.2)
VLDLC SERPL-MCNC: 28 MG/DL (ref 5–40)
WBC NRBC COR # BLD: 8.2 10*3/MM3 (ref 3.4–10.8)
WHOLE BLOOD HOLD COAG: NORMAL
WHOLE BLOOD HOLD SPECIMEN: NORMAL

## 2023-08-01 PROCEDURE — 25010000002 DIPHENHYDRAMINE PER 50 MG: Performed by: INTERNAL MEDICINE

## 2023-08-01 PROCEDURE — 80053 COMPREHEN METABOLIC PANEL: CPT | Performed by: EMERGENCY MEDICINE

## 2023-08-01 PROCEDURE — 70450 CT HEAD/BRAIN W/O DYE: CPT

## 2023-08-01 PROCEDURE — 85730 THROMBOPLASTIN TIME PARTIAL: CPT | Performed by: EMERGENCY MEDICINE

## 2023-08-01 PROCEDURE — 71045 X-RAY EXAM CHEST 1 VIEW: CPT

## 2023-08-01 PROCEDURE — 83036 HEMOGLOBIN GLYCOSYLATED A1C: CPT | Performed by: PSYCHIATRY & NEUROLOGY

## 2023-08-01 PROCEDURE — 86900 BLOOD TYPING SEROLOGIC ABO: CPT | Performed by: EMERGENCY MEDICINE

## 2023-08-01 PROCEDURE — 86901 BLOOD TYPING SEROLOGIC RH(D): CPT | Performed by: EMERGENCY MEDICINE

## 2023-08-01 PROCEDURE — 86850 RBC ANTIBODY SCREEN: CPT | Performed by: EMERGENCY MEDICINE

## 2023-08-01 PROCEDURE — 84443 ASSAY THYROID STIM HORMONE: CPT | Performed by: PSYCHIATRY & NEUROLOGY

## 2023-08-01 PROCEDURE — 25510000001 IOPAMIDOL PER 1 ML: Performed by: EMERGENCY MEDICINE

## 2023-08-01 PROCEDURE — 63710000001 PROMETHAZINE PER 25 MG: Performed by: NURSE PRACTITIONER

## 2023-08-01 PROCEDURE — 84439 ASSAY OF FREE THYROXINE: CPT | Performed by: PSYCHIATRY & NEUROLOGY

## 2023-08-01 PROCEDURE — 70496 CT ANGIOGRAPHY HEAD: CPT

## 2023-08-01 PROCEDURE — 82948 REAGENT STRIP/BLOOD GLUCOSE: CPT

## 2023-08-01 PROCEDURE — 93005 ELECTROCARDIOGRAM TRACING: CPT | Performed by: EMERGENCY MEDICINE

## 2023-08-01 PROCEDURE — 84484 ASSAY OF TROPONIN QUANT: CPT | Performed by: EMERGENCY MEDICINE

## 2023-08-01 PROCEDURE — 3E03317 INTRODUCTION OF OTHER THROMBOLYTIC INTO PERIPHERAL VEIN, PERCUTANEOUS APPROACH: ICD-10-PCS | Performed by: PSYCHIATRY & NEUROLOGY

## 2023-08-01 PROCEDURE — 80061 LIPID PANEL: CPT | Performed by: PSYCHIATRY & NEUROLOGY

## 2023-08-01 PROCEDURE — 85576 BLOOD PLATELET AGGREGATION: CPT | Performed by: INTERNAL MEDICINE

## 2023-08-01 PROCEDURE — 92610 EVALUATE SWALLOWING FUNCTION: CPT

## 2023-08-01 PROCEDURE — 99285 EMERGENCY DEPT VISIT HI MDM: CPT

## 2023-08-01 PROCEDURE — 99214 OFFICE O/P EST MOD 30 MIN: CPT | Performed by: PSYCHIATRY & NEUROLOGY

## 2023-08-01 PROCEDURE — 85025 COMPLETE CBC W/AUTO DIFF WBC: CPT | Performed by: EMERGENCY MEDICINE

## 2023-08-01 PROCEDURE — 25010000002 HYDROMORPHONE 1 MG/ML SOLUTION: Performed by: INTERNAL MEDICINE

## 2023-08-01 PROCEDURE — 25010000002 TENECTEPLASE PER 50 MG

## 2023-08-01 PROCEDURE — 70498 CT ANGIOGRAPHY NECK: CPT

## 2023-08-01 PROCEDURE — 85610 PROTHROMBIN TIME: CPT | Performed by: EMERGENCY MEDICINE

## 2023-08-01 PROCEDURE — 36415 COLL VENOUS BLD VENIPUNCTURE: CPT | Performed by: EMERGENCY MEDICINE

## 2023-08-01 PROCEDURE — 0042T HC CT CEREBRAL PERFUSION W/WO CONTRAST: CPT

## 2023-08-01 RX ORDER — POLYETHYLENE GLYCOL 3350 17 G/17G
17 POWDER, FOR SOLUTION ORAL DAILY
COMMUNITY

## 2023-08-01 RX ORDER — CLOPIDOGREL BISULFATE 75 MG/1
75 TABLET ORAL DAILY
Status: DISCONTINUED | OUTPATIENT
Start: 2023-08-02 | End: 2023-08-02

## 2023-08-01 RX ORDER — BUTALBITAL, ACETAMINOPHEN AND CAFFEINE 50; 325; 40 MG/1; MG/1; MG/1
2 TABLET ORAL EVERY 6 HOURS PRN
Status: DISCONTINUED | OUTPATIENT
Start: 2023-08-01 | End: 2023-08-06 | Stop reason: HOSPADM

## 2023-08-01 RX ORDER — ACETAMINOPHEN 650 MG/1
650 SUPPOSITORY RECTAL EVERY 4 HOURS PRN
Status: DISCONTINUED | OUTPATIENT
Start: 2023-08-01 | End: 2023-08-06 | Stop reason: HOSPADM

## 2023-08-01 RX ORDER — PROMETHAZINE HYDROCHLORIDE 25 MG/1
25 TABLET ORAL EVERY 6 HOURS PRN
Status: DISCONTINUED | OUTPATIENT
Start: 2023-08-01 | End: 2023-08-06 | Stop reason: HOSPADM

## 2023-08-01 RX ORDER — NICOTINE POLACRILEX 4 MG
15 LOZENGE BUCCAL
Status: DISCONTINUED | OUTPATIENT
Start: 2023-08-01 | End: 2023-08-06 | Stop reason: HOSPADM

## 2023-08-01 RX ORDER — SODIUM CHLORIDE 0.9 % (FLUSH) 0.9 %
10 SYRINGE (ML) INJECTION AS NEEDED
Status: DISCONTINUED | OUTPATIENT
Start: 2023-08-01 | End: 2023-08-06 | Stop reason: HOSPADM

## 2023-08-01 RX ORDER — ONDANSETRON 4 MG/1
4 TABLET, FILM COATED ORAL EVERY 6 HOURS PRN
Status: DISCONTINUED | OUTPATIENT
Start: 2023-08-01 | End: 2023-08-06 | Stop reason: HOSPADM

## 2023-08-01 RX ORDER — ASPIRIN 81 MG/1
81 TABLET, CHEWABLE ORAL DAILY
Status: DISCONTINUED | OUTPATIENT
Start: 2023-08-02 | End: 2023-08-06 | Stop reason: HOSPADM

## 2023-08-01 RX ORDER — NITROGLYCERIN 0.4 MG/1
0.4 TABLET SUBLINGUAL
Status: DISCONTINUED | OUTPATIENT
Start: 2023-08-01 | End: 2023-08-06 | Stop reason: HOSPADM

## 2023-08-01 RX ORDER — FAMOTIDINE 10 MG/ML
20 INJECTION, SOLUTION INTRAVENOUS DAILY
Status: DISCONTINUED | OUTPATIENT
Start: 2023-08-02 | End: 2023-08-05

## 2023-08-01 RX ORDER — CLOPIDOGREL BISULFATE 75 MG/1
75 TABLET ORAL DAILY
Status: DISCONTINUED | OUTPATIENT
Start: 2023-08-02 | End: 2023-08-01

## 2023-08-01 RX ORDER — AMOXICILLIN 250 MG
2 CAPSULE ORAL 2 TIMES DAILY
Status: DISCONTINUED | OUTPATIENT
Start: 2023-08-01 | End: 2023-08-06 | Stop reason: HOSPADM

## 2023-08-01 RX ORDER — DULOXETIN HYDROCHLORIDE 30 MG/1
60 CAPSULE, DELAYED RELEASE ORAL DAILY
Status: DISCONTINUED | OUTPATIENT
Start: 2023-08-02 | End: 2023-08-06 | Stop reason: HOSPADM

## 2023-08-01 RX ORDER — DEXTROSE MONOHYDRATE 25 G/50ML
25 INJECTION, SOLUTION INTRAVENOUS
Status: DISCONTINUED | OUTPATIENT
Start: 2023-08-01 | End: 2023-08-06 | Stop reason: HOSPADM

## 2023-08-01 RX ORDER — SODIUM CHLORIDE 0.9 % (FLUSH) 0.9 %
10 SYRINGE (ML) INJECTION EVERY 12 HOURS SCHEDULED
Status: DISCONTINUED | OUTPATIENT
Start: 2023-08-01 | End: 2023-08-06 | Stop reason: HOSPADM

## 2023-08-01 RX ORDER — AMLODIPINE BESYLATE 5 MG/1
5 TABLET ORAL DAILY
COMMUNITY

## 2023-08-01 RX ORDER — SODIUM CHLORIDE 0.9 % (FLUSH) 0.9 %
10 SYRINGE (ML) INJECTION
Status: COMPLETED | OUTPATIENT
Start: 2023-08-01 | End: 2023-08-01

## 2023-08-01 RX ORDER — BISACODYL 10 MG
10 SUPPOSITORY, RECTAL RECTAL DAILY PRN
Status: DISCONTINUED | OUTPATIENT
Start: 2023-08-01 | End: 2023-08-06 | Stop reason: HOSPADM

## 2023-08-01 RX ORDER — LABETALOL HYDROCHLORIDE 5 MG/ML
INJECTION, SOLUTION INTRAVENOUS
Status: COMPLETED
Start: 2023-08-01 | End: 2023-08-01

## 2023-08-01 RX ORDER — ACETAMINOPHEN 325 MG/1
650 TABLET ORAL EVERY 4 HOURS PRN
Status: DISCONTINUED | OUTPATIENT
Start: 2023-08-01 | End: 2023-08-06 | Stop reason: HOSPADM

## 2023-08-01 RX ORDER — SODIUM CHLORIDE 0.9 % (FLUSH) 0.9 %
10 SYRINGE (ML) INJECTION ONCE
Status: COMPLETED | OUTPATIENT
Start: 2023-08-01 | End: 2023-08-01

## 2023-08-01 RX ORDER — SODIUM CHLORIDE 9 MG/ML
40 INJECTION, SOLUTION INTRAVENOUS AS NEEDED
Status: DISCONTINUED | OUTPATIENT
Start: 2023-08-01 | End: 2023-08-06 | Stop reason: HOSPADM

## 2023-08-01 RX ORDER — INSULIN LISPRO 100 [IU]/ML
2-7 INJECTION, SOLUTION INTRAVENOUS; SUBCUTANEOUS EVERY 6 HOURS
Status: DISCONTINUED | OUTPATIENT
Start: 2023-08-02 | End: 2023-08-02

## 2023-08-01 RX ORDER — NITROGLYCERIN 0.4 MG/1
0.4 TABLET SUBLINGUAL
Status: DISCONTINUED | OUTPATIENT
Start: 2023-08-01 | End: 2023-08-01

## 2023-08-01 RX ORDER — MULTIPLE VITAMINS W/ MINERALS TAB 9MG-400MCG
1 TAB ORAL DAILY
COMMUNITY

## 2023-08-01 RX ORDER — POLYETHYLENE GLYCOL 3350 17 G/17G
17 POWDER, FOR SOLUTION ORAL DAILY PRN
Status: DISCONTINUED | OUTPATIENT
Start: 2023-08-01 | End: 2023-08-06 | Stop reason: HOSPADM

## 2023-08-01 RX ORDER — KETOROLAC TROMETHAMINE 30 MG/ML
30 INJECTION, SOLUTION INTRAMUSCULAR; INTRAVENOUS ONCE
Status: DISCONTINUED | OUTPATIENT
Start: 2023-08-01 | End: 2023-08-01

## 2023-08-01 RX ORDER — BISACODYL 5 MG/1
5 TABLET, DELAYED RELEASE ORAL DAILY PRN
Status: DISCONTINUED | OUTPATIENT
Start: 2023-08-01 | End: 2023-08-06 | Stop reason: HOSPADM

## 2023-08-01 RX ORDER — MULTIPLE VITAMINS W/ MINERALS TAB 9MG-400MCG
1 TAB ORAL DAILY
Status: DISCONTINUED | OUTPATIENT
Start: 2023-08-02 | End: 2023-08-06 | Stop reason: HOSPADM

## 2023-08-01 RX ORDER — ATORVASTATIN CALCIUM 40 MG/1
80 TABLET, FILM COATED ORAL NIGHTLY
Status: DISCONTINUED | OUTPATIENT
Start: 2023-08-02 | End: 2023-08-01

## 2023-08-01 RX ORDER — ONDANSETRON 2 MG/ML
4 INJECTION INTRAMUSCULAR; INTRAVENOUS EVERY 6 HOURS PRN
Status: DISCONTINUED | OUTPATIENT
Start: 2023-08-01 | End: 2023-08-06 | Stop reason: HOSPADM

## 2023-08-01 RX ORDER — AMLODIPINE BESYLATE 5 MG/1
5 TABLET ORAL DAILY
Status: DISCONTINUED | OUTPATIENT
Start: 2023-08-02 | End: 2023-08-06 | Stop reason: HOSPADM

## 2023-08-01 RX ORDER — LABETALOL HYDROCHLORIDE 5 MG/ML
10 INJECTION, SOLUTION INTRAVENOUS ONCE
Status: COMPLETED | OUTPATIENT
Start: 2023-08-01 | End: 2023-08-01

## 2023-08-01 RX ORDER — ALUMINA, MAGNESIA, AND SIMETHICONE 2400; 2400; 240 MG/30ML; MG/30ML; MG/30ML
15 SUSPENSION ORAL EVERY 6 HOURS PRN
Status: DISCONTINUED | OUTPATIENT
Start: 2023-08-01 | End: 2023-08-06 | Stop reason: HOSPADM

## 2023-08-01 RX ORDER — IBUPROFEN 600 MG/1
1 TABLET ORAL
Status: DISCONTINUED | OUTPATIENT
Start: 2023-08-01 | End: 2023-08-06 | Stop reason: HOSPADM

## 2023-08-01 RX ORDER — POLYETHYLENE GLYCOL 3350 17 G/17G
17 POWDER, FOR SOLUTION ORAL DAILY
Status: DISCONTINUED | OUTPATIENT
Start: 2023-08-02 | End: 2023-08-06 | Stop reason: HOSPADM

## 2023-08-01 RX ORDER — SODIUM CHLORIDE 9 MG/ML
75 INJECTION, SOLUTION INTRAVENOUS CONTINUOUS
Status: DISCONTINUED | OUTPATIENT
Start: 2023-08-01 | End: 2023-08-06 | Stop reason: HOSPADM

## 2023-08-01 RX ORDER — TIRZEPATIDE 5 MG/.5ML
5 INJECTION, SOLUTION SUBCUTANEOUS WEEKLY
COMMUNITY

## 2023-08-01 RX ORDER — ASPIRIN 300 MG/1
300 SUPPOSITORY RECTAL DAILY
Status: DISCONTINUED | OUTPATIENT
Start: 2023-08-02 | End: 2023-08-06 | Stop reason: HOSPADM

## 2023-08-01 RX ORDER — ATORVASTATIN CALCIUM 40 MG/1
80 TABLET, FILM COATED ORAL NIGHTLY
Status: DISCONTINUED | OUTPATIENT
Start: 2023-08-02 | End: 2023-08-06 | Stop reason: HOSPADM

## 2023-08-01 RX ORDER — DIPHENHYDRAMINE HYDROCHLORIDE 50 MG/ML
25 INJECTION INTRAMUSCULAR; INTRAVENOUS ONCE
Status: COMPLETED | OUTPATIENT
Start: 2023-08-01 | End: 2023-08-01

## 2023-08-01 RX ADMIN — PROMETHAZINE HYDROCHLORIDE 25 MG: 25 TABLET ORAL at 22:25

## 2023-08-01 RX ADMIN — SENNOSIDES AND DOCUSATE SODIUM 2 TABLET: 50; 8.6 TABLET ORAL at 21:19

## 2023-08-01 RX ADMIN — Medication 10 ML: at 13:36

## 2023-08-01 RX ADMIN — HYDROMORPHONE HYDROCHLORIDE 1 MG: 1 INJECTION, SOLUTION INTRAMUSCULAR; INTRAVENOUS; SUBCUTANEOUS at 20:21

## 2023-08-01 RX ADMIN — Medication 10 ML: at 14:06

## 2023-08-01 RX ADMIN — LABETALOL 20 MG/4 ML (5 MG/ML) INTRAVENOUS SYRINGE 10 MG: at 13:50

## 2023-08-01 RX ADMIN — BUTALBITAL, ACETAMINOPHEN, AND CAFFEINE 2 TABLET: 50; 325; 40 TABLET ORAL at 16:25

## 2023-08-01 RX ADMIN — IOPAMIDOL 150 ML: 755 INJECTION, SOLUTION INTRAVENOUS at 13:53

## 2023-08-01 RX ADMIN — SODIUM CHLORIDE 75 ML/HR: 9 INJECTION, SOLUTION INTRAVENOUS at 22:25

## 2023-08-01 RX ADMIN — LABETALOL HYDROCHLORIDE 10 MG: 5 INJECTION, SOLUTION INTRAVENOUS at 13:50

## 2023-08-01 RX ADMIN — Medication 10 ML: at 21:19

## 2023-08-01 RX ADMIN — DIPHENHYDRAMINE HYDROCHLORIDE 25 MG: 50 INJECTION, SOLUTION INTRAMUSCULAR; INTRAVENOUS at 20:20

## 2023-08-01 RX ADMIN — TENECTEPLASE 19 MG: KIT at 13:36

## 2023-08-01 NOTE — CONSULTS
Primary Care Provider: No ref. provider found     Consult requested by: ER team    Reason for Consultation: Neurological evaluation /code stroke    History taken from: patient family RN    Chief complaint: Left-sided numbness and weakness       SUBJECTIVE:    History of present illness: Background per H&P: Marge Mcghee is a 68 y.o. year old female who was evaluated as code stroke, initially in CT suite later on in room 34 in the ER at [facility]    Source of information is the patient and the medical records and my discussions with the team    This is a very pleasant 68-year-old right-handed white female who presented with about 2 hours history of tingling on the left side of the face there was concern about some facial weakness also.  There is concern about left-sided weakness also.  The patient has history of stroke and is on aspirin and Plavix and atorvastatin    When I examined her she definitely had left-sided signs  Her NIH was 5    After we got head CT which was negative and inclusion exclusion criteria were evaluated and discussed with the patient, we decided to go for TNK and it was explained to the patient and the patient agreed    Initially her blood pressure was okay later on we had to give her 10 mg of labetalol and then she got some head CT and we repeated head CT and of course there was contrast but nothing suggesting hemorrhage    Her CTP and CTA were unremarkable so she was not intervention candidate    We will do full stroke work-up and MRI and go from there    No migraines or seizures    No noncompliance    As per ER team,  Patient is a 5-year-old female complaining of 2-hour history of tingling to her left face left arm as well as weakness to her left arm. She also planes of mild dizziness and difficulty with her thought process. She denies other associated complaints.     - Portions of the above HPI were copied from previous encounters and edited as appropriate. PMH as detailed  below.     Review of Systems   No fever chills rigors or sweats  No weight issues  No sleep problems  HEENT:  No speech problem, vision changes, facial asymmetry or pain, or neck problem  Chest: No chest pain, clubbing, cyanosis, orthopnea palpitations  Pulmonary:  No shortness of air, cough or expectoration  Abdomen:  No swelling/tension, constipation,diarrhea or pain  No genitourinary symptoms  Extremity problems as discussed  No back problem  No psychotic issues  Neurologic issues as discussed  No hematologic, dermatologic or endocrine problems        PATIENT HISTORY:  Past Medical History:   Diagnosis Date    Adrenal nodule 11/16/2016    FINDINGS: Mild hepatic steatosis may be present. Cholecystectomy. No biliary ductal dilatation. Negative pancreas, spleen, left adrenal gland, and kidneys. The right adrenal presumed adenoma has increased slightly, measuring 3 x 4 cm in diameter,  compared to 2 x 3.5 cm on the previous exam. The attenuation values are consistent with an adenoma. Done at [facility] in August 2018    Age-related osteoporosis without current pathological fracture 11/16/2016    Arthritis     Delayed surgical wound healing     Essential hypertension 11/16/2016    Gastroesophageal reflux disease with esophagitis 11/16/2016    Hepatic steatosis 11/16/2016    History of anemia     History of sepsis     FROM UTI    Hyperlipidemia 11/16/2016    Lisfranc's dislocation     LEFT WITH FRACTURES NONHEALING FOOT    Osteomyelitis of left foot 11/2020    RLS (restless legs syndrome)     Squamous cell skin cancer 2014    Excised by Dr. James    Thyroid nodule 10/18/2019    BENIGN    Type 2 diabetes mellitus with peripheral neuropathy 11/16/2016    Vitamin D deficiency 1/25/2019   ,   Past Surgical History:   Procedure Laterality Date    BREAST BIOPSY Left     7/2019. BENIGN    CARDIAC CATHETERIZATION Left 5/2/2021    Procedure: Cardiac Catheterization/Vascular Study;  Surgeon: Chacho Esteban MD;  Location:  [facility] CATH INVASIVE LOCATION;  Service: Cardiovascular;  Laterality: Left;    CARDIAC CATHETERIZATION N/A 5/2/2021    Procedure: Intra-Aortic Baloon Pump Insertion;  Surgeon: Chacho Esteban MD;  Location: [facility] CATH INVASIVE LOCATION;  Service: Cardiovascular;  Laterality: N/A;    CATARACT EXTRACTION WITH INTRAOCULAR LENS IMPLANT Bilateral     CHOLECYSTECTOMY      COLONOSCOPY      CORONARY ARTERY BYPASS GRAFT N/A 5/2/2021    Procedure: CORONARY ARTERY BYPASS WITH INTERNAL MAMMARY ARTERY GRAFT;  Surgeon: Jr Rocael Alexander MD;  Location: [facility];  Service: Cardiothoracic;  Laterality: N/A;    FOOT FUSION Right 11/13/2020    Procedure: RIGHT OPEN TREATMENT LISFRANC INJURY, OPEN REDUCTION INTERNAL FIXATION MEDIAL/MIDDLE CUNEIFORM FRACTURE AND 2ND/3RD METATARSAL FRACTURE 1ST 2ND POSSIBLE 3RD TARSOMETATARSAL ARTHRODESIS INTERCUNEIFORM ARTHRODESIS CALCANEAL BONE GRAFT;  Surgeon: Mikhail Banks Jr., MD;  Location: [facility];  Service: Orthopedics;  Laterality: Right;    INCISION AND DRAINAGE LEG Right 1/8/2021    Procedure: RIGHT IRRIGATION AND DEBRIDEMENT OF FOOT WITH SECONDARY CLOSURE AND HARDWARE REMOVAL;  Surgeon: Mikhail Banks Jr., MD;  Location: [facility] OR;  Service: Orthopedics;  Laterality: Right;    KNEE ARTHROSCOPY Bilateral     TOTAL ABDOMINAL HYSTERECTOMY      TRANSESOPHAGEAL ECHOCARDIOGRAM (EMILIA) N/A 5/2/2021    Procedure: TRANSESOPHAGEAL ECHOCARDIOGRAM;  Surgeon: Jr Rocael Alexander MD;  Location: [facility];  Service: Cardiothoracic;  Laterality: N/A;    UPPER GASTROINTESTINAL ENDOSCOPY  08/2016    US GUIDED FINE NEEDLE ASPIRATION  5/8/2020   ,   Family History   Problem Relation Age of Onset    Diabetes Mother     COPD Mother     Hypertension Mother     Kidney disease Mother     Obesity Mother     Thyroid disease Mother     Diabetes Sister     Diabetes Sister     Diabetes Sister     Diabetes Sister     Malig Hyperthermia Neg Hx    ,   Social History     Tobacco Use    Smoking status: Never     Smokeless tobacco: Never   Vaping Use    Vaping Use: Never used   Substance Use Topics    Alcohol use: Yes     Comment: socially     Drug use: Never   ,   Prior to Admission medications    Medication Sig Start Date End Date Taking? Authorizing Provider   amLODIPine (NORVASC) 10 MG tablet Take 1 tablet by mouth Daily. 9/28/22   Lasha Galindo MD   aspirin (aspirin) 81 MG EC tablet Take 1 tablet by mouth Daily. 2/17/22   Chacho Esteban MD   atorvastatin (LIPITOR) 80 MG tablet Take 1 tablet by mouth Every Night. 10/25/22   Gladis Lang APRN   benzonatate (TESSALON) 200 MG capsule Take 1 capsule by mouth 3 (Three) Times a Day As Needed for Cough. 2/5/23   Ivelisse Archer PA   bisacodyl (DULCOLAX) 10 MG suppository Insert 1 suppository into the rectum Daily As Needed for Constipation (Use if bisacodyl oral is ineffective). 9/27/22   Lahsa Galindo MD   bisacodyl (DULCOLAX) 5 MG EC tablet Take 1 tablet by mouth Daily As Needed for Constipation (Use if polyethylene glycol is ineffective). 9/27/22   Lasha Galindo MD   clopidogrel (PLAVIX) 75 MG tablet Take 1 tablet by mouth Daily. 9/28/22   Lasha Galindo MD   DULoxetine (CYMBALTA) 60 MG capsule Take 60 mg by mouth Daily.    ProviderBi MD   famotidine (PEPCID) 20 MG tablet  10/10/22   ProviderBi MD   glucose blood test strip 1 each by Other route 3 (Three) Times a Day Before Meals. Use as instructed Dx code: E11.65  Prescription for test strips for glucometer patient has at home. Patient needs to give name of glucometer. 9/27/22   Lasha Galindo MD   guaiFENesin-codeine (GUAIFENESIN AC) 100-10 MG/5ML liquid Take 5 mL by mouth 4 (Four) Times a Day As Needed for Cough or Congestion (And chest discomfort). 12/7/22   Nima Wynne MD   insulin glargine (LANTUS, SEMGLEE) 100 UNIT/ML injection Inject 25 Units under the skin into the appropriate area as directed Every Night. 9/27/22   Lasha Galindo MD    insulin glargine (LANTUS, SEMGLEE) 100 UNIT/ML injection Inject 25 Units under the skin into the appropriate area as directed Daily. 9/27/22   Bi Haque MD   insulin lispro (ADMELOG) 100 UNIT/ML injection Inject 0-9 Units under the skin into the appropriate area as directed Every 6 (Six) Hours. 9/27/22   Lasha Galindo MD   Insulin Pen Needle (Pen Needles) 32G X 4 MM misc 1 each Every Night. Dx code: E11.65 9/27/22   Lasha Galindo MD   Isopropyl Alcohol (Alcohol Wipes) 70 % misc Apply 1 each topically 3 (Three) Times a Day Before Meals. Dx code: E11.65 9/27/22   Lasha Galindo MD   Lancets misc 1 each 3 (Three) Times a Day Before Meals. Prescription for whatever brand lancets patient currently using. Dx code: E11.65 9/27/22   Lasha Galindo MD   losartan (COZAAR) 50 MG tablet Take 1 tablet by mouth Daily. 9/28/22   Lasha Galindo MD   meclizine (ANTIVERT) 25 MG tablet  10/10/22   Bi Haque MD   metoclopramide (REGLAN) 5 MG tablet Take 1 tablet by mouth 3 (Three) Times a Day As Needed (N/V). 2/5/23   Ivelisse Archer PA   metoprolol succinate XL (TOPROL-XL) 25 MG 24 hr tablet Take 1 tablet by mouth Daily. 9/28/22   Lasha Galindo MD   ondansetron ODT (ZOFRAN-ODT) 8 MG disintegrating tablet Place 1 tablet on the tongue Every 8 (Eight) Hours As Needed for Nausea or Vomiting. 12/7/22   Nima Wynne MD   oseltamivir (TAMIFLU) 75 MG capsule Take 1 capsule by mouth 2 (Two) Times a Day. 12/7/22   Nima Wynne MD   polyethylene glycol (MIRALAX) 17 g packet Take 17 g by mouth Daily As Needed (Use if senna-docusate is ineffective). 9/27/22   Lasha Galindo MD   sennosides-docusate (PERICOLACE) 8.6-50 MG per tablet Take 2 tablets by mouth 2 (Two) Times a Day. 9/27/22   Lasha Galindo MD   traMADol (ULTRAM) 50 MG tablet  10/10/22   Provider, MD Bi   traZODone (DESYREL) 100 MG tablet  10/12/22   Provider, MD Bi    Allergies:   Zofran [ondansetron hcl], Prochlorperazine, Prochlorperazine edisylate, Prochlorperazine maleate, Hydralazine, Hydralazine hcl, Meperidine, and Prochlorperazine maleate    Current Facility-Administered Medications   Medication Dose Route Frequency Provider Last Rate Last Admin    niCARdipine (CARDENE) 25mg in 250mL NS infusion  5-15 mg/hr Intravenous Titrated Rupal Rasheed MD        sodium chloride 0.9 % flush 10 mL  10 mL Intravenous PRN Paolo Sosa MD         Current Outpatient Medications   Medication Sig Dispense Refill    amLODIPine (NORVASC) 10 MG tablet Take 1 tablet by mouth Daily. 30 tablet 0    aspirin (aspirin) 81 MG EC tablet Take 1 tablet by mouth Daily. 200 tablet 2    atorvastatin (LIPITOR) 80 MG tablet Take 1 tablet by mouth Every Night. 90 tablet 3    benzonatate (TESSALON) 200 MG capsule Take 1 capsule by mouth 3 (Three) Times a Day As Needed for Cough. 30 capsule 0    bisacodyl (DULCOLAX) 10 MG suppository Insert 1 suppository into the rectum Daily As Needed for Constipation (Use if bisacodyl oral is ineffective). 30 each 0    bisacodyl (DULCOLAX) 5 MG EC tablet Take 1 tablet by mouth Daily As Needed for Constipation (Use if polyethylene glycol is ineffective). 30 tablet 0    clopidogrel (PLAVIX) 75 MG tablet Take 1 tablet by mouth Daily. 30 tablet 4    DULoxetine (CYMBALTA) 60 MG capsule Take 60 mg by mouth Daily.      famotidine (PEPCID) 20 MG tablet       glucose blood test strip 1 each by Other route 3 (Three) Times a Day Before Meals. Use as instructed Dx code: E11.65  Prescription for test strips for glucometer patient has at home. Patient needs to give name of glucometer. 100 each 2    guaiFENesin-codeine (GUAIFENESIN AC) 100-10 MG/5ML liquid Take 5 mL by mouth 4 (Four) Times a Day As Needed for Cough or Congestion (And chest discomfort). 60 mL 0    insulin glargine (LANTUS, SEMGLEE) 100 UNIT/ML injection Inject 25 Units under the skin into the appropriate area as directed Every  Night. 10 mL 0    insulin glargine (LANTUS, SEMGLEE) 100 UNIT/ML injection Inject 25 Units under the skin into the appropriate area as directed Daily.      insulin lispro (ADMELOG) 100 UNIT/ML injection Inject 0-9 Units under the skin into the appropriate area as directed Every 6 (Six) Hours. 10 mL 12    Insulin Pen Needle (Pen Needles) 32G X 4 MM misc 1 each Every Night. Dx code: E11.65 100 each 2    Isopropyl Alcohol (Alcohol Wipes) 70 % misc Apply 1 each topically 3 (Three) Times a Day Before Meals. Dx code: E11.65 100 each 2    Lancets misc 1 each 3 (Three) Times a Day Before Meals. Prescription for whatever brand lancets patient currently using. Dx code: E11.65 100 each 2    losartan (COZAAR) 50 MG tablet Take 1 tablet by mouth Daily. 30 tablet 0    meclizine (ANTIVERT) 25 MG tablet       metoclopramide (REGLAN) 5 MG tablet Take 1 tablet by mouth 3 (Three) Times a Day As Needed (N/V). 10 tablet 0    metoprolol succinate XL (TOPROL-XL) 25 MG 24 hr tablet Take 1 tablet by mouth Daily. 30 tablet 0    ondansetron ODT (ZOFRAN-ODT) 8 MG disintegrating tablet Place 1 tablet on the tongue Every 8 (Eight) Hours As Needed for Nausea or Vomiting. 10 tablet 0    oseltamivir (TAMIFLU) 75 MG capsule Take 1 capsule by mouth 2 (Two) Times a Day. 10 capsule 0    polyethylene glycol (MIRALAX) 17 g packet Take 17 g by mouth Daily As Needed (Use if senna-docusate is ineffective). 30 each 0    sennosides-docusate (PERICOLACE) 8.6-50 MG per tablet Take 2 tablets by mouth 2 (Two) Times a Day. 30 tablet 0    traMADol (ULTRAM) 50 MG tablet       traZODone (DESYREL) 100 MG tablet           ________________________________________________________        OBJECTIVE:  Upon today's exam, patient resting comfortably in bed in no acute distress     Neurologic Exam    The patient is awake and alert and oriented x3     Cranial nerve examination demonstrate:  The patient had some blurred vision in the left eye.  There was question if there was  some field cut also on the left side  Pupils are round, reactive to light and accommodation and size of about 3 mm  No ptosis or nystagmus  Funduscopic examination was not successful  Eye movements are conjugate     Sensation on the face and scalp demonstrated decrease sensation on the left side  Muscles of mastication are normal and symmetric  Muscles of  facial expression are normal and symmetric  Hearing is intact bilaterally  Head turning and shoulder shrugs were unremarkable  Tongue was midline  I could not visualize her oropharynx or uvula     Motor examination:  Normal bulk, tone and strength was 5/5 on the right side, drift on the left side both upper and lower extremities  No fasciculations     Sensory examination:  Intact for soft touch, pain and position sensation, but decreased on the left side  Romberg was not evaluated     Reflexes:  0/4     Coordination:  Normal finger-to-nose to finger, rapid alternating movements and toe to finger on the right side, minimal ataxia in finger-to-nose to finger     Gait:  Deferred     Toe signs:  Mute        NIH stroke scale  NIHSS:    Level Of Consciousness 0  0=Alert; keenly responsive 1=Not alert, but arousable by minor stimulation 2=Not alert, requires repeated stimulation 3=Responds only with reflex movements    LOC Questions to Month and age 0  0=Answers both questions correctly 1=Answers one question correctly 2=Answers neither question correctly    LOC Commands      -Open/Close eyes 0    -Open/close  0   0=Performs both tasks correctly 1=Performs one task correctly 2=Performs neighter task correctly     Best Gaze 0  0=Normal 1=Partial gaze palsy 2=Forced deviation, or total gaze paresis    Visual 1  0=No visual loss 1=Partial hemianopia 2=Complete hemianopia 3=Bilateral hemianopia (blind including cortical blindness)    Facial Palsy 0  0=Normal symmetrical movement 1=Minor paralysis (asymmetry) 2=Partial paralysis (lower facde) 3=Complete paralysis (upper  and lower face)    Motor: Left Arm-1  Left leg-1; Right Arm-0 Right Leg-0  0=No drift, limb holds posture for full 10 seconds 1=Drift, limb holds posture, no drift to bed 2=Some antigravity effort, cannot maintain posture, drifts to bed 3=No effort against gravity, limb falls 4=No movement    Limb Ataxia 1  0=Absent 1=Present in one limb 2=Present in two limbs    Sensory 1   0=Normal 1=Mild to moderate sensory loss 2=Severe to total sensory loss    Best Language 0   0=No aphasia, normal 1=Mild to moderate aphasia 2=Severe Aphasia (very few words correct or understood)3=Multe, global aphasia    Dysarthria 0  0=Normal 1=Mild to moderate 2=Severe, unintelligible or mute/anarthric    Extinction/Neglect 0   0=No abnormality 1=Extinction to bilateral simultaneous stimulation 2=Profound neglect    Total  __5      ________________________________________________________   RESULTS REVIEW:    VITAL SIGNS:   Temp:  [97.9 øF (36.6 øC)] 97.9 øF (36.6 øC)  Heart Rate:  [74-87] 87  Resp:  [15-20] 16  BP: (143-181)/(81-91) 143/82     LABS:      Lab 08/01/23  1338   WBC 8.20   HEMOGLOBIN 12.5   HEMATOCRIT 37.9   PLATELETS 318   NEUTROS ABS 4.50   LYMPHS ABS 2.90   MONOS ABS 0.60   EOS ABS 0.20   MCV 86.1   PROTIME 11.1   APTT 27.2         Lab 08/01/23  1338   SODIUM 140   POTASSIUM 4.0   CHLORIDE 103   CO2 26.0   ANION GAP 11.0   BUN 22   CREATININE 0.92   EGFR 67.1   GLUCOSE 169*   CALCIUM 9.7         Lab 08/01/23  1338   TOTAL PROTEIN 7.2   ALBUMIN 3.9   GLOBULIN 3.3   ALT (SGPT) 8   AST (SGOT) 16   BILIRUBIN 0.3   ALK PHOS 58         Lab 08/01/23  1338   HSTROP T 8   PROTIME 11.1   INR 1.04             Lab 08/01/23  1338   ABO TYPING A   RH TYPING Positive   ANTIBODY SCREEN Negative         UA          2/5/2023    12:59   Urinalysis   Squamous Epithelial Cells, UA 3-6    Specific Gravity, UA 1.022    Ketones, UA Negative    Blood, UA Trace    Leukocytes, UA Small (1+)    Nitrite, UA Negative    RBC, UA 0-2    WBC, UA Too Numerous  to Count    Bacteria, UA 4+        Lab Results   Component Value Date    TSH 0.383 09/26/2022     (H) 09/26/2022    HGBA1C 9.9 (H) 02/28/2023    FDVQILAN01 1,415 (H) 09/26/2022       IMAGING STUDIES:  CT Head Without Contrast    Result Date: 8/1/2023  No definite acute intracranial abnormality. Electronically Signed: Antelmo Ramirez  8/1/2023 2:33 PM EDT  Workstation ID: [id number]    CT Angiogram Neck    Result Date: 8/1/2023  Impression: 1. No evidence of large vessel occlusion, significant flow-limiting stenosis or aneurysm formation 2. Overall no great change from the prior exam Electronically Signed: Antelmo Ramirez  8/1/2023 2:20 PM EDT  Workstation ID: [id number]    XR Chest 1 View    Result Date: 8/1/2023  Impression: No active pulmonary process. Electronically Signed: Chung Holland MD  8/1/2023 2:11 PM EDT  Workstation ID: [id number]    CT Head Without Contrast Stroke Protocol    Result Date: 8/1/2023  No acute intracranial abnormality. Lacunar infarct right side of the tomas. Electronically Signed: Antelmo Ramirez  8/1/2023 1:36 PM EDT  Workstation ID: [id number]    CT Angiogram Head w AI Analysis of LVO    Result Date: 8/1/2023  Impression: 1. No evidence of large vessel occlusion, significant flow-limiting stenosis or aneurysm formation 2. Overall no great change from the prior exam Electronically Signed: Antelmo Ramirez  8/1/2023 2:20 PM EDT  Workstation ID: [id number]    CT CEREBRAL PERFUSION WITH & WITHOUT CONTRAST    Result Date: 8/1/2023  Impression: No evidence of core infarct or visualized at risk tissue. Electronically Signed: Antelmo Ramirez  8/1/2023 2:07 PM EDT  Workstation ID: [id number]     I reviewed the patient's new clinical results.    ________________________________________________________     PROBLEM LIST:    * No active hospital problems. *            ASSESSMENT/PLAN:  Likely right hemispheric infarct with left-sided deficits  Both sensory and motor and ataxia    We have proceeded with  TNK and now we will do full stroke work-up and go from there    We will do MRI brain    - Implement post tPA/TNK protocol, please see order set  - Admit to ICU  - Keep systolic blood pressure less than 180/105, Cardene drip as needed, avoid hypotension, vital signs per protocol.  - NPO until bedside swallow test completed and passed and/or cleared by speech therapy.  - Closely monitor neurological status, NIH protocol and PRN for changes in neurological status  - Please call with any acute onset of headache, stat CT head at that time.  - Please monitor and call ICU team with any active bleeding.      For long-term,    Modification of stroke risk factors:   - Blood pressure should be less than 130/80 outpatient, HbA1c less than 6.5, LDL less than 70; b12>500 and smoking cessation if applicable. We would be grateful if the primary team / primary care physician would keep a close watch on the above targets.  - Stroke education  - Follow up with neurologist of choice      I discussed the patient's findings and my recommendations with patient, nursing staff, and consulting provider    Rupal Rasheed MD  08/01/23  14:55 EDT

## 2023-08-01 NOTE — PROGRESS NOTES
Subjective: History of CVA x2    Patient ID: Marge Mcghee is a 68 y.o. female.    CHIEF COMPLAINT: History of CVA x2    History of Present Illness Ms. Mcghee is a very pleasant 68-year-old  female who presented alone for a follow-up after CVA  Hospital follow up CVA.  She presented today riding in a wheelchair alone. 8/1/2023 the patient had difficulty speaking and her  brought her to the hospital.  She has a history of cardiac bypass and prior CVA in 2022 due to hypertension and diabetes.    This current CVA8/1/ was a lacunar infarct in the right tomas of 11 mm.  The patient did not meet criteria for tPA.  She was hypertensive on arrival.  See testing below.  The patient is currently a resident at [facility] and is getting intensive physical therapy.    She has a prior history of: Polypharmacy, hyperlipidemia, chest pain, stable angina pectoris, status post CABG x3, hypertensive emergency, essential hypertension, vitamin D deficiency, type 2 diabetes with retinopathy, adrenal nodule, thyroid nodule, obesity, diabetic retinopathy, hepatic steatosis, GERD, REINA, UTI, hypomagnesia, osteoporosis, occipital neuralgia, encephalopathy.    Previously the patient was on aspirin and Plavix and had a stroke despite these medications, she was placed on Brilinta 90 mg bid and aspirin and the Plavix was not discontinued.  I will discontinue Plavix today.    IMAGING STUDIES:  CT Head Without Contrast  Result Date: 8/1/2023  No definite acute intracranial abnormality. Electronically Signed: Antelmo Ramirez  8/1/2023 2:33 PM EDT  Workstation ID: [id number]     CT Angiogram Neck   Result Date: 8/1/2023  Impression: 1. No evidence of large vessel occlusion, significant flow-limiting stenosis or aneurysm formation 2. Overall no great change from the prior exam Electronically Signed: Antelmo Ramirez  8/1/2023 2:20 PM EDT  Workstation ID: [id number]     XR Chest 1 View  Result Date:  8/1/2023  Impression: No active pulmonary process. Electronically Signed: Chung Holland MD  8/1/2023 2:11 PM EDT  Workstation ID: [id number]     CT Head Without Contrast Stroke Protocol  Result Date: 8/1/2023  No acute intracranial abnormality. Lacunar infarct right side of the tomas. Electronically Signed: Antelmo Ramirez  8/1/2023 1:36 PM EDT  Workstation ID: [id number]     CT Angiogram Head w AI Analysis of LVO  Result Date: 8/1/2023  Impression: 1. No evidence of large vessel occlusion, significant flow-limiting stenosis or aneurysm formation 2. Overall no great change from the prior exam Electronically Signed: Antelmo Ramirez  8/1/2023 2:20 PM EDT  Workstation ID: [id number]     CT CEREBRAL PERFUSION WITH & WITHOUT CONTRAST  Result Date: 8/1/2023  Impression: No evidence of core infarct or visualized at risk tissue. Electronically Signed: Antelmo Ramirez  8/1/2023 2:07 PM EDT  Workstation ID: [id number]       2022 Prior CVA Testing  CT Angiogram Neck  Result Date: 9/25/2022   1. No large vessel occlusion or filling defect to indicate cerebral thrombus. 2. No significant carotid artery stenosis. 3. Both vertebral arteries are patent. 4. Incidental findings as noted above.  Electronically Signed By-Darci Okeefe MD On:9/25/2022 3:00 PM This report was finalized on [id number] by  Darci Okeefe MD.     XR Chest 1 View     Result Date: 9/25/2022   1. Mild cardiomegaly. 2. No active pulmonary disease.  Electronically Signed By-Darci Okeefe MD On:9/25/2022 3:05 PM This report was finalized on [id number] by  Darci Okeefe MD.     CT Head Without Contrast Stroke Protocol  Result Date: 9/25/2022  IMPRESSION :  1. Normal study. 2. The results were discussed with Dr. Paolo Sosa M.D. by telephone at 1427 hours.  Electronically Signed By-Darci Okeefe MD On:9/25/2022 2:28 PM This report was finalized on [id number] by  Darci Okeefe MD.     CT Angiogram Head w AI Analysis of LVO  Result Date: 9/25/2022   1. No large vessel  occlusion or filling defect to indicate cerebral thrombus. 2. No significant carotid artery stenosis. 3. Both vertebral arteries are patent. 4. Incidental findings as noted above.  Electronically Signed By-Darci Okeefe MD On:9/25/2022 3:00 PM This report was finalized on [id number] by  Darci Okeefe MD.     CT CEREBRAL PERFUSION WITH & WITHOUT CONTRAST  Result Date: 9/25/2022  No CT perfusion abnormality.  Electronically Signed By-Darci Okeefe MD On:9/25/2022 2:29 PM This report was finalized on [id number] by  Darci Okeefe MD.       The following portions of the patient's history were reviewed and updated as appropriate: allergies, current medications, past family history, past medical history, past social history, past surgical history and problem list.      Family History   Problem Relation Age of Onset    Diabetes Mother     COPD Mother     Hypertension Mother     Kidney disease Mother     Obesity Mother     Thyroid disease Mother     Diabetes Sister     Diabetes Sister     Diabetes Sister     Diabetes Sister     Malig Hyperthermia Neg Hx        Past Medical History:   Diagnosis Date    Adrenal nodule 11/16/2016    FINDINGS: Mild hepatic steatosis may be present. Cholecystectomy. No biliary ductal dilatation. Negative pancreas, spleen, left adrenal gland, and kidneys. The right adrenal presumed adenoma has increased slightly, measuring 3 x 4 cm in diameter,  compared to 2 x 3.5 cm on the previous exam. The attenuation values are consistent with an adenoma. Done at [facility] in August 2018    Age-related osteoporosis without current pathological fracture 11/16/2016    Arthritis     Delayed surgical wound healing     Essential hypertension 11/16/2016    Gastroesophageal reflux disease with esophagitis 11/16/2016    Hepatic steatosis 11/16/2016    History of anemia     History of sepsis     FROM UTI    Hyperlipidemia 11/16/2016    Lisfranc's dislocation     LEFT WITH FRACTURES NONHEALING FOOT     Osteomyelitis of left foot 11/2020    RLS (restless legs syndrome)     Squamous cell skin cancer 2014    Excised by Dr. James    Thyroid nodule 10/18/2019    BENIGN    Type 2 diabetes mellitus with peripheral neuropathy 11/16/2016    Vitamin D deficiency 1/25/2019       Social History     Socioeconomic History    Marital status:    Tobacco Use    Smoking status: Never    Smokeless tobacco: Never   Vaping Use    Vaping Use: Never used   Substance and Sexual Activity    Alcohol use: Yes     Comment: socially     Drug use: Never    Sexual activity: Defer         Current Outpatient Medications:     amLODIPine (NORVASC) 5 MG tablet, Take 1 tablet by mouth Daily., Disp: , Rfl:     aspirin 81 MG chewable tablet, Chew 1 tablet Daily., Disp: 30 tablet, Rfl: 1    atorvastatin (LIPITOR) 80 MG tablet, Take 1 tablet by mouth Every Night., Disp: 90 tablet, Rfl: 3    butalbital-acetaminophen-caffeine (FIORICET, ESGIC) -40 MG per tablet, Take 2 tablets by mouth Every 6 (Six) Hours As Needed for Headache., Disp: 20 tablet, Rfl: 0    clopidogrel (PLAVIX) 75 MG tablet, Take 1 tablet by mouth Daily., Disp: 30 tablet, Rfl: 4    DULoxetine (CYMBALTA) 60 MG capsule, Take 1 capsule by mouth Daily., Disp: , Rfl:     famotidine (PEPCID) 20 MG tablet, Take 1 tablet by mouth 2 (Two) Times a Day As Needed., Disp: , Rfl:     glucose blood test strip, 1 each by Other route 3 (Three) Times a Day Before Meals. Use as instructed Dx code: E11.65 Prescription for test strips for glucometer patient has at home. Patient needs to give name of glucometer., Disp: 100 each, Rfl: 2    Insulin Pen Needle (Pen Needles) 32G X 4 MM misc, 1 each Every Night. Dx code: E11.65, Disp: 100 each, Rfl: 2    Isopropyl Alcohol (Alcohol Wipes) 70 % misc, Apply 1 each topically 3 (Three) Times a Day Before Meals. Dx code: E11.65, Disp: 100 each, Rfl: 2    Lancets misc, 1 each 3 (Three) Times a Day Before Meals. Prescription for whatever brand lancets patient  currently using. Dx code: E11.65, Disp: 100 each, Rfl: 2    multivitamin with minerals tablet tablet, Take 1 tablet by mouth Daily., Disp: , Rfl:     polyethylene glycol (MIRALAX) 17 g packet, Take 17 g by mouth Daily., Disp: , Rfl:     Tirzepatide (Mounjaro) 5 MG/0.5ML solution pen-injector, Inject 0.5 mL under the skin into the appropriate area as directed 1 (One) Time Per Week. Sunday, Disp: , Rfl:     traZODone (DESYREL) 50 MG tablet, Take 1 tablet by mouth At Night As Needed., Disp: , Rfl:   No current facility-administered medications for this visit.    Review of Systems   Constitutional:  Positive for fatigue. Negative for appetite change.   HENT:  Negative for ear pain and trouble swallowing.    Eyes:  Negative for pain and itching.   Respiratory:  Negative for cough and shortness of breath.    Cardiovascular:  Negative for chest pain.   Gastrointestinal:  Negative for abdominal pain and nausea.   Endocrine: Negative for cold intolerance and heat intolerance.   Genitourinary:  Negative for frequency and urgency.   Musculoskeletal:  Negative for back pain and neck pain.   Neurological:  Positive for dizziness and light-headedness.   Psychiatric/Behavioral:  Positive for agitation. Negative for confusion.    All other systems reviewed and are negative.     I have reviewed ROS completed by medical assistant.     Objective:    Neurologic Exam     Mental Status   Oriented to person, place, and time.     Cranial Nerves     CN III, IV, VI   Pupils are equal, round, and reactive to light.    Gait, Coordination, and Reflexes     Coordination   Tandem walking coordination: abnormal (Unable to assess in wheelchair)    Physical Exam  Vitals reviewed.   Constitutional:       Appearance: She is overweight.       HENT:      Head: Normocephalic and atraumatic.        Right Ear: Tympanic membrane normal.      Left Ear: Tympanic membrane normal.      Nose: Nose normal.      Mouth/Throat:      Mouth: Mucous membranes are  "moist.   Eyes:      General: Lids are normal. No visual field deficit.     Extraocular Movements: Extraocular movements intact.      Right eye: Normal extraocular motion and no nystagmus.      Left eye: Normal extraocular motion and no nystagmus.      Pupils: Pupils are equal, round, and reactive to light.     Cardiovascular:      Rate and Rhythm: Normal rate and regular rhythm.      Pulses: Normal pulses.      Heart sounds: Normal heart sounds, S1 normal and S2 normal.   Pulmonary:      Effort: Pulmonary effort is normal.      Breath sounds: Normal breath sounds.   Musculoskeletal:        Arms:       Cervical back: Full passive range of motion without pain and normal range of motion.        Legs:       Comments: Right upper extremity right lower extremity 5/5 including  dorsiflexion plantarflexion of feet, left upper left lower and  and dorsiflexion 4 of 5,    Skin:     General: Skin is warm and dry.      Comments: Denied any numbness or tingling   Neurological:      Mental Status: She is alert and oriented to person, place, and time.      Cranial Nerves: Facial asymmetry (Left facial droop) present.      Sensory: Sensation is intact. No sensory deficit.      Motor: Weakness (Left upper left lower 4 of 5 strength) present. No tremor, atrophy, abnormal muscle tone, seizure activity or pronator drift.      Coordination: Romberg sign positive (Unable to complete in wheelchair). Heel to Shin Test abnormal (Decreased left leg). Impaired rapid alternating movements (Decreased left arm/hand).      Gait: Gait abnormal (Unable to assess, only in wheelchair no walker) and tandem walk abnormal (Unable to assess in wheelchair).   Psychiatric:         Mood and Affect: Mood normal.         Behavior: Behavior is cooperative.       The patient was educated on the Stroke Acronym \"BE FAST\" B=Balance issues, E=Vision changes, F=Face weakness or numbness, A=Arm or Leg weakness or numbness, S=Speech problems and T=Time to call, " "911 She voiced understanding.  She was also given a pamphlet outlining \"BEFAST\".     Assessment/Plan:    PCP to control: BP<130/90, A1c<6.5, LDL<70, B12>500, control weight, exercise 20 min TIW, PT, OT, continue anticoagulant, continue statin, healthy heart diet.    Diagnoses and all orders for this visit:    1. H/O: CVA (cerebrovascular accident) (Primary)    Continue Ticagrelor, ASA and Discontinue Plavix (conferred with [facility] hospitalist neurology)  Agree with intensive therapy at [place].  PCP to control: BP<130/90, A1c<6.5, LDL<70, B12>500, control weight, exercise 20 min TIW, PT, OT, continue anticoagulant, continue statin, healthy heart diet.  The patient states that she does not have any irregular breathing at night but she is to confer with her .  If she does have any irregular breathing we will be happy to pursue sleep testing with this patient as well as that is a stroke risk also.    Return if symptoms worsen or fail to improve.    I spent 40 minutes caring for Marge on this date of service. This time includes time spent by me in the following activities: preparing for the visit, reviewing tests, obtaining and/or reviewing a separately obtained history, performing a medically appropriate examination and/or evaluation, counseling and educating the patient/family/caregiver, ordering medications, tests, or procedures, referring and communicating with other health care professionals, independently interpreting results and communicating that information with the patient/family/caregiver, and care coordination.      This document has been electronically signed by Neelam RIVERA on August 7, 2023 10:04 EDT  "

## 2023-08-01 NOTE — THERAPY EVALUATION
Acute Care - Speech Language Pathology   Swallow Initial Evaluation  [place]     Patient Name: Marge Mcghee  : 1955  MRN: [id number]  Today's Date: 2023               Admit Date: 2023    Visit Dx:     ICD-10-CM ICD-9-CM   1. Acute CVA (cerebrovascular accident)  I63.9 434.91     Patient Active Problem List   Diagnosis    Type 2 diabetes mellitus with peripheral neuropathy    Age-related osteoporosis without current pathological fracture    Mixed hyperlipidemia    Hepatic steatosis    Gastroesophageal reflux disease with esophagitis    Adrenal nodule    Vitamin D deficiency    Thyroid nodule    Urinary tract infection without hematuria    REINA (acute kidney injury)    Hyperglycemia    Acute right flank pain    Vomiting    Hypomagnesemia    Dehydration    Obesity (BMI 30-39.9)    Complicated migraine    Occipital neuralgia of left side    Polypharmacy    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    Lisfranc dislocation    Delayed surgical wound healing    Right foot infection    Chest pain    GERD (gastroesophageal reflux disease)    Stable angina pectoris    S/P CABG x 3    Encephalopathy, metabolic    Hypertensive emergency    Dysarthria    CVA (cerebral vascular accident)    Coronary artery disease involving coronary bypass graft of native heart without angina pectoris    Essential hypertension     Past Medical History:   Diagnosis Date    Adrenal nodule 2016    FINDINGS: Mild hepatic steatosis may be present. Cholecystectomy. No biliary ductal dilatation. Negative pancreas, spleen, left adrenal gland, and kidneys. The right adrenal presumed adenoma has increased slightly, measuring 3 x 4 cm in diameter,  compared to 2 x 3.5 cm on the previous exam. The attenuation values are consistent with an adenoma. Done at [facility] in 2018    Age-related osteoporosis without current pathological fracture 2016     Arthritis     Delayed surgical wound healing     Essential hypertension 11/16/2016    Gastroesophageal reflux disease with esophagitis 11/16/2016    Hepatic steatosis 11/16/2016    History of anemia     History of sepsis     FROM UTI    Hyperlipidemia 11/16/2016    Lisfranc's dislocation     LEFT WITH FRACTURES NONHEALING FOOT    Osteomyelitis of left foot 11/2020    RLS (restless legs syndrome)     Squamous cell skin cancer 2014    Excised by Dr. James    Thyroid nodule 10/18/2019    BENIGN    Type 2 diabetes mellitus with peripheral neuropathy 11/16/2016    Vitamin D deficiency 1/25/2019     Past Surgical History:   Procedure Laterality Date    BREAST BIOPSY Left     7/2019. BENIGN    CARDIAC CATHETERIZATION Left 5/2/2021    Procedure: Cardiac Catheterization/Vascular Study;  Surgeon: Chacho Esteban MD;  Location: [facility] CATH INVASIVE LOCATION;  Service: Cardiovascular;  Laterality: Left;    CARDIAC CATHETERIZATION N/A 5/2/2021    Procedure: Intra-Aortic Baloon Pump Insertion;  Surgeon: Chacho Esteban MD;  Location: [facility] CATH INVASIVE LOCATION;  Service: Cardiovascular;  Laterality: N/A;    CATARACT EXTRACTION WITH INTRAOCULAR LENS IMPLANT Bilateral     CHOLECYSTECTOMY      COLONOSCOPY      CORONARY ARTERY BYPASS GRAFT N/A 5/2/2021    Procedure: CORONARY ARTERY BYPASS WITH INTERNAL MAMMARY ARTERY GRAFT;  Surgeon: Jr Rocael Alexander MD;  Location: [facility] CVOR;  Service: Cardiothoracic;  Laterality: N/A;    FOOT FUSION Right 11/13/2020    Procedure: RIGHT OPEN TREATMENT LISFRANC INJURY, OPEN REDUCTION INTERNAL FIXATION MEDIAL/MIDDLE CUNEIFORM FRACTURE AND 2ND/3RD METATARSAL FRACTURE 1ST 2ND POSSIBLE 3RD TARSOMETATARSAL ARTHRODESIS INTERCUNEIFORM ARTHRODESIS CALCANEAL BONE GRAFT;  Surgeon: Mikhail Banks Jr., MD;  [facility] [place] [facility]  Service: Orthopedics;  Laterality: Right;    INCISION AND DRAINAGE LEG Right 1/8/2021    Procedure: RIGHT IRRIGATION AND DEBRIDEMENT OF FOOT WITH SECONDARY CLOSURE AND HARDWARE  REMOVAL;  Surgeon: Mikhail Banks Jr., MD;  Location: [facility] MAIN OR;  Service: Orthopedics;  Laterality: Right;    KNEE ARTHROSCOPY Bilateral     TOTAL ABDOMINAL HYSTERECTOMY      TRANSESOPHAGEAL ECHOCARDIOGRAM (EMILIA) N/A 5/2/2021    Procedure: TRANSESOPHAGEAL ECHOCARDIOGRAM;  Surgeon: Jr Rocael Alexander MD;  Location: [facility] CVOR;  Service: Cardiothoracic;  Laterality: N/A;    UPPER GASTROINTESTINAL ENDOSCOPY  08/2016    US GUIDED FINE NEEDLE ASPIRATION  5/8/2020       SLP Recommendation and Plan  SLP Swallowing Diagnosis: other (see comments) (suspect mild oral dysphagia related to L sided parasthesia) (08/01/23 1600)  SLP Diet Recommendation: regular textures, thin liquids (08/01/23 1600)  Recommended Precautions and Strategies: upright posture during/after eating, small bites of food and sips of liquid, general aspiration precautions (08/01/23 1600)  SLP Rec. for Method of Medication Administration: as tolerated (08/01/23 1600)     Monitor for Signs of Aspiration: yes, notify SLP if any concerns, cough (08/01/23 1600)     Swallow Criteria for Skilled Therapeutic Interventions Met: demonstrates skilled criteria (08/01/23 1600)  Anticipated Discharge Disposition (SLP): unknown (08/01/23 1600)     Therapy Frequency (Swallow): PRN (08/01/23 1600)  Predicted Duration Therapy Intervention (Days): until discharge (08/01/23 1600)        SWALLOW EVALUATION (last 72 hours)       SLP Adult Swallow Evaluation       Row Name 08/01/23 1600       Rehab Evaluation    Document Type evaluation  -MC       General Information    Patient Profile Reviewed yes  -MC    Pertinent History Of Current Problem Patient is a 68-year-old female who was admitted with left-sided weakness and numbness for stroke workout. CT was negative and the patient was administered TPA and admitted to the ICU. Patient failed stroke swallow screen as administered appropriately by nursing due to left-sided facial droop. CXR negative for any acute findings. Pt  is familiar to ST dept at [facility] from admission in September of 2022 in which she suffered an acute R pontine stroke. She underwent swallow evaluation and was placed on a regular diet at that time as well as a cognitive linguistic assessment with cognitive linguistic deficits identified and goals made.      -    Current Method of Nutrition NPO  2/2 failed stroke swallow screen  -    Prior Level of Function-Communication WFL  -    Prior Level of Function-Swallowing no diet consistency restrictions  -       Oral Motor Structure and Function    Dentition Assessment natural, present and adequate  -    Secretion Management WNL/WFL  -       Oral Musculature and Cranial Nerve Assessment    Oral Labial or Buccal Impairment, Detail, Cranial Nerve VII (Facial): left labial droop  -    Oral Motor, Comment endorses some numbness/disrupted sensation on L side but is able to bring tongue to L sulcus and demonstrate lingual sweep.  -       General Eating/Swallowing Observations    Respiratory Support Currently in Use room air  -    Eating/Swallowing Skills needed assist  -    Positioning During Eating upright 90 degree  -       Clinical Swallow Eval    Oral Prep Phase WFL  -    Oral Transit WFL  -    Oral Residue WFL  -    Pharyngeal Phase --  cough x1  -    Esophageal Phase unremarkable  -    Clinical Swallow Evaluation Summary Patient awake, alert, and oriented. Head of bed was elevated with 90ø hip flexion. The patient was assessed with full range of consistencies by all presentation methods. She did require some package opening/food prep due to left hand weakness but was able to self-feed. The patient does endorse some left-sided numbness but was able to demonstrate left lingual lateralization and able to bring tongue to left sulcus. Cough is appreciated x1 only during full exam, occurring during oral phase on large bolus regular solids and liquid wash. The patient was counseled on safe swallow  strategies including small bites and sips at slow right as well as upright position during & for at least 30 minutes following all meals.     Recommend regular diet with thin liquids at this time. ST will follow up to establish tolerance of diet and for further recommendations as indicated as well as a speech, language, and/or cognitive linguistic assessment pending imaging identifies acute findings.     -       SLP Evaluation Clinical Impression    SLP Swallowing Diagnosis other (see comments)  suspect mild oral dysphagia related to L sided parasthesia  -    Swallow Criteria for Skilled Therapeutic Interventions Met demonstrates skilled criteria  -       Recommendations    Therapy Frequency (Swallow) PRN  -    Predicted Duration Therapy Intervention (Days) until discharge  -    SLP Diet Recommendation regular textures;thin liquids  -    Recommended Precautions and Strategies upright posture during/after eating;small bites of food and sips of liquid;general aspiration precautions  -    SLP Rec. for Method of Medication Administration as tolerated  -    Monitor for Signs of Aspiration yes;notify SLP if any concerns;cough  -    Anticipated Discharge Disposition (SLP) unknown  -       Swallow Goals (SLP)    Swallow LTGs Swallow Long Term Goal (free text)  -    Swallow STGs diet tolerance goal selection (SLP)  -    Diet Tolerance Goal Selection (SLP) Swallow Short Term Goal 1;Swallow Short Term Goal 2  -       (LTG) Swallow    (LTG) Swallow The patient will maximize swallow function for least restrictive po diet, exhibiting no complications associated with dysphagia, adequate po intake, and demonstrating independent use of safe swallow compensations.  -    [place] (Swallow Long Term Goal) independently (over 90% accuracy)  -    Progress/Outcomes (Swallow Long Term Goal) new goal  -       (STG) Swallow 1    (STG) Swallow 1 The patient will verbalize/demonstrate understanding of safe  swallow strategies/positioning including but not limited to: small bites/sips at slow rate, clear oral cavity in b/t bites, 90 degree hip flexion for all PO  -MC    Progress/Outcomes (Swallow Short Term Goal 1) new goal  -MC              User Key  (r) = Recorded By, (t) = Taken By, (c) = Cosigned By      Initials Name Effective Dates    Elizabeth Proctor SLP 08/16/22 -                        SLP GOALS       Row Name 08/01/23 1600             (LTG) Swallow    (LTG) Swallow The patient will maximize swallow function for least restrictive po diet, exhibiting no complications associated with dysphagia, adequate po intake, and demonstrating independent use of safe swallow compensations.  -MC      [place] (Swallow Long Term Goal) independently (over 90% accuracy)  -MC      Progress/Outcomes (Swallow Long Term Goal) new goal  -MC         (STG) Swallow 1    (STG) Swallow 1 The patient will verbalize/demonstrate understanding of safe swallow strategies/positioning including but not limited to: small bites/sips at slow rate, clear oral cavity in b/t bites, 90 degree hip flexion for all PO  -MC      Progress/Outcomes (Swallow Short Term Goal 1) new goal  -MC                User Key  (r) = Recorded By, (t) = Taken By, (c) = Cosigned By      Initials Name Provider Type    Elizabeth Proctor, SLP Speech and Language Pathologist                      URIEL Zavala  8/1/2023

## 2023-08-01 NOTE — ED PROVIDER NOTES
Subjective   History of Present Illness  Patient is a 5-year-old female complaining of 2-hour history of tingling to her left face left arm as well as weakness to her left arm.  She also planes of mild dizziness and difficulty with her thought process.  She denies other associated complaints.    Review of Systems    Past Medical History:   Diagnosis Date    Adrenal nodule 11/16/2016    FINDINGS: Mild hepatic steatosis may be present. Cholecystectomy. No biliary ductal dilatation. Negative pancreas, spleen, left adrenal gland, and kidneys. The right adrenal presumed adenoma has increased slightly, measuring 3 x 4 cm in diameter,  compared to 2 x 3.5 cm on the previous exam. The attenuation values are consistent with an adenoma. Done at [facility] in August 2018    Age-related osteoporosis without current pathological fracture 11/16/2016    Arthritis     Delayed surgical wound healing     Essential hypertension 11/16/2016    Gastroesophageal reflux disease with esophagitis 11/16/2016    Hepatic steatosis 11/16/2016    History of anemia     History of sepsis     FROM UTI    Hyperlipidemia 11/16/2016    Lisfranc's dislocation     LEFT WITH FRACTURES NONHEALING FOOT    Osteomyelitis of left foot 11/2020    RLS (restless legs syndrome)     Squamous cell skin cancer 2014    Excised by Dr. James    Thyroid nodule 10/18/2019    BENIGN    Type 2 diabetes mellitus with peripheral neuropathy 11/16/2016    Vitamin D deficiency 1/25/2019       Allergies   Allergen Reactions    Zofran [Ondansetron Hcl] Nausea And Vomiting     Does the opposite of its purpose    Prochlorperazine Other (See Comments)     CAUSED SEIZURE    Prochlorperazine Edisylate Hives    Prochlorperazine Maleate Irritability    Hydralazine Itching and Rash    Hydralazine Hcl Itching and Rash    Meperidine Itching and Swelling    Prochlorperazine Maleate Other (See Comments)     CAUSES SEIZURE       Past Surgical History:   Procedure Laterality Date     BREAST BIOPSY Left     7/2019. BENIGN    CARDIAC CATHETERIZATION Left 5/2/2021    Procedure: Cardiac Catheterization/Vascular Study;  Surgeon: Chacho Esteban MD;  Location: [facility] CATH INVASIVE LOCATION;  Service: Cardiovascular;  Laterality: Left;    CARDIAC CATHETERIZATION N/A 5/2/2021    Procedure: Intra-Aortic Baloon Pump Insertion;  Surgeon: Chacho Esteban MD;  Location: [facility] CATH INVASIVE LOCATION;  Service: Cardiovascular;  Laterality: N/A;    CATARACT EXTRACTION WITH INTRAOCULAR LENS IMPLANT Bilateral     CHOLECYSTECTOMY      COLONOSCOPY      CORONARY ARTERY BYPASS GRAFT N/A 5/2/2021    Procedure: CORONARY ARTERY BYPASS WITH INTERNAL MAMMARY ARTERY GRAFT;  Surgeon: Jr Rocael Alexander MD;  Location: [facility];  Service: Cardiothoracic;  Laterality: N/A;    FOOT FUSION Right 11/13/2020    Procedure: RIGHT OPEN TREATMENT LISFRANC INJURY, OPEN REDUCTION INTERNAL FIXATION MEDIAL/MIDDLE CUNEIFORM FRACTURE AND 2ND/3RD METATARSAL FRACTURE 1ST 2ND POSSIBLE 3RD TARSOMETATARSAL ARTHRODESIS INTERCUNEIFORM ARTHRODESIS CALCANEAL BONE GRAFT;  Surgeon: Mikhail Banks Jr., MD;  Location: [facility];  Service: Orthopedics;  Laterality: Right;    INCISION AND DRAINAGE LEG Right 1/8/2021    Procedure: RIGHT IRRIGATION AND DEBRIDEMENT OF FOOT WITH SECONDARY CLOSURE AND HARDWARE REMOVAL;  Surgeon: Mikhail Banks Jr., MD;  Location: [facility];  Service: Orthopedics;  Laterality: Right;    KNEE ARTHROSCOPY Bilateral     TOTAL ABDOMINAL HYSTERECTOMY      TRANSESOPHAGEAL ECHOCARDIOGRAM (EMILIA) N/A 5/2/2021    Procedure: TRANSESOPHAGEAL ECHOCARDIOGRAM;  Surgeon: Jr Rocael Alexander MD;  Location: [facility];  Service: Cardiothoracic;  Laterality: N/A;    UPPER GASTROINTESTINAL ENDOSCOPY  08/2016    US GUIDED FINE NEEDLE ASPIRATION  5/8/2020       Family History   Problem Relation Age of Onset    Diabetes Mother     COPD Mother     Hypertension Mother     Kidney disease Mother     Obesity Mother     Thyroid disease Mother      Diabetes Sister     Diabetes Sister     Diabetes Sister     Diabetes Sister     Malig Hyperthermia Neg Hx        Social History     Socioeconomic History    Marital status:    Tobacco Use    Smoking status: Never    Smokeless tobacco: Never   Vaping Use    Vaping Use: Never used   Substance and Sexual Activity    Alcohol use: Yes     Comment: socially     Drug use: Never    Sexual activity: Defer           Objective   Physical Exam  Neurologic exam shows patient had mild left arm weakness on exam.  She has no other focal neurologic deficits.  Neck has no adenopathy JVD or bruits.  Lungs are clear.  Heart has regular rate rhythm without murmur rub or gallop.  Chest is nontender.  Abdomen soft nontender.  Patient with normal bowel sounds.  Extremity exam is no cyanosis or edema.  Procedures     EKG interpretation shows normal sinus rhythm at a rate of 86 with no acute ST change      ED Course      Results for orders placed or performed during the hospital encounter of 08/01/23   Comprehensive Metabolic Panel    Specimen: Blood   Result Value Ref Range    Glucose 169 (H) 65 - 99 mg/dL    BUN 22 8 - 23 mg/dL    Creatinine 0.92 0.57 - 1.00 mg/dL    Sodium 140 136 - 145 mmol/L    Potassium 4.0 3.5 - 5.2 mmol/L    Chloride 103 98 - 107 mmol/L    CO2 26.0 22.0 - 29.0 mmol/L    Calcium 9.7 8.6 - 10.5 mg/dL    Total Protein 7.2 6.0 - 8.5 g/dL    Albumin 3.9 3.5 - 5.2 g/dL    ALT (SGPT) 8 1 - 33 U/L    AST (SGOT) 16 1 - 32 U/L    Alkaline Phosphatase 58 39 - 117 U/L    Total Bilirubin 0.3 0.0 - 1.2 mg/dL    Globulin 3.3 gm/dL    A/G Ratio 1.2 g/dL    BUN/Creatinine Ratio 23.9 7.0 - 25.0    Anion Gap 11.0 5.0 - 15.0 mmol/L    eGFR 67.1 >60.0 mL/min/1.73   Protime-INR    Specimen: Blood   Result Value Ref Range    Protime 11.1 9.6 - 11.7 Seconds    INR 1.04 0.93 - 1.10   aPTT    Specimen: Blood   Result Value Ref Range    PTT 27.2 24.0 - 31.0 seconds   Single High Sensitivity Troponin T    Specimen: Blood   Result  Value Ref Range    HS Troponin T 8 <10 ng/L   CBC Auto Differential    Specimen: Blood   Result Value Ref Range    WBC 8.20 3.40 - 10.80 10*3/mm3    RBC 4.40 3.77 - 5.28 10*6/mm3    Hemoglobin 12.5 12.0 - 15.9 g/dL    Hematocrit 37.9 34.0 - 46.6 %    MCV 86.1 79.0 - 97.0 fL    MCH 28.5 26.6 - 33.0 pg    MCHC 33.1 31.5 - 35.7 g/dL    RDW 14.0 12.3 - 15.4 %    RDW-SD 44.2 37.0 - 54.0 fl    MPV 6.8 6.0 - 12.0 fL    Platelets 318 140 - 450 10*3/mm3    Neutrophil % 54.3 42.7 - 76.0 %    Lymphocyte % 34.7 19.6 - 45.3 %    Monocyte % 7.2 5.0 - 12.0 %    Eosinophil % 2.8 0.3 - 6.2 %    Basophil % 1.0 0.0 - 1.5 %    Neutrophils, Absolute 4.50 1.70 - 7.00 10*3/mm3    Lymphocytes, Absolute 2.90 0.70 - 3.10 10*3/mm3    Monocytes, Absolute 0.60 0.10 - 0.90 10*3/mm3    Eosinophils, Absolute 0.20 0.00 - 0.40 10*3/mm3    Basophils, Absolute 0.10 0.00 - 0.20 10*3/mm3    nRBC 0.2 0.0 - 0.2 /100 WBC   POC Glucose Once    Specimen: Blood   Result Value Ref Range    Glucose 155 (H) 70 - 105 mg/dL   ECG 12 Lead Stroke Evaluation   Result Value Ref Range    QT Interval 388 ms   Type & Screen    Specimen: Blood   Result Value Ref Range    ABO Type A     RH type Positive     Antibody Screen Negative     T&S Expiration Date 8/4/2023 11:59:59 PM      CT Head Without Contrast    Result Date: 8/1/2023  No definite acute intracranial abnormality. Electronically Signed: Antelmo Ramirez  8/1/2023 2:33 PM EDT  Workstation ID: [id number]    CT Angiogram Neck    Result Date: 8/1/2023  Impression: 1. No evidence of large vessel occlusion, significant flow-limiting stenosis or aneurysm formation 2. Overall no great change from the prior exam Electronically Signed: Antelmo Ramirez  8/1/2023 2:20 PM EDT  Workstation ID: [id number]    XR Chest 1 View    Result Date: 8/1/2023  Impression: No active pulmonary process. Electronically Signed: Chung Holland MD  8/1/2023 2:11 PM EDT  Workstation ID: [id number]    CT Head Without Contrast Stroke Protocol    Result  Date: 8/1/2023  No acute intracranial abnormality. Lacunar infarct right side of the tomas. Electronically Signed: Antelmo Ramirez  8/1/2023 1:36 PM EDT  Workstation ID: [id number]    CT Angiogram Head w AI Analysis of LVO    Result Date: 8/1/2023  Impression: 1. No evidence of large vessel occlusion, significant flow-limiting stenosis or aneurysm formation 2. Overall no great change from the prior exam Electronically Signed: Antelmo Ramirez  8/1/2023 2:20 PM EDT  Workstation ID: [id number]    CT CEREBRAL PERFUSION WITH & WITHOUT CONTRAST    Result Date: 8/1/2023  Impression: No evidence of core infarct or visualized at risk tissue. Electronically Signed: Antelmo Ramirez  8/1/2023 2:07 PM EDT  Workstation ID: [id number]                                        Medical Decision Making  Patient had a code stroke initiated.  CT CTA CTP shows no acute abnormality noted per both the neurologist who consulted immediately in the ED as well as with the radiologist.  Patient did receive TNK per the neurologist due to the focal left arm deficit.  On reexamination patient has no change.  Metabolic panel is at baseline without renal sufficiency electrolyte abnormality there is no acute infectious process.  Patiently placed intensive care unit due to the CVA with TNK given.  I did speak the on-call intensivist.  Total critical care time 40 minutes    Amount and/or Complexity of Data Reviewed  Labs: ordered. Decision-making details documented in ED Course.  Radiology: ordered and independent interpretation performed.  ECG/medicine tests: ordered and independent interpretation performed.    Risk  Prescription drug management.  Drug therapy requiring intensive monitoring for toxicity.  Decision regarding hospitalization.        Final diagnoses:   Acute CVA (cerebrovascular accident)       ED Disposition  ED Disposition       ED Disposition   Decision to Admit    Condition   --    Comment   --               No follow-up provider  specified.       Medication List      No changes were made to your prescriptions during this visit.            Paolo Sosa MD  08/01/23 [id number]

## 2023-08-01 NOTE — PLAN OF CARE
Goal Outcome Evaluation:            Patient is a 68-year-old female who was admitted with left-sided weakness and numbness for stroke workout. CT was negative, Tpa administered, and pt admitted to the ICU. Patient failed stroke swallow screen as administered appropriately by nursing due to left-sided facial droop.           Exam:  Patient awake, alert, and oriented and assessed with full range of consistencies by all presentation methods. The patient does endorse some left-sided numbness but was able to demonstrate left lingual lateralization and able to bring tongue to left sulcus. Cough is appreciated x1 only during course of exam, occurring during oral phase on large bolus regular solids and liquid wash. Otherwise, no s/s aspiration noted at any time and no other significant clinical oral findings.      The patient was counseled on safe swallow strategies including small bites and sips at slow right as well as upright position during & for at least 30 minutes following all meals.      Recommend regular diet with thin liquids at this time. ST will follow up to establish tolerance of diet and for further recommendations as indicated as well as a speech, language, and/or cognitive linguistic assessment pending imaging identifies acute findings.

## 2023-08-02 ENCOUNTER — APPOINTMENT (OUTPATIENT)
Dept: CARDIOLOGY | Facility: HOSPITAL | Age: 68
DRG: 063 | End: 2023-08-02
Payer: MEDICARE

## 2023-08-02 ENCOUNTER — APPOINTMENT (OUTPATIENT)
Dept: MRI IMAGING | Facility: HOSPITAL | Age: 68
DRG: 063 | End: 2023-08-02
Payer: MEDICARE

## 2023-08-02 ENCOUNTER — APPOINTMENT (OUTPATIENT)
Dept: CT IMAGING | Facility: HOSPITAL | Age: 68
DRG: 063 | End: 2023-08-02
Payer: MEDICARE

## 2023-08-02 LAB
ALBUMIN SERPL-MCNC: 3.6 G/DL (ref 3.5–5.2)
ALBUMIN/GLOB SERPL: 1.2 G/DL
ALP SERPL-CCNC: 51 U/L (ref 39–117)
ALT SERPL W P-5'-P-CCNC: 8 U/L (ref 1–33)
ANION GAP SERPL CALCULATED.3IONS-SCNC: 10 MMOL/L (ref 5–15)
AST SERPL-CCNC: 16 U/L (ref 1–32)
BASOPHILS # BLD AUTO: 0.1 10*3/MM3 (ref 0–0.2)
BASOPHILS NFR BLD AUTO: 0.9 % (ref 0–1.5)
BH CV ECHO MEAS - AI P1/2T: 944.6 MSEC
BH CV ECHO MEAS - AO MAX PG: 6.1 MMHG
BH CV ECHO MEAS - AO MEAN PG: 3 MMHG
BH CV ECHO MEAS - AO V2 MAX: 123 CM/SEC
BH CV ECHO MEAS - AO V2 VTI: 25.3 CM
BH CV ECHO MEAS - AVA(I,D): 2.2 CM2
BH CV ECHO MEAS - EDV(CUBED): 117.6 ML
BH CV ECHO MEAS - EDV(MOD-SP2): 51.5 ML
BH CV ECHO MEAS - EDV(MOD-SP4): 54.3 ML
BH CV ECHO MEAS - EF(MOD-BP): 60.2 %
BH CV ECHO MEAS - EF(MOD-SP2): 57.7 %
BH CV ECHO MEAS - EF(MOD-SP4): 65.6 %
BH CV ECHO MEAS - ESV(CUBED): 17.6 ML
BH CV ECHO MEAS - ESV(MOD-SP2): 21.8 ML
BH CV ECHO MEAS - ESV(MOD-SP4): 18.7 ML
BH CV ECHO MEAS - FS: 46.9 %
BH CV ECHO MEAS - IVS/LVPW: 0.88 CM
BH CV ECHO MEAS - IVSD: 0.7 CM
BH CV ECHO MEAS - LAT PEAK E' VEL: 8.6 CM/SEC
BH CV ECHO MEAS - LV DIASTOLIC VOL/BSA (35-75): 29.9 CM2
BH CV ECHO MEAS - LV MASS(C)D: 120.8 GRAMS
BH CV ECHO MEAS - LV MAX PG: 2.2 MMHG
BH CV ECHO MEAS - LV MEAN PG: 1 MMHG
BH CV ECHO MEAS - LV SYSTOLIC VOL/BSA (12-30): 10.3 CM2
BH CV ECHO MEAS - LV V1 MAX: 74.1 CM/SEC
BH CV ECHO MEAS - LV V1 VTI: 17.7 CM
BH CV ECHO MEAS - LVIDD: 4.9 CM
BH CV ECHO MEAS - LVIDS: 2.6 CM
BH CV ECHO MEAS - LVOT AREA: 3.1 CM2
BH CV ECHO MEAS - LVOT DIAM: 2 CM
BH CV ECHO MEAS - LVPWD: 0.8 CM
BH CV ECHO MEAS - MED PEAK E' VEL: 5.8 CM/SEC
BH CV ECHO MEAS - MR MAX PG: 68.9 MMHG
BH CV ECHO MEAS - MR MAX VEL: 415 CM/SEC
BH CV ECHO MEAS - MV A DUR: 0.12 SEC
BH CV ECHO MEAS - MV A MAX VEL: 93.3 CM/SEC
BH CV ECHO MEAS - MV DEC SLOPE: 677 CM/SEC2
BH CV ECHO MEAS - MV DEC TIME: 0.15 MSEC
BH CV ECHO MEAS - MV E MAX VEL: 83.6 CM/SEC
BH CV ECHO MEAS - MV E/A: 0.9
BH CV ECHO MEAS - MV MAX PG: 4.5 MMHG
BH CV ECHO MEAS - MV MEAN PG: 2 MMHG
BH CV ECHO MEAS - MV P1/2T: 46.3 MSEC
BH CV ECHO MEAS - MV V2 VTI: 28.5 CM
BH CV ECHO MEAS - MVA(P1/2T): 4.8 CM2
BH CV ECHO MEAS - MVA(VTI): 1.95 CM2
BH CV ECHO MEAS - PA V2 MAX: 79.9 CM/SEC
BH CV ECHO MEAS - PULM A REVS DUR: 0.12 SEC
BH CV ECHO MEAS - PULM A REVS VEL: 26.6 CM/SEC
BH CV ECHO MEAS - PULM DIAS VEL: 48.8 CM/SEC
BH CV ECHO MEAS - PULM S/D: 0.82
BH CV ECHO MEAS - PULM SYS VEL: 40.2 CM/SEC
BH CV ECHO MEAS - RAP SYSTOLE: 3 MMHG
BH CV ECHO MEAS - RV MAX PG: 1.11 MMHG
BH CV ECHO MEAS - RV V1 MAX: 52.7 CM/SEC
BH CV ECHO MEAS - RV V1 VTI: 13.2 CM
BH CV ECHO MEAS - RVSP: 35 MMHG
BH CV ECHO MEAS - SI(MOD-SP2): 16.3 ML/M2
BH CV ECHO MEAS - SI(MOD-SP4): 19.6 ML/M2
BH CV ECHO MEAS - SV(LVOT): 55.6 ML
BH CV ECHO MEAS - SV(MOD-SP2): 29.7 ML
BH CV ECHO MEAS - SV(MOD-SP4): 35.6 ML
BH CV ECHO MEAS - TAPSE (>1.6): 1.31 CM
BH CV ECHO MEAS - TR MAX PG: 31.8 MMHG
BH CV ECHO MEAS - TR MAX VEL: 282 CM/SEC
BH CV ECHO MEASUREMENTS AVERAGE E/E' RATIO: 11.61
BH CV XLRA - RV BASE: 3.7 CM
BH CV XLRA - RV LENGTH: 6.3 CM
BH CV XLRA - RV MID: 2.6 CM
BH CV XLRA - TDI S': 7.8 CM/SEC
BILIRUB SERPL-MCNC: 0.2 MG/DL (ref 0–1.2)
BUN SERPL-MCNC: 19 MG/DL (ref 8–23)
BUN/CREAT SERPL: 21.1 (ref 7–25)
CALCIUM SPEC-SCNC: 8.8 MG/DL (ref 8.6–10.5)
CHLORIDE SERPL-SCNC: 105 MMOL/L (ref 98–107)
CHOLEST SERPL-MCNC: 147 MG/DL (ref 0–200)
CO2 SERPL-SCNC: 27 MMOL/L (ref 22–29)
CREAT SERPL-MCNC: 0.9 MG/DL (ref 0.57–1)
DEPRECATED RDW RBC AUTO: 42.9 FL (ref 37–54)
EGFRCR SERPLBLD CKD-EPI 2021: 68.9 ML/MIN/1.73
EOSINOPHIL # BLD AUTO: 0.2 10*3/MM3 (ref 0–0.4)
EOSINOPHIL NFR BLD AUTO: 2.9 % (ref 0.3–6.2)
ERYTHROCYTE [DISTWIDTH] IN BLOOD BY AUTOMATED COUNT: 14 % (ref 12.3–15.4)
GLOBULIN UR ELPH-MCNC: 3 GM/DL
GLUCOSE BLDC GLUCOMTR-MCNC: 110 MG/DL (ref 70–105)
GLUCOSE BLDC GLUCOMTR-MCNC: 223 MG/DL (ref 70–105)
GLUCOSE BLDC GLUCOMTR-MCNC: 227 MG/DL (ref 70–105)
GLUCOSE SERPL-MCNC: 122 MG/DL (ref 65–99)
HCT VFR BLD AUTO: 36.4 % (ref 34–46.6)
HDLC SERPL-MCNC: 47 MG/DL (ref 40–60)
HGB BLD-MCNC: 11.9 G/DL (ref 12–15.9)
LDLC SERPL CALC-MCNC: 80 MG/DL (ref 0–100)
LDLC/HDLC SERPL: 1.66 {RATIO}
LEFT ATRIUM VOLUME INDEX: 22.6 ML/M2
LYMPHOCYTES # BLD AUTO: 3.2 10*3/MM3 (ref 0.7–3.1)
LYMPHOCYTES NFR BLD AUTO: 44.8 % (ref 19.6–45.3)
MAGNESIUM SERPL-MCNC: 1.7 MG/DL (ref 1.6–2.4)
MCH RBC QN AUTO: 28.7 PG (ref 26.6–33)
MCHC RBC AUTO-ENTMCNC: 32.7 G/DL (ref 31.5–35.7)
MCV RBC AUTO: 87.9 FL (ref 79–97)
MONOCYTES # BLD AUTO: 0.6 10*3/MM3 (ref 0.1–0.9)
MONOCYTES NFR BLD AUTO: 8.4 % (ref 5–12)
NEUTROPHILS NFR BLD AUTO: 3.1 10*3/MM3 (ref 1.7–7)
NEUTROPHILS NFR BLD AUTO: 43 % (ref 42.7–76)
NRBC BLD AUTO-RTO: 0 /100 WBC (ref 0–0.2)
PHOSPHATE SERPL-MCNC: 4.4 MG/DL (ref 2.5–4.5)
PLATELET # BLD AUTO: 291 10*3/MM3 (ref 140–450)
PMV BLD AUTO: 6.8 FL (ref 6–12)
POTASSIUM SERPL-SCNC: 3.9 MMOL/L (ref 3.5–5.2)
PROT SERPL-MCNC: 6.6 G/DL (ref 6–8.5)
QT INTERVAL: 388 MS
RBC # BLD AUTO: 4.14 10*6/MM3 (ref 3.77–5.28)
SODIUM SERPL-SCNC: 142 MMOL/L (ref 136–145)
TRIGL SERPL-MCNC: 109 MG/DL (ref 0–150)
VLDLC SERPL-MCNC: 20 MG/DL (ref 5–40)
WBC NRBC COR # BLD: 7.2 10*3/MM3 (ref 3.4–10.8)

## 2023-08-02 PROCEDURE — 97166 OT EVAL MOD COMPLEX 45 MIN: CPT

## 2023-08-02 PROCEDURE — 63710000001 INSULIN LISPRO (HUMAN) PER 5 UNITS: Performed by: NURSE PRACTITIONER

## 2023-08-02 PROCEDURE — 63710000001 DIPHENHYDRAMINE PER 50 MG: Performed by: INTERNAL MEDICINE

## 2023-08-02 PROCEDURE — 80053 COMPREHEN METABOLIC PANEL: CPT | Performed by: NURSE PRACTITIONER

## 2023-08-02 PROCEDURE — 63710000001 PROMETHAZINE PER 25 MG: Performed by: NURSE PRACTITIONER

## 2023-08-02 PROCEDURE — 25010000002 DEXAMETHASONE PER 1 MG: Performed by: PSYCHIATRY & NEUROLOGY

## 2023-08-02 PROCEDURE — 83735 ASSAY OF MAGNESIUM: CPT | Performed by: NURSE PRACTITIONER

## 2023-08-02 PROCEDURE — 70450 CT HEAD/BRAIN W/O DYE: CPT

## 2023-08-02 PROCEDURE — 82948 REAGENT STRIP/BLOOD GLUCOSE: CPT

## 2023-08-02 PROCEDURE — 25010000002 KETOROLAC TROMETHAMINE PER 15 MG: Performed by: INTERNAL MEDICINE

## 2023-08-02 PROCEDURE — 25010000002 PROMETHAZINE PER 50 MG: Performed by: INTERNAL MEDICINE

## 2023-08-02 PROCEDURE — 70551 MRI BRAIN STEM W/O DYE: CPT

## 2023-08-02 PROCEDURE — 97162 PT EVAL MOD COMPLEX 30 MIN: CPT

## 2023-08-02 PROCEDURE — 85025 COMPLETE CBC W/AUTO DIFF WBC: CPT | Performed by: NURSE PRACTITIONER

## 2023-08-02 PROCEDURE — 80061 LIPID PANEL: CPT | Performed by: NURSE PRACTITIONER

## 2023-08-02 PROCEDURE — 25010000002 HYDROMORPHONE 1 MG/ML SOLUTION: Performed by: INTERNAL MEDICINE

## 2023-08-02 PROCEDURE — 97116 GAIT TRAINING THERAPY: CPT

## 2023-08-02 PROCEDURE — 84100 ASSAY OF PHOSPHORUS: CPT | Performed by: NURSE PRACTITIONER

## 2023-08-02 PROCEDURE — 93306 TTE W/DOPPLER COMPLETE: CPT | Performed by: INTERNAL MEDICINE

## 2023-08-02 PROCEDURE — 93306 TTE W/DOPPLER COMPLETE: CPT

## 2023-08-02 RX ORDER — DIPHENHYDRAMINE HCL 25 MG
50 CAPSULE ORAL EVERY 6 HOURS PRN
Status: DISCONTINUED | OUTPATIENT
Start: 2023-08-02 | End: 2023-08-06 | Stop reason: HOSPADM

## 2023-08-02 RX ORDER — INSULIN LISPRO 100 [IU]/ML
2-7 INJECTION, SOLUTION INTRAVENOUS; SUBCUTANEOUS
Status: DISCONTINUED | OUTPATIENT
Start: 2023-08-02 | End: 2023-08-06 | Stop reason: HOSPADM

## 2023-08-02 RX ORDER — KETOROLAC TROMETHAMINE 15 MG/ML
15 INJECTION, SOLUTION INTRAMUSCULAR; INTRAVENOUS EVERY 6 HOURS PRN
Status: DISCONTINUED | OUTPATIENT
Start: 2023-08-02 | End: 2023-08-06 | Stop reason: HOSPADM

## 2023-08-02 RX ORDER — DEXAMETHASONE SODIUM PHOSPHATE 4 MG/ML
10 INJECTION, SOLUTION INTRA-ARTICULAR; INTRALESIONAL; INTRAMUSCULAR; INTRAVENOUS; SOFT TISSUE ONCE
Status: COMPLETED | OUTPATIENT
Start: 2023-08-02 | End: 2023-08-02

## 2023-08-02 RX ADMIN — MULTIPLE VITAMINS W/ MINERALS TAB 1 TABLET: TAB at 08:41

## 2023-08-02 RX ADMIN — Medication 10 ML: at 08:41

## 2023-08-02 RX ADMIN — Medication 10 ML: at 23:02

## 2023-08-02 RX ADMIN — PROMETHAZINE HYDROCHLORIDE 25 MG: 25 TABLET ORAL at 04:47

## 2023-08-02 RX ADMIN — INSULIN LISPRO 3 UNITS: 100 INJECTION, SOLUTION INTRAVENOUS; SUBCUTANEOUS at 18:17

## 2023-08-02 RX ADMIN — HYDROMORPHONE HYDROCHLORIDE 1 MG: 1 INJECTION, SOLUTION INTRAMUSCULAR; INTRAVENOUS; SUBCUTANEOUS at 19:37

## 2023-08-02 RX ADMIN — KETOROLAC TROMETHAMINE 15 MG: 15 INJECTION, SOLUTION INTRAMUSCULAR; INTRAVENOUS at 11:14

## 2023-08-02 RX ADMIN — AMLODIPINE BESYLATE 5 MG: 5 TABLET ORAL at 08:41

## 2023-08-02 RX ADMIN — PROMETHAZINE HYDROCHLORIDE 25 MG: 25 INJECTION INTRAMUSCULAR; INTRAVENOUS at 12:49

## 2023-08-02 RX ADMIN — ATORVASTATIN CALCIUM 80 MG: 40 TABLET, FILM COATED ORAL at 22:46

## 2023-08-02 RX ADMIN — ASPIRIN 81 MG CHEWABLE TABLET 81 MG: 81 TABLET CHEWABLE at 22:47

## 2023-08-02 RX ADMIN — HYDROMORPHONE HYDROCHLORIDE 1 MG: 1 INJECTION, SOLUTION INTRAMUSCULAR; INTRAVENOUS; SUBCUTANEOUS at 22:47

## 2023-08-02 RX ADMIN — TICAGRELOR 180 MG: 90 TABLET ORAL at 17:17

## 2023-08-02 RX ADMIN — DULOXETINE HYDROCHLORIDE 60 MG: 30 CAPSULE, DELAYED RELEASE ORAL at 08:41

## 2023-08-02 RX ADMIN — HYDROMORPHONE HYDROCHLORIDE 1 MG: 1 INJECTION, SOLUTION INTRAMUSCULAR; INTRAVENOUS; SUBCUTANEOUS at 00:13

## 2023-08-02 RX ADMIN — FAMOTIDINE 20 MG: 10 INJECTION INTRAVENOUS at 08:41

## 2023-08-02 RX ADMIN — HYDROMORPHONE HYDROCHLORIDE 1 MG: 1 INJECTION, SOLUTION INTRAMUSCULAR; INTRAVENOUS; SUBCUTANEOUS at 04:47

## 2023-08-02 RX ADMIN — SENNOSIDES AND DOCUSATE SODIUM 2 TABLET: 50; 8.6 TABLET ORAL at 22:46

## 2023-08-02 RX ADMIN — DIPHENHYDRAMINE HYDROCHLORIDE 50 MG: 25 CAPSULE ORAL at 11:14

## 2023-08-02 RX ADMIN — DEXAMETHASONE SODIUM PHOSPHATE 10 MG: 4 INJECTION, SOLUTION INTRAMUSCULAR; INTRAVENOUS at 12:49

## 2023-08-02 RX ADMIN — INSULIN LISPRO 3 UNITS: 100 INJECTION, SOLUTION INTRAVENOUS; SUBCUTANEOUS at 22:47

## 2023-08-02 RX ADMIN — HYDROMORPHONE HYDROCHLORIDE 1 MG: 1 INJECTION, SOLUTION INTRAMUSCULAR; INTRAVENOUS; SUBCUTANEOUS at 02:44

## 2023-08-02 NOTE — CASE MANAGEMENT/SOCIAL WORK
Discharge Planning Assessment  [facility]     Patient Name: Marge Mcghee  MRN: [id number]  Today's Date: 8/2/2023    Admit Date: 8/1/2023    Plan: Patient asks for referral to [facility] and will need precert. Pending PT/OT recommendations to make referral if appropriate. No pasrr required for [facility]   Discharge Needs Assessment       Row Name 08/02/23 1207       Living Environment    People in Home spouse    Current Living Arrangements home    Potentially Unsafe Housing Conditions none    Primary Care Provided by self    Provides Primary Care For no one    Family Caregiver if Needed spouse    Quality of Family Relationships helpful    Able to Return to Prior Arrangements yes       Resource/Environmental Concerns    Resource/Environmental Concerns none    Transportation Concerns none       Transition Planning    Patient/Family Anticipates Transition to inpatient rehabilitation facility    Patient/Family Anticipated Services at Transition rehabilitation services    Transportation Anticipated family or friend will provide       Discharge Needs Assessment    Readmission Within the Last 30 Days no previous admission in last 30 days    Equipment Currently Used at Home glucometer    Concerns to be Addressed discharge planning    Anticipated Changes Related to Illness inability to care for self    Equipment Needed After Discharge other (see comments)  to be determined    Discharge Facility/Level of Care Needs rehabilitation facility    Provided Post Acute Provider List? Yes    Post Acute Provider List DME Supplier;Home Health;Inpatient Rehab;Nursing Home    Provided Post Acute Provider Quality & Resource List? Yes    Post Acute Provider Quality and Resource List Home Health;Inpatient Rehab;Nursing Home    Delivered To Patient    Method of Delivery In person                   Discharge Plan       Row Name 08/02/23 1204       Plan    Plan Patient asks for referral to [facility] and will need precert. Pending PT/OT recommendations to  make referral if appropriate. No pasrr required for [facility]    Plan Comments Spoke with patient and family at bedside. PCP and pharmacy verified. patient asks for [facility] referral. Instructed patient that referral can be made if that is what PT/OT is recommending. Therapy recommendations are pending at this time. Verbalizes understanding. Barriers to dc: stroke proocol and monitoring                  Continued Care and Services - Admitted Since 8/1/2023    Coordination has not been started for this encounter.          Demographic Summary       Row Name 08/02/23 1206       General Information    Admission Type inpatient    Arrived From emergency department    Required Notices Provided Important Message from Medicare    Referral Source admission list    Reason for Consult discharge planning    Preferred Language English                   Functional Status       Row Name 08/02/23 1206       Functional Status    Usual Activity Tolerance moderate    Current Activity Tolerance moderate       Functional Status, IADL    Medications independent    Meal Preparation assistive person    Housekeeping assistive person    Laundry assistive person    Shopping assistive person    IADL Comments spouse and family help as needed       Mental Status    General Appearance WDL WDL       Mental Status Summary    Recent Changes in Mental Status/Cognitive Functioning no changes                               Nidia Cross RN

## 2023-08-02 NOTE — PROGRESS NOTES
Consult received for ICU downgrade.  Chart reviewed.  Full consult note to follow.      Electronically signed by Liz Jameson DO, 08/02/23, 4:42 PM EDT.

## 2023-08-02 NOTE — ACP (ADVANCE CARE PLANNING)
Social Work Assessment  [facility]     Patient Name: Marge Mcghee  MRN: [id number]  Today's Date: 8/2/2023    Admit Date: 8/1/2023     Demographic Summary       Row Name 08/02/23 1507       General Information    Reason for Consult health care directive    General Information Comments SW was consulted re: ACP. SW met with pt in room 2314 to clarify, as she already has a document that encompasses LW and also appoints her HCR uploaded to EHR. Pt stated she does not wish to make any changes. Consult closed.           STEVE Mays, LSW  Medical Social Worker  Ph [phone]  Fax [phone]  [email or web address]

## 2023-08-02 NOTE — H&P
Critical Care History and Physical     Marge Mcghee : 1955 MRN:[id number] LOS:0 ROOM: [facility]     Reason for admission: CVA (cerebral vascular accident)     Assessment / Plan     Suspected CVA, History of stroke  Presented with left sided numbness and weakness.  Code stroke protocol initiated in ED, decision for TNK administration in ED  Ischemic Stroke orders initiated.  CT scan reviewed: No definitive acute intracranial.  Remote infarct noted as lacunar infarct on the right side of the tomas.  CTA head and neck reviewed: No evidence of LVO, stenosis, or aneurysm formation.  CT perfusion study: No evidence of core infarct or visualized at risk tissue.  Repeat CT scan of head without, 24 hours post tPA administration  Neurology consulted, further workup per neurology  No antiplatelet or anticoagulant medication until 24 hours post-TNK administration  Observe for signs and symptoms of bleeding, if observed, immediately notify Neurology  Strict blood pressure monitoring, keep blood pressure listed 180/105, Cardene drip ordered if needed.  SLP evaluated and initiated diet.  PT/ST/OT evaluation & treatment    Atherosclerotic heart disease, history of CABG  Chronic and stable.   c/w aspirin, Plavix, and statins.    Essential Hypertension: well controlled.   Continue amlodipine when able to take oral medications.  Titrate medications as needed.    Diabetes mellitus Type 2, insulin-dependent : not well controlled.   Hgb A1c 7.5.  Home regimen includes Mounjaro.  Accu-Cheks with SSI with admelog based on diet.    Depression: Chronic and stable, continue cymbalta  Hyperlipidemia: Continue statin therapy.    Level Of Support Discussed With: Patient  Code Status (Patient has no pulse and is not breathing): CPR (Attempt to Resuscitate)  Medical Interventions (Patient has pulse or is breathing): Full Support  Release to patient: Routine Release       Nutrition:   Diet: Cardiac Diets, Diabetic Diets; Healthy Heart (2-3  Na+); Consistent Carbohydrate; Texture: Regular Texture (IDDSI 7); Fluid Consistency: Thin (IDDSI 0)     DVT prophylaxis:  Mechanical DVT prophylaxis orders are present.     History of Present illness     Marge Mcghee is a 68 y.o. female with PMH of Stroke, Diabetes mellitus, hyperlipidemia, HLD, CAD & S/P CABG, and GERD, and presented to the hospital for tingling on the left side of the face, with facial weakness and left sided weakness as well, and was admitted with a principal diagnosis of suspected CVA (cerebral vascular accident).  At baseline, patient is on dual antiplatelet therapy with aspirin and Plavix as well as high-dose atorvastatin.  Patient's initial NIH was 5.  Patient upon arrival to ED had a code stroke initiated.  Patient underwent CT of the head that was negative for acute intracranial abnormalities but a remote infarct on the right side of the tomas was noted.  CTA of the head and neck was without any large vessel occlusion.  CT perfusion study was also without acute findings.  Patient was deemed a candidate for TNK, and after discussion of risks and benefits, patient was agreeable to proceed.  Patient reported that she has been having a few weeks of dizziness and numbness that has been intermittent, but got worse over the past few days.  Patient reported a previous stroke without residual.  Patient works as a schoolbus .  Patient had a planned MRI in the next week, but now with the new onset of symptoms patient is planned to have an MRI in the morning.  Prior to initiation of TNK, patient was hypertensive requiring IV administration of labetalol.  Neurology has been consulted.  Patient will be admitted to the ICU for further treatment and evaluation.  Intensivist service has been consulted.    ACP: ACP on file and reviewed, POA has been appointed to Kimberly Salinas.    Patient was seen and examined on 08/01/23 at 20:20 EDT .    Subjective / Review of systems     Review of Systems    Constitutional:  Negative for chills and fever.   HENT:  Negative for congestion and sore throat.    Respiratory:  Negative for cough and shortness of breath.    Cardiovascular:  Negative for chest pain and palpitations.   Gastrointestinal:  Positive for nausea. Negative for abdominal pain and vomiting.   Endocrine: Negative for heat intolerance and polyuria.   Genitourinary:  Negative for dysuria and urgency.   Musculoskeletal:  Negative for arthralgias and myalgias.   Skin:  Negative for rash and wound.   Neurological:  Positive for dizziness (Intermittent.), speech difficulty (Not slurring, feels like she is talking slower to properly enunciate.), numbness (Left side of face and left side of body.  Slightly improved but persistent.) and headaches. Negative for weakness and light-headedness.   Hematological:  Negative for adenopathy. Does not bruise/bleed easily.   Psychiatric/Behavioral:  Negative for confusion. The patient is not nervous/anxious.       Past Medical/Surgical/Social/Family History & Allergies     Past Medical History:   Diagnosis Date    Adrenal nodule 11/16/2016    FINDINGS: Mild hepatic steatosis may be present. Cholecystectomy. No biliary ductal dilatation. Negative pancreas, spleen, left adrenal gland, and kidneys. The right adrenal presumed adenoma has increased slightly, measuring 3 x 4 cm in diameter,  compared to 2 x 3.5 cm on the previous exam. The attenuation values are consistent with an adenoma. Done at [facility] in August 2018    Age-related osteoporosis without current pathological fracture 11/16/2016    Arthritis     Delayed surgical wound healing     Essential hypertension 11/16/2016    Gastroesophageal reflux disease with esophagitis 11/16/2016    Hepatic steatosis 11/16/2016    History of anemia     History of sepsis     FROM UTI    Hyperlipidemia 11/16/2016    Lisfranc's dislocation     LEFT WITH FRACTURES NONHEALING FOOT    Osteomyelitis of left foot 11/2020    RLS  (restless legs syndrome)     Squamous cell skin cancer 2014    Excised by Dr. James    Thyroid nodule 10/18/2019    BENIGN    Type 2 diabetes mellitus with peripheral neuropathy 11/16/2016    Vitamin D deficiency 1/25/2019      Past Surgical History:   Procedure Laterality Date    BREAST BIOPSY Left     7/2019. BENIGN    CARDIAC CATHETERIZATION Left 5/2/2021    Procedure: Cardiac Catheterization/Vascular Study;  Surgeon: Chacho Esteban MD;  Location: [facility] CATH INVASIVE LOCATION;  Service: Cardiovascular;  Laterality: Left;    CARDIAC CATHETERIZATION N/A 5/2/2021    Procedure: Intra-Aortic Baloon Pump Insertion;  Surgeon: Chacho Esteban MD;  Location: [facility] CATH INVASIVE LOCATION;  Service: Cardiovascular;  Laterality: N/A;    CATARACT EXTRACTION WITH INTRAOCULAR LENS IMPLANT Bilateral     CHOLECYSTECTOMY      COLONOSCOPY      CORONARY ARTERY BYPASS GRAFT N/A 5/2/2021    Procedure: CORONARY ARTERY BYPASS WITH INTERNAL MAMMARY ARTERY GRAFT;  Surgeon: Jr Rocael Alexander MD;  Location: [facility];  Service: Cardiothoracic;  Laterality: N/A;    FOOT FUSION Right 11/13/2020    Procedure: RIGHT OPEN TREATMENT LISFRANC INJURY, OPEN REDUCTION INTERNAL FIXATION MEDIAL/MIDDLE CUNEIFORM FRACTURE AND 2ND/3RD METATARSAL FRACTURE 1ST 2ND POSSIBLE 3RD TARSOMETATARSAL ARTHRODESIS INTERCUNEIFORM ARTHRODESIS CALCANEAL BONE GRAFT;  Surgeon: Mikhail Banks Jr., MD;  Location: [facility];  Service: Orthopedics;  Laterality: Right;    INCISION AND DRAINAGE LEG Right 1/8/2021    Procedure: RIGHT IRRIGATION AND DEBRIDEMENT OF FOOT WITH SECONDARY CLOSURE AND HARDWARE REMOVAL;  Surgeon: Mikhail Banks Jr., MD;  Location: [facility];  Service: Orthopedics;  Laterality: Right;    KNEE ARTHROSCOPY Bilateral     TOTAL ABDOMINAL HYSTERECTOMY      TRANSESOPHAGEAL ECHOCARDIOGRAM (EMILIA) N/A 5/2/2021    Procedure: TRANSESOPHAGEAL ECHOCARDIOGRAM;  Surgeon: Jr Rocael Alexander MD;  Location: [facility];  Service: Cardiothoracic;   Laterality: N/A;    UPPER GASTROINTESTINAL ENDOSCOPY  08/2016    US GUIDED FINE NEEDLE ASPIRATION  5/8/2020      Social History     Socioeconomic History    Marital status:    Tobacco Use    Smoking status: Never    Smokeless tobacco: Never   Vaping Use    Vaping Use: Never used   Substance and Sexual Activity    Alcohol use: Yes     Comment: socially     Drug use: Never    Sexual activity: Defer      Family History   Problem Relation Age of Onset    Diabetes Mother     COPD Mother     Hypertension Mother     Kidney disease Mother     Obesity Mother     Thyroid disease Mother     Diabetes Sister     Diabetes Sister     Diabetes Sister     Diabetes Sister     Malig Hyperthermia Neg Hx       Allergies   Allergen Reactions    Zofran [Ondansetron Hcl] Nausea And Vomiting     Does the opposite of its purpose    Prochlorperazine Other (See Comments)     CAUSED SEIZURE    Prochlorperazine Edisylate Hives    Prochlorperazine Maleate Irritability    Hydralazine Itching and Rash    Hydralazine Hcl Itching and Rash    Meperidine Itching and Swelling    Prochlorperazine Maleate Other (See Comments)     CAUSES SEIZURE      Social Determinants of Health     Tobacco Use: Low Risk     Smoking Tobacco Use: Never    Smokeless Tobacco Use: Never    Passive Exposure: Not on file   Alcohol Use: Not At Risk    Frequency of Alcohol Consumption: Monthly or less    Average Number of Drinks: 1 or 2    Frequency of Binge Drinking: Never   Financial Resource Strain: Not on file   Food Insecurity: Not on file   Transportation Needs: Not on file   Physical Activity: Not on file   Stress: Not on file   Social Connections: Not on file   Intimate Partner Violence: Not on file   Depression: Not at risk    PHQ-2 Score: 0   Housing Stability: Not on file        Home Medications     Prior to Admission medications    Medication Sig Start Date End Date Taking? Authorizing Provider   amLODIPine (NORVASC) 5 MG tablet Take 1 tablet by mouth Daily.    Yes Bi Haque MD   atorvastatin (LIPITOR) 80 MG tablet Take 1 tablet by mouth Every Night. 10/25/22  Yes Gladis Lang APRN   clopidogrel (PLAVIX) 75 MG tablet Take 1 tablet by mouth Daily. 9/28/22  Yes Lasha Galindo MD   DULoxetine (CYMBALTA) 60 MG capsule Take 1 capsule by mouth Daily.   Yes Bi Haque MD   famotidine (PEPCID) 20 MG tablet Take 1 tablet by mouth 2 (Two) Times a Day As Needed. 10/10/22  Yes Bi Haque MD   multivitamin with minerals tablet tablet Take 1 tablet by mouth Daily.   Yes Bi Haque MD   polyethylene glycol (MiraLax) 17 g packet Take 17 g by mouth Daily.   Yes Bi Haque MD   Tirzepatide (Mounjaro) 5 MG/0.5ML solution pen-injector Inject 0.5 mL under the skin into the appropriate area as directed 1 (One) Time Per Week. Sunday   Yes Bi Haque MD   traZODone (DESYREL) 50 MG tablet Take 1 tablet by mouth At Night As Needed. 10/12/22  Yes Bi Haque MD   polyethylene glycol (MIRALAX) 17 g packet Take 17 g by mouth Daily As Needed (Use if senna-docusate is ineffective). 9/27/22 8/1/23 Yes Lasha Galindo MD   glucose blood test strip 1 each by Other route 3 (Three) Times a Day Before Meals. Use as instructed Dx code: E11.65  Prescription for test strips for glucometer patient has at home. Patient needs to give name of glucometer. 9/27/22   Lasha Galindo MD   Insulin Pen Needle (Pen Needles) 32G X 4 MM misc 1 each Every Night. Dx code: E11.65 9/27/22   Lasha Galindo MD   Isopropyl Alcohol (Alcohol Wipes) 70 % misc Apply 1 each topically 3 (Three) Times a Day Before Meals. Dx code: E11.65 9/27/22   Lasha Galindo MD   Lancets misc 1 each 3 (Three) Times a Day Before Meals. Prescription for whatever brand lancets patient currently using. Dx code: E11.65 9/27/22   Lasha Galindo MD   amLODIPine (NORVASC) 10 MG tablet Take 1 tablet by mouth Daily. 9/28/22 8/1/23  Mateo,  Lasha GALINDO MD   aspirin (aspirin) 81 MG EC tablet Take 1 tablet by mouth Daily. 2/17/22 8/1/23  Chacho Esteban MD   benzonatate (TESSALON) 200 MG capsule Take 1 capsule by mouth 3 (Three) Times a Day As Needed for Cough. 2/5/23 8/1/23  Ivelisse Archer PA   bisacodyl (DULCOLAX) 10 MG suppository Insert 1 suppository into the rectum Daily As Needed for Constipation (Use if bisacodyl oral is ineffective). 9/27/22 8/1/23  Lasha Galindo MD   bisacodyl (DULCOLAX) 5 MG EC tablet Take 1 tablet by mouth Daily As Needed for Constipation (Use if polyethylene glycol is ineffective). 9/27/22 8/1/23  Lasha Galindo MD   guaiFENesin-codeine (GUAIFENESIN AC) 100-10 MG/5ML liquid Take 5 mL by mouth 4 (Four) Times a Day As Needed for Cough or Congestion (And chest discomfort). 12/7/22 8/1/23  Nima Wynne MD   insulin glargine (LANTUS, SEMGLEE) 100 UNIT/ML injection Inject 25 Units under the skin into the appropriate area as directed Every Night. 9/27/22 8/1/23  Lasha Galindo MD   insulin glargine (LANTUS, SEMGLEE) 100 UNIT/ML injection Inject 25 Units under the skin into the appropriate area as directed Daily. 9/27/22 8/1/23  Bi Haque MD   insulin lispro (ADMELOG) 100 UNIT/ML injection Inject 0-9 Units under the skin into the appropriate area as directed Every 6 (Six) Hours. 9/27/22 8/1/23  Lasha Galindo MD   losartan (COZAAR) 50 MG tablet Take 1 tablet by mouth Daily. 9/28/22 8/1/23  Lasha Galindo MD   meclizine (ANTIVERT) 25 MG tablet  10/10/22 8/1/23  Bi Haque MD   metoclopramide (REGLAN) 5 MG tablet Take 1 tablet by mouth 3 (Three) Times a Day As Needed (N/V). 2/5/23 8/1/23  Ivelisse Archer PA   metoprolol succinate XL (TOPROL-XL) 25 MG 24 hr tablet Take 1 tablet by mouth Daily. 9/28/22 8/1/23  Lasha Galindo MD   ondansetron ODT (ZOFRAN-ODT) 8 MG disintegrating tablet Place 1 tablet on the tongue Every 8 (Eight) Hours As Needed for Nausea or Vomiting.  12/7/22 8/1/23  Nima Wynne MD   oseltamivir (TAMIFLU) 75 MG capsule Take 1 capsule by mouth 2 (Two) Times a Day. 12/7/22 8/1/23  Nima Wynne MD   sennosides-docusate (PERICOLACE) 8.6-50 MG per tablet Take 2 tablets by mouth 2 (Two) Times a Day. 9/27/22 8/1/23  Lasha Galindo MD   traMADol (ULTRAM) 50 MG tablet  10/10/22 8/1/23  Provider, MD Bi        Objective / Physical Exam     Vital signs:  Temp: 98.3 øF (36.8 øC)  BP: 140/98  Heart Rate: 82  Resp: 21  SpO2: 97 %  Weight: 73.6 kg (162 lb 4.1 oz)    Admission Weight: Weight: 74.2 kg (163 lb 9.3 oz)    Physical Exam  Vitals and nursing note reviewed.   Constitutional:       General: She is not in acute distress.     Appearance: She is not ill-appearing, toxic-appearing or diaphoretic.   HENT:      Head: Normocephalic and atraumatic.      Nose: Nose normal. No congestion.      Mouth/Throat:      Mouth: Mucous membranes are moist.      Pharynx: Oropharynx is clear. No oropharyngeal exudate.   Eyes:      General: No scleral icterus.     Extraocular Movements: Extraocular movements intact.      Pupils: Pupils are equal, round, and reactive to light.   Cardiovascular:      Rate and Rhythm: Normal rate and regular rhythm.      Pulses: Normal pulses.      Heart sounds: Normal heart sounds. No murmur heard.  Pulmonary:      Effort: Pulmonary effort is normal. No respiratory distress.      Breath sounds: Normal breath sounds. No wheezing, rhonchi or rales.   Abdominal:      General: Bowel sounds are normal. There is no distension.      Tenderness: There is no abdominal tenderness. There is no guarding.      Hernia: No hernia is present.   Musculoskeletal:         General: No swelling. Normal range of motion.      Right lower leg: No edema.      Left lower leg: No edema.   Skin:     General: Skin is warm and dry.      Findings: No rash.   Neurological:      Mental Status: She is alert and oriented to person, place, and time.      Sensory: Sensory  deficit (Continues to report decreased sensation on left side of face as well as lower extremities.) present.      Comments: Patient moving all 4 extremities, following commands. Left upper and lower extremity drift.  Strength intact and equal 5/5.   Psychiatric:         Mood and Affect: Mood normal.         Behavior: Behavior normal.         Thought Content: Thought content normal.         Judgment: Judgment normal.        Labs     Results from last 7 days   Lab Units 08/01/23  1338   WBC 10*3/mm3 8.20   HEMATOCRIT % 37.9   PLATELETS 10*3/mm3 318      Results from last 7 days   Lab Units 08/01/23  1338   SODIUM mmol/L 140   POTASSIUM mmol/L 4.0   CHLORIDE mmol/L 103   CO2 mmol/L 26.0   BUN mg/dL 22   CREATININE mg/dL 0.92        Imaging     Chest X ray: My independent assessment showed no infiltrates or effusions    EKG: My independent evaluation showed Sinus rhythm without ST or T wave abnormalities, heart rate 86, QTc 465.    Current Medications     Scheduled Meds:  [START ON 8/2/2023] amLODIPine, 5 mg, Oral, Daily  [START ON 8/2/2023] aspirin, 81 mg, Oral, Daily   Or  [START ON 8/2/2023] aspirin, 300 mg, Rectal, Daily  [START ON 8/2/2023] atorvastatin, 80 mg, Oral, Nightly  [START ON 8/2/2023] DULoxetine, 60 mg, Oral, Daily  [START ON 8/2/2023] famotidine, 20 mg, Intravenous, Daily  [START ON 8/2/2023] insulin lispro, 2-7 Units, Subcutaneous, Q6H  [START ON 8/2/2023] multivitamin with minerals, 1 tablet, Oral, Daily  [START ON 8/2/2023] polyethylene glycol, 17 g, Oral, Daily  sodium chloride, 10 mL, Intravenous, Q12H         Continuous Infusions:  niCARdipine, 5-15 mg/hr, Last Rate: Stopped (08/01/23 1623)       Plan discussed with RN. Reviewed all other data in the last 24 hours, including but not limited to vitals, labs, microbiology, imaging and pertinent notes from other providers.  Plan also discussed with patient at the bedside.      CANDIDO Montelongo   Critical Care  08/01/23   20:20 EDT

## 2023-08-02 NOTE — PROGRESS NOTES
Critical Care Progress Note     Marge Mcghee : 1955 MRN:[id number] LOS:1  Rm: 2314/1     Principal Problem: CVA (cerebral vascular accident)     Reason for follow up: All the medical problems listed below    Summary   Marge Mcghee is a 68 y.o. female with PMH of Stroke, Diabetes mellitus, hyperlipidemia, HLD, CAD & S/P CABG, and GERD, and presented to the hospital for tingling on the left side of the face, with facial weakness and left sided weakness as well, and was admitted with a principal diagnosis of suspected CVA (cerebral vascular accident).  At baseline, patient is on dual antiplatelet therapy with aspirin and Plavix as well as high-dose atorvastatin.  Patient's initial NIH was 5.  Patient upon arrival to ED had a code stroke initiated.  Patient underwent CT of the head that was negative for acute intracranial abnormalities but a remote infarct on the right side of the tomas was noted.  CTA of the head and neck was without any large vessel occlusion.  CT perfusion study was also without acute findings.  Patient was deemed a candidate for TNK, and after discussion of risks and benefits, patient was agreeable to proceed.  Patient reported that she has been having a few weeks of dizziness and numbness that has been intermittent, but got worse over the past few days.  Patient reported a previous stroke without residual.  Patient works as a schoolbus .  Patient had a planned MRI in the next week, but now with the new onset of symptoms patient is planned to have an MRI in the morning.  Prior to initiation of TNK, patient was hypertensive requiring IV administration of labetalol.  Neurology has been consulted.  Patient will be admitted to the ICU for further treatment and evaluation.  Intensivist service has been consulted.     Significant Events     23 : MRI today was negative. CT head negative for acute findings. Showing chronic-appearing lacunar infarct within the tomas, right of  midline. No complications over night. Patient still complaining of headache. Medications adjusted. Neurology changing plavix to Brilinta.     Assessment / Plan     Suspected CVA, History of stroke  Presented with left sided numbness and weakness.  Code stroke protocol initiated in ED, decision for TNK administration in ED  Ischemic Stroke orders initiated.  CT scan reviewed: No definitive acute intracranial.  Remote infarct noted as lacunar infarct on the right side of the tomas.  CTA head and neck reviewed: No evidence of LVO, stenosis, or aneurysm formation.  CT perfusion study: No evidence of core infarct or visualized at risk tissue.  Repeat CT scan of head without, 24 hours post tPA administration  Neurology consulted, further workup per neurology  No antiplatelet or anticoagulant medication until 24 hours post-TNK administration  Observe for signs and symptoms of bleeding, if observed, immediately notify Neurology  Strict blood pressure monitoring, keep blood pressure listed 180/105, Cardene drip ordered if needed.  SLP evaluated and initiated diet.  PT/ST/OT evaluation & treatment     Atherosclerotic heart disease, history of CABG  Chronic and stable.   c/w aspirin, statins, Plavix changed to Brilinta per neurology.     Essential Hypertension: well controlled.   Continue amlodipine when able to take oral medications.  Titrate medications as needed.     Diabetes mellitus Type 2, insulin-dependent : not well controlled.   Hgb A1c 7.5.  Home regimen includes Mounjaro.  Accu-Cheks with SSI with admelog based on diet.     Depression: Chronic and stable, continue cymbalta  Hyperlipidemia: Continue statin therapy.    Disposition:  Downgrade to [facility]    Code status:   Code Status (Patient has no pulse and is not breathing): CPR (Attempt to Resuscitate)  Medical Interventions (Patient has pulse or is breathing): Full Support  Release to patient: Routine Release       Nutrition:   Diet: Cardiac Diets, Diabetic Diets;  Healthy Heart (2-3 Na+); Consistent Carbohydrate; Texture: Regular Texture (IDDSI 7); Fluid Consistency: Thin (IDDSI 0)   Patient isn't on Tube Feeding     DVT prophylaxis:  Mechanical DVT prophylaxis orders are present.     Subjective / Review of systems     Review of Systems   Constitutional:  Negative for chills and fever.   Respiratory:  Negative for chest tightness and shortness of breath.    Cardiovascular:  Negative for chest pain.   Gastrointestinal:  Negative for abdominal pain, nausea and vomiting.   Genitourinary:  Negative for difficulty urinating.   Neurological:  Positive for headaches. Negative for dizziness and light-headedness.      Objective / Physical Exam     Vital signs:  Temp: 97.3 øF (36.3 øC)  BP: 150/82  Heart Rate: 78  Resp: 12  SpO2: 98 %  Weight: 78.5 kg (173 lb)    Admission Weight: Weight: 74.2 kg (163 lb 9.3 oz)  Current Weight: Weight: 78.5 kg (173 lb)    Input/Output in last 24 hours:    Intake/Output Summary (Last 24 hours) at 8/2/2023 1607  Last data filed at 8/2/2023 0400  Gross per 24 hour   Intake 566 ml   Output 450 ml   Net 116 ml      Net IO Since Admission: 116 mL [08/02/23 1607]     Physical Exam  Vitals and nursing note reviewed.   HENT:      Head: Normocephalic.      Mouth/Throat:      Mouth: Mucous membranes are moist.      Pharynx: Oropharynx is clear.   Eyes:      Extraocular Movements: Extraocular movements intact.      Conjunctiva/sclera: Conjunctivae normal.      Pupils: Pupils are equal, round, and reactive to light.   Cardiovascular:      Rate and Rhythm: Normal rate and regular rhythm.      Pulses: Normal pulses.      Heart sounds: Normal heart sounds.   Pulmonary:      Effort: Pulmonary effort is normal.      Breath sounds: Normal breath sounds.   Abdominal:      General: Bowel sounds are normal.      Palpations: Abdomen is soft.   Musculoskeletal:      Right lower leg: No edema.      Left lower leg: No edema.   Skin:     General: Skin is warm and dry.       Findings: No rash.   Neurological:      Mental Status: She is alert and oriented to person, place, and time.      Comments: Patient still has some left sided weakness and numbness.    Psychiatric:         Mood and Affect: Mood normal.         Behavior: Behavior normal.        Radiology and Labs     Results from last 7 days   Lab Units 08/02/23  0451 08/01/23  1338   WBC 10*3/mm3 7.20 8.20   HEMOGLOBIN g/dL 11.9* 12.5   HEMATOCRIT % 36.4 37.9   PLATELETS 10*3/mm3 291 318      Results from last 7 days   Lab Units 08/01/23  1338   PROTIME Seconds 11.1   INR  1.04   APTT seconds 27.2      Results from last 7 days   Lab Units 08/02/23  0451 08/01/23  1338   SODIUM mmol/L 142 140   POTASSIUM mmol/L 3.9 4.0   CHLORIDE mmol/L 105 103   CO2 mmol/L 27.0 26.0   BUN mg/dL 19 22   CREATININE mg/dL 0.90 0.92   GLUCOSE mg/dL 122* 169*   MAGNESIUM mg/dL 1.7  --    PHOSPHORUS mg/dL 4.4  --       Results from last 7 days   Lab Units 08/02/23  0451 08/01/23  1338   ALK PHOS U/L 51 58   AST (SGOT) U/L 16 16   ALT (SGPT) U/L 8 8           CT Head Without Contrast    Result Date: 8/2/2023  Impression: 1. No acute intracranial findings. 2. Chronic-appearing lacunar infarct within the tomas, right of midline. Electronically Signed: Blanca Glass  8/2/2023 3:22 PM EDT  Workstation ID: [id number]    CT Head Without Contrast    Result Date: 8/1/2023  No definite acute intracranial abnormality. Electronically Signed: Antelmo Ramirez  8/1/2023 2:33 PM EDT  Workstation ID: [id number]    CT Angiogram Neck    Result Date: 8/1/2023  Impression: 1. No evidence of large vessel occlusion, significant flow-limiting stenosis or aneurysm formation 2. Overall no great change from the prior exam Electronically Signed: Antelmo Ramirez  8/1/2023 2:20 PM EDT  Workstation ID: [id number]    MRI Brain Without Contrast    Result Date: 8/2/2023  Impression: 1. No acute intracranial abnormality. Electronically Signed: Blair Qureshi  8/2/2023 10:29 AM EDT  Workstation ID:  [id number]    XR Chest 1 View    Result Date: 8/1/2023  Impression: No active pulmonary process. Electronically Signed: Chung Holland MD  8/1/2023 2:11 PM EDT  Workstation ID: [id number]    CT Head Without Contrast Stroke Protocol    Result Date: 8/1/2023  No acute intracranial abnormality. Lacunar infarct right side of the tomas. Electronically Signed: Antelmo Ramirez  8/1/2023 1:36 PM EDT  Workstation ID: [id number]    CT Angiogram Head w AI Analysis of LVO    Result Date: 8/1/2023  Impression: 1. No evidence of large vessel occlusion, significant flow-limiting stenosis or aneurysm formation 2. Overall no great change from the prior exam Electronically Signed: Antelmo Ramirez  8/1/2023 2:20 PM EDT  Workstation ID: [id number]    CT CEREBRAL PERFUSION WITH & WITHOUT CONTRAST    Result Date: 8/1/2023  Impression: No evidence of core infarct or visualized at risk tissue. Electronically Signed: Antelmo Ramirez  8/1/2023 2:07 PM EDT  Workstation ID: [id number]       Current medications     Scheduled Meds:   amLODIPine, 5 mg, Oral, Daily  aspirin, 81 mg, Oral, Daily   Or  aspirin, 300 mg, Rectal, Daily  atorvastatin, 80 mg, Oral, Nightly  DULoxetine, 60 mg, Oral, Daily  famotidine, 20 mg, Intravenous, Daily  insulin lispro, 2-7 Units, Subcutaneous, 4x Daily With Meals & Nightly  multivitamin with minerals, 1 tablet, Oral, Daily  polyethylene glycol, 17 g, Oral, Daily  senna-docusate sodium, 2 tablet, Oral, BID  sodium chloride, 10 mL, Intravenous, Q12H  sodium chloride, 10 mL, Intravenous, Q12H  ticagrelor, 180 mg, Oral, Once  ticagrelor, 90 mg, Oral, BID        Continuous Infusions:   niCARdipine, 5-15 mg/hr, Last Rate: Stopped (08/01/23 1623)  sodium chloride, 75 mL/hr, Last Rate: 75 mL/hr (08/01/23 2225)          Plan discussed with RN. Reviewed all other data in the last 24 hours, including but not limited to vitals, labs, microbiology, imaging and pertinent notes from other providers.  Plan also discussed with patient at the  bedside.      CANDIDO Lopez   Critical Care  08/02/23   16:07 EDT

## 2023-08-02 NOTE — THERAPY EVALUATION
Patient Name: Marge Mcghee  : 1955    MRN: [id number]                              Today's Date: 2023       Admit Date: 2023    Visit Dx:     ICD-10-CM ICD-9-CM   1. Acute CVA (cerebrovascular accident)  I63.9 434.91     Patient Active Problem List   Diagnosis    Type 2 diabetes mellitus with peripheral neuropathy    Age-related osteoporosis without current pathological fracture    Hyperlipidemia    Hepatic steatosis    Gastroesophageal reflux disease with esophagitis    Adrenal nodule    Vitamin D deficiency    Thyroid nodule    UTI (urinary tract infection)    REINA (acute kidney injury)    Hyperglycemia    Acute right flank pain    Vomiting    Hypomagnesemia    Dehydration    Obesity (BMI 30-39.9)    Complicated migraine    Occipital neuralgia of left side    Polypharmacy    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    Lisfranc dislocation    Delayed surgical wound healing    Right foot infection    Chest pain    GERD (gastroesophageal reflux disease)    Stable angina pectoris    S/P CABG x 3    Encephalopathy, metabolic    Hypertensive emergency    Dysarthria    CVA (cerebral vascular accident)    Coronary artery disease involving coronary bypass graft of native heart without angina pectoris    Essential hypertension    HTN, goal below 130/80    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    DM type 2, goal HbA1c < 7%     Past Medical History:   Diagnosis Date    Adrenal nodule 2016    FINDINGS: Mild hepatic steatosis may be present. Cholecystectomy. No biliary ductal dilatation. Negative pancreas, spleen, left adrenal gland, and kidneys. The right adrenal presumed adenoma has increased slightly, measuring 3 x 4 cm in diameter,  compared to 2 x 3.5 cm on the previous exam. The attenuation values are consistent with an adenoma. Done at [facility] in August  2018    Age-related osteoporosis without current pathological fracture 11/16/2016    Arthritis     Delayed surgical wound healing     Essential hypertension 11/16/2016    Gastroesophageal reflux disease with esophagitis 11/16/2016    Hepatic steatosis 11/16/2016    History of anemia     History of sepsis     FROM UTI    Hyperlipidemia 11/16/2016    Lisfranc's dislocation     LEFT WITH FRACTURES NONHEALING FOOT    Osteomyelitis of left foot 11/2020    RLS (restless legs syndrome)     Squamous cell skin cancer 2014    Excised by Dr. James    Thyroid nodule 10/18/2019    BENIGN    Type 2 diabetes mellitus with peripheral neuropathy 11/16/2016    Vitamin D deficiency 1/25/2019     Past Surgical History:   Procedure Laterality Date    BREAST BIOPSY Left     7/2019. BENIGN    CARDIAC CATHETERIZATION Left 5/2/2021    Procedure: Cardiac Catheterization/Vascular Study;  Surgeon: Chacho Esteban MD;  Location: [facility] CATH INVASIVE LOCATION;  Service: Cardiovascular;  Laterality: Left;    CARDIAC CATHETERIZATION N/A 5/2/2021    Procedure: Intra-Aortic Baloon Pump Insertion;  Surgeon: Chacho Esteban MD;  Location: [facility] CATH INVASIVE LOCATION;  Service: Cardiovascular;  Laterality: N/A;    CATARACT EXTRACTION WITH INTRAOCULAR LENS IMPLANT Bilateral     CHOLECYSTECTOMY      COLONOSCOPY      CORONARY ARTERY BYPASS GRAFT N/A 5/2/2021    Procedure: CORONARY ARTERY BYPASS WITH INTERNAL MAMMARY ARTERY GRAFT;  Surgeon: Jr Rocael Alexander MD;  Location: [facility];  Service: Cardiothoracic;  Laterality: N/A;    FOOT FUSION Right 11/13/2020    Procedure: RIGHT OPEN TREATMENT LISFRANC INJURY, OPEN REDUCTION INTERNAL FIXATION MEDIAL/MIDDLE CUNEIFORM FRACTURE AND 2ND/3RD METATARSAL FRACTURE 1ST 2ND POSSIBLE 3RD TARSOMETATARSAL ARTHRODESIS INTERCUNEIFORM ARTHRODESIS CALCANEAL BONE GRAFT;  Surgeon: Mikhail Banks Jr., MD;  Location: [facility];  Service: Orthopedics;  Laterality: Right;    INCISION AND DRAINAGE LEG Right 1/8/2021     Procedure: RIGHT IRRIGATION AND DEBRIDEMENT OF FOOT WITH SECONDARY CLOSURE AND HARDWARE REMOVAL;  Surgeon: Mikhail Banks Jr., MD;  Location: [facility] MAIN OR;  Service: Orthopedics;  Laterality: Right;    KNEE ARTHROSCOPY Bilateral     TOTAL ABDOMINAL HYSTERECTOMY      TRANSESOPHAGEAL ECHOCARDIOGRAM (EMILIA) N/A 5/2/2021    Procedure: TRANSESOPHAGEAL ECHOCARDIOGRAM;  Surgeon: Jr Rocael Alexander MD;  Location: [facility];  Service: Cardiothoracic;  Laterality: N/A;    UPPER GASTROINTESTINAL ENDOSCOPY  08/2016    US GUIDED FINE NEEDLE ASPIRATION  5/8/2020      General Information       Row Name 08/02/23 1519          OT Time and Intention    Document Type evaluation  -     Mode of Treatment occupational therapy  -       Row Name 08/02/23 1519          General Information    Prior Level of Function independent:;ADL's;home management;all household mobility;community mobility;work  -     Existing Precautions/Restrictions fall  -     Barriers to Rehab medically complex  -       Row Name 08/02/23 1519          Living Environment    People in Home spouse  -       Row Name 08/02/23 1519          Home Main Entrance    Number of Stairs, Main Entrance six  -     Stair Railings, Main Entrance railings safe and in good condition  -       Row Name 08/02/23 1519          Stairs Within Home, Primary    Number of Stairs, Within Home, Primary none  -       Row Name 08/02/23 1519          Cognition    Orientation Status (Cognition) oriented x 4  -       Row Name 08/02/23 1519          Safety Issues, Functional Mobility    Impairments Affecting Function (Mobility) balance;coordination;endurance/activity tolerance;motor planning;pain;strength  -               User Key  (r) = Recorded By, (t) = Taken By, (c) = Cosigned By      Initials Name Provider Type     Tong Mcmahon OT Occupational Therapist                     Mobility/ADL's       Row Name 08/02/23 1520          Bed Mobility    Bed Mobility bed mobility  (all) activities  -     All Activities, [place] (Bed Mobility) contact guard;minimum assist (75% patient effort)  -[facility] Name 08/02/23 1520          Transfers    Transfers toilet transfer;sit-stand transfer  -MK       Row Name 08/02/23 1520          Sit-Stand Transfer    Sit-Stand [place] (Transfers) minimum assist (75% patient effort);2 person assist  -MK       Row Name 08/02/23 1520          Toilet Transfer    Type (Toilet Transfer) sit-stand  -     [place] Level (Toilet Transfer) minimum assist (75% patient effort);2 person assist  -[facility] Name 08/02/23 1520          Activities of Daily Living    BADL Assessment/Intervention lower body dressing;toileting  -MK       Row Name 08/02/23 1520          Lower Body Dressing Assessment/Training    [place] Level (Lower Body Dressing) socks;maximum assist (25% patient effort)  -MK       Row Name 08/02/23 1520          Toileting Assessment/Training    [place] Level (Toileting) toileting skills;minimum assist (75% patient effort)  -               User Key  (r) = Recorded By, (t) = Taken By, (c) = Cosigned By      Initials Name Provider Type     Tong Mcmahon OT Occupational Therapist                   Obj/Interventions       [facility] Name 08/02/23 1520          Sensory Assessment (Somatosensory)    Sensory Assessment (Somatosensory) other (see comments)  -     Sensory Assessment sensation questionable as patient got distracted several times during assessment.  -[facility] Name 08/02/23 1520          Vision Assessment/Intervention    Visual Impairment/Limitations WFL  -MK       Row Name 08/02/23 1520          Range of Motion Comprehensive    General Range of Motion bilateral upper extremity ROM L  -MK       Row Name 08/02/23 1520          Strength Comprehensive (MMT)    General Manual Muscle Testing (MMT) Assessment upper extremity strength deficits identified  -     Comment, General Manual Muscle Testing (MMT) Assessment  RUE: WNL, LUE: grossly 3/5  -               User Key  (r) = Recorded By, (t) = Taken By, (c) = Cosigned By      Initials Name Provider Type    Tong Live OT Occupational Therapist                   Goals/Plan       Row Name 08/02/23 1525          Bathing Goal 1 (OT)    Activity/Device (Bathing Goal 1, OT) bathing skills, all  -MK     [place] Level/Cues Needed (Bathing Goal 1, OT) supervision required  -MK     Time Frame (Bathing Goal 1, OT) 2 weeks  -MK       Row Name 08/02/23 1525          Dressing Goal 1 (OT)    Activity/Device (Dressing Goal 1, OT) dressing skills, all  -MK     [place]/Cues Needed (Dressing Goal 1, OT) supervision required  -MK     Time Frame (Dressing Goal 1, OT) 2 weeks  -MK       Row Name 08/02/23 1525          Toileting Goal 1 (OT)    Activity/Device (Toileting Goal 1, OT) toileting skills, all  -MK     [place] Level/Cues Needed (Toileting Goal 1, OT) supervision required  -     Time Frame (Toileting Goal 1, OT) 2 weeks  -MK       Row Name 08/02/23 1525          Therapy Assessment/Plan (OT)    Planned Therapy Interventions (OT) activity tolerance training;functional balance retraining;BADL retraining;neuromuscular control/coordination retraining;patient/caregiver education/training;transfer/mobility retraining;strengthening exercise;ROM/therapeutic exercise  -               User Key  (r) = Recorded By, (t) = Taken By, (c) = Cosigned By      Initials Name Provider Type    Tong Live OT Occupational Therapist                   Clinical Impression       Row Name 08/02/23 1521          Pain Assessment    Additional Documentation Pain Scale: FACES Pre/Post-Treatment (Group)  -MK       Row Name 08/02/23 1521          Pain Scale: FACES Pre/Post-Treatment    Pain: FACES Scale, Pretreatment 6-->hurts even more  -MK     Posttreatment Pain Rating 6-->hurts even more  -MK     Pain Location - head  -MK       Row Name 08/02/23 1521          Plan of Care Review     Plan of Care Reviewed With patient  -     Outcome Evaluation Pt is a 69 y/o F admitted for L sided numbness and tingling.  CT (-).  tPA admistered.  MRI unremarkable.  Pt BP goal: less than 180/105.  PMHx: HTN, DM II, depression, HLD, atherosclerotic heart disease, hx of CABG.  Pt reports she lives in a single story home with 6 steps with HR to enter with .  Pt was independent with ADLs, IADLs, transfers, functional mobility, and works full time as a bus aid.  Pt completes bed mobility with MIN A.  Pt completed t/f with MIN A x 2.  Pt completed functional mobility to toilet with MIN A x 2 via HHA.  Pt completed toileting with MIN A.  Pt presents with impaired strength on LUE.  Pt presents with deficits in strength, ADLs, transfers, mobility, and increased falls indicating need for skilled OT services.  OT recommending inpatient rehab.  -       Row Name 08/02/23 1521          Therapy Assessment/Plan (OT)    Criteria for Skilled Therapeutic Interventions Met (OT) yes;meets criteria  -     Therapy Frequency (OT) 5 times/wk  -     Predicted Duration of Therapy Intervention (OT) until dc  -       Row Name 08/02/23 1521          Therapy Plan Review/Discharge Plan (OT)    Anticipated Discharge Disposition (OT) inpatient rehabilitation facility  -       Row Name 08/02/23 1521          Vital Signs    Pre Systolic BP Rehab 184  -MK     Pre Treatment Diastolic BP 95  -MK     Intra Systolic BP Rehab 160  -MK     Intra Treatment Diastolic BP 72  -MK       Row Name 08/02/23 1521          Positioning and Restraints    Pre-Treatment Position in bed  -     Post Treatment Position chair  -MK     In Chair notified nsg;reclined;call light within reach;encouraged to call for assist;exit alarm on  -               User Key  (r) = Recorded By, (t) = Taken By, (c) = Cosigned By      Initials Name Provider Type    Tong Live, OT Occupational Therapist                   Outcome Measures       Row Name 08/02/23  1525          How much help from another is currently needed...    Putting on and taking off regular lower body clothing? 2  -MK     Bathing (including washing, rinsing, and drying) 2  -MK     Toileting (which includes using toilet bed pan or urinal) 3  -MK     Putting on and taking off regular upper body clothing 3  -MK     Taking care of personal grooming (such as brushing teeth) 4  -MK     Eating meals 4  -     AM-PAC 6 Clicks Score (OT) 18  -       Row Name 08/02/23 1525          Modified [place] Scale    Pre-Stroke Modified Lalo Scale 0 - No Symptoms at all.  -     Modified Lalo Scale 4 - Moderately severe disability.  Unable to walk without assistance, and unable to attend to own bodily needs without assistance.  -       Row Name 08/02/23 1525          Functional Assessment    Outcome Measure Options AM-PAC 6 Clicks Daily Activity (OT);Modified [place]  -               User Key  (r) = Recorded By, (t) = Taken By, (c) = Cosigned By      Initials Name Provider Type    Tong Live OT Occupational Therapist                    Occupational Therapy Education       Title: PT OT SLP Therapies (In Progress)       Topic: Occupational Therapy (In Progress)       Point: ADL training (Done)       Description:   Instruct learner(s) on proper safety adaptation and remediation techniques during self care or transfers.   Instruct in proper use of assistive devices.                  Learning Progress Summary             Patient Acceptance, E, VU by  at 8/2/2023 1526                         Point: Home exercise program (Not Started)       Description:   Instruct learner(s) on appropriate technique for monitoring, assisting and/or progressing therapeutic exercises/activities.                  Learner Progress:  Not documented in this visit.              Point: Precautions (Not Started)       Description:   Instruct learner(s) on prescribed precautions during self-care and functional transfers.                   Learner Progress:  Not documented in this visit.              Point: Body mechanics (Not Started)       Description:   Instruct learner(s) on proper positioning and spine alignment during self-care, functional mobility activities and/or exercises.                  Learner Progress:  Not documented in this visit.                              User Key       Initials Effective Dates Name Provider Type Discipline     02/17/22 -  Tong Mcmahon OT Occupational Therapist OT                  OT Recommendation and Plan  Planned Therapy Interventions (OT): activity tolerance training, functional balance retraining, BADL retraining, neuromuscular control/coordination retraining, patient/caregiver education/training, transfer/mobility retraining, strengthening exercise, ROM/therapeutic exercise  Therapy Frequency (OT): 5 times/wk  Plan of Care Review  Plan of Care Reviewed With: patient  Outcome Evaluation: Pt is a 69 y/o F admitted for L sided numbness and tingling.  CT (-).  tPA admistered.  MRI unremarkable.  Pt BP goal: less than 180/105.  PMHx: HTN, DM II, depression, HLD, atherosclerotic heart disease, hx of CABG.  Pt reports she lives in a single story home with 6 steps with HR to enter with .  Pt was independent with ADLs, IADLs, transfers, functional mobility, and works full time as a bus aid.  Pt completes bed mobility with MIN A.  Pt completed t/f with MIN A x 2.  Pt completed functional mobility to toilet with MIN A x 2 via HHA.  Pt completed toileting with MIN A.  Pt presents with impaired strength on LUE.  Pt presents with deficits in strength, ADLs, transfers, mobility, and increased falls indicating need for skilled OT services.  OT recommending inpatient rehab.     Time Calculation:         Time Calculation- OT       Row Name 08/02/23 1526             Time Calculation- OT    OT Start Time 1414  -MK      OT Stop Time 1448  -MK      OT Time Calculation (min) 34 min  -MK      Total Timed Code  Minutes- OT 0 minute(s)  -HARPER      OT Received On 08/02/23  -HARPER      OT - Next Appointment 08/03/23  -HARPER      OT Goal Re-Cert Due Date 08/16/23  -HARPER                User Key  (r) = Recorded By, (t) = Taken By, (c) = Cosigned By      Initials Name Provider Type    Tong Live OT Occupational Therapist                  Therapy Charges for Today       Code Description Service Date Service Provider Modifiers Qty    [id number] HC OT EVAL MOD COMPLEXITY 4 8/2/2023 Tong Mcmahon OT GO 1                 Tong Mcmahon OT  8/2/2023

## 2023-08-02 NOTE — PLAN OF CARE
"Goal Outcome Evaluation:            Pt now on Q1H neuro checks and vital signs. Pt remains weaker on left side but lower limb ataxia has improved whereas the upper limb ataxia remains unchanged. Pt LUE and LLE drifts with each neuro check. Pt has had numbness and tingling present in Left leg, arm, and face entire shift, buts states that it is improving some. Pt remains with a left sided facial droop but has also improved throughout the shift. Pt has mild aphasia present, mainly with the \"huckleberry\" pronunciation. Pt keeps going in and out of being oriented to time, knows year but not month with each neuro check. Pain has complained of a throbbing, aching headache the entire shift at 8 or 9/10 for majority of shift. Pt has received 1 mg of hydromorphone X4 doses without true relief of pain. Nausea has been associated with the headache entire shift, phenergan given X2 doses without relief. Aromatherapy helps some. Pt has decreased respirations as the shift has progressed but have remained stable. At times of sleeping, pt O2 sat  to mid 80s, so a nasal cannula of 2L was placed. O2 sats have improved to high 90s. NSR, vital signs have remained stable the entire shift. Pt limb coordination decreased after receiving 1st dose of dilaudid to where the RUE drifted during the neuro check. Left sided partial hemaniopia seems to be improving. Pupils have equalized throughout the shift, but are constricted. VTE prophylaxis in place: SCDs and scheduled antiplatelet/anticoagulants. Some urine output this shift. Pt expressed understanding of Ischemic stroke education and stated that she would try to stay more active going forward. Pt expressed acceptance and frustration with medical diagnosis, presenting symptoms, and acknowledgement of careplan moving forward in treatment and healing time. Pt hopeful about progress and improvements showed with the progression of the shift.             "

## 2023-08-02 NOTE — CONSULTS
"Diabetes Education  Assessment/Teaching    Patient Name:  Marge Mcghee  YOB: 1955  MRN: [id number]  Admit Date:  8/1/2023      Assessment Date:  8/2/2023  Flowsheet Row Most Recent Value   General Information     Referral From: MD order, A1c  [MD consult per stroke protocol and on 8/1/2023 A1c was 7.5%.]   Height 160 cm (63\")   Weight 78.5 kg (173 lb)   Weight Method Bed scale   Pregnancy Assessment    Diabetes History    What type of diabetes do you have? Type 2   Current DM knowledge fair   Have you had diabetes education/teaching in the past? yes   When and where was your diabetes education? Inpatient hospitalization at [facility] on 9/26/2022   Do you test your blood sugar at home? no   Have you had high blood sugar? (>140mg/dl) yes   How often do you have high blood sugar? unknown   When was your last high blood sugar? Admission blood sugar 155   Education Preferences    What areas of diabetes would you like to learn about? avoiding high blood sugar, diabetes complications, testing my blood sugar at home   Nutrition Information    Assessment Topics    Problem Solving - Assessment Needs education   Reducing Risk - Assessment Needs education   Monitoring - Assessment Needs education   DM Goals    Problem Solving - Goal Today   Reducing Risk - Goal Today   Monitoring - Goal Today            Flowsheet Row Most Recent Value   DM Education Needs    Meter Has own   Meter Type Other (comment)  [Patient stated she has a glucometer that is about 3 years old.]   Frequency of Testing 2 times a week  [Discussed with patient about trying to check blood sugar 2-3 days a week varying the times before meals and at bedtime.]   Medication Other injectables  [At home patient stated that she takes Mounjaro 5 mg weekly on Sundays.]   Problem Solving Hyperglycemia, Signs, Symptoms, Treatment   Reducing Risks A1C testing  [On 8/1/2023 A1c was 7.5%.]   Discharge Plan Home   Motivation Moderate   Teaching Method Discussion, " Handouts   Patient Response Verbalized understanding              Other Comments:  A1c info sheet given with discussion on A1c target and healthy blood sugar range. Patient stated she drinks water and unsweetened tea with Splenda. For exercise patient stated she walks in her driveway. Discussed with patient that drinking water and exercise are 2 natural ways to help control blood sugar. Patient has no further questions or concerns related to diabetes at this time.        Electronically signed by:  Ivelisse Owens RN  08/02/23 14:31 EDT

## 2023-08-02 NOTE — PROGRESS NOTES
Patient doing well but still has some left-sided drift and somewhat decreased sensation    She is having bad headaches    Her MRI brain was unremarkable    No stroke    So we are dealing with a possibility of aborted stroke and now a possibility of migraines    We will try some pain control and see how things go    Her platelet function analysis for Plavix was abnormal so I am going to change her to Brilinta    90 mg twice daily with loading dose of 180 mg plus aspirin 81 mg plus atorvastatin      Modification of stroke risk factors:   - Blood pressure should be less than 130/80 outpatient, HbA1c less than 6.5, LDL less than 70; b12>500 and smoking cessation if applicable. We would be grateful if the primary team / primary care physician keep a close watch on this above targets.  - Stroke education  - Follow up with neurologist of choice

## 2023-08-02 NOTE — THERAPY EVALUATION
Patient Name: Marge Mcghee  : 1955    MRN: [id number]                              Today's Date: 2023       Admit Date: 2023    Visit Dx:     ICD-10-CM ICD-9-CM   1. Acute CVA (cerebrovascular accident)  I63.9 434.91     Patient Active Problem List   Diagnosis    Type 2 diabetes mellitus with peripheral neuropathy    Age-related osteoporosis without current pathological fracture    Hyperlipidemia    Hepatic steatosis    Gastroesophageal reflux disease with esophagitis    Adrenal nodule    Vitamin D deficiency    Thyroid nodule    UTI (urinary tract infection)    REINA (acute kidney injury)    Hyperglycemia    Acute right flank pain    Vomiting    Hypomagnesemia    Dehydration    Obesity (BMI 30-39.9)    Complicated migraine    Occipital neuralgia of left side    Polypharmacy    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    Lisfranc dislocation    Delayed surgical wound healing    Right foot infection    Chest pain    GERD (gastroesophageal reflux disease)    Stable angina pectoris    S/P CABG x 3    Encephalopathy, metabolic    Hypertensive emergency    Dysarthria    CVA (cerebral vascular accident)    Coronary artery disease involving coronary bypass graft of native heart without angina pectoris    Essential hypertension    HTN, goal below 130/80    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    DM type 2, goal HbA1c < 7%     Past Medical History:   Diagnosis Date    Adrenal nodule 2016    FINDINGS: Mild hepatic steatosis may be present. Cholecystectomy. No biliary ductal dilatation. Negative pancreas, spleen, left adrenal gland, and kidneys. The right adrenal presumed adenoma has increased slightly, measuring 3 x 4 cm in diameter,  compared to 2 x 3.5 cm on the previous exam. The attenuation values are consistent with an adenoma. Done at [facility] in August  2018    Age-related osteoporosis without current pathological fracture 11/16/2016    Arthritis     Delayed surgical wound healing     Essential hypertension 11/16/2016    Gastroesophageal reflux disease with esophagitis 11/16/2016    Hepatic steatosis 11/16/2016    History of anemia     History of sepsis     FROM UTI    Hyperlipidemia 11/16/2016    Lisfranc's dislocation     LEFT WITH FRACTURES NONHEALING FOOT    Osteomyelitis of left foot 11/2020    RLS (restless legs syndrome)     Squamous cell skin cancer 2014    Excised by Dr. aJmes    Thyroid nodule 10/18/2019    BENIGN    Type 2 diabetes mellitus with peripheral neuropathy 11/16/2016    Vitamin D deficiency 1/25/2019     Past Surgical History:   Procedure Laterality Date    BREAST BIOPSY Left     7/2019. BENIGN    CARDIAC CATHETERIZATION Left 5/2/2021    Procedure: Cardiac Catheterization/Vascular Study;  Surgeon: Chacho Esteban MD;  Location: [facility] CATH INVASIVE LOCATION;  Service: Cardiovascular;  Laterality: Left;    CARDIAC CATHETERIZATION N/A 5/2/2021    Procedure: Intra-Aortic Baloon Pump Insertion;  Surgeon: Chacho Esteban MD;  Location: [facility] CATH INVASIVE LOCATION;  Service: Cardiovascular;  Laterality: N/A;    CATARACT EXTRACTION WITH INTRAOCULAR LENS IMPLANT Bilateral     CHOLECYSTECTOMY      COLONOSCOPY      CORONARY ARTERY BYPASS GRAFT N/A 5/2/2021    Procedure: CORONARY ARTERY BYPASS WITH INTERNAL MAMMARY ARTERY GRAFT;  Surgeon: Jr Rocael Alexander MD;  Location: [facility];  Service: Cardiothoracic;  Laterality: N/A;    FOOT FUSION Right 11/13/2020    Procedure: RIGHT OPEN TREATMENT LISFRANC INJURY, OPEN REDUCTION INTERNAL FIXATION MEDIAL/MIDDLE CUNEIFORM FRACTURE AND 2ND/3RD METATARSAL FRACTURE 1ST 2ND POSSIBLE 3RD TARSOMETATARSAL ARTHRODESIS INTERCUNEIFORM ARTHRODESIS CALCANEAL BONE GRAFT;  Surgeon: Mikhail Banks Jr., MD;  Location: [facility] OR [facility];  Service: Orthopedics;  Laterality: Right;    INCISION AND DRAINAGE LEG Right 1/8/2021     Procedure: RIGHT IRRIGATION AND DEBRIDEMENT OF FOOT WITH SECONDARY CLOSURE AND HARDWARE REMOVAL;  Surgeon: Mikhail Banks Jr., MD;  Location: [facility] MAIN OR;  Service: Orthopedics;  Laterality: Right;    KNEE ARTHROSCOPY Bilateral     TOTAL ABDOMINAL HYSTERECTOMY      TRANSESOPHAGEAL ECHOCARDIOGRAM (EMILIA) N/A 5/2/2021    Procedure: TRANSESOPHAGEAL ECHOCARDIOGRAM;  Surgeon: Jr Rocael Alexander MD;  Location: [facility];  Service: Cardiothoracic;  Laterality: N/A;    UPPER GASTROINTESTINAL ENDOSCOPY  08/2016    US GUIDED FINE NEEDLE ASPIRATION  5/8/2020      General Information       Row Name 08/02/23 1601          Physical Therapy Time and Intention    Document Type evaluation  -CL     Mode of Treatment physical therapy  -CL       Row Name 08/02/23 1601          General Information    Patient Profile Reviewed yes  -CL     Prior Level of Function independent:;ADL's;community mobility;gait;driving;work  -CL     Existing Precautions/Restrictions fall  -CL     Barriers to Rehab medically complex  -CL       Row Name 08/02/23 1601          Living Environment    People in Home spouse  -CL       Row Name 08/02/23 1601          Home Main Entrance    Number of Stairs, Main Entrance six  -CL     Stair Railings, Main Entrance railings safe and in good condition  -CL       Row Name 08/02/23 1601          Stairs Within Home, Primary    Stairs, Within Home, Primary Bedrooms on 2nd floor  -CL     Number of Stairs, Within Home, Primary twelve  -CL       Row Name 08/02/23 1601          Cognition    Orientation Status (Cognition) oriented x 4  -CL       Row Name 08/02/23 1601          Safety Issues, Functional Mobility    Safety Issues Affecting Function (Mobility) impulsivity;insight into deficits/self-awareness;safety precaution awareness;safety precautions follow-through/compliance  -CL     Impairments Affecting Function (Mobility) balance;coordination;endurance/activity tolerance;motor planning;strength  -CL               User Key   (r) = Recorded By, (t) = Taken By, (c) = Cosigned By      Initials Name Provider Type    Stacy Barba PT Physical Therapist                   Mobility       Row Name 08/02/23 1602          Bed Mobility    Bed Mobility bed mobility (all) activities  -CL     All Activities, [place] (Bed Mobility) contact guard;minimum assist (75% patient effort)  -CL       Row Name 08/02/23 1602          Bed-Chair Transfer    Bed-Chair [place] (Transfers) minimum assist (75% patient effort);2 person assist  -CL       Row Name 08/02/23 1602          Sit-Stand Transfer    Sit-Stand [place] (Transfers) minimum assist (75% patient effort);2 person assist  -CL       Row Name 08/02/23 1602          Gait/Stairs (Locomotion)    [place] Level (Gait) minimum assist (75% patient effort);2 person assist  -CL     Distance in Feet (Gait) 10' x 2, decreased aleah, decreased balance; reaching for objects for support  -CL               User Key  (r) = Recorded By, (t) = Taken By, (c) = Cosigned By      Initials Name Provider Type    Stacy Barba PT Physical Therapist                   Obj/Interventions       Row Name 08/02/23 1604          Range of Motion Comprehensive    General Range of Motion bilateral lower extremity ROM WNL  -CL       Row Name 08/02/23 1604          Strength Comprehensive (MMT)    Comment, General Manual Muscle Testing (MMT) Assessment Hip flex 3+/5 R, 3/5 L, knee ext 4+/5 R, 4/5 L, DF 4+/5 R, 4/5 L  -CL       Row Name 08/02/23 1604          Motor Skills    Motor Skills coordination  -CL     Coordination heel to shin;finger to nose;minimal impairment;left  -CL       Row Name 08/02/23 1604          Balance    Balance Assessment standing static balance;standing dynamic balance  -CL     Static Standing Balance minimal assist  -CL     Dynamic Standing Balance minimal assist;2-person assist  -CL       Row Name 08/02/23 1604          Sensory Assessment (Somatosensory)    Sensory Assessment  (Somatosensory) unable/difficult to assess  Pt distracted/unable to follow cues  -CL               User Key  (r) = Recorded By, (t) = Taken By, (c) = Cosigned By      Initials Name Provider Type    CL Stacy Ng, PT Physical Therapist                   Goals/Plan       Row Name 08/02/23 1612          Bed Mobility Goal 1 (PT)    Activity/Assistive Device (Bed Mobility Goal 1, PT) bed mobility activities, all  -CL     [place] Level/Cues Needed (Bed Mobility Goal 1, PT) independent  -CL     Time Frame (Bed Mobility Goal 1, PT) long term goal (LTG);2 weeks  -CL       Row Name 08/02/23 1612          Transfer Goal 1 (PT)    Activity/Assistive Device (Transfer Goal 1, PT) sit-to-stand/stand-to-sit;bed-to-chair/chair-to-bed  -CL     [place] Level/Cues Needed (Transfer Goal 1, PT) modified independence  -CL     Time Frame (Transfer Goal 1, PT) long term goal (LTG);2 weeks  -CL       Row Name 08/02/23 1612          Gait Training Goal 1 (PT)    Activity/Assistive Device (Gait Training Goal 1, PT) gait (walking locomotion);assistive device use  -CL     [place] Level (Gait Training Goal 1, PT) modified independence  -CL     Time Frame (Gait Training Goal 1, PT) long term goal (LTG);2 weeks  -CL       Row Name 08/02/23 1612          Stairs Goal 1 (PT)    Activity/Assistive Device (Stairs Goal 1, PT) ascending stairs;descending stairs  -CL     [place] Level/Cues Needed (Stairs Goal 1, PT) modified independence  -CL     Number of Stairs (Stairs Goal 1, PT) 12  -CL     Time Frame (Stairs Goal 1, PT) long term goal (LTG);2 weeks  -CL       Row Name 08/02/23 1612          Therapy Assessment/Plan (PT)    Planned Therapy Interventions (PT) balance training;bed mobility training;gait training;neuromuscular re-education;patient/family education;stair training;strengthening;transfer training  -CL               User Key  (r) = Recorded By, (t) = Taken By, (c) = Cosigned By      Initials Name Provider Type    CL  Stacy Ng, PT Physical Therapist                   Clinical Impression       Row Name 08/02/23 1605          Pain    Pretreatment Pain Rating 0/10 - no pain  -CL     Posttreatment Pain Rating 0/10 - no pain  -CL       Row Name 08/02/23 1601          Plan of Care Review    Plan of Care Reviewed With patient  -CL     Outcome Evaluation Marge Mcghee is a 68 y.o. female who presents with L sided weakness. CT & MRI unremarkable.  TNKase delivered 8/1/23.   PMH: CABG hx, HTN, DM, Depression, HLD. Pt lives with spouse in a 2 story home wiht [address].  Pt reports that she is normally independent with mobility but 'furniture walks' in the home.  She reports hx of 2 falls in the past 6 months.  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  At time of evaluation, she is A&O x 4 but distractible and impulsive.  She requires min A 2 for transfers and short distance ambulation for safety due in part to LLE weakness as well as impulsivity and decreased balance.  She exhibits mild decrease in coordination on L and it is difficult to assess her sensation as she was distracted and unable to consistently answer questions.  Given her previous high level of independence in the community, she would benefit from acute IP rehab at discharge.  -CL       Row Name 08/02/23 1607          Therapy Assessment/Plan (PT)    Rehab Potential (PT) good, to achieve stated therapy goals  -CL     Criteria for Skilled Interventions Met (PT) yes;skilled treatment is necessary  -CL     Therapy Frequency (PT) 5 times/wk  -CL     Predicted Duration of Therapy Intervention (PT) Until discharge  -CL       Row Name 08/02/23 1609          Vital Signs    Pre Systolic BP Rehab 184  -CL     Pre Treatment Diastolic BP 95  -CL     Intra Systolic BP Rehab 160  -CL     Intra Treatment Diastolic BP 72  -CL     Pre Patient Position Supine  -CL     Intra Patient Position Sitting  -CL       Row  Name 08/02/23 1605          Positioning and Restraints    Pre-Treatment Position in bed  -CL     Post Treatment Position chair  -CL     In Chair notified nsg;reclined;call light within reach;encouraged to call for assist;exit alarm on  -CL               User Key  (r) = Recorded By, (t) = Taken By, (c) = Cosigned By      Initials Name Provider Type    CL Stacy Ng, PT Physical Therapist                   Outcome Measures       Row Name 08/02/23 1612          How much help from another person do you currently need...    Turning from your back to your side while in flat bed without using bedrails? 4  -CL     Moving from lying on back to sitting on the side of a flat bed without bedrails? 3  -CL     Moving to and from a bed to a chair (including a wheelchair)? 3  -CL     Standing up from a chair using your arms (e.g., wheelchair, bedside chair)? 3  -CL     Climbing 3-5 steps with a railing? 2  -CL     To walk in hospital room? 3  -CL     AM-PAC 6 Clicks Score (PT) 18  -CL     Highest level of mobility 6 --> Walked 10 steps or more  -CL       Row Name 08/02/23 1612 08/02/23 1525       Modified [place] Scale    Pre-Stroke Modified [place] Scale -- 0 - No Symptoms at all.  -    Modified [place] Scale 4 - Moderately severe disability.  Unable to walk without assistance, and unable to attend to own bodily needs without assistance.  -CL 4 - Moderately severe disability.  Unable to walk without assistance, and unable to attend to own bodily needs without assistance.  -      Row Name 08/02/23 1612 08/02/23 1525       Functional Assessment    Outcome Measure Options AM-PAC 6 Clicks Basic Mobility (PT);Modified [place]  -CL AM-PAC 6 Clicks Daily Activity (OT);Modified [place]  -MK              User Key  (r) = Recorded By, (t) = Taken By, (c) = Cosigned By      Initials Name Provider Type    Tong Live, OT Occupational Therapist    Stacy Barba, PT Physical Therapist                                  Physical Therapy Education       Title: PT OT SLP Therapies (In Progress)       Topic: Physical Therapy (Done)       Point: Mobility training (Done)       Learning Progress Summary             Patient Acceptance, E,TB, VU,NR by CL at 8/2/2023 1613                         Point: Precautions (Done)       Learning Progress Summary             Patient Acceptance, E,TB, VU,NR by CL at 8/2/2023 1613                                         User Key       Initials Effective Dates Name Provider Type Discipline     03/02/22 -  Stacy Ng, PT Physical Therapist PT                  PT Recommendation and Plan  Planned Therapy Interventions (PT): balance training, bed mobility training, gait training, neuromuscular re-education, patient/family education, stair training, strengthening, transfer training  Plan of Care Reviewed With: patient  Outcome Evaluation: Marge Mcghee is a 68 y.o. female who presents with L sided weakness. CT & MRI unremarkable.  TNKase delivered 8/1/23.   PMH: CABG hx, HTN, DM, Depression, HLD. Pt lives with spouse in a 2 story home [facility] [address] [facility].  Pt reports that she is normally independent with mobility but 'furniture walks' in the home.  She reports hx of 2 falls in the past 6 months.  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  At time of evaluation, she is A&O x 4 but distractible and impulsive.  She requires min A 2 for transfers and short distance ambulation for safety due in part to LLE weakness as well as impulsivity and decreased balance.  She exhibits mild decrease in coordination on L and it is difficult to assess her sensation as she was distracted and unable to consistently answer questions.  Given her previous high level of independence in the community, she would benefit from acute IP rehab at discharge.     Time Calculation:   PT Evaluation Complexity  History, PT Evaluation Complexity: 3 or more personal  factors and/or comorbidities  Examination of Body Systems (PT Eval Complexity): total of 3 or more elements  Clinical Presentation (PT Evaluation Complexity): evolving  Clinical Decision Making (PT Evaluation Complexity): moderate complexity  Overall Complexity (PT Evaluation Complexity): moderate complexity     PT Charges       Row Name 08/02/23 1613             Time Calculation    Start Time 1415  -CL      Stop Time 1444  -CL      Time Calculation (min) 29 min  -CL      PT Received On 08/02/23  -CL      PT - Next Appointment 08/03/23  -CL      PT Goal Re-Cert Due Date 08/16/23  -CL         Time Calculation- PT    Total Timed Code Minutes- PT 10 minute(s)  -CL                User Key  (r) = Recorded By, (t) = Taken By, (c) = Cosigned By      Initials Name Provider Type    CL Stacy Ng, PT Physical Therapist                  Therapy Charges for Today       Code Description Service Date Service Provider Modifiers Qty    20813796610 HC PT EVAL MOD COMPLEXITY 4 8/2/2023 Stacy Ng, PT GP 1    59913206289 HC GAIT TRAINING EA 15 MIN 8/2/2023 Stacy Ng, PT GP 1            PT G-Codes  Outcome Measure Options: AM-PAC 6 Clicks Basic Mobility (PT), Modified [place]  AM-PAC 6 Clicks Score (PT): 18  AM-PAC 6 Clicks Score (OT): 18  Modified [place] Scale: 4 - Moderately severe disability.  Unable to walk without assistance, and unable to attend to own bodily needs without assistance.       Stacy Ng PT  8/2/2023

## 2023-08-02 NOTE — PLAN OF CARE
Goal Outcome Evaluation:  Plan of Care Reviewed With: patient           Outcome Evaluation: Pt is a 69 y/o F admitted for L sided numbness and tingling.  CT (-).  tPA admistered.  MRI unremarkable.  Pt BP goal: less than 180/105.  PMHx: HTN, DM II, depression, HLD, atherosclerotic heart disease, hx of CABG.  Pt reports she lives in a single story home with 6 steps with HR to enter with .  Pt was independent with ADLs, IADLs, transfers, functional mobility, and works full time as a bus aid.  Pt completes bed mobility with MIN A.  Pt completed t/f with MIN A x 2.  Pt completed functional mobility to toilet with MIN A x 2 via HHA.  Pt completed toileting with MIN A.  Pt presents with impaired strength on LUE.  Pt presents with deficits in strength, ADLs, transfers, mobility, and increased falls indicating need for skilled OT services.  OT recommending inpatient rehab.      Anticipated Discharge Disposition (OT): inpatient rehabilitation facility

## 2023-08-02 NOTE — PLAN OF CARE
Goal Outcome Evaluation:  Plan of Care Reviewed With: patient           Outcome Evaluation: Marge Mcghee is a 68 y.o. female who presents with L sided weakness. CT & MRI unremarkable.  TNKase delivered 8/1/23.   PMH: CABG hx, HTN, DM, Depression, HLD. Pt lives with spouse in a 2 story home wiht 5-[address].  Pt reports that she is normally independent with mobility but 'furniture walks' in the home.  She reports hx of 2 falls in the past 6 months.  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  At time of evaluation, she is A&O x 4 but distractible and impulsive.  She requires min A 2 for transfers and short distance ambulation for safety due in part to LLE weakness as well as impulsivity and decreased balance.  She exhibits mild decrease in coordination on L and it is difficult to assess her sensation as she was distracted and unable to consistently answer questions.  Given her previous high level of independence in the community, she would benefit from acute IP rehab at discharge.

## 2023-08-03 PROBLEM — Z79.4 TYPE 2 DIABETES MELLITUS WITHOUT COMPLICATION, WITH LONG-TERM CURRENT USE OF INSULIN: Status: ACTIVE | Noted: 2023-08-01

## 2023-08-03 LAB
ALBUMIN SERPL-MCNC: 4 G/DL (ref 3.5–5.2)
ALBUMIN/GLOB SERPL: 1.3 G/DL
ALP SERPL-CCNC: 59 U/L (ref 39–117)
ALT SERPL W P-5'-P-CCNC: 13 U/L (ref 1–33)
ANION GAP SERPL CALCULATED.3IONS-SCNC: 12 MMOL/L (ref 5–15)
AST SERPL-CCNC: 24 U/L (ref 1–32)
BASOPHILS # BLD AUTO: 0 10*3/MM3 (ref 0–0.2)
BASOPHILS NFR BLD AUTO: 0.3 % (ref 0–1.5)
BILIRUB SERPL-MCNC: 0.3 MG/DL (ref 0–1.2)
BUN SERPL-MCNC: 26 MG/DL (ref 8–23)
BUN/CREAT SERPL: 25.7 (ref 7–25)
CALCIUM SPEC-SCNC: 9 MG/DL (ref 8.6–10.5)
CHLORIDE SERPL-SCNC: 101 MMOL/L (ref 98–107)
CO2 SERPL-SCNC: 23 MMOL/L (ref 22–29)
CREAT SERPL-MCNC: 1.01 MG/DL (ref 0.57–1)
DEPRECATED RDW RBC AUTO: 44.2 FL (ref 37–54)
EGFRCR SERPLBLD CKD-EPI 2021: 60 ML/MIN/1.73
EOSINOPHIL # BLD AUTO: 0 10*3/MM3 (ref 0–0.4)
EOSINOPHIL NFR BLD AUTO: 0.1 % (ref 0.3–6.2)
ERYTHROCYTE [DISTWIDTH] IN BLOOD BY AUTOMATED COUNT: 13.8 % (ref 12.3–15.4)
GLOBULIN UR ELPH-MCNC: 3 GM/DL
GLUCOSE BLDC GLUCOMTR-MCNC: 156 MG/DL (ref 70–105)
GLUCOSE BLDC GLUCOMTR-MCNC: 168 MG/DL (ref 70–105)
GLUCOSE BLDC GLUCOMTR-MCNC: 180 MG/DL (ref 70–105)
GLUCOSE BLDC GLUCOMTR-MCNC: 197 MG/DL (ref 70–105)
GLUCOSE SERPL-MCNC: 225 MG/DL (ref 65–99)
HCT VFR BLD AUTO: 37.5 % (ref 34–46.6)
HGB BLD-MCNC: 12.3 G/DL (ref 12–15.9)
LYMPHOCYTES # BLD AUTO: 1.2 10*3/MM3 (ref 0.7–3.1)
LYMPHOCYTES NFR BLD AUTO: 14.1 % (ref 19.6–45.3)
MAGNESIUM SERPL-MCNC: 1.8 MG/DL (ref 1.6–2.4)
MCH RBC QN AUTO: 28.3 PG (ref 26.6–33)
MCHC RBC AUTO-ENTMCNC: 32.8 G/DL (ref 31.5–35.7)
MCV RBC AUTO: 86.2 FL (ref 79–97)
MONOCYTES # BLD AUTO: 0.4 10*3/MM3 (ref 0.1–0.9)
MONOCYTES NFR BLD AUTO: 5.1 % (ref 5–12)
NEUTROPHILS NFR BLD AUTO: 7 10*3/MM3 (ref 1.7–7)
NEUTROPHILS NFR BLD AUTO: 80.4 % (ref 42.7–76)
NRBC BLD AUTO-RTO: 0.1 /100 WBC (ref 0–0.2)
PHOSPHATE SERPL-MCNC: 2.6 MG/DL (ref 2.5–4.5)
PLATELET # BLD AUTO: 305 10*3/MM3 (ref 140–450)
PMV BLD AUTO: 7 FL (ref 6–12)
POTASSIUM SERPL-SCNC: 4.4 MMOL/L (ref 3.5–5.2)
PROT SERPL-MCNC: 7 G/DL (ref 6–8.5)
RBC # BLD AUTO: 4.35 10*6/MM3 (ref 3.77–5.28)
SODIUM SERPL-SCNC: 136 MMOL/L (ref 136–145)
WBC NRBC COR # BLD: 8.7 10*3/MM3 (ref 3.4–10.8)

## 2023-08-03 PROCEDURE — 25010000002 HYDROMORPHONE 1 MG/ML SOLUTION: Performed by: INTERNAL MEDICINE

## 2023-08-03 PROCEDURE — 97530 THERAPEUTIC ACTIVITIES: CPT

## 2023-08-03 PROCEDURE — 83735 ASSAY OF MAGNESIUM: CPT | Performed by: NURSE PRACTITIONER

## 2023-08-03 PROCEDURE — 84100 ASSAY OF PHOSPHORUS: CPT | Performed by: NURSE PRACTITIONER

## 2023-08-03 PROCEDURE — 85025 COMPLETE CBC W/AUTO DIFF WBC: CPT | Performed by: NURSE PRACTITIONER

## 2023-08-03 PROCEDURE — 97112 NEUROMUSCULAR REEDUCATION: CPT

## 2023-08-03 PROCEDURE — 63710000001 INSULIN LISPRO (HUMAN) PER 5 UNITS: Performed by: NURSE PRACTITIONER

## 2023-08-03 PROCEDURE — 80053 COMPREHEN METABOLIC PANEL: CPT | Performed by: NURSE PRACTITIONER

## 2023-08-03 PROCEDURE — 25010000002 KETOROLAC TROMETHAMINE PER 15 MG: Performed by: INTERNAL MEDICINE

## 2023-08-03 PROCEDURE — 97116 GAIT TRAINING THERAPY: CPT

## 2023-08-03 PROCEDURE — 82948 REAGENT STRIP/BLOOD GLUCOSE: CPT

## 2023-08-03 PROCEDURE — 25010000002 ONDANSETRON PER 1 MG: Performed by: NURSE PRACTITIONER

## 2023-08-03 PROCEDURE — 92526 ORAL FUNCTION THERAPY: CPT

## 2023-08-03 RX ADMIN — INSULIN LISPRO 2 UNITS: 100 INJECTION, SOLUTION INTRAVENOUS; SUBCUTANEOUS at 08:32

## 2023-08-03 RX ADMIN — DULOXETINE HYDROCHLORIDE 60 MG: 30 CAPSULE, DELAYED RELEASE ORAL at 08:32

## 2023-08-03 RX ADMIN — HYDROMORPHONE HYDROCHLORIDE 1 MG: 1 INJECTION, SOLUTION INTRAMUSCULAR; INTRAVENOUS; SUBCUTANEOUS at 05:51

## 2023-08-03 RX ADMIN — Medication 10 ML: at 08:36

## 2023-08-03 RX ADMIN — AMLODIPINE BESYLATE 5 MG: 5 TABLET ORAL at 08:32

## 2023-08-03 RX ADMIN — INSULIN LISPRO 2 UNITS: 100 INJECTION, SOLUTION INTRAVENOUS; SUBCUTANEOUS at 17:15

## 2023-08-03 RX ADMIN — HYDROMORPHONE HYDROCHLORIDE 1 MG: 1 INJECTION, SOLUTION INTRAMUSCULAR; INTRAVENOUS; SUBCUTANEOUS at 12:30

## 2023-08-03 RX ADMIN — Medication 10 ML: at 20:24

## 2023-08-03 RX ADMIN — KETOROLAC TROMETHAMINE 15 MG: 15 INJECTION, SOLUTION INTRAMUSCULAR; INTRAVENOUS at 01:21

## 2023-08-03 RX ADMIN — INSULIN LISPRO 2 UNITS: 100 INJECTION, SOLUTION INTRAVENOUS; SUBCUTANEOUS at 12:30

## 2023-08-03 RX ADMIN — TICAGRELOR 90 MG: 90 TABLET ORAL at 20:21

## 2023-08-03 RX ADMIN — HYDROMORPHONE HYDROCHLORIDE 1 MG: 1 INJECTION, SOLUTION INTRAMUSCULAR; INTRAVENOUS; SUBCUTANEOUS at 20:21

## 2023-08-03 RX ADMIN — HYDROMORPHONE HYDROCHLORIDE 1 MG: 1 INJECTION, SOLUTION INTRAMUSCULAR; INTRAVENOUS; SUBCUTANEOUS at 03:57

## 2023-08-03 RX ADMIN — FAMOTIDINE 20 MG: 10 INJECTION INTRAVENOUS at 08:33

## 2023-08-03 RX ADMIN — HYDROMORPHONE HYDROCHLORIDE 1 MG: 1 INJECTION, SOLUTION INTRAMUSCULAR; INTRAVENOUS; SUBCUTANEOUS at 01:21

## 2023-08-03 RX ADMIN — ONDANSETRON 4 MG: 2 INJECTION INTRAMUSCULAR; INTRAVENOUS at 21:09

## 2023-08-03 RX ADMIN — SENNOSIDES AND DOCUSATE SODIUM 2 TABLET: 50; 8.6 TABLET ORAL at 20:21

## 2023-08-03 RX ADMIN — TICAGRELOR 90 MG: 90 TABLET ORAL at 08:14

## 2023-08-03 RX ADMIN — Medication 10 ML: at 22:48

## 2023-08-03 RX ADMIN — INSULIN LISPRO 2 UNITS: 100 INJECTION, SOLUTION INTRAVENOUS; SUBCUTANEOUS at 21:18

## 2023-08-03 RX ADMIN — KETOROLAC TROMETHAMINE 15 MG: 15 INJECTION, SOLUTION INTRAMUSCULAR; INTRAVENOUS at 08:32

## 2023-08-03 RX ADMIN — ATORVASTATIN CALCIUM 80 MG: 40 TABLET, FILM COATED ORAL at 20:21

## 2023-08-03 RX ADMIN — MULTIPLE VITAMINS W/ MINERALS TAB 1 TABLET: TAB at 08:32

## 2023-08-03 NOTE — PLAN OF CARE
Problem: Fall Injury Risk  Goal: Absence of Fall and Fall-Related Injury  Outcome: Ongoing, Progressing  Intervention: Identify and Manage Contributors  Recent Flowsheet Documentation  Taken 8/3/2023 1548 by Elena Maguire LPN  Medication Review/Management: medications reviewed  Taken 8/3/2023 1400 by Elena Maguire LPN  Medication Review/Management: medications reviewed  Taken 8/3/2023 1215 by Elena Maguire LPN  Medication Review/Management: medications reviewed  Taken 8/3/2023 1000 by Elena Maguire LPN  Medication Review/Management: medications reviewed  Taken 8/3/2023 0820 by Elena Maguire LPN  Medication Review/Management: medications reviewed  Intervention: Promote Injury-Free Environment  Recent Flowsheet Documentation  Taken 8/3/2023 1548 by Elena Maguire LPN  Safety Promotion/Fall Prevention: safety round/check completed  Taken 8/3/2023 1400 by Elena Maguire LPN  Safety Promotion/Fall Prevention: safety round/check completed  Taken 8/3/2023 1215 by Elnea Maguire LPN  Safety Promotion/Fall Prevention: safety round/check completed  Taken 8/3/2023 1000 by Elena Maguire LPN  Safety Promotion/Fall Prevention: safety round/check completed  Taken 8/3/2023 0820 by Elena Maguire LPN  Safety Promotion/Fall Prevention: safety round/check completed     Problem: Diabetes Comorbidity  Goal: Blood Glucose Level Within Targeted Range  Outcome: Ongoing, Progressing     Problem: Hypertension Comorbidity  Goal: Blood Pressure in Desired Range  Outcome: Ongoing, Progressing  Intervention: Maintain Blood Pressure Management  Recent Flowsheet Documentation  Taken 8/3/2023 1548 by Elena Maguire LPN  Medication Review/Management: medications reviewed  Taken 8/3/2023 1400 by Elena Maguire LPN  Medication Review/Management: medications reviewed  Taken 8/3/2023 1215 by Elena Maguire LPN  Medication Review/Management: medications reviewed  Taken 8/3/2023 1000 by Elena Maguire LPN  Medication Review/Management: medications  reviewed  Taken 8/3/2023 0820 by Elena Maguire LPN  Medication Review/Management: medications reviewed     Problem: Skin Injury Risk Increased  Goal: Skin Health and Integrity  Outcome: Ongoing, Progressing     Problem: Adjustment to Illness (Stroke, Ischemic/Transient Ischemic Attack)  Goal: Optimal Coping  Outcome: Ongoing, Progressing     Problem: Bowel Elimination Impaired (Stroke, Ischemic/Transient Ischemic Attack)  Goal: Effective Bowel Elimination  Outcome: Ongoing, Progressing     Problem: Cerebral Tissue Perfusion (Stroke, Ischemic/Transient Ischemic Attack)  Goal: Optimal Cerebral Tissue Perfusion  Outcome: Ongoing, Progressing     Problem: Cognitive Impairment (Stroke, Ischemic/Transient Ischemic Attack)  Goal: Optimal Cognitive Function  Outcome: Ongoing, Progressing     Problem: Communication Impairment (Stroke, Ischemic/Transient Ischemic Attack)  Goal: Improved Communication Skills  Outcome: Ongoing, Progressing     Problem: Functional Ability Impaired (Stroke, Ischemic/Transient Ischemic Attack)  Goal: Optimal Functional Ability  Outcome: Ongoing, Progressing     Problem: Respiratory Compromise (Stroke, Ischemic/Transient Ischemic Attack)  Goal: Effective Oxygenation and Ventilation  Outcome: Ongoing, Progressing     Problem: Sensorimotor Impairment (Stroke, Ischemic/Transient Ischemic Attack)  Goal: Improved Sensorimotor Function  Outcome: Ongoing, Progressing     Problem: Swallowing Impairment (Stroke, Ischemic/Transient Ischemic Attack)  Goal: Optimal Eating and Swallowing without Aspiration  Outcome: Ongoing, Progressing     Problem: Urinary Elimination Impaired (Stroke, Ischemic/Transient Ischemic Attack)  Goal: Effective Urinary Elimination  Outcome: Ongoing, Progressing     Problem: Pain Acute  Goal: Acceptable Pain Control and Functional Ability  Outcome: Ongoing, Progressing  Intervention: Prevent or Manage Pain  Recent Flowsheet Documentation  Taken 8/3/2023 1817 by Elena Maguire,  LPN  Medication Review/Management: medications reviewed  Taken 8/3/2023 1400 by Elena Maguire LPN  Medication Review/Management: medications reviewed  Taken 8/3/2023 1215 by Elena Maguire LPN  Medication Review/Management: medications reviewed  Taken 8/3/2023 1000 by Elena Maguire LPN  Medication Review/Management: medications reviewed  Taken 8/3/2023 0820 by Elena Maguire LPN  Medication Review/Management: medications reviewed   Goal Outcome Evaluation:

## 2023-08-03 NOTE — CONSULTS
Diabetes Education    Patient Name:  Marge Mcghee  YOB: 1955  MRN: [id number]  Admit Date:  8/1/2023      Order for DM education placed.  Patient was seen by diabetes educator on 8/2/23.  Stopped by patient's room to see if she needed any additional education.  She has no further questions at this time.       Electronically signed by:  Tresa Paige RN  08/03/23 12:34 EDT

## 2023-08-03 NOTE — THERAPY TREATMENT NOTE
Subjective: Pt agreeable to therapeutic plan of care.    Objective:   Goal to maintain BP <180/105    Bed mobility - SBA  Transfers - Min-A and with rolling walker   Ambulation - 40 feet CGA, Min-A, and with rolling walker Able to ambulate into the alexis w/ use of AD w/ slight postural instability. Gait pattern was decreased heel strike, decreased hip flexion, decreased step length, and decreased gait speed. Multiple bouts of performing gait training in the hallway w/ chair follow w/ exaggerated hip flexion and emphasis on heel strike on first contact. Max VC required for correct sequencing of gait training. Pt also cued to increase step width to improve SINTIA while ambulating.    Vitals: WNL     Pain: 0 VAS       Education: Provided education on the importance of mobility in the acute care setting, Verbal/Tactile Cues, Transfer Training, Gait Training, and Stroke prevention and risk factors Educated on the importance of being patient and using the call light for increased safety awareness.     Assessment: Marge Mcghee is a 68 y.o. female who presents with L sided weakness. CT & MRI unremarkable. TNKase delivered 8/1/23. PMH: CABG hx, HTN, DM, Depression, HLD. Pt lives with spouse in a 2 story home wiht 5-6 FELICIA. Pt reports that she is normally independent with mobility but 'furniture walks' in the home. She reports hx of 2 falls in the past 6 months. 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 Goal to maintain BP <180/105. Pt VS WNL. This date, pt still presents w/ impulsivity for OOB activities. Pt able to follow VC for performing exaggerated hip flexion and DF for heel strike on initial contact during gait cycle. Pt became fatigued after multiple bouts of gait training. She and her daughter were educated on the importance of maintaining safety Pt would continue to benefit from therapy to address gait, balance, strength, and endurance deficits. Pt is  not safe to return home as she is at in increased falls risk d/t decreased balance and impaired safety awareness. Recommended IRF upon d/c.     Plan/Recommendations:   High Intensity Therapy recommended post-acute care. This is recommended as therapy feels the patient would require 5-6 days per week, 2-3 hours per day. At this time, inpatient rehabilitation (acute rehab) would be the first choice and SNF would be second.. Pt requires no DME at discharge.     Pt desires Inpatient Rehabilitation placement at discharge. Pt cooperative; agreeable to therapeutic recommendations and plan of care.         Basic Mobility 6-click:  Rollin = Total, A lot = 2, A little = 3; 4 = None  Supine>Sit:   1 = Total, A lot = 2, A little = 3; 4 = None   Sit>Stand with arms:  1 = Total, A lot = 2, A little = 3; 4 = None  Bed>Chair:   1 = Total, A lot = 2, A little = 3; 4 = None  Ambulate in room:  1 = Total, A lot = 2, A little = 3; 4 = None  3-5 Steps with railin = Total, A lot = 2, A little = 3; 4 = None  Score: 17    Modified Lalo: 3 = Moderate disability (Requires some help, but able to walk unassisted)    Post-Tx Position: Up in Chair, Alarms activated, and Call light and personal items within reach  PPE: gloves

## 2023-08-03 NOTE — THERAPY TREATMENT NOTE
Acute Care - Speech Language Pathology   Swallow Treatment Note ELSY Woo     Patient Name: Marge Mcghee  : 1955  MRN: [id number]  Today's Date: 8/3/2023               Admit Date: 2023    Visit Dx:     ICD-10-CM ICD-9-CM   1. Acute CVA (cerebrovascular accident)  I63.9 434.91     Patient Active Problem List   Diagnosis    Type 2 diabetes mellitus with peripheral neuropathy    Age-related osteoporosis without current pathological fracture    Hyperlipidemia    Hepatic steatosis    Gastroesophageal reflux disease with esophagitis    Adrenal nodule    Vitamin D deficiency    Thyroid nodule    UTI (urinary tract infection)    REINA (acute kidney injury)    Hyperglycemia    Acute right flank pain    Vomiting    Hypomagnesemia    Dehydration    Obesity (BMI 30-39.9)    Complicated migraine    Occipital neuralgia of left side    Polypharmacy    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    Lisfranc dislocation    Delayed surgical wound healing    Right foot infection    Chest pain    GERD (gastroesophageal reflux disease)    Stable angina pectoris    S/P CABG x 3    Encephalopathy, metabolic    Hypertensive emergency    Dysarthria    CVA (cerebral vascular accident)    Coronary artery disease involving coronary bypass graft of native heart without angina pectoris    Essential hypertension    HTN, goal below 130/80    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    DM type 2, goal HbA1c < 7%     Past Medical History:   Diagnosis Date    Adrenal nodule 2016    FINDINGS: Mild hepatic steatosis may be present. Cholecystectomy. No biliary ductal dilatation. Negative pancreas, spleen, left adrenal gland, and kidneys. The right adrenal presumed adenoma has increased slightly, measuring 3 x 4 cm in diameter,  compared to 2 x 3.5 cm on the previous exam. The attenuation values are  consistent with an adenoma. Done [facility] in August 2018    Age-related osteoporosis without current pathological fracture 11/16/2016    Arthritis     Delayed surgical wound healing     Essential hypertension 11/16/2016    Gastroesophageal reflux disease with esophagitis 11/16/2016    Hepatic steatosis 11/16/2016    History of anemia     History of sepsis     FROM UTI    Hyperlipidemia 11/16/2016    Lisfranc's dislocation     LEFT WITH FRACTURES NONHEALING FOOT    Osteomyelitis of left foot 11/2020    RLS (restless legs syndrome)     Squamous cell skin cancer 2014    Excised by Dr. James    Thyroid nodule 10/18/2019    BENIGN    Type 2 diabetes mellitus with peripheral neuropathy 11/16/2016    Vitamin D deficiency 1/25/2019     Past Surgical History:   Procedure Laterality Date    BREAST BIOPSY Left     7/2019. BENIGN    CARDIAC CATHETERIZATION Left 5/2/2021    Procedure: Cardiac Catheterization/Vascular Study;  Surgeon: Chacho Esteban MD;  Location: [facility] CATH INVASIVE LOCATION;  Service: Cardiovascular;  Laterality: Left;    CARDIAC CATHETERIZATION N/A 5/2/2021    Procedure: Intra-Aortic Baloon Pump Insertion;  Surgeon: Chacho Esteban MD;  Location: [facility] CATH INVASIVE LOCATION;  Service: Cardiovascular;  Laterality: N/A;    CATARACT EXTRACTION WITH INTRAOCULAR LENS IMPLANT Bilateral     CHOLECYSTECTOMY      COLONOSCOPY      CORONARY ARTERY BYPASS GRAFT N/A 5/2/2021    Procedure: CORONARY ARTERY BYPASS WITH INTERNAL MAMMARY ARTERY GRAFT;  Surgeon: Jr Rocael Alexander MD;  [facility];  Service: Cardiothoracic;  Laterality: N/A;    FOOT FUSION Right 11/13/2020    Procedure: RIGHT OPEN TREATMENT LISFRANC INJURY, OPEN REDUCTION INTERNAL FIXATION MEDIAL/MIDDLE CUNEIFORM FRACTURE AND 2ND/3RD METATARSAL FRACTURE 1ST 2ND POSSIBLE 3RD TARSOMETATARSAL ARTHRODESIS INTERCUNEIFORM ARTHRODESIS CALCANEAL BONE GRAFT;  Surgeon: Mikhail Banks Jr., MD;  [place] [place] [facility]  Service: Orthopedics;   Laterality: Right;    INCISION AND DRAINAGE LEG Right 1/8/2021    Procedure: RIGHT IRRIGATION AND DEBRIDEMENT OF FOOT WITH SECONDARY CLOSURE AND HARDWARE REMOVAL;  Surgeon: Mikhail Banks Jr., MD;  Location: [facility] MAIN OR;  Service: Orthopedics;  Laterality: Right;    KNEE ARTHROSCOPY Bilateral     TOTAL ABDOMINAL HYSTERECTOMY      TRANSESOPHAGEAL ECHOCARDIOGRAM (EMILIA) N/A 5/2/2021    Procedure: TRANSESOPHAGEAL ECHOCARDIOGRAM;  Surgeon: Jr Rocael Alexander MD;  Location: [facility];  Service: Cardiothoracic;  Laterality: N/A;    UPPER GASTROINTESTINAL ENDOSCOPY  08/2016    US GUIDED FINE NEEDLE ASPIRATION  5/8/2020       SLP Recommendation and Plan          SWALLOW EVALUATION (last 72 hours)            EDUCATION  The patient has been educated in the following areas:   Dysphagia (Swallowing Impairment) Oral Care/Hydration.        SLP GOALS       Row Name 08/03/23 1200       (LTG) Swallow    (LTG) Swallow The patient will maximize swallow function for least restrictive po diet, exhibiting no complications associated with dysphagia, adequate po intake, and demonstrating independent use of safe swallow compensations.  -CB    [place] (Swallow Long Term Goal) independently (over 90% accuracy)  -CB    Progress/Outcomes (Swallow Long Term Goal) --    Comment (Swallow Long Term Goal) Patient was seen for DT/meal at breakfast. Patient was observed taking whole pills with water without any overt s/s of aspiration. Patient did complain about  meats at times as she was able to chew but stated it seemed like it does not clear in her throat completely. Suggested a liquid wash which did clear for her. Provided some suggestions on safe swallow strategies to minimize risk of aspiration.  Patient agreed to allow for one more day with regular texture and to see if she has better success with meats given safe swallow strategies. If not diet can be altered. Received communication orders per stroke protocol. However, MRI revealed  no acute intracranial abnormalites. Therefore, completed order. ST will continue to follow for swallowing to assure safety and tolerance with recommended diet.  -CB       (STG) Swallow 1    (STG) Swallow 1 The patient will verbalize/demonstrate understanding of safe swallow strategies/positioning including but not limited to: small bites/sips at slow rate, clear oral cavity in b/t bites, 90 degree hip flexion for all PO  -CB    Progress/Outcomes (Swallow Short Term Goal 1) --              User Key  (r) = Recorded By, (t) = Taken By, (c) = Cosigned By      Initials Name Provider Type          Kim Larson, SLP Speech and Language Pathologist                       Time Calculation:                URIEL Childress  8/3/2023

## 2023-08-03 NOTE — PLAN OF CARE
Goal Outcome Evaluation:      Communication orders received. MRI indicated no acute intracranial abnormality. ST will continue to follow for swallowing.

## 2023-08-03 NOTE — PROGRESS NOTES
Patient otherwise stable    Had to use Toradol and Dilaudid for headaches  That is her only complaint    MRI and CT okay    Per neurology I would observe    I do not want to add more medication at this stage like Depacon etc. because it has more potential side effects    I would still use Fioricet and if needed Medrol Dosepak

## 2023-08-03 NOTE — PLAN OF CARE
Problem: Fall Injury Risk  Goal: Absence of Fall and Fall-Related Injury  Outcome: Ongoing, Progressing  Intervention: Identify and Manage Contributors  Recent Flowsheet Documentation  Taken 8/3/2023 0600 by Diane Ramirez RN  Medication Review/Management: medications reviewed  Self-Care Promotion:   independence encouraged   BADL personal objects within reach   BADL personal routines maintained  Taken 8/3/2023 0400 by Diane Ramirez RN  Medication Review/Management: medications reviewed  Self-Care Promotion:   independence encouraged   BADL personal objects within reach   BADL personal routines maintained  Taken 8/3/2023 0200 by Diane Ramirez RN  Medication Review/Management: medications reviewed  Self-Care Promotion:   independence encouraged   BADL personal objects within reach   BADL personal routines maintained  Taken 8/3/2023 0000 by Diane Ramirez RN  Medication Review/Management: medications reviewed  Self-Care Promotion:   independence encouraged   BADL personal objects within reach   BADL personal routines maintained  Taken 8/2/2023 2200 by Diane Ramirez RN  Medication Review/Management: medications reviewed  Self-Care Promotion:   independence encouraged   BADL personal objects within reach   BADL personal routines maintained  Intervention: Promote Injury-Free Environment  Recent Flowsheet Documentation  Taken 8/3/2023 0600 by Diane Ramirez RN  Safety Promotion/Fall Prevention:   activity supervised   assistive device/personal items within reach   clutter free environment maintained   fall prevention program maintained   lighting adjusted   nonskid shoes/slippers when out of bed   room organization consistent   safety round/check completed  Taken 8/3/2023 0400 by Diane Ramirez RN  Safety Promotion/Fall Prevention:   activity supervised   assistive device/personal items within reach   clutter free environment maintained   fall prevention program maintained   lighting adjusted    nonskid shoes/slippers when out of bed   room organization consistent   safety round/check completed  Taken 8/3/2023 0200 by Diane Ramirez RN  Safety Promotion/Fall Prevention:   activity supervised   assistive device/personal items within reach   clutter free environment maintained   fall prevention program maintained   lighting adjusted   nonskid shoes/slippers when out of bed   room organization consistent   safety round/check completed  Taken 8/3/2023 0000 by Diane Ramirez RN  Safety Promotion/Fall Prevention:   activity supervised   assistive device/personal items within reach   clutter free environment maintained   fall prevention program maintained   lighting adjusted   nonskid shoes/slippers when out of bed   room organization consistent   safety round/check completed  Taken 8/2/2023 2200 by Diane Ramirez RN  Safety Promotion/Fall Prevention:   activity supervised   assistive device/personal items within reach   clutter free environment maintained   fall prevention program maintained   lighting adjusted   nonskid shoes/slippers when out of bed   room organization consistent   safety round/check completed     Problem: Diabetes Comorbidity  Goal: Blood Glucose Level Within Targeted Range  Outcome: Ongoing, Progressing  Intervention: Monitor and Manage Glycemia  Recent Flowsheet Documentation  Taken 8/2/2023 2200 by Diane Ramirez, RN  Glycemic Management:   blood glucose monitored   supplemental insulin given     Problem: Hypertension Comorbidity  Goal: Blood Pressure in Desired Range  Outcome: Ongoing, Progressing  Intervention: Maintain Blood Pressure Management  Recent Flowsheet Documentation  Taken 8/3/2023 0600 by Diane Ramirez, RN  Medication Review/Management: medications reviewed  Taken 8/3/2023 0400 by Diane Ramirez, RN  Medication Review/Management: medications reviewed  Taken 8/3/2023 0200 by Diane Ramirez, RN  Medication Review/Management: medications reviewed  Taken 8/3/2023  0000 by Diane Ramirez RN  Medication Review/Management: medications reviewed  Taken 8/2/2023 2200 by Diane Ramirez RN  Syncope Management:   position changed slowly   legs elevated  Medication Review/Management: medications reviewed     Problem: Skin Injury Risk Increased  Goal: Skin Health and Integrity  Outcome: Ongoing, Progressing  Intervention: Promote and Optimize Oral Intake  Recent Flowsheet Documentation  Taken 8/2/2023 2200 by Diane Ramirez RN  Oral Nutrition Promotion: physical activity promoted  Intervention: Optimize Skin Protection  Recent Flowsheet Documentation  Taken 8/3/2023 0600 by Diane Ramirez RN  Head of Bed (HOB) Positioning: HOB at 20-30 degrees  Taken 8/3/2023 0400 by Diane Ramirez RN  Head of Bed (HOB) Positioning: HOB at 20-30 degrees  Taken 8/3/2023 0200 by Diane Ramirez RN  Head of Bed (HOB) Positioning: HOB at 20-30 degrees  Taken 8/3/2023 0000 by Diane Ramirez RN  Head of Bed (HOB) Positioning: HOB at 20-30 degrees  Taken 8/2/2023 2200 by Diane Ramirez RN  Pressure Reduction Techniques:   frequent weight shift encouraged   heels elevated off bed  Head of Bed (HOB) Positioning: HOB at 20-30 degrees  Pressure Reduction Devices: pressure-redistributing mattress utilized  Skin Protection:   adhesive use limited   tubing/devices free from skin contact     Problem: Adjustment to Illness (Stroke, Ischemic/Transient Ischemic Attack)  Goal: Optimal Coping  Outcome: Ongoing, Progressing  Intervention: Support Psychosocial Response to Stroke  Recent Flowsheet Documentation  Taken 8/2/2023 2200 by Diane Ramirez RN  Family/Support System Care:   support provided   self-care encouraged   presence promoted   involvement promoted     Problem: Bowel Elimination Impaired (Stroke, Ischemic/Transient Ischemic Attack)  Goal: Effective Bowel Elimination  Outcome: Ongoing, Progressing     Problem: Cerebral Tissue Perfusion (Stroke, Ischemic/Transient Ischemic  Attack)  Goal: Optimal Cerebral Tissue Perfusion  Outcome: Ongoing, Progressing     Problem: Cognitive Impairment (Stroke, Ischemic/Transient Ischemic Attack)  Goal: Optimal Cognitive Function  Outcome: Ongoing, Progressing     Problem: Communication Impairment (Stroke, Ischemic/Transient Ischemic Attack)  Goal: Improved Communication Skills  Outcome: Ongoing, Progressing  Intervention: Optimize Communication Skills  Recent Flowsheet Documentation  Taken 8/2/2023 2200 by Diane Ramirez RN  Communication Enhancement Strategies: call light answered in person     Problem: Functional Ability Impaired (Stroke, Ischemic/Transient Ischemic Attack)  Goal: Optimal Functional Ability  Outcome: Ongoing, Progressing  Intervention: Optimize Functional Ability  Recent Flowsheet Documentation  Taken 8/3/2023 0600 by Diane Ramirez RN  Activity Management: activity encouraged  Self-Care Promotion:   independence encouraged   BADL personal objects within reach   BADL personal routines maintained  Taken 8/3/2023 0400 by Diane Ramirez RN  Activity Management: activity encouraged  Self-Care Promotion:   independence encouraged   BADL personal objects within reach   BADL personal routines maintained  Taken 8/3/2023 0200 by Diane Ramirez RN  Activity Management:   ambulated to bathroom   activity encouraged  Self-Care Promotion:   independence encouraged   BADL personal objects within reach   BADL personal routines maintained  Taken 8/3/2023 0000 by Diane Ramirez RN  Activity Management: activity encouraged  Self-Care Promotion:   independence encouraged   BADL personal objects within reach   BADL personal routines maintained  Taken 8/2/2023 2200 by Diane Ramirez RN  Activity Management:   activity encouraged   ambulated to bathroom  Self-Care Promotion:   independence encouraged   BADL personal objects within reach   BADL personal routines maintained     Problem: Respiratory Compromise (Stroke, Ischemic/Transient  Ischemic Attack)  Goal: Effective Oxygenation and Ventilation  Outcome: Ongoing, Progressing  Intervention: Optimize Oxygenation and Ventilation  Recent Flowsheet Documentation  Taken 8/3/2023 0600 by Diane Ramirez RN  Head of Bed (HOB) Positioning: HOB at 20-30 degrees  Taken 8/3/2023 0400 by Diane Ramirez RN  Head of Bed (HOB) Positioning: HOB at 20-30 degrees  Taken 8/3/2023 0200 by Diane Ramirez RN  Head of Bed (HOB) Positioning: HOB at 20-30 degrees  Taken 8/3/2023 0000 by Diane Ramirez RN  Head of Bed (HOB) Positioning: HOB at 20-30 degrees  Taken 8/2/2023 2200 by Diane Ramirez RN  Head of Bed (HOB) Positioning: HOB at 20-30 degrees     Problem: Sensorimotor Impairment (Stroke, Ischemic/Transient Ischemic Attack)  Goal: Improved Sensorimotor Function  Outcome: Ongoing, Progressing  Intervention: Optimize Range of Motion, Motor Control and Function  Recent Flowsheet Documentation  Taken 8/3/2023 0600 by Diane Ramirez RN  Positioning/Transfer Devices:   pillows   in use  Taken 8/3/2023 0400 by Diane Ramirez RN  Positioning/Transfer Devices:   pillows   in use  Taken 8/3/2023 0200 by Diane Ramirez RN  Positioning/Transfer Devices:   pillows   in use  Taken 8/3/2023 0000 by Diane Ramirez RN  Positioning/Transfer Devices:   pillows   in use  Taken 8/2/2023 2200 by Diane Ramirez RN  Positioning/Transfer Devices:   pillows   in use  Intervention: Optimize Sensory and Perceptual Ability  Recent Flowsheet Documentation  Taken 8/2/2023 2200 by Diane Ramirez RN  Pressure Reduction Techniques:   frequent weight shift encouraged   heels elevated off bed  Pressure Reduction Devices: pressure-redistributing mattress utilized     Problem: Swallowing Impairment (Stroke, Ischemic/Transient Ischemic Attack)  Goal: Optimal Eating and Swallowing without Aspiration  Outcome: Ongoing, Progressing     Problem: Urinary Elimination Impaired (Stroke, Ischemic/Transient Ischemic Attack)  Goal:  Effective Urinary Elimination  Outcome: Ongoing, Progressing     Problem: Pain Acute  Goal: Acceptable Pain Control and Functional Ability  Outcome: Ongoing, Progressing  Intervention: Prevent or Manage Pain  Recent Flowsheet Documentation  Taken 8/3/2023 0600 by Diane Ramirez RN  Medication Review/Management: medications reviewed  Taken 8/3/2023 0400 by Diane Ramirez RN  Medication Review/Management: medications reviewed  Taken 8/3/2023 0200 by Diane Ramirez RN  Medication Review/Management: medications reviewed  Taken 8/3/2023 0000 by Diane Ramirez RN  Medication Review/Management: medications reviewed  Taken 8/2/2023 2200 by Diane Ramirez RN  Medication Review/Management: medications reviewed  Intervention: Develop Pain Management Plan  Recent Flowsheet Documentation  Taken 8/2/2023 2247 by Diane Ramirez, RN  Pain Management Interventions:   care clustered   quiet environment facilitated   see MAR   Goal Outcome Evaluation:

## 2023-08-03 NOTE — DISCHARGE PLACEMENT REQUEST
"Maxwell Moreno (68 y.o. Female)       Date of Birth   1955    Social Security Number       Address   [address] [place] [address] [place] [facility]    Home Phone   [phone]    MRN   [id number]       Jehovah's witness   None    Marital Status                               Admission Date   8/1/23    Admission Type   Emergency    Admitting Provider   Cassius Lopez DO    Attending Provider   Lee Mays MD    Department, Room/Bed   [facility], 241/1       Discharge Date       Discharge Disposition       Discharge Destination                                 Attending Provider: Lee Mays MD    Allergies: Zofran [Ondansetron Hcl], Prochlorperazine, Prochlorperazine Edisylate, Prochlorperazine Maleate, Hydralazine, Hydralazine Hcl, Meperidine, Prochlorperazine Maleate    Isolation: None   Infection: None   Code Status: CPR    Ht: 162.6 cm (64\")   Wt: 73 kg (160 lb 15 oz)    Admission Cmt: None   Principal Problem: CVA (cerebral vascular accident) [I63.9]                   Active Insurance as of 8/1/2023       Primary Coverage       Payor Plan Insurance Group Employer/Plan Group    HUMANA MEDICARE REPLACEMENT HUMANA MEDICARE REPLACEMENT [id number]       Payor Plan Address Payor Plan Phone Number Payor Plan Fax Number Effective Dates    [address] [address] [phone]  2/1/2022 - None Entered    [facility] [id number]         Subscriber Name Subscriber Birth Date Member ID       MAXWELL MORENO 1955 [id number]               Secondary Coverage       Payor Plan Insurance Group Employer/Plan Group    [employer] [id number]        Payor Plan Address Payor Plan Phone Number Payor Plan Fax Number Effective Dates    [address]   1/1/2019 - None Entered    [facility] [id number]         Subscriber Name Subscriber Birth Date Member ID       MAXWELL MORENO 1955 [id number]                     Emergency Contacts        (Rel.) Home Phone Work Phone Mobile Phone    " Kimberly Salinas (POA) (Daughter) -- -- [phone]    Tonia Rockwell (Friend) [phone] -- --    SILVIO MORENO (Spouse) -- -- [phone]

## 2023-08-03 NOTE — PROGRESS NOTES
[facility] Medicine Services   Daily Progress Note    Patient Name: Marge Mcghee  : 1955  MRN: [id number]  Primary Care Physician:  Traci Townsend APRN  Date of admission: 2023    Subjective      Admitted in consultation with neurology with concern for aborted stroke status post tPA infusion.  Transferred out of ICU to the hospitalist service last night.    Did okay overnight.  Still having some left-sided numbness and tingling of her face and extremities along with a facial droop.  She said her tongue feels thick, and she keeps substituting words (i.e., during our conversation she said thumb instead of tongue), and she does have some trouble word finding.  MRI was negative for acute stroke however.  Her echocardiogram is pending.  She was on dual antiplatelet and statin therapy prior to admission.  Blood pressures have been reasonable.  She is on room air.  Glucose was 225.  Serial CT scans were negative.  She is working with therapy with a referral to [facility] rehab.  Still awaiting speech therapy evaluation.  Extensive chart review for service turnover today. Discussed the case with the bedside nursing staff. No other acute concerns.    Objective      Vitals:   Temp:  [97.5 øF (36.4 øC)-98.6 øF (37 øC)] 98 øF (36.7 øC)  Heart Rate:  [] 88  Resp:  [13-19] 18  BP: (120-179)/(68-99) 121/94    Physical Exam   GEN: WDWN, no acute distress  HEENT: NCAT, PERRLA, moist mucous membranes  NECK: Supple, midline trachea  CARDIAC: RRR, no appreciable M/R/G, no peripheral edema  PULM: CTAB, nondistressed  ABD: soft, NTND, normoactive bowel sounds throughout  SKIN: Warm, dry  NEURO: Grossly intact, nonfocal exam, paresthesias along the left face, arm, and leg--she also has a bit of a left facial droop.  Otherwise, nonfocal exam  PSYCH: Pleasant     Result Review    I have personally reviewed the results from the time of this admission to 8/3/2023 16:50 EDT and agree with these  findings:  [x]  Laboratory  [x]  Microbiology  [x]  Radiology  [x]  EKG/Telemetry   [x]  Cardiology/Vascular   []  Pathology  [x]  Old records  []  Other:    Assessment & Plan        amLODIPine, 5 mg, Oral, Daily  aspirin, 81 mg, Oral, Daily   Or  aspirin, 300 mg, Rectal, Daily  atorvastatin, 80 mg, Oral, Nightly  DULoxetine, 60 mg, Oral, Daily  famotidine, 20 mg, Intravenous, Daily  insulin lispro, 2-7 Units, Subcutaneous, 4x Daily With Meals & Nightly  multivitamin with minerals, 1 tablet, Oral, Daily  polyethylene glycol, 17 g, Oral, Daily  senna-docusate sodium, 2 tablet, Oral, BID  sodium chloride, 10 mL, Intravenous, Q12H  sodium chloride, 10 mL, Intravenous, Q12H  ticagrelor, 90 mg, Oral, BID       sodium chloride, 75 mL/hr, Last Rate: 75 mL/hr (08/01/23 2225)         Active Hospital Problems    Diagnosis  POA    **CVA (cerebral vascular accident) [I63.9]  Yes    Type 2 diabetes mellitus without complication, with long-term current use of insulin [E11.9, Z79.4]  Not Applicable    Coronary artery disease involving coronary bypass graft of native heart without angina pectoris [I25.810]  Yes    Essential hypertension [I10]  Yes    S/P CABG x 3 [Z95.1]  Not Applicable    GERD (gastroesophageal reflux disease) [K21.9]  Yes    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 [E11.3513]  Yes    Complicated migraine [G43.109]  Yes    Vitamin D deficiency [E55.9]  Yes    Age-related osteoporosis without current pathological fracture [M81.0]  Yes    Hyperlipidemia [E78.5]  Yes    Type 2 diabetes mellitus with retinopathy, with long-term current use of insulin [E11.319, Z79.4]  Not Applicable      Resolved Hospital Problems   No resolved problems to display.       Plan:   Suspected CVA, History of stroke  Presented with left sided numbness and weakness.  Code stroke protocol initiated in ED, decision for tPA administration in ED  Ischemic Stroke orders  initiated.  CT scan reviewed: No definitive acute intracranial.  Remote infarct noted as lacunar infarct on the right side of the tomas.  CTA head and neck reviewed: No evidence of LVO, stenosis, or aneurysm formation.  CT perfusion study: No evidence of core infarct or visualized at risk tissue.  Repeat CT scan of head 24 hours post tPA administration OK  MRI not showing stroke, but still has deficits  Neurology following  No antiplatelet or anticoagulant medication until 24 hours post-tPA administration  Observe for signs and symptoms of bleeding, if observed, immediately notify Neurology  Strict blood pressure monitoring, keep blood pressure listed 180/105  SLP evaluated and initiated diet.  PT/ST/OT evaluation & treatment     Atherosclerotic heart disease, history of CABG  Chronic and stable.   c/w aspirin, statins, Plavix changed to Brilinta per neurology.     Essential Hypertension: well controlled.   Continue amlodipine when able to take oral medications.  Titrate medications as needed.     Diabetes mellitus Type 2, insulin-dependent : not well controlled.   Hgb A1c 7.5.  Home regimen includes Mounjaro.  Accu-Cheks with SSI with admelog based on diet.     Depression: Chronic and stable, continue cymbalta  Hyperlipidemia: Continue statin therapy.      Code status:   Code Status (Patient has no pulse and is not breathing): CPR (Attempt to Resuscitate)  Medical Interventions (Patient has pulse or is breathing): Full Support  Release to patient: Routine Release      Nutrition:   Diet: Cardiac Diets, Diabetic Diets; Healthy Heart (2-3 Na+); Consistent Carbohydrate; Texture: Regular Texture (IDDSI 7); Fluid Consistency: Thin (IDDSI 0)   Patient isn't on Tube Feeding      DVT prophylaxis:  Mechanical DVT prophylaxis orders are present.     Disposition:  I expect patient to be discharged to [facility] once accepted.    Electronically signed by Lee Mays MD, 08/03/23, 16:50 EDT.  [facility] Hospitalist Team

## 2023-08-03 NOTE — PLAN OF CARE
Goal Outcome Evaluation: Marge Mcghee is a 68 y.o. female who presents with L sided weakness. CT & MRI unremarkable. TNKase delivered 8/1/23. PMH: CABG hx, HTN, DM, Depression, HLD. Pt lives with spouse in a 2 story home wiht 5-6 FELICIA. Pt reports that she is normally independent with mobility but 'furniture walks' in the home. She reports hx of 2 falls in the past 6 months. 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 Goal to maintain BP <180/105. Pt VS WNL. This date, pt still presents w/ impulsivity for OOB activities. Pt able to follow VC for performing exaggerated hip flexion and DF for heel strike on initial contact during gait cycle. Pt became fatigued after multiple bouts of gait training. She and her daughter were educated on the importance of maintaining safety Pt would continue to benefit from therapy to address gait, balance, strength, and endurance deficits. Pt is not safe to return home as she is at in increased falls risk d/t decreased balance and impaired safety awareness. Recommended IRF upon d/c.                            Alcohol Abuse    Alcohol intoxication occurs when the amount of alcohol that a person has consumed impairs his or her ability to mentally and physically function. Chronic alcohol consumption can also lead to a variety of health issues including neurological disease, stomach disease, heart disease, liver disease, etc. Do not drive after drinking alcohol. Drinking enough alcohol to end up in an Emergency Room suggests you may have an alcohol abuse problem. Seek help at a drug addiction center.    SEEK IMMEDIATE MEDICAL CARE IF YOU HAVE ANY OF THE FOLLOWING SYMPTOMS: seizures, vomiting blood, blood in your stool, lightheadedness/dizziness, or becoming shaky to tremulous when you stop drinking.

## 2023-08-04 LAB
ALBUMIN SERPL-MCNC: 3.4 G/DL (ref 3.5–5.2)
ALBUMIN/GLOB SERPL: 1.3 G/DL
ALP SERPL-CCNC: 57 U/L (ref 39–117)
ALT SERPL W P-5'-P-CCNC: 13 U/L (ref 1–33)
ANION GAP SERPL CALCULATED.3IONS-SCNC: 9 MMOL/L (ref 5–15)
AST SERPL-CCNC: 21 U/L (ref 1–32)
BASOPHILS # BLD AUTO: 0.1 10*3/MM3 (ref 0–0.2)
BASOPHILS NFR BLD AUTO: 0.9 % (ref 0–1.5)
BILIRUB SERPL-MCNC: 0.2 MG/DL (ref 0–1.2)
BUN SERPL-MCNC: 25 MG/DL (ref 8–23)
BUN/CREAT SERPL: 23.8 (ref 7–25)
CALCIUM SPEC-SCNC: 8.7 MG/DL (ref 8.6–10.5)
CHLORIDE SERPL-SCNC: 102 MMOL/L (ref 98–107)
CO2 SERPL-SCNC: 27 MMOL/L (ref 22–29)
CREAT SERPL-MCNC: 1.05 MG/DL (ref 0.57–1)
DEPRECATED RDW RBC AUTO: 42.4 FL (ref 37–54)
EGFRCR SERPLBLD CKD-EPI 2021: 57.3 ML/MIN/1.73
EOSINOPHIL # BLD AUTO: 0.3 10*3/MM3 (ref 0–0.4)
EOSINOPHIL NFR BLD AUTO: 3 % (ref 0.3–6.2)
ERYTHROCYTE [DISTWIDTH] IN BLOOD BY AUTOMATED COUNT: 13.9 % (ref 12.3–15.4)
GLOBULIN UR ELPH-MCNC: 2.7 GM/DL
GLUCOSE BLDC GLUCOMTR-MCNC: 121 MG/DL (ref 70–105)
GLUCOSE BLDC GLUCOMTR-MCNC: 126 MG/DL (ref 70–105)
GLUCOSE BLDC GLUCOMTR-MCNC: 128 MG/DL (ref 70–105)
GLUCOSE BLDC GLUCOMTR-MCNC: 209 MG/DL (ref 70–105)
GLUCOSE SERPL-MCNC: 144 MG/DL (ref 65–99)
HCT VFR BLD AUTO: 36.1 % (ref 34–46.6)
HGB BLD-MCNC: 11.7 G/DL (ref 12–15.9)
LYMPHOCYTES # BLD AUTO: 3.4 10*3/MM3 (ref 0.7–3.1)
LYMPHOCYTES NFR BLD AUTO: 31.8 % (ref 19.6–45.3)
MAGNESIUM SERPL-MCNC: 1.8 MG/DL (ref 1.6–2.4)
MCH RBC QN AUTO: 28.5 PG (ref 26.6–33)
MCHC RBC AUTO-ENTMCNC: 32.4 G/DL (ref 31.5–35.7)
MCV RBC AUTO: 87.9 FL (ref 79–97)
MONOCYTES # BLD AUTO: 0.8 10*3/MM3 (ref 0.1–0.9)
MONOCYTES NFR BLD AUTO: 7.6 % (ref 5–12)
NEUTROPHILS NFR BLD AUTO: 56.7 % (ref 42.7–76)
NEUTROPHILS NFR BLD AUTO: 6.1 10*3/MM3 (ref 1.7–7)
NRBC BLD AUTO-RTO: 0.1 /100 WBC (ref 0–0.2)
PHOSPHATE SERPL-MCNC: 3 MG/DL (ref 2.5–4.5)
PLATELET # BLD AUTO: 302 10*3/MM3 (ref 140–450)
PMV BLD AUTO: 7 FL (ref 6–12)
POTASSIUM SERPL-SCNC: 4.2 MMOL/L (ref 3.5–5.2)
PROT SERPL-MCNC: 6.1 G/DL (ref 6–8.5)
RBC # BLD AUTO: 4.11 10*6/MM3 (ref 3.77–5.28)
SODIUM SERPL-SCNC: 138 MMOL/L (ref 136–145)
WBC NRBC COR # BLD: 10.8 10*3/MM3 (ref 3.4–10.8)

## 2023-08-04 PROCEDURE — 92526 ORAL FUNCTION THERAPY: CPT

## 2023-08-04 PROCEDURE — 97116 GAIT TRAINING THERAPY: CPT

## 2023-08-04 PROCEDURE — 83735 ASSAY OF MAGNESIUM: CPT | Performed by: NURSE PRACTITIONER

## 2023-08-04 PROCEDURE — 84100 ASSAY OF PHOSPHORUS: CPT | Performed by: NURSE PRACTITIONER

## 2023-08-04 PROCEDURE — 25010000002 KETOROLAC TROMETHAMINE PER 15 MG: Performed by: INTERNAL MEDICINE

## 2023-08-04 PROCEDURE — 63710000001 INSULIN LISPRO (HUMAN) PER 5 UNITS: Performed by: NURSE PRACTITIONER

## 2023-08-04 PROCEDURE — 97530 THERAPEUTIC ACTIVITIES: CPT

## 2023-08-04 PROCEDURE — 25010000002 HYDROMORPHONE 1 MG/ML SOLUTION: Performed by: INTERNAL MEDICINE

## 2023-08-04 PROCEDURE — 82948 REAGENT STRIP/BLOOD GLUCOSE: CPT

## 2023-08-04 PROCEDURE — 85025 COMPLETE CBC W/AUTO DIFF WBC: CPT | Performed by: NURSE PRACTITIONER

## 2023-08-04 PROCEDURE — 97535 SELF CARE MNGMENT TRAINING: CPT

## 2023-08-04 PROCEDURE — 80053 COMPREHEN METABOLIC PANEL: CPT | Performed by: NURSE PRACTITIONER

## 2023-08-04 RX ADMIN — SENNOSIDES AND DOCUSATE SODIUM 2 TABLET: 50; 8.6 TABLET ORAL at 21:14

## 2023-08-04 RX ADMIN — KETOROLAC TROMETHAMINE 15 MG: 15 INJECTION, SOLUTION INTRAMUSCULAR; INTRAVENOUS at 17:47

## 2023-08-04 RX ADMIN — Medication 10 ML: at 08:15

## 2023-08-04 RX ADMIN — FAMOTIDINE 20 MG: 10 INJECTION INTRAVENOUS at 08:12

## 2023-08-04 RX ADMIN — TICAGRELOR 90 MG: 90 TABLET ORAL at 08:12

## 2023-08-04 RX ADMIN — ATORVASTATIN CALCIUM 80 MG: 40 TABLET, FILM COATED ORAL at 21:14

## 2023-08-04 RX ADMIN — KETOROLAC TROMETHAMINE 15 MG: 15 INJECTION, SOLUTION INTRAMUSCULAR; INTRAVENOUS at 08:56

## 2023-08-04 RX ADMIN — SENNOSIDES AND DOCUSATE SODIUM 2 TABLET: 50; 8.6 TABLET ORAL at 08:12

## 2023-08-04 RX ADMIN — HYDROMORPHONE HYDROCHLORIDE 1 MG: 1 INJECTION, SOLUTION INTRAMUSCULAR; INTRAVENOUS; SUBCUTANEOUS at 05:12

## 2023-08-04 RX ADMIN — TICAGRELOR 90 MG: 90 TABLET ORAL at 21:14

## 2023-08-04 RX ADMIN — ASPIRIN 81 MG CHEWABLE TABLET 81 MG: 81 TABLET CHEWABLE at 08:12

## 2023-08-04 RX ADMIN — Medication 10 ML: at 21:14

## 2023-08-04 RX ADMIN — HYDROMORPHONE HYDROCHLORIDE 1 MG: 1 INJECTION, SOLUTION INTRAMUSCULAR; INTRAVENOUS; SUBCUTANEOUS at 19:14

## 2023-08-04 RX ADMIN — DULOXETINE HYDROCHLORIDE 60 MG: 30 CAPSULE, DELAYED RELEASE ORAL at 08:12

## 2023-08-04 RX ADMIN — AMLODIPINE BESYLATE 5 MG: 5 TABLET ORAL at 08:12

## 2023-08-04 RX ADMIN — INSULIN LISPRO 3 UNITS: 100 INJECTION, SOLUTION INTRAVENOUS; SUBCUTANEOUS at 11:57

## 2023-08-04 RX ADMIN — Medication 10 ML: at 21:16

## 2023-08-04 RX ADMIN — MULTIPLE VITAMINS W/ MINERALS TAB 1 TABLET: TAB at 08:12

## 2023-08-04 RX ADMIN — HYDROMORPHONE HYDROCHLORIDE 1 MG: 1 INJECTION, SOLUTION INTRAMUSCULAR; INTRAVENOUS; SUBCUTANEOUS at 11:58

## 2023-08-04 NOTE — PROGRESS NOTES
Patient stable  No new issues    Question is rehab?  Headache is better or improving    If it continues then Medrol Dosepak may be a good idea on discharge

## 2023-08-04 NOTE — DISCHARGE PLACEMENT REQUEST
"Maxwell Moreno (68 y.o. Female)       Date of Birth   1955    Social Security Number       Address   [address] [place] [address] [place] [facility]    Home Phone   [phone]    MRN   [id number]       Taoism   None    Marital Status                               Admission Date   8/1/23    Admission Type   Emergency    Admitting Provider   Cassius Lopez DO    Attending Provider   Lee Mays MD    Department, Room/Bed   [facility], 241/1       Discharge Date       Discharge Disposition       Discharge Destination                                 Attending Provider: Lee Mays MD    Allergies: Zofran [Ondansetron Hcl], Prochlorperazine, Prochlorperazine Edisylate, Prochlorperazine Maleate, Hydralazine, Hydralazine Hcl, Meperidine, Prochlorperazine Maleate    Isolation: None   Infection: None   Code Status: CPR    Ht: 162.6 cm (64\")   Wt: 73.5 kg (162 lb 0.6 oz)    Admission Cmt: None   Principal Problem: CVA (cerebral vascular accident) [I63.9]                   Active Insurance as of 8/1/2023       Primary Coverage       Payor Plan Insurance Group Employer/Plan Group    HUMANA MEDICARE REPLACEMENT HUMANA MEDICARE REPLACEMENT [id number]       Payor Plan Address Payor Plan Phone Number Payor Plan Fax Number Effective Dates    [address] [address] [phone]  2/1/2022 - None Entered    [facility] [address]         Subscriber Name Subscriber Birth Date Member ID       MAXWELL MORENO 1955 [id number]               Secondary Coverage       Payor Plan Insurance Group Employer/Plan Group    GPM LIFE G[employer] LIFE MC SUP        Payor Plan Address Payor Plan Phone Number Payor Plan Fax Number Effective Dates    [address]   1/1/2019 - None Entered    [facility] [id number]         Subscriber Name Subscriber Birth Date Member ID       MAXWELL MORENO 1955 [id number]                     Emergency Contacts        (Rel.) Home Phone Work Phone Mobile Phone    " RitaKimberly gilliland (POA) (Daughter) -- -- [phone]    Tonia Rockwell (Friend) [phone] -- --    TAZSILVIO (Spouse) -- -- [phone]                 History & Physical        Robert Mayen, APRN at 23 2019       Attestation signed by Cassius Lopez DO at 23 1051    I have reviewed this documentation and agree with NP note, with any exceptions noted below.  Pt seen and examined by me personally.  I have personally reviewed all overnight events, vitals, labs, chart notes, and imaging.  The NP and I have collaborated to design the stated plan.                       Critical Care History and Physical     Marge MANLEY Taz : 1955 MRN:[id number] LOS:0 ROOM: [facility]/     Reason for admission: CVA (cerebral vascular accident)     Assessment / Plan     Suspected CVA, History of stroke  Presented with left sided numbness and weakness.  Code stroke protocol initiated in ED, decision for TNK administration in ED  Ischemic Stroke orders initiated.  CT scan reviewed: No definitive acute intracranial.  Remote infarct noted as lacunar infarct on the right side of the tomas.  CTA head and neck reviewed: No evidence of LVO, stenosis, or aneurysm formation.  CT perfusion study: No evidence of core infarct or visualized at risk tissue.  Repeat CT scan of head without, 24 hours post tPA administration  Neurology consulted, further workup per neurology  No antiplatelet or anticoagulant medication until 24 hours post-TNK administration  Observe for signs and symptoms of bleeding, if observed, immediately notify Neurology  Strict blood pressure monitoring, keep blood pressure listed 180/105, Cardene drip ordered if needed.  SLP evaluated and initiated diet.  PT/ST/OT evaluation & treatment    Atherosclerotic heart disease, history of CABG  Chronic and stable.   c/w aspirin, Plavix, and statins.    Essential Hypertension: well controlled.   Continue amlodipine when able to take oral medications.  Titrate  medications as needed.    Diabetes mellitus Type 2, insulin-dependent : not well controlled.   Hgb A1c 7.5.  Home regimen includes Mounjaro.  Accu-Cheks with SSI with admelog based on diet.    Depression: Chronic and stable, continue cymbalta  Hyperlipidemia: Continue statin therapy.    Level Of Support Discussed With: Patient  Code Status (Patient has no pulse and is not breathing): CPR (Attempt to Resuscitate)  Medical Interventions (Patient has pulse or is breathing): Full Support  Release to patient: Routine Release       Nutrition:   Diet: Cardiac Diets, Diabetic Diets; Healthy Heart (2-3 Na+); Consistent Carbohydrate; Texture: Regular Texture (IDDSI 7); Fluid Consistency: Thin (IDDSI 0)     DVT prophylaxis:  Mechanical DVT prophylaxis orders are present.     History of Present illness     Marge Mcghee is a 68 y.o. female with PMH of Stroke, Diabetes mellitus, hyperlipidemia, HLD, CAD & S/P CABG, and GERD, and presented to the hospital for tingling on the left side of the face, with facial weakness and left sided weakness as well, and was admitted with a principal diagnosis of suspected CVA (cerebral vascular accident).  At baseline, patient is on dual antiplatelet therapy with aspirin and Plavix as well as high-dose atorvastatin.  Patient's initial NIH was 5.  Patient upon arrival to ED had a code stroke initiated.  Patient underwent CT of the head that was negative for acute intracranial abnormalities but a remote infarct on the right side of the tomas was noted.  CTA of the head and neck was without any large vessel occlusion.  CT perfusion study was also without acute findings.  Patient was deemed a candidate for TNK, and after discussion of risks and benefits, patient was agreeable to proceed.  Patient reported that she has been having a few weeks of dizziness and numbness that has been intermittent, but got worse over the past few days.  Patient reported a previous stroke without residual.  Patient  works as a schoolbus .  Patient had a planned MRI in the next week, but now with the new onset of symptoms patient is planned to have an MRI in the morning.  Prior to initiation of TNK, patient was hypertensive requiring IV administration of labetalol.  Neurology has been consulted.  Patient will be admitted to the ICU for further treatment and evaluation.  Intensivist service has been consulted.    ACP: ACP on file and reviewed, POA has been appointed to Kimberly Salinas.    Patient was seen and examined on 08/01/23 at 20:20 EDT .    Subjective / Review of systems     Review of Systems   Constitutional:  Negative for chills and fever.   HENT:  Negative for congestion and sore throat.    Respiratory:  Negative for cough and shortness of breath.    Cardiovascular:  Negative for chest pain and palpitations.   Gastrointestinal:  Positive for nausea. Negative for abdominal pain and vomiting.   Endocrine: Negative for heat intolerance and polyuria.   Genitourinary:  Negative for dysuria and urgency.   Musculoskeletal:  Negative for arthralgias and myalgias.   Skin:  Negative for rash and wound.   Neurological:  Positive for dizziness (Intermittent.), speech difficulty (Not slurring, feels like she is talking slower to properly enunciate.), numbness (Left side of face and left side of body.  Slightly improved but persistent.) and headaches. Negative for weakness and light-headedness.   Hematological:  Negative for adenopathy. Does not bruise/bleed easily.   Psychiatric/Behavioral:  Negative for confusion. The patient is not nervous/anxious.       Past Medical/Surgical/Social/Family History & Allergies     Past Medical History:   Diagnosis Date    Adrenal nodule 11/16/2016    FINDINGS: Mild hepatic steatosis may be present. Cholecystectomy. No biliary ductal dilatation. Negative pancreas, spleen, left adrenal gland, and kidneys. The right adrenal presumed adenoma has increased slightly, measuring 3 x 4 cm in diameter,   compared to 2 x 3.5 cm on the previous exam. The attenuation values are consistent with an adenoma. Done at [facility] in August 2018    Age-related osteoporosis without current pathological fracture 11/16/2016    Arthritis     Delayed surgical wound healing     Essential hypertension 11/16/2016    Gastroesophageal reflux disease with esophagitis 11/16/2016    Hepatic steatosis 11/16/2016    History of anemia     History of sepsis     FROM UTI    Hyperlipidemia 11/16/2016    Lisfranc's dislocation     LEFT WITH FRACTURES NONHEALING FOOT    Osteomyelitis of left foot 11/2020    RLS (restless legs syndrome)     Squamous cell skin cancer 2014    Excised by Dr. James    Thyroid nodule 10/18/2019    BENIGN    Type 2 diabetes mellitus with peripheral neuropathy 11/16/2016    Vitamin D deficiency 1/25/2019      Past Surgical History:   Procedure Laterality Date    BREAST BIOPSY Left     7/2019. BENIGN    CARDIAC CATHETERIZATION Left 5/2/2021    Procedure: Cardiac Catheterization/Vascular Study;  Surgeon: Chacho Esteban MD;  Location: [facility] CATH INVASIVE LOCATION;  Service: Cardiovascular;  Laterality: Left;    CARDIAC CATHETERIZATION N/A 5/2/2021    Procedure: Intra-Aortic Baloon Pump Insertion;  Surgeon: Chacho Esteban MD;  Location: [facility] CATH INVASIVE LOCATION;  Service: Cardiovascular;  Laterality: N/A;    CATARACT EXTRACTION WITH INTRAOCULAR LENS IMPLANT Bilateral     CHOLECYSTECTOMY      COLONOSCOPY      CORONARY ARTERY BYPASS GRAFT N/A 5/2/2021    Procedure: CORONARY ARTERY BYPASS WITH INTERNAL MAMMARY ARTERY GRAFT;  Surgeon: Jr Rocael Alexander MD;  Location: [facility] CVOR;  Service: Cardiothoracic;  Laterality: N/A;    FOOT FUSION Right 11/13/2020    Procedure: RIGHT OPEN TREATMENT LISFRANC INJURY, OPEN REDUCTION INTERNAL FIXATION MEDIAL/MIDDLE CUNEIFORM FRACTURE AND 2ND/3RD METATARSAL FRACTURE 1ST 2ND POSSIBLE 3RD TARSOMETATARSAL ARTHRODESIS INTERCUNEIFORM ARTHRODESIS CALCANEAL BONE GRAFT;  Surgeon:  Mikhail Banks Jr., MD;  Location: [facility] OR OSC;  Service: Orthopedics;  Laterality: Right;    INCISION AND DRAINAGE LEG Right 1/8/2021    Procedure: RIGHT IRRIGATION AND DEBRIDEMENT OF FOOT WITH SECONDARY CLOSURE AND HARDWARE REMOVAL;  Surgeon: Mikhail Banks Jr., MD;  Location: [facility] MAIN OR;  Service: Orthopedics;  Laterality: Right;    KNEE ARTHROSCOPY Bilateral     TOTAL ABDOMINAL HYSTERECTOMY      TRANSESOPHAGEAL ECHOCARDIOGRAM (EMILIA) N/A 5/2/2021    Procedure: TRANSESOPHAGEAL ECHOCARDIOGRAM;  Surgeon: Jr Rocael Alexander MD;  Location: [facility];  Service: Cardiothoracic;  Laterality: N/A;    UPPER GASTROINTESTINAL ENDOSCOPY  08/2016    US GUIDED FINE NEEDLE ASPIRATION  5/8/2020      Social History     Socioeconomic History    Marital status:    Tobacco Use    Smoking status: Never    Smokeless tobacco: Never   Vaping Use    Vaping Use: Never used   Substance and Sexual Activity    Alcohol use: Yes     Comment: socially     Drug use: Never    Sexual activity: Defer      Family History   Problem Relation Age of Onset    Diabetes Mother     COPD Mother     Hypertension Mother     Kidney disease Mother     Obesity Mother     Thyroid disease Mother     Diabetes Sister     Diabetes Sister     Diabetes Sister     Diabetes Sister     Malig Hyperthermia Neg Hx       Allergies   Allergen Reactions    Zofran [Ondansetron Hcl] Nausea And Vomiting     Does the opposite of its purpose    Prochlorperazine Other (See Comments)     CAUSED SEIZURE    Prochlorperazine Edisylate Hives    Prochlorperazine Maleate Irritability    Hydralazine Itching and Rash    Hydralazine Hcl Itching and Rash    Meperidine Itching and Swelling    Prochlorperazine Maleate Other (See Comments)     CAUSES SEIZURE      Social Determinants of Health     Tobacco Use: Low Risk     Smoking Tobacco Use: Never    Smokeless Tobacco Use: Never    Passive Exposure: Not on file   Alcohol Use: Not At Risk    Frequency of Alcohol Consumption: Monthly  or less    Average Number of Drinks: 1 or 2    Frequency of Binge Drinking: Never   Financial Resource Strain: Not on file   Food Insecurity: Not on file   Transportation Needs: Not on file   Physical Activity: Not on file   Stress: Not on file   Social Connections: Not on file   Intimate Partner Violence: Not on file   Depression: Not at risk    PHQ-2 Score: 0   Housing Stability: Not on file        Home Medications     Prior to Admission medications    Medication Sig Start Date End Date Taking? Authorizing Provider   amLODIPine (NORVASC) 5 MG tablet Take 1 tablet by mouth Daily.   Yes Bi Haque MD   atorvastatin (LIPITOR) 80 MG tablet Take 1 tablet by mouth Every Night. 10/25/22  Yes Gladis Lang APRN   clopidogrel (PLAVIX) 75 MG tablet Take 1 tablet by mouth Daily. 9/28/22  Yes Lasha Galindo MD   DULoxetine (CYMBALTA) 60 MG capsule Take 1 capsule by mouth Daily.   Yes Bi Haque MD   famotidine (PEPCID) 20 MG tablet Take 1 tablet by mouth 2 (Two) Times a Day As Needed. 10/10/22  Yes Bi Haque MD   multivitamin with minerals tablet tablet Take 1 tablet by mouth Daily.   Yes iB Haque MD   polyethylene glycol (MiraLax) 17 g packet Take 17 g by mouth Daily.   Yes Bi Haque MD   Tirzepatide (Mounjaro) 5 MG/0.5ML solution pen-injector Inject 0.5 mL under the skin into the appropriate area as directed 1 (One) Time Per Week. Sunday   Yes Bi Haque MD   traZODone (DESYREL) 50 MG tablet Take 1 tablet by mouth At Night As Needed. 10/12/22  Yes Bi Haque MD   polyethylene glycol (MIRALAX) 17 g packet Take 17 g by mouth Daily As Needed (Use if senna-docusate is ineffective). 9/27/22 8/1/23 Yes Lasha Galindo MD   glucose blood test strip 1 each by Other route 3 (Three) Times a Day Before Meals. Use as instructed Dx code: E11.65  Prescription for test strips for glucometer patient has at home. Patient needs to give name of  glucometer. 9/27/22   Lasha Galindo MD   Insulin Pen Needle (Pen Needles) 32G X 4 MM misc 1 each Every Night. Dx code: E11.65 9/27/22   Lasha Galindo MD   Isopropyl Alcohol (Alcohol Wipes) 70 % misc Apply 1 each topically 3 (Three) Times a Day Before Meals. Dx code: E11.65 9/27/22   Lasha Galindo MD   Lancets misc 1 each 3 (Three) Times a Day Before Meals. Prescription for whatever brand lancets patient currently using. Dx code: E11.65 9/27/22   Lasha Galindo MD   amLODIPine (NORVASC) 10 MG tablet Take 1 tablet by mouth Daily. 9/28/22 8/1/23  Lasha Galindo MD   aspirin (aspirin) 81 MG EC tablet Take 1 tablet by mouth Daily. 2/17/22 8/1/23  Chacho Esteban MD   benzonatate (TESSALON) 200 MG capsule Take 1 capsule by mouth 3 (Three) Times a Day As Needed for Cough. 2/5/23 8/1/23  Ivelisse Archer PA   bisacodyl (DULCOLAX) 10 MG suppository Insert 1 suppository into the rectum Daily As Needed for Constipation (Use if bisacodyl oral is ineffective). 9/27/22 8/1/23  Lasha Galindo MD   bisacodyl (DULCOLAX) 5 MG EC tablet Take 1 tablet by mouth Daily As Needed for Constipation (Use if polyethylene glycol is ineffective). 9/27/22 8/1/23  Lasha Galindo MD   guaiFENesin-codeine (GUAIFENESIN AC) 100-10 MG/5ML liquid Take 5 mL by mouth 4 (Four) Times a Day As Needed for Cough or Congestion (And chest discomfort). 12/7/22 8/1/23  Nima Wynne MD   insulin glargine (LANTUS, SEMGLEE) 100 UNIT/ML injection Inject 25 Units under the skin into the appropriate area as directed Every Night. 9/27/22 8/1/23  Lasha Galindo MD   insulin glargine (LANTUS, SEMGLEE) 100 UNIT/ML injection Inject 25 Units under the skin into the appropriate area as directed Daily. 9/27/22 8/1/23  Provider, MD Bi   insulin lispro (ADMELOG) 100 UNIT/ML injection Inject 0-9 Units under the skin into the appropriate area as directed Every 6 (Six) Hours. 9/27/22 8/1/23  Lasha Galindo MD    losartan (COZAAR) 50 MG tablet Take 1 tablet by mouth Daily. 9/28/22 8/1/23  Lasha Galindo MD   meclizine (ANTIVERT) 25 MG tablet  10/10/22 8/1/23  Bi Haque MD   metoclopramide (REGLAN) 5 MG tablet Take 1 tablet by mouth 3 (Three) Times a Day As Needed (N/V). 2/5/23 8/1/23  Ivelisse Archer PA   metoprolol succinate XL (TOPROL-XL) 25 MG 24 hr tablet Take 1 tablet by mouth Daily. 9/28/22 8/1/23  Lasha Galindo MD   ondansetron ODT (ZOFRAN-ODT) 8 MG disintegrating tablet Place 1 tablet on the tongue Every 8 (Eight) Hours As Needed for Nausea or Vomiting. 12/7/22 8/1/23  Nima Wynne MD   oseltamivir (TAMIFLU) 75 MG capsule Take 1 capsule by mouth 2 (Two) Times a Day. 12/7/22 8/1/23  Nima Wynne MD   sennosides-docusate (PERICOLACE) 8.6-50 MG per tablet Take 2 tablets by mouth 2 (Two) Times a Day. 9/27/22 8/1/23  Lasha Galindo MD   traMADol (ULTRAM) 50 MG tablet  10/10/22 8/1/23  Provider, MD Bi        Objective / Physical Exam     Vital signs:  Temp: 98.3 øF (36.8 øC)  BP: 140/98  Heart Rate: 82  Resp: 21  SpO2: 97 %  Weight: 73.6 kg (162 lb 4.1 oz)    Admission Weight: Weight: 74.2 kg (163 lb 9.3 oz)    Physical Exam  Vitals and nursing note reviewed.   Constitutional:       General: She is not in acute distress.     Appearance: She is not ill-appearing, toxic-appearing or diaphoretic.   HENT:      Head: Normocephalic and atraumatic.      Nose: Nose normal. No congestion.      Mouth/Throat:      Mouth: Mucous membranes are moist.      Pharynx: Oropharynx is clear. No oropharyngeal exudate.   Eyes:      General: No scleral icterus.     Extraocular Movements: Extraocular movements intact.      Pupils: Pupils are equal, round, and reactive to light.   Cardiovascular:      Rate and Rhythm: Normal rate and regular rhythm.      Pulses: Normal pulses.      Heart sounds: Normal heart sounds. No murmur heard.  Pulmonary:      Effort: Pulmonary effort is normal. No  respiratory distress.      Breath sounds: Normal breath sounds. No wheezing, rhonchi or rales.   Abdominal:      General: Bowel sounds are normal. There is no distension.      Tenderness: There is no abdominal tenderness. There is no guarding.      Hernia: No hernia is present.   Musculoskeletal:         General: No swelling. Normal range of motion.      Right lower leg: No edema.      Left lower leg: No edema.   Skin:     General: Skin is warm and dry.      Findings: No rash.   Neurological:      Mental Status: She is alert and oriented to person, place, and time.      Sensory: Sensory deficit (Continues to report decreased sensation on left side of face as well as lower extremities.) present.      Comments: Patient moving all 4 extremities, following commands. Left upper and lower extremity drift.  Strength intact and equal 5/5.   Psychiatric:         Mood and Affect: Mood normal.         Behavior: Behavior normal.         Thought Content: Thought content normal.         Judgment: Judgment normal.        Labs     Results from last 7 days   Lab Units 08/01/23  1338   WBC 10*3/mm3 8.20   HEMATOCRIT % 37.9   PLATELETS 10*3/mm3 318      Results from last 7 days   Lab Units 08/01/23  1338   SODIUM mmol/L 140   POTASSIUM mmol/L 4.0   CHLORIDE mmol/L 103   CO2 mmol/L 26.0   BUN mg/dL 22   CREATININE mg/dL 0.92        Imaging     Chest X ray: My independent assessment showed no infiltrates or effusions    EKG: My independent evaluation showed Sinus rhythm without ST or T wave abnormalities, heart rate 86, QTc 465.    Current Medications     Scheduled Meds:  [START ON 8/2/2023] amLODIPine, 5 mg, Oral, Daily  [START ON 8/2/2023] aspirin, 81 mg, Oral, Daily   Or  [START ON 8/2/2023] aspirin, 300 mg, Rectal, Daily  [START ON 8/2/2023] atorvastatin, 80 mg, Oral, Nightly  [START ON 8/2/2023] DULoxetine, 60 mg, Oral, Daily  [START ON 8/2/2023] famotidine, 20 mg, Intravenous, Daily  [START ON 8/2/2023] insulin lispro, 2-7 Units,  Subcutaneous, Q6H  [START ON 8/2/2023] multivitamin with minerals, 1 tablet, Oral, Daily  [START ON 8/2/2023] polyethylene glycol, 17 g, Oral, Daily  sodium chloride, 10 mL, Intravenous, Q12H         Continuous Infusions:  niCARdipine, 5-15 mg/hr, Last Rate: Stopped (08/01/23 1623)       Plan discussed with RN. Reviewed all other data in the last 24 hours, including but not limited to vitals, labs, microbiology, imaging and pertinent notes from other providers.  Plan also discussed with patient at the bedside.      CANDIDO Montelongo   Critical Care  08/01/23   20:20 EDT       Electronically signed by Cassius Lopez DO at 08/02/23 1051          Physician Progress Notes (most recent note)        Rupal Rasheed MD at 08/04/23 1304          Patient stable  No new issues    Question is rehab?  Headache is better or improving    If it continues then Medrol Dosepak may be a good idea on discharge    Electronically signed by Rupal Rasheed MD at 08/04/23 1305          Consult Notes (most recent note)        Rupal Rasheed MD at 08/01/23 1455        Consult Orders    1. Inpatient Neurology Consult Stroke [[id number]] ordered by Paolo Sosa MD at 08/01/23 1319                 Primary Care Provider: No ref. provider found     Consult requested by: ER team    Reason for Consultation: Neurological evaluation /code stroke    History taken from: patient family RN    Chief complaint: Left-sided numbness and weakness    Subjective   SUBJECTIVE:    History of present illness: Background per H&P: Marge Mcghee is a 68 y.o. year old female who was evaluated as code stroke, initially in CT suite later on in room 34 in the ER at [facility]    Source of information is the patient and the medical records and my discussions with the team    This is a very pleasant 68-year-old right-handed white female who presented with about 2 hours history of tingling on the left side of the face there was concern about  some facial weakness also.  There is concern about left-sided weakness also.  The patient has history of stroke and is on aspirin and Plavix and atorvastatin    When I examined her she definitely had left-sided signs  Her NIH was 5    After we got head CT which was negative and inclusion exclusion criteria were evaluated and discussed with the patient, we decided to go for TNK and it was explained to the patient and the patient agreed    Initially her blood pressure was okay later on we had to give her 10 mg of labetalol and then she got some head CT and we repeated head CT and of course there was contrast but nothing suggesting hemorrhage    Her CTP and CTA were unremarkable so she was not intervention candidate    We will do full stroke work-up and MRI and go from there    No migraines or seizures    No noncompliance    As per ER team,  Patient is a 5-year-old female complaining of 2-hour history of tingling to her left face left arm as well as weakness to her left arm. She also planes of mild dizziness and difficulty with her thought process. She denies other associated complaints.     - Portions of the above HPI were copied from previous encounters and edited as appropriate. PMH as detailed below.     Review of Systems   No fever chills rigors or sweats  No weight issues  No sleep problems  HEENT:  No speech problem, vision changes, facial asymmetry or pain, or neck problem  Chest: No chest pain, clubbing, cyanosis, orthopnea palpitations  Pulmonary:  No shortness of air, cough or expectoration  Abdomen:  No swelling/tension, constipation,diarrhea or pain  No genitourinary symptoms  Extremity problems as discussed  No back problem  No psychotic issues  Neurologic issues as discussed  No hematologic, dermatologic or endocrine problems        PATIENT HISTORY:  Past Medical History:   Diagnosis Date    Adrenal nodule 11/16/2016    FINDINGS: Mild hepatic steatosis may be present. Cholecystectomy. No biliary ductal  dilatation. Negative pancreas, spleen, left adrenal gland, and kidneys. The right adrenal presumed adenoma has increased slightly, measuring 3 x 4 cm in diameter,  compared to 2 x 3.5 cm on the previous exam. The attenuation values are consistent with an adenoma. Done at [facility] in August 2018    Age-related osteoporosis without current pathological fracture 11/16/2016    Arthritis     Delayed surgical wound healing     Essential hypertension 11/16/2016    Gastroesophageal reflux disease with esophagitis 11/16/2016    Hepatic steatosis 11/16/2016    History of anemia     History of sepsis     FROM UTI    Hyperlipidemia 11/16/2016    Lisfranc's dislocation     LEFT WITH FRACTURES NONHEALING FOOT    Osteomyelitis of left foot 11/2020    RLS (restless legs syndrome)     Squamous cell skin cancer 2014    Excised by Dr. James    Thyroid nodule 10/18/2019    BENIGN    Type 2 diabetes mellitus with peripheral neuropathy 11/16/2016    Vitamin D deficiency 1/25/2019   ,   Past Surgical History:   Procedure Laterality Date    BREAST BIOPSY Left     7/2019. BENIGN    CARDIAC CATHETERIZATION Left 5/2/2021    Procedure: Cardiac Catheterization/Vascular Study;  Surgeon: Chacho Esteban MD;  Location: [facility] CATH INVASIVE LOCATION;  Service: Cardiovascular;  Laterality: Left;    CARDIAC CATHETERIZATION N/A 5/2/2021    Procedure: Intra-Aortic Baloon Pump Insertion;  Surgeon: Chacho Esteban MD;  Location: [facility] CATH INVASIVE LOCATION;  Service: Cardiovascular;  Laterality: N/A;    CATARACT EXTRACTION WITH INTRAOCULAR LENS IMPLANT Bilateral     CHOLECYSTECTOMY      COLONOSCOPY      CORONARY ARTERY BYPASS GRAFT N/A 5/2/2021    Procedure: CORONARY ARTERY BYPASS WITH INTERNAL MAMMARY ARTERY GRAFT;  Surgeon: Jr Rocael Alexander MD;  Location: [facility] CVOR;  Service: Cardiothoracic;  Laterality: N/A;    FOOT FUSION Right 11/13/2020    Procedure: RIGHT OPEN TREATMENT LISFRANC INJURY, OPEN REDUCTION INTERNAL FIXATION MEDIAL/MIDDLE  CUNEIFORM FRACTURE AND 2ND/3RD METATARSAL FRACTURE 1ST 2ND POSSIBLE 3RD TARSOMETATARSAL ARTHRODESIS INTERCUNEIFORM ARTHRODESIS CALCANEAL BONE GRAFT;  Surgeon: Mikhail Banks Jr., MD;  Location:  [facility];  Service: Orthopedics;  Laterality: Right;    INCISION AND DRAINAGE LEG Right 1/8/2021    Procedure: RIGHT IRRIGATION AND DEBRIDEMENT OF FOOT WITH SECONDARY CLOSURE AND HARDWARE REMOVAL;  Surgeon: Mikhail Banks Jr., MD;  Location: [facility] MAIN OR;  Service: Orthopedics;  Laterality: Right;    KNEE ARTHROSCOPY Bilateral     TOTAL ABDOMINAL HYSTERECTOMY      TRANSESOPHAGEAL ECHOCARDIOGRAM (EMILIA) N/A 5/2/2021    Procedure: TRANSESOPHAGEAL ECHOCARDIOGRAM;  Surgeon: Jr Rocael Alexander MD;  [facility]  Service: Cardiothoracic;  Laterality: N/A;    UPPER GASTROINTESTINAL ENDOSCOPY  08/2016    US GUIDED FINE NEEDLE ASPIRATION  5/8/2020   ,   Family History   Problem Relation Age of Onset    Diabetes Mother     COPD Mother     Hypertension Mother     Kidney disease Mother     Obesity Mother     Thyroid disease Mother     Diabetes Sister     Diabetes Sister     Diabetes Sister     Diabetes Sister     Malig Hyperthermia Neg Hx    ,   Social History     Tobacco Use    Smoking status: Never    Smokeless tobacco: Never   Vaping Use    Vaping Use: Never used   Substance Use Topics    Alcohol use: Yes     Comment: socially     Drug use: Never   ,   Prior to Admission medications    Medication Sig Start Date End Date Taking? Authorizing Provider   amLODIPine (NORVASC) 10 MG tablet Take 1 tablet by mouth Daily. 9/28/22   Lasha Galindo MD   aspirin (aspirin) 81 MG EC tablet Take 1 tablet by mouth Daily. 2/17/22   Chacho Esteban MD   atorvastatin (LIPITOR) 80 MG tablet Take 1 tablet by mouth Every Night. 10/25/22   Gladis Lang APRN   benzonatate (TESSALON) 200 MG capsule Take 1 capsule by mouth 3 (Three) Times a Day As Needed for Cough. 2/5/23   Ivelisse Archer PA   bisacodyl (DULCOLAX) 10 MG suppository  Insert 1 suppository into the rectum Daily As Needed for Constipation (Use if bisacodyl oral is ineffective). 9/27/22   Lasha Galindo MD   bisacodyl (DULCOLAX) 5 MG EC tablet Take 1 tablet by mouth Daily As Needed for Constipation (Use if polyethylene glycol is ineffective). 9/27/22   Lasha Galindo MD   clopidogrel (PLAVIX) 75 MG tablet Take 1 tablet by mouth Daily. 9/28/22   Lasha Galindo MD   DULoxetine (CYMBALTA) 60 MG capsule Take 60 mg by mouth Daily.    ProviderBi MD   famotidine (PEPCID) 20 MG tablet  10/10/22   ProviderBi MD   glucose blood test strip 1 each by Other route 3 (Three) Times a Day Before Meals. Use as instructed Dx code: E11.65  Prescription for test strips for glucometer patient has at home. Patient needs to give name of glucometer. 9/27/22   Lasha Galindo MD   guaiFENesin-codeine (GUAIFENESIN AC) 100-10 MG/5ML liquid Take 5 mL by mouth 4 (Four) Times a Day As Needed for Cough or Congestion (And chest discomfort). 12/7/22   Nima Wynne MD   insulin glargine (LANTUS, SEMGLEE) 100 UNIT/ML injection Inject 25 Units under the skin into the appropriate area as directed Every Night. 9/27/22   Lasha Galindo MD   insulin glargine (LANTUS, SEMGLEE) 100 UNIT/ML injection Inject 25 Units under the skin into the appropriate area as directed Daily. 9/27/22   Bi Haque MD   insulin lispro (ADMELOG) 100 UNIT/ML injection Inject 0-9 Units under the skin into the appropriate area as directed Every 6 (Six) Hours. 9/27/22   Lasha Galindo MD   Insulin Pen Needle (Pen Needles) 32G X 4 MM misc 1 each Every Night. Dx code: E11.65 9/27/22   Lasha Galindo MD   Isopropyl Alcohol (Alcohol Wipes) 70 % misc Apply 1 each topically 3 (Three) Times a Day Before Meals. Dx code: E11.65 9/27/22   Lasha Galindo MD   Lancets misc 1 each 3 (Three) Times a Day Before Meals. Prescription for whatever brand lancets patient currently using.  Dx code: E11.65 9/27/22   Lasha Galindo MD   losartan (COZAAR) 50 MG tablet Take 1 tablet by mouth Daily. 9/28/22   Lasha Galindo MD   meclizine (ANTIVERT) 25 MG tablet  10/10/22   Bi Haque MD   metoclopramide (REGLAN) 5 MG tablet Take 1 tablet by mouth 3 (Three) Times a Day As Needed (N/V). 2/5/23   Ivelisse Archer PA   metoprolol succinate XL (TOPROL-XL) 25 MG 24 hr tablet Take 1 tablet by mouth Daily. 9/28/22   Lasha Galindo MD   ondansetron ODT (ZOFRAN-ODT) 8 MG disintegrating tablet Place 1 tablet on the tongue Every 8 (Eight) Hours As Needed for Nausea or Vomiting. 12/7/22   Nima Wynne MD   oseltamivir (TAMIFLU) 75 MG capsule Take 1 capsule by mouth 2 (Two) Times a Day. 12/7/22   Nima Wynne MD   polyethylene glycol (MIRALAX) 17 g packet Take 17 g by mouth Daily As Needed (Use if senna-docusate is ineffective). 9/27/22   Lasha Galindo MD   sennosides-docusate (PERICOLACE) 8.6-50 MG per tablet Take 2 tablets by mouth 2 (Two) Times a Day. 9/27/22   Lasha Galindo MD   traMADol (ULTRAM) 50 MG tablet  10/10/22   Bi Haque MD   traZODone (DESYREL) 100 MG tablet  10/12/22   Bi Haque MD    Allergies:  Zofran [ondansetron hcl], Prochlorperazine, Prochlorperazine edisylate, Prochlorperazine maleate, Hydralazine, Hydralazine hcl, Meperidine, and Prochlorperazine maleate    Current Facility-Administered Medications   Medication Dose Route Frequency Provider Last Rate Last Admin    niCARdipine (CARDENE) 25mg in 250mL NS infusion  5-15 mg/hr Intravenous Titrated Rupal Rasheed MD        sodium chloride 0.9 % flush 10 mL  10 mL Intravenous PRN Paolo Sosa MD         Current Outpatient Medications   Medication Sig Dispense Refill    amLODIPine (NORVASC) 10 MG tablet Take 1 tablet by mouth Daily. 30 tablet 0    aspirin (aspirin) 81 MG EC tablet Take 1 tablet by mouth Daily. 200 tablet 2    atorvastatin (LIPITOR) 80 MG tablet Take 1  tablet by mouth Every Night. 90 tablet 3    benzonatate (TESSALON) 200 MG capsule Take 1 capsule by mouth 3 (Three) Times a Day As Needed for Cough. 30 capsule 0    bisacodyl (DULCOLAX) 10 MG suppository Insert 1 suppository into the rectum Daily As Needed for Constipation (Use if bisacodyl oral is ineffective). 30 each 0    bisacodyl (DULCOLAX) 5 MG EC tablet Take 1 tablet by mouth Daily As Needed for Constipation (Use if polyethylene glycol is ineffective). 30 tablet 0    clopidogrel (PLAVIX) 75 MG tablet Take 1 tablet by mouth Daily. 30 tablet 4    DULoxetine (CYMBALTA) 60 MG capsule Take 60 mg by mouth Daily.      famotidine (PEPCID) 20 MG tablet       glucose blood test strip 1 each by Other route 3 (Three) Times a Day Before Meals. Use as instructed Dx code: E11.65  Prescription for test strips for glucometer patient has at home. Patient needs to give name of glucometer. 100 each 2    guaiFENesin-codeine (GUAIFENESIN AC) 100-10 MG/5ML liquid Take 5 mL by mouth 4 (Four) Times a Day As Needed for Cough or Congestion (And chest discomfort). 60 mL 0    insulin glargine (LANTUS, SEMGLEE) 100 UNIT/ML injection Inject 25 Units under the skin into the appropriate area as directed Every Night. 10 mL 0    insulin glargine (LANTUS, SEMGLEE) 100 UNIT/ML injection Inject 25 Units under the skin into the appropriate area as directed Daily.      insulin lispro (ADMELOG) 100 UNIT/ML injection Inject 0-9 Units under the skin into the appropriate area as directed Every 6 (Six) Hours. 10 mL 12    Insulin Pen Needle (Pen Needles) 32G X 4 MM misc 1 each Every Night. Dx code: E11.65 100 each 2    Isopropyl Alcohol (Alcohol Wipes) 70 % misc Apply 1 each topically 3 (Three) Times a Day Before Meals. Dx code: E11.65 100 each 2    Lancets misc 1 each 3 (Three) Times a Day Before Meals. Prescription for whatever brand lancets patient currently using. Dx code: E11.65 100 each 2    losartan (COZAAR) 50 MG tablet Take 1 tablet by mouth  Daily. 30 tablet 0    meclizine (ANTIVERT) 25 MG tablet       metoclopramide (REGLAN) 5 MG tablet Take 1 tablet by mouth 3 (Three) Times a Day As Needed (N/V). 10 tablet 0    metoprolol succinate XL (TOPROL-XL) 25 MG 24 hr tablet Take 1 tablet by mouth Daily. 30 tablet 0    ondansetron ODT (ZOFRAN-ODT) 8 MG disintegrating tablet Place 1 tablet on the tongue Every 8 (Eight) Hours As Needed for Nausea or Vomiting. 10 tablet 0    oseltamivir (TAMIFLU) 75 MG capsule Take 1 capsule by mouth 2 (Two) Times a Day. 10 capsule 0    polyethylene glycol (MIRALAX) 17 g packet Take 17 g by mouth Daily As Needed (Use if senna-docusate is ineffective). 30 each 0    sennosides-docusate (PERICOLACE) 8.6-50 MG per tablet Take 2 tablets by mouth 2 (Two) Times a Day. 30 tablet 0    traMADol (ULTRAM) 50 MG tablet       traZODone (DESYREL) 100 MG tablet           ________________________________________________________     Objective   OBJECTIVE:  Upon today's exam, patient resting comfortably in bed in no acute distress     Neurologic Exam    The patient is awake and alert and oriented x3     Cranial nerve examination demonstrate:  The patient had some blurred vision in the left eye.  There was question if there was some field cut also on the left side  Pupils are round, reactive to light and accommodation and size of about 3 mm  No ptosis or nystagmus  Funduscopic examination was not successful  Eye movements are conjugate     Sensation on the face and scalp demonstrated decrease sensation on the left side  Muscles of mastication are normal and symmetric  Muscles of  facial expression are normal and symmetric  Hearing is intact bilaterally  Head turning and shoulder shrugs were unremarkable  Tongue was midline  I could not visualize her oropharynx or uvula     Motor examination:  Normal bulk, tone and strength was 5/5 on the right side, drift on the left side both upper and lower extremities  No fasciculations     Sensory  examination:  Intact for soft touch, pain and position sensation, but decreased on the left side  Romberg was not evaluated     Reflexes:  0/4     Coordination:  Normal finger-to-nose to finger, rapid alternating movements and toe to finger on the right side, minimal ataxia in finger-to-nose to finger     Gait:  Deferred     Toe signs:  Mute        NIH stroke scale  NIHSS:    Level Of Consciousness 0  0=Alert; keenly responsive 1=Not alert, but arousable by minor stimulation 2=Not alert, requires repeated stimulation 3=Responds only with reflex movements    LOC Questions to Month and age 0  0=Answers both questions correctly 1=Answers one question correctly 2=Answers neither question correctly    LOC Commands      -Open/Close eyes 0    -Open/close  0   0=Performs both tasks correctly 1=Performs one task correctly 2=Performs neighter task correctly     Best Gaze 0  0=Normal 1=Partial gaze palsy 2=Forced deviation, or total gaze paresis    Visual 1  0=No visual loss 1=Partial hemianopia 2=Complete hemianopia 3=Bilateral hemianopia (blind including cortical blindness)    Facial Palsy 0  0=Normal symmetrical movement 1=Minor paralysis (asymmetry) 2=Partial paralysis (lower facde) 3=Complete paralysis (upper and lower face)    Motor: Left Arm-1  Left leg-1; Right Arm-0 Right Leg-0  0=No drift, limb holds posture for full 10 seconds 1=Drift, limb holds posture, no drift to bed 2=Some antigravity effort, cannot maintain posture, drifts to bed 3=No effort against gravity, limb falls 4=No movement    Limb Ataxia 1  0=Absent 1=Present in one limb 2=Present in two limbs    Sensory 1   0=Normal 1=Mild to moderate sensory loss 2=Severe to total sensory loss    Best Language 0   0=No aphasia, normal 1=Mild to moderate aphasia 2=Severe Aphasia (very few words correct or understood)3=Multe, global aphasia    Dysarthria 0  0=Normal 1=Mild to moderate 2=Severe, unintelligible or mute/anarthric    Extinction/Neglect 0   0=No  abnormality 1=Extinction to bilateral simultaneous stimulation 2=Profound neglect    Total  __5      ________________________________________________________   RESULTS REVIEW:    VITAL SIGNS:   Temp:  [97.9 øF (36.6 øC)] 97.9 øF (36.6 øC)  Heart Rate:  [74-87] 87  Resp:  [15-20] 16  BP: (143-181)/(81-91) 143/82     LABS:      Lab 08/01/23  1338   WBC 8.20   HEMOGLOBIN 12.5   HEMATOCRIT 37.9   PLATELETS 318   NEUTROS ABS 4.50   LYMPHS ABS 2.90   MONOS ABS 0.60   EOS ABS 0.20   MCV 86.1   PROTIME 11.1   APTT 27.2         Lab 08/01/23  1338   SODIUM 140   POTASSIUM 4.0   CHLORIDE 103   CO2 26.0   ANION GAP 11.0   BUN 22   CREATININE 0.92   EGFR 67.1   GLUCOSE 169*   CALCIUM 9.7         Lab 08/01/23  1338   TOTAL PROTEIN 7.2   ALBUMIN 3.9   GLOBULIN 3.3   ALT (SGPT) 8   AST (SGOT) 16   BILIRUBIN 0.3   ALK PHOS 58         Lab 08/01/23  1338   HSTROP T 8   PROTIME 11.1   INR 1.04             Lab 08/01/23  1338   ABO TYPING A   RH TYPING Positive   ANTIBODY SCREEN Negative         UA          2/5/2023    12:59   Urinalysis   Squamous Epithelial Cells, UA 3-6    Specific Gravity, UA 1.022    Ketones, UA Negative    Blood, UA Trace    Leukocytes, UA Small (1+)    Nitrite, UA Negative    RBC, UA 0-2    WBC, UA Too Numerous to Count    Bacteria, UA 4+        Lab Results   Component Value Date    TSH 0.383 09/26/2022     (H) 09/26/2022    HGBA1C 9.9 (H) 02/28/2023    [id number] 1,415 (H) 09/26/2022       IMAGING STUDIES:  CT Head Without Contrast    Result Date: 8/1/2023  No definite acute intracranial abnormality. Electronically Signed: Antelmo Ramirez  8/1/2023 2:33 PM EDT  Workstation ID: [id number]    CT Angiogram Neck    Result Date: 8/1/2023  Impression: 1. No evidence of large vessel occlusion, significant flow-limiting stenosis or aneurysm formation 2. Overall no great change from the prior exam Electronically Signed: Antelmo Ramirez  8/1/2023 2:20 PM EDT  Workstation ID: [id number]    XR Chest 1 View    Result Date:  8/1/2023  Impression: No active pulmonary process. Electronically Signed: Chung Holland MD  8/1/2023 2:11 PM EDT  Workstation ID: [id number]    CT Head Without Contrast Stroke Protocol    Result Date: 8/1/2023  No acute intracranial abnormality. Lacunar infarct right side of the tomas. Electronically Signed: Antelmo Ramirez  8/1/2023 1:36 PM EDT  Workstation ID: [id number]    CT Angiogram Head w AI Analysis of LVO    Result Date: 8/1/2023  Impression: 1. No evidence of large vessel occlusion, significant flow-limiting stenosis or aneurysm formation 2. Overall no great change from the prior exam Electronically Signed: Antelmo Ramirez  8/1/2023 2:20 PM EDT  Workstation ID: [id number]    CT CEREBRAL PERFUSION WITH & WITHOUT CONTRAST    Result Date: 8/1/2023  Impression: No evidence of core infarct or visualized at risk tissue. Electronically Signed: Antelmo Ramirez  8/1/2023 2:07 PM EDT  Workstation ID: [id number]     I reviewed the patient's new clinical results.    ________________________________________________________     PROBLEM LIST:    * No active hospital problems. 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  - NPO until bedside swallow test completed and passed and/or cleared by speech therapy.  - Closely monitor neurological status, NIH protocol and PRN for changes in neurological status  - Please call with any acute onset of headache, stat CT head at that time.  - Please monitor and call ICU team with any active bleeding.      For long-term,    Modification of stroke risk factors:   - Blood pressure should be less than 130/80  outpatient, HbA1c less than 6.5, LDL less than 70; b12>500 and smoking cessation if applicable. We would be grateful if the primary team / primary care physician would keep a close watch on the above targets.  - Stroke education  - Follow up with neurologist of choice      I discussed the patient's findings and my recommendations with patient, nursing staff, and consulting provider    Rupal Rasheed MD  23  14:55 EDT          Electronically signed by Rupal Rasheed MD at 23 1503          Physical Therapy Notes (most recent note)        Julien Chahal, PT at 23 1305  Version 1 of 1         Pt presents with functional mobility impairments which indicate the need for skilled intervention. Tolerating session today without incident. Pt on room air and telemetry this date.  Pt required SBA for bed mobility, Min A for transfers with RW and ambulated 45' with RW with CGA.  Emphasis placed on increasing heel strike, hip flexion and step length this date.  Pt responded well to tactile cues for hip flexion.  Pt required mod verbal cueing for sequencing of gait.   Will continue to follow and progress as tolerated.                           Electronically signed by Julien Chahal, PT at 23 1307          Occupational Therapy Notes (most recent note)        Tong Mcmahon, OT at 23 1414          Patient Name: Marge Mcghee  : 1955    MRN: [id number]                              Today's Date: 2023       Admit Date: 2023    Visit Dx:     ICD-10-CM ICD-9-CM   1. Acute CVA (cerebrovascular accident)  I63.9 434.91     Patient Active Problem List   Diagnosis    Type 2 diabetes mellitus with peripheral neuropathy    Age-related osteoporosis without current pathological fracture    Hyperlipidemia    Hepatic steatosis    Gastroesophageal reflux disease with esophagitis    Adrenal nodule    Vitamin D deficiency    Thyroid nodule    UTI (urinary tract infection)    REINA (acute kidney  injury)    Hyperglycemia    Acute right flank pain    Vomiting    Hypomagnesemia    Dehydration    Obesity (BMI 30-39.9)    Complicated migraine    Occipital neuralgia of left side    Polypharmacy    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    Lisfranc dislocation    Delayed surgical wound healing    Right foot infection    Chest pain    GERD (gastroesophageal reflux disease)    Stable angina pectoris    S/P CABG x 3    Encephalopathy, metabolic    Hypertensive emergency    Dysarthria    CVA (cerebral vascular accident)    Coronary artery disease involving coronary bypass graft of native heart without angina pectoris    Essential hypertension    HTN, goal below 130/80    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    DM type 2, goal HbA1c < 7%     Past Medical History:   Diagnosis Date    Adrenal nodule 11/16/2016    FINDINGS: Mild hepatic steatosis may be present. Cholecystectomy. No biliary ductal dilatation. Negative pancreas, spleen, left adrenal gland, and kidneys. The right adrenal presumed adenoma has increased slightly, measuring 3 x 4 cm in diameter,  compared to 2 x 3.5 cm on the previous exam. The attenuation values are consistent with an adenoma. Done at [facility] in August 2018    Age-related osteoporosis without current pathological fracture 11/16/2016    Arthritis     Delayed surgical wound healing     Essential hypertension 11/16/2016    Gastroesophageal reflux disease with esophagitis 11/16/2016    Hepatic steatosis 11/16/2016    History of anemia     History of sepsis     FROM UTI    Hyperlipidemia 11/16/2016    Lisfranc's dislocation     LEFT WITH FRACTURES NONHEALING FOOT    Osteomyelitis of left foot 11/2020    RLS (restless legs syndrome)     Squamous cell skin cancer 2014    Excised by Dr. James    Thyroid nodule 10/18/2019    BENIGN    Type 2 diabetes  mellitus with peripheral neuropathy 11/16/2016    Vitamin D deficiency 1/25/2019     Past Surgical History:   Procedure Laterality Date    BREAST BIOPSY Left     7/2019. BENIGN    CARDIAC CATHETERIZATION Left 5/2/2021    Procedure: Cardiac Catheterization/Vascular Study;  Surgeon: Chacho Esteban MD;  Location: [facility] CATH INVASIVE LOCATION;  Service: Cardiovascular;  Laterality: Left;    CARDIAC CATHETERIZATION N/A 5/2/2021    Procedure: Intra-Aortic Baloon Pump Insertion;  Surgeon: Chacho Esteban MD;  Location: [facility] CATH INVASIVE LOCATION;  Service: Cardiovascular;  Laterality: N/A;    CATARACT EXTRACTION WITH INTRAOCULAR LENS IMPLANT Bilateral     CHOLECYSTECTOMY      COLONOSCOPY      CORONARY ARTERY BYPASS GRAFT N/A 5/2/2021    Procedure: CORONARY ARTERY BYPASS WITH INTERNAL MAMMARY ARTERY GRAFT;  Surgeon: Jr Rocael Alexander MD;  Location: [facility];  Service: Cardiothoracic;  Laterality: N/A;    FOOT FUSION Right 11/13/2020    Procedure: RIGHT OPEN TREATMENT LISFRANC INJURY, OPEN REDUCTION INTERNAL FIXATION MEDIAL/MIDDLE CUNEIFORM FRACTURE AND 2ND/3RD METATARSAL FRACTURE 1ST 2ND POSSIBLE 3RD TARSOMETATARSAL ARTHRODESIS INTERCUNEIFORM ARTHRODESIS CALCANEAL BONE GRAFT;  Surgeon: Mikhail Banks Jr., MD;  Location: [facility];  Service: Orthopedics;  Laterality: Right;    INCISION AND DRAINAGE LEG Right 1/8/2021    Procedure: RIGHT IRRIGATION AND DEBRIDEMENT OF FOOT WITH SECONDARY CLOSURE AND HARDWARE REMOVAL;  Surgeon: Mikhail Banks Jr., MD;  Location: [facility];  Service: Orthopedics;  Laterality: Right;    KNEE ARTHROSCOPY Bilateral     TOTAL ABDOMINAL HYSTERECTOMY      TRANSESOPHAGEAL ECHOCARDIOGRAM (EMILIA) N/A 5/2/2021    Procedure: TRANSESOPHAGEAL ECHOCARDIOGRAM;  Surgeon: Jr Rocael Alexander MD;  Location: [facility];  Service: Cardiothoracic;  Laterality: N/A;    UPPER GASTROINTESTINAL ENDOSCOPY  08/2016    US GUIDED FINE NEEDLE ASPIRATION  5/8/2020      General Information       Row Name  08/02/23 1519          OT Time and Intention    Document Type evaluation  -     Mode of Treatment occupational therapy  -       Row Name 08/02/23 1519          General Information    Prior Level of Function independent:;ADL's;home management;all household mobility;community mobility;work  -     Existing Precautions/Restrictions fall  -     Barriers to Rehab medically complex  -       Row Name 08/02/23 1519          Living Environment    People in Home spouse  -       Row Name 08/02/23 1519          Home Main Entrance    Number of Stairs, Main Entrance six  -     Stair Railings, Main Entrance railings safe and in good condition  -       Row Name 08/02/23 1519          Stairs Within Home, Primary    Number of Stairs, Within Home, Primary none  -[facility] Name 08/02/23 1519          Cognition    Orientation Status (Cognition) oriented x 4  -[facility] Name 08/02/23 1519          Safety Issues, Functional Mobility    Impairments Affecting Function (Mobility) balance;coordination;endurance/activity tolerance;motor planning;pain;strength  -               User Key  (r) = Recorded By, (t) = Taken By, (c) = Cosigned By      Initials Name Provider Type     Tong Mcmahon OT Occupational Therapist                     Mobility/ADL's       Row Name 08/02/23 1520          Bed Mobility    Bed Mobility bed mobility (all) activities  -     All Activities, [place] (Bed Mobility) contact guard;minimum assist (75% patient effort)  -       Row Name 08/02/23 1520          Transfers    Transfers toilet transfer;sit-stand transfer  -[facility] Name 08/02/23 1520          Sit-Stand Transfer    Sit-Stand [place] (Transfers) minimum assist (75% patient effort);2 person assist  -[facility] Name 08/02/23 1520          Toilet Transfer    Type (Toilet Transfer) sit-stand  -     [place] Level (Toilet Transfer) minimum assist (75% patient effort);2 person assist  -       Row Name 08/02/23 1520           Activities of Daily Living    BADL Assessment/Intervention lower body dressing;toileting  -[facility] Name 08/02/23 1520          Lower Body Dressing Assessment/Training    [place] Level (Lower Body Dressing) socks;maximum assist (25% patient effort)  -MK       Row Name 08/02/23 1520          Toileting Assessment/Training    [place] Level (Toileting) toileting skills;minimum assist (75% patient effort)  -               User Key  (r) = Recorded By, (t) = Taken By, (c) = Cosigned By      Initials Name Provider Type    Tong Live OT Occupational Therapist                   Obj/Interventions       [facility] Name 08/02/23 1520          Sensory Assessment (Somatosensory)    Sensory Assessment (Somatosensory) other (see comments)  -     Sensory Assessment sensation questionable as patient got distracted several times during assessment.  -MK       Row Name 08/02/23 1520          Vision Assessment/Intervention    Visual Impairment/Limitations WFL  -MK       Row Name 08/02/23 1520          Range of Motion Comprehensive    General Range of Motion bilateral upper extremity ROM [facility]  -MK       Row Name 08/02/23 1520          Strength Comprehensive (MMT)    General Manual Muscle Testing (MMT) Assessment upper extremity strength deficits identified  -     Comment, General Manual Muscle Testing (MMT) Assessment RUE: WNL, LUE: grossly 3/5  -               User Key  (r) = Recorded By, (t) = Taken By, (c) = Cosigned By      Initials Name Provider Type    Tong Live OT Occupational Therapist                   Goals/Plan       Row Name 08/02/23 1525          Bathing Goal 1 (OT)    Activity/Device (Bathing Goal 1, OT) bathing skills, all  -     [place] Level/Cues Needed (Bathing Goal 1, OT) supervision required  -     Time Frame (Bathing Goal 1, OT) 2 weeks  -MK       Row Name 08/02/23 1525          Dressing Goal 1 (OT)    Activity/Device (Dressing Goal 1, OT) dressing skills, all  -      [place]/Cues Needed (Dressing Goal 1, OT) supervision required  -     Time Frame (Dressing Goal 1, OT) 2 weeks  -       Row Name 08/02/23 1525          Toileting Goal 1 (OT)    Activity/Device (Toileting Goal 1, OT) toileting skills, all  -     [place] Level/Cues Needed (Toileting Goal 1, OT) supervision required  -     Time Frame (Toileting Goal 1, OT) 2 weeks  -       Row Name 08/02/23 1525          Therapy Assessment/Plan (OT)    Planned Therapy Interventions (OT) activity tolerance training;functional balance retraining;BADL retraining;neuromuscular control/coordination retraining;patient/caregiver education/training;transfer/mobility retraining;strengthening exercise;ROM/therapeutic exercise  -               User Key  (r) = Recorded By, (t) = Taken By, (c) = Cosigned By      Initials Name Provider Type     Tong Mcmahon, OT Occupational Therapist                   Clinical Impression       Row Name 08/02/23 1521          Pain Assessment    Additional Documentation Pain Scale: FACES Pre/Post-Treatment (Group)  -[facility] Name 08/02/23 1521          Pain Scale: FACES Pre/Post-Treatment    Pain: FACES Scale, Pretreatment 6-->hurts even more  -     Posttreatment Pain Rating 6-->hurts even more  -MK     Pain Location - head  -       Row Name 08/02/23 1521          Plan of Care Review    Plan of Care Reviewed With patient  -     Outcome Evaluation Pt is a 67 y/o F admitted for L sided numbness and tingling.  CT (-).  tPA admistered.  MRI unremarkable.  Pt BP goal: less than 180/105.  PMHx: HTN, DM II, depression, HLD, atherosclerotic heart disease, hx of CABG.  Pt reports she lives in a single story home with 6 steps with HR to enter with .  Pt was independent with ADLs, IADLs, transfers, functional mobility, and works full time as a bus aid.  Pt completes bed mobility with MIN A.  Pt completed t/f with MIN A x 2.  Pt completed functional mobility to toilet with MIN A x 2  via HHA.  Pt completed toileting with MIN A.  Pt presents with impaired strength on LUE.  Pt presents with deficits in strength, ADLs, transfers, mobility, and increased falls indicating need for skilled OT services.  OT recommending inpatient rehab.  -       Row Name 08/02/23 1521          Therapy Assessment/Plan (OT)    Criteria for Skilled Therapeutic Interventions Met (OT) yes;meets criteria  -     Therapy Frequency (OT) 5 times/wk  -     Predicted Duration of Therapy Intervention (OT) until dc  -       Row Name 08/02/23 1521          Therapy Plan Review/Discharge Plan (OT)    Anticipated Discharge Disposition (OT) inpatient rehabilitation facility  -       Row Name 08/02/23 1521          Vital Signs    Pre Systolic BP Rehab 184  -MK     Pre Treatment Diastolic BP 95  -MK     Intra Systolic BP Rehab 160  -MK     Intra Treatment Diastolic BP 72  -MK       Row Name 08/02/23 1521          Positioning and Restraints    Pre-Treatment Position in bed  -     Post Treatment Position chair  -MK     In Chair notified nsg;reclined;call light within reach;encouraged to call for assist;exit alarm on  -               User Key  (r) = Recorded By, (t) = Taken By, (c) = Cosigned By      Initials Name Provider Type     Tong Mcmahon, OT Occupational Therapist                   Outcome Measures       Row Name 08/02/23 1525          How much help from another is currently needed...    Putting on and taking off regular lower body clothing? 2  -MK     Bathing (including washing, rinsing, and drying) 2  -MK     Toileting (which includes using toilet bed pan or urinal) 3  -MK     Putting on and taking off regular upper body clothing 3  -MK     Taking care of personal grooming (such as brushing teeth) 4  -MK     Eating meals 4  -MK     AM-PAC 6 Clicks Score (OT) 18  -       Row Name 08/02/23 1525          Modified [place] Scale    Pre-Stroke Modified [place] Scale 0 - No Symptoms at all.  -MK     Modified [place] Scale  4 - Moderately severe disability.  Unable to walk without assistance, and unable to attend to own bodily needs without assistance.  -       Row Name 08/02/23 1525          Functional Assessment    Outcome Measure Options AM-PAC 6 Clicks Daily Activity (OT);Modified [place]  -               User Key  (r) = Recorded By, (t) = Taken By, (c) = Cosigned By      Initials Name Provider Type     Tong Mcmahon OT Occupational Therapist                    Occupational Therapy Education       Title: PT OT SLP Therapies (In Progress)       Topic: Occupational Therapy (In Progress)       Point: ADL training (Done)       Description:   Instruct learner(s) on proper safety adaptation and remediation techniques during self care or transfers.   Instruct in proper use of assistive devices.                  Learning Progress Summary             Patient Acceptance, E, VU by  at 8/2/2023 1526                         Point: Home exercise program (Not Started)       Description:   Instruct learner(s) on appropriate technique for monitoring, assisting and/or progressing therapeutic exercises/activities.                  Learner Progress:  Not documented in this visit.              Point: Precautions (Not Started)       Description:   Instruct learner(s) on prescribed precautions during self-care and functional transfers.                  Learner Progress:  Not documented in this visit.              Point: Body mechanics (Not Started)       Description:   Instruct learner(s) on proper positioning and spine alignment during self-care, functional mobility activities and/or exercises.                  Learner Progress:  Not documented in this visit.                              User Key       Initials Effective Dates Name Provider Type [facility] 02/17/22 -  Tong Mcmahon OT Occupational Therapist OT                  OT Recommendation and Plan  Planned Therapy Interventions (OT): activity tolerance training, functional  balance retraining, BADL retraining, neuromuscular control/coordination retraining, patient/caregiver education/training, transfer/mobility retraining, strengthening exercise, ROM/therapeutic exercise  Therapy Frequency (OT): 5 times/wk  Plan of Care Review  Plan of Care Reviewed With: patient  Outcome Evaluation: Pt is a 67 y/o F admitted for L sided numbness and tingling.  CT (-).  tPA admistered.  MRI unremarkable.  Pt BP goal: less than 180/105.  PMHx: HTN, DM II, depression, HLD, atherosclerotic heart disease, hx of CABG.  Pt reports she lives in a single story home with 6 steps with HR to enter with .  Pt was independent with ADLs, IADLs, transfers, functional mobility, and works full time as a bus aid.  Pt completes bed mobility with MIN A.  Pt completed t/f with MIN A x 2.  Pt completed functional mobility to toilet with MIN A x 2 via HHA.  Pt completed toileting with MIN A.  Pt presents with impaired strength on LUE.  Pt presents with deficits in strength, ADLs, transfers, mobility, and increased falls indicating need for skilled OT services.  OT recommending inpatient rehab.     Time Calculation:         Time Calculation- OT       Row Name 08/02/23 1526             Time Calculation- OT    OT Start Time 1414  -      OT Stop Time 1448  -      OT Time Calculation (min) 34 min  -      Total Timed Code Minutes- OT 0 minute(s)  -      OT Received On 08/02/23  -      OT - Next Appointment 08/03/23  -      OT Goal Re-Cert Due Date 08/16/23  -                User Key  (r) = Recorded By, (t) = Taken By, (c) = Cosigned By      Initials Name Provider Type    Tong Live OT Occupational Therapist                  Therapy Charges for Today       Code Description Service Date Service Provider Modifiers Qty    33040113007 HC OT EVAL MOD COMPLEXITY 4 8/2/2023 Tong Mcmahon OT GO 1                 Tong Mcmahon OT  8/2/2023    Electronically signed by Tong Mcmahon OT at 08/02/23  1527          Speech Language Pathology Notes (most recent note)        Kim Fernandes, SLP at 23 1211          Acute Care - Speech Language Pathology   Swallow Treatment Note  [place]     Patient Name: Marge Mcghee  : 1955  MRN: [id number]  Today's Date: 2023               Admit Date: 2023    Visit Dx:     ICD-10-CM ICD-9-CM   1. Acute CVA (cerebrovascular accident)  I63.9 434.91     Patient Active Problem List   Diagnosis    Type 2 diabetes mellitus with retinopathy, with long-term current use of insulin    Age-related osteoporosis without current pathological fracture    Hyperlipidemia    Hepatic steatosis    Gastroesophageal reflux disease with esophagitis    Adrenal nodule    Vitamin D deficiency    Thyroid nodule    UTI (urinary tract infection)    REINA (acute kidney injury)    Hyperglycemia    Acute right flank pain    Vomiting    Hypomagnesemia    Dehydration    Obesity (BMI 30-39.9)    Complicated migraine    Occipital neuralgia of left side    Polypharmacy    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    Lisfranc dislocation    Delayed surgical wound healing    Right foot infection    Chest pain    GERD (gastroesophageal reflux disease)    Stable angina pectoris    S/P CABG x 3    Encephalopathy, metabolic    Hypertensive emergency    Dysarthria    CVA (cerebral vascular accident)    Coronary artery disease involving coronary bypass graft of native heart without angina pectoris    Essential hypertension    HTN, goal below 130/80    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    Type 2 diabetes mellitus without complication, with long-term current use of insulin     Past Medical History:   Diagnosis Date    Adrenal nodule 2016    FINDINGS: Mild hepatic steatosis may be present. Cholecystectomy. No biliary ductal dilatation. Negative pancreas,  spleen, left adrenal gland, and kidneys. The right adrenal presumed adenoma has increased slightly, measuring 3 x 4 cm in diameter,  compared to 2 x 3.5 cm on the previous exam. The attenuation values are consistent with an adenoma. Done at [facility] in August 2018    Age-related osteoporosis without current pathological fracture 11/16/2016    Arthritis     Delayed surgical wound healing     Essential hypertension 11/16/2016    Gastroesophageal reflux disease with esophagitis 11/16/2016    Hepatic steatosis 11/16/2016    History of anemia     History of sepsis     FROM UTI    Hyperlipidemia 11/16/2016    Lisfranc's dislocation     LEFT WITH FRACTURES NONHEALING FOOT    Osteomyelitis of left foot 11/2020    RLS (restless legs syndrome)     Squamous cell skin cancer 2014    Excised by Dr. James    Thyroid nodule 10/18/2019    BENIGN    Type 2 diabetes mellitus with peripheral neuropathy 11/16/2016    Vitamin D deficiency 1/25/2019     Past Surgical History:   Procedure Laterality Date    BREAST BIOPSY Left     7/2019. BENIGN    CARDIAC CATHETERIZATION Left 5/2/2021    Procedure: Cardiac Catheterization/Vascular Study;  Surgeon: Chacho Esteban MD;  Location: [facility] CATH INVASIVE LOCATION;  Service: Cardiovascular;  Laterality: Left;    CARDIAC CATHETERIZATION N/A 5/2/2021    Procedure: Intra-Aortic Baloon Pump Insertion;  Surgeon: Chacho Esteban MD;  Location: [facility] CATH INVASIVE LOCATION;  Service: Cardiovascular;  Laterality: N/A;    CATARACT EXTRACTION WITH INTRAOCULAR LENS IMPLANT Bilateral     CHOLECYSTECTOMY      COLONOSCOPY      CORONARY ARTERY BYPASS GRAFT N/A 5/2/2021    Procedure: CORONARY ARTERY BYPASS WITH INTERNAL MAMMARY ARTERY GRAFT;  Surgeon: Jr Rocael Alexander MD;  Location: [facility] CVOR;  Service: Cardiothoracic;  Laterality: N/A;    FOOT FUSION Right 11/13/2020    Procedure: RIGHT OPEN TREATMENT LISFRANC INJURY, OPEN REDUCTION INTERNAL FIXATION MEDIAL/MIDDLE CUNEIFORM FRACTURE AND 2ND/3RD  METATARSAL FRACTURE 1ST 2ND POSSIBLE 3RD TARSOMETATARSAL ARTHRODESIS INTERCUNEIFORM ARTHRODESIS CALCANEAL BONE GRAFT;  Surgeon: Mikhail Banks Jr., MD;  Location:  [facility];  Service: Orthopedics;  Laterality: Right;    INCISION AND DRAINAGE LEG Right 1/8/2021    Procedure: RIGHT IRRIGATION AND DEBRIDEMENT OF FOOT WITH SECONDARY CLOSURE AND HARDWARE REMOVAL;  Surgeon: Mikhail Banks Jr., MD;  Location: [facility] MAIN OR;  Service: Orthopedics;  Laterality: Right;    KNEE ARTHROSCOPY Bilateral     TOTAL ABDOMINAL HYSTERECTOMY      TRANSESOPHAGEAL ECHOCARDIOGRAM (EMILIA) N/A 5/2/2021    Procedure: TRANSESOPHAGEAL ECHOCARDIOGRAM;  Surgeon: Jr Rocael Alexander MD;  Location: [facility];  Service: Cardiothoracic;  Laterality: N/A;    UPPER GASTROINTESTINAL ENDOSCOPY  08/2016    US GUIDED FINE NEEDLE ASPIRATION  5/8/2020       SLP Recommendation and Plan       SWALLOW EVALUATION (last 72 hours)            EDUCATION  The patient has been educated in the following areas:   Dysphagia (Swallowing Impairment) Oral Care/Hydration.        SLP GOALS       Row Name 08/04/23 1200       (LTG) Swallow    (LTG) Swallow The patient will maximize swallow function for least restrictive po diet, exhibiting no complications associated with dysphagia, adequate po intake, and demonstrating independent use of safe swallow compensations.  -CB    Jay (Swallow Long Term Goal) independently (over 90% accuracy)  -CB    Progress/Outcomes (Swallow Long Term Goal) --    Comment (Swallow Long Term Goal) Patient was seen for DT/meal at breakfast. Patient tolerated regular diet without any difficulty. No s/s of aspiration was evident. Patient, however, continues to be rather impulsive by taking too large of bites and not thoroughly chewing up bites before swallowing. Reiterated some safe swallowing strategies that would minimize risk of aspiration. Encouraged patient to take smaller bites, chew each bite 15-20x and to alternate solids with  liquids. Suggested to turn head towards left (weak side) in order to direct bolus towards the strong side since she had  c/o food sticking on one side the previous day. ST will continue ongoing DT/meal since patient is not independent with safe swallowing strategies.  -CB       (STG) Swallow 1    (STG) Swallow 1 The patient will verbalize/demonstrate understanding of safe swallow strategies/positioning including but not limited to: small bites/sips at slow rate, clear oral cavity in b/t bites, 90 degree hip flexion for all PO  -CB    Progress/Outcomes (Swallow Short Term Goal 1) --              User Key  (r) = Recorded By, (t) = Taken By, (c) = Cosigned By      Initials Name Provider Type          Kim Larson, SLP Speech and Language Pathologist                       Time Calculation:                  URIEL Childress  8/4/2023    Electronically signed by Kim Fernandes, SLP at 08/04/23 3839

## 2023-08-04 NOTE — PLAN OF CARE
Goal Outcome Evaluation:  Patient had c/o headache and nausea at the beginning of the shift, pain and nausea medication administered (see MAR). After receiving medication patient has rested for majority of the shift. Plan is to be discharged to [facility]

## 2023-08-04 NOTE — PLAN OF CARE
Goal Outcome Evaluation:           Assessment: Marge Mcghee presents with ADL impairments affecting function including balance, cognition, coordination, endurance / activity tolerance, pain, sensation / sensory awareness, strength, and visual / perceptual. Demonstrated functioning below baseline abilities indicate the need for continued skilled intervention while inpatient. Tolerating session today without incident. Pt loses balance and is impulsive. She has ongoing headaches and numbness in her left face, arm, leg affecting her mobility, proprioception, and coordination. She has poorly managed DM ad HTN and this is her second stroke. Will continue to follow and progress as tolerated. Pt is able to manage most of her ADL and mobility with up to min assist, but her spouse works FT and she will not be safe at home alone. Additionally, she is typically a highly functioning person, FT employed, and her current deficits began after returning to her first day of work after summer break as a . She will not be able to return to work at this time until her deficits have resolved and her management of her comorbidities has improved. OT recommends IP neuro-rehab.    Plan/Recommendations:   High Intensity Therapy recommended post-acute care. This is recommended as therapy feels the patient would require 5-6 days per week, 2-3 hours per day. At this time, inpatient rehabilitation (acute rehab) would be the first choice and SNF would be second.. Pt requires no DME at discharge.     Pt desires Inpatient Rehabilitation placement at discharge. Pt cooperative; agreeable to therapeutic recommendations and plan of care.

## 2023-08-04 NOTE — PLAN OF CARE
Problem: Fall Injury Risk  Goal: Absence of Fall and Fall-Related Injury  Outcome: Ongoing, Progressing  Intervention: Identify and Manage Contributors  Recent Flowsheet Documentation  Taken 8/4/2023 1620 by Elena Maguire LPN  Medication Review/Management: medications reviewed  Taken 8/4/2023 1400 by Elena Maguire LPN  Medication Review/Management: medications reviewed  Taken 8/4/2023 1200 by Elena Maguire LPN  Medication Review/Management: medications reviewed  Taken 8/4/2023 1000 by Elena Maguire LPN  Medication Review/Management: medications reviewed  Taken 8/4/2023 0810 by Elena Maguire LPN  Medication Review/Management: medications reviewed  Intervention: Promote Injury-Free Environment  Recent Flowsheet Documentation  Taken 8/4/2023 1620 by Elena Maguire LPN  Safety Promotion/Fall Prevention: safety round/check completed  Taken 8/4/2023 1400 by Elena Maguire LPN  Safety Promotion/Fall Prevention: safety round/check completed  Taken 8/4/2023 1200 by Elena Maguire LPN  Safety Promotion/Fall Prevention: safety round/check completed  Taken 8/4/2023 1000 by Elena Maguire LPN  Safety Promotion/Fall Prevention: safety round/check completed  Taken 8/4/2023 0810 by Elena Maguire LPN  Safety Promotion/Fall Prevention: safety round/check completed     Problem: Diabetes Comorbidity  Goal: Blood Glucose Level Within Targeted Range  Outcome: Ongoing, Progressing     Problem: Hypertension Comorbidity  Goal: Blood Pressure in Desired Range  Outcome: Ongoing, Progressing  Intervention: Maintain Blood Pressure Management  Recent Flowsheet Documentation  Taken 8/4/2023 1620 by Elena Maguire LPN  Medication Review/Management: medications reviewed  Taken 8/4/2023 1400 by Elena Maguire LPN  Medication Review/Management: medications reviewed  Taken 8/4/2023 1200 by Elena Maguire LPN  Medication Review/Management: medications reviewed  Taken 8/4/2023 1000 by Elena Maguire LPN  Medication Review/Management: medications  reviewed  Taken 8/4/2023 0810 by Elena Maguire LPN  Medication Review/Management: medications reviewed     Problem: Skin Injury Risk Increased  Goal: Skin Health and Integrity  Outcome: Ongoing, Progressing     Problem: Adjustment to Illness (Stroke, Ischemic/Transient Ischemic Attack)  Goal: Optimal Coping  Outcome: Ongoing, Progressing     Problem: Bowel Elimination Impaired (Stroke, Ischemic/Transient Ischemic Attack)  Goal: Effective Bowel Elimination  Outcome: Ongoing, Progressing     Problem: Cerebral Tissue Perfusion (Stroke, Ischemic/Transient Ischemic Attack)  Goal: Optimal Cerebral Tissue Perfusion  Outcome: Ongoing, Progressing     Problem: Cognitive Impairment (Stroke, Ischemic/Transient Ischemic Attack)  Goal: Optimal Cognitive Function  Outcome: Ongoing, Progressing     Problem: Communication Impairment (Stroke, Ischemic/Transient Ischemic Attack)  Goal: Improved Communication Skills  Outcome: Ongoing, Progressing     Problem: Functional Ability Impaired (Stroke, Ischemic/Transient Ischemic Attack)  Goal: Optimal Functional Ability  Outcome: Ongoing, Progressing     Problem: Respiratory Compromise (Stroke, Ischemic/Transient Ischemic Attack)  Goal: Effective Oxygenation and Ventilation  Outcome: Ongoing, Progressing     Problem: Sensorimotor Impairment (Stroke, Ischemic/Transient Ischemic Attack)  Goal: Improved Sensorimotor Function  Outcome: Ongoing, Progressing     Problem: Swallowing Impairment (Stroke, Ischemic/Transient Ischemic Attack)  Goal: Optimal Eating and Swallowing without Aspiration  Outcome: Ongoing, Progressing     Problem: Urinary Elimination Impaired (Stroke, Ischemic/Transient Ischemic Attack)  Goal: Effective Urinary Elimination  Outcome: Ongoing, Progressing     Problem: Pain Acute  Goal: Acceptable Pain Control and Functional Ability  Outcome: Ongoing, Progressing  Intervention: Prevent or Manage Pain  Recent Flowsheet Documentation  Taken 8/4/2023 1620 by Elena Maguire,  LPN  Medication Review/Management: medications reviewed  Taken 8/4/2023 1400 by Elena Maguire LPN  Medication Review/Management: medications reviewed  Taken 8/4/2023 1200 by Elena Maguire LPN  Medication Review/Management: medications reviewed  Taken 8/4/2023 1000 by Elena Maguire LPN  Medication Review/Management: medications reviewed  Taken 8/4/2023 0810 by Elena Maguire LPN  Medication Review/Management: medications reviewed   Goal Outcome Evaluation:

## 2023-08-04 NOTE — PROGRESS NOTES
[facility] Medicine Services   Daily Progress Note    Patient Name: Marge Mcghee  : 1955  MRN: [id number]  Primary Care Physician:  Traci Townsend APRN  Date of admission: 2023    Subjective      Admitted in consultation with neurology with concern for aborted stroke status post tPA infusion.  Transferred out of ICU to the hospitalist service last night.    Did okay overnight. Continues to have left-sided numbness and tingling of her face and extremities along with a facial droop.  This has improved slightly today. Her echocardiogram was OK. Blood pressures have been reasonable.  She is on room air. Refused by [facility], awaiting SNF rehab referrals.Continuing to work with therapy. Discussed the case with the bedside nursing staff. No other acute concerns.    Objective      Vitals:   Temp:  [97.5 øF (36.4 øC)-98.6 øF (37 øC)] 97.6 øF (36.4 øC)  Heart Rate:  [83-90] 86  Resp:  [13-19] 16  BP: (121-179)/(71-99) 143/91    Physical Exam   GEN: WDWN, no acute distress  HEENT: NCAT, PERRLA, moist mucous membranes  NECK: Supple, midline trachea  CARDIAC: RRR, no appreciable M/R/G, no peripheral edema  PULM: CTAB, nondistressed  ABD: soft, NTND, normoactive bowel sounds throughout  SKIN: Warm, dry  NEURO: Grossly intact, nonfocal exam, paresthesias along the left face, arm, and leg--she also has a bit of a left facial droop that seems less pronounced today.  Otherwise, nonfocal exam  PSYCH: Pleasant     Result Review    I have personally reviewed the results from the time of this admission to 2023 08:54 EDT and agree with these findings:  [x]  Laboratory  [x]  Microbiology  [x]  Radiology  [x]  EKG/Telemetry   [x]  Cardiology/Vascular   []  Pathology  [x]  Old records  []  Other:    Assessment & Plan        amLODIPine, 5 mg, Oral, Daily  aspirin, 81 mg, Oral, Daily   Or  aspirin, 300 mg, Rectal, Daily  atorvastatin, 80 mg, Oral, Nightly  DULoxetine, 60 mg, Oral, Daily  famotidine, 20 mg,  Intravenous, Daily  insulin lispro, 2-7 Units, Subcutaneous, 4x Daily With Meals & Nightly  multivitamin with minerals, 1 tablet, Oral, Daily  polyethylene glycol, 17 g, Oral, Daily  senna-docusate sodium, 2 tablet, Oral, BID  sodium chloride, 10 mL, Intravenous, Q12H  sodium chloride, 10 mL, Intravenous, Q12H  ticagrelor, 90 mg, Oral, BID       sodium chloride, 75 mL/hr, Last Rate: 75 mL/hr (08/01/23 2225)         Active Hospital Problems    Diagnosis  POA    **CVA (cerebral vascular accident) [I63.9]  Yes    Type 2 diabetes mellitus without complication, with long-term current use of insulin [E11.9, Z79.4]  Not Applicable    Coronary artery disease involving coronary bypass graft of native heart without angina pectoris [I25.810]  Yes    Essential hypertension [I10]  Yes    S/P CABG x 3 [Z95.1]  Not Applicable    GERD (gastroesophageal reflux disease) [K21.9]  Yes    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 [E11.3513]  Yes    Complicated migraine [G43.109]  Yes    Vitamin D deficiency [E55.9]  Yes    Age-related osteoporosis without current pathological fracture [M81.0]  Yes    Hyperlipidemia [E78.5]  Yes    Type 2 diabetes mellitus with retinopathy, with long-term current use of insulin [E11.319, Z79.4]  Not Applicable      Resolved Hospital Problems   No resolved problems to display.       Plan:   Suspected CVA, History of stroke  Presented with left sided numbness and weakness.  Code stroke protocol initiated in ED, decision for tPA administration in ED  Ischemic Stroke orders initiated.  CT scan reviewed: No definitive acute intracranial.  Remote infarct noted as lacunar infarct on the right side of the tomas.  CTA head and neck reviewed: No evidence of LVO, stenosis, or aneurysm formation.  CT perfusion study: No evidence of core infarct or visualized at risk tissue.  Repeat CT scan of head 24 hours post tPA administration OK  MRI not showing  stroke, but still has deficits  Echo OK  Neurology following  No antiplatelet or anticoagulant medication until 24 hours post-tPA administration  Observe for signs and symptoms of bleeding, if observed, immediately notify Neurology  Strict blood pressure monitoring, keep blood pressure listed 180/105  SLP evaluated and initiated diet.  PT/ST/OT evaluation & treatment     Atherosclerotic heart disease, history of CABG  Chronic and stable.   c/w aspirin, statins, Plavix changed to Brilinta per neurology.     Essential Hypertension: well controlled.   Continue amlodipine when able to take oral medications.  Titrate medications as needed.     Diabetes mellitus Type 2, insulin-dependent : not well controlled.   Hgb A1c 7.5.  Home regimen includes Mounjaro.  Accu-Cheks with SSI with admelog based on diet.     Depression: Chronic and stable, continue cymbalta  Hyperlipidemia: Continue statin therapy.      Code status:   Code Status (Patient has no pulse and is not breathing): CPR (Attempt to Resuscitate)  Medical Interventions (Patient has pulse or is breathing): Full Support  Release to patient: Routine Release      Nutrition:   Diet: Cardiac Diets, Diabetic Diets; Healthy Heart (2-3 Na+); Consistent Carbohydrate; Texture: Regular Texture (IDDSI 7); Fluid Consistency: Thin (IDDSI 0)   Patient isn't on Tube Feeding      DVT prophylaxis:  Mechanical DVT prophylaxis orders are present.     Disposition:  Declined by [facility], expect patient to be discharged to SNF rehab once facility has been arranged.    Electronically signed by Lee Mays MD, 08/04/23, 08:54 EDT.  [facility], [place], operated by [facility] Hospitalist Team

## 2023-08-04 NOTE — PLAN OF CARE
Pt presents with functional mobility impairments which indicate the need for skilled intervention. Tolerating session today without incident. Pt on room air and telemetry this date.  Pt required SBA for bed mobility, Min A for transfers with RW and ambulated 45' with RW with CGA.  Emphasis placed on increasing heel strike, hip flexion and step length this date.  Pt responded well to tactile cues for hip flexion.  Pt required mod verbal cueing for sequencing of gait.   Will continue to follow and progress as tolerated.

## 2023-08-04 NOTE — DISCHARGE PLACEMENT REQUEST
"Maxwell Moreno (68 y.o. Female)       Date of Birth   1955    Social Security Number       Address   [address] [place] [address] [place] [facility]    Home Phone   [phone]    MRN   [id number]       Pentecostalism   None    Marital Status                               Admission Date   8/1/23    Admission Type   Emergency    Admitting Provider   Cassius Lopez DO    Attending Provider   Lee Mays MD    Department, Room/Bed   [facility], 241/1       Discharge Date       Discharge Disposition       Discharge Destination                                 Attending Provider: Lee Mays MD    Allergies: Zofran [Ondansetron Hcl], Prochlorperazine, Prochlorperazine Edisylate, Prochlorperazine Maleate, Hydralazine, Hydralazine Hcl, Meperidine, Prochlorperazine Maleate    Isolation: None   Infection: None   Code Status: CPR    Ht: 162.6 cm (64\")   Wt: 73.5 kg (162 lb 0.6 oz)    Admission Cmt: None   Principal Problem: CVA (cerebral vascular accident) [I63.9]                   Active Insurance as of 8/1/2023       Primary Coverage       Payor Plan Insurance Group Employer/Plan Group    HUMANA MEDICARE REPLACEMENT HUMANA MEDICARE REPLACEMENT [id number]       Payor Plan Address Payor Plan Phone Number Payor Plan Fax Number Effective Dates    [address] [address] [phone]  2/1/2022 - None Entered    [facility] [address]         Subscriber Name Subscriber Birth Date Member ID       MAXWELL MORENO 1955 [id number]               Secondary Coverage       Payor Plan Insurance Group Employer/Plan Group    GPM LIFE G[employer] LIFE MC SUP        Payor Plan Address Payor Plan Phone Number Payor Plan Fax Number Effective Dates    [address]   1/1/2019 - None Entered    [facility] [id number]         Subscriber Name Subscriber Birth Date Member ID       MAXWELL MORENO 1955 [id number]                     Emergency Contacts        (Rel.) Home Phone Work Phone Mobile Phone    " Kimberly Salinas (POA) (Daughter) -- -- [phone]    Tonia Rockwell (Friend) [phone] -- --    SILVIO MORENO (Spouse) -- -- [phone]

## 2023-08-04 NOTE — THERAPY TREATMENT NOTE
Subjective: Pt agreeable to therapeutic plan of care.  Cognition: oriented to Person, Place, Time, and Situation, memory: Normal, arousal/alertness: Alert, Attentive, and Other, safety/judgement: fair, and awareness of deficits: good awareness of safety precautions and fair awareness of deficits    Objective: pt is impulsive in her mvmts and loses balance when inattentive and unsafe when up unsupervised.     Bed Mobility: Supervision   Functional Transfers: CGA and with rolling walker     Balance: unsupported, dynamic, and with UE support Min-A and Mod-A  Functional Ambulation: CGA and with rolling walker    Toileting: SBA  ADL Position: unsupported sitting  ADL Comments: unsafe to toilet unsupervised    Lower Body Dressing and Grooming: Supervision  ADL Position: edge of bed sitting, supported standing, and at sink  ADL Comments: Pt doffs and dons socks unassisted and washes hands w/o cues or assist; providing  CGA to SBA for balance.    Therapeutic Exercise - demonstrates moderate to severe Dysdiadocokinesia. Completed rhythmic exercise to improve this. Completed proprioceptive assessment determining pt has moderate deficit in body awareness of her LUE in space with eyes closed.    Vitals: Hypertensive recent difficulties managing BP and Glucose    Pain: 4 VAS  Location: headaches  Interventions for pain: Repositioned, Increased Activity, and Therapeutic Presence  Education: Provided education on the importance of mobility in the acute care setting, Verbal/Tactile Cues, ADL training, Transfer Training, Energy conservation strategies, and Stroke prevention and risk factors      Assessment: Marge Mcghee presents with ADL impairments affecting function including balance, cognition, coordination, endurance / activity tolerance, pain, sensation / sensory awareness, strength, and visual / perceptual. Demonstrated functioning below baseline abilities indicate the need for continued skilled intervention while  inpatient. Tolerating session today without incident. Pt loses balance and is impulsive. She has ongoing headaches and numbness in her left face, arm, leg affecting her mobility, proprioception, and coordination. She has poorly managed DM ad HTN and this is her second stroke. Will continue to follow and progress as tolerated. Pt is able to manage most of her ADL and mobility with up to min assist, but her spouse works FT and she will not be safe at home alone. Additionally, she is typically a highly functioning person, FT employed, and her current deficits began after returning to her first day of work after summer break as a . She will not be able to return to work at this time until her deficits have resolved and her management of her comorbidities has improved. OT recommends IP neuro-rehab.    Plan/Recommendations:   High Intensity Therapy recommended post-acute care. This is recommended as therapy feels the patient would require 5-6 days per week, 2-3 hours per day. At this time, inpatient rehabilitation (acute rehab) would be the first choice and SNF would be second.. Pt requires no DME at discharge.     Pt desires Inpatient Rehabilitation placement at discharge. Pt cooperative; agreeable to therapeutic recommendations and plan of care.     Modified [place]: 4 = Moderately severe disability (Unable to attend to own bodily needs without assistance, and unable to walk unassisted)     Post-Tx Position: Supine with HOB Elevated, Alarms activated, and Call light and personal items within reach  PPE: gloves     Principal Discharge DX:	STEMI (ST elevation myocardial infarction)

## 2023-08-04 NOTE — THERAPY TREATMENT NOTE
Acute Care - Speech Language Pathology   Swallow Treatment Note ELSY Woo     Patient Name: Marge Mcghee  : 1955  MRN: [id number]  Today's Date: 2023               Admit Date: 2023    Visit Dx:     ICD-10-CM ICD-9-CM   1. Acute CVA (cerebrovascular accident)  I63.9 434.91     Patient Active Problem List   Diagnosis    Type 2 diabetes mellitus with retinopathy, with long-term current use of insulin    Age-related osteoporosis without current pathological fracture    Hyperlipidemia    Hepatic steatosis    Gastroesophageal reflux disease with esophagitis    Adrenal nodule    Vitamin D deficiency    Thyroid nodule    UTI (urinary tract infection)    REINA (acute kidney injury)    Hyperglycemia    Acute right flank pain    Vomiting    Hypomagnesemia    Dehydration    Obesity (BMI 30-39.9)    Complicated migraine    Occipital neuralgia of left side    Polypharmacy    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    Lisfranc dislocation    Delayed surgical wound healing    Right foot infection    Chest pain    GERD (gastroesophageal reflux disease)    Stable angina pectoris    S/P CABG x 3    Encephalopathy, metabolic    Hypertensive emergency    Dysarthria    CVA (cerebral vascular accident)    Coronary artery disease involving coronary bypass graft of native heart without angina pectoris    Essential hypertension    HTN, goal below 130/80    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    Type 2 diabetes mellitus without complication, with long-term current use of insulin     Past Medical History:   Diagnosis Date    Adrenal nodule 2016    FINDINGS: Mild hepatic steatosis may be present. Cholecystectomy. No biliary ductal dilatation. Negative pancreas, spleen, left adrenal gland, and kidneys. The right adrenal presumed adenoma has increased slightly, measuring 3 x 4 cm in  diameter,  compared to 2 x 3.5 cm on the previous exam. The attenuation values are consistent with an adenoma. Done at [facility] in August 2018    Age-related osteoporosis without current pathological fracture 11/16/2016    Arthritis     Delayed surgical wound healing     Essential hypertension 11/16/2016    Gastroesophageal reflux disease with esophagitis 11/16/2016    Hepatic steatosis 11/16/2016    History of anemia     History of sepsis     FROM UTI    Hyperlipidemia 11/16/2016    Lisfranc's dislocation     LEFT WITH FRACTURES NONHEALING FOOT    Osteomyelitis of left foot 11/2020    RLS (restless legs syndrome)     Squamous cell skin cancer 2014    Excised by Dr. James    Thyroid nodule 10/18/2019    BENIGN    Type 2 diabetes mellitus with peripheral neuropathy 11/16/2016    Vitamin D deficiency 1/25/2019     Past Surgical History:   Procedure Laterality Date    BREAST BIOPSY Left     7/2019. BENIGN    CARDIAC CATHETERIZATION Left 5/2/2021    Procedure: Cardiac Catheterization/Vascular Study;  Surgeon: Chacho Esteban MD;  Location: [facility] CATH INVASIVE LOCATION;  Service: Cardiovascular;  Laterality: Left;    CARDIAC CATHETERIZATION N/A 5/2/2021    Procedure: Intra-Aortic Baloon Pump Insertion;  Surgeon: Chacho Esteban MD;  Location: [facility] CATH INVASIVE LOCATION;  Service: Cardiovascular;  Laterality: N/A;    CATARACT EXTRACTION WITH INTRAOCULAR LENS IMPLANT Bilateral     CHOLECYSTECTOMY      COLONOSCOPY      CORONARY ARTERY BYPASS GRAFT N/A 5/2/2021    Procedure: CORONARY ARTERY BYPASS WITH INTERNAL MAMMARY ARTERY GRAFT;  Surgeon: Jr Rocael Alexander MD;  Location: [facility] CVOR;  Service: Cardiothoracic;  Laterality: N/A;    FOOT FUSION Right 11/13/2020    Procedure: RIGHT OPEN TREATMENT LISFRANC INJURY, OPEN REDUCTION INTERNAL FIXATION MEDIAL/MIDDLE CUNEIFORM FRACTURE AND 2ND/3RD METATARSAL FRACTURE 1ST 2ND POSSIBLE 3RD TARSOMETATARSAL ARTHRODESIS INTERCUNEIFORM ARTHRODESIS CALCANEAL BONE GRAFT;   Surgeon: Mikhail Banks Jr., MD;  Location:  [facility];  Service: Orthopedics;  Laterality: Right;    INCISION AND DRAINAGE LEG Right 1/8/2021    Procedure: RIGHT IRRIGATION AND DEBRIDEMENT OF FOOT WITH SECONDARY CLOSURE AND HARDWARE REMOVAL;  Surgeon: Mikhail Banks Jr., MD;  Location[facility] MAIN OR;  Service: Orthopedics;  Laterality: Right;    KNEE ARTHROSCOPY Bilateral     TOTAL ABDOMINAL HYSTERECTOMY      TRANSESOPHAGEAL ECHOCARDIOGRAM (EMILIA) N/A 5/2/2021    Procedure: TRANSESOPHAGEAL ECHOCARDIOGRAM;  Surgeon: Jr Rocael Alexander MD;  Location: [facility];  Service: Cardiothoracic;  Laterality: N/A;    UPPER GASTROINTESTINAL ENDOSCOPY  08/2016    US GUIDED FINE NEEDLE ASPIRATION  5/8/2020       SLP Recommendation and Plan       SWALLOW EVALUATION (last 72 hours)            EDUCATION  The patient has been educated in the following areas:   Dysphagia (Swallowing Impairment) Oral Care/Hydration.        SLP GOALS       Row Name 08/04/23 1200       (LTG) Swallow    (LTG) Swallow The patient will maximize swallow function for least restrictive po diet, exhibiting no complications associated with dysphagia, adequate po intake, and demonstrating independent use of safe swallow compensations.  -CB    [place] (Swallow Long Term Goal) independently (over 90% accuracy)  -CB    Progress/Outcomes (Swallow Long Term Goal) --    Comment (Swallow Long Term Goal) Patient was seen for DT/meal at breakfast. Patient tolerated regular diet without any difficulty. No s/s of aspiration was evident. Patient, however, continues to be rather impulsive by taking too large of bites and not thoroughly chewing up bites before swallowing. Reiterated some safe swallowing strategies that would minimize risk of aspiration. Encouraged patient to take smaller bites, chew each bite 15-20x and to alternate solids with liquids. Suggested to turn head towards left (weak side) in order to direct bolus towards the strong side since she had  c/o  food sticking on one side the previous day. ST will continue ongoing DT/meal since patient is not independent with safe swallowing strategies.  -CB       (STG) Swallow 1    (STG) Swallow 1 The patient will verbalize/demonstrate understanding of safe swallow strategies/positioning including but not limited to: small bites/sips at slow rate, clear oral cavity in b/t bites, 90 degree hip flexion for all PO  -CB    Progress/Outcomes (Swallow Short Term Goal 1) --              User Key  (r) = Recorded By, (t) = Taken By, (c) = Cosigned By      Initials Name Provider Type          Kim Larson, SLP Speech and Language Pathologist                       Time Calculation:                  URIEL Childress  8/4/2023

## 2023-08-04 NOTE — THERAPY TREATMENT NOTE
Subjective: Pt agreeable to therapeutic plan of care.    Objective:     Bed mobility -  SBA  Transfers -  Min A RW  Ambulation -  45' with RW with CGA.  Decreased heel strike, hip flexion, gait speed and step length     Therapeutic Exercise -  10 reps each of BLE exercises in sitting:  AP, hip flexion, LAQ, GS    Vitals: WNL    Pain: 8 VAS   Location: headache   Intervention for pain: Repositioned    Education: Provided education on the importance of mobility in the acute care setting, Verbal/Tactile Cues, Transfer Training, Gait Training, and Energy conservation strategies    Assessment: Marge Mcghee presents with functional mobility impairments which indicate the need for skilled intervention. Tolerating session today without incident. Pt on room air and telemetry this date.  Pt required SBA for bed mobility, Min A for transfers with RW and ambulated 45' with RW with CGA.  Emphasis placed on increasing heel strike, hip flexion and step length this date.  Pt responded well to tactile cues for hip flexion.  Pt required mod verbal cueing for sequencing of gait.   Will continue to follow and progress as tolerated.     Plan/Recommendations:   High Intensity Therapy recommended post-acute care. This is recommended as therapy feels the patient would require 5-6 days per week, 2-3 hours per day. At this time, inpatient rehabilitation (acute rehab) would be the first choice and SNF would be second.. Pt requires no DME at discharge.     Pt desires Skilled Rehab placement at discharge. Pt cooperative; agreeable to therapeutic recommendations and plan of care.       Modified [place]: 3 = Moderate disability (Requires some help, but able to walk unassisted)    Post-Tx Position: Up in Chair, Alarms activated, and Call light and personal items within reach  PPE: gloves

## 2023-08-04 NOTE — SIGNIFICANT NOTE
Case Management/Social Work    Patient Name:  Marge Mcghee  YOB: 1955  MRN: [id number]  Admit Date:  8/1/2023 08/04/23 1524   Post Acute Pre-Cert Documentation   Date Post Acute Pre-Cert Inititated per Facility 08/04/23   Post Acute Pre-Cert Initiated Comment CM submitted pre-cert via [employer] portal. Patient does not have accepting SNF facility, CM will update [employer] portal upon acceptance. Pre-cert pending 8/4. Auth ID#:[id number]. CM updated 2CN CM.           Electronically signed by:  Chantal Meza RN  08/04/23 15:26 EDT

## 2023-08-05 LAB
ALBUMIN SERPL-MCNC: 3.4 G/DL (ref 3.5–5.2)
ALBUMIN/GLOB SERPL: 1.1 G/DL
ALP SERPL-CCNC: 60 U/L (ref 39–117)
ALT SERPL W P-5'-P-CCNC: 10 U/L (ref 1–33)
ANION GAP SERPL CALCULATED.3IONS-SCNC: 8 MMOL/L (ref 5–15)
AST SERPL-CCNC: 19 U/L (ref 1–32)
BASOPHILS # BLD AUTO: 0.1 10*3/MM3 (ref 0–0.2)
BASOPHILS NFR BLD AUTO: 0.9 % (ref 0–1.5)
BILIRUB SERPL-MCNC: 0.2 MG/DL (ref 0–1.2)
BUN SERPL-MCNC: 27 MG/DL (ref 8–23)
BUN/CREAT SERPL: 22 (ref 7–25)
CALCIUM SPEC-SCNC: 8.6 MG/DL (ref 8.6–10.5)
CHLORIDE SERPL-SCNC: 102 MMOL/L (ref 98–107)
CO2 SERPL-SCNC: 27 MMOL/L (ref 22–29)
CREAT SERPL-MCNC: 1.23 MG/DL (ref 0.57–1)
DEPRECATED RDW RBC AUTO: 45.5 FL (ref 37–54)
EGFRCR SERPLBLD CKD-EPI 2021: 47.4 ML/MIN/1.73
EOSINOPHIL # BLD AUTO: 0.4 10*3/MM3 (ref 0–0.4)
EOSINOPHIL NFR BLD AUTO: 4.4 % (ref 0.3–6.2)
ERYTHROCYTE [DISTWIDTH] IN BLOOD BY AUTOMATED COUNT: 14.3 % (ref 12.3–15.4)
GLOBULIN UR ELPH-MCNC: 3 GM/DL
GLUCOSE BLDC GLUCOMTR-MCNC: 111 MG/DL (ref 70–105)
GLUCOSE BLDC GLUCOMTR-MCNC: 177 MG/DL (ref 70–105)
GLUCOSE BLDC GLUCOMTR-MCNC: 194 MG/DL (ref 70–105)
GLUCOSE BLDC GLUCOMTR-MCNC: 237 MG/DL (ref 70–105)
GLUCOSE SERPL-MCNC: 139 MG/DL (ref 65–99)
HCT VFR BLD AUTO: 35.8 % (ref 34–46.6)
HGB BLD-MCNC: 12.2 G/DL (ref 12–15.9)
LYMPHOCYTES # BLD AUTO: 3.2 10*3/MM3 (ref 0.7–3.1)
LYMPHOCYTES NFR BLD AUTO: 34.4 % (ref 19.6–45.3)
MAGNESIUM SERPL-MCNC: 1.8 MG/DL (ref 1.6–2.4)
MCH RBC QN AUTO: 29.6 PG (ref 26.6–33)
MCHC RBC AUTO-ENTMCNC: 34.2 G/DL (ref 31.5–35.7)
MCV RBC AUTO: 86.5 FL (ref 79–97)
MONOCYTES # BLD AUTO: 0.7 10*3/MM3 (ref 0.1–0.9)
MONOCYTES NFR BLD AUTO: 8.1 % (ref 5–12)
NEUTROPHILS NFR BLD AUTO: 4.8 10*3/MM3 (ref 1.7–7)
NEUTROPHILS NFR BLD AUTO: 52.2 % (ref 42.7–76)
NRBC BLD AUTO-RTO: 0.2 /100 WBC (ref 0–0.2)
PHOSPHATE SERPL-MCNC: 3 MG/DL (ref 2.5–4.5)
PLATELET # BLD AUTO: 295 10*3/MM3 (ref 140–450)
PMV BLD AUTO: 6.7 FL (ref 6–12)
POTASSIUM SERPL-SCNC: 4.5 MMOL/L (ref 3.5–5.2)
PROT SERPL-MCNC: 6.4 G/DL (ref 6–8.5)
RBC # BLD AUTO: 4.14 10*6/MM3 (ref 3.77–5.28)
SODIUM SERPL-SCNC: 137 MMOL/L (ref 136–145)
WBC NRBC COR # BLD: 9.2 10*3/MM3 (ref 3.4–10.8)

## 2023-08-05 PROCEDURE — 97112 NEUROMUSCULAR REEDUCATION: CPT

## 2023-08-05 PROCEDURE — 63710000001 DIPHENHYDRAMINE PER 50 MG: Performed by: INTERNAL MEDICINE

## 2023-08-05 PROCEDURE — 63710000001 INSULIN LISPRO (HUMAN) PER 5 UNITS: Performed by: NURSE PRACTITIONER

## 2023-08-05 PROCEDURE — 25010000002 KETOROLAC TROMETHAMINE PER 15 MG: Performed by: INTERNAL MEDICINE

## 2023-08-05 PROCEDURE — 25010000002 HYDROMORPHONE 1 MG/ML SOLUTION: Performed by: INTERNAL MEDICINE

## 2023-08-05 PROCEDURE — 97530 THERAPEUTIC ACTIVITIES: CPT

## 2023-08-05 PROCEDURE — 85025 COMPLETE CBC W/AUTO DIFF WBC: CPT | Performed by: NURSE PRACTITIONER

## 2023-08-05 PROCEDURE — 83735 ASSAY OF MAGNESIUM: CPT | Performed by: NURSE PRACTITIONER

## 2023-08-05 PROCEDURE — 82948 REAGENT STRIP/BLOOD GLUCOSE: CPT

## 2023-08-05 PROCEDURE — 80053 COMPREHEN METABOLIC PANEL: CPT | Performed by: NURSE PRACTITIONER

## 2023-08-05 PROCEDURE — 97110 THERAPEUTIC EXERCISES: CPT

## 2023-08-05 PROCEDURE — 84100 ASSAY OF PHOSPHORUS: CPT | Performed by: NURSE PRACTITIONER

## 2023-08-05 RX ORDER — FAMOTIDINE 20 MG/1
20 TABLET, FILM COATED ORAL DAILY
Status: DISCONTINUED | OUTPATIENT
Start: 2023-08-06 | End: 2023-08-06 | Stop reason: HOSPADM

## 2023-08-05 RX ORDER — SODIUM CHLORIDE 9 MG/ML
100 INJECTION, SOLUTION INTRAVENOUS CONTINUOUS
Status: DISCONTINUED | OUTPATIENT
Start: 2023-08-05 | End: 2023-08-06 | Stop reason: HOSPADM

## 2023-08-05 RX ADMIN — HYDROMORPHONE HYDROCHLORIDE 1 MG: 1 INJECTION, SOLUTION INTRAMUSCULAR; INTRAVENOUS; SUBCUTANEOUS at 21:07

## 2023-08-05 RX ADMIN — INSULIN LISPRO 3 UNITS: 100 INJECTION, SOLUTION INTRAVENOUS; SUBCUTANEOUS at 17:09

## 2023-08-05 RX ADMIN — Medication 10 ML: at 19:05

## 2023-08-05 RX ADMIN — MULTIPLE VITAMINS W/ MINERALS TAB 1 TABLET: TAB at 08:16

## 2023-08-05 RX ADMIN — SENNOSIDES AND DOCUSATE SODIUM 2 TABLET: 50; 8.6 TABLET ORAL at 19:04

## 2023-08-05 RX ADMIN — AMLODIPINE BESYLATE 5 MG: 5 TABLET ORAL at 08:15

## 2023-08-05 RX ADMIN — INSULIN LISPRO 2 UNITS: 100 INJECTION, SOLUTION INTRAVENOUS; SUBCUTANEOUS at 21:06

## 2023-08-05 RX ADMIN — ATORVASTATIN CALCIUM 80 MG: 40 TABLET, FILM COATED ORAL at 19:04

## 2023-08-05 RX ADMIN — BUTALBITAL, ACETAMINOPHEN, AND CAFFEINE 2 TABLET: 50; 325; 40 TABLET ORAL at 18:03

## 2023-08-05 RX ADMIN — TICAGRELOR 90 MG: 90 TABLET ORAL at 19:04

## 2023-08-05 RX ADMIN — Medication 10 ML: at 08:22

## 2023-08-05 RX ADMIN — KETOROLAC TROMETHAMINE 15 MG: 15 INJECTION, SOLUTION INTRAMUSCULAR; INTRAVENOUS at 19:05

## 2023-08-05 RX ADMIN — FAMOTIDINE 20 MG: 10 INJECTION INTRAVENOUS at 08:16

## 2023-08-05 RX ADMIN — DULOXETINE HYDROCHLORIDE 60 MG: 30 CAPSULE, DELAYED RELEASE ORAL at 08:16

## 2023-08-05 RX ADMIN — INSULIN LISPRO 2 UNITS: 100 INJECTION, SOLUTION INTRAVENOUS; SUBCUTANEOUS at 11:44

## 2023-08-05 RX ADMIN — TICAGRELOR 90 MG: 90 TABLET ORAL at 08:15

## 2023-08-05 RX ADMIN — DIPHENHYDRAMINE HYDROCHLORIDE 50 MG: 25 CAPSULE ORAL at 19:04

## 2023-08-05 RX ADMIN — KETOROLAC TROMETHAMINE 15 MG: 15 INJECTION, SOLUTION INTRAMUSCULAR; INTRAVENOUS at 08:16

## 2023-08-05 RX ADMIN — ASPIRIN 81 MG CHEWABLE TABLET 81 MG: 81 TABLET CHEWABLE at 08:15

## 2023-08-05 RX ADMIN — SODIUM CHLORIDE 100 ML/HR: 9 INJECTION, SOLUTION INTRAVENOUS at 11:44

## 2023-08-05 RX ADMIN — BUTALBITAL, ACETAMINOPHEN, AND CAFFEINE 2 TABLET: 50; 325; 40 TABLET ORAL at 11:44

## 2023-08-05 NOTE — PLAN OF CARE
Problem: Fall Injury Risk  Goal: Absence of Fall and Fall-Related Injury  Outcome: Ongoing, Progressing  Intervention: Identify and Manage Contributors  Recent Flowsheet Documentation  Taken 8/5/2023 1202 by Rere Hernandez LPN  Medication Review/Management: medications reviewed  Taken 8/5/2023 1000 by Rere Hernandez LPN  Medication Review/Management: medications reviewed  Taken 8/5/2023 0816 by Rere Hernandez LPN  Medication Review/Management: medications reviewed  Intervention: Promote Injury-Free Environment  Recent Flowsheet Documentation  Taken 8/5/2023 1202 by Fence, Rere A, LPN  Safety Promotion/Fall Prevention:   activity supervised   assistive device/personal items within reach   clutter free environment maintained   fall prevention program maintained   nonskid shoes/slippers when out of bed   room organization consistent   safety round/check completed  Taken 8/5/2023 1000 by Fence, Rere A, LPN  Safety Promotion/Fall Prevention:   activity supervised   assistive device/personal items within reach   clutter free environment maintained   fall prevention program maintained   nonskid shoes/slippers when out of bed   room organization consistent   safety round/check completed  Taken 8/5/2023 0816 by Fence, Rere A, LPN  Safety Promotion/Fall Prevention:   activity supervised   assistive device/personal items within reach   clutter free environment maintained   fall prevention program maintained   nonskid shoes/slippers when out of bed   room organization consistent   safety round/check completed     Problem: Diabetes Comorbidity  Goal: Blood Glucose Level Within Targeted Range  Outcome: Ongoing, Progressing     Problem: Hypertension Comorbidity  Goal: Blood Pressure in Desired Range  Outcome: Ongoing, Progressing  Intervention: Maintain Blood Pressure Management  Recent Flowsheet Documentation  Taken 8/5/2023 1202 by Rere Hernandez LPN  Medication Review/Management: medications  reviewed  Taken 8/5/2023 1000 by Rere Hernandez LPN  Medication Review/Management: medications reviewed  Taken 8/5/2023 0816 by Rere Hernandez LPN  Medication Review/Management: medications reviewed     Problem: Skin Injury Risk Increased  Goal: Skin Health and Integrity  Outcome: Ongoing, Progressing  Intervention: Optimize Skin Protection  Recent Flowsheet Documentation  Taken 8/5/2023 0816 by Rere Hernandez LPN  Pressure Reduction Techniques:   frequent weight shift encouraged   weight shift assistance provided  Pressure Reduction Devices:   positioning supports utilized   pressure-redistributing mattress utilized  Skin Protection:   adhesive use limited   tubing/devices free from skin contact     Problem: Adjustment to Illness (Stroke, Ischemic/Transient Ischemic Attack)  Goal: Optimal Coping  Outcome: Ongoing, Progressing     Problem: Bowel Elimination Impaired (Stroke, Ischemic/Transient Ischemic Attack)  Goal: Effective Bowel Elimination  Outcome: Ongoing, Progressing     Problem: Cerebral Tissue Perfusion (Stroke, Ischemic/Transient Ischemic Attack)  Goal: Optimal Cerebral Tissue Perfusion  Outcome: Ongoing, Progressing  Intervention: Protect and Optimize Cerebral Perfusion  Recent Flowsheet Documentation  Taken 8/5/2023 0816 by Rere Hernandez LPN  Sensory Stimulation Regulation: care clustered  Cerebral Perfusion Promotion: blood pressure monitored     Problem: Cognitive Impairment (Stroke, Ischemic/Transient Ischemic Attack)  Goal: Optimal Cognitive Function  Outcome: Ongoing, Progressing  Intervention: Optimize Cognitive Function  Recent Flowsheet Documentation  Taken 8/5/2023 0816 by Rere Hernandez LPN  Sensory Stimulation Regulation: care clustered  Reorientation Measures: clock in view     Problem: Communication Impairment (Stroke, Ischemic/Transient Ischemic Attack)  Goal: Improved Communication Skills  Outcome: Ongoing, Progressing  Intervention: Optimize Communication  Skills  Recent Flowsheet Documentation  Taken 8/5/2023 0816 by Rere Hernandez LPN  Communication Enhancement Strategies: call light answered in person     Problem: Functional Ability Impaired (Stroke, Ischemic/Transient Ischemic Attack)  Goal: Optimal Functional Ability  Outcome: Ongoing, Progressing     Problem: Respiratory Compromise (Stroke, Ischemic/Transient Ischemic Attack)  Goal: Effective Oxygenation and Ventilation  Outcome: Ongoing, Progressing     Problem: Sensorimotor Impairment (Stroke, Ischemic/Transient Ischemic Attack)  Goal: Improved Sensorimotor Function  Outcome: Ongoing, Progressing  Intervention: Optimize Range of Motion, Motor Control and Function  Recent Flowsheet Documentation  Taken 8/5/2023 0816 by Rere Hernandez LPN  Range of Motion: active ROM (range of motion) encouraged  Intervention: Optimize Sensory and Perceptual Ability  Recent Flowsheet Documentation  Taken 8/5/2023 0816 by Rere Hernandez LPN  Pressure Reduction Techniques:   frequent weight shift encouraged   weight shift assistance provided  Pressure Reduction Devices:   positioning supports utilized   pressure-redistributing mattress utilized     Problem: Swallowing Impairment (Stroke, Ischemic/Transient Ischemic Attack)  Goal: Optimal Eating and Swallowing without Aspiration  Outcome: Ongoing, Progressing     Problem: Urinary Elimination Impaired (Stroke, Ischemic/Transient Ischemic Attack)  Goal: Effective Urinary Elimination  Outcome: Ongoing, Progressing     Problem: Pain Acute  Goal: Acceptable Pain Control and Functional Ability  Outcome: Ongoing, Progressing  Intervention: Prevent or Manage Pain  Recent Flowsheet Documentation  Taken 8/5/2023 1202 by Rere Hernandez LPN  Medication Review/Management: medications reviewed  Taken 8/5/2023 1000 by Rere Hernandez LPN  Medication Review/Management: medications reviewed  Taken 8/5/2023 0816 by Rere Hernandez LPN  Sensory Stimulation Regulation: care  clustered  Medication Review/Management: medications reviewed   Goal Outcome Evaluation:

## 2023-08-05 NOTE — CASE MANAGEMENT/SOCIAL WORK
Continued Stay Note   [facility]     Patient Name: Marge Mcghee  MRN: [id number]  Today's Date: 8/5/2023    Admit Date: 8/1/2023    Plan: DC plan: [facility]- pending acceptance.  Precert approved 8/4.  Need destination updated once verified of accepting facility.  PASRR approved.   Discharge Plan       Row Name 08/05/23 0728       Plan    Plan DC plan: [facility]- pending acceptance.  Precert approved 8/4.  Need destination updated once verified of accepting facility.  PASRR approved.    Plan Comments Precert approved [facility] Auth ID  [id number].  Need to update with accepting facility once confirmed.                      Expected Discharge Date and Time       Expected Discharge Date Expected Discharge Time    Aug 5, 2023               Lorenza Samayoa RN

## 2023-08-05 NOTE — CASE MANAGEMENT/SOCIAL WORK
Continued Stay Note  [facility]     Patient Name: Marge Mcghee  MRN: [id number]  Today's Date: 8/5/2023    Admit Date: 8/1/2023    Plan: [facility], accepted.  precert approved  Plan auth ID [id number]  Bed avail 8/5   Discharge Plan       Row Name 08/05/23 1143       Plan    Plan [facility], accepted.  precert approved  Plan auth ID [id number]  Bed avail 8/5    Plan Comments verified that Yao blakely was attached to approved auth.  plan ID [id number]                 Expected Discharge Date and Time       Expected Discharge Date Expected Discharge Time    Aug 5, 2023               Lorenza Samayoa RN

## 2023-08-05 NOTE — PLAN OF CARE
Assessment: Marge Mcghee presents with functional mobility impairments which indicate the need for skilled intervention. Tolerating session today without incident. Emphasized Isolated strengthening on L side and decreased compensation. Had her clasp hands for sh flex for better range, 1/2 bridges with opposite foot on knee to strengthen hips. Plans on acute rehab at [facility].Will continue to follow and progress as tolerated.

## 2023-08-05 NOTE — PLAN OF CARE
Problem: Fall Injury Risk  Goal: Absence of Fall and Fall-Related Injury  8/5/2023 0338 by Rose Macdonald RN  Outcome: Ongoing, Progressing  8/5/2023 0337 by Rose Macdonald RN  Outcome: Ongoing, Progressing  Intervention: Identify and Manage Contributors  Recent Flowsheet Documentation  Taken 8/5/2023 0200 by Rose Macdonald RN  Medication Review/Management:   medications reviewed   high-risk medications identified  Taken 8/5/2023 0000 by Rose Macdonald RN  Medication Review/Management:   medications reviewed   high-risk medications identified  Taken 8/4/2023 2200 by Rose Macdonald RN  Medication Review/Management:   medications reviewed   high-risk medications identified  Taken 8/4/2023 1945 by Rose Macdonald RN  Medication Review/Management:   medications reviewed   high-risk medications identified  Intervention: Promote Injury-Free Environment  Recent Flowsheet Documentation  Taken 8/5/2023 0200 by Rose Macdonald RN  Safety Promotion/Fall Prevention:   safety round/check completed   room organization consistent   clutter free environment maintained   assistive device/personal items within reach   fall prevention program maintained  Taken 8/5/2023 0000 by Rose Macdonald RN  Safety Promotion/Fall Prevention:   safety round/check completed   room organization consistent   clutter free environment maintained   assistive device/personal items within reach   fall prevention program maintained  Taken 8/4/2023 2200 by Rose Macdonald RN  Safety Promotion/Fall Prevention:   safety round/check completed   room organization consistent   clutter free environment maintained   assistive device/personal items within reach  Taken 8/4/2023 1945 by Rose Macdonald RN  Safety Promotion/Fall Prevention:   safety round/check completed   room organization consistent   clutter free environment maintained   assistive device/personal items within reach   fall prevention program  maintained   nonskid shoes/slippers when out of bed     Problem: Diabetes Comorbidity  Goal: Blood Glucose Level Within Targeted Range  8/5/2023 0338 by Rose Macdonald RN  Outcome: Ongoing, Progressing  8/5/2023 0337 by Rose Macdonald RN  Outcome: Ongoing, Progressing     Problem: Hypertension Comorbidity  Goal: Blood Pressure in Desired Range  8/5/2023 0338 by Rose Macdonald RN  Outcome: Ongoing, Progressing  8/5/2023 0337 by Rose Macdonald RN  Outcome: Ongoing, Progressing  Intervention: Maintain Blood Pressure Management  Recent Flowsheet Documentation  Taken 8/5/2023 0200 by Rose Macdonald RN  Medication Review/Management:   medications reviewed   high-risk medications identified  Taken 8/5/2023 0000 by Rose Macdonald RN  Medication Review/Management:   medications reviewed   high-risk medications identified  Taken 8/4/2023 2200 by Rose Macdonald RN  Medication Review/Management:   medications reviewed   high-risk medications identified  Taken 8/4/2023 1945 by Rose Macdonald RN  Medication Review/Management:   medications reviewed   high-risk medications identified     Problem: Skin Injury Risk Increased  Goal: Skin Health and Integrity  8/5/2023 0338 by Rose Macdonald RN  Outcome: Ongoing, Progressing  8/5/2023 0337 by Rose Macdonald RN  Outcome: Ongoing, Progressing  Intervention: Optimize Skin Protection  Recent Flowsheet Documentation  Taken 8/5/2023 0200 by Rose Macdonald RN  Head of Bed (HOB) Positioning: HOB elevated  Taken 8/5/2023 0000 by Rose Macdonald RN  Head of Bed (HOB) Positioning: HOB elevated  Taken 8/4/2023 2200 by Rose Macdonald RN  Head of Bed (HOB) Positioning: HOB elevated  Taken 8/4/2023 1945 by Rose Macdonald RN  Pressure Reduction Techniques: frequent weight shift encouraged  Head of Bed (HOB) Positioning: HOB elevated  Pressure Reduction Devices: pressure-redistributing mattress utilized  Skin Protection: adhesive use  limited     Problem: Adjustment to Illness (Stroke, Ischemic/Transient Ischemic Attack)  Goal: Optimal Coping  8/5/2023 0338 by Rose Macdonald RN  Outcome: Ongoing, Progressing  8/5/2023 0337 by Rose Macdonald RN  Outcome: Ongoing, Progressing  Intervention: Support Psychosocial Response to Stroke  Recent Flowsheet Documentation  Taken 8/4/2023 1945 by Rose Macdonald RN  Supportive Measures:   active listening utilized   self-care encouraged  Family/Support System Care: support provided     Problem: Bowel Elimination Impaired (Stroke, Ischemic/Transient Ischemic Attack)  Goal: Effective Bowel Elimination  8/5/2023 0338 by Rose Macdonald RN  Outcome: Ongoing, Progressing  8/5/2023 0337 by Rose Macdonald RN  Outcome: Ongoing, Progressing     Problem: Cerebral Tissue Perfusion (Stroke, Ischemic/Transient Ischemic Attack)  Goal: Optimal Cerebral Tissue Perfusion  8/5/2023 0338 by Rose Macdonald RN  Outcome: Ongoing, Progressing  8/5/2023 0337 by Rose Macdonald RN  Outcome: Ongoing, Progressing  Intervention: Protect and Optimize Cerebral Perfusion  Recent Flowsheet Documentation  Taken 8/5/2023 0000 by Rose Macdonald RN  Cerebral Perfusion Promotion: blood pressure monitored  Taken 8/4/2023 1945 by Rose Macdonald RN  Sensory Stimulation Regulation:   care clustered   quiet environment promoted  Cerebral Perfusion Promotion: blood pressure monitored     Problem: Cognitive Impairment (Stroke, Ischemic/Transient Ischemic Attack)  Goal: Optimal Cognitive Function  8/5/2023 0338 by Rose Macdonald RN  Outcome: Ongoing, Progressing  8/5/2023 0337 by Rose Macdonald RN  Outcome: Ongoing, Progressing  Intervention: Optimize Cognitive Function  Recent Flowsheet Documentation  Taken 8/4/2023 1945 by Rose Macdonald RN  Sensory Stimulation Regulation:   care clustered   quiet environment promoted  Reorientation Measures: clock in view     Problem: Communication Impairment  (Stroke, Ischemic/Transient Ischemic Attack)  Goal: Improved Communication Skills  8/5/2023 0338 by Rose Macdonald RN  Outcome: Ongoing, Progressing  8/5/2023 0337 by Rose Macdonald RN  Outcome: Ongoing, Progressing  Intervention: Optimize Communication Skills  Recent Flowsheet Documentation  Taken 8/4/2023 1945 by Rose Macdonald RN  Communication Enhancement Strategies:   communication board used   call light answered in person     Problem: Functional Ability Impaired (Stroke, Ischemic/Transient Ischemic Attack)  Goal: Optimal Functional Ability  8/5/2023 0338 by Rose Macdonald RN  Outcome: Ongoing, Progressing  8/5/2023 0337 by Rose Macdonald RN  Outcome: Ongoing, Progressing  Intervention: Optimize Functional Ability  Recent Flowsheet Documentation  Taken 8/4/2023 1945 by Rose Macdonald RN  Activity Management: activity encouraged     Problem: Respiratory Compromise (Stroke, Ischemic/Transient Ischemic Attack)  Goal: Effective Oxygenation and Ventilation  8/5/2023 0338 by Rose Macdonald RN  Outcome: Ongoing, Progressing  8/5/2023 0337 by Rose Macdonald RN  Outcome: Ongoing, Progressing  Intervention: Optimize Oxygenation and Ventilation  Recent Flowsheet Documentation  Taken 8/5/2023 0200 by Rose Macdonald RN  Head of Bed (HOB) Positioning: HOB elevated  Taken 8/5/2023 0000 by Rose Macdonald RN  Head of Bed (HOB) Positioning: HOB elevated  Taken 8/4/2023 2200 by Rose Macdonald RN  Head of Bed (HOB) Positioning: HOB elevated  Taken 8/4/2023 1945 by Rose Macdonald RN  Head of Bed (HOB) Positioning: HOB elevated     Problem: Sensorimotor Impairment (Stroke, Ischemic/Transient Ischemic Attack)  Goal: Improved Sensorimotor Function  8/5/2023 0338 by Rose Macdonald RN  Outcome: Ongoing, Progressing  8/5/2023 0337 by Rose Macdonald RN  Outcome: Ongoing, Progressing  Intervention: Optimize Range of Motion, Motor Control and Function  Recent Flowsheet  Documentation  Taken 8/5/2023 0200 by Rose Macdonald RN  Positioning/Transfer Devices:   pillows   in use  Taken 8/5/2023 0000 by Rose Macdonald RN  Positioning/Transfer Devices:   pillows   in use  Taken 8/4/2023 2200 by Rose Macdonald RN  Positioning/Transfer Devices:   pillows   in use  Taken 8/4/2023 1945 by Rose Macdonald RN  Positioning/Transfer Devices:   pillows   in use  Range of Motion: active ROM (range of motion) encouraged  Intervention: Optimize Sensory and Perceptual Ability  Recent Flowsheet Documentation  Taken 8/4/2023 1945 by Rose Macdonald RN  Pressure Reduction Techniques: frequent weight shift encouraged  Pressure Reduction Devices: pressure-redistributing mattress utilized     Problem: Swallowing Impairment (Stroke, Ischemic/Transient Ischemic Attack)  Goal: Optimal Eating and Swallowing without Aspiration  8/5/2023 0338 by Rose Macdonald RN  Outcome: Ongoing, Progressing  8/5/2023 0337 by Rose Macdonald RN  Outcome: Ongoing, Progressing  Intervention: Optimize Eating and Swallowing  Recent Flowsheet Documentation  Taken 8/4/2023 1945 by Rose Macdonald RN  Aspiration Precautions: awake/alert before oral intake     Problem: Urinary Elimination Impaired (Stroke, Ischemic/Transient Ischemic Attack)  Goal: Effective Urinary Elimination  8/5/2023 0338 by Rose Macdonald RN  Outcome: Ongoing, Progressing  8/5/2023 0337 by Rose Macdonald RN  Outcome: Ongoing, Progressing     Problem: Pain Acute  Goal: Acceptable Pain Control and Functional Ability  8/5/2023 0338 by Rose Macdonald RN  Outcome: Ongoing, Progressing  8/5/2023 0337 by Rose Macdonald RN  Outcome: Ongoing, Progressing  Intervention: Prevent or Manage Pain  Recent Flowsheet Documentation  Taken 8/5/2023 0200 by Rose Macdonald RN  Medication Review/Management:   medications reviewed   high-risk medications identified  Taken 8/5/2023 0000 by Rose Macdonald RN  Medication  Review/Management:   medications reviewed   high-risk medications identified  Taken 8/4/2023 2200 by Rose Macdonald RN  Medication Review/Management:   medications reviewed   high-risk medications identified  Taken 8/4/2023 1945 by Rose Macdonald RN  Sensory Stimulation Regulation:   care clustered   quiet environment promoted  Bowel Elimination Promotion: adequate fluid intake promoted  Sleep/Rest Enhancement:   room darkened   awakenings minimized  Medication Review/Management:   medications reviewed   high-risk medications identified  Intervention: Optimize Psychosocial Wellbeing  Recent Flowsheet Documentation  Taken 8/4/2023 1945 by Rose Macdonald RN  Supportive Measures:   active listening utilized   self-care encouraged  Diversional Activities: television   Goal Outcome Evaluation:

## 2023-08-05 NOTE — PROGRESS NOTES
[facility] Medicine Services   Daily Progress Note    Patient Name: Marge Mcghee  : 1955  MRN: [id number]  Primary Care Physician:  Traci Townsend APRN  Date of admission: 2023    Subjective      Admitted in consultation with neurology with concern for aborted stroke status post tPA infusion.  Transferred out of ICU to the hospitalist service last night.    Did okay overnight. Continues to have left-sided numbness and tingling of her face and extremities along with a facial droop.  This has improved slightly today. Her echocardiogram was OK. Blood pressures have been reasonable.  She is on room air. Refused by [facility], awaiting SNF rehab referrals.Continuing to work with therapy. Discussed the case with the bedside nursing staff. No other acute concerns.    2023 pt denies for any new complaint. No nausea or vomiting.    Objective      Vitals:   Temp:  [97.3 øF (36.3 øC)-98.3 øF (36.8 øC)] 98.3 øF (36.8 øC)  Heart Rate:  [75-89] 75  Resp:  [14-17] 14  BP: (124-156)/(67-91) 156/79    Physical Exam   GEN: WDWN, no acute distress  HEENT: NCAT, PERRLA, moist mucous membranes  NECK: Supple, midline trachea  CARDIAC: RRR, no appreciable M/R/G, no peripheral edema  PULM: CTAB, nondistressed  ABD: soft, NTND, normoactive bowel sounds throughout  SKIN: Warm, dry  NEURO: Grossly intact, nonfocal exam, paresthesias along the left face, arm, and leg--she also has a bit of a left facial droop that seems less pronounced today.  Otherwise, nonfocal exam  PSYCH: Pleasant     Result Review    I have personally reviewed the results from the time of this admission to 2023 11:43 EDT and agree with these findings:  [x]  Laboratory  [x]  Microbiology  [x]  Radiology  [x]  EKG/Telemetry   [x]  Cardiology/Vascular   []  Pathology  [x]  Old records  []  Other:    Assessment & Plan        amLODIPine, 5 mg, Oral, Daily  aspirin, 81 mg, Oral, Daily   Or  aspirin, 300 mg, Rectal, Daily  atorvastatin, 80 mg,  Oral, Nightly  DULoxetine, 60 mg, Oral, Daily  [START ON 8/6/2023] famotidine, 20 mg, Oral, Daily  insulin lispro, 2-7 Units, Subcutaneous, 4x Daily With Meals & Nightly  multivitamin with minerals, 1 tablet, Oral, Daily  polyethylene glycol, 17 g, Oral, Daily  senna-docusate sodium, 2 tablet, Oral, BID  sodium chloride, 10 mL, Intravenous, Q12H  sodium chloride, 10 mL, Intravenous, Q12H  ticagrelor, 90 mg, Oral, BID       sodium chloride, 75 mL/hr, Last Rate: 75 mL/hr (08/01/23 2225)  sodium chloride, 100 mL/hr         Active Hospital Problems    Diagnosis  POA    **CVA (cerebral vascular accident) [I63.9]  Yes    Type 2 diabetes mellitus without complication, with long-term current use of insulin [E11.9, Z79.4]  Not Applicable    Coronary artery disease involving coronary bypass graft of native heart without angina pectoris [I25.810]  Yes    Essential hypertension [I10]  Yes    S/P CABG x 3 [Z95.1]  Not Applicable    GERD (gastroesophageal reflux disease) [K21.9]  Yes    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 [E11.3513]  Yes    Complicated migraine [G43.109]  Yes    Vitamin D deficiency [E55.9]  Yes    Age-related osteoporosis without current pathological fracture [M81.0]  Yes    Hyperlipidemia [E78.5]  Yes    Type 2 diabetes mellitus with retinopathy, with long-term current use of insulin [E11.319, Z79.4]  Not Applicable      Resolved Hospital Problems   No resolved problems to display.       Plan:   Suspected CVA, History of stroke  Presented with left sided numbness and weakness.  Code stroke protocol initiated in ED, decision for tPA administration in ED  Ischemic Stroke orders initiated.  CT scan reviewed: No definitive acute intracranial.  Remote infarct noted as lacunar infarct on the right side of the tomas.  CTA head and neck reviewed: No evidence of LVO, stenosis, or aneurysm formation.  CT perfusion study: No evidence of core infarct or  visualized at risk tissue.  Repeat CT scan of head 24 hours post tPA administration OK  MRI not showing stroke, but still has deficits  Echo OK  Neurology following  No antiplatelet or anticoagulant medication until 24 hours post-tPA administration  Observe for signs and symptoms of bleeding, if observed, immediately notify Neurology  Strict blood pressure monitoring, keep blood pressure listed 180/105  SLP evaluated and initiated diet.  PT/ST/OT evaluation & treatment     Atherosclerotic heart disease, history of CABG  Chronic and stable.   c/w aspirin, statins, Plavix changed to Brilinta per neurology.     Essential Hypertension: well controlled.   Continue amlodipine when able to take oral medications.  Titrate medications as needed.     Diabetes mellitus Type 2, insulin-dependent : not well controlled.   Hgb A1c 7.5.  Home regimen includes Mounjaro.  Accu-Cheks with SSI with admelog based on diet.     Depression: Chronic and stable, continue cymbalta  Hyperlipidemia: Continue statin therapy.    Awaited placement for rehab.      Code status:   Code Status (Patient has no pulse and is not breathing): CPR (Attempt to Resuscitate)  Medical Interventions (Patient has pulse or is breathing): Full Support  Release to patient: Routine Release      Nutrition:   Diet: Cardiac Diets, Diabetic Diets; Healthy Heart (2-3 Na+); Consistent Carbohydrate; Texture: Regular Texture (IDDSI 7); Fluid Consistency: Thin (IDDSI 0)   Patient isn't on Tube Feeding      DVT prophylaxis:  Mechanical DVT prophylaxis orders are present.     Disposition:  Declined by [facility], expect patient to be discharged to SNF rehab once facility has been arranged.    Electronically signed by Meaghan Guevara MD, 08/05/23, 11:43 EDT.  [facility] Hospitalist Team

## 2023-08-05 NOTE — THERAPY TREATMENT NOTE
Subjective: Pt agreeable to therapeutic plan of care.Pt c/o eyes feeling foggy and R eye twitching more at end of session.    Objective:     Bed mobility - SBA  Transfers - CGA and with rolling walker to CTS to take BP  Ambulation -  feet N/A or Not attempted. Due to orthostatic BP and incr in dizziness    Therapeutic Exercise - 10 Reps B LE AROM lying supine    Vitals: Hypertensive and Orthostatic supine BP-151/78, sitting BP-116/76, standing BP -92/57, supine BP at end of session-141/92    Pain: c/o H/A at R eye area  Intervention for pain: RN notified and Therapeutic Presence    Education: Provided education on the importance of mobility in the acute care setting, Verbal/Tactile Cues, Transfer Training, and Stroke prevention and risk factors, vc's to stay seated or standing for a few mins to make sure no drop in BP    Assessment: Marge Mcghee presents with functional mobility impairments which indicate the need for skilled intervention. Tolerating session today without incident. Emphasized Isolated strengthening on L side and decreased compensation. Had her clasp hands for sh flex for better range, 1/2 bridges with opposite foot on knee to strengthen hips. Plans on acute rehab at [facility].Will continue to follow and progress as tolerated.     Plan/Recommendations:   High Intensity Therapy recommended post-acute care. This is recommended as therapy feels the patient would require 5-6 days per week, 2-3 hours per day. At this time, inpatient rehabilitation (acute rehab) would be the first choice and SNF would be second.. Pt requires no DME at discharge.     Pt desires Inpatient Rehabilitation placement at discharge. Pt cooperative; agreeable to therapeutic recommendations and plan of care.         Basic Mobility 6-click:  Rollin = Total, A lot = 2, A little = 3; 4 = None  Supine>Sit:   1 = Total, A lot = 2, A little = 3; 4 = None   Sit>Stand with arms:  1 = Total, A lot = 2, A little = 3; 4 =  None  Bed>Chair:   1 = Total, A lot = 2, A little = 3; 4 = None  Ambulate in room:  1 = Total, A lot = 2, A little = 3; 4 = None  3-5 Steps with railin = Total, A lot = 2, A little = 3; 4 = None  Score: 18    Modified [place]: 4 = Moderately severe disability (Unable to attend to own bodily needs without assistance, and unable to walk unassisted)     Post-Tx Position: Supine with HOB Elevated, Alarms activated, and Call light and personal items within reach  PPE: gloves

## 2023-08-06 VITALS
HEIGHT: 64 IN | SYSTOLIC BLOOD PRESSURE: 176 MMHG | OXYGEN SATURATION: 95 % | DIASTOLIC BLOOD PRESSURE: 101 MMHG | TEMPERATURE: 97.6 F | RESPIRATION RATE: 12 BRPM | BODY MASS INDEX: 27.85 KG/M2 | WEIGHT: 163.14 LBS | HEART RATE: 93 BPM

## 2023-08-06 LAB
ALBUMIN SERPL-MCNC: 3.5 G/DL (ref 3.5–5.2)
ALBUMIN/GLOB SERPL: 1.2 G/DL
ALP SERPL-CCNC: 64 U/L (ref 39–117)
ALT SERPL W P-5'-P-CCNC: 14 U/L (ref 1–33)
ANION GAP SERPL CALCULATED.3IONS-SCNC: 8 MMOL/L (ref 5–15)
AST SERPL-CCNC: 21 U/L (ref 1–32)
BASOPHILS # BLD AUTO: 0.1 10*3/MM3 (ref 0–0.2)
BASOPHILS NFR BLD AUTO: 0.8 % (ref 0–1.5)
BILIRUB SERPL-MCNC: 0.2 MG/DL (ref 0–1.2)
BUN SERPL-MCNC: 23 MG/DL (ref 8–23)
BUN/CREAT SERPL: 20 (ref 7–25)
CALCIUM SPEC-SCNC: 8.7 MG/DL (ref 8.6–10.5)
CHLORIDE SERPL-SCNC: 102 MMOL/L (ref 98–107)
CO2 SERPL-SCNC: 28 MMOL/L (ref 22–29)
CREAT SERPL-MCNC: 1.15 MG/DL (ref 0.57–1)
DEPRECATED RDW RBC AUTO: 43.3 FL (ref 37–54)
EGFRCR SERPLBLD CKD-EPI 2021: 51.4 ML/MIN/1.73
EOSINOPHIL # BLD AUTO: 0.4 10*3/MM3 (ref 0–0.4)
EOSINOPHIL NFR BLD AUTO: 3.9 % (ref 0.3–6.2)
ERYTHROCYTE [DISTWIDTH] IN BLOOD BY AUTOMATED COUNT: 14.1 % (ref 12.3–15.4)
GLOBULIN UR ELPH-MCNC: 2.9 GM/DL
GLUCOSE BLDC GLUCOMTR-MCNC: 127 MG/DL (ref 70–105)
GLUCOSE BLDC GLUCOMTR-MCNC: 163 MG/DL (ref 70–105)
GLUCOSE SERPL-MCNC: 228 MG/DL (ref 65–99)
HCT VFR BLD AUTO: 38 % (ref 34–46.6)
HGB BLD-MCNC: 12.3 G/DL (ref 12–15.9)
LYMPHOCYTES # BLD AUTO: 3.7 10*3/MM3 (ref 0.7–3.1)
LYMPHOCYTES NFR BLD AUTO: 38.9 % (ref 19.6–45.3)
MAGNESIUM SERPL-MCNC: 1.9 MG/DL (ref 1.6–2.4)
MCH RBC QN AUTO: 28.4 PG (ref 26.6–33)
MCHC RBC AUTO-ENTMCNC: 32.4 G/DL (ref 31.5–35.7)
MCV RBC AUTO: 87.5 FL (ref 79–97)
MONOCYTES # BLD AUTO: 0.8 10*3/MM3 (ref 0.1–0.9)
MONOCYTES NFR BLD AUTO: 8.1 % (ref 5–12)
NEUTROPHILS NFR BLD AUTO: 4.5 10*3/MM3 (ref 1.7–7)
NEUTROPHILS NFR BLD AUTO: 48.3 % (ref 42.7–76)
NRBC BLD AUTO-RTO: 0.1 /100 WBC (ref 0–0.2)
PHOSPHATE SERPL-MCNC: 3 MG/DL (ref 2.5–4.5)
PLATELET # BLD AUTO: 338 10*3/MM3 (ref 140–450)
PMV BLD AUTO: 7 FL (ref 6–12)
POTASSIUM SERPL-SCNC: 4.1 MMOL/L (ref 3.5–5.2)
PROT SERPL-MCNC: 6.4 G/DL (ref 6–8.5)
RBC # BLD AUTO: 4.35 10*6/MM3 (ref 3.77–5.28)
SODIUM SERPL-SCNC: 138 MMOL/L (ref 136–145)
WBC NRBC COR # BLD: 9.4 10*3/MM3 (ref 3.4–10.8)

## 2023-08-06 PROCEDURE — 63710000001 INSULIN LISPRO (HUMAN) PER 5 UNITS: Performed by: NURSE PRACTITIONER

## 2023-08-06 PROCEDURE — 80053 COMPREHEN METABOLIC PANEL: CPT | Performed by: NURSE PRACTITIONER

## 2023-08-06 PROCEDURE — 25010000002 HYDROMORPHONE 1 MG/ML SOLUTION: Performed by: INTERNAL MEDICINE

## 2023-08-06 PROCEDURE — 84100 ASSAY OF PHOSPHORUS: CPT | Performed by: NURSE PRACTITIONER

## 2023-08-06 PROCEDURE — 83735 ASSAY OF MAGNESIUM: CPT | Performed by: NURSE PRACTITIONER

## 2023-08-06 PROCEDURE — 82948 REAGENT STRIP/BLOOD GLUCOSE: CPT

## 2023-08-06 PROCEDURE — 85025 COMPLETE CBC W/AUTO DIFF WBC: CPT | Performed by: NURSE PRACTITIONER

## 2023-08-06 RX ORDER — BUTALBITAL, ACETAMINOPHEN AND CAFFEINE 50; 325; 40 MG/1; MG/1; MG/1
2 TABLET ORAL EVERY 6 HOURS PRN
Start: 2023-08-06 | End: 2023-08-06 | Stop reason: SDUPTHER

## 2023-08-06 RX ORDER — ASPIRIN 81 MG/1
81 TABLET, CHEWABLE ORAL DAILY
Qty: 30 TABLET | Refills: 1 | Status: SHIPPED | OUTPATIENT
Start: 2023-08-07

## 2023-08-06 RX ORDER — BUTALBITAL, ACETAMINOPHEN AND CAFFEINE 50; 325; 40 MG/1; MG/1; MG/1
2 TABLET ORAL EVERY 6 HOURS PRN
Qty: 20 TABLET | Refills: 0
Start: 2023-08-06

## 2023-08-06 RX ADMIN — MULTIPLE VITAMINS W/ MINERALS TAB 1 TABLET: TAB at 08:32

## 2023-08-06 RX ADMIN — DULOXETINE HYDROCHLORIDE 60 MG: 30 CAPSULE, DELAYED RELEASE ORAL at 08:32

## 2023-08-06 RX ADMIN — ASPIRIN 81 MG CHEWABLE TABLET 81 MG: 81 TABLET CHEWABLE at 08:32

## 2023-08-06 RX ADMIN — INSULIN LISPRO 2 UNITS: 100 INJECTION, SOLUTION INTRAVENOUS; SUBCUTANEOUS at 12:41

## 2023-08-06 RX ADMIN — BUTALBITAL, ACETAMINOPHEN, AND CAFFEINE 2 TABLET: 50; 325; 40 TABLET ORAL at 12:45

## 2023-08-06 RX ADMIN — Medication 10 ML: at 08:31

## 2023-08-06 RX ADMIN — SENNOSIDES AND DOCUSATE SODIUM 2 TABLET: 50; 8.6 TABLET ORAL at 08:32

## 2023-08-06 RX ADMIN — AMLODIPINE BESYLATE 5 MG: 5 TABLET ORAL at 08:32

## 2023-08-06 RX ADMIN — FAMOTIDINE 20 MG: 20 TABLET, FILM COATED ORAL at 08:32

## 2023-08-06 RX ADMIN — Medication 10 ML: at 08:32

## 2023-08-06 RX ADMIN — POLYETHYLENE GLYCOL 3350 17 G: 17 POWDER, FOR SOLUTION ORAL at 08:32

## 2023-08-06 RX ADMIN — HYDROMORPHONE HYDROCHLORIDE 1 MG: 1 INJECTION, SOLUTION INTRAMUSCULAR; INTRAVENOUS; SUBCUTANEOUS at 01:06

## 2023-08-06 RX ADMIN — TICAGRELOR 90 MG: 90 TABLET ORAL at 08:32

## 2023-08-06 NOTE — PLAN OF CARE
Goal Outcome Evaluation:   C/o near constant headache. Reports that only hydromorphone helps to decrease headache. Reports all other pain meds and interventions have not been eff.

## 2023-08-06 NOTE — DISCHARGE SUMMARY
[facility] Medicine Services  DISCHARGE SUMMARY    Patient Name: Marge Mcghee  : 1955  MRN: [id number]    Date of Admission: 2023  Discharge Diagnosis:   Date of Discharge:  2023  Primary Care Physician: Traci Townsend APRN      Presenting Problem:   Acute CVA (cerebrovascular accident) [I63.9]  CVA (cerebral vascular accident) [I63.9]    Active and Resolved Hospital Problems:  Active Hospital Problems    Diagnosis POA    **CVA (cerebral vascular accident) [I63.9] Yes    Type 2 diabetes mellitus without complication, with long-term current use of insulin [E11.9, Z79.4] Not Applicable    Coronary artery disease involving coronary bypass graft of native heart without angina pectoris [I25.810] Yes    Essential hypertension [I10] Yes    S/P CABG x 3 [Z95.1] Not Applicable    GERD (gastroesophageal reflux disease) [K21.9] Yes    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 [E11.3513] Yes    Complicated migraine [G43.109] Yes    Vitamin D deficiency [E55.9] Yes    Age-related osteoporosis without current pathological fracture [M81.0] Yes    Hyperlipidemia [E78.5] Yes    Type 2 diabetes mellitus with retinopathy, with long-term current use of insulin [E11.319, Z79.4] Not Applicable      Resolved Hospital Problems   No resolved problems to display.         Hospital Course     Hospital Course by problem list.    Suspected CVA, History of stroke  Presented with left sided numbness and weakness.  Code stroke protocol initiated in ED, decision for tPA administration in ED  Ischemic Stroke orders initiated.  CT scan reviewed: No definitive acute intracranial.  Remote infarct noted as lacunar infarct on the right side of the tomas.  CTA head and neck reviewed: No evidence of LVO, stenosis, or aneurysm formation.  CT perfusion study: No evidence of core infarct or visualized at risk tissue.  Repeat CT scan of head 24  hours post tPA administration OK  MRI not showing stroke, but still has deficits  Echo OK  Neurology following  No antiplatelet or anticoagulant medication until 24 hours post-tPA administration  Observe for signs and symptoms of bleeding, if observed, immediately notify Neurology  Strict blood pressure monitoring, keep blood pressure listed 180/105  SLP evaluated and initiated diet.  PT/ST/OT evaluation & treatment     Atherosclerotic heart disease, history of CABG  Chronic and stable.   c/w aspirin, statins, Plavix changed to Brilinta per neurology.     Essential Hypertension: well controlled.   Continue amlodipine when able to take oral medications.  Titrate medications as needed.     Diabetes mellitus Type 2, insulin-dependent : not well controlled.   Hgb A1c 7.5.  Home regimen includes Mounjaro.  Accu-Cheks with SSI with admelog based on diet.     Depression: Chronic and stable, continue cymbalta  Hyperlipidemia: Continue statin therapy.     Will be discharge to rehab in a stable condition.        DISCHARGE Follow Up    Follow up with PCP in a week time.  Follow up with Neurology service in four week time.      Reasons For Change In Medications and Indications for New Medications:      Day of Discharge     Vital Signs:  Temp:  [97.3 øF (36.3 øC)-98.8 øF (37.1 øC)] 97.6 øF (36.4 øC)  Heart Rate:  [] 74  Resp:  [12-20] 12  BP: (102-194)/(80-97) 153/84    Physical Exam:  Physical Exam  Vitals and nursing note reviewed.   Constitutional:       General: She is not in acute distress.     Appearance: Normal appearance. She is well-developed. She is not ill-appearing, toxic-appearing or diaphoretic.   HENT:      Head: Normocephalic and atraumatic.      Right Ear: Ear canal and external ear normal.      Left Ear: Ear canal and external ear normal.      Nose: Nose normal. No congestion or rhinorrhea.      Mouth/Throat:      Mouth: Mucous membranes are moist.      Pharynx: No oropharyngeal exudate.   Eyes:       General: No scleral icterus.        Right eye: No discharge.         Left eye: No discharge.      Extraocular Movements: Extraocular movements intact.      Conjunctiva/sclera: Conjunctivae normal.      Pupils: Pupils are equal, round, and reactive to light.   Neck:      Thyroid: No thyromegaly.      Vascular: No carotid bruit or JVD.      Trachea: No tracheal deviation.   Cardiovascular:      Rate and Rhythm: Normal rate and regular rhythm.      Pulses: Normal pulses.      Heart sounds: Normal heart sounds. No murmur heard.    No friction rub. No gallop.   Pulmonary:      Effort: Pulmonary effort is normal. No respiratory distress.      Breath sounds: Normal breath sounds. No stridor. No wheezing, rhonchi or rales.   Chest:      Chest wall: No tenderness.   Abdominal:      General: Bowel sounds are normal. There is no distension.      Palpations: Abdomen is soft. There is no mass.      Tenderness: There is no abdominal tenderness. There is no guarding or rebound.      Hernia: No hernia is present.   Musculoskeletal:         General: No swelling, tenderness, deformity or signs of injury. Normal range of motion.      Cervical back: Normal range of motion and neck supple. No rigidity. No muscular tenderness.      Right lower leg: No edema.      Left lower leg: No edema.   Lymphadenopathy:      Cervical: No cervical adenopathy.   Skin:     General: Skin is warm and dry.      Coloration: Skin is not jaundiced or pale.      Findings: No bruising, erythema or rash.   Neurological:      General: No focal deficit present.      Mental Status: She is alert and oriented to person, place, and time. Mental status is at baseline.      Cranial Nerves: No cranial nerve deficit.      Sensory: No sensory deficit.      Motor: No weakness or abnormal muscle tone.      Coordination: Coordination normal.   Psychiatric:         Mood and Affect: Mood normal.         Behavior: Behavior normal.         Thought Content: Thought content normal.          Judgment: Judgment normal.              Pertinent  and/or Most Recent Results     LAB RESULTS:      Lab 08/06/23  0028 08/05/23  0057 08/04/23  0115 08/03/23  0439 08/02/23  0451 08/01/23  1338   WBC 9.40 9.20 10.80 8.70 7.20 8.20   HEMOGLOBIN 12.3 12.2 11.7* 12.3 11.9* 12.5   HEMATOCRIT 38.0 35.8 36.1 37.5 36.4 37.9   PLATELETS 338 295 302 305 291 318   NEUTROS ABS 4.50 4.80 6.10 7.00 3.10 4.50   LYMPHS ABS 3.70* 3.20* 3.40* 1.20 3.20* 2.90   MONOS ABS 0.80 0.70 0.80 0.40 0.60 0.60   EOS ABS 0.40 0.40 0.30 0.00 0.20 0.20   MCV 87.5 86.5 87.9 86.2 87.9 86.1   PROTIME  --   --   --   --   --  11.1   APTT  --   --   --   --   --  27.2         Lab 08/06/23  0028 08/05/23  0057 08/04/23  0115 08/03/23  0439 08/03/23  0314 08/02/23  0451 08/01/23  1338   SODIUM 138 137 138 136  --  142 140   POTASSIUM 4.1 4.5 4.2 4.4  --  3.9 4.0   CHLORIDE 102 102 102 101  --  105 103   CO2 28.0 27.0 27.0 23.0  --  27.0 26.0   ANION GAP 8.0 8.0 9.0 12.0  --  10.0 11.0   BUN 23 27* 25* 26*  --  19 22   CREATININE 1.15* 1.23* 1.05* 1.01*  --  0.90 0.92   EGFR 51.4* 47.4* 57.3* 60.0*  --  68.9 67.1   GLUCOSE 228* 139* 144* 225*  --  122* 169*   CALCIUM 8.7 8.6 8.7 9.0  --  8.8 9.7   MAGNESIUM 1.9 1.8 1.8  --  1.8 1.7  --    PHOSPHORUS 3.0 3.0 3.0 2.6  --  4.4  --    HEMOGLOBIN A1C  --   --   --   --   --   --  7.50*   TSH  --   --   --   --   --   --  0.749         Lab 08/06/23  0028 08/05/23  0057 08/04/23  0115 08/03/23  0439 08/02/23  0451   TOTAL PROTEIN 6.4 6.4 6.1 7.0 6.6   ALBUMIN 3.5 3.4* 3.4* 4.0 3.6   GLOBULIN 2.9 3.0 2.7 3.0 3.0   ALT (SGPT) 14 10 13 13 8   AST (SGOT) 21 19 21 24 16   BILIRUBIN 0.2 0.2 0.2 0.3 0.2   ALK PHOS 64 60 57 59 51         Lab 08/01/23  1338   HSTROP T 8   PROTIME 11.1   INR 1.04         Lab 08/02/23  0451 08/01/23  1338   CHOLESTEROL 147 165   LDL CHOL 80 86   HDL CHOL 47 51   TRIGLYCERIDES 109 161*         Lab 08/01/23  1338   ABO TYPING A   RH TYPING Positive   ANTIBODY SCREEN Negative          Brief Urine Lab Results  (Last result in the past 365 days)        Color   Clarity   Blood   Leuk Est   Nitrite   Protein   CREAT   Urine HCG        02/05/23 1259 Yellow   Cloudy   Trace   Small (1+)   Negative   Trace                 Microbiology Results (last 10 days)       ** No results found for the last 240 hours. **            CT Head Without Contrast    Result Date: 8/2/2023  Impression: Impression: 1. No acute intracranial findings. 2. Chronic-appearing lacunar infarct within the tomas, right of midline. Electronically Signed: Blanca Glass  8/2/2023 3:22 PM EDT  Workstation ID: [id number]    CT Head Without Contrast    Result Date: 8/1/2023  Impression: No definite acute intracranial abnormality. Electronically Signed: Antelmo Ramirez  8/1/2023 2:33 PM EDT  Workstation ID: [id number]    CT Angiogram Neck    Result Date: 8/1/2023  Impression: Impression: 1. No evidence of large vessel occlusion, significant flow-limiting stenosis or aneurysm formation 2. Overall no great change from the prior exam Electronically Signed: Antelmo Ramirez  8/1/2023 2:20 PM EDT  Workstation ID: [id number]    MRI Brain Without Contrast    Result Date: 8/2/2023  Impression: Impression: 1. No acute intracranial abnormality. Electronically Signed: Blair Qureshi  8/2/2023 10:29 AM EDT  Workstation ID: [id number]    XR Chest 1 View    Result Date: 8/1/2023  Impression: Impression: No active pulmonary process. Electronically Signed: Chung Holland MD  8/1/2023 2:11 PM EDT  Workstation ID: [id number]    CT Head Without Contrast Stroke Protocol    Result Date: 8/1/2023  Impression: No acute intracranial abnormality. Lacunar infarct right side of the tomas. Electronically Signed: Antelmo Ramirez  8/1/2023 1:36 PM EDT  Workstation ID: [id number]    CT Angiogram Head w AI Analysis of LVO    Result Date: 8/1/2023  Impression: Impression: 1. No evidence of large vessel occlusion, significant flow-limiting stenosis or aneurysm formation 2. Overall no  great change from the prior exam Electronically Signed: Antelmo Webbkin  8/1/2023 2:20 PM EDT  Workstation ID: [id number]    CT CEREBRAL PERFUSION WITH & WITHOUT CONTRAST    Result Date: 8/1/2023  Impression: Impression: No evidence of core infarct or visualized at risk tissue. Electronically Signed: Antelmo Webbkin  8/1/2023 2:07 PM EDT  Workstation ID: [id number]     Results for orders placed during the hospital encounter of 09/25/22    Bilateral Carotid Duplex    Interpretation Summary  ú Proximal right internal carotid artery is normal.  ú Proximal left internal carotid artery is normal.      Results for orders placed during the hospital encounter of 09/25/22    Bilateral Carotid Duplex    Interpretation Summary  ú Proximal right internal carotid artery is normal.  ú Proximal left internal carotid artery is normal.      Results for orders placed during the hospital encounter of 08/01/23    Adult Transthoracic Echo Complete W/ Cont if Necessary Per Protocol    Interpretation Summary    Left ventricular systolic function is normal. Left ventricular ejection fraction appears to be 61 - 65%.    Left ventricular wall thickness is consistent with mild concentric hypertrophy.    Left ventricular diastolic function was normal.    The left atrial cavity is mildly dilated.    Estimated right ventricular systolic pressure from tricuspid regurgitation is mildly elevated (35-45 mmHg). Calculated right ventricular systolic pressure from tricuspid regurgitation is 35 mmHg.      Labs Pending at Discharge:      Procedures Performed           Consults:   Consults       Date and Time Order Name Status Description    8/2/2023 12:35 PM Inpatient Hospitalist Consult      8/1/2023  8:52 PM Inpatient Neurology Consult Stroke      8/1/2023  1:19 PM Inpatient Neurology Consult Stroke      8/1/2023  1:19 PM Inpatient Neurology Consult Stroke Completed               Discharge Details        Discharge Medications        New Medications         Instructions Start Date   aspirin 81 MG chewable tablet   81 mg, Oral, Daily   Start Date: August 7, 2023     butalbital-acetaminophen-caffeine -40 MG per tablet  Commonly known as: FIORICET, ESGIC   2 tablets, Oral, Every 6 Hours PRN      ticagrelor 90 MG tablet tablet  Commonly known as: BRILINTA   90 mg, Oral, 2 Times Daily             Continue These Medications        Instructions Start Date   Alcohol Wipes 70 % misc   1 each, Apply externally, 3 Times Daily Before Meals, Dx code: E11.65      amLODIPine 5 MG tablet  Commonly known as: NORVASC   5 mg, Oral, Daily      atorvastatin 80 MG tablet  Commonly known as: LIPITOR   80 mg, Oral, Nightly      clopidogrel 75 MG tablet  Commonly known as: PLAVIX   75 mg, Oral, Daily      DULoxetine 60 MG capsule  Commonly known as: CYMBALTA   60 mg, Oral, Daily      famotidine 20 MG tablet  Commonly known as: PEPCID   20 mg, Oral, 2 Times Daily PRN      glucose blood test strip   1 each, Other, 3 Times Daily Before Meals, Use as instructed Dx code: E11.65 Prescription for test strips for glucometer patient has at home. Patient needs to give name of glucometer.      Lancets misc   1 each, Does not apply, 3 Times Daily Before Meals, Prescription for whatever brand lancets patient currently using. Dx code: E11.65      MiraLax 17 g packet  Generic drug: polyethylene glycol   17 g, Oral, Daily      Mounjaro 5 MG/0.5ML solution pen-injector  Generic drug: Tirzepatide   5 mg, Subcutaneous, Weekly, Sunday       multivitamin with minerals tablet tablet   1 tablet, Oral, Daily      Pen Needles 32G X 4 MM misc   1 each, Does not apply, Nightly, Dx code: E11.65      traZODone 50 MG tablet  Commonly known as: DESYREL   50 mg, Oral, Nightly PRN               Allergies   Allergen Reactions    Zofran [Ondansetron Hcl] Nausea And Vomiting     Does the opposite of its purpose    Prochlorperazine Other (See Comments)     CAUSED SEIZURE    Prochlorperazine Edisylate Hives     Prochlorperazine Maleate Irritability    Hydralazine Itching and Rash    Hydralazine Hcl Itching and Rash    Meperidine Itching and Swelling    Prochlorperazine Maleate Other (See Comments)     CAUSES SEIZURE         Discharge Disposition:   Rehab Facility or Unit (DC - External)    Diet:  Hospital:  Diet Order   Procedures    Diet: Cardiac Diets, Diabetic Diets; Healthy Heart (2-3 Na+); Consistent Carbohydrate; Texture: Regular Texture (IDDSI 7); Fluid Consistency: Thin (IDDSI 0)         Discharge Activity:         CODE STATUS:  Code Status and Medical Interventions:   Ordered at: 08/01/23 2108     Code Status (Patient has no pulse and is not breathing):    CPR (Attempt to Resuscitate)     Medical Interventions (Patient has pulse or is breathing):    Full Support     Release to patient:    Routine Release         Future Appointments   Date Time Provider Department Center   8/7/2023 10:00 AM Neelam Negrete APRN [facility]   8/22/2023  5:00 PM [facility] 1  [facility]           Time spent on Discharge including face to face service:  33 minutes    This patient has been examined wearing appropriate Personal Protective Equipment and discussed with hospital infection control department. 08/06/23      Signature:

## 2023-08-06 NOTE — CASE MANAGEMENT/SOCIAL WORK
Case Management Discharge Note      Final Note: snf    Provided Post Acute Provider List?: Yes  Post Acute Provider List: DME Supplier, Home Health, Inpatient Rehab, Nursing Home  Provided Post Acute Provider Quality & Resource List?: Yes  Post Acute Provider Quality and Resource List: Home Health, Inpatient Rehab, Nursing Home  Delivered To: Patient  Method of Delivery: In person    Selected Continued Care - Discharged on 8/6/2023 Admission date: 8/1/2023 - Discharge disposition: Rehab Facility or Unit (DC - External)      Destination Coordination complete.      Service Provider Selected Services Address Phone Fax Patient Preferred    [facility] [address] [place] [address] [phone] --                 Transportation Services  Private: Car    Final Discharge Disposition Code: 03 - skilled nursing facility (SNF)

## 2023-08-07 ENCOUNTER — OFFICE VISIT (OUTPATIENT)
Dept: NEUROLOGY | Facility: CLINIC | Age: 68
End: 2023-08-07
Payer: MEDICARE

## 2023-08-07 VITALS
SYSTOLIC BLOOD PRESSURE: 128 MMHG | DIASTOLIC BLOOD PRESSURE: 80 MMHG | WEIGHT: 163 LBS | BODY MASS INDEX: 27.83 KG/M2 | HEIGHT: 64 IN | HEART RATE: 93 BPM

## 2023-08-07 DIAGNOSIS — Z86.73 H/O: CVA (CEREBROVASCULAR ACCIDENT): Primary | ICD-10-CM

## 2023-08-07 LAB
GLUCOSE BLDC GLUCOMTR-MCNC: 117 MG/DL (ref 70–105)
GLUCOSE BLDC GLUCOMTR-MCNC: 90 MG/DL (ref 70–105)

## 2023-08-07 NOTE — LETTER
August 7, 2023     CANDIDO Clayton  [address] [place] [address]    Patient: Marge Mcghee   YOB: 1955   Date of Visit: 8/7/2023     Dear CANDIDO Clayton:       Thank you for referring Marge Mcghee to me for evaluation. Below are the relevant portions of my assessment and plan of care.    If you have questions, please do not hesitate to call me. I look forward to following Marge along with you.         Sincerely,        CANDIDO Amin        CC: No Recipients    Neelam Negrete APRN  08/07/23 1045  Sign when Signing Visit  Subjective: History of CVA x2    Patient ID: Marge Mcghee is a 68 y.o. female.    CHIEF COMPLAINT: History of CVA x2    History of Present Illness Ms. Mcghee is a very pleasant 68-year-old  female who presented alone for a follow-up after CVA  Hospital follow up CVA.  On 8/1/2023 the patient had difficulty speaking and her  brought her to the hospital.  She has a history of cardiac bypass and prior CVA in 2022 due to hypertension and diabetes.  This CVA was a lacunar infarct in the right tomas of 11 mm.  The patient did not meet criteria for tPA.  She was hypertensive on arrival.  See testing below.  The patient is currently a resident at [facility] and is getting intensive physical therapy.    She has a prior history of: Polypharmacy, hyperlipidemia, chest pain, stable angina pectoris, status post CABG x3, hypertensive emergency, essential hypertension, vitamin D deficiency, type 2 diabetes with retinopathy, adrenal nodule, thyroid nodule, obesity, diabetic retinopathy, hepatic steatosis, GERD, REINA, UTI, hypomagnesia, osteoporosis, occipital neuralgia, encephalopathy.    Previously the patient was on aspirin and Plavix and had a stroke despite these medications, she was placed on Brilinta 90 mg bid and aspirin and the Plavix was not discontinued.  I will discontinue Plavix today.    IMAGING  STUDIES:  CT Head Without Contrast  Result Date: 8/1/2023  No definite acute intracranial abnormality. Electronically Signed: Antelmo Ramirez  8/1/2023 2:33 PM EDT  Workstation ID: [id number]     CT Angiogram Neck   Result Date: 8/1/2023  Impression: 1. No evidence of large vessel occlusion, significant flow-limiting stenosis or aneurysm formation 2. Overall no great change from the prior exam Electronically Signed: Antelmo Ramirez  8/1/2023 2:20 PM EDT  Workstation ID: [id number]     XR Chest 1 View  Result Date: 8/1/2023  Impression: No active pulmonary process. Electronically Signed: Chung Holland MD  8/1/2023 2:11 PM EDT  Workstation ID: [id number]     CT Head Without Contrast Stroke Protocol  Result Date: 8/1/2023  No acute intracranial abnormality. Lacunar infarct right side of the tomas. Electronically Signed: Antelmo Ramirez  8/1/2023 1:36 PM EDT  Workstation ID: [id number]     CT Angiogram Head w AI Analysis of LVO  Result Date: 8/1/2023  Impression: 1. No evidence of large vessel occlusion, significant flow-limiting stenosis or aneurysm formation 2. Overall no great change from the prior exam Electronically Signed: Antelmo Ramirez  8/1/2023 2:20 PM EDT  Workstation ID: [id number]     CT CEREBRAL PERFUSION WITH & WITHOUT CONTRAST  Result Date: 8/1/2023  Impression: No evidence of core infarct or visualized at risk tissue. Electronically Signed: Antelmo Ramirez  8/1/2023 2:07 PM EDT  Workstation ID: [id number]       2022 Prior CVA Testing  CT Angiogram Neck  Result Date: 9/25/2022   1. No large vessel occlusion or filling defect to indicate cerebral thrombus. 2. No significant carotid artery stenosis. 3. Both vertebral arteries are patent. 4. Incidental findings as noted above.  Electronically Signed By-Darci Okeefe MD On:9/25/2022 3:00 PM This report was finalized on [id number] by  Darci Okeefe MD.     XR Chest 1 View     Result Date: 9/25/2022   1. Mild cardiomegaly. 2. No active pulmonary disease.  Electronically  Signed By-Darci Okeefe MD On:9/25/2022 3:05 PM This report was finalized on [id number] by  Darci Okeefe MD.     CT Head Without Contrast Stroke Protocol  Result Date: 9/25/2022  IMPRESSION :  1. Normal study. 2. The results were discussed with Dr. Paolo Sosa M.D. by telephone at 1427 hours.  Electronically Signed By-Darci Okeefe MD On:9/25/2022 2:28 PM This report was finalized on [id number] by  Darci Okeefe MD.     CT Angiogram Head w AI Analysis of LVO  Result Date: 9/25/2022   1. No large vessel occlusion or filling defect to indicate cerebral thrombus. 2. No significant carotid artery stenosis. 3. Both vertebral arteries are patent. 4. Incidental findings as noted above.  Electronically Signed By-Darci Okeefe MD On:9/25/2022 3:00 PM This report was finalized on [id number] by  Darci Okeefe MD.     CT CEREBRAL PERFUSION WITH & WITHOUT CONTRAST  Result Date: 9/25/2022  No CT perfusion abnormality.  Electronically Signed By-Darci Okeefe MD On:9/25/2022 2:29 PM This report was finalized on [id number] by  Darci Okeefe MD.       The following portions of the patient's history were reviewed and updated as appropriate: allergies, current medications, past family history, past medical history, past social history, past surgical history and problem list.      Family History   Problem Relation Age of Onset    Diabetes Mother     COPD Mother     Hypertension Mother     Kidney disease Mother     Obesity Mother     Thyroid disease Mother     Diabetes Sister     Diabetes Sister     Diabetes Sister     Diabetes Sister     Malig Hyperthermia Neg Hx        Past Medical History:   Diagnosis Date    Adrenal nodule 11/16/2016    FINDINGS: Mild hepatic steatosis may be present. Cholecystectomy. No biliary ductal dilatation. Negative pancreas, spleen, left adrenal gland, and kidneys. The right adrenal presumed adenoma has increased slightly, measuring 3 x 4 cm in diameter,  compared to 2 x 3.5 cm on the previous  exam. The attenuation values are consistent with an adenoma. Done at [facility] in August 2018    Age-related osteoporosis without current pathological fracture 11/16/2016    Arthritis     Delayed surgical wound healing     Essential hypertension 11/16/2016    Gastroesophageal reflux disease with esophagitis 11/16/2016    Hepatic steatosis 11/16/2016    History of anemia     History of sepsis     FROM UTI    Hyperlipidemia 11/16/2016    Lisfranc's dislocation     LEFT WITH FRACTURES NONHEALING FOOT    Osteomyelitis of left foot 11/2020    RLS (restless legs syndrome)     Squamous cell skin cancer 2014    Excised by Dr. James    Thyroid nodule 10/18/2019    BENIGN    Type 2 diabetes mellitus with peripheral neuropathy 11/16/2016    Vitamin D deficiency 1/25/2019       Social History     Socioeconomic History    Marital status:    Tobacco Use    Smoking status: Never    Smokeless tobacco: Never   Vaping Use    Vaping Use: Never used   Substance and Sexual Activity    Alcohol use: Yes     Comment: socially     Drug use: Never    Sexual activity: Defer         Current Outpatient Medications:     amLODIPine (NORVASC) 5 MG tablet, Take 1 tablet by mouth Daily., Disp: , Rfl:     aspirin 81 MG chewable tablet, Chew 1 tablet Daily., Disp: 30 tablet, Rfl: 1    atorvastatin (LIPITOR) 80 MG tablet, Take 1 tablet by mouth Every Night., Disp: 90 tablet, Rfl: 3    butalbital-acetaminophen-caffeine (FIORICET, ESGIC) -40 MG per tablet, Take 2 tablets by mouth Every 6 (Six) Hours As Needed for Headache., Disp: 20 tablet, Rfl: 0    clopidogrel (PLAVIX) 75 MG tablet, Take 1 tablet by mouth Daily., Disp: 30 tablet, Rfl: 4    DULoxetine (CYMBALTA) 60 MG capsule, Take 1 capsule by mouth Daily., Disp: , Rfl:     famotidine (PEPCID) 20 MG tablet, Take 1 tablet by mouth 2 (Two) Times a Day As Needed., Disp: , Rfl:     glucose blood test strip, 1 each by Other route 3 (Three) Times a Day  Before Meals. Use as instructed Dx code: E11.65 Prescription for test strips for glucometer patient has at home. Patient needs to give name of glucometer., Disp: 100 each, Rfl: 2    Insulin Pen Needle (Pen Needles) 32G X 4 MM misc, 1 each Every Night. Dx code: E11.65, Disp: 100 each, Rfl: 2    Isopropyl Alcohol (Alcohol Wipes) 70 % misc, Apply 1 each topically 3 (Three) Times a Day Before Meals. Dx code: E11.65, Disp: 100 each, Rfl: 2    Lancets misc, 1 each 3 (Three) Times a Day Before Meals. Prescription for whatever brand lancets patient currently using. Dx code: E11.65, Disp: 100 each, Rfl: 2    multivitamin with minerals tablet tablet, Take 1 tablet by mouth Daily., Disp: , Rfl:     polyethylene glycol (MIRALAX) 17 g packet, Take 17 g by mouth Daily., Disp: , Rfl:     Tirzepatide (Mounjaro) 5 MG/0.5ML solution pen-injector, Inject 0.5 mL under the skin into the appropriate area as directed 1 (One) Time Per Week. Sunday, Disp: , Rfl:     traZODone (DESYREL) 50 MG tablet, Take 1 tablet by mouth At Night As Needed., Disp: , Rfl:   No current facility-administered medications for this visit.    Review of Systems   Constitutional:  Positive for fatigue. Negative for appetite change.   HENT:  Negative for ear pain and trouble swallowing.    Eyes:  Negative for pain and itching.   Respiratory:  Negative for cough and shortness of breath.    Cardiovascular:  Negative for chest pain.   Gastrointestinal:  Negative for abdominal pain and nausea.   Endocrine: Negative for cold intolerance and heat intolerance.   Genitourinary:  Negative for frequency and urgency.   Musculoskeletal:  Negative for back pain and neck pain.   Neurological:  Positive for dizziness and light-headedness.   Psychiatric/Behavioral:  Positive for agitation. Negative for confusion.    All other systems reviewed and are negative.     I have reviewed ROS completed by medical assistant.     Objective:    Neurologic Exam     Mental Status    Oriented to person, place, and time.     Cranial Nerves     CN III, IV, VI   Pupils are equal, round, and reactive to light.    Gait, Coordination, and Reflexes     Coordination   Tandem walking coordination: abnormal (Unable to assess in wheelchair)    Physical Exam  Vitals reviewed.   Constitutional:       Appearance: She is overweight.       HENT:      Head: Normocephalic and atraumatic.        Right Ear: Tympanic membrane normal.      Left Ear: Tympanic membrane normal.      Nose: Nose normal.      Mouth/Throat:      Mouth: Mucous membranes are moist.   Eyes:      General: Lids are normal. No visual field deficit.     Extraocular Movements: Extraocular movements intact.      Right eye: Normal extraocular motion and no nystagmus.      Left eye: Normal extraocular motion and no nystagmus.      Pupils: Pupils are equal, round, and reactive to light.     Cardiovascular:      Rate and Rhythm: Normal rate and regular rhythm.      Pulses: Normal pulses.      Heart sounds: Normal heart sounds, S1 normal and S2 normal.   Pulmonary:      Effort: Pulmonary effort is normal.      Breath sounds: Normal breath sounds.   Musculoskeletal:        Arms:       Cervical back: Full passive range of motion without pain and normal range of motion.        Legs:       Comments: Right upper extremity right lower extremity 5/5 including  dorsiflexion plantarflexion of feet, left upper left lower and  and dorsiflexion 4 of 5,    Skin:     General: Skin is warm and dry.      Comments: Denied any numbness or tingling   Neurological:      Mental Status: She is alert and oriented to person, place, and time.      Cranial Nerves: Facial asymmetry (Left facial droop) present.      Sensory: Sensation is intact. No sensory deficit.      Motor: Weakness (Left upper left lower 4 of 5 strength) present. No tremor, atrophy, abnormal muscle tone, seizure activity or pronator drift.      Coordination: Romberg sign positive (Unable to complete  "in wheelchair). Heel to Shin Test abnormal (Decreased left leg). Impaired rapid alternating movements (Decreased left arm/hand).      Gait: Gait abnormal (Unable to assess, only in wheelchair no walker) and tandem walk abnormal (Unable to assess in wheelchair).   Psychiatric:         Mood and Affect: Mood normal.         Behavior: Behavior is cooperative.       The patient was educated on the Stroke Acronym \"BE FAST\" B=Balance issues, E=Vision changes, F=Face weakness or numbness, A=Arm or Leg weakness or numbness, S=Speech problems and T=Time to call, 911 She voiced understanding.  She was also given a pamphlet outlining \"BEFAST\".     Assessment/Plan:    PCP to control: BP<130/90, A1c<6.5, LDL<70, B12>500, control weight, exercise 20 min TIW, PT, OT, continue anticoagulant, continue statin, healthy heart diet.    Diagnoses and all orders for this visit:    1. H/O: CVA (cerebrovascular accident) (Primary)    Continue Ticagrelor, ASA and Discontinue Plavix (conferred with MIGUEL Pastor hospitalist neurology)    PCP to control: BP<130/90, A1c<6.5, LDL<70, B12>500, control weight, exercise 20 min TIW, PT, OT, continue anticoagulant, continue statin, healthy heart diet.    Return if symptoms worsen or fail to improve.    I spent 40 minutes caring for Marge on this date of service. This time includes time spent by me in the following activities: preparing for the visit, reviewing tests, obtaining and/or reviewing a separately obtained history, performing a medically appropriate examination and/or evaluation, counseling and educating the patient/family/caregiver, ordering medications, tests, or procedures, referring and communicating with other health care professionals, independently interpreting results and communicating that information with the patient/family/caregiver, and care coordination.      This document has been electronically signed by Neelam RIVERA on August 7, 2023 10:04 EDT  "

## 2023-08-11 ENCOUNTER — PATIENT ROUNDING (BHMG ONLY) (OUTPATIENT)
Dept: NEUROLOGY | Facility: CLINIC | Age: 68
End: 2023-08-11
Payer: MEDICARE

## 2023-08-22 ENCOUNTER — HOSPITAL ENCOUNTER (OUTPATIENT)
Dept: MRI IMAGING | Facility: HOSPITAL | Age: 68
Discharge: HOME | End: 2023-08-22
Admitting: NURSE PRACTITIONER
Payer: MEDICARE

## 2023-08-22 DIAGNOSIS — R26.89 BALANCE PROBLEMS: ICD-10-CM

## 2023-08-22 DIAGNOSIS — R42 DIZZINESS: ICD-10-CM

## 2023-08-22 PROCEDURE — 70553 MRI BRAIN STEM W/O & W/DYE: CPT

## 2023-08-22 PROCEDURE — 25010000002 GADOTERIDOL PER 1 ML: Performed by: NURSE PRACTITIONER

## 2023-08-22 PROCEDURE — A9579 GAD-BASE MR CONTRAST NOS,1ML: HCPCS | Performed by: NURSE PRACTITIONER

## 2023-08-22 RX ADMIN — GADOTERIDOL 15 ML: 279.3 INJECTION, SOLUTION INTRAVENOUS at 18:03

## 2023-08-30 ENCOUNTER — TELEPHONE (OUTPATIENT)
Dept: NEUROLOGY | Facility: CLINIC | Age: 68
End: 2023-08-30

## 2023-08-30 NOTE — TELEPHONE ENCOUNTER
Provider: ORLANDO ARELLANO   Caller: SILVIO   Relationship to Patient: PATIENT     Phone Number: [phone]  Reason for Call: PATIENTS  CALLED IN. HE STATES THAT HE IS BEGINNING TO GET CONCERNED AS NO ONE HAS COMMUNICATED WITH HIM ABOUT PATIENTS CONDITION. PATIENTS  STATES THAT THE PATIENT HAD 2 MRI ON 08/22/23 AND NO ONE HAS CALLED HIS TO ADVISE ABOUT THOSE EITHER. PATIENTS  IS REQUESTING A CALL BACK TO DISCUSS HIS CONCERNS FURTHER     THANK YOU

## 2023-09-17 ENCOUNTER — HOSPITAL ENCOUNTER (EMERGENCY)
Facility: HOSPITAL | Age: 68
Discharge: HOME | End: 2023-09-17
Attending: EMERGENCY MEDICINE | Admitting: EMERGENCY MEDICINE
Payer: MEDICARE

## 2023-09-17 ENCOUNTER — APPOINTMENT (OUTPATIENT)
Dept: GENERAL RADIOLOGY | Facility: HOSPITAL | Age: 68
End: 2023-09-17
Payer: MEDICARE

## 2023-09-17 VITALS
HEIGHT: 64 IN | WEIGHT: 163 LBS | OXYGEN SATURATION: 96 % | SYSTOLIC BLOOD PRESSURE: 153 MMHG | RESPIRATION RATE: 16 BRPM | TEMPERATURE: 98.9 F | HEART RATE: 84 BPM | BODY MASS INDEX: 27.83 KG/M2 | DIASTOLIC BLOOD PRESSURE: 85 MMHG

## 2023-09-17 DIAGNOSIS — S90.32XA CONTUSION OF LEFT FOOT, INITIAL ENCOUNTER: Primary | ICD-10-CM

## 2023-09-17 DIAGNOSIS — M19.072 PRIMARY OSTEOARTHRITIS OF LEFT FOOT: ICD-10-CM

## 2023-09-17 PROCEDURE — 99283 EMERGENCY DEPT VISIT LOW MDM: CPT

## 2023-09-17 PROCEDURE — 73630 X-RAY EXAM OF FOOT: CPT

## 2023-09-17 RX ORDER — DICLOFENAC SODIUM 75 MG/1
75 TABLET, DELAYED RELEASE ORAL 2 TIMES DAILY
Qty: 15 TABLET | Refills: 0 | Status: SHIPPED | OUTPATIENT
Start: 2023-09-17

## 2023-09-17 RX ORDER — TRAMADOL HYDROCHLORIDE 50 MG/1
50 TABLET ORAL ONCE
Status: COMPLETED | OUTPATIENT
Start: 2023-09-17 | End: 2023-09-17

## 2023-09-17 RX ADMIN — TRAMADOL HYDROCHLORIDE 50 MG: 50 TABLET, COATED ORAL at 20:09

## 2023-09-17 NOTE — ED NOTES
Patient presents with Left foot bruising, Left Leg Pain. Patient denies any injury but takes blood thinners.

## 2023-09-17 NOTE — DISCHARGE INSTRUCTIONS
Use cam splint for the next 5 days  No work for the next 5 days  No prolonged walking or standing for the next 3 days  Medication as directed  Follow-up with primary care provider or podiatrist

## 2023-09-17 NOTE — ED PROVIDER NOTES
Subjective   History of Present Illness  68-year-old female complaining of foot pain.  States that her foot felt okay when she went to bed last night does not think she struck it on any objects.  She takes Brilinta.  She states she has not had other bruising problems.  She reports pain with pressure on her forefoot today.  She reports a noticeable contusion on the dorsum of the foot    Review of Systems   HENT:  Negative for nosebleeds.    Gastrointestinal:  Negative for anal bleeding and blood in stool.   Genitourinary:  Negative for hematuria.   Hematological:  Bruises/bleeds easily.   All other systems reviewed and are negative.    Past Medical History:   Diagnosis Date    Adrenal nodule 11/16/2016    FINDINGS: Mild hepatic steatosis may be present. Cholecystectomy. No biliary ductal dilatation. Negative pancreas, spleen, left adrenal gland, and kidneys. The right adrenal presumed adenoma has increased slightly, measuring 3 x 4 cm in diameter,  compared to 2 x 3.5 cm on the previous exam. The attenuation values are consistent with an adenoma. Done at [facility] in August 2018    Age-related osteoporosis without current pathological fracture 11/16/2016    Arthritis     Delayed surgical wound healing     Essential hypertension 11/16/2016    Gastroesophageal reflux disease with esophagitis 11/16/2016    Hepatic steatosis 11/16/2016    History of anemia     History of sepsis     FROM UTI    Hyperlipidemia 11/16/2016    Lisfranc's dislocation     LEFT WITH FRACTURES NONHEALING FOOT    Osteomyelitis of left foot 11/2020    RLS (restless legs syndrome)     Squamous cell skin cancer 2014    Excised by Dr. James    Thyroid nodule 10/18/2019    BENIGN    Type 2 diabetes mellitus with peripheral neuropathy 11/16/2016    Vitamin D deficiency 1/25/2019       Allergies   Allergen Reactions    Zofran [Ondansetron Hcl] Nausea And Vomiting     Does the opposite of its purpose    Prochlorperazine Other (See Comments)      CAUSED SEIZURE    Prochlorperazine Edisylate Hives    Prochlorperazine Maleate Irritability    Hydralazine Itching and Rash    Hydralazine Hcl Itching and Rash    Meperidine Itching and Swelling    Prochlorperazine Maleate Other (See Comments)     CAUSES SEIZURE       Past Surgical History:   Procedure Laterality Date    BREAST BIOPSY Left     7/2019. BENIGN    CARDIAC CATHETERIZATION Left 5/2/2021    Procedure: Cardiac Catheterization/Vascular Study;  Surgeon: Chacho Esteban MD;  Location: [facility] CATH INVASIVE LOCATION;  Service: Cardiovascular;  Laterality: Left;    CARDIAC CATHETERIZATION N/A 5/2/2021    Procedure: Intra-Aortic Baloon Pump Insertion;  Surgeon: Chacho Esteban MD;  Location: [facility] CATH INVASIVE LOCATION;  Service: Cardiovascular;  Laterality: N/A;    CATARACT EXTRACTION WITH INTRAOCULAR LENS IMPLANT Bilateral     CHOLECYSTECTOMY      COLONOSCOPY      CORONARY ARTERY BYPASS GRAFT N/A 5/2/2021    Procedure: CORONARY ARTERY BYPASS WITH INTERNAL MAMMARY ARTERY GRAFT;  Surgeon: Jr Rocael Alexander MD;  Location: [facility] CVOR;  Service: Cardiothoracic;  Laterality: N/A;    FOOT FUSION Right 11/13/2020    Procedure: RIGHT OPEN TREATMENT LISFRANC INJURY, OPEN REDUCTION INTERNAL FIXATION MEDIAL/MIDDLE CUNEIFORM FRACTURE AND 2ND/3RD METATARSAL FRACTURE 1ST 2ND POSSIBLE 3RD TARSOMETATARSAL ARTHRODESIS INTERCUNEIFORM ARTHRODESIS CALCANEAL BONE GRAFT;  Surgeon: Mikhail Banks Jr., MD;  Location: [facility] [place], operated by [facility];  Service: Orthopedics;  Laterality: Right;    INCISION AND DRAINAGE LEG Right 1/8/2021    Procedure: RIGHT IRRIGATION AND DEBRIDEMENT OF FOOT WITH SECONDARY CLOSURE AND HARDWARE REMOVAL;  Surgeon: Mikhail Banks Jr., MD;  Location: [facility] OR;  Service: Orthopedics;  Laterality: Right;    KNEE ARTHROSCOPY Bilateral     TOTAL ABDOMINAL HYSTERECTOMY      TRANSESOPHAGEAL ECHOCARDIOGRAM (EMILIA) N/A 5/2/2021    Procedure: TRANSESOPHAGEAL ECHOCARDIOGRAM;  Surgeon: Jr Rocael Alexander MD;  Location: [facility]  CVOR;  Service: Cardiothoracic;  Laterality: N/A;    UPPER GASTROINTESTINAL ENDOSCOPY  08/2016    US GUIDED FINE NEEDLE ASPIRATION  5/8/2020       Family History   Problem Relation Age of Onset    Diabetes Mother     COPD Mother     Hypertension Mother     Kidney disease Mother     Obesity Mother     Thyroid disease Mother     Diabetes Sister     Diabetes Sister     Diabetes Sister     Diabetes Sister     Malig Hyperthermia Neg Hx        Social History     Socioeconomic History    Marital status:    Tobacco Use    Smoking status: Never    Smokeless tobacco: Never   Vaping Use    Vaping Use: Never used   Substance and Sexual Activity    Alcohol use: Yes     Comment: socially     Drug use: Never    Sexual activity: Defer           Objective   Physical Exam  Alert Cl[place]on Coma Scale 15   HEENT: Pupils equal and reactive to light. Conjunctivae are not injected. Normal tympanic membranes. Oropharynx and nares are normal.   Neck: Supple. Midline trachea. No JVD. No goiter.   Chest: Clear and equal breath sounds bilaterally, regular rate and rhythm without murmur or rub.   Abdomen: Positive bowel sounds, nontender, nondistended. No rebound or peritoneal signs. No CVA tenderness.   Extremities no clubbing. cyanosis or edema. Motor sensory exam is normal. The full range of motion is intact there is an approximately 3 x 5 cm contusion noted in the area of the distal fourth and fifth metatarsals.  There is no palpable fracture or step-off.  There is no plantar fascial tenderness noted.  There is no ankle effusion   Skin: Warm and dry, no rashes or petechia.  No purpura  Lymphatic: No regional lymphadenopathy. No calf pain, swelling or Homans sign    Procedures         Was placed in a cam splint and was neurovascular intact afterwards  ED Course      Labs Reviewed - No data to display  Medications - No data to display  XR Foot 3+ View Left    Result Date: 9/17/2023  Impression: 1. No acute fracture or dislocation. 2.  Mild degenerative changes. Electronically Signed: Darci Okeefe MD  9/17/2023 6:00 PM EDT  Workstation ID: [id number]                                        Medical Decision Making  The patient will be placed on diclofenac.  She was encouraged to follow-up with podiatry the patient was stable at discharge and vocalized understanding of discharge instructions and warnings    Amount and/or Complexity of Data Reviewed  Independent Historian: spouse  Radiology: ordered and independent interpretation performed.    Risk  Prescription drug management.        Final diagnoses:   Contusion of left foot, initial encounter   Primary osteoarthritis of left foot       ED Disposition  ED Disposition       ED Disposition   Discharge    Condition   Stable    Comment   --               DEBBI Camargo DPM  [address] [place] [address]  [phone]    Call       Traci Townsend, APRN  [address] [place] [address] [phone]               Medication List      No changes were made to your prescriptions during this visit.            Nima Wynne MD  09/17/23 1955

## 2023-09-17 NOTE — Clinical Note
[facility] EMERGENCY DEPARTMENT  [address] [facility] [place] [address]  Phone: [phone]    Marge Mcghee was seen and treated in our emergency department on 9/17/2023.  She may return to work on 09/21/2023.         Thank you for choosing [facility].    Nima Wynne MD

## 2023-12-05 ENCOUNTER — APPOINTMENT (OUTPATIENT)
Dept: GENERAL RADIOLOGY | Facility: HOSPITAL | Age: 68
DRG: 103 | End: 2023-12-05
Payer: MEDICARE

## 2023-12-05 ENCOUNTER — HOSPITAL ENCOUNTER (INPATIENT)
Facility: HOSPITAL | Age: 68
LOS: 4 days | Discharge: NURSING, REHAB OR LONG-TERM CARE | DRG: 103 | End: 2023-12-09
Attending: EMERGENCY MEDICINE | Admitting: INTERNAL MEDICINE
Payer: MEDICARE

## 2023-12-05 ENCOUNTER — APPOINTMENT (OUTPATIENT)
Dept: CT IMAGING | Facility: HOSPITAL | Age: 68
DRG: 103 | End: 2023-12-05
Payer: MEDICARE

## 2023-12-05 DIAGNOSIS — I63.9 CEREBROVASCULAR ACCIDENT (CVA), UNSPECIFIED MECHANISM: Primary | ICD-10-CM

## 2023-12-05 PROBLEM — G45.9 TIA (TRANSIENT ISCHEMIC ATTACK): Status: ACTIVE | Noted: 2023-12-05

## 2023-12-05 LAB
ABO GROUP BLD: NORMAL
ALBUMIN SERPL-MCNC: 4.1 G/DL (ref 3.5–5.2)
ALBUMIN/GLOB SERPL: 1.2 G/DL
ALP SERPL-CCNC: 57 U/L (ref 39–117)
ALT SERPL W P-5'-P-CCNC: 9 U/L (ref 1–33)
ANION GAP SERPL CALCULATED.3IONS-SCNC: 10 MMOL/L (ref 5–15)
AST SERPL-CCNC: 17 U/L (ref 1–32)
BASOPHILS # BLD AUTO: 0.1 10*3/MM3 (ref 0–0.2)
BASOPHILS NFR BLD AUTO: 1.3 % (ref 0–1.5)
BILIRUB SERPL-MCNC: 0.3 MG/DL (ref 0–1.2)
BLD GP AB SCN SERPL QL: NEGATIVE
BUN SERPL-MCNC: 22 MG/DL (ref 8–23)
BUN/CREAT SERPL: 23.4 (ref 7–25)
CALCIUM SPEC-SCNC: 9.3 MG/DL (ref 8.6–10.5)
CHLORIDE SERPL-SCNC: 105 MMOL/L (ref 98–107)
CO2 SERPL-SCNC: 25 MMOL/L (ref 22–29)
CREAT SERPL-MCNC: 0.94 MG/DL (ref 0.57–1)
DEPRECATED RDW RBC AUTO: 42.4 FL (ref 37–54)
EGFRCR SERPLBLD CKD-EPI 2021: 65.4 ML/MIN/1.73
EOSINOPHIL # BLD AUTO: 0.2 10*3/MM3 (ref 0–0.4)
EOSINOPHIL NFR BLD AUTO: 2.6 % (ref 0.3–6.2)
ERYTHROCYTE [DISTWIDTH] IN BLOOD BY AUTOMATED COUNT: 13.9 % (ref 12.3–15.4)
GLOBULIN UR ELPH-MCNC: 3.3 GM/DL
GLUCOSE BLDC GLUCOMTR-MCNC: 117 MG/DL (ref 70–105)
GLUCOSE BLDC GLUCOMTR-MCNC: 118 MG/DL (ref 70–105)
GLUCOSE BLDC GLUCOMTR-MCNC: 130 MG/DL (ref 70–105)
GLUCOSE BLDC GLUCOMTR-MCNC: 92 MG/DL (ref 70–105)
GLUCOSE SERPL-MCNC: 120 MG/DL (ref 65–99)
HCT VFR BLD AUTO: 39.5 % (ref 34–46.6)
HGB BLD-MCNC: 13 G/DL (ref 12–15.9)
HOLD SPECIMEN: NORMAL
HOLD SPECIMEN: NORMAL
INR PPP: 0.98 (ref 0.93–1.1)
LYMPHOCYTES # BLD AUTO: 2.8 10*3/MM3 (ref 0.7–3.1)
LYMPHOCYTES NFR BLD AUTO: 39.3 % (ref 19.6–45.3)
MCH RBC QN AUTO: 28.9 PG (ref 26.6–33)
MCHC RBC AUTO-ENTMCNC: 32.8 G/DL (ref 31.5–35.7)
MCV RBC AUTO: 88 FL (ref 79–97)
MONOCYTES # BLD AUTO: 0.5 10*3/MM3 (ref 0.1–0.9)
MONOCYTES NFR BLD AUTO: 7.1 % (ref 5–12)
NEUTROPHILS NFR BLD AUTO: 3.6 10*3/MM3 (ref 1.7–7)
NEUTROPHILS NFR BLD AUTO: 49.7 % (ref 42.7–76)
NRBC BLD AUTO-RTO: 0 /100 WBC (ref 0–0.2)
PLATELET # BLD AUTO: 318 10*3/MM3 (ref 140–450)
PMV BLD AUTO: 6.9 FL (ref 6–12)
POTASSIUM SERPL-SCNC: 4.3 MMOL/L (ref 3.5–5.2)
PROT SERPL-MCNC: 7.4 G/DL (ref 6–8.5)
PROTHROMBIN TIME: 10.7 SECONDS (ref 9.6–11.7)
RBC # BLD AUTO: 4.48 10*6/MM3 (ref 3.77–5.28)
RH BLD: POSITIVE
SODIUM SERPL-SCNC: 140 MMOL/L (ref 136–145)
T&S EXPIRATION DATE: NORMAL
WBC NRBC COR # BLD AUTO: 7.2 10*3/MM3 (ref 3.4–10.8)
WHOLE BLOOD HOLD COAG: NORMAL
WHOLE BLOOD HOLD SPECIMEN: NORMAL

## 2023-12-05 PROCEDURE — 97165 OT EVAL LOW COMPLEX 30 MIN: CPT

## 2023-12-05 PROCEDURE — 93005 ELECTROCARDIOGRAM TRACING: CPT | Performed by: EMERGENCY MEDICINE

## 2023-12-05 PROCEDURE — 86900 BLOOD TYPING SEROLOGIC ABO: CPT | Performed by: EMERGENCY MEDICINE

## 2023-12-05 PROCEDURE — 82948 REAGENT STRIP/BLOOD GLUCOSE: CPT

## 2023-12-05 PROCEDURE — 25010000002 MORPHINE PER 10 MG: Performed by: HOSPITALIST

## 2023-12-05 PROCEDURE — 70498 CT ANGIOGRAPHY NECK: CPT

## 2023-12-05 PROCEDURE — 84443 ASSAY THYROID STIM HORMONE: CPT | Performed by: NURSE PRACTITIONER

## 2023-12-05 PROCEDURE — 71045 X-RAY EXAM CHEST 1 VIEW: CPT

## 2023-12-05 PROCEDURE — 25010000002 KETOROLAC TROMETHAMINE PER 15 MG: Performed by: EMERGENCY MEDICINE

## 2023-12-05 PROCEDURE — 83036 HEMOGLOBIN GLYCOSYLATED A1C: CPT | Performed by: INTERNAL MEDICINE

## 2023-12-05 PROCEDURE — 70450 CT HEAD/BRAIN W/O DYE: CPT

## 2023-12-05 PROCEDURE — 86850 RBC ANTIBODY SCREEN: CPT | Performed by: EMERGENCY MEDICINE

## 2023-12-05 PROCEDURE — 86901 BLOOD TYPING SEROLOGIC RH(D): CPT | Performed by: EMERGENCY MEDICINE

## 2023-12-05 PROCEDURE — 85025 COMPLETE CBC W/AUTO DIFF WBC: CPT | Performed by: EMERGENCY MEDICINE

## 2023-12-05 PROCEDURE — 80053 COMPREHEN METABOLIC PANEL: CPT | Performed by: EMERGENCY MEDICINE

## 2023-12-05 PROCEDURE — 36415 COLL VENOUS BLD VENIPUNCTURE: CPT | Performed by: INTERNAL MEDICINE

## 2023-12-05 PROCEDURE — 82607 VITAMIN B-12: CPT | Performed by: NURSE PRACTITIONER

## 2023-12-05 PROCEDURE — 92610 EVALUATE SWALLOWING FUNCTION: CPT

## 2023-12-05 PROCEDURE — 85610 PROTHROMBIN TIME: CPT | Performed by: EMERGENCY MEDICINE

## 2023-12-05 PROCEDURE — 80061 LIPID PANEL: CPT | Performed by: INTERNAL MEDICINE

## 2023-12-05 PROCEDURE — 70496 CT ANGIOGRAPHY HEAD: CPT

## 2023-12-05 PROCEDURE — 99285 EMERGENCY DEPT VISIT HI MDM: CPT

## 2023-12-05 PROCEDURE — 85652 RBC SED RATE AUTOMATED: CPT | Performed by: NURSE PRACTITIONER

## 2023-12-05 PROCEDURE — 25510000001 IOPAMIDOL PER 1 ML: Performed by: EMERGENCY MEDICINE

## 2023-12-05 PROCEDURE — 25010000002 ENOXAPARIN PER 10 MG: Performed by: INTERNAL MEDICINE

## 2023-12-05 RX ORDER — ASPIRIN 81 MG/1
81 TABLET, CHEWABLE ORAL DAILY
Status: DISCONTINUED | OUTPATIENT
Start: 2023-12-05 | End: 2023-12-05

## 2023-12-05 RX ORDER — ENOXAPARIN SODIUM 100 MG/ML
40 INJECTION SUBCUTANEOUS EVERY 24 HOURS
Status: DISCONTINUED | OUTPATIENT
Start: 2023-12-05 | End: 2023-12-05

## 2023-12-05 RX ORDER — DIPHENOXYLATE HYDROCHLORIDE AND ATROPINE SULFATE 2.5; .025 MG/1; MG/1
1 TABLET ORAL DAILY
Status: DISCONTINUED | OUTPATIENT
Start: 2023-12-05 | End: 2023-12-09 | Stop reason: HOSPADM

## 2023-12-05 RX ORDER — KETOROLAC TROMETHAMINE 15 MG/ML
15 INJECTION, SOLUTION INTRAMUSCULAR; INTRAVENOUS ONCE
Status: COMPLETED | OUTPATIENT
Start: 2023-12-05 | End: 2023-12-05

## 2023-12-05 RX ORDER — SODIUM CHLORIDE 0.9 % (FLUSH) 0.9 %
10 SYRINGE (ML) INJECTION EVERY 12 HOURS SCHEDULED
Status: DISCONTINUED | OUTPATIENT
Start: 2023-12-05 | End: 2023-12-09 | Stop reason: HOSPADM

## 2023-12-05 RX ORDER — CLOPIDOGREL BISULFATE 75 MG/1
75 TABLET ORAL EVERY EVENING
COMMUNITY

## 2023-12-05 RX ORDER — POLYETHYLENE GLYCOL 3350 17 G/17G
17 POWDER, FOR SOLUTION ORAL DAILY PRN
Status: DISCONTINUED | OUTPATIENT
Start: 2023-12-05 | End: 2023-12-09 | Stop reason: HOSPADM

## 2023-12-05 RX ORDER — ENOXAPARIN SODIUM 100 MG/ML
40 INJECTION SUBCUTANEOUS DAILY
Status: DISCONTINUED | OUTPATIENT
Start: 2023-12-05 | End: 2023-12-09 | Stop reason: HOSPADM

## 2023-12-05 RX ORDER — FAMOTIDINE 20 MG/1
20 TABLET, FILM COATED ORAL 2 TIMES DAILY PRN
Status: DISCONTINUED | OUTPATIENT
Start: 2023-12-05 | End: 2023-12-09 | Stop reason: HOSPADM

## 2023-12-05 RX ORDER — ASPIRIN 325 MG
325 TABLET ORAL DAILY
Status: DISCONTINUED | OUTPATIENT
Start: 2023-12-05 | End: 2023-12-08

## 2023-12-05 RX ORDER — SODIUM CHLORIDE 9 MG/ML
40 INJECTION, SOLUTION INTRAVENOUS AS NEEDED
Status: DISCONTINUED | OUTPATIENT
Start: 2023-12-05 | End: 2023-12-09 | Stop reason: HOSPADM

## 2023-12-05 RX ORDER — SODIUM CHLORIDE 0.9 % (FLUSH) 0.9 %
10 SYRINGE (ML) INJECTION AS NEEDED
Status: DISCONTINUED | OUTPATIENT
Start: 2023-12-05 | End: 2023-12-09 | Stop reason: HOSPADM

## 2023-12-05 RX ORDER — MULTIPLE VITAMINS W/ MINERALS TAB 9MG-400MCG
1 TAB ORAL DAILY
Status: DISCONTINUED | OUTPATIENT
Start: 2023-12-05 | End: 2023-12-05 | Stop reason: CLARIF

## 2023-12-05 RX ORDER — AMLODIPINE BESYLATE 5 MG/1
10 TABLET ORAL DAILY
Status: DISCONTINUED | OUTPATIENT
Start: 2023-12-06 | End: 2023-12-09 | Stop reason: HOSPADM

## 2023-12-05 RX ORDER — POLYETHYLENE GLYCOL 3350 17 G/17G
17 POWDER, FOR SOLUTION ORAL DAILY
Status: DISCONTINUED | OUTPATIENT
Start: 2023-12-05 | End: 2023-12-09 | Stop reason: HOSPADM

## 2023-12-05 RX ORDER — AMOXICILLIN 250 MG
2 CAPSULE ORAL 2 TIMES DAILY
Status: DISCONTINUED | OUTPATIENT
Start: 2023-12-05 | End: 2023-12-09 | Stop reason: HOSPADM

## 2023-12-05 RX ORDER — DULOXETIN HYDROCHLORIDE 30 MG/1
60 CAPSULE, DELAYED RELEASE ORAL DAILY
Status: DISCONTINUED | OUTPATIENT
Start: 2023-12-05 | End: 2023-12-09 | Stop reason: HOSPADM

## 2023-12-05 RX ORDER — BISACODYL 5 MG/1
5 TABLET, DELAYED RELEASE ORAL DAILY PRN
Status: DISCONTINUED | OUTPATIENT
Start: 2023-12-05 | End: 2023-12-09 | Stop reason: HOSPADM

## 2023-12-05 RX ORDER — ASPIRIN 300 MG/1
300 SUPPOSITORY RECTAL DAILY
Status: DISCONTINUED | OUTPATIENT
Start: 2023-12-05 | End: 2023-12-08

## 2023-12-05 RX ORDER — ATORVASTATIN CALCIUM 40 MG/1
80 TABLET, FILM COATED ORAL NIGHTLY
Status: DISCONTINUED | OUTPATIENT
Start: 2023-12-05 | End: 2023-12-05

## 2023-12-05 RX ORDER — LABETALOL HYDROCHLORIDE 5 MG/ML
10 INJECTION, SOLUTION INTRAVENOUS EVERY 6 HOURS PRN
Status: DISCONTINUED | OUTPATIENT
Start: 2023-12-05 | End: 2023-12-09 | Stop reason: HOSPADM

## 2023-12-05 RX ORDER — BISACODYL 10 MG
10 SUPPOSITORY, RECTAL RECTAL DAILY PRN
Status: DISCONTINUED | OUTPATIENT
Start: 2023-12-05 | End: 2023-12-09 | Stop reason: HOSPADM

## 2023-12-05 RX ORDER — TRAZODONE HYDROCHLORIDE 50 MG/1
50 TABLET ORAL NIGHTLY PRN
Status: DISCONTINUED | OUTPATIENT
Start: 2023-12-05 | End: 2023-12-09 | Stop reason: HOSPADM

## 2023-12-05 RX ORDER — ASPIRIN 81 MG/1
81 TABLET ORAL DAILY
COMMUNITY

## 2023-12-05 RX ORDER — ACETAMINOPHEN 325 MG/1
650 TABLET ORAL EVERY 6 HOURS PRN
Status: DISCONTINUED | OUTPATIENT
Start: 2023-12-05 | End: 2023-12-09 | Stop reason: HOSPADM

## 2023-12-05 RX ORDER — BUTALBITAL, ACETAMINOPHEN AND CAFFEINE 50; 325; 40 MG/1; MG/1; MG/1
2 TABLET ORAL EVERY 6 HOURS PRN
Status: DISCONTINUED | OUTPATIENT
Start: 2023-12-05 | End: 2023-12-09 | Stop reason: HOSPADM

## 2023-12-05 RX ORDER — ATORVASTATIN CALCIUM 40 MG/1
80 TABLET, FILM COATED ORAL NIGHTLY
Status: DISCONTINUED | OUTPATIENT
Start: 2023-12-05 | End: 2023-12-09 | Stop reason: HOSPADM

## 2023-12-05 RX ORDER — AMLODIPINE BESYLATE 5 MG/1
5 TABLET ORAL DAILY
Status: DISCONTINUED | OUTPATIENT
Start: 2023-12-05 | End: 2023-12-05

## 2023-12-05 RX ADMIN — KETOROLAC TROMETHAMINE 15 MG: 15 INJECTION, SOLUTION INTRAMUSCULAR; INTRAVENOUS at 14:21

## 2023-12-05 RX ADMIN — AMLODIPINE BESYLATE 5 MG: 5 TABLET ORAL at 16:49

## 2023-12-05 RX ADMIN — ATORVASTATIN CALCIUM 80 MG: 40 TABLET, FILM COATED ORAL at 21:49

## 2023-12-05 RX ADMIN — Medication 10 ML: at 21:50

## 2023-12-05 RX ADMIN — DULOXETINE HYDROCHLORIDE 60 MG: 30 CAPSULE, DELAYED RELEASE ORAL at 16:49

## 2023-12-05 RX ADMIN — ENOXAPARIN SODIUM 40 MG: 100 INJECTION SUBCUTANEOUS at 16:50

## 2023-12-05 RX ADMIN — Medication 10 MG: at 20:29

## 2023-12-05 RX ADMIN — IOPAMIDOL 100 ML: 755 INJECTION, SOLUTION INTRAVENOUS at 12:53

## 2023-12-05 RX ADMIN — BUTALBITAL, ACETAMINOPHEN, AND CAFFEINE 2 TABLET: 50; 325; 40 TABLET ORAL at 19:35

## 2023-12-05 RX ADMIN — ASPIRIN 325 MG ORAL TABLET 325 MG: 325 PILL ORAL at 16:49

## 2023-12-05 RX ADMIN — DOCUSATE SODIUM 50 MG AND SENNOSIDES 8.6 MG 2 TABLET: 8.6; 5 TABLET, FILM COATED ORAL at 21:50

## 2023-12-05 RX ADMIN — MORPHINE SULFATE 4 MG: 4 INJECTION, SOLUTION INTRAMUSCULAR; INTRAVENOUS at 20:30

## 2023-12-05 RX ADMIN — TICAGRELOR 90 MG: 90 TABLET ORAL at 21:49

## 2023-12-05 RX ADMIN — ACETAMINOPHEN 650 MG: 325 TABLET, FILM COATED ORAL at 21:53

## 2023-12-05 RX ADMIN — THERA TABS 1 TABLET: TAB at 18:21

## 2023-12-05 NOTE — CASE MANAGEMENT/SOCIAL WORK
Discharge Planning Assessment  [facility]     Patient Name: Marge Mcghee  MRN: [id number]  Today's Date: 12/5/2023    Admit Date: 12/5/2023    Plan: Referral to [facility], await acceptance and precert.   Discharge Needs Assessment       Row Name 12/05/23 1823       Living Environment    People in Home spouse    Name(s) of People in Home Tino-spouse    Current Living Arrangements home    Potentially Unsafe Housing Conditions none    Primary Care Provided by self    Provides Primary Care For no one    Family Caregiver if Needed spouse    Quality of Family Relationships helpful;involved;supportive    Able to Return to Prior Arrangements yes       Resource/Environmental Concerns    Resource/Environmental Concerns none    Transportation Concerns none       Transition Planning    Patient/Family Anticipates Transition to inpatient rehabilitation facility  Referral to [facility]    Patient/Family Anticipated Services at Transition rehabilitation services    Transportation Anticipated family or friend will provide  Spouse       Discharge Needs Assessment    Readmission Within the Last 30 Days no previous admission in last 30 days    Equipment Currently Used at Home none    Concerns to be Addressed discharge planning    Provided Post Acute Provider List? Yes    Post Acute Provider List Inpatient Rehab    Provided Post Acute Provider Quality & Resource List? Yes    Post Acute Provider Quality and Resource List Inpatient Rehab    Delivered To Patient;Support Person    Support Person [email or web address]    Method of Delivery In person                   Discharge Plan       Row Name 12/05/23 1823       Plan    Plan Referral to [facility], await acceptance and precert.    Plan Comments Spoke with patient and spouse at bedside, discussed PT recommendation for acute IP rehab.Choices given, Referral given to [facility] per patient request.  DC Barrier: [facility]  acceptance,precert,neurology consult                  Continued Care and Services - Admitted Since 12/5/2023        Destination       Service Provider Request Status Selected Services Address Phone Fax Patient Preferred    [facility] Pending - Request Sent N/A [address] [place] [address] [phone] --                  Expected Discharge Date and Time       Expected Discharge Date Expected Discharge Time    Dec 7, 2023            Demographic Summary       Row Name 12/05/23 1822       General Information    Admission Type inpatient    Arrived From home    Required Notices Provided Important Message from Medicare    Referral Source patient;family    Reason for Consult discharge planning    Preferred Language English                   Functional Status       Row Name 12/05/23 1823       Functional Status    Usual Activity Tolerance good    Current Activity Tolerance good       Functional Status, IADL    Medications independent    Meal Preparation independent    Housekeeping independent    Laundry independent    Shopping independent       Mental Status    General Appearance WDL WDL                  Patient Forms       Row Name 12/05/23 1822       Patient Forms    Important Message from Medicare (IMM) Delivered  12/5/23 Registration           Met with patient in room wearing PPE: mask, face shield/goggles, gloves, gown.      Maintained distance greater than six feet and spent less than 15 minutes in the room.     Narda Perdomo RN

## 2023-12-05 NOTE — PLAN OF CARE
Goal Outcome Evaluation:  Plan of Care Reviewed With: patient        Progress: no change  Outcome Evaluation: Pt. is 67 y/o female admit w/ decreased sensation L side, CT (-) and MRI pending. Pt. w/ impacts to memory from previous CVA, daughter present to assist w/ history. Pt. w/ CVA earlier this year requiring continued therapy to address LLE weakness, lives at home w/ spouse and maintains ADL independence at baseline. Pt. w/ LUE weakness this date 3+/5 and impacts ot sensation and FMC/GMC. Pt. also reports blurry vision L eye w/ difficulty tracking. Pt. provided min A for functional transfers, reports severe dizziness w/ standing activity and SBP drops 150 to 101. BP 170s/100s once return to sitting w/ symptoms resolved. Pt. not safe to d/c home at this time, recommend IP rehab pending progress to address aforementioned deficits.      Anticipated Discharge Disposition (OT): inpatient rehabilitation facility

## 2023-12-05 NOTE — THERAPY EVALUATION
Patient Name: Marge Mcghee  : 1955    MRN: [id number]                              Today's Date: 2023       Admit Date: 2023    Visit Dx:     ICD-10-CM ICD-9-CM   1. Cerebrovascular accident (CVA), unspecified mechanism  I63.9 434.91     Patient Active Problem List   Diagnosis    Type 2 diabetes mellitus with retinopathy, with long-term current use of insulin    Age-related osteoporosis without current pathological fracture    Hyperlipidemia    Hepatic steatosis    Gastroesophageal reflux disease with esophagitis    Adrenal nodule    Vitamin D deficiency    Thyroid nodule    UTI (urinary tract infection)    REINA (acute kidney injury)    Hyperglycemia    Acute right flank pain    Vomiting    Hypomagnesemia    Dehydration    Obesity (BMI 30-39.9)    Complicated migraine    Occipital neuralgia of left side    Polypharmacy    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    Lisfranc dislocation    Delayed surgical wound healing    Right foot infection    Chest pain    GERD (gastroesophageal reflux disease)    Stable angina pectoris    S/P CABG x 3    Encephalopathy, metabolic    Hypertensive emergency    Dysarthria    CVA (cerebral vascular accident)    Coronary artery disease involving coronary bypass graft of native heart without angina pectoris    Essential hypertension    HTN, goal below 130/80    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    Type 2 diabetes mellitus without complication, with long-term current use of insulin    TIA (transient ischemic attack)     Past Medical History:   Diagnosis Date    Adrenal nodule 2016    FINDINGS: Mild hepatic steatosis may be present. Cholecystectomy. No biliary ductal dilatation. Negative pancreas, spleen, left adrenal gland, and kidneys. The right adrenal presumed adenoma has increased slightly, measuring 3 x 4 cm in  diameter,  compared to 2 x 3.5 cm on the previous exam. The attenuation values are consistent with an adenoma. Done at [facility] in August 2018    Age-related osteoporosis without current pathological fracture 11/16/2016    Arthritis     Delayed surgical wound healing     Essential hypertension 11/16/2016    Gastroesophageal reflux disease with esophagitis 11/16/2016    Hepatic steatosis 11/16/2016    History of anemia     History of sepsis     FROM UTI    Hyperlipidemia 11/16/2016    Lisfranc's dislocation     LEFT WITH FRACTURES NONHEALING FOOT    Osteomyelitis of left foot 11/2020    RLS (restless legs syndrome)     Squamous cell skin cancer 2014    Excised by Dr. James    Thyroid nodule 10/18/2019    BENIGN    Type 2 diabetes mellitus with peripheral neuropathy 11/16/2016    Vitamin D deficiency 1/25/2019     Past Surgical History:   Procedure Laterality Date    BREAST BIOPSY Left     7/2019. BENIGN    CARDIAC CATHETERIZATION Left 5/2/2021    Procedure: Cardiac Catheterization/Vascular Study;  Surgeon: Chacho Esteban MD;  Location: [facility] CATH INVASIVE LOCATION;  Service: Cardiovascular;  Laterality: Left;    CARDIAC CATHETERIZATION N/A 5/2/2021    Procedure: Intra-Aortic Baloon Pump Insertion;  Surgeon: Chacho Esteban MD;  Location: [facility] CATH INVASIVE LOCATION;  Service: Cardiovascular;  Laterality: N/A;    CATARACT EXTRACTION WITH INTRAOCULAR LENS IMPLANT Bilateral     CHOLECYSTECTOMY      COLONOSCOPY      CORONARY ARTERY BYPASS GRAFT N/A 5/2/2021    Procedure: CORONARY ARTERY BYPASS WITH INTERNAL MAMMARY ARTERY GRAFT;  Surgeon: Jr Rocael Alexander MD;  Location: [facility] CVOR;  Service: Cardiothoracic;  Laterality: N/A;    FOOT FUSION Right 11/13/2020    Procedure: RIGHT OPEN TREATMENT LISFRANC INJURY, OPEN REDUCTION INTERNAL FIXATION MEDIAL/MIDDLE CUNEIFORM FRACTURE AND 2ND/3RD METATARSAL FRACTURE 1ST 2ND POSSIBLE 3RD TARSOMETATARSAL ARTHRODESIS INTERCUNEIFORM ARTHRODESIS CALCANEAL BONE GRAFT;   Surgeon: Mikhail Banks Jr., MD;  [place] [facility] [place] [facility]  Service: Orthopedics;  Laterality: Right;    INCISION AND DRAINAGE LEG Right 1/8/2021    Procedure: RIGHT IRRIGATION AND DEBRIDEMENT OF FOOT WITH SECONDARY CLOSURE AND HARDWARE REMOVAL;  Surgeon: Mikhail Banks Jr., MD;  Location: [facility] MAIN OR;  Service: Orthopedics;  Laterality: Right;    KNEE ARTHROSCOPY Bilateral     TOTAL ABDOMINAL HYSTERECTOMY      TRANSESOPHAGEAL ECHOCARDIOGRAM (EMILIA) N/A 5/2/2021    Procedure: TRANSESOPHAGEAL ECHOCARDIOGRAM;  Surgeon: Jr Rocael Alexander MD;  Location: [facility];  Service: Cardiothoracic;  Laterality: N/A;    UPPER GASTROINTESTINAL ENDOSCOPY  08/2016    US GUIDED FINE NEEDLE ASPIRATION  5/8/2020      General Information       Row Name 12/05/23 1702          OT Time and Intention    Document Type evaluation  -     Mode of Treatment individual therapy  -MP       Row Name 12/05/23 1702          General Information    Patient Profile Reviewed yes  -MP     Prior Level of Function independent:;ADL's  -MP       Row Name 12/05/23 1702          Living Environment    People in Home spouse  -MP       Row Name 12/05/23 1702          Cognition    Orientation Status (Cognition) oriented x 3  -MP       Row Name 12/05/23 1702          Safety Issues, Functional Mobility    Impairments Affecting Function (Mobility) balance;endurance/activity tolerance;strength;motor control;grasp  -MP               User Key  (r) = Recorded By, (t) = Taken By, (c) = Cosigned By      Initials Name Provider Type    MP Harry Lynch OT Occupational Therapist                     Mobility/ADL's       Row Name 12/05/23 1703          Bed Mobility    Bed Mobility bed mobility (all) activities  -     All Activities, [place] (Bed Mobility) supervision  -MP       Row Name 12/05/23 1703          Transfers    Transfers sit-stand transfer  -MP       Row Name 12/05/23 1703          Sit-Stand Transfer    Sit-Stand [place] (Transfers) minimum  assist (75% patient effort)  -       Row Name 12/05/23 1703          Functional Mobility    Functional Mobility- Ind. Level minimum assist (75% patient effort)  -       Row Name 12/05/23 1703          Activities of Daily Living    BADL Assessment/Intervention lower body dressing  -       Row Name 12/05/23 1703          Lower Body Dressing Assessment/Training    [place] Level (Lower Body Dressing) lower body dressing skills;minimum assist (75% patient effort)  -               User Key  (r) = Recorded By, (t) = Taken By, (c) = Cosigned By      Initials Name Provider Type    Harry Connor OT Occupational Therapist                   Obj/Interventions       Row Name 12/05/23 1707          Sensory Assessment (Somatosensory)    Sensory Assessment (Somatosensory) left UE  -     Left UE Sensory Assessment impaired  -       Row Name 12/05/23 1707          Range of Motion Comprehensive    Comment, General Range of Motion L shoulder AROM impacted 25-50%  -       Row Name 12/05/23 1707          Strength Comprehensive (MMT)    Comment, General Manual Muscle Testing (MMT) Assessment RUE WFL, LUE 3+/5  -       Row Name 12/05/23 1707          Balance    Balance Interventions sitting;standing;sit to stand;static;dynamic;supported  -               User Key  (r) = Recorded By, (t) = Taken By, (c) = Cosigned By      Initials Name Provider Type    Harry Connor OT Occupational Therapist                   Goals/Plan       Row Name 12/05/23 1713          Bed Mobility Goal 1 (OT)    Activity/Assistive Device (Bed Mobility Goal 1, OT) bed mobility activities, all  -MP     [place] Level/Cues Needed (Bed Mobility Goal 1, OT) contact guard required  -     Time Frame (Bed Mobility Goal 1, OT) long term goal (LTG);2 weeks  -       Row Name 12/05/23 1713          Transfer Goal 1 (OT)    Activity/Assistive Device (Transfer Goal 1, OT) sit-to-stand/stand-to-sit;toilet  -     [place] Level/Cues  Needed (Transfer Goal 1, OT) contact guard required  -MP     Time Frame (Transfer Goal 1, OT) long term goal (LTG);2 weeks  -MP       Row Name 12/05/23 1713          Dressing Goal 1 (OT)    Activity/Device (Dressing Goal 1, OT) lower body dressing  -MP     [place]/Cues Needed (Dressing Goal 1, OT) contact guard required  -MP     Time Frame (Dressing Goal 1, OT) long term goal (LTG);2 weeks  -MP               User Key  (r) = Recorded By, (t) = Taken By, (c) = Cosigned By      Initials Name Provider Type    MP Harry Lynch OT Occupational Therapist                   Clinical Impression       Row Name 12/05/23 1709          Pain Assessment    Pretreatment Pain Rating 0/10 - no pain  -MP     Posttreatment Pain Rating 0/10 - no pain  -MP       Row Name 12/05/23 1709          Plan of Care Review    Plan of Care Reviewed With patient  -MP     Progress no change  -MP     Outcome Evaluation Pt. is 67 y/o female admit w/ decreased sensation L side, CT (-) and MRI pending. Pt. w/ impacts to memory from previous CVA, daughter present to assist w/ history. Pt. w/ CVA earlier this year requiring continued therapy to address LLE weakness, lives at home w/ spouse and maintains ADL independence at baseline. Pt. w/ LUE weakness this date 3+/5 and impacts ot sensation and FMC/GMC. Pt. also reports blurry vision L eye w/ difficulty tracking. Pt. provided min A for functional transfers, reports severe dizziness w/ standing activity and SBP drops 150 to 101. BP 170s/100s once return to sitting w/ symptoms resolved. Pt. not safe to d/c home at this time, recommend IP rehab pending progress to address aforementioned deficits.  -MP       Row Name 12/05/23 1709          Therapy Assessment/Plan (OT)    Rehab Potential (OT) good, to achieve stated therapy goals  -MP     Criteria for Skilled Therapeutic Interventions Met (OT) yes;meets criteria;skilled treatment is necessary  -MP     Therapy Frequency (OT) 5 times/wk  -MP       Row  Name 12/05/23 1709          Therapy Plan Review/Discharge Plan (OT)    Anticipated Discharge Disposition (OT) inpatient rehabilitation facility  -MP       Row Name 12/05/23 1709          Vital Signs    Pre Patient Position Sitting  -MP     Intra Patient Position Standing  -MP     Post Patient Position Sitting  -MP       Row Name 12/05/23 1709          Positioning and Restraints    Pre-Treatment Position in bed  -MP     Post Treatment Position bed  -MP     In Bed sitting EOB  -MP               User Key  (r) = Recorded By, (t) = Taken By, (c) = Cosigned By      Initials Name Provider Type    Harry Connor OT Occupational Therapist                   Outcome Measures       Row Name 12/05/23 1702          How much help from another person do you currently need...    Turning from your back to your side while in flat bed without using bedrails? 4  -AH     Moving from lying on back to sitting on the side of a flat bed without bedrails? 4  -AH     Moving to and from a bed to a chair (including a wheelchair)? 4  -AH     Standing up from a chair using your arms (e.g., wheelchair, bedside chair)? 4  -AH     Climbing 3-5 steps with a railing? 3  -AH     To walk in hospital room? 3  -AH     AM-PAC 6 Clicks Score (PT) 22  -AH     Highest Level of Mobility Goal 7 --> Walk 25 feet or more  -               User Key  (r) = Recorded By, (t) = Taken By, (c) = Cosigned By      Initials Name Provider Type     Julien Deal, RN Registered Nurse                    Occupational Therapy Education       Title: PT OT SLP Therapies (In Progress)       Topic: Occupational Therapy (In Progress)       Point: ADL training (Not Started)       Description:   Instruct learner(s) on proper safety adaptation and remediation techniques during self care or transfers.   Instruct in proper use of assistive devices.                  Learner Progress:  Not documented in this visit.              Point: Home exercise program (Not Started)        Description:   Instruct learner(s) on appropriate technique for monitoring, assisting and/or progressing therapeutic exercises/activities.                  Learner Progress:  Not documented in this visit.              Point: Precautions (Not Started)       Description:   Instruct learner(s) on prescribed precautions during self-care and functional transfers.                  Learner Progress:  Not documented in this visit.              Point: Body mechanics (Done)       Description:   Instruct learner(s) on proper positioning and spine alignment during self-care, functional mobility activities and/or exercises.                  Learning Progress Summary             Patient Acceptance, E,TB, VU by  at 12/5/2023 1715                                         User Key       Initials Effective Dates Name Provider Type Discipline    NIMISHA 06/16/21 -  Harry Lynch, JON Occupational Therapist OT                  OT Recommendation and Plan  Therapy Frequency (OT): 5 times/wk  Plan of Care Review  Plan of Care Reviewed With: patient  Progress: no change  Outcome Evaluation: Pt. is 69 y/o female admit w/ decreased sensation L side, CT (-) and MRI pending. Pt. w/ impacts to memory from previous CVA, daughter present to assist w/ history. Pt. w/ CVA earlier this year requiring continued therapy to address LLE weakness, lives at home w/ spouse and maintains ADL independence at baseline. Pt. w/ LUE weakness this date 3+/5 and impacts ot sensation and FMC/GMC. Pt. also reports blurry vision L eye w/ difficulty tracking. Pt. provided min A for functional transfers, reports severe dizziness w/ standing activity and SBP drops 150 to 101. BP 170s/100s once return to sitting w/ symptoms resolved. Pt. not safe to d/c home at this time, recommend IP rehab pending progress to address aforementioned deficits.     Time Calculation:         Time Calculation- OT       Row Name 12/05/23 1290             Time Calculation- OT    OT Start Time  1547  -      OT Stop Time 1614  -      OT Time Calculation (min) 27 min  -      Total Timed Code Minutes- OT 0 minute(s)  -      OT Received On 12/05/23  -      OT - Next Appointment 12/06/23  -      OT Goal Re-Cert Due Date 12/19/23  -                User Key  (r) = Recorded By, (t) = Taken By, (c) = Cosigned By      Initials Name Provider Type    Harry Connor OT Occupational Therapist                  Therapy Charges for Today       Code Description Service Date Service Provider Modifiers Qty    [id number]  OT EVAL LOW COMPLEXITY 4 12/5/2023 Harry Lynch OT GO 1                 Harry Lynch OT  12/5/2023

## 2023-12-05 NOTE — PLAN OF CARE
"Goal Outcome Evaluation:           ST consulted this date because RN dysphagia screen failed d/t L facial droop.       Patient seen this date for a clinical bedside swallow evaluation. Family at bedside. Pt reports facial droop is new as well as \"tongue feeling like its fat.\" Patient sitting upright in bed and observed self feeding trials of ice chips x1, water by cup and straw x1, applesauce x2, peaches x2, and cracker x1. Patient took appropriate sized bites and sips. Patient with complete labial seal/pull from spoon and straw with no anterior loss noted. Patient demonstrated clinically efficient mastication with rotary chew. No oral stasis noted following all consistencies. No clinical s/s of aspiration noted at any time.         Recommend:   Initiate regular diet with thin liquids   Oral Care BID/PRN   Meds whole with thin liquids     Precautions:  upright posture during/after eating  small bites of food and sips of liquid  general aspiration precautions  reflux precautions      ST to follow up next date to establish diet tolerance with further recs as indicated.         "

## 2023-12-05 NOTE — ED PROVIDER NOTES
Subjective   History of Present Illness  Patient 5-year-old female with a 26-hour history of left facial weakness and left arm weakness.  She denies neck pain although she has had mild headache.  Denies cough fever chest pain shortness of breath vomiting or other complaint.      Review of Systems    Past Medical History:   Diagnosis Date    Adrenal nodule 11/16/2016    FINDINGS: Mild hepatic steatosis may be present. Cholecystectomy. No biliary ductal dilatation. Negative pancreas, spleen, left adrenal gland, and kidneys. The right adrenal presumed adenoma has increased slightly, measuring 3 x 4 cm in diameter,  compared to 2 x 3.5 cm on the previous exam. The attenuation values are consistent with an adenoma. Done at [facility] in August 2018    Age-related osteoporosis without current pathological fracture 11/16/2016    Arthritis     Delayed surgical wound healing     Essential hypertension 11/16/2016    Gastroesophageal reflux disease with esophagitis 11/16/2016    Hepatic steatosis 11/16/2016    History of anemia     History of sepsis     FROM UTI    Hyperlipidemia 11/16/2016    Lisfranc's dislocation     LEFT WITH FRACTURES NONHEALING FOOT    Osteomyelitis of left foot 11/2020    RLS (restless legs syndrome)     Squamous cell skin cancer 2014    Excised by Dr. James    Thyroid nodule 10/18/2019    BENIGN    Type 2 diabetes mellitus with peripheral neuropathy 11/16/2016    Vitamin D deficiency 1/25/2019       Allergies   Allergen Reactions    Zofran [Ondansetron Hcl] Nausea And Vomiting     Does the opposite of its purpose    Prochlorperazine Other (See Comments)     CAUSED SEIZURE    Prochlorperazine Edisylate Hives    Prochlorperazine Maleate Irritability    Hydralazine Itching and Rash    Hydralazine Hcl Itching and Rash    Meperidine Itching and Swelling    Prochlorperazine Maleate Other (See Comments)     CAUSES SEIZURE       Past Surgical History:   Procedure Laterality Date    BREAST BIOPSY Left      7/2019. BENIGN    CARDIAC CATHETERIZATION Left 5/2/2021    Procedure: Cardiac Catheterization/Vascular Study;  Surgeon: Chacho Esteban MD;  Location: [facility] CATH INVASIVE LOCATION;  Service: Cardiovascular;  Laterality: Left;    CARDIAC CATHETERIZATION N/A 5/2/2021    Procedure: Intra-Aortic Baloon Pump Insertion;  Surgeon: Chacho Esteban MD;  Location: [facility] CATH INVASIVE LOCATION;  Service: Cardiovascular;  Laterality: N/A;    CATARACT EXTRACTION WITH INTRAOCULAR LENS IMPLANT Bilateral     CHOLECYSTECTOMY      COLONOSCOPY      CORONARY ARTERY BYPASS GRAFT N/A 5/2/2021    Procedure: CORONARY ARTERY BYPASS WITH INTERNAL MAMMARY ARTERY GRAFT;  Surgeon: Jr Rocael Alexander MD;  Location: [facility];  Service: Cardiothoracic;  Laterality: N/A;    FOOT FUSION Right 11/13/2020    Procedure: RIGHT OPEN TREATMENT LISFRANC INJURY, OPEN REDUCTION INTERNAL FIXATION MEDIAL/MIDDLE CUNEIFORM FRACTURE AND 2ND/3RD METATARSAL FRACTURE 1ST 2ND POSSIBLE 3RD TARSOMETATARSAL ARTHRODESIS INTERCUNEIFORM ARTHRODESIS CALCANEAL BONE GRAFT;  Surgeon: Mikhail Banks Jr., MD;  Location: [facility];  Service: Orthopedics;  Laterality: Right;    INCISION AND DRAINAGE LEG Right 1/8/2021    Procedure: RIGHT IRRIGATION AND DEBRIDEMENT OF FOOT WITH SECONDARY CLOSURE AND HARDWARE REMOVAL;  Surgeon: Mikhail Banks Jr., MD;  Location: [facility];  Service: Orthopedics;  Laterality: Right;    KNEE ARTHROSCOPY Bilateral     TOTAL ABDOMINAL HYSTERECTOMY      TRANSESOPHAGEAL ECHOCARDIOGRAM (EMILIA) N/A 5/2/2021    Procedure: TRANSESOPHAGEAL ECHOCARDIOGRAM;  Surgeon: Jr Rocael Alexander MD;  Location: [facility];  Service: Cardiothoracic;  Laterality: N/A;    UPPER GASTROINTESTINAL ENDOSCOPY  08/2016    US GUIDED FINE NEEDLE ASPIRATION  5/8/2020       Family History   Problem Relation Age of Onset    Diabetes Mother     COPD Mother     Hypertension Mother     Kidney disease Mother     Obesity Mother     Thyroid disease Mother     Diabetes Sister      Diabetes Sister     Diabetes Sister     Diabetes Sister     Malig Hyperthermia Neg Hx        Social History     Socioeconomic History    Marital status:    Tobacco Use    Smoking status: Never    Smokeless tobacco: Never   Vaping Use    Vaping Use: Never used   Substance and Sexual Activity    Alcohol use: Yes     Comment: socially     Drug use: Never    Sexual activity: Defer           Objective   Physical Exam  Neurologic exam shows patient in mild left facial weakness left arm weakness.  She has no other focal neurologic deficits.  Neck has no adenopathy JVD bruits meningismus or other abnormality.  Lungs are clear.  Heart has regular rate rhythm without murmur rub or gallop.  Chest is nontender.  Abdomen is soft nontender.  Extremity exam is no cyanosis or edema.  Procedures     My EKG interpretation shows normal sinus rhythm at a rate of 85 with no acute ST change      ED Course      Results for orders placed or performed during the hospital encounter of 12/05/23   Comprehensive Metabolic Panel    Specimen: Blood   Result Value Ref Range    Glucose 120 (H) 65 - 99 mg/dL    BUN 22 8 - 23 mg/dL    Creatinine 0.94 0.57 - 1.00 mg/dL    Sodium 140 136 - 145 mmol/L    Potassium 4.3 3.5 - 5.2 mmol/L    Chloride 105 98 - 107 mmol/L    CO2 25.0 22.0 - 29.0 mmol/L    Calcium 9.3 8.6 - 10.5 mg/dL    Total Protein 7.4 6.0 - 8.5 g/dL    Albumin 4.1 3.5 - 5.2 g/dL    ALT (SGPT) 9 1 - 33 U/L    AST (SGOT) 17 1 - 32 U/L    Alkaline Phosphatase 57 39 - 117 U/L    Total Bilirubin 0.3 0.0 - 1.2 mg/dL    Globulin 3.3 gm/dL    A/G Ratio 1.2 g/dL    BUN/Creatinine Ratio 23.4 7.0 - 25.0    Anion Gap 10.0 5.0 - 15.0 mmol/L    eGFR 65.4 >60.0 mL/min/1.73   Protime-INR    Specimen: Blood   Result Value Ref Range    Protime 10.7 9.6 - 11.7 Seconds    INR 0.98 0.93 - 1.10   CBC Auto Differential    Specimen: Blood   Result Value Ref Range    WBC 7.20 3.40 - 10.80 10*3/mm3    RBC 4.48 3.77 - 5.28 10*6/mm3    Hemoglobin 13.0 12.0  - 15.9 g/dL    Hematocrit 39.5 34.0 - 46.6 %    MCV 88.0 79.0 - 97.0 fL    MCH 28.9 26.6 - 33.0 pg    MCHC 32.8 31.5 - 35.7 g/dL    RDW 13.9 12.3 - 15.4 %    RDW-SD 42.4 37.0 - 54.0 fl    MPV 6.9 6.0 - 12.0 fL    Platelets 318 140 - 450 10*3/mm3    Neutrophil % 49.7 42.7 - 76.0 %    Lymphocyte % 39.3 19.6 - 45.3 %    Monocyte % 7.1 5.0 - 12.0 %    Eosinophil % 2.6 0.3 - 6.2 %    Basophil % 1.3 0.0 - 1.5 %    Neutrophils, Absolute 3.60 1.70 - 7.00 10*3/mm3    Lymphocytes, Absolute 2.80 0.70 - 3.10 10*3/mm3    Monocytes, Absolute 0.50 0.10 - 0.90 10*3/mm3    Eosinophils, Absolute 0.20 0.00 - 0.40 10*3/mm3    Basophils, Absolute 0.10 0.00 - 0.20 10*3/mm3    nRBC 0.0 0.0 - 0.2 /100 WBC   ECG 12 Lead ED Triage Standing Order; Stroke (Onset >12 hrs)   Result Value Ref Range    QT Interval 386 ms    QTC Interval 459 ms   Type & Screen    Specimen: Blood   Result Value Ref Range    ABO Type A     RH type Positive     Antibody Screen Negative     T&S Expiration Date 12/8/2023 11:59:59 PM    Green Top (Gel)   Result Value Ref Range    Extra Tube Hold for add-ons.    Lavender Top   Result Value Ref Range    Extra Tube hold for add-on    Gold Top - SST   Result Value Ref Range    Extra Tube Hold for add-ons.    Light Blue Top   Result Value Ref Range    Extra Tube Hold for add-ons.      CT Angiogram Head w AI Analysis of LVO    Result Date: 12/5/2023  Impression: CT angiogram demonstrates some irregular long segment moderate narrowing of the left M1 segment distally as well as some moderate to severe narrowing of adjacent left anterior temporal M2 branch. These findings appear somewhat more conspicuous than on comparison, possibly reflecting technical differences in scanning, versus progressive atherosclerotic change. There is otherwise no evidence of additional high-grade stenosis, occlusion or aneurysm in the head and neck. Electronically Signed: Jassi Mosley MD  12/5/2023 1:10 PM [facility]  Workstation ID: [id number]    CT  Angiogram Neck    Result Date: 12/5/2023  Impression: CT angiogram demonstrates some irregular long segment moderate narrowing of the left M1 segment distally as well as some moderate to severe narrowing of adjacent left anterior temporal M2 branch. These findings appear somewhat more conspicuous than on comparison, possibly reflecting technical differences in scanning, versus progressive atherosclerotic change. There is otherwise no evidence of additional high-grade stenosis, occlusion or aneurysm in the head and neck. Electronically Signed: Jassi Mosley MD  12/5/2023 1:10 PM EST  Workstation ID: [id number]    CT Head Without Contrast Stroke Protocol    Result Date: 12/5/2023  No acute intracranial abnormality. Electronically Signed: Antelmo Ramirez MD  12/5/2023 1:09 PM EST  Workstation ID: [id number]    XR Chest 1 View    Result Date: 12/5/2023  Impression: No acute process. Electronically Signed: Oneida Munoz MD  12/5/2023 12:29 PM EST  Workstation ID: [id number]                       Total (NIH Stroke Scale): 3                  Medical Decision Making  My interpretation patient CT scan head without contrast shows no intra hemorrhage mass effect or other abnormality.  Chest x-ray shows no cardiac effusion or infiltrate.  Per the radiologist CTA head and neck shows diffuse intracerebral plaque formation without obstruction.  Patient has no leukocytosis with no shift and no anemia.  Metabolic panel at baseline without renal insufficiency or electrolyte abnormality.  Patient is 26 hours out from her baseline therefore not a TNK candidate.  Patient be admitted to [facility] for further neurologic evaluation.  I did speak the on-call hospitalist.    Amount and/or Complexity of Data Reviewed  Labs: ordered. Decision-making details documented in ED Course.  Radiology: ordered and independent interpretation performed.  ECG/medicine tests: ordered and independent interpretation performed.    Risk  Prescription drug  management.  Decision regarding hospitalization.        Final diagnoses:   Cerebrovascular accident (CVA), unspecified mechanism       ED Disposition  ED Disposition       ED Disposition   Decision to Admit    Condition   --    Comment   --               No follow-up provider specified.       Medication List      No changes were made to your prescriptions during this visit.            Paolo Sosa MD  12/05/23 [id number]

## 2023-12-05 NOTE — H&P
[facility]   HISTORY AND PHYSICAL    Patient Name: Marge Mcghee  : 1955  MRN: [id number]  Primary Care Physician:  Traci Townsend APRN  Date of admission: 2023    Subjective   Subjective     Chief Complaint: Numbness in the left side of the face and left arm starting today    Stroke    This is a pleasant 68-year-old female with history of hypertension diabetes  Patient had some injection in her eye few days ago by a retinal specialist for some inflammation and has been causing pain in her eyes since  Pain mostly with moving the eye sideways  Patient had previous TIA x 2 and has been on Brilinta and aspirin  Woke up this morning with left-sided facial numbness was numbness in the left arm  Did not denied any slurred speech or blurred vision  She had mild weakness in the left arm but not noted on exam  Patient seen in the ER head CT and CTA admitted for neuro evaluation    Review of Systems   Significant for left facial and left arm numbness  She had pain in her eyes mostly photophobia and pain on moving her eyes sideways  Denied any chest pain fever chills nausea vomiting diarrhea  Mild and significant weakness in the left arm  No slurred speech or blurred vision otherwise  Personal History     Past Medical History:   Diagnosis Date    Adrenal nodule 2016    FINDINGS: Mild hepatic steatosis may be present. Cholecystectomy. No biliary ductal dilatation. Negative pancreas, spleen, left adrenal gland, and kidneys. The right adrenal presumed adenoma has increased slightly, measuring 3 x 4 cm in diameter,  compared to 2 x 3.5 cm on the previous exam. The attenuation values are consistent with an adenoma. Done at [facility] in 2018    Age-related osteoporosis without current pathological fracture 2016    Arthritis     Delayed surgical wound healing     Essential hypertension 2016    Gastroesophageal reflux disease with esophagitis 2016    Hepatic steatosis  11/16/2016    History of anemia     History of sepsis     FROM UTI    Hyperlipidemia 11/16/2016    Lisfranc's dislocation     LEFT WITH FRACTURES NONHEALING FOOT    Osteomyelitis of left foot 11/2020    RLS (restless legs syndrome)     Squamous cell skin cancer 2014    Excised by Dr. aJmes    Thyroid nodule 10/18/2019    BENIGN    Type 2 diabetes mellitus with peripheral neuropathy 11/16/2016    Vitamin D deficiency 1/25/2019       Past Surgical History:   Procedure Laterality Date    BREAST BIOPSY Left     7/2019. BENIGN    CARDIAC CATHETERIZATION Left 5/2/2021    Procedure: Cardiac Catheterization/Vascular Study;  Surgeon: Chacho Esteban MD;  Location: [facility] CATH INVASIVE LOCATION;  Service: Cardiovascular;  Laterality: Left;    CARDIAC CATHETERIZATION N/A 5/2/2021    Procedure: Intra-Aortic Baloon Pump Insertion;  Surgeon: Chacho Esteban MD;  Location: [facility] CATH INVASIVE LOCATION;  Service: Cardiovascular;  Laterality: N/A;    CATARACT EXTRACTION WITH INTRAOCULAR LENS IMPLANT Bilateral     CHOLECYSTECTOMY      COLONOSCOPY      CORONARY ARTERY BYPASS GRAFT N/A 5/2/2021    Procedure: CORONARY ARTERY BYPASS WITH INTERNAL MAMMARY ARTERY GRAFT;  Surgeon: Jr Rocael Alexander MD;  Location: [facility] CVOR;  Service: Cardiothoracic;  Laterality: N/A;    FOOT FUSION Right 11/13/2020    Procedure: RIGHT OPEN TREATMENT LISFRANC INJURY, OPEN REDUCTION INTERNAL FIXATION MEDIAL/MIDDLE CUNEIFORM FRACTURE AND 2ND/3RD METATARSAL FRACTURE 1ST 2ND POSSIBLE 3RD TARSOMETATARSAL ARTHRODESIS INTERCUNEIFORM ARTHRODESIS CALCANEAL BONE GRAFT;  Surgeon: Mikhail Banks Jr., MD;  Location: [facility];  Service: Orthopedics;  Laterality: Right;    INCISION AND DRAINAGE LEG Right 1/8/2021    Procedure: RIGHT IRRIGATION AND DEBRIDEMENT OF FOOT WITH SECONDARY CLOSURE AND HARDWARE REMOVAL;  Surgeon: Mikhail Banks Jr., MD;  Location: [facility] OR;  Service: Orthopedics;  Laterality: Right;    KNEE ARTHROSCOPY Bilateral     TOTAL ABDOMINAL  HYSTERECTOMY      TRANSESOPHAGEAL ECHOCARDIOGRAM (EMILIA) N/A 5/2/2021    Procedure: TRANSESOPHAGEAL ECHOCARDIOGRAM;  Surgeon: Jr Rocael Alexander MD;  Location: [facility];  Service: Cardiothoracic;  Laterality: N/A;    UPPER GASTROINTESTINAL ENDOSCOPY  08/2016    US GUIDED FINE NEEDLE ASPIRATION  5/8/2020       Family History: family history includes COPD in her mother; Diabetes in her mother, sister, sister, sister, and sister; Hypertension in her mother; Kidney disease in her mother; Obesity in her mother; Thyroid disease in her mother. Otherwise pertinent FHx was reviewed and not pertinent to current issue.    Social History:  reports that she has never smoked. She has never used smokeless tobacco. She reports current alcohol use. She reports that she does not use drugs.    Home Medications:  Alcohol Wipes, DULoxetine, Lancets, Pen Needles, Tirzepatide, amLODIPine, aspirin, atorvastatin, butalbital-acetaminophen-caffeine, diclofenac, famotidine, glucose blood, multivitamin with minerals, polyethylene glycol, ticagrelor, and traZODone    Allergies:  Allergies   Allergen Reactions    Zofran [Ondansetron Hcl] Nausea And Vomiting     Does the opposite of its purpose    Prochlorperazine Other (See Comments)     CAUSED SEIZURE    Prochlorperazine Edisylate Hives    Prochlorperazine Maleate Irritability    Hydralazine Itching and Rash    Hydralazine Hcl Itching and Rash    Meperidine Itching and Swelling    Prochlorperazine Maleate Other (See Comments)     CAUSES SEIZURE       Objective    Objective     Vitals:   Temp:  [97.8 °F (36.6 °C)] 97.8 °F (36.6 °C)  Heart Rate:  [85-86] 86  Resp:  [15-18] 15  BP: (151-186)/() 186/97    Physical Exam  Awake alert oriented x 3  Cranial nerves are intact  Neck supple  Chest clear auscultation bilaterally  Heart S1-S2 no murmur  Abdomen soft benign nontender bowel sounds positive  Extremities no edema  Neuroexam mild sensory changes in the left face but no facial droop  noted with motor power 5/5 bilaterally there is no ataxia  Result Review    Result Review:  I have personally reviewed the results from the time of this admission to 12/5/2023 14:00 EST and agree with these findings:  [x]  Laboratory list / accordion  []  Microbiology  [x]  Radiology  []  EKG/Telemetry   []  Cardiology/Vascular   []  Pathology  []  Old records  []  Other:  Most notable findings include:       Assessment & Plan   Assessment / Plan     Brief Patient Summary:  Marge Mcghee is a 68 y.o. female who is admitted for TIA  Patient has a history of previous TIA on Brilinta  History of diabetes      Active Hospital Problems:  Active Hospital Problems    Diagnosis     **TIA (transient ischemic attack)      Plan:   She will be admitted  Continue aspirin Brilinta  Patient had CT and CTA  Get MRI done while in hospital  Neuro consult with neurology on-call  Keep patient Accu-Chek sliding scale and resume rest of her home meds    DVT prophylaxis:  Medical DVT prophylaxis orders are present.    CODE STATUS:       Admission Status:  I believe this patient meets ip status.    Antwan Lam MD

## 2023-12-05 NOTE — Clinical Note
Level of Care: Telemetry [5]   Admitting Physician: RAFA GARZON [[id number]]   Bed Request Comments: terry  
no

## 2023-12-05 NOTE — THERAPY EVALUATION
Acute Care - Speech Language Pathology   Swallow Initial Evaluation  [place]     Patient Name: Marge Mcghee  : 1955  MRN: [id number]  Today's Date: 2023               Admit Date: 2023    Visit Dx:     ICD-10-CM ICD-9-CM   1. Cerebrovascular accident (CVA), unspecified mechanism  I63.9 434.91     Patient Active Problem List   Diagnosis    Type 2 diabetes mellitus with retinopathy, with long-term current use of insulin    Age-related osteoporosis without current pathological fracture    Hyperlipidemia    Hepatic steatosis    Gastroesophageal reflux disease with esophagitis    Adrenal nodule    Vitamin D deficiency    Thyroid nodule    UTI (urinary tract infection)    REINA (acute kidney injury)    Hyperglycemia    Acute right flank pain    Vomiting    Hypomagnesemia    Dehydration    Obesity (BMI 30-39.9)    Complicated migraine    Occipital neuralgia of left side    Polypharmacy    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    Lisfranc dislocation    Delayed surgical wound healing    Right foot infection    Chest pain    GERD (gastroesophageal reflux disease)    Stable angina pectoris    S/P CABG x 3    Encephalopathy, metabolic    Hypertensive emergency    Dysarthria    CVA (cerebral vascular accident)    Coronary artery disease involving coronary bypass graft of native heart without angina pectoris    Essential hypertension    HTN, goal below 130/80    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    Type 2 diabetes mellitus without complication, with long-term current use of insulin    TIA (transient ischemic attack)     Past Medical History:   Diagnosis Date    Adrenal nodule 2016    FINDINGS: Mild hepatic steatosis may be present. Cholecystectomy. No biliary ductal dilatation. Negative pancreas, spleen, left adrenal gland, and kidneys. The right adrenal presumed  adenoma has increased slightly, measuring 3 x 4 cm in diameter,  compared to 2 x 3.5 cm on the previous exam. The attenuation values are consistent with an adenoma. Done at [facility] in August 2018    Age-related osteoporosis without current pathological fracture 11/16/2016    Arthritis     Delayed surgical wound healing     Essential hypertension 11/16/2016    Gastroesophageal reflux disease with esophagitis 11/16/2016    Hepatic steatosis 11/16/2016    History of anemia     History of sepsis     FROM UTI    Hyperlipidemia 11/16/2016    Lisfranc's dislocation     LEFT WITH FRACTURES NONHEALING FOOT    Osteomyelitis of left foot 11/2020    RLS (restless legs syndrome)     Squamous cell skin cancer 2014    Excised by Dr. James    Thyroid nodule 10/18/2019    BENIGN    Type 2 diabetes mellitus with peripheral neuropathy 11/16/2016    Vitamin D deficiency 1/25/2019     Past Surgical History:   Procedure Laterality Date    BREAST BIOPSY Left     7/2019. BENIGN    CARDIAC CATHETERIZATION Left 5/2/2021    Procedure: Cardiac Catheterization/Vascular Study;  Surgeon: Chacho Esteban MD;  Location: [facility] CATH INVASIVE LOCATION;  Service: Cardiovascular;  Laterality: Left;    CARDIAC CATHETERIZATION N/A 5/2/2021    Procedure: Intra-Aortic Baloon Pump Insertion;  Surgeon: Chacho Esteban MD;  Location: [facility] CATH INVASIVE LOCATION;  Service: Cardiovascular;  Laterality: N/A;    CATARACT EXTRACTION WITH INTRAOCULAR LENS IMPLANT Bilateral     CHOLECYSTECTOMY      COLONOSCOPY      CORONARY ARTERY BYPASS GRAFT N/A 5/2/2021    Procedure: CORONARY ARTERY BYPASS WITH INTERNAL MAMMARY ARTERY GRAFT;  Surgeon: Jr Rocael Alexander MD;  Location: [facility] CVOR;  Service: Cardiothoracic;  Laterality: N/A;    FOOT FUSION Right 11/13/2020    Procedure: RIGHT OPEN TREATMENT LISFRANC INJURY, OPEN REDUCTION INTERNAL FIXATION MEDIAL/MIDDLE CUNEIFORM FRACTURE AND 2ND/3RD METATARSAL FRACTURE 1ST 2ND POSSIBLE 3RD TARSOMETATARSAL ARTHRODESIS  INTERCUNEIFORM ARTHRODESIS CALCANEAL BONE GRAFT;  Surgeon: Mikhail Banks Jr., MD;  Location:  [facility];  Service: Orthopedics;  Laterality: Right;    INCISION AND DRAINAGE LEG Right 1/8/2021    Procedure: RIGHT IRRIGATION AND DEBRIDEMENT OF FOOT WITH SECONDARY CLOSURE AND HARDWARE REMOVAL;  Surgeon: Mikhail Banks Jr., MD;  Location: [facility] MAIN OR;  Service: Orthopedics;  Laterality: Right;    KNEE ARTHROSCOPY Bilateral     TOTAL ABDOMINAL HYSTERECTOMY      TRANSESOPHAGEAL ECHOCARDIOGRAM (EMILIA) N/A 5/2/2021    Procedure: TRANSESOPHAGEAL ECHOCARDIOGRAM;  Surgeon: Jr Rocael Alexander MD;  Location: [facility];  Service: Cardiothoracic;  Laterality: N/A;    UPPER GASTROINTESTINAL ENDOSCOPY  08/2016    US GUIDED FINE NEEDLE ASPIRATION  5/8/2020       SLP Recommendation and Plan  SLP Swallowing Diagnosis: (P) swallow WFL/no suspected pharyngeal impairment (12/05/23 1500)  SLP Diet Recommendation: (P) regular textures, thin liquids (12/05/23 1500)  Recommended Precautions and Strategies: (P) upright posture during/after eating, small bites of food and sips of liquid, general aspiration precautions, reflux precautions (12/05/23 1500)  SLP Rec. for Method of Medication Administration: (P) meds whole, with thin liquids, as tolerated (12/05/23 1500)     Monitor for Signs of Aspiration: (P) yes, notify SLP if any concerns (12/05/23 1500)     Swallow Criteria for Skilled Therapeutic Interventions Met: (P) demonstrates skilled criteria (12/05/23 1500)     Rehab Potential/Prognosis, Swallowing: (P) good, to achieve stated therapy goals (12/05/23 1500)  Therapy Frequency (Swallow): (P) PRN (12/05/23 1500)  Predicted Duration Therapy Intervention (Days): (P) until discharge (12/05/23 1500)  Oral Care Recommendations: (P) Oral Care BID/PRN (12/05/23 1500)                                      Oral Care Recommendations: (P) Oral Care BID/PRN (12/05/23 1500)           SWALLOW EVALUATION (last 72 hours)       SLP Adult Swallow  "Evaluation       Row Name 12/05/23 1500       Rehab Evaluation    Document Type evaluation (P)   -KP    Subjective Information complains of;pain (P)   eye pain  -KP    Patient Observations alert;cooperative;agree to therapy (P)   -KP    Patient Effort excellent (P)   -KP    Symptoms Noted During/After Treatment none (P)   -KP       General Information    Patient Profile Reviewed yes (P)   -KP    Pertinent History Of Current Problem Per H&P, \"This is a pleasant 68-year-old female with history of hypertension diabetes  Patient had some injection in her eye few days ago by a retinal specialist for some inflammation and has been causing pain in her eyes since  Pain mostly with moving the eye sideways  Patient had previous TIA x 2 and has been on Brilinta and aspirin  Woke up this morning with left-sided facial numbness was numbness in the left arm  Did not denied any slurred speech or blurred vision  She had mild weakness in the left arm but not noted on exam  Patient seen in the ER head CT and CTA admitted for neuro evaluation\"      CXR 12/5  Impression:  No acute process.    CT HEAD 12/5  IMPRESSION:  No acute intracranial abnormality    CTA HEAD AND NECK  Impression:  CT angiogram demonstrates some irregular long segment moderate narrowing of the left M1 segment distally as well as some moderate to severe narrowing of adjacent left anterior temporal M2 branch. These findings appear somewhat more conspicuous than on   comparison, possibly reflecting technical differences in scanning, versus progressive atherosclerotic change. There is otherwise no evidence of additional high-grade stenosis, occlusion or aneurysm in the head and neck.      ST consulted this date because RN dysphagia screen failed d/t L facial droop.         (P)   -KP    Current Method of Nutrition NPO (P)   -KP    Prior Level of Function-Swallowing no diet consistency restrictions (P)   -KP    Plans/Goals Discussed with patient;family (P)   -KP    " "Barriers to Rehab none identified (P)   -KP       Oral Motor Structure and Function    Dentition Assessment natural, present and adequate (P)   -KP       Oral Musculature and Cranial Nerve Assessment    Oral Motor General Assessment WFL (P)   -KP    Oral Labial or Buccal Impairment, Detail, Cranial Nerve VII (Facial): left labial droop (P)   -KP       General Eating/Swallowing Observations    Respiratory Support Currently in Use room air (P)   -KP    Eating/Swallowing Skills self-fed (P)   -KP    Positioning During Eating upright 90 degree (P)   -KP    Utensils Used spoon;cup;straw (P)   -KP    Consistencies Trialed ice chips;thin liquids;pureed;mixed consistency;regular textures (P)   -KP       Clinical Swallow Eval    Oral Prep Phase WFL (P)   -KP    Oral Transit WFL (P)   -KP    Oral Residue WFL (P)   -KP    Pharyngeal Phase no overt signs/symptoms of pharyngeal impairment (P)   -KP    Clinical Swallow Evaluation Summary Patient seen this date for a clinical bedside swallow evaluation. Family at bedside. Pt reports facial droop is new as well as \"tongue feeling like its fat.\" Patient sitting upright in bed and observed self feeding trials of ice chips x1, water by cup and straw x1, applesauce x2, peaches x2, and cracker x1. Patient took appropriate sized bites and sips. Patient with complete labial seal/pull from spoon and straw with no anterior loss noted. Patient demonstrated clinically efficient mastication with rotary chew. No oral stasis noted following all consistencies. No clinical s/s of aspiration noted at any time.         Recommend:   Initiate regular diet with thin liquids   Oral Care BID/PRN   Meds whole with thin liquids     Precautions:  upright posture during/after eating  small bites of food and sips of liquid  general aspiration precautions  reflux precautions      ST to follow up next date to establish diet tolerance with further recs as indicated.       (P)   -KP       SLP Evaluation Clinical " Impression    SLP Swallowing Diagnosis swallow WFL/no suspected pharyngeal impairment (P)   -    Functional Impact no impact on function (P)   -    Rehab Potential/Prognosis, Swallowing good, to achieve stated therapy goals (P)   -    Swallow Criteria for Skilled Therapeutic Interventions Met demonstrates skilled criteria (P)   -       Recommendations    Therapy Frequency (Swallow) PRN (P)   -    Predicted Duration Therapy Intervention (Days) until discharge (P)   -    SLP Diet Recommendation regular textures;thin liquids (P)   -    Recommended Precautions and Strategies upright posture during/after eating;small bites of food and sips of liquid;general aspiration precautions;reflux precautions (P)   -    Oral Care Recommendations Oral Care BID/PRN (P)   -    SLP Rec. for Method of Medication Administration meds whole;with thin liquids;as tolerated (P)   -    Monitor for Signs of Aspiration yes;notify SLP if any concerns (P)   -       Swallow Goals (SLP)    Swallow LTGs Swallow Long Term Goal (free text) (P)   -    Swallow STGs diet tolerance goal selection (SLP) (P)   -    Diet Tolerance Goal Selection (SLP) Swallow Short Term Goal 1 (P)   -       (LTG) Swallow    (LTG) Swallow Pt will demonstrate tolerance of recommended diet without unresolved pocketing or s/s aspiration as evidenced via direct observation by SLP (P)   -    Time Frame (Swallow Long Term Goal) by discharge (P)   -    Progress/Outcomes (Swallow Long Term Goal) new goal (P)   -       (STG) Swallow 1    (STG) Swallow 1 further goals if indicated (P)   -    Time Frame (Swallow Short Term Goal 1) 1 week (P)   -    Progress/Outcomes (Swallow Short Term Goal 1) new goal (P)   -              User Key  (r) = Recorded By, (t) = Taken By, (c) = Cosigned By      Initials Name Effective Dates     Milly Reynoso, Speech Therapy Student 08/23/23 -                     EDUCATION  The patient has been educated in the  following areas:   Safe swallow strategies, ST POC .        SLP GOALS       Row Name 12/05/23 1500       (LTG) Swallow    (LTG) Swallow Pt will demonstrate tolerance of recommended diet without unresolved pocketing or s/s aspiration as evidenced via direct observation by SLP (P)   -KP    Time Frame (Swallow Long Term Goal) by discharge (P)   -KP    Progress/Outcomes (Swallow Long Term Goal) new goal (P)   -KP       (STG) Swallow 1    (STG) Swallow 1 further goals if indicated (P)   -KP    Time Frame (Swallow Short Term Goal 1) 1 week (P)   -KP    Progress/Outcomes (Swallow Short Term Goal 1) new goal (P)   -KP              User Key  (r) = Recorded By, (t) = Taken By, (c) = Cosigned By      Initials Name Provider Type    Milly Alcala Speech Therapy Student SLP Student                       Time Calculation:                Milly Reynoso Speech Therapy Student  12/5/2023

## 2023-12-05 NOTE — DISCHARGE PLACEMENT REQUEST
"Maxwell Moreno (68 y.o. Female)       Date of Birth   1955    Social Security Number       Address   [address] [place] [address] [place] [facility]    Home Phone   [phone]    MRN   [id number]       Denominational   None    Marital Status                               Admission Date   12/5/23    Admission Type   Emergency    Admitting Provider   Antwan Lam MD    Attending Provider   Paool Sosa MD    Department, Room/Bed   [facility] EMERGENCY DEPARTMENT, 07/07       Discharge Date       Discharge Disposition       Discharge Destination                                 Attending Provider: Paolo Sosa MD    Allergies: Zofran [Ondansetron Hcl], Prochlorperazine, Prochlorperazine Edisylate, Prochlorperazine Maleate, Hydralazine, Hydralazine Hcl, Meperidine, Prochlorperazine Maleate    Isolation: None   Infection: None   Code Status: CPR    Ht: 162.6 cm (64\")   Wt: 73.9 kg (163 lb)    Admission Cmt: None   Principal Problem: TIA (transient ischemic attack) [G45.9]                   Active Insurance as of 12/5/2023       Primary Coverage       Payor Plan Insurance Group Employer/Plan Group    HUMANA MEDICARE REPLACEMENT HUMANA MEDICARE REPLACEMENT [id number]       Payor Plan Address Payor Plan Phone Number Payor Plan Fax Number Effective Dates    [address] [address] [phone]  2/1/2022 - None Entered    [facility] [id number]         Subscriber Name Subscriber Birth Date Member ID       MAXWELL MORENO 1955 [id number]               Secondary Coverage       Payor Plan Insurance Group Employer/Plan Group    G[employer] LIFE G[employer] SUP        Payor Plan Address Payor Plan Phone Number Payor Plan Fax Number Effective Dates    [address]   1/1/2019 - None Entered    [facility] [id number]         Subscriber Name Subscriber Birth Date Member ID       MAXWELL MORENO 1955 [id number]                     Emergency Contacts        (Rel.) Home Phone Work Phone Mobile Phone    " Kimberly Salinas (POA) (Daughter) -- -- [phone]    Tonia Rockwell (Friend) [phone] -- --    SILVIO MORENO (Spouse) -- -- [phone]

## 2023-12-06 LAB
CHOLEST SERPL-MCNC: 162 MG/DL (ref 0–200)
CRP SERPL-MCNC: <0.3 MG/DL (ref 0–0.5)
ERYTHROCYTE [SEDIMENTATION RATE] IN BLOOD: 24 MM/HR (ref 0–30)
GLUCOSE BLDC GLUCOMTR-MCNC: 136 MG/DL (ref 70–105)
GLUCOSE BLDC GLUCOMTR-MCNC: 95 MG/DL (ref 70–105)
HBA1C MFR BLD: 7.4 % (ref 4.8–5.6)
HDLC SERPL-MCNC: 48 MG/DL (ref 40–60)
LDLC SERPL CALC-MCNC: 83 MG/DL (ref 0–100)
LDLC/HDLC SERPL: 1.62 {RATIO}
QT INTERVAL: 386 MS
QTC INTERVAL: 459 MS
TRIGL SERPL-MCNC: 181 MG/DL (ref 0–150)
TSH SERPL DL<=0.05 MIU/L-ACNC: 1.2 UIU/ML (ref 0.27–4.2)
VIT B12 BLD-MCNC: 410 PG/ML (ref 211–946)
VLDLC SERPL-MCNC: 31 MG/DL (ref 5–40)

## 2023-12-06 PROCEDURE — 25010000002 ENOXAPARIN PER 10 MG: Performed by: INTERNAL MEDICINE

## 2023-12-06 PROCEDURE — 97162 PT EVAL MOD COMPLEX 30 MIN: CPT

## 2023-12-06 PROCEDURE — 82948 REAGENT STRIP/BLOOD GLUCOSE: CPT

## 2023-12-06 PROCEDURE — 86140 C-REACTIVE PROTEIN: CPT | Performed by: INTERNAL MEDICINE

## 2023-12-06 PROCEDURE — 25010000002 MORPHINE PER 10 MG: Performed by: HOSPITALIST

## 2023-12-06 PROCEDURE — 99223 1ST HOSP IP/OBS HIGH 75: CPT | Performed by: NURSE PRACTITIONER

## 2023-12-06 PROCEDURE — 25010000002 DEXAMETHASONE PER 1 MG: Performed by: NURSE PRACTITIONER

## 2023-12-06 RX ORDER — DEXAMETHASONE SODIUM PHOSPHATE 4 MG/ML
10 INJECTION, SOLUTION INTRA-ARTICULAR; INTRALESIONAL; INTRAMUSCULAR; INTRAVENOUS; SOFT TISSUE ONCE
Status: COMPLETED | OUTPATIENT
Start: 2023-12-06 | End: 2023-12-06

## 2023-12-06 RX ADMIN — POLYETHYLENE GLYCOL 3350 17 G: 17 POWDER, FOR SOLUTION ORAL at 09:09

## 2023-12-06 RX ADMIN — AMLODIPINE BESYLATE 10 MG: 5 TABLET ORAL at 09:10

## 2023-12-06 RX ADMIN — MORPHINE SULFATE 4 MG: 4 INJECTION, SOLUTION INTRAMUSCULAR; INTRAVENOUS at 13:35

## 2023-12-06 RX ADMIN — DEXAMETHASONE SODIUM PHOSPHATE 10 MG: 4 INJECTION, SOLUTION INTRAMUSCULAR; INTRAVENOUS at 16:30

## 2023-12-06 RX ADMIN — ATORVASTATIN CALCIUM 80 MG: 40 TABLET, FILM COATED ORAL at 20:00

## 2023-12-06 RX ADMIN — THERA TABS 1 TABLET: TAB at 09:10

## 2023-12-06 RX ADMIN — DULOXETINE HYDROCHLORIDE 60 MG: 30 CAPSULE, DELAYED RELEASE ORAL at 09:10

## 2023-12-06 RX ADMIN — TICAGRELOR 90 MG: 90 TABLET ORAL at 09:10

## 2023-12-06 RX ADMIN — ENOXAPARIN SODIUM 40 MG: 100 INJECTION SUBCUTANEOUS at 16:30

## 2023-12-06 RX ADMIN — MORPHINE SULFATE 4 MG: 4 INJECTION, SOLUTION INTRAMUSCULAR; INTRAVENOUS at 19:48

## 2023-12-06 RX ADMIN — ASPIRIN 325 MG ORAL TABLET 325 MG: 325 PILL ORAL at 09:10

## 2023-12-06 RX ADMIN — TICAGRELOR 90 MG: 90 TABLET ORAL at 20:01

## 2023-12-06 RX ADMIN — Medication 10 ML: at 09:12

## 2023-12-06 RX ADMIN — MORPHINE SULFATE 4 MG: 4 INJECTION, SOLUTION INTRAMUSCULAR; INTRAVENOUS at 05:29

## 2023-12-06 RX ADMIN — MORPHINE SULFATE 4 MG: 4 INJECTION, SOLUTION INTRAMUSCULAR; INTRAVENOUS at 00:34

## 2023-12-06 RX ADMIN — Medication 10 ML: at 20:01

## 2023-12-06 RX ADMIN — FAMOTIDINE 20 MG: 20 TABLET, FILM COATED ORAL at 16:30

## 2023-12-06 RX ADMIN — DOCUSATE SODIUM 50 MG AND SENNOSIDES 8.6 MG 2 TABLET: 8.6; 5 TABLET, FILM COATED ORAL at 09:10

## 2023-12-06 RX ADMIN — DOCUSATE SODIUM 50 MG AND SENNOSIDES 8.6 MG 2 TABLET: 8.6; 5 TABLET, FILM COATED ORAL at 20:00

## 2023-12-06 NOTE — PROGRESS NOTES
[facility] Complaint: Numbness in the left side of the face and left arm starting today     Stroke     This is a pleasant 68-year-old female with history of hypertension diabetes  Patient had some injection in her eye few days ago by a retinal specialist for some inflammation and has been causing pain in her eyes since  Pain mostly with moving the eye sideways  Patient had previous TIA x 2 and has been on Brilinta and aspirin  Woke up this morning with left-sided facial numbness was numbness in the left arm  Did not denied any slurred speech or blurred vision  She had mild weakness in the left arm but not noted on exam  Patient seen in the ER head CT and CTA admitted for neuro evaluation     Review of Systems   Significant for left facial and left arm numbness  She had pain in her eyes mostly photophobia and pain on moving her eyes sideways  Denied any chest pain fever chills nausea vomiting diarrhea  Mild and significant weakness in the left arm  No slurred speech or blurred vision otherwise    12/6  Patient sitting in her chair  Feeling no energy  Awaiting MRI   Neuro evaluations pending  No focal neurodeficits noted  Hemodynamically stable  Personal History      Medical History        Past Medical History:   Diagnosis Date    Adrenal nodule 11/16/2016     FINDINGS: Mild hepatic steatosis may be present. Cholecystectomy. No biliary ductal dilatation. Negative pancreas, spleen, left adrenal gland, and kidneys. The right adrenal presumed adenoma has increased slightly, measuring 3 x 4 cm in diameter,  compared to 2 x 3.5 cm on the previous exam. The attenuation values are consistent with an adenoma. Done at [facility] in August 2018    Age-related osteoporosis without current pathological fracture 11/16/2016    Arthritis      Delayed surgical wound healing      Essential hypertension 11/16/2016    Gastroesophageal reflux disease with esophagitis 11/16/2016    Hepatic steatosis 11/16/2016    History of anemia       History of sepsis       FROM UTI    Hyperlipidemia 11/16/2016    Lisfranc's dislocation       LEFT WITH FRACTURES NONHEALING FOOT    Osteomyelitis of left foot 11/2020    RLS (restless legs syndrome)      Squamous cell skin cancer 2014     Excised by Dr. James    Thyroid nodule 10/18/2019     BENIGN    Type 2 diabetes mellitus with peripheral neuropathy 11/16/2016    Vitamin D deficiency 1/25/2019            Surgical History         Past Surgical History:   Procedure Laterality Date    BREAST BIOPSY Left       7/2019. BENIGN    CARDIAC CATHETERIZATION Left 5/2/2021     Procedure: Cardiac Catheterization/Vascular Study;  Surgeon: Chacho Esteban MD;  Location: [facility] CATH INVASIVE LOCATION;  Service: Cardiovascular;  Laterality: Left;    CARDIAC CATHETERIZATION N/A 5/2/2021     Procedure: Intra-Aortic Baloon Pump Insertion;  Surgeon: Chacho Esteban MD;  Location: [facility] CATH INVASIVE LOCATION;  Service: Cardiovascular;  Laterality: N/A;    CATARACT EXTRACTION WITH INTRAOCULAR LENS IMPLANT Bilateral      CHOLECYSTECTOMY        COLONOSCOPY        CORONARY ARTERY BYPASS GRAFT N/A 5/2/2021     Procedure: CORONARY ARTERY BYPASS WITH INTERNAL MAMMARY ARTERY GRAFT;  Surgeon: Jr Rocael Alexander MD;  Location: [facility] CVOR;  Service: Cardiothoracic;  Laterality: N/A;    FOOT FUSION Right 11/13/2020     Procedure: RIGHT OPEN TREATMENT LISFRANC INJURY, OPEN REDUCTION INTERNAL FIXATION MEDIAL/MIDDLE CUNEIFORM FRACTURE AND 2ND/3RD METATARSAL FRACTURE 1ST 2ND POSSIBLE 3RD TARSOMETATARSAL ARTHRODESIS INTERCUNEIFORM ARTHRODESIS CALCANEAL BONE GRAFT;  Surgeon: Mikhail Banks Jr., MD;  Location: [facility];  Service: Orthopedics;  Laterality: Right;    INCISION AND DRAINAGE LEG Right 1/8/2021     Procedure: RIGHT IRRIGATION AND DEBRIDEMENT OF FOOT WITH SECONDARY CLOSURE AND HARDWARE REMOVAL;  Surgeon: Mikhail Banks Jr., MD;  Location: [facility] OR;  Service: Orthopedics;  Laterality: Right;    KNEE ARTHROSCOPY Bilateral      TOTAL  ABDOMINAL HYSTERECTOMY        TRANSESOPHAGEAL ECHOCARDIOGRAM (EMILIA) N/A 5/2/2021     Procedure: TRANSESOPHAGEAL ECHOCARDIOGRAM;  Surgeon: Jr Rocael Alexander MD;  Location: [facility];  Service: Cardiothoracic;  Laterality: N/A;    UPPER GASTROINTESTINAL ENDOSCOPY   08/2016    US GUIDED FINE NEEDLE ASPIRATION   5/8/2020            Family History: family history includes COPD in her mother; Diabetes in her mother, sister, sister, sister, and sister; Hypertension in her mother; Kidney disease in her mother; Obesity in her mother; Thyroid disease in her mother. Otherwise pertinent FHx was reviewed and not pertinent to current issue.     Social History:  reports that she has never smoked. She has never used smokeless tobacco. She reports current alcohol use. She reports that she does not use drugs.     Home Medications:  Alcohol Wipes, DULoxetine, Lancets, Pen Needles, Tirzepatide, amLODIPine, aspirin, atorvastatin, butalbital-acetaminophen-caffeine, diclofenac, famotidine, glucose blood, multivitamin with minerals, polyethylene glycol, ticagrelor, and traZODone     Allergies:        Allergies   Allergen Reactions    Zofran [Ondansetron Hcl] Nausea And Vomiting       Does the opposite of its purpose    Prochlorperazine Other (See Comments)       CAUSED SEIZURE    Prochlorperazine Edisylate Hives    Prochlorperazine Maleate Irritability    Hydralazine Itching and Rash    Hydralazine Hcl Itching and Rash    Meperidine Itching and Swelling    Prochlorperazine Maleate Other (See Comments)       CAUSES SEIZURE               Objective   Objective      Vitals:   Temp:  [97.8 °F (36.6 °C)] 97.8 °F (36.6 °C)  Heart Rate:  [85-86] 86  Resp:  [15-18] 15  BP: (151-186)/() 186/97     Physical Exam  Awake alert oriented x 3  Cranial nerves are intact  Neck supple  Chest clear auscultation bilaterally  Heart S1-S2 no murmur  Abdomen soft benign nontender bowel sounds positive  Extremities no edema  Neuroexam mild sensory  changes in the left face but no facial droop noted with motor power 5/5 bilaterally there is no ataxia        Result Review   Result Review:  I have personally reviewed the results from the time of this admission to 12/5/2023 14:00 EST and agree with these findings:  [x]  Laboratory list / accordion  []  Microbiology  [x]  Radiology  []  EKG/Telemetry   []  Cardiology/Vascular   []  Pathology  []  Old records  []  Other:  Most notable findings include:               Assessment & Plan  Assessment / Plan      Brief Patient Summary:  Marge Mcghee is a 68 y.o. female who is admitted for TIA  Patient has a history of previous TIA on Brilinta  History of diabetes        Active Hospital Problems:       Active Hospital Problems     Diagnosis      **TIA (transient ischemic attack)        Plan:   She will be admitted  Continue aspirin Brilinta  Patient had CT and CTA  Get MRI done while in hospital  Neuro consult with neurology on-call  Keep patient Accu-Chek sliding scale and resume rest of her home meds     DVT prophylaxis:  Medical DVT prophylaxis orders are present.     CODE STATUS:    Admission Status:  I believe this patient meets ip status.     Antwan Lam MD

## 2023-12-06 NOTE — PLAN OF CARE
Goal Outcome Evaluation:  Plan of Care Reviewed With: patient              69 y/o F who presented with L facial weakness and L UE weakness. Awaiting MRI. PMH diabetes and previous TIA. Patient is a  on a special needs bus. Spouse works full time as a . They live in a multilevel home with 5 [address], bedroom on second floor. Patient reports increase in falls recently and attributes this to her neuropathy. She reports she probably should be using an AD but she does not. She is limited this date due to lightheadedness. /67 in standing. Patient requests to sit after less than 2 min standing. Able to progress to 20' ambulation with RW. Requires SBA for bed mobility, CGA to min A for transfer and ambulation. She demos weakness in L LE 3/5 compared to 5/5 R LE. She reports diminished sensation in L side. Patient is at increased risk for falls and has experienced a decline from her baseline function. Recommending acute rehab at d/c.     Anticipated Discharge Disposition (PT): inpatient rehabilitation facility

## 2023-12-06 NOTE — CONSULTS
"Diabetes Education  Assessment/Teaching    Patient Name:  Marge Mcghee  YOB: 1955  MRN: [id number]  Admit Date:  12/5/2023      Assessment Date:  12/6/2023  Flowsheet Row Most Recent Value   General Information     Referral From: , MD order  [MD consult per stroke protocol and on 12/5/2023 A1c was 7.4%.]   Height 162.6 cm (64.02\")   Height Method Stated   Weight 71.4 kg (157 lb 6.5 oz)   Weight Method Bed scale   Pregnancy Assessment    Diabetes History    What type of diabetes do you have? Type 2   Current DM knowledge fair   Have you had diabetes education/teaching in the past? yes   When and where was your diabetes education? Inpatient hospitalizations at [facility] with last one being 8/2/2023   Do you test your blood sugar at home? yes   Frequency of checks once a week   Meter type unsure of name but about 3 years old   Who performs the test? patient   Typical readings 120-150   Education Preferences    What areas of diabetes would you like to learn about? diabetes complications, exercise information   Nutrition Information    Assessment Topics    Being Active - Assessment Needs education   Reducing Risk - Assessment Needs education   DM Goals    Being Active - Goal Today   Reducing Risk - Goal Today            Flowsheet Row Most Recent Value   DM Education Needs    Meter Has own   Frequency of Testing Weekly   Medication Other injectables  [At home patient stated that she takes Mounjaro 5 mg weekly on Wednesdays.]   Reducing Risks A1C testing  [On 12/5/2023 A1c was 7.4%.]   Discharge Plan Home   Motivation Moderate   Teaching Method Discussion, Handouts   Patient Response Verbalized understanding              Other Comments:  A1c info sheet given with discussion on A1c target and healthy blood sugar range. Patient stated that her  gives her Mounjaro shot. Patient stated she has lost 17 pounds this past year since starting on Mounjaro. Chocolate is the patient's downfall. Discussed with " patient planning for treats by having a low carb meal. Discussed with patient the importance of exercise in helping to control blood sugar. Patient has no further questions or concerns related to diabetes at this time.        Electronically signed by:  Ivelisse Owens RN  12/06/23 15:51 EST

## 2023-12-06 NOTE — PLAN OF CARE
Problem: Adult Inpatient Plan of Care  Goal: Plan of Care Review  Outcome: Ongoing, Progressing  Goal: Patient-Specific Goal (Individualized)  Outcome: Ongoing, Progressing  Goal: Absence of Hospital-Acquired Illness or Injury  Outcome: Ongoing, Progressing  Intervention: Identify and Manage Fall Risk  Recent Flowsheet Documentation  Taken 12/6/2023 1128 by Lipan, Rere A, LPN  Safety Promotion/Fall Prevention:   activity supervised   assistive device/personal items within reach   clutter free environment maintained   fall prevention program maintained   nonskid shoes/slippers when out of bed   room organization consistent   safety round/check completed  Taken 12/6/2023 1000 by Lipan, Rere A, LPN  Safety Promotion/Fall Prevention:   activity supervised   assistive device/personal items within reach   clutter free environment maintained   fall prevention program maintained   nonskid shoes/slippers when out of bed   room organization consistent   safety round/check completed  Taken 12/6/2023 0912 by Lipan, Rere A, LPN  Safety Promotion/Fall Prevention:   activity supervised   assistive device/personal items within reach   clutter free environment maintained   fall prevention program maintained   nonskid shoes/slippers when out of bed   room organization consistent   safety round/check completed  Taken 12/6/2023 0800 by Rere Hernandez LPN  Safety Promotion/Fall Prevention:   activity supervised   assistive device/personal items within reach   clutter free environment maintained   fall prevention program maintained   nonskid shoes/slippers when out of bed   room organization consistent   safety round/check completed  Intervention: Prevent and Manage VTE (Venous Thromboembolism) Risk  Recent Flowsheet Documentation  Taken 12/6/2023 1128 by Rere Hernandez LPN  VTE Prevention/Management:   bilateral   sequential compression devices off   patient refused intervention  Range of Motion: active ROM  (range of motion) encouraged  Taken 12/6/2023 0912 by Rere Hernandez LPN  VTE Prevention/Management:   bilateral   sequential compression devices off   patient refused intervention  Range of Motion: active ROM (range of motion) encouraged  Intervention: Prevent Infection  Recent Flowsheet Documentation  Taken 12/6/2023 1128 by Rere Hernandez LPN  Infection Prevention: hand hygiene promoted  Taken 12/6/2023 1000 by Rere Hernandez LPN  Infection Prevention: hand hygiene promoted  Taken 12/6/2023 0912 by Rere Hernandez LPN  Infection Prevention: hand hygiene promoted  Taken 12/6/2023 0800 by Rere Hernandez LPN  Infection Prevention: hand hygiene promoted  Goal: Optimal Comfort and Wellbeing  Outcome: Ongoing, Progressing  Intervention: Provide Person-Centered Care  Recent Flowsheet Documentation  Taken 12/6/2023 1128 by Rere Hernandez LPN  Trust Relationship/Rapport:   care explained   thoughts/feelings acknowledged  Taken 12/6/2023 0912 by Rere Hernandez LPN  Trust Relationship/Rapport:   care explained   thoughts/feelings acknowledged  Goal: Readiness for Transition of Care  Outcome: Ongoing, Progressing     Problem: Adjustment to Illness (Stroke, Ischemic/Transient Ischemic Attack)  Goal: Optimal Coping  Outcome: Ongoing, Progressing  Intervention: Support Psychosocial Response to Stroke  Recent Flowsheet Documentation  Taken 12/6/2023 0912 by Rere Hernandez LPN  Supportive Measures: active listening utilized     Problem: Bowel Elimination Impaired (Stroke, Ischemic/Transient Ischemic Attack)  Goal: Effective Bowel Elimination  Outcome: Ongoing, Progressing     Problem: Cerebral Tissue Perfusion (Stroke, Ischemic/Transient Ischemic Attack)  Goal: Optimal Cerebral Tissue Perfusion  Outcome: Ongoing, Progressing  Intervention: Protect and Optimize Cerebral Perfusion  Recent Flowsheet Documentation  Taken 12/6/2023 0912 by Rere Hernandez LPN  Sensory Stimulation Regulation:  care clustered     Problem: Cognitive Impairment (Stroke, Ischemic/Transient Ischemic Attack)  Goal: Optimal Cognitive Function  Outcome: Ongoing, Progressing  Intervention: Optimize Cognitive Function  Recent Flowsheet Documentation  Taken 12/6/2023 0912 by Rere Hernandez LPN  Sensory Stimulation Regulation: care clustered  Reorientation Measures: clock in view  Environment Familiarity/Consistency: familiar objects from home provided     Problem: Communication Impairment (Stroke, Ischemic/Transient Ischemic Attack)  Goal: Improved Communication Skills  Outcome: Ongoing, Progressing  Intervention: Optimize Communication Skills  Recent Flowsheet Documentation  Taken 12/6/2023 0912 by Rere Hernandez LPN  Communication Enhancement Strategies: call light answered in person     Problem: Functional Ability Impaired (Stroke, Ischemic/Transient Ischemic Attack)  Goal: Optimal Functional Ability  Outcome: Ongoing, Progressing     Problem: Respiratory Compromise (Stroke, Ischemic/Transient Ischemic Attack)  Goal: Effective Oxygenation and Ventilation  Outcome: Ongoing, Progressing     Problem: Sensorimotor Impairment (Stroke, Ischemic/Transient Ischemic Attack)  Goal: Improved Sensorimotor Function  Outcome: Ongoing, Progressing  Intervention: Optimize Range of Motion, Motor Control and Function  Recent Flowsheet Documentation  Taken 12/6/2023 1128 by Rere Hernandez LPN  Range of Motion: active ROM (range of motion) encouraged  Taken 12/6/2023 0912 by Rere Hernandez LPN  Range of Motion: active ROM (range of motion) encouraged     Problem: Swallowing Impairment (Stroke, Ischemic/Transient Ischemic Attack)  Goal: Optimal Eating and Swallowing without Aspiration  Outcome: Ongoing, Progressing     Problem: Urinary Elimination Impaired (Stroke, Ischemic/Transient Ischemic Attack)  Goal: Effective Urinary Elimination  Outcome: Ongoing, Progressing     Problem: Diabetes Comorbidity  Goal: Blood Glucose Level  Within Targeted Range  Outcome: Ongoing, Progressing     Problem: Hypertension Comorbidity  Goal: Blood Pressure in Desired Range  Outcome: Ongoing, Progressing  Intervention: Maintain Blood Pressure Management  Recent Flowsheet Documentation  Taken 12/6/2023 1128 by Rere Hernandez LPN  Medication Review/Management: medications reviewed  Taken 12/6/2023 1000 by Rere Hernandez LPN  Medication Review/Management: medications reviewed  Taken 12/6/2023 0912 by Rere Hernandez LPN  Medication Review/Management: medications reviewed  Taken 12/6/2023 0800 by Rere Hernandez LPN  Medication Review/Management: medications reviewed     Problem: Pain Chronic (Persistent) (Comorbidity Management)  Goal: Acceptable Pain Control and Functional Ability  Outcome: Ongoing, Progressing  Intervention: Manage Persistent Pain  Recent Flowsheet Documentation  Taken 12/6/2023 1128 by Rere Hernandez LPN  Medication Review/Management: medications reviewed  Taken 12/6/2023 1000 by Rere Hernandez LPN  Medication Review/Management: medications reviewed  Taken 12/6/2023 0912 by Rere Hernandez LPN  Medication Review/Management: medications reviewed  Taken 12/6/2023 0800 by Rere Hernandez LPN  Medication Review/Management: medications reviewed  Intervention: Optimize Psychosocial Wellbeing  Recent Flowsheet Documentation  Taken 12/6/2023 0912 by Rere Hernandez LPN  Supportive Measures: active listening utilized   Goal Outcome Evaluation:

## 2023-12-06 NOTE — CONSULTS
Primary Care Provider: Traci Townsend APRN     Consult requested by: Dr. Sosa    Reason for Consultation: Neurological evaluation, stroke    History taken from: patient chart RN    Chief complaint: Left face and arm numbness and tingling       SUBJECTIVE:    History of present illness: Background per H&P: This is a pleasant 68-year-old female with history of hypertension diabetes  Patient had some injection in her eye few days ago by a retinal specialist for some inflammation and has been causing pain in her eyes since  Pain mostly with moving the eye sideways  Patient had previous TIA x 2 and has been on Brilinta and aspirin  Woke up this morning with left-sided facial numbness was numbness in the left arm  Did not denied any slurred speech or blurred vision  She had mild weakness in the left arm but not noted on exam  Patient seen in the ER head CT and CTA admitted for neuro evaluation    - Portions of the above HPI were copied from previous encounters and edited as appropriate. PMH as detailed below.     Patient was last seen by inpatient neurology in August of this year.  She was seen for continuous headaches with left-sided weakness.  MRI was unremarkable.  Migraine was a possibility plus her platelet function analysis for Plavix was abnormal so patient was changed to Brilinta +81 mg aspirin.    Patient was also seen by neurologist inpatient in September 2022 And was diagnosed with a right pontine stroke.    Patient was seen by outpatient neurologist Dr. Seipel recently and appears the patient was still having left-sided weakness on exam at that time.     Patient states she came in because she started noticing some vision changes with her left eye along with left face and arm numbness.  Patient does have history of stroke and has some baseline left-sided deficit but feels that it typically equal at baseline.  She has decreased vision in the left peripheral vision, she has pain with left eye abduction.  She  has worsening left facial asymmetry.  She also had a left sided headache that is described more of a constant pain around the left eye. She came in in September with very similar symptoms including headache and left sided symptoms. She denies history of migraine. She denies any changes in medication. She takes twice daily Brilinta and ASA. BP on admission was 200/101.       Review of Systems   Constitutional:  Positive for fatigue.   Eyes:  Positive for photophobia, pain and visual disturbance.   Respiratory: Negative.     Cardiovascular: Negative.    Gastrointestinal:  Negative for nausea and vomiting.   Endocrine: Negative.    Genitourinary: Negative.    Musculoskeletal: Negative.    Skin: Negative.    Allergic/Immunologic: Negative.    Neurological:  Positive for facial asymmetry, weakness, numbness and headaches. Negative for dizziness, tremors, seizures, syncope, speech difficulty and light-headedness.   Hematological: Negative.    Psychiatric/Behavioral:  Negative for confusion.           PATIENT HISTORY:  Past Medical History:   Diagnosis Date    Adrenal nodule 11/16/2016    FINDINGS: Mild hepatic steatosis may be present. Cholecystectomy. No biliary ductal dilatation. Negative pancreas, spleen, left adrenal gland, and kidneys. The right adrenal presumed adenoma has increased slightly, measuring 3 x 4 cm in diameter,  compared to 2 x 3.5 cm on the previous exam. The attenuation values are consistent with an adenoma. Done at [facility] in August 2018    Age-related osteoporosis without current pathological fracture 11/16/2016    Arthritis     Delayed surgical wound healing     Essential hypertension 11/16/2016    Gastroesophageal reflux disease with esophagitis 11/16/2016    Hepatic steatosis 11/16/2016    History of anemia     History of sepsis     FROM UTI    Hyperlipidemia 11/16/2016    Lisfranc's dislocation     LEFT WITH FRACTURES NONHEALING FOOT    Osteomyelitis of left foot 11/2020    RLS  (restless legs syndrome)     Squamous cell skin cancer 2014    Excised by Dr. James    Thyroid nodule 10/18/2019    BENIGN    Type 2 diabetes mellitus with peripheral neuropathy 11/16/2016    Vitamin D deficiency 1/25/2019   ,   Past Surgical History:   Procedure Laterality Date    BREAST BIOPSY Left     7/2019. BENIGN    CARDIAC CATHETERIZATION Left 5/2/2021    Procedure: Cardiac Catheterization/Vascular Study;  Surgeon: Chacho Esteban MD;  Location: [facility] CATH INVASIVE LOCATION;  Service: Cardiovascular;  Laterality: Left;    CARDIAC CATHETERIZATION N/A 5/2/2021    Procedure: Intra-Aortic Baloon Pump Insertion;  Surgeon: Chacho Esteban MD;  Location: [facility] CATH INVASIVE LOCATION;  Service: Cardiovascular;  Laterality: N/A;    CATARACT EXTRACTION WITH INTRAOCULAR LENS IMPLANT Bilateral     CHOLECYSTECTOMY      COLONOSCOPY      CORONARY ARTERY BYPASS GRAFT N/A 5/2/2021    Procedure: CORONARY ARTERY BYPASS WITH INTERNAL MAMMARY ARTERY GRAFT;  Surgeon: Jr Rocael Alexander MD;  Location: [facility];  Service: Cardiothoracic;  Laterality: N/A;    FOOT FUSION Right 11/13/2020    Procedure: RIGHT OPEN TREATMENT LISFRANC INJURY, OPEN REDUCTION INTERNAL FIXATION MEDIAL/MIDDLE CUNEIFORM FRACTURE AND 2ND/3RD METATARSAL FRACTURE 1ST 2ND POSSIBLE 3RD TARSOMETATARSAL ARTHRODESIS INTERCUNEIFORM ARTHRODESIS CALCANEAL BONE GRAFT;  Surgeon: Mikhail Banks Jr., MD;  Location: [facility];  Service: Orthopedics;  Laterality: Right;    INCISION AND DRAINAGE LEG Right 1/8/2021    Procedure: RIGHT IRRIGATION AND DEBRIDEMENT OF FOOT WITH SECONDARY CLOSURE AND HARDWARE REMOVAL;  Surgeon: Mikhail Banks Jr., MD;  Location: [facility];  Service: Orthopedics;  Laterality: Right;    KNEE ARTHROSCOPY Bilateral     TOTAL ABDOMINAL HYSTERECTOMY      TRANSESOPHAGEAL ECHOCARDIOGRAM (EMILIA) N/A 5/2/2021    Procedure: TRANSESOPHAGEAL ECHOCARDIOGRAM;  Surgeon: Jr Rocael Alexander MD;  Location: [facility];  Service: Cardiothoracic;   Laterality: N/A;    UPPER GASTROINTESTINAL ENDOSCOPY  08/2016    US GUIDED FINE NEEDLE ASPIRATION  5/8/2020   ,   Family History   Problem Relation Age of Onset    Diabetes Mother     COPD Mother     Hypertension Mother     Kidney disease Mother     Obesity Mother     Thyroid disease Mother     Diabetes Sister     Diabetes Sister     Diabetes Sister     Diabetes Sister     Malig Hyperthermia Neg Hx    ,   Social History     Tobacco Use    Smoking status: Never    Smokeless tobacco: Never   Vaping Use    Vaping Use: Never used   Substance Use Topics    Alcohol use: Yes     Comment: socially     Drug use: Never   ,   Prior to Admission medications    Medication Sig Start Date End Date Taking? Authorizing Provider   amLODIPine (NORVASC) 5 MG tablet Take 1 tablet by mouth Daily.   Yes Bi Haque MD   aspirin 81 MG EC tablet Take 1 tablet by mouth Daily.   Yes Bi Haque MD   atorvastatin (LIPITOR) 80 MG tablet Take 1 tablet by mouth Every Night. 10/25/22  Yes Gladis Lang APRN   DULoxetine (CYMBALTA) 60 MG capsule Take 1 capsule by mouth Daily.   Yes Bi Haque MD   famotidine (PEPCID) 20 MG tablet Take 1 tablet by mouth 2 (Two) Times a Day As Needed. 10/10/22  Yes Bi Haque MD   multivitamin with minerals tablet tablet Take 1 tablet by mouth Daily.   Yes Bi Haque MD   ticagrelor (BRILINTA) 90 MG tablet tablet Take 1 tablet by mouth 2 (Two) Times a Day. 8/7/23  Yes Neelam Negrete APRN   Tirzepatide (Mounjaro) 5 MG/0.5ML solution pen-injector Inject 0.5 mL under the skin into the appropriate area as directed 1 (One) Time Per Week. Wednesday.   Yes Bi Haque MD   clopidogrel (PLAVIX) 75 MG tablet Take 1 tablet by mouth Every Evening.    iB Haque MD   traZODone (DESYREL) 50 MG tablet Take 1 tablet by mouth Every Night. 10/12/22   Bi Haque MD    Allergies:  Zofran [ondansetron hcl], Prochlorperazine,  Prochlorperazine edisylate, Prochlorperazine maleate, Hydralazine, Hydralazine hcl, Meperidine, and Prochlorperazine maleate    Current Facility-Administered Medications   Medication Dose Route Frequency Provider Last Rate Last Admin    acetaminophen (TYLENOL) tablet 650 mg  650 mg Oral Q6H PRN Anat Alvarado MD   650 mg at 12/05/23 2153    amLODIPine (NORVASC) tablet 10 mg  10 mg Oral Daily Anat Alvarado MD   10 mg at 12/06/23 0910    aspirin tablet 325 mg  325 mg Oral Daily Antwan Lam MD   325 mg at 12/06/23 0910    Or    aspirin suppository 300 mg  300 mg Rectal Daily Atnwan Lam MD        atorvastatin (LIPITOR) tablet 80 mg  80 mg Oral Nightly Antwan Lam MD   80 mg at 12/05/23 2149    sennosides-docusate (PERICOLACE) 8.6-50 MG per tablet 2 tablet  2 tablet Oral BID Antwan Lam MD   2 tablet at 12/06/23 0910    And    polyethylene glycol (MIRALAX) packet 17 g  17 g Oral Daily PRN Antwan Lam MD        And    bisacodyl (DULCOLAX) EC tablet 5 mg  5 mg Oral Daily PRN Antwan Lam MD        And    bisacodyl (DULCOLAX) suppository 10 mg  10 mg Rectal Daily PRN Antwan Lam MD        butalbital-acetaminophen-caffeine (FIORICET, ESGIC) -40 MG per tablet 2 tablet  2 tablet Oral Q6H PRN Antwan Lam MD   2 tablet at 12/05/23 1935    DULoxetine (CYMBALTA) DR capsule 60 mg  60 mg Oral Daily Antwan Lam MD   60 mg at 12/06/23 0910    Enoxaparin Sodium (LOVENOX) syringe 40 mg  40 mg Subcutaneous Daily Antwan Lam MD   40 mg at 12/05/23 1650    famotidine (PEPCID) tablet 20 mg  20 mg Oral BID PRN Antwan Lam MD        labetalol (NORMODYNE,TRANDATE) injection 10 mg  10 mg Intravenous Q6H PRN Anat Alvarado MD   10 mg at 12/05/23 2029    morphine injection 4 mg  4 mg Intravenous Q4H PRN Anat Alvarado MD   4 mg at 12/06/23 0529    multivitamin (THERAGRAN) tablet 1 tablet  1 tablet Oral Daily Antwan Lam MD   1 tablet at 12/06/23 0910     polyethylene glycol (MIRALAX) packet 17 g  17 g Oral Daily Antwan Lam MD   17 g at 12/06/23 0909    sodium chloride 0.9 % flush 10 mL  10 mL Intravenous PRN Paolo Sosa MD        sodium chloride 0.9 % flush 10 mL  10 mL Intravenous Q12H Antwan Lam MD   10 mL at 12/06/23 0912    sodium chloride 0.9 % flush 10 mL  10 mL Intravenous PRN Antwan Lam MD        sodium chloride 0.9 % flush 10 mL  10 mL Intravenous Q12H Antwan Lam MD   10 mL at 12/06/23 0912    sodium chloride 0.9 % flush 10 mL  10 mL Intravenous PRN Antwan Lam MD        sodium chloride 0.9 % infusion 40 mL  40 mL Intravenous PRN Antwan Lam MD        sodium chloride 0.9 % infusion 40 mL  40 mL Intravenous PRN Antwan Lam MD        ticagrelor (BRILINTA) tablet 90 mg  90 mg Oral BID Antwan Lam MD   90 mg at 12/06/23 0910    traZODone (DESYREL) tablet 50 mg  50 mg Oral Nightly PRN Antwan Lam MD            ________________________________________________________        OBJECTIVE:    PHYSICAL EXAM:    Constitutional: The patient is in no apparent distress, bright awake and alert. There is no shortness of breath.  Patient sitting up in the chair.    PSYCHIATRIC: Mood/affect normal, judgement normal, appropriate    HEENT: There is no tenderness over the temporal arteries bilaterally. Normocephalic, atraumatic.     Chest: Breathing unlabored    Cardiac: Regular rate and rhythm.     Extremities:  No clubbing, cyanosis or edema.    NEUROLOGICAL:    Cognition:   Fully oriented.  Fund of knowledge excellent.  Concentration and attention normal.   Language normal with normal comprehension, fluent speech, intact repetition and naming.   Short and long term memory appears intact    Cranial nerves;    II - pupils bilaterally equal reacting to light,  Decreased vision/blurry vision in left peripheral of left eye only.   Fundoscopic exam attempted- unable to visualize- patient very uncomfortable due to light   III,IV,VI:  EOMI with no diplopia  V: tingling left facial sensation   VII: mild left facial asymmetry,  VIII: No New hearing abnormality  IX, X, XI: normal gag and shoulder shrug;  XII: tongue is in the midline.    Sensory:  Intact to light touch in all extremities.     Motor: Strength 5-/5 LUE upper and lower extremities. No involuntary movements present. Normal tone and bulk.  Deep tendon reflexes: 1/4 and symmetrical in biceps, brachioradialis, triceps, bilateral  1/4 knees and ankles. Both plantars are flexor.    Cerebellar: Difficulty with finger to nose with left upper extremity.     Gait and balance: Deferred.     Physical exam performed by MIGUEL Avnia.  ________________________________________________________   RESULTS REVIEW:    VITAL SIGNS:   Temp:  [97.1 °F (36.2 °C)-98 °F (36.7 °C)] 97.1 °F (36.2 °C)  Heart Rate:  [74-86] 78  Resp:  [11-18] 16  BP: (105-200)/() 113/78     LABS:      Lab 12/05/23  1137   WBC 7.20   HEMOGLOBIN 13.0   HEMATOCRIT 39.5   PLATELETS 318   NEUTROS ABS 3.60   LYMPHS ABS 2.80   MONOS ABS 0.50   EOS ABS 0.20   MCV 88.0   PROTIME 10.7         Lab 12/05/23  2354 12/05/23  1137   SODIUM  --  140   POTASSIUM  --  4.3   CHLORIDE  --  105   CO2  --  25.0   ANION GAP  --  10.0   BUN  --  22   CREATININE  --  0.94   EGFR  --  65.4   GLUCOSE  --  120*   CALCIUM  --  9.3   HEMOGLOBIN A1C 7.40*  --          Lab 12/05/23  1137   TOTAL PROTEIN 7.4   ALBUMIN 4.1   GLOBULIN 3.3   ALT (SGPT) 9   AST (SGOT) 17   BILIRUBIN 0.3   ALK PHOS 57         Lab 12/05/23  1137   PROTIME 10.7   INR 0.98         Lab 12/05/23  2354   CHOLESTEROL 162   LDL CHOL 83   HDL CHOL 48   TRIGLYCERIDES 181*         Lab 12/05/23  0000   ABO TYPING A   RH TYPING Positive   ANTIBODY SCREEN Negative         UA          2/5/2023    12:59 6/12/2023    10:37   Urinalysis   Squamous Epithelial Cells, UA 3-6     Specific Gravity, UA 1.022  1.020       Ketones, UA Negative     Blood, UA Trace     Leukocytes, UA Small (1+)      Nitrite, UA Negative  Positive       RBC, UA 0-2     WBC, UA Too Numerous to Count     Bacteria, UA 4+        Details          This result is from an external source.               Lab Results   Component Value Date    TSH 0.749 08/01/2023    LDL 83 12/05/2023    HGBA1C 7.40 (H) 12/05/2023    [id number] 1,415 (H) 09/26/2022       IMAGING STUDIES:  CT Angiogram Head w AI Analysis of LVO    Result Date: 12/5/2023  Impression: CT angiogram demonstrates some irregular long segment moderate narrowing of the left M1 segment distally as well as some moderate to severe narrowing of adjacent left anterior temporal M2 branch. These findings appear somewhat more conspicuous than on comparison, possibly reflecting technical differences in scanning, versus progressive atherosclerotic change. There is otherwise no evidence of additional high-grade stenosis, occlusion or aneurysm in the head and neck. Electronically Signed: Jassi Mosley MD  12/5/2023 1:10 PM EST  Workstation ID: [id number]    CT Angiogram Neck    Result Date: 12/5/2023  Impression: CT angiogram demonstrates some irregular long segment moderate narrowing of the left M1 segment distally as well as some moderate to severe narrowing of adjacent left anterior temporal M2 branch. These findings appear somewhat more conspicuous than on comparison, possibly reflecting technical differences in scanning, versus progressive atherosclerotic change. There is otherwise no evidence of additional high-grade stenosis, occlusion or aneurysm in the head and neck. Electronically Signed: Jassi Mosley MD  12/5/2023 1:10 PM EST  Workstation ID: [id number]    CT Head Without Contrast Stroke Protocol    Result Date: 12/5/2023  No acute intracranial abnormality. Electronically Signed: Antelmo Ramirez MD  12/5/2023 1:09 PM EST  Workstation ID: [id number]    XR Chest 1 View    Result Date: 12/5/2023  Impression: No acute process. Electronically Signed: Oneida Munoz MD  12/5/2023 12:29 PM  EST  Workstation ID: [id number]     I reviewed the patient's new clinical results.    ________________________________________________________     PROBLEM LIST:    TIA (transient ischemic attack)            ASSESSMENT/PLAN:    Left eye pain and peripheral blurred vision with left sided headache, Left face and arm numbness  Differentials include migraine, TIA/stroke, optic etiology (glaucoma, corneal etiology), temporal arteritis.   - CT head negative for hemorrhage  - MRI brain without contrast + orbits pending   - CTA head and neck: There there is moderate narrowing of the left M1 and moderate to severe narrowing of the left M2 branch.  No additional high-grade stenosis of the head or neck.  - Echo (August 2023): EF is 61 to 65%  -Patient had a EMILIA in October 2022 which was negative for PFO, aneurysm or other structural causes.   - EKG:Sinus rhythm rate 85  - Labs: A1C: 7.40, B12: P, LDL:  83, TSH: 1.200  - Continue ASA 81 and Brilinta 90 mg BID & Lipitor 80  - Check ESR & CRP   - Fioricet 2 tabs Q8hr  - Decadron 10 mg IV x 1 now   -- PT/OT/ST as appropriate, Neuro checks per protocol, DVT prophylaxis, Stroke education  - Patient will need opthalmology evaluation     Modification of stroke risk factors:   - Blood pressure should be less than 130/80 outpatient, HbA1c less than 6.5, LDL less than 70; b12>500 and smoking cessation if applicable. We would be grateful if the primary team / primary care physician would keep a close watch on the above targets.  - Stroke education  - Follow up with stroke clinic         I discussed the patient's findings and my recommendations with patient and nursing staff    Nidia Uribe, CANDIDO  12/06/23  09:46 EST

## 2023-12-06 NOTE — THERAPY EVALUATION
Patient Name: Marge Mcghee  : 1955    MRN: [id number]                              Today's Date: 2023       Admit Date: 2023    Visit Dx:     ICD-10-CM ICD-9-CM   1. Cerebrovascular accident (CVA), unspecified mechanism  I63.9 434.91     Patient Active Problem List   Diagnosis    Type 2 diabetes mellitus with retinopathy, with long-term current use of insulin    Age-related osteoporosis without current pathological fracture    Hyperlipidemia    Hepatic steatosis    Gastroesophageal reflux disease with esophagitis    Adrenal nodule    Vitamin D deficiency    Thyroid nodule    UTI (urinary tract infection)    REINA (acute kidney injury)    Hyperglycemia    Acute right flank pain    Vomiting    Hypomagnesemia    Dehydration    Obesity (BMI 30-39.9)    Complicated migraine    Occipital neuralgia of left side    Polypharmacy    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    Lisfranc dislocation    Delayed surgical wound healing    Right foot infection    Chest pain    GERD (gastroesophageal reflux disease)    Stable angina pectoris    S/P CABG x 3    Encephalopathy, metabolic    Hypertensive emergency    Dysarthria    CVA (cerebral vascular accident)    Coronary artery disease involving coronary bypass graft of native heart without angina pectoris    Essential hypertension    HTN, goal below 130/80    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    Type 2 diabetes mellitus without complication, with long-term current use of insulin    TIA (transient ischemic attack)     Past Medical History:   Diagnosis Date    Adrenal nodule 2016    FINDINGS: Mild hepatic steatosis may be present. Cholecystectomy. No biliary ductal dilatation. Negative pancreas, spleen, left adrenal gland, and kidneys. The right adrenal presumed adenoma has increased slightly, measuring 3 x 4 cm in  diameter,  compared to 2 x 3.5 cm on the previous exam. The attenuation values are consistent with an adenoma. Done at [facility] in August 2018    Age-related osteoporosis without current pathological fracture 11/16/2016    Arthritis     Delayed surgical wound healing     Essential hypertension 11/16/2016    Gastroesophageal reflux disease with esophagitis 11/16/2016    Hepatic steatosis 11/16/2016    History of anemia     History of sepsis     FROM UTI    Hyperlipidemia 11/16/2016    Lisfranc's dislocation     LEFT WITH FRACTURES NONHEALING FOOT    Osteomyelitis of left foot 11/2020    RLS (restless legs syndrome)     Squamous cell skin cancer 2014    Excised by Dr. James    Thyroid nodule 10/18/2019    BENIGN    Type 2 diabetes mellitus with peripheral neuropathy 11/16/2016    Vitamin D deficiency 1/25/2019     Past Surgical History:   Procedure Laterality Date    BREAST BIOPSY Left     7/2019. BENIGN    CARDIAC CATHETERIZATION Left 5/2/2021    Procedure: Cardiac Catheterization/Vascular Study;  Surgeon: Chacho Esteban MD;  Location: [facility] CATH INVASIVE LOCATION;  Service: Cardiovascular;  Laterality: Left;    CARDIAC CATHETERIZATION N/A 5/2/2021    Procedure: Intra-Aortic Baloon Pump Insertion;  Surgeon: Chacho Esteban MD;  Location: [facility] CATH INVASIVE LOCATION;  Service: Cardiovascular;  Laterality: N/A;    CATARACT EXTRACTION WITH INTRAOCULAR LENS IMPLANT Bilateral     CHOLECYSTECTOMY      COLONOSCOPY      CORONARY ARTERY BYPASS GRAFT N/A 5/2/2021    Procedure: CORONARY ARTERY BYPASS WITH INTERNAL MAMMARY ARTERY GRAFT;  Surgeon: Jr Rocael Alexander MD;  Location: [facility] CVOR;  Service: Cardiothoracic;  Laterality: N/A;    FOOT FUSION Right 11/13/2020    Procedure: RIGHT OPEN TREATMENT LISFRANC INJURY, OPEN REDUCTION INTERNAL FIXATION MEDIAL/MIDDLE CUNEIFORM FRACTURE AND 2ND/3RD METATARSAL FRACTURE 1ST 2ND POSSIBLE 3RD TARSOMETATARSAL ARTHRODESIS INTERCUNEIFORM ARTHRODESIS CALCANEAL BONE GRAFT;   Surgeon: Mikhail Banks Jr., MD;  [place] [facility] [place] [facility] [place]  Service: [place]opedics;  Laterality: Right;    INCISION AND DRAINAGE LEG Right 1/8/2021    Procedure: RIGHT IRRIGATION AND DEBRIDEMENT OF FOOT WITH SECONDARY CLOSURE AND HARDWARE REMOVAL;  Surgeon: Mikhail Banks Jr., MD;  Location: [facility] OR;  Service: Orthopedics;  Laterality: Right;    KNEE ARTHROSCOPY Bilateral     TOTAL ABDOMINAL HYSTERECTOMY      TRANSESOPHAGEAL ECHOCARDIOGRAM (EMILIA) N/A 5/2/2021    Procedure: TRANSESOPHAGEAL ECHOCARDIOGRAM;  Surgeon: Jr Rocael Alexander MD;  Location: [facility];  Service: Cardiothoracic;  Laterality: N/A;    UPPER GASTROINTESTINAL ENDOSCOPY  08/2016    US GUIDED FINE NEEDLE ASPIRATION  5/8/2020      General Information       Row Name 12/06/23 0927          Physical Therapy Time and Intention    Document Type evaluation  -     Mode of Treatment physical therapy  -       Row Name 12/06/23 0927          General Information    Patient Profile Reviewed yes  -SS     Prior Level of Function independent:;ADL's  -SS     Existing Precautions/Restrictions fall  -SS       Row Name 12/06/23 0927          Living Environment    People in Home spouse  -SS       Row Name 12/06/23 0927          Home Main Entrance    Number of Stairs, Main Entrance five  -SS       Row Name 12/06/23 0927          Stairs Within Home, Primary    Stairs, Within Home, Primary bedroom upstairs  -SS       Row Name 12/06/23 0927          Cognition    Orientation Status (Cognition) oriented x 4  -SS       Row Name 12/06/23 0927          Safety Issues, Functional Mobility    Impairments Affecting Function (Mobility) balance;endurance/activity tolerance;strength;motor control;grasp  -SS               User Key  (r) = Recorded By, (t) = Taken By, (c) = Cosigned By      Initials Name Provider Type    SS Jami Flowers PT Physical Therapist                   Mobility       Row Name 12/06/23 1123          Bed Mobility    All Activities, [place] (Bed  Mobility) supervision  -       Row Name 12/06/23 1123          Sit-Stand Transfer    Sit-Stand [place] (Transfers) minimum assist (75% patient effort)  -       Row Name 12/06/23 1123          Gait/Stairs (Locomotion)    [place] Level (Gait) minimum assist (75% patient effort);contact guard  -     Assistive Device (Gait) walker, front-wheeled  -     Distance in Feet (Gait) 20'  -     Deviations/Abnormal Patterns (Gait) gait speed decreased  -     Comment, (Gait/Stairs) postural instability and dizziness limiting gait distance  -               User Key  (r) = Recorded By, (t) = Taken By, (c) = Cosigned By      Initials Name Provider Type     Jami Flowers PT Physical Therapist                   Obj/Interventions       Row Name 12/06/23 1124          Range of Motion Comprehensive    General Range of Motion no range of motion deficits identified  -       Row Name 12/06/23 1124          Strength Comprehensive (MMT)    Comment, General Manual Muscle Testing (MMT) Assessment L LE 3/5, effort on MMT may be inconsistent; R LE 5/5  -       Row Name 12/06/23 1124          Balance    Balance Assessment sitting static balance;standing static balance  -     Static Sitting Balance independent  -SS     Static Standing Balance contact guard;minimal assist  -       Row Name 12/06/23 1124          Sensory Assessment (Somatosensory)    Sensory Assessment (Somatosensory) --  reports L sided sensation diminished but not absent  -               User Key  (r) = Recorded By, (t) = Taken By, (c) = Cosigned By      Initials Name Provider Type     Jami Flowers PT Physical Therapist                   Goals/Plan       Row Name 12/06/23 1127          Bed Mobility Goal 1 (PT)    Activity/Assistive Device (Bed Mobility Goal 1, PT) bed mobility activities, all  -SS     [place] Level/Cues Needed (Bed Mobility Goal 1, PT) independent  -SS     Time Frame (Bed Mobility Goal 1, PT) long term goal  (LTG);2 weeks  -SS       Row Name 12/06/23 1127          Transfer Goal 1 (PT)    Activity/Assistive Device (Transfer Goal 1, PT) transfers, all  -SS     [place] Level/Cues Needed (Transfer Goal 1, PT) independent  -SS     Time Frame (Transfer Goal 1, PT) long term goal (LTG);2 weeks  -SS       Row Name 12/06/23 1127          Gait Training Goal 1 (PT)    Activity/Assistive Device (Gait Training Goal 1, PT) gait (walking locomotion)  -SS     [place] Level (Gait Training Goal 1, PT) independent  -SS     Distance (Gait Training Goal 1, PT) 200'  -SS     Time Frame (Gait Training Goal 1, PT) long term goal (LTG);2 weeks  -SS       Row Name 12/06/23 1127          Therapy Assessment/Plan (PT)    Planned Therapy Interventions (PT) balance training;bed mobility training;gait training;home exercise program;transfer training;patient/family education;neuromuscular re-education;strengthening  -SS               User Key  (r) = Recorded By, (t) = Taken By, (c) = Cosigned By      Initials Name Provider Type    SS Jami Flowers, PT Physical Therapist                   Clinical Impression       Row Name 12/06/23 1126          Pain    Pretreatment Pain Rating 0/10 - no pain  -SS     Posttreatment Pain Rating 0/10 - no pain  -SS       Row Name 12/06/23 1133 12/06/23 1126       Plan of Care Review    Plan of Care Reviewed With -- patient  -SS    Outcome Evaluation 67 y/o F who presented with L facial weakness and L UE weakness. Awaiting MRI. PMH diabetes and previous TIA. Patient is a  on a special needs bus. Spouse works full time as a . They live in a multilevel home with 5 [address], bedroom on second floor. Patient reports increase in falls recently and attributes this to her neuropathy. She reports she probably should be using an AD but she does not. She is limited this date due to lightheadedness. /67 in standing. Patient requests to sit after less than 2 min standing. Able to progress  to 20' ambulation with RW. Requires SBA for bed mobility, CGA to min A for transfer and ambulation. She demos weakness in L LE 3/5 compared to 5/5 R LE. She reports diminished sensation in L side. Patient is at increased risk for falls and has experienced a decline from her baseline function. Recommending acute rehab at d/c.  -SS --      Row Name 12/06/23 1126          Therapy Assessment/Plan (PT)    Rehab Potential (PT) good, to achieve stated therapy goals  -SS     Criteria for Skilled Interventions Met (PT) yes;meets criteria  -SS     Therapy Frequency (PT) 5 times/wk  -SS     Predicted Duration of Therapy Intervention (PT) until d/c  -SS       Row Name 12/06/23 1126          Vital Signs    Pre Systolic BP Rehab 117  -SS     Pre Treatment Diastolic BP 70  -SS     Intra Systolic BP Rehab 107  -SS     Intra Treatment Diastolic BP 67  -SS       Row Name 12/06/23 1126          Positioning and Restraints    Pre-Treatment Position in bed  -SS     Post Treatment Position chair  -SS     In Bed exit alarm on  -SS               User Key  (r) = Recorded By, (t) = Taken By, (c) = Cosigned By      Initials Name Provider Type    SS Jami Flowers, PT Physical Therapist                   Outcome Measures       Row Name 12/06/23 0912          How much help from another person do you currently need...    Turning from your back to your side while in flat bed without using bedrails? 4  -EV     Moving from lying on back to sitting on the side of a flat bed without bedrails? 4  -EV     Moving to and from a bed to a chair (including a wheelchair)? 4  -EV     Standing up from a chair using your arms (e.g., wheelchair, bedside chair)? 4  -EV     Climbing 3-5 steps with a railing? 3  -EV     To walk in hospital room? 3  -EV     AM-PAC 6 Clicks Score (PT) 22  -EV     Highest Level of Mobility Goal 7 --> Walk 25 feet or more  -EV       Row Name 12/06/23 1127          Modified Lalo Scale    Pre-Stroke Modified Barnes Scale 6 - Unable  to determine (UTD) from the medical record documentation  -     Modified [place] Scale 4 - Moderately severe disability.  Unable to walk without assistance, and unable to attend to own bodily needs without assistance.  -       Row Name 12/06/23 1127          Functional Assessment    Outcome Measure Options Modified Lalo  -               User Key  (r) = Recorded By, (t) = Taken By, (c) = Cosigned By      Initials Name Provider Type    SS Jami Flowers, PT Physical Therapist    Rere Amaya LPN Licensed Nurse                                 Physical Therapy Education       Title: PT OT SLP Therapies (In Progress)       Topic: Physical Therapy (Done)       Point: Mobility training (Done)       Learning Progress Summary             Patient Acceptance, E, VU by  at 12/6/2023 1128                                         User Key       Initials Effective Dates Name Provider Type [facility] 06/16/21 -  Jami Flowers, PT Physical Therapist PT                  PT Recommendation and Plan  Planned Therapy Interventions (PT): balance training, bed mobility training, gait training, home exercise program, transfer training, patient/family education, neuromuscular re-education, strengthening  Plan of Care Reviewed With: patient  Outcome Evaluation: 67 y/o F who presented with L facial weakness and L UE weakness. Awaiting MRI. PMH diabetes and previous TIA. Patient is a  on a special needs bus. Spouse works full time as a . They live in a multilevel home with [address], bedroom on second floor. Patient reports increase in falls recently and attributes this to her neuropathy. She reports she probably should be using an AD but she does not. She is limited this date due to lightheadedness. /67 in standing. Patient requests to sit after less than 2 min standing. Able to progress to 20' ambulation with RW. Requires SBA for bed mobility, CGA to min A for transfer and  ambulation. She demos weakness in L LE 3/5 compared to 5/5 R LE. She reports diminished sensation in L side. Patient is at increased risk for falls and has experienced a decline from her baseline function. Recommending acute rehab at d/c.     Time Calculation:   PT Evaluation Complexity  History, PT Evaluation Complexity: 3 or more personal factors and/or comorbidities  Examination of Body Systems (PT Eval Complexity): total of 3 or more elements  Clinical Presentation (PT Evaluation Complexity): evolving  Clinical Decision Making (PT Evaluation Complexity): moderate complexity  Overall Complexity (PT Evaluation Complexity): moderate complexity     PT Charges       Row Name 12/06/23 1128             Time Calculation    Start Time 0915  -      Stop Time 0942  -      Time Calculation (min) 27 min  -      PT Received On 12/06/23  -      PT - Next Appointment 12/07/23  -      PT Goal Re-Cert Due Date 12/20/23  -         Time Calculation- PT    Total Timed Code Minutes- PT 0 minute(s)  -                User Key  (r) = Recorded By, (t) = Taken By, (c) = Cosigned By      Initials Name Provider Type    SS Jami Flowers, PT Physical Therapist                  Therapy Charges for Today       Code Description Service Date Service Provider Modifiers Qty    [id number] HC PT EVAL MOD COMPLEXITY 4 12/6/2023 Jami Flowers, PT GP 1            PT G-Codes  Outcome Measure Options: Modified [place]  AM-PAC 6 Clicks Score (PT): 22  Modified Lalo Scale: 4 - Moderately severe disability.  Unable to walk without assistance, and unable to attend to own bodily needs without assistance.  PT Discharge Summary  Anticipated Discharge Disposition (PT): inpatient rehabilitation facility    Jami Flowers PT  12/6/2023

## 2023-12-06 NOTE — PLAN OF CARE
Fair shift spent, medicated as ordered, ambulated to bathroom with standby, left stable, care and observation continues    Goal Outcome Evaluation:    Problem: Adult Inpatient Plan of Care  Goal: Readiness for Transition of Care  Outcome: Ongoing, Progressing     Problem: Adjustment to Illness (Stroke, Ischemic/Transient Ischemic Attack)  Goal: Optimal Coping  Outcome: Ongoing, Progressing  Intervention: Support Psychosocial Response to Stroke  Recent Flowsheet Documentation  Taken 12/5/2023 2045 by Davon Lazcano, RN  Supportive Measures: active listening utilized  Family/Support System Care: support provided     Problem: Cerebral Tissue Perfusion (Stroke, Ischemic/Transient Ischemic Attack)  Goal: Optimal Cerebral Tissue Perfusion  Outcome: Ongoing, Progressing  Intervention: Protect and Optimize Cerebral Perfusion  Recent Flowsheet Documentation  Taken 12/5/2023 2045 by Davon Lazcano, RN  Sensory Stimulation Regulation:   care clustered   television on  Cerebral Perfusion Promotion: blood pressure monitored

## 2023-12-07 ENCOUNTER — APPOINTMENT (OUTPATIENT)
Dept: MRI IMAGING | Facility: HOSPITAL | Age: 68
DRG: 103 | End: 2023-12-07
Payer: MEDICARE

## 2023-12-07 LAB
GLUCOSE BLDC GLUCOMTR-MCNC: 177 MG/DL (ref 70–105)
GLUCOSE BLDC GLUCOMTR-MCNC: 184 MG/DL (ref 70–105)
GLUCOSE BLDC GLUCOMTR-MCNC: 193 MG/DL (ref 70–105)
GLUCOSE BLDC GLUCOMTR-MCNC: 245 MG/DL (ref 70–105)
GLUCOSE BLDC GLUCOMTR-MCNC: 283 MG/DL (ref 70–105)
GLUCOSE BLDC GLUCOMTR-MCNC: 302 MG/DL (ref 70–105)

## 2023-12-07 PROCEDURE — 25010000002 GADOTERIDOL PER 1 ML: Performed by: INTERNAL MEDICINE

## 2023-12-07 PROCEDURE — A9579 GAD-BASE MR CONTRAST NOS,1ML: HCPCS | Performed by: INTERNAL MEDICINE

## 2023-12-07 PROCEDURE — 25010000002 PROMETHAZINE PER 50 MG: Performed by: INTERNAL MEDICINE

## 2023-12-07 PROCEDURE — 99232 SBSQ HOSP IP/OBS MODERATE 35: CPT | Performed by: NURSE PRACTITIONER

## 2023-12-07 PROCEDURE — 70543 MRI ORBT/FAC/NCK W/O &W/DYE: CPT

## 2023-12-07 PROCEDURE — 82948 REAGENT STRIP/BLOOD GLUCOSE: CPT

## 2023-12-07 PROCEDURE — 97116 GAIT TRAINING THERAPY: CPT

## 2023-12-07 PROCEDURE — 97110 THERAPEUTIC EXERCISES: CPT

## 2023-12-07 PROCEDURE — 25010000002 MORPHINE PER 10 MG: Performed by: HOSPITALIST

## 2023-12-07 PROCEDURE — 63710000001 INSULIN LISPRO (HUMAN) PER 5 UNITS: Performed by: NURSE PRACTITIONER

## 2023-12-07 PROCEDURE — 70553 MRI BRAIN STEM W/O & W/DYE: CPT

## 2023-12-07 PROCEDURE — 92526 ORAL FUNCTION THERAPY: CPT

## 2023-12-07 PROCEDURE — 97530 THERAPEUTIC ACTIVITIES: CPT

## 2023-12-07 PROCEDURE — 25010000002 ENOXAPARIN PER 10 MG: Performed by: INTERNAL MEDICINE

## 2023-12-07 PROCEDURE — 25010000002 METHYLPREDNISOLONE PER 125 MG: Performed by: NURSE PRACTITIONER

## 2023-12-07 RX ORDER — INSULIN LISPRO 100 [IU]/ML
2-7 INJECTION, SOLUTION INTRAVENOUS; SUBCUTANEOUS
Status: DISCONTINUED | OUTPATIENT
Start: 2023-12-07 | End: 2023-12-08

## 2023-12-07 RX ORDER — NICOTINE POLACRILEX 4 MG
15 LOZENGE BUCCAL
Status: DISCONTINUED | OUTPATIENT
Start: 2023-12-07 | End: 2023-12-09 | Stop reason: HOSPADM

## 2023-12-07 RX ORDER — IBUPROFEN 600 MG/1
1 TABLET ORAL
Status: DISCONTINUED | OUTPATIENT
Start: 2023-12-07 | End: 2023-12-09 | Stop reason: HOSPADM

## 2023-12-07 RX ORDER — DEXTROSE MONOHYDRATE 25 G/50ML
25 INJECTION, SOLUTION INTRAVENOUS
Status: DISCONTINUED | OUTPATIENT
Start: 2023-12-07 | End: 2023-12-09 | Stop reason: HOSPADM

## 2023-12-07 RX ADMIN — INSULIN LISPRO 5 UNITS: 100 INJECTION, SOLUTION INTRAVENOUS; SUBCUTANEOUS at 21:11

## 2023-12-07 RX ADMIN — PROMETHAZINE HYDROCHLORIDE 25 MG: 25 INJECTION INTRAMUSCULAR; INTRAVENOUS at 12:18

## 2023-12-07 RX ADMIN — DOCUSATE SODIUM 50 MG AND SENNOSIDES 8.6 MG 2 TABLET: 8.6; 5 TABLET, FILM COATED ORAL at 08:22

## 2023-12-07 RX ADMIN — THERA TABS 1 TABLET: TAB at 08:21

## 2023-12-07 RX ADMIN — TICAGRELOR 90 MG: 90 TABLET ORAL at 21:12

## 2023-12-07 RX ADMIN — ATORVASTATIN CALCIUM 80 MG: 40 TABLET, FILM COATED ORAL at 21:12

## 2023-12-07 RX ADMIN — POLYETHYLENE GLYCOL 3350 17 G: 17 POWDER, FOR SOLUTION ORAL at 08:22

## 2023-12-07 RX ADMIN — MORPHINE SULFATE 4 MG: 4 INJECTION, SOLUTION INTRAMUSCULAR; INTRAVENOUS at 05:25

## 2023-12-07 RX ADMIN — TICAGRELOR 90 MG: 90 TABLET ORAL at 08:21

## 2023-12-07 RX ADMIN — GADOTERIDOL 15 ML: 279.3 INJECTION, SOLUTION INTRAVENOUS at 16:36

## 2023-12-07 RX ADMIN — AMLODIPINE BESYLATE 10 MG: 5 TABLET ORAL at 08:22

## 2023-12-07 RX ADMIN — INSULIN LISPRO 3 UNITS: 100 INJECTION, SOLUTION INTRAVENOUS; SUBCUTANEOUS at 17:09

## 2023-12-07 RX ADMIN — Medication 10 ML: at 21:12

## 2023-12-07 RX ADMIN — MORPHINE SULFATE 4 MG: 4 INJECTION, SOLUTION INTRAMUSCULAR; INTRAVENOUS at 09:32

## 2023-12-07 RX ADMIN — MORPHINE SULFATE 4 MG: 4 INJECTION, SOLUTION INTRAMUSCULAR; INTRAVENOUS at 01:25

## 2023-12-07 RX ADMIN — ENOXAPARIN SODIUM 40 MG: 100 INJECTION SUBCUTANEOUS at 17:09

## 2023-12-07 RX ADMIN — INSULIN LISPRO 2 UNITS: 100 INJECTION, SOLUTION INTRAVENOUS; SUBCUTANEOUS at 13:01

## 2023-12-07 RX ADMIN — DULOXETINE HYDROCHLORIDE 60 MG: 30 CAPSULE, DELAYED RELEASE ORAL at 08:21

## 2023-12-07 RX ADMIN — SODIUM CHLORIDE 1000 MG: 900 INJECTION INTRAVENOUS at 12:56

## 2023-12-07 RX ADMIN — ASPIRIN 325 MG ORAL TABLET 325 MG: 325 PILL ORAL at 08:21

## 2023-12-07 RX ADMIN — Medication 10 ML: at 08:22

## 2023-12-07 RX ADMIN — DOCUSATE SODIUM 50 MG AND SENNOSIDES 8.6 MG 2 TABLET: 8.6; 5 TABLET, FILM COATED ORAL at 21:12

## 2023-12-07 NOTE — THERAPY TREATMENT NOTE
Acute Care - Speech Language Pathology   Swallow Treatment Note ELSY Woo     Patient Name: Marge Mcghee  : 1955  MRN: [id number]  Today's Date: 2023               Admit Date: 2023    Visit Dx:     ICD-10-CM ICD-9-CM   1. Cerebrovascular accident (CVA), unspecified mechanism  I63.9 434.91     Patient Active Problem List   Diagnosis    Type 2 diabetes mellitus with retinopathy, with long-term current use of insulin    Age-related osteoporosis without current pathological fracture    Hyperlipidemia    Hepatic steatosis    Gastroesophageal reflux disease with esophagitis    Adrenal nodule    Vitamin D deficiency    Thyroid nodule    UTI (urinary tract infection)    REINA (acute kidney injury)    Hyperglycemia    Acute right flank pain    Vomiting    Hypomagnesemia    Dehydration    Obesity (BMI 30-39.9)    Complicated migraine    Occipital neuralgia of left side    Polypharmacy    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    Lisfranc dislocation    Delayed surgical wound healing    Right foot infection    Chest pain    GERD (gastroesophageal reflux disease)    Stable angina pectoris    S/P CABG x 3    Encephalopathy, metabolic    Hypertensive emergency    Dysarthria    CVA (cerebral vascular accident)    Coronary artery disease involving coronary bypass graft of native heart without angina pectoris    Essential hypertension    HTN, goal below 130/80    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    Type 2 diabetes mellitus without complication, with long-term current use of insulin    TIA (transient ischemic attack)     Past Medical History:   Diagnosis Date    Adrenal nodule 2016    FINDINGS: Mild hepatic steatosis may be present. Cholecystectomy. No biliary ductal dilatation. Negative pancreas, spleen, left adrenal gland, and kidneys. The right adrenal presumed  adenoma has increased slightly, measuring 3 x 4 cm in diameter,  compared to 2 x 3.5 cm on the previous exam. The attenuation values are consistent with an adenoma. Done at [facility] in August 2018    Age-related osteoporosis without current pathological fracture 11/16/2016    Arthritis     Delayed surgical wound healing     Essential hypertension 11/16/2016    Gastroesophageal reflux disease with esophagitis 11/16/2016    Hepatic steatosis 11/16/2016    History of anemia     History of sepsis     FROM UTI    Hyperlipidemia 11/16/2016    Lisfranc's dislocation     LEFT WITH FRACTURES NONHEALING FOOT    Osteomyelitis of left foot 11/2020    RLS (restless legs syndrome)     Squamous cell skin cancer 2014    Excised by Dr. James    Thyroid nodule 10/18/2019    BENIGN    Type 2 diabetes mellitus with peripheral neuropathy 11/16/2016    Vitamin D deficiency 1/25/2019     Past Surgical History:   Procedure Laterality Date    BREAST BIOPSY Left     7/2019. BENIGN    CARDIAC CATHETERIZATION Left 5/2/2021    Procedure: Cardiac Catheterization/Vascular Study;  Surgeon: Chacho Esteban MD;  Location: [facility] CATH INVASIVE LOCATION;  Service: Cardiovascular;  Laterality: Left;    CARDIAC CATHETERIZATION N/A 5/2/2021    Procedure: Intra-Aortic Baloon Pump Insertion;  Surgeon: Chacho Esteban MD;  Location: [facility] CATH INVASIVE LOCATION;  Service: Cardiovascular;  Laterality: N/A;    CATARACT EXTRACTION WITH INTRAOCULAR LENS IMPLANT Bilateral     CHOLECYSTECTOMY      COLONOSCOPY      CORONARY ARTERY BYPASS GRAFT N/A 5/2/2021    Procedure: CORONARY ARTERY BYPASS WITH INTERNAL MAMMARY ARTERY GRAFT;  Surgeon: Jr Rocael Alexander MD;  Location: [facility] CVOR;  Service: Cardiothoracic;  Laterality: N/A;    FOOT FUSION Right 11/13/2020    Procedure: RIGHT OPEN TREATMENT LISFRANC INJURY, OPEN REDUCTION INTERNAL FIXATION MEDIAL/MIDDLE CUNEIFORM FRACTURE AND 2ND/3RD METATARSAL FRACTURE 1ST 2ND POSSIBLE 3RD TARSOMETATARSAL ARTHRODESIS  INTERCUNEIFORM ARTHRODESIS CALCANEAL BONE GRAFT;  Surgeon: Mikhail Banks Jr., MD;  Location:  [facility];  Service: Orthopedics;  Laterality: Right;    INCISION AND DRAINAGE LEG Right 1/8/2021    Procedure: RIGHT IRRIGATION AND DEBRIDEMENT OF FOOT WITH SECONDARY CLOSURE AND HARDWARE REMOVAL;  Surgeon: Mikhail Banks Jr., MD;  Location:  [facility] OR;  Service: Orthopedics;  Laterality: Right;    KNEE ARTHROSCOPY Bilateral     TOTAL ABDOMINAL HYSTERECTOMY      TRANSESOPHAGEAL ECHOCARDIOGRAM (EMILIA) N/A 5/2/2021    Procedure: TRANSESOPHAGEAL ECHOCARDIOGRAM;  Surgeon: Jr Rocael Alexander MD;  Location: [facility] CV[facility];  Service: Cardiothoracic;  Laterality: N/A;    UPPER GASTROINTESTINAL ENDOSCOPY  08/2016    US GUIDED FINE NEEDLE ASPIRATION  5/8/2020       SLP Recommendation and Plan               SWALLOW EVALUATION (last 72 hours)            EDUCATION  The patient has been educated in the following areas:   Dysphagia (Swallowing Impairment) Oral Care/Hydration.        SLP GOALS       Row Name 12/07/23 1400       (LTG) Swallow    (LTG) Swallow Pt will demonstrate tolerance of recommended diet without unresolved pocketing or s/s aspiration as evidenced via direct observation by SLP  -CB    Time Frame (Swallow Long Term Goal) by discharge  -CB    Progress/Outcomes (Swallow Long Term Goal) goal ongoing  -CB    Comment (Swallow Long Term Goal) Patient was seen for DT/meal at lunch. Patient is currently on regular diet. Most recent CT of the head revealed no acute intracranial abnormality. No MRI has been done at this time. Noted a bit of confusion with mixing her different drinks together and also dumping her jello in her drink as well. Otherwise, patient was alert x 3. Properly positioned patient upright in bed prior to meal. Patient displayed adequate rotary chewing. Noted chewing on the right side as this is her strong side. Patient able to clear oral cavity effectively between bites without evidence of oral residue.  However, noted intermittent clearing of throat throughout the meal. Patient also exhibited significant cough x 2.  Patient was initially eating rather fast but did slow it down upon verbal cuing. Patient has a good productive cough. Patient continues to be at room air not requiring oxygen needs at this time.  ST will continue ongoing meal assessment  closely to assure safety and tolerance with least restrictive diet given symptoms she displayed during this visit.  -CB       (STG) Swallow 1    (STG) Swallow 1 further goals if indicated  -CB    Time Frame (Swallow Short Term Goal 1) 1 week  -CB    Progress/Outcomes (Swallow Short Term Goal 1) --              User Key  (r) = Recorded By, (t) = Taken By, (c) = Cosigned By      Initials Name Provider Type          Kim Larson, SLP Speech and Language Pathologist                             Time Calculation:                URIEL Childress  12/7/2023

## 2023-12-07 NOTE — THERAPY TREATMENT NOTE
Subjective: Pt agreeable to therapeutic plan of care. Pt reports feeling dizzy when she gets out of bed. Orthostatic vitals assessed.     Objective:     Bed mobility - Supervision  Transfers - Min-A and with rolling walker  Ambulation - 20  feet x2 Min-A and with rolling walker    Therapeutic Exercise - 10 Reps B LE AROM supported sitting / chair    Vitals: Orthostatic  Supine /80 mmHg;  Sitting /92 mmHg,  Standing /62 mmhg;  Sitting BP post gait 158/84 mmHg.     Pain: 4 VAS   Location: headache  Intervention for pain: Repositioned and RN provided medication    Education: Provided education on the importance of mobility in the acute care setting, Verbal/Tactile Cues, Transfer Training, Gait Training, Energy conservation strategies, and Stroke prevention and risk factors    Assessment: Marge Mcghee presents with functional mobility impairments which indicate the need for skilled intervention. Tolerating session today without incident. Pt does report feeling dizzy in standing, pt with orthostatic change in BP from sitting to standing, but standing /62 mmHg and still appropriate for gait training to pt's tolerance; limited to 20 ft x2 by fatigue. Will continue to follow and progress as tolerated.     Plan/Recommendations:   High Intensity Therapy recommended post-acute care. This is recommended as therapy feels the patient would require 5-6 days per week, 2-3 hours per day. At this time, inpatient rehabilitation (acute rehab) would be the first choice and SNF would be second.. Pt requires rolling walker at discharge.     Pt desires Inpatient Rehabilitation placement at discharge. Pt cooperative; agreeable to therapeutic recommendations and plan of care.     Modified [place]: 4 = Moderately severe disability (Unable to attend to own bodily needs without assistance, and unable to walk unassisted)     Post-Tx Position: Up in Chair, Alarms activated, and Call light and personal items within  reach  PPE: gloves and surgical mask

## 2023-12-07 NOTE — CASE MANAGEMENT/SOCIAL WORK
Continued Stay Note  ELSY Woo     Patient Name: aMrge Mcghee  MRN: [id number]  Today's Date: 12/7/2023    Admit Date: 12/5/2023    Plan: Referral to [facility], await acceptance and precert.   Discharge Plan       Row Name 12/07/23 0942       Plan    Plan Comments D/C Barrier: MRI needs to be completed.                 Expected Discharge Date and Time       Expected Discharge Date Expected Discharge Time    Dec 8, 2023               Belem Owens RN

## 2023-12-07 NOTE — PLAN OF CARE
Goal Outcome Evaluation:              Problem: Adult Inpatient Plan of Care  Goal: Plan of Care Review  Outcome: Ongoing, Progressing  Goal: Patient-Specific Goal (Individualized)  Outcome: Ongoing, Progressing  Goal: Absence of Hospital-Acquired Illness or Injury  Outcome: Ongoing, Progressing  Intervention: Identify and Manage Fall Risk  Recent Flowsheet Documentation  Taken 12/7/2023 1709 by Nighat Catherine RN  Safety Promotion/Fall Prevention:   activity supervised   assistive device/personal items within reach   clutter free environment maintained   safety round/check completed   room organization consistent   nonskid shoes/slippers when out of bed   lighting adjusted   fall prevention program maintained  Taken 12/7/2023 1600 by Nighat Catherine RN  Safety Promotion/Fall Prevention: (MRI) patient off unit  Taken 12/7/2023 1400 by Nighat Catherine RN  Safety Promotion/Fall Prevention:   activity supervised   assistive device/personal items within reach   clutter free environment maintained   safety round/check completed   room organization consistent   nonskid shoes/slippers when out of bed   lighting adjusted   fall prevention program maintained  Taken 12/7/2023 1200 by Nighat Catherine RN  Safety Promotion/Fall Prevention:   activity supervised   assistive device/personal items within reach   clutter free environment maintained   safety round/check completed   room organization consistent   nonskid shoes/slippers when out of bed   lighting adjusted   fall prevention program maintained  Taken 12/7/2023 1000 by Nighat aCtherine RN  Safety Promotion/Fall Prevention:   activity supervised   assistive device/personal items within reach   clutter free environment maintained   safety round/check completed   room organization consistent   nonskid shoes/slippers when out of bed   lighting adjusted   fall prevention program maintained  Taken 12/7/2023 0900 by Nighat Catherine RN  Safety Promotion/Fall  Prevention:   activity supervised   assistive device/personal items within reach   clutter free environment maintained   safety round/check completed   room organization consistent   nonskid shoes/slippers when out of bed   lighting adjusted   fall prevention program maintained  Taken 12/7/2023 0800 by Nighat Catherine RN  Safety Promotion/Fall Prevention:   activity supervised   assistive device/personal items within reach   clutter free environment maintained   safety round/check completed   room organization consistent   nonskid shoes/slippers when out of bed   lighting adjusted   fall prevention program maintained  Intervention: Prevent Skin Injury  Recent Flowsheet Documentation  Taken 12/7/2023 1709 by Nighat Catherine RN  Skin Protection: adhesive use limited  Taken 12/7/2023 1200 by Nighat Catherine RN  Skin Protection: adhesive use limited  Taken 12/7/2023 0900 by Nighat Catherine RN  Skin Protection: adhesive use limited  Intervention: Prevent and Manage VTE (Venous Thromboembolism) Risk  Recent Flowsheet Documentation  Taken 12/7/2023 0900 by Nighat Catherine RN  VTE Prevention/Management:   bilateral   sequential compression devices on  Intervention: Prevent Infection  Recent Flowsheet Documentation  Taken 12/7/2023 1709 by Nighat Catherine RN  Infection Prevention:   single patient room provided   rest/sleep promoted   personal protective equipment utilized   hand hygiene promoted  Taken 12/7/2023 1400 by Nighat Catherine RN  Infection Prevention:   single patient room provided   rest/sleep promoted   personal protective equipment utilized   hand hygiene promoted  Taken 12/7/2023 1200 by Nighat Catherine RN  Infection Prevention:   single patient room provided   rest/sleep promoted   personal protective equipment utilized   equipment surfaces disinfected   hand hygiene promoted  Taken 12/7/2023 1000 by Nighat Catherine RN  Infection Prevention:   single patient room provided   rest/sleep  promoted   personal protective equipment utilized   hand hygiene promoted  Taken 12/7/2023 0900 by Nighat Catherine RN  Infection Prevention:   single patient room provided   rest/sleep promoted   personal protective equipment utilized   hand hygiene promoted  Taken 12/7/2023 0800 by Nighat Catherine RN  Infection Prevention:   single patient room provided   rest/sleep promoted   personal protective equipment utilized   hand hygiene promoted  Goal: Optimal Comfort and Wellbeing  Outcome: Ongoing, Progressing  Intervention: Monitor Pain and Promote Comfort  Recent Flowsheet Documentation  Taken 12/7/2023 1200 by Nighat Catherine RN  Pain Management Interventions: see MAR  Taken 12/7/2023 0900 by Nighat Catherine RN  Pain Management Interventions: see MAR  Intervention: Provide Person-Centered Care  Recent Flowsheet Documentation  Taken 12/7/2023 1709 by Nighat Catherine RN  Trust Relationship/Rapport:   care explained   choices provided  Taken 12/7/2023 1200 by Nighat Catherine RN  Trust Relationship/Rapport:   choices provided   care explained  Taken 12/7/2023 0900 by Nighat Catherine RN  Trust Relationship/Rapport:   care explained   choices provided  Goal: Readiness for Transition of Care  Outcome: Ongoing, Progressing     Problem: Adjustment to Illness (Stroke, Ischemic/Transient Ischemic Attack)  Goal: Optimal Coping  Outcome: Ongoing, Progressing  Intervention: Support Psychosocial Response to Stroke  Recent Flowsheet Documentation  Taken 12/7/2023 1709 by Nighat Catherine RN  Family/Support System Care: support provided  Taken 12/7/2023 1200 by Nighat Catherine RN  Family/Support System Care: support provided  Taken 12/7/2023 0900 by Nighat Catherine RN  Supportive Measures: active listening utilized  Family/Support System Care: support provided     Problem: Bowel Elimination Impaired (Stroke, Ischemic/Transient Ischemic Attack)  Goal: Effective Bowel Elimination  Outcome: Ongoing, Progressing      Problem: Cerebral Tissue Perfusion (Stroke, Ischemic/Transient Ischemic Attack)  Goal: Optimal Cerebral Tissue Perfusion  Outcome: Ongoing, Progressing  Intervention: Protect and Optimize Cerebral Perfusion  Recent Flowsheet Documentation  Taken 12/7/2023 1709 by Nighat Catherine RN  Cerebral Perfusion Promotion: blood pressure monitored  Taken 12/7/2023 1200 by Nighat Catherine RN  Sensory Stimulation Regulation: television on  Cerebral Perfusion Promotion: blood pressure monitored     Problem: Cognitive Impairment (Stroke, Ischemic/Transient Ischemic Attack)  Goal: Optimal Cognitive Function  Outcome: Ongoing, Progressing  Intervention: Optimize Cognitive Function  Recent Flowsheet Documentation  Taken 12/7/2023 1709 by Nighat Catherine RN  Reorientation Measures: clock in view  Taken 12/7/2023 1200 by Nighat Catherine RN  Sensory Stimulation Regulation: television on  Reorientation Measures: clock in view  Taken 12/7/2023 0900 by Nighat Catherine RN  Reorientation Measures: clock in view     Problem: Communication Impairment (Stroke, Ischemic/Transient Ischemic Attack)  Goal: Improved Communication Skills  Outcome: Ongoing, Progressing  Intervention: Optimize Communication Skills  Recent Flowsheet Documentation  Taken 12/7/2023 1200 by Nighat Catherine RN  Communication Enhancement Strategies: call light answered in person  Taken 12/7/2023 0900 by Nighat Catherine RN  Communication Enhancement Strategies: call light answered in person     Problem: Functional Ability Impaired (Stroke, Ischemic/Transient Ischemic Attack)  Goal: Optimal Functional Ability  Outcome: Ongoing, Progressing     Problem: Respiratory Compromise (Stroke, Ischemic/Transient Ischemic Attack)  Goal: Effective Oxygenation and Ventilation  Outcome: Ongoing, Progressing     Problem: Sensorimotor Impairment (Stroke, Ischemic/Transient Ischemic Attack)  Goal: Improved Sensorimotor Function  Outcome: Ongoing, Progressing  Intervention:  Optimize Sensory and Perceptual Ability  Recent Flowsheet Documentation  Taken 12/7/2023 1709 by Nighat Catherine RN  Pressure Reduction Techniques: frequent weight shift encouraged  Pressure Reduction Devices: pressure-redistributing mattress utilized  Taken 12/7/2023 1200 by Nighat Catherine RN  Pressure Reduction Techniques: frequent weight shift encouraged  Pressure Reduction Devices: pressure-redistributing mattress utilized  Taken 12/7/2023 0900 by Nighat Catherine RN  Pressure Reduction Techniques: frequent weight shift encouraged  Pressure Reduction Devices: pressure-redistributing mattress utilized     Problem: Swallowing Impairment (Stroke, Ischemic/Transient Ischemic Attack)  Goal: Optimal Eating and Swallowing without Aspiration  Outcome: Ongoing, Progressing     Problem: Urinary Elimination Impaired (Stroke, Ischemic/Transient Ischemic Attack)  Goal: Effective Urinary Elimination  Outcome: Ongoing, Progressing     Problem: Diabetes Comorbidity  Goal: Blood Glucose Level Within Targeted Range  Outcome: Ongoing, Progressing     Problem: Hypertension Comorbidity  Goal: Blood Pressure in Desired Range  Outcome: Ongoing, Progressing  Intervention: Maintain Blood Pressure Management  Recent Flowsheet Documentation  Taken 12/7/2023 1709 by Nighat Catherine RN  Medication Review/Management: medications reviewed  Taken 12/7/2023 1400 by Nighat Catherine RN  Medication Review/Management: medications reviewed  Taken 12/7/2023 1200 by Nighat Catherine RN  Medication Review/Management: medications reviewed  Taken 12/7/2023 1000 by Nighat Catherine RN  Medication Review/Management: medications reviewed  Taken 12/7/2023 0900 by Nighat Catherine RN  Medication Review/Management: medications reviewed  Taken 12/7/2023 0800 by Nighat Catherine RN  Medication Review/Management: medications reviewed     Problem: Pain Chronic (Persistent) (Comorbidity Management)  Goal: Acceptable Pain Control and Functional  Ability  Outcome: Ongoing, Progressing  Intervention: Manage Persistent Pain  Recent Flowsheet Documentation  Taken 12/7/2023 1709 by Nighat Catherine RN  Medication Review/Management: medications reviewed  Taken 12/7/2023 1400 by Nighat Catherine RN  Medication Review/Management: medications reviewed  Taken 12/7/2023 1200 by Nighat Catherine RN  Medication Review/Management: medications reviewed  Taken 12/7/2023 1000 by Nighat Catherine RN  Medication Review/Management: medications reviewed  Taken 12/7/2023 0900 by Nighat Catherine RN  Medication Review/Management: medications reviewed  Taken 12/7/2023 0800 by Nighat Catherine RN  Medication Review/Management: medications reviewed  Intervention: Develop Pain Management Plan  Recent Flowsheet Documentation  Taken 12/7/2023 1200 by Nighat Catherine RN  Pain Management Interventions: see MAR  Taken 12/7/2023 0900 by Nighat Catherine RN  Pain Management Interventions: see MAR  Intervention: Optimize Psychosocial Wellbeing  Recent Flowsheet Documentation  Taken 12/7/2023 1709 by Nighat Catherine RN  Diversional Activities:   smartphone   television  Family/Support System Care: support provided  Taken 12/7/2023 1200 by Nighat Catherine RN  Diversional Activities:   smartphone   television  Family/Support System Care: support provided  Taken 12/7/2023 0900 by Nighat Catherine RN  Supportive Measures: active listening utilized  Diversional Activities: smartphone  Family/Support System Care: support provided

## 2023-12-07 NOTE — PLAN OF CARE
Assessment: Marge Mcghee presents with functional mobility impairments which indicate the need for skilled intervention. Tolerating session today without incident. Pt does report feeling dizzy in standing, pt with orthostatic change in BP from sitting to standing, but standing /62 mmHg and still appropriate for gait training to pt's tolerance; limited to 20 ft x2 by fatigue. Will continue to follow and progress as tolerated.     Plan/Recommendations:   High Intensity Therapy recommended post-acute care. This is recommended as therapy feels the patient would require 5-6 days per week, 2-3 hours per day. At this time, inpatient rehabilitation (acute rehab) would be the first choice and SNF would be second.. Pt requires rolling walker at discharge.     Pt desires Inpatient Rehabilitation placement at discharge. Pt cooperative; agreeable to therapeutic recommendations and plan of care.

## 2023-12-07 NOTE — PROGRESS NOTES
"     LOS: 2 days     Chief Complaint: left arm weakness, left eye blurred vision, headache        SUBJECTIVE:    History taken from: patient chart    Interval History: Patient continues to have left temporal/orbital headache with blurred vision. No hx of migraines.     Still waiting on MRI brain.     She denies tearing, lacrimation, nasal congestion.     Patient Complaints: 7/10 left sided headache, blurred vision       Review of Systems   Constitutional: Negative.    HENT:  Negative for congestion.    Eyes:  Positive for photophobia and visual disturbance. Negative for discharge.   Gastrointestinal:  Negative for nausea and vomiting.   Neurological:  Positive for headaches. Negative for dizziness, tremors, seizures, syncope, facial asymmetry, speech difficulty, weakness, light-headedness and numbness.   Psychiatric/Behavioral:  Negative for confusion.            Pertinent PMH:  has a past medical history of Adrenal nodule (11/16/2016), Age-related osteoporosis without current pathological fracture (11/16/2016), Arthritis, Delayed surgical wound healing, Essential hypertension (11/16/2016), Gastroesophageal reflux disease with esophagitis (11/16/2016), Hepatic steatosis (11/16/2016), History of anemia, History of sepsis, Hyperlipidemia (11/16/2016), Lisfranc's dislocation, Osteomyelitis of left foot (11/2020), RLS (restless legs syndrome), Squamous cell skin cancer (2014), Thyroid nodule (10/18/2019), Type 2 diabetes mellitus with peripheral neuropathy (11/16/2016), and Vitamin D deficiency (1/25/2019).   ________________________________________________     OBJECTIVE:    On exam:  GENERAL: NAD  CARDIO: RRR  NEURO:  Oriented x3  EOMI, PERRL, subjective left eye blurred vision and \"spider webs\"   CN 2-12 intact, No facial asymmetry  Speech clear without dysarthria  Sensations intact and equal bilaterally  Strength 5-/5 and equal in all extremities  No " ataxia      ________________________________________________   RESULTS REVIEW    VITAL SIGNS:  Temp:  [97.6 °F (36.4 °C)-97.9 °F (36.6 °C)] 97.9 °F (36.6 °C)  Heart Rate:  [79-97] 89  Resp:  [11-20] 11  BP: (122-160)/(67-89) 155/84    LABS:       Lab 12/06/23  1137 12/05/23 2354 12/05/23 1137   WBC  --   --  7.20   HEMOGLOBIN  --   --  13.0   HEMATOCRIT  --   --  39.5   PLATELETS  --   --  318   NEUTROS ABS  --   --  3.60   LYMPHS ABS  --   --  2.80   MONOS ABS  --   --  0.50   EOS ABS  --   --  0.20   MCV  --   --  88.0   SED RATE  --  24  --    CRP <0.30  --   --    PROTIME  --   --  10.7         Lab 12/05/23  2354 12/05/23  1137   SODIUM  --  140   POTASSIUM  --  4.3   CHLORIDE  --  105   CO2  --  25.0   ANION GAP  --  10.0   BUN  --  22   CREATININE  --  0.94   EGFR  --  65.4   GLUCOSE  --  120*   CALCIUM  --  9.3   HEMOGLOBIN A1C 7.40*  --    TSH  --  1.200         Lab 12/05/23 1137   TOTAL PROTEIN 7.4   ALBUMIN 4.1   GLOBULIN 3.3   ALT (SGPT) 9   AST (SGOT) 17   BILIRUBIN 0.3   ALK PHOS 57         Lab 12/05/23 1137   PROTIME 10.7   INR 0.98         Lab 12/05/23 2354   CHOLESTEROL 162   LDL CHOL 83   HDL CHOL 48   TRIGLYCERIDES 181*         Lab 12/05/23  1137 12/05/23  0000   VITAMIN B 12 410  --    ABO TYPING  --  A   RH TYPING  --  Positive   ANTIBODY SCREEN  --  Negative         UA          2/5/2023    12:59 6/12/2023    10:37   Urinalysis   Squamous Epithelial Cells, UA 3-6     Specific Gravity, UA 1.022  1.020       Ketones, UA Negative     Blood, UA Trace     Leukocytes, UA Small (1+)     Nitrite, UA Negative  Positive       RBC, UA 0-2     WBC, UA Too Numerous to Count     Bacteria, UA 4+        Details          This result is from an external source.               Lab Results   Component Value Date    TSH 1.200 12/05/2023    LDL 83 12/05/2023    HGBA1C 7.40 (H) 12/05/2023    [id number] 410 12/05/2023         IMAGING STUDIES:  CT Angiogram Head w AI Analysis of LVO    Result Date:  12/5/2023  Impression: CT angiogram demonstrates some irregular long segment moderate narrowing of the left M1 segment distally as well as some moderate to severe narrowing of adjacent left anterior temporal M2 branch. These findings appear somewhat more conspicuous than on comparison, possibly reflecting technical differences in scanning, versus progressive atherosclerotic change. There is otherwise no evidence of additional high-grade stenosis, occlusion or aneurysm in the head and neck. Electronically Signed: Jassi Mosley MD  12/5/2023 1:10 PM EST  Workstation ID: [id number]    CT Angiogram Neck    Result Date: 12/5/2023  Impression: CT angiogram demonstrates some irregular long segment moderate narrowing of the left M1 segment distally as well as some moderate to severe narrowing of adjacent left anterior temporal M2 branch. These findings appear somewhat more conspicuous than on comparison, possibly reflecting technical differences in scanning, versus progressive atherosclerotic change. There is otherwise no evidence of additional high-grade stenosis, occlusion or aneurysm in the head and neck. Electronically Signed: Jassi Mosley MD  12/5/2023 1:10 PM EST  Workstation ID: [id number]    CT Head Without Contrast Stroke Protocol    Result Date: 12/5/2023  No acute intracranial abnormality. Electronically Signed: Antelmo Ramirez MD  12/5/2023 1:09 PM EST  Workstation ID: [id number]    XR Chest 1 View    Result Date: 12/5/2023  Impression: No acute process. Electronically Signed: Oneida Munoz MD  12/5/2023 12:29 PM EST  Workstation ID: [id number]     I reviewed the patient's new clinical results.    ________________________________________________      PROBLEM LIST:    TIA (transient ischemic attack)        ASSESSMENT/PLAN:  Left eye pain and peripheral blurred vision with left sided headache, Left face and arm numbness (hx of right pontine stroke)   Differentials include optic neuritis, migraine/cluster headache,  TIA/stroke, optic etiology (glaucoma, corneal etiology), temporal arteritis.   - CT head negative for hemorrhage  - MRI brain with and without contrast with orbits pending   - CTA head and neck: There there is moderate narrowing of the left M1 and moderate to severe narrowing of the left M2 branch.  No additional high-grade stenosis of the head or neck.  - Echo (August 2023): EF is 61 to 65%  -Patient had a EMILIA in October 2022 which was negative for PFO, aneurysm or other structural causes.   - EKG:Sinus rhythm rate 85  - Labs: A1C: 7.40, B12:410, LDL:  83, TSH: 1.200  - Continue ASA 81 and Brilinta 90 mg BID & Lipitor 80  - ESR & CRP normal  - Fioricet 2 tabs Q8hr   - Give 1g Solumedrol daily x 3 days- there is concern for optic neuritis- unfortunately we are still waiting on MRI. Closely monitor blood glucose.   -- PT/OT/ST as appropriate, Neuro checks per protocol, DVT prophylaxis, Stroke education  - Patient will need opthalmology evaluation after d/c         **Please refer to previous notes for further details and recommendations.     I discussed the patient's findings and my recommendations with patient and nursing staff    CANDIDO Rhodes  12/07/23  10:11 EST

## 2023-12-07 NOTE — PLAN OF CARE
Goal Outcome Evaluation:Assessment: Marge Mcghee presents with functional mobility impairments which indicate the need for skilled intervention. Tolerating session today without incident. Pt does report feeling dizzy in standing, pt with orthostatic change in BP from sitting to standing, but standing /62 mmHg and still appropriate for gait training to pt's tolerance; limited to 20 ft x2 by fatigue. Will continue to follow and progress as tolerated.     Plan/Recommendations:   High Intensity Therapy recommended post-acute care. This is recommended as therapy feels the patient would require 5-6 days per week, 2-3 hours per day. At this time, inpatient rehabilitation (acute rehab) would be the first choice and SNF would be second.. Pt requires rolling walker at discharge.     Pt desires Inpatient Rehabilitation placement at discharge. Pt cooperative; agreeable to therapeutic recommendations and plan of care.

## 2023-12-07 NOTE — PROGRESS NOTES
[facility] Complaint: Numbness in the left side of the face and left arm starting today     Stroke     This is a pleasant 68-year-old female with history of hypertension diabetes  Patient had some injection in her eye few days ago by a retinal specialist for some inflammation and has been causing pain in her eyes since  Pain mostly with moving the eye sideways  Patient had previous TIA x 2 and has been on Brilinta and aspirin  Woke up this morning with left-sided facial numbness was numbness in the left arm  Did not denied any slurred speech or blurred vision  She had mild weakness in the left arm but not noted on exam  Patient seen in the ER head CT and CTA admitted for neuro evaluation     Review of Systems   Significant for left facial and left arm numbness  She had pain in her eyes mostly photophobia and pain on moving her eyes sideways  Denied any chest pain fever chills nausea vomiting diarrhea  Mild and significant weakness in the left arm  No slurred speech or blurred vision otherwise    12/6  Patient sitting in her chair  Feeling no energy  Awaiting MRI   Neuro evaluations pending  No focal neurodeficits noted  Hemodynamically stable    12/7  Patient is feeling nauseous  Still with headaches  Will try Phenergan as she did not improve with Zofran or Compazine  Headache is mostly in the left side of her face above the eye worse with lateral deviation of the hide  ESR and CRP normal  Patient started on steroids  MRI is pending  Follow-up per neurology recommendation  Personal History      Medical History        Past Medical History:   Diagnosis Date    Adrenal nodule 11/16/2016     FINDINGS: Mild hepatic steatosis may be present. Cholecystectomy. No biliary ductal dilatation. Negative pancreas, spleen, left adrenal gland, and kidneys. The right adrenal presumed adenoma has increased slightly, measuring 3 x 4 cm in diameter,  compared to 2 x 3.5 cm on the previous exam. The attenuation values are consistent with an  adenoma. Done at [facility] in August 2018    Age-related osteoporosis without current pathological fracture 11/16/2016    Arthritis      Delayed surgical wound healing      Essential hypertension 11/16/2016    Gastroesophageal reflux disease with esophagitis 11/16/2016    Hepatic steatosis 11/16/2016    History of anemia      History of sepsis       FROM UTI    Hyperlipidemia 11/16/2016    Lisfranc's dislocation       LEFT WITH FRACTURES NONHEALING FOOT    Osteomyelitis of left foot 11/2020    RLS (restless legs syndrome)      Squamous cell skin cancer 2014     Excised by Dr. James    Thyroid nodule 10/18/2019     BENIGN    Type 2 diabetes mellitus with peripheral neuropathy 11/16/2016    Vitamin D deficiency 1/25/2019            Surgical History         Past Surgical History:   Procedure Laterality Date    BREAST BIOPSY Left       7/2019. BENIGN    CARDIAC CATHETERIZATION Left 5/2/2021     Procedure: Cardiac Catheterization/Vascular Study;  Surgeon: Chacho Esteban MD;  Location: [facility] CATH INVASIVE LOCATION;  Service: Cardiovascular;  Laterality: Left;    CARDIAC CATHETERIZATION N/A 5/2/2021     Procedure: Intra-Aortic Baloon Pump Insertion;  Surgeon: Chacho Esteban MD;  Location: [facility] CATH INVASIVE LOCATION;  Service: Cardiovascular;  Laterality: N/A;    CATARACT EXTRACTION WITH INTRAOCULAR LENS IMPLANT Bilateral      CHOLECYSTECTOMY        COLONOSCOPY        CORONARY ARTERY BYPASS GRAFT N/A 5/2/2021     Procedure: CORONARY ARTERY BYPASS WITH INTERNAL MAMMARY ARTERY GRAFT;  Surgeon: Jr Rocael Alexander MD;  [facility];  Service: Cardiothoracic;  Laterality: N/A;    FOOT FUSION Right 11/13/2020     Procedure: RIGHT OPEN TREATMENT LISFRANC INJURY, OPEN REDUCTION INTERNAL FIXATION MEDIAL/MIDDLE CUNEIFORM FRACTURE AND 2ND/3RD METATARSAL FRACTURE 1ST 2ND POSSIBLE 3RD TARSOMETATARSAL ARTHRODESIS INTERCUNEIFORM ARTHRODESIS CALCANEAL BONE GRAFT;  Surgeon: Mikhail Banks Jr., MD;  Location: [facility];  Service: Orthopedics;  Laterality: Right;    INCISION AND DRAINAGE LEG Right 1/8/2021     Procedure: RIGHT IRRIGATION AND DEBRIDEMENT OF FOOT WITH SECONDARY CLOSURE AND HARDWARE REMOVAL;  Surgeon: Mikhail Banks Jr., MD;  Location: [facility] OR;  Service: Orthopedics;  Laterality: Right;    KNEE ARTHROSCOPY Bilateral      TOTAL ABDOMINAL HYSTERECTOMY        TRANSESOPHAGEAL ECHOCARDIOGRAM (EMILIA) N/A 5/2/2021     Procedure: TRANSESOPHAGEAL ECHOCARDIOGRAM;  Surgeon: Jr Rocael Alexander MD;  Location: [facility];  Service: Cardiothoracic;  Laterality: N/A;    UPPER GASTROINTESTINAL ENDOSCOPY   08/2016    US GUIDED FINE NEEDLE ASPIRATION   5/8/2020            Family History: family history includes COPD in her mother; Diabetes in her mother, sister, sister, sister, and sister; Hypertension in her mother; Kidney disease in her mother; Obesity in her mother; Thyroid disease in her mother. Otherwise pertinent FHx was reviewed and not pertinent to current issue.     Social History:  reports that she has never smoked. She has never used smokeless tobacco. She reports current alcohol use. She reports that she does not use drugs.     Home Medications:  Alcohol Wipes, DULoxetine, Lancets, Pen Needles, Tirzepatide, amLODIPine, aspirin, atorvastatin, butalbital-acetaminophen-caffeine, diclofenac, famotidine, glucose blood, multivitamin with minerals, polyethylene glycol, ticagrelor, and traZODone     Allergies:        Allergies   Allergen Reactions    Zofran [Ondansetron Hcl] Nausea And Vomiting       Does the opposite of its purpose    Prochlorperazine Other (See Comments)       CAUSED SEIZURE    Prochlorperazine Edisylate Hives    Prochlorperazine Maleate Irritability    Hydralazine Itching and Rash    Hydralazine Hcl Itching and Rash    Meperidine Itching and Swelling    Prochlorperazine Maleate Other (See Comments)       CAUSES SEIZURE               Objective   Objective      Vitals:   Temp:  [97.8 °F (36.6 °C)] 97.8  °F (36.6 °C)  Heart Rate:  [85-86] 86  Resp:  [15-18] 15  BP: (151-186)/() 186/97     Physical Exam  Awake alert oriented x 3  Cranial nerves are intact  Neck supple  Chest clear auscultation bilaterally  Heart S1-S2 no murmur  Abdomen soft benign nontender bowel sounds positive  Extremities no edema  Neuroexam mild sensory changes in the left face but no facial droop noted with motor power 5/5 bilaterally there is no ataxia        Result Review   Result Review:  I have personally reviewed the results from the time of this admission to 12/5/2023 14:00 EST and agree with these findings:  [x]  Laboratory list / accordion  []  Microbiology  [x]  Radiology  []  EKG/Telemetry   []  Cardiology/Vascular   []  Pathology  []  Old records  []  Other:  Most notable findings include:               Assessment & Plan  Assessment / Plan      Brief Patient Summary:  Marge Mcghee is a 68 y.o. female who is admitted for TIA  Patient has a history of previous TIA on Brilinta  History of diabetes        Active Hospital Problems:       Active Hospital Problems     Diagnosis      **TIA (transient ischemic attack)        Plan:   She will be admitted  Continue aspirin Brilinta  Patient had CT and CTA  Get MRI done while in hospital  Neuro consult with neurology on-call  Keep patient Accu-Chek sliding scale and resume rest of her home meds     DVT prophylaxis:  Medical DVT prophylaxis orders are present.     CODE STATUS:    Admission Status:  I believe this patient meets ip status.     Antwan Lam MD

## 2023-12-07 NOTE — PLAN OF CARE
Fair shift spent, medicated as ordered, left stable, care and observation continues    Goal Outcome Evaluation:    Problem: Adult Inpatient Plan of Care  Goal: Optimal Comfort and Wellbeing  Outcome: Ongoing, Progressing  Intervention: Provide Person-Centered Care  Recent Flowsheet Documentation  Taken 12/6/2023 1900 by Davon Lazcano RN  Trust Relationship/Rapport:   care explained   choices provided  Goal: Readiness for Transition of Care  Outcome: Ongoing, Progressing     Problem: Cerebral Tissue Perfusion (Stroke, Ischemic/Transient Ischemic Attack)  Goal: Optimal Cerebral Tissue Perfusion  Outcome: Ongoing, Progressing  Intervention: Protect and Optimize Cerebral Perfusion  Recent Flowsheet Documentation  Taken 12/6/2023 1900 by Davon Lazcano RN  Sensory Stimulation Regulation:   television on   quiet environment promoted  Cerebral Perfusion Promotion: blood pressure monitored     Problem: Sensorimotor Impairment (Stroke, Ischemic/Transient Ischemic Attack)  Goal: Improved Sensorimotor Function  Outcome: Ongoing, Progressing  Intervention: Optimize Range of Motion, Motor Control and Function  Recent Flowsheet Documentation  Taken 12/7/2023 0000 by Davon Lazcano RN  Positioning/Transfer Devices:   pillows   in use  Taken 12/6/2023 1900 by Davon Lazcano RN  Positioning/Transfer Devices:   pillows   in use  Range of Motion: active ROM (range of motion) encouraged  Intervention: Optimize Sensory and Perceptual Ability  Recent Flowsheet Documentation  Taken 12/6/2023 1900 by Davon Lazcano RN  Pressure Reduction Techniques: frequent weight shift encouraged  Pressure Reduction Devices: pressure-redistributing mattress utilized

## 2023-12-08 PROBLEM — G44.89 OTHER HEADACHE SYNDROME: Status: ACTIVE | Noted: 2023-12-08

## 2023-12-08 PROBLEM — H53.9 CHANGE IN VISION: Status: ACTIVE | Noted: 2023-12-05

## 2023-12-08 LAB
GLUCOSE BLDC GLUCOMTR-MCNC: 204 MG/DL (ref 70–105)
GLUCOSE BLDC GLUCOMTR-MCNC: 223 MG/DL (ref 70–105)
GLUCOSE BLDC GLUCOMTR-MCNC: 355 MG/DL (ref 70–105)
GLUCOSE BLDC GLUCOMTR-MCNC: 400 MG/DL (ref 70–105)

## 2023-12-08 PROCEDURE — 97530 THERAPEUTIC ACTIVITIES: CPT

## 2023-12-08 PROCEDURE — 63710000001 INSULIN LISPRO (HUMAN) PER 5 UNITS: Performed by: INTERNAL MEDICINE

## 2023-12-08 PROCEDURE — 25010000002 METHYLPREDNISOLONE PER 125 MG: Performed by: NURSE PRACTITIONER

## 2023-12-08 PROCEDURE — 92526 ORAL FUNCTION THERAPY: CPT

## 2023-12-08 PROCEDURE — 63710000001 INSULIN LISPRO (HUMAN) PER 5 UNITS: Performed by: NURSE PRACTITIONER

## 2023-12-08 PROCEDURE — 82948 REAGENT STRIP/BLOOD GLUCOSE: CPT

## 2023-12-08 PROCEDURE — 99233 SBSQ HOSP IP/OBS HIGH 50: CPT | Performed by: NURSE PRACTITIONER

## 2023-12-08 PROCEDURE — 63710000001 PROMETHAZINE PER 25 MG: Performed by: INTERNAL MEDICINE

## 2023-12-08 PROCEDURE — 97535 SELF CARE MNGMENT TRAINING: CPT

## 2023-12-08 PROCEDURE — 25010000002 ENOXAPARIN PER 10 MG: Performed by: INTERNAL MEDICINE

## 2023-12-08 RX ORDER — CLOPIDOGREL BISULFATE 75 MG/1
75 TABLET ORAL DAILY
Status: DISCONTINUED | OUTPATIENT
Start: 2023-12-09 | End: 2023-12-09 | Stop reason: HOSPADM

## 2023-12-08 RX ORDER — PROMETHAZINE HYDROCHLORIDE 25 MG/1
25 TABLET ORAL ONCE
Status: COMPLETED | OUTPATIENT
Start: 2023-12-08 | End: 2023-12-08

## 2023-12-08 RX ORDER — ASPIRIN 81 MG/1
81 TABLET, CHEWABLE ORAL DAILY
Status: DISCONTINUED | OUTPATIENT
Start: 2023-12-09 | End: 2023-12-09 | Stop reason: HOSPADM

## 2023-12-08 RX ORDER — INSULIN LISPRO 100 [IU]/ML
3-14 INJECTION, SOLUTION INTRAVENOUS; SUBCUTANEOUS
Status: DISCONTINUED | OUTPATIENT
Start: 2023-12-08 | End: 2023-12-09

## 2023-12-08 RX ADMIN — DOCUSATE SODIUM 50 MG AND SENNOSIDES 8.6 MG 2 TABLET: 8.6; 5 TABLET, FILM COATED ORAL at 08:49

## 2023-12-08 RX ADMIN — ASPIRIN 325 MG ORAL TABLET 325 MG: 325 PILL ORAL at 08:49

## 2023-12-08 RX ADMIN — AMLODIPINE BESYLATE 10 MG: 5 TABLET ORAL at 08:49

## 2023-12-08 RX ADMIN — INSULIN LISPRO 12 UNITS: 100 INJECTION, SOLUTION INTRAVENOUS; SUBCUTANEOUS at 21:04

## 2023-12-08 RX ADMIN — INSULIN LISPRO 3 UNITS: 100 INJECTION, SOLUTION INTRAVENOUS; SUBCUTANEOUS at 08:48

## 2023-12-08 RX ADMIN — Medication 10 ML: at 08:49

## 2023-12-08 RX ADMIN — DULOXETINE HYDROCHLORIDE 60 MG: 30 CAPSULE, DELAYED RELEASE ORAL at 08:49

## 2023-12-08 RX ADMIN — BUTALBITAL, ACETAMINOPHEN, AND CAFFEINE 2 TABLET: 50; 325; 40 TABLET ORAL at 08:49

## 2023-12-08 RX ADMIN — THERA TABS 1 TABLET: TAB at 08:49

## 2023-12-08 RX ADMIN — INSULIN LISPRO 3 UNITS: 100 INJECTION, SOLUTION INTRAVENOUS; SUBCUTANEOUS at 12:24

## 2023-12-08 RX ADMIN — Medication 10 ML: at 21:05

## 2023-12-08 RX ADMIN — PROMETHAZINE HYDROCHLORIDE 25 MG: 25 TABLET ORAL at 08:49

## 2023-12-08 RX ADMIN — SODIUM CHLORIDE 1000 MG: 900 INJECTION INTRAVENOUS at 12:32

## 2023-12-08 RX ADMIN — POLYETHYLENE GLYCOL 3350 17 G: 17 POWDER, FOR SOLUTION ORAL at 08:49

## 2023-12-08 RX ADMIN — ATORVASTATIN CALCIUM 80 MG: 40 TABLET, FILM COATED ORAL at 21:05

## 2023-12-08 RX ADMIN — INSULIN LISPRO 12 UNITS: 100 INJECTION, SOLUTION INTRAVENOUS; SUBCUTANEOUS at 18:36

## 2023-12-08 RX ADMIN — TICAGRELOR 90 MG: 90 TABLET ORAL at 08:49

## 2023-12-08 RX ADMIN — ENOXAPARIN SODIUM 40 MG: 100 INJECTION SUBCUTANEOUS at 17:14

## 2023-12-08 NOTE — CASE MANAGEMENT/SOCIAL WORK
Continued Stay Note  [facility]     Patient Name: Marge Mcghee  MRN: [id number]  Today's Date: 12/8/2023    Admit Date: 12/5/2023    Plan: D/C Plan: [facility] pending ins auth.; PASRR  completed; Transport by family car.   Discharge Plan       Row Name 12/08/23 1513       Plan    Plan D/C Plan: [facility] pending ins auth.; PASRR  completed; Transport by family car.               Expected Discharge Date and Time       Expected Discharge Date Expected Discharge Time    Dec 9, 2023               Belem Owens RN

## 2023-12-08 NOTE — CONSULTS
"Nutrition Services    Patient Name: Marge Mcghee  YOB: 1955  MRN: [id number]  Admission date: 12/5/2023    Continue current diet and encourage good PO intake.     PPE Documentation        PPE Worn By Provider Did not enter room this encounter   PPE Worn By Patient  N/A     NUTRITION SCREENING      Trending Narrative: 12/8: Pt being assessed for nursing admission screen consult and malnutrition screening tool score of 3. Pt admitted to [facility] with a TIA. Pt is complaining of some nausea. Phenergan in place. Neuro following. SLP following.        PO Diet: Diet: Cardiac Diets, Diabetic Diets; Healthy Heart (2-3 Na+); Consistent Carbohydrate; Texture: Regular Texture (IDDSI 7); Fluid Consistency: Thin (IDDSI 0)   PO Supplements: -   Trending PO Intake:  112/8: %       Nutritionally-Pertinent Medications RDN Reviewed, C/W clinical course         Labs (reviewed below): 12/5 labs reviewed     Results from last 7 days   Lab Units 12/05/23  1137   SODIUM mmol/L 140   POTASSIUM mmol/L 4.3   CHLORIDE mmol/L 105   CO2 mmol/L 25.0   BUN mg/dL 22   CREATININE mg/dL 0.94   CALCIUM mg/dL 9.3   BILIRUBIN mg/dL 0.3   ALK PHOS U/L 57   ALT (SGPT) U/L 9   AST (SGOT) U/L 17   GLUCOSE mg/dL 120*     Results from last 7 days   Lab Units 12/05/23  2354 12/05/23  1137   HEMOGLOBIN g/dL  --  13.0   HEMATOCRIT %  --  39.5   TRIGLYCERIDES mg/dL 181*  --      Lab Results   Component Value Date    HGBA1C 7.40 (H) 12/05/2023          GI Function:  + BM on 12/4; bowel regimen in place       Skin: Intact        Weight Review: Estimated body mass index is 27.91 kg/m² as calculated from the following:    Height as of this encounter: 162.6 cm (64.02\").    Weight as of this encounter: 73.8 kg (162 lb 11.2 oz).    Comment:   6% wt loss x 4 months    Wt Readings from Last 30 Encounters:   12/08/23 0342 73.8 kg (162 lb 11.2 oz)   12/07/23 0351 73.7 kg (162 lb 7.7 oz)   12/06/23 0326 71.4 kg (157 lb 6.5 oz)   12/05/23 1049 73.9 kg (163 " lb)   09/17/23 1641 73.9 kg (163 lb)   08/07/23 0922 73.9 kg (163 lb)   08/06/23 0548 74 kg (163 lb 2.3 oz)   08/04/23 0340 73.5 kg (162 lb 0.6 oz)   08/03/23 0700 73 kg (160 lb 15 oz)   08/02/23 2231 73 kg (160 lb 15 oz)   08/02/23 1115 78.5 kg (173 lb)   08/01/23 2230 78.8 kg (173 lb 11.6 oz)   08/01/23 1506 73.6 kg (162 lb 4.1 oz)   08/01/23 1311 74.2 kg (163 lb 9.3 oz)   02/05/23 1110 81.4 kg (179 lb 6.4 oz)   12/07/22 1151 80.8 kg (178 lb 2.1 oz)   10/25/22 0935 79.8 kg (176 lb)   10/11/22 1005 80.3 kg (177 lb)   09/26/22 1232 81.6 kg (180 lb)   09/25/22 1400 81.6 kg (180 lb)   08/24/22 1029 77 kg (169 lb 12.1 oz)   05/13/22 2325 81.2 kg (179 lb 0.2 oz)   02/17/22 1546 74.4 kg (164 lb)   09/08/21 0713 78.9 kg (174 lb)   07/12/21 1115 83 kg (183 lb)   06/09/21 0718 78.5 kg (173 lb)   06/03/21 1048 82.8 kg (182 lb 8 oz)   05/11/21 0600 85 kg (187 lb 6.3 oz)   05/10/21 0300 87.8 kg (193 lb 9 oz)   05/09/21 0600 87.6 kg (193 lb 2 oz)   05/09/21 0510 87.6 kg (193 lb 2 oz)   05/08/21 0525 89.1 kg (196 lb 6.9 oz)   05/07/21 0400 89.8 kg (198 lb)   05/06/21 0600 88 kg (194 lb 0.1 oz)   05/05/21 0600 91.4 kg (201 lb 8 oz)   05/04/21 0600 90 kg (198 lb 6.6 oz)   05/03/21 0515 88.1 kg (194 lb 3.6 oz)   05/03/21 0136 88.1 kg (194 lb 3.6 oz)   05/02/21 2100 88.1 kg (194 lb 3.6 oz)   05/02/21 1916 88.1 kg (194 lb 3.6 oz)   05/02/21 1500 88.1 kg (194 lb 3.6 oz)   05/02/21 0256 87.1 kg (192 lb)   05/01/21 2231 85.6 kg (188 lb 11.4 oz)   01/08/21 1355 85 kg (187 lb 6.3 oz)   12/28/20 1537 79.4 kg (175 lb)   11/13/20 1844 79.8 kg (175 lb 14.8 oz)   11/06/20 0805 79.8 kg (176 lb)   08/05/20 1810 81.2 kg (179 lb)   07/22/20 1010 81.2 kg (179 lb)   04/12/20 0510 78.4 kg (172 lb 13.5 oz)   04/12/20 0150 77.1 kg (170 lb)   03/24/20 0400 78.7 kg (173 lb 9.6 oz)   03/23/20 0712 80.8 kg (178 lb 2.1 oz)   03/13/20 1117 81.5 kg (179 lb 9.6 oz)   10/18/19 1445 81.7 kg (180 lb 3.2 oz)   06/14/19 1347 77.2 kg (170 lb 3.2 oz)   01/25/19 1304  78.4 kg (172 lb 12.8 oz)   09/11/17 1527 85.1 kg (187 lb 9.6 oz)          --       Nutrition Problem Statement: No acute nutrition dx at this time; RD to follow per protocol        Nutrition Intervention: Continue current diet and ONS. Encourage good PO intake.           Monitoring/Evaluation Per protocol, PO intake, Pertinent labs          RD to follow up per protocol.    Electronically signed by:  Olivia Lin RD  12/08/23 07:52 EST

## 2023-12-08 NOTE — PLAN OF CARE
Problem: Adult Inpatient Plan of Care  Goal: Plan of Care Review  Outcome: Ongoing, Progressing  Goal: Patient-Specific Goal (Individualized)  Outcome: Ongoing, Progressing  Goal: Absence of Hospital-Acquired Illness or Injury  Outcome: Ongoing, Progressing  Intervention: Identify and Manage Fall Risk  Recent Flowsheet Documentation  Taken 12/8/2023 0400 by Rose Macdonald RN  Safety Promotion/Fall Prevention:   safety round/check completed   room organization consistent   fall prevention program maintained   clutter free environment maintained   assistive device/personal items within reach  Taken 12/8/2023 0000 by Rose Macdonald RN  Safety Promotion/Fall Prevention:   safety round/check completed   room organization consistent   clutter free environment maintained   assistive device/personal items within reach   fall prevention program maintained   nonskid shoes/slippers when out of bed  Taken 12/7/2023 2200 by Rose Macdonald RN  Safety Promotion/Fall Prevention:   room organization consistent   safety round/check completed   fall prevention program maintained   clutter free environment maintained   assistive device/personal items within reach  Taken 12/7/2023 2005 by Rose Macdonald RN  Safety Promotion/Fall Prevention:   safety round/check completed   room organization consistent   fall prevention program maintained   clutter free environment maintained   assistive device/personal items within reach  Intervention: Prevent Skin Injury  Recent Flowsheet Documentation  Taken 12/8/2023 0400 by Rose Macdonald RN  Body Position: position changed independently  Skin Protection: adhesive use limited  Taken 12/8/2023 0000 by Rose Macdonald RN  Body Position: position changed independently  Taken 12/7/2023 2200 by Rose Macdonald RN  Body Position: position changed independently  Taken 12/7/2023 2005 by Rose Macdonald RN  Body Position: position changed independently  Skin Protection:  adhesive use limited  Intervention: Prevent and Manage VTE (Venous Thromboembolism) Risk  Recent Flowsheet Documentation  Taken 12/8/2023 0400 by Rose Macdonald RN  Activity Management: activity encouraged  Taken 12/8/2023 0000 by Rose Macdonald RN  Activity Management: activity encouraged  VTE Prevention/Management:   bilateral   sequential compression devices on  Taken 12/7/2023 2005 by Rose Macdonald RN  Activity Management: activity encouraged  VTE Prevention/Management:   bilateral   sequential compression devices on  Intervention: Prevent Infection  Recent Flowsheet Documentation  Taken 12/8/2023 0400 by Rose Macdonald RN  Infection Prevention:   single patient room provided   rest/sleep promoted   personal protective equipment utilized   hand hygiene promoted   environmental surveillance performed  Taken 12/8/2023 0000 by Rose Macdonald RN  Infection Prevention:   single patient room provided   rest/sleep promoted   personal protective equipment utilized   hand hygiene promoted   environmental surveillance performed  Taken 12/7/2023 2200 by Rose Macdonald RN  Infection Prevention:   single patient room provided   rest/sleep promoted   personal protective equipment utilized   hand hygiene promoted   environmental surveillance performed  Taken 12/7/2023 2005 by Rose Macdonald RN  Infection Prevention:   single patient room provided   rest/sleep promoted   personal protective equipment utilized   hand hygiene promoted   environmental surveillance performed  Goal: Optimal Comfort and Wellbeing  Outcome: Ongoing, Progressing  Intervention: Provide Person-Centered Care  Recent Flowsheet Documentation  Taken 12/8/2023 0400 by Rose Macdonald RN  Trust Relationship/Rapport: care explained  Taken 12/8/2023 0000 by Rose Macdonald RN  Trust Relationship/Rapport: care explained  Taken 12/7/2023 2005 by Rose Macdonald RN  Trust Relationship/Rapport: care explained  Goal:  Readiness for Transition of Care  Outcome: Ongoing, Progressing     Problem: Adjustment to Illness (Stroke, Ischemic/Transient Ischemic Attack)  Goal: Optimal Coping  Outcome: Ongoing, Progressing  Intervention: Support Psychosocial Response to Stroke  Recent Flowsheet Documentation  Taken 12/8/2023 0400 by Rose Macdonald RN  Supportive Measures: active listening utilized  Family/Support System Care: support provided  Taken 12/8/2023 0000 by Rose Macdonald RN  Supportive Measures: active listening utilized  Family/Support System Care: support provided  Taken 12/7/2023 2005 by Rose Macdonald RN  Supportive Measures: active listening utilized  Family/Support System Care: support provided     Problem: Bowel Elimination Impaired (Stroke, Ischemic/Transient Ischemic Attack)  Goal: Effective Bowel Elimination  Outcome: Ongoing, Progressing     Problem: Cerebral Tissue Perfusion (Stroke, Ischemic/Transient Ischemic Attack)  Goal: Optimal Cerebral Tissue Perfusion  Outcome: Ongoing, Progressing  Intervention: Protect and Optimize Cerebral Perfusion  Recent Flowsheet Documentation  Taken 12/8/2023 0400 by Rose Macdonald RN  Sensory Stimulation Regulation:   care clustered   quiet environment promoted  Cerebral Perfusion Promotion: blood pressure monitored  Taken 12/8/2023 0000 by Rose Macdonald RN  Cerebral Perfusion Promotion: blood pressure monitored  Taken 12/7/2023 2005 by Rose Macdonald RN  Sensory Stimulation Regulation:   care clustered   quiet environment promoted  Cerebral Perfusion Promotion: blood pressure monitored     Problem: Cognitive Impairment (Stroke, Ischemic/Transient Ischemic Attack)  Goal: Optimal Cognitive Function  Outcome: Ongoing, Progressing  Intervention: Optimize Cognitive Function  Recent Flowsheet Documentation  Taken 12/8/2023 0400 by Rose Macdonald RN  Sensory Stimulation Regulation:   care clustered   quiet environment promoted  Reorientation Measures:  clock in view  Taken 12/8/2023 0000 by Rose Macdonald RN  Reorientation Measures: clock in view  Taken 12/7/2023 2005 by Rose Macdonald RN  Sensory Stimulation Regulation:   care clustered   quiet environment promoted  Reorientation Measures: clock in view     Problem: Communication Impairment (Stroke, Ischemic/Transient Ischemic Attack)  Goal: Improved Communication Skills  Outcome: Ongoing, Progressing  Intervention: Optimize Communication Skills  Recent Flowsheet Documentation  Taken 12/8/2023 0400 by Rose Macdonald RN  Communication Enhancement Strategies: call light answered in person  Taken 12/8/2023 0000 by Rose Macdonald RN  Communication Enhancement Strategies: call light answered in person  Taken 12/7/2023 2005 by Rose Macdonald RN  Communication Enhancement Strategies: call light answered in person     Problem: Functional Ability Impaired (Stroke, Ischemic/Transient Ischemic Attack)  Goal: Optimal Functional Ability  Outcome: Ongoing, Progressing  Intervention: Optimize Functional Ability  Recent Flowsheet Documentation  Taken 12/8/2023 0400 by Rose Macdonald RN  Activity Management: activity encouraged  Taken 12/8/2023 0000 by Rose Macdonald RN  Activity Management: activity encouraged  Taken 12/7/2023 2005 by Rose Macdonald RN  Activity Management: activity encouraged     Problem: Respiratory Compromise (Stroke, Ischemic/Transient Ischemic Attack)  Goal: Effective Oxygenation and Ventilation  Outcome: Ongoing, Progressing  Intervention: Optimize Oxygenation and Ventilation  Recent Flowsheet Documentation  Taken 12/8/2023 0400 by Rose Macdonald RN  Head of Bed (HOB) Positioning: HOB elevated  Taken 12/8/2023 0000 by Rose Macdonald RN  Head of Bed (HOB) Positioning: HOB elevated  Taken 12/7/2023 2200 by Rose Macdonald RN  Head of Bed (HOB) Positioning: HOB elevated  Taken 12/7/2023 2005 by Rose Macdonald RN  Head of Bed (HOB) Positioning: HOB  elevated     Problem: Sensorimotor Impairment (Stroke, Ischemic/Transient Ischemic Attack)  Goal: Improved Sensorimotor Function  Outcome: Ongoing, Progressing  Intervention: Optimize Range of Motion, Motor Control and Function  Recent Flowsheet Documentation  Taken 12/8/2023 0400 by Rose Macdonald RN  Positioning/Transfer Devices:   pillows   in use  Taken 12/8/2023 0000 by Rose Macdonald RN  Positioning/Transfer Devices:   pillows   in use  Taken 12/7/2023 2200 by Rose Macdonald RN  Positioning/Transfer Devices:   pillows   in use  Taken 12/7/2023 2005 by Rose Macdonald RN  Positioning/Transfer Devices:   pillows   in use  Intervention: Optimize Sensory and Perceptual Ability  Recent Flowsheet Documentation  Taken 12/8/2023 0400 by Rose Macdonald RN  Pressure Reduction Techniques: frequent weight shift encouraged  Pressure Reduction Devices: pressure-redistributing mattress utilized  Taken 12/7/2023 2005 by Rose Macdonald RN  Pressure Reduction Techniques: frequent weight shift encouraged  Pressure Reduction Devices: pressure-redistributing mattress utilized     Problem: Swallowing Impairment (Stroke, Ischemic/Transient Ischemic Attack)  Goal: Optimal Eating and Swallowing without Aspiration  Outcome: Ongoing, Progressing     Problem: Urinary Elimination Impaired (Stroke, Ischemic/Transient Ischemic Attack)  Goal: Effective Urinary Elimination  Outcome: Ongoing, Progressing     Problem: Diabetes Comorbidity  Goal: Blood Glucose Level Within Targeted Range  Outcome: Ongoing, Progressing  Intervention: Monitor and Manage Glycemia  Recent Flowsheet Documentation  Taken 12/7/2023 2005 by Rose Macdonald RN  Glycemic Management: blood glucose monitored     Problem: Hypertension Comorbidity  Goal: Blood Pressure in Desired Range  Outcome: Ongoing, Progressing  Intervention: Maintain Blood Pressure Management  Recent Flowsheet Documentation  Taken 12/8/2023 0400 by Rose Macdonald  RN  Syncope Management: position changed slowly  Medication Review/Management:   medications reviewed   high-risk medications identified  Taken 12/8/2023 0000 by Rose Macdonald RN  Medication Review/Management:   medications reviewed   high-risk medications identified  Taken 12/7/2023 2200 by Rose Macdonald RN  Medication Review/Management:   medications reviewed   high-risk medications identified  Taken 12/7/2023 2005 by Rose Macdonald RN  Syncope Management: position changed slowly  Medication Review/Management:   medications reviewed   high-risk medications identified     Problem: Pain Chronic (Persistent) (Comorbidity Management)  Goal: Acceptable Pain Control and Functional Ability  Outcome: Ongoing, Progressing  Intervention: Manage Persistent Pain  Recent Flowsheet Documentation  Taken 12/8/2023 0400 by Rose Macdonald RN  Medication Review/Management:   medications reviewed   high-risk medications identified  Taken 12/8/2023 0000 by Rose Macdonald RN  Medication Review/Management:   medications reviewed   high-risk medications identified  Taken 12/7/2023 2200 by Rose Macdonald RN  Medication Review/Management:   medications reviewed   high-risk medications identified  Taken 12/7/2023 2005 by Rose Macdonald RN  Sleep/Rest Enhancement:   awakenings minimized   regular sleep/rest pattern promoted   noise level reduced  Medication Review/Management:   medications reviewed   high-risk medications identified  Intervention: Optimize Psychosocial Wellbeing  Recent Flowsheet Documentation  Taken 12/8/2023 0400 by Rose Macdonald RN  Supportive Measures: active listening utilized  Family/Support System Care: support provided  Taken 12/8/2023 0000 by Rose Macdonald RN  Supportive Measures: active listening utilized  Family/Support System Care: support provided  Taken 12/7/2023 2005 by Rose Macdonald RN  Supportive Measures: active listening utilized  Diversional  Activities: television  Spiritual Activities Assistance: hope instilled  Family/Support System Care: support provided     Problem: Fall Injury Risk  Goal: Absence of Fall and Fall-Related Injury  Outcome: Ongoing, Progressing  Intervention: Identify and Manage Contributors  Recent Flowsheet Documentation  Taken 12/8/2023 0400 by Rose Macdonald RN  Medication Review/Management:   medications reviewed   high-risk medications identified  Taken 12/8/2023 0000 by Rose Macdonald RN  Medication Review/Management:   medications reviewed   high-risk medications identified  Taken 12/7/2023 2200 by Rose Macdonald RN  Medication Review/Management:   medications reviewed   high-risk medications identified  Taken 12/7/2023 2005 by Rose Macdonald RN  Medication Review/Management:   medications reviewed   high-risk medications identified  Intervention: Promote Injury-Free Environment  Recent Flowsheet Documentation  Taken 12/8/2023 0400 by Rose Macdonald RN  Safety Promotion/Fall Prevention:   safety round/check completed   room organization consistent   fall prevention program maintained   clutter free environment maintained   assistive device/personal items within reach  Taken 12/8/2023 0000 by Rose Macdonald RN  Safety Promotion/Fall Prevention:   safety round/check completed   room organization consistent   clutter free environment maintained   assistive device/personal items within reach   fall prevention program maintained   nonskid shoes/slippers when out of bed  Taken 12/7/2023 2200 by Rose Macdonald RN  Safety Promotion/Fall Prevention:   room organization consistent   safety round/check completed   fall prevention program maintained   clutter free environment maintained   assistive device/personal items within reach  Taken 12/7/2023 2005 by Rose Macdonald RN  Safety Promotion/Fall Prevention:   safety round/check completed   room organization consistent   fall prevention program  maintained   clutter free environment maintained   assistive device/personal items within reach   Goal Outcome Evaluation:

## 2023-12-08 NOTE — PLAN OF CARE
Problem: Adult Inpatient Plan of Care  Goal: Plan of Care Review  Outcome: Ongoing, Progressing  Flowsheets (Taken 12/8/2023 1759)  Progress: no change  Plan of Care Reviewed With: patient  Goal: Patient-Specific Goal (Individualized)  Outcome: Ongoing, Progressing  Goal: Absence of Hospital-Acquired Illness or Injury  Outcome: Ongoing, Progressing  Intervention: Identify and Manage Fall Risk  Recent Flowsheet Documentation  Taken 12/8/2023 1733 by Elizabeth Fang LPN  Safety Promotion/Fall Prevention:   assistive device/personal items within reach   clutter free environment maintained   fall prevention program maintained   nonskid shoes/slippers when out of bed   room organization consistent   safety round/check completed  Taken 12/8/2023 1610 by Elizabeth Fang LPN  Safety Promotion/Fall Prevention:   assistive device/personal items within reach   clutter free environment maintained   fall prevention program maintained   nonskid shoes/slippers when out of bed   room organization consistent   safety round/check completed  Taken 12/8/2023 1200 by Elizabeth Fang LPN  Safety Promotion/Fall Prevention:   assistive device/personal items within reach   clutter free environment maintained   fall prevention program maintained   nonskid shoes/slippers when out of bed   room organization consistent   safety round/check completed  Taken 12/8/2023 1041 by Elizabeth Fang LPN  Safety Promotion/Fall Prevention:   clutter free environment maintained   assistive device/personal items within reach   fall prevention program maintained   nonskid shoes/slippers when out of bed   safety round/check completed   room organization consistent  Taken 12/8/2023 0800 by Elizabeth Fang LPN  Safety Promotion/Fall Prevention:   clutter free environment maintained   assistive device/personal items within reach   fall prevention program maintained   nonskid shoes/slippers when out of bed   room organization consistent   safety round/check  completed  Intervention: Prevent Skin Injury  Recent Flowsheet Documentation  Taken 12/8/2023 1610 by Elizabeth Fang LPN  Body Position: position changed independently  Skin Protection:   adhesive use limited   tubing/devices free from skin contact  Taken 12/8/2023 1200 by Elizabeth Fang LPN  Body Position: position changed independently  Skin Protection:   adhesive use limited   tubing/devices free from skin contact  Taken 12/8/2023 0800 by Elizabeth Fang LPN  Body Position: position changed independently  Skin Protection:   adhesive use limited   tubing/devices free from skin contact  Intervention: Prevent and Manage VTE (Venous Thromboembolism) Risk  Recent Flowsheet Documentation  Taken 12/8/2023 1610 by Elizabeth Fang LPN  Activity Management: activity encouraged  VTE Prevention/Management:   bilateral   sequential compression devices on  Range of Motion: active ROM (range of motion) encouraged  Taken 12/8/2023 1200 by Elizabeth Fang LPN  Activity Management: activity encouraged  VTE Prevention/Management:   bilateral   sequential compression devices on  Range of Motion: active ROM (range of motion) encouraged  Taken 12/8/2023 0800 by Elizabeth Fang LPN  Activity Management: activity encouraged  VTE Prevention/Management:   bilateral   sequential compression devices on  Range of Motion: active ROM (range of motion) encouraged  Intervention: Prevent Infection  Recent Flowsheet Documentation  Taken 12/8/2023 1733 by Elizabeth Fang LPN  Infection Prevention:   environmental surveillance performed   hand hygiene promoted   rest/sleep promoted   single patient room provided  Taken 12/8/2023 1610 by Elizabeth Fang LPN  Infection Prevention:   environmental surveillance performed   hand hygiene promoted   rest/sleep promoted   single patient room provided  Taken 12/8/2023 1200 by Elizabeth Fang LPN  Infection Prevention:   environmental surveillance performed   hand hygiene promoted   rest/sleep promoted   single  patient room provided  Taken 12/8/2023 1041 by Elizabeth Fang LPN  Infection Prevention:   environmental surveillance performed   hand hygiene promoted   rest/sleep promoted   single patient room provided  Taken 12/8/2023 0800 by Elizabeth Fang LPN  Infection Prevention:   environmental surveillance performed   hand hygiene promoted   rest/sleep promoted   single patient room provided  Goal: Optimal Comfort and Wellbeing  Outcome: Ongoing, Progressing  Intervention: Provide Person-Centered Care  Recent Flowsheet Documentation  Taken 12/8/2023 1610 by Elizabeth Fang LPN  Trust Relationship/Rapport:   care explained   thoughts/feelings acknowledged  Taken 12/8/2023 1200 by Elizabeth Fang LPN  Trust Relationship/Rapport:   care explained   thoughts/feelings acknowledged  Taken 12/8/2023 0800 by Elizabeth Fang LPN  Trust Relationship/Rapport:   care explained   thoughts/feelings acknowledged  Goal: Readiness for Transition of Care  Outcome: Ongoing, Progressing     Problem: Adjustment to Illness (Stroke, Ischemic/Transient Ischemic Attack)  Goal: Optimal Coping  Outcome: Ongoing, Progressing  Intervention: Support Psychosocial Response to Stroke  Recent Flowsheet Documentation  Taken 12/8/2023 1610 by Elizabeth Fang LPN  Supportive Measures: active listening utilized  Family/Support System Care: caregiver stress acknowledged  Taken 12/8/2023 1200 by Elizabeth Fang LPN  Supportive Measures: active listening utilized  Family/Support System Care: caregiver stress acknowledged  Taken 12/8/2023 0800 by Elizabeth Fang LPN  Supportive Measures: active listening utilized  Family/Support System Care:   caregiver stress acknowledged   support provided     Problem: Bowel Elimination Impaired (Stroke, Ischemic/Transient Ischemic Attack)  Goal: Effective Bowel Elimination  Outcome: Ongoing, Progressing     Problem: Cerebral Tissue Perfusion (Stroke, Ischemic/Transient Ischemic Attack)  Goal: Optimal Cerebral Tissue  Perfusion  Outcome: Ongoing, Progressing  Intervention: Protect and Optimize Cerebral Perfusion  Recent Flowsheet Documentation  Taken 12/8/2023 1610 by Elizabeth Fang LPN  Sensory Stimulation Regulation: care clustered  Taken 12/8/2023 1200 by Elizabeth Fang LPN  Sensory Stimulation Regulation: care clustered  Cerebral Perfusion Promotion: blood pressure monitored  Taken 12/8/2023 0800 by Elizabeth Fang LPN  Sensory Stimulation Regulation: care clustered  Cerebral Perfusion Promotion: blood pressure monitored     Problem: Cognitive Impairment (Stroke, Ischemic/Transient Ischemic Attack)  Goal: Optimal Cognitive Function  Outcome: Ongoing, Progressing  Intervention: Optimize Cognitive Function  Recent Flowsheet Documentation  Taken 12/8/2023 1610 by Elizabeth Fang LPN  Sensory Stimulation Regulation: care clustered  Reorientation Measures:   calendar in view   clock in view  Taken 12/8/2023 1200 by Elizabeth Fang LPN  Sensory Stimulation Regulation: care clustered  Reorientation Measures: clock in view  Taken 12/8/2023 0800 by Elizabeth Fnag LPN  Sensory Stimulation Regulation: care clustered  Reorientation Measures: clock in view     Problem: Communication Impairment (Stroke, Ischemic/Transient Ischemic Attack)  Goal: Improved Communication Skills  Outcome: Ongoing, Progressing  Intervention: Optimize Communication Skills  Recent Flowsheet Documentation  Taken 12/8/2023 1610 by Elizabeth Fang LPN  Communication Enhancement Strategies:   call light answered in person   communication board used  Taken 12/8/2023 1200 by Elizabeth Fang LPN  Communication Enhancement Strategies:   call light answered in person   communication board used  Taken 12/8/2023 0800 by Elizabeth Fang LPN  Communication Enhancement Strategies:   call light answered in person   communication board used     Problem: Functional Ability Impaired (Stroke, Ischemic/Transient Ischemic Attack)  Goal: Optimal Functional Ability  Outcome: Ongoing,  Progressing  Intervention: Optimize Functional Ability  Recent Flowsheet Documentation  Taken 12/8/2023 1610 by Elizabeth Fang LPN  Activity Management: activity encouraged  Taken 12/8/2023 1200 by Elizabeth Fang LPN  Activity Management: activity encouraged  Taken 12/8/2023 0800 by Elizabeth Fang LPN  Activity Management: activity encouraged     Problem: Respiratory Compromise (Stroke, Ischemic/Transient Ischemic Attack)  Goal: Effective Oxygenation and Ventilation  Outcome: Ongoing, Progressing  Intervention: Optimize Oxygenation and Ventilation  Recent Flowsheet Documentation  Taken 12/8/2023 1610 by Elizabeth Fang LPN  Head of Bed (HOB) Positioning: HOB elevated  Taken 12/8/2023 1200 by Elizabeth Fang LPN  Head of Bed (HOB) Positioning: HOB elevated  Taken 12/8/2023 0800 by Elizabeth Fang LPN  Head of Bed (HOB) Positioning: HOB elevated  Airway/Ventilation Management: airway patency maintained     Problem: Sensorimotor Impairment (Stroke, Ischemic/Transient Ischemic Attack)  Goal: Improved Sensorimotor Function  Outcome: Ongoing, Progressing  Intervention: Optimize Range of Motion, Motor Control and Function  Recent Flowsheet Documentation  Taken 12/8/2023 1610 by Elizabeth Fang LPN  Positioning/Transfer Devices:   pillows   in use  Range of Motion: active ROM (range of motion) encouraged  Taken 12/8/2023 1200 by Elizabeth Fang LPN  Positioning/Transfer Devices:   pillows   in use  Range of Motion: active ROM (range of motion) encouraged  Taken 12/8/2023 0800 by Elizabeth Fang LPN  Positioning/Transfer Devices:   pillows   in use  Range of Motion: active ROM (range of motion) encouraged  Intervention: Optimize Sensory and Perceptual Ability  Recent Flowsheet Documentation  Taken 12/8/2023 1610 by Elizabeth Fang LPN  Pressure Reduction Techniques: frequent weight shift encouraged  Pressure Reduction Devices: pressure-redistributing mattress utilized  Taken 12/8/2023 1200 by Elizabeth Fang LPN  Pressure Reduction  Techniques: frequent weight shift encouraged  Pressure Reduction Devices: pressure-redistributing mattress utilized  Taken 12/8/2023 0800 by Elizabeth Fang LPN  Pressure Reduction Techniques: frequent weight shift encouraged  Pressure Reduction Devices: pressure-redistributing mattress utilized     Problem: Swallowing Impairment (Stroke, Ischemic/Transient Ischemic Attack)  Goal: Optimal Eating and Swallowing without Aspiration  Outcome: Ongoing, Progressing  Intervention: Optimize Eating and Swallowing  Recent Flowsheet Documentation  Taken 12/8/2023 1610 by Elizabeth Fang LPN  Aspiration Precautions: awake/alert before oral intake  Taken 12/8/2023 1200 by Elizabeth Fang LPN  Aspiration Precautions: awake/alert before oral intake  Taken 12/8/2023 0800 by Elizabeth Fang LPN  Aspiration Precautions: awake/alert before oral intake     Problem: Urinary Elimination Impaired (Stroke, Ischemic/Transient Ischemic Attack)  Goal: Effective Urinary Elimination  Outcome: Ongoing, Progressing     Problem: Diabetes Comorbidity  Goal: Blood Glucose Level Within Targeted Range  Outcome: Ongoing, Progressing  Intervention: Monitor and Manage Glycemia  Recent Flowsheet Documentation  Taken 12/8/2023 1610 by Elizabeth Fang LPN  Glycemic Management: blood glucose monitored  Taken 12/8/2023 1200 by Elizabeth Fang LPN  Glycemic Management: blood glucose monitored  Taken 12/8/2023 0800 by Elizabeth Fang LPN  Glycemic Management: blood glucose monitored     Problem: Hypertension Comorbidity  Goal: Blood Pressure in Desired Range  Outcome: Ongoing, Progressing  Intervention: Maintain Blood Pressure Management  Recent Flowsheet Documentation  Taken 12/8/2023 1733 by Elizabeth Fang LPN  Medication Review/Management: medications reviewed  Taken 12/8/2023 1610 by Elizabeth Fang LPN  Syncope Management: position changed slowly  Medication Review/Management: medications reviewed  Taken 12/8/2023 1200 by Elizabeth Fang LPN  Medication  Review/Management: medications reviewed  Taken 12/8/2023 1041 by Elizabeth Fang LPN  Medication Review/Management: medications reviewed  Taken 12/8/2023 0800 by Elizabeth Fang LPN  Syncope Management: position changed slowly  Medication Review/Management: medications reviewed     Problem: Pain Chronic (Persistent) (Comorbidity Management)  Goal: Acceptable Pain Control and Functional Ability  Outcome: Ongoing, Progressing  Intervention: Manage Persistent Pain  Recent Flowsheet Documentation  Taken 12/8/2023 1733 by Elizabeth Fang LPN  Medication Review/Management: medications reviewed  Taken 12/8/2023 1610 by Elizabeth Fang LPN  Medication Review/Management: medications reviewed  Taken 12/8/2023 1200 by Elizabeth Fang LPN  Medication Review/Management: medications reviewed  Taken 12/8/2023 1041 by Elizabeth Fang LPN  Medication Review/Management: medications reviewed  Taken 12/8/2023 0800 by Elizabeth Fang LPN  Sleep/Rest Enhancement:   awakenings minimized   relaxation techniques promoted  Medication Review/Management: medications reviewed  Intervention: Optimize Psychosocial Wellbeing  Recent Flowsheet Documentation  Taken 12/8/2023 1610 by Elizabeth Fang LPN  Supportive Measures: active listening utilized  Diversional Activities: television  Family/Support System Care: caregiver stress acknowledged  Taken 12/8/2023 1200 by Elizabeth Fang LPN  Supportive Measures: active listening utilized  Family/Support System Care: caregiver stress acknowledged  Taken 12/8/2023 0800 by Elizabeth Fang LPN  Supportive Measures: active listening utilized  Diversional Activities: television  Family/Support System Care:   caregiver stress acknowledged   support provided     Problem: Fall Injury Risk  Goal: Absence of Fall and Fall-Related Injury  Outcome: Ongoing, Progressing  Intervention: Identify and Manage Contributors  Recent Flowsheet Documentation  Taken 12/8/2023 1733 by Elizabeth Fang LPN  Medication Review/Management:  medications reviewed  Taken 12/8/2023 1610 by Elizabeth Fang LPN  Medication Review/Management: medications reviewed  Taken 12/8/2023 1200 by Elizabeth Fang LPN  Medication Review/Management: medications reviewed  Taken 12/8/2023 1041 by Elizabeth Fang LPN  Medication Review/Management: medications reviewed  Taken 12/8/2023 0800 by Elizabeth Fang LPN  Medication Review/Management: medications reviewed  Intervention: Promote Injury-Free Environment  Recent Flowsheet Documentation  Taken 12/8/2023 1733 by Elizabeth Fang LPN  Safety Promotion/Fall Prevention:   assistive device/personal items within reach   clutter free environment maintained   fall prevention program maintained   nonskid shoes/slippers when out of bed   room organization consistent   safety round/check completed  Taken 12/8/2023 1610 by Elizabeth Fang LPN  Safety Promotion/Fall Prevention:   assistive device/personal items within reach   clutter free environment maintained   fall prevention program maintained   nonskid shoes/slippers when out of bed   room organization consistent   safety round/check completed  Taken 12/8/2023 1200 by Elizabeth Fang LPN  Safety Promotion/Fall Prevention:   assistive device/personal items within reach   clutter free environment maintained   fall prevention program maintained   nonskid shoes/slippers when out of bed   room organization consistent   safety round/check completed  Taken 12/8/2023 1041 by Elizabeth Fang LPN  Safety Promotion/Fall Prevention:   clutter free environment maintained   assistive device/personal items within reach   fall prevention program maintained   nonskid shoes/slippers when out of bed   safety round/check completed   room organization consistent  Taken 12/8/2023 0800 by Elizabeth Fang LPN  Safety Promotion/Fall Prevention:   clutter free environment maintained   assistive device/personal items within reach   fall prevention program maintained   nonskid shoes/slippers when out of bed   room  organization consistent   safety round/check completed   Goal Outcome Evaluation:  Plan of Care Reviewed With: patient        Progress: no change

## 2023-12-08 NOTE — PROGRESS NOTES
[facility] Complaint: Numbness in the left side of the face and left arm starting today     Stroke     This is a pleasant 68-year-old female with history of hypertension diabetes  Patient had some injection in her eye few days ago by a retinal specialist for some inflammation and has been causing pain in her eyes since  Pain mostly with moving the eye sideways  Patient had previous TIA x 2 and has been on Brilinta and aspirin  Woke up this morning with left-sided facial numbness was numbness in the left arm  Did not denied any slurred speech or blurred vision  She had mild weakness in the left arm but not noted on exam  Patient seen in the ER head CT and CTA admitted for neuro evaluation     Review of Systems   Significant for left facial and left arm numbness  She had pain in her eyes mostly photophobia and pain on moving her eyes sideways  Denied any chest pain fever chills nausea vomiting diarrhea  Mild and significant weakness in the left arm  No slurred speech or blurred vision otherwise    12/6  Patient sitting in her chair  Feeling no energy  Awaiting MRI   Neuro evaluations pending  No focal neurodeficits noted  Hemodynamically stable    12/7  Patient is feeling nauseous  Still with headaches  Will try Phenergan as she did not improve with Zofran or Compazine  Headache is mostly in the left side of her face above the eye worse with lateral deviation of the hide  ESR and CRP normal  Patient started on steroids  MRI is pending  Follow-up per neurology recommendation    12/8  Patient still awaiting MRI  Still on IV steroids  No improvement in her headache  Patient with left eye pain with peripheral blocked vision with left-sided headaches and left facial numbness with a history of pontine stroke  Differential includes optic neuritis and migraine and cluster headache  CT of the brain is negative for hemorrhage  MRI with and without contrast is pending  CTA of the head and neck with moderate narrowing of the left MM 1  and moderate to severe narrowing in the left M2 branch  Patient had echo showing normal ejection fraction and no August 2023 and had EMILIA in October 2022 negative for PFO or aneurysm  Patient has been on Brilinta and aspirin  She is concerned that Brilinta too expensive pharmacy recommended switching to Plavix will defer that to neurology  Continue Lipitor and aspirin  Her ESR and CRP are normal  Patient given Fioricet for the headaches  She is can need outpatient ophthalmology follow-up  Her  asked to be contacted I called him at 1054 am,left message    Addendum   did call me back and we discussed the patient condition    Personal History      Medical History        Past Medical History:   Diagnosis Date    Adrenal nodule 11/16/2016     FINDINGS: Mild hepatic steatosis may be present. Cholecystectomy. No biliary ductal dilatation. Negative pancreas, spleen, left adrenal gland, and kidneys. The right adrenal presumed adenoma has increased slightly, measuring 3 x 4 cm in diameter,  compared to 2 x 3.5 cm on the previous exam. The attenuation values are consistent with an adenoma. Done at [facility] in August 2018    Age-related osteoporosis without current pathological fracture 11/16/2016    Arthritis      Delayed surgical wound healing      Essential hypertension 11/16/2016    Gastroesophageal reflux disease with esophagitis 11/16/2016    Hepatic steatosis 11/16/2016    History of anemia      History of sepsis       FROM UTI    Hyperlipidemia 11/16/2016    Lisfranc's dislocation       LEFT WITH FRACTURES NONHEALING FOOT    Osteomyelitis of left foot 11/2020    RLS (restless legs syndrome)      Squamous cell skin cancer 2014     Excised by Dr. James    Thyroid nodule 10/18/2019     BENIGN    Type 2 diabetes mellitus with peripheral neuropathy 11/16/2016    Vitamin D deficiency 1/25/2019            Surgical History         Past Surgical History:   Procedure Laterality Date    BREAST BIOPSY Left        7/2019. BENIGN    CARDIAC CATHETERIZATION Left 5/2/2021     Procedure: Cardiac Catheterization/Vascular Study;  Surgeon: Chacho Esteban MD;  Location: [facility] CATH INVASIVE LOCATION;  Service: Cardiovascular;  Laterality: Left;    CARDIAC CATHETERIZATION N/A 5/2/2021     Procedure: Intra-Aortic Baloon Pump Insertion;  Surgeon: Chacho Esteban MD;  Location: [facility] CATH INVASIVE LOCATION;  Service: Cardiovascular;  Laterality: N/A;    CATARACT EXTRACTION WITH INTRAOCULAR LENS IMPLANT Bilateral      CHOLECYSTECTOMY        COLONOSCOPY        CORONARY ARTERY BYPASS GRAFT N/A 5/2/2021     Procedure: CORONARY ARTERY BYPASS WITH INTERNAL MAMMARY ARTERY GRAFT;  Surgeon: Jr Rocael Alexander MD;  [facility];  Service: Cardiothoracic;  Laterality: N/A;    FOOT FUSION Right 11/13/2020     Procedure: RIGHT OPEN TREATMENT LISFRANC INJURY, OPEN REDUCTION INTERNAL FIXATION MEDIAL/MIDDLE CUNEIFORM FRACTURE AND 2ND/3RD METATARSAL FRACTURE 1ST 2ND POSSIBLE 3RD TARSOMETATARSAL ARTHRODESIS INTERCUNEIFORM ARTHRODESIS CALCANEAL BONE GRAFT;  Surgeon: Mikhail Banks Jr., MD;  Location: [facility];  Service: Orthopedics;  Laterality: Right;    INCISION AND DRAINAGE LEG Right 1/8/2021     Procedure: RIGHT IRRIGATION AND DEBRIDEMENT OF FOOT WITH SECONDARY CLOSURE AND HARDWARE REMOVAL;  Surgeon: Mikhial Banks Jr., MD;  Location: [facility];  Service: Orthopedics;  Laterality: Right;    KNEE ARTHROSCOPY Bilateral      TOTAL ABDOMINAL HYSTERECTOMY        TRANSESOPHAGEAL ECHOCARDIOGRAM (EMILIA) N/A 5/2/2021     Procedure: TRANSESOPHAGEAL ECHOCARDIOGRAM;  Surgeon: Jr Rocael Alexander MD;  [facility];  Service: Cardiothoracic;  Laterality: N/A;    UPPER GASTROINTESTINAL ENDOSCOPY   08/2016    US GUIDED FINE NEEDLE ASPIRATION   5/8/2020            Family History: family history includes COPD in her mother; Diabetes in her mother, sister, sister, sister, and sister; Hypertension in her mother; Kidney disease in her mother;  Obesity in her mother; Thyroid disease in her mother. Otherwise pertinent FHx was reviewed and not pertinent to current issue.     Social History:  reports that she has never smoked. She has never used smokeless tobacco. She reports current alcohol use. She reports that she does not use drugs.     Home Medications:  Alcohol Wipes, DULoxetine, Lancets, Pen Needles, Tirzepatide, amLODIPine, aspirin, atorvastatin, butalbital-acetaminophen-caffeine, diclofenac, famotidine, glucose blood, multivitamin with minerals, polyethylene glycol, ticagrelor, and traZODone     Allergies:        Allergies   Allergen Reactions    Zofran [Ondansetron Hcl] Nausea And Vomiting       Does the opposite of its purpose    Prochlorperazine Other (See Comments)       CAUSED SEIZURE    Prochlorperazine Edisylate Hives    Prochlorperazine Maleate Irritability    Hydralazine Itching and Rash    Hydralazine Hcl Itching and Rash    Meperidine Itching and Swelling    Prochlorperazine Maleate Other (See Comments)       CAUSES SEIZURE               Objective   Objective      Vitals:   Temp:  [97.8 °F (36.6 °C)] 97.8 °F (36.6 °C)  Heart Rate:  [85-86] 86  Resp:  [15-18] 15  BP: (151-186)/() 186/97     Physical Exam  Awake alert oriented x 3  Cranial nerves are intact  Neck supple  Chest clear auscultation bilaterally  Heart S1-S2 no murmur  Abdomen soft benign nontender bowel sounds positive  Extremities no edema  Neuroexam mild sensory changes in the left face but no facial droop noted with motor power 5/5 bilaterally there is no ataxia        Result Review   Result Review:  I have personally reviewed the results from the time of this admission to 12/5/2023 14:00 EST and agree with these findings:  [x]  Laboratory list / accordion  []  Microbiology  [x]  Radiology  []  EKG/Telemetry   []  Cardiology/Vascular   []  Pathology  []  Old records  []  Other:  Most notable findings include:               Assessment & Plan  Assessment / Plan      Brief  Patient Summary:  Marge Mcghee is a 68 y.o. female who is admitted for TIA  Patient has a history of previous TIA on Brilinta  History of diabetes        Active Hospital Problems:       Active Hospital Problems     Diagnosis      **TIA (transient ischemic attack)        Plan:   She will be admitted  Continue aspirin Brilinta  Patient had CT and CTA  Get MRI done while in hospital  Neuro consult with neurology on-call  Keep patient Accu-Chek sliding scale and resume rest of her home meds     DVT prophylaxis:  Medical DVT prophylaxis orders are present.     CODE STATUS:    Admission Status:  I believe this patient meets ip status.     Antwan Lam MD

## 2023-12-08 NOTE — THERAPY TREATMENT NOTE
Assessment: Marge Mcghee presents with ADL impairments affecting function including endurance / activity tolerance. Pt tolerated therapy session well this date. Requires supervision for all functional mobility/ambulation, though reports some dizziness and headache. Pt donned gown requiring set-up. Demonstrated functioning below baseline abilities indicate the need for continued skilled intervention while inpatient. Tolerating session today without incident. Will continue to follow and progress as tolerated.      Plan/Recommendations:   High Intensity Therapy recommended post-acute care. This is recommended as therapy feels the patient would require 5-6 days per week, 2-3 hours per day. At this time, inpatient rehabilitation (acute rehab) would be the first choice and SNF would be second.

## 2023-12-08 NOTE — THERAPY TREATMENT NOTE
Subjective: Pt agreeable to therapeutic plan of care.  Cognition: oriented to Person, Place, Time, and Situation and arousal/alertness: Alert and Attentive    Objective:     Bed Mobility: Supervision   Functional Transfers: SBA  Balance: unsupported and standing SBA  Functional Ambulation: SBA, Pt ambulated from bathroom to chair, then requested to transfer to bed. Pt required SBA for all functional mobility and transfers. Pt reports feeling a little dizzy.     Upper Body Dressing: SBA  ADL Position: unsupported standing  ADL Comments: Pt required set up to don hospital down, though completed upper body dressing with SBA this date.     Vitals: Hypertensive, in sitting pt 142/91, in supine pt 154/89    Pain: 9 VAS  Location: Headache  Interventions for pain: Repositioned, RN notified, and Therapeutic Presence  Education: Provided education on the importance of mobility in the acute care setting      Assessment: Marge Mcghee presents with ADL impairments affecting function including endurance / activity tolerance. Pt tolerated therapy session well this date. Requires supervision for all functional mobility/ambulation, though reports some dizziness and headache. Pt donned gown requiring set-up. Demonstrated functioning below baseline abilities indicate the need for continued skilled intervention while inpatient. Tolerating session today without incident. Will continue to follow and progress as tolerated.     Plan/Recommendations:   High Intensity Therapy recommended post-acute care. This is recommended as therapy feels the patient would require 5-6 days per week, 2-3 hours per day. At this time, inpatient rehabilitation (acute rehab) would be the first choice and SNF would be second.    Pt desires Inpatient Rehabilitation placement at discharge. Pt cooperative; agreeable to therapeutic recommendations and plan of care.     Modified Lalo: 3 = Moderate disability (Requires some help, but able to walk  unassisted)    Post-Tx Position: Supine with HOB Elevated, Alarms activated, and Call light and personal items within reach  PPE: gloves

## 2023-12-08 NOTE — SIGNIFICANT NOTE
12/08/23 1349   Rehab Time/Intention   Session Not Performed patient/family declined treatment  (still with L sided headache and fatigued after working with OT)   Recommendation   PT - Next Appointment 12/10/23

## 2023-12-08 NOTE — SIGNIFICANT NOTE
Case Management/Social Work    Patient Name:  Marge Mcghee  YOB: 1955  MRN: [id number]  Admit Date:  12/5/2023 12/08/23 1531   Post Acute Pre-Cert Documentation   Request Submitted by Facility - Type: Hospital   Post-Acute Authorization Type Submitted: SNF   Date Post Acute Pre-Cert Inititated per Facility 12/08/23   Verification from Payer Yes   Date Post Acute Pre-Cert Completed 12/08/23   Accepting Facility [facility] Discharge Date Requested 12/09/23   All Clinicals Submitted? Yes   Had Accepting Facility at Time of Submission Yes   Response Received from Insurance? Approval   Response Communicated to:    Authorization Number: [id number]   Post Acute Pre-Cert Initiated Comment BEN submitted SNF precert for [facility] via [employer] portal. [email or web address] auth ID [id number]. SNF precert auto approved. Valid 12/9-12/12. CM made aware.           Electronically signed by:  Tong Falcon CMA  12/08/23 15:35 EST    Tong Falcon  Case Management Associate  [address] [address]  P: [phone]  F: [phone]

## 2023-12-08 NOTE — PROGRESS NOTES
LOS: 3 days     Chief Complaint:   left arm weakness, left eye blurred vision, headache        SUBJECTIVE:    History taken from: patient chart RN    Interval History: Marge Mcghee was admitted on 12/5/2023  with left eye blurry vision and pain. She had an ophthalmic injection a few days ago and started having symptoms after that. She does have a hx of right sided stroke with residual left sided deficits, but felt her numbness was more noticeable than her baseline.   - Portions of the above HPI were copied from previous encounters and edited as appropriate.    Patient Complaints:  Patient continues to have left temporal/orbital headache with blurred vision. No hx of migraines. She states the headache is mostly the eye pain, not really head pain. She denies any significant left sided deficits aside from her baseline deficits.     Still waiting on MRI brain. I have reviewed the images and there does not appear to be an new stroke. Awaiting radiologist interpretation for orbital imaging.     She denies tearing, lacrimation, nasal congestion.        Review of Systems   Constitutional:  Negative for chills, diaphoresis and fatigue.   Eyes:  Positive for visual disturbance. Negative for photophobia.   Neurological:  Positive for numbness and headaches. Negative for dizziness, tremors, seizures, syncope, facial asymmetry, speech difficulty, weakness and light-headedness.           Pertinent PMH:  has a past medical history of Adrenal nodule (11/16/2016), Age-related osteoporosis without current pathological fracture (11/16/2016), Arthritis, Delayed surgical wound healing, Essential hypertension (11/16/2016), Gastroesophageal reflux disease with esophagitis (11/16/2016), Hepatic steatosis (11/16/2016), History of anemia, History of sepsis, Hyperlipidemia (11/16/2016), Lisfranc's dislocation, Osteomyelitis of left foot (11/2020), RLS (restless legs syndrome), Squamous cell skin cancer (2014), Thyroid nodule  (10/18/2019), Type 2 diabetes mellitus with peripheral neuropathy (11/16/2016), and Vitamin D deficiency (1/25/2019).   ________________________________________________     OBJECTIVE:  Pt examined at .     Neurologic Exam    On exam:  GENERAL: NAD, awake and alert  HEENT: Normocephalic, Atraumatic. Oral mucosa clear and moist.   RESP: Breathing unlabored, breath sounds normal  CARDIO: RRR  SKIN: Warm, dry. No apparent rash.   PSYCH: Mood/affect normal    NEURO:  Oriented x3  EOMI, pupils pinpoint. Left eye blurred vision throughout, decreased peripheral vision in the  inferior temporal field on left, but not full cut. Vision intact on the right.   No facial asymmetry  Speech clear without dysarthria  Sensations intact bilaterally, slightly decreased in left arm.  Strength 5/5 and equal in all extremities  No ataxia    ________________________________________________   RESULTS REVIEW    VITAL SIGNS:  Temp:  [97.2 °F (36.2 °C)-98.2 °F (36.8 °C)] 97.4 °F (36.3 °C)  Heart Rate:  [88-92] 88  Resp:  [13-18] 15  BP: (135-169)/(70-90) 151/90    LABS:       Lab 12/06/23  1137 12/05/23 2354 12/05/23  1137   WBC  --   --  7.20   HEMOGLOBIN  --   --  13.0   HEMATOCRIT  --   --  39.5   PLATELETS  --   --  318   NEUTROS ABS  --   --  3.60   LYMPHS ABS  --   --  2.80   MONOS ABS  --   --  0.50   EOS ABS  --   --  0.20   MCV  --   --  88.0   SED RATE  --  24  --    CRP <0.30  --   --    PROTIME  --   --  10.7         Lab 12/05/23 2354 12/05/23  1137   SODIUM  --  140   POTASSIUM  --  4.3   CHLORIDE  --  105   CO2  --  25.0   ANION GAP  --  10.0   BUN  --  22   CREATININE  --  0.94   EGFR  --  65.4   GLUCOSE  --  120*   CALCIUM  --  9.3   HEMOGLOBIN A1C 7.40*  --    TSH  --  1.200         Lab 12/05/23  1137   TOTAL PROTEIN 7.4   ALBUMIN 4.1   GLOBULIN 3.3   ALT (SGPT) 9   AST (SGOT) 17   BILIRUBIN 0.3   ALK PHOS 57         Lab 12/05/23  1137   PROTIME 10.7   INR 0.98         Lab 12/05/23  2354   CHOLESTEROL 162   LDL CHOL 83    HDL CHOL 48   TRIGLYCERIDES 181*         Lab 12/05/23  1137 12/05/23  0000   VITAMIN B 12 410  --    ABO TYPING  --  A   RH TYPING  --  Positive   ANTIBODY SCREEN  --  Negative         UA          2/5/2023    12:59 6/12/2023    10:37   Urinalysis   Squamous Epithelial Cells, UA 3-6     Specific Gravity, UA 1.022  1.020       Ketones, UA Negative     Blood, UA Trace     Leukocytes, UA Small (1+)     Nitrite, UA Negative  Positive       RBC, UA 0-2     WBC, UA Too Numerous to Count     Bacteria, UA 4+        Details          This result is from an external source.               Lab Results   Component Value Date    TSH 1.200 12/05/2023    LDL 83 12/05/2023    HGBA1C 7.40 (H) 12/05/2023    MBCNMNYB05 410 12/05/2023         IMAGING STUDIES:  No radiology results for the last day    I reviewed the patient's new clinical results.    ________________________________________________      PROBLEM LIST:    Change in vision    Other headache syndrome        ASSESSMENT/PLAN:  Left eye pain and peripheral blurred vision with left sided headache, Left face and arm numbness (hx of right pontine stroke)   Suspect optic etiology (glaucoma, corneal etiology). Symptoms started after she had an injection in her left eye. Migraine/cluster headache is in the differential. CVA ruled out. This is not a TIA. Temporal arteritis less likely with normal ESR/CRP  - MRI brain with and without contrast with orbits pending. MRI brain images reviewed, no acute stroke. Awaiting radiology interpretation.  - Echo (August 2023): EF is 61 to 65%  - Patient had a EMILIA in October 2022 which was negative for PFO, aneurysm or other structural causes.   - Labs: A1C: 7.40, B12:410, LDL:  83, TSH: 1.200  - DAPT & Lipitor 80   - Pt is concerned about the price of Brilinta. She had been on Plavix until her admission in August when P2Y12 testing showed an abnormal response to Plavix so she was switched to Brilinta. After reviewing records, pt had not received  Plavix that admission and it is unclear when she had her last dose prior to that test. Will change her back to Plavix for now, but recommend repeating P2Y12 to assess platelet response   - Fioricet 2 tabs Q8hr   - Looking at previous records and clinical findings, optic neuritis is less likely so will stop steroids.   - Patient will need ophthalmology evaluation as soon as possible after d/c, she will contact her doctor    2. Intracranial atherosclerotic disease  - CTA head and neck: There there is moderate narrowing of the left M1 and moderate to severe narrowing of the left M2 branch.  No additional high-grade stenosis of the head or neck.  - ASA and Brilinta, Lipitor 80mg qhs     **Please refer to previous notes for further details and recommendations.     Pending MRI, pt can likely be discharged from neuro standpoint to f/u with her ophthalmologist.     I discussed the patient's findings and my recommendations with patient, nursing staff, and consulting provider    CANDIDO Pastor  12/08/23  10:19 EST

## 2023-12-08 NOTE — CASE MANAGEMENT/SOCIAL WORK
Continued Stay Note  [facility]     Patient Name: Marge Mcghee  MRN: [id number]  Today's Date: 12/8/2023    Admit Date: 12/5/2023    Plan: D/C Plan: [facility]; Ins auth good 12/9/23--12-12/23   Discharge Plan       Row Name 12/08/23 1610       Plan    Plan D/C Plan: [facility]; Ins auth good 12/9/23--12-12/23      Row Name 12/08/23 1513       Plan    Plan D/C Plan: [facility] pending ins auth.; PASRR  completed; Transport by family car.               Expected Discharge Date and Time       Expected Discharge Date Expected Discharge Time    Dec 8, 2023               Belem Owens RN

## 2023-12-08 NOTE — DISCHARGE PLACEMENT REQUEST
"Maxwell Moreno (68 y.o. Female)       Date of Birth   1955    Social Security Number       Address   [address] [place] [address] [place] [facility]    Home Phone   [phone]    MRN   [id number]       Hoahaoism   None    Marital Status                               Admission Date   12/5/23    Admission Type   Emergency    Admitting Provider   Antwan Lam MD    Attending Provider   Antwan Lam MD    Department, Room/Bed   [facility], 240/1       Discharge Date       Discharge Disposition       Discharge Destination                                 Attending Provider: Antwan Lam MD    Allergies: Zofran [Ondansetron Hcl], Prochlorperazine, Prochlorperazine Edisylate, Prochlorperazine Maleate, Hydralazine, Hydralazine Hcl, Meperidine, Prochlorperazine Maleate    Isolation: None   Infection: None   Code Status: CPR    Ht: 162.6 cm (64.02\")   Wt: 73.8 kg (162 lb 11.2 oz)    Admission Cmt: None   Principal Problem: Change in vision [H53.9]                   Active Insurance as of 12/5/2023       Primary Coverage       Payor Plan Insurance Group Employer/Plan Group    HUMANA MEDICARE REPLACEMENT HUMANA MEDICARE REPLACEMENT [id number]       Payor Plan Address Payor Plan Phone Number Payor Plan Fax Number Effective Dates    [address] [address] [phone]  2/1/2022 - None Entered    [facility] [id number]         Subscriber Name Subscriber Birth Date Member ID       MAXWELL MORENO 1955 [id number]               Secondary Coverage       Payor Plan Insurance Group Employer/Plan Group    GPM LIFE GPM LIFE MC SUP        Payor Plan Address Payor Plan Phone Number Payor Plan Fax Number Effective Dates    [address]   1/1/2019 - None Entered    [facility] [id number]         Subscriber Name Subscriber Birth Date Member ID       MORENOMAXWELL VINEET 1955 [id number]                     Emergency Contacts        (Rel.) Home Phone Work Phone Mobile Phone    Kimberly Salinas (POA) " (Daughter) -- -- [phone]    Tonia Rockwell (Friend) [phone] -- --    SILVIO MORENO (Spouse) -- -- [phone]                 History & Physical        Antwan Lam MD at [address] [place] [address]   HISTORY AND PHYSICAL    Patient Name: Marge Moreno  : 1955  MRN: [id number]  Primary Care Physician:  Traci Townsend APRN  Date of admission: 2023    Subjective  Subjective     Chief Complaint: Numbness in the left side of the face and left arm starting today    Stroke    This is a pleasant 68-year-old female with history of hypertension diabetes  Patient had some injection in her eye few days ago by a retinal specialist for some inflammation and has been causing pain in her eyes since  Pain mostly with moving the eye sideways  Patient had previous TIA x 2 and has been on Brilinta and aspirin  Woke up this morning with left-sided facial numbness was numbness in the left arm  Did not denied any slurred speech or blurred vision  She had mild weakness in the left arm but not noted on exam  Patient seen in the ER head CT and CTA admitted for neuro evaluation    Review of Systems   Significant for left facial and left arm numbness  She had pain in her eyes mostly photophobia and pain on moving her eyes sideways  Denied any chest pain fever chills nausea vomiting diarrhea  Mild and significant weakness in the left arm  No slurred speech or blurred vision otherwise  Personal History     Past Medical History:   Diagnosis Date    Adrenal nodule 2016    FINDINGS: Mild hepatic steatosis may be present. Cholecystectomy. No biliary ductal dilatation. Negative pancreas, spleen, left adrenal gland, and kidneys. The right adrenal presumed adenoma has increased slightly, measuring 3 x 4 cm in diameter,  compared to 2 x 3.5 cm on the previous exam. The attenuation values are consistent with an adenoma. Done at [facility] in 2018    Age-related osteoporosis without current  pathological fracture 11/16/2016    Arthritis     Delayed surgical wound healing     Essential hypertension 11/16/2016    Gastroesophageal reflux disease with esophagitis 11/16/2016    Hepatic steatosis 11/16/2016    History of anemia     History of sepsis     FROM UTI    Hyperlipidemia 11/16/2016    Lisfranc's dislocation     LEFT WITH FRACTURES NONHEALING FOOT    Osteomyelitis of left foot 11/2020    RLS (restless legs syndrome)     Squamous cell skin cancer 2014    Excised by Dr. James    Thyroid nodule 10/18/2019    BENIGN    Type 2 diabetes mellitus with peripheral neuropathy 11/16/2016    Vitamin D deficiency 1/25/2019       Past Surgical History:   Procedure Laterality Date    BREAST BIOPSY Left     7/2019. BENIGN    CARDIAC CATHETERIZATION Left 5/2/2021    Procedure: Cardiac Catheterization/Vascular Study;  Surgeon: Chacho Esteban MD;  Location: [facility] CATH INVASIVE LOCATION;  Service: Cardiovascular;  Laterality: Left;    CARDIAC CATHETERIZATION N/A 5/2/2021    Procedure: Intra-Aortic Baloon Pump Insertion;  Surgeon: Chacho Esteban MD;  Location: [facility] CATH INVASIVE LOCATION;  Service: Cardiovascular;  Laterality: N/A;    CATARACT EXTRACTION WITH INTRAOCULAR LENS IMPLANT Bilateral     CHOLECYSTECTOMY      COLONOSCOPY      CORONARY ARTERY BYPASS GRAFT N/A 5/2/2021    Procedure: CORONARY ARTERY BYPASS WITH INTERNAL MAMMARY ARTERY GRAFT;  Surgeon: Jr Rocael Alexander MD;  Location: [facility] CVOR;  Service: Cardiothoracic;  Laterality: N/A;    FOOT FUSION Right 11/13/2020    Procedure: RIGHT OPEN TREATMENT LISFRANC INJURY, OPEN REDUCTION INTERNAL FIXATION MEDIAL/MIDDLE CUNEIFORM FRACTURE AND 2ND/3RD METATARSAL FRACTURE 1ST 2ND POSSIBLE 3RD TARSOMETATARSAL ARTHRODESIS INTERCUNEIFORM ARTHRODESIS CALCANEAL BONE GRAFT;  Surgeon: Mikhail Banks Jr., MD;  [facility];  Service: Orthopedics;  Laterality: Right;    INCISION AND DRAINAGE LEG Right 1/8/2021    Procedure: RIGHT IRRIGATION AND DEBRIDEMENT OF  FOOT WITH SECONDARY CLOSURE AND HARDWARE REMOVAL;  Surgeon: Mikhail Banks Jr., MD;  Location: [facility] MAIN OR;  Service: Orthopedics;  Laterality: Right;    KNEE ARTHROSCOPY Bilateral     TOTAL ABDOMINAL HYSTERECTOMY      TRANSESOPHAGEAL ECHOCARDIOGRAM (EMILIA) N/A 5/2/2021    Procedure: TRANSESOPHAGEAL ECHOCARDIOGRAM;  Surgeon: Jr Rocael Alexander MD;  Location: [facility];  Service: Cardiothoracic;  Laterality: N/A;    UPPER GASTROINTESTINAL ENDOSCOPY  08/2016    US GUIDED FINE NEEDLE ASPIRATION  5/8/2020       Family History: family history includes COPD in her mother; Diabetes in her mother, sister, sister, sister, and sister; Hypertension in her mother; Kidney disease in her mother; Obesity in her mother; Thyroid disease in her mother. Otherwise pertinent FHx was reviewed and not pertinent to current issue.    Social History:  reports that she has never smoked. She has never used smokeless tobacco. She reports current alcohol use. She reports that she does not use drugs.    Home Medications:  Alcohol Wipes, DULoxetine, Lancets, Pen Needles, Tirzepatide, amLODIPine, aspirin, atorvastatin, butalbital-acetaminophen-caffeine, diclofenac, famotidine, glucose blood, multivitamin with minerals, polyethylene glycol, ticagrelor, and traZODone    Allergies:  Allergies   Allergen Reactions    Zofran [Ondansetron Hcl] Nausea And Vomiting     Does the opposite of its purpose    Prochlorperazine Other (See Comments)     CAUSED SEIZURE    Prochlorperazine Edisylate Hives    Prochlorperazine Maleate Irritability    Hydralazine Itching and Rash    Hydralazine Hcl Itching and Rash    Meperidine Itching and Swelling    Prochlorperazine Maleate Other (See Comments)     CAUSES SEIZURE       Objective   Objective     Vitals:   Temp:  [97.8 °F (36.6 °C)] 97.8 °F (36.6 °C)  Heart Rate:  [85-86] 86  Resp:  [15-18] 15  BP: (151-186)/() 186/97    Physical Exam  Awake alert oriented x 3  Cranial nerves are intact  Neck supple  Chest  clear auscultation bilaterally  Heart S1-S2 no murmur  Abdomen soft benign nontender bowel sounds positive  Extremities no edema  Neuroexam mild sensory changes in the left face but no facial droop noted with motor power 5/5 bilaterally there is no ataxia  Result Review   Result Review:  I have personally reviewed the results from the time of this admission to 12/5/2023 14:00 EST and agree with these findings:  [x]  Laboratory list / accordion  []  Microbiology  [x]  Radiology  []  EKG/Telemetry   []  Cardiology/Vascular   []  Pathology  []  Old records  []  Other:  Most notable findings include:       Assessment & Plan  Assessment / Plan     Brief Patient Summary:  Marge Mcghee is a 68 y.o. female who is admitted for TIA  Patient has a history of previous TIA on Brilinta  History of diabetes      Active Hospital Problems:  Active Hospital Problems    Diagnosis     **TIA (transient ischemic attack)      Plan:   She will be admitted  Continue aspirin Brilinta  Patient had CT and CTA  Get MRI done while in hospital  Neuro consult with neurology on-call  Keep patient Accu-Chek sliding scale and resume rest of her home meds    DVT prophylaxis:  Medical DVT prophylaxis orders are present.    CODE STATUS:       Admission Status:  I believe this patient meets ip status.    Antwan Lam MD    Electronically signed by Antwan Lam MD at 12/05/23 1404       Operative/Procedure Notes (all)    No notes of this type exist for this encounter.          Physician Progress Notes (last 48 hours)        Antwan Lam MD at 12/08/23 1051          [facility] Complaint: Numbness in the left side of the face and left arm starting today     Stroke     This is a pleasant 68-year-old female with history of hypertension diabetes  Patient had some injection in her eye few days ago by a retinal specialist for some inflammation and has been causing pain in her eyes since  Pain mostly with moving the eye sideways  Patient had previous TIA  x 2 and has been on Brilinta and aspirin  Woke up this morning with left-sided facial numbness was numbness in the left arm  Did not denied any slurred speech or blurred vision  She had mild weakness in the left arm but not noted on exam  Patient seen in the ER head CT and CTA admitted for neuro evaluation     Review of Systems   Significant for left facial and left arm numbness  She had pain in her eyes mostly photophobia and pain on moving her eyes sideways  Denied any chest pain fever chills nausea vomiting diarrhea  Mild and significant weakness in the left arm  No slurred speech or blurred vision otherwise    12/6  Patient sitting in her chair  Feeling no energy  Awaiting MRI   Neuro evaluations pending  No focal neurodeficits noted  Hemodynamically stable    12/7  Patient is feeling nauseous  Still with headaches  Will try Phenergan as she did not improve with Zofran or Compazine  Headache is mostly in the left side of her face above the eye worse with lateral deviation of the hide  ESR and CRP normal  Patient started on steroids  MRI is pending  Follow-up per neurology recommendation    12/8  Patient still awaiting MRI  Still on IV steroids  No improvement in her headache  Patient with left eye pain with peripheral blocked vision with left-sided headaches and left facial numbness with a history of pontine stroke  Differential includes optic neuritis and migraine and cluster headache  CT of the brain is negative for hemorrhage  MRI with and without contrast is pending  CTA of the head and neck with moderate narrowing of the left MM 1 and moderate to severe narrowing in the left M2 branch  Patient had echo showing normal ejection fraction and no August 2023 and had EMILIA in October 2022 negative for PFO or aneurysm  Patient has been on Brilinta and aspirin  She is concerned that Brilinta too expensive pharmacy recommended switching to Plavix will defer that to neurology  Continue Lipitor and aspirin  Her ESR and  CRP are normal  Patient given Fioricet for the headaches  She is can need outpatient ophthalmology follow-up  Her  asked to be contacted I called him at 1054 am,left message    Addendum   did call me back and we discussed the patient condition    Personal History      Medical History        Past Medical History:   Diagnosis Date    Adrenal nodule 11/16/2016     FINDINGS: Mild hepatic steatosis may be present. Cholecystectomy. No biliary ductal dilatation. Negative pancreas, spleen, left adrenal gland, and kidneys. The right adrenal presumed adenoma has increased slightly, measuring 3 x 4 cm in diameter,  compared to 2 x 3.5 cm on the previous exam. The attenuation values are consistent with an adenoma. Done at [facility] in August 2018    Age-related osteoporosis without current pathological fracture 11/16/2016    Arthritis      Delayed surgical wound healing      Essential hypertension 11/16/2016    Gastroesophageal reflux disease with esophagitis 11/16/2016    Hepatic steatosis 11/16/2016    History of anemia      History of sepsis       FROM UTI    Hyperlipidemia 11/16/2016    Lisfranc's dislocation       LEFT WITH FRACTURES NONHEALING FOOT    Osteomyelitis of left foot 11/2020    RLS (restless legs syndrome)      Squamous cell skin cancer 2014     Excised by Dr. James    Thyroid nodule 10/18/2019     BENIGN    Type 2 diabetes mellitus with peripheral neuropathy 11/16/2016    Vitamin D deficiency 1/25/2019            Surgical History         Past Surgical History:   Procedure Laterality Date    BREAST BIOPSY Left       7/2019. BENIGN    CARDIAC CATHETERIZATION Left 5/2/2021     Procedure: Cardiac Catheterization/Vascular Study;  Surgeon: Chacho Esteban MD;  Location: [facility] CATH INVASIVE LOCATION;  Service: Cardiovascular;  Laterality: Left;    CARDIAC CATHETERIZATION N/A 5/2/2021     Procedure: Intra-Aortic Baloon Pump Insertion;  Surgeon: Chacho Esteban MD;  Location: [facility] CATH INVASIVE  LOCATION;  Service: Cardiovascular;  Laterality: N/A;    CATARACT EXTRACTION WITH INTRAOCULAR LENS IMPLANT Bilateral      CHOLECYSTECTOMY        COLONOSCOPY        CORONARY ARTERY BYPASS GRAFT N/A 5/2/2021     Procedure: CORONARY ARTERY BYPASS WITH INTERNAL MAMMARY ARTERY GRAFT;  Surgeon: Jr Rocael Alexander MD;  Location: [facility];  Service: Cardiothoracic;  Laterality: N/A;    FOOT FUSION Right 11/13/2020     Procedure: RIGHT OPEN TREATMENT LISFRANC INJURY, OPEN REDUCTION INTERNAL FIXATION MEDIAL/MIDDLE CUNEIFORM FRACTURE AND 2ND/3RD METATARSAL FRACTURE 1ST 2ND POSSIBLE 3RD TARSOMETATARSAL ARTHRODESIS INTERCUNEIFORM ARTHRODESIS CALCANEAL BONE GRAFT;  Surgeon: Mikhail Banks Jr., MD;  Location: [facility];  Service: Orthopedics;  Laterality: Right;    INCISION AND DRAINAGE LEG Right 1/8/2021     Procedure: RIGHT IRRIGATION AND DEBRIDEMENT OF FOOT WITH SECONDARY CLOSURE AND HARDWARE REMOVAL;  Surgeon: Mikhail Banks Jr., MD;  Location: [facility] OR;  Service: Orthopedics;  Laterality: Right;    KNEE ARTHROSCOPY Bilateral      TOTAL ABDOMINAL HYSTERECTOMY        TRANSESOPHAGEAL ECHOCARDIOGRAM (EMILIA) N/A 5/2/2021     Procedure: TRANSESOPHAGEAL ECHOCARDIOGRAM;  Surgeon: Jr Rocael Alexander MD;  Location: [facility];  Service: Cardiothoracic;  Laterality: N/A;    UPPER GASTROINTESTINAL ENDOSCOPY   08/2016    US GUIDED FINE NEEDLE ASPIRATION   5/8/2020            Family History: family history includes COPD in her mother; Diabetes in her mother, sister, sister, sister, and sister; Hypertension in her mother; Kidney disease in her mother; Obesity in her mother; Thyroid disease in her mother. Otherwise pertinent FHx was reviewed and not pertinent to current issue.     Social History:  reports that she has never smoked. She has never used smokeless tobacco. She reports current alcohol use. She reports that she does not use drugs.     Home Medications:  Alcohol Wipes, DULoxetine, Lancets, Pen Needles, Tirzepatide,  amLODIPine, aspirin, atorvastatin, butalbital-acetaminophen-caffeine, diclofenac, famotidine, glucose blood, multivitamin with minerals, polyethylene glycol, ticagrelor, and traZODone     Allergies:        Allergies   Allergen Reactions    Zofran [Ondansetron Hcl] Nausea And Vomiting       Does the opposite of its purpose    Prochlorperazine Other (See Comments)       CAUSED SEIZURE    Prochlorperazine Edisylate Hives    Prochlorperazine Maleate Irritability    Hydralazine Itching and Rash    Hydralazine Hcl Itching and Rash    Meperidine Itching and Swelling    Prochlorperazine Maleate Other (See Comments)       CAUSES SEIZURE               Objective   Objective      Vitals:   Temp:  [97.8 °F (36.6 °C)] 97.8 °F (36.6 °C)  Heart Rate:  [85-86] 86  Resp:  [15-18] 15  BP: (151-186)/() 186/97     Physical Exam  Awake alert oriented x 3  Cranial nerves are intact  Neck supple  Chest clear auscultation bilaterally  Heart S1-S2 no murmur  Abdomen soft benign nontender bowel sounds positive  Extremities no edema  Neuroexam mild sensory changes in the left face but no facial droop noted with motor power 5/5 bilaterally there is no ataxia        Result Review   Result Review:  I have personally reviewed the results from the time of this admission to 12/5/2023 14:00 EST and agree with these findings:  [x]  Laboratory list / accordion  []  Microbiology  [x]  Radiology  []  EKG/Telemetry   []  Cardiology/Vascular   []  Pathology  []  Old records  []  Other:  Most notable findings include:               Assessment & Plan  Assessment / Plan      Brief Patient Summary:  Marge Mcghee is a 68 y.o. female who is admitted for TIA  Patient has a history of previous TIA on Brilinta  History of diabetes        Active Hospital Problems:       Active Hospital Problems     Diagnosis      **TIA (transient ischemic attack)        Plan:   She will be admitted  Continue aspirin Brilinta  Patient had CT and CTA  Get MRI done while in  hospital  Neuro consult with neurology on-call  Keep patient Accu-Chek sliding scale and resume rest of her home meds     DVT prophylaxis:  Medical DVT prophylaxis orders are present.     CODE STATUS:    Admission Status:  I believe this patient meets ip status.     Antwan Lam MD       Electronically signed by Antwan Lam MD at 12/08/23 1150       Manju Bloom APRN at 12/08/23 1008               LOS: 3 days     Chief Complaint:   left arm weakness, left eye blurred vision, headache     Subjective   SUBJECTIVE:    History taken from: patient chart RN    Interval History: Marge Mcghee was admitted on 12/5/2023  with left eye blurry vision and pain. She had an ophthalmic injection a few days ago and started having symptoms after that. She does have a hx of right sided stroke with residual left sided deficits, but felt her numbness was more noticeable than her baseline.   - Portions of the above HPI were copied from previous encounters and edited as appropriate.    Patient Complaints:  Patient continues to have left temporal/orbital headache with blurred vision. No hx of migraines. She states the headache is mostly the eye pain, not really head pain. She denies any significant left sided deficits aside from her baseline deficits.     Still waiting on MRI brain. I have reviewed the images and there does not appear to be an new stroke. Awaiting radiologist interpretation for orbital imaging.     She denies tearing, lacrimation, nasal congestion.        Review of Systems   Constitutional:  Negative for chills, diaphoresis and fatigue.   Eyes:  Positive for visual disturbance. Negative for photophobia.   Neurological:  Positive for numbness and headaches. Negative for dizziness, tremors, seizures, syncope, facial asymmetry, speech difficulty, weakness and light-headedness.           Pertinent PMH:  has a past medical history of Adrenal nodule (11/16/2016), Age-related osteoporosis without current  pathological fracture (11/16/2016), Arthritis, Delayed surgical wound healing, Essential hypertension (11/16/2016), Gastroesophageal reflux disease with esophagitis (11/16/2016), Hepatic steatosis (11/16/2016), History of anemia, History of sepsis, Hyperlipidemia (11/16/2016), Lisfranc's dislocation, Osteomyelitis of left foot (11/2020), RLS (restless legs syndrome), Squamous cell skin cancer (2014), Thyroid nodule (10/18/2019), Type 2 diabetes mellitus with peripheral neuropathy (11/16/2016), and Vitamin D deficiency (1/25/2019).   ________________________________________________  Objective   OBJECTIVE:  Pt examined at .     Neurologic Exam    On exam:  GENERAL: NAD, awake and alert  HEENT: Normocephalic, Atraumatic. Oral mucosa clear and moist.   RESP: Breathing unlabored, breath sounds normal  CARDIO: RRR  SKIN: Warm, dry. No apparent rash.   PSYCH: Mood/affect normal    NEURO:  Oriented x3  EOMI, pupils pinpoint. Left eye blurred vision throughout, decreased peripheral vision in the  inferior temporal field on left, but not full cut. Vision intact on the right.   No facial asymmetry  Speech clear without dysarthria  Sensations intact bilaterally, slightly decreased in left arm.  Strength 5/5 and equal in all extremities  No ataxia    ________________________________________________   RESULTS REVIEW    VITAL SIGNS:  Temp:  [97.2 °F (36.2 °C)-98.2 °F (36.8 °C)] 97.4 °F (36.3 °C)  Heart Rate:  [88-92] 88  Resp:  [13-18] 15  BP: (135-169)/(70-90) 151/90    LABS:       Lab 12/06/23  1137 12/05/23  2354 12/05/23  1137   WBC  --   --  7.20   HEMOGLOBIN  --   --  13.0   HEMATOCRIT  --   --  39.5   PLATELETS  --   --  318   NEUTROS ABS  --   --  3.60   LYMPHS ABS  --   --  2.80   MONOS ABS  --   --  0.50   EOS ABS  --   --  0.20   MCV  --   --  88.0   SED RATE  --  24  --    CRP <0.30  --   --    PROTIME  --   --  10.7         Lab 12/05/23  2354 12/05/23  1137   SODIUM  --  140   POTASSIUM  --  4.3   CHLORIDE  --  105    CO2  --  25.0   ANION GAP  --  10.0   BUN  --  22   CREATININE  --  0.94   EGFR  --  65.4   GLUCOSE  --  120*   CALCIUM  --  9.3   HEMOGLOBIN A1C 7.40*  --    TSH  --  1.200         Lab 12/05/23  1137   TOTAL PROTEIN 7.4   ALBUMIN 4.1   GLOBULIN 3.3   ALT (SGPT) 9   AST (SGOT) 17   BILIRUBIN 0.3   ALK PHOS 57         Lab 12/05/23  1137   PROTIME 10.7   INR 0.98         Lab 12/05/23  2354   CHOLESTEROL 162   LDL CHOL 83   HDL CHOL 48   TRIGLYCERIDES 181*         Lab 12/05/23  1137 12/05/23  0000   VITAMIN B 12 410  --    ABO TYPING  --  A   RH TYPING  --  Positive   ANTIBODY SCREEN  --  Negative         UA          2/5/2023    12:59 6/12/2023    10:37   Urinalysis   Squamous Epithelial Cells, UA 3-6     Specific Gravity, UA 1.022  1.020       Ketones, UA Negative     Blood, UA Trace     Leukocytes, UA Small (1+)     Nitrite, UA Negative  Positive       RBC, UA 0-2     WBC, UA Too Numerous to Count     Bacteria, UA 4+        Details          This result is from an external source.               Lab Results   Component Value Date    TSH 1.200 12/05/2023    LDL 83 12/05/2023    HGBA1C 7.40 (H) 12/05/2023    [id number] 410 12/05/2023         IMAGING STUDIES:  No radiology results for the last day    I reviewed the patient's new clinical results.    ________________________________________________      PROBLEM LIST:    Change in vision    Other headache syndrome        ASSESSMENT/PLAN:  Left eye pain and peripheral blurred vision with left sided headache, Left face and arm numbness (hx of right pontine stroke)   Suspect optic etiology (glaucoma, corneal etiology). Symptoms started after she had an injection in her left eye. Migraine/cluster headache is in the differential. CVA ruled out. This is not a TIA. Temporal arteritis less likely with normal ESR/CRP  - MRI brain with and without contrast with orbits pending. MRI brain images reviewed, no acute stroke. Awaiting radiology interpretation.  - Echo (August 2023): EF  is 61 to 65%  - Patient had a EMILIA in October 2022 which was negative for PFO, aneurysm or other structural causes.   - Labs: A1C: 7.40, B12:410, LDL:  83, TSH: 1.200  - DAPT & Lipitor 80   - Pt is concerned about the price of Brilinta. She had been on Plavix until her admission in August when P2Y12 testing showed an abnormal response to Plavix so she was switched to Brilinta. After reviewing records, pt had not received Plavix that admission and it is unclear when she had her last dose prior to that test. Will change her back to Plavix for now, but recommend repeating P2Y12 to assess platelet response   - Fioricet 2 tabs Q8hr   - Looking at previous records and clinical findings, optic neuritis is less likely so will stop steroids.   - Patient will need ophthalmology evaluation as soon as possible after d/c, she will contact her doctor    2. Intracranial atherosclerotic disease  - CTA head and neck: There there is moderate narrowing of the left M1 and moderate to severe narrowing of the left M2 branch.  No additional high-grade stenosis of the head or neck.  - ASA and Brilinta, Lipitor 80mg qhs     **Please refer to previous notes for further details and recommendations.     Pending MRI, pt can likely be discharged from neuro standpoint to f/u with her ophthalmologist.     I discussed the patient's findings and my recommendations with patient, nursing staff, and consulting provider    CANDIDO Pastor  12/08/23  10:19 EST        Electronically signed by Manju Bloom APRN at 12/08/23 1404       Antwan Lam MD at 12/07/23 1250          [facility] Complaint: Numbness in the left side of the face and left arm starting today     Stroke     This is a pleasant 68-year-old female with history of hypertension diabetes  Patient had some injection in her eye few days ago by a retinal specialist for some inflammation and has been causing pain in her eyes since  Pain mostly with moving the eye sideways  Patient had  previous TIA x 2 and has been on Brilinta and aspirin  Woke up this morning with left-sided facial numbness was numbness in the left arm  Did not denied any slurred speech or blurred vision  She had mild weakness in the left arm but not noted on exam  Patient seen in the ER head CT and CTA admitted for neuro evaluation     Review of Systems   Significant for left facial and left arm numbness  She had pain in her eyes mostly photophobia and pain on moving her eyes sideways  Denied any chest pain fever chills nausea vomiting diarrhea  Mild and significant weakness in the left arm  No slurred speech or blurred vision otherwise    12/6  Patient sitting in her chair  Feeling no energy  Awaiting MRI   Neuro evaluations pending  No focal neurodeficits noted  Hemodynamically stable    12/7  Patient is feeling nauseous  Still with headaches  Will try Phenergan as she did not improve with Zofran or Compazine  Headache is mostly in the left side of her face above the eye worse with lateral deviation of the hide  ESR and CRP normal  Patient started on steroids  MRI is pending  Follow-up per neurology recommendation  Personal History      Medical History        Past Medical History:   Diagnosis Date    Adrenal nodule 11/16/2016     FINDINGS: Mild hepatic steatosis may be present. Cholecystectomy. No biliary ductal dilatation. Negative pancreas, spleen, left adrenal gland, and kidneys. The right adrenal presumed adenoma has increased slightly, measuring 3 x 4 cm in diameter,  compared to 2 x 3.5 cm on the previous exam. The attenuation values are consistent with an adenoma. Done at [facility] in August 2018    Age-related osteoporosis without current pathological fracture 11/16/2016    Arthritis      Delayed surgical wound healing      Essential hypertension 11/16/2016    Gastroesophageal reflux disease with esophagitis 11/16/2016    Hepatic steatosis 11/16/2016    History of anemia      History of sepsis       FROM UTI     Hyperlipidemia 11/16/2016    Lisfranc's dislocation       LEFT WITH FRACTURES NONHEALING FOOT    Osteomyelitis of left foot 11/2020    RLS (restless legs syndrome)      Squamous cell skin cancer 2014     Excised by Dr. James    Thyroid nodule 10/18/2019     BENIGN    Type 2 diabetes mellitus with peripheral neuropathy 11/16/2016    Vitamin D deficiency 1/25/2019            Surgical History         Past Surgical History:   Procedure Laterality Date    BREAST BIOPSY Left       7/2019. BENIGN    CARDIAC CATHETERIZATION Left 5/2/2021     Procedure: Cardiac Catheterization/Vascular Study;  Surgeon: Chacho Esteban MD;  Location: [facility] CATH INVASIVE LOCATION;  Service: Cardiovascular;  Laterality: Left;    CARDIAC CATHETERIZATION N/A 5/2/2021     Procedure: Intra-Aortic Baloon Pump Insertion;  Surgeon: Chacho Esteban MD;  Location: [facility] CATH INVASIVE LOCATION;  Service: Cardiovascular;  Laterality: N/A;    CATARACT EXTRACTION WITH INTRAOCULAR LENS IMPLANT Bilateral      CHOLECYSTECTOMY        COLONOSCOPY        CORONARY ARTERY BYPASS GRAFT N/A 5/2/2021     Procedure: CORONARY ARTERY BYPASS WITH INTERNAL MAMMARY ARTERY GRAFT;  Surgeon: Jr Rocael Alexander MD;  Location: [facility] CVOR;  Service: Cardiothoracic;  Laterality: N/A;    FOOT FUSION Right 11/13/2020     Procedure: RIGHT OPEN TREATMENT LISFRANC INJURY, OPEN REDUCTION INTERNAL FIXATION MEDIAL/MIDDLE CUNEIFORM FRACTURE AND 2ND/3RD METATARSAL FRACTURE 1ST 2ND POSSIBLE 3RD TARSOMETATARSAL ARTHRODESIS INTERCUNEIFORM ARTHRODESIS CALCANEAL BONE GRAFT;  Surgeon: Mikhail Banks Jr., MD;  Location: [facility];  Service: Orthopedics;  Laterality: Right;    INCISION AND DRAINAGE LEG Right 1/8/2021     Procedure: RIGHT IRRIGATION AND DEBRIDEMENT OF FOOT WITH SECONDARY CLOSURE AND HARDWARE REMOVAL;  Surgeon: Mikhail Banks Jr., MD;  Location: [facility] OR;  Service: Orthopedics;  Laterality: Right;    KNEE ARTHROSCOPY Bilateral      TOTAL ABDOMINAL HYSTERECTOMY         TRANSESOPHAGEAL ECHOCARDIOGRAM (EMILIA) N/A 5/2/2021     Procedure: TRANSESOPHAGEAL ECHOCARDIOGRAM;  Surgeon: Jr Rocael Alexander MD;  Location: [facility];  Service: Cardiothoracic;  Laterality: N/A;    UPPER GASTROINTESTINAL ENDOSCOPY   08/2016    US GUIDED FINE NEEDLE ASPIRATION   5/8/2020            Family History: family history includes COPD in her mother; Diabetes in her mother, sister, sister, sister, and sister; Hypertension in her mother; Kidney disease in her mother; Obesity in her mother; Thyroid disease in her mother. Otherwise pertinent FHx was reviewed and not pertinent to current issue.     Social History:  reports that she has never smoked. She has never used smokeless tobacco. She reports current alcohol use. She reports that she does not use drugs.     Home Medications:  Alcohol Wipes, DULoxetine, Lancets, Pen Needles, Tirzepatide, amLODIPine, aspirin, atorvastatin, butalbital-acetaminophen-caffeine, diclofenac, famotidine, glucose blood, multivitamin with minerals, polyethylene glycol, ticagrelor, and traZODone     Allergies:        Allergies   Allergen Reactions    Zofran [Ondansetron Hcl] Nausea And Vomiting       Does the opposite of its purpose    Prochlorperazine Other (See Comments)       CAUSED SEIZURE    Prochlorperazine Edisylate Hives    Prochlorperazine Maleate Irritability    Hydralazine Itching and Rash    Hydralazine Hcl Itching and Rash    Meperidine Itching and Swelling    Prochlorperazine Maleate Other (See Comments)       CAUSES SEIZURE               Objective   Objective      Vitals:   Temp:  [97.8 °F (36.6 °C)] 97.8 °F (36.6 °C)  Heart Rate:  [85-86] 86  Resp:  [15-18] 15  BP: (151-186)/() 186/97     Physical Exam  Awake alert oriented x 3  Cranial nerves are intact  Neck supple  Chest clear auscultation bilaterally  Heart S1-S2 no murmur  Abdomen soft benign nontender bowel sounds positive  Extremities no edema  Neuroexam mild sensory changes in the left face but no  facial droop noted with motor power 5/5 bilaterally there is no ataxia        Result Review   Result Review:  I have personally reviewed the results from the time of this admission to 12/5/2023 14:00 EST and agree with these findings:  [x]  Laboratory list / accordion  []  Microbiology  [x]  Radiology  []  EKG/Telemetry   []  Cardiology/Vascular   []  Pathology  []  Old records  []  Other:  Most notable findings include:               Assessment & Plan  Assessment / Plan      Brief Patient Summary:  Marge Mcghee is a 68 y.o. female who is admitted for TIA  Patient has a history of previous TIA on Brilinta  History of diabetes        Active Hospital Problems:       Active Hospital Problems     Diagnosis      **TIA (transient ischemic attack)        Plan:   She will be admitted  Continue aspirin Brilinta  Patient had CT and CTA  Get MRI done while in hospital  Neuro consult with neurology on-call  Keep patient Accu-Chek sliding scale and resume rest of her home meds     DVT prophylaxis:  Medical DVT prophylaxis orders are present.     CODE STATUS:    Admission Status:  I believe this patient meets ip status.     Antwan Lam MD       Electronically signed by Antwan Lam MD at 12/07/23 1251       Nidia Uribe APRN at 12/07/23 1011               LOS: 2 days     Chief Complaint: left arm weakness, left eye blurred vision, headache     Subjective   SUBJECTIVE:    History taken from: patient chart    Interval History: Patient continues to have left temporal/orbital headache with blurred vision. No hx of migraines.     Still waiting on MRI brain.     She denies tearing, lacrimation, nasal congestion.     Patient Complaints: 7/10 left sided headache, blurred vision       Review of Systems   Constitutional: Negative.    HENT:  Negative for congestion.    Eyes:  Positive for photophobia and visual disturbance. Negative for discharge.   Gastrointestinal:  Negative for nausea and vomiting.   Neurological:   "Positive for headaches. Negative for dizziness, tremors, seizures, syncope, facial asymmetry, speech difficulty, weakness, light-headedness and numbness.   Psychiatric/Behavioral:  Negative for confusion.            Pertinent PMH:  has a past medical history of Adrenal nodule (11/16/2016), Age-related osteoporosis without current pathological fracture (11/16/2016), Arthritis, Delayed surgical wound healing, Essential hypertension (11/16/2016), Gastroesophageal reflux disease with esophagitis (11/16/2016), Hepatic steatosis (11/16/2016), History of anemia, History of sepsis, Hyperlipidemia (11/16/2016), Lisfranc's dislocation, Osteomyelitis of left foot (11/2020), RLS (restless legs syndrome), Squamous cell skin cancer (2014), Thyroid nodule (10/18/2019), Type 2 diabetes mellitus with peripheral neuropathy (11/16/2016), and Vitamin D deficiency (1/25/2019).   ________________________________________________  Objective   OBJECTIVE:    On exam:  GENERAL: NAD  CARDIO: RRR  NEURO:  Oriented x3  EOMI, PERRL, subjective left eye blurred vision and \"spider webs\"   CN 2-12 intact, No facial asymmetry  Speech clear without dysarthria  Sensations intact and equal bilaterally  Strength 5-/5 and equal in all extremities  No ataxia      ________________________________________________   RESULTS REVIEW    VITAL SIGNS:  Temp:  [97.6 °F (36.4 °C)-97.9 °F (36.6 °C)] 97.9 °F (36.6 °C)  Heart Rate:  [79-97] 89  Resp:  [11-20] 11  BP: (122-160)/(67-89) 155/84    LABS:       Lab 12/06/23  1137 12/05/23  2354 12/05/23  1137   WBC  --   --  7.20   HEMOGLOBIN  --   --  13.0   HEMATOCRIT  --   --  39.5   PLATELETS  --   --  318   NEUTROS ABS  --   --  3.60   LYMPHS ABS  --   --  2.80   MONOS ABS  --   --  0.50   EOS ABS  --   --  0.20   MCV  --   --  88.0   SED RATE  --  24  --    CRP <0.30  --   --    PROTIME  --   --  10.7         Lab 12/05/23  2354 12/05/23  1137   SODIUM  --  140   POTASSIUM  --  4.3   CHLORIDE  --  105   CO2  --  25.0 "   ANION GAP  --  10.0   BUN  --  22   CREATININE  --  0.94   EGFR  --  65.4   GLUCOSE  --  120*   CALCIUM  --  9.3   HEMOGLOBIN A1C 7.40*  --    TSH  --  1.200         Lab 12/05/23  1137   TOTAL PROTEIN 7.4   ALBUMIN 4.1   GLOBULIN 3.3   ALT (SGPT) 9   AST (SGOT) 17   BILIRUBIN 0.3   ALK PHOS 57         Lab 12/05/23  1137   PROTIME 10.7   INR 0.98         Lab 12/05/23  2354   CHOLESTEROL 162   LDL CHOL 83   HDL CHOL 48   TRIGLYCERIDES 181*         Lab 12/05/23  1137 12/05/23  0000   VITAMIN B 12 410  --    ABO TYPING  --  A   RH TYPING  --  Positive   ANTIBODY SCREEN  --  Negative         UA          2/5/2023    12:59 6/12/2023    10:37   Urinalysis   Squamous Epithelial Cells, UA 3-6     Specific Gravity, UA 1.022  1.020       Ketones, UA Negative     Blood, UA Trace     Leukocytes, UA Small (1+)     Nitrite, UA Negative  Positive       RBC, UA 0-2     WBC, UA Too Numerous to Count     Bacteria, UA 4+        Details          This result is from an external source.               Lab Results   Component Value Date    TSH 1.200 12/05/2023    LDL 83 12/05/2023    HGBA1C 7.40 (H) 12/05/2023    [id number] 410 12/05/2023         IMAGING STUDIES:  CT Angiogram Head w AI Analysis of LVO    Result Date: 12/5/2023  Impression: CT angiogram demonstrates some irregular long segment moderate narrowing of the left M1 segment distally as well as some moderate to severe narrowing of adjacent left anterior temporal M2 branch. These findings appear somewhat more conspicuous than on comparison, possibly reflecting technical differences in scanning, versus progressive atherosclerotic change. There is otherwise no evidence of additional high-grade stenosis, occlusion or aneurysm in the head and neck. Electronically Signed: Jassi Mosley MD  12/5/2023 1:10 PM EST  Workstation ID: [id number]    CT Angiogram Neck    Result Date: 12/5/2023  Impression: CT angiogram demonstrates some irregular long segment moderate narrowing of the left M1  segment distally as well as some moderate to severe narrowing of adjacent left anterior temporal M2 branch. These findings appear somewhat more conspicuous than on comparison, possibly reflecting technical differences in scanning, versus progressive atherosclerotic change. There is otherwise no evidence of additional high-grade stenosis, occlusion or aneurysm in the head and neck. Electronically Signed: Jassi Mosley MD  12/5/2023 1:10 PM EST  Workstation ID: [id number]    CT Head Without Contrast Stroke Protocol    Result Date: 12/5/2023  No acute intracranial abnormality. Electronically Signed: Antelmo Ramirez MD  12/5/2023 1:09 PM EST  Workstation ID: [id number]    XR Chest 1 View    Result Date: 12/5/2023  Impression: No acute process. Electronically Signed: Oneida Munoz MD  12/5/2023 12:29 PM EST  Workstation ID: [id number]     I reviewed the patient's new clinical results.    ________________________________________________      PROBLEM LIST:    TIA (transient ischemic attack)        ASSESSMENT/PLAN:  Left eye pain and peripheral blurred vision with left sided headache, Left face and arm numbness (hx of right pontine stroke)   Differentials include optic neuritis, migraine/cluster headache, TIA/stroke, optic etiology (glaucoma, corneal etiology), temporal arteritis.   - CT head negative for hemorrhage  - MRI brain with and without contrast with orbits pending   - CTA head and neck: There there is moderate narrowing of the left M1 and moderate to severe narrowing of the left M2 branch.  No additional high-grade stenosis of the head or neck.  - Echo (August 2023): EF is 61 to 65%  -Patient had a EMILIA in October 2022 which was negative for PFO, aneurysm or other structural causes.   - EKG:Sinus rhythm rate 85  - Labs: A1C: 7.40, B12:410, LDL:  83, TSH: 1.200  - Continue ASA 81 and Brilinta 90 mg BID & Lipitor 80  - ESR & CRP normal  - Fioricet 2 tabs Q8hr   - Give 1g Solumedrol daily x 3 days- there is concern for  optic neuritis- unfortunately we are still waiting on MRI. Closely monitor blood glucose.   -- PT/OT/ST as appropriate, Neuro checks per protocol, DVT prophylaxis, Stroke education  - Patient will need opthalmology evaluation after d/c         **Please refer to previous notes for further details and recommendations.     I discussed the patient's findings and my recommendations with patient and nursing staff    CANDIDO Rhodes  12/07/23  10:11 EST        Electronically signed by Nidia Uribe APRN at 12/07/23 1228       Consult Notes (last 48 hours)  Notes from 12/06/23 1530 through 12/08/23 1530   No notes of this type exist for this encounter.            Occupational Therapy Notes (last 24 hours)        Madhu Neal OT at 12/08/23 1221          Assessment: Marge Mcghee presents with ADL impairments affecting function including endurance / activity tolerance. Pt tolerated therapy session well this date. Requires supervision for all functional mobility/ambulation, though reports some dizziness and headache. Pt donned gown requiring set-up. Demonstrated functioning below baseline abilities indicate the need for continued skilled intervention while inpatient. Tolerating session today without incident. Will continue to follow and progress as tolerated.      Plan/Recommendations:   High Intensity Therapy recommended post-acute care. This is recommended as therapy feels the patient would require 5-6 days per week, 2-3 hours per day. At this time, inpatient rehabilitation (acute rehab) would be the first choice and SNF would be second.    Electronically signed by Madhu Neal OT at 12/08/23 1222       Madhu Neal OT at 12/08/23 1212          Subjective: Pt agreeable to therapeutic plan of care.  Cognition: oriented to Person, Place, Time, and Situation and arousal/alertness: Alert and Attentive    Objective:     Bed Mobility: Supervision   Functional Transfers: SBA  Balance: unsupported  and standing SBA  Functional Ambulation: SBA, Pt ambulated from bathroom to chair, then requested to transfer to bed. Pt required SBA for all functional mobility and transfers. Pt reports feeling a little dizzy.     Upper Body Dressing: SBA  ADL Position: unsupported standing  ADL Comments: Pt required set up to don hospital down, though completed upper body dressing with SBA this date.     Vitals: Hypertensive, in sitting pt 142/91, in supine pt 154/89    Pain: 9 VAS  Location: Headache  Interventions for pain: Repositioned, RN notified, and Therapeutic Presence  Education: Provided education on the importance of mobility in the acute care setting      Assessment: Marge Mcghee presents with ADL impairments affecting function including endurance / activity tolerance. Pt tolerated therapy session well this date. Requires supervision for all functional mobility/ambulation, though reports some dizziness and headache. Pt donned gown requiring set-up. Demonstrated functioning below baseline abilities indicate the need for continued skilled intervention while inpatient. Tolerating session today without incident. Will continue to follow and progress as tolerated.     Plan/Recommendations:   High Intensity Therapy recommended post-acute care. This is recommended as therapy feels the patient would require 5-6 days per week, 2-3 hours per day. At this time, inpatient rehabilitation (acute rehab) would be the first choice and SNF would be second.    Pt desires Inpatient Rehabilitation placement at discharge. Pt cooperative; agreeable to therapeutic recommendations and plan of care.     Modified Cope: 3 = Moderate disability (Requires some help, but able to walk unassisted)    Post-Tx Position: Supine with HOB Elevated, Alarms activated, and Call light and personal items within reach  PPE: gloves     Electronically signed by Madhu Neal OT at 12/08/23 1221          Speech Language Pathology Notes (most recent note)         Kim Fernandes, SLP at 23 1454          Acute Care - Speech Language Pathology   Swallow Treatment Note ELSY Woo     Patient Name: Marge Mcghee  : 1955  MRN: [id number]  Today's Date: 2023               Admit Date: 2023    Visit Dx:     ICD-10-CM ICD-9-CM   1. Cerebrovascular accident (CVA), unspecified mechanism  I63.9 434.91     Patient Active Problem List   Diagnosis    Type 2 diabetes mellitus with retinopathy, with long-term current use of insulin    Age-related osteoporosis without current pathological fracture    Hyperlipidemia    Hepatic steatosis    Gastroesophageal reflux disease with esophagitis    Adrenal nodule    Vitamin D deficiency    Thyroid nodule    UTI (urinary tract infection)    REINA (acute kidney injury)    Hyperglycemia    Acute right flank pain    Vomiting    Hypomagnesemia    Dehydration    Obesity (BMI 30-39.9)    Complicated migraine    Occipital neuralgia of left side    Polypharmacy    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    Lisfranc dislocation    Delayed surgical wound healing    Right foot infection    Chest pain    GERD (gastroesophageal reflux disease)    Stable angina pectoris    S/P CABG x 3    Encephalopathy, metabolic    Hypertensive emergency    Dysarthria    CVA (cerebral vascular accident)    Coronary artery disease involving coronary bypass graft of native heart without angina pectoris    Essential hypertension    HTN, goal below 130/80    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    Type 2 diabetes mellitus without complication, with long-term current use of insulin    TIA (transient ischemic attack)     Past Medical History:   Diagnosis Date    Adrenal nodule 2016    FINDINGS: Mild hepatic steatosis may be present. Cholecystectomy. No biliary ductal dilatation. Negative pancreas, spleen, left  adrenal gland, and kidneys. The right adrenal presumed adenoma has increased slightly, measuring 3 x 4 cm in diameter,  compared to 2 x 3.5 cm on the previous exam. The attenuation values are consistent with an adenoma. Done at [facility] in August 2018    Age-related osteoporosis without current pathological fracture 11/16/2016    Arthritis     Delayed surgical wound healing     Essential hypertension 11/16/2016    Gastroesophageal reflux disease with esophagitis 11/16/2016    Hepatic steatosis 11/16/2016    History of anemia     History of sepsis     FROM UTI    Hyperlipidemia 11/16/2016    Lisfranc's dislocation     LEFT WITH FRACTURES NONHEALING FOOT    Osteomyelitis of left foot 11/2020    RLS (restless legs syndrome)     Squamous cell skin cancer 2014    Excised by Dr. James    Thyroid nodule 10/18/2019    BENIGN    Type 2 diabetes mellitus with peripheral neuropathy 11/16/2016    Vitamin D deficiency 1/25/2019     Past Surgical History:   Procedure Laterality Date    BREAST BIOPSY Left     7/2019. BENIGN    CARDIAC CATHETERIZATION Left 5/2/2021    Procedure: Cardiac Catheterization/Vascular Study;  Surgeon: Chacho Esteban MD;  Location: [facility] CATH INVASIVE LOCATION;  Service: Cardiovascular;  Laterality: Left;    CARDIAC CATHETERIZATION N/A 5/2/2021    Procedure: Intra-Aortic Baloon Pump Insertion;  Surgeon: Chacho Esteban MD;  Location: [facility] CATH INVASIVE LOCATION;  Service: Cardiovascular;  Laterality: N/A;    CATARACT EXTRACTION WITH INTRAOCULAR LENS IMPLANT Bilateral     CHOLECYSTECTOMY      COLONOSCOPY      CORONARY ARTERY BYPASS GRAFT N/A 5/2/2021    Procedure: CORONARY ARTERY BYPASS WITH INTERNAL MAMMARY ARTERY GRAFT;  Surgeon: Jr Rocael Alexander MD;  Location: [facility] CVOR;  Service: Cardiothoracic;  Laterality: N/A;    FOOT FUSION Right 11/13/2020    Procedure: RIGHT OPEN TREATMENT LISFRANC INJURY, OPEN REDUCTION INTERNAL FIXATION MEDIAL/MIDDLE CUNEIFORM FRACTURE AND 2ND/3RD METATARSAL  FRACTURE 1ST 2ND POSSIBLE 3RD TARSOMETATARSAL ARTHRODESIS INTERCUNEIFORM ARTHRODESIS CALCANEAL BONE GRAFT;  Surgeon: Mikhail Banks Jr., MD;  Location:  [facility];  Service: Orthopedics;  Laterality: Right;    INCISION AND DRAINAGE LEG Right 1/8/2021    Procedure: RIGHT IRRIGATION AND DEBRIDEMENT OF FOOT WITH SECONDARY CLOSURE AND HARDWARE REMOVAL;  Surgeon: Mikhail Banks Jr., MD;  Location: [facility] MAIN OR;  Service: Orthopedics;  Laterality: Right;    KNEE ARTHROSCOPY Bilateral     TOTAL ABDOMINAL HYSTERECTOMY      TRANSESOPHAGEAL ECHOCARDIOGRAM (EMILIA) N/A 5/2/2021    Procedure: TRANSESOPHAGEAL ECHOCARDIOGRAM;  Surgeon: Jr Rocael Alexander MD;  Location: [facility];  Service: Cardiothoracic;  Laterality: N/A;    UPPER GASTROINTESTINAL ENDOSCOPY  08/2016    US GUIDED FINE NEEDLE ASPIRATION  5/8/2020       SLP Recommendation and Plan               SWALLOW EVALUATION (last 72 hours)            EDUCATION  The patient has been educated in the following areas:   Dysphagia (Swallowing Impairment) Oral Care/Hydration.        SLP GOALS       Row Name 12/07/23 1400       (LTG) Swallow    (LTG) Swallow Pt will demonstrate tolerance of recommended diet without unresolved pocketing or s/s aspiration as evidenced via direct observation by SLP  -CB    Time Frame (Swallow Long Term Goal) by discharge  -CB    Progress/Outcomes (Swallow Long Term Goal) goal ongoing  -CB    Comment (Swallow Long Term Goal) Patient was seen for DT/meal at lunch. Patient is currently on regular diet. Most recent CT of the head revealed no acute intracranial abnormality. No MRI has been done at this time. Noted a bit of confusion with mixing her different drinks together and also dumping her jello in her drink as well. Otherwise, patient was alert x 3. Properly positioned patient upright in bed prior to meal. Patient displayed adequate rotary chewing. Noted chewing on the right side as this is her strong side. Patient able to clear oral cavity  effectively between bites without evidence of oral residue. However, noted intermittent clearing of throat throughout the meal. Patient also exhibited significant cough x 2.  Patient was initially eating rather fast but did slow it down upon verbal cuing. Patient has a good productive cough. Patient continues to be at room air not requiring oxygen needs at this time.  ST will continue ongoing meal assessment  closely to assure safety and tolerance with least restrictive diet given symptoms she displayed during this visit.  -CB       (STG) Swallow 1    (STG) Swallow 1 further goals if indicated  -CB    Time Frame (Swallow Short Term Goal 1) 1 week  -CB    Progress/Outcomes (Swallow Short Term Goal 1) --              User Key  (r) = Recorded By, (t) = Taken By, (c) = Cosigned By      Initials Name Provider Type          Kim Larson SLP Speech and Language Pathologist                             Time Calculation:                URIEL Childress  12/7/2023    Electronically signed by Kim Fernandes SLP at 12/07/23 1456       Macarena Richards   Physical Therapist  Physical Therapy  Therapy Treatment Note     Signed  Date of Service:  12/07/23 1428  Creation Time:  12/07/23 1428     Signed        Subjective: Pt agreeable to therapeutic plan of care. Pt reports feeling dizzy when she gets out of bed. Orthostatic vitals assessed.      Objective:      Bed mobility - Supervision  Transfers - Min-A and with rolling walker  Ambulation - 20  feet x2 Min-A and with rolling walker     Therapeutic Exercise - 10 Reps B LE AROM supported sitting / chair     Vitals: Orthostatic  Supine /80 mmHg;  Sitting /92 mmHg,  Standing /62 mmhg;  Sitting BP post gait 158/84 mmHg.      Pain: 4 VAS   Location: headache  Intervention for pain: Repositioned and RN provided medication     Education: Provided education on the importance of mobility in the acute care setting, Verbal/Tactile Cues, Transfer  Training, Gait Training, Energy conservation strategies, and Stroke prevention and risk factors     Assessment: Marge Mcghee presents with functional mobility impairments which indicate the need for skilled intervention. Tolerating session today without incident. Pt does report feeling dizzy in standing, pt with orthostatic change in BP from sitting to standing, but standing /62 mmHg and still appropriate for gait training to pt's tolerance; limited to 20 ft x2 by fatigue. Will continue to follow and progress as tolerated.      Plan/Recommendations:   High Intensity Therapy recommended post-acute care. This is recommended as therapy feels the patient would require 5-6 days per week, 2-3 hours per day. At this time, inpatient rehabilitation (acute rehab) would be the first choice and SNF would be second.. Pt requires rolling walker at discharge.      Pt desires Inpatient Rehabilitation placement at discharge. Pt cooperative; agreeable to therapeutic recommendations and plan of care.      Modified Lalo: 4 = Moderately severe disability (Unable to attend to own bodily needs without assistance, and unable to walk unassisted)      Post-Tx Position: Up in Chair, Alarms activated, and Call light and personal items within reach  PPE: gloves and surgical mask                 Jami Flowers, PT   Physical Therapist  Physical Therapy  Therapy Evaluation     Signed  Date of Service:  23  Creation Time:  23     Signed        Expand All Collapse All  Patient Name: Marge Mcghee                : 1955                          MRN: [id number]                              Today's Date: 2023                                   Admit Date: 2023                        Visit Dx:   Visit Diagnosis       ICD-10-CM ICD-9-CM   1. Cerebrovascular accident (CVA), unspecified mechanism  I63.9 434.91         Problem List       Patient Active Problem List   Diagnosis    Type 2 diabetes  mellitus with retinopathy, with long-term current use of insulin    Age-related osteoporosis without current pathological fracture    Hyperlipidemia    Hepatic steatosis    Gastroesophageal reflux disease with esophagitis    Adrenal nodule    Vitamin D deficiency    Thyroid nodule    UTI (urinary tract infection)    REINA (acute kidney injury)    Hyperglycemia    Acute right flank pain    Vomiting    Hypomagnesemia    Dehydration    Obesity (BMI 30-39.9)    Complicated migraine    Occipital neuralgia of left side    Polypharmacy    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    Lisfranc dislocation    Delayed surgical wound healing    Right foot infection    Chest pain    GERD (gastroesophageal reflux disease)    Stable angina pectoris    S/P CABG x 3    Encephalopathy, metabolic    Hypertensive emergency    Dysarthria    CVA (cerebral vascular accident)    Coronary artery disease involving coronary bypass graft of native heart without angina pectoris    Essential hypertension    HTN, goal below 130/80    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    Type 2 diabetes mellitus without complication, with long-term current use of insulin    TIA (transient ischemic attack)         Medical History        Past Medical History:   Diagnosis Date    Adrenal nodule 11/16/2016     FINDINGS: Mild hepatic steatosis may be present. Cholecystectomy. No biliary ductal dilatation. Negative pancreas, spleen, left adrenal gland, and kidneys. The right adrenal presumed adenoma has increased slightly, measuring 3 x 4 cm in diameter,  compared to 2 x 3.5 cm on the previous exam. The attenuation values are consistent with an adenoma. [facility] August 2018    Age-related osteoporosis without current pathological fracture 11/16/2016    Arthritis      Delayed surgical wound healing      Essential  hypertension 11/16/2016    Gastroesophageal reflux disease with esophagitis 11/16/2016    Hepatic steatosis 11/16/2016    History of anemia      History of sepsis       FROM UTI    Hyperlipidemia 11/16/2016    Lisfranc's dislocation       LEFT WITH FRACTURES NONHEALING FOOT    Osteomyelitis of left foot 11/2020    RLS (restless legs syndrome)      Squamous cell skin cancer 2014     Excised by Dr. James    Thyroid nodule 10/18/2019     BENIGN    Type 2 diabetes mellitus with peripheral neuropathy 11/16/2016    Vitamin D deficiency 1/25/2019         Surgical History         Past Surgical History:   Procedure Laterality Date    BREAST BIOPSY Left       7/2019. BENIGN    CARDIAC CATHETERIZATION Left 5/2/2021     Procedure: Cardiac Catheterization/Vascular Study;  Surgeon: Chacho Esteban MD;  Location: [facility] CATH INVASIVE LOCATION;  Service: Cardiovascular;  Laterality: Left;    CARDIAC CATHETERIZATION N/A 5/2/2021     Procedure: Intra-Aortic Baloon Pump Insertion;  Surgeon: Chacho Esteban MD;  Location: [facility] CATH INVASIVE LOCATION;  Service: Cardiovascular;  Laterality: N/A;    CATARACT EXTRACTION WITH INTRAOCULAR LENS IMPLANT Bilateral      CHOLECYSTECTOMY        COLONOSCOPY        CORONARY ARTERY BYPASS GRAFT N/A 5/2/2021     Procedure: CORONARY ARTERY BYPASS WITH INTERNAL MAMMARY ARTERY GRAFT;  Surgeon: Jr Rocael Alexander MD;  Location: [facility];  Service: Cardiothoracic;  Laterality: N/A;    FOOT FUSION Right 11/13/2020     Procedure: RIGHT OPEN TREATMENT LISFRANC INJURY, OPEN REDUCTION INTERNAL FIXATION MEDIAL/MIDDLE CUNEIFORM FRACTURE AND 2ND/3RD METATARSAL FRACTURE 1ST 2ND POSSIBLE 3RD TARSOMETATARSAL ARTHRODESIS INTERCUNEIFORM ARTHRODESIS CALCANEAL BONE GRAFT;  Surgeon: Mikhail Banks Jr., MD;  Location: [facility] [place] [facility];  Service: Orthopedics;  Laterality: Right;    INCISION AND DRAINAGE LEG Right 1/8/2021     Procedure: RIGHT IRRIGATION AND DEBRIDEMENT OF FOOT WITH SECONDARY CLOSURE AND HARDWARE REMOVAL;   Surgeon: Mikhail Banks Jr., MD;  Location: [facility] MAIN OR;  Service: Orthopedics;  Laterality: Right;    KNEE ARTHROSCOPY Bilateral      TOTAL ABDOMINAL HYSTERECTOMY        TRANSESOPHAGEAL ECHOCARDIOGRAM (EMILIA) N/A 5/2/2021     Procedure: TRANSESOPHAGEAL ECHOCARDIOGRAM;  Surgeon: Jr Rocael Alexander MD;  Location: [facility];  Service: Cardiothoracic;  Laterality: N/A;    UPPER GASTROINTESTINAL ENDOSCOPY   08/2016    US GUIDED FINE NEEDLE ASPIRATION   5/8/2020           General Information         Row Name 12/06/23 0927                 Physical Therapy Time and Intention     Document Type evaluation  -SS       Mode of Treatment physical therapy  -          Row Name 12/06/23 0927                 General Information     Patient Profile Reviewed yes  -SS       Prior Level of Function independent:;ADL's  -SS       Existing Precautions/Restrictions fall  -          Row Name 12/06/23 0927                 Living Environment     People in Home spouse  -          Row Name 12/06/23 0927                 Home Main Entrance     Number of Stairs, Main Entrance five  -          Row Name 12/06/23 0927                 Stairs Within Home, Primary     Stairs, Within Home, Primary bedroom upstairs  -          Row Name 12/06/23 0927                 Cognition     Orientation Status (Cognition) oriented x 4  -          Row Name 12/06/23 0927                 Safety Issues, Functional Mobility     Impairments Affecting Function (Mobility) balance;endurance/activity tolerance;strength;motor control;grasp  -SS                       User Key  (r) = Recorded By, (t) = Taken By, (c) = Cosigned By        Initials Name Provider Type     SS Jami Flowers PT Physical Therapist                            Mobility         Row Name 12/06/23 1123                 Bed Mobility     All Activities, [place] (Bed Mobility) supervision  -          Row Name 12/06/23 1123                 Sit-Stand Transfer     Sit-Stand [place]  (Transfers) minimum assist (75% patient effort)  -          Row Name 12/06/23 1123                 Gait/Stairs (Locomotion)     [place] Level (Gait) minimum assist (75% patient effort);contact guard  -       Assistive Device (Gait) walker, front-wheeled  -       Distance in Feet (Gait) 20'  -       Deviations/Abnormal Patterns (Gait) gait speed decreased  -       Comment, (Gait/Stairs) postural instability and dizziness limiting gait distance  -                       User Key  (r) = Recorded By, (t) = Taken By, (c) = Cosigned By        Initials Name Provider Type      Jami Flowers PT Physical Therapist                            Obj/Interventions         Row Name 12/06/23 1124                 Range of Motion Comprehensive     General Range of Motion no range of motion deficits identified  -          Row Name 12/06/23 1124                 Strength Comprehensive (MMT)     Comment, General Manual Muscle Testing (MMT) Assessment L LE 3/5, effort on MMT may be inconsistent; R LE 5/5  -          Row Name 12/06/23 1124                 Balance     Balance Assessment sitting static balance;standing static balance  -SS       Static Sitting Balance independent  -SS       Static Standing Balance contact guard;minimal assist  -          Row Name 12/06/23 1124                 Sensory Assessment (Somatosensory)     Sensory Assessment (Somatosensory) --  reports L sided sensation diminished but not absent  -                       User Key  (r) = Recorded By, (t) = Taken By, (c) = Cosigned By        Initials Name Provider Type      Jami Flowers PT Physical Therapist                            Goals/Plan         Row Name 12/06/23 1127                 Bed Mobility Goal 1 (PT)     Activity/Assistive Device (Bed Mobility Goal 1, PT) bed mobility activities, all  -SS       [place] Level/Cues Needed (Bed Mobility Goal 1, PT) independent  -SS       Time Frame (Bed Mobility Goal 1, PT) long  term goal (LTG);2 weeks  -SS          Row Name 12/06/23 1127                 Transfer Goal 1 (PT)     Activity/Assistive Device (Transfer Goal 1, PT) transfers, all  -SS       [place] Level/Cues Needed (Transfer Goal 1, PT) independent  -SS       Time Frame (Transfer Goal 1, PT) long term goal (LTG);2 weeks  -SS          Row Name 12/06/23 1127                 Gait Training Goal 1 (PT)     Activity/Assistive Device (Gait Training Goal 1, PT) gait (walking locomotion)  -SS       [place] Level (Gait Training Goal 1, PT) independent  -SS       Distance (Gait Training Goal 1, PT) 200'  -SS       Time Frame (Gait Training Goal 1, PT) long term goal (LTG);2 weeks  -SS          Row Name 12/06/23 1127                 Therapy Assessment/Plan (PT)     Planned Therapy Interventions (PT) balance training;bed mobility training;gait training;home exercise program;transfer training;patient/family education;neuromuscular re-education;strengthening  -SS                    User Key  (r) = Recorded By, (t) = Taken By, (c) = Cosigned By        Initials Name Provider Type     SS Jami Flowers, PT Physical Therapist                         Clinical Impression         Row Name 12/06/23 1126                 Pain     Pretreatment Pain Rating 0/10 - no pain  -SS       Posttreatment Pain Rating 0/10 - no pain  -SS          Row Name 12/06/23 1133 12/06/23 1126            Plan of Care Review     Plan of Care Reviewed With -- patient  -SS     Outcome Evaluation 67 y/o F who presented with L facial weakness and L UE weakness. Awaiting MRI. PMH diabetes and previous TIA. Patient is a  on a special needs bus. Spouse works full time as a . They live in a multilevel home with 5 [address], bedroom on second floor. Patient reports increase in falls recently and attributes this to her neuropathy. She reports she probably should be using an AD but she does not. She is limited this date due to lightheadedness. BP  107/67 in standing. Patient requests to sit after less than 2 min standing. Able to progress to 20' ambulation with RW. Requires SBA for bed mobility, CGA to min A for transfer and ambulation. She demos weakness in L LE 3/5 compared to 5/5 R LE. She reports diminished sensation in L side. Patient is at increased risk for falls and has experienced a decline from her baseline function. Recommending acute rehab at d/c.  - --        Row Name 12/06/23 1126                 Therapy Assessment/Plan (PT)     Rehab Potential (PT) good, to achieve stated therapy goals  -SS       Criteria for Skilled Interventions Met (PT) yes;meets criteria  -SS       Therapy Frequency (PT) 5 times/wk  -SS       Predicted Duration of Therapy Intervention (PT) until d/c  -SS          Row Name 12/06/23 1126                 Vital Signs     Pre Systolic BP Rehab 117  -SS       Pre Treatment Diastolic BP 70  -SS       Intra Systolic BP Rehab 107  -SS       Intra Treatment Diastolic BP 67  -SS          Row Name 12/06/23 1126                 Positioning and Restraints     Pre-Treatment Position in bed  -SS       Post Treatment Position chair  -SS       In Bed exit alarm on  -SS                       User Key  (r) = Recorded By, (t) = Taken By, (c) = Cosigned By        Initials Name Provider Type     SS Jami Flowers, PT Physical Therapist                            Outcome Measures         Row Name 12/06/23 0912                 How much help from another person do you currently need...     Turning from your back to your side while in flat bed without using bedrails? 4  -EV       Moving from lying on back to sitting on the side of a flat bed without bedrails? 4  -EV       Moving to and from a bed to a chair (including a wheelchair)? 4  -EV       Standing up from a chair using your arms (e.g., wheelchair, bedside chair)? 4  -EV       Climbing 3-5 steps with a railing? 3  -EV       To walk in hospital room? 3  -EV       AM-PAC 6 Clicks Score  (PT) 22  -EV       Highest Level of Mobility Goal 7 --> Walk 25 feet or more  -EV          Row Name 12/06/23 1127                 Modified Lalo Scale     Pre-Stroke Modified Lalo Scale 6 - Unable to determine (UTD) from the medical record documentation  -       Modified [place] Scale 4 - Moderately severe disability.  Unable to walk without assistance, and unable to attend to own bodily needs without assistance.  -          Row Name 12/06/23 1127                 Functional Assessment     Outcome Measure Options Modified Lalo  -                       User Key  (r) = Recorded By, (t) = Taken By, (c) = Cosigned By        Initials Name Provider Type     SS Jami Flowers, PT Physical Therapist     Rere Amaya, LPN Licensed Nurse                          Physical Therapy Education            Title: PT OT SLP Therapies (In Progress)         Topic: Physical Therapy (Done)         Point: Mobility training (Done)         Learning Progress Summary               Patient Acceptance, E, VU by  at 12/6/2023 1128                                                   User Key         Initials Effective Dates Name Provider Type Discipline      06/16/21 -  Jami Flowers, PT Physical Therapist PT                          PT Recommendation and Plan  Planned Therapy Interventions (PT): balance training, bed mobility training, gait training, home exercise program, transfer training, patient/family education, neuromuscular re-education, strengthening  Plan of Care Reviewed With: patient  Outcome Evaluation: 67 y/o F who presented with L facial weakness and L UE weakness. Awaiting MRI. PMH diabetes and previous TIA. Patient is a  on a special needs bus. Spouse works full time as a . They live in a multilevel home with 5 [address], bedroom on second floor. Patient reports increase in falls recently and attributes this to her neuropathy. She reports she probably should be using an AD  but she does not. She is limited this date due to lightheadedness. /67 in standing. Patient requests to sit after less than 2 min standing. Able to progress to 20' ambulation with RW. Requires SBA for bed mobility, CGA to min A for transfer and ambulation. She demos weakness in L LE 3/5 compared to 5/5 R LE. She reports diminished sensation in L side. Patient is at increased risk for falls and has experienced a decline from her baseline function. Recommending acute rehab at d/c.      Time Calculation:   PT Evaluation Complexity  History, PT Evaluation Complexity: 3 or more personal factors and/or comorbidities  Examination of Body Systems (PT Eval Complexity): total of 3 or more elements  Clinical Presentation (PT Evaluation Complexity): evolving  Clinical Decision Making (PT Evaluation Complexity): moderate complexity  Overall Complexity (PT Evaluation Complexity): moderate complexity       PT Charges         Row Name 12/06/23 1128                       Time Calculation     Start Time 0915  -         Stop Time 0942  -         Time Calculation (min) 27 min  -         PT Received On 12/06/23  -         PT - Next Appointment 12/07/23  -         PT Goal Re-Cert Due Date 12/20/23  -                 Time Calculation- PT     Total Timed Code Minutes- PT 0 minute(s)  -                      User Key  (r) = Recorded By, (t) = Taken By, (c) = Cosigned By        Initials Name Provider Type      Jami Flowers, PT Physical Therapist                       Therapy Charges for Today         Code Description Service Date Service Provider Modifiers Qty     27307732132 HC PT EVAL MOD COMPLEXITY 4 12/6/2023 Jami Flowers, PT GP 1                PT G-Codes  Outcome Measure Options: Modified [place]  AM-PAC 6 Clicks Score (PT): 22  Modified Lalo Scale: 4 - Moderately severe disability.  Unable to walk without assistance, and unable to attend to own bodily needs without assistance.  PT Discharge  Summary  Anticipated Discharge Disposition (PT): inpatient rehabilitation facility     Jami Flowers, PT                     12/6/2023

## 2023-12-08 NOTE — THERAPY TREATMENT NOTE
Acute Care - Speech Language Pathology   Swallow Treatment Note ELSY Woo     Patient Name: Marge Mcghee  : 1955  MRN: [id number]  Today's Date: 2023               Admit Date: 2023    Visit Dx:     ICD-10-CM ICD-9-CM   1. Cerebrovascular accident (CVA), unspecified mechanism  I63.9 434.91     Patient Active Problem List   Diagnosis    Type 2 diabetes mellitus with retinopathy, with long-term current use of insulin    Age-related osteoporosis without current pathological fracture    Hyperlipidemia    Hepatic steatosis    Gastroesophageal reflux disease with esophagitis    Adrenal nodule    Vitamin D deficiency    Thyroid nodule    UTI (urinary tract infection)    REINA (acute kidney injury)    Hyperglycemia    Acute right flank pain    Vomiting    Hypomagnesemia    Dehydration    Obesity (BMI 30-39.9)    Complicated migraine    Occipital neuralgia of left side    Polypharmacy    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    Lisfranc dislocation    Delayed surgical wound healing    Right foot infection    Chest pain    GERD (gastroesophageal reflux disease)    Stable angina pectoris    S/P CABG x 3    Encephalopathy, metabolic    Hypertensive emergency    Dysarthria    CVA (cerebral vascular accident)    Coronary artery disease involving coronary bypass graft of native heart without angina pectoris    Essential hypertension    HTN, goal below 130/80    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    Type 2 diabetes mellitus without complication, with long-term current use of insulin    Change in vision    Other headache syndrome       SLP Recommendation and Plan      EDUCATION  The patient has been educated in the following areas:   Dysphagia (Swallowing Impairment) Oral Care/Hydration Modified Diet Instruction.        SLP GOALS       Row Name 23 1500       (LTG)  "Swallow    (LTG) Swallow Pt will demonstrate tolerance of recommended diet without unresolved pocketing or s/s aspiration as evidenced via direct observation by SLP  -PF    Time Frame (Swallow Long Term Goal) by discharge  -PF    Progress/Outcomes (Swallow Long Term Goal) goal ongoing  -PF    Comment (Swallow Long Term Goal) Pt was seen for DT/meal to ensure tolerance and safety of rec'd diet. Pt alert and oriented, and positioned upright at a 90 degree angle hip flexion prior to PO (lunch). Pt demonstrates functional mastication with adequate bolus organization, oral transit appears WFL, no overt s/s of aspiration noted on any consistency. Pt denies difficulty w/current diet with exception of \"sometimes I drinks fall out of my mouth\". ST to follow patient for a meal assessment x1 prior to signing off, as well as pending MRI result. -PF         (STG) Swallow 1    (STG) Swallow 1 further goals if indicated  -PF    Time Frame (Swallow Short Term Goal 1) 1 week  -PF         User Key  (r) = Recorded By, (t) = Taken By, (c) = Cosigned By      Initials Name Provider Type    PF Fakto, [place], SLP Speech and Language Pathologist          URIEL Roblero  12/8/2023  "

## 2023-12-09 VITALS
RESPIRATION RATE: 17 BRPM | OXYGEN SATURATION: 97 % | WEIGHT: 156.97 LBS | HEIGHT: 64 IN | BODY MASS INDEX: 26.8 KG/M2 | SYSTOLIC BLOOD PRESSURE: 122 MMHG | TEMPERATURE: 98.6 F | HEART RATE: 85 BPM | DIASTOLIC BLOOD PRESSURE: 64 MMHG

## 2023-12-09 LAB
GLUCOSE BLDC GLUCOMTR-MCNC: 217 MG/DL (ref 70–105)
GLUCOSE BLDC GLUCOMTR-MCNC: 246 MG/DL (ref 70–105)

## 2023-12-09 PROCEDURE — 82948 REAGENT STRIP/BLOOD GLUCOSE: CPT

## 2023-12-09 PROCEDURE — 63710000001 INSULIN GLARGINE PER 5 UNITS: Performed by: INTERNAL MEDICINE

## 2023-12-09 PROCEDURE — 63710000001 INSULIN LISPRO (HUMAN) PER 5 UNITS: Performed by: INTERNAL MEDICINE

## 2023-12-09 RX ORDER — INSULIN LISPRO 100 [IU]/ML
4-24 INJECTION, SOLUTION INTRAVENOUS; SUBCUTANEOUS
Status: DISCONTINUED | OUTPATIENT
Start: 2023-12-09 | End: 2023-12-09 | Stop reason: HOSPADM

## 2023-12-09 RX ADMIN — THERA TABS 1 TABLET: TAB at 08:57

## 2023-12-09 RX ADMIN — AMLODIPINE BESYLATE 10 MG: 5 TABLET ORAL at 08:57

## 2023-12-09 RX ADMIN — INSULIN GLARGINE 10 UNITS: 100 INJECTION, SOLUTION SUBCUTANEOUS at 14:49

## 2023-12-09 RX ADMIN — DULOXETINE HYDROCHLORIDE 60 MG: 30 CAPSULE, DELAYED RELEASE ORAL at 08:57

## 2023-12-09 RX ADMIN — CLOPIDOGREL BISULFATE 75 MG: 75 TABLET ORAL at 08:57

## 2023-12-09 RX ADMIN — Medication 10 ML: at 08:58

## 2023-12-09 RX ADMIN — ASPIRIN 81 MG CHEWABLE TABLET 81 MG: 81 TABLET CHEWABLE at 08:57

## 2023-12-09 RX ADMIN — INSULIN LISPRO 5 UNITS: 100 INJECTION, SOLUTION INTRAVENOUS; SUBCUTANEOUS at 08:57

## 2023-12-09 RX ADMIN — INSULIN LISPRO 8 UNITS: 100 INJECTION, SOLUTION INTRAVENOUS; SUBCUTANEOUS at 12:29

## 2023-12-09 NOTE — PROGRESS NOTES
[facility] Complaint: Numbness in the left side of the face and left arm starting today     Stroke     This is a pleasant 68-year-old female with history of hypertension diabetes  Patient had some injection in her eye few days ago by a retinal specialist for some inflammation and has been causing pain in her eyes since  Pain mostly with moving the eye sideways  Patient had previous TIA x 2 and has been on Brilinta and aspirin  Woke up this morning with left-sided facial numbness was numbness in the left arm  Did not denied any slurred speech or blurred vision  She had mild weakness in the left arm but not noted on exam  Patient seen in the ER head CT and CTA admitted for neuro evaluation     Review of Systems   Significant for left facial and left arm numbness  She had pain in her eyes mostly photophobia and pain on moving her eyes sideways  Denied any chest pain fever chills nausea vomiting diarrhea  Mild and significant weakness in the left arm  No slurred speech or blurred vision otherwise    12/6  Patient sitting in her chair  Feeling no energy  Awaiting MRI   Neuro evaluations pending  No focal neurodeficits noted  Hemodynamically stable    12/7  Patient is feeling nauseous  Still with headaches  Will try Phenergan as she did not improve with Zofran or Compazine  Headache is mostly in the left side of her face above the eye worse with lateral deviation of the hide  ESR and CRP normal  Patient started on steroids  MRI is pending  Follow-up per neurology recommendation    12/8  Patient still awaiting MRI  Still on IV steroids  No improvement in her headache  Patient with left eye pain with peripheral blocked vision with left-sided headaches and left facial numbness with a history of pontine stroke  Differential includes optic neuritis and migraine and cluster headache  CT of the brain is negative for hemorrhage  MRI with and without contrast is pending  CTA of the head and neck with moderate narrowing of the left MM 1  and moderate to severe narrowing in the left M2 branch  Patient had echo showing normal ejection fraction and no August 2023 and had EMILIA in October 2022 negative for PFO or aneurysm  Patient has been on Brilinta and aspirin  She is concerned that Brilinta too expensive pharmacy recommended switching to Plavix will defer that to neurology  Continue Lipitor and aspirin  Her ESR and CRP are normal  Patient given Fioricet for the headaches  She is can need outpatient ophthalmology follow-up  Her  asked to be contacted I called him at 1054 am,left message    Addendum   did call me back and we discussed the patient condition  12/9  Patient MRI is negative  Her headaches has improved and her vision is stable  Cleared per neurology  Patient wants to go to skilled nursing facility  Discharge to SNF once bed is available  Personal History      Medical History        Past Medical History:   Diagnosis Date    Adrenal nodule 11/16/2016     FINDINGS: Mild hepatic steatosis may be present. Cholecystectomy. No biliary ductal dilatation. Negative pancreas, spleen, left adrenal gland, and kidneys. The right adrenal presumed adenoma has increased slightly, measuring 3 x 4 cm in diameter,  compared to 2 x 3.5 cm on the previous exam. The attenuation values are consistent with an adenoma. Done at [facility] in August 2018    Age-related osteoporosis without current pathological fracture 11/16/2016    Arthritis      Delayed surgical wound healing      Essential hypertension 11/16/2016    Gastroesophageal reflux disease with esophagitis 11/16/2016    Hepatic steatosis 11/16/2016    History of anemia      History of sepsis       FROM UTI    Hyperlipidemia 11/16/2016    Lisfranc's dislocation       LEFT WITH FRACTURES NONHEALING FOOT    Osteomyelitis of left foot 11/2020    RLS (restless legs syndrome)      Squamous cell skin cancer 2014     Excised by Dr. James    Thyroid nodule 10/18/2019     BENIGN    Type 2  diabetes mellitus with peripheral neuropathy 11/16/2016    Vitamin D deficiency 1/25/2019            Surgical History         Past Surgical History:   Procedure Laterality Date    BREAST BIOPSY Left       7/2019. BENIGN    CARDIAC CATHETERIZATION Left 5/2/2021     Procedure: Cardiac Catheterization/Vascular Study;  Surgeon: Chacho Esteban MD;  Location: [facility] CATH INVASIVE LOCATION;  Service: Cardiovascular;  Laterality: Left;    CARDIAC CATHETERIZATION N/A 5/2/2021     Procedure: Intra-Aortic Baloon Pump Insertion;  Surgeon: Chacho Esteban MD;  Location: [facility] CATH INVASIVE LOCATION;  Service: Cardiovascular;  Laterality: N/A;    CATARACT EXTRACTION WITH INTRAOCULAR LENS IMPLANT Bilateral      CHOLECYSTECTOMY        COLONOSCOPY        CORONARY ARTERY BYPASS GRAFT N/A 5/2/2021     Procedure: CORONARY ARTERY BYPASS WITH INTERNAL MAMMARY ARTERY GRAFT;  Surgeon: Jr Rocael Alexander MD;  Location: [facility];  Service: Cardiothoracic;  Laterality: N/A;    FOOT FUSION Right 11/13/2020     Procedure: RIGHT OPEN TREATMENT LISFRANC INJURY, OPEN REDUCTION INTERNAL FIXATION MEDIAL/MIDDLE CUNEIFORM FRACTURE AND 2ND/3RD METATARSAL FRACTURE 1ST 2ND POSSIBLE 3RD TARSOMETATARSAL ARTHRODESIS INTERCUNEIFORM ARTHRODESIS CALCANEAL BONE GRAFT;  Surgeon: Mikhail Banks Jr., MD;  Location: [facility];  Service: Orthopedics;  Laterality: Right;    INCISION AND DRAINAGE LEG Right 1/8/2021     Procedure: RIGHT IRRIGATION AND DEBRIDEMENT OF FOOT WITH SECONDARY CLOSURE AND HARDWARE REMOVAL;  Surgeon: Mikhail Banks Jr., MD;  Location: [facility] OR;  Service: Orthopedics;  Laterality: Right;    KNEE ARTHROSCOPY Bilateral      TOTAL ABDOMINAL HYSTERECTOMY        TRANSESOPHAGEAL ECHOCARDIOGRAM (EMILIA) N/A 5/2/2021     Procedure: TRANSESOPHAGEAL ECHOCARDIOGRAM;  Surgeon: Jr Rocael Alexander MD;  Location: [facility];  Service: Cardiothoracic;  Laterality: N/A;    UPPER GASTROINTESTINAL ENDOSCOPY   08/2016    US GUIDED FINE NEEDLE  ASPIRATION   5/8/2020            Family History: family history includes COPD in her mother; Diabetes in her mother, sister, sister, sister, and sister; Hypertension in her mother; Kidney disease in her mother; Obesity in her mother; Thyroid disease in her mother. Otherwise pertinent FHx was reviewed and not pertinent to current issue.     Social History:  reports that she has never smoked. She has never used smokeless tobacco. She reports current alcohol use. She reports that she does not use drugs.     Home Medications:  Alcohol Wipes, DULoxetine, Lancets, Pen Needles, Tirzepatide, amLODIPine, aspirin, atorvastatin, butalbital-acetaminophen-caffeine, diclofenac, famotidine, glucose blood, multivitamin with minerals, polyethylene glycol, ticagrelor, and traZODone     Allergies:        Allergies   Allergen Reactions    Zofran [Ondansetron Hcl] Nausea And Vomiting       Does the opposite of its purpose    Prochlorperazine Other (See Comments)       CAUSED SEIZURE    Prochlorperazine Edisylate Hives    Prochlorperazine Maleate Irritability    Hydralazine Itching and Rash    Hydralazine Hcl Itching and Rash    Meperidine Itching and Swelling    Prochlorperazine Maleate Other (See Comments)       CAUSES SEIZURE               Objective   Objective      Vitals:   Temp:  [97.8 °F (36.6 °C)] 97.8 °F (36.6 °C)  Heart Rate:  [85-86] 86  Resp:  [15-18] 15  BP: (151-186)/() 186/97     Physical Exam  Awake alert oriented x 3  Cranial nerves are intact  Neck supple  Chest clear auscultation bilaterally  Heart S1-S2 no murmur  Abdomen soft benign nontender bowel sounds positive  Extremities no edema  Neuroexam mild sensory changes in the left face but no facial droop noted with motor power 5/5 bilaterally there is no ataxia        Result Review   Result Review:  I have personally reviewed the results from the time of this admission to 12/5/2023 14:00 EST and agree with these findings:  [x]  Laboratory list / accordion  []   Microbiology  [x]  Radiology  []  EKG/Telemetry   []  Cardiology/Vascular   []  Pathology  []  Old records  []  Other:  Most notable findings include:               Assessment & Plan  Assessment / Plan      Brief Patient Summary:  Marge Mcghee is a 68 y.o. female who is admitted for TIA  Patient has a history of previous TIA on Brilinta  History of diabetes        Active Hospital Problems:       Active Hospital Problems     Diagnosis      **TIA (transient ischemic attack)        Plan:   She will be admitted  Continue aspirin Brilinta  Patient had CT and CTA  Get MRI done while in hospital  Neuro consult with neurology on-call  Keep patient Accu-Chek sliding scale and resume rest of her home meds     DVT prophylaxis:  Medical DVT prophylaxis orders are present.     CODE STATUS:    Admission Status:  I believe this patient meets ip status.     Antwan Lam MD

## 2023-12-09 NOTE — PLAN OF CARE
Problem: Adult Inpatient Plan of Care  Goal: Plan of Care Review  Outcome: Met  Goal: Patient-Specific Goal (Individualized)  Outcome: Met  Goal: Absence of Hospital-Acquired Illness or Injury  Outcome: Met  Intervention: Identify and Manage Fall Risk  Recent Flowsheet Documentation  Taken 12/9/2023 1000 by Jami Lee LPN  Safety Promotion/Fall Prevention:   activity supervised   assistive device/personal items within reach  Taken 12/9/2023 0800 by Jami Lee LPN  Safety Promotion/Fall Prevention: activity supervised  Intervention: Prevent Skin Injury  Recent Flowsheet Documentation  Taken 12/9/2023 1100 by Jami Lee LPN  Body Position: position changed independently  Taken 12/9/2023 1000 by Jami Lee LPN  Body Position: position changed independently  Intervention: Prevent and Manage VTE (Venous Thromboembolism) Risk  Recent Flowsheet Documentation  Taken 12/9/2023 1100 by Jami Lee LPN  Activity Management: activity encouraged  VTE Prevention/Management: sequential compression devices on  Range of Motion: active ROM (range of motion) encouraged  Taken 12/9/2023 1000 by Jami Lee LPN  Activity Management: activity encouraged  Taken 12/9/2023 0800 by Jami Lee LPN  Activity Management: activity encouraged  VTE Prevention/Management: sequential compression devices on  Range of Motion: active ROM (range of motion) encouraged  Intervention: Prevent Infection  Recent Flowsheet Documentation  Taken 12/9/2023 1000 by Jami Lee LPN  Infection Prevention: single patient room provided  Taken 12/9/2023 0800 by Jami Lee LPN  Infection Prevention: environmental surveillance performed  Goal: Optimal Comfort and Wellbeing  Outcome: Met  Intervention: Provide Person-Centered Care  Recent Flowsheet Documentation  Taken 12/9/2023 1100 by Jami Lee LPN  Trust Relationship/Rapport:   choices provided   empathic listening provided   questions answered  Goal:  Readiness for Transition of Care  Outcome: Met     Problem: Adjustment to Illness (Stroke, Ischemic/Transient Ischemic Attack)  Goal: Optimal Coping  Outcome: Met  Intervention: Support Psychosocial Response to Stroke  Recent Flowsheet Documentation  Taken 12/9/2023 1100 by Jami Lee LPN  Family/Support System Care: support provided     Problem: Bowel Elimination Impaired (Stroke, Ischemic/Transient Ischemic Attack)  Goal: Effective Bowel Elimination  Outcome: Met     Problem: Cerebral Tissue Perfusion (Stroke, Ischemic/Transient Ischemic Attack)  Goal: Optimal Cerebral Tissue Perfusion  Outcome: Met     Problem: Cognitive Impairment (Stroke, Ischemic/Transient Ischemic Attack)  Goal: Optimal Cognitive Function  Outcome: Met     Problem: Communication Impairment (Stroke, Ischemic/Transient Ischemic Attack)  Goal: Improved Communication Skills  Outcome: Met     Problem: Functional Ability Impaired (Stroke, Ischemic/Transient Ischemic Attack)  Goal: Optimal Functional Ability  Outcome: Met  Intervention: Optimize Functional Ability  Recent Flowsheet Documentation  Taken 12/9/2023 1100 by Jami Lee LPN  Activity Management: activity encouraged  Taken 12/9/2023 1000 by Jami Lee LPN  Activity Management: activity encouraged  Taken 12/9/2023 0800 by Jami Lee LPN  Activity Management: activity encouraged     Problem: Respiratory Compromise (Stroke, Ischemic/Transient Ischemic Attack)  Goal: Effective Oxygenation and Ventilation  Outcome: Met  Intervention: Optimize Oxygenation and Ventilation  Recent Flowsheet Documentation  Taken 12/9/2023 1100 by Jami Lee LPN  Head of Bed (HOB) Positioning: HOB elevated  Taken 12/9/2023 1000 by Jami Lee LPN  Head of Bed (HOB) Positioning: HOB elevated     Problem: Sensorimotor Impairment (Stroke, Ischemic/Transient Ischemic Attack)  Goal: Improved Sensorimotor Function  Outcome: Met  Intervention: Optimize Range of Motion, Motor Control and  Function  Recent Flowsheet Documentation  Taken 12/9/2023 1100 by Jami Lee LPN  Positioning/Transfer Devices:   pillows   in use  Range of Motion: active ROM (range of motion) encouraged  Taken 12/9/2023 1000 by Jami Lee LPN  Positioning/Transfer Devices:   pillows   in use  Taken 12/9/2023 0800 by Jami Lee LPN  Range of Motion: active ROM (range of motion) encouraged     Problem: Swallowing Impairment (Stroke, Ischemic/Transient Ischemic Attack)  Goal: Optimal Eating and Swallowing without Aspiration  Outcome: Met     Problem: Urinary Elimination Impaired (Stroke, Ischemic/Transient Ischemic Attack)  Goal: Effective Urinary Elimination  Outcome: Met     Problem: Diabetes Comorbidity  Goal: Blood Glucose Level Within Targeted Range  Outcome: Met     Problem: Hypertension Comorbidity  Goal: Blood Pressure in Desired Range  Outcome: Met  Intervention: Maintain Blood Pressure Management  Recent Flowsheet Documentation  Taken 12/9/2023 1000 by Jami Lee LPN  Medication Review/Management: medications reviewed  Taken 12/9/2023 0800 by Jami Lee LPN  Medication Review/Management: medications reviewed     Problem: Pain Chronic (Persistent) (Comorbidity Management)  Goal: Acceptable Pain Control and Functional Ability  Outcome: Met  Intervention: Manage Persistent Pain  Recent Flowsheet Documentation  Taken 12/9/2023 1000 by Jami Lee LPN  Medication Review/Management: medications reviewed  Taken 12/9/2023 0800 by Jami Lee LPN  Medication Review/Management: medications reviewed  Intervention: Optimize Psychosocial Wellbeing  Recent Flowsheet Documentation  Taken 12/9/2023 1100 by Jami Lee LPN  Diversional Activities: television  Family/Support System Care: support provided     Problem: Fall Injury Risk  Goal: Absence of Fall and Fall-Related Injury  Outcome: Met  Intervention: Identify and Manage Contributors  Recent Flowsheet Documentation  Taken 12/9/2023 1000 by  Jami Lee LPN  Medication Review/Management: medications reviewed  Taken 12/9/2023 0800 by Jami Lee LPN  Medication Review/Management: medications reviewed  Intervention: Promote Injury-Free Environment  Recent Flowsheet Documentation  Taken 12/9/2023 1000 by Jami Lee LPN  Safety Promotion/Fall Prevention:   activity supervised   assistive device/personal items within reach  Taken 12/9/2023 0800 by Jami Lee LPN  Safety Promotion/Fall Prevention: activity supervised   Goal Outcome Evaluation:

## 2023-12-09 NOTE — PLAN OF CARE
Problem: Adult Inpatient Plan of Care  Goal: Plan of Care Review  Outcome: Ongoing, Progressing  Goal: Patient-Specific Goal (Individualized)  Outcome: Ongoing, Progressing  Goal: Absence of Hospital-Acquired Illness or Injury  Outcome: Ongoing, Progressing  Intervention: Identify and Manage Fall Risk  Recent Flowsheet Documentation  Taken 12/9/2023 0400 by Rose Macdonald RN  Safety Promotion/Fall Prevention:   safety round/check completed   room organization consistent   fall prevention program maintained   clutter free environment maintained   assistive device/personal items within reach  Taken 12/9/2023 0200 by Rose Macdonald RN  Safety Promotion/Fall Prevention:   room organization consistent   safety round/check completed   fall prevention program maintained   clutter free environment maintained   assistive device/personal items within reach  Taken 12/9/2023 0000 by Rose Macdonald RN  Safety Promotion/Fall Prevention:   safety round/check completed   room organization consistent   fall prevention program maintained   clutter free environment maintained   assistive device/personal items within reach  Taken 12/8/2023 2200 by Rose Macdonald RN  Safety Promotion/Fall Prevention:   safety round/check completed   room organization consistent   fall prevention program maintained   clutter free environment maintained   assistive device/personal items within reach  Taken 12/8/2023 2000 by Rose Macdonald RN  Safety Promotion/Fall Prevention:   safety round/check completed   room organization consistent   fall prevention program maintained   clutter free environment maintained   assistive device/personal items within reach  Intervention: Prevent Skin Injury  Recent Flowsheet Documentation  Taken 12/9/2023 0400 by Rose Macdonald RN  Body Position: position changed independently  Taken 12/9/2023 0200 by Rose Macdonald RN  Body Position: position changed independently  Taken 12/9/2023  0000 by Rose Macdonald RN  Body Position: position changed independently  Skin Protection: adhesive use limited  Taken 12/8/2023 2200 by Rose Macdonald RN  Body Position: position changed independently  Taken 12/8/2023 2000 by Rose Macdonald RN  Body Position: position changed independently  Skin Protection: adhesive use limited  Intervention: Prevent and Manage VTE (Venous Thromboembolism) Risk  Recent Flowsheet Documentation  Taken 12/9/2023 0400 by Rose Macdonald RN  Activity Management: activity encouraged  Taken 12/9/2023 0000 by Rose Macdonald RN  Activity Management: activity encouraged  VTE Prevention/Management:   bilateral   sequential compression devices on  Range of Motion: active ROM (range of motion) encouraged  Taken 12/8/2023 2200 by Rose Macdonald RN  Activity Management: activity encouraged  Taken 12/8/2023 2000 by Rose Macdonald RN  Activity Management: activity encouraged  VTE Prevention/Management:   bilateral   sequential compression devices on  Range of Motion: active ROM (range of motion) encouraged  Intervention: Prevent Infection  Recent Flowsheet Documentation  Taken 12/9/2023 0400 by Rose Macdonald RN  Infection Prevention:   single patient room provided   rest/sleep promoted   personal protective equipment utilized   hand hygiene promoted   environmental surveillance performed  Taken 12/9/2023 0200 by Rose Macdonald RN  Infection Prevention:   single patient room provided   rest/sleep promoted   personal protective equipment utilized   hand hygiene promoted   environmental surveillance performed  Taken 12/9/2023 0000 by Rose Macdonald RN  Infection Prevention:   single patient room provided   rest/sleep promoted   personal protective equipment utilized   hand hygiene promoted   environmental surveillance performed  Taken 12/8/2023 2200 by Rose Macdonald RN  Infection Prevention:   single patient room provided   rest/sleep promoted    personal protective equipment utilized   hand hygiene promoted   environmental surveillance performed  Taken 12/8/2023 2000 by Rose Macdonald RN  Infection Prevention:   single patient room provided   rest/sleep promoted   personal protective equipment utilized   hand hygiene promoted   environmental surveillance performed  Goal: Optimal Comfort and Wellbeing  Outcome: Ongoing, Progressing  Intervention: Provide Person-Centered Care  Recent Flowsheet Documentation  Taken 12/9/2023 0400 by Rose Macdonald RN  Trust Relationship/Rapport: care explained  Taken 12/9/2023 0000 by Rose Macdonald RN  Trust Relationship/Rapport: care explained  Taken 12/8/2023 2000 by Rose Macdonald RN  Trust Relationship/Rapport: care explained  Goal: Readiness for Transition of Care  Outcome: Ongoing, Progressing     Problem: Adjustment to Illness (Stroke, Ischemic/Transient Ischemic Attack)  Goal: Optimal Coping  Outcome: Ongoing, Progressing  Intervention: Support Psychosocial Response to Stroke  Recent Flowsheet Documentation  Taken 12/9/2023 0400 by Rose Macdonald RN  Supportive Measures: active listening utilized  Family/Support System Care: support provided  Taken 12/9/2023 0000 by Rose Macdonald RN  Supportive Measures: active listening utilized  Family/Support System Care: support provided  Taken 12/8/2023 2000 by Rose Macdonald RN  Supportive Measures: active listening utilized  Family/Support System Care: support provided     Problem: Bowel Elimination Impaired (Stroke, Ischemic/Transient Ischemic Attack)  Goal: Effective Bowel Elimination  Outcome: Ongoing, Progressing     Problem: Cerebral Tissue Perfusion (Stroke, Ischemic/Transient Ischemic Attack)  Goal: Optimal Cerebral Tissue Perfusion  Outcome: Ongoing, Progressing  Intervention: Protect and Optimize Cerebral Perfusion  Recent Flowsheet Documentation  Taken 12/9/2023 0400 by Rose Macdonald RN  Sensory Stimulation Regulation:   care  clustered   quiet environment promoted  Cerebral Perfusion Promotion: blood pressure monitored  Taken 12/9/2023 0000 by Rose Macdonald RN  Sensory Stimulation Regulation:   care clustered   quiet environment promoted  Cerebral Perfusion Promotion: blood pressure monitored  Taken 12/8/2023 2000 by Rose Macdonald RN  Sensory Stimulation Regulation:   care clustered   quiet environment promoted  Cerebral Perfusion Promotion: blood pressure monitored     Problem: Cognitive Impairment (Stroke, Ischemic/Transient Ischemic Attack)  Goal: Optimal Cognitive Function  Outcome: Ongoing, Progressing  Intervention: Optimize Cognitive Function  Recent Flowsheet Documentation  Taken 12/9/2023 0400 by Rose Macdonald RN  Sensory Stimulation Regulation:   care clustered   quiet environment promoted  Reorientation Measures: clock in view  Taken 12/9/2023 0000 by Rose Macdonald RN  Sensory Stimulation Regulation:   care clustered   quiet environment promoted  Reorientation Measures: clock in view  Taken 12/8/2023 2000 by Rose Macdonald RN  Sensory Stimulation Regulation:   care clustered   quiet environment promoted  Reorientation Measures: clock in view     Problem: Communication Impairment (Stroke, Ischemic/Transient Ischemic Attack)  Goal: Improved Communication Skills  Outcome: Ongoing, Progressing  Intervention: Optimize Communication Skills  Recent Flowsheet Documentation  Taken 12/9/2023 0400 by Rose Macdonald RN  Communication Enhancement Strategies: call light answered in person  Taken 12/9/2023 0000 by Rose Macdonald RN  Communication Enhancement Strategies: call light answered in person  Taken 12/8/2023 2000 by Rose Macdonald RN  Communication Enhancement Strategies: call light answered in person     Problem: Functional Ability Impaired (Stroke, Ischemic/Transient Ischemic Attack)  Goal: Optimal Functional Ability  Outcome: Ongoing, Progressing  Intervention: Optimize Functional  Ability  Recent Flowsheet Documentation  Taken 12/9/2023 0400 by Rose Macdonald RN  Activity Management: activity encouraged  Taken 12/9/2023 0000 by Rose Macdonald RN  Activity Management: activity encouraged  Taken 12/8/2023 2200 by Rose Macdonald RN  Activity Management: activity encouraged  Taken 12/8/2023 2000 by Rose Macdonald RN  Activity Management: activity encouraged     Problem: Respiratory Compromise (Stroke, Ischemic/Transient Ischemic Attack)  Goal: Effective Oxygenation and Ventilation  Outcome: Ongoing, Progressing  Intervention: Optimize Oxygenation and Ventilation  Recent Flowsheet Documentation  Taken 12/9/2023 0400 by Rose Macdonald RN  Head of Bed (HOB) Positioning: HOB elevated  Taken 12/9/2023 0200 by Rose Macdonald RN  Head of Bed (HOB) Positioning: HOB elevated  Taken 12/9/2023 0000 by Rose Macdonald RN  Head of Bed (HOB) Positioning: HOB elevated  Taken 12/8/2023 2200 by Rose Macdonald RN  Head of Bed (HOB) Positioning: HOB elevated  Taken 12/8/2023 2000 by Rose Macdonald RN  Head of Bed (HOB) Positioning: HOB elevated     Problem: Sensorimotor Impairment (Stroke, Ischemic/Transient Ischemic Attack)  Goal: Improved Sensorimotor Function  Outcome: Ongoing, Progressing  Intervention: Optimize Range of Motion, Motor Control and Function  Recent Flowsheet Documentation  Taken 12/9/2023 0400 by Rose Macdonald RN  Positioning/Transfer Devices:   pillows   in use  Taken 12/9/2023 0200 by Rose Macdonald RN  Positioning/Transfer Devices:   pillows   in use  Taken 12/9/2023 0000 by Rose Macdonald RN  Positioning/Transfer Devices:   pillows   in use  Range of Motion: active ROM (range of motion) encouraged  Taken 12/8/2023 2200 by Rose Macdonald RN  Positioning/Transfer Devices:   pillows   in use  Taken 12/8/2023 2000 by Rose Macdonald RN  Positioning/Transfer Devices:   pillows   in use  Range of Motion: active ROM (range of  motion) encouraged  Intervention: Optimize Sensory and Perceptual Ability  Recent Flowsheet Documentation  Taken 12/9/2023 0000 by Rose Macdonald RN  Pressure Reduction Techniques: frequent weight shift encouraged  Pressure Reduction Devices: pressure-redistributing mattress utilized  Taken 12/8/2023 2000 by Rose Macdonald RN  Pressure Reduction Techniques: frequent weight shift encouraged  Pressure Reduction Devices: pressure-redistributing mattress utilized     Problem: Swallowing Impairment (Stroke, Ischemic/Transient Ischemic Attack)  Goal: Optimal Eating and Swallowing without Aspiration  Outcome: Ongoing, Progressing     Problem: Urinary Elimination Impaired (Stroke, Ischemic/Transient Ischemic Attack)  Goal: Effective Urinary Elimination  Outcome: Ongoing, Progressing     Problem: Diabetes Comorbidity  Goal: Blood Glucose Level Within Targeted Range  Outcome: Ongoing, Progressing  Intervention: Monitor and Manage Glycemia  Recent Flowsheet Documentation  Taken 12/9/2023 0400 by Rose Macdonald RN  Glycemic Management: blood glucose monitored  Taken 12/9/2023 0000 by Rose Macdonald RN  Glycemic Management: blood glucose monitored  Taken 12/8/2023 2000 by Rose Macdonald RN  Glycemic Management: blood glucose monitored     Problem: Hypertension Comorbidity  Goal: Blood Pressure in Desired Range  Outcome: Ongoing, Progressing  Intervention: Maintain Blood Pressure Management  Recent Flowsheet Documentation  Taken 12/9/2023 0400 by Rose Macdonald RN  Medication Review/Management:   medications reviewed   high-risk medications identified  Taken 12/9/2023 0200 by Rose Macdonald RN  Medication Review/Management:   medications reviewed   high-risk medications identified  Taken 12/9/2023 0000 by Rose Macdonald RN  Syncope Management: position changed slowly  Medication Review/Management:   medications reviewed   high-risk medications identified  Taken 12/8/2023 2200 by Renae  EDGARDO Rose  Medication Review/Management:   medications reviewed   high-risk medications identified  Taken 12/8/2023 2000 by Rose Macdonald RN  Syncope Management: position changed slowly  Medication Review/Management:   medications reviewed   high-risk medications identified     Problem: Pain Chronic (Persistent) (Comorbidity Management)  Goal: Acceptable Pain Control and Functional Ability  Outcome: Ongoing, Progressing  Intervention: Manage Persistent Pain  Recent Flowsheet Documentation  Taken 12/9/2023 0400 by Rose Macdonald RN  Medication Review/Management:   medications reviewed   high-risk medications identified  Taken 12/9/2023 0200 by Rose Macdonald RN  Medication Review/Management:   medications reviewed   high-risk medications identified  Taken 12/9/2023 0000 by Rose Macdonald RN  Sleep/Rest Enhancement: awakenings minimized  Medication Review/Management:   medications reviewed   high-risk medications identified  Taken 12/8/2023 2200 by Rose Macdonald RN  Medication Review/Management:   medications reviewed   high-risk medications identified  Taken 12/8/2023 2000 by Rose Macdonald RN  Medication Review/Management:   medications reviewed   high-risk medications identified  Intervention: Optimize Psychosocial Wellbeing  Recent Flowsheet Documentation  Taken 12/9/2023 0400 by Rose Macdonald RN  Supportive Measures: active listening utilized  Family/Support System Care: support provided  Taken 12/9/2023 0000 by Rose Macdonald RN  Supportive Measures: active listening utilized  Family/Support System Care: support provided  Taken 12/8/2023 2000 by Rose Macdonald RN  Supportive Measures: active listening utilized  Diversional Activities: television  Family/Support System Care: support provided     Problem: Fall Injury Risk  Goal: Absence of Fall and Fall-Related Injury  Outcome: Ongoing, Progressing  Intervention: Identify and Manage Contributors  Recent Flowsheet  Documentation  Taken 12/9/2023 0400 by Rose Macdonald RN  Medication Review/Management:   medications reviewed   high-risk medications identified  Taken 12/9/2023 0200 by Rose Macdonald RN  Medication Review/Management:   medications reviewed   high-risk medications identified  Taken 12/9/2023 0000 by Rose Macdonald RN  Medication Review/Management:   medications reviewed   high-risk medications identified  Taken 12/8/2023 2200 by Rose Macdonald RN  Medication Review/Management:   medications reviewed   high-risk medications identified  Taken 12/8/2023 2000 by Rose Macdonald RN  Medication Review/Management:   medications reviewed   high-risk medications identified  Intervention: Promote Injury-Free Environment  Recent Flowsheet Documentation  Taken 12/9/2023 0400 by Rose Macdonald RN  Safety Promotion/Fall Prevention:   safety round/check completed   room organization consistent   fall prevention program maintained   clutter free environment maintained   assistive device/personal items within reach  Taken 12/9/2023 0200 by Rose Macdonald RN  Safety Promotion/Fall Prevention:   room organization consistent   safety round/check completed   fall prevention program maintained   clutter free environment maintained   assistive device/personal items within reach  Taken 12/9/2023 0000 by Rose Macdonald RN  Safety Promotion/Fall Prevention:   safety round/check completed   room organization consistent   fall prevention program maintained   clutter free environment maintained   assistive device/personal items within reach  Taken 12/8/2023 2200 by Rose Macdonald RN  Safety Promotion/Fall Prevention:   safety round/check completed   room organization consistent   fall prevention program maintained   clutter free environment maintained   assistive device/personal items within reach  Taken 12/8/2023 2000 by Rose Macdonald RN  Safety Promotion/Fall Prevention:   safety round/check  completed   room organization consistent   fall prevention program maintained   clutter free environment maintained   assistive device/personal items within reach   Goal Outcome Evaluation:

## 2023-12-09 NOTE — CASE MANAGEMENT/SOCIAL WORK
Continued Stay Note  ELSY Woo     Patient Name: Marge Mcghee  MRN: [id number]  Today's Date: 12/9/2023    Admit Date: 12/5/2023    Plan: Margarita Khalil precert approved 12/9- 12/12.  PASRR approved.   Discharge Plan       Row Name 12/09/23 0853       Plan    Plan Margarita Khalil preclloyd approved 12/9- 12/12.  PASRR approved.    Plan Comments Verified with liasion that bed is avail when pt has been cleared by neuro.                      Expected Discharge Date and Time       Expected Discharge Date Expected Discharge Time    Dec 8, 2023               Lorenza Samayoa RN

## 2023-12-09 NOTE — DISCHARGE SUMMARY
Discharge Summary    Date of Service:   Patient Name: Marge Mcghee  : 1955  MRN: [id number]    Date of Admission: 2023  Discharge Diagnosis: Patient with headache and pain in the left eye on moving the eye and was questionable optic neuritis MRI of the brain came back essentially normal  Her ESR and CRP were negative  She had no history of migraine  She improved with Fioricet and steroids  She was cleared for discharge per neurology  There was no stroke documented  Date of Discharge:    Primary Care Physician: Traci Townsend APRN      Presenting Problem:   TIA (transient ischemic attack) [G45.9]  Cerebrovascular accident (CVA), unspecified mechanism [I63.9]    Active and Resolved Hospital Problems:  Active Hospital Problems    Diagnosis POA    **Change in vision [H53.9] Yes    Other headache syndrome [G44.89] Yes      Resolved Hospital Problems   No resolved problems to display.         Hospital Course     Hospital Course:  Marge Mcghee is a 68 y.o. female presented with headaches and numbness in the left face and visual disturbances  Patient workup is negative for stroke  Patient was treated with steroids and Fioricet for migraine  She improved clinically  MRI was negative  She will be discharged to skilled nursing facility        DISCHARGE Follow Up Recommendations for labs and diagnostics: snf      Reasons For Change In Medications and Indications for New Medications:      Day of Discharge     Vital Signs:  Temp:  [97.8 °F (36.6 °C)-98 °F (36.7 °C)] 98 °F (36.7 °C)  Heart Rate:  [76-93] 76  Resp:  [14-18] 15  BP: (136-153)/(75-88) 139/75    Physical Exam:  Physical Exam   [place]ake alert oriented x 3  HEENT exam is normal  Neck supple  Chest consultation  Heart S1-S2 no murmur  Abdomen soft nontender bowel sounds positive  Neuroexam nonfocal      Pertinent  and/or Most Recent Results     LAB RESULTS:      Lab 23  1137 23  2354 23  1137   WBC  --   --  7.20    HEMOGLOBIN  --   --  13.0   HEMATOCRIT  --   --  39.5   PLATELETS  --   --  318   NEUTROS ABS  --   --  3.60   LYMPHS ABS  --   --  2.80   MONOS ABS  --   --  0.50   EOS ABS  --   --  0.20   MCV  --   --  88.0   SED RATE  --  24  --    CRP <0.30  --   --    PROTIME  --   --  10.7         Lab 12/05/23  2354 12/05/23  1137   SODIUM  --  140   POTASSIUM  --  4.3   CHLORIDE  --  105   CO2  --  25.0   ANION GAP  --  10.0   BUN  --  22   CREATININE  --  0.94   EGFR  --  65.4   GLUCOSE  --  120*   CALCIUM  --  9.3   HEMOGLOBIN A1C 7.40*  --    TSH  --  1.200         Lab 12/05/23  1137   TOTAL PROTEIN 7.4   ALBUMIN 4.1   GLOBULIN 3.3   ALT (SGPT) 9   AST (SGOT) 17   BILIRUBIN 0.3   ALK PHOS 57         Lab 12/05/23  1137   PROTIME 10.7   INR 0.98         Lab 12/05/23  2354   CHOLESTEROL 162   LDL CHOL 83   HDL CHOL 48   TRIGLYCERIDES 181*         Lab 12/05/23  1137 12/05/23  0000   VITAMIN B 12 410  --    ABO TYPING  --  A   RH TYPING  --  Positive   ANTIBODY SCREEN  --  Negative         Brief Urine Lab Results  (Last result in the past 365 days)        Color   Clarity   Blood   Leuk Est   Nitrite   Protein   CREAT   Urine HCG        06/12/23 1037 Yellow     50 Tommy/uL   500 Aida/ul   Positive                   Microbiology Results (last 10 days)       ** No results found for the last 240 hours. **            CT Angiogram Head w AI Analysis of LVO    Result Date: 12/5/2023  Impression: Impression: CT angiogram demonstrates some irregular long segment moderate narrowing of the left M1 segment distally as well as some moderate to severe narrowing of adjacent left anterior temporal M2 branch. These findings appear somewhat more conspicuous than on comparison, possibly reflecting technical differences in scanning, versus progressive atherosclerotic change. There is otherwise no evidence of additional high-grade stenosis, occlusion or aneurysm in the head and neck. Electronically Signed: Jassi Mosley MD  12/5/2023 1:10 PM EST   Workstation ID: [id number]    CT Angiogram Neck    Result Date: 12/5/2023  Impression: Impression: CT angiogram demonstrates some irregular long segment moderate narrowing of the left M1 segment distally as well as some moderate to severe narrowing of adjacent left anterior temporal M2 branch. These findings appear somewhat more conspicuous than on comparison, possibly reflecting technical differences in scanning, versus progressive atherosclerotic change. There is otherwise no evidence of additional high-grade stenosis, occlusion or aneurysm in the head and neck. Electronically Signed: Jassi Mosley MD  12/5/2023 1:10 PM EST  Workstation ID: [id number]    CT Head Without Contrast Stroke Protocol    Result Date: 12/5/2023  Impression: No acute intracranial abnormality. Electronically Signed: Antelmo Ramirez MD  12/5/2023 1:09 PM EST  Workstation ID: [id number]    XR Chest 1 View    Result Date: 12/5/2023  Impression: Impression: No acute process. Electronically Signed: Oneida Munoz MD  12/5/2023 12:29 PM EST  Workstation ID: [id number]     Results for orders placed during the hospital encounter of 09/25/22    Bilateral Carotid Duplex    Interpretation Summary  · Proximal right internal carotid artery is normal.  · Proximal left internal carotid artery is normal.      Results for orders placed during the hospital encounter of 09/25/22    Bilateral Carotid Duplex    Interpretation Summary  · Proximal right internal carotid artery is normal.  · Proximal left internal carotid artery is normal.      Results for orders placed during the hospital encounter of 08/01/23    Adult Transthoracic Echo Complete W/ Cont if Necessary Per Protocol    Interpretation Summary    Left ventricular systolic function is normal. Left ventricular ejection fraction appears to be 61 - 65%.    Left ventricular wall thickness is consistent with mild concentric hypertrophy.    Left ventricular diastolic function was normal.    The left atrial cavity  is mildly dilated.    Estimated right ventricular systolic pressure from tricuspid regurgitation is mildly elevated (35-45 mmHg). Calculated right ventricular systolic pressure from tricuspid regurgitation is 35 mmHg.      Labs Pending at Discharge:      Procedures Performed           Consults:   Consults       Date and Time Order Name Status Description    12/5/2023  2:37 PM Inpatient Neurology Consult Stroke      12/5/2023  1:55 PM Inpatient Neurology Consult Stroke      12/5/2023  1:42 PM Inpatient Neurology Consult Stroke Completed     12/5/2023  1:25 PM Hospitalist (on-call MD unless specified)                Discharge Details        Discharge Medications        Continue These Medications        Instructions Start Date   amLODIPine 5 MG tablet  Commonly known as: NORVASC   5 mg, Oral, Daily      aspirin 81 MG EC tablet   81 mg, Oral, Daily      atorvastatin 80 MG tablet  Commonly known as: LIPITOR   80 mg, Oral, Nightly      clopidogrel 75 MG tablet  Commonly known as: PLAVIX   75 mg, Oral, Every Evening      DULoxetine 60 MG capsule  Commonly known as: CYMBALTA   60 mg, Oral, Daily      famotidine 20 MG tablet  Commonly known as: PEPCID   20 mg, Oral, 2 Times Daily PRN      Mounjaro 5 MG/0.5ML solution pen-injector  Generic drug: Tirzepatide   5 mg, Subcutaneous, Weekly, Wednesday.      multivitamin with minerals tablet tablet   1 tablet, Oral, Daily      ticagrelor 90 MG tablet tablet  Commonly known as: BRILINTA   90 mg, Oral, 2 Times Daily      traZODone 50 MG tablet  Commonly known as: DESYREL   50 mg, Oral, Nightly               Allergies   Allergen Reactions    Zofran [Ondansetron Hcl] Nausea And Vomiting     Does the opposite of its purpose    Prochlorperazine Other (See Comments)     CAUSED SEIZURE    Prochlorperazine Edisylate Hives    Prochlorperazine Maleate Irritability    Hydralazine Itching and Rash    Hydralazine Hcl Itching and Rash    Meperidine Itching and Swelling    Prochlorperazine Maleate  Other (See Comments)     CAUSES SEIZURE         Discharge Disposition: snf  Skilled Nursing Facility (DC - External)    Diet:  Hospital:  Diet Order   Procedures    Diet: Cardiac Diets, Diabetic Diets; Healthy Heart (2-3 Na+); Consistent Carbohydrate; Texture: Regular Texture (IDDSI 7); Fluid Consistency: Thin (IDDSI 0)         Discharge Activity:         CODE STATUS:  Code Status and Medical Interventions:   Ordered at: 12/05/23 1437     Code Status (Patient has no pulse and is not breathing):    CPR (Attempt to Resuscitate)     Medical Interventions (Patient has pulse or is breathing):    Full Support         No future appointments.        Time spent on Discharge including face to face service:  [id number]    Signature: Electronically signed by Antwan Lam MD, 12/09/23, 11:44 EST.  [facility] Hospitalist Team

## 2023-12-10 NOTE — CASE MANAGEMENT/SOCIAL WORK
Case Management Discharge Note      Final Note: [facility]    Provided Post Acute Provider List?: Yes  Post Acute Provider List: Inpatient Rehab  Provided Post Acute Provider Quality & Resource List?: Yes  Post Acute Provider Quality and Resource List: Inpatient Rehab  Delivered To: Patient, Support Person  Support Person: [email or web address]  Method of Delivery: In person    Selected Continued Care - Discharged on 12/9/2023 Admission date: 12/5/2023 - Discharge disposition: Skilled Nursing Facility (DC - External)      Destination Coordination complete.      Service Provider Selected Services Address Phone Fax Patient Preferred    [facility] [address] [place] [address] [phone] --               Transportation Services  Private: Car    Final Discharge Disposition Code: 03 - skilled nursing facility (SNF)

## 2023-12-15 ENCOUNTER — DOCUMENTATION (OUTPATIENT)
Dept: HOME HEALTH SERVICES | Facility: HOME HEALTHCARE | Age: 68
End: 2023-12-15
Payer: MEDICARE

## 2023-12-15 NOTE — PROGRESS NOTES
Facility Discharge -   Marge Mcghee - [birth year] -   DC 12/18 [facility] -   RN/PT/HHA    Traci Townsend NP is the PCP/POC/Attending and agrees to follow for home health, per [place] at 08:20 on 12/15/23.   Dr. Mikhail Archer is the ordering provider: RN/PT/HHA  Dr. Mikhail Archer performed the F2F on 12/11/23  DX: TIA, hemiplegia and hemiparesis affecting left non-dominant side, DM2 with diabetic polyneuropathy, RLS, hyperlipidemia, HTN, GERD, osteoarthritis.     Address:  Patient living at daughter's address:  [address] [place] [address]    Contact:  Daughter (Kimberly) - [phone]      I met with the patient and daughter at the bedside and they are agreeable to home health and do not have any other skilled services in the home at this time.

## 2023-12-18 ENCOUNTER — HOME HEALTH ADMISSION (OUTPATIENT)
Dept: HOME HEALTH SERVICES | Facility: HOME HEALTHCARE | Age: 68
End: 2023-12-18
Payer: MEDICARE

## 2023-12-18 ENCOUNTER — TRANSCRIBE ORDERS (OUTPATIENT)
Dept: HOME HEALTH SERVICES | Facility: HOME HEALTHCARE | Age: 68
End: 2023-12-18
Payer: MEDICARE

## 2023-12-18 DIAGNOSIS — G45.9 TIA (TRANSIENT ISCHEMIC ATTACK): Primary | ICD-10-CM

## 2023-12-21 ENCOUNTER — HOME CARE VISIT (OUTPATIENT)
Dept: HOME HEALTH SERVICES | Facility: HOME HEALTHCARE | Age: 68
End: 2023-12-21
Payer: MEDICARE

## 2023-12-21 VITALS
DIASTOLIC BLOOD PRESSURE: 80 MMHG | OXYGEN SATURATION: 98 % | HEART RATE: 92 BPM | RESPIRATION RATE: 16 BRPM | TEMPERATURE: 97.8 F | SYSTOLIC BLOOD PRESSURE: 138 MMHG

## 2023-12-21 PROCEDURE — G0299 HHS/HOSPICE OF RN EA 15 MIN: HCPCS

## 2023-12-21 NOTE — HOME HEALTH
"SOC Note:  67y/o female admitted by SN after admisstion to [facility] for left face numbness, left eye pain, and headache.  Pt was diagnosed with TIA.  Pt usually lives in [place], [place] with her spouse, but is staying with her daughter in [place] temporarily.      Home Health ordered for: disciplines SN 1w6, PT for initial eval and treatment plan    Reason for Hosp/Primary Dx/Co-morbidities:  Left face numbness, left eye pain, and headache,  Dx was TIA.  Co-morbidities include:  Type II DM, Osteoporosis, Hyperlipidemia, Hepatic steatosis, Adrenal Nodule, Vitamin D deficiency, Diabetic retinopathy, GERD, s/p CABG x 3, HTN    Focus of Care: TIA    Patient's goal(s):  \"Resume daily functions\"    Current Functional status/mobility/DME: Pt is ambulatory without aide, but has a rollator walker if needed    HB status/Living Arrangements: Pt is temporarily staying with her adult daughter in a one story, single family home.  HB:  Fall risk, Illness, Impaired gait    Skin Integrity/wound status: Skin intact    Code Status: Full Code    Fall Risk/Safety concerns: Fall Risk    Educated on Emergency Plan, steps to take prior to going to the ER and when to Call Home Health First:  Yes     Medication issues/Concerns:No    Additional Problems/Concerns: No    SDOH Barriers (i.e. caregiver concerns, social isolation, transportation, food insecurity, environment, income etc.)/Need for MSW: No    Plan for next visit: CP assess, Neuro assess, Med ed and management, Skin assess"

## 2023-12-22 ENCOUNTER — HOME CARE VISIT (OUTPATIENT)
Dept: HOME HEALTH SERVICES | Facility: HOME HEALTHCARE | Age: 68
End: 2023-12-22
Payer: MEDICARE

## 2023-12-22 PROCEDURE — G0151 HHCP-SERV OF PT,EA 15 MIN: HCPCS

## 2023-12-24 VITALS
TEMPERATURE: 97.5 F | DIASTOLIC BLOOD PRESSURE: 68 MMHG | OXYGEN SATURATION: 99 % | RESPIRATION RATE: 18 BRPM | HEART RATE: 104 BPM | SYSTOLIC BLOOD PRESSURE: 102 MMHG

## 2023-12-24 NOTE — HOME HEALTH
"Eval Note A fall incident    Patient's goal(s): \"To be able to walk without staggering\"    Services required to achieve goals: PT and SN    Potential Issues for goal attainment: N/A    Problems identified: Generalized weakness post recent hospitalization    Describe the Functional status and safety: Patient has recent diagnosis of TIA and presents with ataxic gait pattern with frequent deviations from straight course and loss of balance turning/negotiating bends    Describe any environmental issues: Patient is temporarily recuperating at daughter and son in law's     Any equipment needs: No    POC confirmed with patient and daughter"

## 2023-12-27 ENCOUNTER — HOME CARE VISIT (OUTPATIENT)
Dept: HOME HEALTH SERVICES | Facility: HOME HEALTHCARE | Age: 68
End: 2023-12-27
Payer: MEDICARE

## 2023-12-27 VITALS
HEART RATE: 76 BPM | RESPIRATION RATE: 17 BRPM | TEMPERATURE: 98.6 F | OXYGEN SATURATION: 95 % | DIASTOLIC BLOOD PRESSURE: 58 MMHG | SYSTOLIC BLOOD PRESSURE: 102 MMHG

## 2023-12-27 PROCEDURE — G0299 HHS/HOSPICE OF RN EA 15 MIN: HCPCS

## 2023-12-27 PROCEDURE — G0157 HHC PT ASSISTANT EA 15: HCPCS

## 2023-12-27 NOTE — Clinical Note
Patient was experiencing orthostatic hypotension today 156/76 sitting and 102/58 standing and she did get slightly lightheaded when standing. please be aware when performing therapy. Physician was notified. Patient has been taking Norvasc at 10 pm and asked if this should be transitioned to am.

## 2023-12-27 NOTE — HOME HEALTH
Routine Visit Note: Physician was notified of orthostatic hypotension and requested clarification as to time of day patient shoud be taking her Norvasc. Patient is currently taking this at 10pm. Patient reports very little residual effects from TIA with minimal left sided weakness.    Skill/education provided: Cardiopulmonary assessment, falls assessment, pain assessment, medication review and teaching    Patient/caregiver response: Patient tolerated assessment without complaint    Plan for next visit: Cardiopulmonary assessment, falls assessment, pain assessment, medication review and teaching    Other pertinent info: Reviewed medications with patient and spouse and they reports they have not had much instruction

## 2023-12-28 VITALS
HEART RATE: 84 BPM | OXYGEN SATURATION: 92 % | TEMPERATURE: 97.5 F | SYSTOLIC BLOOD PRESSURE: 130 MMHG | DIASTOLIC BLOOD PRESSURE: 82 MMHG

## 2024-01-03 ENCOUNTER — HOME CARE VISIT (OUTPATIENT)
Dept: HOME HEALTH SERVICES | Facility: HOME HEALTHCARE | Age: 69
End: 2024-01-03
Payer: MEDICARE

## 2024-01-03 VITALS
TEMPERATURE: 97.4 F | DIASTOLIC BLOOD PRESSURE: 60 MMHG | RESPIRATION RATE: 16 BRPM | SYSTOLIC BLOOD PRESSURE: 108 MMHG | HEART RATE: 98 BPM | OXYGEN SATURATION: 98 %

## 2024-01-03 PROCEDURE — G0162 HHC RN E&M PLAN SVS, 15 MIN: HCPCS

## 2024-06-20 ENCOUNTER — TELEPHONE (OUTPATIENT)
Dept: CARDIOLOGY | Facility: CLINIC | Age: 69
End: 2024-06-20
Payer: COMMERCIAL

## 2024-06-20 NOTE — TELEPHONE ENCOUNTER
Patient stated SOB, tired, and just does not feel right, Schedule her appointment 7/30/2024 and explain to go to ER if needed before then, as well as telling her I will see if she needs seen sooner. Please advise

## 2024-06-21 ENCOUNTER — TRANSCRIBE ORDERS (OUTPATIENT)
Dept: ADMINISTRATIVE | Facility: HOSPITAL | Age: 69
End: 2024-06-21
Payer: COMMERCIAL

## 2024-06-21 DIAGNOSIS — R10.31 RLQ ABDOMINAL PAIN: Primary | ICD-10-CM

## 2024-06-27 ENCOUNTER — APPOINTMENT (OUTPATIENT)
Dept: CT IMAGING | Facility: HOSPITAL | Age: 69
End: 2024-06-27
Payer: COMMERCIAL

## 2024-06-27 ENCOUNTER — HOSPITAL ENCOUNTER (INPATIENT)
Facility: HOSPITAL | Age: 69
LOS: 5 days | Discharge: HOME | End: 2024-07-02
Attending: EMERGENCY MEDICINE | Admitting: EMERGENCY MEDICINE
Payer: COMMERCIAL

## 2024-06-27 ENCOUNTER — APPOINTMENT (OUTPATIENT)
Dept: MRI IMAGING | Facility: HOSPITAL | Age: 69
End: 2024-06-27
Payer: COMMERCIAL

## 2024-06-27 DIAGNOSIS — R51.9 ACUTE INTRACTABLE HEADACHE, UNSPECIFIED HEADACHE TYPE: Primary | ICD-10-CM

## 2024-06-27 DIAGNOSIS — E11.3513 PROLIFERATIVE DIABETIC RETINOPATHY OF BOTH EYES WITH MACULAR EDEMA ASSOCIATED WITH TYPE 2 DIABETES MELLITUS: ICD-10-CM

## 2024-06-27 DIAGNOSIS — G43.119 INTRACTABLE MIGRAINE WITH AURA WITHOUT STATUS MIGRAINOSUS: ICD-10-CM

## 2024-06-27 PROBLEM — G43.919 MIGRAINE, INTRACTABLE: Status: ACTIVE | Noted: 2024-06-27

## 2024-06-27 LAB
ANION GAP SERPL CALCULATED.3IONS-SCNC: 12 MMOL/L (ref 5–15)
APTT PPP: 26.5 SECONDS (ref 61–76.5)
B PARAPERT DNA SPEC QL NAA+PROBE: NOT DETECTED
B PERT DNA SPEC QL NAA+PROBE: NOT DETECTED
BASOPHILS # BLD AUTO: 0.08 10*3/MM3 (ref 0–0.2)
BASOPHILS NFR BLD AUTO: 0.8 % (ref 0–1.5)
BUN SERPL-MCNC: 13 MG/DL (ref 8–23)
BUN/CREAT SERPL: 12.3 (ref 7–25)
C PNEUM DNA NPH QL NAA+NON-PROBE: NOT DETECTED
CALCIUM SPEC-SCNC: 9.7 MG/DL (ref 8.6–10.5)
CHLORIDE SERPL-SCNC: 100 MMOL/L (ref 98–107)
CHOLEST SERPL-MCNC: 212 MG/DL (ref 0–200)
CO2 SERPL-SCNC: 28 MMOL/L (ref 22–29)
CREAT SERPL-MCNC: 1.06 MG/DL (ref 0.57–1)
CRP SERPL-MCNC: <0.3 MG/DL (ref 0–0.5)
DEPRECATED RDW RBC AUTO: 39.2 FL (ref 37–54)
EGFRCR SERPLBLD CKD-EPI 2021: 56.3 ML/MIN/1.73
EOSINOPHIL # BLD AUTO: 0.14 10*3/MM3 (ref 0–0.4)
EOSINOPHIL NFR BLD AUTO: 1.4 % (ref 0.3–6.2)
ERYTHROCYTE [DISTWIDTH] IN BLOOD BY AUTOMATED COUNT: 12.5 % (ref 12.3–15.4)
ERYTHROCYTE [SEDIMENTATION RATE] IN BLOOD: 12 MM/HR (ref 0–30)
FLUAV SUBTYP SPEC NAA+PROBE: NOT DETECTED
FLUBV RNA ISLT QL NAA+PROBE: NOT DETECTED
GLUCOSE BLDC GLUCOMTR-MCNC: 103 MG/DL (ref 70–105)
GLUCOSE BLDC GLUCOMTR-MCNC: 119 MG/DL (ref 70–105)
GLUCOSE BLDC GLUCOMTR-MCNC: 173 MG/DL (ref 70–105)
GLUCOSE BLDC GLUCOMTR-MCNC: 97 MG/DL (ref 70–105)
GLUCOSE SERPL-MCNC: 148 MG/DL (ref 65–99)
HADV DNA SPEC NAA+PROBE: NOT DETECTED
HBA1C MFR BLD: 7.74 % (ref 4.8–5.6)
HCOV 229E RNA SPEC QL NAA+PROBE: NOT DETECTED
HCOV HKU1 RNA SPEC QL NAA+PROBE: NOT DETECTED
HCOV NL63 RNA SPEC QL NAA+PROBE: NOT DETECTED
HCOV OC43 RNA SPEC QL NAA+PROBE: NOT DETECTED
HCT VFR BLD AUTO: 43.3 % (ref 34–46.6)
HDLC SERPL-MCNC: 59 MG/DL (ref 40–60)
HGB BLD-MCNC: 14.2 G/DL (ref 12–15.9)
HMPV RNA NPH QL NAA+NON-PROBE: NOT DETECTED
HPIV1 RNA ISLT QL NAA+PROBE: NOT DETECTED
HPIV2 RNA SPEC QL NAA+PROBE: NOT DETECTED
HPIV3 RNA NPH QL NAA+PROBE: NOT DETECTED
HPIV4 P GENE NPH QL NAA+PROBE: NOT DETECTED
IMM GRANULOCYTES # BLD AUTO: 0.03 10*3/MM3 (ref 0–0.05)
IMM GRANULOCYTES NFR BLD AUTO: 0.3 % (ref 0–0.5)
INR PPP: 1.01 (ref 0.93–1.1)
LDLC SERPL CALC-MCNC: 129 MG/DL (ref 0–100)
LDLC/HDLC SERPL: 2.14 {RATIO}
LYMPHOCYTES # BLD AUTO: 2.48 10*3/MM3 (ref 0.7–3.1)
LYMPHOCYTES NFR BLD AUTO: 24.1 % (ref 19.6–45.3)
M PNEUMO IGG SER IA-ACNC: NOT DETECTED
MCH RBC QN AUTO: 28.4 PG (ref 26.6–33)
MCHC RBC AUTO-ENTMCNC: 32.8 G/DL (ref 31.5–35.7)
MCV RBC AUTO: 86.6 FL (ref 79–97)
MONOCYTES # BLD AUTO: 0.73 10*3/MM3 (ref 0.1–0.9)
MONOCYTES NFR BLD AUTO: 7.1 % (ref 5–12)
NEUTROPHILS NFR BLD AUTO: 6.82 10*3/MM3 (ref 1.7–7)
NEUTROPHILS NFR BLD AUTO: 66.3 % (ref 42.7–76)
NRBC BLD AUTO-RTO: 0 /100 WBC (ref 0–0.2)
PLATELET # BLD AUTO: 254 10*3/MM3 (ref 140–450)
PMV BLD AUTO: 8.9 FL (ref 6–12)
POTASSIUM SERPL-SCNC: 3.5 MMOL/L (ref 3.5–5.2)
PROCALCITONIN SERPL-MCNC: 0.04 NG/ML (ref 0–0.25)
PROTHROMBIN TIME: 11 SECONDS (ref 9.6–11.7)
QT INTERVAL: 412 MS
QTC INTERVAL: 479 MS
RBC # BLD AUTO: 5 10*6/MM3 (ref 3.77–5.28)
RHINOVIRUS RNA SPEC NAA+PROBE: NOT DETECTED
RSV RNA NPH QL NAA+NON-PROBE: NOT DETECTED
SARS-COV-2 RNA NPH QL NAA+NON-PROBE: NOT DETECTED
SODIUM SERPL-SCNC: 140 MMOL/L (ref 136–145)
TRIGL SERPL-MCNC: 135 MG/DL (ref 0–150)
TSH SERPL DL<=0.05 MIU/L-ACNC: 0.69 UIU/ML (ref 0.27–4.2)
VLDLC SERPL-MCNC: 24 MG/DL (ref 5–40)
WBC NRBC COR # BLD AUTO: 10.28 10*3/MM3 (ref 3.4–10.8)

## 2024-06-27 PROCEDURE — 70551 MRI BRAIN STEM W/O DYE: CPT

## 2024-06-27 PROCEDURE — 82948 REAGENT STRIP/BLOOD GLUCOSE: CPT

## 2024-06-27 PROCEDURE — 70450 CT HEAD/BRAIN W/O DYE: CPT

## 2024-06-27 PROCEDURE — 63710000001 INSULIN LISPRO (HUMAN) PER 5 UNITS: Performed by: NURSE PRACTITIONER

## 2024-06-27 PROCEDURE — 80048 BASIC METABOLIC PNL TOTAL CA: CPT | Performed by: EMERGENCY MEDICINE

## 2024-06-27 PROCEDURE — 25010000002 DIPHENHYDRAMINE PER 50 MG: Performed by: EMERGENCY MEDICINE

## 2024-06-27 PROCEDURE — 25010000002 MORPHINE PER 10 MG: Performed by: EMERGENCY MEDICINE

## 2024-06-27 PROCEDURE — 83036 HEMOGLOBIN GLYCOSYLATED A1C: CPT | Performed by: NURSE PRACTITIONER

## 2024-06-27 PROCEDURE — 70496 CT ANGIOGRAPHY HEAD: CPT

## 2024-06-27 PROCEDURE — 0202U NFCT DS 22 TRGT SARS-COV-2: CPT | Performed by: EMERGENCY MEDICINE

## 2024-06-27 PROCEDURE — 86140 C-REACTIVE PROTEIN: CPT | Performed by: EMERGENCY MEDICINE

## 2024-06-27 PROCEDURE — 70498 CT ANGIOGRAPHY NECK: CPT

## 2024-06-27 PROCEDURE — 99223 1ST HOSP IP/OBS HIGH 75: CPT | Performed by: NURSE PRACTITIONER

## 2024-06-27 PROCEDURE — 99285 EMERGENCY DEPT VISIT HI MDM: CPT

## 2024-06-27 PROCEDURE — 85730 THROMBOPLASTIN TIME PARTIAL: CPT | Performed by: EMERGENCY MEDICINE

## 2024-06-27 PROCEDURE — 80061 LIPID PANEL: CPT | Performed by: NURSE PRACTITIONER

## 2024-06-27 PROCEDURE — 0042T HC CT CEREBRAL PERFUSION W/WO CONTRAST: CPT

## 2024-06-27 PROCEDURE — 85025 COMPLETE CBC W/AUTO DIFF WBC: CPT | Performed by: EMERGENCY MEDICINE

## 2024-06-27 PROCEDURE — 84145 PROCALCITONIN (PCT): CPT | Performed by: EMERGENCY MEDICINE

## 2024-06-27 PROCEDURE — 85652 RBC SED RATE AUTOMATED: CPT | Performed by: EMERGENCY MEDICINE

## 2024-06-27 PROCEDURE — 25810000003 SODIUM CHLORIDE 0.9 % SOLUTION: Performed by: NURSE PRACTITIONER

## 2024-06-27 PROCEDURE — 92610 EVALUATE SWALLOWING FUNCTION: CPT

## 2024-06-27 PROCEDURE — 25510000001 IOPAMIDOL PER 1 ML: Performed by: INTERNAL MEDICINE

## 2024-06-27 PROCEDURE — 85610 PROTHROMBIN TIME: CPT | Performed by: EMERGENCY MEDICINE

## 2024-06-27 PROCEDURE — 82948 REAGENT STRIP/BLOOD GLUCOSE: CPT | Performed by: NURSE PRACTITIONER

## 2024-06-27 PROCEDURE — 25010000002 KETOROLAC TROMETHAMINE PER 15 MG: Performed by: EMERGENCY MEDICINE

## 2024-06-27 PROCEDURE — 93010 ELECTROCARDIOGRAM REPORT: CPT | Performed by: INTERNAL MEDICINE

## 2024-06-27 PROCEDURE — 25010000002 METOCLOPRAMIDE PER 10 MG: Performed by: EMERGENCY MEDICINE

## 2024-06-27 PROCEDURE — 84443 ASSAY THYROID STIM HORMONE: CPT | Performed by: NURSE PRACTITIONER

## 2024-06-27 PROCEDURE — 93005 ELECTROCARDIOGRAM TRACING: CPT | Performed by: STUDENT IN AN ORGANIZED HEALTH CARE EDUCATION/TRAINING PROGRAM

## 2024-06-27 RX ORDER — ASPIRIN 81 MG/1
81 TABLET ORAL DAILY
Status: DISCONTINUED | OUTPATIENT
Start: 2024-06-27 | End: 2024-07-02 | Stop reason: HOSPADM

## 2024-06-27 RX ORDER — BISACODYL 10 MG
10 SUPPOSITORY, RECTAL RECTAL DAILY PRN
Status: DISCONTINUED | OUTPATIENT
Start: 2024-06-27 | End: 2024-07-02 | Stop reason: HOSPADM

## 2024-06-27 RX ORDER — GABAPENTIN 600 MG/1
600 TABLET ORAL 3 TIMES DAILY
COMMUNITY

## 2024-06-27 RX ORDER — IBUPROFEN 600 MG/1
1 TABLET ORAL
Status: DISCONTINUED | OUTPATIENT
Start: 2024-06-27 | End: 2024-07-02 | Stop reason: HOSPADM

## 2024-06-27 RX ORDER — SODIUM CHLORIDE 0.9 % (FLUSH) 0.9 %
10 SYRINGE (ML) INJECTION EVERY 12 HOURS SCHEDULED
Status: DISCONTINUED | OUTPATIENT
Start: 2024-06-27 | End: 2024-07-02 | Stop reason: HOSPADM

## 2024-06-27 RX ORDER — BISACODYL 5 MG/1
5 TABLET, DELAYED RELEASE ORAL DAILY PRN
Status: DISCONTINUED | OUTPATIENT
Start: 2024-06-27 | End: 2024-07-02 | Stop reason: HOSPADM

## 2024-06-27 RX ORDER — SODIUM CHLORIDE 9 MG/ML
100 INJECTION, SOLUTION INTRAVENOUS CONTINUOUS
Status: DISCONTINUED | OUTPATIENT
Start: 2024-06-27 | End: 2024-06-27

## 2024-06-27 RX ORDER — PROPARACAINE HYDROCHLORIDE 5 MG/ML
2 SOLUTION/ DROPS OPHTHALMIC ONCE
Status: COMPLETED | OUTPATIENT
Start: 2024-06-27 | End: 2024-06-27

## 2024-06-27 RX ORDER — DENOSUMAB 60 MG/ML
60 INJECTION SUBCUTANEOUS ONCE
COMMUNITY

## 2024-06-27 RX ORDER — METOCLOPRAMIDE HYDROCHLORIDE 5 MG/ML
10 INJECTION INTRAMUSCULAR; INTRAVENOUS ONCE
Status: COMPLETED | OUTPATIENT
Start: 2024-06-27 | End: 2024-06-27

## 2024-06-27 RX ORDER — SODIUM CHLORIDE 9 MG/ML
40 INJECTION, SOLUTION INTRAVENOUS AS NEEDED
Status: DISCONTINUED | OUTPATIENT
Start: 2024-06-27 | End: 2024-07-02 | Stop reason: HOSPADM

## 2024-06-27 RX ORDER — NICOTINE POLACRILEX 4 MG
15 LOZENGE BUCCAL
Status: DISCONTINUED | OUTPATIENT
Start: 2024-06-27 | End: 2024-07-02 | Stop reason: HOSPADM

## 2024-06-27 RX ORDER — SODIUM CHLORIDE 0.9 % (FLUSH) 0.9 %
10 SYRINGE (ML) INJECTION AS NEEDED
Status: DISCONTINUED | OUTPATIENT
Start: 2024-06-27 | End: 2024-07-02 | Stop reason: HOSPADM

## 2024-06-27 RX ORDER — AMOXICILLIN 250 MG
2 CAPSULE ORAL 2 TIMES DAILY PRN
Status: DISCONTINUED | OUTPATIENT
Start: 2024-06-27 | End: 2024-07-02 | Stop reason: HOSPADM

## 2024-06-27 RX ORDER — BUTALBITAL, ACETAMINOPHEN AND CAFFEINE 50; 325; 40 MG/1; MG/1; MG/1
1 TABLET ORAL ONCE
Status: COMPLETED | OUTPATIENT
Start: 2024-06-28 | End: 2024-06-27

## 2024-06-27 RX ORDER — POLYETHYLENE GLYCOL 3350 17 G/17G
17 POWDER, FOR SOLUTION ORAL DAILY PRN
Status: DISCONTINUED | OUTPATIENT
Start: 2024-06-27 | End: 2024-07-02 | Stop reason: HOSPADM

## 2024-06-27 RX ORDER — ACETAMINOPHEN 325 MG/1
650 TABLET ORAL EVERY 6 HOURS PRN
Status: DISCONTINUED | OUTPATIENT
Start: 2024-06-27 | End: 2024-07-02 | Stop reason: HOSPADM

## 2024-06-27 RX ORDER — DIPHENHYDRAMINE HYDROCHLORIDE 50 MG/ML
25 INJECTION INTRAMUSCULAR; INTRAVENOUS ONCE
Status: COMPLETED | OUTPATIENT
Start: 2024-06-27 | End: 2024-06-27

## 2024-06-27 RX ORDER — KETOROLAC TROMETHAMINE 30 MG/ML
15 INJECTION, SOLUTION INTRAMUSCULAR; INTRAVENOUS ONCE
Status: COMPLETED | OUTPATIENT
Start: 2024-06-27 | End: 2024-06-27

## 2024-06-27 RX ORDER — MORPHINE SULFATE 2 MG/ML
2 INJECTION, SOLUTION INTRAMUSCULAR; INTRAVENOUS ONCE
Status: COMPLETED | OUTPATIENT
Start: 2024-06-27 | End: 2024-06-27

## 2024-06-27 RX ORDER — ACETAMINOPHEN 325 MG/1
650 TABLET ORAL EVERY 6 HOURS PRN
Status: DISCONTINUED | OUTPATIENT
Start: 2024-06-27 | End: 2024-06-27

## 2024-06-27 RX ORDER — GABAPENTIN 300 MG/1
600 CAPSULE ORAL EVERY 12 HOURS SCHEDULED
Status: DISCONTINUED | OUTPATIENT
Start: 2024-06-27 | End: 2024-07-02 | Stop reason: HOSPADM

## 2024-06-27 RX ORDER — AMLODIPINE BESYLATE 5 MG/1
10 TABLET ORAL DAILY
Status: DISCONTINUED | OUTPATIENT
Start: 2024-06-28 | End: 2024-07-02 | Stop reason: HOSPADM

## 2024-06-27 RX ORDER — DEXTROSE MONOHYDRATE 25 G/50ML
25 INJECTION, SOLUTION INTRAVENOUS
Status: DISCONTINUED | OUTPATIENT
Start: 2024-06-27 | End: 2024-07-02 | Stop reason: HOSPADM

## 2024-06-27 RX ORDER — MAGNESIUM SULFATE 1 G/100ML
1 INJECTION INTRAVENOUS ONCE
Status: DISCONTINUED | OUTPATIENT
Start: 2024-06-27 | End: 2024-06-27

## 2024-06-27 RX ORDER — INSULIN LISPRO 100 [IU]/ML
2-7 INJECTION, SOLUTION INTRAVENOUS; SUBCUTANEOUS
Status: DISCONTINUED | OUTPATIENT
Start: 2024-06-27 | End: 2024-07-02 | Stop reason: HOSPADM

## 2024-06-27 RX ORDER — ATORVASTATIN CALCIUM 40 MG/1
80 TABLET, FILM COATED ORAL NIGHTLY
Status: DISCONTINUED | OUTPATIENT
Start: 2024-06-28 | End: 2024-07-02 | Stop reason: HOSPADM

## 2024-06-27 RX ORDER — TRAZODONE HYDROCHLORIDE 50 MG/1
50 TABLET ORAL NIGHTLY
Status: DISCONTINUED | OUTPATIENT
Start: 2024-06-27 | End: 2024-07-02 | Stop reason: HOSPADM

## 2024-06-27 RX ADMIN — TICAGRELOR 90 MG: 90 TABLET ORAL at 20:05

## 2024-06-27 RX ADMIN — METOCLOPRAMIDE 10 MG: 5 INJECTION, SOLUTION INTRAMUSCULAR; INTRAVENOUS at 04:37

## 2024-06-27 RX ADMIN — PROPARACAINE HYDROCHLORIDE 2 DROP: 5 SOLUTION/ DROPS OPHTHALMIC at 05:57

## 2024-06-27 RX ADMIN — IOPAMIDOL 150 ML: 755 INJECTION, SOLUTION INTRAVENOUS at 09:52

## 2024-06-27 RX ADMIN — GABAPENTIN 600 MG: 300 CAPSULE ORAL at 20:05

## 2024-06-27 RX ADMIN — TRAZODONE HYDROCHLORIDE 50 MG: 50 TABLET ORAL at 20:06

## 2024-06-27 RX ADMIN — INSULIN LISPRO 2 UNITS: 100 INJECTION, SOLUTION INTRAVENOUS; SUBCUTANEOUS at 17:57

## 2024-06-27 RX ADMIN — MORPHINE SULFATE 2 MG: 2 INJECTION, SOLUTION INTRAMUSCULAR; INTRAVENOUS at 06:01

## 2024-06-27 RX ADMIN — SODIUM CHLORIDE 1000 ML: 9 INJECTION, SOLUTION INTRAVENOUS at 09:14

## 2024-06-27 RX ADMIN — ASPIRIN 81 MG: 81 TABLET, COATED ORAL at 20:05

## 2024-06-27 RX ADMIN — DIPHENHYDRAMINE HYDROCHLORIDE 25 MG: 50 INJECTION, SOLUTION INTRAMUSCULAR; INTRAVENOUS at 04:38

## 2024-06-27 RX ADMIN — KETOROLAC TROMETHAMINE 15 MG: 30 INJECTION, SOLUTION INTRAMUSCULAR at 04:38

## 2024-06-27 RX ADMIN — ACETAMINOPHEN 650 MG: 325 TABLET, FILM COATED ORAL at 12:27

## 2024-06-27 RX ADMIN — BUTALBITAL, ACETAMINOPHEN, AND CAFFEINE 1 TABLET: 50; 325; 40 TABLET ORAL at 23:21

## 2024-06-27 RX ADMIN — Medication 10 ML: at 20:35

## 2024-06-27 NOTE — NURSING NOTE
Code stroke called r/t left side facial droop and left visual changes. Left sided weakness is residual from previous stroke per patient. CANDIDO Last at bedside to evaluate during CT. Dr. Painting arrived at bedside to evaluate. No TNK per Dr. Painting. Order for ANA PAULA level bed.

## 2024-06-27 NOTE — CONSULTS
Primary Care Provider: Traci Townsend APRN     Consult requested by: Dr. Barbour    Reason for Consultation: Neurological evaluation, headache    History taken from: patient chart RN    Chief complaint: Headache       SUBJECTIVE:    History of present illness: Background per H&P:  Marge Mcghee is a 69 y.o. female with a CMH of hepatic steatos, HLD, UTI, REINA, GERD, DM2, CAD with triple bypass, CVA x 3, RLS who presented to [facility] on 6/27/2024 with right frontal/parietal headache.  Patient states that it was acute onset abruptly about 8 PM last night.  Stated migraines 10/10, no vision changes, there was some nausea, vomiting.  Some pain relief with applying ice pack.  At that time patient's blood pressure was 180s over 100s patient attempted to get some rest, however migraine was unresolved.     While in emergency department patient received 1 dose of Toradol, Reglan, and Benadryl.  Labs were remarkable for PTT of 26.5.  Patient received some relief with medications, stated headache was 5/10.      9:15 AM EDT  As noted the patient's blood pressure has significant change of 86/45.  1 L of IV fluids ordered.     9:45 AM EDT while waiting for bed placement, patient developed left-sided facial droop as well as vision changes.  Code stroke protocol initiated.  Accompany patient and RN down to CT scan, neurology APRN at bedside as well.     - Portions of the above HPI were copied from previous encounters and edited as appropriate. PMH as detailed below.     Patient initially consulted for migraine.   Patient was a code stroke at approximately 920 this morning.    CT head initially completed and negative for any acute findings.  On exam patient does have left facial asymmetry, left arm and leg drift.  Patient also had left peripheral upper and lower visual field cut.  On further evaluation, patient states she started noticing the left vision deficit last night between 5 and 8 PM.  She also states she  noticed that the left arm and leg was slight weaker prior to coming into the hospital as well.  Patient states she does have history of stroke and history of left-sided weakness with that stroke.  CTA and CTP also completed.     NIH was completed multiple times and each time patient's symptoms waxed and waned.  Patient's blood pressure initially was 80s over 50s and 1 L bolus was started prior to coming to the CT scanner.  Once patient was taken back to her room in the emergency department, patient's blood pressure was again lower in the 80s.  Dr. Painting at bedside as well to evaluate patient and obtain history.  Patient states multiple times that the vision change and the left-sided weakness started more so yesterday between 5 and 8 PM.     Patient is not complaining of headache at this time.     Due to the patient's wax and waning symptoms and the onset being greater than 12 hours, patient not TNK candidate. This was discussed with patient, patient agreed the plan.     Patient was seen by neurology team and August 2023 for very similar episode of left-sided hemiparesis.  MRI brain at that time was negative.  Patient did have positive MRI for right pontine stroke in September 2022.         Review of Systems   Eyes:  Negative for visual disturbance.   Respiratory: Negative.     Cardiovascular: Negative.    Gastrointestinal: Negative.    Endocrine: Negative.    Genitourinary: Negative.    Musculoskeletal: Negative.    Skin: Negative.    Allergic/Immunologic: Negative.    Neurological:  Positive for facial asymmetry, speech difficulty, weakness and headaches. Negative for dizziness, tremors, seizures, syncope, light-headedness and numbness.   Hematological: Negative.    Psychiatric/Behavioral:  Negative for confusion.           PATIENT HISTORY:  Past Medical History:   Diagnosis Date    Adrenal nodule 11/16/2016    FINDINGS: Mild hepatic steatosis may be present. Cholecystectomy. No biliary ductal dilatation. Negative  pancreas, spleen, left adrenal gland, and kidneys. The right adrenal presumed adenoma has increased slightly, measuring 3 x 4 cm in diameter,  compared to 2 x 3.5 cm on the previous exam. The attenuation values are consistent with an adenoma. Done at [facility] in August 2018    Age-related osteoporosis without current pathological fracture 11/16/2016    Arthritis     Delayed surgical wound healing     Essential hypertension 11/16/2016    Gastroesophageal reflux disease with esophagitis 11/16/2016    Hepatic steatosis 11/16/2016    History of anemia     History of sepsis     FROM UTI    Hyperlipidemia 11/16/2016    Lisfranc's dislocation     LEFT WITH FRACTURES NONHEALING FOOT    Osteomyelitis of left foot 11/2020    RLS (restless legs syndrome)     Squamous cell skin cancer 2014    Excised by Dr. James    Thyroid nodule 10/18/2019    BENIGN    Type 2 diabetes mellitus with peripheral neuropathy 11/16/2016    Vitamin D deficiency 1/25/2019   ,   Past Surgical History:   Procedure Laterality Date    BREAST BIOPSY Left     7/2019. BENIGN    CARDIAC CATHETERIZATION Left 5/2/2021    Procedure: Cardiac Catheterization/Vascular Study;  Surgeon: Chacho Esteban MD;  Location: [facility] CATH INVASIVE LOCATION;  Service: Cardiovascular;  Laterality: Left;    CARDIAC CATHETERIZATION N/A 5/2/2021    Procedure: Intra-Aortic Baloon Pump Insertion;  Surgeon: Chacho Esteban MD;  Location: [facility] CATH INVASIVE LOCATION;  Service: Cardiovascular;  Laterality: N/A;    CATARACT EXTRACTION WITH INTRAOCULAR LENS IMPLANT Bilateral     CHOLECYSTECTOMY      COLONOSCOPY      CORONARY ARTERY BYPASS GRAFT N/A 5/2/2021    Procedure: CORONARY ARTERY BYPASS WITH INTERNAL MAMMARY ARTERY GRAFT;  Surgeon: Jr Rocael Alexander MD;  Location: [facility] CVOR;  Service: Cardiothoracic;  Laterality: N/A;    FOOT FUSION Right 11/13/2020    Procedure: RIGHT OPEN TREATMENT LISFRANC INJURY, OPEN REDUCTION INTERNAL FIXATION MEDIAL/MIDDLE CUNEIFORM FRACTURE  AND 2ND/3RD METATARSAL FRACTURE 1ST 2ND POSSIBLE 3RD TARSOMETATARSAL ARTHRODESIS INTERCUNEIFORM ARTHRODESIS CALCANEAL BONE GRAFT;  Surgeon: Mikhail Banks Jr., MD;  Location: [facility] OR [facility];  Service: Orthopedics;  Laterality: Right;    INCISION AND DRAINAGE LEG Right 1/8/2021    Procedure: RIGHT IRRIGATION AND DEBRIDEMENT OF FOOT WITH SECONDARY CLOSURE AND HARDWARE REMOVAL;  Surgeon: Mikhail Banks Jr., MD;  Location: [facility] MAIN OR;  Service: Orthopedics;  Laterality: Right;    KNEE ARTHROSCOPY Bilateral     TOTAL ABDOMINAL HYSTERECTOMY      TRANSESOPHAGEAL ECHOCARDIOGRAM (EMILIA) N/A 5/2/2021    Procedure: TRANSESOPHAGEAL ECHOCARDIOGRAM;  Surgeon: Jr Rocael Alexander MD;  Location: [facility];  Service: Cardiothoracic;  Laterality: N/A;    UPPER GASTROINTESTINAL ENDOSCOPY  08/2016    US GUIDED FINE NEEDLE ASPIRATION  5/8/2020   ,   Family History   Problem Relation Age of Onset    Diabetes Mother     COPD Mother     Hypertension Mother     Kidney disease Mother     Obesity Mother     Thyroid disease Mother     Diabetes Sister     Diabetes Sister     Diabetes Sister     Diabetes Sister     Malig Hyperthermia Neg Hx    ,   Social History     Tobacco Use    Smoking status: Never    Smokeless tobacco: Never   Vaping Use    Vaping status: Never Used   Substance Use Topics    Alcohol use: Yes     Comment: socially     Drug use: Never   ,   Prior to Admission medications    Medication Sig Start Date End Date Taking? Authorizing Provider   acetaminophen (TYLENOL) 325 MG tablet Take 650 mg by mouth Every 4 (Four) Hours As Needed for Mild Pain. Indications: Pain 1/1/23   Bi Haque MD   amLODIPine (NORVASC) 10 MG tablet Take 1 tablet by mouth Daily. Indications: High Blood Pressure Disorder 1/1/23   Bi Haque MD   aspirin 81 MG EC tablet Take 1 tablet by mouth Daily. Indications: Disease involving Lipid Deposits in the Arteries 1/1/23   Bi Haque MD   atorvastatin (LIPITOR) 80 MG tablet Take  1 tablet by mouth Every Night. 10/25/22   Gladis Lang APRN   DULoxetine (CYMBALTA) 60 MG capsule Take 1 capsule by mouth Daily. Indications: Major Depressive Disorder 1/1/23   Bi Haque MD   famotidine (PEPCID) 20 MG tablet Take 1 tablet by mouth 2 (Two) Times a Day As Needed. Indications: Gastroesophageal Reflux Disease 10/10/22   Bi Haque MD   multivitamin with minerals tablet tablet Take 1 tablet by mouth Daily. Indications: Supplement 1/1/23   Bi Haque MD   promethazine (PHENERGAN) 12.5 MG tablet Take 12.5 mg by mouth Every 6 (Six) Hours As Needed for Nausea. Indications: Nausea and Vomiting 1/1/23   Bi Haque MD   ticagrelor (BRILINTA) 90 MG tablet tablet Take 1 tablet by mouth 2 (Two) Times a Day. 8/7/23   Neelam Negrete APRN   Tirzepatide (MOUNJARO) 12.5 MG/0.5ML solution pen-injector pen Inject 0.5 mL under the skin into the appropriate area as directed 1 (One) Time Per Week. Wednesday.  Indications: Type 2 Diabetes 1/1/23   Bi Haque MD   traZODone (DESYREL) 50 MG tablet Take 1 tablet by mouth Every Night. Indications: Trouble Sleeping 10/12/22   Bi Haque MD    Allergies:  Zofran [ondansetron hcl], Prochlorperazine, Prochlorperazine edisylate, Hydralazine, Hydralazine hcl, Meperidine, Prochlorperazine maleate, and Prochlorperazine maleate    Current Facility-Administered Medications   Medication Dose Route Frequency Provider Last Rate Last Admin    sennosides-docusate (PERICOLACE) 8.6-50 MG per tablet 2 tablet  2 tablet Oral BID PRN GrToni huffmane P, APRN        And    polyethylene glycol (MIRALAX) packet 17 g  17 g Oral Daily PRN Griten, Nhi P, APRN        And    bisacodyl (DULCOLAX) EC tablet 5 mg  5 mg Oral Daily PRN GriteMilka almeidaNhi P, APRN        And    bisacodyl (DULCOLAX) suppository 10 mg  10 mg Rectal Daily PRN Griten, Nhi P, APRN        Calcium Replacement - Follow Nurse / BPA Driven Protocol    Does not apply PRN Nhi Wolf APRN        dextrose (D50W) (25 g/50 mL) IV injection 25 g  25 g Intravenous Q15 Min PRN Nhi Wolf APRN        dextrose (GLUTOSE) oral gel 15 g  15 g Oral Q15 Min PRN Nhi Wolf APRN        glucagon (GLUCAGEN) injection 1 mg  1 mg Intramuscular Q15 Min PRN Nhi Wolf APRN        insulin lispro (HUMALOG/ADMELOG) injection 2-7 Units  2-7 Units Subcutaneous 4x Daily AC & at Bedtime Nhi Wolf APRN        Magnesium Standard Dose Replacement - Follow Nurse / BPA Driven Protocol   Does not apply PRN Nhi Wolf APRN        Phosphorus Replacement - Follow Nurse / BPA Driven Protocol   Does not apply PRN Nhi Wolf APRN        Potassium Replacement - Follow Nurse / BPA Driven Protocol   Does not apply PRN Nhi Wolf APRN        sodium chloride 0.9 % flush 10 mL  10 mL Intravenous Q12H Nhi Wolf APRJOSIE        sodium chloride 0.9 % flush 10 mL  10 mL Intravenous PRN Nhi Wolf APRN        sodium chloride 0.9 % infusion 40 mL  40 mL Intravenous PRN Nhi Wolf APRN        sodium chloride 0.9 % infusion  100 mL/hr Intravenous Continuous Nhi Wolf APRN         Current Outpatient Medications   Medication Sig Dispense Refill    acetaminophen (TYLENOL) 325 MG tablet Take 650 mg by mouth Every 4 (Four) Hours As Needed for Mild Pain. Indications: Pain      amLODIPine (NORVASC) 10 MG tablet Take 1 tablet by mouth Daily. Indications: High Blood Pressure Disorder      aspirin 81 MG EC tablet Take 1 tablet by mouth Daily. Indications: Disease involving Lipid Deposits in the Arteries      atorvastatin (LIPITOR) 80 MG tablet Take 1 tablet by mouth Every Night. 90 tablet 3    DULoxetine (CYMBALTA) 60 MG capsule Take 1 capsule by mouth Daily. Indications: Major Depressive Disorder      famotidine (PEPCID) 20 MG tablet Take 1 tablet by mouth 2 (Two) Times a Day As Needed. Indications: Gastroesophageal Reflux  Disease      multivitamin with minerals tablet tablet Take 1 tablet by mouth Daily. Indications: Supplement      promethazine (PHENERGAN) 12.5 MG tablet Take 12.5 mg by mouth Every 6 (Six) Hours As Needed for Nausea. Indications: Nausea and Vomiting      ticagrelor (BRILINTA) 90 MG tablet tablet Take 1 tablet by mouth 2 (Two) Times a Day. 60 tablet 6    Tirzepatide (MOUNJARO) 12.5 MG/0.5ML solution pen-injector pen Inject 0.5 mL under the skin into the appropriate area as directed 1 (One) Time Per Week. Wednesday.  Indications: Type 2 Diabetes      traZODone (DESYREL) 50 MG tablet Take 1 tablet by mouth Every Night. Indications: Trouble Sleeping          ________________________________________________________        OBJECTIVE:      PHYSICAL EXAM:    Constitutional: The patient is in no apparent distress, awake and alert. There is no shortness of breath.     PSYCHIATRIC: Appears anxious     HEENT: Normocephalic, atraumatic.     Chest: Breathing unlabored    Cardiac: Regular rate and rhythm. Hypotensive (80/50s)     Extremities:  No clubbing, cyanosis or edema.    NEUROLOGICAL:    Cognition:   Oriented x 3   Fund of knowledge decent   Concentration and attention normal.   Short and long term memory appears intact    Cranial nerves;    II - pupils bilaterally equal reacting to light,  Left hemianopsia   Fundoscopic exam- Not able to be done, non-dilated exam  III,IV,VI: EOMI with no diplopia  V: Normal facial sensations  VII: Left  facial asymmetry,  VIII: No New hearing abnormality  IX, X, XI: normal gag and shoulder shrug;  XII: tongue is in the midline.    Sensory:  Intact to light touch in all extremities.     Motor: Strength 5-/5 LUE and LLE  No involuntary movements present. Normal tone and bulk.    Cerebellar: Finger to nose and mirror movements normal bilaterally.    Gait and balance: Deferred.     Physical exam performed by MIGUEL Avina.      NIHSS:    Level Of Consciousness 0  0=Alert; keenly responsive  1=Not alert, but arousable by minor stimulation 2=Not alert, requires repeated stimulation 3=Responds only with reflex movements    LOC Questions to Month and age 0  0=Answers both questions correctly 1=Answers one question correctly 2=Answers neither question correctly    LOC Commands      -Open/Close eyes 0    -Open/close  0   0=Performs both tasks correctly 1=Performs one task correctly 2=Performs neighter task correctly     Best Gaze 0  0=Normal 1=Partial gaze palsy 2=Forced deviation, or total gaze paresis    Visual 1  0=No visual loss 1=Partial hemianopia 2=Complete hemianopia 3=Bilateral hemianopia (blind including cortical blindness)    Facial Palsy 1  0=Normal symmetrical movement 1=Minor paralysis (asymmetry) 2=Partial paralysis (lower facde) 3=Complete paralysis (upper and lower face)    Motor: Left Arm-1  Left leg-1; Right Arm-0 Right Leg-0  0=No drift, limb holds posture for full 10 seconds 1=Drift, limb holds posture, no drift to bed 2=Some antigravity effort, cannot maintain posture, drifts to bed 3=No effort against gravity, limb falls 4=No movement    Limb Ataxia 0  0=Absent 1=Present in one limb 2=Present in two limbs    Sensory 0   0=Normal 1=Mild to moderate sensory loss 2=Severe to total sensory loss    Best Language 0   0=No aphasia, normal 1=Mild to moderate aphasia 2=Severe Aphasia (very few words correct or understood)3=Multe, global aphasia    Dysarthria 0  0=Normal 1=Mild to moderate 2=Severe, unintelligible or mute/anarthric    Extinction/Neglect 0   0=No abnormality 1=Extinction to bilateral simultaneous stimulation 2=Profound neglect    Total 4     ________________________________________________________   RESULTS REVIEW:    VITAL SIGNS:   Temp:  [98.1 °F (36.7 °C)] 98.1 °F (36.7 °C)  Heart Rate:  [] 82  Resp:  [18] 18  BP: ()/(43-91) 123/63     LABS:      Lab 06/27/24  0344   WBC 10.28   HEMOGLOBIN 14.2   HEMATOCRIT 43.3   PLATELETS 254   NEUTROS ABS 6.82   IMMATURE  GRANS (ABS) 0.03   LYMPHS ABS 2.48   MONOS ABS 0.73   EOS ABS 0.14   MCV 86.6   SED RATE 12   CRP <0.30   PROCALCITONIN 0.04   PROTIME 11.0   APTT 26.5*         Lab 06/27/24  0344   SODIUM 140   POTASSIUM 3.5   CHLORIDE 100   CO2 28.0   ANION GAP 12.0   BUN 13   CREATININE 1.06*   EGFR 56.3*   GLUCOSE 148*   CALCIUM 9.7             Lab 06/27/24  0344   PROTIME 11.0   INR 1.01                     Lab Results   Component Value Date    TSH 1.200 12/05/2023    LDL 83 12/05/2023    HGBA1C 7.40 (H) 12/05/2023    HNCVISMJ33 410 12/05/2023       IMAGING STUDIES:  CT Angiogram Head w AI Analysis of LVO    Result Date: 6/27/2024  1.No acute abnormality identified within the large arteries of the head or neck. 2.No significant stenosis of the bilateral internal carotid arteries. 3.CT perfusion study has been reported separately. 4.Bilateral hypoattenuating thyroid nodules measuring up to approximately 1 cm. Nonemergent follow-up thyroid ultrasound may be considered. Electronically Signed: Naveed Love MD  6/27/2024 10:00 AM EDT  Workstation ID: [id number]    CT CEREBRAL PERFUSION WITH & WITHOUT CONTRAST    Result Date: 6/27/2024  Impression: No evidence of brain ischemia or infarct Electronically Signed: Will Hernandez  6/27/2024 9:56 AM EDT  Workstation ID: [id number]    CT Head Without Contrast Stroke Protocol    Result Date: 6/27/2024  Impression: 1. No intracranial hemorrhage 2. No CT changes of vascular territory brain ischemia at this time Electronically Signed: Will Hernandez  6/27/2024 9:46 AM EDT  Workstation ID: [id number]    CT Head Without Contrast    Result Date: 6/27/2024  Impression: No acute intracranial process. Electronically Signed: Agnieszka Lujan MD  6/27/2024 4:07 AM EDT  Workstation ID: [id number]     I reviewed the patient's new clinical results.    ________________________________________________________     PROBLEM LIST:    Migraine, intractable            ASSESSMENT/PLAN:    Left hemiparesis, left visual field  cut.   MRI brain negative for stroke  Likely recrudescence from previous stoke (explains the left sided symptoms but not the visual field cut- right pontine stroke in 2022), hemiplegic/migraine with aura is also within differential.   - CT head negative  - MRI brain reviewed and negative for acute to subacute stroke..  There is mild white matter changes compatible with small vessel ischemic disease that correlates with patient's age.  - CTA head and neck: No significant stenosis of the head or neck.  - Echo- EF is 61-65%   - EKG: Sinus rhythm, rate 81  - Labs: A1C: P, B12: P, LDL:  129, TSH: P  - continue bASA and Brilinta 90 mg BID  - Pt may benefit from a preventive medication (topamax)- will discuss   - Will follow up     2.  Hypertension history, hypotensive in hospital  -1 L IV fluids given, continue IVF, hold anti-hypertensive medications until needed.     3.  Type 2 Diabetes Mellitus  - A1C: P  - Strict glycemic control, SSI, diabetic diet, diabetes educator    4.  Hyperlipidemia  - Statin & dietary modifications    5.  History of coronary artery disease-on Brilinta and aspirin      Will follow-up.      I discussed the patient's findings and my recommendations with patient and nursing staff    CANDIDO Rhodes  06/27/24  10:39 EDT

## 2024-06-27 NOTE — ED NOTES
At 2100, pt took blood pressure and it was 188/104. Pt is also experiencing a headache that is painful on right frontal. Pt is also dizzy. Pt takes amlodipine for hypertension.

## 2024-06-27 NOTE — CASE MANAGEMENT/SOCIAL WORK
Discharge Planning Assessment   [place]     Patient Name: Marge Mcghee  MRN: [id number]  Today's Date: 6/27/2024    Admit Date: 6/27/2024    Plan: Plan to dc home with spouse, pending PT/OT lazara.   Discharge Needs Assessment       Row Name 06/27/24 1346       Living Environment    People in Home spouse    Name(s) of People in Home Tino - spouse    Current Living Arrangements home    Potentially Unsafe Housing Conditions none    In the past 12 months has the electric, gas, oil, or water company threatened to shut off services in your home? No    Primary Care Provided by self    Provides Primary Care For no one    Family Caregiver if Needed spouse    Family Caregiver Names Tino - spouse, dgt Teshea (POA)    Quality of Family Relationships helpful;involved;supportive    Able to Return to Prior Arrangements yes       Resource/Environmental Concerns    Resource/Environmental Concerns none    Transportation Concerns none       Transportation Needs    In the past 12 months, has lack of transportation kept you from medical appointments or from getting medications? no    In the past 12 months, has lack of transportation kept you from meetings, work, or from getting things needed for daily living? No       Food Insecurity    Within the past 12 months, you worried that your food would run out before you got the money to buy more. Never true    Within the past 12 months, the food you bought just didn't last and you didn't have money to get more. Never true       Transition Planning    Patient/Family Anticipates Transition to home with family    Patient/Family Anticipated Services at Transition none    Transportation Anticipated car, drives self;family or friend will provide       Discharge Needs Assessment    Readmission Within the Last 30 Days no previous admission in last 30 days    Equipment Currently Used at Home none    Concerns to be Addressed discharge planning    Anticipated Changes Related to Illness none     Equipment Needed After Discharge none                   Discharge Plan       Row Name 06/27/24 1346       Plan    Plan Plan to dc home with spouse, pending PT/OT eval.    Patient/Family in Agreement with Plan yes    Plan Comments CM met with patient at bedside. Patient A&Ox4. Patient lives at home with spouse who will transport at discharge. Patient performs ADLs. PCP and pharmacy confirmed. Agreeable to M2B.  Denies financial assistance needs for medication and/or food. Denies any current DME, HH, Caregiver, or rehab services.  DC Barriers:  PT/OT/SLP evals pending, Neuro following.                    Demographic Summary       Row Name 06/27/24 1345       General Information    Admission Type inpatient    Arrived From emergency department    Required Notices Provided Important Message from Medicare    Referral Source admission list    Reason for Consult discharge planning    Preferred Language English                   Functional Status       Row Name 06/27/24 1345       Functional Status    Usual Activity Tolerance good    Current Activity Tolerance moderate       Functional Status, IADL    Medications independent    Meal Preparation independent    Housekeeping independent    Laundry independent    Shopping independent       Mental Status    General Appearance WDL WDL       Mental Status Summary    Recent Changes in Mental Status/Cognitive Functioning no changes                  Patient Forms       Row Name 06/27/24 1316       Patient Forms    Important Message from Medicare (IMM) --  IMM given by reg 6/27                      THUY Soto RN  SIPS/ICU   O: [phone]  C: [phone]  [email or web address]

## 2024-06-27 NOTE — H&P
[facility] Medicine Services  History & Physical    Patient Name: Marge Mcghee  : 1955  MRN: [id number]  Primary Care Physician:  Traci Townsend APRN  Date of admission: 2024  Date and Time of Service: 2024 at 8:30 AM    Subjective      Chief Complaint: Migraine    History of Present Illness: Marge Mcghee is a 69 y.o. female with a CMH of hepatic steatos, HLD, UTI, REINA, GERD, DM2, CAD with triple bypass, CVA x 3, RLS who presented to [facility] on 2024 with right frontal/parietal headache.  Patient states that it was acute onset abruptly about 8 PM last night.  Stated migraines 10/10, no vision changes, there was some nausea, vomiting.  Some pain relief with applying ice pack.  At that time patient's blood pressure was 180s over 100s patient attempted to get some rest, however migraine was unresolved.    While in emergency department patient received 1 dose of Toradol, Reglan, and Benadryl.  Labs were remarkable for PTT of 26.5.  Patient received some relief with medications, stated headache was 5/10.     9:15 AM EDT  As noted the patient's blood pressure has significant change of 86/45.  1 L of IV fluids ordered.    9:45 AM EDT while waiting for bed placement, patient developed left-sided facial droop as well as vision changes.  Code stroke protocol initiated.  Accompany patient and RN down to CT scan, neurology APRN at bedside as well.    Review of Systems  Please see above, review of systems otherwise negative   Personal History     Past Medical History:   Diagnosis Date    Adrenal nodule 2016    FINDINGS: Mild hepatic steatosis may be present. Cholecystectomy. No biliary ductal dilatation. Negative pancreas, spleen, left adrenal gland, and kidneys. The right adrenal presumed adenoma has increased slightly, measuring 3 x 4 cm in diameter,  compared to 2 x 3.5 cm on the previous exam. The attenuation values are consistent with an adenoma. Done at [facility] in August 2018    Age-related osteoporosis without current pathological fracture 11/16/2016    Arthritis     Delayed surgical wound healing     Essential hypertension 11/16/2016    Gastroesophageal reflux disease with esophagitis 11/16/2016    Hepatic steatosis 11/16/2016    History of anemia     History of sepsis     FROM UTI    Hyperlipidemia 11/16/2016    Lisfranc's dislocation     LEFT WITH FRACTURES NONHEALING FOOT    Osteomyelitis of left foot 11/2020    RLS (restless legs syndrome)     Squamous cell skin cancer 2014    Excised by Dr. James    Thyroid nodule 10/18/2019    BENIGN    Type 2 diabetes mellitus with peripheral neuropathy 11/16/2016    Vitamin D deficiency 1/25/2019       Past Surgical History:   Procedure Laterality Date    BREAST BIOPSY Left     7/2019. BENIGN    CARDIAC CATHETERIZATION Left 5/2/2021    Procedure: Cardiac Catheterization/Vascular Study;  Surgeon: Chacho Esteban MD;  Location: [facility] CATH INVASIVE LOCATION;  Service: Cardiovascular;  Laterality: Left;    CARDIAC CATHETERIZATION N/A 5/2/2021    Procedure: Intra-Aortic Baloon Pump Insertion;  Surgeon: Chacho Esteban MD;  Location: [facility] CATH INVASIVE LOCATION;  Service: Cardiovascular;  Laterality: N/A;    CATARACT EXTRACTION WITH INTRAOCULAR LENS IMPLANT Bilateral     CHOLECYSTECTOMY      COLONOSCOPY      CORONARY ARTERY BYPASS GRAFT N/A 5/2/2021    Procedure: CORONARY ARTERY BYPASS WITH INTERNAL MAMMARY ARTERY GRAFT;  Surgeon: Jr Rocael Alexander MD;  Location: [facility];  Service: Cardiothoracic;  Laterality: N/A;    FOOT FUSION Right 11/13/2020    Procedure: RIGHT OPEN TREATMENT LISFRANC INJURY, OPEN REDUCTION INTERNAL FIXATION MEDIAL/MIDDLE CUNEIFORM FRACTURE AND 2ND/3RD METATARSAL FRACTURE 1ST 2ND POSSIBLE 3RD TARSOMETATARSAL ARTHRODESIS INTERCUNEIFORM ARTHRODESIS CALCANEAL BONE GRAFT;  Surgeon: Mikhail Banks Jr., MD;  Location: [facility];  Service: Orthopedics;  Laterality: Right;    INCISION AND DRAINAGE  LEG Right 1/8/2021    Procedure: RIGHT IRRIGATION AND DEBRIDEMENT OF FOOT WITH SECONDARY CLOSURE AND HARDWARE REMOVAL;  Surgeon: Mikhail aBnks Jr., MD;  Location: [facility] MAIN OR;  Service: Orthopedics;  Laterality: Right;    KNEE ARTHROSCOPY Bilateral     TOTAL ABDOMINAL HYSTERECTOMY      TRANSESOPHAGEAL ECHOCARDIOGRAM (EMILIA) N/A 5/2/2021    Procedure: TRANSESOPHAGEAL ECHOCARDIOGRAM;  Surgeon: Jr Rocael Alexander MD;  Location: [facility];  Service: Cardiothoracic;  Laterality: N/A;    UPPER GASTROINTESTINAL ENDOSCOPY  08/2016    US GUIDED FINE NEEDLE ASPIRATION  5/8/2020       Family History: family history includes COPD in her mother; Diabetes in her mother, sister, sister, sister, and sister; Hypertension in her mother; Kidney disease in her mother; Obesity in her mother; Thyroid disease in her mother. Otherwise pertinent FHx was reviewed and not pertinent to current issue.    Social History:  reports that she has never smoked. She has never used smokeless tobacco. She reports current alcohol use. She reports that she does not use drugs.    Home Medications:  Prior to Admission Medications       Prescriptions Last Dose Informant Patient Reported? Taking?    acetaminophen (TYLENOL) 325 MG tablet   Yes No    Take 650 mg by mouth Every 4 (Four) Hours As Needed for Mild Pain. Indications: Pain    amLODIPine (NORVASC) 5 MG tablet   Yes No    Take 1 tablet by mouth Daily. Indications: High Blood Pressure Disorder    aspirin 81 MG EC tablet   Yes No    Take 1 tablet by mouth Daily. Indications: Disease involving Lipid Deposits in the Arteries    atorvastatin (LIPITOR) 80 MG tablet   No No    Take 1 tablet by mouth Every Night.    DULoxetine (CYMBALTA) 60 MG capsule  Pharmacy Yes No    Take 1 capsule by mouth Daily. Indications: Major Depressive Disorder    famotidine (PEPCID) 20 MG tablet   Yes No    Take 1 tablet by mouth 2 (Two) Times a Day As Needed. Indications: Gastroesophageal Reflux Disease    multivitamin with  minerals tablet tablet   Yes No    Take 1 tablet by mouth Daily. Indications: Supplement    promethazine (PHENERGAN) 12.5 MG tablet   Yes No    Take 12.5 mg by mouth Every 6 (Six) Hours As Needed for Nausea. Indications: Nausea and Vomiting    ticagrelor (BRILINTA) 90 MG tablet tablet   No No    Take 1 tablet by mouth 2 (Two) Times a Day.    Tirzepatide (Mounjaro) 5 MG/0.5ML solution pen-injector   Yes No    Inject 0.5 mL under the skin into the appropriate area as directed 1 (One) Time Per Week. Wednesday.  Indications: Type 2 Diabetes    traZODone (DESYREL) 50 MG tablet   Yes No    Take 1 tablet by mouth Every Night. Indications: Trouble Sleeping              Allergies:  Allergies   Allergen Reactions    Zofran [Ondansetron Hcl] Nausea And Vomiting     Does the opposite of its purpose    Prochlorperazine Other (See Comments)     CAUSED SEIZURE    Prochlorperazine Edisylate Hives    Hydralazine Itching and Rash    Hydralazine Hcl Itching and Rash    Meperidine Itching and Swelling    Prochlorperazine Maleate Other (See Comments)     CAUSES SEIZURE    Prochlorperazine Maleate Irritability       Objective      Vitals:   Temp:  [98.1 °F (36.7 °C)] 98.1 °F (36.7 °C)  Heart Rate:  [] 86  Resp:  [18] 18  BP: ()/(43-91) 85/43  Body mass index is 28.16 kg/m².  Physical Exam  PHYSICAL EXAM  Constitutional:  Well developed, well nourished, no acute distress, non-toxic appearance   Eyes:  PERRL, conjunctiva normal, EOMI   HENT:  Atraumatic, external ears normal, nose normal, oropharynx moist, no pharyngeal exudates. Neck-normal range of motion, no tenderness, supple, trachea midline  Respiratory:  ctab, non-labored respirations without accessory muscle use  Cardiovascular:  Normal rate, normal rhythm, no murmurs, no gallops, no rubs   GI:  Soft, nondistended, normal bowel sounds, nontender, no organomegaly, no mass, no rebound, no guarding   :  No costovertebral angle tenderness   Musculoskeletal:  No edema, no  "tenderness, no deformities  Integument:  Well hydrated, no rash   Lymphatic:  No lymphadenopathy noted   Neurologic:  Alert & oriented x 3, CN 2-12 normal, normal motor function, normal sensory function, no focal deficits noted   Psychiatric:  Speech and behavior appropriate    Diagnostic Data:  Lab Results (last 24 hours)       Procedure Component Value Units Date/Time    Procalcitonin [[id number]]  (Normal) Collected: 06/27/24 0344    Specimen: Blood Updated: 06/27/24 0801     Procalcitonin 0.04 ng/mL     Narrative:      As a Marker for Sepsis (Non-Neonates):    1. <0.5 ng/mL represents a low risk of severe sepsis and/or septic shock.  2. >2 ng/mL represents a high risk of severe sepsis and/or septic shock.    As a Marker for Lower Respiratory Tract Infections that require antibiotic therapy:    PCT on Admission    Antibiotic Therapy       6-12 Hrs later    >0.5                Strongly Recommended  >0.25 - <0.5        Recommended   0.1 - 0.25          Discouraged              Remeasure/reassess PCT  <0.1                Strongly Discouraged     Remeasure/reassess PCT    As 28 day mortality risk marker: \"Change in Procalcitonin Result\" (>80% or <=80%) if Day 0 (or Day 1) and Day 4 values are available. Refer to http://[email or web address]pct[facility][email or web address]    Change in PCT <=80%  A decrease of PCT levels below or equal to 80% defines a positive change in PCT test result representing a higher risk for 28-day all-cause mortality of patients diagnosed with severe sepsis for septic shock.    Change in PCT >80%  A decrease of PCT levels of more than 80% defines a negative change in PCT result representing a lower risk for 28-day all-cause mortality of patients diagnosed with severe sepsis or septic shock.       Respiratory Panel PCR w/COVID-19(SARS-CoV-2) ALISSA/HEAVENLY/NILA/PAD/COR/NADEEM In-House, NP Swab in UTM/VTM, 2 HR TAT - Swab, Nasopharynx [[id number]]  (Normal) Collected: 06/27/24 0705    Specimen: Swab from Nasopharynx Updated: " 06/27/24 0759     ADENOVIRUS, PCR Not Detected     Coronavirus 229E Not Detected     Coronavirus HKU1 Not Detected     Coronavirus NL63 Not Detected     Coronavirus OC43 Not Detected     COVID19 Not Detected     Human Metapneumovirus Not Detected     Human Rhinovirus/Enterovirus Not Detected     Influenza A PCR Not Detected     Influenza B PCR Not Detected     Parainfluenza Virus 1 Not Detected     Parainfluenza Virus 2 Not Detected     Parainfluenza Virus 3 Not Detected     Parainfluenza Virus 4 Not Detected     RSV, PCR Not Detected     Bordetella pertussis pcr Not Detected     Bordetella parapertussis PCR Not Detected     Chlamydophila pneumoniae PCR Not Detected     Mycoplasma pneumo by PCR Not Detected    Narrative:      In the setting of a positive respiratory panel with a viral infection PLUS a negative procalcitonin without other underlying concern for bacterial infection, consider observing off antibiotics or discontinuation of antibiotics and continue supportive care. If the respiratory panel is positive for atypical bacterial infection (Bordetella pertussis, Chlamydophila pneumoniae, or Mycoplasma pneumoniae), consider antibiotic de-escalation to target atypical bacterial infection.    C-reactive Protein [[id number]]  (Normal) Collected: 06/27/24 0344    Specimen: Blood Updated: 06/27/24 0615     C-Reactive Protein <0.30 mg/dL     Sedimentation Rate [[id number]]  (Normal) Collected: 06/27/24 0344    Specimen: Blood Updated: 06/27/24 0601     Sed Rate 12 mm/hr     Basic Metabolic Panel [[id number]]  (Abnormal) Collected: 06/27/24 0344    Specimen: Blood Updated: 06/27/24 0415     Glucose 148 mg/dL      BUN 13 mg/dL      Creatinine 1.06 mg/dL      Sodium 140 mmol/L      Potassium 3.5 mmol/L      Chloride 100 mmol/L      CO2 28.0 mmol/L      Calcium 9.7 mg/dL      BUN/Creatinine Ratio 12.3     Anion Gap 12.0 mmol/L      eGFR 56.3 mL/min/1.73     Narrative:      GFR Normal >60  Chronic Kidney Disease <60  Kidney  Failure <15    The GFR formula is only valid for adults with stable renal function between ages 18 and 70.    Protime-INR [[id number]]  (Normal) Collected: 06/27/24 0344    Specimen: Blood Updated: 06/27/24 0407     Protime 11.0 Seconds      INR 1.01    aPTT [[id number]]  (Abnormal) Collected: 06/27/24 0344    Specimen: Blood Updated: 06/27/24 0407     PTT 26.5 seconds     CBC & Differential [[id number]]  (Normal) Collected: 06/27/24 0344    Specimen: Blood Updated: 06/27/24 0353    Narrative:      The following orders were created for panel order CBC & Differential.  Procedure                               Abnormality         Status                     ---------                               -----------         ------                     CBC Auto Differential[[id number]]        Normal              Final result                 Please view results for these tests on the individual orders.    CBC Auto Differential [[id number]]  (Normal) Collected: 06/27/24 0344    Specimen: Blood Updated: 06/27/24 0353     WBC 10.28 10*3/mm3      RBC 5.00 10*6/mm3      Hemoglobin 14.2 g/dL      Hematocrit 43.3 %      MCV 86.6 fL      MCH 28.4 pg      MCHC 32.8 g/dL      RDW 12.5 %      RDW-SD 39.2 fl      MPV 8.9 fL      Platelets 254 10*3/mm3      Neutrophil % 66.3 %      Lymphocyte % 24.1 %      Monocyte % 7.1 %      Eosinophil % 1.4 %      Basophil % 0.8 %      Immature Grans % 0.3 %      Neutrophils, Absolute 6.82 10*3/mm3      Lymphocytes, Absolute 2.48 10*3/mm3      Monocytes, Absolute 0.73 10*3/mm3      Eosinophils, Absolute 0.14 10*3/mm3      Basophils, Absolute 0.08 10*3/mm3      Immature Grans, Absolute 0.03 10*3/mm3      nRBC 0.0 /100 WBC              Imaging Results (Last 24 Hours)       Procedure Component Value Units Date/Time    CT Head Without Contrast [[id number]] Collected: 06/27/24 0407     Updated: 06/27/24 0409    Narrative:      CT HEAD WO CONTRAST    Date of Exam: 6/27/2024 3:53 AM EDT    Indication: ha, elevated  bp.    Comparison: 12/7/2023.    Technique: Axial CT images were obtained of the head without contrast administration.  Coronal reconstructions were performed.  Automated exposure control and iterative reconstruction methods were used.      Findings:  There is no evidence of hemorrhage. There is no mass effect or midline shift.    There is no extracerebral collection.    Ventricles are normal in size and configuration for patient's stated age.      Posterior fossa is within normal limits.    Calvarium and skull base appear intact.   Visualized sinuses show no air fluid levels. Visualized orbits are unremarkable.      Impression:      Impression:  No acute intracranial process.        Electronically Signed: Agnieszka Lujan MD    6/27/2024 4:07 AM EDT    Workstation ID: [id number]              Assessment & Plan        This is a 69 y.o. female with:    Active and Resolved Problems  Active Hospital Problems    Diagnosis  POA    **Migraine, intractable [G43.919]  Yes      Resolved Hospital Problems   No resolved problems to display.       Migraine  Strokelike symptoms  -History of CVA x 3  - CT head with perfusion negative  - CTA of head and neck negative  - Normal saline bolus over 1 hour  - Repeat migraine cocktail if required  - Acetaminophen for pain  - Continue Brilinta  - Neurochecks every 4  - Neurology consult    HLD  - Lipid panel ordered  - Resume home medication    DM2  - Accu-Cheks before meals and at bedtime  - Carb conscious diet  - Low-dose sliding scale    GERD  - Protonix    RLS  - Continue home regimen      VTE Prophylaxis:  Mechanical VTE prophylaxis orders are present.        The patient desires to be as follows:    CODE STATUS:    Level Of Support Discussed With: Patient  Code Status (Patient has no pulse and is not breathing): CPR (Attempt to Resuscitate)  Medical Interventions (Patient has pulse or is breathing): Full Support        Kimberly Salinas, who can be contacted at [phone], is the designated  person to make medical decisions on the patient's behalf if She is incapable of doing so. This was clarified with patient and/or next of kin on 6/27/2024 during the course of this H&P.    Admission Status:  I believe this patient meets inpatient status.    Expected Length of Stay: 2 nights    PDMP and Medication Dispenses via Sidebar reviewed and consistent with patient reported medications.    I discussed the patient's findings and my recommendations with patient.      Signature:     This document has been electronically signed by CANDIDO Araya on June 27, 2024 09:14 EDT   [facility] Hospitalist Team

## 2024-06-27 NOTE — PAYOR COMM NOTE
"CLINICALS FOR PENDING INPATIENT PRECERT: AUTH # [id number]      Marge Moreno (69 y.o. Female) 1955          AUTHORIZATION PENDING:   PLEASE CALL OR FAX DETERMINATION TO CONTACT BELOW. THANK YOU.        Sweta Ribera, RN MSN  /UR  [facility]  [phone] office  [phone] fax  [email or web address]    [facility]  NPI: [id number]  Tax: [id number]            Marge Moreno (69 y.o. Female)       Date of Birth   1955    Social Security Number       Address   [address] [place] [address] [place] [facility]    Home Phone   [phone]    MRN   [id number]       Synagogue   None    Marital Status                               Admission Date   6/27/24    Admission Type   Emergency    Admitting Provider   Mikhail Barbour MD    Attending Provider   Lyndsey Layne MD    Department, Room/Bed   [facility] INTENSIVE CARE UNIT, 2312/1       Discharge Date       Discharge Disposition       Discharge Destination                                 Attending Provider: Lyndsey Layne MD    Allergies: Zofran [Ondansetron Hcl], Prochlorperazine, Prochlorperazine Edisylate, Hydralazine, Hydralazine Hcl, Meperidine, Prochlorperazine Maleate, Prochlorperazine Maleate    Isolation: None   Infection: None   Code Status: CPR    Ht: 160 cm (62.99\")   Wt: 74.5 kg (164 lb 3.9 oz)    Admission Cmt: None   Principal Problem: Migraine, intractable [G43.919]                   Active Insurance as of 6/27/2024       Primary Coverage       Payor Plan Insurance Group Employer/Plan Group    AETNA COMMERCIAL AETNA [id number]       Payor Plan Address Payor Plan Phone Number Payor Plan Fax Number Effective Dates    [address] [phone]  1/1/2024 - None Entered    [place] [address]         Subscriber Name Subscriber Birth Date Member ID       GRADY MORENO 3/15/1952 [id number]               Secondary Coverage       Payor Plan Insurance Group Employer/Plan Group    " HUMANA MEDICARE REPLACEMENT HUMANA MEDICARE REPLACEMENT [id number]       Payor Plan Address Payor Plan Phone Number Payor Plan Fax Number Effective Dates    [address] [address] [phone]  2022 - None Entered    [facility] [address]         Subscriber Name Subscriber Birth Date Member ID       MARGE MORENO 1955 [id number]               Tertiary Coverage       Payor Plan Insurance Group Employer/Plan Group    GPM LIFE GPM LIFE MC SUP        Payor Plan Address Payor Plan Phone Number Payor Plan Fax Number Effective Dates    [address]   2019 - None Entered    [place] [address]         Subscriber Name Subscriber Birth Date Member ID       MARGE MORENO 1955 [id number]                     Emergency Contacts        (Rel.) Home Phone Work Phone Mobile Phone    Kimberly Salinas (POA) (Daughter) -- -- [phone]    LuliTonia gamez (Friend) [phone] -- --    JOSHSILVIO (Spouse) -- -- [phone]          24 0852  Inpatient Admission  Once     Completed     Level of Care: Med/Surg  Diagnosis: Migraine, intractable [699019]  Admitting Physician: LYNDSEY LAYNE [[id number]]  Attending Physician: LYNDSEY LAYNE [[id number]]  Isolate for COVID?: No  Certification: I Certify That Inpatient Hospital Services Are Medically Necessary For Greater Than 2 Midnights               History & Physical        Nhi Wolf APRN at 24 0903       Attestation signed by Lyndsey Layne MD at 24 1353    I have reviewed this documentation and agree.                      [facility] Medicine Services  History & Physical    Patient Name: Marge Moreno  : 1955  MRN: [id number]  Primary Care Physician:  Traci Townsend APRN  Date of admission: 2024  Date and Time of Service: 2024 at 8:30 AM    Subjective      Chief Complaint: Migraine    History of Present Illness: Marge Moreno is a 69 y.o. female with a CMH of hepatic steatos, HLD, UTI, REINA, GERD, DM2, CAD with triple bypass, CVA x 3,  RLS who presented to [facility] on 6/27/2024 with right frontal/parietal headache.  Patient states that it was acute onset abruptly about 8 PM last night.  Stated migraines 10/10, no vision changes, there was some nausea, vomiting.  Some pain relief with applying ice pack.  At that time patient's blood pressure was 180s over 100s patient attempted to get some rest, however migraine was unresolved.    While in emergency department patient received 1 dose of Toradol, Reglan, and Benadryl.  Labs were remarkable for PTT of 26.5.  Patient received some relief with medications, stated headache was 5/10.     9:15 AM EDT  As noted the patient's blood pressure has significant change of 86/45.  1 L of IV fluids ordered.    9:45 AM EDT while waiting for bed placement, patient developed left-sided facial droop as well as vision changes.  Code stroke protocol initiated.  Accompany patient and RN down to CT scan, neurology APRN at bedside as well.    Review of Systems  Please see above, review of systems otherwise negative   Personal History     Past Medical History:   Diagnosis Date    Adrenal nodule 11/16/2016    FINDINGS: Mild hepatic steatosis may be present. Cholecystectomy. No biliary ductal dilatation. Negative pancreas, spleen, left adrenal gland, and kidneys. The right adrenal presumed adenoma has increased slightly, measuring 3 x 4 cm in diameter,  compared to 2 x 3.5 cm on the previous exam. The attenuation values are consistent with an adenoma. Done at [facility] in August 2018    Age-related osteoporosis without current pathological fracture 11/16/2016    Arthritis     Delayed surgical wound healing     Essential hypertension 11/16/2016    Gastroesophageal reflux disease with esophagitis 11/16/2016    Hepatic steatosis 11/16/2016    History of anemia     History of sepsis     FROM UTI    Hyperlipidemia 11/16/2016    Lisfranc's dislocation     LEFT WITH FRACTURES NONHEALING FOOT    Osteomyelitis of  left foot 11/2020    RLS (restless legs syndrome)     Squamous cell skin cancer 2014    Excised by Dr. James    Thyroid nodule 10/18/2019    BENIGN    Type 2 diabetes mellitus with peripheral neuropathy 11/16/2016    Vitamin D deficiency 1/25/2019       Past Surgical History:   Procedure Laterality Date    BREAST BIOPSY Left     7/2019. BENIGN    CARDIAC CATHETERIZATION Left 5/2/2021    Procedure: Cardiac Catheterization/Vascular Study;  Surgeon: Chacho Esteban MD;  Location: [facility] CATH INVASIVE LOCATION;  Service: Cardiovascular;  Laterality: Left;    CARDIAC CATHETERIZATION N/A 5/2/2021    Procedure: Intra-Aortic Baloon Pump Insertion;  Surgeon: Chacho Esteban MD;  Location: [facility] CATH INVASIVE LOCATION;  Service: Cardiovascular;  Laterality: N/A;    CATARACT EXTRACTION WITH INTRAOCULAR LENS IMPLANT Bilateral     CHOLECYSTECTOMY      COLONOSCOPY      CORONARY ARTERY BYPASS GRAFT N/A 5/2/2021    Procedure: CORONARY ARTERY BYPASS WITH INTERNAL MAMMARY ARTERY GRAFT;  Surgeon: Jr Rocael Alexander MD;  Location: [facility];  Service: Cardiothoracic;  Laterality: N/A;    FOOT FUSION Right 11/13/2020    Procedure: RIGHT OPEN TREATMENT LISFRANC INJURY, OPEN REDUCTION INTERNAL FIXATION MEDIAL/MIDDLE CUNEIFORM FRACTURE AND 2ND/3RD METATARSAL FRACTURE 1ST 2ND POSSIBLE 3RD TARSOMETATARSAL ARTHRODESIS INTERCUNEIFORM ARTHRODESIS CALCANEAL BONE GRAFT;  Surgeon: Mikhail Banks Jr., MD;  Location: [facility];  Service: Orthopedics;  Laterality: Right;    INCISION AND DRAINAGE LEG Right 1/8/2021    Procedure: RIGHT IRRIGATION AND DEBRIDEMENT OF FOOT WITH SECONDARY CLOSURE AND HARDWARE REMOVAL;  Surgeon: Mikhail Banks Jr., MD;  Location: [facility];  Service: Orthopedics;  Laterality: Right;    KNEE ARTHROSCOPY Bilateral     TOTAL ABDOMINAL HYSTERECTOMY      TRANSESOPHAGEAL ECHOCARDIOGRAM (EMILIA) N/A 5/2/2021    Procedure: TRANSESOPHAGEAL ECHOCARDIOGRAM;  Surgeon: Jr Rocael Alexander MD;  Location: [facility];  Service:  Cardiothoracic;  Laterality: N/A;    UPPER GASTROINTESTINAL ENDOSCOPY  08/2016    US GUIDED FINE NEEDLE ASPIRATION  5/8/2020       Family History: family history includes COPD in her mother; Diabetes in her mother, sister, sister, sister, and sister; Hypertension in her mother; Kidney disease in her mother; Obesity in her mother; Thyroid disease in her mother. Otherwise pertinent FHx was reviewed and not pertinent to current issue.    Social History:  reports that she has never smoked. She has never used smokeless tobacco. She reports current alcohol use. She reports that she does not use drugs.    Home Medications:  Prior to Admission Medications       Prescriptions Last Dose Informant Patient Reported? Taking?    acetaminophen (TYLENOL) 325 MG tablet   Yes No    Take 650 mg by mouth Every 4 (Four) Hours As Needed for Mild Pain. Indications: Pain    amLODIPine (NORVASC) 5 MG tablet   Yes No    Take 1 tablet by mouth Daily. Indications: High Blood Pressure Disorder    aspirin 81 MG EC tablet   Yes No    Take 1 tablet by mouth Daily. Indications: Disease involving Lipid Deposits in the Arteries    atorvastatin (LIPITOR) 80 MG tablet   No No    Take 1 tablet by mouth Every Night.    DULoxetine (CYMBALTA) 60 MG capsule  Pharmacy Yes No    Take 1 capsule by mouth Daily. Indications: Major Depressive Disorder    famotidine (PEPCID) 20 MG tablet   Yes No    Take 1 tablet by mouth 2 (Two) Times a Day As Needed. Indications: Gastroesophageal Reflux Disease    multivitamin with minerals tablet tablet   Yes No    Take 1 tablet by mouth Daily. Indications: Supplement    promethazine (PHENERGAN) 12.5 MG tablet   Yes No    Take 12.5 mg by mouth Every 6 (Six) Hours As Needed for Nausea. Indications: Nausea and Vomiting    ticagrelor (BRILINTA) 90 MG tablet tablet   No No    Take 1 tablet by mouth 2 (Two) Times a Day.    Tirzepatide (Mounjaro) 5 MG/0.5ML solution pen-injector   Yes No    Inject 0.5 mL under the skin into the  appropriate area as directed 1 (One) Time Per Week. Wednesday.  Indications: Type 2 Diabetes    traZODone (DESYREL) 50 MG tablet   Yes No    Take 1 tablet by mouth Every Night. Indications: Trouble Sleeping              Allergies:  Allergies   Allergen Reactions    Zofran [Ondansetron Hcl] Nausea And Vomiting     Does the opposite of its purpose    Prochlorperazine Other (See Comments)     CAUSED SEIZURE    Prochlorperazine Edisylate Hives    Hydralazine Itching and Rash    Hydralazine Hcl Itching and Rash    Meperidine Itching and Swelling    Prochlorperazine Maleate Other (See Comments)     CAUSES SEIZURE    Prochlorperazine Maleate Irritability       Objective      Vitals:   Temp:  [98.1 °F (36.7 °C)] 98.1 °F (36.7 °C)  Heart Rate:  [] 86  Resp:  [18] 18  BP: ()/(43-91) 85/43  Body mass index is 28.16 kg/m².  Physical Exam  PHYSICAL EXAM  Constitutional:  Well developed, well nourished, no acute distress, non-toxic appearance   Eyes:  PERRL, conjunctiva normal, EOMI   HENT:  Atraumatic, external ears normal, nose normal, oropharynx moist, no pharyngeal exudates. Neck-normal range of motion, no tenderness, supple, trachea midline  Respiratory:  ctab, non-labored respirations without accessory muscle use  Cardiovascular:  Normal rate, normal rhythm, no murmurs, no gallops, no rubs   GI:  Soft, nondistended, normal bowel sounds, nontender, no organomegaly, no mass, no rebound, no guarding   :  No costovertebral angle tenderness   Musculoskeletal:  No edema, no tenderness, no deformities  Integument:  Well hydrated, no rash   Lymphatic:  No lymphadenopathy noted   Neurologic:  Alert & oriented x 3, CN 2-12 normal, normal motor function, normal sensory function, no focal deficits noted   Psychiatric:  Speech and behavior appropriate    Diagnostic Data:  Lab Results (last 24 hours)       Procedure Component Value Units Date/Time    Procalcitonin [[id number]]  (Normal) Collected: 06/27/24 0344    Specimen:  "Blood Updated: 06/27/24 0801     Procalcitonin 0.04 ng/mL     Narrative:      As a Marker for Sepsis (Non-Neonates):    1. <0.5 ng/mL represents a low risk of severe sepsis and/or septic shock.  2. >2 ng/mL represents a high risk of severe sepsis and/or septic shock.    As a Marker for Lower Respiratory Tract Infections that require antibiotic therapy:    PCT on Admission    Antibiotic Therapy       6-12 Hrs later    >0.5                Strongly Recommended  >0.25 - <0.5        Recommended   0.1 - 0.25          Discouraged              Remeasure/reassess PCT  <0.1                Strongly Discouraged     Remeasure/reassess PCT    As 28 day mortality risk marker: \"Change in Procalcitonin Result\" (>80% or <=80%) if Day 0 (or Day 1) and Day 4 values are available. Refer to http://www.Pomelopct-calculator.com    Change in PCT <=80%  A decrease of PCT levels below or equal to 80% defines a positive change in PCT test result representing a higher risk for 28-day all-cause mortality of patients diagnosed with severe sepsis for septic shock.    Change in PCT >80%  A decrease of PCT levels of more than 80% defines a negative change in PCT result representing a lower risk for 28-day all-cause mortality of patients diagnosed with severe sepsis or septic shock.       Respiratory Panel PCR w/COVID-19(SARS-CoV-2) ALISSA/HEAVENLY/NILA/PAD/COR/NADEEM In-House, NP Swab in UTM/VTM, 2 HR TAT - Swab, Nasopharynx [[id number]]  (Normal) Collected: 06/27/24 0705    Specimen: Swab from Nasopharynx Updated: 06/27/24 0759     ADENOVIRUS, PCR Not Detected     Coronavirus 229E Not Detected     Coronavirus HKU1 Not Detected     Coronavirus NL63 Not Detected     Coronavirus OC43 Not Detected     COVID19 Not Detected     Human Metapneumovirus Not Detected     Human Rhinovirus/Enterovirus Not Detected     Influenza A PCR Not Detected     Influenza B PCR Not Detected     Parainfluenza Virus 1 Not Detected     Parainfluenza Virus 2 Not Detected     Parainfluenza " Virus 3 Not Detected     Parainfluenza Virus 4 Not Detected     RSV, PCR Not Detected     Bordetella pertussis pcr Not Detected     Bordetella parapertussis PCR Not Detected     Chlamydophila pneumoniae PCR Not Detected     Mycoplasma pneumo by PCR Not Detected    Narrative:      In the setting of a positive respiratory panel with a viral infection PLUS a negative procalcitonin without other underlying concern for bacterial infection, consider observing off antibiotics or discontinuation of antibiotics and continue supportive care. If the respiratory panel is positive for atypical bacterial infection (Bordetella pertussis, Chlamydophila pneumoniae, or Mycoplasma pneumoniae), consider antibiotic de-escalation to target atypical bacterial infection.    C-reactive Protein [[id number]]  (Normal) Collected: 06/27/24 0344    Specimen: Blood Updated: 06/27/24 0615     C-Reactive Protein <0.30 mg/dL     Sedimentation Rate [[id number]]  (Normal) Collected: 06/27/24 0344    Specimen: Blood Updated: 06/27/24 0601     Sed Rate 12 mm/hr     Basic Metabolic Panel [[id number]]  (Abnormal) Collected: 06/27/24 0344    Specimen: Blood Updated: 06/27/24 0415     Glucose 148 mg/dL      BUN 13 mg/dL      Creatinine 1.06 mg/dL      Sodium 140 mmol/L      Potassium 3.5 mmol/L      Chloride 100 mmol/L      CO2 28.0 mmol/L      Calcium 9.7 mg/dL      BUN/Creatinine Ratio 12.3     Anion Gap 12.0 mmol/L      eGFR 56.3 mL/min/1.73     Narrative:      GFR Normal >60  Chronic Kidney Disease <60  Kidney Failure <15    The GFR formula is only valid for adults with stable renal function between ages 18 and 70.    Protime-INR [[id number]]  (Normal) Collected: 06/27/24 0344    Specimen: Blood Updated: 06/27/24 0407     Protime 11.0 Seconds      INR 1.01    aPTT [[id number]]  (Abnormal) Collected: 06/27/24 0344    Specimen: Blood Updated: 06/27/24 0407     PTT 26.5 seconds     CBC & Differential [[id number]]  (Normal) Collected: 06/27/24 0344    Specimen:  Blood Updated: 06/27/24 0353    Narrative:      The following orders were created for panel order CBC & Differential.  Procedure                               Abnormality         Status                     ---------                               -----------         ------                     CBC Auto Differential[[id number]]        Normal              Final result                 Please view results for these tests on the individual orders.    CBC Auto Differential [[id number]]  (Normal) Collected: 06/27/24 0344    Specimen: Blood Updated: 06/27/24 0353     WBC 10.28 10*3/mm3      RBC 5.00 10*6/mm3      Hemoglobin 14.2 g/dL      Hematocrit 43.3 %      MCV 86.6 fL      MCH 28.4 pg      MCHC 32.8 g/dL      RDW 12.5 %      RDW-SD 39.2 fl      MPV 8.9 fL      Platelets 254 10*3/mm3      Neutrophil % 66.3 %      Lymphocyte % 24.1 %      Monocyte % 7.1 %      Eosinophil % 1.4 %      Basophil % 0.8 %      Immature Grans % 0.3 %      Neutrophils, Absolute 6.82 10*3/mm3      Lymphocytes, Absolute 2.48 10*3/mm3      Monocytes, Absolute 0.73 10*3/mm3      Eosinophils, Absolute 0.14 10*3/mm3      Basophils, Absolute 0.08 10*3/mm3      Immature Grans, Absolute 0.03 10*3/mm3      nRBC 0.0 /100 WBC              Imaging Results (Last 24 Hours)       Procedure Component Value Units Date/Time    CT Head Without Contrast [[id number]] Collected: 06/27/24 0407     Updated: 06/27/24 0409    Narrative:      CT HEAD WO CONTRAST    Date of Exam: 6/27/2024 3:53 AM EDT    Indication: ha, elevated bp.    Comparison: 12/7/2023.    Technique: Axial CT images were obtained of the head without contrast administration.  Coronal reconstructions were performed.  Automated exposure control and iterative reconstruction methods were used.      Findings:  There is no evidence of hemorrhage. There is no mass effect or midline shift.    There is no extracerebral collection.    Ventricles are normal in size and configuration for patient's stated age.       Posterior fossa is within normal limits.    Calvarium and skull base appear intact.   Visualized sinuses show no air fluid levels. Visualized orbits are unremarkable.      Impression:      Impression:  No acute intracranial process.        Electronically Signed: Agnieszka Lujan MD    6/27/2024 4:07 AM EDT    Workstation ID: [id number]              Assessment & Plan        This is a 69 y.o. female with:    Active and Resolved Problems  Active Hospital Problems    Diagnosis  POA    **Migraine, intractable [G43.919]  Yes      Resolved Hospital Problems   No resolved problems to display.       Migraine  Strokelike symptoms  -History of CVA x 3  - CT head with perfusion negative  - CTA of head and neck negative  - Normal saline bolus over 1 hour  - Repeat migraine cocktail if required  - Acetaminophen for pain  - Continue Brilinta  - Neurochecks every 4  - Neurology consult    HLD  - Lipid panel ordered  - Resume home medication    DM2  - Accu-Cheks before meals and at bedtime  - Carb conscious diet  - Low-dose sliding scale    GERD  - Protonix    RLS  - Continue home regimen      VTE Prophylaxis:  Mechanical VTE prophylaxis orders are present.        The patient desires to be as follows:    CODE STATUS:    Level Of Support Discussed With: Patient  Code Status (Patient has no pulse and is not breathing): CPR (Attempt to Resuscitate)  Medical Interventions (Patient has pulse or is breathing): Full Support        Kimberly Salinas, who can be contacted at [phone], is the designated person to make medical decisions on the patient's behalf if She is incapable of doing so. This was clarified with patient and/or next of kin on 6/27/2024 during the course of this H&P.    Admission Status:  I believe this patient meets inpatient status.    Expected Length of Stay: 2 nights    PDMP and Medication Dispenses via Sidebar reviewed and consistent with patient reported medications.    I discussed the patient's findings and my  recommendations with patient.      Signature:     This document has been electronically signed by CANDIDO Aarya on June 27, 2024 09:14 EDT   [facility] Hospitalist Team    Electronically signed by Lyndsey Layne MD at 06/27/24 1353          Emergency Department Notes        Concha Greer at 06/27/24 1003          EKG requested       Electronically signed by Concha Greer at 06/27/24 1003       Mikhail Barbour MD at 06/27/24 0204          Subjective   History of Present Illness  69-year-old female with onset of right frontal headache at 8 PM, moderate with associated nausea and vomiting presents for further evaluation.  Patient also has associated elevated blood pressure.  No fever, no trauma, no neck stiffness, no rashes no chest pain.      Review of Systems   Gastrointestinal:  Positive for nausea and vomiting.   Neurological:  Positive for headaches.       Past Medical History:   Diagnosis Date    Adrenal nodule 11/16/2016    FINDINGS: Mild hepatic steatosis may be present. Cholecystectomy. No biliary ductal dilatation. Negative pancreas, spleen, left adrenal gland, and kidneys. The right adrenal presumed adenoma has increased slightly, measuring 3 x 4 cm in diameter,  compared to 2 x 3.5 cm on the previous exam. The attenuation values are consistent with an adenoma. Done at [facility] in August 2018    Age-related osteoporosis without current pathological fracture 11/16/2016    Arthritis     Delayed surgical wound healing     Essential hypertension 11/16/2016    Gastroesophageal reflux disease with esophagitis 11/16/2016    Hepatic steatosis 11/16/2016    History of anemia     History of sepsis     FROM UTI    Hyperlipidemia 11/16/2016    Lisfranc's dislocation     LEFT WITH FRACTURES NONHEALING FOOT    Osteomyelitis of left foot 11/2020    RLS (restless legs syndrome)     Squamous cell skin cancer 2014    Excised by Dr. James    Thyroid nodule 10/18/2019    BENIGN    Type 2 diabetes  mellitus with peripheral neuropathy 11/16/2016    Vitamin D deficiency 1/25/2019       Allergies   Allergen Reactions    Zofran [Ondansetron Hcl] Nausea And Vomiting     Does the opposite of its purpose    Prochlorperazine Other (See Comments)     CAUSED SEIZURE    Prochlorperazine Edisylate Hives    Hydralazine Itching and Rash    Hydralazine Hcl Itching and Rash    Meperidine Itching and Swelling    Prochlorperazine Maleate Other (See Comments)     CAUSES SEIZURE    Prochlorperazine Maleate Irritability       Past Surgical History:   Procedure Laterality Date    BREAST BIOPSY Left     7/2019. BENIGN    CARDIAC CATHETERIZATION Left 5/2/2021    Procedure: Cardiac Catheterization/Vascular Study;  Surgeon: Chacho Esteban MD;  Location: [facility] CATH INVASIVE LOCATION;  Service: Cardiovascular;  Laterality: Left;    CARDIAC CATHETERIZATION N/A 5/2/2021    Procedure: Intra-Aortic Baloon Pump Insertion;  Surgeon: Chacho Esteban MD;  Location: [facility] CATH INVASIVE LOCATION;  Service: Cardiovascular;  Laterality: N/A;    CATARACT EXTRACTION WITH INTRAOCULAR LENS IMPLANT Bilateral     CHOLECYSTECTOMY      COLONOSCOPY      CORONARY ARTERY BYPASS GRAFT N/A 5/2/2021    Procedure: CORONARY ARTERY BYPASS WITH INTERNAL MAMMARY ARTERY GRAFT;  Surgeon: Jr Rocael Alexander MD;  Location: [facility] CVOR;  Service: Cardiothoracic;  Laterality: N/A;    FOOT FUSION Right 11/13/2020    Procedure: RIGHT OPEN TREATMENT LISFRANC INJURY, OPEN REDUCTION INTERNAL FIXATION MEDIAL/MIDDLE CUNEIFORM FRACTURE AND 2ND/3RD METATARSAL FRACTURE 1ST 2ND POSSIBLE 3RD TARSOMETATARSAL ARTHRODESIS INTERCUNEIFORM ARTHRODESIS CALCANEAL BONE GRAFT;  Surgeon: Mikhail Banks Jr., MD;  Location: [facility];  Service: Orthopedics;  Laterality: Right;    INCISION AND DRAINAGE LEG Right 1/8/2021    Procedure: RIGHT IRRIGATION AND DEBRIDEMENT OF FOOT WITH SECONDARY CLOSURE AND HARDWARE REMOVAL;  Surgeon: Mikhail Banks Jr., MD;  Location: [facility] OR;  Service:  Orthopedics;  Laterality: Right;    KNEE ARTHROSCOPY Bilateral     TOTAL ABDOMINAL HYSTERECTOMY      TRANSESOPHAGEAL ECHOCARDIOGRAM (EMILIA) N/A 5/2/2021    Procedure: TRANSESOPHAGEAL ECHOCARDIOGRAM;  Surgeon: Jr Rocael Alexander MD;  Location: [facility];  Service: Cardiothoracic;  Laterality: N/A;    UPPER GASTROINTESTINAL ENDOSCOPY  08/2016    US GUIDED FINE NEEDLE ASPIRATION  5/8/2020       Family History   Problem Relation Age of Onset    Diabetes Mother     COPD Mother     Hypertension Mother     Kidney disease Mother     Obesity Mother     Thyroid disease Mother     Diabetes Sister     Diabetes Sister     Diabetes Sister     Diabetes Sister     Malig Hyperthermia Neg Hx        Social History     Socioeconomic History    Marital status:    Tobacco Use    Smoking status: Never    Smokeless tobacco: Never   Vaping Use    Vaping status: Never Used   Substance and Sexual Activity    Alcohol use: Yes     Comment: socially     Drug use: Never    Sexual activity: Defer           Objective   Physical Exam  Constitutional:       Appearance: Normal appearance.   HENT:      Head: Normocephalic and atraumatic.      Mouth/Throat:      Mouth: Mucous membranes are moist.      Pharynx: Oropharynx is clear.   Eyes:      Extraocular Movements: Extraocular movements intact.      Conjunctiva/sclera: Conjunctivae normal.      Pupils: Pupils are equal, round, and reactive to light.   Cardiovascular:      Rate and Rhythm: Normal rate and regular rhythm.      Heart sounds: Normal heart sounds.   Pulmonary:      Effort: Pulmonary effort is normal.      Breath sounds: Normal breath sounds.   Musculoskeletal:      Cervical back: Normal range of motion and neck supple. No rigidity.   Skin:     General: Skin is warm and dry.      Capillary Refill: Capillary refill takes less than 2 seconds.   Neurological:      Mental Status: She is alert and oriented to person, place, and time.      Cranial Nerves: No cranial nerve deficit.       Sensory: No sensory deficit.      Motor: No weakness.   Psychiatric:         Mood and Affect: Mood normal.         Behavior: Behavior normal.         Procedures          ED Course                                             Medical Decision Making  Patient little if any relief with migraine cocktail.  Patient has no history of migraines and etiology of headache at this time is not clear.  Temporal arteritis was considered but inflammatory markers are not elevated.  Patient does not appear ill but still reports a moderate headache.  Patient points to her right forehead/parietal scalp as well as maxilla.  Patient denies any cough or congestion.  Eye pressure checked in the right eye with iCare tonometer 11/15.  I also used the Puma-Pen but got mixed results.  No measurements were high.  Acute glaucoma felt to be highly unlikely.  There may be some residual eye irritation after Puma-Pen investigation but patient had no eye injection prior to this, symmetric pupils, no eye pain or vision changes.  This was simply done to expand differential.  Meningitis felt to be highly unlikely as there is no neck stiffness or fever, normal white count.  Will check procalcitonin with PCR panel and observe given intractable symptoms.  Case discussed with Shaji with hospital service, accepts    Problems Addressed:  Acute intractable headache, unspecified headache type: acute illness or injury    Amount and/or Complexity of Data Reviewed  Labs: ordered.  Radiology: ordered.    Risk  Prescription drug management.        Final diagnoses:   Acute intractable headache, unspecified headache type       ED Disposition  ED Disposition       ED Disposition   Decision to Admit    Condition   --    Comment   Level of Care: Med/Surg [1]   Admitting Physician: JUAN TAYLOR [[id number]]                 No follow-up provider specified.       Medication List      No changes were made to your prescriptions during this visit.            Mikhail Barbour,  MD  06/27/24 0720      Electronically signed by Mikhail Barbour MD at 06/27/24 0720       Sonya Mohamud, RN at 06/27/24 0257          At 2100, pt took blood pressure and it was 188/104. Pt is also experiencing a headache that is painful on right frontal. Pt is also dizzy. Pt takes amlodipine for hypertension.     Electronically signed by Sonya Mohamud, RN at 06/27/24 0301       Physician Progress Notes (all)    No notes of this type exist for this encounter.          Consult Notes (all)        Nidia Uribe APRN at 06/27/24 1039        Consult Orders    1. Inpatient Neurology Consult General [[id number]] ordered by Mikhail Barbour MD at 06/27/24 0654              Attestation signed by Jay Painting MD at 06/27/24 1502 (Updated)    I have reviewed this documentation and agree.    Imaging reviewed  Maintain normotension  Possible stroke recrudescence                  Primary Care Provider: Traci Townsend APRN     Consult requested by: Dr. Barbour    Reason for Consultation: Neurological evaluation, headache    History taken from: patient chart RN    Chief complaint: Headache    Subjective   SUBJECTIVE:    History of present illness: Background per H&P:  Marge Mcghee is a 69 y.o. female with a CMH of hepatic steatos, HLD, UTI, REINA, GERD, DM2, CAD with triple bypass, CVA x 3, RLS who presented to [facility] on 6/27/2024 with right frontal/parietal headache.  Patient states that it was acute onset abruptly about 8 PM last night.  Stated migraines 10/10, no vision changes, there was some nausea, vomiting.  Some pain relief with applying ice pack.  At that time patient's blood pressure was 180s over 100s patient attempted to get some rest, however migraine was unresolved.     While in emergency department patient received 1 dose of Toradol, Reglan, and Benadryl.  Labs were remarkable for PTT of 26.5.  Patient received some relief with medications, stated headache was 5/10.      9:15 AM  EDT  As noted the patient's blood pressure has significant change of 86/45.  1 L of IV fluids ordered.     9:45 AM EDT while waiting for bed placement, patient developed left-sided facial droop as well as vision changes.  Code stroke protocol initiated.  Accompany patient and RN down to CT scan, neurology APRN at bedside as well.     - Portions of the above HPI were copied from previous encounters and edited as appropriate. PMH as detailed below.     Patient initially consulted for migraine.   Patient was a code stroke at approximately 920 this morning.    CT head initially completed and negative for any acute findings.  On exam patient does have left facial asymmetry, left arm and leg drift.  Patient also had left peripheral upper and lower visual field cut.  On further evaluation, patient states she started noticing the left vision deficit last night between 5 and 8 PM.  She also states she noticed that the left arm and leg was slight weaker prior to coming into the hospital as well.  Patient states she does have history of stroke and history of left-sided weakness with that stroke.  CTA and CTP also completed.     NIH was completed multiple times and each time patient's symptoms waxed and waned.  Patient's blood pressure initially was 80s over 50s and 1 L bolus was started prior to coming to the CT scanner.  Once patient was taken back to her room in the emergency department, patient's blood pressure was again lower in the 80s.  Dr. Painting at bedside as well to evaluate patient and obtain history.  Patient states multiple times that the vision change and the left-sided weakness started more so yesterday between 5 and 8 PM.     Patient is not complaining of headache at this time.     Due to the patient's wax and waning symptoms and the onset being greater than 12 hours, patient not TNK candidate. This was discussed with patient, patient agreed the plan.     Patient was seen by neurology team and August 2023 for  very similar episode of left-sided hemiparesis.  MRI brain at that time was negative.  Patient did have positive MRI for right pontine stroke in September 2022.         Review of Systems   Eyes:  Negative for visual disturbance.   Respiratory: Negative.     Cardiovascular: Negative.    Gastrointestinal: Negative.    Endocrine: Negative.    Genitourinary: Negative.    Musculoskeletal: Negative.    Skin: Negative.    Allergic/Immunologic: Negative.    Neurological:  Positive for facial asymmetry, speech difficulty, weakness and headaches. Negative for dizziness, tremors, seizures, syncope, light-headedness and numbness.   Hematological: Negative.    Psychiatric/Behavioral:  Negative for confusion.           PATIENT HISTORY:  Past Medical History:   Diagnosis Date    Adrenal nodule 11/16/2016    FINDINGS: Mild hepatic steatosis may be present. Cholecystectomy. No biliary ductal dilatation. Negative pancreas, spleen, left adrenal gland, and kidneys. The right adrenal presumed adenoma has increased slightly, measuring 3 x 4 cm in diameter,  compared to 2 x 3.5 cm on the previous exam. The attenuation values are consistent with an adenoma. Done at [facility] in August 2018    Age-related osteoporosis without current pathological fracture 11/16/2016    Arthritis     Delayed surgical wound healing     Essential hypertension 11/16/2016    Gastroesophageal reflux disease with esophagitis 11/16/2016    Hepatic steatosis 11/16/2016    History of anemia     History of sepsis     FROM UTI    Hyperlipidemia 11/16/2016    Lisfranc's dislocation     LEFT WITH FRACTURES NONHEALING FOOT    Osteomyelitis of left foot 11/2020    RLS (restless legs syndrome)     Squamous cell skin cancer 2014    Excised by Dr. James    Thyroid nodule 10/18/2019    BENIGN    Type 2 diabetes mellitus with peripheral neuropathy 11/16/2016    Vitamin D deficiency 1/25/2019   ,   Past Surgical History:   Procedure Laterality Date    BREAST BIOPSY Left      7/2019. BENIGN    CARDIAC CATHETERIZATION Left 5/2/2021    Procedure: Cardiac Catheterization/Vascular Study;  Surgeon: Chacho Esteban MD;  Location: [facility] CATH INVASIVE LOCATION;  Service: Cardiovascular;  Laterality: Left;    CARDIAC CATHETERIZATION N/A 5/2/2021    Procedure: Intra-Aortic Baloon Pump Insertion;  Surgeon: Chacho Esteban MD;  Location: [facility] CATH INVASIVE LOCATION;  Service: Cardiovascular;  Laterality: N/A;    CATARACT EXTRACTION WITH INTRAOCULAR LENS IMPLANT Bilateral     CHOLECYSTECTOMY      COLONOSCOPY      CORONARY ARTERY BYPASS GRAFT N/A 5/2/2021    Procedure: CORONARY ARTERY BYPASS WITH INTERNAL MAMMARY ARTERY GRAFT;  Surgeon: Jr Rocael Alexander MD;  Location: [facility][1];  Service: Cardiothoracic;  Laterality: N/A;    FOOT FUSION Right 11/13/2020    Procedure: RIGHT OPEN TREATMENT LISFRANC INJURY, OPEN REDUCTION INTERNAL FIXATION MEDIAL/MIDDLE CUNEIFORM FRACTURE AND 2ND/3RD METATARSAL FRACTURE 1ST 2ND POSSIBLE 3RD TARSOMETATARSAL ARTHRODESIS INTERCUNEIFORM ARTHRODESIS CALCANEAL BONE GRAFT;  Surgeon: Mikhail Banks Jr., MD;  Location: [facility];  Service: Orthopedics;  Laterality: Right;    INCISION AND DRAINAGE LEG Right 1/8/2021    Procedure: RIGHT IRRIGATION AND DEBRIDEMENT OF FOOT WITH SECONDARY CLOSURE AND HARDWARE REMOVAL;  Surgeon: Mikhail Banks Jr., MD;  Location: [facility];  Service: Orthopedics;  Laterality: Right;    KNEE ARTHROSCOPY Bilateral     TOTAL ABDOMINAL HYSTERECTOMY      TRANSESOPHAGEAL ECHOCARDIOGRAM (EMILIA) N/A 5/2/2021    Procedure: TRANSESOPHAGEAL ECHOCARDIOGRAM;  Surgeon: Jr Rocael Alexander MD;  Location: [facility][1];  Service: Cardiothoracic;  Laterality: N/A;    UPPER GASTROINTESTINAL ENDOSCOPY  08/2016    US GUIDED FINE NEEDLE ASPIRATION  5/8/2020   ,   Family History   Problem Relation Age of Onset    Diabetes Mother     COPD Mother     Hypertension Mother     Kidney disease Mother     Obesity Mother     Thyroid disease Mother     Diabetes Sister      Diabetes Sister     Diabetes Sister     Diabetes Sister     Britt Hyperthermia Neg Hx    ,   Social History     Tobacco Use    Smoking status: Never    Smokeless tobacco: Never   Vaping Use    Vaping status: Never Used   Substance Use Topics    Alcohol use: Yes     Comment: socially     Drug use: Never   ,   Prior to Admission medications    Medication Sig Start Date End Date Taking? Authorizing Provider   acetaminophen (TYLENOL) 325 MG tablet Take 650 mg by mouth Every 4 (Four) Hours As Needed for Mild Pain. Indications: Pain 1/1/23   iB Haque MD   amLODIPine (NORVASC) 10 MG tablet Take 1 tablet by mouth Daily. Indications: High Blood Pressure Disorder 1/1/23   Bi Haque MD   aspirin 81 MG EC tablet Take 1 tablet by mouth Daily. Indications: Disease involving Lipid Deposits in the Arteries 1/1/23   Bi Haque MD   atorvastatin (LIPITOR) 80 MG tablet Take 1 tablet by mouth Every Night. 10/25/22   Gladis Lang APRN   DULoxetine (CYMBALTA) 60 MG capsule Take 1 capsule by mouth Daily. Indications: Major Depressive Disorder 1/1/23   Bi Haque MD   famotidine (PEPCID) 20 MG tablet Take 1 tablet by mouth 2 (Two) Times a Day As Needed. Indications: Gastroesophageal Reflux Disease 10/10/22   Bi Haque MD   multivitamin with minerals tablet tablet Take 1 tablet by mouth Daily. Indications: Supplement 1/1/23   Bi Haque MD   promethazine (PHENERGAN) 12.5 MG tablet Take 12.5 mg by mouth Every 6 (Six) Hours As Needed for Nausea. Indications: Nausea and Vomiting 1/1/23   Bi Haque MD   ticagrelor (BRILINTA) 90 MG tablet tablet Take 1 tablet by mouth 2 (Two) Times a Day. 8/7/23   Neelam Negrete APRN   Tirzepatide (MOUNJARO) 12.5 MG/0.5ML solution pen-injector pen Inject 0.5 mL under the skin into the appropriate area as directed 1 (One) Time Per Week. Wednesday.  Indications: Type 2 Diabetes 1/1/23   Bi Haque MD    traZODone (DESYREL) 50 MG tablet Take 1 tablet by mouth Every Night. Indications: Trouble Sleeping 10/12/22   Provider, MD Bi    Allergies:  Zofran [ondansetron hcl], Prochlorperazine, Prochlorperazine edisylate, Hydralazine, Hydralazine hcl, Meperidine, Prochlorperazine maleate, and Prochlorperazine maleate    Current Facility-Administered Medications   Medication Dose Route Frequency Provider Last Rate Last Admin    sennosides-docusate (PERICOLACE) 8.6-50 MG per tablet 2 tablet  2 tablet Oral BID PRN Nhi Wolf P APRN        And    polyethylene glycol (MIRALAX) packet 17 g  17 g Oral Daily PRN Nhi Wolf APRN        And    bisacodyl (DULCOLAX) EC tablet 5 mg  5 mg Oral Daily PRN Nhi Wolf APRN        And    bisacodyl (DULCOLAX) suppository 10 mg  10 mg Rectal Daily PRN Toni Wolfe P, APRN        Calcium Replacement - Follow Nurse / BPA Driven Protocol   Does not apply PRN Nhi Wolf P, APRN        dextrose (D50W) (25 g/50 mL) IV injection 25 g  25 g Intravenous Q15 Min PRN Nhi Wolf APRN        dextrose (GLUTOSE) oral gel 15 g  15 g Oral Q15 Min PRN Nhi Wolf, APRN        glucagon (GLUCAGEN) injection 1 mg  1 mg Intramuscular Q15 Min PRN Nhi Wolf, APRN        insulin lispro (HUMALOG/ADMELOG) injection 2-7 Units  2-7 Units Subcutaneous 4x Daily AC & at Bedtime Nhi Wolf P APRN        Magnesium Standard Dose Replacement - Follow Nurse / BPA Driven Protocol   Does not apply PRN Nhi Wolf P, APRN        Phosphorus Replacement - Follow Nurse / BPA Driven Protocol   Does not apply PRN Toni Wolfe P, APRN        Potassium Replacement - Follow Nurse / BPA Driven Protocol   Does not apply PRN Toni Wolfe P, APRN        sodium chloride 0.9 % flush 10 mL  10 mL Intravenous Q12H Nhi Wolf APRN        sodium chloride 0.9 % flush 10 mL  10 mL Intravenous PRN Nhi Wolf, CANDIDO        sodium chloride 0.9 %  infusion 40 mL  40 mL Intravenous PRN Nhi Wolf APRN        sodium chloride 0.9 % infusion  100 mL/hr Intravenous Continuous Nhi Wolf APRN         Current Outpatient Medications   Medication Sig Dispense Refill    acetaminophen (TYLENOL) 325 MG tablet Take 650 mg by mouth Every 4 (Four) Hours As Needed for Mild Pain. Indications: Pain      amLODIPine (NORVASC) 10 MG tablet Take 1 tablet by mouth Daily. Indications: High Blood Pressure Disorder      aspirin 81 MG EC tablet Take 1 tablet by mouth Daily. Indications: Disease involving Lipid Deposits in the Arteries      atorvastatin (LIPITOR) 80 MG tablet Take 1 tablet by mouth Every Night. 90 tablet 3    DULoxetine (CYMBALTA) 60 MG capsule Take 1 capsule by mouth Daily. Indications: Major Depressive Disorder      famotidine (PEPCID) 20 MG tablet Take 1 tablet by mouth 2 (Two) Times a Day As Needed. Indications: Gastroesophageal Reflux Disease      multivitamin with minerals tablet tablet Take 1 tablet by mouth Daily. Indications: Supplement      promethazine (PHENERGAN) 12.5 MG tablet Take 12.5 mg by mouth Every 6 (Six) Hours As Needed for Nausea. Indications: Nausea and Vomiting      ticagrelor (BRILINTA) 90 MG tablet tablet Take 1 tablet by mouth 2 (Two) Times a Day. 60 tablet 6    Tirzepatide (MOUNJARO) 12.5 MG/0.5ML solution pen-injector pen Inject 0.5 mL under the skin into the appropriate area as directed 1 (One) Time Per Week. Wednesday.  Indications: Type 2 Diabetes      traZODone (DESYREL) 50 MG tablet Take 1 tablet by mouth Every Night. Indications: Trouble Sleeping          ________________________________________________________     Objective   OBJECTIVE:      PHYSICAL EXAM:    Constitutional: The patient is in no apparent distress, awake and alert. There is no shortness of breath.     PSYCHIATRIC: Appears anxious     HEENT: Normocephalic, atraumatic.     Chest: Breathing unlabored    Cardiac: Regular rate and rhythm. Hypotensive  (80/50s)     Extremities:  No clubbing, cyanosis or edema.    NEUROLOGICAL:    Cognition:   Oriented x 3   Fund of knowledge decent   Concentration and attention normal.   Short and long term memory appears intact    Cranial nerves;    II - pupils bilaterally equal reacting to light,  Left hemianopsia   Fundoscopic exam- Not able to be done, non-dilated exam  III,IV,VI: EOMI with no diplopia  V: Normal facial sensations  VII: Left  facial asymmetry,  VIII: No New hearing abnormality  IX, X, XI: normal gag and shoulder shrug;  XII: tongue is in the midline.    Sensory:  Intact to light touch in all extremities.     Motor: Strength 5-/5 LUE and LLE  No involuntary movements present. Normal tone and bulk.    Cerebellar: Finger to nose and mirror movements normal bilaterally.    Gait and balance: Deferred.     Physical exam performed by MIGUEL Avina.      NIHSS:    Level Of Consciousness 0  0=Alert; keenly responsive 1=Not alert, but arousable by minor stimulation 2=Not alert, requires repeated stimulation 3=Responds only with reflex movements    LOC Questions to Month and age 0  0=Answers both questions correctly 1=Answers one question correctly 2=Answers neither question correctly    LOC Commands      -Open/Close eyes 0    -Open/close  0   0=Performs both tasks correctly 1=Performs one task correctly 2=Performs neighter task correctly     Best Gaze 0  0=Normal 1=Partial gaze palsy 2=Forced deviation, or total gaze paresis    Visual 1  0=No visual loss 1=Partial hemianopia 2=Complete hemianopia 3=Bilateral hemianopia (blind including cortical blindness)    Facial Palsy 1  0=Normal symmetrical movement 1=Minor paralysis (asymmetry) 2=Partial paralysis (lower facde) 3=Complete paralysis (upper and lower face)    Motor: Left Arm-1  Left leg-1; Right Arm-0 Right Leg-0  0=No drift, limb holds posture for full 10 seconds 1=Drift, limb holds posture, no drift to bed 2=Some antigravity effort, cannot maintain posture,  drifts to bed 3=No effort against gravity, limb falls 4=No movement    Limb Ataxia 0  0=Absent 1=Present in one limb 2=Present in two limbs    Sensory 0   0=Normal 1=Mild to moderate sensory loss 2=Severe to total sensory loss    Best Language 0   0=No aphasia, normal 1=Mild to moderate aphasia 2=Severe Aphasia (very few words correct or understood)3=Multe, global aphasia    Dysarthria 0  0=Normal 1=Mild to moderate 2=Severe, unintelligible or mute/anarthric    Extinction/Neglect 0   0=No abnormality 1=Extinction to bilateral simultaneous stimulation 2=Profound neglect    Total 4     ________________________________________________________   RESULTS REVIEW:    VITAL SIGNS:   Temp:  [98.1 °F (36.7 °C)] 98.1 °F (36.7 °C)  Heart Rate:  [] 82  Resp:  [18] 18  BP: ()/(43-91) 123/63     LABS:      Lab 06/27/24  0344   WBC 10.28   HEMOGLOBIN 14.2   HEMATOCRIT 43.3   PLATELETS 254   NEUTROS ABS 6.82   IMMATURE GRANS (ABS) 0.03   LYMPHS ABS 2.48   MONOS ABS 0.73   EOS ABS 0.14   MCV 86.6   SED RATE 12   CRP <0.30   PROCALCITONIN 0.04   PROTIME 11.0   APTT 26.5*         Lab 06/27/24  0344   SODIUM 140   POTASSIUM 3.5   CHLORIDE 100   CO2 28.0   ANION GAP 12.0   BUN 13   CREATININE 1.06*   EGFR 56.3*   GLUCOSE 148*   CALCIUM 9.7             Lab 06/27/24  0344   PROTIME 11.0   INR 1.01                     Lab Results   Component Value Date    TSH 1.200 12/05/2023    LDL 83 12/05/2023    HGBA1C 7.40 (H) 12/05/2023    [id number] 410 12/05/2023       IMAGING STUDIES:  CT Angiogram Head w AI Analysis of LVO    Result Date: 6/27/2024  1.No acute abnormality identified within the large arteries of the head or neck. 2.No significant stenosis of the bilateral internal carotid arteries. 3.CT perfusion study has been reported separately. 4.Bilateral hypoattenuating thyroid nodules measuring up to approximately 1 cm. Nonemergent follow-up thyroid ultrasound may be considered. Electronically Signed: Naveed Love MD   6/27/2024 10:00 AM EDT  Workstation ID: [id number]    CT CEREBRAL PERFUSION WITH & WITHOUT CONTRAST    Result Date: 6/27/2024  Impression: No evidence of brain ischemia or infarct Electronically Signed: Will Hernandez  6/27/2024 9:56 AM EDT  Workstation ID: [id number]    CT Head Without Contrast Stroke Protocol    Result Date: 6/27/2024  Impression: 1. No intracranial hemorrhage 2. No CT changes of vascular territory brain ischemia at this time Electronically Signed: Will Hernandez  6/27/2024 9:46 AM EDT  Workstation ID: [id number]    CT Head Without Contrast    Result Date: 6/27/2024  Impression: No acute intracranial process. Electronically Signed: Agnieszka Lujan MD  6/27/2024 4:07 AM EDT  Workstation ID: [id number]     I reviewed the patient's new clinical results.    ________________________________________________________     PROBLEM LIST:    Migraine, intractable            ASSESSMENT/PLAN:    Left hemiparesis, left visual field cut.   MRI brain negative for stroke  Likely recrudescence from previous stoke (explains the left sided symptoms but not the visual field cut- right pontine stroke in 2022), hemiplegic/migraine with aura is also within differential.   - CT head negative  - MRI brain reviewed and negative for acute to subacute stroke..  There is mild white matter changes compatible with small vessel ischemic disease that correlates with patient's age.  - CTA head and neck: No significant stenosis of the head or neck.  - Echo- EF is 61-65%   - EKG: Sinus rhythm, rate 81  - Labs: A1C: P, B12: P, LDL:  129, TSH: P  - continue bASA and Brilinta 90 mg BID  - Pt may benefit from a preventive medication (topamax)- will discuss   - Will follow up     2.  Hypertension history, hypotensive in hospital  -1 L IV fluids given, continue IVF, hold anti-hypertensive medications until needed.     3.  Type 2 Diabetes Mellitus  - A1C: P  - Strict glycemic control, SSI, diabetic diet, diabetes educator    4.  Hyperlipidemia  - Statin  & dietary modifications    5.  History of coronary artery disease-on Brilinta and aspirin      Will follow-up.      I discussed the patient's findings and my recommendations with patient and nursing staff    CANDIDO Rhodes  06/27/24  10:39 EDT          Electronically signed by Jay Painting MD at 06/27/24 5217

## 2024-06-27 NOTE — THERAPY EVALUATION
Acute Care - Speech Language Pathology   Swallow Initial Evaluation  [place]     Patient Name: Marge Mcghee  : 1955  MRN: [id number]  Today's Date: 2024               Admit Date: 2024    Visit Dx:     ICD-10-CM ICD-9-CM   1. Acute intractable headache, unspecified headache type  R51.9 784.0     Patient Active Problem List   Diagnosis    Type 2 diabetes mellitus with retinopathy, with long-term current use of insulin    Age-related osteoporosis without current pathological fracture    Hyperlipidemia    Hepatic steatosis    Gastroesophageal reflux disease with esophagitis    Adrenal nodule    Vitamin D deficiency    Thyroid nodule    UTI (urinary tract infection)    REINA (acute kidney injury)    Hyperglycemia    Acute right flank pain    Vomiting    Hypomagnesemia    Dehydration    Obesity (BMI 30-39.9)    Complicated migraine    Occipital neuralgia of left side    Polypharmacy    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    Lisfranc dislocation    Delayed surgical wound healing    Right foot infection    Chest pain    GERD (gastroesophageal reflux disease)    Stable angina pectoris    S/P CABG x 3    Encephalopathy, metabolic    Hypertensive emergency    Dysarthria    CVA (cerebral vascular accident)    Coronary artery disease involving coronary bypass graft of native heart without angina pectoris    Essential hypertension    HTN, goal below 130/80    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    Type 2 diabetes mellitus without complication, with long-term current use of insulin    Change in vision    Other headache syndrome    Migraine, intractable     Past Medical History:   Diagnosis Date    Adrenal nodule 2016    FINDINGS: Mild hepatic steatosis may be present. Cholecystectomy. No biliary ductal dilatation. Negative pancreas, spleen, left adrenal gland, and  kidneys. The right adrenal presumed adenoma has increased slightly, measuring 3 x 4 cm in diameter,  compared to 2 x 3.5 cm on the previous exam. The attenuation values are consistent with an adenoma. Done at [facility] in August 2018    Age-related osteoporosis without current pathological fracture 11/16/2016    Arthritis     Delayed surgical wound healing     Essential hypertension 11/16/2016    Gastroesophageal reflux disease with esophagitis 11/16/2016    Hepatic steatosis 11/16/2016    History of anemia     History of sepsis     FROM UTI    Hyperlipidemia 11/16/2016    Lisfranc's dislocation     LEFT WITH FRACTURES NONHEALING FOOT    Osteomyelitis of left foot 11/2020    RLS (restless legs syndrome)     Squamous cell skin cancer 2014    Excised by Dr. James    Thyroid nodule 10/18/2019    BENIGN    Type 2 diabetes mellitus with peripheral neuropathy 11/16/2016    Vitamin D deficiency 1/25/2019     Past Surgical History:   Procedure Laterality Date    BREAST BIOPSY Left     7/2019. BENIGN    CARDIAC CATHETERIZATION Left 5/2/2021    Procedure: Cardiac Catheterization/Vascular Study;  Surgeon: Chacho Esteban MD;  Location: [facility] CATH INVASIVE LOCATION;  Service: Cardiovascular;  Laterality: Left;    CARDIAC CATHETERIZATION N/A 5/2/2021    Procedure: Intra-Aortic Baloon Pump Insertion;  Surgeon: Chacho Esteban MD;  Location: [facility] CATH INVASIVE LOCATION;  Service: Cardiovascular;  Laterality: N/A;    CATARACT EXTRACTION WITH INTRAOCULAR LENS IMPLANT Bilateral     CHOLECYSTECTOMY      COLONOSCOPY      CORONARY ARTERY BYPASS GRAFT N/A 5/2/2021    Procedure: CORONARY ARTERY BYPASS WITH INTERNAL MAMMARY ARTERY GRAFT;  Surgeon: Jr Rocael Alexander MD;  Location: [facility] CVOR;  Service: Cardiothoracic;  Laterality: N/A;    FOOT FUSION Right 11/13/2020    Procedure: RIGHT OPEN TREATMENT LISFRANC INJURY, OPEN REDUCTION INTERNAL FIXATION MEDIAL/MIDDLE CUNEIFORM FRACTURE AND 2ND/3RD METATARSAL FRACTURE 1ST 2ND  POSSIBLE 3RD TARSOMETATARSAL ARTHRODESIS INTERCUNEIFORM ARTHRODESIS CALCANEAL BONE GRAFT;  Surgeon: Mikhail Banks Jr., MD;  Location: [facility] OR [facility];  Service: Orthopedics;  Laterality: Right;    INCISION AND DRAINAGE LEG Right 1/8/2021    Procedure: RIGHT IRRIGATION AND DEBRIDEMENT OF FOOT WITH SECONDARY CLOSURE AND HARDWARE REMOVAL;  Surgeon: Mikhail Banks Jr., MD;  Location: [facility] MAIN OR;  Service: Orthopedics;  Laterality: Right;    KNEE ARTHROSCOPY Bilateral     TOTAL ABDOMINAL HYSTERECTOMY      TRANSESOPHAGEAL ECHOCARDIOGRAM (EMILIA) N/A 5/2/2021    Procedure: TRANSESOPHAGEAL ECHOCARDIOGRAM;  Surgeon: Jr Rocael Alexander MD;  Location: [facility];  Service: Cardiothoracic;  Laterality: N/A;    UPPER GASTROINTESTINAL ENDOSCOPY  08/2016    US GUIDED FINE NEEDLE ASPIRATION  5/8/2020       SLP Recommendation and Plan  SLP Swallowing Diagnosis: mild, oral dysphagia, functional pharyngeal phase (06/27/24 1300)  SLP Diet Recommendation: mechanical ground textures, thin liquids (06/27/24 1300)  Recommended Precautions and Strategies: upright posture during/after eating, fatigue precautions, reflux precautions, general aspiration precautions, small bites of food and sips of liquid (06/27/24 1300)  SLP Rec. for Method of Medication Administration: as tolerated (06/27/24 1300)     Monitor for Signs of Aspiration: yes, notify SLP if any concerns (06/27/24 1300)  Recommended Diagnostics: reassess via clinical swallow evaluation (06/27/24 1300)  Swallow Criteria for Skilled Therapeutic Interventions Met: demonstrates skilled criteria (06/27/24 1300)     Rehab Potential/Prognosis, Swallowing: good, to achieve stated therapy goals (06/27/24 1300)  Therapy Frequency (Swallow): PRN (06/27/24 1300)  Predicted Duration Therapy Intervention (Days): until discharge (06/27/24 1300)  Oral Care Recommendations: Oral Care BID/PRN (06/27/24 1300)                       SWALLOW EVALUATION (Last 72 Hours)       SLP Adult Swallow  Evaluation       Row Name 06/27/24 1300       Rehab Evaluation    Document Type evaluation  -RM    Subjective Information complains of;fatigue  -RM    Patient Observations alert;cooperative;agree to therapy  -RM    Patient/Family/Caregiver Comments/Observations no family present at bedside  -RM    Patient Effort good  -RM       General Information    Patient Profile Reviewed yes  -RM    Pertinent History Of Current Problem Pt is familiar to [facility] ST dept with diet recommendations of regular/thins 12/2023.     Per H&P: Marge Mcghee is a 69 y.o. female with a CMH of hepatic steatos, HLD, UTI, REINA, GERD, DM2, CAD with triple bypass, CVA x 3, RLS who [facility] with right frontal/parietal headache.  Patient states that it was acute onset abruptly about 8 PM last night.  Stated migraines 10/10, no vision changes, there was some nausea, vomiting.  Some pain relief with applying ice pack.  At that time patient's blood pressure was 180s over 100s patient attempted to get some rest, however migraine was unresolved.     While in emergency department patient received 1 dose of Toradol, Reglan, and Benadryl.  Labs were remarkable for PTT of 26.5.  Patient received some relief with medications, stated headache was 5/10.     Narrative & Impression   MRI BRAIN WO CONTRAST     Date of Exam: 6/27/2024 10:29 AM EDT     Indication: Left side vision loss, left side weakness.     Comparison: CT same day and prior including prior MRIs     Technique:  Routine multiplanar/multisequence sequence images of the brain were obtained without contrast administration.        Findings:  Diffusion weighted imaging demonstrates no acute restriction abnormality.           The midline structures of the brain appear grossly unremarkable in appearance.     Pituitary and sella structures and the craniocervical junction are grossly unremarkable in appearance.     Mild degree of punctate and patchy FLAIR and T2 signal  abnormality scattered within the supratentorial brain is nonspecific but most compatible with sequela small vessel ischemic disease in this age group. Changes also noted within the tomas and midbrain.     Major intracranial flow voids appear patent. The mastoid air cells, paranasal sinuses and orbits are grossly unremarkable in appearance     IMPRESSION:  Impression:     1. No acute ischemic change.     2. Mild white matter changes compatible small vessel ischemic disease in this age.           Electronically Signed: Antelmo Ramirez MD    6/27/2024 10:57 AM EDT    Workstation ID: [id number]       -RM    Current Method of Nutrition NPO  per stroke protocol  -RM    Precautions/Limitations, Hearing WFL;for purposes of eval  -RM    Prior Level of Function-Swallowing no diet consistency restrictions;safe, efficient swallowing in all situations  -RM    Plans/Goals Discussed with patient;other (see comments)  pt's nurse  -RM       Pain    Additional Documentation Pain Scale: FACES Pre/Post-Treatment (Group)  -RM       Pain Scale: FACES Pre/Post-Treatment    Pain: FACES Scale, Pretreatment 0-->no hurt  -RM    Posttreatment Pain Rating 0-->no hurt  -RM       Oral Motor Structure and Function    Oral Lesions or Structural Abnormalities and/or variants none identified  -RM    Dentition Assessment natural, present and adequate  -RM    Secretion Management WNL/WFL  -RM    Mucosal Quality dry;sticky  -RM       Oral Musculature and Cranial Nerve Assessment    Oral Motor, Comment Oral mech exam completed. Lingual elevation, lateralization, and elevation noted WFL. Labial protrusion WNL. Labial retraction noted as decreased on left side. Pt reports difficulty with her speech. Pt reports that she is having more difficultly and reports that her speech sounds “slurred”. MRI completed 6/27/24 showed no acute ischemia.  -RM       Clinical Swallow Eval    Clinical Swallow Evaluation Summary Clinical swallow evaluation completed on today’s  date. Pt alert, cooperative, and agreeable to therapy. Swallow orders placed per stroke protocol d/t facial asymmetry. Pt reports no history of dysphagia; however, endorses GERD. Pt familiar to [facility] dept with diet recommendations of regular/thin liquids 12/2023.  Oral mech exam completed. Lingual elevation, lateralization, and elevation noted WFL. Labial protrusion WNL. Labial retraction noted as decreased on left side. Pt reports difficulty with her speech. Pt reports that she is having more difficultly and reports that her speech sounds “slurred”. MRI completed 6/27/24 impression- no acute ischemia changes. Pt on room air.             Pt agreeable to trials of icechips, thin liquids, puree, and mech soft solids.Pt seated upright in bed and self-fed trials. Adequate labial seal and no anterior spillage noted. No cough/throat clear across trials. No oral/lingual residue observed. Slightly prolonged mastication with mech soft noted; however remained functional. Pt endorsed fatigue with mech soft solids and no further trials completed. No overt s/s of aspiration noted across all trials.             ST RECOMMENDATIONS: ST recommends that pt initiate a mechanical soft solid/thin liquid diet. Pt should remain upright during meals/medication, take small bites/sips, monitor for fatigue and any s/s of aspiration. ST will continue to follow to ensure diet tolerance and further goals/recommendations as indicated.  -RM       SLP Evaluation Clinical Impression    SLP Swallowing Diagnosis mild;oral dysphagia;functional pharyngeal phase  -RM    Functional Impact risk of malnutrition  -RM    Rehab Potential/Prognosis, Swallowing good, to achieve stated therapy goals  -    Swallow Criteria for Skilled Therapeutic Interventions Met demonstrates skilled criteria  -RM       Recommendations    Therapy Frequency (Swallow) PRN  -RM    Predicted Duration Therapy Intervention (Days) until discharge  -RM    SLP Diet Recommendation  mechanical ground textures;thin liquids  -RM    Recommended Diagnostics reassess via clinical swallow evaluation  -RM    Recommended Precautions and Strategies upright posture during/after eating;fatigue precautions;reflux precautions;general aspiration precautions;small bites of food and sips of liquid  -RM    Oral Care Recommendations Oral Care BID/PRN  -RM    SLP Rec. for Method of Medication Administration as tolerated  -RM    Monitor for Signs of Aspiration yes;notify SLP if any concerns  -RM       Swallow Goals (SLP)    Swallow LTGs Swallow Long Term Goal (free text)  -RM    Swallow STGs diet tolerance goal selection (SLP)  -RM    Diet Tolerance Goal Selection (SLP) Swallow Short Term Goal 1  -RM       (LTG) Swallow    (LTG) Swallow The patient will maximize swallow function for least restrictive PO diet, exhibiting no complications associated with dysphagia, adequate PO intake, and demonstrating independent use of safe swallow strategies.  -RM    Time Frame (Swallow Long Term Goal) by discharge  -RM    Progress/Outcomes (Swallow Long Term Goal) new goal  -RM       (STG) Swallow 1    (STG) Swallow 1 The patient will participate in a meal/follow-up assessment to determine safety and adequacy of recommended diet, independent use of safe swallow compensations, pt/family education and additional goals/recommendations to follow  -RM    Time Frame (Swallow Short Term Goal 1) other (see comments)  2-3 days  -RM    Progress/Outcomes (Swallow Short Term Goal 1) new goal  -RM              User Key  (r) = Recorded By, (t) = Taken By, (c) = Cosigned By      Initials Name Effective Dates    RM Bethel Granados, SLP 01/26/23 -                     EDUCATION  The patient has been educated in the following areas:   Dysphagia (Swallowing Impairment) Modified Diet Instruction.        SLP GOALS       Row Name 06/27/24 1300       (LTG) Swallow    (LTG) Swallow The patient will maximize swallow function for least restrictive PO  diet, exhibiting no complications associated with dysphagia, adequate PO intake, and demonstrating independent use of safe swallow strategies.  -RM    Time Frame (Swallow Long Term Goal) by discharge  -RM    Progress/Outcomes (Swallow Long Term Goal) new goal  -RM       (STG) Swallow 1    (STG) Swallow 1 The patient will participate in a meal/follow-up assessment to determine safety and adequacy of recommended diet, independent use of safe swallow compensations, pt/family education and additional goals/recommendations to follow  -RM    Time Frame (Swallow Short Term Goal 1) other (see comments)  2-3 days  -RM    Progress/Outcomes (Swallow Short Term Goal 1) new goal  -RM              User Key  (r) = Recorded By, (t) = Taken By, (c) = Cosigned By      Initials Name Provider Type    Bethel Waggoner, SLP Speech and Language Pathologist                         Time Calculation:                Bethel Granados SLP  6/27/2024

## 2024-06-27 NOTE — ED PROVIDER NOTES
Subjective   History of Present Illness  69-year-old female with onset of right frontal headache at 8 PM, moderate with associated nausea and vomiting presents for further evaluation.  Patient also has associated elevated blood pressure.  No fever, no trauma, no neck stiffness, no rashes no chest pain.      Review of Systems   Gastrointestinal:  Positive for nausea and vomiting.   Neurological:  Positive for headaches.       Past Medical History:   Diagnosis Date    Adrenal nodule 11/16/2016    FINDINGS: Mild hepatic steatosis may be present. Cholecystectomy. No biliary ductal dilatation. Negative pancreas, spleen, left adrenal gland, and kidneys. The right adrenal presumed adenoma has increased slightly, measuring 3 x 4 cm in diameter,  compared to 2 x 3.5 cm on the previous exam. The attenuation values are consistent with an adenoma. Done at [facility] in August 2018    Age-related osteoporosis without current pathological fracture 11/16/2016    Arthritis     Delayed surgical wound healing     Essential hypertension 11/16/2016    Gastroesophageal reflux disease with esophagitis 11/16/2016    Hepatic steatosis 11/16/2016    History of anemia     History of sepsis     FROM UTI    Hyperlipidemia 11/16/2016    Lisfranc's dislocation     LEFT WITH FRACTURES NONHEALING FOOT    Osteomyelitis of left foot 11/2020    RLS (restless legs syndrome)     Squamous cell skin cancer 2014    Excised by Dr. James    Thyroid nodule 10/18/2019    BENIGN    Type 2 diabetes mellitus with peripheral neuropathy 11/16/2016    Vitamin D deficiency 1/25/2019       Allergies   Allergen Reactions    Zofran [Ondansetron Hcl] Nausea And Vomiting     Does the opposite of its purpose    Prochlorperazine Other (See Comments)     CAUSED SEIZURE    Prochlorperazine Edisylate Hives    Hydralazine Itching and Rash    Hydralazine Hcl Itching and Rash    Meperidine Itching and Swelling    Prochlorperazine Maleate Other (See Comments)     CAUSES  SEIZURE    Prochlorperazine Maleate Irritability       Past Surgical History:   Procedure Laterality Date    BREAST BIOPSY Left     7/2019. BENIGN    CARDIAC CATHETERIZATION Left 5/2/2021    Procedure: Cardiac Catheterization/Vascular Study;  Surgeon: Chacho Esteban MD;  Location: [facility] CATH INVASIVE LOCATION;  Service: Cardiovascular;  Laterality: Left;    CARDIAC CATHETERIZATION N/A 5/2/2021    Procedure: Intra-Aortic Baloon Pump Insertion;  Surgeon: Chacho Esteban MD;  Location: [facility] CATH INVASIVE LOCATION;  Service: Cardiovascular;  Laterality: N/A;    CATARACT EXTRACTION WITH INTRAOCULAR LENS IMPLANT Bilateral     CHOLECYSTECTOMY      COLONOSCOPY      CORONARY ARTERY BYPASS GRAFT N/A 5/2/2021    Procedure: CORONARY ARTERY BYPASS WITH INTERNAL MAMMARY ARTERY GRAFT;  Surgeon: Jr Rocael Alexander MD;  Location: [facility];  Service: Cardiothoracic;  Laterality: N/A;    FOOT FUSION Right 11/13/2020    Procedure: RIGHT OPEN TREATMENT LISFRANC INJURY, OPEN REDUCTION INTERNAL FIXATION MEDIAL/MIDDLE CUNEIFORM FRACTURE AND 2ND/3RD METATARSAL FRACTURE 1ST 2ND POSSIBLE 3RD TARSOMETATARSAL ARTHRODESIS INTERCUNEIFORM ARTHRODESIS CALCANEAL BONE GRAFT;  Surgeon: Mikhail Banks Jr., MD;  Location: [facility];  Service: Orthopedics;  Laterality: Right;    INCISION AND DRAINAGE LEG Right 1/8/2021    Procedure: RIGHT IRRIGATION AND DEBRIDEMENT OF FOOT WITH SECONDARY CLOSURE AND HARDWARE REMOVAL;  Surgeon: Mikhail Banks Jr., MD;  Location: [facility] OR;  Service: Orthopedics;  Laterality: Right;    KNEE ARTHROSCOPY Bilateral     TOTAL ABDOMINAL HYSTERECTOMY      TRANSESOPHAGEAL ECHOCARDIOGRAM (EMILIA) N/A 5/2/2021    Procedure: TRANSESOPHAGEAL ECHOCARDIOGRAM;  Surgeon: Jr Rocael Alexander MD;  Location: [facility];  Service: Cardiothoracic;  Laterality: N/A;    UPPER GASTROINTESTINAL ENDOSCOPY  08/2016    US GUIDED FINE NEEDLE ASPIRATION  5/8/2020       Family History   Problem Relation Age of Onset    Diabetes Mother      COPD Mother     Hypertension Mother     Kidney disease Mother     Obesity Mother     Thyroid disease Mother     Diabetes Sister     Diabetes Sister     Diabetes Sister     Diabetes Sister     Malig Hyperthermia Neg Hx        Social History     Socioeconomic History    Marital status:    Tobacco Use    Smoking status: Never    Smokeless tobacco: Never   Vaping Use    Vaping status: Never Used   Substance and Sexual Activity    Alcohol use: Yes     Comment: socially     Drug use: Never    Sexual activity: Defer           Objective   Physical Exam  Constitutional:       Appearance: Normal appearance.   HENT:      Head: Normocephalic and atraumatic.      Mouth/Throat:      Mouth: Mucous membranes are moist.      Pharynx: Oropharynx is clear.   Eyes:      Extraocular Movements: Extraocular movements intact.      Conjunctiva/sclera: Conjunctivae normal.      Pupils: Pupils are equal, round, and reactive to light.   Cardiovascular:      Rate and Rhythm: Normal rate and regular rhythm.      Heart sounds: Normal heart sounds.   Pulmonary:      Effort: Pulmonary effort is normal.      Breath sounds: Normal breath sounds.   Musculoskeletal:      Cervical back: Normal range of motion and neck supple. No rigidity.   Skin:     General: Skin is warm and dry.      Capillary Refill: Capillary refill takes less than 2 seconds.   Neurological:      Mental Status: She is alert and oriented to person, place, and time.      Cranial Nerves: No cranial nerve deficit.      Sensory: No sensory deficit.      Motor: No weakness.   Psychiatric:         Mood and Affect: Mood normal.         Behavior: Behavior normal.         Procedures           ED Course                                             Medical Decision Making  Patient little if any relief with migraine cocktail.  Patient has no history of migraines and etiology of headache at this time is not clear.  Temporal arteritis was considered but inflammatory markers are not elevated.   Patient does not appear ill but still reports a moderate headache.  Patient points to her right forehead/parietal scalp as well as maxilla.  Patient denies any cough or congestion.  Eye pressure checked in the right eye with iCare tonometer 11/15.  I also used the Puma-Pen but got mixed results.  No measurements were high.  Acute glaucoma felt to be highly unlikely.  There may be some residual eye irritation after Puma-Pen investigation but patient had no eye injection prior to this, symmetric pupils, no eye pain or vision changes.  This was simply done to expand differential.  Meningitis felt to be highly unlikely as there is no neck stiffness or fever, normal white count.  Will check procalcitonin with PCR panel and observe given intractable symptoms.  Case discussed with Shaji with hospital service, accepts    Problems Addressed:  Acute intractable headache, unspecified headache type: acute illness or injury    Amount and/or Complexity of Data Reviewed  Labs: ordered.  Radiology: ordered.    Risk  Prescription drug management.        Final diagnoses:   Acute intractable headache, unspecified headache type       ED Disposition  ED Disposition       ED Disposition   Decision to Admit    Condition   --    Comment   Level of Care: Med/Surg [1]   Admitting Physician: JUAN TAYLOR [[id number]]                 No follow-up provider specified.       Medication List      No changes were made to your prescriptions during this visit.            Mikhail Barbour MD  06/27/24 [id number]

## 2024-06-27 NOTE — PLAN OF CARE
Goal Outcome Evaluation:   Clinical swallow evaluation completed on today’s date. Pt alert, cooperative, and agreeable to therapy. Swallow orders placed per stroke protocol d/t facial asymmetry. Pt reports no history of dysphagia; however, endorses GERD. Pt familiar to [facility] ST dept with diet recommendations of regular/thin liquids 12/2023.  Oral mech exam completed. Lingual elevation, lateralization, and elevation noted WFL. Labial protrusion WNL. Labial retraction noted as decreased on left side. Pt reports difficulty with her speech. Pt reports that she is having more difficultly and reports that her speech sounds “slurred”. MRI completed 6/27/24 impression- no acute ischemia changes. Pt on room air.             Pt agreeable to trials of icechips, thin liquids, puree, and mech soft solids.Pt seated upright in bed and self-fed trials. Adequate labial seal and no anterior spillage noted. No cough/throat clear across trials. No oral/lingual residue observed. Slightly prolonged mastication with mech soft noted; however remained functional. Pt endorsed fatigue with mech soft solids and no further trials completed. No overt s/s of aspiration noted across all trials.             ST RECOMMENDATIONS: ST recommends that pt initiate a mechanical soft solid/thin liquid diet. Pt should remain upright during meals/medication, take small bites/sips, monitor for fatigue and any s/s of aspiration. ST will continue to follow to ensure diet tolerance and further goals/recommendations as indicated.            SLP Swallowing Diagnosis: mild, oral dysphagia, functional pharyngeal phase (06/27/24 1300)

## 2024-06-28 LAB
ANION GAP SERPL CALCULATED.3IONS-SCNC: 8.3 MMOL/L (ref 5–15)
BASOPHILS # BLD AUTO: 0.1 10*3/MM3 (ref 0–0.2)
BASOPHILS NFR BLD AUTO: 1.6 % (ref 0–1.5)
BUN SERPL-MCNC: 23 MG/DL (ref 8–23)
BUN/CREAT SERPL: 17.2 (ref 7–25)
CALCIUM SPEC-SCNC: 9.3 MG/DL (ref 8.6–10.5)
CHLORIDE SERPL-SCNC: 104 MMOL/L (ref 98–107)
CO2 SERPL-SCNC: 27.7 MMOL/L (ref 22–29)
CREAT SERPL-MCNC: 1.34 MG/DL (ref 0.57–1)
DEPRECATED RDW RBC AUTO: 41.7 FL (ref 37–54)
EGFRCR SERPLBLD CKD-EPI 2021: 42.5 ML/MIN/1.73
EOSINOPHIL # BLD AUTO: 0.3 10*3/MM3 (ref 0–0.4)
EOSINOPHIL NFR BLD AUTO: 4.8 % (ref 0.3–6.2)
ERYTHROCYTE [DISTWIDTH] IN BLOOD BY AUTOMATED COUNT: 12.8 % (ref 12.3–15.4)
GLUCOSE BLDC GLUCOMTR-MCNC: 119 MG/DL (ref 70–105)
GLUCOSE BLDC GLUCOMTR-MCNC: 169 MG/DL (ref 70–105)
GLUCOSE BLDC GLUCOMTR-MCNC: 196 MG/DL (ref 70–105)
GLUCOSE SERPL-MCNC: 140 MG/DL (ref 65–99)
HCT VFR BLD AUTO: 36.8 % (ref 34–46.6)
HGB BLD-MCNC: 11.8 G/DL (ref 12–15.9)
IMM GRANULOCYTES # BLD AUTO: 0.01 10*3/MM3 (ref 0–0.05)
IMM GRANULOCYTES NFR BLD AUTO: 0.2 % (ref 0–0.5)
LYMPHOCYTES # BLD AUTO: 3.13 10*3/MM3 (ref 0.7–3.1)
LYMPHOCYTES NFR BLD AUTO: 50.6 % (ref 19.6–45.3)
MCH RBC QN AUTO: 28.4 PG (ref 26.6–33)
MCHC RBC AUTO-ENTMCNC: 32.1 G/DL (ref 31.5–35.7)
MCV RBC AUTO: 88.7 FL (ref 79–97)
MONOCYTES # BLD AUTO: 0.53 10*3/MM3 (ref 0.1–0.9)
MONOCYTES NFR BLD AUTO: 8.6 % (ref 5–12)
NEUTROPHILS NFR BLD AUTO: 2.12 10*3/MM3 (ref 1.7–7)
NEUTROPHILS NFR BLD AUTO: 34.2 % (ref 42.7–76)
NRBC BLD AUTO-RTO: 0 /100 WBC (ref 0–0.2)
PLATELET # BLD AUTO: 212 10*3/MM3 (ref 140–450)
PMV BLD AUTO: 9 FL (ref 6–12)
POTASSIUM SERPL-SCNC: 3.6 MMOL/L (ref 3.5–5.2)
RBC # BLD AUTO: 4.15 10*6/MM3 (ref 3.77–5.28)
SODIUM SERPL-SCNC: 140 MMOL/L (ref 136–145)
VIT B12 BLD-MCNC: 300 PG/ML (ref 211–946)
WBC NRBC COR # BLD AUTO: 6.19 10*3/MM3 (ref 3.4–10.8)

## 2024-06-28 PROCEDURE — 85025 COMPLETE CBC W/AUTO DIFF WBC: CPT | Performed by: NURSE PRACTITIONER

## 2024-06-28 PROCEDURE — 63710000001 INSULIN LISPRO (HUMAN) PER 5 UNITS: Performed by: NURSE PRACTITIONER

## 2024-06-28 PROCEDURE — 80048 BASIC METABOLIC PNL TOTAL CA: CPT | Performed by: NURSE PRACTITIONER

## 2024-06-28 PROCEDURE — 82607 VITAMIN B-12: CPT | Performed by: NURSE PRACTITIONER

## 2024-06-28 PROCEDURE — 25010000002 PROMETHAZINE PER 50 MG: Performed by: INTERNAL MEDICINE

## 2024-06-28 PROCEDURE — 99232 SBSQ HOSP IP/OBS MODERATE 35: CPT | Performed by: NURSE PRACTITIONER

## 2024-06-28 PROCEDURE — 97162 PT EVAL MOD COMPLEX 30 MIN: CPT

## 2024-06-28 PROCEDURE — 82948 REAGENT STRIP/BLOOD GLUCOSE: CPT | Performed by: NURSE PRACTITIONER

## 2024-06-28 RX ORDER — POTASSIUM CHLORIDE 1.5 G/1.58G
40 POWDER, FOR SOLUTION ORAL EVERY 4 HOURS
Status: DISPENSED | OUTPATIENT
Start: 2024-06-28 | End: 2024-06-28

## 2024-06-28 RX ORDER — BUTALBITAL, ACETAMINOPHEN AND CAFFEINE 50; 325; 40 MG/1; MG/1; MG/1
1 TABLET ORAL EVERY 4 HOURS PRN
Qty: 30 TABLET | Refills: 0 | Status: SHIPPED | OUTPATIENT
Start: 2024-06-28

## 2024-06-28 RX ORDER — BUTALBITAL, ACETAMINOPHEN AND CAFFEINE 50; 325; 40 MG/1; MG/1; MG/1
1 TABLET ORAL ONCE
Status: COMPLETED | OUTPATIENT
Start: 2024-06-28 | End: 2024-06-28

## 2024-06-28 RX ORDER — HYDROCODONE BITARTRATE AND ACETAMINOPHEN 5; 325 MG/1; MG/1
1 TABLET ORAL EVERY 6 HOURS PRN
Qty: 14 TABLET | Refills: 0 | Status: SHIPPED | OUTPATIENT
Start: 2024-06-28

## 2024-06-28 RX ORDER — ONDANSETRON 2 MG/ML
4 INJECTION INTRAMUSCULAR; INTRAVENOUS EVERY 6 HOURS PRN
Status: DISCONTINUED | OUTPATIENT
Start: 2024-06-28 | End: 2024-06-28

## 2024-06-28 RX ORDER — TOPIRAMATE 25 MG/1
25 TABLET ORAL DAILY
Qty: 30 TABLET | Refills: 1 | Status: SHIPPED | OUTPATIENT
Start: 2024-06-29

## 2024-06-28 RX ORDER — CALCIUM CARBONATE 500 MG/1
2 TABLET, CHEWABLE ORAL 3 TIMES DAILY PRN
Status: DISCONTINUED | OUTPATIENT
Start: 2024-06-28 | End: 2024-07-02 | Stop reason: HOSPADM

## 2024-06-28 RX ORDER — MORPHINE SULFATE 2 MG/ML
2 INJECTION, SOLUTION INTRAMUSCULAR; INTRAVENOUS EVERY 4 HOURS PRN
Status: DISCONTINUED | OUTPATIENT
Start: 2024-06-28 | End: 2024-07-02 | Stop reason: HOSPADM

## 2024-06-28 RX ORDER — BUTALBITAL, ACETAMINOPHEN AND CAFFEINE 50; 325; 40 MG/1; MG/1; MG/1
1 TABLET ORAL EVERY 4 HOURS PRN
Status: DISCONTINUED | OUTPATIENT
Start: 2024-06-28 | End: 2024-07-01

## 2024-06-28 RX ORDER — TOPIRAMATE 25 MG/1
25 TABLET ORAL DAILY
Status: DISCONTINUED | OUTPATIENT
Start: 2024-06-28 | End: 2024-07-02 | Stop reason: HOSPADM

## 2024-06-28 RX ADMIN — INSULIN LISPRO 2 UNITS: 100 INJECTION, SOLUTION INTRAVENOUS; SUBCUTANEOUS at 18:32

## 2024-06-28 RX ADMIN — ATORVASTATIN CALCIUM 80 MG: 40 TABLET, FILM COATED ORAL at 21:35

## 2024-06-28 RX ADMIN — GABAPENTIN 600 MG: 300 CAPSULE ORAL at 21:35

## 2024-06-28 RX ADMIN — Medication 10 ML: at 21:36

## 2024-06-28 RX ADMIN — TICAGRELOR 90 MG: 90 TABLET ORAL at 21:35

## 2024-06-28 RX ADMIN — ASPIRIN 81 MG: 81 TABLET, COATED ORAL at 08:31

## 2024-06-28 RX ADMIN — BUTALBITAL, ACETAMINOPHEN, AND CAFFEINE 1 TABLET: 50; 325; 40 TABLET ORAL at 18:32

## 2024-06-28 RX ADMIN — AMLODIPINE BESYLATE 10 MG: 5 TABLET ORAL at 08:31

## 2024-06-28 RX ADMIN — TRAZODONE HYDROCHLORIDE 50 MG: 50 TABLET ORAL at 21:35

## 2024-06-28 RX ADMIN — Medication 10 ML: at 08:32

## 2024-06-28 RX ADMIN — PROMETHAZINE HYDROCHLORIDE 12.5 MG: 25 INJECTION INTRAMUSCULAR; INTRAVENOUS at 18:15

## 2024-06-28 RX ADMIN — TOPIRAMATE 25 MG: 25 TABLET, FILM COATED ORAL at 18:32

## 2024-06-28 RX ADMIN — TICAGRELOR 90 MG: 90 TABLET ORAL at 08:31

## 2024-06-28 RX ADMIN — ACETAMINOPHEN 650 MG: 325 TABLET, FILM COATED ORAL at 15:13

## 2024-06-28 RX ADMIN — INSULIN LISPRO 2 UNITS: 100 INJECTION, SOLUTION INTRAVENOUS; SUBCUTANEOUS at 12:13

## 2024-06-28 RX ADMIN — GABAPENTIN 600 MG: 300 CAPSULE ORAL at 08:31

## 2024-06-28 RX ADMIN — POTASSIUM CHLORIDE 40 MEQ: 1.5 POWDER, FOR SOLUTION ORAL at 08:47

## 2024-06-28 RX ADMIN — CALCIUM CARBONATE 2 TABLET: 500 TABLET, CHEWABLE ORAL at 14:56

## 2024-06-28 NOTE — PLAN OF CARE
Goal Outcome Evaluation:     Patient is being discharged today.  is on his way to pick her up. She's going to go to outpatient rehab.

## 2024-06-28 NOTE — PLAN OF CARE
Goal Outcome Evaluation:      Pt remained A&Ox4 all shift, Q4 neuro checks completed. Pt complained of headache early in shift, see MAR. No further complaints this shift. VSS, plan of care ongoing.

## 2024-06-28 NOTE — NURSING NOTE
Hospitalist notified of patient now refusing to leave, additional discharge delay added to navigator. Awaiting hospitalist response.

## 2024-06-28 NOTE — PLAN OF CARE
Goal Outcome Evaluation:  Plan of Care Reviewed With: patient, spouse           Outcome Evaluation: 70 y/o presented to ED on 6/27/24 with c/o severe headache. Found to have elevated BP 180s over 100s.  Several hours later pt developed L side facial droop and reported vision changes, prompting Code stoke. Stroke work up was negative for acute CVA. Neurology differential diagnosis including recrudescence from previous stroke vs. Hemiplegic/migraine with aura. Pt with hx of R pontine stroke in 2022, but reports no residual deficits. Pt presents today with inconsistencies noted in effort with strength testing and gait trial. She demos decreased step length and impaired stance control on LLE inconsistently. Quality of gait and balance are improved with RW use. Pt lives with her supportive spouse. PT spoke with spouse on the phone regarding pt's presentation and potential for increased assistance needs at time of DC. Spouse verbalizes understanding and reports he's able and willing to assist her. He is available for 24/7 supervision/assist at home. He reports they have a RW and a WC. PT recommends OPPT to ensure full resolution of L side symptoms and balance deficits.  No PT POC established due to plans to DC home today. PT will sign off.      Anticipated Discharge Disposition (PT): home with assist, home with outpatient therapy services

## 2024-06-28 NOTE — NURSING NOTE
"Resumed care for nightshift, dayshift reported patient was discharged, paperwork done, however  ((patient's ride )) came and left. Did not take her home because \"she had a headache and was vomiting\". Patient is still present in room.  "

## 2024-06-28 NOTE — PROGRESS NOTES
Enter Query Response Below      Query Response: encephalopathy was not supported Electronically signed by Lasha Galindo MD, 06/28/24, 1:21 PM EDT.              If applicable, please update the problem list.

## 2024-06-28 NOTE — DISCHARGE SUMMARY
[facility] Medicine Services  Discharge Summary    Date of Service: 24  Patient Name: Marge Mcghee  : 1955  MRN: [id number]    Date of Admission: 2024  Discharge Diagnosis: Migraine  Date of Discharge:  24  Primary Care Physician: Traci Townsend APRN      Presenting Problem:   Migraine, intractable [G43.919]  Acute intractable headache, unspecified headache type [R51.9]    Active and Resolved Hospital Problems:  Active Hospital Problems    Diagnosis POA    **Migraine, intractable [G43.919] Yes    Encephalopathy, metabolic [G93.41] Yes     Priority: High    Hypertensive emergency [I16.1] Yes     Priority: High    S/P CABG x 3 [Z95.1] Not Applicable     Priority: High    Type 2 diabetes mellitus with retinopathy, with long-term current use of insulin [E11.319, Z79.4] Not Applicable     Priority: High    REINA (acute kidney injury) [N17.9] Yes     Priority: Medium    Obesity (BMI 30-39.9) [E66.9] Yes     Priority: Medium    Hyperlipidemia [E78.5] Yes     Priority: Medium    GERD (gastroesophageal reflux disease) [K21.9] Yes     Priority: Low    Polypharmacy [Z79.899] Not Applicable     Priority: Low    Vitamin D deficiency [E55.9] Yes     Priority: Low    Hepatic steatosis [K76.0] Yes     Priority: Low    Gastroesophageal reflux disease with esophagitis [K21.00] Yes     Priority: Low      Resolved Hospital Problems   No resolved problems to display.       Migraine  Strokelike symptoms  -History of CVA x 3  - CT head with perfusion negative  - CTA of head and neck negative  - Normal saline bolus over 1 hour  - Repeat migraine cocktail if required  - Acetaminophen for pain  - Continue Brilinta  - Neurochecks every 4  - Neurology consult     HLD  - Lipid panel ordered  - Resume home medication     DM2  - Accu-Cheks before meals and at bedtime  - Carb conscious diet  - Low-dose sliding scale     GERD  - Protonix     RLS  - Continue home regimen    Hospital Course       History  of Present Illness: Marge Mcghee is a 69 y.o. female with a CMH of hepatic steatos, HLD, UTI, REINA, GERD, DM2, CAD with triple bypass, CVA x 3, RLS who presented to [facility] on 6/27/2024 with right frontal/parietal headache.  Patient states that it was acute onset abruptly about 8 PM last night.  Stated migraines 10/10, no vision changes, there was some nausea, vomiting.  Some pain relief with applying ice pack.  At that time patient's blood pressure was 180s over 100s patient attempted to get some rest, however migraine was unresolved.     While in emergency department patient received 1 dose of Toradol, Reglan, and Benadryl.  Labs were remarkable for PTT of 26.5.  Patient received some relief with medications, stated headache was 5/10.      9:15 AM EDT  As noted the patient's blood pressure has significant change of 86/45.  1 L of IV fluids ordered.     9:45 AM EDT while waiting for bed placement, patient developed left-sided facial droop as well as vision changes.  Code stroke protocol initiated.  Accompany patient and RN down to CT scan, neurology APRN at bedside as well.    6/28/24 patient seen and examined in bed no acute distress, doing better today, will discharge patient home.  Condition at discharge stable, discussed with RN.    DISCHARGE Follow Up Recommendations for labs and diagnostics: Follow-up with PCP in a week  Follow-up with neurology in 2 weeks    Reasons For Change In Medications and Indications for New Medications:      Day of Discharge     Vital Signs:  Temp:  [97.4 °F (36.3 °C)-98.1 °F (36.7 °C)] 97.4 °F (36.3 °C)  Heart Rate:  [] 86  BP: (107-144)/(53-81) 128/77    Physical Exam   Constitutional:  Well developed, well nourished, no acute distress, non-toxic appearance   Eyes:  PERRL, conjunctiva normal, EOMI   HENT:  Atraumatic, external ears normal, nose normal, oropharynx moist, no pharyngeal exudates. Neck-normal range of motion, no tenderness, supple, trachea  midline  Respiratory:  ctab, non-labored respirations without accessory muscle use  Cardiovascular:  Normal rate, normal rhythm, no murmurs, no gallops, no rubs   GI:  Soft, nondistended, normal bowel sounds, nontender, no organomegaly, no mass, no rebound, no guarding   :  No costovertebral angle tenderness   Musculoskeletal:  No edema, no tenderness, no deformities  Integument:  Well hydrated, no rash   Lymphatic:  No lymphadenopathy noted   Neurologic:  Alert & oriented x 3, CN 2-12 normal, normal motor function, normal sensory function, no focal deficits noted   Psychiatric:  Speech and behavior appropriate    Diagnostic Data:      Pertinent  and/or Most Recent Results     LAB RESULTS:      Lab 06/28/24  0531 06/27/24  0344   WBC 6.19 10.28   HEMOGLOBIN 11.8* 14.2   HEMATOCRIT 36.8 43.3   PLATELETS 212 254   NEUTROS ABS 2.12 6.82   IMMATURE GRANS (ABS) 0.01 0.03   LYMPHS ABS 3.13* 2.48   MONOS ABS 0.53 0.73   EOS ABS 0.30 0.14   MCV 88.7 86.6   SED RATE  --  12   CRP  --  <0.30   PROCALCITONIN  --  0.04   PROTIME  --  11.0   APTT  --  26.5*         Lab 06/28/24  0531 06/27/24  0344   SODIUM 140 140   POTASSIUM 3.6 3.5   CHLORIDE 104 100   CO2 27.7 28.0   ANION GAP 8.3 12.0   BUN 23 13   CREATININE 1.34* 1.06*   EGFR 42.5* 56.3*   GLUCOSE 140* 148*   CALCIUM 9.3 9.7   HEMOGLOBIN A1C  --  7.74*   TSH  --  0.694             Lab 06/27/24  0344   PROTIME 11.0   INR 1.01         Lab 06/27/24  0344   CHOLESTEROL 212*   LDL CHOL 129*   HDL CHOL 59   TRIGLYCERIDES 135         Lab 06/28/24  0531   VITAMIN B 12 300         Brief Urine Lab Results       None          Microbiology Results (last 10 days)       Procedure Component Value - Date/Time    Respiratory Panel PCR w/COVID-19(SARS-CoV-2) ALISSA/HEAVENLY/NILA/PAD/COR/NADEEM In-House, NP Swab in UTM/VTM, 2 HR TAT - Swab, Nasopharynx [[id number]]  (Normal) Collected: 06/27/24 0705    Lab Status: Final result Specimen: Swab from Nasopharynx Updated: 06/27/24 0759     ADENOVIRUS, PCR  Not Detected     Coronavirus 229E Not Detected     Coronavirus HKU1 Not Detected     Coronavirus NL63 Not Detected     Coronavirus OC43 Not Detected     COVID19 Not Detected     Human Metapneumovirus Not Detected     Human Rhinovirus/Enterovirus Not Detected     Influenza A PCR Not Detected     Influenza B PCR Not Detected     Parainfluenza Virus 1 Not Detected     Parainfluenza Virus 2 Not Detected     Parainfluenza Virus 3 Not Detected     Parainfluenza Virus 4 Not Detected     RSV, PCR Not Detected     Bordetella pertussis pcr Not Detected     Bordetella parapertussis PCR Not Detected     Chlamydophila pneumoniae PCR Not Detected     Mycoplasma pneumo by PCR Not Detected    Narrative:      In the setting of a positive respiratory panel with a viral infection PLUS a negative procalcitonin without other underlying concern for bacterial infection, consider observing off antibiotics or discontinuation of antibiotics and continue supportive care. If the respiratory panel is positive for atypical bacterial infection (Bordetella pertussis, Chlamydophila pneumoniae, or Mycoplasma pneumoniae), consider antibiotic de-escalation to target atypical bacterial infection.            MRI Brain Without Contrast    Result Date: 6/27/2024  Impression: Impression: 1. No acute ischemic change. 2. Mild white matter changes compatible small vessel ischemic disease in this age. Electronically Signed: Antelmo Ramirez MD  6/27/2024 10:57 AM EDT  Workstation ID: [id number]    CT Angiogram Neck    Result Date: 6/27/2024  Impression: 1.No acute abnormality identified within the large arteries of the head or neck. 2.No significant stenosis of the bilateral internal carotid arteries. 3.CT perfusion study has been reported separately. 4.Bilateral hypoattenuating thyroid nodules measuring up to approximately 1 cm. Nonemergent follow-up thyroid ultrasound may be considered. Electronically Signed: Naveed Love MD  6/27/2024 10:46 AM EDT   Workstation ID: [id number]    CT Angiogram Head w AI Analysis of LVO    Result Date: 6/27/2024  Impression: 1.No acute abnormality identified within the large arteries of the head or neck. 2.No significant stenosis of the bilateral internal carotid arteries. 3.CT perfusion study has been reported separately. 4.Bilateral hypoattenuating thyroid nodules measuring up to approximately 1 cm. Nonemergent follow-up thyroid ultrasound may be considered. Electronically Signed: Naveed Love MD  6/27/2024 10:00 AM EDT  Workstation ID: [id number]    CT CEREBRAL PERFUSION WITH & WITHOUT CONTRAST    Result Date: 6/27/2024  Impression: Impression: No evidence of brain ischemia or infarct Electronically Signed: Will Hernandez  6/27/2024 9:56 AM EDT  Workstation ID: [id number]    CT Head Without Contrast Stroke Protocol    Result Date: 6/27/2024  Impression: Impression: 1. No intracranial hemorrhage 2. No CT changes of vascular territory brain ischemia at this time Electronically Signed: Will Hernandez  6/27/2024 9:46 AM EDT  Workstation ID: [id number]    CT Head Without Contrast    Result Date: 6/27/2024  Impression: Impression: No acute intracranial process. Electronically Signed: Agnieszka Lujan MD  6/27/2024 4:07 AM EDT  Workstation ID: [id number]     Results for orders placed during the hospital encounter of 09/25/22    Bilateral Carotid Duplex    Interpretation Summary  · Proximal right internal carotid artery is normal.  · Proximal left internal carotid artery is normal.      Results for orders placed during the hospital encounter of 09/25/22    Bilateral Carotid Duplex    Interpretation Summary  · Proximal right internal carotid artery is normal.  · Proximal left internal carotid artery is normal.      Results for orders placed during the hospital encounter of 08/01/23    Adult Transthoracic Echo Complete W/ Cont if Necessary Per Protocol    Interpretation Summary    Left ventricular systolic function is normal. Left ventricular  ejection fraction appears to be 61 - 65%.    Left ventricular wall thickness is consistent with mild concentric hypertrophy.    Left ventricular diastolic function was normal.    The left atrial cavity is mildly dilated.    Estimated right ventricular systolic pressure from tricuspid regurgitation is mildly elevated (35-45 mmHg). Calculated right ventricular systolic pressure from tricuspid regurgitation is 35 mmHg.      Labs Pending at Discharge:      Procedures Performed           Consults:   Consults       Date and Time Order Name Status Description    6/27/2024  6:54 AM Inpatient Neurology Consult General Completed     6/27/2024  6:54 AM Inpatient Hospitalist Consult                    ASSESSMENT/PLAN:     Left hemiparesis, left visual field cut.   MRI brain negative for stroke  Likely recrudescence from previous stoke (explains the left sided symptoms but not the visual field cut- right pontine stroke in 2022), hemiplegic/migraine with aura is also within differential.   - CT head negative  - MRI brain reviewed and negative for acute to subacute stroke..  There is mild white matter changes compatible with small vessel ischemic disease that correlates with patient's age.  - CTA head and neck: No significant stenosis of the head or neck.  - Echo- EF is 61-65%   - EKG: Sinus rhythm, rate 81  - Labs: A1C: P, B12: P, LDL:  129, TSH: P  - continue bASA and Brilinta 90 mg BID  - Pt may benefit from a preventive medication (topamax)- will discuss   - Will follow up      2.  Hypertension history, hypotensive in hospital  -1 L IV fluids given, continue IVF, hold anti-hypertensive medications until needed.      3.  Type 2 Diabetes Mellitus  - A1C: P  - Strict glycemic control, SSI, diabetic diet, diabetes educator     4.  Hyperlipidemia  - Statin & dietary modifications     5.  History of coronary artery disease-on Brilinta and aspirin        Will follow-up.        I discussed the patient's findings and my recommendations  with patient and nursing staff     CANDIDO Rhodes    Discharge Details        Discharge Medications        Continue These Medications        Instructions Start Date   acetaminophen 325 MG tablet  Commonly known as: TYLENOL   650 mg, Oral, Every 4 Hours PRN      amLODIPine 10 MG tablet  Commonly known as: NORVASC   10 mg, Oral, Daily      aspirin 81 MG EC tablet   1 tablet, Oral, Daily      atorvastatin 80 MG tablet  Commonly known as: LIPITOR   80 mg, Oral, Nightly      DULoxetine 60 MG capsule  Commonly known as: CYMBALTA   1 capsule, Oral, Daily      gabapentin 600 MG tablet  Commonly known as: NEURONTIN   600 mg, Oral, 3 Times Daily      multivitamin with minerals tablet tablet   1 tablet, Oral, Daily      Prolia 60 MG/ML solution prefilled syringe syringe  Generic drug: denosumab   60 mg, Subcutaneous, Once      ticagrelor 90 MG tablet tablet  Commonly known as: BRILINTA   90 mg, Oral, 2 Times Daily      Tirzepatide 12.5 MG/0.5ML solution pen-injector pen  Commonly known as: MOUNJARO   0.5 mL, Subcutaneous, Weekly, Wednesday.      traZODone 50 MG tablet  Commonly known as: DESYREL   1 tablet, Oral, Nightly               Allergies   Allergen Reactions    Zofran [Ondansetron Hcl] Nausea And Vomiting     Does the opposite of its purpose    Prochlorperazine Other (See Comments)     CAUSED SEIZURE    Prochlorperazine Edisylate Hives    Hydralazine Itching and Rash    Hydralazine Hcl Itching and Rash    Meperidine Itching and Swelling    Prochlorperazine Maleate Other (See Comments)     CAUSES SEIZURE    Prochlorperazine Maleate Irritability         Discharge Disposition:   Home or Self Care    Diet:  Hospital:  Diet Order   Procedures    Diet: Regular/House; Texture: Mechanical Ground (NDD 2); Fluid Consistency: Thin (IDDSI 0)         Discharge Activity:   Activity Instructions       Gradually Increase Activity Until at Pre-Hospitalization Level                CODE STATUS:  Code Status and Medical  Interventions:   Ordered at: 06/27/24 0856     Level Of Support Discussed With:    Patient     Code Status (Patient has no pulse and is not breathing):    CPR (Attempt to Resuscitate)     Medical Interventions (Patient has pulse or is breathing):    Full Support         Future Appointments   Date Time Provider Department Center   7/17/2024  4:30 PM NIL[facility] CT 3 [facility]   7/30/2024 11:00 AM Gladis Lang APRN [facility]       Additional Instructions for the Follow-ups that You Need to Schedule       Ambulatory Referral to Physical Therapy   As directed      Specialty needed: Evaluate and treat   Follow-up needed: Yes        Discharge Follow-up with PCP   As directed       Currently Documented PCP:    Traci Townsend APRN    PCP Phone Number:    [phone]     Follow Up Details: 1 week        Discharge Follow-up with Specified Provider: neurology; 2 Weeks   As directed      To: neurology   Follow Up: 2 Weeks                Time spent on Discharge including face to face service:  >30 minutes    Signature: Electronically signed by Lasha Galindo MD, 06/28/24, 11:38 EDT.  [facility] Hospitalist Team

## 2024-06-28 NOTE — DISCHARGE PLACEMENT REQUEST
"Maxwell Mcghee (69 y.o. Female)       Date of Birth   1955    Social Security Number       Address   [address] [place] [address] [place] [facility]    Home Phone   [phone]    MRN   [id number]       Nondenominational   None    Marital Status                               Admission Date   6/27/24    Admission Type   Emergency    Admitting Provider   Mikhail Barbour MD    Attending Provider   Lasha Galindo MD    Department, Room/Bed   [facility] INTENSIVE CARE UNIT, 2312/1       Discharge Date       Discharge Disposition   Home or Self Care    Discharge Destination                                 Attending Provider: Lasha Galindo MD    Allergies: Zofran [Ondansetron Hcl], Prochlorperazine, Prochlorperazine Edisylate, Hydralazine, Hydralazine Hcl, Meperidine, Prochlorperazine Maleate, Prochlorperazine Maleate    Isolation: None   Infection: None   Code Status: CPR    Ht: 160 cm (62.99\")   Wt: 74.5 kg (164 lb 3.9 oz)    Admission Cmt: None   Principal Problem: Migraine, intractable [G43.919]                   Active Insurance as of 6/27/2024       Primary Coverage       Payor Plan Insurance Group Employer/Plan Group    AETNA COMMERCIAL AETNA [id number]       Payor Plan Address Payor Plan Phone Number Payor Plan Fax Number Effective Dates    [address] [phone]  1/1/2024 - None Entered    [facility] [id number]         Subscriber Name Subscriber Birth Date Member ID       GRADY MCGHEE 3/15/1952 [id number]               Secondary Coverage       Payor Plan Insurance Group Employer/Plan Group    HUMANA MEDICARE REPLACEMENT HUMANA MEDICARE REPLACEMENT [id number]       Payor Plan Address Payor Plan Phone Number Payor Plan Fax Number Effective Dates    [address] [address] [phone]  2/1/2022 - None Entered    [facility] [address]         Subscriber Name Subscriber Birth Date Member ID       MAXWELL MCGHEE 1955 [id number]               Tertiary Coverage       Payor Plan " Insurance Group Employer/Plan Group    GPM LIFE GPM LIFE MC SUP        Payor Plan Address Payor Plan Phone Number Payor Plan Fax Number Effective Dates    [address]   1/1/2019 - None Entered    [place] [address]         Subscriber Name Subscriber Birth Date Member ID       MAXWELL MORENO VINEET 1955 [id number]                     Emergency Contacts        (Rel.) Home Phone Work Phone Mobile Phone    Kimberly Salinas (POA) (Daughter) -- -- [phone]    Tonia Rockwell (Friend) [phone] -- --    SILVIO MORENO (Spouse) -- -- [phone]

## 2024-06-28 NOTE — CASE MANAGEMENT/SOCIAL WORK
Continued Stay Note   Chilo     Patient Name: Marge Mcghee  MRN: [id number]  Today's Date: 6/28/2024    Admit Date: 6/27/2024    Plan: Plan to dc home with spouse and [facility] OPPT.   Discharge Plan       Row Name 06/28/24 1148       Plan    Plan Plan to dc home with spouse and [facility] OPPT.    Plan Comments Cm spoke with patient via phone and is agreeable to [facility] OPPT, referral in basket, liaison Shira notified, accepted.                 Expected Discharge Date and Time       Expected Discharge Date Expected Discharge Time    Jun 28, 2024               THUY Soto RN  SIPS/ICU   O: [phone]  C: [phone]  [email or web address]

## 2024-06-28 NOTE — PROGRESS NOTES
LOS: 1 day     Chief Complaint: Headache       SUBJECTIVE:    History taken from: patient chart    Interval History: Patient had another migraine last night, patient is very fatigued and still feels that she is having weakness on the left side        Review of Systems   Constitutional:  Positive for fatigue.   Cardiovascular: Negative.    Neurological:  Positive for weakness and headaches. Negative for dizziness, tremors, seizures, syncope, facial asymmetry, speech difficulty, light-headedness and numbness.      Pertinent PMH:  has a past medical history of Adrenal nodule (11/16/2016), Age-related osteoporosis without current pathological fracture (11/16/2016), Arthritis, Delayed surgical wound healing, Essential hypertension (11/16/2016), Gastroesophageal reflux disease with esophagitis (11/16/2016), Hepatic steatosis (11/16/2016), History of anemia, History of sepsis, Hyperlipidemia (11/16/2016), Lisfranc's dislocation, Osteomyelitis of left foot (11/2020), RLS (restless legs syndrome), Squamous cell skin cancer (2014), Thyroid nodule (10/18/2019), Type 2 diabetes mellitus with peripheral neuropathy (11/16/2016), and Vitamin D deficiency (1/25/2019).   ________________________________________________     OBJECTIVE:    On exam:  GENERAL: NAD  CARDIO: RRR  NEURO:  Oriented x3  EOMI, PERRL, no visual field deficits  CN 2-12 intact, No facial asymmetry  Speech clear without dysarthria  Sensations intact and equal bilaterally  LUE and LLE with give-way weakness  5/5 with encouragement   No ataxia      ________________________________________________   RESULTS REVIEW    VITAL SIGNS:  Temp:  [97.4 °F (36.3 °C)-98.1 °F (36.7 °C)] 97.6 °F (36.4 °C)  Heart Rate:  [] 86  BP: (107-144)/(53-84) 131/84    LABS:       Lab 06/28/24  0531 06/27/24  0344   WBC 6.19 10.28   HEMOGLOBIN 11.8* 14.2   HEMATOCRIT 36.8 43.3   PLATELETS 212 254   NEUTROS ABS 2.12 6.82   IMMATURE GRANS (ABS) 0.01 0.03   LYMPHS ABS 3.13* 2.48    MONOS ABS 0.53 0.73   EOS ABS 0.30 0.14   MCV 88.7 86.6   SED RATE  --  12   CRP  --  <0.30   PROCALCITONIN  --  0.04   PROTIME  --  11.0   APTT  --  26.5*         Lab 06/28/24  0531 06/27/24  0344   SODIUM 140 140   POTASSIUM 3.6 3.5   CHLORIDE 104 100   CO2 27.7 28.0   ANION GAP 8.3 12.0   BUN 23 13   CREATININE 1.34* 1.06*   EGFR 42.5* 56.3*   GLUCOSE 140* 148*   CALCIUM 9.3 9.7   HEMOGLOBIN A1C  --  7.74*   TSH  --  0.694             Lab 06/27/24 0344   PROTIME 11.0   INR 1.01         Lab 06/27/24  0344   CHOLESTEROL 212*   LDL CHOL 129*   HDL CHOL 59   TRIGLYCERIDES 135         Lab 06/28/24  0531   VITAMIN B 12 300             Lab Results   Component Value Date    TSH 0.694 06/27/2024     (H) 06/27/2024    HGBA1C 7.74 (H) 06/27/2024    YSCNZWOV23 300 06/28/2024         IMAGING STUDIES:  MRI Brain Without Contrast    Result Date: 6/27/2024  Impression: 1. No acute ischemic change. 2. Mild white matter changes compatible small vessel ischemic disease in this age. Electronically Signed: Antelmo Ramirez MD  6/27/2024 10:57 AM EDT  Workstation ID: [id number]    CT Angiogram Neck    Result Date: 6/27/2024  1.No acute abnormality identified within the large arteries of the head or neck. 2.No significant stenosis of the bilateral internal carotid arteries. 3.CT perfusion study has been reported separately. 4.Bilateral hypoattenuating thyroid nodules measuring up to approximately 1 cm. Nonemergent follow-up thyroid ultrasound may be considered. Electronically Signed: Naveed Love MD  6/27/2024 10:46 AM EDT  Workstation ID: [id number]    CT Angiogram Head w AI Analysis of LVO    Result Date: 6/27/2024  1.No acute abnormality identified within the large arteries of the head or neck. 2.No significant stenosis of the bilateral internal carotid arteries. 3.CT perfusion study has been reported separately. 4.Bilateral hypoattenuating thyroid nodules measuring up to approximately 1 cm. Nonemergent follow-up thyroid  ultrasound may be considered. Electronically Signed: Naveed Love MD  6/27/2024 10:00 AM EDT  Workstation ID: [id number]    CT CEREBRAL PERFUSION WITH & WITHOUT CONTRAST    Result Date: 6/27/2024  Impression: No evidence of brain ischemia or infarct Electronically Signed: Will Hernandez  6/27/2024 9:56 AM EDT  Workstation ID: [id number]    CT Head Without Contrast Stroke Protocol    Result Date: 6/27/2024  Impression: 1. No intracranial hemorrhage 2. No CT changes of vascular territory brain ischemia at this time Electronically Signed: Will Hernandez  6/27/2024 9:46 AM EDT  Workstation ID: [id number]    CT Head Without Contrast    Result Date: 6/27/2024  Impression: No acute intracranial process. Electronically Signed: Agnieszka Lujan MD  6/27/2024 4:07 AM EDT  Workstation ID: [id number]     I reviewed the patient's new clinical results.    ________________________________________________      PROBLEM LIST:    Migraine, intractable    Type 2 diabetes mellitus with retinopathy, with long-term current use of insulin    Hyperlipidemia    Hepatic steatosis    Gastroesophageal reflux disease with esophagitis    Vitamin D deficiency    REINA (acute kidney injury)    Obesity (BMI 30-39.9)    Polypharmacy    GERD (gastroesophageal reflux disease)    S/P CABG x 3    Encephalopathy, metabolic    Hypertensive emergency        ASSESSMENT/PLAN:  Functional left hemiparesis, with inconsistent left visual field cut.   MRI brain negative for stroke  Differentials include recrudescence from previous stoke (explains the left sided symptoms but not the visual field cut- right pontine stroke in 2022)  Hemiplegic migraine is also within differential.   - CT head negative  - MRI brain reviewed and negative for acute to subacute stroke..  There is mild white matter changes compatible with small vessel ischemic disease that correlates with patient's age.  - CTA head and neck: No significant stenosis of the head or neck.  - Echo- EF is 61-65%   - EKG:  Sinus rhythm, rate 81  - Labs: A1C: 7.74, B12: 300, LDL:  129, TSH: 0.694  - continue bASA and Brilinta 90 mg BID  - Follow up with PCP and outpatient Neurology for migraine management       2.  Hypertension history, hypotensive in hospital  - per primary      3.  Type 2 Diabetes Mellitus  - Strict glycemic control, SSI, diabetic diet, diabetes educator     4.  Hyperlipidemia  - Statin & dietary modifications     5.  History of coronary artery disease-on Brilinta and aspirin    6.  Vitamin B12 deficiency  - Recommend 1000 mcg p.o. daily    There are no further recommendations. Will sign off, please call with any questions or concerns.    I discussed the patient's findings and my recommendations with patient and nursing staff    CANDIDO Rhodes  06/28/24  15:10 EDT

## 2024-06-28 NOTE — THERAPY EVALUATION
Patient Name: Marge Mcghee  : 1955    MRN: [id number]                              Today's Date: 2024       Admit Date: 2024    Visit Dx:     ICD-10-CM ICD-9-CM   1. Acute intractable headache, unspecified headache type  R51.9 784.0   2. 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  E11.3513 250.50     362.07     362.02     Patient Active Problem List   Diagnosis    Type 2 diabetes mellitus with retinopathy, with long-term current use of insulin    Age-related osteoporosis without current pathological fracture    Hyperlipidemia    Hepatic steatosis    Gastroesophageal reflux disease with esophagitis    Adrenal nodule    Vitamin D deficiency    Thyroid nodule    UTI (urinary tract infection)    REINA (acute kidney injury)    Hyperglycemia    Acute right flank pain    Vomiting    Hypomagnesemia    Dehydration    Obesity (BMI 30-39.9)    Complicated migraine    Occipital neuralgia of left side    Polypharmacy    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    Lisfranc dislocation    Delayed surgical wound healing    Right foot infection    Chest pain    GERD (gastroesophageal reflux disease)    Stable angina pectoris    S/P CABG x 3    Encephalopathy, metabolic    Hypertensive emergency    Dysarthria    CVA (cerebral vascular accident)    Coronary artery disease involving coronary bypass graft of native heart without angina pectoris    Essential hypertension    HTN, goal below 130/80    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    Type 2 diabetes mellitus without complication, with long-term current use of insulin    Change in vision    Other headache syndrome    Migraine, intractable     Past Medical History:   Diagnosis Date    Adrenal nodule 2016    FINDINGS: Mild hepatic steatosis may be present.  Cholecystectomy. No biliary ductal dilatation. Negative pancreas, spleen, left adrenal gland, and kidneys. The right adrenal presumed adenoma has increased slightly, measuring 3 x 4 cm in diameter,  compared to 2 x 3.5 cm on the previous exam. The attenuation values are consistent with an adenoma. Done at [facility] August 2018    Age-related osteoporosis without current pathological fracture 11/16/2016    Arthritis     Delayed surgical wound healing     Essential hypertension 11/16/2016    Gastroesophageal reflux disease with esophagitis 11/16/2016    Hepatic steatosis 11/16/2016    History of anemia     History of sepsis     FROM UTI    Hyperlipidemia 11/16/2016    Lisfranc's dislocation     LEFT WITH FRACTURES NONHEALING FOOT    Osteomyelitis of left foot 11/2020    RLS (restless legs syndrome)     Squamous cell skin cancer 2014    Excised by Dr. James    Thyroid nodule 10/18/2019    BENIGN    Type 2 diabetes mellitus with peripheral neuropathy 11/16/2016    Vitamin D deficiency 1/25/2019     Past Surgical History:   Procedure Laterality Date    BREAST BIOPSY Left     7/2019. BENIGN    CARDIAC CATHETERIZATION Left 5/2/2021    Procedure: Cardiac Catheterization/Vascular Study;  Surgeon: Chacho Esteban MD;  Location: [facility] CATH INVASIVE LOCATION;  Service: Cardiovascular;  Laterality: Left;    CARDIAC CATHETERIZATION N/A 5/2/2021    Procedure: Intra-Aortic Baloon Pump Insertion;  Surgeon: Chacho Esteban MD;  Location: [facility] CATH INVASIVE LOCATION;  Service: Cardiovascular;  Laterality: N/A;    CATARACT EXTRACTION WITH INTRAOCULAR LENS IMPLANT Bilateral     CHOLECYSTECTOMY      COLONOSCOPY      CORONARY ARTERY BYPASS GRAFT N/A 5/2/2021    Procedure: CORONARY ARTERY BYPASS WITH INTERNAL MAMMARY ARTERY GRAFT;  Surgeon: Jr Rocael Alexander MD;  Location: [facility] CVOR;  Service: Cardiothoracic;  Laterality: N/A;    FOOT FUSION Right 11/13/2020    Procedure: RIGHT OPEN TREATMENT LISFRANC INJURY, OPEN  REDUCTION INTERNAL FIXATION MEDIAL/MIDDLE CUNEIFORM FRACTURE AND 2ND/3RD METATARSAL FRACTURE 1ST 2ND POSSIBLE 3RD TARSOMETATARSAL ARTHRODESIS INTERCUNEIFORM ARTHRODESIS CALCANEAL BONE GRAFT;  Surgeon: Mikhail Banks Jr., MD;  Location: [facility];  Service: Orthopedics;  Laterality: Right;    INCISION AND DRAINAGE LEG Right 1/8/2021    Procedure: RIGHT IRRIGATION AND DEBRIDEMENT OF FOOT WITH SECONDARY CLOSURE AND HARDWARE REMOVAL;  Surgeon: Mikhail Banks Jr., MD;  Location: [facility];  Service: Orthopedics;  Laterality: Right;    KNEE ARTHROSCOPY Bilateral     TOTAL ABDOMINAL HYSTERECTOMY      TRANSESOPHAGEAL ECHOCARDIOGRAM (EMILIA) N/A 5/2/2021    Procedure: TRANSESOPHAGEAL ECHOCARDIOGRAM;  Surgeon: Jr Rocael Alexander MD;  Location: [facility];  Service: Cardiothoracic;  Laterality: N/A;    UPPER GASTROINTESTINAL ENDOSCOPY  08/2016    US GUIDED FINE NEEDLE ASPIRATION  5/8/2020      General Information       [facility] Name 06/28/24 1323          Physical Therapy Time and Intention    Document Type evaluation  -     Mode of Treatment physical therapy  -       Row Name 06/28/24 1323          General Information    Patient Profile Reviewed yes  -     Prior Level of Function independent:;all household mobility;community mobility;ADL's;cooking;shopping;driving  -     Existing Precautions/Restrictions fall  -     Barriers to Rehab none identified  -[facility] Name 06/28/24 1323          Living Environment    People in Home spouse  -[facility] Name 06/28/24 1323          Stairs Within Home, Primary    Stairs, Within Home, Primary full flight  -     Stair Railings, Within Home, Primary railings safe and in good condition  -       Row Name 06/28/24 1323          Cognition    Orientation Status (Cognition) oriented x 3  -[facility] Name 06/28/24 1323          Safety Issues, Functional Mobility    Safety Issues Affecting Function (Mobility) at risk behavior observed;judgment  -     Impairments Affecting  Function (Mobility) balance;endurance/activity tolerance;coordination;strength  -               User Key  (r) = Recorded By, (t) = Taken By, (c) = Cosigned By      Initials Name Provider Type     Raven Enriquez PT Physical Therapist                   Mobility       Row Name 06/28/24 1325          Bed Mobility    Bed Mobility supine-sit  -     Rolling Left [place] (Bed Mobility) independent  -AH       Row Name 06/28/24 1325          Sit-Stand Transfer    Sit-Stand [place] (Transfers) contact guard;minimum assist (75% patient effort)  -[facility] Name 06/28/24 1325          Gait/Stairs (Locomotion)    [place] Level (Gait) minimum assist (75% patient effort)  -     Patient was able to Ambulate yes  -     Distance in Feet (Gait) 100  and 2nd trial of 100' with RW.  -     Deviations/Abnormal Patterns (Gait) aleah decreased;base of support, narrow  -     Left Sided Gait Deviations decreased knee extension;knee buckling, left side;heel strike decreased  -     Comment, (Gait/Stairs) Gait impairments noted to decrease when pt is engaged in conversation. Gait quality and standing balance improved with use of walker.  -               User Key  (r) = Recorded By, (t) = Taken By, (c) = Cosigned By      Initials Name Provider Type    Raven Malcolm PT Physical Therapist                   Obj/Interventions       [facility] Name 06/28/24 1327          Range of Motion Comprehensive    General Range of Motion no range of motion deficits identified  -AH       Row Name 06/28/24 1327          Strength Comprehensive (MMT)    General Manual Muscle Testing (MMT) Assessment lower extremity strength deficits identified  -     Comment, General Manual Muscle Testing (MMT) Assessment RLE grossly 4+-5/5. LLE with inconsistent effort noted - initially with ~4/5 strength but then with give way (not associated with pain or any complications).  -[facility] Name 06/28/24 1327          Motor Skills    Motor  Skills coordination  -     Coordination WFL  -       Row Name 06/28/24 1327          Balance    Balance Assessment sitting static balance;sitting dynamic balance;standing static balance;standing dynamic balance  -     Static Sitting Balance standby assist  -     Dynamic Sitting Balance standby assist  -     Position, Sitting Balance sitting edge of bed  -     Static Standing Balance contact guard  -     Dynamic Standing Balance minimal assist  -     Position/Device Used, Standing Balance unsupported  -     Comment, Balance instability with static and dynamic standing noted inconsistently, improved with walker.  -       Row Name 06/28/24 1327          Sensory Assessment (Somatosensory)    Sensory Assessment (Somatosensory) sensation intact  pt reports loss of bilateral peripheral vision which is inconsistent with her presentation during functional tasks and gait training (and inconsistent with her neuro consult yesterday).  -               User Key  (r) = Recorded By, (t) = Taken By, (c) = Cosigned By      Initials Name Provider Type     Raven Enriquez PT Physical Therapist                   Goals/Plan    No documentation.                  Clinical Impression       [facility] Name 06/28/24 1341          Pain    Pretreatment Pain Rating 0/10 - no pain  -     Posttreatment Pain Rating 0/10 - no pain  -[facility] Name 06/28/24 1341          Plan of Care Review    Plan of Care Reviewed With patient;spouse  -     Outcome Evaluation 70 y/o presented to ED on 6/27/24 with c/o severe headache. Found to have elevated BP 180s over 100s.  Several hours later pt developed L side facial droop and reported vision changes, prompting Code stoke. Stroke work up was negative for acute CVA. Neurology differential diagnosis including recrudescence from previous stroke vs. Hemiplegic/migraine with aura. Pt with hx of R pontine stroke in 2022, but reports no residual deficits. Pt presents today with inconsistencies  noted in effort with strength testing and gait trial. She demos decreased step length and impaired stance control on LLE inconsistently. Quality of gait and balance are improved with RW use. Pt lives with her supportive spouse. PT spoke with spouse on the phone regarding pt's presentation and potential for increased assistance needs at time of DC. Spouse verbalizes understanding and reports he's able and willing to assist her. He is available for 24/7 supervision/assist at home. He reports they have a RW and a WC. PT recommends OPPT to ensure full resolution of L side symptoms and balance deficits.  No PT POC established due to plans to DC home today. PT will sign off.  -       Row Name 06/28/24 1341          Therapy Assessment/Plan (PT)    Patient/Family Therapy Goals Statement (PT) get better  -     Criteria for Skilled Interventions Met (PT) yes;meets criteria  -     Therapy Frequency (PT) evaluation only  Due to DC home today.  -[facility] Name 06/28/24 1341          Vital Signs    Pre Systolic BP Rehab 135  -AH     Pre Treatment Diastolic BP 76  -AH     Intra Systolic BP Rehab 130  -AH     Intra Treatment Diastolic BP 75  -AH     Pre Patient Position Sitting  -     Intra Patient Position Standing  -[facility] Name 06/28/24 1341          Positioning and Restraints    Post Treatment Position chair  -     In Chair notified nsg;reclined;call light within reach;encouraged to call for assist;exit alarm on  -               User Key  (r) = Recorded By, (t) = Taken By, (c) = Cosigned By      Initials Name Provider Type     Raven Enriquez, PT Physical Therapist                   Outcome Measures       Row Name 06/28/24 1344 06/28/24 6725       How much help from another person do you currently need...    Turning from your back to your side while in flat bed without using bedrails? 4  - 4  -JN    Moving from lying on back to sitting on the side of a flat bed without bedrails? 4  - 4  -JN    Moving to  and from a bed to a chair (including a wheelchair)? 3  - 4  -JN    Standing up from a chair using your arms (e.g., wheelchair, bedside chair)? 3  - 4  -JN    Climbing 3-5 steps with a railing? 3  - 4  -JN    To walk in hospital room? 3  - 3  -JN    AM-PAC 6 Clicks Score (PT) 20  - 23  -JN    Highest Level of Mobility Goal 6 --> Walk 10 steps or more  - 7 --> Walk 25 feet or more  -      Row Name 06/28/24 1344          Functional Assessment    Outcome Measure Options AM-PAC 6 Clicks Basic Mobility (PT)  -               User Key  (r) = Recorded By, (t) = Taken By, (c) = Cosigned By      Initials Name Provider Type    Yumiko Vaz, RN Registered Nurse    Raven Malcolm, PT Physical Therapist                                 Physical Therapy Education       Title: PT OT SLP Therapies (Done)       Topic: Physical Therapy (Done)       Point: Mobility training (Done)       Learning Progress Summary             Patient Eager, E, VU by  at 6/28/2024 1345                         Point: Home exercise program (Done)       Learning Progress Summary             Patient Eager, E, VU by  at 6/28/2024 1345                         Point: Body mechanics (Done)       Learning Progress Summary             Patient Eager, E, VU by  at 6/28/2024 1345                         Point: Precautions (Done)       Learning Progress Summary             Patient Eager, E, VU by  at 6/28/2024 1345                                         User Key       Initials Effective Dates Name Provider Type [facility] 12/04/23 -  Raven Enriquez, NEHAL Physical Therapist PT                  PT Recommendation and Plan     Plan of Care Reviewed With: patient, spouse  Outcome Evaluation: 70 y/o presented to ED on 6/27/24 with c/o severe headache. Found to have elevated BP 180s over 100s.  Several hours later pt developed L side facial droop and reported vision changes, prompting Code stoke. Stroke work up was negative for acute CVA.  Neurology differential diagnosis including recrudescence from previous stroke vs. Hemiplegic/migraine with aura. Pt with hx of R pontine stroke in 2022, but reports no residual deficits. Pt presents today with inconsistencies noted in effort with strength testing and gait trial. She demos decreased step length and impaired stance control on LLE inconsistently. Quality of gait and balance are improved with RW use. Pt lives with her supportive spouse. PT spoke with spouse on the phone regarding pt's presentation and potential for increased assistance needs at time of DC. Spouse verbalizes understanding and reports he's able and willing to assist her. He is available for 24/7 supervision/assist at home. He reports they have a RW and a WC. PT recommends OPPT to ensure full resolution of L side symptoms and balance deficits.  No PT POC established due to plans to DC home today. PT will sign off.     Time Calculation:         PT Charges       Row Name 06/28/24 1345             Time Calculation    Start Time 1009  -      Stop Time 1105  -      Time Calculation (min) 56 min  -      PT Non-Billable Time (min) 0 min  -      PT Received On 06/28/24  -         Time Calculation- PT    Total Timed Code Minutes- PT 0 minute(s)  -                User Key  (r) = Recorded By, (t) = Taken By, (c) = Cosigned By      Initials Name Provider Type    Raven Malcolm PT Physical Therapist                  Therapy Charges for Today       Code Description Service Date Service Provider Modifiers Qty    [id number] HC-PT EVAL MOD COMPLEXITY 5 6/28/2024 Raven Enriquez PT  1            PT G-Codes  Outcome Measure Options: AM-PAC 6 Clicks Basic Mobility (PT)  AM-PAC 6 Clicks Score (PT): 20  PT Discharge Summary  Anticipated Discharge Disposition (PT): home with assist, home with outpatient therapy services    Raven Enriquez PT  6/28/2024

## 2024-06-29 LAB
ANION GAP SERPL CALCULATED.3IONS-SCNC: 9.6 MMOL/L (ref 5–15)
BASOPHILS # BLD AUTO: 0.07 10*3/MM3 (ref 0–0.2)
BASOPHILS NFR BLD AUTO: 0.9 % (ref 0–1.5)
BUN SERPL-MCNC: 23 MG/DL (ref 8–23)
BUN/CREAT SERPL: 21.5 (ref 7–25)
CALCIUM SPEC-SCNC: 9.6 MG/DL (ref 8.6–10.5)
CHLORIDE SERPL-SCNC: 106 MMOL/L (ref 98–107)
CO2 SERPL-SCNC: 25.4 MMOL/L (ref 22–29)
CREAT SERPL-MCNC: 1.07 MG/DL (ref 0.57–1)
DEPRECATED RDW RBC AUTO: 41.5 FL (ref 37–54)
EGFRCR SERPLBLD CKD-EPI 2021: 55.6 ML/MIN/1.73
EOSINOPHIL # BLD AUTO: 0.38 10*3/MM3 (ref 0–0.4)
EOSINOPHIL NFR BLD AUTO: 4.8 % (ref 0.3–6.2)
ERYTHROCYTE [DISTWIDTH] IN BLOOD BY AUTOMATED COUNT: 12.8 % (ref 12.3–15.4)
GLUCOSE BLDC GLUCOMTR-MCNC: 101 MG/DL (ref 70–105)
GLUCOSE BLDC GLUCOMTR-MCNC: 128 MG/DL (ref 70–105)
GLUCOSE BLDC GLUCOMTR-MCNC: 145 MG/DL (ref 70–105)
GLUCOSE BLDC GLUCOMTR-MCNC: 163 MG/DL (ref 70–105)
GLUCOSE SERPL-MCNC: 99 MG/DL (ref 65–99)
HCT VFR BLD AUTO: 37.8 % (ref 34–46.6)
HGB BLD-MCNC: 12 G/DL (ref 12–15.9)
IMM GRANULOCYTES # BLD AUTO: 0.02 10*3/MM3 (ref 0–0.05)
IMM GRANULOCYTES NFR BLD AUTO: 0.3 % (ref 0–0.5)
LYMPHOCYTES # BLD AUTO: 3.22 10*3/MM3 (ref 0.7–3.1)
LYMPHOCYTES NFR BLD AUTO: 40.4 % (ref 19.6–45.3)
MCH RBC QN AUTO: 28 PG (ref 26.6–33)
MCHC RBC AUTO-ENTMCNC: 31.7 G/DL (ref 31.5–35.7)
MCV RBC AUTO: 88.1 FL (ref 79–97)
MONOCYTES # BLD AUTO: 0.72 10*3/MM3 (ref 0.1–0.9)
MONOCYTES NFR BLD AUTO: 9 % (ref 5–12)
NEUTROPHILS NFR BLD AUTO: 3.57 10*3/MM3 (ref 1.7–7)
NEUTROPHILS NFR BLD AUTO: 44.6 % (ref 42.7–76)
NRBC BLD AUTO-RTO: 0 /100 WBC (ref 0–0.2)
PLATELET # BLD AUTO: 230 10*3/MM3 (ref 140–450)
PMV BLD AUTO: 9.5 FL (ref 6–12)
POTASSIUM SERPL-SCNC: 3.6 MMOL/L (ref 3.5–5.2)
RBC # BLD AUTO: 4.29 10*6/MM3 (ref 3.77–5.28)
SODIUM SERPL-SCNC: 141 MMOL/L (ref 136–145)
WBC NRBC COR # BLD AUTO: 7.98 10*3/MM3 (ref 3.4–10.8)

## 2024-06-29 PROCEDURE — 85025 COMPLETE CBC W/AUTO DIFF WBC: CPT | Performed by: NURSE PRACTITIONER

## 2024-06-29 PROCEDURE — 25010000002 METOCLOPRAMIDE PER 10 MG: Performed by: STUDENT IN AN ORGANIZED HEALTH CARE EDUCATION/TRAINING PROGRAM

## 2024-06-29 PROCEDURE — 82948 REAGENT STRIP/BLOOD GLUCOSE: CPT | Performed by: NURSE PRACTITIONER

## 2024-06-29 PROCEDURE — 82948 REAGENT STRIP/BLOOD GLUCOSE: CPT

## 2024-06-29 PROCEDURE — 83921 ORGANIC ACID SINGLE QUANT: CPT | Performed by: STUDENT IN AN ORGANIZED HEALTH CARE EDUCATION/TRAINING PROGRAM

## 2024-06-29 PROCEDURE — 25010000002 PROMETHAZINE PER 50 MG: Performed by: INTERNAL MEDICINE

## 2024-06-29 PROCEDURE — 80048 BASIC METABOLIC PNL TOTAL CA: CPT | Performed by: NURSE PRACTITIONER

## 2024-06-29 PROCEDURE — 63710000001 INSULIN LISPRO (HUMAN) PER 5 UNITS: Performed by: NURSE PRACTITIONER

## 2024-06-29 RX ORDER — METOCLOPRAMIDE HYDROCHLORIDE 5 MG/ML
10 INJECTION INTRAMUSCULAR; INTRAVENOUS EVERY 6 HOURS PRN
Status: DISCONTINUED | OUTPATIENT
Start: 2024-06-29 | End: 2024-07-02 | Stop reason: HOSPADM

## 2024-06-29 RX ADMIN — Medication 10 ML: at 09:12

## 2024-06-29 RX ADMIN — AMLODIPINE BESYLATE 10 MG: 5 TABLET ORAL at 09:12

## 2024-06-29 RX ADMIN — PROMETHAZINE HYDROCHLORIDE 12.5 MG: 25 INJECTION INTRAMUSCULAR; INTRAVENOUS at 03:09

## 2024-06-29 RX ADMIN — TICAGRELOR 90 MG: 90 TABLET ORAL at 09:12

## 2024-06-29 RX ADMIN — INSULIN LISPRO 2 UNITS: 100 INJECTION, SOLUTION INTRAVENOUS; SUBCUTANEOUS at 18:02

## 2024-06-29 RX ADMIN — PROMETHAZINE HYDROCHLORIDE 12.5 MG: 25 INJECTION INTRAMUSCULAR; INTRAVENOUS at 20:20

## 2024-06-29 RX ADMIN — GABAPENTIN 600 MG: 300 CAPSULE ORAL at 09:12

## 2024-06-29 RX ADMIN — GABAPENTIN 600 MG: 300 CAPSULE ORAL at 20:20

## 2024-06-29 RX ADMIN — PROMETHAZINE HYDROCHLORIDE 12.5 MG: 25 INJECTION INTRAMUSCULAR; INTRAVENOUS at 13:55

## 2024-06-29 RX ADMIN — Medication 10 ML: at 20:20

## 2024-06-29 RX ADMIN — TICAGRELOR 90 MG: 90 TABLET ORAL at 20:20

## 2024-06-29 RX ADMIN — BUTALBITAL, ACETAMINOPHEN, AND CAFFEINE 1 TABLET: 325; 50; 40 TABLET ORAL at 13:05

## 2024-06-29 RX ADMIN — METOCLOPRAMIDE 10 MG: 5 INJECTION, SOLUTION INTRAMUSCULAR; INTRAVENOUS at 17:09

## 2024-06-29 RX ADMIN — ATORVASTATIN CALCIUM 80 MG: 40 TABLET, FILM COATED ORAL at 20:20

## 2024-06-29 RX ADMIN — TRAZODONE HYDROCHLORIDE 50 MG: 50 TABLET ORAL at 20:20

## 2024-06-29 RX ADMIN — ASPIRIN 81 MG: 81 TABLET, COATED ORAL at 09:12

## 2024-06-29 RX ADMIN — TOPIRAMATE 25 MG: 25 TABLET, FILM COATED ORAL at 09:12

## 2024-06-29 RX ADMIN — BUTALBITAL, ACETAMINOPHEN, AND CAFFEINE 1 TABLET: 325; 50; 40 TABLET ORAL at 21:26

## 2024-06-29 NOTE — PROGRESS NOTES
[facility] MEDICINE SERVICE  DAILY PROGRESS NOTE    NAME: Marge Mcghee  : 1955  MRN: [id number]      LOS: 2 days     PROVIDER OF SERVICE: Carlos Llanes Alvarez, MD    Chief Complaint: Migraine, intractable    Subjective:     Interval History:  History taken from: patient chart  Patient Complaints: Headache, feels unsteady.   Patient Denies:  tingling or numbness.     Patient is being followed for left sided weakness, headache likely hemiplegic migraine. She was seen and examined at bedside. She was discharged yesterday but apparently did not feel safe to go home. She is still reporting frontal headache, states she feels unsteady when walking. She states she is afraid to go home. She is in agreement with going to rehab.         Review of Systems   Constitutional:  Negative for fatigue and fever.   Neurological:  Positive for dizziness, weakness and headaches. Negative for numbness.       Objective:     Vital Signs  Temp:  [97.6 °F (36.4 °C)-97.9 °F (36.6 °C)] 97.6 °F (36.4 °C)  Heart Rate:  [80-99] 90  Resp:  [13-18] 14  BP: (102-168)/(61-93) 127/90   Body mass index is 29.1 kg/m².      Physical Exam  Constitutional:       General: She is not in acute distress.     Appearance: Normal appearance.   HENT:      Head: Normocephalic.      Nose: Nose normal.      Mouth/Throat:      Mouth: Mucous membranes are moist. Mucous membranes are dry.   Eyes:      Extraocular Movements: Extraocular movements intact.      Pupils: Pupils are equal, round, and reactive to light.   Cardiovascular:      Rate and Rhythm: Normal rate and regular rhythm.   Pulmonary:      Effort: Pulmonary effort is normal. No respiratory distress.      Breath sounds: Normal breath sounds.   Abdominal:      General: Abdomen is flat.      Palpations: Abdomen is soft.   Musculoskeletal:         General: Normal range of motion.      Cervical back: Neck supple.   Skin:     General: Skin is dry.      Capillary Refill: Capillary refill takes  less than 2 seconds.   Neurological:      Mental Status: She is alert and oriented to person, place, and time.      Cranial Nerves: No cranial nerve deficit.      Motor: No weakness.      Gait: Gait abnormal.   Psychiatric:         Behavior: Behavior normal.         Scheduled Meds   amLODIPine, 10 mg, Oral, Daily  aspirin, 81 mg, Oral, Daily  atorvastatin, 80 mg, Oral, Nightly  gabapentin, 600 mg, Oral, Q12H  insulin lispro, 2-7 Units, Subcutaneous, 4x Daily AC & at Bedtime  sodium chloride, 10 mL, Intravenous, Q12H  ticagrelor, 90 mg, Oral, BID  topiramate, 25 mg, Oral, Daily  traZODone, 50 mg, Oral, Nightly       PRN Meds     acetaminophen    senna-docusate sodium **AND** polyethylene glycol **AND** bisacodyl **AND** bisacodyl    butalbital-acetaminophen-caffeine    calcium carbonate    Calcium Replacement - Follow Nurse / BPA Driven Protocol    dextrose    dextrose    glucagon (human recombinant)    Magnesium Standard Dose Replacement - Follow Nurse / BPA Driven Protocol    Morphine    Phosphorus Replacement - Follow Nurse / BPA Driven Protocol    Potassium Replacement - Follow Nurse / BPA Driven Protocol    promethazine    sodium chloride    sodium chloride   Infusions         Diagnostic Data    Results from last 7 days   Lab Units 06/29/24  0344   WBC 10*3/mm3 7.98   HEMOGLOBIN g/dL 12.0   HEMATOCRIT % 37.8   PLATELETS 10*3/mm3 230   GLUCOSE mg/dL 99   CREATININE mg/dL 1.07*   BUN mg/dL 23   SODIUM mmol/L 141   POTASSIUM mmol/L 3.6   ANION GAP mmol/L 9.6       No radiology results for the last day      I reviewed the patient's new clinical results.  I reviewed the patient's new imaging results and agree with the interpretation.  I reviewed the patient's other test results and agree with the interpretation    Assessment/Plan:     Active and Resolved Problems  Active Hospital Problems    Diagnosis  POA    **Migraine, intractable [G43.919]  Yes    Encephalopathy, metabolic [G93.41]  Yes    Hypertensive emergency  [I16.1]  Yes    S/P CABG x 3 [Z95.1]  Not Applicable    GERD (gastroesophageal reflux disease) [K21.9]  Yes    Polypharmacy [Z79.899]  Not Applicable    REINA (acute kidney injury) [N17.9]  Yes    Obesity (BMI 30-39.9) [E66.9]  Yes    Vitamin D deficiency [E55.9]  Yes    Type 2 diabetes mellitus with retinopathy, with long-term current use of insulin [E11.319, Z79.4]  Not Applicable    Hyperlipidemia [E78.5]  Yes    Hepatic steatosis [K76.0]  Yes    Gastroesophageal reflux disease with esophagitis [K21.00]  Yes      Resolved Hospital Problems   No resolved problems to display.       Left sided weakness, probably old stroke recrudescence   Headache, probably hemiplegic migraine  MRI brain negative for acute pathology, CTA head and neck with no significant stenosis.   Continue PT/OT, patient will probably require placement in rehab  On Brillinta 90 mg bid, ASA 81 mg once daily  Continue high intensity statin   Continue Fioricet as needed q 4  Continue Topamax 25 mg once daily  Continue morphine 2 q 4 for severe pain  Neurology following    History of CAD  On DAPT, statin    HLD  Continue statin       Type 2 DM  Continue insulin SS  Changed diet to carb consistent, cardiac       GERD  Continue PPI     RLS  Continue gabapentin    History of Vit B12 deficiency  Continue B12 supplementation  Checking methylmalonic acid       VTE Prophylaxis:  Mechanical VTE prophylaxis orders are present.         Code status is   Code Status and Medical Interventions:   Ordered at: 06/27/24 0856     Level Of Support Discussed With:    Patient     Code Status (Patient has no pulse and is not breathing):    CPR (Attempt to Resuscitate)     Medical Interventions (Patient has pulse or is breathing):    Full Support       Plan for disposition:rehab in 1-2 days    Time: 30 minutes    Signature: Electronically signed by Carlos Llanes Alvarez, MD, 06/29/24, 12:29 EDT.  [facility] Hospitalist Team

## 2024-06-29 NOTE — PLAN OF CARE
Goal Outcome Evaluation:         Able to ambulate in room per self. No weakness noted. No c/o pain this evening. Call light in reach.

## 2024-06-29 NOTE — PLAN OF CARE
Goal Outcome Evaluation:            Pt steady on feet to BR. Continues with nausea and headache.

## 2024-06-30 LAB
ANION GAP SERPL CALCULATED.3IONS-SCNC: 9.6 MMOL/L (ref 5–15)
BASOPHILS # BLD AUTO: 0.05 10*3/MM3 (ref 0–0.2)
BASOPHILS # BLD AUTO: 0.09 10*3/MM3 (ref 0–0.2)
BASOPHILS NFR BLD AUTO: 0.9 % (ref 0–1.5)
BASOPHILS NFR BLD AUTO: 1.1 % (ref 0–1.5)
BUN SERPL-MCNC: 24 MG/DL (ref 8–23)
BUN/CREAT SERPL: 19.8 (ref 7–25)
CALCIUM SPEC-SCNC: 9.4 MG/DL (ref 8.6–10.5)
CHLORIDE SERPL-SCNC: 106 MMOL/L (ref 98–107)
CO2 SERPL-SCNC: 26.4 MMOL/L (ref 22–29)
CREAT SERPL-MCNC: 1.21 MG/DL (ref 0.57–1)
DEPRECATED RDW RBC AUTO: 40.8 FL (ref 37–54)
DEPRECATED RDW RBC AUTO: 41.2 FL (ref 37–54)
EGFRCR SERPLBLD CKD-EPI 2021: 48 ML/MIN/1.73
EOSINOPHIL # BLD AUTO: 0.01 10*3/MM3 (ref 0–0.4)
EOSINOPHIL # BLD AUTO: 0.38 10*3/MM3 (ref 0–0.4)
EOSINOPHIL NFR BLD AUTO: 0.2 % (ref 0.3–6.2)
EOSINOPHIL NFR BLD AUTO: 4.8 % (ref 0.3–6.2)
ERYTHROCYTE [DISTWIDTH] IN BLOOD BY AUTOMATED COUNT: 12.4 % (ref 12.3–15.4)
ERYTHROCYTE [DISTWIDTH] IN BLOOD BY AUTOMATED COUNT: 12.7 % (ref 12.3–15.4)
GLUCOSE BLDC GLUCOMTR-MCNC: 192 MG/DL (ref 70–105)
GLUCOSE BLDC GLUCOMTR-MCNC: 309 MG/DL (ref 70–105)
GLUCOSE BLDC GLUCOMTR-MCNC: 91 MG/DL (ref 70–105)
GLUCOSE BLDC GLUCOMTR-MCNC: 93 MG/DL (ref 70–105)
GLUCOSE SERPL-MCNC: 136 MG/DL (ref 65–99)
HCT VFR BLD AUTO: 35.1 % (ref 34–46.6)
HCT VFR BLD AUTO: 37.3 % (ref 34–46.6)
HGB BLD-MCNC: 11.2 G/DL (ref 12–15.9)
HGB BLD-MCNC: 11.9 G/DL (ref 12–15.9)
IMM GRANULOCYTES # BLD AUTO: 0.03 10*3/MM3 (ref 0–0.05)
IMM GRANULOCYTES # BLD AUTO: 0.03 10*3/MM3 (ref 0–0.05)
IMM GRANULOCYTES NFR BLD AUTO: 0.4 % (ref 0–0.5)
IMM GRANULOCYTES NFR BLD AUTO: 0.5 % (ref 0–0.5)
LYMPHOCYTES # BLD AUTO: 1.2 10*3/MM3 (ref 0.7–3.1)
LYMPHOCYTES # BLD AUTO: 2.84 10*3/MM3 (ref 0.7–3.1)
LYMPHOCYTES NFR BLD AUTO: 20.9 % (ref 19.6–45.3)
LYMPHOCYTES NFR BLD AUTO: 35.7 % (ref 19.6–45.3)
MCH RBC QN AUTO: 28.2 PG (ref 26.6–33)
MCH RBC QN AUTO: 28.6 PG (ref 26.6–33)
MCHC RBC AUTO-ENTMCNC: 31.9 G/DL (ref 31.5–35.7)
MCHC RBC AUTO-ENTMCNC: 31.9 G/DL (ref 31.5–35.7)
MCV RBC AUTO: 88.4 FL (ref 79–97)
MCV RBC AUTO: 89.7 FL (ref 79–97)
MONOCYTES # BLD AUTO: 0.07 10*3/MM3 (ref 0.1–0.9)
MONOCYTES # BLD AUTO: 0.72 10*3/MM3 (ref 0.1–0.9)
MONOCYTES NFR BLD AUTO: 1.2 % (ref 5–12)
MONOCYTES NFR BLD AUTO: 9 % (ref 5–12)
NEUTROPHILS NFR BLD AUTO: 3.9 10*3/MM3 (ref 1.7–7)
NEUTROPHILS NFR BLD AUTO: 4.38 10*3/MM3 (ref 1.7–7)
NEUTROPHILS NFR BLD AUTO: 49 % (ref 42.7–76)
NEUTROPHILS NFR BLD AUTO: 76.3 % (ref 42.7–76)
NRBC BLD AUTO-RTO: 0 /100 WBC (ref 0–0.2)
NRBC BLD AUTO-RTO: 0 /100 WBC (ref 0–0.2)
PLATELET # BLD AUTO: 226 10*3/MM3 (ref 140–450)
PLATELET # BLD AUTO: 237 10*3/MM3 (ref 140–450)
PMV BLD AUTO: 9.2 FL (ref 6–12)
PMV BLD AUTO: 9.2 FL (ref 6–12)
POTASSIUM SERPL-SCNC: 4.1 MMOL/L (ref 3.5–5.2)
RBC # BLD AUTO: 3.97 10*6/MM3 (ref 3.77–5.28)
RBC # BLD AUTO: 4.16 10*6/MM3 (ref 3.77–5.28)
SODIUM SERPL-SCNC: 142 MMOL/L (ref 136–145)
WBC NRBC COR # BLD AUTO: 5.74 10*3/MM3 (ref 3.4–10.8)
WBC NRBC COR # BLD AUTO: 7.96 10*3/MM3 (ref 3.4–10.8)

## 2024-06-30 PROCEDURE — 63710000001 INSULIN LISPRO (HUMAN) PER 5 UNITS: Performed by: NURSE PRACTITIONER

## 2024-06-30 PROCEDURE — 25010000002 PROMETHAZINE PER 50 MG: Performed by: INTERNAL MEDICINE

## 2024-06-30 PROCEDURE — 85025 COMPLETE CBC W/AUTO DIFF WBC: CPT | Performed by: STUDENT IN AN ORGANIZED HEALTH CARE EDUCATION/TRAINING PROGRAM

## 2024-06-30 PROCEDURE — 83735 ASSAY OF MAGNESIUM: CPT | Performed by: STUDENT IN AN ORGANIZED HEALTH CARE EDUCATION/TRAINING PROGRAM

## 2024-06-30 PROCEDURE — 82948 REAGENT STRIP/BLOOD GLUCOSE: CPT

## 2024-06-30 PROCEDURE — 25010000002 METOCLOPRAMIDE PER 10 MG: Performed by: NURSE PRACTITIONER

## 2024-06-30 PROCEDURE — 25010000002 DEXAMETHASONE SODIUM PHOSPHATE 10 MG/ML SOLUTION: Performed by: NURSE PRACTITIONER

## 2024-06-30 PROCEDURE — 25010000002 DIPHENHYDRAMINE PER 50 MG: Performed by: NURSE PRACTITIONER

## 2024-06-30 PROCEDURE — 82948 REAGENT STRIP/BLOOD GLUCOSE: CPT | Performed by: NURSE PRACTITIONER

## 2024-06-30 PROCEDURE — 97166 OT EVAL MOD COMPLEX 45 MIN: CPT

## 2024-06-30 PROCEDURE — 25010000002 KETOROLAC TROMETHAMINE PER 15 MG: Performed by: NURSE PRACTITIONER

## 2024-06-30 PROCEDURE — 80053 COMPREHEN METABOLIC PANEL: CPT | Performed by: STUDENT IN AN ORGANIZED HEALTH CARE EDUCATION/TRAINING PROGRAM

## 2024-06-30 RX ORDER — METOCLOPRAMIDE HYDROCHLORIDE 5 MG/ML
10 INJECTION INTRAMUSCULAR; INTRAVENOUS ONCE
Status: COMPLETED | OUTPATIENT
Start: 2024-06-30 | End: 2024-06-30

## 2024-06-30 RX ORDER — DEXAMETHASONE SODIUM PHOSPHATE 10 MG/ML
6 INJECTION, SOLUTION INTRAMUSCULAR; INTRAVENOUS ONCE
Status: COMPLETED | OUTPATIENT
Start: 2024-06-30 | End: 2024-06-30

## 2024-06-30 RX ORDER — KETOROLAC TROMETHAMINE 30 MG/ML
15 INJECTION, SOLUTION INTRAMUSCULAR; INTRAVENOUS ONCE
Status: COMPLETED | OUTPATIENT
Start: 2024-06-30 | End: 2024-06-30

## 2024-06-30 RX ORDER — DIPHENHYDRAMINE HYDROCHLORIDE 50 MG/ML
12.5 INJECTION INTRAMUSCULAR; INTRAVENOUS ONCE
Status: COMPLETED | OUTPATIENT
Start: 2024-06-30 | End: 2024-06-30

## 2024-06-30 RX ADMIN — INSULIN LISPRO 5 UNITS: 100 INJECTION, SOLUTION INTRAVENOUS; SUBCUTANEOUS at 21:27

## 2024-06-30 RX ADMIN — GABAPENTIN 600 MG: 300 CAPSULE ORAL at 21:26

## 2024-06-30 RX ADMIN — AMLODIPINE BESYLATE 10 MG: 5 TABLET ORAL at 08:19

## 2024-06-30 RX ADMIN — PROMETHAZINE HYDROCHLORIDE 12.5 MG: 25 INJECTION INTRAMUSCULAR; INTRAVENOUS at 10:30

## 2024-06-30 RX ADMIN — Medication 10 ML: at 21:27

## 2024-06-30 RX ADMIN — GABAPENTIN 600 MG: 300 CAPSULE ORAL at 08:19

## 2024-06-30 RX ADMIN — KETOROLAC TROMETHAMINE 15 MG: 30 INJECTION, SOLUTION INTRAMUSCULAR at 16:46

## 2024-06-30 RX ADMIN — TRAZODONE HYDROCHLORIDE 50 MG: 50 TABLET ORAL at 21:27

## 2024-06-30 RX ADMIN — ATORVASTATIN CALCIUM 80 MG: 40 TABLET, FILM COATED ORAL at 21:26

## 2024-06-30 RX ADMIN — TICAGRELOR 90 MG: 90 TABLET ORAL at 08:18

## 2024-06-30 RX ADMIN — DIPHENHYDRAMINE HYDROCHLORIDE 12.5 MG: 50 INJECTION, SOLUTION INTRAMUSCULAR; INTRAVENOUS at 16:45

## 2024-06-30 RX ADMIN — BUTALBITAL, ACETAMINOPHEN, AND CAFFEINE 1 TABLET: 325; 50; 40 TABLET ORAL at 10:25

## 2024-06-30 RX ADMIN — METOCLOPRAMIDE 10 MG: 5 INJECTION, SOLUTION INTRAMUSCULAR; INTRAVENOUS at 16:44

## 2024-06-30 RX ADMIN — ASPIRIN 81 MG: 81 TABLET, COATED ORAL at 08:19

## 2024-06-30 RX ADMIN — TOPIRAMATE 25 MG: 25 TABLET, FILM COATED ORAL at 08:19

## 2024-06-30 RX ADMIN — TICAGRELOR 90 MG: 90 TABLET ORAL at 21:27

## 2024-06-30 RX ADMIN — DEXAMETHASONE SODIUM PHOSPHATE 6 MG: 10 INJECTION, SOLUTION INTRAMUSCULAR; INTRAVENOUS at 16:42

## 2024-06-30 RX ADMIN — Medication 10 ML: at 08:19

## 2024-06-30 NOTE — THERAPY EVALUATION
Patient Name: Marge Mcghee  : 1955    MRN: [id number]                              Today's Date: 2024       Admit Date: 2024    Visit Dx:     ICD-10-CM ICD-9-CM   1. Acute intractable headache, unspecified headache type  R51.9 784.0   2. 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  E11.3513 250.50     362.07     362.02   3. Intractable migraine with aura without status migrainosus  G43.119 346.01     Patient Active Problem List   Diagnosis    Type 2 diabetes mellitus with retinopathy, with long-term current use of insulin    Age-related osteoporosis without current pathological fracture    Hyperlipidemia    Hepatic steatosis    Gastroesophageal reflux disease with esophagitis    Adrenal nodule    Vitamin D deficiency    Thyroid nodule    UTI (urinary tract infection)    REINA (acute kidney injury)    Hyperglycemia    Acute right flank pain    Vomiting    Hypomagnesemia    Dehydration    Obesity (BMI 30-39.9)    Complicated migraine    Occipital neuralgia of left side    Polypharmacy    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    Lisfranc dislocation    Delayed surgical wound healing    Right foot infection    Chest pain    GERD (gastroesophageal reflux disease)    Stable angina pectoris    S/P CABG x 3    Encephalopathy, metabolic    Hypertensive emergency    Dysarthria    CVA (cerebral vascular accident)    Coronary artery disease involving coronary bypass graft of native heart without angina pectoris    Essential hypertension    HTN, goal below 130/80    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    Type 2 diabetes mellitus without complication, with long-term current use of insulin    Change in vision    Other headache syndrome    Migraine, intractable     Past Medical History:   Diagnosis Date     Adrenal nodule 11/16/2016    FINDINGS: Mild hepatic steatosis may be present. Cholecystectomy. No biliary ductal dilatation. Negative pancreas, spleen, left adrenal gland, and kidneys. The right adrenal presumed adenoma has increased slightly, measuring 3 x 4 cm in diameter,  compared to 2 x 3.5 cm on the previous exam. The attenuation values are consistent with an adenoma. Done at [facility] in August 2018    Age-related osteoporosis without current pathological fracture 11/16/2016    Arthritis     Delayed surgical wound healing     Essential hypertension 11/16/2016    Gastroesophageal reflux disease with esophagitis 11/16/2016    Hepatic steatosis 11/16/2016    History of anemia     History of sepsis     FROM UTI    Hyperlipidemia 11/16/2016    Lisfranc's dislocation     LEFT WITH FRACTURES NONHEALING FOOT    Osteomyelitis of left foot 11/2020    RLS (restless legs syndrome)     Squamous cell skin cancer 2014    Excised by Dr. James    Thyroid nodule 10/18/2019    BENIGN    Type 2 diabetes mellitus with peripheral neuropathy 11/16/2016    Vitamin D deficiency 1/25/2019     Past Surgical History:   Procedure Laterality Date    BREAST BIOPSY Left     7/2019. BENIGN    CARDIAC CATHETERIZATION Left 5/2/2021    Procedure: Cardiac Catheterization/Vascular Study;  Surgeon: Chacho Esteban MD;  Location: [facility] CATH INVASIVE LOCATION;  Service: Cardiovascular;  Laterality: Left;    CARDIAC CATHETERIZATION N/A 5/2/2021    Procedure: Intra-Aortic Baloon Pump Insertion;  Surgeon: Chacho Esteban MD;  Location: [facility] CATH INVASIVE LOCATION;  Service: Cardiovascular;  Laterality: N/A;    CATARACT EXTRACTION WITH INTRAOCULAR LENS IMPLANT Bilateral     CHOLECYSTECTOMY      COLONOSCOPY      CORONARY ARTERY BYPASS GRAFT N/A 5/2/2021    Procedure: CORONARY ARTERY BYPASS WITH INTERNAL MAMMARY ARTERY GRAFT;  Surgeon: Jr Rocael Alexander MD;  Location: [facility] CVOR;  Service: Cardiothoracic;  Laterality: N/A;    FOOT FUSION  Right 11/13/2020    Procedure: RIGHT OPEN TREATMENT LISFRANC INJURY, OPEN REDUCTION INTERNAL FIXATION MEDIAL/MIDDLE CUNEIFORM FRACTURE AND 2ND/3RD METATARSAL FRACTURE 1ST 2ND POSSIBLE 3RD TARSOMETATARSAL ARTHRODESIS INTERCUNEIFORM ARTHRODESIS CALCANEAL BONE GRAFT;  Surgeon: Mikhail Banks Jr., MD;  Location: [facility] OR [facility];  Service: Orthopedics;  Laterality: Right;    INCISION AND DRAINAGE LEG Right 1/8/2021    Procedure: RIGHT IRRIGATION AND DEBRIDEMENT OF FOOT WITH SECONDARY CLOSURE AND HARDWARE REMOVAL;  Surgeon: Mikhail Banks Jr., MD;  Location: [facility] OR;  Service: Orthopedics;  Laterality: Right;    KNEE ARTHROSCOPY Bilateral     TOTAL ABDOMINAL HYSTERECTOMY      TRANSESOPHAGEAL ECHOCARDIOGRAM (EMILIA) N/A 5/2/2021    Procedure: TRANSESOPHAGEAL ECHOCARDIOGRAM;  Surgeon: Jr Rocael Alexander MD;  Location: [facility];  Service: Cardiothoracic;  Laterality: N/A;    UPPER GASTROINTESTINAL ENDOSCOPY  08/2016    US GUIDED FINE NEEDLE ASPIRATION  5/8/2020      General Information       Row Name 06/30/24 1654          OT Time and Intention    Document Type evaluation  -SP     Mode of Treatment occupational therapy  -SP       Row Name 06/30/24 4122          General Information    Patient Profile Reviewed yes  -SP     Prior Level of Function independent:;ADL's;driving;home management;all household mobility;community mobility  -SP     Existing Precautions/Restrictions no known precautions/restrictions  -SP     Barriers to Rehab none identified  -SP       Row Name 06/30/24 7348          Occupational Profile    Reason for Services/Referral (Occupational Profile) Pt is a 68 y/o female admitted to [facility] on 6/27/24 with c/o migraines with associated n/v. Elevated BP, 180s/100s. Code kortney called in 6/27 d/t L sided facial droop and visual changes. MRI brain: (-) acute ischemic change. CTA head/neck: (-). PMHx of hepatic steatos, HLD, UTI, REINA, GERD, DM2, CAD with triple bypass, CVA x 3. At baseline, pt resides with spouse  in multilevel home (bedrooms upstairs), with 8 FELICIA. Pt reports she is IND all I/ADLs, driving, and does not utilize any DME.  -SP       Row Name 06/30/24 1654          Living Environment    People in Home spouse  -SP       Row Name 06/30/24 1654          Home Main Entrance    Number of Stairs, Main Entrance twelve;eight  -SP       Row Name 06/30/24 1654          Stairs Within Home, Primary    Number of Stairs, Within Home, Primary twelve  -SP       Row Name 06/30/24 1654          Cognition    Orientation Status (Cognition) oriented x 4  -SP       Row Name 06/30/24 1654          Safety Issues, Functional Mobility    Safety Issues Affecting Function (Mobility) impulsivity;awareness of need for assistance;judgment  -SP     Impairments Affecting Function (Mobility) balance;endurance/activity tolerance;coordination;strength;pain  -SP               User Key  (r) = Recorded By, (t) = Taken By, (c) = Cosigned By      Initials Name Provider Type    SP Madhu Neal OT Occupational Therapist                     Mobility/ADL's       Row Name 06/30/24 1701          Bed Mobility    Bed Mobility bed mobility (all) activities  -SP     All Activities, Cotton (Bed Mobility) modified independence  -SP     Assistive Device (Bed Mobility) bed rails  -SP       Row Name 06/30/24 1701          Sit-Stand Transfer    Sit-Stand Cotton (Transfers) contact guard  -SP       Row Name 06/30/24 1701          Functional Mobility    Functional Mobility- Ind. Level contact guard assist  -SP     Patient was able to Ambulate yes  -SP       Row Name 06/30/24 1701          Activities of Daily Living    BADL Assessment/Intervention lower body dressing;toileting  -SP       Row Name 06/30/24 1701          Lower Body Dressing Assessment/Training    Cotton Level (Lower Body Dressing) don;socks;independent  -SP     Position (Lower Body Dressing) edge of bed sitting;unsupported sitting  -SP       Row Name 06/30/24 1701          Toileting  Assessment/Training    [place] Level (Toileting) toileting skills;standby assist  -SP               User Key  (r) = Recorded By, (t) = Taken By, (c) = Cosigned By      Initials Name Provider Type    SP Madhu Neal OT Occupational Therapist                   Obj/Interventions       Row Name 06/30/24 1702          Sensory Assessment (Somatosensory)    Sensory Assessment (Somatosensory) UE sensation intact  -SP       Row Name 06/30/24 1702          Vision Assessment/Intervention    Visual Impairment/Limitations WFL  -SP       Row Name 06/30/24 1702          Range of Motion Comprehensive    Comment, General Range of Motion RUE WFLs, LUE ~75*.  -SP       Row Name 06/30/24 1702          Strength Comprehensive (MMT)    Comment, General Manual Muscle Testing (MMT) Assessment MMT RUE WFLs, LUE 4-/5.  -SP       Row Name 06/30/24 1702          Balance    Balance Assessment sitting static balance;sitting dynamic balance;sit to stand dynamic balance;standing dynamic balance;standing static balance  -SP     Static Sitting Balance standby assist  -SP     Dynamic Sitting Balance standby assist  -SP     Position, Sitting Balance unsupported;sitting edge of bed  -SP     Sit to Stand Dynamic Balance contact guard  -SP     Static Standing Balance contact guard  -SP     Dynamic Standing Balance contact guard  -SP               User Key  (r) = Recorded By, (t) = Taken By, (c) = Cosigned By      Initials Name Provider Type    SP Madhu Neal OT Occupational Therapist                   Goals/Plan       Row Name 06/30/24 1719          Bathing Goal 1 (OT)    Activity/Device (Bathing Goal 1, OT) bathing skills, all  -SP     [place] Level/Cues Needed (Bathing Goal 1, OT) modified independence  -SP     Time Frame (Bathing Goal 1, OT) 2 weeks  -SP     Strategies/Barriers (Bathing Goal 1, OT) until d/c  -SP       Row Name 06/30/24 1719          Dressing Goal 1 (OT)    Activity/Device (Dressing Goal 1, OT) dressing skills, all   -SP     Calvin/Cues Needed (Dressing Goal 1, OT) modified independence  -SP     Time Frame (Dressing Goal 1, OT) 2 weeks  -SP     Strategies/Barriers (Dressing Goal 1, OT) until d/c  -SP       Row Name 06/30/24 1719          Strength Goal 1 (OT)    Strength Goal 1 (OT) pt will demo good understanding of BUE HEP  -SP     Time Frame (Strength Goal 1, OT) 2 weeks  -SP     Strategies/Barriers (Strength Goal 1, OT) until d/c  -SP       Row Name 06/30/24 171          Therapy Assessment/Plan (OT)    Planned Therapy Interventions (OT) activity tolerance training;cognitive/visual perception retraining;patient/caregiver education/training;occupation/activity based interventions;ROM/therapeutic exercise;strengthening exercise;passive ROM/stretching;IADL retraining  -SP               User Key  (r) = Recorded By, (t) = Taken By, (c) = Cosigned By      Initials Name Provider Type    Madhu Oliva, OT Occupational Therapist                   Clinical Impression       Row Name 06/30/24 1718          Pain Assessment    Pretreatment Pain Rating 8/10  -SP     Posttreatment Pain Rating 8/10  -SP     Pain Location - Side/Orientation Bilateral  -SP     Pain Location lower  -SP     Pain Intervention(s) Repositioned;Nursing Notified;Therapeutic presence  -SP       Row Name 06/30/24 1717          Plan of Care Review    Plan of Care Reviewed With patient  -SP     Outcome Evaluation Pt is a 70 y/o female admitted to [facility] on 6/[facility] with c/o migraines with associated n/v. Elevated BP, 180s/100s. Code stoke called in 6/27 d/t L sided facial droop and visual changes. MRI brain: (-) acute ischemic change. CTA head/neck: (-). PMHx of hepatic steatos, HLD, UTI, REINA, GERD, DM2, CAD with triple bypass, CVA x 3. At baseline, pt resides with spouse in multilevel home (bedrooms upstairs), with 8 FELICIA. Pt reports she is IND all I/ADLs, driving, and does not utilize any DME. Pt A&O x4 with reports of BLE 8/10 pain. Pt mod I for bed mobility and  CGA to come to standing. Pt unsteady on feet and requires increased time d/t dizziness. Pt IND donned socks sitting EOB and SBA to complete toileting/nena-care. ROM RUE WFLs, LUE ~75*. MMT RUE WFLs, LUE 4-/5. Based on assessment, pt would benefit from continued skilled OT services while admitted for increased activity toleranvce, strength, and endurance in preparatio for higher level ADLs. OT recommending inpatient rehab when medically appropriate.  -SP       Row Name 06/30/24 1717          Therapy Assessment/Plan (OT)    Criteria for Skilled Therapeutic Interventions Met (OT) yes;meets criteria;skilled treatment is necessary  -SP     Therapy Frequency (OT) 5 times/wk  -SP     Predicted Duration of Therapy Intervention (OT) until d/c  -SP       Row Name 06/30/24 1717          Therapy Plan Review/Discharge Plan (OT)    Anticipated Discharge Disposition (OT) inpatient rehabilitation facility  -SP       Row Name 06/30/24 1717          Vital Signs    Pre Systolic BP Rehab 105  -SP     Pre Treatment Diastolic BP 64  -SP     Pre SpO2 (%) 98  2L O2 NC  -SP     O2 Delivery Pre Treatment supplemental O2  -SP     Intra SpO2 (%) 100  -SP     O2 Delivery Intra Treatment room air  -SP     Post SpO2 (%) 99  -SP     O2 Delivery Post Treatment supplemental O2  -SP     Pre Patient Position Supine  -SP     Intra Patient Position Standing  -SP     Post Patient Position Supine  -SP       Row Name 06/30/24 1717          Positioning and Restraints    Pre-Treatment Position in bed  -SP     Post Treatment Position bed  -SP     In Bed fowlers;call light within reach;encouraged to call for assist;exit alarm on;with nsg  -SP               User Key  (r) = Recorded By, (t) = Taken By, (c) = Cosigned By      Initials Name Provider Type    SP Madhu Neal, OT Occupational Therapist                   Outcome Measures       Row Name 06/30/24 1722          How much help from another is currently needed...    Putting on and taking off regular  lower body clothing? 3  -SP     Bathing (including washing, rinsing, and drying) 3  -SP     Toileting (which includes using toilet bed pan or urinal) 4  -SP     Putting on and taking off regular upper body clothing 4  -SP     Taking care of personal grooming (such as brushing teeth) 4  -SP     Eating meals 4  -SP     AM-PAC 6 Clicks Score (OT) 22  -SP       Row Name 06/30/24 0815          How much help from another person do you currently need...    Turning from your back to your side while in flat bed without using bedrails? 4  -RA     Moving from lying on back to sitting on the side of a flat bed without bedrails? 4  -RA     Moving to and from a bed to a chair (including a wheelchair)? 4  -RA     Standing up from a chair using your arms (e.g., wheelchair, bedside chair)? 4  -RA     Climbing 3-5 steps with a railing? 4  -RA     To walk in hospital room? 3  -RA     AM-PAC 6 Clicks Score (PT) 23  -RA     Highest Level of Mobility Goal 7 --> Walk 25 feet or more  -RA       Row Name 06/30/24 1722          Functional Assessment    Outcome Measure Options AM-PAC 6 Clicks Daily Activity (OT)  -SP               User Key  (r) = Recorded By, (t) = Taken By, (c) = Cosigned By      Initials Name Provider Type    SP Madhu Neal OT Occupational Therapist    Marco Kearns, RN Registered Nurse                    Occupational Therapy Education       Title: PT OT SLP Therapies (In Progress)       Topic: Occupational Therapy (In Progress)       Point: ADL training (Done)       Description:   Instruct learner(s) on proper safety adaptation and remediation techniques during self care or transfers.   Instruct in proper use of assistive devices.                  Learning Progress Summary             Patient Acceptance, E,TB, VU by SP at 6/30/2024 1722                         Point: Home exercise program (Not Started)       Description:   Instruct learner(s) on appropriate technique for monitoring, assisting and/or progressing  therapeutic exercises/activities.                  Learner Progress:  Not documented in this visit.              Point: Precautions (Not Started)       Description:   Instruct learner(s) on prescribed precautions during self-care and functional transfers.                  Learner Progress:  Not documented in this visit.              Point: Body mechanics (Done)       Description:   Instruct learner(s) on proper positioning and spine alignment during self-care, functional mobility activities and/or exercises.                  Learning Progress Summary             Patient Acceptance, E,TB, VU by SP at 6/30/2024 1722                                         User Key       Initials Effective Dates Name Provider Type Discipline    SP 11/15/23 -  Madhu Neal, JON Occupational Therapist OT                  OT Recommendation and Plan  Planned Therapy Interventions (OT): activity tolerance training, cognitive/visual perception retraining, patient/caregiver education/training, occupation/activity based interventions, ROM/therapeutic exercise, strengthening exercise, passive ROM/stretching, IADL retraining  Therapy Frequency (OT): 5 times/wk  Plan of Care Review  Plan of Care Reviewed With: patient  Outcome Evaluation: Pt is a 70 y/o female admitted to [facility] on 6/27/24 with c/o migraines with associated n/v. Elevated BP, 180s/100s. Code kortney called in 6/27 d/t L sided facial droop and visual changes. MRI brain: (-) acute ischemic change. CTA head/neck: (-). PMHx of hepatic steatos, HLD, UTI, REINA, GERD, DM2, CAD with triple bypass, CVA x 3. At baseline, pt resides with spouse in multilevel home (bedrooms upstairs), with 8 FELICIA. Pt reports she is IND all I/ADLs, driving, and does not utilize any DME. Pt A&O x4 with reports of BLE 8/10 pain. Pt mod I for bed mobility and CGA to come to standing. Pt unsteady on feet and requires increased time d/t dizziness. Pt IND donned socks sitting EOB and SBA to complete toileting/nena-care.  ROM RUE WFLs, LUE ~75*. MMT RUE WFLs, LUE 4-/5. Based on assessment, pt would benefit from continued skilled OT services while admitted for increased activity toleranvce, strength, and endurance in preparatio for higher level ADLs. OT recommending inpatient rehab when medically appropriate.     Time Calculation:         Time Calculation- OT       Row Name 06/30/24 1723             Time Calculation- OT    OT Start Time 1619  -SP      OT Stop Time 1652  -SP      OT Time Calculation (min) 33 min  -SP      OT Received On 06/30/24  -SP      OT - Next Appointment 07/01/24  -SP      OT Goal Re-Cert Due Date 07/14/24  -SP                User Key  (r) = Recorded By, (t) = Taken By, (c) = Cosigned By      Initials Name Provider Type    Madhu Oliva OT Occupational Therapist                  Therapy Charges for Today       Code Description Service Date Service Provider Modifiers Qty    [id number] HC OT EVAL MOD COMPLEXITY 4 6/30/2024 Madhu Neal OT GO 1                 Madhu Neal OT  6/30/2024

## 2024-06-30 NOTE — PROGRESS NOTES
[facility] MEDICINE SERVICE  DAILY PROGRESS NOTE    NAME: Marge Mcghee  : 1955  MRN: [id number]      LOS: 3 days     PROVIDER OF SERVICE: Carlos Llanes Alvarez, MD    Chief Complaint: Migraine, intractable    Subjective:     Interval History:  History taken from: patient chart  Patient Complaints: Headache, nausea or vomiting  Patient Denies:  tingling or numbness    Patient is being followed for left sided weakness, headache likely hemiplegic migraine. She was seen and examined at bedside. She was discharged on  but apparently did not feel safe to go home.     She is still reporting constant frontal headache, having persistent nausea and vomiting.        Review of Systems   Constitutional:  Negative for fatigue and fever.   Gastrointestinal:  Positive for nausea.   Neurological:  Positive for headaches.   Psychiatric/Behavioral:  Negative for confusion.        Objective:     Vital Signs  Temp:  [97.2 °F (36.2 °C)-97.9 °F (36.6 °C)] 97.4 °F (36.3 °C)  Heart Rate:  [82-94] 84  Resp:  [12-19] 19  BP: (102-158)/(55-87) 158/87   Body mass index is 29.1 kg/m².    Physical Exam  Constitutional:       General: She is not in acute distress.     Appearance: Normal appearance.   HENT:      Head: Normocephalic.      Nose: Nose normal.      Mouth/Throat:      Mouth: Mucous membranes are moist. Mucous membranes are dry.   Eyes:      Extraocular Movements: Extraocular movements intact.      Pupils: Pupils are equal, round, and reactive to light.   Cardiovascular:      Rate and Rhythm: Normal rate and regular rhythm.   Pulmonary:      Effort: Pulmonary effort is normal. No respiratory distress.      Breath sounds: Normal breath sounds.   Abdominal:      General: Abdomen is flat.      Palpations: Abdomen is soft.   Musculoskeletal:         General: Normal range of motion.      Cervical back: Neck supple.   Skin:     General: Skin is dry.      Capillary Refill: Capillary refill takes less than 2 seconds.    Neurological:      Mental Status: She is alert and oriented to person, place, and time.      Cranial Nerves: No cranial nerve deficit.      Motor: No weakness.      Gait: Gait abnormal.   Psychiatric:         Behavior: Behavior normal.        Scheduled Meds   amLODIPine, 10 mg, Oral, Daily  aspirin, 81 mg, Oral, Daily  atorvastatin, 80 mg, Oral, Nightly  gabapentin, 600 mg, Oral, Q12H  insulin lispro, 2-7 Units, Subcutaneous, 4x Daily AC & at Bedtime  sodium chloride, 10 mL, Intravenous, Q12H  ticagrelor, 90 mg, Oral, BID  topiramate, 25 mg, Oral, Daily  traZODone, 50 mg, Oral, Nightly       PRN Meds     acetaminophen    senna-docusate sodium **AND** polyethylene glycol **AND** bisacodyl **AND** bisacodyl    butalbital-acetaminophen-caffeine    calcium carbonate    Calcium Replacement - Follow Nurse / BPA Driven Protocol    dextrose    dextrose    glucagon (human recombinant)    Magnesium Standard Dose Replacement - Follow Nurse / BPA Driven Protocol    metoclopramide    Morphine    Phosphorus Replacement - Follow Nurse / BPA Driven Protocol    Potassium Replacement - Follow Nurse / BPA Driven Protocol    promethazine    sodium chloride    sodium chloride   Infusions         Diagnostic Data    Results from last 7 days   Lab Units 06/29/24  2344   WBC 10*3/mm3 7.96   HEMOGLOBIN g/dL 11.2*   HEMATOCRIT % 35.1   PLATELETS 10*3/mm3 226   GLUCOSE mg/dL 136*   CREATININE mg/dL 1.21*   BUN mg/dL 24*   SODIUM mmol/L 142   POTASSIUM mmol/L 4.1   ANION GAP mmol/L 9.6       No radiology results for the last day      I reviewed the patient's new clinical results.  I reviewed the patient's new imaging results and agree with the interpretation.  I reviewed the patient's other test results and agree with the interpretation    Assessment/Plan:     Active and Resolved Problems  Active Hospital Problems    Diagnosis  POA    **Migraine, intractable [G43.919]  Yes    Encephalopathy, metabolic [G93.41]  Yes    Hypertensive emergency  [I16.1]  Yes    S/P CABG x 3 [Z95.1]  Not Applicable    GERD (gastroesophageal reflux disease) [K21.9]  Yes    Polypharmacy [Z79.899]  Not Applicable    REINA (acute kidney injury) [N17.9]  Yes    Obesity (BMI 30-39.9) [E66.9]  Yes    Vitamin D deficiency [E55.9]  Yes    Type 2 diabetes mellitus with retinopathy, with long-term current use of insulin [E11.319, Z79.4]  Not Applicable    Hyperlipidemia [E78.5]  Yes    Hepatic steatosis [K76.0]  Yes    Gastroesophageal reflux disease with esophagitis [K21.00]  Yes      Resolved Hospital Problems   No resolved problems to display.       Left sided weakness, probably old stroke recrudescence   Intractable Headache, probably hemiplegic migraine. Other differentials cluster, tension  MRI brain negative for acute pathology, CTA head and neck with no significant stenosis.   Continue PT/OT, patient will probably require placement in rehab  On Brillinta 90 mg bid, ASA 81 mg once daily  Continue high intensity statin   Continue Fioricet as needed q 4  Continue Topamax 25 mg once daily  Continue morphine 2 q 4 for severe pain  Requested Neurology follow up  Trial of oxygen via nasal cannula     History of CAD  On DAPT, statin     HLD  Continue statin        Type 2 DM  Continue insulin SS  Changed diet to carb consistent, cardiac         GERD  Continue PPI     RLS  Continue gabapentin     History of Vit B12 deficiency  Continue B12 supplementation  Checking methylmalonic acid    VTE Prophylaxis:  Mechanical VTE prophylaxis orders are present.         Code status is   Code Status and Medical Interventions:   Ordered at: 06/27/24 0856     Level Of Support Discussed With:    Patient     Code Status (Patient has no pulse and is not breathing):    CPR (Attempt to Resuscitate)     Medical Interventions (Patient has pulse or is breathing):    Full Support       Plan for disposition:Rehab in 1 days    Time: 30 minutes    Signature: Electronically signed by Carlos Llanes Alvarez, MD,  06/30/24, 13:46 EDT.  [facility] Hospitalist Team

## 2024-06-30 NOTE — PLAN OF CARE
Goal Outcome Evaluation:                   Pt agreeable to bed alarm to maintain safety and prevent falls. Nausea and headaches continues.

## 2024-06-30 NOTE — PLAN OF CARE
Goal Outcome Evaluation:            Pt currently resting abed. Pt with c/o nausea and headache see mar. VSS no signs of distress cont plan of care.

## 2024-06-30 NOTE — PLAN OF CARE
Goal Outcome Evaluation:  Plan of Care Reviewed With: patient     Outcome Evaluation: Pt is a 68 y/o female admitted to [facility] on 6/27/24 with c/o migraines with associated n/v. Elevated BP, 180s/100s. Code kortney called in 6/27 d/t L sided facial droop and visual changes. MRI brain: (-) acute ischemic change. CTA head/neck: (-). PMHx of hepatic steatos, HLD, UTI, REINA, GERD, DM2, CAD with triple bypass, CVA x 3. At baseline, pt resides with spouse in multilevel home (bedrooms upstairs), with 8 FELICIA. Pt reports she is IND all I/ADLs, driving, and does not utilize any DME. Pt A&O x4 with reports of BLE 8/10 pain. Pt mod I for bed mobility and CGA to come to standing. Pt unsteady on feet and requires increased time d/t dizziness. Pt IND donned socks sitting EOB and SBA to complete toileting/nena-care. ROM RUE WFLs, LUE ~75*. MMT RUE WFLs, LUE 4-/5. Based on assessment, pt would benefit from continued skilled OT services while admitted for increased activity toleranvce, strength, and endurance in preparatio for higher level ADLs. OT recommending inpatient rehab when medically appropriate.    Anticipated Discharge Disposition (OT): inpatient rehabilitation facility

## 2024-07-01 LAB
ALBUMIN SERPL-MCNC: 3.9 G/DL (ref 3.5–5.2)
ALBUMIN/GLOB SERPL: 1.4 G/DL
ALP SERPL-CCNC: 71 U/L (ref 39–117)
ALT SERPL W P-5'-P-CCNC: 6 U/L (ref 1–33)
ANION GAP SERPL CALCULATED.3IONS-SCNC: 12.2 MMOL/L (ref 5–15)
AST SERPL-CCNC: 19 U/L (ref 1–32)
BILIRUB SERPL-MCNC: 0.2 MG/DL (ref 0–1.2)
BUN SERPL-MCNC: 28 MG/DL (ref 8–23)
BUN/CREAT SERPL: 17.6 (ref 7–25)
CALCIUM SPEC-SCNC: 9.4 MG/DL (ref 8.6–10.5)
CHLORIDE SERPL-SCNC: 103 MMOL/L (ref 98–107)
CO2 SERPL-SCNC: 21.8 MMOL/L (ref 22–29)
CREAT SERPL-MCNC: 1.59 MG/DL (ref 0.57–1)
EGFRCR SERPLBLD CKD-EPI 2021: 34.6 ML/MIN/1.73
GLOBULIN UR ELPH-MCNC: 2.7 GM/DL
GLUCOSE BLDC GLUCOMTR-MCNC: 251 MG/DL (ref 70–105)
GLUCOSE BLDC GLUCOMTR-MCNC: 281 MG/DL (ref 70–105)
GLUCOSE BLDC GLUCOMTR-MCNC: 297 MG/DL (ref 70–105)
GLUCOSE BLDC GLUCOMTR-MCNC: 297 MG/DL (ref 70–105)
GLUCOSE SERPL-MCNC: 353 MG/DL (ref 65–99)
MAGNESIUM SERPL-MCNC: 1.8 MG/DL (ref 1.6–2.4)
POTASSIUM SERPL-SCNC: 4.2 MMOL/L (ref 3.5–5.2)
PROT SERPL-MCNC: 6.6 G/DL (ref 6–8.5)
SODIUM SERPL-SCNC: 137 MMOL/L (ref 136–145)

## 2024-07-01 PROCEDURE — 82948 REAGENT STRIP/BLOOD GLUCOSE: CPT

## 2024-07-01 PROCEDURE — 25010000002 MORPHINE PER 10 MG: Performed by: INTERNAL MEDICINE

## 2024-07-01 PROCEDURE — 63710000001 INSULIN LISPRO (HUMAN) PER 5 UNITS: Performed by: NURSE PRACTITIONER

## 2024-07-01 PROCEDURE — 99233 SBSQ HOSP IP/OBS HIGH 50: CPT | Performed by: NURSE PRACTITIONER

## 2024-07-01 PROCEDURE — 82948 REAGENT STRIP/BLOOD GLUCOSE: CPT | Performed by: NURSE PRACTITIONER

## 2024-07-01 PROCEDURE — 92526 ORAL FUNCTION THERAPY: CPT

## 2024-07-01 PROCEDURE — 25010000002 METOCLOPRAMIDE PER 10 MG: Performed by: STUDENT IN AN ORGANIZED HEALTH CARE EDUCATION/TRAINING PROGRAM

## 2024-07-01 RX ORDER — BUTALBITAL, ACETAMINOPHEN AND CAFFEINE 50; 325; 40 MG/1; MG/1; MG/1
2 TABLET ORAL 2 TIMES DAILY
Status: DISCONTINUED | OUTPATIENT
Start: 2024-07-01 | End: 2024-07-02 | Stop reason: HOSPADM

## 2024-07-01 RX ORDER — PANTOPRAZOLE SODIUM 40 MG/1
40 TABLET, DELAYED RELEASE ORAL
Status: DISCONTINUED | OUTPATIENT
Start: 2024-07-01 | End: 2024-07-02 | Stop reason: HOSPADM

## 2024-07-01 RX ORDER — BACLOFEN 10 MG/1
10 TABLET ORAL 2 TIMES DAILY PRN
Status: DISCONTINUED | OUTPATIENT
Start: 2024-07-03 | End: 2024-07-02 | Stop reason: HOSPADM

## 2024-07-01 RX ORDER — BACLOFEN 10 MG/1
10 TABLET ORAL 2 TIMES DAILY
Status: DISCONTINUED | OUTPATIENT
Start: 2024-07-01 | End: 2024-07-02 | Stop reason: HOSPADM

## 2024-07-01 RX ORDER — BUTALBITAL, ACETAMINOPHEN AND CAFFEINE 50; 325; 40 MG/1; MG/1; MG/1
1 TABLET ORAL ONCE
Status: DISCONTINUED | OUTPATIENT
Start: 2024-07-01 | End: 2024-07-02 | Stop reason: HOSPADM

## 2024-07-01 RX ORDER — ALUMINA, MAGNESIA, AND SIMETHICONE 2400; 2400; 240 MG/30ML; MG/30ML; MG/30ML
15 SUSPENSION ORAL EVERY 6 HOURS PRN
Status: DISCONTINUED | OUTPATIENT
Start: 2024-07-01 | End: 2024-07-02 | Stop reason: HOSPADM

## 2024-07-01 RX ADMIN — ASPIRIN 81 MG: 81 TABLET, COATED ORAL at 09:12

## 2024-07-01 RX ADMIN — INSULIN LISPRO 4 UNITS: 100 INJECTION, SOLUTION INTRAVENOUS; SUBCUTANEOUS at 09:11

## 2024-07-01 RX ADMIN — TRAZODONE HYDROCHLORIDE 50 MG: 50 TABLET ORAL at 20:53

## 2024-07-01 RX ADMIN — TICAGRELOR 90 MG: 90 TABLET ORAL at 09:12

## 2024-07-01 RX ADMIN — TICAGRELOR 90 MG: 90 TABLET ORAL at 20:51

## 2024-07-01 RX ADMIN — ATORVASTATIN CALCIUM 80 MG: 40 TABLET, FILM COATED ORAL at 20:50

## 2024-07-01 RX ADMIN — Medication 10 ML: at 09:12

## 2024-07-01 RX ADMIN — AMLODIPINE BESYLATE 10 MG: 5 TABLET ORAL at 09:12

## 2024-07-01 RX ADMIN — PANTOPRAZOLE SODIUM 40 MG: 40 TABLET, DELAYED RELEASE ORAL at 16:00

## 2024-07-01 RX ADMIN — BACLOFEN 10 MG: 10 TABLET ORAL at 21:00

## 2024-07-01 RX ADMIN — BUTALBITAL, ACETAMINOPHEN, AND CAFFEINE 1 TABLET: 325; 50; 40 TABLET ORAL at 09:20

## 2024-07-01 RX ADMIN — BUTALBITAL, ACETAMINOPHEN, AND CAFFEINE 2 TABLET: 50; 325; 40 TABLET ORAL at 20:50

## 2024-07-01 RX ADMIN — ALUMINUM HYDROXIDE, MAGNESIUM HYDROXIDE, AND DIMETHICONE 15 ML: 400; 400; 40 SUSPENSION ORAL at 17:02

## 2024-07-01 RX ADMIN — METOCLOPRAMIDE 10 MG: 5 INJECTION, SOLUTION INTRAMUSCULAR; INTRAVENOUS at 09:21

## 2024-07-01 RX ADMIN — BACLOFEN 10 MG: 10 TABLET ORAL at 16:00

## 2024-07-01 RX ADMIN — GABAPENTIN 600 MG: 300 CAPSULE ORAL at 09:12

## 2024-07-01 RX ADMIN — Medication 10 ML: at 21:09

## 2024-07-01 RX ADMIN — MORPHINE SULFATE 2 MG: 2 INJECTION, SOLUTION INTRAMUSCULAR; INTRAVENOUS at 22:43

## 2024-07-01 RX ADMIN — GABAPENTIN 600 MG: 300 CAPSULE ORAL at 20:51

## 2024-07-01 RX ADMIN — TOPIRAMATE 25 MG: 25 TABLET, FILM COATED ORAL at 09:12

## 2024-07-01 RX ADMIN — INSULIN LISPRO 4 UNITS: 100 INJECTION, SOLUTION INTRAVENOUS; SUBCUTANEOUS at 20:51

## 2024-07-01 NOTE — THERAPY TREATMENT NOTE
Acute Care - Speech Language Pathology   Swallow Treatment Note ELSY Woo     Patient Name: Marge Mcghee  : 1955  MRN: [id number]  Today's Date: 2024               Admit Date: 2024    Visit Dx:     ICD-10-CM ICD-9-CM   1. Acute intractable headache, unspecified headache type  R51.9 784.0   2. 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  E11.3513 250.50     362.07     362.02   3. Intractable migraine with aura without status migrainosus  G43.119 346.01     Patient Active Problem List   Diagnosis    Type 2 diabetes mellitus with retinopathy, with long-term current use of insulin    Age-related osteoporosis without current pathological fracture    Hyperlipidemia    Hepatic steatosis    Gastroesophageal reflux disease with esophagitis    Adrenal nodule    Vitamin D deficiency    Thyroid nodule    UTI (urinary tract infection)    REINA (acute kidney injury)    Hyperglycemia    Acute right flank pain    Vomiting    Hypomagnesemia    Dehydration    Obesity (BMI 30-39.9)    Complicated migraine    Occipital neuralgia of left side    Polypharmacy    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    Lisfranc dislocation    Delayed surgical wound healing    Right foot infection    Chest pain    GERD (gastroesophageal reflux disease)    Stable angina pectoris    S/P CABG x 3    Encephalopathy, metabolic    Hypertensive emergency    Dysarthria    CVA (cerebral vascular accident)    Coronary artery disease involving coronary bypass graft of native heart without angina pectoris    Essential hypertension    HTN, goal below 130/80    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    Type 2 diabetes mellitus without complication, with long-term current use of insulin    Change in vision    Other headache syndrome    Migraine,  intractable     Past Medical History:   Diagnosis Date    Adrenal nodule 11/16/2016    FINDINGS: Mild hepatic steatosis may be present. Cholecystectomy. No biliary ductal dilatation. Negative pancreas, spleen, left adrenal gland, and kidneys. The right adrenal presumed adenoma has increased slightly, measuring 3 x 4 cm in diameter,  compared to 2 x 3.5 cm on the previous exam. The attenuation values are consistent with an adenoma. Done at [facility] in August 2018    Age-related osteoporosis without current pathological fracture 11/16/2016    Arthritis     Delayed surgical wound healing     Essential hypertension 11/16/2016    Gastroesophageal reflux disease with esophagitis 11/16/2016    Hepatic steatosis 11/16/2016    History of anemia     History of sepsis     FROM UTI    Hyperlipidemia 11/16/2016    Lisfranc's dislocation     LEFT WITH FRACTURES NONHEALING FOOT    Osteomyelitis of left foot 11/2020    RLS (restless legs syndrome)     Squamous cell skin cancer 2014    Excised by Dr. James    Thyroid nodule 10/18/2019    BENIGN    Type 2 diabetes mellitus with peripheral neuropathy 11/16/2016    Vitamin D deficiency 1/25/2019     Past Surgical History:   Procedure Laterality Date    BREAST BIOPSY Left     7/2019. BENIGN    CARDIAC CATHETERIZATION Left 5/2/2021    Procedure: Cardiac Catheterization/Vascular Study;  Surgeon: Chacho Esteban MD;  Location: [facility] CATH INVASIVE LOCATION;  Service: Cardiovascular;  Laterality: Left;    CARDIAC CATHETERIZATION N/A 5/2/2021    Procedure: Intra-Aortic Baloon Pump Insertion;  Surgeon: Chacho Estbean MD;  Location: [facility] CATH INVASIVE LOCATION;  Service: Cardiovascular;  Laterality: N/A;    CATARACT EXTRACTION WITH INTRAOCULAR LENS IMPLANT Bilateral     CHOLECYSTECTOMY      COLONOSCOPY      CORONARY ARTERY BYPASS GRAFT N/A 5/2/2021    Procedure: CORONARY ARTERY BYPASS WITH INTERNAL MAMMARY ARTERY GRAFT;  Surgeon: Jr Rocael Alexander MD;  Location: [facility] CVOR;   Service: Cardiothoracic;  Laterality: N/A;    FOOT FUSION Right 11/13/2020    Procedure: RIGHT OPEN TREATMENT LISFRANC INJURY, OPEN REDUCTION INTERNAL FIXATION MEDIAL/MIDDLE CUNEIFORM FRACTURE AND 2ND/3RD METATARSAL FRACTURE 1ST 2ND POSSIBLE 3RD TARSOMETATARSAL ARTHRODESIS INTERCUNEIFORM ARTHRODESIS CALCANEAL BONE GRAFT;  Surgeon: Mikhail Banks Jr., MD;  [place] [facility] [place] [facility]  Service: Orthopedics;  Laterality: Right;    INCISION AND DRAINAGE LEG Right 1/8/2021    Procedure: RIGHT IRRIGATION AND DEBRIDEMENT OF FOOT WITH SECONDARY CLOSURE AND HARDWARE REMOVAL;  Surgeon: Mikhail Banks Jr., MD;  Location: [facility] MAIN OR;  Service: Orthopedics;  Laterality: Right;    KNEE ARTHROSCOPY Bilateral     TOTAL ABDOMINAL HYSTERECTOMY      TRANSESOPHAGEAL ECHOCARDIOGRAM (EMILIA) N/A 5/2/2021    Procedure: TRANSESOPHAGEAL ECHOCARDIOGRAM;  Surgeon: Jr Rocael Alexander MD;  Location: [facility];  Service: Cardiothoracic;  Laterality: N/A;    UPPER GASTROINTESTINAL ENDOSCOPY  08/2016    US GUIDED FINE NEEDLE ASPIRATION  5/8/2020       EDUCATION  The patient has been educated in the following areas:   Dysphagia (Swallowing Impairment).        SLP GOALS       Row Name 07/01/24 1200       (LTG) Swallow    (LTG) Swallow The patient will maximize swallow function for least restrictive PO diet, exhibiting no complications associated with dysphagia, adequate PO intake, and demonstrating independent use of safe swallow strategies.  -MS    [place] (Swallow Long Term Goal) with minimal cues (75-90% accuracy)  -MS    Time Frame (Swallow Long Term Goal) by discharge  -MS    Progress/Outcomes (Swallow Long Term Goal) goal ongoing  -MS       (STG) Swallow 1    (STG) Swallow 1 The patient will participate in a meal/follow-up assessment to determine safety and adequacy of recommended diet, independent use of safe swallow compensations, pt/family education and additional goals/recommendations to follow  -MS    [place] (Swallow Short  Term Goal 1) with minimal cues (75-90% accuracy)  -MS    Time Frame (Swallow Short Term Goal 1) by discharge  -MS    Progress/Outcomes (Swallow Short Term Goal 1) goal ongoing  -MS    Comment (Swallow Short Term Goal 1) Pt was seen for DT/snack at lunch. Pt is currently on mechanical soft diet and thin liquids. Pt has adequate natural dentition. Pt was alert & oriented x5. Asleep upon SLP arrival, however awoken with verbal stimuli and sat up at 90 degrees in bed. Noting pretzels at pt's bedside and she reported her family brought them in and she has been eating them with no issues. SLP educated pt on diet recommendations. Pt proceeded to eats pretzels in front of SLP. SLP noting mildly prolonged mastication secondary to overall oral motor weakness still; however no residue or s/s of penetration/aspiration noted. Pt took very small bites of pretzel with slow rate of intake. Adequate labial seal w/straw during thin liquids, no anterior loss. SLP recommending pt upgrade to STC whole and thin liquids. Pt verbalized agreement to diet change. ST will continue to follow closely to assure safety and tolerance with recommended diet. It is recommended that nursing provide meds as pt tolerates.     Swallowing precautions include:  *Upgrade to STC whole and thin liquids  *Meds as tolerated  *Pt sitting upright at 90 degrees hip flexion  *Oral care to be completed at least x2 daily  *Notify SLP if any s/s of penetration/aspiration occur    -MS              User Key  (r) = Recorded By, (t) = Taken By, (c) = Cosigned By      Initials Name Provider Type    Tong Peguero SLP Speech and Language Pathologist                         URIEL Fowler  7/1/2024

## 2024-07-01 NOTE — PLAN OF CARE
Goal Outcome Evaluation:Plan is to return home when medically stable.

## 2024-07-01 NOTE — CASE MANAGEMENT/SOCIAL WORK
Continued Stay Note  ELSY Woo     Patient Name: Marge Mcghee  MRN: [id number]  Today's Date: 7/1/2024    Admit Date: 6/27/2024    Plan: Return home w/ spouse, OP PT at [facility].   Discharge Plan       Row Name 07/01/24 1447       Plan    Plan Return home w/ spouse, OP PT at [facility].    Patient/Family in Agreement with Plan yes    Plan Comments DC barriers: Neuro following, pt continues w/ migraine. Rx for Baclofen started, considering occipital nerve block.             Megan Naegele, RN     Office Phone: [phone]  Office Cell: [phone]

## 2024-07-01 NOTE — PLAN OF CARE
Goal Outcome Evaluation:   Pt was seen for DT/snack at lunch. Pt is currently on mechanical soft diet and thin liquids. Pt has adequate natural dentition. Pt was alert & oriented x5. Asleep upon SLP arrival, however awoken with verbal stimuli and sat up at 90 degrees in bed. Noting pretzels at pt's bedside and she reported her family brought them in and she has been eating them with no issues. SLP educated pt on diet recommendations. Pt proceeded to eats pretzels in front of SLP. SLP noting mildly prolonged mastication secondary to overall oral motor weakness still; however no residue or s/s of penetration/aspiration noted. Pt took very small bites of pretzel with slow rate of intake. Adequate labial seal w/straw during thin liquids, no anterior loss. SLP recommending pt upgrade to STC whole and thin liquids. Pt verbalized agreement to diet change. ST will continue to follow closely to assure safety and tolerance with recommended diet. It is recommended that nursing provide meds as pt tolerates.     Swallowing precautions include:  *Upgrade to STC whole and thin liquids  *Meds as tolerated  *Pt sitting upright at 90 degrees hip flexion  *Oral care to be completed at least x2 daily  *Notify SLP if any s/s of penetration/aspiration occur    -MS

## 2024-07-01 NOTE — PROGRESS NOTES
LOS: 4 days     Chief Complaint: Headache       SUBJECTIVE:    History taken from: patient chart    Interval History: S/p migraine cocktail and decadron on 6/30. She had a little improvement, but not resolved. She continues to c/o a bifrontal headache with photophobia. She also has some nausea and heartburn. She feels the Fioricet caused stomach upset. She has chronic left hemiparesis that she states is at baseline. She denies any complaints otherwise.     She is noted to have significant tenderness at the occiput on the left, consistent with occipital neuralgia. The headache is intensified when this area is pressed. She is awake and alert, sitting up in bed, conversing normally. Does not appear encephalopathic. No neck stiffness. She states her daughter has frequent migraines.     Review of Systems   Constitutional:  Positive for fatigue.   Cardiovascular: Negative.    Neurological:  Positive for weakness and headaches. Negative for dizziness, tremors, seizures, syncope, facial asymmetry, speech difficulty, light-headedness and numbness.      Pertinent PMH:  has a past medical history of Adrenal nodule (11/16/2016), Age-related osteoporosis without current pathological fracture (11/16/2016), Arthritis, Delayed surgical wound healing, Essential hypertension (11/16/2016), Gastroesophageal reflux disease with esophagitis (11/16/2016), Hepatic steatosis (11/16/2016), History of anemia, History of sepsis, Hyperlipidemia (11/16/2016), Lisfranc's dislocation, Osteomyelitis of left foot (11/2020), RLS (restless legs syndrome), Squamous cell skin cancer (2014), Thyroid nodule (10/18/2019), Type 2 diabetes mellitus with peripheral neuropathy (11/16/2016), and Vitamin D deficiency (1/25/2019).   ________________________________________________     OBJECTIVE:    On exam:  GENERAL: NAD  CARDIO: RRR  Neck is supple, tenderness at the left occipital groove  No temporal tenderness   NEURO:  Oriented x3  EOMI, PERRL, no visual  field deficits  CN 2-12 intact, No facial asymmetry  Speech clear without dysarthria  Sensations intact and equal bilaterally  Chronic left sided weakness 5-/5, Right arm/leg 5/5  Slight questionable ataxia in LUE      ________________________________________________   RESULTS REVIEW    VITAL SIGNS:  Temp:  [97.2 °F (36.2 °C)-97.9 °F (36.6 °C)] 97.4 °F (36.3 °C)  Heart Rate:  [84-96] 93  Resp:  [12-19] 13  BP: (115-158)/(58-87) 131/79    LABS:       Lab 06/30/24 2253 06/29/24 2344 06/29/24 0344 06/28/24  0531 06/27/24  0344   WBC 5.74 7.96 7.98 6.19 10.28   HEMOGLOBIN 11.9* 11.2* 12.0 11.8* 14.2   HEMATOCRIT 37.3 35.1 37.8 36.8 43.3   PLATELETS 237 226 230 212 254   NEUTROS ABS 4.38 3.90 3.57 2.12 6.82   IMMATURE GRANS (ABS) 0.03 0.03 0.02 0.01 0.03   LYMPHS ABS 1.20 2.84 3.22* 3.13* 2.48   MONOS ABS 0.07* 0.72 0.72 0.53 0.73   EOS ABS 0.01 0.38 0.38 0.30 0.14   MCV 89.7 88.4 88.1 88.7 86.6   SED RATE  --   --   --   --  12   CRP  --   --   --   --  <0.30   PROCALCITONIN  --   --   --   --  0.04   PROTIME  --   --   --   --  11.0   APTT  --   --   --   --  26.5*         Lab 06/30/24 2253 06/29/24 2344 06/29/24 0344 06/28/24  0531 06/27/24  0344   SODIUM 137 142 141 140 140   POTASSIUM 4.2 4.1 3.6 3.6 3.5   CHLORIDE 103 106 106 104 100   CO2 21.8* 26.4 25.4 27.7 28.0   ANION GAP 12.2 9.6 9.6 8.3 12.0   BUN 28* 24* 23 23 13   CREATININE 1.59* 1.21* 1.07* 1.34* 1.06*   EGFR 34.6* 48.0* 55.6* 42.5* 56.3*   GLUCOSE 353* 136* 99 140* 148*   CALCIUM 9.4 9.4 9.6 9.3 9.7   MAGNESIUM 1.8  --   --   --   --    HEMOGLOBIN A1C  --   --   --   --  7.74*   TSH  --   --   --   --  0.694         Lab 06/30/24  2253   TOTAL PROTEIN 6.6   ALBUMIN 3.9   GLOBULIN 2.7   ALT (SGPT) 6   AST (SGOT) 19   BILIRUBIN 0.2   ALK PHOS 71         Lab 06/27/24  0344   PROTIME 11.0   INR 1.01         Lab 06/27/24  0344   CHOLESTEROL 212*   LDL CHOL 129*   HDL CHOL 59   TRIGLYCERIDES 135         Lab 06/28/24  0531   VITAMIN B 12 300              Lab Results   Component Value Date    TSH 0.694 06/27/2024     (H) 06/27/2024    HGBA1C 7.74 (H) 06/27/2024    [id number] 300 06/28/2024         IMAGING STUDIES:  No radiology results for the last day    I reviewed the patient's new clinical results.    ________________________________________________      PROBLEM LIST:    Migraine, intractable    Type 2 diabetes mellitus with retinopathy, with long-term current use of insulin    Hyperlipidemia    Hepatic steatosis    Gastroesophageal reflux disease with esophagitis    Vitamin D deficiency    REINA (acute kidney injury)    Obesity (BMI 30-39.9)    Polypharmacy    GERD (gastroesophageal reflux disease)    S/P CABG x 3    Encephalopathy, metabolic    Hypertensive emergency        ASSESSMENT/PLAN:  Functional left hemiparesis, with inconsistent left visual field cut. Pt has had a headache and is being treated for possible atypical migraine. Probable occipital neuralgia as a contributing factor (left).  MRI brain negative for stroke  Differentials include recrudescence from previous stoke (explains the left sided symptoms but not the visual field cut- right pontine stroke in 2022)  - CT head negative  - MRI brain reviewed and negative for acute to subacute stroke..  There is mild white matter changes compatible with small vessel ischemic disease that correlates with patient's age.  - CTA head and neck: No significant stenosis of the head or neck.  - Echo- EF is 61-65%   - EKG: Sinus rhythm, rate 81  - Labs: A1C: 7.74, B12: 300, LDL:  129, TSH: 0.694  - continue bASA and Brilinta 90 mg BID  - s/p migraine cocktail and Decadron on 6/30, only mild improvement   - Fioricet 2 tab BID PRN  - Started on Topamax 25mg daily on 6/28. Recommend slow titration up to 50mg BID as tolerated on an outpt basis. Recommend to limit increases to 25mg increments per week.   - Follow up with PCP and outpatient Neurology for migraine management    - She appears to have some degree  of occipital neuralgia which could be contributing. Will start baclofen 10mg BID. Pending response, may consider occipital nerve blocks tomorrow.     2.  Hypertension history, hypotensive in hospital  - per primary      3.  Type 2 Diabetes Mellitus  - Strict glycemic control, SSI, diabetic diet, diabetes educator     4.  Hyperlipidemia  - Statin & dietary modifications     5.  History of coronary artery disease-on Brilinta and aspirin    6.  Vitamin B12 deficiency  - Recommend 1000 mcg p.o. daily    There are no further recommendations. Will sign off, please call with any questions or concerns.    I discussed the patient's findings and my recommendations with patient and nursing staff    CANDIDO Pastor  07/01/24  10:05 EDT

## 2024-07-01 NOTE — PROGRESS NOTES
.    [facility] MEDICINE SERVICE  DAILY PROGRESS NOTE    NAME: Marge Mcghee  : 1955  MRN: [id number]      LOS: 4 days     PROVIDER OF SERVICE: Arnold Flor MD    Chief Complaint: Migraine, intractable    Subjective:     Interval History:  History taken from: patient  Patient seen and examined at bedside, she was laying comfortably and denied having a headache.  She only complains of persistent epigastric burning and reflux symptoms and says she has been having them for years.  Patient has been reporting symptoms of nausea to nursing.  Review of Systems:   Review of Systems negative except as mentioned above.    Objective:     Vital Signs  Temp:  [97.2 °F (36.2 °C)-98 °F (36.7 °C)] 97.9 °F (36.6 °C)  Heart Rate:  [87-99] 99  Resp:  [12-16] 16  BP: (115-182)/(58-81) 182/81  Flow (L/min):  [2] 2   Body mass index is 29.1 kg/m².    Physical Exam  Physical Exam  Vitals and nursing note reviewed.   Constitutional:       Appearance: Normal appearance. She is normal weight.   HENT:      Head: Normocephalic and atraumatic.      Nose: Nose normal.      Mouth/Throat:      Mouth: Mucous membranes are moist.      Pharynx: Oropharynx is clear.   Eyes:      Extraocular Movements: Extraocular movements intact.      Conjunctiva/sclera: Conjunctivae normal.      Pupils: Pupils are equal, round, and reactive to light.   Cardiovascular:      Rate and Rhythm: Normal rate and regular rhythm.      Pulses: Normal pulses.      Heart sounds: Normal heart sounds.   Pulmonary:      Effort: Pulmonary effort is normal.      Breath sounds: Normal breath sounds.   Abdominal:      General: Abdomen is flat. Bowel sounds are normal.      Palpations: Abdomen is soft.   Musculoskeletal:         General: Normal range of motion.      Cervical back: Normal range of motion and neck supple.   Skin:     General: Skin is warm and dry.      Capillary Refill: Capillary refill takes less than 2 seconds.   Neurological:      General: No focal  deficit present.      Mental Status: She is alert and oriented to person, place, and time. Mental status is at baseline.   Psychiatric:         Mood and Affect: Mood normal.         Scheduled Meds   amLODIPine, 10 mg, Oral, Daily  aspirin, 81 mg, Oral, Daily  atorvastatin, 80 mg, Oral, Nightly  baclofen, 10 mg, Oral, BID  butalbital-acetaminophen-caffeine, 1 tablet, Oral, Once  butalbital-acetaminophen-caffeine, 2 tablet, Oral, BID  gabapentin, 600 mg, Oral, Q12H  insulin lispro, 2-7 Units, Subcutaneous, 4x Daily AC & at Bedtime  pantoprazole, 40 mg, Oral, Q AM  sodium chloride, 10 mL, Intravenous, Q12H  ticagrelor, 90 mg, Oral, BID  topiramate, 25 mg, Oral, Daily  traZODone, 50 mg, Oral, Nightly       PRN Meds     acetaminophen    aluminum-magnesium hydroxide-simethicone    baclofen **FOLLOWED BY** [START ON 7/3/2024] baclofen    senna-docusate sodium **AND** polyethylene glycol **AND** bisacodyl **AND** bisacodyl    calcium carbonate    Calcium Replacement - Follow Nurse / BPA Driven Protocol    dextrose    dextrose    glucagon (human recombinant)    Magnesium Standard Dose Replacement - Follow Nurse / BPA Driven Protocol    metoclopramide    Morphine    Phosphorus Replacement - Follow Nurse / BPA Driven Protocol    Potassium Replacement - Follow Nurse / BPA Driven Protocol    sodium chloride    sodium chloride   Infusions         Diagnostic Data    Results from last 7 days   Lab Units 06/30/24  2253   WBC 10*3/mm3 5.74   HEMOGLOBIN g/dL 11.9*   HEMATOCRIT % 37.3   PLATELETS 10*3/mm3 237   GLUCOSE mg/dL 353*   CREATININE mg/dL 1.59*   BUN mg/dL 28*   SODIUM mmol/L 137   POTASSIUM mmol/L 4.2   AST (SGOT) U/L 19   ALT (SGPT) U/L 6   ALK PHOS U/L 71   BILIRUBIN mg/dL 0.2   ANION GAP mmol/L 12.2       No radiology results for the last day      I reviewed the patient's new clinical results.    Assessment/Plan:     Active and Resolved Problems  Active Hospital Problems    Diagnosis  POA    **Migraine, intractable  [G43.919]  Yes    Encephalopathy, metabolic [G93.41]  Yes    Hypertensive emergency [I16.1]  Yes    S/P CABG x 3 [Z95.1]  Not Applicable    GERD (gastroesophageal reflux disease) [K21.9]  Yes    Polypharmacy [Z79.899]  Not Applicable    REINA (acute kidney injury) [N17.9]  Yes    Obesity (BMI 30-39.9) [E66.9]  Yes    Vitamin D deficiency [E55.9]  Yes    Type 2 diabetes mellitus with retinopathy, with long-term current use of insulin [E11.319, Z79.4]  Not Applicable    Hyperlipidemia [E78.5]  Yes    Hepatic steatosis [K76.0]  Yes    Gastroesophageal reflux disease with esophagitis [K21.00]  Yes      Resolved Hospital Problems   No resolved problems to display.       Left sided weakness, probably old stroke recrudescence   Intractable Headache, probably hemiplegic migraine; Resolved   MRI brain negative for acute pathology, CTA head and neck with no significant stenosis.   Continue PT/OT, patient will probably require placement in rehab  On Brillinta 90 mg bid, ASA 81 mg once daily  Continue high intensity statin   Continue Fioricet as needed BID  Neurology following and slowly increasing Topamax as tolerated.  Also added baclofen 10 mg twice daily, pending response may consider occipital nerve blocks tomorrow.     History of CAD  Continue DAPT and statin     HLD  Continue statin        Type 2 DM  Continue insulin SS  Changed diet to carb consistent, cardiac         GERD: Chronic  Continue PPI     RLS  Continue gabapentin     History of Vit B12 deficiency  Continue B12 supplementation  Checking methylmalonic acid    VTE Prophylaxis:  Mechanical VTE prophylaxis orders are present.         Code status is   Code Status and Medical Interventions:   Ordered at: 06/27/24 0856     Level Of Support Discussed With:    Patient     Code Status (Patient has no pulse and is not breathing):    CPR (Attempt to Resuscitate)     Medical Interventions (Patient has pulse or is breathing):    Full Support       Plan for disposition:  Discharge home in tomorrow with outpatient PT at [facility].    Time: 30 minutes    Signature: Electronically signed by Arnold Flor MD, 07/01/24, 18:55 EDT.  [facility] Hospitalist Team

## 2024-07-02 ENCOUNTER — READMISSION MANAGEMENT (OUTPATIENT)
Dept: CALL CENTER | Facility: HOSPITAL | Age: 69
End: 2024-07-02
Payer: COMMERCIAL

## 2024-07-02 VITALS
BODY MASS INDEX: 29.1 KG/M2 | SYSTOLIC BLOOD PRESSURE: 131 MMHG | HEART RATE: 76 BPM | TEMPERATURE: 97.4 F | DIASTOLIC BLOOD PRESSURE: 77 MMHG | HEIGHT: 63 IN | RESPIRATION RATE: 12 BRPM | OXYGEN SATURATION: 98 % | WEIGHT: 164.24 LBS

## 2024-07-02 LAB
ANION GAP SERPL CALCULATED.3IONS-SCNC: 8.3 MMOL/L (ref 5–15)
BASOPHILS # BLD AUTO: 0.08 10*3/MM3 (ref 0–0.2)
BASOPHILS NFR BLD AUTO: 0.7 % (ref 0–1.5)
BUN SERPL-MCNC: 22 MG/DL (ref 8–23)
BUN/CREAT SERPL: 21.8 (ref 7–25)
CALCIUM SPEC-SCNC: 9.5 MG/DL (ref 8.6–10.5)
CHLORIDE SERPL-SCNC: 106 MMOL/L (ref 98–107)
CO2 SERPL-SCNC: 26.7 MMOL/L (ref 22–29)
CREAT SERPL-MCNC: 1.01 MG/DL (ref 0.57–1)
DEPRECATED RDW RBC AUTO: 41.4 FL (ref 37–54)
EGFRCR SERPLBLD CKD-EPI 2021: 59.6 ML/MIN/1.73
EOSINOPHIL # BLD AUTO: 0.18 10*3/MM3 (ref 0–0.4)
EOSINOPHIL NFR BLD AUTO: 1.5 % (ref 0.3–6.2)
ERYTHROCYTE [DISTWIDTH] IN BLOOD BY AUTOMATED COUNT: 12.6 % (ref 12.3–15.4)
GLUCOSE BLDC GLUCOMTR-MCNC: 168 MG/DL (ref 70–105)
GLUCOSE BLDC GLUCOMTR-MCNC: 189 MG/DL (ref 70–105)
GLUCOSE SERPL-MCNC: 176 MG/DL (ref 65–99)
HCT VFR BLD AUTO: 37.5 % (ref 34–46.6)
HGB BLD-MCNC: 12 G/DL (ref 12–15.9)
IMM GRANULOCYTES # BLD AUTO: 0.04 10*3/MM3 (ref 0–0.05)
IMM GRANULOCYTES NFR BLD AUTO: 0.3 % (ref 0–0.5)
LYMPHOCYTES # BLD AUTO: 3.86 10*3/MM3 (ref 0.7–3.1)
LYMPHOCYTES NFR BLD AUTO: 31.8 % (ref 19.6–45.3)
MCH RBC QN AUTO: 28.8 PG (ref 26.6–33)
MCHC RBC AUTO-ENTMCNC: 32 G/DL (ref 31.5–35.7)
MCV RBC AUTO: 89.9 FL (ref 79–97)
MONOCYTES # BLD AUTO: 0.74 10*3/MM3 (ref 0.1–0.9)
MONOCYTES NFR BLD AUTO: 6.1 % (ref 5–12)
NEUTROPHILS NFR BLD AUTO: 59.6 % (ref 42.7–76)
NEUTROPHILS NFR BLD AUTO: 7.22 10*3/MM3 (ref 1.7–7)
NRBC BLD AUTO-RTO: 0 /100 WBC (ref 0–0.2)
PLATELET # BLD AUTO: 248 10*3/MM3 (ref 140–450)
PMV BLD AUTO: 9.3 FL (ref 6–12)
POTASSIUM SERPL-SCNC: 4.8 MMOL/L (ref 3.5–5.2)
RBC # BLD AUTO: 4.17 10*6/MM3 (ref 3.77–5.28)
SODIUM SERPL-SCNC: 141 MMOL/L (ref 136–145)
WBC NRBC COR # BLD AUTO: 12.12 10*3/MM3 (ref 3.4–10.8)

## 2024-07-02 PROCEDURE — 25010000002 MORPHINE PER 10 MG: Performed by: INTERNAL MEDICINE

## 2024-07-02 PROCEDURE — 25010000002 DEXAMETHASONE SODIUM PHOSPHATE 10 MG/ML SOLUTION: Performed by: NURSE PRACTITIONER

## 2024-07-02 PROCEDURE — 25010000002 LIDOCAINE 1 % SOLUTION: Performed by: NURSE PRACTITIONER

## 2024-07-02 PROCEDURE — 99233 SBSQ HOSP IP/OBS HIGH 50: CPT | Performed by: NURSE PRACTITIONER

## 2024-07-02 PROCEDURE — 80048 BASIC METABOLIC PNL TOTAL CA: CPT | Performed by: NURSE PRACTITIONER

## 2024-07-02 PROCEDURE — 82948 REAGENT STRIP/BLOOD GLUCOSE: CPT

## 2024-07-02 PROCEDURE — 85025 COMPLETE CBC W/AUTO DIFF WBC: CPT | Performed by: NURSE PRACTITIONER

## 2024-07-02 RX ORDER — DEXAMETHASONE SODIUM PHOSPHATE 10 MG/ML
10 INJECTION, SOLUTION INTRAMUSCULAR; INTRAVENOUS ONCE
Status: COMPLETED | OUTPATIENT
Start: 2024-07-02 | End: 2024-07-02

## 2024-07-02 RX ORDER — BACLOFEN 10 MG/1
TABLET ORAL
Qty: 4 TABLET | Refills: 0 | Status: SHIPPED | OUTPATIENT
Start: 2024-07-02 | End: 2024-07-05

## 2024-07-02 RX ORDER — LIDOCAINE HYDROCHLORIDE 10 MG/ML
20 INJECTION, SOLUTION INFILTRATION; PERINEURAL ONCE
Status: COMPLETED | OUTPATIENT
Start: 2024-07-02 | End: 2024-07-02

## 2024-07-02 RX ADMIN — DEXAMETHASONE SODIUM PHOSPHATE 10 MG: 10 INJECTION, SOLUTION INTRAMUSCULAR; INTRAVENOUS at 15:49

## 2024-07-02 RX ADMIN — LIDOCAINE HYDROCHLORIDE 20 ML: 10 INJECTION, SOLUTION INFILTRATION; PERINEURAL at 15:50

## 2024-07-02 RX ADMIN — TOPIRAMATE 25 MG: 25 TABLET, FILM COATED ORAL at 12:00

## 2024-07-02 RX ADMIN — PANTOPRAZOLE SODIUM 40 MG: 40 TABLET, DELAYED RELEASE ORAL at 06:17

## 2024-07-02 RX ADMIN — BUTALBITAL, ACETAMINOPHEN, AND CAFFEINE 2 TABLET: 50; 325; 40 TABLET ORAL at 12:00

## 2024-07-02 RX ADMIN — Medication 10 ML: at 12:01

## 2024-07-02 RX ADMIN — GABAPENTIN 600 MG: 300 CAPSULE ORAL at 12:00

## 2024-07-02 RX ADMIN — MORPHINE SULFATE 2 MG: 2 INJECTION, SOLUTION INTRAMUSCULAR; INTRAVENOUS at 04:00

## 2024-07-02 RX ADMIN — AMLODIPINE BESYLATE 10 MG: 5 TABLET ORAL at 12:00

## 2024-07-02 RX ADMIN — CALCIUM CARBONATE 2 TABLET: 500 TABLET, CHEWABLE ORAL at 00:33

## 2024-07-02 RX ADMIN — ASPIRIN 81 MG: 81 TABLET, COATED ORAL at 12:00

## 2024-07-02 RX ADMIN — BACLOFEN 10 MG: 10 TABLET ORAL at 12:00

## 2024-07-02 RX ADMIN — TICAGRELOR 90 MG: 90 TABLET ORAL at 12:00

## 2024-07-02 RX ADMIN — ACETAMINOPHEN 650 MG: 325 TABLET, FILM COATED ORAL at 00:33

## 2024-07-02 NOTE — PAYOR COMM NOTE
"CLINICAL UPDATE 07/02/2024:        AUTH # [id number]   Marge Moreno (69 y.o. Female) 1955          AUTHORIZATION PENDING:   PLEASE CALL OR FAX DETERMINATION TO CONTACT BELOW. THANK YOU.        Sweta Ribera RN MSN  /UR  [facility]  [phone] office  [phone] fax  [email or web address]    [facility]  NPI: [id number]  Tax: [phone]          Marge Moreno (69 y.o. Female)       Date of Birth   1955    Social Security Number       Address   [address] [place] [address] [place] [facility]    Home Phone   [phone]    MRN   [id number]       Catholic   None    Marital Status                               Admission Date   6/27/24    Admission Type   Emergency    Admitting Provider   Mikhail Barbour MD    Attending Provider   Arnold Flor MD    Department, Room/Bed   Psychiatric 3C MEDICAL INPATIENT, 360/2       Discharge Date       Discharge Disposition   Home or Self Care    Discharge Destination                                 Attending Provider: Arnold Flor MD    Allergies: Zofran [Ondansetron Hcl], Prochlorperazine, Prochlorperazine Edisylate, Hydralazine, Hydralazine Hcl, Meperidine, Prochlorperazine Maleate, Prochlorperazine Maleate    Isolation: None   Infection: None   Code Status: CPR    Ht: 160 cm (62.99\")   Wt: 74.5 kg (164 lb 3.9 oz)    Admission Cmt: None   Principal Problem: Migraine, intractable [G43.919]                   Active Insurance as of 6/27/2024       Primary Coverage       Payor Plan Insurance Group Employer/Plan Group    AETNA COMMERCIAL AETNA [id number]       Payor Plan Address Payor Plan Phone Number Payor Plan Fax Number Effective Dates    [address] [phone]  1/1/2024 - None Entered    [place] [facility] [place] [address]         Subscriber Name Subscriber Birth Date Member ID       GRADY MORENO 3/15/1952 [id number]               Secondary Coverage       Payor Plan Insurance Group " Employer/Plan Group    HUMANA MEDICARE REPLACEMENT HUMANA MEDICARE REPLACEMENT [id number]       Payor Plan Address Payor Plan Phone Number Payor Plan Fax Number Effective Dates    [address] [address] [phone]  2022 - None Entered    [facility] [address]         Subscriber Name Subscriber Birth Date Member ID       MARGE MORENO 1955 [id number]               Tertiary Coverage       Payor Plan Insurance Group Employer/Plan Group    GPM LIFE GPM LIFE MC SUP        Payor Plan Address Payor Plan Phone Number Payor Plan Fax Number Effective Dates    [address]   2019 - None Entered    [place] [address]         Subscriber Name Subscriber Birth Date Member ID       MARGE MORENO 1955 [id number]                     Emergency Contacts        (Rel.) Home Phone Work Phone Mobile Phone    Kimberly Salinas (POA) (Daughter) -- -- [phone]    SILVIO MORENO (Spouse) -- -- [phone]    Tonia Rockwell (Friend) [phone] -- --                 Physician Progress Notes (last 72 hours)        Arnold Flor MD at 24 5935          .    [facility] MEDICINE SERVICE  DAILY PROGRESS NOTE    NAME: Marge Moreno  : 1955  MRN: [id number]      LOS: 4 days     PROVIDER OF SERVICE: Arnold Flor MD    Chief Complaint: Migraine, intractable    Subjective:     Interval History:  History taken from: patient  Patient seen and examined at bedside, she was laying comfortably and denied having a headache.  She only complains of persistent epigastric burning and reflux symptoms and says she has been having them for years.  Patient has been reporting symptoms of nausea to nursing.  Review of Systems:   Review of Systems negative except as mentioned above.    Objective:     Vital Signs  Temp:  [97.2 °F (36.2 °C)-98 °F (36.7 °C)] 97.9 °F (36.6 °C)  Heart Rate:  [87-99] 99  Resp:  [12-16] 16  BP: (115-182)/(58-81) 182/81  Flow (L/min):  [2] 2   Body mass index is 29.1 kg/m².    Physical Exam  Physical  Exam  Vitals and nursing note reviewed.   Constitutional:       Appearance: Normal appearance. She is normal weight.   HENT:      Head: Normocephalic and atraumatic.      Nose: Nose normal.      Mouth/Throat:      Mouth: Mucous membranes are moist.      Pharynx: Oropharynx is clear.   Eyes:      Extraocular Movements: Extraocular movements intact.      Conjunctiva/sclera: Conjunctivae normal.      Pupils: Pupils are equal, round, and reactive to light.   Cardiovascular:      Rate and Rhythm: Normal rate and regular rhythm.      Pulses: Normal pulses.      Heart sounds: Normal heart sounds.   Pulmonary:      Effort: Pulmonary effort is normal.      Breath sounds: Normal breath sounds.   Abdominal:      General: Abdomen is flat. Bowel sounds are normal.      Palpations: Abdomen is soft.   Musculoskeletal:         General: Normal range of motion.      Cervical back: Normal range of motion and neck supple.   Skin:     General: Skin is warm and dry.      Capillary Refill: Capillary refill takes less than 2 seconds.   Neurological:      General: No focal deficit present.      Mental Status: She is alert and oriented to person, place, and time. Mental status is at baseline.   Psychiatric:         Mood and Affect: Mood normal.         Scheduled Meds   amLODIPine, 10 mg, Oral, Daily  aspirin, 81 mg, Oral, Daily  atorvastatin, 80 mg, Oral, Nightly  baclofen, 10 mg, Oral, BID  butalbital-acetaminophen-caffeine, 1 tablet, Oral, Once  butalbital-acetaminophen-caffeine, 2 tablet, Oral, BID  gabapentin, 600 mg, Oral, Q12H  insulin lispro, 2-7 Units, Subcutaneous, 4x Daily AC & at Bedtime  pantoprazole, 40 mg, Oral, Q AM  sodium chloride, 10 mL, Intravenous, Q12H  ticagrelor, 90 mg, Oral, BID  topiramate, 25 mg, Oral, Daily  traZODone, 50 mg, Oral, Nightly       PRN Meds     acetaminophen    aluminum-magnesium hydroxide-simethicone    baclofen **FOLLOWED BY** [START ON 7/3/2024] baclofen    senna-docusate sodium **AND**  polyethylene glycol **AND** bisacodyl **AND** bisacodyl    calcium carbonate    Calcium Replacement - Follow Nurse / BPA Driven Protocol    dextrose    dextrose    glucagon (human recombinant)    Magnesium Standard Dose Replacement - Follow Nurse / BPA Driven Protocol    metoclopramide    Morphine    Phosphorus Replacement - Follow Nurse / BPA Driven Protocol    Potassium Replacement - Follow Nurse / BPA Driven Protocol    sodium chloride    sodium chloride   Infusions         Diagnostic Data    Results from last 7 days   Lab Units 06/30/24  2253   WBC 10*3/mm3 5.74   HEMOGLOBIN g/dL 11.9*   HEMATOCRIT % 37.3   PLATELETS 10*3/mm3 237   GLUCOSE mg/dL 353*   CREATININE mg/dL 1.59*   BUN mg/dL 28*   SODIUM mmol/L 137   POTASSIUM mmol/L 4.2   AST (SGOT) U/L 19   ALT (SGPT) U/L 6   ALK PHOS U/L 71   BILIRUBIN mg/dL 0.2   ANION GAP mmol/L 12.2       No radiology results for the last day      I reviewed the patient's new clinical results.    Assessment/Plan:     Active and Resolved Problems  Active Hospital Problems    Diagnosis  POA    **Migraine, intractable [G43.919]  Yes    Encephalopathy, metabolic [G93.41]  Yes    Hypertensive emergency [I16.1]  Yes    S/P CABG x 3 [Z95.1]  Not Applicable    GERD (gastroesophageal reflux disease) [K21.9]  Yes    Polypharmacy [Z79.899]  Not Applicable    REINA (acute kidney injury) [N17.9]  Yes    Obesity (BMI 30-39.9) [E66.9]  Yes    Vitamin D deficiency [E55.9]  Yes    Type 2 diabetes mellitus with retinopathy, with long-term current use of insulin [E11.319, Z79.4]  Not Applicable    Hyperlipidemia [E78.5]  Yes    Hepatic steatosis [K76.0]  Yes    Gastroesophageal reflux disease with esophagitis [K21.00]  Yes      Resolved Hospital Problems   No resolved problems to display.       Left sided weakness, probably old stroke recrudescence   Intractable Headache, probably hemiplegic migraine; Resolved   MRI brain negative for acute pathology, CTA head and neck with no significant stenosis.    Continue PT/OT, patient will probably require placement in rehab  On Brillinta 90 mg bid, ASA 81 mg once daily  Continue high intensity statin   Continue Fioricet as needed BID  Neurology following and slowly increasing Topamax as tolerated.  Also added baclofen 10 mg twice daily, pending response may consider occipital nerve blocks tomorrow.     History of CAD  Continue DAPT and statin     HLD  Continue statin        Type 2 DM  Continue insulin SS  Changed diet to carb consistent, cardiac         GERD: Chronic  Continue PPI     RLS  Continue gabapentin     History of Vit B12 deficiency  Continue B12 supplementation  Checking methylmalonic acid    VTE Prophylaxis:  Mechanical VTE prophylaxis orders are present.         Code status is   Code Status and Medical Interventions:   Ordered at: 06/27/24 0856     Level Of Support Discussed With:    Patient     Code Status (Patient has no pulse and is not breathing):    CPR (Attempt to Resuscitate)     Medical Interventions (Patient has pulse or is breathing):    Full Support       Plan for disposition: Discharge home in tomorrow with outpatient PT at [facility].    Time: 30 minutes    Signature: Electronically signed by Arnold Flor MD, 07/01/24, 18:55 EDT.  [facility] Hospitalist Team     Electronically signed by Arnold Flor MD at 07/01/24 4511       Manju Bloom APRN at 07/01/24 1005               LOS: 4 days     Chief Complaint: Headache    Subjective   SUBJECTIVE:    History taken from: patient chart    Interval History: S/p migraine cocktail and decadron on 6/30. She had a little improvement, but not resolved. She continues to c/o a bifrontal headache with photophobia. She also has some nausea and heartburn. She feels the Fioricet caused stomach upset. She has chronic left hemiparesis that she states is at baseline. She denies any complaints otherwise.     She is noted to have significant tenderness at the occiput on the left,  consistent with occipital neuralgia. The headache is intensified when this area is pressed. She is awake and alert, sitting up in bed, conversing normally. Does not appear encephalopathic. No neck stiffness. She states her daughter has frequent migraines.     Review of Systems   Constitutional:  Positive for fatigue.   Cardiovascular: Negative.    Neurological:  Positive for weakness and headaches. Negative for dizziness, tremors, seizures, syncope, facial asymmetry, speech difficulty, light-headedness and numbness.      Pertinent PMH:  has a past medical history of Adrenal nodule (11/16/2016), Age-related osteoporosis without current pathological fracture (11/16/2016), Arthritis, Delayed surgical wound healing, Essential hypertension (11/16/2016), Gastroesophageal reflux disease with esophagitis (11/16/2016), Hepatic steatosis (11/16/2016), History of anemia, History of sepsis, Hyperlipidemia (11/16/2016), Lisfranc's dislocation, Osteomyelitis of left foot (11/2020), RLS (restless legs syndrome), Squamous cell skin cancer (2014), Thyroid nodule (10/18/2019), Type 2 diabetes mellitus with peripheral neuropathy (11/16/2016), and Vitamin D deficiency (1/25/2019).   ________________________________________________  Objective   OBJECTIVE:    On exam:  GENERAL: NAD  CARDIO: RRR  Neck is supple, tenderness at the left occipital groove  No temporal tenderness   NEURO:  Oriented x3  EOMI, PERRL, no visual field deficits  CN 2-12 intact, No facial asymmetry  Speech clear without dysarthria  Sensations intact and equal bilaterally  Chronic left sided weakness 5-/5, Right arm/leg 5/5  Slight questionable ataxia in LUE      ________________________________________________   RESULTS REVIEW    VITAL SIGNS:  Temp:  [97.2 °F (36.2 °C)-97.9 °F (36.6 °C)] 97.4 °F (36.3 °C)  Heart Rate:  [84-96] 93  Resp:  [12-19] 13  BP: (115-158)/(58-87) 131/79    LABS:       Lab 06/30/24  2253 06/29/24  2344 06/29/24  0344 06/28/24  0531  06/27/24  0344   WBC 5.74 7.96 7.98 6.19 10.28   HEMOGLOBIN 11.9* 11.2* 12.0 11.8* 14.2   HEMATOCRIT 37.3 35.1 37.8 36.8 43.3   PLATELETS 237 226 230 212 254   NEUTROS ABS 4.38 3.90 3.57 2.12 6.82   IMMATURE GRANS (ABS) 0.03 0.03 0.02 0.01 0.03   LYMPHS ABS 1.20 2.84 3.22* 3.13* 2.48   MONOS ABS 0.07* 0.72 0.72 0.53 0.73   EOS ABS 0.01 0.38 0.38 0.30 0.14   MCV 89.7 88.4 88.1 88.7 86.6   SED RATE  --   --   --   --  12   CRP  --   --   --   --  <0.30   PROCALCITONIN  --   --   --   --  0.04   PROTIME  --   --   --   --  11.0   APTT  --   --   --   --  26.5*         Lab 06/30/24  2253 06/29/24  2344 06/29/24  0344 06/28/24  0531 06/27/24  0344   SODIUM 137 142 141 140 140   POTASSIUM 4.2 4.1 3.6 3.6 3.5   CHLORIDE 103 106 106 104 100   CO2 21.8* 26.4 25.4 27.7 28.0   ANION GAP 12.2 9.6 9.6 8.3 12.0   BUN 28* 24* 23 23 13   CREATININE 1.59* 1.21* 1.07* 1.34* 1.06*   EGFR 34.6* 48.0* 55.6* 42.5* 56.3*   GLUCOSE 353* 136* 99 140* 148*   CALCIUM 9.4 9.4 9.6 9.3 9.7   MAGNESIUM 1.8  --   --   --   --    HEMOGLOBIN A1C  --   --   --   --  7.74*   TSH  --   --   --   --  0.694         Lab 06/30/24 2253   TOTAL PROTEIN 6.6   ALBUMIN 3.9   GLOBULIN 2.7   ALT (SGPT) 6   AST (SGOT) 19   BILIRUBIN 0.2   ALK PHOS 71         Lab 06/27/24  0344   PROTIME 11.0   INR 1.01         Lab 06/27/24  0344   CHOLESTEROL 212*   LDL CHOL 129*   HDL CHOL 59   TRIGLYCERIDES 135         Lab 06/28/24  0531   VITAMIN B 12 300             Lab Results   Component Value Date    TSH 0.694 06/27/2024     (H) 06/27/2024    HGBA1C 7.74 (H) 06/27/2024    ZYNCBNID51 300 06/28/2024         IMAGING STUDIES:  No radiology results for the last day    I reviewed the patient's new clinical results.    ________________________________________________      PROBLEM LIST:    Migraine, intractable    Type 2 diabetes mellitus with retinopathy, with long-term current use of insulin    Hyperlipidemia    Hepatic steatosis    Gastroesophageal reflux disease with  esophagitis    Vitamin D deficiency    REINA (acute kidney injury)    Obesity (BMI 30-39.9)    Polypharmacy    GERD (gastroesophageal reflux disease)    S/P CABG x 3    Encephalopathy, metabolic    Hypertensive emergency        ASSESSMENT/PLAN:  Functional left hemiparesis, with inconsistent left visual field cut. Pt has had a headache and is being treated for possible atypical migraine. Probable occipital neuralgia as a contributing factor (left).  MRI brain negative for stroke  Differentials include recrudescence from previous stoke (explains the left sided symptoms but not the visual field cut- right pontine stroke in 2022)  - CT head negative  - MRI brain reviewed and negative for acute to subacute stroke..  There is mild white matter changes compatible with small vessel ischemic disease that correlates with patient's age.  - CTA head and neck: No significant stenosis of the head or neck.  - Echo- EF is 61-65%   - EKG: Sinus rhythm, rate 81  - Labs: A1C: 7.74, B12: 300, LDL:  129, TSH: 0.694  - continue bASA and Brilinta 90 mg BID  - s/p migraine cocktail and Decadron on 6/30, only mild improvement   - Fioricet 2 tab BID PRN  - Started on Topamax 25mg daily on 6/28. Recommend slow titration up to 50mg BID as tolerated on an outpt basis. Recommend to limit increases to 25mg increments per week.   - Follow up with PCP and outpatient Neurology for migraine management    - She appears to have some degree of occipital neuralgia which could be contributing. Will start baclofen 10mg BID. Pending response, may consider occipital nerve blocks tomorrow.     2.  Hypertension history, hypotensive in hospital  - per primary      3.  Type 2 Diabetes Mellitus  - Strict glycemic control, SSI, diabetic diet, diabetes educator     4.  Hyperlipidemia  - Statin & dietary modifications     5.  History of coronary artery disease-on Brilinta and aspirin    6.  Vitamin B12 deficiency  - Recommend 1000 mcg p.o. daily    There are no  further recommendations. Will sign off, please call with any questions or concerns.    I discussed the patient's findings and my recommendations with patient and nursing staff    CANDIDO Pastor  24  10:05 EDT        Electronically signed by Manju Bloom APRN at 24 1403       Llanes Alvarez, Carlos, MD at 24 1346              [facility] MEDICINE SERVICE  DAILY PROGRESS NOTE    NAME: Marge Mcghee  : 1955  MRN: [id number]      LOS: 3 days     PROVIDER OF SERVICE: Carlos Llanes Alvarez, MD    Chief Complaint: Migraine, intractable    Subjective:     Interval History:  History taken from: patient chart  Patient Complaints: Headache, nausea or vomiting  Patient Denies:  tingling or numbness    Patient is being followed for left sided weakness, headache likely hemiplegic migraine. She was seen and examined at bedside. She was discharged on  but apparently did not feel safe to go home.     She is still reporting constant frontal headache, having persistent nausea and vomiting.        Review of Systems   Constitutional:  Negative for fatigue and fever.   Gastrointestinal:  Positive for nausea.   Neurological:  Positive for headaches.   Psychiatric/Behavioral:  Negative for confusion.        Objective:     Vital Signs  Temp:  [97.2 °F (36.2 °C)-97.9 °F (36.6 °C)] 97.4 °F (36.3 °C)  Heart Rate:  [82-94] 84  Resp:  [12-19] 19  BP: (102-158)/(55-87) 158/87   Body mass index is 29.1 kg/m².    Physical Exam  Constitutional:       General: She is not in acute distress.     Appearance: Normal appearance.   HENT:      Head: Normocephalic.      Nose: Nose normal.      Mouth/Throat:      Mouth: Mucous membranes are moist. Mucous membranes are dry.   Eyes:      Extraocular Movements: Extraocular movements intact.      Pupils: Pupils are equal, round, and reactive to light.   Cardiovascular:      Rate and Rhythm: Normal rate and regular rhythm.   Pulmonary:      Effort: Pulmonary effort  is normal. No respiratory distress.      Breath sounds: Normal breath sounds.   Abdominal:      General: Abdomen is flat.      Palpations: Abdomen is soft.   Musculoskeletal:         General: Normal range of motion.      Cervical back: Neck supple.   Skin:     General: Skin is dry.      Capillary Refill: Capillary refill takes less than 2 seconds.   Neurological:      Mental Status: She is alert and oriented to person, place, and time.      Cranial Nerves: No cranial nerve deficit.      Motor: No weakness.      Gait: Gait abnormal.   Psychiatric:         Behavior: Behavior normal.        Scheduled Meds   amLODIPine, 10 mg, Oral, Daily  aspirin, 81 mg, Oral, Daily  atorvastatin, 80 mg, Oral, Nightly  gabapentin, 600 mg, Oral, Q12H  insulin lispro, 2-7 Units, Subcutaneous, 4x Daily AC & at Bedtime  sodium chloride, 10 mL, Intravenous, Q12H  ticagrelor, 90 mg, Oral, BID  topiramate, 25 mg, Oral, Daily  traZODone, 50 mg, Oral, Nightly       PRN Meds     acetaminophen    senna-docusate sodium **AND** polyethylene glycol **AND** bisacodyl **AND** bisacodyl    butalbital-acetaminophen-caffeine    calcium carbonate    Calcium Replacement - Follow Nurse / BPA Driven Protocol    dextrose    dextrose    glucagon (human recombinant)    Magnesium Standard Dose Replacement - Follow Nurse / BPA Driven Protocol    metoclopramide    Morphine    Phosphorus Replacement - Follow Nurse / BPA Driven Protocol    Potassium Replacement - Follow Nurse / BPA Driven Protocol    promethazine    sodium chloride    sodium chloride   Infusions         Diagnostic Data    Results from last 7 days   Lab Units 06/29/24  2344   WBC 10*3/mm3 7.96   HEMOGLOBIN g/dL 11.2*   HEMATOCRIT % 35.1   PLATELETS 10*3/mm3 226   GLUCOSE mg/dL 136*   CREATININE mg/dL 1.21*   BUN mg/dL 24*   SODIUM mmol/L 142   POTASSIUM mmol/L 4.1   ANION GAP mmol/L 9.6       No radiology results for the last day      I reviewed the patient's new clinical results.  I reviewed the  patient's new imaging results and agree with the interpretation.  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       Left sided weakness, probably old stroke recrudescence   Intractable Headache, probably hemiplegic migraine. Other differentials cluster, tension  MRI brain negative for acute pathology, CTA head and neck with no significant stenosis.   Continue PT/OT, patient will probably require placement in rehab  On Brillinta 90 mg bid, ASA 81 mg once daily  Continue high intensity statin   Continue Fioricet as needed q 4  Continue Topamax 25 mg once daily  Continue morphine 2 q 4 for severe pain  Requested Neurology follow up  Trial of oxygen via nasal cannula     History of CAD  On DAPT, statin     HLD  Continue statin        Type 2 DM  Continue insulin SS  Changed diet to carb consistent, cardiac         GERD  Continue PPI     RLS  Continue gabapentin     History of Vit B12 deficiency  Continue B12 supplementation  Checking methylmalonic acid    VTE Prophylaxis:  Mechanical VTE prophylaxis orders are present.         Code status is   Code Status and Medical Interventions:   Ordered at: 06/27/24 0856      Level Of Support Discussed With:    Patient     Code Status (Patient has no pulse and is not breathing):    CPR (Attempt to Resuscitate)     Medical Interventions (Patient has pulse or is breathing):    Full Support       Plan for disposition:Rehab in 1 days    Time: 30 minutes    Signature: Electronically signed by Carlos Llanes Alvarez, MD, 24, 13:46 EDT.  [facility] Hospitalist Team     Electronically signed by Llanes Alvarez, Carlos, MD at 24 1353       Llanes Alvarez, Carlos, MD at 24 1228              [facility] MEDICINE SERVICE  DAILY PROGRESS NOTE    NAME: Marge Mcghee  : 1955  MRN: [id number]      LOS: 2 days     PROVIDER OF SERVICE: Carlos Llanes Alvarez, MD    Chief Complaint: Migraine, intractable    Subjective:     Interval History:  History taken from: patient chart  Patient Complaints: Headache, feels unsteady.   Patient Denies:  tingling or numbness.     Patient is being followed for left sided weakness, headache likely hemiplegic migraine. She was seen and examined at bedside. She was discharged yesterday but apparently did not feel safe to go home. She is still reporting frontal headache, states she feels unsteady when walking. She states she is afraid to go home. She is in agreement with going to rehab.         Review of Systems   Constitutional:  Negative for fatigue and fever.   Neurological:  Positive for dizziness, weakness and headaches. Negative for numbness.       Objective:     Vital Signs  Temp:  [97.6 °F (36.4 °C)-97.9 °F (36.6 °C)] 97.6 °F (36.4 °C)  Heart Rate:  [80-99] 90  Resp:  [13-18] 14  BP: (102-168)/(61-93) 127/90   Body mass index is 29.1 kg/m².      Physical Exam  Constitutional:       General: She is not in acute distress.     Appearance: Normal appearance.   HENT:      Head: Normocephalic.      Nose: Nose normal.      Mouth/Throat:      Mouth: Mucous membranes are moist. Mucous membranes are dry.   Eyes:      Extraocular Movements:  Extraocular movements intact.      Pupils: Pupils are equal, round, and reactive to light.   Cardiovascular:      Rate and Rhythm: Normal rate and regular rhythm.   Pulmonary:      Effort: Pulmonary effort is normal. No respiratory distress.      Breath sounds: Normal breath sounds.   Abdominal:      General: Abdomen is flat.      Palpations: Abdomen is soft.   Musculoskeletal:         General: Normal range of motion.      Cervical back: Neck supple.   Skin:     General: Skin is dry.      Capillary Refill: Capillary refill takes less than 2 seconds.   Neurological:      Mental Status: She is alert and oriented to person, place, and time.      Cranial Nerves: No cranial nerve deficit.      Motor: No weakness.      Gait: Gait abnormal.   Psychiatric:         Behavior: Behavior normal.         Scheduled Meds   amLODIPine, 10 mg, Oral, Daily  aspirin, 81 mg, Oral, Daily  atorvastatin, 80 mg, Oral, Nightly  gabapentin, 600 mg, Oral, Q12H  insulin lispro, 2-7 Units, Subcutaneous, 4x Daily AC & at Bedtime  sodium chloride, 10 mL, Intravenous, Q12H  ticagrelor, 90 mg, Oral, BID  topiramate, 25 mg, Oral, Daily  traZODone, 50 mg, Oral, Nightly       PRN Meds     acetaminophen    senna-docusate sodium **AND** polyethylene glycol **AND** bisacodyl **AND** bisacodyl    butalbital-acetaminophen-caffeine    calcium carbonate    Calcium Replacement - Follow Nurse / BPA Driven Protocol    dextrose    dextrose    glucagon (human recombinant)    Magnesium Standard Dose Replacement - Follow Nurse / BPA Driven Protocol    Morphine    Phosphorus Replacement - Follow Nurse / BPA Driven Protocol    Potassium Replacement - Follow Nurse / BPA Driven Protocol    promethazine    sodium chloride    sodium chloride   Infusions         Diagnostic Data    Results from last 7 days   Lab Units 06/29/24  0344   WBC 10*3/mm3 7.98   HEMOGLOBIN g/dL 12.0   HEMATOCRIT % 37.8   PLATELETS 10*3/mm3 230   GLUCOSE mg/dL 99   CREATININE mg/dL 1.07*   BUN mg/dL  23   SODIUM mmol/L 141   POTASSIUM mmol/L 3.6   ANION GAP mmol/L 9.6       No radiology results for the last day      I reviewed the patient's new clinical results.  I reviewed the patient's new imaging results and agree with the interpretation.  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       Left sided weakness, probably old stroke recrudescence   Headache, probably hemiplegic migraine  MRI brain negative for acute pathology, CTA head and neck with no significant stenosis.   Continue PT/OT, patient will probably require placement in rehab  On Brillinta 90 mg bid, ASA 81 mg once daily  Continue high intensity statin   Continue Fioricet as needed q 4  Continue Topamax 25 mg once daily  Continue morphine 2 q 4 for severe pain  Neurology following    History of CAD  On DAPT, statin    HLD  Continue statin       Type 2 DM  Continue insulin SS  Changed diet to carb consistent, cardiac       GERD  Continue PPI     RLS  Continue gabapentin    History of Vit B12 deficiency  Continue B12 supplementation  Checking methylmalonic acid       VTE Prophylaxis:  Mechanical VTE prophylaxis orders are present.         Code  status is   Code Status and Medical Interventions:   Ordered at: 06/27/24 0856     Level Of Support Discussed With:    Patient     Code Status (Patient has no pulse and is not breathing):    CPR (Attempt to Resuscitate)     Medical Interventions (Patient has pulse or is breathing):    Full Support       Plan for disposition:rehab in 1-2 days    Time: 30 minutes    Signature: Electronically signed by Carlos Llanes Alvarez, MD, 06/29/24, 12:29 EDT.  [facility] Hospitalist Team     Electronically signed by Llanes Alvarez, Carlos, MD at 06/29/24 1243       Consult Notes (last 72 hours)  Notes from 06/29/24 1012 through 07/02/24 1012   No notes of this type exist for this encounter.

## 2024-07-02 NOTE — DISCHARGE SUMMARY
.             [facility] Medicine Services  Discharge Summary    Date of Service: 2024  Patient Name: Marge Mcghee  : 1955  MRN: [id number]    Date of Admission: 2024  Discharge Diagnosis: Functional left hemiparesis, with inconsistent left visual field cut. Pt has had a headache and is being treated for possible atypical migraine. 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         Hospital Course     HPI:  Marge Mcghee is a 69 y.o. female with a CMH of hepatic steatos, HLD, UTI, REINA, GERD, DM2, CAD with triple bypass, CVA x 3, RLS who presented to [facility] on 2024 with right frontal/parietal headache.  Patient states that it was acute onset abruptly about 8 PM last night.  Stated migraines 10/10, no vision changes, there was some nausea, vomiting.  Some pain relief with applying ice pack.  At that time patient's blood pressure was 180s over 100s patient attempted to get some rest, however migraine was  unresolved.  While in emergency department patient received 1 dose of Toradol, Reglan, and Benadryl.  Labs were remarkable for PTT of 26.5.  Patient received some relief with medications, stated headache was 5/10.   9:15 AM EDT  As noted the patient's blood pressure has significant change of 86/45.  1 L of IV fluids ordered.  9:45 AM EDT while waiting for bed placement, patient developed left-sided facial droop as well as vision changes.     Hospital Course:  Patient initially admitted for intractable migraine and later developed functional left-sided weakness which eventually resolved.  Neurology was consulted and followed patient throughout hospital course.  MRI brain was negative for acute intracranial abnormalities, CT head was negative.  CT angio head and neck showed no significant stenosis of head or neck, echo revealed EF of 61-65%, EKG was sinus rhythm, labs revealed elevated A1c of 7.7 forehead LDL of 129.  Patient continued on aspirin, Brilinta and statin.  Neurology initiated Fioricet 2 tabs as needed for pain relief from headache as well as started patient on Topamax 25 mg with intention to slowly increase dose up to 50 mg twice daily in outpatient setting.  She will also undergo occipital nerve block today following which neurology has cleared her for discharge.  Today she is resting in bed comfortably and tolerating her diet even though patient complains of nausea to nursing she only complains of epigastric burning from her chronic GERD to me.  PPI was prescribed and patient will go home today and follow-up with neurology as well as her PCP.        DISCHARGE Follow Up Recommendations for labs and diagnostics: Patient is to follow-up with PCP as well as neurology for further titration of Topamax and expresses good understanding.      Day of Discharge     Vital Signs:  Temp:  [97.4 °F (36.3 °C)-98 °F (36.7 °C)] 97.7 °F (36.5 °C)  Heart Rate:  [78-99] 79  Resp:  [13-22] 16  BP: (114-182)/(69-88)  136/88  Flow (L/min):  [2] 2    Physical Exam:  Physical Exam Vitals and nursing note reviewed.   Constitutional:       Appearance: Normal appearance. She is normal weight.   HENT:      Head: Normocephalic and atraumatic.      Nose: Nose normal.      Mouth/Throat:      Mouth: Mucous membranes are moist.      Pharynx: Oropharynx is clear.   Eyes:      Extraocular Movements: Extraocular movements intact.      Conjunctiva/sclera: Conjunctivae normal.      Pupils: Pupils are equal, round, and reactive to light.   Cardiovascular:      Rate and Rhythm: Normal rate and regular rhythm.      Pulses: Normal pulses.      Heart sounds: Normal heart sounds.   Pulmonary:      Effort: Pulmonary effort is normal.      Breath sounds: Normal breath sounds.   Abdominal:      General: Abdomen is flat. Bowel sounds are normal.      Palpations: Abdomen is soft.   Musculoskeletal:         General: Normal range of motion.      Cervical back: Normal range of motion and neck supple.   Skin:     General: Skin is warm and dry.      Capillary Refill: Capillary refill takes less than 2 seconds.   Neurological:      General: No focal deficit present.      Mental Status: She is alert and oriented to person, place, and time. Mental status is at baseline.   Psychiatric:         Mood and Affect: Mood normal.       Pertinent  and/or Most Recent Results     LAB RESULTS:      Lab 07/02/24  0529 06/30/24  2253 06/29/24  2344 06/29/24  0344 06/28/24  0531 06/27/24  0344   WBC 12.12* 5.74 7.96 7.98 6.19 10.28   HEMOGLOBIN 12.0 11.9* 11.2* 12.0 11.8* 14.2   HEMATOCRIT 37.5 37.3 35.1 37.8 36.8 43.3   PLATELETS 248 237 226 230 212 254   NEUTROS ABS 7.22* 4.38 3.90 3.57 2.12 6.82   IMMATURE GRANS (ABS) 0.04 0.03 0.03 0.02 0.01 0.03   LYMPHS ABS 3.86* 1.20 2.84 3.22* 3.13* 2.48   MONOS ABS 0.74 0.07* 0.72 0.72 0.53 0.73   EOS ABS 0.18 0.01 0.38 0.38 0.30 0.14   MCV 89.9 89.7 88.4 88.1 88.7 86.6   SED RATE  --   --   --   --   --  12   CRP  --   --   --   --    --  <0.30   PROCALCITONIN  --   --   --   --   --  0.04   PROTIME  --   --   --   --   --  11.0   APTT  --   --   --   --   --  26.5*         Lab 07/02/24  0529 06/30/24  2253 06/29/24  2344 06/29/24  0344 06/28/24  0531 06/27/24  0344   SODIUM 141 137 142 141 140 140   POTASSIUM 4.8 4.2 4.1 3.6 3.6 3.5   CHLORIDE 106 103 106 106 104 100   CO2 26.7 21.8* 26.4 25.4 27.7 28.0   ANION GAP 8.3 12.2 9.6 9.6 8.3 12.0   BUN 22 28* 24* 23 23 13   CREATININE 1.01* 1.59* 1.21* 1.07* 1.34* 1.06*   EGFR 59.6* 34.6* 48.0* 55.6* 42.5* 56.3*   GLUCOSE 176* 353* 136* 99 140* 148*   CALCIUM 9.5 9.4 9.4 9.6 9.3 9.7   MAGNESIUM  --  1.8  --   --   --   --    HEMOGLOBIN A1C  --   --   --   --   --  7.74*   TSH  --   --   --   --   --  0.694         Lab 06/30/24 2253   TOTAL PROTEIN 6.6   ALBUMIN 3.9   GLOBULIN 2.7   ALT (SGPT) 6   AST (SGOT) 19   BILIRUBIN 0.2   ALK PHOS 71         Lab 06/27/24  0344   PROTIME 11.0   INR 1.01         Lab 06/27/24  0344   CHOLESTEROL 212*   LDL CHOL 129*   HDL CHOL 59   TRIGLYCERIDES 135         Lab 06/28/24  0531   VITAMIN B 12 300         Brief Urine Lab Results       None          Microbiology Results (last 10 days)       Procedure Component Value - Date/Time    Respiratory Panel PCR w/COVID-19(SARS-CoV-2) ALISSA/HEAVENLY/N[facility]A/PAD/COR/NADEEM In-House, NP Swab in [facility], 2 HR TAT - Swab, Nasopharynx [[id number]]  (Normal) Collected: 06/27/24 0705    Lab Status: Final result Specimen: Swab from Nasopharynx Updated: 06/27/24 0750     ADENOVIRUS, PCR Not Detected     Coronavirus 229E Not Detected     Coronavirus HKU1 Not Detected     Coronavirus NL63 Not Detected     Coronavirus OC43 Not Detected     COVID19 Not Detected     Human Metapneumovirus Not Detected     Human Rhinovirus/Enterovirus Not Detected     Influenza A PCR Not Detected     Influenza B PCR Not Detected     Parainfluenza Virus 1 Not Detected     Parainfluenza Virus 2 Not Detected     Parainfluenza Virus 3 Not Detected     Parainfluenza Virus 4 Not  Detected     RSV, PCR Not Detected     Bordetella pertussis pcr Not Detected     Bordetella parapertussis PCR Not Detected     Chlamydophila pneumoniae PCR Not Detected     Mycoplasma pneumo by PCR Not Detected    Narrative:      In the setting of a positive respiratory panel with a viral infection PLUS a negative procalcitonin without other underlying concern for bacterial infection, consider observing off antibiotics or discontinuation of antibiotics and continue supportive care. If the respiratory panel is positive for atypical bacterial infection (Bordetella pertussis, Chlamydophila pneumoniae, or Mycoplasma pneumoniae), consider antibiotic de-escalation to target atypical bacterial infection.            MRI Brain Without Contrast    Result Date: 6/27/2024  Impression: Impression: 1. No acute ischemic change. 2. Mild white matter changes compatible small vessel ischemic disease in this age. Electronically Signed: Antelmo Ramirez MD  6/27/2024 10:57 AM EDT  Workstation ID: [id number]    CT Angiogram Neck    Result Date: 6/27/2024  Impression: 1.No acute abnormality identified within the large arteries of the head or neck. 2.No significant stenosis of the bilateral internal carotid arteries. 3.CT perfusion study has been reported separately. 4.Bilateral hypoattenuating thyroid nodules measuring up to approximately 1 cm. Nonemergent follow-up thyroid ultrasound may be considered. Electronically Signed: Naveed Love MD  6/27/2024 10:46 AM EDT  Workstation ID: [id number]    CT Angiogram Head w AI Analysis of LVO    Result Date: 6/27/2024  Impression: 1.No acute abnormality identified within the large arteries of the head or neck. 2.No significant stenosis of the bilateral internal carotid arteries. 3.CT perfusion study has been reported separately. 4.Bilateral hypoattenuating thyroid nodules measuring up to approximately 1 cm. Nonemergent follow-up thyroid ultrasound may be considered. Electronically Signed: Naveed  MD Ivan  6/27/2024 10:00 AM EDT  Workstation ID: [id number]    CT CEREBRAL PERFUSION WITH & WITHOUT CONTRAST    Result Date: 6/27/2024  Impression: Impression: No evidence of brain ischemia or infarct Electronically Signed: Will Hernandez  6/27/2024 9:56 AM EDT  Workstation ID: [id number]    CT Head Without Contrast Stroke Protocol    Result Date: 6/27/2024  Impression: Impression: 1. No intracranial hemorrhage 2. No CT changes of vascular territory brain ischemia at this time Electronically Signed: Will Hernandez  6/27/2024 9:46 AM EDT  Workstation ID: [id number]    CT Head Without Contrast    Result Date: 6/27/2024  Impression: Impression: No acute intracranial process. Electronically Signed: Agnieszka Lujan MD  6/27/2024 4:07 AM EDT  Workstation ID: [id number]     Results for orders placed during the hospital encounter of 09/25/22    Bilateral Carotid Duplex    Interpretation Summary  · Proximal right internal carotid artery is normal.  · Proximal left internal carotid artery is normal.      Results for orders placed during the hospital encounter of 09/25/22    Bilateral Carotid Duplex    Interpretation Summary  · Proximal right internal carotid artery is normal.  · Proximal left internal carotid artery is normal.      Results for orders placed during the hospital encounter of 08/01/23    Adult Transthoracic Echo Complete W/ Cont if Necessary Per Protocol    Interpretation Summary    Left ventricular systolic function is normal. Left ventricular ejection fraction appears to be 61 - 65%.    Left ventricular wall thickness is consistent with mild concentric hypertrophy.    Left ventricular diastolic function was normal.    The left atrial cavity is mildly dilated.    Estimated right ventricular systolic pressure from tricuspid regurgitation is mildly elevated (35-45 mmHg). Calculated right ventricular systolic pressure from tricuspid regurgitation is 35 mmHg.      Labs Pending at Discharge:  Pending Labs       Order Current  Status    Methylmalonic Acid, Serum In process            Procedures Performed           Consults:   Consults       Date and Time Order Name Status Description    6/27/2024  6:54 AM Inpatient Neurology Consult General Completed     6/27/2024  6:54 AM Inpatient Hospitalist Consult                Discharge Details        Discharge Medications        New Medications        Instructions Start Date   baclofen 10 MG tablet  Commonly known as: LIORESAL   Take 1 tablet by mouth 2 (Two) Times a Day for 1 day, THEN 1 tablet 2 (Two) Times a Day As Needed for Muscle Spasms (Headache, neck pain) for up to 2 days.   Start Date: July 2, 2024     butalbital-acetaminophen-caffeine -40 MG per tablet  Commonly known as: FIORICET, ESGIC   1 tablet, Oral, Every 4 Hours PRN      HYDROcodone-acetaminophen 5-325 MG per tablet  Commonly known as: NORCO   1 tablet, Oral, Every 6 Hours PRN      topiramate 25 MG tablet  Commonly known as: TOPAMAX   25 mg, Oral, Daily             Continue These Medications        Instructions Start Date   acetaminophen 325 MG tablet  Commonly known as: TYLENOL   650 mg, Oral, Every 4 Hours PRN      amLODIPine 10 MG tablet  Commonly known as: NORVASC   10 mg, Oral, Daily      aspirin 81 MG EC tablet   1 tablet, Oral, Daily      atorvastatin 80 MG tablet  Commonly known as: LIPITOR   80 mg, Oral, Nightly      DULoxetine 60 MG capsule  Commonly known as: CYMBALTA   1 capsule, Oral, Daily      gabapentin 600 MG tablet  Commonly known as: NEURONTIN   600 mg, Oral, 3 Times Daily      multivitamin with minerals tablet tablet   1 tablet, Oral, Daily      Prolia 60 MG/ML solution prefilled syringe syringe  Generic drug: denosumab   60 mg, Subcutaneous, Once      ticagrelor 90 MG tablet tablet  Commonly known as: BRILINTA   90 mg, Oral, 2 Times Daily      Tirzepatide 12.5 MG/0.5ML solution pen-injector pen  Commonly known as: MOUNJARO   0.5 mL, Subcutaneous, Weekly, Wednesday.      traZODone 50 MG tablet  Commonly  known as: DESYREL   1 tablet, Oral, Nightly               Allergies   Allergen Reactions    Zofran [Ondansetron Hcl] Nausea And Vomiting     Does the opposite of its purpose    Prochlorperazine Other (See Comments)     CAUSED SEIZURE    Prochlorperazine Edisylate Hives    Hydralazine Itching and Rash    Hydralazine Hcl Itching and Rash    Meperidine Itching and Swelling    Prochlorperazine Maleate Other (See Comments)     CAUSES SEIZURE    Prochlorperazine Maleate Irritability         Discharge Disposition: Patient will go home with home health and follow-up in outpatient setting with neurology as well as her PCP.  Home or Self Care    Diet:  Hospital:  Diet Order   Procedures    Diet: Cardiac, Diabetic; Healthy Heart (2-3 Na+); Consistent Carbohydrate; Texture: Mechanical Ground (NDD 2); Fluid Consistency: Thin (IDDSI 0)         Discharge Activity:   Activity Instructions       Gradually Increase Activity Until at Pre-Hospitalization Level                CODE STATUS:  Code Status and Medical Interventions:   Ordered at: 06/27/24 0856     Level Of Support Discussed With:    Patient     Code Status (Patient has no pulse and is not breathing):    CPR (Attempt to Resuscitate)     Medical Interventions (Patient has pulse or is breathing):    Full Support         Future Appointments   Date Time Provider Department Center   7/17/2024  4:30 PM [facility] CT 3 [facility]   7/30/2024 11:00 AM Gladis Lang APRN [facility]       Additional Instructions for the Follow-ups that You Need to Schedule       Ambulatory Referral to Physical Therapy   As directed      Specialty needed: Evaluate and treat   Follow-up needed: Yes        Discharge Follow-up with PCP   As directed       Currently Documented PCP:    Traci Townsend APRN    PCP Phone Number:    [phone]     Follow Up Details: 1 week        Discharge Follow-up with Specified Provider: neurology; 2 Weeks   As directed      To: neurology   Follow Up: 2 Weeks                 Time spent on Discharge including face to face service:  >30 minutes    Signature: Electronically signed by Arnold Flor MD, 07/02/24, 09:34 EDT.  [facility] Hospitalist Team

## 2024-07-02 NOTE — SIGNIFICANT NOTE
07/02/24 1625   OTHER   Discipline occupational therapist   Therapy Assessment/Plan (PT)   Criteria for Skilled Interventions Met (PT)   (Anticipate d/c this date.)   Recommendation   OT - Next Appointment 07/03/24

## 2024-07-02 NOTE — CASE MANAGEMENT/SOCIAL WORK
Social Work Assessment  [facility]     Patient Name: Marge Mcghee  MRN: [id number]  Today's Date: 7/2/2024    Admit Date: 6/27/2024         Discharge Plan       Row Name 07/02/24 9793       Plan    Plan Comments LSW met with pt at bedside to complete LACE screen. Pt lives with her spouse. Pt stated no trouble affording mortagage  or utilities. Pt is of [employer] tio. Pt does not have anxiety or depression. Pt reported that she drinks a glass of wine with dinner very rarely. No tobacco. Pt and spouse enjoy music. Spouse used to be in a band. Pt has a good support system. LACE: 13                    Psychosocial       Row Name 07/02/24 4104       Values/Beliefs    Spiritual, Cultural Beliefs, Pentecostalism Practices, Values that Affect Care no    Values/Beliefs Comment Episcopalian       Behavior WDL    Behavior WDL WDL       Emotion Mood WDL    Emotion/Mood/Affect WDL WDL       Mental Health    Little Interest or Pleasure in Doing Things 0-->not at all    Feeling Down, Depressed or Hopeless 0-->not at all       Intellectual Performance WDL    Level of Consciousness Alert       Stress    Do you feel stress - tense, restless, nervous, or anxious, or unable to sleep at night because your mind is troubled all the time - these days? Not at all       Coping/Stress    Major Change/Loss/Stressor illness    Patient Personal Strengths able to adapt;expressive of emotions;positive attitude;future/goal oriented;strong support system    Sources of Support spouse;friend(s);other family members    Techniques to [place] with Loss/Stress/Change diversional activities    Reaction to Health Status accepting    Understanding of Condition and Treatment adequate understanding of medical condition;adequate understanding of treatment       Developmental Stage (Eriksson's)    Developmental Stage Stage 8 (63 years-death/Late Adulthood) Integrity vs. Despair       C-SSRS (Recent)    Q1 Wished to be Dead (Past Month) no    Q2 Suicidal Thoughts (Past  Month) no    Q6 Suicide Behavior (Lifetime) no       Violence Risk    Feels Like Hurting Others no    Previous Attempt to Harm Others no                   Abuse/Neglect       Row Name 07/02/24 1335       Personal Safety    Feels Unsafe at Home or Work/School no    Feels Threatened by Someone no    Does Anyone Try to Keep You From Having Contact with Others or Doing Things Outside Your Home? no    Physical Signs of Abuse Present no                Legal       Row Name 07/02/24 1335       Financial Resource Strain    How hard is it for you to pay for the very basics like food, housing, medical care, and heating? Not very       Financial/Legal    Source of Income social security       Legal    Criminal Activity/Legal Involvement none                Substance Abuse       Row Name 07/02/24 5674       AUDIT-C (Alcohol Use Disorders ID Test)    Q1: How often do you have a drink containing alcohol? Monthly or l    Q2: How many drinks containing alcohol do you have on a typical day when you are drinking? 1 or 2    Q3: How often do you have six or more drinks on one occasion? Never    Audit-C Score 1       Family Member Substance Use (#4)    Previous Substance Use Treatment none           CHINMAY Calderon, MSW    Phone: [phone]  Fax: [phone]  Email: [email or web address]

## 2024-07-02 NOTE — PLAN OF CARE
Goal Outcome Evaluation:         Patient reported vomiting a lot but the emesis bags and trash can had no evidence. Patient requests medication frequently and wants IV phenergan only. Patient had been educated about IV phenergan.

## 2024-07-02 NOTE — PROGRESS NOTES
LOS: 5 days     Chief Complaint: Headache       SUBJECTIVE:    History taken from: patient chart    Interval History: S/p migraine cocktail and decadron on 6/30. She had a little improvement, but not resolved. She continued to c/o a bifrontal headache with photophobia. Baclofen added on 7/1 for symptoms of occipital neuralgia/tension headache.   She has also had some nausea and heartburn, acute on chronic. She feels the Fioricet caused stomach upset. She has chronic left hemiparesis that she states is at baseline. She denies any complaints otherwise.     She is noted to have significant tenderness at the occiput on the left, consistent with occipital neuralgia. The headache is intensified when this area is pressed. She is awake and alert, sitting up in bed, conversing normally. Does not appear encephalopathic. No neck stiffness. She states her daughter has frequent migraines.     7/2: Pt continues to c/o headache. RN states pt has c/o indigestion/heart burn and nausea. She was noted to consume V8 juice and other foods likely to contribute so she has been educated to avoid.     Review of Systems   Constitutional:  Positive for fatigue.   Cardiovascular: Negative.    Neurological:  Positive for weakness and headaches. Negative for dizziness, tremors, seizures, syncope, facial asymmetry, speech difficulty, light-headedness and numbness.      Pertinent PMH:  has a past medical history of Adrenal nodule (11/16/2016), Age-related osteoporosis without current pathological fracture (11/16/2016), Arthritis, Delayed surgical wound healing, Essential hypertension (11/16/2016), Gastroesophageal reflux disease with esophagitis (11/16/2016), Hepatic steatosis (11/16/2016), History of anemia, History of sepsis, Hyperlipidemia (11/16/2016), Lisfranc's dislocation, Osteomyelitis of left foot (11/2020), RLS (restless legs syndrome), Squamous cell skin cancer (2014), Thyroid nodule (10/18/2019), Type 2 diabetes mellitus with  peripheral neuropathy (11/16/2016), and Vitamin D deficiency (1/25/2019).   ________________________________________________     OBJECTIVE:    On exam:  GENERAL: NAD  CARDIO: RRR  Neck is supple, tenderness at the left occipital groove  No temporal tenderness   NEURO:  Oriented x3  EOMI, PERRL, no visual field deficits  CN 2-12 intact, No facial asymmetry  Speech clear without dysarthria  Sensations intact and equal bilaterally  Chronic left sided weakness 5-/5, Right arm/leg 5/5  Slight questionable ataxia in LUE      ________________________________________________   RESULTS REVIEW    VITAL SIGNS:  Temp:  [97.4 °F (36.3 °C)-98 °F (36.7 °C)] 97.7 °F (36.5 °C)  Heart Rate:  [78-99] 79  Resp:  [13-22] 16  BP: (114-182)/(69-88) 136/88    LABS:       Lab 07/02/24  0529 06/30/24  2253 06/29/24  2344 06/29/24  0344 06/28/24  0531 06/27/24  0344   WBC 12.12* 5.74 7.96 7.98 6.19 10.28   HEMOGLOBIN 12.0 11.9* 11.2* 12.0 11.8* 14.2   HEMATOCRIT 37.5 37.3 35.1 37.8 36.8 43.3   PLATELETS 248 237 226 230 212 254   NEUTROS ABS 7.22* 4.38 3.90 3.57 2.12 6.82   IMMATURE GRANS (ABS) 0.04 0.03 0.03 0.02 0.01 0.03   LYMPHS ABS 3.86* 1.20 2.84 3.22* 3.13* 2.48   MONOS ABS 0.74 0.07* 0.72 0.72 0.53 0.73   EOS ABS 0.18 0.01 0.38 0.38 0.30 0.14   MCV 89.9 89.7 88.4 88.1 88.7 86.6   SED RATE  --   --   --   --   --  12   CRP  --   --   --   --   --  <0.30   PROCALCITONIN  --   --   --   --   --  0.04   PROTIME  --   --   --   --   --  11.0   APTT  --   --   --   --   --  26.5*         Lab 07/02/24  0529 06/30/24  2253 06/29/24  2344 06/29/24 0344 06/28/24  0531 06/27/24  0344   SODIUM 141 137 142 141 140 140   POTASSIUM 4.8 4.2 4.1 3.6 3.6 3.5   CHLORIDE 106 103 106 106 104 100   CO2 26.7 21.8* 26.4 25.4 27.7 28.0   ANION GAP 8.3 12.2 9.6 9.6 8.3 12.0   BUN 22 28* 24* 23 23 13   CREATININE 1.01* 1.59* 1.21* 1.07* 1.34* 1.06*   EGFR 59.6* 34.6* 48.0* 55.6* 42.5* 56.3*   GLUCOSE 176* 353* 136* 99 140* 148*   CALCIUM 9.5 9.4 9.4 9.6 9.3 9.7    MAGNESIUM  --  1.8  --   --   --   --    HEMOGLOBIN A1C  --   --   --   --   --  7.74*   TSH  --   --   --   --   --  0.694         Lab 06/30/24  2253   TOTAL PROTEIN 6.6   ALBUMIN 3.9   GLOBULIN 2.7   ALT (SGPT) 6   AST (SGOT) 19   BILIRUBIN 0.2   ALK PHOS 71         Lab 06/27/24  0344   PROTIME 11.0   INR 1.01         Lab 06/27/24  0344   CHOLESTEROL 212*   LDL CHOL 129*   HDL CHOL 59   TRIGLYCERIDES 135         Lab 06/28/24  0531   VITAMIN B 12 300             Lab Results   Component Value Date    TSH 0.694 06/27/2024     (H) 06/27/2024    HGBA1C 7.74 (H) 06/27/2024    [id number] 300 06/28/2024         IMAGING STUDIES:  No radiology results for the last day    I reviewed the patient's new clinical results.    ________________________________________________      PROBLEM LIST:    Migraine, intractable    Type 2 diabetes mellitus with retinopathy, with long-term current use of insulin    Hyperlipidemia    Hepatic steatosis    Gastroesophageal reflux disease with esophagitis    Vitamin D deficiency    REINA (acute kidney injury)    Obesity (BMI 30-39.9)    Polypharmacy    GERD (gastroesophageal reflux disease)    S/P CABG x 3    Encephalopathy, metabolic    Hypertensive emergency        ASSESSMENT/PLAN:  Functional left hemiparesis, with inconsistent left visual field cut. Pt has had a headache and is being treated for possible atypical migraine. Probable occipital neuralgia as a contributing factor (left).  MRI brain negative for stroke  Differentials include recrudescence from previous stoke (explains the left sided symptoms but not the visual field cut- right pontine stroke in 2022)  - CT head negative  - MRI brain reviewed and negative for acute to subacute stroke..  There is mild white matter changes compatible with small vessel ischemic disease that correlates with patient's age.  - CTA head and neck: No significant stenosis of the head or neck.  - Echo- EF is 61-65%   - EKG: Sinus rhythm, rate 81  -  Labs: A1C: 7.74, B12: 300, LDL:  129, TSH: 0.694  - continue bASA and Brilinta 90 mg BID  - s/p migraine cocktail and Decadron on 6/30, only mild improvement   - Fioricet 2 tab BID PRN  - Started on Topamax 25mg daily on 6/28. Recommend slow titration up to 50mg BID as tolerated on an outpt basis. Recommend to limit increases to 25mg increments per week.   - Follow up with PCP and outpatient Neurology for migraine management    - She appears to have some degree of occipital neuralgia which could be contributing.   - S/p occipital nerve block on left today with significant improvement in pain   - Continue baclofen 10mg BID PRN     2.  Hypertension history, hypotensive in hospital  - per primary      3.  Type 2 Diabetes Mellitus  - Strict glycemic control, SSI, diabetic diet, diabetes educator     4.  Hyperlipidemia  - Statin & dietary modifications     5.  History of coronary artery disease-on Brilinta and aspirin    6.  Vitamin B12 deficiency  - Recommend 1000 mcg p.o. daily    Will sign off. Please call with questions or concerns.       I discussed the patient's findings and my recommendations with patient and nursing staff    CANDIDO Pastor  07/02/24  09:54 EDT

## 2024-07-02 NOTE — OUTREACH NOTE
Prep Survey      Flowsheet Row Responses   [facility] facility patient discharged from? [facility]   Is LACE score < 7 ? No   Eligibility Readm Mgmt   Discharge diagnosis Migraine, intractable   Does the patient have one of the following disease processes/diagnoses(primary or secondary)? Other   Does the patient have Home health ordered? No   Is there a DME ordered? No   Prep survey completed? Yes            CLAYTON TITUS - Registered Nurse

## 2024-07-03 NOTE — CASE MANAGEMENT/SOCIAL WORK
Case Management Discharge Note      Final Note: Home w/ OP PT.         Selected Continued Care - Discharged on 7/2/2024 Admission date: 6/27/2024 - Discharge disposition: Home or Self Care      Therapy Coordination complete.      Service Provider Selected Services Address Phone Fax Patient Preferred    [facility] PHYSICAL [facility]ERAPY [facility] [address] [place] [address] [phone] --             Transportation Services  Private: Car    Final Discharge Disposition Code: 01 - home or self-care

## 2024-07-03 NOTE — PAYOR COMM NOTE
"NOTIFICATION OF DISCHARGE:          AUTH # [id number]   Taz Marge D (69 y.o. Female) 1955          DISCHARGED TO HOME ON 1955                  Sweta Ribera RN MSN  /UR  [facility]  [phone] office  [phone] fax  [email or web address]    [facility]  NPI: [id number]  Tax: [id number]            Marge Moreno (69 y.o. Female)       Date of Birth   1955    Social Security Number       Address   [address] [place] [address] [place] [address]    Home Phone   [phone]    MRN   [id number]       Hinduism   None    Marital Status                               Admission Date   6/27/24    Admission Type   Emergency    Admitting Provider   Mikhail Barbour MD    Attending Provider       Department, Room/Bed   [facility] 3C MEDICAL INPATIENT, 360/2       Discharge Date   7/2/2024    Discharge Disposition   Home or Self Care    Discharge Destination                                 Attending Provider: (none)   Allergies: Zofran [Ondansetron Hcl], Prochlorperazine, Prochlorperazine Edisylate, Hydralazine, Hydralazine Hcl, Meperidine, Prochlorperazine Maleate, Prochlorperazine Maleate    Isolation: None   Infection: None   Code Status: Prior    Ht: 160 cm (62.99\")   Wt: 74.5 kg (164 lb 3.9 oz)    Admission Cmt: None   Principal Problem: Migraine, intractable [G43.919]                   Active Insurance as of 6/27/2024       Primary Coverage       Payor Plan Insurance Group Employer/Plan Group    AETNA COMMERCIAL AETNA [id number]       Payor Plan Address Payor Plan Phone Number Payor Plan Fax Number Effective Dates    [address] [phone]  1/1/2024 - None Entered    [place] [id number]         Subscriber Name Subscriber Birth Date Member ID       GRADY MORENO 3/15/1952 [id number]               Secondary Coverage       Payor Plan Insurance Group Employer/Plan Group    HUMANA MEDICARE REPLACEMENT HUMANA MEDICARE " REPLACEMENT [id number]       Payor Plan Address Payor Plan Phone Number Payor Plan Fax Number Effective Dates    [address] [address] [phone]  2022 - None Entered    [facility] [id number]         Subscriber Name Subscriber Birth Date Member ID       MARGE MORENO 1955 [id number]               Tertiary Coverage       Payor Plan Insurance Group Employer/Plan Group    GPM LIFE GPM LIFE MC SUP        Payor Plan Address Payor Plan Phone Number Payor Plan Fax Number Effective Dates    [address]   2019 - None Entered    [place] [address]         Subscriber Name Subscriber Birth Date Member ID       MARGE MORENO 1955 [id number]                     Emergency Contacts        (Rel.) Home Phone Work Phone Mobile Phone    Kimberly Salinas (POA) (Daughter) -- -- [phone]    SILVIO MORENO (Spouse) -- -- [phone]    Tonia Rockwell (Friend) [phone] -- --                 Discharge Summary        Arnold Flor MD at 24 0934          .             [facility] Medicine Services  Discharge Summary    Date of Service: 2024  Patient Name: Marge Moreno  : 1955  MRN: [id number]    Date of Admission: 2024  Discharge Diagnosis: Functional left hemiparesis, with inconsistent left visual field cut. Pt has had a headache and is being treated for possible atypical migraine. Probable occipital neuralgia as a contributing factor (left)   Date of Discharge: 2024  Primary Care Physician: Traci Townsend APRN      Presenting Problem:   Migraine, intractable [G43.919]  Acute intractable headache, unspecified headache type [R51.9]    Active and Resolved Hospital Problems:  Active Hospital Problems    Diagnosis POA    **Migraine, intractable [G43.919] Yes    Encephalopathy, metabolic [G93.41] Yes    Hypertensive emergency [I16.1] Yes    S/P CABG x 3 [Z95.1] Not Applicable    GERD (gastroesophageal reflux disease) [K21.9] Yes    Polypharmacy [Z79.899] Not Applicable    REINA (acute  kidney injury) [N17.9] Yes    Obesity (BMI 30-39.9) [E66.9] Yes    Vitamin D deficiency [E55.9] Yes    Type 2 diabetes mellitus with retinopathy, with long-term current use of insulin [E11.319, Z79.4] Not Applicable    Hyperlipidemia [E78.5] Yes    Hepatic steatosis [K76.0] Yes    Gastroesophageal reflux disease with esophagitis [K21.00] Yes      Resolved Hospital Problems   No resolved problems to display.         Hospital Course     HPI:  Marge Mcghee is a 69 y.o. female with a CMH of hepatic steatos, HLD, UTI, REINA, GERD, DM2, CAD with triple bypass, CVA x 3, RLS who presented to [facility] on [facility]/27/[facility] with right frontal/parietal headache.  Patient states that it was acute onset abruptly about 8 PM last night.  Stated migraines 10/10, no vision changes, there was some nausea, vomiting.  Some pain relief with applying ice pack.  At that time patient's blood pressure was 180s over 100s patient attempted to get some rest, however migraine was unresolved.  While in emergency department patient received 1 dose of Toradol, Reglan, and Benadryl.  Labs were remarkable for PTT of 26.5.  Patient received some relief with medications, stated headache was 5/10.   9:15 AM EDT  As noted the patient's blood pressure has significant change of 86/45.  1 L of IV fluids ordered.  9:45 AM EDT while waiting for bed placement, patient developed left-sided facial droop as well as vision changes.     Hospital Course:  Patient initially admitted for intractable migraine and later developed functional left-sided weakness which eventually resolved.  Neurology was consulted and followed patient throughout hospital course.  MRI brain was negative for acute intracranial abnormalities, CT head was negative.  CT angio head and neck showed no significant stenosis of head or neck, echo revealed EF of 61-65%, EKG was sinus rhythm, labs revealed elevated A1c of 7.7 forehead LDL of 129.  Patient continued on aspirin, Brilinta and  statin.  Neurology initiated Fioricet 2 tabs as needed for pain relief from headache as well as started patient on Topamax 25 mg with intention to slowly increase dose up to 50 mg twice daily in outpatient setting.  She will also undergo occipital nerve block today following which neurology has cleared her for discharge.  Today she is resting in bed comfortably and tolerating her diet even though patient complains of nausea to nursing she only complains of epigastric burning from her chronic GERD to me.  PPI was prescribed and patient will go home today and follow-up with neurology as well as her PCP.        DISCHARGE Follow Up Recommendations for labs and diagnostics: Patient is to follow-up with PCP as well as neurology for further titration of Topamax and expresses good understanding.      Day of Discharge     Vital Signs:  Temp:  [97.4 °F (36.3 °C)-98 °F (36.7 °C)] 97.7 °F (36.5 °C)  Heart Rate:  [78-99] 79  Resp:  [13-22] 16  BP: (114-182)/(69-88) 136/88  Flow (L/min):  [2] 2    Physical Exam:  Physical Exam Vitals and nursing note reviewed.   Constitutional:       Appearance: Normal appearance. She is normal weight.   HENT:      Head: Normocephalic and atraumatic.      Nose: Nose normal.      Mouth/Throat:      Mouth: Mucous membranes are moist.      Pharynx: Oropharynx is clear.   Eyes:      Extraocular Movements: Extraocular movements intact.      Conjunctiva/sclera: Conjunctivae normal.      Pupils: Pupils are equal, round, and reactive to light.   Cardiovascular:      Rate and Rhythm: Normal rate and regular rhythm.      Pulses: Normal pulses.      Heart sounds: Normal heart sounds.   Pulmonary:      Effort: Pulmonary effort is normal.      Breath sounds: Normal breath sounds.   Abdominal:      General: Abdomen is flat. Bowel sounds are normal.      Palpations: Abdomen is soft.   Musculoskeletal:         General: Normal range of motion.      Cervical back: Normal range of motion and neck supple.   Skin:      General: Skin is warm and dry.      Capillary Refill: Capillary refill takes less than 2 seconds.   Neurological:      General: No focal deficit present.      Mental Status: She is alert and oriented to person, place, and time. Mental status is at baseline.   Psychiatric:         Mood and Affect: Mood normal.       Pertinent  and/or Most Recent Results     LAB RESULTS:      Lab 07/02/24 0529 06/30/24 2253 06/29/24 2344 06/29/24 0344 06/28/24  0531 06/27/24  0344   WBC 12.12* 5.74 7.96 7.98 6.19 10.28   HEMOGLOBIN 12.0 11.9* 11.2* 12.0 11.8* 14.2   HEMATOCRIT 37.5 37.3 35.1 37.8 36.8 43.3   PLATELETS 248 237 226 230 212 254   NEUTROS ABS 7.22* 4.38 3.90 3.57 2.12 6.82   IMMATURE GRANS (ABS) 0.04 0.03 0.03 0.02 0.01 0.03   LYMPHS ABS 3.86* 1.20 2.84 3.22* 3.13* 2.48   MONOS ABS 0.74 0.07* 0.72 0.72 0.53 0.73   EOS ABS 0.18 0.01 0.38 0.38 0.30 0.14   MCV 89.9 89.7 88.4 88.1 88.7 86.6   SED RATE  --   --   --   --   --  12   CRP  --   --   --   --   --  <0.30   PROCALCITONIN  --   --   --   --   --  0.04   PROTIME  --   --   --   --   --  11.0   APTT  --   --   --   --   --  26.5*         Lab 07/02/24 0529 06/30/24 2253 06/29/24 2344 06/29/24 0344 06/28/24  0531 06/27/24 0344   SODIUM 141 137 142 141 140 140   POTASSIUM 4.8 4.2 4.1 3.6 3.6 3.5   CHLORIDE 106 103 106 106 104 100   CO2 26.7 21.8* 26.4 25.4 27.7 28.0   ANION GAP 8.3 12.2 9.6 9.6 8.3 12.0   BUN 22 28* 24* 23 23 13   CREATININE 1.01* 1.59* 1.21* 1.07* 1.34* 1.06*   EGFR 59.6* 34.6* 48.0* 55.6* 42.5* 56.3*   GLUCOSE 176* 353* 136* 99 140* 148*   CALCIUM 9.5 9.4 9.4 9.6 9.3 9.7   MAGNESIUM  --  1.8  --   --   --   --    HEMOGLOBIN A1C  --   --   --   --   --  7.74*   TSH  --   --   --   --   --  0.694         Lab 06/30/24  2253   TOTAL PROTEIN 6.6   ALBUMIN 3.9   GLOBULIN 2.7   ALT (SGPT) 6   AST (SGOT) 19   BILIRUBIN 0.2   ALK PHOS 71         Lab 06/27/24  0344   PROTIME 11.0   INR 1.01         Lab 06/27/24  0344   CHOLESTEROL 212*   LDL CHOL 129*    HDL CHOL 59   TRIGLYCERIDES 135         Lab 06/28/24  0531   VITAMIN B 12 300         Brief Urine Lab Results       None          Microbiology Results (last 10 days)       Procedure Component Value - Date/Time    Respiratory Panel PCR w/COVID-19(SARS-CoV-2) ALISSA/HE[facility]ENLY/[facility] In-House, NP Swab in UTM/VTM, 2 HR TAT - Swab, Nasopharynx [[id number]]  (Normal) Collected: 06/27/24 0705    Lab Status: Final result Specimen: Swab from Nasopharynx Updated: 06/27/24 0759     ADENOVIRUS, PCR Not Detected     Coronavirus 229E Not Detected     Coronavirus HKU1 Not Detected     Coronavirus NL63 Not Detected     Coronavirus OC43 Not Detected     COVID19 Not Detected     Human Metapneumovirus Not Detected     Human Rhinovirus/Enterovirus Not Detected     Influenza A PCR Not Detected     Influenza B PCR Not Detected     Parainfluenza Virus 1 Not Detected     Parainfluenza Virus 2 Not Detected     Parainfluenza Virus 3 Not Detected     Parainfluenza Virus 4 Not Detected     RSV, PCR Not Detected     Bordetella pertussis pcr Not Detected     Bordetella parapertussis PCR Not Detected     Chlamydophila pneumoniae PCR Not Detected     Mycoplasma pneumo by PCR Not Detected    Narrative:      In the setting of a positive respiratory panel with a viral infection PLUS a negative procalcitonin without other underlying concern for bacterial infection, consider observing off antibiotics or discontinuation of antibiotics and continue supportive care. If the respiratory panel is positive for atypical bacterial infection (Bordetella pertussis, Chlamydophila pneumoniae, or Mycoplasma pneumoniae), consider antibiotic de-escalation to target atypical bacterial infection.            MRI Brain Without Contrast    Result Date: 6/27/2024  Impression: Impression: 1. No acute ischemic change. 2. Mild white matter changes compatible small vessel ischemic disease in this age. Electronically Signed: Antelmo Ramirez MD  6/27/2024 10:57 AM EDT   Workstation ID: [id number]    CT Angiogram Neck    Result Date: 6/27/2024  Impression: 1.No acute abnormality identified within the large arteries of the head or neck. 2.No significant stenosis of the bilateral internal carotid arteries. 3.CT perfusion study has been reported separately. 4.Bilateral hypoattenuating thyroid nodules measuring up to approximately 1 cm. Nonemergent follow-up thyroid ultrasound may be considered. Electronically Signed: Naveed Love MD  6/27/2024 10:46 AM EDT  Workstation ID: [id number]    CT Angiogram Head w AI Analysis of LVO    Result Date: 6/27/2024  Impression: 1.No acute abnormality identified within the large arteries of the head or neck. 2.No significant stenosis of the bilateral internal carotid arteries. 3.CT perfusion study has been reported separately. 4.Bilateral hypoattenuating thyroid nodules measuring up to approximately 1 cm. Nonemergent follow-up thyroid ultrasound may be considered. Electronically Signed: Naveed Love MD  6/27/2024 10:00 AM EDT  Workstation ID: [id number]    CT CEREBRAL PERFUSION WITH & WITHOUT CONTRAST    Result Date: 6/27/2024  Impression: Impression: No evidence of brain ischemia or infarct Electronically Signed: Will Hernandez  6/27/2024 9:56 AM EDT  Workstation ID: [id number]    CT Head Without Contrast Stroke Protocol    Result Date: 6/27/2024  Impression: Impression: 1. No intracranial hemorrhage 2. No CT changes of vascular territory brain ischemia at this time Electronically Signed: Will Hernandez  6/27/2024 9:46 AM EDT  Workstation ID: [id number]    CT Head Without Contrast    Result Date: 6/27/2024  Impression: Impression: No acute intracranial process. Electronically Signed: Agnieszka Lujan MD  6/27/2024 4:07 AM EDT  Workstation ID: [id number]     Results for orders placed during the hospital encounter of 09/25/22    Bilateral Carotid Duplex    Interpretation Summary  · Proximal right internal carotid artery is normal.  · Proximal left internal carotid  artery is normal.      Results for orders placed during the hospital encounter of 09/25/22    Bilateral Carotid Duplex    Interpretation Summary  · Proximal right internal carotid artery is normal.  · Proximal left internal carotid artery is normal.      Results for orders placed during the hospital encounter of 08/01/23    Adult Transthoracic Echo Complete W/ Cont if Necessary Per Protocol    Interpretation Summary    Left ventricular systolic function is normal. Left ventricular ejection fraction appears to be 61 - 65%.    Left ventricular wall thickness is consistent with mild concentric hypertrophy.    Left ventricular diastolic function was normal.    The left atrial cavity is mildly dilated.    Estimated right ventricular systolic pressure from tricuspid regurgitation is mildly elevated (35-45 mmHg). Calculated right ventricular systolic pressure from tricuspid regurgitation is 35 mmHg.      Labs Pending at Discharge:  Pending Labs       Order Current Status    Methylmalonic Acid, Serum In process            Procedures Performed           Consults:   Consults       Date and Time Order Name Status Description    6/27/2024  6:54 AM Inpatient Neurology Consult General Completed     6/27/2024  6:54 AM Inpatient Hospitalist Consult                Discharge Details        Discharge Medications        New Medications        Instructions Start Date   baclofen 10 MG tablet  Commonly known as: LIORESAL   Take 1 tablet by mouth 2 (Two) Times a Day for 1 day, THEN 1 tablet 2 (Two) Times a Day As Needed for Muscle Spasms (Headache, neck pain) for up to 2 days.   Start Date: July 2, 2024     butalbital-acetaminophen-caffeine -40 MG per tablet  Commonly known as: FIORICET, ESGIC   1 tablet, Oral, Every 4 Hours PRN      HYDROcodone-acetaminophen 5-325 MG per tablet  Commonly known as: NORCO   1 tablet, Oral, Every 6 Hours PRN      topiramate 25 MG tablet  Commonly known as: TOPAMAX   25 mg, Oral, Daily              Continue These Medications        Instructions Start Date   acetaminophen 325 MG tablet  Commonly known as: TYLENOL   650 mg, Oral, Every 4 Hours PRN      amLODIPine 10 MG tablet  Commonly known as: NORVASC   10 mg, Oral, Daily      aspirin 81 MG EC tablet   1 tablet, Oral, Daily      atorvastatin 80 MG tablet  Commonly known as: LIPITOR   80 mg, Oral, Nightly      DULoxetine 60 MG capsule  Commonly known as: CYMBALTA   1 capsule, Oral, Daily      gabapentin 600 MG tablet  Commonly known as: NEURONTIN   600 mg, Oral, 3 Times Daily      multivitamin with minerals tablet tablet   1 tablet, Oral, Daily      Prolia 60 MG/ML solution prefilled syringe syringe  Generic drug: denosumab   60 mg, Subcutaneous, Once      ticagrelor 90 MG tablet tablet  Commonly known as: BRILINTA   90 mg, Oral, 2 Times Daily      Tirzepatide 12.5 MG/0.5ML solution pen-injector pen  Commonly known as: MOUNJARO   0.5 mL, Subcutaneous, Weekly, Wednesday.      traZODone 50 MG tablet  Commonly known as: DESYREL   1 tablet, Oral, Nightly               Allergies   Allergen Reactions    Zofran [Ondansetron Hcl] Nausea And Vomiting     Does the opposite of its purpose    Prochlorperazine Other (See Comments)     CAUSED SEIZURE    Prochlorperazine Edisylate Hives    Hydralazine Itching and Rash    Hydralazine Hcl Itching and Rash    Meperidine Itching and Swelling    Prochlorperazine Maleate Other (See Comments)     CAUSES SEIZURE    Prochlorperazine Maleate Irritability         Discharge Disposition: Patient will go home with home health and follow-up in outpatient setting with neurology as well as her PCP.  Home or Self Care    Diet:  Hospital:  Diet Order   Procedures    Diet: Cardiac, Diabetic; Healthy Heart (2-3 Na+); Consistent Carbohydrate; Texture: Mechanical Ground (NDD 2); Fluid Consistency: Thin (IDDSI 0)         Discharge Activity:   Activity Instructions       Gradually Increase Activity Until at Pre-Hospitalization Level                 CODE STATUS:  Code Status and Medical Interventions:   Ordered at: 06/27/24 0856     Level Of Support Discussed With:    Patient     Code Status (Patient has no pulse and is not breathing):    CPR (Attempt to Resuscitate)     Medical Interventions (Patient has pulse or is breathing):    Full Support         Future Appointments   Date Time Provider Department Center   7/17/2024  4:30 PM [facility] CT 3 [facility]   7/30/2024 11:00 AM Gladis Lang APRN [facility]       Additional Instructions for the Follow-ups that You Need to Schedule       Ambulatory Referral to Physical Therapy   As directed      Specialty needed: Evaluate and treat   Follow-up needed: Yes        Discharge Follow-up with PCP   As directed       Currently Documented PCP:    Traci Townsend APRN    PCP Phone Number:    [phone]     Follow Up Details: 1 week        Discharge Follow-up with Specified Provider: neurology; 2 Weeks   As directed      To: neurology   Follow Up: 2 Weeks                Time spent on Discharge including face to face service:  >30 minutes    Signature: Electronically signed by Nick Simeon MD, 07/02/24, 09:34 EDT.  [facility] Hospitalist Team     Electronically signed by Nick Simeon MD at 07/02/24 1402       Discharge Order (From admission, onward)       Start     Ordered    07/02/24 1403  Discharge patient  Once        Expected Discharge Date: 07/02/24   Discharge Disposition: Home or Self Care   Physician of Record for Attribution - Please select from Treatment Team: NICK SIMEON [[id number]]   Review needed by CMO to determine Physician of Record: No      Question Answer Comment   Physician of Record for Attribution - Please select from Treatment Team NICK SIMEON    Review needed by CMO to determine Physician of Record No        07/02/24 1403    07/02/24 0930  Discharge patient  Once,   Status:  Canceled        Expected Discharge Date: 07/02/24   Expected Discharge Time: Morning    Discharge Disposition: Home or Self Care   Physician of Record for Attribution - Please select from Treatment Team: NICK SIMEON [[id number]]   Review needed by CMO to determine Physician of Record: No      Question Answer Comment   Physician of Record for Attribution - Please select from Treatment Team NICK SIMEON    Review needed by CMO to determine Physician of Record No        07/02/24 0933    06/28/24 1135  Discharge patient  Once,   Status:  Canceled        Expected Discharge Date: 06/28/24   Expected Discharge Time: Morning   Discharge Disposition: Home or Self Care   Physician of Record for Attribution - Please select from Treatment Team: JUANA DALLAS [[id number]]   Review needed by CMO to determine Physician of Record: No      Question Answer Comment   Physician of Record for Attribution - Please select from Treatment Team JUANA DALLAS    Review needed by CMO to determine Physician of Record No        06/28/24 1136

## 2024-07-05 LAB — METHYLMALONATE SERPL-SCNC: 437 NMOL/L (ref 0–378)

## 2024-07-10 NOTE — PROGRESS NOTES
[facility] MEDICINE SERVICE  DAILY PROGRESS NOTE    NAME: Marge Mcghee  : 1955  MRN: [id number]      LOS: 5 days     PROVIDER OF SERVICE: Lasha Galindo MD    Chief Complaint: Migraine, intractable    Subjective:       History of Present Illness: Marge Mcghee is a 69 y.o. female with a CMH of hepatic steatos, HLD, UTI, REINA, GERD, DM2, CAD with triple bypass, CVA x 3, RLS who presented to [facility] on 2024 with right frontal/parietal headache.  Patient states that it was acute onset abruptly about 8 PM last night.  Stated migraines 10/10, no vision changes, there was some nausea, vomiting.  Some pain relief with applying ice pack.  At that time patient's blood pressure was 180s over 100s patient attempted to get some rest, however migraine was unresolved.     While in emergency department patient received 1 dose of Toradol, Reglan, and Benadryl.  Labs were remarkable for PTT of 26.5.  Patient received some relief with medications, stated headache was 5/10.      24-9:15 AM EDT  As noted the patient's blood pressure has significant change of 86/45.  1 L of IV fluids ordered.     24-9:45 AM EDT while waiting for bed placement, patient developed left-sided facial droop as well as vision changes.  Code stroke protocol initiated.  Accompany patient and RN down to CT scan, neurology APRN at bedside as well.    24 patient seen and examined in bed no acute distress, doing better today. C/o Headache, nausea or vomiting  Patient Denies:  tingling or numbness    Review of Systems   Constitutional:  Negative for fatigue and fever.   Gastrointestinal:  Positive for nausea.   Neurological:  Positive for headaches.   Psychiatric/Behavioral:  Negative for confusion.        Objective:     Vital Signs      Body mass index is 29.1 kg/m².    Physical Exam  Constitutional:       General: She is not in acute distress.     Appearance: Normal appearance.   HENT:      Head:  Normocephalic.      Nose: Nose normal.      Mouth/Throat:      Mouth: Mucous membranes are moist. Mucous membranes are dry.   Eyes:      Extraocular Movements: Extraocular movements intact.      Pupils: Pupils are equal, round, and reactive to light.   Cardiovascular:      Rate and Rhythm: Normal rate and regular rhythm.   Pulmonary:      Effort: Pulmonary effort is normal. No respiratory distress.      Breath sounds: Normal breath sounds.   Abdominal:      General: Abdomen is flat.      Palpations: Abdomen is soft.   Musculoskeletal:         General: Normal range of motion.      Cervical back: Neck supple.   Skin:     General: Skin is dry.      Capillary Refill: Capillary refill takes less than 2 seconds.   Neurological:      Mental Status: She is alert and oriented to person, place, and time.      Cranial Nerves: No cranial nerve deficit.      Motor: No weakness.      Gait: Gait abnormal.   Psychiatric:         Behavior: Behavior normal.        Scheduled Meds        PRN Meds      Infusions  No current facility-administered medications for this encounter.        Diagnostic Data            No radiology results for the last day      I reviewed the patient's new clinical results.  I reviewed the patient's new imaging results and agree with the interpretation.  I reviewed the patient's other test results and agree with the interpretation    Assessment/Plan:     Active and Resolved Problems  Active Hospital Problems    Diagnosis  POA    **Migraine, intractable [G43.919]  Yes    Encephalopathy, metabolic [G93.41]  Yes     Priority: High    Hypertensive emergency [I16.1]  Yes     Priority: High    S/P CABG x 3 [Z95.1]  Not Applicable     Priority: High    Type 2 diabetes mellitus with retinopathy, with long-term current use of insulin [E11.319, Z79.4]  Not Applicable     Priority: High    REINA (acute kidney injury) [N17.9]  Yes     Priority: Medium    Obesity (BMI 30-39.9) [E66.9]  Yes     Priority: Medium     Hyperlipidemia [E78.5]  Yes     Priority: Medium    GERD (gastroesophageal reflux disease) [K21.9]  Yes     Priority: Low    Polypharmacy [Z79.899]  Not Applicable     Priority: Low    Vitamin D deficiency [E55.9]  Yes     Priority: Low    Hepatic steatosis [K76.0]  Yes     Priority: Low    Gastroesophageal reflux disease with esophagitis [K21.00]  Yes     Priority: Low      Resolved Hospital Problems   No resolved problems to display.       Left sided weakness, probably old stroke recrudescence   Intractable Headache, probably hemiplegic migraine. Other differentials cluster, tension  MRI brain negative for acute pathology, CTA head and neck with no significant stenosis.   Continue PT/OT, patient will probably require placement in rehab  On Brillinta 90 mg bid, ASA 81 mg once daily  Continue high intensity statin   Continue Fioricet as needed q 4  Continue Topamax 25 mg once daily  Continue morphine 2 q 4 for severe pain  Requested Neurology follow up  Trial of oxygen via nasal cannula     History of CAD  On DAPT, statin     HLD  Continue statin        Type 2 DM  Continue insulin SS  Changed diet to carb consistent, cardiac         GERD  Continue PPI     RLS  Continue gabapentin     History of Vit B12 deficiency  Continue B12 supplementation  Checking methylmalonic acid    VTE Prophylaxis:  Mechanical VTE prophylaxis orders are present.         Code status is   Code Status and Medical Interventions:   Ordered at: 06/27/24 0856     Level Of Support Discussed With:    Patient     Code Status (Patient has no pulse and is not breathing):    CPR (Attempt to Resuscitate)     Medical Interventions (Patient has pulse or is breathing):    Full Support       Plan for disposition:Rehab in 1 days    Time: 30 minutes    Signature: Electronically signed by Lasha Galindo MD, 07/10/24, 13:49 EDT.  [facility] Hospitalist Team

## 2024-07-13 RX ORDER — UBIDECARENONE 75 MG
500 CAPSULE ORAL DAILY
Qty: 150 TABLET | Refills: 0 | Status: SHIPPED | OUTPATIENT
Start: 2024-07-13 | End: 2024-08-12

## 2024-07-15 ENCOUNTER — READMISSION MANAGEMENT (OUTPATIENT)
Dept: CALL CENTER | Facility: HOSPITAL | Age: 69
End: 2024-07-15
Payer: COMMERCIAL

## 2024-07-15 NOTE — OUTREACH NOTE
Medical Week 2 Survey      Flowsheet Row Responses   [facility] facility patient discharged from? [facility]   Does the patient have one of the following disease processes/diagnoses(primary or secondary)? Other   Week 2 attempt successful? No   Unsuccessful attempts Attempt 1            Shama PARADA - Registered Nurse

## 2024-07-16 ENCOUNTER — TREATMENT (OUTPATIENT)
Dept: PHYSICAL THERAPY | Facility: CLINIC | Age: 69
End: 2024-07-16
Payer: COMMERCIAL

## 2024-07-16 DIAGNOSIS — R53.1 WEAKNESS: Primary | ICD-10-CM

## 2024-07-16 DIAGNOSIS — R26.2 DIFFICULTY WALKING: ICD-10-CM

## 2024-07-16 DIAGNOSIS — R26.89 BALANCE PROBLEM: ICD-10-CM

## 2024-07-16 NOTE — PROGRESS NOTES
Physical Therapy Initial Evaluation and Plan of Care    Patient: Marge Mcghee   : 1955  Diagnosis/ICD-10 Code:  Weakness [R53.1]  Referring practitioner: Lasha Galindo MD  Date of Initial Visit: 2024  Today's Date: 2024  Patient seen for 1 sessions  PT Clinic location:  [address] [address] [address]  [place] [address]         Subjective Questionnaire: LEFS: 42/80    QuickDASH: 34%    Subjective Evaluation    History of Present Illness  Mechanism of injury: History of current condition: Presents today with reports of worsening weakness & balance since hospitalization a few weeks ago. Presented to [place] ER on 24 with R frontal headache 10/10 pain, BP was high (180s/100s). Developed left sided facial droop & vision changes while in hospital, but these resolved. MRI & CT negative for CVA. Said she had a TIA. Underwent occipital nerve block for headache which really helped. Reports history of 3 CVAs with some residual mild left sided weakness (CVA in 2023 & TIA in 2023). Denies recent falls, but has had 3-4 in the last year. Says she gets dizzy and unsteady a lot. Is on new BP meds, report they check her BP intermittently and sometimes it is low. Has follow up with PCP this afternoon - also appointments scheduled for cardiologist & neurologist over the next few weeks.     Patient's , Tino, present for evaluation & assists with history    Reported functional limitation: says she has light headedness when she stands up which makes her unsteady, difficulty getting up out of chair. Feels weakness and doesn't trust her legs. Left side is weak - never really had therapy follower her CVA 1 year ago except for a few home health visits.        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 See chart for further detail.   Therapy Precautions: falls risk, monitor BP    BP in sittin/60 (reported dizziness upon standing)     Objective          Active Range of Motion   Left Shoulder   Flexion: 100 degrees     Passive Range of Motion   Left Shoulder   Flexion: 150 degrees     Strength/Myotome Testing     Left Shoulder     Planes of Motion   Flexion: 2     Right Shoulder   Normal muscle strength    Left Elbow   Flexion: 4  Extension: 4    Left Hip   Planes of Motion   Flexion: 3+    Right Hip   Planes of Motion   Flexion: 4+    Left Knee   Flexion: 3+  Extension: 3+    Right Knee   Flexion: 4+  Extension: 4+    Left Ankle/Foot   Dorsiflexion: 3+  Plantar flexion: 2    Right Ankle/Foot   Dorsiflexion: 5  Plantar flexion: 2    Additional Strength Details  Difficulty coordinating LLE MMT    Functional Assessment     Comments  TRANSFERS: able to stand from standard chair w/o UE assist (increased effort)  GAIT: slow aleah, wide SINTIA, unsteady w/ path deviations, hand held assist at times (reports dizziness - likely from low BP)  ROM: WFL  BALANCE:     30 second sit to stand: unable to test d/t low BP today     TUG: unable to test d/t low BP today     Static balance (feet together): 30 sec, slight increased sway          Assessment & Plan       Assessment  Impairments: abnormal gait, abnormal or restricted ROM, impaired balance, impaired physical strength, lacks appropriate home exercise program and safety issue   Functional limitations: carrying objects, walking, standing and reaching overhead   Assessment details: The patient is a 69 y.o. female who presents to physical therapy today for weakness & balance impairment originally from CVA/TIAs over the last year, but made worse from recent TIA &  hospitalization 2 weeks ago. Upon initial evaluation, the patient demonstrates the following impairments: Left sided UE & LE weakness, impaired standing balance, impaired gait mechanics. Examination findings indicate that her left sided weakness is contributing to her unsteadiness, however I suspect that low BP may also be part of her problem since recent hospitalization & she is following up with her PCP regarding this this afternoon. Balance assessment somewhat limited today due to low BP in sitting & dizziness in standing.    Due to these impairments, the patient is unable to perform or has difficulty with the following functional tasks: standing, walking, getting up in the morning, limited walking distance. The patient would benefit from skilled PT services to address functional limitations and impairments and to improve patient quality of life.      Prognosis: good    Goals  Plan Goals: ST. Pt will be independent and compliant with initial HEP in 4 weeks.  2. Pt will improve TUG time to <15 seconds in 4 weeks.  3. Pt will improve 30 sec sit to stand test to 5 reps in 4 weeks.   4. Pt will demonstrate good safety awareness & decision making to reduce falls risk in 4 weeks.    LT. Pt will be independent with final HEP for self-management of condition by DC.  2. Pt will improve LEFS score to >55/80 to indicate improved LE function by DC  3. Pt will improve TUG time to <10 seconds to indicate decreased falls risk by DC.   4. Pt will improve 30 second sit to stand test to >8 reps by DC in order to indicate improved functional BLE strength.       Plan  Therapy options: will be seen for skilled therapy services  Planned modality interventions: cryotherapy and thermotherapy (hydrocollator packs)  Planned therapy interventions: abdominal trunk stabilization, ADL retraining, balance/weight-bearing training, body mechanics training, flexibility, functional ROM exercises, gait training, home exercise program,  joint mobilization, manual therapy, motor coordination training, neuromuscular re-education, postural training, soft tissue mobilization, spinal/joint mobilization, strengthening, stretching, therapeutic activities and transfer training  Frequency: 2x week  Duration in weeks: 12  Treatment plan discussed with: patient        See flowsheets for treatment detail. Increased time today spent getting accurate medical history as patient and  were not completely clear on timeline.    History # of Personal Factors and/or Comorbidities: HIGH (3+)  Examination of Body System(s): # of elements: HIGH (4+)  Clinical Presentation: UNSTABLE   Clinical Decision Making: HIGH      Timed:         Manual Therapy:         mins  24283;     Therapeutic Exercise:         mins  52975;     Neuromuscular Sae:        mins  24697;    Therapeutic Activity:     10     mins  44587;     Gait Training:           mins  72303;     Ultrasound:          mins  80130;    Ionto                                   mins   05672  Self Care                            mins   87333      Un-Timed:  Electrical Stimulation:         mins  44204 ( );  Dry Needling          mins self-pay  Traction          mins 96356  Low Eval          Mins  50987  Mod Eval          Mins  65094  High Eval                       40     Mins  01842  Re-Eval                               mins  10789      Timed Treatment:   10   mins   Total Treatment:     40   mins    PT SIGNATURE: Kristy Cope PT   [place] PT license #: [id number]  [place] PT license #: [id number]  DATE TREATMENT INITIATED: 7/16/2024    Initial Certification  Certification Period: 10/13/2024  I certify that the therapy services are furnished while this patient is under my care.  The services outlined above are required by this patient, and will be reviewed every 90 days.    PHYSICIAN: Lasha Galindo MD  NPI: [id number]                                      DATE:     Please sign and return via fax to   [address] [address] [address]  [place] [phone] . Thank you, [facility] Physical Therapy.

## 2024-07-17 ENCOUNTER — HOSPITAL ENCOUNTER (OUTPATIENT)
Dept: CT IMAGING | Facility: HOSPITAL | Age: 69
Discharge: HOME | End: 2024-07-17
Admitting: NURSE PRACTITIONER
Payer: COMMERCIAL

## 2024-07-17 DIAGNOSIS — R10.31 RLQ ABDOMINAL PAIN: ICD-10-CM

## 2024-07-17 PROCEDURE — 74177 CT ABD & PELVIS W/CONTRAST: CPT

## 2024-07-17 PROCEDURE — 25510000001 IOPAMIDOL PER 1 ML: Performed by: NURSE PRACTITIONER

## 2024-07-17 RX ADMIN — IOPAMIDOL 100 ML: 755 INJECTION, SOLUTION INTRAVENOUS at 16:45

## 2024-07-23 ENCOUNTER — TREATMENT (OUTPATIENT)
Dept: PHYSICAL THERAPY | Facility: CLINIC | Age: 69
End: 2024-07-23
Payer: COMMERCIAL

## 2024-07-23 ENCOUNTER — READMISSION MANAGEMENT (OUTPATIENT)
Dept: CALL CENTER | Facility: HOSPITAL | Age: 69
End: 2024-07-23
Payer: COMMERCIAL

## 2024-07-23 DIAGNOSIS — R53.1 WEAKNESS: Primary | ICD-10-CM

## 2024-07-23 DIAGNOSIS — R26.2 DIFFICULTY WALKING: ICD-10-CM

## 2024-07-23 DIAGNOSIS — R26.89 BALANCE PROBLEM: ICD-10-CM

## 2024-07-23 NOTE — PROGRESS NOTES
Physical Therapy Daily Treatment Note      [facility] [place]              [address], [address]                [facility] [place]  [address]        Patient: Marge Mcghee   : 1955  Diagnosis/ICD-10 Code:  Weakness [R53.1]  Referring practitioner: Lasha Galindo MD  Date of Initial Visit: Type: THERAPY  Noted: 2024  Today's Date: 2024  Patient seen for 2 sessions         Subjective    Marge Mcghee reports: they think they got the BP issue worked out but she does feel a little bit off today.            Objective   See Exercise, Manual, and Modality Logs for complete treatment.     Initial TV=591/64mmHg (seated, (R) arm)  Final JA=969/64mmHg (seated, (R) arm)    Assessment/Plan  BP within normal limits throughout the session.  Initiated strengthening program however pt reported fatigue and nausea thus held further exercise.  Issued initial HEP with seated exercises completed today and will have pt complete these until next session.  Will progress as able based on symptoms next session.     Progress per Plan of Care           Timed:  Manual Therapy:         mins  30004;  Therapeutic Exercise:    18     mins  19897;     Neuromuscular Sae:        mins  48217;    Therapeutic Activity:          mins  70510;     Gait Training:           mins  95108;     Ultrasound:          mins  94242;     Work Conditioning/Hardening (initial 2 hours)        mins  47379  Work Conditioning/Hardening (each add'l hour)        mins  05188    Untimed:   Electrical Stimulation:         mins  73510 ( );  Traction          mins 43034    Timed Treatment:   18   mins   Total Treatment:     18   mins    Bernice Yarbrough PTA  Physical Therapist Assistant

## 2024-07-23 NOTE — OUTREACH NOTE
Medical Week 3 Survey      Flowsheet Row Responses   [facility] [place] patient discharged from? [facility]   Does the patient have one of the following disease processes/diagnoses(primary or secondary)? Other   Week 3 attempt successful? Yes   Call start time 1609   Call end time 1613   Discharge diagnosis Migraine, intractable   Is the patient taking all medications as directed (includes completed medication regime)? Yes   Does the patient have a primary care provider?  Yes   Does the patient have an appointment with their PCP within 7 days of discharge? No   Nursing Interventions Advised patient to make appointment, Educated patient on importance of making appointment   Comments Pt to make appt with PCP and neuro   Psychosocial issues? No   What is the patient's perception of their health status since discharge? Improving   Is the patient/caregiver able to teach back signs and symptoms related to disease process for when to call PCP? Yes   Is the patient/caregiver able to teach back signs and symptoms related to disease process for when to call 911? Yes   Is the patient/caregiver able to teach back the hierarchy of who to call/visit for symptoms/problems? PCP, Specialist, Home health nurse, Urgent Care, ED, 911 Yes   Additional teach back comments She is doing well.  She will make appt with PCP and neuro   Week 3 Call Completed? Yes   Graduated Yes   Call end time 1613            Karina BALL - Licensed Nurse

## 2024-07-26 ENCOUNTER — TREATMENT (OUTPATIENT)
Dept: PHYSICAL THERAPY | Facility: CLINIC | Age: 69
End: 2024-07-26
Payer: COMMERCIAL

## 2024-07-26 DIAGNOSIS — R26.2 DIFFICULTY WALKING: ICD-10-CM

## 2024-07-26 DIAGNOSIS — R26.89 BALANCE PROBLEM: ICD-10-CM

## 2024-07-26 DIAGNOSIS — R53.1 WEAKNESS: Primary | ICD-10-CM

## 2024-07-26 NOTE — PROGRESS NOTES
Physical Therapy Daily Treatment Note      [facility] [place]              [address], [address]                [facility] [place]  [address]        Patient: Marge Mcghee   : 1955  Diagnosis/ICD-10 Code:  Weakness [R53.1]  Referring practitioner: Lasha Galindo MD  Date of Initial Visit: Type: THERAPY  Noted: 2024  Today's Date: 2024  Patient seen for 3 sessions         Subjective    Marge Mcghee reports: she did ok after last session but she was worn out.            Objective   See Exercise, Manual, and Modality Logs for complete treatment.     Initial KR=799/70mmHg (seated, (R) UE)  BP upon standing=90/58mmHg (standing, (R)UE)  Supine LC=168/75mmHg  Seated after resting in supine ~20 min=88/54mmHg  Seated after 5-10 min=108/64mmHg    Educated pt to contact MD regarding BP and possible use of abdominal binder to help in standing.     Assessment/Plan  Continued with seated strengthening today and BP WNL.  Planned to trial standing exercises however BP dropped and pt was symptomatic, light headed, thus returned to sitting and opted for supine strengthening.  Low BP persisted with attempt to return to sitting with worsening symptoms.  Returned to supine until symptoms stabilizied, returned to sitting with unset of symptoms however after ~10-15, they stabilized and pt able to leave clinic.      Progress per Plan of Care           Timed:  Manual Therapy:         mins  13546;  Therapeutic Exercise:    30     mins  73146;     Neuromuscular Sae:        mins  00019;    Therapeutic Activity:          mins  95246;     Gait Training:           mins  51734;     Ultrasound:          mins  64512;     Work Conditioning/Hardening (initial 2 hours)        mins  67263  Work Conditioning/Hardening (each add'l hour)        mins  14936    Untimed:   Electrical Stimulation:         mins  61552 (MC );  Traction          mins 10943    Timed Treatment:   30   mins   Total Treatment:     30   mins    Bernice ZHANG  Zenon, DEE  Physical Therapist Assistant

## 2024-07-30 ENCOUNTER — TREATMENT (OUTPATIENT)
Dept: PHYSICAL THERAPY | Facility: CLINIC | Age: 69
End: 2024-07-30
Payer: COMMERCIAL

## 2024-07-30 DIAGNOSIS — R26.89 BALANCE PROBLEM: ICD-10-CM

## 2024-07-30 DIAGNOSIS — R53.1 WEAKNESS: Primary | ICD-10-CM

## 2024-07-30 DIAGNOSIS — R26.2 DIFFICULTY WALKING: ICD-10-CM

## 2024-07-30 NOTE — PROGRESS NOTES
Physical Therapy Daily Treatment Note      [facility] [place]              [address]                [facility] [place]  [address]        Patient: Marge Mcghee   : 1955  Diagnosis/ICD-10 Code:  Weakness [R53.1]  Referring practitioner: Lasha Galindo MD  Date of Initial Visit: Type: THERAPY  Noted: 2024  Today's Date: 2024  Patient seen for 4 sessions         Subjective    Marge Mcghee reports: she is dizzy and light headed.  She feels off balance.           Objective   See Exercise, Manual, and Modality Logs for complete treatment.     BP in nrzikzi=787/62mmHg  BP in standing=72/46mmHg    Assessment/Plan  Pt continued to have low BP with symptoms present.  Completed seated exercises only as BP stable in this position and to improve strength.  She continued to fatigue with exercises however some improvement in symptoms following.  Low BP upon standing thus pt with prolonged seated rest break and water after exercises to recover and exit clinic.       Progress per Plan of Care; Encouraged pt to follow up with PCP regarding BP concerns especially with symptoms present.  Primary PT to contact PCP as well.            Timed:  Manual Therapy:         mins  54290;  Therapeutic Exercise:    20     mins  42236;     Neuromuscular Sae:        mins  52062;    Therapeutic Activity:          mins  01146;     Gait Training:           mins  12010;     Ultrasound:          mins  72154;     Work Conditioning/Hardening (initial 2 hours)        mins  75573  Work Conditioning/Hardening (each add'l hour)        mins  77157    Untimed:   Electrical Stimulation:         mins  00043 (MC );  Traction          mins 08488    Timed Treatment:   20   mins   Total Treatment:     20   mins    Bernice Yarbrough PTA  Physical Therapist Assistant

## 2024-08-22 ENCOUNTER — DOCUMENTATION (OUTPATIENT)
Dept: PHYSICAL THERAPY | Facility: CLINIC | Age: 69
End: 2024-08-22

## 2024-10-16 NOTE — PLAN OF CARE
Goal Outcome Evaluation:  Plan of Care Reviewed With: patient  Progress: improving  Outcome Summary: good pain control with PO, ambulating assist of 1 with walker, NWB RE, plan for stress test in am, PICC line tomorrow, DC Hand rehab tomorrow, NVI, educated on bp meds and monitoring   Today's date 10/16/2024  Admission date 9/18/2024  Hospital Room /A     Reason for initial visit:  Evaluation and management of Diabetes Mellitus - type II, uncontrolled with hyperglycemia.     Portions of this note are brought forward from Dr. Escobar's note; it was reviewed and edited by me as appropriate.    History of Present Illness:  This is a 53 year old Female who is admitted for second opinion on advanced heart failure therapies. She underwent LVAD eval at [facility] [place] and was deemed ineligible on 9/6/2024 d/t lack of social support and RV failure. S/p Impella insertion on 9/24/24. Given dexamethasone 4mg in OR at 10:48am. Now transferred to CVICU.     Interval History/ROS:  - POD2 s/p  LVAD placement 10/14  - has not resumed diet yet, low appetite  denies N, V  + postop pain  - on IV insulin     Current hospital regimen for diabetes: IV insulin, humalog 4 units pc, lantus 20 units hs    Prior to surgery was on Lantus 60 units daily, and humalog 36 units with meals along with med correctional insulin, while on liberal carb diet to avoid outside foods.     Current oral intake status Nursing to Pass Tray - Set Up Tray  Room Service Continuous  Consistent Carb Moderate (45-75 Gm/Meal); Fluid Restrict 2000ml (1200 From Dietary) Diet  Daily W Dinner; Ensure High Protein/High Protein Low Carbohydrate Supplement, Chocolate Oral Nutrition SupplementNPO    Blood sugars while hospitalized    Recent Labs   Lab 10/15/24  1404 10/15/24  1457 10/15/24  1606 10/15/24  1720 10/15/24  1819 10/15/24  2045 10/15/24  2211 10/15/24  2357 10/16/24  0215 10/16/24  0740 10/16/24  0916 10/16/24  1005 10/16/24  1117 10/16/24  1202   GLUCOSE BEDSIDE 99 106* 129* 136* 162* 172* 150* 134* 124* 180* 151* 117* 102* 112*      Type 2 diabetes diagnosed around year 2007 on routine blood work.   She follows with Dr Catalan for DM management with last OV Dec 18, 2023.   She did not show to follow up on Aug 23, 2024 and currently has no follow up  scheduled.   Microvascular complications: peripheral neuropathy, microalbuminuria with level at 54.4 May 2023, CKD IIIb, recent GFR at 32 on 9/20/2024.   She does not recall when was last dilated eye exam. She reports that she has a follow up scheduled for Oct 4, 2024.   Macrovascular complications:congestive heart failure with reduced EF sp ICD.   History of hospitalizations with diabetic complications or DKA - No.    Home DM regimen consists of:   Lantus 39 units at night time.   Humalog 32 units with meals along with a correctional insulin 2:50> 150.   Jardiance 25mg  Has not used Ozempic since sometime last year due to GI issues.     She reports that she has BG meter at home and is testing her BG levels before meals.   She reports BG levels to be 90's (sometimes in am) to 300's when drinking juice and regular soda.   She sips on both during the day due to metallic taste in her mouth.   She denies any hypoglycemia.     History:   Past medical history, surgical history, medications, allergies, family history and social history reviewed and in Epic.    Physical Exam:  Visit Vitals  /73   Pulse 99   Temp 98.1 °F (36.7 °C)   Resp (!) 32   Ht 5' 3\" (1.6 m)   Wt 108 kg (238 lb 1.6 oz)   LMP 01/17/2023 (Exact Date)   SpO2 94%   BMI 42.18 kg/m²   Constitutional: up in chair, NAD  Eyes: EOMI    Neck: Supple   Respiratory: no labored  LVAD   Skin: No visible rashes on exposed skin areas.    Psych: normal mood and affect  Musculoskeletal: moves extremities  Neuro: AAOx3    Lab Review:  Last Lab A1C:  Hemoglobin A1C (%)   Date Value   09/18/2024 8.8 (H)     Last Point of Care A1C:  Hemoglobin A1C, POC (%)   Date Value   09/08/2023 8.3 (A)     No components found for: \"CHOLHDL\"  Triglycerides (mg/dL)   Date Value   09/19/2024 91     HDL (mg/dL)   Date Value   09/19/2024 29 (L)     LDL (mg/dL)   Date Value   09/19/2024 48     No components found for: \"NONHDLCALC\"  Sodium (mmol/L)   Date Value   10/16/2024 133 (L)      Potassium (mmol/L)   Date Value   10/16/2024 4.3     Chloride (mmol/L)   Date Value   10/16/2024 101     Glucose (mg/dL)   Date Value   10/16/2024 132 (H)     Calcium (mg/dL)   Date Value   10/16/2024 10.0     Carbon Dioxide (mmol/L)   Date Value   10/16/2024 21     BUN (mg/dL)   Date Value   10/16/2024 58 (H)     Glomerular Filtration Rate (no units)   Date Value   10/16/2024 31 (L)     Creatinine (mg/dL)   Date Value   10/16/2024 1.91 (H)     TSH (mcUnits/mL)   Date Value   10/14/2024 0.946     WBC (K/mcL)   Date Value   10/16/2024 17.4 (H)     RBC (mil/mcL)   Date Value   10/16/2024 3.51 (L)     HCT (%)   Date Value   10/16/2024 31.3 (L)     HGB (g/dL)   Date Value   10/16/2024 9.7 (L)     PLT (K/mcL)   Date Value   10/16/2024 159     GOT/AST (Units/L)   Date Value   10/16/2024 95 (H)     Alkaline Phosphatase (Units/L)   Date Value   10/16/2024 90     Bilirubin, Total (mg/dL)   Date Value   10/16/2024 0.9       Assessment:  DM II uncontrolled with hyperglycemia  A1c is at 8.8% on 9/18/2024.   Microalbuminuria  CKD IIIb  Obesity with Body mass index is 42.18 kg/m².  S/p Impella insertion on 9/24/24, given dexamethasone 4mg in OR (on 9/24).   S/p LVAD 10/14, no intraop steroids    Plan:    Has not resumed diet yet  POD2    Continue IV insulin perioperatively  Increase  lantus to 30 units daily and Humalog to 6 units pc for now once eating >50%  -- Check BG per protocol  -- Continue Hypoglycemia protocol prn    Marly is ready for discharge from my viewpoint: No  Workup needed prior to discharge: None   Medications changes at discharge:   basal-bolus insulin, doses not yet finalized  Follow up appointments needed after discharge:   Will request follow-up with Dr. Catalan or [facility] within 1 - 2 months post-discharge; patient to call with BG log as needed prior to follow-up visit    Estimated Date of Discharge documented: 10/26/2024    Patient is testing blood glucose above Medicare guidelines (>1x/day non-insulin;  >3x/day on insulin): NO    We will continue to follow and titrate/manage medications as appropriate.

## 2024-10-23 RX ORDER — ASPIRIN 81 MG/1
81 TABLET, COATED ORAL DAILY
Qty: 90 TABLET | OUTPATIENT
Start: 2024-10-23

## 2024-11-05 NOTE — PROGRESS NOTES
Discharge Summary  Discharge Summary from Physical/Occupational Therapy Report    Patient: Marge Mcghee   : 1955  Today's Date: 2024    Patient seen for 4 visits.  Dates of Service: 24 - 24    Discharge Status of Patient: Marge verduzco showed for her last 3 PT sessions. Therefore she is being discharged.     Goals: Not Met    Discharge Plan: Patient to return to referring/providing physician        Thank you for this referral to [facility].    SIGNATURE: Kristy Cope, PT

## 2024-11-07 NOTE — ANESTHESIA PROCEDURE NOTES
Airway  Urgency: elective    Date/Time: 11/13/2020 3:22 PM  Airway not difficult    General Information and Staff    Patient location during procedure: OR  Anesthesiologist: Nelson Yi MD    Indications and Patient Condition  Indications for airway management: airway protection    Preoxygenated: yes  MILS maintained throughout  Mask difficulty assessment: 0 - not attempted    Final Airway Details  Final airway type: supraglottic airway      Successful airway: LMA  Size 4    Number of attempts at approach: 1  Assessment: lips, teeth, and gum same as pre-op and atraumatic intubation               No

## 2024-11-28 ENCOUNTER — HOSPITAL ENCOUNTER (INPATIENT)
Facility: HOSPITAL | Age: 69
LOS: 2 days | Discharge: HOME | DRG: 637 | End: 2024-11-30
Attending: EMERGENCY MEDICINE | Admitting: INTERNAL MEDICINE
Payer: MEDICARE

## 2024-11-28 ENCOUNTER — APPOINTMENT (OUTPATIENT)
Dept: CT IMAGING | Facility: HOSPITAL | Age: 69
DRG: 637 | End: 2024-11-28
Payer: MEDICARE

## 2024-11-28 ENCOUNTER — APPOINTMENT (OUTPATIENT)
Dept: GENERAL RADIOLOGY | Facility: HOSPITAL | Age: 69
DRG: 637 | End: 2024-11-28
Payer: MEDICARE

## 2024-11-28 DIAGNOSIS — N39.0 URINARY TRACT INFECTION WITHOUT HEMATURIA, SITE UNSPECIFIED: ICD-10-CM

## 2024-11-28 DIAGNOSIS — E11.65 HYPERGLYCEMIA DUE TO DIABETES MELLITUS: Primary | ICD-10-CM

## 2024-11-28 DIAGNOSIS — R55 SYNCOPE, UNSPECIFIED SYNCOPE TYPE: ICD-10-CM

## 2024-11-28 PROBLEM — E11.00 HYPEROSMOLAR HYPERGLYCEMIC STATE (HHS): Status: ACTIVE | Noted: 2024-11-28

## 2024-11-28 LAB
ABO GROUP BLD: NORMAL
ALBUMIN SERPL-MCNC: 4.4 G/DL (ref 3.5–5.2)
ALBUMIN/GLOB SERPL: 1.3 G/DL
ALP SERPL-CCNC: 85 U/L (ref 39–117)
ALT SERPL W P-5'-P-CCNC: 11 U/L (ref 1–33)
AMPHET+METHAMPHET UR QL: NEGATIVE
AMPHETAMINES UR QL: NEGATIVE
ANION GAP SERPL CALCULATED.3IONS-SCNC: 11.8 MMOL/L (ref 5–15)
ANION GAP SERPL CALCULATED.3IONS-SCNC: 9.1 MMOL/L (ref 5–15)
AST SERPL-CCNC: 26 U/L (ref 1–32)
BACTERIA UR QL AUTO: ABNORMAL /HPF
BARBITURATES UR QL SCN: NEGATIVE
BASOPHILS # BLD AUTO: 0.07 10*3/MM3 (ref 0–0.2)
BASOPHILS NFR BLD AUTO: 0.7 % (ref 0–1.5)
BENZODIAZ UR QL SCN: NEGATIVE
BILIRUB SERPL-MCNC: 0.4 MG/DL (ref 0–1.2)
BILIRUB UR QL STRIP: NEGATIVE
BLD GP AB SCN SERPL QL: NEGATIVE
BUN SERPL-MCNC: 18 MG/DL (ref 8–23)
BUN SERPL-MCNC: 20 MG/DL (ref 8–23)
BUN/CREAT SERPL: 16.8 (ref 7–25)
BUN/CREAT SERPL: 19.6 (ref 7–25)
BUPRENORPHINE SERPL-MCNC: NEGATIVE NG/ML
CALCIUM SPEC-SCNC: 10.2 MG/DL (ref 8.6–10.5)
CALCIUM SPEC-SCNC: 8.7 MG/DL (ref 8.6–10.5)
CANNABINOIDS SERPL QL: NEGATIVE
CHLORIDE SERPL-SCNC: 101 MMOL/L (ref 98–107)
CHLORIDE SERPL-SCNC: 94 MMOL/L (ref 98–107)
CLARITY UR: CLEAR
CO2 SERPL-SCNC: 27.9 MMOL/L (ref 22–29)
CO2 SERPL-SCNC: 28.2 MMOL/L (ref 22–29)
COCAINE UR QL: NEGATIVE
COLOR UR: YELLOW
CREAT SERPL-MCNC: 0.92 MG/DL (ref 0.57–1)
CREAT SERPL-MCNC: 1.19 MG/DL (ref 0.57–1)
DEPRECATED RDW RBC AUTO: 40.6 FL (ref 37–54)
EGFRCR SERPLBLD CKD-EPI 2021: 49 ML/MIN/1.73
EGFRCR SERPLBLD CKD-EPI 2021: 66.7 ML/MIN/1.73
EOSINOPHIL # BLD AUTO: 0.28 10*3/MM3 (ref 0–0.4)
EOSINOPHIL NFR BLD AUTO: 2.7 % (ref 0.3–6.2)
ERYTHROCYTE [DISTWIDTH] IN BLOOD BY AUTOMATED COUNT: 12.3 % (ref 12.3–15.4)
ETHANOL UR QL: <0.01 %
GEN 5 2HR TROPONIN T REFLEX: 13 NG/L
GLOBULIN UR ELPH-MCNC: 3.5 GM/DL
GLUCOSE BLDC GLUCOMTR-MCNC: 134 MG/DL (ref 70–105)
GLUCOSE BLDC GLUCOMTR-MCNC: 135 MG/DL (ref 70–105)
GLUCOSE BLDC GLUCOMTR-MCNC: 167 MG/DL (ref 70–105)
GLUCOSE BLDC GLUCOMTR-MCNC: 224 MG/DL (ref 70–105)
GLUCOSE BLDC GLUCOMTR-MCNC: 266 MG/DL (ref 70–105)
GLUCOSE BLDC GLUCOMTR-MCNC: 373 MG/DL (ref 70–105)
GLUCOSE BLDC GLUCOMTR-MCNC: 501 MG/DL (ref 70–105)
GLUCOSE SERPL-MCNC: 236 MG/DL (ref 65–99)
GLUCOSE SERPL-MCNC: 521 MG/DL (ref 65–99)
GLUCOSE UR STRIP-MCNC: ABNORMAL MG/DL
HBA1C MFR BLD: 14.4 % (ref 4.8–5.6)
HCT VFR BLD AUTO: 39.1 % (ref 34–46.6)
HGB BLD-MCNC: 12.9 G/DL (ref 12–15.9)
HGB UR QL STRIP.AUTO: NEGATIVE
HOLD SPECIMEN: NORMAL
HOLD SPECIMEN: NORMAL
HYALINE CASTS UR QL AUTO: ABNORMAL /LPF
IMM GRANULOCYTES # BLD AUTO: 0.03 10*3/MM3 (ref 0–0.05)
IMM GRANULOCYTES NFR BLD AUTO: 0.3 % (ref 0–0.5)
INR PPP: 0.95 (ref 0.9–1.1)
KETONES UR QL STRIP: NEGATIVE
LEUKOCYTE ESTERASE UR QL STRIP.AUTO: ABNORMAL
LYMPHOCYTES # BLD AUTO: 2.88 10*3/MM3 (ref 0.7–3.1)
LYMPHOCYTES NFR BLD AUTO: 28.2 % (ref 19.6–45.3)
MAGNESIUM SERPL-MCNC: 1.9 MG/DL (ref 1.6–2.4)
MCH RBC QN AUTO: 29.5 PG (ref 26.6–33)
MCHC RBC AUTO-ENTMCNC: 33 G/DL (ref 31.5–35.7)
MCV RBC AUTO: 89.5 FL (ref 79–97)
METHADONE UR QL SCN: NEGATIVE
MONOCYTES # BLD AUTO: 0.85 10*3/MM3 (ref 0.1–0.9)
MONOCYTES NFR BLD AUTO: 8.3 % (ref 5–12)
NEUTROPHILS NFR BLD AUTO: 59.8 % (ref 42.7–76)
NEUTROPHILS NFR BLD AUTO: 6.1 10*3/MM3 (ref 1.7–7)
NITRITE UR QL STRIP: NEGATIVE
NRBC BLD AUTO-RTO: 0 /100 WBC (ref 0–0.2)
NT-PROBNP SERPL-MCNC: 144 PG/ML (ref 0–900)
OPIATES UR QL: NEGATIVE
OSMOLALITY SERPL: 310 MOSM/KG (ref 280–301)
OXYCODONE UR QL SCN: NEGATIVE
PCP UR QL SCN: NEGATIVE
PH UR STRIP.AUTO: 6.5 [PH] (ref 5–8)
PHOSPHATE SERPL-MCNC: 4.4 MG/DL (ref 2.5–4.5)
PLATELET # BLD AUTO: 261 10*3/MM3 (ref 140–450)
PMV BLD AUTO: 9.4 FL (ref 6–12)
POTASSIUM SERPL-SCNC: 3.2 MMOL/L (ref 3.5–5.2)
POTASSIUM SERPL-SCNC: 4.2 MMOL/L (ref 3.5–5.2)
PROT SERPL-MCNC: 7.9 G/DL (ref 6–8.5)
PROT UR QL STRIP: NEGATIVE
PROTHROMBIN TIME: 12.7 SECONDS (ref 11.7–14.2)
RBC # BLD AUTO: 4.37 10*6/MM3 (ref 3.77–5.28)
RBC # UR STRIP: ABNORMAL /HPF
REF LAB TEST METHOD: ABNORMAL
RH BLD: POSITIVE
SODIUM SERPL-SCNC: 134 MMOL/L (ref 136–145)
SODIUM SERPL-SCNC: 138 MMOL/L (ref 136–145)
SP GR UR STRIP: 1.03 (ref 1–1.03)
SQUAMOUS #/AREA URNS HPF: ABNORMAL /HPF
T&S EXPIRATION DATE: NORMAL
TRICYCLICS UR QL SCN: NEGATIVE
TROPONIN T DELTA: -2 NG/L
TROPONIN T SERPL HS-MCNC: 15 NG/L
UROBILINOGEN UR QL STRIP: ABNORMAL
WBC # UR STRIP: ABNORMAL /HPF
WBC NRBC COR # BLD AUTO: 10.21 10*3/MM3 (ref 3.4–10.8)
WHOLE BLOOD HOLD COAG: NORMAL
WHOLE BLOOD HOLD SPECIMEN: NORMAL

## 2024-11-28 PROCEDURE — 82948 REAGENT STRIP/BLOOD GLUCOSE: CPT

## 2024-11-28 PROCEDURE — 71045 X-RAY EXAM CHEST 1 VIEW: CPT

## 2024-11-28 PROCEDURE — 87077 CULTURE AEROBIC IDENTIFY: CPT | Performed by: EMERGENCY MEDICINE

## 2024-11-28 PROCEDURE — 83036 HEMOGLOBIN GLYCOSYLATED A1C: CPT

## 2024-11-28 PROCEDURE — 87186 SC STD MICRODIL/AGAR DIL: CPT | Performed by: EMERGENCY MEDICINE

## 2024-11-28 PROCEDURE — 86850 RBC ANTIBODY SCREEN: CPT | Performed by: EMERGENCY MEDICINE

## 2024-11-28 PROCEDURE — 83735 ASSAY OF MAGNESIUM: CPT | Performed by: EMERGENCY MEDICINE

## 2024-11-28 PROCEDURE — 83930 ASSAY OF BLOOD OSMOLALITY: CPT

## 2024-11-28 PROCEDURE — 82077 ASSAY SPEC XCP UR&BREATH IA: CPT | Performed by: EMERGENCY MEDICINE

## 2024-11-28 PROCEDURE — 83880 ASSAY OF NATRIURETIC PEPTIDE: CPT | Performed by: INTERNAL MEDICINE

## 2024-11-28 PROCEDURE — 25010000002 CEFTRIAXONE PER 250 MG

## 2024-11-28 PROCEDURE — 80306 DRUG TEST PRSMV INSTRMNT: CPT | Performed by: EMERGENCY MEDICINE

## 2024-11-28 PROCEDURE — 25810000003 SODIUM CHLORIDE 0.9 % SOLUTION: Performed by: EMERGENCY MEDICINE

## 2024-11-28 PROCEDURE — 81001 URINALYSIS AUTO W/SCOPE: CPT | Performed by: EMERGENCY MEDICINE

## 2024-11-28 PROCEDURE — 99285 EMERGENCY DEPT VISIT HI MDM: CPT

## 2024-11-28 PROCEDURE — 85025 COMPLETE CBC W/AUTO DIFF WBC: CPT | Performed by: EMERGENCY MEDICINE

## 2024-11-28 PROCEDURE — 87086 URINE CULTURE/COLONY COUNT: CPT | Performed by: EMERGENCY MEDICINE

## 2024-11-28 PROCEDURE — 86900 BLOOD TYPING SEROLOGIC ABO: CPT | Performed by: EMERGENCY MEDICINE

## 2024-11-28 PROCEDURE — 87040 BLOOD CULTURE FOR BACTERIA: CPT | Performed by: EMERGENCY MEDICINE

## 2024-11-28 PROCEDURE — 85610 PROTHROMBIN TIME: CPT | Performed by: EMERGENCY MEDICINE

## 2024-11-28 PROCEDURE — 70450 CT HEAD/BRAIN W/O DYE: CPT

## 2024-11-28 PROCEDURE — 86901 BLOOD TYPING SEROLOGIC RH(D): CPT | Performed by: EMERGENCY MEDICINE

## 2024-11-28 PROCEDURE — 25810000003 DEXTROSE 5 % AND SODIUM CHLORIDE 0.9 % 5-0.9 % SOLUTION

## 2024-11-28 PROCEDURE — 25810000003 SODIUM CHLORIDE 0.9 % SOLUTION

## 2024-11-28 PROCEDURE — 84100 ASSAY OF PHOSPHORUS: CPT

## 2024-11-28 PROCEDURE — 93005 ELECTROCARDIOGRAM TRACING: CPT | Performed by: EMERGENCY MEDICINE

## 2024-11-28 PROCEDURE — 80053 COMPREHEN METABOLIC PANEL: CPT | Performed by: EMERGENCY MEDICINE

## 2024-11-28 PROCEDURE — 36415 COLL VENOUS BLD VENIPUNCTURE: CPT

## 2024-11-28 PROCEDURE — 84484 ASSAY OF TROPONIN QUANT: CPT

## 2024-11-28 RX ORDER — ACETAMINOPHEN 160 MG/5ML
650 SOLUTION ORAL EVERY 4 HOURS PRN
Status: DISCONTINUED | OUTPATIENT
Start: 2024-11-28 | End: 2024-11-30 | Stop reason: HOSPADM

## 2024-11-28 RX ORDER — SODIUM CHLORIDE 450 MG/100ML
200 INJECTION, SOLUTION INTRAVENOUS CONTINUOUS PRN
Status: DISCONTINUED | OUTPATIENT
Start: 2024-11-28 | End: 2024-11-29

## 2024-11-28 RX ORDER — SODIUM CHLORIDE 0.9 % (FLUSH) 0.9 %
10 SYRINGE (ML) INJECTION AS NEEDED
Status: DISCONTINUED | OUTPATIENT
Start: 2024-11-28 | End: 2024-11-30 | Stop reason: HOSPADM

## 2024-11-28 RX ORDER — SODIUM CHLORIDE AND POTASSIUM CHLORIDE 300; 900 MG/100ML; MG/100ML
200 INJECTION, SOLUTION INTRAVENOUS CONTINUOUS PRN
Status: DISCONTINUED | OUTPATIENT
Start: 2024-11-28 | End: 2024-11-29

## 2024-11-28 RX ORDER — ACETAMINOPHEN 325 MG/1
650 TABLET ORAL EVERY 4 HOURS PRN
Status: DISCONTINUED | OUTPATIENT
Start: 2024-11-28 | End: 2024-11-30 | Stop reason: HOSPADM

## 2024-11-28 RX ORDER — BISACODYL 10 MG
10 SUPPOSITORY, RECTAL RECTAL DAILY PRN
Status: DISCONTINUED | OUTPATIENT
Start: 2024-11-28 | End: 2024-11-30 | Stop reason: HOSPADM

## 2024-11-28 RX ORDER — SODIUM CHLORIDE 9 MG/ML
200 INJECTION, SOLUTION INTRAVENOUS CONTINUOUS PRN
Status: DISCONTINUED | OUTPATIENT
Start: 2024-11-28 | End: 2024-11-29

## 2024-11-28 RX ORDER — DEXTROSE MONOHYDRATE, SODIUM CHLORIDE, AND POTASSIUM CHLORIDE 50; 1.49; 9 G/1000ML; G/1000ML; G/1000ML
125 INJECTION, SOLUTION INTRAVENOUS CONTINUOUS PRN
Status: DISCONTINUED | OUTPATIENT
Start: 2024-11-28 | End: 2024-11-29

## 2024-11-28 RX ORDER — SODIUM CHLORIDE AND POTASSIUM CHLORIDE 150; 450 MG/100ML; MG/100ML
200 INJECTION, SOLUTION INTRAVENOUS CONTINUOUS PRN
Status: DISCONTINUED | OUTPATIENT
Start: 2024-11-28 | End: 2024-11-29

## 2024-11-28 RX ORDER — DEXTROSE MONOHYDRATE, SODIUM CHLORIDE, AND POTASSIUM CHLORIDE 50; 1.49; 4.5 G/1000ML; G/1000ML; G/1000ML
125 INJECTION, SOLUTION INTRAVENOUS CONTINUOUS PRN
Status: DISCONTINUED | OUTPATIENT
Start: 2024-11-28 | End: 2024-11-29

## 2024-11-28 RX ORDER — ONDANSETRON 2 MG/ML
4 INJECTION INTRAMUSCULAR; INTRAVENOUS EVERY 6 HOURS PRN
Status: DISCONTINUED | OUTPATIENT
Start: 2024-11-28 | End: 2024-11-28

## 2024-11-28 RX ORDER — DEXTROSE MONOHYDRATE AND SODIUM CHLORIDE 5; .9 G/100ML; G/100ML
125 INJECTION, SOLUTION INTRAVENOUS CONTINUOUS PRN
Status: DISCONTINUED | OUTPATIENT
Start: 2024-11-28 | End: 2024-11-29

## 2024-11-28 RX ORDER — SODIUM CHLORIDE 0.9 % (FLUSH) 0.9 %
10 SYRINGE (ML) INJECTION EVERY 12 HOURS SCHEDULED
Status: DISCONTINUED | OUTPATIENT
Start: 2024-11-28 | End: 2024-11-29

## 2024-11-28 RX ORDER — BISACODYL 5 MG/1
5 TABLET, DELAYED RELEASE ORAL DAILY PRN
Status: DISCONTINUED | OUTPATIENT
Start: 2024-11-28 | End: 2024-11-30 | Stop reason: HOSPADM

## 2024-11-28 RX ORDER — NICOTINE POLACRILEX 4 MG
15 LOZENGE BUCCAL
Status: DISCONTINUED | OUTPATIENT
Start: 2024-11-28 | End: 2024-11-29

## 2024-11-28 RX ORDER — POLYETHYLENE GLYCOL 3350 17 G/17G
17 POWDER, FOR SOLUTION ORAL DAILY PRN
Status: DISCONTINUED | OUTPATIENT
Start: 2024-11-28 | End: 2024-11-30 | Stop reason: HOSPADM

## 2024-11-28 RX ORDER — DEXTROSE MONOHYDRATE, SODIUM CHLORIDE, AND POTASSIUM CHLORIDE 50; 2.98; 9 G/1000ML; G/1000ML; G/1000ML
125 INJECTION, SOLUTION INTRAVENOUS CONTINUOUS PRN
Status: DISCONTINUED | OUTPATIENT
Start: 2024-11-28 | End: 2024-11-29

## 2024-11-28 RX ORDER — DEXTROSE MONOHYDRATE AND SODIUM CHLORIDE 5; .45 G/100ML; G/100ML
125 INJECTION, SOLUTION INTRAVENOUS CONTINUOUS PRN
Status: DISCONTINUED | OUTPATIENT
Start: 2024-11-28 | End: 2024-11-29

## 2024-11-28 RX ORDER — ACETAMINOPHEN 650 MG/1
650 SUPPOSITORY RECTAL EVERY 4 HOURS PRN
Status: DISCONTINUED | OUTPATIENT
Start: 2024-11-28 | End: 2024-11-30 | Stop reason: HOSPADM

## 2024-11-28 RX ORDER — AMOXICILLIN 250 MG
2 CAPSULE ORAL 2 TIMES DAILY PRN
Status: DISCONTINUED | OUTPATIENT
Start: 2024-11-28 | End: 2024-11-30 | Stop reason: HOSPADM

## 2024-11-28 RX ORDER — SODIUM CHLORIDE 9 MG/ML
40 INJECTION, SOLUTION INTRAVENOUS AS NEEDED
Status: DISCONTINUED | OUTPATIENT
Start: 2024-11-28 | End: 2024-11-29

## 2024-11-28 RX ORDER — POTASSIUM CHLORIDE 1500 MG/1
40 TABLET, EXTENDED RELEASE ORAL EVERY 4 HOURS
Status: COMPLETED | OUTPATIENT
Start: 2024-11-28 | End: 2024-11-29

## 2024-11-28 RX ORDER — IBUPROFEN 600 MG/1
1 TABLET ORAL
Status: DISCONTINUED | OUTPATIENT
Start: 2024-11-28 | End: 2024-11-29

## 2024-11-28 RX ORDER — DEXTROSE MONOHYDRATE, SODIUM CHLORIDE, AND POTASSIUM CHLORIDE 50; 2.98; 4.5 G/1000ML; G/1000ML; G/1000ML
125 INJECTION, SOLUTION INTRAVENOUS CONTINUOUS PRN
Status: DISCONTINUED | OUTPATIENT
Start: 2024-11-28 | End: 2024-11-29

## 2024-11-28 RX ORDER — ONDANSETRON 4 MG/1
4 TABLET, ORALLY DISINTEGRATING ORAL EVERY 6 HOURS PRN
Status: DISCONTINUED | OUTPATIENT
Start: 2024-11-28 | End: 2024-11-28

## 2024-11-28 RX ORDER — SODIUM CHLORIDE 0.9 % (FLUSH) 0.9 %
10 SYRINGE (ML) INJECTION AS NEEDED
Status: DISCONTINUED | OUTPATIENT
Start: 2024-11-28 | End: 2024-11-29

## 2024-11-28 RX ORDER — SODIUM CHLORIDE AND POTASSIUM CHLORIDE 150; 900 MG/100ML; MG/100ML
200 INJECTION, SOLUTION INTRAVENOUS CONTINUOUS PRN
Status: DISCONTINUED | OUTPATIENT
Start: 2024-11-28 | End: 2024-11-29

## 2024-11-28 RX ORDER — DEXTROSE MONOHYDRATE 25 G/50ML
10-50 INJECTION, SOLUTION INTRAVENOUS
Status: DISCONTINUED | OUTPATIENT
Start: 2024-11-28 | End: 2024-11-29

## 2024-11-28 RX ADMIN — SODIUM CHLORIDE 1000 ML/HR: 9 INJECTION, SOLUTION INTRAVENOUS at 17:55

## 2024-11-28 RX ADMIN — INSULIN HUMAN 3.3 UNITS/HR: 1 INJECTION, SOLUTION INTRAVENOUS at 21:11

## 2024-11-28 RX ADMIN — CEFTRIAXONE 2000 MG: 2 INJECTION, POWDER, FOR SOLUTION INTRAMUSCULAR; INTRAVENOUS at 17:38

## 2024-11-28 RX ADMIN — INSULIN HUMAN 2.1 UNITS/HR: 1 INJECTION, SOLUTION INTRAVENOUS at 22:07

## 2024-11-28 RX ADMIN — POTASSIUM CHLORIDE 40 MEQ: 1500 TABLET, EXTENDED RELEASE ORAL at 23:24

## 2024-11-28 RX ADMIN — INSULIN HUMAN 1.2 UNITS/HR: 1 INJECTION, SOLUTION INTRAVENOUS at 23:07

## 2024-11-28 RX ADMIN — DEXTROSE AND SODIUM CHLORIDE 125 ML/HR: 5; 900 INJECTION, SOLUTION INTRAVENOUS at 21:48

## 2024-11-28 RX ADMIN — SODIUM CHLORIDE 1000 ML: 9 INJECTION, SOLUTION INTRAVENOUS at 15:59

## 2024-11-28 RX ADMIN — INSULIN HUMAN 8.8 UNITS/HR: 1 INJECTION, SOLUTION INTRAVENOUS at 17:51

## 2024-11-28 RX ADMIN — DEXTROSE AND SODIUM CHLORIDE 125 ML/HR: 5; 450 INJECTION, SOLUTION INTRAVENOUS at 22:07

## 2024-11-28 NOTE — H&P
[facility]  History & Physical    Patient Name: Marge Mcghee  : 1955  MRN: [id number]  Primary Care Physician:  Traci Townsend APRN  Date of admission: 2024  Date and Time of Service: 2024 at 1723    Subjective      Chief Complaint: AMS    History of Present Illness: Marge Mcghee is a 69 y.o. female with a CMH of type 2 diabetes mellitus, HTN, HLD, who presented to [facility] on 2024 with altered mental status.  Patient mentation improved since onset.  She reports that she had a syncopal episode x 2 over the last 2 days.  She denies any preceding symptoms such as chest pain, palpitations, lightheadedness, diaphoresis.  She states syncopal episodes coincidently happened with change of position.  She denies hitting head.   is at bedside who reports that patient has not been on Mounjaro due to medication shortage.  However he states that patient's glucose is fairly controlled.  Patient currently denies abdominal pain, dysuria, hematuria, incontinence or any other urinary symptoms.    On ED evaluation, patient was noted to be hypertensive with blood pressure 177/101, otherwise hemodynamically stable and AF. Labs were pertinent for glucose >500.  CBC unremarkable.  UA with 21-50 WBCs and 4+ bacteria.  UDS negative.  CT head with no acute findings.  Chest x-ray with no acute findings.  Patient was given 1 L IV fluids in the ED.  Hospital service to admit for further management    Review of Systems   Constitutional:  Negative for chills, fatigue and fever.   Respiratory:  Negative for shortness of breath.    Cardiovascular:  Negative for chest pain and palpitations.   Gastrointestinal:  Negative for abdominal pain, nausea and vomiting.   Genitourinary:  Negative for dysuria and urgency.   Neurological:  Negative for dizziness, weakness, light-headedness and headaches.       Personal History     Past Medical History:   Diagnosis Date    Adrenal  nodule 11/16/2016    FINDINGS: Mild hepatic steatosis may be present. Cholecystectomy. No biliary ductal dilatation. Negative pancreas, spleen, left adrenal gland, and kidneys. The right adrenal presumed adenoma has increased slightly, measuring 3 x 4 cm in diameter,  compared to 2 x 3.5 cm on the previous exam. The attenuation values are consistent with an adenoma. Done at [facility] in August 2018    Age-related osteoporosis without current pathological fracture 11/16/2016    Arthritis     Delayed surgical wound healing     Essential hypertension 11/16/2016    Gastroesophageal reflux disease with esophagitis 11/16/2016    Hepatic steatosis 11/16/2016    History of anemia     History of sepsis     FROM UTI    Hyperlipidemia 11/16/2016    Lisfranc's dislocation     LEFT WITH FRACTURES NONHEALING FOOT    Osteomyelitis of left foot 11/2020    RLS (restless legs syndrome)     Squamous cell skin cancer 2014    Excised by Dr. James    Thyroid nodule 10/18/2019    BENIGN    Type 2 diabetes mellitus with peripheral neuropathy 11/16/2016    Vitamin D deficiency 1/25/2019       Past Surgical History:   Procedure Laterality Date    BREAST BIOPSY Left     7/2019. BENIGN    CARDIAC CATHETERIZATION Left 5/2/2021    Procedure: Cardiac Catheterization/Vascular Study;  Surgeon: Chacho Esteban MD;  Location: [facility] CATH INVASIVE LOCATION;  Service: Cardiovascular;  Laterality: Left;    CARDIAC CATHETERIZATION N/A 5/2/2021    Procedure: Intra-Aortic Baloon Pump Insertion;  Surgeon: Chacho Esteban MD;  Location: [facility] CATH INVASIVE LOCATION;  Service: Cardiovascular;  Laterality: N/A;    CATARACT EXTRACTION WITH INTRAOCULAR LENS IMPLANT Bilateral     CHOLECYSTECTOMY      COLONOSCOPY      CORONARY ARTERY BYPASS GRAFT N/A 5/2/2021    Procedure: CORONARY ARTERY BYPASS WITH INTERNAL MAMMARY ARTERY GRAFT;  Surgeon: Jr Rocael Alexander MD;  Location: [facility] CVOR;  Service: Cardiothoracic;  Laterality: N/A;    FOOT FUSION Right  11/13/2020    Procedure: RIGHT OPEN TREATMENT LISFRANC INJURY, OPEN REDUCTION INTERNAL FIXATION MEDIAL/MIDDLE CUNEIFORM FRACTURE AND 2ND/3RD METATARSAL FRACTURE 1ST 2ND POSSIBLE 3RD TARSOMETATARSAL ARTHRODESIS INTERCUNEIFORM ARTHRODESIS CALCANEAL BONE GRAFT;  Surgeon: Mikhail Banks Jr., MD;  Location: [facility];  Service: Orthopedics;  Laterality: Right;    INCISION AND DRAINAGE LEG Right 1/8/2021    Procedure: RIGHT IRRIGATION AND DEBRIDEMENT OF FOOT WITH SECONDARY CLOSURE AND HARDWARE REMOVAL;  Surgeon: Mikhail Banks Jr., MD;  Location: [facility] OR;  Service: Orthopedics;  Laterality: Right;    KNEE ARTHROSCOPY Bilateral     TOTAL ABDOMINAL HYSTERECTOMY      TRANSESOPHAGEAL ECHOCARDIOGRAM (EMILIA) N/A 5/2/2021    Procedure: TRANSESOPHAGEAL ECHOCARDIOGRAM;  Surgeon: Jr Rocael Alexander MD;  Location: [facility];  Service: Cardiothoracic;  Laterality: N/A;    UPPER GASTROINTESTINAL ENDOSCOPY  08/2016    US GUIDED FINE NEEDLE ASPIRATION  5/8/2020       Family History: family history includes COPD in her mother; Diabetes in her mother, sister, sister, sister, and sister; Hypertension in her mother; Kidney disease in her mother; Obesity in her mother; Thyroid disease in her mother. Otherwise pertinent FHx was reviewed and not pertinent to current issue.    Social History:  reports that she has never smoked. She has never used smokeless tobacco. She reports current alcohol use. She reports that she does not use drugs.    Home Medications:  Prior to Admission Medications       Prescriptions Last Dose Informant Patient Reported? Taking?    acetaminophen (TYLENOL) 325 MG tablet   Yes No    Take 650 mg by mouth Every 4 (Four) Hours As Needed for Mild Pain. Indications: Pain    amLODIPine (NORVASC) 10 MG tablet   Yes No    Take 1 tablet by mouth Daily. Indications: High Blood Pressure Disorder    aspirin 81 MG EC tablet   Yes No    Take 1 tablet by mouth Daily. Indications: Disease involving Lipid Deposits in the  Arteries    atorvastatin (LIPITOR) 80 MG tablet   No No    Take 1 tablet by mouth Every Night.    butalbital-acetaminophen-caffeine (FIORICET, ESGIC) -40 MG per tablet   No No    Take 1 tablet by mouth Every 4 (Four) Hours As Needed for Headache.    denosumab (Prolia) 60 MG/ML solution prefilled syringe syringe   Yes No    Inject 1 mL under the skin into the appropriate area as directed 1 (One) Time.    DULoxetine (CYMBALTA) 60 MG capsule  Pharmacy Yes No    Take 1 capsule by mouth Daily. Indications: Major Depressive Disorder    gabapentin (NEURONTIN) 600 MG tablet   Yes No    Take 1 tablet by mouth 3 (Three) Times a Day.    HYDROcodone-acetaminophen (NORCO) 5-325 MG per tablet   No No    Take 1 tablet by mouth Every 6 (Six) Hours As Needed for Moderate Pain.    multivitamin with minerals tablet tablet   Yes No    Take 1 tablet by mouth Daily. Indications: Supplement    ticagrelor (BRILINTA) 90 MG tablet tablet   No No    Take 1 tablet by mouth 2 (Two) Times a Day.    Tirzepatide (MOUNJARO) 12.5 MG/0.5ML solution pen-injector pen   Yes No    Inject 0.5 mL under the skin into the appropriate area as directed 1 (One) Time Per Week. Wednesday.  Indications: Type 2 Diabetes    topiramate (TOPAMAX) 25 MG tablet   No No    Take 1 tablet by mouth Daily.    traZODone (DESYREL) 50 MG tablet   Yes No    Take 1 tablet by mouth Every Night. Indications: Trouble Sleeping              Allergies:  Allergies   Allergen Reactions    Zofran [Ondansetron Hcl] Nausea And Vomiting     Does the opposite of its purpose    Prochlorperazine Other (See Comments)     CAUSED SEIZURE    Prochlorperazine Edisylate Hives    Hydralazine Itching and Rash    Hydralazine Hcl Itching and Rash    Meperidine Itching and Swelling    Prochlorperazine Maleate Other (See Comments)     CAUSES SEIZURE    Prochlorperazine Maleate Irritability       Objective      Vitals:   Temp:  [97.7 °F (36.5 °C)] 97.7 °F (36.5 °C)  Heart Rate:  [88] 88  Resp:  [20]  20  BP: (177)/(101) 177/101  Body mass index is 29.06 kg/m².    Physical Exam  General: 68 yo WF, Alert and oriented, well nourished, no acute distress.  HENT: Normocephalic, normal hearing, moist oral mucosa, no scleral icterus.  Neck: Supple, non-tender, no carotid bruits, no JVD, no LAD.  Lungs: Clear to auscultation, non-labored respiration.  Heart: RRR, no murmur, gallop or edema.  Abdomen: Soft, nontender, non-distended, + bowel sounds.  Musculoskeletal: Normal range of motion and strength, no tenderness or swelling.  Skin: Skin is warm, dry and pink, no rashes or lesions.  Psychiatric: Cooperative, appropriate mood and affect.  Neurologic: Equal strength bilaterally. No focal motor or sensory deficits appreciated. No facial droop or aphasia present.        Diagnostic Data:  Lab Results (last 24 hours)       Procedure Component Value Units Date/Time    Urinalysis With Culture If Indicated - Urine, Clean Catch [[id number]]  (Abnormal) Collected: 11/28/24 1646    Specimen: Urine, Clean Catch Updated: 11/28/24 1708     Color, UA Yellow     Appearance, UA Clear     pH, UA 6.5     Specific Gravity, UA 1.027     Glucose,  mg/dL (2+)     Ketones, UA Negative     Bilirubin, UA Negative     Blood, UA Negative     Protein, UA Negative     Leuk Esterase, UA Trace     Nitrite, UA Negative     Urobilinogen, UA 0.2 E.U./dL    Narrative:      In absence of clinical symptoms, the presence of pyuria, bacteria, and/or nitrites on the urinalysis result does not correlate with infection.    Urinalysis, Microscopic Only - Urine, Clean Catch [[id number]]  (Abnormal) Collected: 11/28/24 1646    Specimen: Urine, Clean Catch Updated: 11/28/24 1708     RBC, UA 0-2 /HPF      WBC, UA 21-50 /HPF      Bacteria, UA 4+ /HPF      Squamous Epithelial Cells, UA 0-2 /HPF      Hyaline Casts, UA None Seen /LPF      Methodology Automated Microscopy    Urine Culture - Urine, Urine, Clean Catch [[id number]] Collected: 11/28/24 1646    Specimen:  Urine, Clean Catch Updated: 11/28/24 1708    Urine Drug Screen - Urine, Clean Catch [[id number]]  (Normal) Collected: 11/28/24 1646    Specimen: Urine, Clean Catch Updated: 11/28/24 1659     THC, Screen, Urine Negative     Phencyclidine (PCP), Urine Negative     Cocaine Screen, Urine Negative     Methamphetamine, Ur Negative     Opiate Screen Negative     Amphetamine Screen, Urine Negative     Benzodiazepine Screen, Urine Negative     Tricyclic Antidepressants Screen Negative     Methadone Screen, Urine Negative     Barbiturates Screen, Urine Negative     Oxycodone Screen, Urine Negative     Buprenorphine, Screen, Urine Negative    Narrative:      Cutoff For Drugs Screened:    Amphetamines               500 ng/ml  Barbiturates               200 ng/ml  Benzodiazepines            150 ng/ml  Cocaine                    150 ng/ml  Methadone                  200 ng/ml  Opiates                    100 ng/ml  Phencyclidine               25 ng/ml  THC                         50 ng/ml  Methamphetamine            500 ng/ml  Tricyclic Antidepressants  300 ng/ml  Oxycodone                  100 ng/ml  Buprenorphine               10 ng/ml    The normal value for all drugs tested is negative. This report includes unconfirmed screening results, with the cutoff values listed, to be used for medical treatment purposes only.  Unconfirmed results must not be used for non-medical purposes such as employment or legal testing.  Clinical consideration should be applied to any drug of abuse test, particularly when unconfirmed results are used.    All urine drugs of abuse requests without chain of custody are for medical screening purposes only.  False positives are possible.      Magnesium [[id number]]  (Normal) Collected: 11/28/24 1556    Specimen: Blood Updated: 11/28/24 1631     Magnesium 1.9 mg/dL     Comprehensive Metabolic Panel [[id number]]  (Abnormal) Collected: 11/28/24 1556    Specimen: Blood Updated: 11/28/24 1631     Glucose 521  mg/dL      BUN 20 mg/dL      Creatinine 1.19 mg/dL      Sodium 134 mmol/L      Potassium 4.2 mmol/L      Comment: Slight hemolysis detected by analyzer. Result may be falsely elevated.        Chloride 94 mmol/L      CO2 28.2 mmol/L      Calcium 10.2 mg/dL      Total Protein 7.9 g/dL      Albumin 4.4 g/dL      ALT (SGPT) 11 U/L      AST (SGOT) 26 U/L      Comment: Slight hemolysis detected by analyzer. Result may be falsely elevated.        Alkaline Phosphatase 85 U/L      Total Bilirubin 0.4 mg/dL      Globulin 3.5 gm/dL      A/G Ratio 1.3 g/dL      BUN/Creatinine Ratio 16.8     Anion Gap 11.8 mmol/L      eGFR 49.0 mL/min/1.73     Narrative:      GFR Normal >60  Chronic Kidney Disease <60  Kidney Failure <15    The GFR formula is only valid for adults with stable renal function between ages 18 and 70.    Ethanol [[id number]] Collected: 11/28/24 1556    Specimen: Blood Updated: 11/28/24 1630     Ethanol % <0.010 %     Narrative:      Plasma Ethanol Clinical Symptoms:    ETOH (%)               Clinical Symptom  .01-.05              No apparent influence  .03-.12              Euphoria, Diminished judgment and attention   .09-.25              Impaired comprehension, Muscle incoordination  .18-.30              Confusion, Staggered gait, Slurred speech  .25-.40              Markedly decreased response to stimuli, unable to stand or                        walk, vomitting, sleep or stupor  .35-.50              Comatose, Anesthesia, Subnormal body temperature        [place] Draw [[id number]] Collected: 11/28/24 1556    Specimen: Blood Updated: 11/28/24 1615    Narrative:      The following orders were created for panel order [place] Draw.  Procedure                               Abnormality         Status                     ---------                               -----------         ------                     Green Top (Gel)[[id number]]                                  Final result               Lavender Top[[id number]]                                      Final result               Gold Top - SST[[id number]]                                   Final result               Light Blue Top[[id number]]                                   Final result                 Please view results for these tests on the individual orders.    Green Top (Gel) [[id number]] Collected: 11/28/24 1556    Specimen: Blood Updated: 11/28/24 1615     Extra Tube Hold for add-ons.     Comment: Auto resulted.       Lavender Top [[id number]] Collected: 11/28/24 1556    Specimen: Blood Updated: 11/28/24 1615     Extra Tube hold for add-on     Comment: Auto resulted       Gold Top - SST [[id number]] Collected: 11/28/24 1556    Specimen: Blood Updated: 11/28/24 1615     Extra Tube Hold for add-ons.     Comment: Auto resulted.       Light Blue Top [[id number]] Collected: 11/28/24 1556    Specimen: Blood Updated: 11/28/24 1615     Extra Tube Hold for add-ons.     Comment: Auto resulted       Protime-INR [[id number]]  (Normal) Collected: 11/28/24 1556    Specimen: Blood Updated: 11/28/24 1614     Protime 12.7 Seconds      INR 0.95    CBC & Differential [[id number]]  (Normal) Collected: 11/28/24 1556    Specimen: Blood Updated: 11/28/24 1604    Narrative:      The following orders were created for panel order CBC & Differential.  Procedure                               Abnormality         Status                     ---------                               -----------         ------                     CBC Auto Differential[[id number]]        Normal              Final result                 Please view results for these tests on the individual orders.    CBC Auto Differential [[id number]]  (Normal) Collected: 11/28/24 1556    Specimen: Blood Updated: 11/28/24 1604     WBC 10.21 10*3/mm3      RBC 4.37 10*6/mm3      Hemoglobin 12.9 g/dL      Hematocrit 39.1 %      MCV 89.5 fL      MCH 29.5 pg      MCHC 33.0 g/dL      RDW 12.3 %      RDW-SD 40.6 fl      MPV 9.4 fL      Platelets 261 10*3/mm3       Neutrophil % 59.8 %      Lymphocyte % 28.2 %      Monocyte % 8.3 %      Eosinophil % 2.7 %      Basophil % 0.7 %      Immature Grans % 0.3 %      Neutrophils, Absolute 6.10 10*3/mm3      Lymphocytes, Absolute 2.88 10*3/mm3      Monocytes, Absolute 0.85 10*3/mm3      Eosinophils, Absolute 0.28 10*3/mm3      Basophils, Absolute 0.07 10*3/mm3      Immature Grans, Absolute 0.03 10*3/mm3      nRBC 0.0 /100 WBC     POC Glucose Once [[id number]]  (Abnormal) Collected: 11/28/24 1510    Specimen: Blood Updated: 11/28/24 1512     Glucose 501 mg/dL      Comment: Serial Number: [id number]:  [id number]                Imaging Results (Last 24 Hours)       Procedure Component Value Units Date/Time    CT Head Without Contrast Stroke Protocol [[id number]] Collected: 11/28/24 1538     Updated: 11/28/24 1544    Narrative:      CT HEAD WO CONTRAST STROKE PROTOCOL    Date of Exam: 11/28/2024 3:30 PM EST    Indication: Stroke, follow up  Neuro deficit, acute, stroke suspected.    Comparison: MRI head conducted 6/27/2024    Technique: Axial CT images were obtained of the head without contrast administration.  Reconstructed coronal and sagittal images were also obtained. Automated exposure control and iterative construction methods were used.    Scan Time: 1533 hours  Results discussed with Dr. Larson at 3:40 p.m. on 11/28/2024.      FINDINGS:  There is no evidence for acute intracranial hemorrhage. No definitive acute focal ischemia is identified. Nonspecific white matter changes are noted likely related to chronic small vessel ischemic changes and age-related changes. Associated diffuse   volume loss is observed. There is no evidence for abnormal cerebral edema. There is no mass effect or midline shift. The ventricular system is nondilated. The basal cisterns are patent. The skull is intact. The paranasal sinuses and mastoid air cells are   clear.      Impression:      1.No evidence for acute intracranial abnormality.  2.Nonspecific  white matter changes are noted with associated diffuse volume loss. These findings are likely related to chronic small vessel ischemic changes and/or age-related changes.          Electronically Signed: Wes Means MD    11/28/2024 3:42 PM EST    Workstation ID: [id number]    XR Chest 1 View [[id number]] Collected: 11/28/24 1529     Updated: 11/28/24 1532    Narrative:      XR CHEST 1 VW    Date of Exam: 11/28/2024 3:20 PM EST    Indication: Stroke Protocol (Onset > 12 hrs)    Comparison: 12/5/2023    Findings:  Cardiac surgical changes with median sternotomy wires. No new focal airspace consolidation, pleural effusion, or pneumothorax. No acute bone abnormality.      Impression:      Impression:  No acute process.        Electronically Signed: Honorio Olguin MD    11/28/2024 3:30 PM EST    Workstation ID: [id number]              Assessment & Plan        This is a 69 y.o. female with:    Active and Resolved Problems  Active Hospital Problems    Diagnosis  POA    **Hyperosmolar hyperglycemic state (HHS) [E11.00]  Yes      Resolved Hospital Problems   No resolved problems to display.     Hyperglycemic hyperosmolar state  Urinary tract infection  Metabolic encephalopathy, improving  Type 2 diabetes mellitus  Glucose 500s, A1c pending  UA with 21-50 WBCs and 4+ bacteria  Start insulin drip with Glucommander.  Start Rocephin  Continue IV fluids  Urine cultures pending  Diabetes educator  Fall precaution  SCDs for VTE prophylaxis    Hypertension  Continue amlodipine    Hyperlipidemia  Continue ASA, statin, Brilinta    Chronic pain  Continue home pain medication, gabapentin, Cymbalta      VTE Prophylaxis:  Mechanical VTE prophylaxis orders are present.        The patient desires to be as follows:    CODE STATUS:    Code Status (Patient has no pulse and is not breathing): CPR (Attempt to Resuscitate)  Medical Interventions (Patient has pulse or is breathing): Full Support        Allie Salinas, who can be contacted at  [phone], is the designated person to make medical decisions on the patient's behalf if She is incapable of doing so. This was clarified with patient and/or next of kin on 11/28/2024 during the course of this H&P.    Admission Status:  I believe this patient meets inpatient status.    Expected Length of Stay: 2 to 3 days    PDMP and Medication Dispenses via Sidebar reviewed and consistent with patient reported medications.    I discussed the patient's findings and my recommendations with patient.      Signature:     This document has been electronically signed by Payal Catalan PA-C on November 28, 2024 17:23 [facility] Hospitalist Team

## 2024-11-28 NOTE — Clinical Note
Level of Care: Progressive Care [20]  Diagnosis: Hyperosmolar hyperglycemic state (HHS) [[id number]]  Admitting Physician: RAGHAV LIPSCOMB [[id number]]  Attending Physician: RAGHAV LIPSCOMB [[id number]]  Certification: I Certify That Inpatient Hospital Services  Are Medically Necessary For Greater Than 2 Midnights

## 2024-11-28 NOTE — ED PROVIDER NOTES
Subjective   History of Present Illness  69-year-old female was reportedly celebrating Thanksgiving at "[employer] and family noted that she passed out yesterday at around 2 PM and then again today about an hour prior to arrival.  They states she has been slurring her speech this morning and they were concerned it may be medications.  She was not describing any focal numbness or weakness or blurry vision or headache.  Review of Systems    Past Medical History:   Diagnosis Date    Adrenal nodule 11/16/2016    FINDINGS: Mild hepatic steatosis may be present. Cholecystectomy. No biliary ductal dilatation. Negative pancreas, spleen, left adrenal gland, and kidneys. The right adrenal presumed adenoma has increased slightly, measuring 3 x 4 cm in diameter,  compared to 2 x 3.5 cm on the previous exam. The attenuation values are consistent with an adenoma. Done at [facility] in August 2018    Age-related osteoporosis without current pathological fracture 11/16/2016    Arthritis     Delayed surgical wound healing     Essential hypertension 11/16/2016    Gastroesophageal reflux disease with esophagitis 11/16/2016    Hepatic steatosis 11/16/2016    History of anemia     History of sepsis     FROM UTI    Hyperlipidemia 11/16/2016    Lisfranc's dislocation     LEFT WITH FRACTURES NONHEALING FOOT    Osteomyelitis of left foot 11/2020    RLS (restless legs syndrome)     Squamous cell skin cancer 2014    Excised by Dr. James    Thyroid nodule 10/18/2019    BENIGN    Type 2 diabetes mellitus with peripheral neuropathy 11/16/2016    Vitamin D deficiency 1/25/2019       Allergies   Allergen Reactions    Zofran [Ondansetron Hcl] Nausea And Vomiting     Does the opposite of its purpose    Prochlorperazine Other (See Comments)     CAUSED SEIZURE    Prochlorperazine Edisylate Hives    Hydralazine Itching and Rash    Hydralazine Hcl Itching and Rash    Meperidine Itching and Swelling    Prochlorperazine Maleate Other (See  Comments)     CAUSES SEIZURE    Prochlorperazine Maleate Irritability       Past Surgical History:   Procedure Laterality Date    BREAST BIOPSY Left     7/2019. BENIGN    CARDIAC CATHETERIZATION Left 5/2/2021    Procedure: Cardiac Catheterization/Vascular Study;  Surgeon: Chacho Esteban MD;  Location: [facility] CATH INVASIVE LOCATION;  Service: Cardiovascular;  Laterality: Left;    CARDIAC CATHETERIZATION N/A 5/2/2021    Procedure: Intra-Aortic Baloon Pump Insertion;  Surgeon: Chacho Esteban MD;  Location: [facility] CATH INVASIVE LOCATION;  Service: Cardiovascular;  Laterality: N/A;    CATARACT EXTRACTION WITH INTRAOCULAR LENS IMPLANT Bilateral     CHOLECYSTECTOMY      COLONOSCOPY      CORONARY ARTERY BYPASS GRAFT N/A 5/2/2021    Procedure: CORONARY ARTERY BYPASS WITH INTERNAL MAMMARY ARTERY GRAFT;  Surgeon: Jr Rocael Alexander MD;  Location: [facility];  Service: Cardiothoracic;  Laterality: N/A;    FOOT FUSION Right 11/13/2020    Procedure: RIGHT OPEN TREATMENT LISFRANC INJURY, OPEN REDUCTION INTERNAL FIXATION MEDIAL/MIDDLE CUNEIFORM FRACTURE AND 2ND/3RD METATARSAL FRACTURE 1ST 2ND POSSIBLE 3RD TARSOMETATARSAL ARTHRODESIS INTERCUNEIFORM ARTHRODESIS CALCANEAL BONE GRAFT;  Surgeon: Mikhail Banks Jr., MD;  Location: [facility];  Service: Orthopedics;  Laterality: Right;    INCISION AND DRAINAGE LEG Right 1/8/2021    Procedure: RIGHT IRRIGATION AND DEBRIDEMENT OF FOOT WITH SECONDARY CLOSURE AND HARDWARE REMOVAL;  Surgeon: Mikhail Banks Jr., MD;  Location: [facility] OR;  Service: Orthopedics;  Laterality: Right;    KNEE ARTHROSCOPY Bilateral     TOTAL ABDOMINAL HYSTERECTOMY      TRANSESOPHAGEAL ECHOCARDIOGRAM (EMILIA) N/A 5/2/2021    Procedure: TRANSESOPHAGEAL ECHOCARDIOGRAM;  Surgeon: Jr Rocael Alexander MD;  Location: [facility];  Service: Cardiothoracic;  Laterality: N/A;    UPPER GASTROINTESTINAL ENDOSCOPY  08/2016    US GUIDED FINE NEEDLE ASPIRATION  5/8/2020       Family History   Problem Relation Age of Onset     Diabetes Mother     COPD Mother     Hypertension Mother     Kidney disease Mother     Obesity Mother     Thyroid disease Mother     Diabetes Sister     Diabetes Sister     Diabetes Sister     Diabetes Sister     Malig Hyperthermia Neg Hx        Social History     Socioeconomic History    Marital status:    Tobacco Use    Smoking status: Never    Smokeless tobacco: Never   Vaping Use    Vaping status: Never Used   Substance and Sexual Activity    Alcohol use: Yes     Comment: socially     Drug use: Never    Sexual activity: Defer       Prior to Admission medications    Medication Sig Start Date End Date Taking? Authorizing Provider   acetaminophen (TYLENOL) 325 MG tablet Take 650 mg by mouth Every 4 (Four) Hours As Needed for Mild Pain. Indications: Pain 1/1/23   Bi Haque MD   amLODIPine (NORVASC) 10 MG tablet Take 1 tablet by mouth Daily. Indications: High Blood Pressure Disorder 1/1/23   Bi Haque MD   aspirin 81 MG EC tablet Take 1 tablet by mouth Daily. Indications: Disease involving Lipid Deposits in the Arteries 1/1/23   Bi Haque MD   atorvastatin (LIPITOR) 80 MG tablet Take 1 tablet by mouth Every Night. 10/25/22   Gladis Lang APRN   butalbital-acetaminophen-caffeine (FIORICET, ESGIC) -40 MG per tablet Take 1 tablet by mouth Every 4 (Four) Hours As Needed for Headache. 6/28/24   Lasha Galindo MD   denosumab (Prolia) 60 MG/ML solution prefilled syringe syringe Inject 1 mL under the skin into the appropriate area as directed 1 (One) Time.    Bi Haque MD   DULoxetine (CYMBALTA) 60 MG capsule Take 1 capsule by mouth Daily. Indications: Major Depressive Disorder 1/1/23   Bi Haque MD   gabapentin (NEURONTIN) 600 MG tablet Take 1 tablet by mouth 3 (Three) Times a Day.    Bi Haque MD   HYDROcodone-acetaminophen (NORCO) 5-325 MG per tablet Take 1 tablet by mouth Every 6 (Six) Hours As Needed for Moderate Pain. 6/28/24  "  Lasha Galindo MD   multivitamin with minerals tablet tablet Take 1 tablet by mouth Daily. Indications: Supplement 1/1/23   Bi Haque MD   ticagrelor (BRILINTA) 90 MG tablet tablet Take 1 tablet by mouth 2 (Two) Times a Day. 8/7/23   Neelam Negrete APRN   Tirzepatide (MOUNJARO) 12.5 MG/0.5ML solution pen-injector pen Inject 0.5 mL under the skin into the appropriate area as directed 1 (One) Time Per Week. Wednesday.  Indications: Type 2 Diabetes 1/1/23   Bi Haque MD   topiramate (TOPAMAX) 25 MG tablet Take 1 tablet by mouth Daily. 6/29/24   Lasha Galindo MD   traZODone (DESYREL) 50 MG tablet Take 1 tablet by mouth Every Night. Indications: Trouble Sleeping 10/12/22   Bi Haque MD     BP (!) 177/101 (BP Location: Left arm, Patient Position: Sitting)   Pulse 88   Temp 97.7 °F (36.5 °C) (Oral)   Resp 20   Ht 160 cm (63\")   Wt 74.4 kg (164 lb 0.4 oz)   SpO2 97%   BMI 29.06 kg/m²       Objective   Physical Exam  General: Well-developed well-appearing, no acute distress, alert and appropriate  Eyes: Pupils round and equal, sclera nonicteric  HEENT: Mucous membranes moist, no mucosal swelling  Neck: Supple, no nuchal rigidity,  Respirations: Respirations nonlabored, equal breath sounds bilaterally, clear lungs  Heart regular rate and rhythm, no murmurs rubs or gallops,   Abdomen soft nontender nondistended, no hepatosplenomegaly, no hernia, no mass, normal bowel sounds, no CVA tenderness  Extremities no clubbing cyanosis or edema, calves are symmetric and nontender  Neuro cranial nerves II through XII intact , normal sensory/motor function and strength in four extremities, patient has some intermittent slight slurring of speech, no facial droop, no ataxia  Psych oriented, pleasant affect  Skin no rash, brisk cap refill  Procedures           ED Course        Results for orders placed or performed during the hospital encounter of 11/28/24   POC Glucose Once "    Collection Time: 11/28/24  3:10 PM    Specimen: Blood   Result Value Ref Range    Glucose 501 (C) 70 - 105 mg/dL   ECG 12 Lead ED Triage Standing Order; Stroke (Onset >12 hrs)    Collection Time: 11/28/24  3:45 PM   Result Value Ref Range    QT Interval 361 ms    QTC Interval 431 ms   Comprehensive Metabolic Panel    Collection Time: 11/28/24  3:56 PM    Specimen: Blood   Result Value Ref Range    Glucose 521 (C) 65 - 99 mg/dL    BUN 20 8 - 23 mg/dL    Creatinine 1.19 (H) 0.57 - 1.00 mg/dL    Sodium 134 (L) 136 - 145 mmol/L    Potassium 4.2 3.5 - 5.2 mmol/L    Chloride 94 (L) 98 - 107 mmol/L    CO2 28.2 22.0 - 29.0 mmol/L    Calcium 10.2 8.6 - 10.5 mg/dL    Total Protein 7.9 6.0 - 8.5 g/dL    Albumin 4.4 3.5 - 5.2 g/dL    ALT (SGPT) 11 1 - 33 U/L    AST (SGOT) 26 1 - 32 U/L    Alkaline Phosphatase 85 39 - 117 U/L    Total Bilirubin 0.4 0.0 - 1.2 mg/dL    Globulin 3.5 gm/dL    A/G Ratio 1.3 g/dL    BUN/Creatinine Ratio 16.8 7.0 - 25.0    Anion Gap 11.8 5.0 - 15.0 mmol/L    eGFR 49.0 (L) >60.0 mL/min/1.73   Protime-INR    Collection Time: 11/28/24  3:56 PM    Specimen: Blood   Result Value Ref Range    Protime 12.7 11.7 - 14.2 Seconds    INR 0.95 0.90 - 1.10   CBC Auto Differential    Collection Time: 11/28/24  3:56 PM    Specimen: Blood   Result Value Ref Range    WBC 10.21 3.40 - 10.80 10*3/mm3    RBC 4.37 3.77 - 5.28 10*6/mm3    Hemoglobin 12.9 12.0 - 15.9 g/dL    Hematocrit 39.1 34.0 - 46.6 %    MCV 89.5 79.0 - 97.0 fL    MCH 29.5 26.6 - 33.0 pg    MCHC 33.0 31.5 - 35.7 g/dL    RDW 12.3 12.3 - 15.4 %    RDW-SD 40.6 37.0 - 54.0 fl    MPV 9.4 6.0 - 12.0 fL    Platelets 261 140 - 450 10*3/mm3    Neutrophil % 59.8 42.7 - 76.0 %    Lymphocyte % 28.2 19.6 - 45.3 %    Monocyte % 8.3 5.0 - 12.0 %    Eosinophil % 2.7 0.3 - 6.2 %    Basophil % 0.7 0.0 - 1.5 %    Immature Grans % 0.3 0.0 - 0.5 %    Neutrophils, Absolute 6.10 1.70 - 7.00 10*3/mm3    Lymphocytes, Absolute 2.88 0.70 - 3.10 10*3/mm3    Monocytes, Absolute  0.85 0.10 - 0.90 10*3/mm3    Eosinophils, Absolute 0.28 0.00 - 0.40 10*3/mm3    Basophils, Absolute 0.07 0.00 - 0.20 10*3/mm3    Immature Grans, Absolute 0.03 0.00 - 0.05 10*3/mm3    nRBC 0.0 0.0 - 0.2 /100 WBC   Ethanol    Collection Time: 11/28/24  3:56 PM    Specimen: Blood   Result Value Ref Range    Ethanol % <0.010 %   Magnesium    Collection Time: 11/28/24  3:56 PM    Specimen: Blood   Result Value Ref Range    Magnesium 1.9 1.6 - 2.4 mg/dL   Phosphorus    Collection Time: 11/28/24  3:56 PM    Specimen: Blood   Result Value Ref Range    Phosphorus 4.4 2.5 - 4.5 mg/dL   Osmolality, Serum    Collection Time: 11/28/24  3:56 PM    Specimen: Blood   Result Value Ref Range    Osmolality 310 (H) 280 - 301 mOsm/kg   Hemoglobin A1c    Collection Time: 11/28/24  3:56 PM    Specimen: Blood   Result Value Ref Range    Hemoglobin A1C 14.40 (H) 4.80 - 5.60 %   High Sensitivity Troponin T    Collection Time: 11/28/24  3:56 PM    Specimen: Blood   Result Value Ref Range    HS Troponin T 15 (H) <14 ng/L   Type & Screen    Collection Time: 11/28/24  3:56 PM    Specimen: Blood   Result Value Ref Range    ABO Type A     RH type Positive     Antibody Screen Negative     T&S Expiration Date 12/1/2024 11:59:59 PM    Green Top (Gel)    Collection Time: 11/28/24  3:56 PM   Result Value Ref Range    Extra Tube Hold for add-ons.    Lavender Top    Collection Time: 11/28/24  3:56 PM   Result Value Ref Range    Extra Tube hold for add-on    Gold Top - SST    Collection Time: 11/28/24  3:56 PM   Result Value Ref Range    Extra Tube Hold for add-ons.    Light Blue Top    Collection Time: 11/28/24  3:56 PM   Result Value Ref Range    Extra Tube Hold for add-ons.    Urine Drug Screen - Urine, Clean Catch    Collection Time: 11/28/24  4:46 PM    Specimen: Urine, Clean Catch   Result Value Ref Range    THC, Screen, Urine Negative Negative    Phencyclidine (PCP), Urine Negative Negative    Cocaine Screen, Urine Negative Negative     Methamphetamine, Ur Negative Negative    Opiate Screen Negative Negative    Amphetamine Screen, Urine Negative Negative    Benzodiazepine Screen, Urine Negative Negative    Tricyclic Antidepressants Screen Negative Negative    Methadone Screen, Urine Negative Negative    Barbiturates Screen, Urine Negative Negative    Oxycodone Screen, Urine Negative Negative    Buprenorphine, Screen, Urine Negative Negative   Urinalysis With Culture If Indicated - Urine, Clean Catch    Collection Time: 11/28/24  4:46 PM    Specimen: Urine, Clean Catch   Result Value Ref Range    Color, UA Yellow Yellow, Straw    Appearance, UA Clear Clear    pH, UA 6.5 5.0 - 8.0    Specific Gravity, UA 1.027 1.005 - 1.030    Glucose,  mg/dL (2+) (A) Negative    Ketones, UA Negative Negative    Bilirubin, UA Negative Negative    Blood, UA Negative Negative    Protein, UA Negative Negative    Leuk Esterase, UA Trace (A) Negative    Nitrite, UA Negative Negative    Urobilinogen, UA 0.2 E.U./dL 0.2 - 1.0 E.U./dL   Urinalysis, Microscopic Only - Urine, Clean Catch    Collection Time: 11/28/24  4:46 PM    Specimen: Urine, Clean Catch   Result Value Ref Range    RBC, UA 0-2 None Seen, 0-2 /HPF    WBC, UA 21-50 (A) None Seen, 0-2 /HPF    Bacteria, UA 4+ (A) None Seen /HPF    Squamous Epithelial Cells, UA 0-2 None Seen, 0-2 /HPF    Hyaline Casts, UA None Seen None Seen /LPF    Methodology Automated Microscopy      CT Head Without Contrast Stroke Protocol    Result Date: 11/28/2024  1.No evidence for acute intracranial abnormality. 2.Nonspecific white matter changes are noted with associated diffuse volume loss. These findings are likely related to chronic small vessel ischemic changes and/or age-related changes. Electronically Signed: Wes Means MD  11/28/2024 3:42 PM EST  Workstation ID: [id number]    XR Chest 1 View    Result Date: 11/28/2024  Impression: No acute process. Electronically Signed: Honorio Olguin MD  11/28/2024 3:30 PM EST   Workstation ID: [id number]                                                  Medical Decision Making  Differential diagnosis including CVA, metabolic encephalopathy, urinary tract infection, drug reaction    Patient has no focal deficits to suggest CVA.  She was found to have urinary tract infection as well as severe hyperglycemia.  She was ordered IV fluids and IV Rocephin.  In discussion with the hospitalist service they are placing orders for Glucomander IV insulin therapy.  Patient and family advised of findings and agreeable plan of admission.  Patient awake and alert on reexamination with no change in mental status.  Case and findings discussed with Hossein with the hospitalist service who is agreeable plan of admission.    Problems Addressed:  Hyperglycemia due to diabetes mellitus: complicated acute illness or injury  Syncope, unspecified syncope type: complicated acute illness or injury  Urinary tract infection without hematuria, site unspecified: complicated acute illness or injury    Amount and/or Complexity of Data Reviewed  Labs: ordered. Decision-making details documented in ED Course.     Details: CBC no leukocytosis, comprehensive metabolic panel shows significant hyperglycemia without acidosis, urinalysis positive for bacteria, alcohol negative, high-sensitivity troponin in the indeterminate range  Radiology: ordered and independent interpretation performed.     Details: My independent interpretation of CT head image no apparent acute hemorrhage  ECG/medicine tests: ordered and independent interpretation performed.     Details: My EKG interpretation sinus rhythm PVC, rate of 86    Risk  Prescription drug management.  Decision regarding hospitalization.        Final diagnoses:   Hyperglycemia due to diabetes mellitus   Syncope, unspecified syncope type   Urinary tract infection without hematuria, site unspecified       ED Disposition  ED Disposition       ED Disposition   Decision to Admit    Condition    --    Comment   Level of Care: Telemetry [5]   Admitting Physician: RAGHAV LIPSCOMB [[id number]]   Attending Physician: RAGHAV LIPSCOMB [[id number]]                 No follow-up provider specified.       Medication List      No changes were made to your prescriptions during this visit.            Newton Larson MD  11/28/24 1085

## 2024-11-29 ENCOUNTER — APPOINTMENT (OUTPATIENT)
Dept: GENERAL RADIOLOGY | Facility: HOSPITAL | Age: 69
DRG: 637 | End: 2024-11-29
Payer: MEDICARE

## 2024-11-29 LAB
ANION GAP SERPL CALCULATED.3IONS-SCNC: 7.1 MMOL/L (ref 5–15)
BASOPHILS # BLD AUTO: 0.08 10*3/MM3 (ref 0–0.2)
BASOPHILS NFR BLD AUTO: 0.8 % (ref 0–1.5)
BUN SERPL-MCNC: 16 MG/DL (ref 8–23)
BUN/CREAT SERPL: 17.2 (ref 7–25)
CALCIUM SPEC-SCNC: 8.6 MG/DL (ref 8.6–10.5)
CHLORIDE SERPL-SCNC: 104 MMOL/L (ref 98–107)
CO2 SERPL-SCNC: 26.9 MMOL/L (ref 22–29)
CREAT SERPL-MCNC: 0.93 MG/DL (ref 0.57–1)
DEPRECATED RDW RBC AUTO: 39.8 FL (ref 37–54)
EGFRCR SERPLBLD CKD-EPI 2021: 65.8 ML/MIN/1.73
EOSINOPHIL # BLD AUTO: 0.35 10*3/MM3 (ref 0–0.4)
EOSINOPHIL NFR BLD AUTO: 3.3 % (ref 0.3–6.2)
ERYTHROCYTE [DISTWIDTH] IN BLOOD BY AUTOMATED COUNT: 12.3 % (ref 12.3–15.4)
GLUCOSE BLDC GLUCOMTR-MCNC: 120 MG/DL (ref 70–105)
GLUCOSE BLDC GLUCOMTR-MCNC: 139 MG/DL (ref 70–105)
GLUCOSE BLDC GLUCOMTR-MCNC: 234 MG/DL (ref 70–105)
GLUCOSE BLDC GLUCOMTR-MCNC: 252 MG/DL (ref 70–105)
GLUCOSE BLDC GLUCOMTR-MCNC: 277 MG/DL (ref 70–105)
GLUCOSE BLDC GLUCOMTR-MCNC: 327 MG/DL (ref 70–105)
GLUCOSE SERPL-MCNC: 143 MG/DL (ref 65–99)
HCT VFR BLD AUTO: 32.6 % (ref 34–46.6)
HGB BLD-MCNC: 10.6 G/DL (ref 12–15.9)
IMM GRANULOCYTES # BLD AUTO: 0.03 10*3/MM3 (ref 0–0.05)
IMM GRANULOCYTES NFR BLD AUTO: 0.3 % (ref 0–0.5)
LYMPHOCYTES # BLD AUTO: 3.85 10*3/MM3 (ref 0.7–3.1)
LYMPHOCYTES NFR BLD AUTO: 36.8 % (ref 19.6–45.3)
MAGNESIUM SERPL-MCNC: 1.7 MG/DL (ref 1.6–2.4)
MCH RBC QN AUTO: 29.2 PG (ref 26.6–33)
MCHC RBC AUTO-ENTMCNC: 32.5 G/DL (ref 31.5–35.7)
MCV RBC AUTO: 89.8 FL (ref 79–97)
MONOCYTES # BLD AUTO: 0.97 10*3/MM3 (ref 0.1–0.9)
MONOCYTES NFR BLD AUTO: 9.3 % (ref 5–12)
NEUTROPHILS NFR BLD AUTO: 49.5 % (ref 42.7–76)
NEUTROPHILS NFR BLD AUTO: 5.19 10*3/MM3 (ref 1.7–7)
NRBC BLD AUTO-RTO: 0 /100 WBC (ref 0–0.2)
OSMOLALITY SERPL: 297 MOSM/KG (ref 280–301)
PLATELET # BLD AUTO: 210 10*3/MM3 (ref 140–450)
PMV BLD AUTO: 9.5 FL (ref 6–12)
POTASSIUM SERPL-SCNC: 3.6 MMOL/L (ref 3.5–5.2)
POTASSIUM SERPL-SCNC: 4.2 MMOL/L (ref 3.5–5.2)
QT INTERVAL: 361 MS
QTC INTERVAL: 431 MS
RBC # BLD AUTO: 3.63 10*6/MM3 (ref 3.77–5.28)
SODIUM SERPL-SCNC: 138 MMOL/L (ref 136–145)
WBC NRBC COR # BLD AUTO: 10.47 10*3/MM3 (ref 3.4–10.8)

## 2024-11-29 PROCEDURE — 82948 REAGENT STRIP/BLOOD GLUCOSE: CPT

## 2024-11-29 PROCEDURE — 82948 REAGENT STRIP/BLOOD GLUCOSE: CPT | Performed by: INTERNAL MEDICINE

## 2024-11-29 PROCEDURE — 25810000003 SODIUM CHLORIDE 0.9 % SOLUTION: Performed by: INTERNAL MEDICINE

## 2024-11-29 PROCEDURE — 63710000001 INSULIN GLARGINE PER 5 UNITS: Performed by: STUDENT IN AN ORGANIZED HEALTH CARE EDUCATION/TRAINING PROGRAM

## 2024-11-29 PROCEDURE — 97166 OT EVAL MOD COMPLEX 45 MIN: CPT

## 2024-11-29 PROCEDURE — 85025 COMPLETE CBC W/AUTO DIFF WBC: CPT

## 2024-11-29 PROCEDURE — 84132 ASSAY OF SERUM POTASSIUM: CPT | Performed by: STUDENT IN AN ORGANIZED HEALTH CARE EDUCATION/TRAINING PROGRAM

## 2024-11-29 PROCEDURE — 80048 BASIC METABOLIC PNL TOTAL CA: CPT

## 2024-11-29 PROCEDURE — 72220 X-RAY EXAM SACRUM TAILBONE: CPT

## 2024-11-29 PROCEDURE — 25010000002 CEFTRIAXONE PER 250 MG

## 2024-11-29 PROCEDURE — 63710000001 INSULIN LISPRO (HUMAN) PER 5 UNITS: Performed by: INTERNAL MEDICINE

## 2024-11-29 PROCEDURE — 83930 ASSAY OF BLOOD OSMOLALITY: CPT | Performed by: INTERNAL MEDICINE

## 2024-11-29 PROCEDURE — 83735 ASSAY OF MAGNESIUM: CPT

## 2024-11-29 PROCEDURE — 97162 PT EVAL MOD COMPLEX 30 MIN: CPT

## 2024-11-29 PROCEDURE — 63710000001 INSULIN GLARGINE PER 5 UNITS: Performed by: INTERNAL MEDICINE

## 2024-11-29 RX ORDER — INSULIN LISPRO 100 [IU]/ML
2-9 INJECTION, SOLUTION INTRAVENOUS; SUBCUTANEOUS
Status: DISCONTINUED | OUTPATIENT
Start: 2024-11-29 | End: 2024-11-30 | Stop reason: HOSPADM

## 2024-11-29 RX ORDER — SODIUM CHLORIDE 9 MG/ML
125 INJECTION, SOLUTION INTRAVENOUS CONTINUOUS
Status: DISCONTINUED | OUTPATIENT
Start: 2024-11-29 | End: 2024-11-29

## 2024-11-29 RX ORDER — DEXTROSE MONOHYDRATE 25 G/50ML
25 INJECTION, SOLUTION INTRAVENOUS
Status: DISCONTINUED | OUTPATIENT
Start: 2024-11-29 | End: 2024-11-30 | Stop reason: HOSPADM

## 2024-11-29 RX ORDER — IBUPROFEN 600 MG/1
1 TABLET ORAL
Status: DISCONTINUED | OUTPATIENT
Start: 2024-11-29 | End: 2024-11-30 | Stop reason: HOSPADM

## 2024-11-29 RX ORDER — NICOTINE POLACRILEX 4 MG
15 LOZENGE BUCCAL
Status: DISCONTINUED | OUTPATIENT
Start: 2024-11-29 | End: 2024-11-30 | Stop reason: HOSPADM

## 2024-11-29 RX ADMIN — INSULIN GLARGINE 10 UNITS: 100 INJECTION, SOLUTION SUBCUTANEOUS at 08:14

## 2024-11-29 RX ADMIN — INSULIN LISPRO 4 UNITS: 100 INJECTION, SOLUTION INTRAVENOUS; SUBCUTANEOUS at 17:13

## 2024-11-29 RX ADMIN — INSULIN LISPRO 6 UNITS: 100 INJECTION, SOLUTION INTRAVENOUS; SUBCUTANEOUS at 08:14

## 2024-11-29 RX ADMIN — INSULIN LISPRO 6 UNITS: 100 INJECTION, SOLUTION INTRAVENOUS; SUBCUTANEOUS at 12:25

## 2024-11-29 RX ADMIN — INSULIN LISPRO 7 UNITS: 100 INJECTION, SOLUTION INTRAVENOUS; SUBCUTANEOUS at 21:12

## 2024-11-29 RX ADMIN — POTASSIUM CHLORIDE 40 MEQ: 1500 TABLET, EXTENDED RELEASE ORAL at 04:10

## 2024-11-29 RX ADMIN — CEFTRIAXONE 2000 MG: 2 INJECTION, POWDER, FOR SOLUTION INTRAMUSCULAR; INTRAVENOUS at 17:13

## 2024-11-29 RX ADMIN — INSULIN GLARGINE 5 UNITS: 100 INJECTION, SOLUTION SUBCUTANEOUS at 16:35

## 2024-11-29 RX ADMIN — ACETAMINOPHEN 650 MG: 325 TABLET, FILM COATED ORAL at 06:57

## 2024-11-29 RX ADMIN — SODIUM CHLORIDE 125 ML/HR: 9 INJECTION, SOLUTION INTRAVENOUS at 02:41

## 2024-11-29 NOTE — PLAN OF CARE
Goal Outcome Evaluation:  Plan of Care Reviewed With: patient    Outcome Evaluation: Pt is a 70 y/o female admitted to [facility] on 11/28/24 with c/o AMS with x2 syncopal episodes with positional changes. Pt hypertensive in ED, 177/101. UA (+) UTI. Glucose 501, of note,  reports that pt has not been on Mounjaro due to medication shortage. CT head and chest XR (-). PMHx significant for DMII,  HTN, CVA, and HLD. At baseline, pt resides with spouse in multilevel home (bedrooms upstairs), with 5 FELICIA. Pt reports she is IND all I/ADLs, driving, and does not utilize any DME. Upon evaluation, pt A&O x4, reports 8/10 pain in buttocks. Pt IND donned socks sitting EOB completed bed mobility with mod I using bed rails. Pt comes to standing with CGA, intermittent min A as pt unsteady on her feet this date as pt is orthostatic hypotensive and symptomatic. BP as follows: /60, standing 69/36, return to supine 132/53. Pt appears to be near her baseline and anticipate return home with HHOT when medically appropriate. OT will continue to follow to ensure pt maintains her independence, strength, endurance, and balance while in acute care setting.    Anticipated Discharge Disposition (OT): home with home health

## 2024-11-29 NOTE — CASE MANAGEMENT/SOCIAL WORK
Discharge Planning Assessment   [place]     Patient Name: Marge Mcghee  MRN: [id number]  Today's Date: 11/29/2024    Admit Date: 11/28/2024    Plan: Return home with family   Discharge Needs Assessment       Row Name 11/29/24 1347       Living Environment    People in Home spouse    Name(s) of People in Home  Tino    Current Living Arrangements home    Potentially Unsafe Housing Conditions none    In the past 12 months has the electric, gas, oil, or water company threatened to shut off services in your home? No    Primary Care Provided by self    Provides Primary Care For no one    Family Caregiver if Needed spouse    Family Caregiver Names  Tino, Daughter Kimberly (POA)    Quality of Family Relationships helpful;involved;supportive    Able to Return to Prior Arrangements yes       Resource/Environmental Concerns    Resource/Environmental Concerns none    Transportation Concerns none       Transportation Needs    In the past 12 months, has lack of transportation kept you from medical appointments or from getting medications? no    In the past 12 months, has lack of transportation kept you from meetings, work, or from getting things needed for daily living? No       Food Insecurity    Within the past 12 months, you worried that your food would run out before you got the money to buy more. Never true    Within the past 12 months, the food you bought just didn't last and you didn't have money to get more. Never true       Transition Planning    Patient/Family Anticipates Transition to home with family    Patient/Family Anticipated Services at Transition none    Transportation Anticipated family or friend will provide       Discharge Needs Assessment    Readmission Within the Last 30 Days no previous admission in last 30 days    Equipment Currently Used at Home glucometer    Concerns to be Addressed no discharge needs identified;denies needs/concerns at this time    Do you want help finding or keeping work  or a job? Patient declined    Do you want help with school or training? For example, starting or completing job training or getting a high school diploma, GED or equivalent Patient declined    Anticipated Changes Related to Illness none    Equipment Needed After Discharge none    Provided Post Acute Provider List? N/A    Provided Post Acute Provider Quality & Resource List? N/A    Offered/Gave Vendor List no                   Discharge Plan       Row Name 11/29/24 0632       Plan    Plan Return home with family    Plan Comments CM met with patient at bedside to discuss discharge planning. Patient states she lives at home with her  Tino. She drives and is independent at home. She has a glucometer and denies any other DME. PCP and pharmacy confirmed, declines [facility]. Denies issues affording medications, utilities, and or food. No current OPPT or HHC. She states her  or dtr will transport at discharge.                  Continued Care and Services - Admitted Since 11/28/2024    No active coordination exists for this encounter.        Demographic Summary       Row Name 11/29/24 0346       General Information    Admission Type inpatient    Arrived From emergency department    Referral Source admission list    Reason for Consult care coordination/care conference;discharge planning    Preferred Language English       Contact Information    Permission Granted to Share Info With                    Functional Status       Row Name 11/29/24 7986       Functional Status    Usual Activity Tolerance good    Current Activity Tolerance good       Functional Status, IADL    Medications independent    Meal Preparation independent    Housekeeping independent    Laundry independent    Shopping independent       Mental Status Summary    Recent Changes in Mental Status/Cognitive Functioning no changes             Emilia Perez RN      [facility]  Office: [phone]  Cell: [phone]  Fax #  [phone]

## 2024-11-29 NOTE — THERAPY EVALUATION
Patient Name: Marge Mcghee  : 1955    MRN: [id number]                              Today's Date: 2024       Admit Date: 2024    Visit Dx:     ICD-10-CM ICD-9-CM   1. Hyperglycemia due to diabetes mellitus  E11.65 250.02   2. Syncope, unspecified syncope type  R55 780.2   3. Urinary tract infection without hematuria, site unspecified  N39.0 599.0     Patient Active Problem List   Diagnosis    Type 2 diabetes mellitus with retinopathy, with long-term current use of insulin    Age-related osteoporosis without current pathological fracture    Hyperlipidemia    Hepatic steatosis    Gastroesophageal reflux disease with esophagitis    Adrenal nodule    Vitamin D deficiency    Thyroid nodule    UTI (urinary tract infection)    REINA (acute kidney injury)    Hyperglycemia    Acute right flank pain    Vomiting    Hypomagnesemia    Dehydration    Obesity (BMI 30-39.9)    Complicated migraine    Occipital neuralgia of left side    Polypharmacy    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    Lisfranc dislocation    Delayed surgical wound healing    Right foot infection    Chest pain    GERD (gastroesophageal reflux disease)    Stable angina pectoris    S/P CABG x 3    Encephalopathy, metabolic    Hypertensive emergency    Dysarthria    CVA (cerebral vascular accident)    Coronary artery disease involving coronary bypass graft of native heart without angina pectoris    Essential hypertension    HTN, goal below 130/80    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    Type 2 diabetes mellitus without complication, with long-term current use of insulin    Change in vision    Other headache syndrome    Migraine, intractable    Hyperosmolar hyperglycemic state (HHS)     Past Medical History:   Diagnosis Date    Adrenal nodule 2016    FINDINGS: Mild hepatic steatosis may be  present. Cholecystectomy. No biliary ductal dilatation. Negative pancreas, spleen, left adrenal gland, and kidneys. The right adrenal presumed adenoma has increased slightly, measuring 3 x 4 cm in diameter,  compared to 2 x 3.5 cm on the previous exam. The attenuation values are consistent with an adenoma. Done at [facility] in August 2018    Age-related osteoporosis without current pathological fracture 11/16/2016    Arthritis     Delayed surgical wound healing     Essential hypertension 11/16/2016    Gastroesophageal reflux disease with esophagitis 11/16/2016    Hepatic steatosis 11/16/2016    History of anemia     History of sepsis     FROM UTI    Hyperlipidemia 11/16/2016    Lisfranc's dislocation     LEFT WITH FRACTURES NONHEALING FOOT    Osteomyelitis of left foot 11/2020    RLS (restless legs syndrome)     Squamous cell skin cancer 2014    Excised by Dr. James    Thyroid nodule 10/18/2019    BENIGN    Type 2 diabetes mellitus with peripheral neuropathy 11/16/2016    Vitamin D deficiency 1/25/2019     Past Surgical History:   Procedure Laterality Date    BREAST BIOPSY Left     7/2019. BENIGN    CARDIAC CATHETERIZATION Left 5/2/2021    Procedure: Cardiac Catheterization/Vascular Study;  Surgeon: Chacho Esteban MD;  Location: [facility] CATH INVASIVE LOCATION;  Service: Cardiovascular;  Laterality: Left;    CARDIAC CATHETERIZATION N/A 5/2/2021    Procedure: Intra-Aortic Baloon Pump Insertion;  Surgeon: Chacho Esteban MD;  Location: [facility] CATH INVASIVE LOCATION;  Service: Cardiovascular;  Laterality: N/A;    CATARACT EXTRACTION WITH INTRAOCULAR LENS IMPLANT Bilateral     CHOLECYSTECTOMY      COLONOSCOPY      CORONARY ARTERY BYPASS GRAFT N/A 5/2/2021    Procedure: CORONARY ARTERY BYPASS WITH INTERNAL MAMMARY ARTERY GRAFT;  Surgeon: Jr Rocael Alexander MD;  Location: [facility] CVOR;  Service: Cardiothoracic;  Laterality: N/A;    FOOT FUSION Right 11/13/2020    Procedure: RIGHT OPEN TREATMENT LISFRANC INJURY, OPEN  REDUCTION INTERNAL FIXATION MEDIAL/MIDDLE CUNEIFORM FRACTURE AND 2ND/3RD METATARSAL FRACTURE 1ST 2ND POSSIBLE 3RD TARSOMETATARSAL ARTHRODESIS INTERCUNEIFORM ARTHRODESIS CALCANEAL BONE GRAFT;  Surgeon: Mikhail Banks Jr., MD;  Location: [facility];  Service: Orthopedics;  Laterality: Right;    INCISION AND DRAINAGE LEG Right 1/8/2021    Procedure: RIGHT IRRIGATION AND DEBRIDEMENT OF FOOT WITH SECONDARY CLOSURE AND HARDWARE REMOVAL;  Surgeon: Mikhail Banks Jr., MD;  Location: [facility];  Service: Orthopedics;  Laterality: Right;    KNEE ARTHROSCOPY Bilateral     TOTAL ABDOMINAL HYSTERECTOMY      TRANSESOPHAGEAL ECHOCARDIOGRAM (EMILIA) N/A 5/2/2021    Procedure: TRANSESOPHAGEAL ECHOCARDIOGRAM;  Surgeon: Jr Rocael Alexander MD;  Location: [facility];  Service: Cardiothoracic;  Laterality: N/A;    UPPER GASTROINTESTINAL ENDOSCOPY  08/2016    US GUIDED FINE NEEDLE ASPIRATION  5/8/2020      General Information       [facility] Name 11/29/24 1458          Physical Therapy Time and Intention    Document Type evaluation  -     Mode of Treatment physical therapy  -[facility] Name 11/29/24 1458          General Information    Patient Profile Reviewed yes  -     Prior Level of Function independent:;all household mobility;community mobility;ADL's;driving  -     Existing Precautions/Restrictions fall  -     Barriers to Rehab none identified  -[facility] Name 11/29/24 1458          Living Environment    People in Home spouse  -[facility] Name 11/29/24 1458          Home Main Entrance    Number of Stairs, Main Entrance five  -     Stair Railings, Main Entrance railings safe and in good condition  -[facility] Name 11/29/24 1458          Stairs Within Home, Primary    Number of Stairs, Within Home, Primary twelve  -     Stair Railings, Within Home, Primary railings safe and in good condition  -[facility] Name 11/29/24 1458          Cognition    Orientation Status (Cognition) oriented x 4  -[facility] Name  11/29/24 1458          Safety Issues/Impairments Affecting Functional Mobility    Impairments Affecting Function (Mobility) endurance/activity tolerance;strength;balance  -               User Key  (r) = Recorded By, (t) = Taken By, (c) = Cosigned By      Initials Name Provider Type    Shameka Zimmer, PT Physical Therapist                   Mobility       Row Name 11/29/24 1459          Bed Mobility    Bed Mobility bed mobility (all) activities  -     All Activities, [place] (Bed Mobility) independent  -       Row Name 11/29/24 1459          Bed-Chair Transfer    Bed-Chair [place] (Transfers) contact guard  -[facility] Name 11/29/24 1459          Sit-Stand Transfer    Sit-Stand [place] (Transfers) standby assist  -[facility] Name 11/29/24 1459          Gait/Stairs (Locomotion)    Patient was able to Ambulate no, other medical factors prevent ambulation  -     Reason Patient was unable to Ambulate Hypotension  -               User Key  (r) = Recorded By, (t) = Taken By, (c) = Cosigned By      Initials Name Provider Type    Shameka Zimmer, NEHAL Physical Therapist                   Obj/Interventions       [facility] Name 11/29/24 1500          Range of Motion Comprehensive    General Range of Motion no range of motion deficits identified  -[facility] Name 11/29/24 1500          Strength Comprehensive (MMT)    General Manual Muscle Testing (MMT) Assessment lower extremity strength deficits identified  -     Comment, General Manual Muscle Testing (MMT) Assessment LLE grossly 3+/5, RLE grossly 4/5  -[facility] Name 11/29/24 1500          Balance    Balance Assessment sitting static balance;sitting dynamic balance;standing static balance;standing dynamic balance  -     Static Sitting Balance independent  -     Dynamic Sitting Balance independent  -     Position, Sitting Balance unsupported;sitting edge of bed  -     Static Standing Balance contact guard  -      Dynamic Standing Balance contact guard  -     Position/Device Used, Standing Balance unsupported  -AH       Row Name 11/29/24 1500          Sensory Assessment (Somatosensory)    Sensory Assessment (Somatosensory) sensation intact  -               User Key  (r) = Recorded By, (t) = Taken By, (c) = Cosigned By      Initials Name Provider Type    Shameka Zimmer, PT Physical Therapist                   Goals/Plan       Row Name 11/29/24 1502          Transfer Goal 1 (PT)    Activity/Assistive Device (Transfer Goal 1, PT) transfers, all  -     [place] Level/Cues Needed (Transfer Goal 1, PT) independent  -AH     Time Frame (Transfer Goal 1, PT) long term goal (LTG);2 weeks  -AH       Row Name 11/29/24 1502          Gait Training Goal 1 (PT)    Activity/Assistive Device (Gait Training Goal 1, PT) gait (walking locomotion)  -     [place] Level (Gait Training Goal 1, PT) independent  -     Distance (Gait Training Goal 1, PT) 150 ft  -     Time Frame (Gait Training Goal 1, PT) long term goal (LTG);2 weeks  -AH       Row Name 11/29/24 1502          Balance Goal 1 (PT)    Activity/Assistive Device (Balance Goal) with functional mobility activities;standing dynamic balance  -     [place] Level/Cues Needed (Balance Goal 1, PT) independent  -     Time Frame (Balance Goal 1, PT) long-term goal (LTG);2 weeks  -AH       Row Name 11/29/24 1502          Therapy Assessment/Plan (PT)    Planned Therapy Interventions (PT) balance training;gait training;transfer training;home exercise program;patient/family education;strengthening  -               User Key  (r) = Recorded By, (t) = Taken By, (c) = Cosigned By      Initials Name Provider Type    Shameka Zimmer, PT Physical Therapist                   Clinical Impression       Row Name 11/29/24 1500          Pain    Pretreatment Pain Rating 7/10  -     Posttreatment Pain Rating 7/10  -     Pain Location other (see comments)   coccyx  -       Row Name 11/29/24 1500          Plan of Care Review    Plan of Care Reviewed With patient  -     Outcome Evaluation Marge Mcghee is a 69 y.o. female with a CMH of type 2 diabetes mellitus, HTN, HLD, who presented to [facility] on 11/28/2024 with altered mental status. She reports that she had a syncopal episode x 2 over the last 2 days. CT head with no acute findings. Chest x-ray with no acute findings. Glucose >500 in ED. Found to be in Hyperglycemic hyperosmolar state, UTI, metabolic encephalopathy. Pt is A&Ox4 and reports PLOF as IND with mobility (wo AD), ADLs, and is an active . She lives with her spouse in a 2SH with bedroom on the 2nd floor and 5 FELICIA. Pt has residual LLE weakness from past stroke. She reports 7/10 pain on her coccyx, where she fell at home. Today she is CGA for transfer from EOB to chair. Pt is orthostatic with BP as follows, supine 165/93, sitting 124/68, standing 102/56, return to sitting 165/93. Pt is symptomatic and a little unsteady initially upon standing. Pt appears to be near baseline and anticipate return home with  PT when medically appropriate. PT will follow caryn GRAF to encourage activity and monitor BP.  -       Row Name 11/29/24 1500          Therapy Assessment/Plan (PT)    Criteria for Skilled Interventions Met (PT) yes  -     Therapy Frequency (PT) 3 times/wk  -       Row Name 11/29/24 1500          Vital Signs    Pre Systolic BP Rehab 165  -AH     Pre Treatment Diastolic BP 93  -AH     Intra Systolic BP Rehab 102  -AH     Intra Treatment Diastolic BP 56  -AH     Post Systolic BP Rehab 165  -AH     Post Treatment Diastolic BP 93  -AH     Pre Patient Position Supine  -AH     Intra Patient Position Standing  -AH     Post Patient Position Sitting  -AH       Row Name 11/29/24 1500          Positioning and Restraints    Pre-Treatment Position in bed  -AH     Post Treatment Position chair  -AH     In Chair notified nsg;reclined;call  light within reach;encouraged to call for assist;exit alarm on  -               User Key  (r) = Recorded By, (t) = Taken By, (c) = Cosigned By      Initials Name Provider Type     Shameka Maria, PT Physical Therapist                   Outcome Measures       Row Name 11/29/24 0800          How much help from another person do you currently need...    Turning from your back to your side while in flat bed without using bedrails? 3  -JH     Moving from lying on back to sitting on the side of a flat bed without bedrails? 3  -JH     Moving to and from a bed to a chair (including a wheelchair)? 3  -JH     Standing up from a chair using your arms (e.g., wheelchair, bedside chair)? 3  -JH     Climbing 3-5 steps with a railing? 3  -JH     To walk in hospital room? 3  -JH     AM-PAC 6 Clicks Score (PT) 18  -       Row Name 11/29/24 1106          Functional Assessment    Outcome Measure Options AM-PAC 6 Clicks Daily Activity (OT)  -SP               User Key  (r) = Recorded By, (t) = Taken By, (c) = Cosigned By      Initials Name Provider Type    Inga Trevizo, RN Registered Nurse    Madhu Oliva, JON Occupational Therapist                                 Physical Therapy Education       Title: PT OT SLP Therapies (In Progress)       Topic: Physical Therapy (Done)       Point: Mobility training (Done)       Learning Progress Summary            Patient Acceptance, E, VU by  at 11/29/2024 1503                      Point: Home exercise program (Done)       Learning Progress Summary            Patient Acceptance, E, VU by  at 11/29/2024 1503                      Point: Body mechanics (Done)       Learning Progress Summary            Patient Acceptance, E, VU by  at 11/29/2024 1503                      Point: Precautions (Done)       Learning Progress Summary            Patient Acceptance, E, VU by  at 11/29/2024 1503                                      User Key       Initials Effective Dates Name  Provider Type Discipline     08/12/24 -  Shameka Maria, PT Physical Therapist PT                  PT Recommendation and Plan  Planned Therapy Interventions (PT): balance training, gait training, transfer training, home exercise program, patient/family education, strengthening  Outcome Evaluation: Marge Mcghee is a 69 y.o. female with a CMH of type 2 diabetes mellitus, HTN, HLD, who presented to [facility] on 11/28/2024 with altered mental status. She reports that she had a syncopal episode x 2 over the last 2 days. CT head with no acute findings. Chest x-ray with no acute findings. Glucose >500 in ED. Found to be in Hyperglycemic hyperosmolar state, UTI, metabolic encephalopathy. Pt is A&Ox4 and reports PLOF as IND with mobility (wo AD), ADLs, and is an active . She lives with her spouse in a 2SH with bedroom on the 2nd floor and 5 FELICIA. Pt has residual LLE weakness from past stroke. She reports 7/10 pain on her coccyx, where she fell at home. Today she is CGA for transfer from EOB to chair. Pt is orthostatic with BP as follows, supine 165/93, sitting 124/68, standing 102/56, return to sitting 165/93. Pt is symptomatic and a little unsteady initially upon standing. Pt appears to be near baseline and anticipate return home with  PT when medically appropriate. [facility] and [facility] BP.     Time Calculation:         PT Charges       Row Name 11/29/24 1503             Time Calculation    Start Time 1243  -      Stop Time 1259  -      Time Calculation (min) 16 min  -      PT Received On 11/29/24  -      PT - Next Appointment 12/02/24  -      PT Goal Re-Cert Due Date 12/13/24  -                User Key  (r) = Recorded By, (t) = Taken By, (c) = Cosigned By      Initials Name Provider Type     Shameka Maria, PT Physical Therapist                  Therapy Charges for Today       Code Description Service Date Service Provider  Modifiers Qty    96333822927 HC PT EVAL MOD COMPLEXITY 4 11/29/2024 Shameka Maria, PT GP 1            PT G-Codes  Outcome Measure Options: AM-PAC 6 Clicks Daily Activity (OT)  AM-PAC 6 Clicks Score (PT): 18  AM-PAC 6 Clicks Score (OT): 22  PT Discharge Summary  Anticipated Discharge Disposition (PT): home with home health    Shameka Maria, PT  11/29/2024

## 2024-11-29 NOTE — PLAN OF CARE
Problem: Adult Inpatient Plan of Care  Goal: Plan of Care Review  Outcome: Progressing  Flowsheets (Taken 11/29/2024 0601)  Progress: improving  Goal: Patient-Specific Goal (Individualized)  Outcome: Progressing  Goal: Absence of Hospital-Acquired Illness or Injury  Outcome: Progressing  Intervention: Identify and Manage Fall Risk  Description: Perform standard risk assessment on admission using a validated tool or comprehensive approach appropriate to the patient; reassess fall risk frequently, with change in status or transfer to another level of care.  Communicate risk to interprofessional healthcare team; ensure fall risk visible cue.  Determine need for increased observation, equipment and environmental modification, as well as use of supportive, nonskid footwear.  Adjust safety measures to individual needs and identified risk factors.  Reinforce the importance of active participation with fall risk prevention, safety, and physical activity with the patient and family.  Perform regular intentional rounding to assess need for position change, pain assessment and personal needs, including assistance with toileting.  Recent Flowsheet Documentation  Taken 11/29/2024 0400 by Mckenzie Aragon, RN  Safety Promotion/Fall Prevention:   safety round/check completed   clutter free environment maintained   assistive device/personal items within reach   fall prevention program maintained   lighting adjusted   nonskid shoes/slippers when out of bed   muscle strengthening facilitated   room organization consistent  Taken 11/29/2024 0200 by Mckenzie Aragon, RN  Safety Promotion/Fall Prevention: safety round/check completed  Intervention: Prevent Skin Injury  Description: Perform a screening for skin injury risk, such as pressure or moisture-associated skin damage on admission and at regular intervals throughout hospital stay.  Keep all areas of skin (especially folds) clean and dry.  Maintain adequate skin hydration.  Relieve and  redistribute pressure and protect bony prominences and skin at risk for injury; implement measures based on patient-specific risk factors.  Match turning and repositioning schedule to clinical condition.  Encourage weight shift frequently; assist with reposition if unable to complete independently.  Float heels off bed; avoid pressure on the Achilles tendon.  Keep skin free from extended contact with medical devices.  Optimize nutrition and hydration.  Encourage functional activity and mobility, as early as tolerated.  Use aids (e.g., slide boards, mechanical lift) during transfer.  Recent Flowsheet Documentation  Taken 11/29/2024 0500 by Mckenzie Aragon RN  Body Position: position changed independently  Taken 11/29/2024 0400 by Mckenzie Aragon RN  Body Position: position changed independently  Skin Protection:   incontinence pads utilized   pulse oximeter probe site changed   silicone foam dressing in place  Intervention: Prevent and Manage VTE (Venous Thromboembolism) Risk  Description: Assess for VTE (venous thromboembolism) risk.  Promote early mobilization; encourage both active and passive leg exercises, if unable to ambulate.  Initiate and maintain compression or other therapy, as indicated, based on identified risk in accordance with organizational protocol and provider order.  Recognize the patient's individual risk for bleeding before initiating pharmacologic thromboprophylaxis.  Recent Flowsheet Documentation  Taken 11/29/2024 0400 by Mckenzie Aragon RN  VTE Prevention/Management:   patient refused intervention   SCDs (sequential compression devices) off  Intervention: Prevent Infection  Description: Maintain skin and mucous membrane integrity; promote hand, oral and pulmonary hygiene.  Optimize fluid balance, nutrition, sleep and glycemic control to maximize infection resistance.  Identify potential sources of infection early to prevent or mitigate progression of infection (e.g., wound, lines, devices).  Evaluate  ongoing need for invasive devices; remove promptly when no longer indicated.  Review vaccination status.  Recent Flowsheet Documentation  Taken 11/29/2024 0400 by Mckenzie Aragon RN  Infection Prevention:   equipment surfaces disinfected   hand hygiene promoted   personal protective equipment utilized   rest/sleep promoted   single patient room provided  Goal: Optimal Comfort and Wellbeing  Outcome: Progressing  Intervention: Provide Person-Centered Care  Description: Use a family-focused approach to care; encourage support system presence and participation.  Develop trust and rapport by proactively providing information, encouraging questions, addressing concerns and offering reassurance.  Acknowledge emotional response to hospitalization.  Recognize and utilize personal coping strategies and strengths; develop goals via shared decision-making.  Honor spiritual and cultural preferences.  Recent Flowsheet Documentation  Taken 11/29/2024 0400 by Mckenzie Aragon RN  Trust Relationship/Rapport:   care explained   choices provided   emotional support provided   empathic listening provided   questions answered   questions encouraged   reassurance provided   thoughts/feelings acknowledged  Goal: Readiness for Transition of Care  Outcome: Progressing     Problem: Comorbidity Management  Goal: Blood Glucose Level Within Target Range  Outcome: Progressing  Intervention: Monitor and Manage Glycemia  Description: Establish target blood glucose levels based on patient-specific factors, such as age, diabetes-related complications and illness severity.  Document blood glucose levels and monitor trend.  Provide antihyperglycemic pharmacologic therapy to maintain blood glucose levels within targeted range.  Advocate for patient use of home devices such as insulin pump, continuous glucose monitor; assess for safe and competent participation in care.  Check blood glucose level if there is a change in mental or cognitive status.  Recognize,  treat and document hypoglycemia event and potential cause.  Assess current lifestyle patterns; acknowledging positive patterns supporting wellbeing.  Evaluate effectiveness of coping skills; encourage expression of feelings, expectations and concerns related to disease management and quality of life; reinforce education to enhance management plan and wellbeing.  Recent Flowsheet Documentation  Taken 11/29/2024 0400 by Mckenzie Aragon RN  Medication Review/Management:   medications reviewed   high-risk medications identified  Goal: Maintenance of Heart Failure Symptom Control  Outcome: Progressing  Intervention: Maintain Heart Failure Management  Description: Evaluate adherence to home heart failure self-care regimen (e.g., medication, fluid balance, sodium intake, daily weight, physical activity, telemonitoring, support).  Advocate continuation of home medication and schedule.  Consider pharmacologic therapy administration time and effects (e.g., avoid giving diuretic prior to bedtime or nitrates on empty stomach).  Monitor response to pharmacologic therapy, including weight fluctuations, blood pressure and electrolyte levels.  Monitor for signs and symptoms of anxiety and depression, including severity and duration; if present, provide psychosocial support.  Consider need for heart failure clinic or palliative care consult.  Recent Flowsheet Documentation  Taken 11/29/2024 0400 by Mckenzie Aragon RN  Medication Review/Management:   medications reviewed   high-risk medications identified  Goal: Blood Pressure in Desired Range  Outcome: Progressing  Intervention: Maintain Blood Pressure Management  Description: Evaluate adherence to home antihypertensive regimen (e.g., exercise and activity, diet modification, medication).  Provide scheduled antihypertensive medication; consider administration time and effects (e.g., avoid giving diuretic prior to bedtime).  Monitor response to antihypertensive medication therapy (e.g.,  blood pressure, electrolyte levels, medication effects).  Minimize risk of orthostatic hypotension; encourage caution with position changes, particularly if elderly.  Recent Flowsheet Documentation  Taken 11/29/2024 0400 by Mckenzie Aragon RN  Medication Review/Management:   medications reviewed   high-risk medications identified  Goal: Maintenance of Osteoarthritis Symptom Control  Outcome: Progressing  Intervention: Maintain Osteoarthritis Symptom Control  Description: Evaluate adherence to self-management plan, such as medication, exercise and weight management.  Advocate for continuation of home regimen, such as medication and physical activity; monitor response.  Encourage participation in functional activities, such as mobility and ADLs (activities of daily living) to minimize decline associated with inactivity.  Facilitate use of patient-specific assistive devices, equipment or orthoses.  Evaluate effectiveness of coping skills; encourage expression of feelings, expectations and concerns related to disease management and quality of life; reinforce education to enhance management plan and wellbeing.  Recent Flowsheet Documentation  Taken 11/29/2024 0400 by Mckenzie Aragon RN  Medication Review/Management:   medications reviewed   high-risk medications identified     Problem: Pain Acute  Goal: Optimal Pain Control and Function  Outcome: Progressing  Intervention: Optimize Psychosocial Wellbeing  Description: Facilitate patient's self-control over pain by providing pain information and allowing choices in treatment.  Consider and address emotional response to pain; express compassion and reassurance.  Explore and promote use of coping strategies; address barriers to successful coping.  Evaluate and assist with psychosocial, cultural and spiritual factors impacting pain.  Assess for risk factors for developing chronic pain, such as depression, fear, pain avoidance and pain catastrophizing.  Modify pain perception using  techniques, such as distraction, mindfulness, guided imagery, meditation or music.  Consider referral for ongoing coping support, such as education, relaxation training and role of thoughts.  Recent Flowsheet Documentation  Taken 11/29/2024 0400 by Mckenzie Aragon RN  Supportive Measures:   relaxation techniques promoted   positive reinforcement provided   mindfulness techniques promoted   verbalization of feelings encouraged   active listening utilized   decision-making supported  Diversional Activities: television  Intervention: Prevent or Manage Pain  Description: Evaluate pain level, effect of treatment and patient response at regular intervals.  Minimize painful stimuli; coordinate care and adjust environment (e.g., light, noise, unnecessary movement); promote sleep/rest.  Match pharmacologic analgesia to severity and type of pain mechanism (e.g., neuropathic, muscle, inflammatory); consider multimodal approach.  Provide medication at regular intervals; titrate to patient response; premedicate for painful procedures.  Manage breakthrough pain with additional doses; consider rotation or switching medication.  Monitor for signs of substance tolerance (increased dose to reach desired effect, decreased effect with same dose).  Manage medication-induced adverse events and side effects.  Provide multimodal interventions, such as physical activity, therapeutic exercise, yoga, TENS (transcutaneous electrical nerve stimulation) and manual therapy.  Train in functional activity modifications, such as body mechanics, posture, ergonomics, energy conservation and activity pacing.  Consider addition of complementary or alternative therapy, such as acupuncture, hypnosis or therapeutic touch.  Recent Flowsheet Documentation  Taken 11/29/2024 0400 by Mckenzie Aragon RN  Sensory Stimulation Regulation:   care clustered   lighting decreased   quiet environment promoted  Medication Review/Management:   medications reviewed   high-risk  medications identified   Goal Outcome Evaluation:           Progress: improving

## 2024-11-29 NOTE — PLAN OF CARE
Problem: Adult Inpatient Plan of Care  Goal: Plan of Care Review  Outcome: Progressing  Flowsheets (Taken 11/28/2024 2314)  Plan of Care Reviewed With:   patient   spouse  Goal: Patient-Specific Goal (Individualized)  Outcome: Progressing  Goal: Absence of Hospital-Acquired Illness or Injury  Outcome: Progressing  Intervention: Identify and Manage Fall Risk  Recent Flowsheet Documentation  Taken 11/28/2024 2200 by Barbara Ames RN  Safety Promotion/Fall Prevention:   activity supervised   assistive device/personal items within reach   clutter free environment maintained   fall prevention program maintained   nonskid shoes/slippers when out of bed   room organization consistent   safety round/check completed  Taken 11/28/2024 2025 by Barbara Ames RN  Safety Promotion/Fall Prevention:   activity supervised   assistive device/personal items within reach   clutter free environment maintained   fall prevention program maintained   nonskid shoes/slippers when out of bed   room organization consistent   safety round/check completed  Intervention: Prevent Skin Injury  Recent Flowsheet Documentation  Taken 11/28/2024 2200 by Barbara Ames RN  Body Position:   position changed independently   supine   weight shifting  Taken 11/28/2024 2025 by Barbara Ames RN  Body Position:   position changed independently   supine   weight shifting  Skin Protection: pulse oximeter probe site changed  Intervention: Prevent and Manage VTE (Venous Thromboembolism) Risk  Recent Flowsheet Documentation  Taken 11/28/2024 2025 by Barbara Ames RN  VTE Prevention/Management: (dr gonzales aware)   bilateral   SCDs (sequential compression devices) off   patient refused intervention  Intervention: Prevent Infection  Recent Flowsheet Documentation  Taken 11/28/2024 2200 by Barbara Ames RN  Infection Prevention:   hand hygiene promoted   equipment surfaces disinfected   environmental surveillance performed  Taken 11/28/2024 2025 by Kwaku  EDGARDO Jimenez  Infection Prevention:   hand hygiene promoted   equipment surfaces disinfected   environmental surveillance performed  Goal: Optimal Comfort and Wellbeing  Outcome: Progressing  Intervention: Monitor Pain and Promote Comfort  Recent Flowsheet Documentation  Taken 11/28/2024 2025 by Barbara Ames RN  Pain Management Interventions:   pillow support provided   position adjusted  Intervention: Provide Person-Centered Care  Recent Flowsheet Documentation  Taken 11/28/2024 2025 by Barbara Ames RN  Trust Relationship/Rapport:   care explained   choices provided   emotional support provided   empathic listening provided   questions answered   questions encouraged   reassurance provided   thoughts/feelings acknowledged  Goal: Readiness for Transition of Care  Outcome: Progressing  Intervention: Mutually Develop Transition Plan  Recent Flowsheet Documentation  Taken 11/28/2024 2046 by Barbara Ames RN  Transportation Anticipated: family or friend will provide  Patient/Family Anticipated Services at Transition: none  Patient/Family Anticipates Transition to: home with family  Taken 11/28/2024 2045 by Barbara Ames RN  Equipment Currently Used at Home: none     Problem: Comorbidity Management  Goal: Blood Glucose Level Within Target Range  Outcome: Progressing  Intervention: Monitor and Manage Glycemia  Recent Flowsheet Documentation  Taken 11/28/2024 2200 by Barbara Ames RN  Medication Review/Management: medications reviewed  Taken 11/28/2024 2025 by Barbara Ames RN  Medication Review/Management: medications reviewed  Goal: Maintenance of Heart Failure Symptom Control  Outcome: Progressing  Intervention: Maintain Heart Failure Management  Recent Flowsheet Documentation  Taken 11/28/2024 2200 by Barbara Ames RN  Medication Review/Management: medications reviewed  Taken 11/28/2024 2025 by Barbara Ames RN  Medication Review/Management: medications reviewed  Goal: Blood Pressure in Desired  Range  Outcome: Progressing  Intervention: Maintain Blood Pressure Management  Recent Flowsheet Documentation  Taken 11/28/2024 2200 by Barbara Ames RN  Medication Review/Management: medications reviewed  Taken 11/28/2024 2025 by Barbara Ames RN  Medication Review/Management: medications reviewed  Goal: Maintenance of Osteoarthritis Symptom Control  Outcome: Progressing  Intervention: Maintain Osteoarthritis Symptom Control  Recent Flowsheet Documentation  Taken 11/28/2024 2200 by Barbara Ames RN  Activity Management: activity encouraged  Medication Review/Management: medications reviewed  Taken 11/28/2024 2025 by Barbara Ames RN  Activity Management:   ambulated to bathroom   back to bed  Medication Review/Management: medications reviewed     Problem: Pain Acute  Goal: Optimal Pain Control and Function  Outcome: Progressing  Intervention: Optimize Psychosocial Wellbeing  Recent Flowsheet Documentation  Taken 11/28/2024 2025 by Barbara Ames RN  Diversional Activities: television  Intervention: Develop Pain Management Plan  Recent Flowsheet Documentation  Taken 11/28/2024 2025 by Barbara Ames RN  Pain Management Interventions:   pillow support provided   position adjusted  Intervention: Prevent or Manage Pain  Recent Flowsheet Documentation  Taken 11/28/2024 2200 by Barbara Ames RN  Medication Review/Management: medications reviewed  Taken 11/28/2024 2025 by Barbara Ames RN  Medication Review/Management: medications reviewed   Goal Outcome Evaluation:  Plan of Care Reviewed With: patient, spouse              Patient a/ox4, up to bathroom with standby assist, here from home for AMS, high blood sugar, syncopal episode x 2 days,  on insulin drip per protocol, explained in detail plan of care to patient and spouse, states she slipped and fell at home a few days ago  landing on her butt, NIHSS 0, NPO, clean and dry, call light in reach.

## 2024-11-29 NOTE — CASE MANAGEMENT/SOCIAL WORK
Continued Stay Note   [facility]     Patient Name: Marge Mcghee  MRN: [id number]  Today's Date: 11/29/2024    Admit Date: 11/28/2024        Discharge Plan       Row Name 11/29/24 1033       Plan    Plan Comments DC Barriers: AMS, elevated blood glucose, was on insulin gtt, NPO, IV Jefe Perez, EDGARDO      [facility]  Office: [phone]  Cell: [phone]  Fax # [phone]

## 2024-11-29 NOTE — PLAN OF CARE
Goal Outcome Evaluation:  Plan of Care Reviewed With: patient           Outcome Evaluation: Marge Mcghee is a 69 y.o. female with a CMH of type 2 diabetes mellitus, HTN, HLD, who presented to [facility] on 11/28/2024 with altered mental status. She reports that she had a syncopal episode x 2 over the last 2 days. CT head with no acute findings. Chest x-ray with no acute findings. Glucose >500 in ED. Found to be in Hyperglycemic hyperosmolar state, UTI, metabolic encephalopathy. Pt is A&Ox4 and reports PLOF as IND with mobility (wo AD), ADLs, and is an active . She lives with her spouse in a 2SH with bedroom on the 2nd floor and 5 [address]. Pt has residual LLE weakness from past stroke. She reports 7/10 pain on her coccyx, where she fell at home. Today she is CGA for transfer from EOB to chair. Pt is orthostatic with BP as follows, supine 165/93, sitting 124/68, standing 102/56, return to sitting 165/93. Pt is symptomatic and a little unsteady initially upon standing. Pt appears to be near baseline and anticipate return home with  PT when medically appropriate. PT will follow caryn GRAF to encourage activity and monitor BP.    Anticipated Discharge Disposition (PT): home with home health

## 2024-11-29 NOTE — THERAPY EVALUATION
Patient Name: Marge Mcghee  : 1955    MRN: [id number]                              Today's Date: 2024       Admit Date: 2024    Visit Dx:     ICD-10-CM ICD-9-CM   1. Hyperglycemia due to diabetes mellitus  E11.65 250.02   2. Syncope, unspecified syncope type  R55 780.2   3. Urinary tract infection without hematuria, site unspecified  N39.0 599.0     Patient Active Problem List   Diagnosis    Type 2 diabetes mellitus with retinopathy, with long-term current use of insulin    Age-related osteoporosis without current pathological fracture    Hyperlipidemia    Hepatic steatosis    Gastroesophageal reflux disease with esophagitis    Adrenal nodule    Vitamin D deficiency    Thyroid nodule    UTI (urinary tract infection)    REINA (acute kidney injury)    Hyperglycemia    Acute right flank pain    Vomiting    Hypomagnesemia    Dehydration    Obesity (BMI 30-39.9)    Complicated migraine    Occipital neuralgia of left side    Polypharmacy    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    Lisfranc dislocation    Delayed surgical wound healing    Right foot infection    Chest pain    GERD (gastroesophageal reflux disease)    Stable angina pectoris    S/P CABG x 3    Encephalopathy, metabolic    Hypertensive emergency    Dysarthria    CVA (cerebral vascular accident)    Coronary artery disease involving coronary bypass graft of native heart without angina pectoris    Essential hypertension    HTN, goal below 130/80    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    Type 2 diabetes mellitus without complication, with long-term current use of insulin    Change in vision    Other headache syndrome    Migraine, intractable    Hyperosmolar hyperglycemic state (HHS)     Past Medical History:   Diagnosis Date    Adrenal nodule 2016    FINDINGS: Mild hepatic steatosis may be  present. Cholecystectomy. No biliary ductal dilatation. Negative pancreas, spleen, left adrenal gland, and kidneys. The right adrenal presumed adenoma has increased slightly, measuring 3 x 4 cm in diameter,  compared to 2 x 3.5 cm on the previous exam. The attenuation values are consistent with an adenoma. Done at [facility] in August 2018    Age-related osteoporosis without current pathological fracture 11/16/2016    Arthritis     Delayed surgical wound healing     Essential hypertension 11/16/2016    Gastroesophageal reflux disease with esophagitis 11/16/2016    Hepatic steatosis 11/16/2016    History of anemia     History of sepsis     FROM UTI    Hyperlipidemia 11/16/2016    Lisfranc's dislocation     LEFT WITH FRACTURES NONHEALING FOOT    Osteomyelitis of left foot 11/2020    RLS (restless legs syndrome)     Squamous cell skin cancer 2014    Excised by Dr. James    Thyroid nodule 10/18/2019    BENIGN    Type 2 diabetes mellitus with peripheral neuropathy 11/16/2016    Vitamin D deficiency 1/25/2019     Past Surgical History:   Procedure Laterality Date    BREAST BIOPSY Left     7/2019. BENIGN    CARDIAC CATHETERIZATION Left 5/2/2021    Procedure: Cardiac Catheterization/Vascular Study;  Surgeon: Chacho Esteban MD;  Location: [facility] CATH INVASIVE LOCATION;  Service: Cardiovascular;  Laterality: Left;    CARDIAC CATHETERIZATION N/A 5/2/2021    Procedure: Intra-Aortic Baloon Pump Insertion;  Surgeon: Chacho Esteban MD;  Location: [facility] CATH INVASIVE LOCATION;  Service: Cardiovascular;  Laterality: N/A;    CATARACT EXTRACTION WITH INTRAOCULAR LENS IMPLANT Bilateral     CHOLECYSTECTOMY      COLONOSCOPY      CORONARY ARTERY BYPASS GRAFT N/A 5/2/2021    Procedure: CORONARY ARTERY BYPASS WITH INTERNAL MAMMARY ARTERY GRAFT;  Surgeon: Jr Rocael Alexander MD;  Location: [facility] CVOR;  Service: Cardiothoracic;  Laterality: N/A;    FOOT FUSION Right 11/13/2020    Procedure: RIGHT OPEN TREATMENT LISFRANC INJURY, OPEN  REDUCTION INTERNAL FIXATION MEDIAL/MIDDLE CUNEIFORM FRACTURE AND 2ND/3RD METATARSAL FRACTURE 1ST 2ND POSSIBLE 3RD TARSOMETATARSAL ARTHRODESIS INTERCUNEIFORM ARTHRODESIS CALCANEAL BONE GRAFT;  Surgeon: Mikhail Banks Jr., MD;  Location: [facility];  Service: Orthopedics;  Laterality: Right;    INCISION AND DRAINAGE LEG Right 1/8/2021    Procedure: RIGHT IRRIGATION AND DEBRIDEMENT OF FOOT WITH SECONDARY CLOSURE AND HARDWARE REMOVAL;  Surgeon: Mikhail Banks Jr., MD;  Location: [facility] OR;  Service: Orthopedics;  Laterality: Right;    KNEE ARTHROSCOPY Bilateral     TOTAL ABDOMINAL HYSTERECTOMY      TRANSESOPHAGEAL ECHOCARDIOGRAM (EMILIA) N/A 5/2/2021    Procedure: TRANSESOPHAGEAL ECHOCARDIOGRAM;  Surgeon: Jr Rocael Alexander MD;  Location: [facility];  Service: Cardiothoracic;  Laterality: N/A;    UPPER GASTROINTESTINAL ENDOSCOPY  08/2016    US GUIDED FINE NEEDLE ASPIRATION  5/8/2020      General Information       Row Name 11/29/24 1053          OT Time and Intention    Document Type evaluation  -SP     Mode of Treatment individual therapy;occupational therapy  -SP     Symptoms Noted During/After Treatment dizziness;nausea  -SP       Row Name 11/29/24 1053          General Information    Patient Profile Reviewed yes  -SP     Prior Level of Function independent:;ADL's;driving;home management;all household mobility  -SP     Barriers to Rehab medically complex  -SP       Row Name 11/29/24 1053          Living Environment    People in Home spouse  -SP       Row Name 11/29/24 1053          Home Main Entrance    Number of Stairs, Main Entrance five  -SP       Row Name 11/29/24 1053          Stairs Within Home, Primary    Number of Stairs, Within Home, Primary twelve;other (see comments)  mutilevel, flight upstairs to bedroom  -SP       Row Name 11/29/24 1053          Cognition    Orientation Status (Cognition) oriented x 4  -SP       Row Name 11/29/24 1053          Safety Issues/Impairments Affecting Functional Mobility     Impairments Affecting Function (Mobility) balance;endurance/activity tolerance;pain;range of motion (ROM);strength  -SP               User Key  (r) = Recorded By, (t) = Taken By, (c) = Cosigned By      Initials Name Provider Type    SP Madhu Neal OT Occupational Therapist                     Mobility/ADL's       Row Name 11/29/24 1054          Bed Mobility    Bed Mobility bed mobility (all) activities  -SP     All Activities, [place] (Bed Mobility) modified independence  -SP     Assistive Device (Bed Mobility) bed rails  -SP     Comment, (Bed Mobility) with increased time  -SP       Row Name 11/29/24 1054          Transfers    Transfers sit-stand transfer;stand-sit transfer  -SP       Row Name 11/29/24 1054          Sit-Stand Transfer    Sit-Stand [place] (Transfers) contact guard;minimum assist (75% patient effort)  -SP     Comment, (Sit-Stand Transfer) pt unsteady static standing d/t dizziness  -SP       Row Name 11/29/24 1054          Stand-Sit Transfer    Stand-Sit [place] (Transfers) contact guard  -SP       Row Name 11/29/24 1054          Functional Mobility    Functional Mobility- Ind. Level unable to perform  -SP     Patient was able to Ambulate no, other medical factors prevent ambulation  -SP     Reason Patient was unable to Ambulate Dizziness;Nausea/Vomiting  -SP       Row Name 11/29/24 1054          Activities of Daily Living    BADL Assessment/Intervention lower body dressing  -SP       Row Name 11/29/24 1054          Lower Body Dressing Assessment/Training    [place] Level (Lower Body Dressing) don;socks;independent  -SP     Position (Lower Body Dressing) edge of bed sitting;unsupported sitting  -SP               User Key  (r) = Recorded By, (t) = Taken By, (c) = Cosigned By      Initials Name Provider Type    Madhu Oliva OT Occupational Therapist                   Obj/Interventions       Row Name 11/29/24 1101          Sensory Assessment (Somatosensory)    Sensory  Assessment (Somatosensory) UE sensation intact  -SP       Row Name 11/29/24 1101          Range of Motion Comprehensive    Comment, General Range of Motion RUE WFLs, LUE ~75 2/2 residual stroke deficits  -SP       Row Name 11/29/24 1101          Strength Comprehensive (MMT)    Comment, General Manual Muscle Testing (MMT) Assessment RUE WNLs, LUE 3+/5  -SP       Row Name 11/29/24 1101          Balance    Balance Assessment sitting dynamic balance;sit to stand dynamic balance;standing static balance  -SP     Dynamic Sitting Balance standby assist  -SP     Position, Sitting Balance unsupported;sitting edge of bed  -SP     Sit to Stand Dynamic Balance contact guard  -SP     Static Standing Balance contact guard;minimal assist  -SP               User Key  (r) = Recorded By, (t) = Taken By, (c) = Cosigned By      Initials Name Provider Type    SP Madhu Neal, OT Occupational Therapist                   Goals/Plan       Row Name 11/29/24 1104          Bathing Goal 1 (OT)    Activity/Device (Bathing Goal 1, OT) bathing skills, all  -SP     [place] Level/Cues Needed (Bathing Goal 1, OT) modified independence  -SP     Time Frame (Bathing Goal 1, OT) 2 weeks  -SP     Strategies/Barriers (Bathing Goal 1, OT) until d/c  -SP       Row Name 11/29/24 1104          Dressing Goal 1 (OT)    Activity/Device (Dressing Goal 1, OT) dressing skills, all  -SP     [place]/Cues Needed (Dressing Goal 1, OT) modified independence  -SP     Time Frame (Dressing Goal 1, OT) 2 weeks  -SP     Strategies/Barriers (Dressing Goal 1, OT) until d/c  -SP       Row Name 11/29/24 1104          Toileting Goal 1 (OT)    Activity/Device (Toileting Goal 1, OT) toileting skills, all  -SP     [place] Level/Cues Needed (Toileting Goal 1, OT) modified independence  -SP     Time Frame (Toileting Goal 1, OT) 2 weeks  -SP     Strategies/Barriers (Toileting Goal 1, OT) until d/c  -SP       Row Name 11/29/24 1104          Strength Goal 1 (OT)     Strength Goal 1 (OT) Pt will demo goo duy of [place]  -SP     Time Frame (Strength Goal 1, OT) 2 weeks  -SP     Strategies/Barriers (Strength Goal 1, OT) until d/c  -SP       Row Name 11/29/24 1104          Therapy Assessment/Plan (OT)    Planned Therapy Interventions (OT) activity tolerance training;IADL retraining;occupation/activity based interventions;passive ROM/stretching;patient/caregiver education/training;ROM/therapeutic exercise;strengthening exercise;transfer/mobility retraining  -SP               User Key  (r) = Recorded By, (t) = Taken By, (c) = Cosigned By      Initials Name Provider Type    SP Madhu Neal OT Occupational Therapist                   Clinical Impression       Row Name 11/29/24 1102          Pain Assessment    Pretreatment Pain Rating 8/10  -SP     Posttreatment Pain Rating 8/10  -SP     Pain Location buttock  -SP       Row Name 11/29/24 1102          Plan of Care Review    Plan of Care Reviewed With patient  -SP     Outcome Evaluation Pt is a 68 y/o female admitted to [facility] on 11/28/24 with c/o AMS with x2 syncopal episodes with positional changes. Pt hypertensive in ED, 177/101. UA (+) UTI. Glucose 501, of note,  reports that pt has not been on Mounjaro due to medication shortage. CT head and chest XR (-). PMHx significant for DMII,  HTN, CVA, and HLD. At baseline, pt resides with spouse in multilevel home (bedrooms upstairs), with 5 FELICIA. Pt reports she is IND all I/ADLs, driving, and does not utilize any DME. Upon evaluation, pt A&O x4, reports 8/10 pain in buttocks. Pt IND donned socks sitting EOB completed bed mobility with mod I using bed rails. Pt comes to standing with CGA, intermittent min A as pt unsteady on her feet this date as pt is orthostatic hypotensive and symptomatic. BP as follows: /60, standing 69/36, return to supine 132/53. Pt appears to be near her baseline and anticipate return home with HHOT when medically appropriate. OT will continue  to follow to ensure pt maintains her independence, strength, endurance, and balance while in acute care setting.  -SP       Row Name 11/29/24 1102          Therapy Assessment/Plan (OT)    Criteria for Skilled Therapeutic Interventions Met (OT) yes;meets criteria;skilled treatment is necessary  -SP     Therapy Frequency (OT) 5 times/wk  -SP     Predicted Duration of Therapy Intervention (OT) until d/c  -SP       Row Name 11/29/24 1102          Therapy Plan Review/Discharge Plan (OT)    Anticipated Discharge Disposition (OT) home with home health  -SP       Row Name 11/29/24 1102          Vital Signs    Pre Systolic BP Rehab 115  -SP     Pre Treatment Diastolic BP 60  -SP     Intra Systolic BP Rehab 69  -SP     Intra Treatment Diastolic BP 36  -SP     Post Systolic BP Rehab 132  -SP     Post Treatment Diastolic BP 53  -SP     Pre SpO2 (%) 98  -SP     O2 Delivery Pre Treatment nasal cannula  2L O2 NC  -SP     O2 Delivery Intra Treatment room air  -SP     Post SpO2 (%) 96  -SP     O2 Delivery Post Treatment room air  -SP     Pre Patient Position Supine  -SP     Intra Patient Position Standing  -SP     Post Patient Position Supine  -SP       Row Name 11/29/24 1102          Positioning and Restraints    Pre-Treatment Position in bed  -SP     Post Treatment Position bed  -SP     In Bed notified nsg;fowlers;call light within reach;encouraged to call for assist;exit alarm on  -SP               User Key  (r) = Recorded By, (t) = Taken By, (c) = Cosigned By      Initials Name Provider Type    SP Madhu Neal, JON Occupational Therapist                   Outcome Measures       Row Name 11/29/24 1106          How much help from another is currently needed...    Putting on and taking off regular lower body clothing? 3  -SP     Bathing (including washing, rinsing, and drying) 4  -SP     Toileting (which includes using toilet bed pan or urinal) 4  -SP     Putting on and taking off regular upper body clothing 3  -SP     Taking  care of personal grooming (such as brushing teeth) 4  -SP     Eating meals 4  -SP     AM-PAC 6 Clicks Score (OT) 22  -SP       Row Name 11/29/24 1106          Functional Assessment    Outcome Measure Options AM-PAC 6 Clicks Daily Activity (OT)  -SP               User Key  (r) = Recorded By, (t) = Taken By, (c) = Cosigned By      Initials Name Provider Type    SP Madhu Neal, OT Occupational Therapist                    Occupational Therapy Education       Title: PT OT SLP Therapies (In Progress)       Topic: Occupational Therapy (In Progress)       Point: ADL training (Done)       Description:   Instruct learner(s) on proper safety adaptation and remediation techniques during self care or transfers.   Instruct in proper use of assistive devices.                  Learning Progress Summary            Patient Acceptance, E,TB, VU by SP at 11/29/2024 1106                      Point: Home exercise program (Not Started)       Description:   Instruct learner(s) on appropriate technique for monitoring, assisting and/or progressing therapeutic exercises/activities.                  Learner Progress:  Not documented in this visit.              Point: Precautions (Done)       Description:   Instruct learner(s) on prescribed precautions during self-care and functional transfers.                  Learning Progress Summary            Patient Acceptance, E,TB, VU by SP at 11/29/2024 1106                      Point: Body mechanics (Not Started)       Description:   Instruct learner(s) on proper positioning and spine alignment during self-care, functional mobility activities and/or exercises.                  Learner Progress:  Not documented in this visit.                              User Key       Initials Effective Dates Name Provider Type Discipline    SP 11/15/23 -  Madhu Neal, JON Occupational Therapist OT                  OT Recommendation and Plan  Planned Therapy Interventions (OT): activity tolerance training,  IADL retraining, occupation/activity based interventions, passive ROM/stretching, patient/caregiver education/training, ROM/therapeutic exercise, strengthening exercise, transfer/mobility retraining  Therapy Frequency (OT): 5 times/wk  Plan of Care Review  Plan of Care Reviewed With: patient  Outcome Evaluation: Pt is a 70 y/o female admitted to [facility] on 11/28/24 with c/o AMS with x2 syncopal episodes with positional changes. Pt hypertensive in ED, 177/101. UA (+) UTI. Glucose 501, of note,  reports that pt has not been on Mounjaro due to medication shortage. CT head and chest XR (-). PMHx significant for DMII,  HTN, CVA, and HLD. At baseline, pt resides with spouse in multilevel home (bedrooms upstairs), with 5 FELICIA. Pt reports she is IND all I/ADLs, driving, and does not utilize any DME. Upon evaluation, pt A&O x4, reports 8/10 pain in buttocks. Pt IND donned socks sitting EOB completed bed mobility with mod I using bed rails. Pt comes to standing with CGA, intermittent min A as pt unsteady on her feet this date as pt is orthostatic hypotensive and symptomatic. BP as follows: /60, standing 69/36, return to supine 132/53. Pt appears to be near her baseline and anticipate return home with HHOT when medically appropriate. OT will continue to follow to ensure pt maintains her independence, strength, endurance, and balance while in acute care setting.     Time Calculation:         Time Calculation- OT       Row Name 11/29/24 1106             Time Calculation- OT    OT Start Time 0840  -SP      OT Stop Time 0859  -SP      OT Time Calculation (min) 19 min  -SP      OT Received On 11/29/24  -SP      OT - Next Appointment 12/02/24  -SP      OT Goal Re-Cert Due Date 12/13/24  -SP                User Key  (r) = Recorded By, (t) = Taken By, (c) = Cosigned By      Initials Name Provider Type    Madhu Oliva OT Occupational Therapist                  Therapy Charges for Today       Code Description Service  Date Service Provider Modifiers Qty    10688145151 HC OT EVAL MOD COMPLEXITY 4 11/29/2024 Madhu Neal OT GO 1                 Madhu Neal OT  11/29/2024

## 2024-11-29 NOTE — NURSING NOTE
Patient had unassisted fall in bathroom at approximately 1215. Patient did not use call light for assistance out of restroom. Patient is alert and oriented time 4. Code 58 called. Patient did have did have xray to sacrum/coccyx due to complaints of pain, no injuries noted on results. New fall precautions implemented and patient education on fall precautions and importance of using call light for assistance at this time. Patient decline family notification of fall. Unit manager notified.

## 2024-11-29 NOTE — PROGRESS NOTES
[facility] MEDICINE SERVICE  DAILY PROGRESS NOTE    NAME: Marge Mcghee  : 1955  MRN: [id number]      LOS: 1 day     PROVIDER OF SERVICE: Brandon Ambriz MD    Chief Complaint: Hyperosmolar hyperglycemic state (HHS)    Subjective:     Interval History:  History taken from: patient    Feeling improved.        Review of Systems:   Review of Systems    Objective:     Vital Signs  Temp:  [97.6 °F (36.4 °C)-98 °F (36.7 °C)] 97.7 °F (36.5 °C)  Heart Rate:  [75-98] 88  Resp:  [13-20] 16  BP: (118-197)/() 165/93  Flow (L/min) (Oxygen Therapy):  [2] 2   Body mass index is 28.75 kg/m².    Physical Exam  Physical Exam  Constitutional:       Appearance: Normal appearance.   Cardiovascular:      Rate and Rhythm: Normal rate and regular rhythm.   Pulmonary:      Effort: Pulmonary effort is normal.      Breath sounds: Normal breath sounds.   Abdominal:      General: Abdomen is flat.      Palpations: Abdomen is soft.   Neurological:      Mental Status: She is alert. Mental status is at baseline.            Diagnostic Data    Results from last 7 days   Lab Units 24  0800 24  0026 24  1556   WBC 10*3/mm3  --  10.47  --  10.21   HEMOGLOBIN g/dL  --  10.6*  --  12.9   HEMATOCRIT %  --  32.6*  --  39.1   PLATELETS 10*3/mm3  --  210  --  261   GLUCOSE mg/dL  --  143*   < > 521*   CREATININE mg/dL  --  0.93   < > 1.19*   BUN mg/dL  --  16   < > 20   SODIUM mmol/L  --  138   < > 134*   POTASSIUM mmol/L 4.2 3.6   < > 4.2   AST (SGOT) U/L  --   --   --  26   ALT (SGPT) U/L  --   --   --  11   ALK PHOS U/L  --   --   --  85   BILIRUBIN mg/dL  --   --   --  0.4   ANION GAP mmol/L  --  7.1   < > 11.8    < > = values in this interval not displayed.       CT Head Without Contrast Stroke Protocol    Result Date: 2024  1.No evidence for acute intracranial abnormality. 2.Nonspecific white matter changes are noted with associated diffuse volume loss. These findings are likely related to  chronic small vessel ischemic changes and/or age-related changes. Electronically Signed: Wes Means MD  11/28/2024 3:42 PM EST  Workstation ID: [id number]    XR Chest 1 View    Result Date: 11/28/2024  Impression: No acute process. Electronically Signed: Honorio Olguin MD  11/28/2024 3:30 PM EST  Workstation ID: [id number]       I reviewed the patient's new clinical results.    Assessment/Plan:     Active and Resolved Problems  Active Hospital Problems    Diagnosis  POA    **Hyperosmolar hyperglycemic state (HHS) [E11.00]  Yes      Resolved Hospital Problems   No resolved problems to display.       Hyperglycemic hyperosmolar state  Urinary tract infection  Metabolic encephalopathy, improving  Type 2 diabetes mellitus  Continue current antibiotics.  Urine cultures growing gram-negative bacilli.  Adjust antibiotics based on this.  Transition off of the insulin drip today.  Increase her glargine from 10 to 15 units.  Give her another 5 units of glargine this afternoon.  Encephalopathy is greatly improving.  Okay to discontinue IV fluids as long as she is able to eat.  SCDs for VTE prophylaxis     Fall  Secondary to her weakness in the setting of infection  For pain on the coccyx obtain the x-ray of this.    Hypertension  Continue amlodipine     Hyperlipidemia  Continue ASA, statin, Brilinta     Chronic pain  Continue home pain medication, gabapentin, Cymbalta    VTE Prophylaxis:  Mechanical VTE prophylaxis orders are present.             Disposition Planning:     Barriers to Discharge: Antibiotics, glucose stability  Anticipated Date of Discharge: 11/30/2024  Place of Discharge: Home      Time: 50 minutes     Code Status and Medical Interventions: CPR (Attempt to Resuscitate); Full Support   Ordered at: 11/28/24 1718     Code Status (Patient has no pulse and is not breathing):    CPR (Attempt to Resuscitate)     Medical Interventions (Patient has pulse or is breathing):    Full Support       Signature: Electronically  signed by Brandon Ambriz MD, 11/29/24, 14:09 EST.  [facility] Hospitalist Team

## 2024-11-29 NOTE — CONSULTS
"Diabetes Education  Assessment/Teaching    Patient Name:  Marge Mcghee  YOB: 1955  MRN: [id number]  Admit Date:  11/28/2024      Assessment Date:  11/29/2024  Flowsheet Row Most Recent Value   General Information     Referral From: A1c, Blood glucose, MD order  [MD consult for blood glucose >200, admission blood sugar 501, and on 11/28/2024 A1c was 14.4%.]   Height 160 cm (62.99\")   Height Method Stated   Weight 73.6 kg (162 lb 4.1 oz)   Weight Method Bed scale   Pregnancy Assessment    Diabetes History    What type of diabetes do you have? Type 2   Current DM knowledge fair   Have you had diabetes education/teaching in the past? yes   When and where was your diabetes education? Inpatient hospitalizations at [facility] with last one being 12/6/2023   Do you test your blood sugar at home? yes   Frequency of checks once every 2 weeks   Meter type unsure of name or age   Who performs the test? patient   Typical readings 230   Have you had high blood sugar? (>140mg/dl) yes   How often do you have high blood sugar? unknown   When was your last high blood sugar? Admission blood sugar 501   Education Preferences    What areas of diabetes would you like to learn about? avoiding high blood sugar, avoiding low blood sugar, diabetes complications, medications for diabetes, testing my blood sugar at home   Nutrition Information    Assessment Topics    Taking Medication - Assessment Needs education   Problem Solving - Assessment Needs education   Reducing Risk - Assessment Needs education   Monitoring - Assessment Needs education   DM Goals    Taking Medication - Goal Today   Problem Solving - Goal Today   Reducing Risk - Goal Today   Monitoring - Goal Today            Flowsheet Row Most Recent Value   DM Education Needs    Meter Needs meter   Meter Type Accuchek   Frequency of Testing AC meals  [Discussed with patient that it is recommended to check blood sugar 3X a day before meals and sometimes at bedtime when on " insulin.]   Medication Insulin, Side effects, Administration, Actions, Pen  [Patient stated that she was on Mounjaro but hasn't been able to get for 3 months due to it being on back order.]   Problem Solving Hypoglycemia, Hyperglycemia, Signs, Symptoms, Treatment   Reducing Risks A1C testing  [On 11/28/2024 A1c was 14.4%.]   Discharge Plan Home   Motivation Moderate   Teaching Method Explanation, Discussion, Demonstration, Handouts, Teach back   Patient Response Demonstrates adequately, Verbalized understanding              Other Comments:  A1c info sheet given with discussion on A1c target and healthy blood sugar range. Explained to patient the importance of good blood sugar control and that drinking water and exercise were 2 natural ways to help with blood sugar control. Handouts given with discussion on types of insulin, storage of insulin, injection sites, disposal of needles, rotation of sites, and how to use an insulin pen. Patient did return demonstration of applying the pen needle to the insulin pen, priming the pen, administering the shot into the foam pad,and holding the pen for 10 seconds after administration. Handouts given with discussion on hypoglycemia and hyperglycemia signs, symptoms, and treatment. Patient did return demonstration with the Accu-chek Guide Me glucometer by inserting the test strip into the meter, inserting the lancet into the lancing device, cocking the lancet, and ejecting the lancet. Explained to patient that test strips have expiration dates.  Prescriptions started in discharge orders for lancets, test strips,Accu-chek Guide Me glucometer, Lantus, and pen needles. Patient has no further questions or concerns related to diabetes at this time.      Electronically signed by:  Ivelisse Owens RN  11/29/24 16:49 EST

## 2024-11-30 ENCOUNTER — READMISSION MANAGEMENT (OUTPATIENT)
Dept: CALL CENTER | Facility: HOSPITAL | Age: 69
End: 2024-11-30
Payer: MEDICARE

## 2024-11-30 VITALS
BODY MASS INDEX: 28.75 KG/M2 | OXYGEN SATURATION: 97 % | WEIGHT: 162.26 LBS | TEMPERATURE: 97.9 F | SYSTOLIC BLOOD PRESSURE: 136 MMHG | HEART RATE: 78 BPM | DIASTOLIC BLOOD PRESSURE: 95 MMHG | RESPIRATION RATE: 13 BRPM | HEIGHT: 63 IN

## 2024-11-30 LAB
ANION GAP SERPL CALCULATED.3IONS-SCNC: 6.7 MMOL/L (ref 5–15)
BACTERIA SPEC AEROBE CULT: ABNORMAL
BASOPHILS # BLD AUTO: 0.07 10*3/MM3 (ref 0–0.2)
BASOPHILS NFR BLD AUTO: 0.9 % (ref 0–1.5)
BUN SERPL-MCNC: 13 MG/DL (ref 8–23)
BUN/CREAT SERPL: 14.6 (ref 7–25)
CALCIUM SPEC-SCNC: 8.6 MG/DL (ref 8.6–10.5)
CHLORIDE SERPL-SCNC: 105 MMOL/L (ref 98–107)
CO2 SERPL-SCNC: 26.3 MMOL/L (ref 22–29)
CREAT SERPL-MCNC: 0.89 MG/DL (ref 0.57–1)
DEPRECATED RDW RBC AUTO: 41 FL (ref 37–54)
EGFRCR SERPLBLD CKD-EPI 2021: 69.4 ML/MIN/1.73
EOSINOPHIL # BLD AUTO: 0.36 10*3/MM3 (ref 0–0.4)
EOSINOPHIL NFR BLD AUTO: 4.4 % (ref 0.3–6.2)
ERYTHROCYTE [DISTWIDTH] IN BLOOD BY AUTOMATED COUNT: 12.5 % (ref 12.3–15.4)
GLUCOSE BLDC GLUCOMTR-MCNC: 207 MG/DL (ref 70–105)
GLUCOSE BLDC GLUCOMTR-MCNC: 292 MG/DL (ref 70–105)
GLUCOSE BLDC GLUCOMTR-MCNC: 292 MG/DL (ref 70–105)
GLUCOSE SERPL-MCNC: 181 MG/DL (ref 65–99)
HCT VFR BLD AUTO: 35.2 % (ref 34–46.6)
HGB BLD-MCNC: 11.4 G/DL (ref 12–15.9)
IMM GRANULOCYTES # BLD AUTO: 0.02 10*3/MM3 (ref 0–0.05)
IMM GRANULOCYTES NFR BLD AUTO: 0.2 % (ref 0–0.5)
LYMPHOCYTES # BLD AUTO: 3.06 10*3/MM3 (ref 0.7–3.1)
LYMPHOCYTES NFR BLD AUTO: 37.6 % (ref 19.6–45.3)
MAGNESIUM SERPL-MCNC: 1.7 MG/DL (ref 1.6–2.4)
MCH RBC QN AUTO: 29.2 PG (ref 26.6–33)
MCHC RBC AUTO-ENTMCNC: 32.4 G/DL (ref 31.5–35.7)
MCV RBC AUTO: 90.3 FL (ref 79–97)
MONOCYTES # BLD AUTO: 0.68 10*3/MM3 (ref 0.1–0.9)
MONOCYTES NFR BLD AUTO: 8.4 % (ref 5–12)
NEUTROPHILS NFR BLD AUTO: 3.94 10*3/MM3 (ref 1.7–7)
NEUTROPHILS NFR BLD AUTO: 48.5 % (ref 42.7–76)
NRBC BLD AUTO-RTO: 0 /100 WBC (ref 0–0.2)
PLATELET # BLD AUTO: 233 10*3/MM3 (ref 140–450)
PMV BLD AUTO: 9.3 FL (ref 6–12)
POTASSIUM SERPL-SCNC: 4.4 MMOL/L (ref 3.5–5.2)
RBC # BLD AUTO: 3.9 10*6/MM3 (ref 3.77–5.28)
SODIUM SERPL-SCNC: 138 MMOL/L (ref 136–145)
WBC NRBC COR # BLD AUTO: 8.13 10*3/MM3 (ref 3.4–10.8)

## 2024-11-30 PROCEDURE — 80048 BASIC METABOLIC PNL TOTAL CA: CPT

## 2024-11-30 PROCEDURE — 82948 REAGENT STRIP/BLOOD GLUCOSE: CPT

## 2024-11-30 PROCEDURE — 82948 REAGENT STRIP/BLOOD GLUCOSE: CPT | Performed by: INTERNAL MEDICINE

## 2024-11-30 PROCEDURE — 63710000001 INSULIN LISPRO (HUMAN) PER 5 UNITS: Performed by: INTERNAL MEDICINE

## 2024-11-30 PROCEDURE — 83735 ASSAY OF MAGNESIUM: CPT

## 2024-11-30 PROCEDURE — 63710000001 INSULIN GLARGINE PER 5 UNITS: Performed by: STUDENT IN AN ORGANIZED HEALTH CARE EDUCATION/TRAINING PROGRAM

## 2024-11-30 PROCEDURE — 85025 COMPLETE CBC W/AUTO DIFF WBC: CPT

## 2024-11-30 RX ORDER — PEN NEEDLE, DIABETIC 30 GX3/16"
1 NEEDLE, DISPOSABLE MISCELLANEOUS NIGHTLY
Qty: 100 EACH | Refills: 2 | Status: ON HOLD | OUTPATIENT
Start: 2024-11-30

## 2024-11-30 RX ORDER — BLOOD-GLUCOSE METER
1 EACH MISCELLANEOUS
Qty: 1 KIT | Refills: 0 | Status: ON HOLD | OUTPATIENT
Start: 2024-11-30

## 2024-11-30 RX ORDER — LANCETS
1 EACH MISCELLANEOUS
Qty: 100 EACH | Refills: 2 | Status: ON HOLD | OUTPATIENT
Start: 2024-11-30

## 2024-11-30 RX ORDER — METFORMIN HYDROCHLORIDE 500 MG/1
500 TABLET, EXTENDED RELEASE ORAL
Qty: 30 TABLET | Refills: 0 | Status: SHIPPED | OUTPATIENT
Start: 2024-11-30 | End: 2024-12-06

## 2024-11-30 RX ADMIN — INSULIN GLARGINE 15 UNITS: 100 INJECTION, SOLUTION SUBCUTANEOUS at 08:32

## 2024-11-30 RX ADMIN — INSULIN LISPRO 5 UNITS: 100 INJECTION, SOLUTION INTRAVENOUS; SUBCUTANEOUS at 17:11

## 2024-11-30 RX ADMIN — Medication 10 ML: at 08:33

## 2024-11-30 RX ADMIN — INSULIN LISPRO 5 UNITS: 100 INJECTION, SOLUTION INTRAVENOUS; SUBCUTANEOUS at 12:37

## 2024-11-30 RX ADMIN — INSULIN LISPRO 4 UNITS: 100 INJECTION, SOLUTION INTRAVENOUS; SUBCUTANEOUS at 08:32

## 2024-11-30 RX ADMIN — ACETAMINOPHEN 650 MG: 325 TABLET, FILM COATED ORAL at 08:52

## 2024-11-30 NOTE — NURSING NOTE
Patient DC'd home with family. Informed with  about importance of checking BG at home. Discussed meds and what next steps need to be taken.

## 2024-11-30 NOTE — OUTREACH NOTE
Prep Survey      Flowsheet Row Responses   [facility] facility patient discharged from? [facility]   Is LACE score < 7 ? No   Eligibility Readm Mgmt   Discharge diagnosis Hyperosmolar hyperglycemic state (HHS)   Does the patient have one of the following disease processes/diagnoses(primary or secondary)? Other   Does the patient have Home health ordered? No   Is there a DME ordered? No   Medication alerts for this patient see AVS   Prep survey completed? Yes            Breann BALDERAS - Registered Nurse

## 2024-11-30 NOTE — DISCHARGE SUMMARY
"[facility] Medicine Services  Discharge Summary    Date of Service: 2024  Patient Name: Marge Mcghee  : 1955  MRN: [id number]    Date of Admission: 2024  Discharge Diagnosis: Hyperosmolar hyperglycemic state (HHS)  Date of Discharge: 2024  Primary Care Physician: Traci Townsend APRN      Presenting Problem:   Urinary tract infection without hematuria, site unspecified [N39.0]  Syncope, unspecified syncope type [R55]  Hyperglycemia due to diabetes mellitus [E11.65]  Hyperosmolar hyperglycemic state (HHS) [E11.00]    Active and Resolved Hospital Problems:  Active Hospital Problems    Diagnosis POA    **Hyperosmolar hyperglycemic state (HHS) [E11.00] Yes      Resolved Hospital Problems   No resolved problems to display.         Hospital Course     HPI:    \"Marge Mcghee is a 69 y.o. female with a CMH of type 2 diabetes mellitus, HTN, HLD, who presented to [facility] on 2024 with altered mental status.  Patient mentation improved since onset.  She reports that she had a syncopal episode x 2 over the last 2 days.  She denies any preceding symptoms such as chest pain, palpitations, lightheadedness, diaphoresis.  She states syncopal episodes coincidently happened with change of position.  She denies hitting head.   is at bedside who reports that patient has not been on Mounjaro due to medication shortage.  However he states that patient's glucose is fairly controlled.  Patient currently denies abdominal pain, dysuria, hematuria, incontinence or any other urinary symptoms.     On ED evaluation, patient was noted to be hypertensive with blood pressure 177/101, otherwise hemodynamically stable and AF. Labs were pertinent for glucose >500.  CBC unremarkable.  UA with 21-50 WBCs and 4+ bacteria.  UDS negative.  CT head with no acute findings.  Chest x-ray with no acute findings.  Patient was given 1 L IV fluids in the ED.  Hospital service to admit for " "further management\"    Hospital Course:  Essentially patient was unable to receive her outpatient diabetes medication for a prolonged period of time and then got a UTI which landed her in HHS.  She rapidly improved with IV insulin and antibiotics.  IV insulin was converted to subcutaneous insulin and she did well.  Antibiotics converted to p.o. as well.    I discussed with her that given she is not able to access her Mounjaro with the shortage she will need to temporarily be on insulin therapy to keep her out of the hospital.  I started her on a small dose of glargine insulin daily.  She met with the diabetic educator to understand how to take her blood sugar and care for diabetes.    She was discharged 11/30/2024 in improved condition with instructions to take her insulin as prescribed and follow-up with her PCP regarding switching back her diabetic medications.        DISCHARGE Follow Up Recommendations for labs and diagnostics:     PCP        Day of Discharge     Vital Signs:  Temp:  [97.8 °F (36.6 °C)-98.3 °F (36.8 °C)] 97.8 °F (36.6 °C)  Heart Rate:  [75-89] 89  Resp:  [12-20] 17  BP: (156-177)/(64-98) 172/93    Physical Exam:  Physical Exam  Constitutional:       Appearance: Normal appearance.   Cardiovascular:      Rate and Rhythm: Normal rate and regular rhythm.      Pulses: Normal pulses.      Heart sounds: Normal heart sounds.   Pulmonary:      Effort: Pulmonary effort is normal.      Breath sounds: Normal breath sounds.   Abdominal:      General: Abdomen is flat.      Palpations: Abdomen is soft.   Neurological:      Mental Status: She is alert. Mental status is at baseline.            Pertinent  and/or Most Recent Results     LAB RESULTS:      Lab 11/30/24  0434 11/29/24  0026 11/28/24  1556   WBC 8.13 10.47 10.21   HEMOGLOBIN 11.4* 10.6* 12.9   HEMATOCRIT 35.2 32.6* 39.1   PLATELETS 233 210 261   NEUTROS ABS 3.94 5.19 6.10   IMMATURE GRANS (ABS) 0.02 0.03 0.03   LYMPHS ABS 3.06 3.85* 2.88   MONOS ABS " 0.68 0.97* 0.85   EOS ABS 0.36 0.35 0.28   MCV 90.3 89.8 89.5   PROTIME  --   --  12.7         Lab 11/30/24  0434 11/29/24  0800 11/29/24  0026 11/28/24 2028 11/28/24  1556   SODIUM 138  --  138 138 134*   POTASSIUM 4.4 4.2 3.6 3.2* 4.2   CHLORIDE 105  --  104 101 94*   CO2 26.3  --  26.9 27.9 28.2   ANION GAP 6.7  --  7.1 9.1 11.8   BUN 13  --  16 18 20   CREATININE 0.89  --  0.93 0.92 1.19*   EGFR 69.4  --  65.8 66.7 49.0*   GLUCOSE 181*  --  143* 236* 521*   CALCIUM 8.6  --  8.6 8.7 10.2   MAGNESIUM 1.7  --  1.7  --  1.9   PHOSPHORUS  --   --   --   --  4.4   HEMOGLOBIN A1C  --   --   --   --  14.40*         Lab 11/28/24  1556   TOTAL PROTEIN 7.9   ALBUMIN 4.4   GLOBULIN 3.5   ALT (SGPT) 11   AST (SGOT) 26   BILIRUBIN 0.4   ALK PHOS 85         Lab 11/28/24 1916 11/28/24  1556   PROBNP 144.0  --    HSTROP T 13 15*   PROTIME  --  12.7   INR  --  0.95             Lab 11/28/24  1556   ABO TYPING A   RH TYPING Positive   ANTIBODY SCREEN Negative         Brief Urine Lab Results  (Last result in the past 365 days)        Color   Clarity   Blood   Leuk Est   Nitrite   Protein   CREAT   Urine HCG        11/28/24 1646 Yellow   Clear   Negative   Trace   Negative   Negative                 Microbiology Results (last 10 days)       Procedure Component Value - Date/Time    Blood Culture - Blood, Arm, Left [[id number]]  (Normal) Collected: 11/28/24 1916    Lab Status: Preliminary result Specimen: Blood from Arm, Left Updated: 11/29/24 1931     Blood Culture No growth at 24 hours    Blood Culture - Blood, Arm, Left [[id number]]  (Normal) Collected: 11/28/24 1916    Lab Status: Preliminary result Specimen: Blood from Arm, Left Updated: 11/29/24 1931     Blood Culture No growth at 24 hours    Urine Culture - Urine, Urine, Clean Catch [[id number]]  (Abnormal)  (Susceptibility) Collected: 11/28/24 1646    Lab Status: Final result Specimen: Urine, Clean Catch Updated: 11/30/24 1227     Urine Culture >100,000 CFU/mL Escherichia coli     Narrative:      Colonization of the urinary tract without infection is common. Treatment is discouraged unless the patient is symptomatic, pregnant, or undergoing an invasive urologic procedure.    Susceptibility        Escherichia coli      NORMA      Amoxicillin + Clavulanate Susceptible      Ampicillin Susceptible      Ampicillin + Sulbactam Susceptible      Cefazolin (Urine) Susceptible      Cefepime Susceptible      Ceftazidime Susceptible      Ceftriaxone Susceptible      Gentamicin Susceptible      Levofloxacin Susceptible      Nitrofurantoin Susceptible      Piperacillin + Tazobactam Susceptible      Trimethoprim + Sulfamethoxazole Susceptible                                   XR Sacrum & Coccyx    Result Date: 11/29/2024  Impression: Impression: No acute abnormality of the sacrum or coccyx is identified. Electronically Signed: Blanca Glass MD  11/29/2024 2:37 PM EST  Workstation ID: [id number]    CT Head Without Contrast Stroke Protocol    Result Date: 11/28/2024  Impression: 1.No evidence for acute intracranial abnormality. 2.Nonspecific white matter changes are noted with associated diffuse volume loss. These findings are likely related to chronic small vessel ischemic changes and/or age-related changes. Electronically Signed: Wes Means MD  11/28/2024 3:42 PM EST  Workstation ID: [id number]    XR Chest 1 View    Result Date: 11/28/2024  Impression: Impression: No acute process. Electronically Signed: Honorio Olguin MD  11/28/2024 3:30 PM EST  Workstation ID: [id number]     Results for orders placed during the hospital encounter of 09/25/22    Bilateral Carotid Duplex    Interpretation Summary  · Proximal right internal carotid artery is normal.  · Proximal left internal carotid artery is normal.      Results for orders placed during the hospital encounter of 09/25/22    Bilateral Carotid Duplex    Interpretation Summary  · Proximal right internal carotid artery is normal.  · Proximal left internal  carotid artery is normal.      Results for orders placed during the hospital encounter of 08/01/23    Adult Transthoracic Echo Complete W/ Cont if Necessary Per Protocol    Interpretation Summary    Left ventricular systolic function is normal. Left ventricular ejection fraction appears to be 61 - 65%.    Left ventricular wall thickness is consistent with mild concentric hypertrophy.    Left ventricular diastolic function was normal.    The left atrial cavity is mildly dilated.    Estimated right ventricular systolic pressure from tricuspid regurgitation is mildly elevated (35-45 mmHg). Calculated right ventricular systolic pressure from tricuspid regurgitation is 35 mmHg.      Labs Pending at Discharge:  Pending Results       None            Procedures Performed           Consults:   Consults       Date and Time Order Name Status Description    11/28/2024  4:52 PM Hospitalist (on-call MD unless specified)                Discharge Details        Discharge Medications        New Medications        Instructions Start Date   Accu-Chek FastClix Lancets misc   1 each, Not Applicable, 3 Times Daily Before Meals, Dx code: E11.65      Accu-Chek Softclix Lancets lancets   1 each, Other, 3 Times Daily Before Meals, Use as instructed Dx code: E11.65      Accu-Chek Guide Me w/Device kit   1 each, Not Applicable, 3 Times Daily Before Meals, Dx code: E11.65  NDC [address]-26  Bin#: [id number] Group #: [id number]  ID #: [id number]  Issuer #: [id number])      Insulin Glargine 100 UNIT/ML injection pen  Commonly known as: LANTUS SOLOSTAR   15 Units, Subcutaneous, Daily, Dx code: E11.65      metFORMIN  MG 24 hr tablet  Commonly known as: GLUCOPHAGE-XR   500 mg, Oral, Daily With Breakfast      Pen Needles 32G X 4 MM misc   1 each, Not Applicable, Nightly, Dx code: E11.65             Continue These Medications        Instructions Start Date   acetaminophen 325 MG tablet  Commonly known as: TYLENOL   650 mg, Oral, Every 4 Hours PRN       amLODIPine 10 MG tablet  Commonly known as: NORVASC   10 mg, Oral, Daily      aspirin 81 MG EC tablet   1 tablet, Oral, Daily      atorvastatin 80 MG tablet  Commonly known as: LIPITOR   80 mg, Oral, Nightly      butalbital-acetaminophen-caffeine -40 MG per tablet  Commonly known as: FIORICET, ESGIC   1 tablet, Oral, Every 4 Hours PRN      DULoxetine 60 MG capsule  Commonly known as: CYMBALTA   1 capsule, Oral, Daily      gabapentin 600 MG tablet  Commonly known as: NEURONTIN   600 mg, Oral, 3 Times Daily      HYDROcodone-acetaminophen 5-325 MG per tablet  Commonly known as: NORCO   1 tablet, Oral, Every 6 Hours PRN      multivitamin with minerals tablet tablet   1 tablet, Oral, Daily      Prolia 60 MG/ML solution prefilled syringe syringe  Generic drug: denosumab   60 mg, Subcutaneous, Once      ticagrelor 90 MG tablet tablet  Commonly known as: BRILINTA   90 mg, Oral, 2 Times Daily      Tirzepatide 12.5 MG/0.5ML solution auto-injector   0.5 mL, Subcutaneous, Weekly, Wednesday.      topiramate 25 MG tablet  Commonly known as: TOPAMAX   25 mg, Oral, Daily      traZODone 50 MG tablet  Commonly known as: DESYREL   1 tablet, Oral, Nightly               Allergies   Allergen Reactions    Zofran [Ondansetron Hcl] Nausea And Vomiting     Does the opposite of its purpose    Prochlorperazine Other (See Comments)     CAUSED SEIZURE    Prochlorperazine Edisylate Hives    Hydralazine Itching and Rash    Hydralazine Hcl Itching and Rash    Meperidine Itching and Swelling    Prochlorperazine Maleate Other (See Comments)     CAUSES SEIZURE    Prochlorperazine Maleate Irritability         Discharge Disposition:   Home or Self Care    Diet:  Hospital:  Diet Order   Procedures    Diet: Diabetic; Consistent Carbohydrate; Fluid Consistency: Thin (IDDSI 0)         Discharge Activity:         CODE STATUS:  Code Status and Medical Interventions: CPR (Attempt to Resuscitate); Full Support   Ordered at: 11/28/24 1708     Code Status  (Patient has no pulse and is not breathing):    CPR (Attempt to Resuscitate)     Medical Interventions (Patient has pulse or is breathing):    Full Support         No future appointments.        Time spent on Discharge including face to face service:  60 minutes    Signature: Electronically signed by Brandon Ambriz MD, 11/30/24, 14:16 EST.  [facility]

## 2024-12-02 ENCOUNTER — TELEPHONE (OUTPATIENT)
Dept: DIABETES SERVICES | Facility: HOSPITAL | Age: 69
End: 2024-12-02
Payer: MEDICARE

## 2024-12-02 NOTE — CASE MANAGEMENT/SOCIAL WORK
Case Management Discharge Note      Final Note: routine home    Provided Post Acute Provider List?: N/A  Provided Post Acute Provider Quality & Resource List?: N/A    Selected Continued Care - Discharged on 11/30/2024 Admission date: 11/28/2024 - Discharge disposition: Home or Self Care       Transportation Services  Private: Car    Final Discharge Disposition Code: 01 - home or self-care

## 2024-12-03 LAB
BACTERIA SPEC AEROBE CULT: NORMAL
BACTERIA SPEC AEROBE CULT: NORMAL

## 2024-12-04 ENCOUNTER — READMISSION MANAGEMENT (OUTPATIENT)
Dept: CALL CENTER | Facility: HOSPITAL | Age: 69
End: 2024-12-04
Payer: MEDICARE

## 2024-12-04 NOTE — OUTREACH NOTE
Medical Week 1 Survey      Flowsheet Row Responses   [facility] facility patient discharged from? [facility]   Does the patient have one of the following disease processes/diagnoses(primary or secondary)? Other   Week 1 attempt successful? No   Unsuccessful attempts Attempt 1            Shama MANLEY - Registered Nurse

## 2024-12-06 ENCOUNTER — HOSPITAL ENCOUNTER (INPATIENT)
Facility: HOSPITAL | Age: 69
LOS: 6 days | Discharge: NURSING, REHAB OR LONG-TERM CARE | DRG: 683 | End: 2024-12-12
Attending: EMERGENCY MEDICINE | Admitting: FAMILY MEDICINE
Payer: COMMERCIAL

## 2024-12-06 ENCOUNTER — APPOINTMENT (OUTPATIENT)
Dept: CT IMAGING | Facility: HOSPITAL | Age: 69
DRG: 683 | End: 2024-12-06
Payer: COMMERCIAL

## 2024-12-06 ENCOUNTER — READMISSION MANAGEMENT (OUTPATIENT)
Dept: CALL CENTER | Facility: HOSPITAL | Age: 69
End: 2024-12-06
Payer: MEDICARE

## 2024-12-06 ENCOUNTER — APPOINTMENT (OUTPATIENT)
Dept: ULTRASOUND IMAGING | Facility: HOSPITAL | Age: 69
DRG: 683 | End: 2024-12-06
Payer: COMMERCIAL

## 2024-12-06 DIAGNOSIS — N17.9 AKI (ACUTE KIDNEY INJURY): Primary | ICD-10-CM

## 2024-12-06 DIAGNOSIS — N39.0 URINARY TRACT INFECTION WITHOUT HEMATURIA, SITE UNSPECIFIED: ICD-10-CM

## 2024-12-06 LAB
ALBUMIN SERPL-MCNC: 4 G/DL (ref 3.5–5.2)
ALBUMIN/GLOB SERPL: 1.3 G/DL
ALP SERPL-CCNC: 65 U/L (ref 39–117)
ALT SERPL W P-5'-P-CCNC: 9 U/L (ref 1–33)
ANION GAP SERPL CALCULATED.3IONS-SCNC: 10.7 MMOL/L (ref 5–15)
ANION GAP SERPL CALCULATED.3IONS-SCNC: 14.3 MMOL/L (ref 5–15)
APTT PPP: 25.8 SECONDS (ref 22.7–35.4)
AST SERPL-CCNC: 23 U/L (ref 1–32)
BACTERIA UR QL AUTO: ABNORMAL /HPF
BASOPHILS # BLD AUTO: 0.08 10*3/MM3 (ref 0–0.2)
BASOPHILS # BLD AUTO: 0.09 10*3/MM3 (ref 0–0.2)
BASOPHILS NFR BLD AUTO: 0.7 % (ref 0–1.5)
BASOPHILS NFR BLD AUTO: 0.8 % (ref 0–1.5)
BILIRUB SERPL-MCNC: 0.5 MG/DL (ref 0–1.2)
BILIRUB UR QL STRIP: ABNORMAL
BUN SERPL-MCNC: 39 MG/DL (ref 8–23)
BUN SERPL-MCNC: 40 MG/DL (ref 8–23)
BUN/CREAT SERPL: 14.4 (ref 7–25)
BUN/CREAT SERPL: 17.3 (ref 7–25)
CALCIUM SPEC-SCNC: 10.1 MG/DL (ref 8.6–10.5)
CALCIUM SPEC-SCNC: 10.9 MG/DL (ref 8.6–10.5)
CHLORIDE SERPL-SCNC: 93 MMOL/L (ref 98–107)
CHLORIDE SERPL-SCNC: 96 MMOL/L (ref 98–107)
CK SERPL-CCNC: 46 U/L (ref 20–180)
CLARITY UR: ABNORMAL
CO2 SERPL-SCNC: 28.3 MMOL/L (ref 22–29)
CO2 SERPL-SCNC: 28.7 MMOL/L (ref 22–29)
COLOR UR: ABNORMAL
CREAT SERPL-MCNC: 2.26 MG/DL (ref 0.57–1)
CREAT SERPL-MCNC: 2.77 MG/DL (ref 0.57–1)
D-LACTATE SERPL-SCNC: 2 MMOL/L (ref 0.5–2)
DEPRECATED RDW RBC AUTO: 41.1 FL (ref 37–54)
DEPRECATED RDW RBC AUTO: 42.1 FL (ref 37–54)
EGFRCR SERPLBLD CKD-EPI 2021: 17.8 ML/MIN/1.73
EGFRCR SERPLBLD CKD-EPI 2021: 22.7 ML/MIN/1.73
EOSINOPHIL # BLD AUTO: 0.14 10*3/MM3 (ref 0–0.4)
EOSINOPHIL # BLD AUTO: 0.19 10*3/MM3 (ref 0–0.4)
EOSINOPHIL NFR BLD AUTO: 1 % (ref 0.3–6.2)
EOSINOPHIL NFR BLD AUTO: 1.8 % (ref 0.3–6.2)
ERYTHROCYTE [DISTWIDTH] IN BLOOD BY AUTOMATED COUNT: 12.5 % (ref 12.3–15.4)
ERYTHROCYTE [DISTWIDTH] IN BLOOD BY AUTOMATED COUNT: 12.6 % (ref 12.3–15.4)
ERYTHROCYTE [SEDIMENTATION RATE] IN BLOOD: 49 MM/HR (ref 0–30)
GEN 5 1HR TROPONIN T REFLEX: 20 NG/L
GLOBULIN UR ELPH-MCNC: 3 GM/DL
GLUCOSE BLDC GLUCOMTR-MCNC: 229 MG/DL (ref 70–105)
GLUCOSE BLDC GLUCOMTR-MCNC: 253 MG/DL (ref 70–105)
GLUCOSE SERPL-MCNC: 246 MG/DL (ref 65–99)
GLUCOSE SERPL-MCNC: 289 MG/DL (ref 65–99)
GLUCOSE UR STRIP-MCNC: ABNORMAL MG/DL
HCT VFR BLD AUTO: 35.3 % (ref 34–46.6)
HCT VFR BLD AUTO: 40.9 % (ref 34–46.6)
HGB BLD-MCNC: 11.4 G/DL (ref 12–15.9)
HGB BLD-MCNC: 13.2 G/DL (ref 12–15.9)
HGB UR QL STRIP.AUTO: NEGATIVE
HYALINE CASTS UR QL AUTO: ABNORMAL /LPF
IMM GRANULOCYTES # BLD AUTO: 0.03 10*3/MM3 (ref 0–0.05)
IMM GRANULOCYTES # BLD AUTO: 0.04 10*3/MM3 (ref 0–0.05)
IMM GRANULOCYTES NFR BLD AUTO: 0.3 % (ref 0–0.5)
IMM GRANULOCYTES NFR BLD AUTO: 0.3 % (ref 0–0.5)
INR PPP: 1.05 (ref 0.9–1.1)
KETONES UR QL STRIP: ABNORMAL
LEUKOCYTE ESTERASE UR QL STRIP.AUTO: ABNORMAL
LYMPHOCYTES # BLD AUTO: 3.43 10*3/MM3 (ref 0.7–3.1)
LYMPHOCYTES # BLD AUTO: 3.61 10*3/MM3 (ref 0.7–3.1)
LYMPHOCYTES NFR BLD AUTO: 24.9 % (ref 19.6–45.3)
LYMPHOCYTES NFR BLD AUTO: 34.8 % (ref 19.6–45.3)
MAGNESIUM SERPL-MCNC: 1.9 MG/DL (ref 1.6–2.4)
MCH RBC QN AUTO: 28.9 PG (ref 26.6–33)
MCH RBC QN AUTO: 29.7 PG (ref 26.6–33)
MCHC RBC AUTO-ENTMCNC: 32.3 G/DL (ref 31.5–35.7)
MCHC RBC AUTO-ENTMCNC: 32.3 G/DL (ref 31.5–35.7)
MCV RBC AUTO: 89.5 FL (ref 79–97)
MCV RBC AUTO: 91.9 FL (ref 79–97)
MONOCYTES # BLD AUTO: 0.88 10*3/MM3 (ref 0.1–0.9)
MONOCYTES # BLD AUTO: 1.21 10*3/MM3 (ref 0.1–0.9)
MONOCYTES NFR BLD AUTO: 8.5 % (ref 5–12)
MONOCYTES NFR BLD AUTO: 8.8 % (ref 5–12)
NEUTROPHILS NFR BLD AUTO: 5.58 10*3/MM3 (ref 1.7–7)
NEUTROPHILS NFR BLD AUTO: 53.8 % (ref 42.7–76)
NEUTROPHILS NFR BLD AUTO: 64.3 % (ref 42.7–76)
NEUTROPHILS NFR BLD AUTO: 8.87 10*3/MM3 (ref 1.7–7)
NITRITE UR QL STRIP: NEGATIVE
NRBC BLD AUTO-RTO: 0 /100 WBC (ref 0–0.2)
NRBC BLD AUTO-RTO: 0 /100 WBC (ref 0–0.2)
OSMOLALITY UR: 399 MOSM/KG (ref 300–800)
PH UR STRIP.AUTO: <=5 [PH] (ref 5–8)
PHOSPHATE SERPL-MCNC: 4.5 MG/DL (ref 2.5–4.5)
PLATELET # BLD AUTO: 266 10*3/MM3 (ref 140–450)
PLATELET # BLD AUTO: 305 10*3/MM3 (ref 140–450)
PMV BLD AUTO: 9.4 FL (ref 6–12)
PMV BLD AUTO: 9.4 FL (ref 6–12)
POTASSIUM SERPL-SCNC: 3.6 MMOL/L (ref 3.5–5.2)
POTASSIUM SERPL-SCNC: 3.7 MMOL/L (ref 3.5–5.2)
PROT SERPL-MCNC: 7 G/DL (ref 6–8.5)
PROT UR QL STRIP: ABNORMAL
PROTHROMBIN TIME: 13.9 SECONDS (ref 11.7–14.2)
RBC # BLD AUTO: 3.84 10*6/MM3 (ref 3.77–5.28)
RBC # BLD AUTO: 4.57 10*6/MM3 (ref 3.77–5.28)
RBC # UR STRIP: ABNORMAL /HPF
REF LAB TEST METHOD: ABNORMAL
RENAL EPI CELLS #/AREA URNS HPF: ABNORMAL /HPF
SODIUM SERPL-SCNC: 135 MMOL/L (ref 136–145)
SODIUM SERPL-SCNC: 136 MMOL/L (ref 136–145)
SODIUM UR-SCNC: 28 MMOL/L
SP GR UR STRIP: 1.02 (ref 1–1.03)
SQUAMOUS #/AREA URNS HPF: ABNORMAL /HPF
TROPONIN T NUMERIC DELTA: -7 NG/L
TROPONIN T SERPL HS-MCNC: 27 NG/L
URATE SERPL-MCNC: 7.1 MG/DL (ref 2.4–5.7)
UROBILINOGEN UR QL STRIP: ABNORMAL
WBC # UR STRIP: ABNORMAL /HPF
WBC NRBC COR # BLD AUTO: 10.37 10*3/MM3 (ref 3.4–10.8)
WBC NRBC COR # BLD AUTO: 13.78 10*3/MM3 (ref 3.4–10.8)

## 2024-12-06 PROCEDURE — 85025 COMPLETE CBC W/AUTO DIFF WBC: CPT

## 2024-12-06 PROCEDURE — 83605 ASSAY OF LACTIC ACID: CPT

## 2024-12-06 PROCEDURE — 87040 BLOOD CULTURE FOR BACTERIA: CPT

## 2024-12-06 PROCEDURE — 25010000002 DIPHENHYDRAMINE PER 50 MG

## 2024-12-06 PROCEDURE — 25010000002 CEFTRIAXONE PER 250 MG

## 2024-12-06 PROCEDURE — 25810000003 SODIUM CHLORIDE 0.9 % SOLUTION

## 2024-12-06 PROCEDURE — 85730 THROMBOPLASTIN TIME PARTIAL: CPT

## 2024-12-06 PROCEDURE — 84550 ASSAY OF BLOOD/URIC ACID: CPT

## 2024-12-06 PROCEDURE — 85610 PROTHROMBIN TIME: CPT

## 2024-12-06 PROCEDURE — 82948 REAGENT STRIP/BLOOD GLUCOSE: CPT

## 2024-12-06 PROCEDURE — 83935 ASSAY OF URINE OSMOLALITY: CPT

## 2024-12-06 PROCEDURE — 99285 EMERGENCY DEPT VISIT HI MDM: CPT

## 2024-12-06 PROCEDURE — 70450 CT HEAD/BRAIN W/O DYE: CPT

## 2024-12-06 PROCEDURE — 81001 URINALYSIS AUTO W/SCOPE: CPT

## 2024-12-06 PROCEDURE — 25010000002 METOCLOPRAMIDE PER 10 MG

## 2024-12-06 PROCEDURE — 87086 URINE CULTURE/COLONY COUNT: CPT

## 2024-12-06 PROCEDURE — G0378 HOSPITAL OBSERVATION PER HR: HCPCS

## 2024-12-06 PROCEDURE — 93010 ELECTROCARDIOGRAM REPORT: CPT | Performed by: INTERNAL MEDICINE

## 2024-12-06 PROCEDURE — 25010000002 HYDROMORPHONE PER 4 MG

## 2024-12-06 PROCEDURE — 80053 COMPREHEN METABOLIC PANEL: CPT

## 2024-12-06 PROCEDURE — 25010000002 HYDROMORPHONE PER 4 MG: Performed by: STUDENT IN AN ORGANIZED HEALTH CARE EDUCATION/TRAINING PROGRAM

## 2024-12-06 PROCEDURE — 84300 ASSAY OF URINE SODIUM: CPT

## 2024-12-06 PROCEDURE — 85652 RBC SED RATE AUTOMATED: CPT

## 2024-12-06 PROCEDURE — 84100 ASSAY OF PHOSPHORUS: CPT

## 2024-12-06 PROCEDURE — 82550 ASSAY OF CK (CPK): CPT

## 2024-12-06 PROCEDURE — 36415 COLL VENOUS BLD VENIPUNCTURE: CPT

## 2024-12-06 PROCEDURE — 63710000001 INSULIN LISPRO (HUMAN) PER 5 UNITS

## 2024-12-06 PROCEDURE — 83735 ASSAY OF MAGNESIUM: CPT

## 2024-12-06 PROCEDURE — 93005 ELECTROCARDIOGRAM TRACING: CPT

## 2024-12-06 PROCEDURE — 84484 ASSAY OF TROPONIN QUANT: CPT

## 2024-12-06 RX ORDER — ACETAMINOPHEN 160 MG/5ML
650 SOLUTION ORAL EVERY 4 HOURS PRN
Status: DISCONTINUED | OUTPATIENT
Start: 2024-12-06 | End: 2024-12-12 | Stop reason: HOSPADM

## 2024-12-06 RX ORDER — DEXTROSE MONOHYDRATE 25 G/50ML
25 INJECTION, SOLUTION INTRAVENOUS
Status: DISCONTINUED | OUTPATIENT
Start: 2024-12-06 | End: 2024-12-12 | Stop reason: HOSPADM

## 2024-12-06 RX ORDER — SODIUM CHLORIDE 9 MG/ML
125 INJECTION, SOLUTION INTRAVENOUS CONTINUOUS
Status: DISPENSED | OUTPATIENT
Start: 2024-12-06 | End: 2024-12-07

## 2024-12-06 RX ORDER — METOCLOPRAMIDE HYDROCHLORIDE 5 MG/ML
10 INJECTION INTRAMUSCULAR; INTRAVENOUS ONCE
Status: COMPLETED | OUTPATIENT
Start: 2024-12-06 | End: 2024-12-06

## 2024-12-06 RX ORDER — ACETAMINOPHEN 325 MG/1
650 TABLET ORAL EVERY 4 HOURS PRN
Status: DISCONTINUED | OUTPATIENT
Start: 2024-12-06 | End: 2024-12-12 | Stop reason: HOSPADM

## 2024-12-06 RX ORDER — TRAZODONE HYDROCHLORIDE 50 MG/1
50 TABLET, FILM COATED ORAL NIGHTLY
Status: DISCONTINUED | OUTPATIENT
Start: 2024-12-06 | End: 2024-12-12 | Stop reason: HOSPADM

## 2024-12-06 RX ORDER — NITROGLYCERIN 0.4 MG/1
0.4 TABLET SUBLINGUAL
Status: DISCONTINUED | OUTPATIENT
Start: 2024-12-06 | End: 2024-12-12 | Stop reason: HOSPADM

## 2024-12-06 RX ORDER — CLOPIDOGREL BISULFATE 75 MG/1
75 TABLET ORAL DAILY
COMMUNITY

## 2024-12-06 RX ORDER — IBUPROFEN 600 MG/1
1 TABLET ORAL
Status: DISCONTINUED | OUTPATIENT
Start: 2024-12-06 | End: 2024-12-12 | Stop reason: HOSPADM

## 2024-12-06 RX ORDER — ONDANSETRON 2 MG/ML
4 INJECTION INTRAMUSCULAR; INTRAVENOUS EVERY 6 HOURS PRN
Status: DISCONTINUED | OUTPATIENT
Start: 2024-12-06 | End: 2024-12-11

## 2024-12-06 RX ORDER — ATORVASTATIN CALCIUM 40 MG/1
80 TABLET, FILM COATED ORAL NIGHTLY
Status: DISCONTINUED | OUTPATIENT
Start: 2024-12-06 | End: 2024-12-12 | Stop reason: HOSPADM

## 2024-12-06 RX ORDER — DIPHENHYDRAMINE HYDROCHLORIDE 50 MG/ML
12.5 INJECTION INTRAMUSCULAR; INTRAVENOUS ONCE
Status: COMPLETED | OUTPATIENT
Start: 2024-12-06 | End: 2024-12-06

## 2024-12-06 RX ORDER — AMLODIPINE BESYLATE 5 MG/1
10 TABLET ORAL DAILY
Status: DISCONTINUED | OUTPATIENT
Start: 2024-12-07 | End: 2024-12-09

## 2024-12-06 RX ORDER — BISACODYL 5 MG/1
5 TABLET, DELAYED RELEASE ORAL DAILY PRN
Status: DISCONTINUED | OUTPATIENT
Start: 2024-12-06 | End: 2024-12-12 | Stop reason: HOSPADM

## 2024-12-06 RX ORDER — HYDROMORPHONE HYDROCHLORIDE 1 MG/ML
0.25 INJECTION, SOLUTION INTRAMUSCULAR; INTRAVENOUS; SUBCUTANEOUS ONCE
Status: COMPLETED | OUTPATIENT
Start: 2024-12-06 | End: 2024-12-06

## 2024-12-06 RX ORDER — BISACODYL 10 MG
10 SUPPOSITORY, RECTAL RECTAL DAILY PRN
Status: DISCONTINUED | OUTPATIENT
Start: 2024-12-06 | End: 2024-12-12 | Stop reason: HOSPADM

## 2024-12-06 RX ORDER — SODIUM CHLORIDE 0.9 % (FLUSH) 0.9 %
10 SYRINGE (ML) INJECTION EVERY 12 HOURS SCHEDULED
Status: DISCONTINUED | OUTPATIENT
Start: 2024-12-06 | End: 2024-12-12 | Stop reason: HOSPADM

## 2024-12-06 RX ORDER — ACETAMINOPHEN 650 MG/1
650 SUPPOSITORY RECTAL EVERY 4 HOURS PRN
Status: DISCONTINUED | OUTPATIENT
Start: 2024-12-06 | End: 2024-12-12 | Stop reason: HOSPADM

## 2024-12-06 RX ORDER — ASPIRIN 81 MG/1
81 TABLET ORAL DAILY
Status: DISCONTINUED | OUTPATIENT
Start: 2024-12-07 | End: 2024-12-12 | Stop reason: HOSPADM

## 2024-12-06 RX ORDER — LISINOPRIL 5 MG/1
5 TABLET ORAL DAILY
COMMUNITY
End: 2024-12-12 | Stop reason: HOSPADM

## 2024-12-06 RX ORDER — SODIUM CHLORIDE 0.9 % (FLUSH) 0.9 %
10 SYRINGE (ML) INJECTION AS NEEDED
Status: DISCONTINUED | OUTPATIENT
Start: 2024-12-06 | End: 2024-12-12 | Stop reason: HOSPADM

## 2024-12-06 RX ORDER — INSULIN LISPRO 100 [IU]/ML
2-9 INJECTION, SOLUTION INTRAVENOUS; SUBCUTANEOUS
Status: DISCONTINUED | OUTPATIENT
Start: 2024-12-06 | End: 2024-12-12 | Stop reason: HOSPADM

## 2024-12-06 RX ORDER — CLOPIDOGREL BISULFATE 75 MG/1
75 TABLET ORAL DAILY
Status: DISCONTINUED | OUTPATIENT
Start: 2024-12-07 | End: 2024-12-12 | Stop reason: HOSPADM

## 2024-12-06 RX ORDER — AMOXICILLIN 250 MG
2 CAPSULE ORAL 2 TIMES DAILY PRN
Status: DISCONTINUED | OUTPATIENT
Start: 2024-12-06 | End: 2024-12-12 | Stop reason: HOSPADM

## 2024-12-06 RX ORDER — HYDROMORPHONE HYDROCHLORIDE 1 MG/ML
0.5 INJECTION, SOLUTION INTRAMUSCULAR; INTRAVENOUS; SUBCUTANEOUS ONCE
Status: COMPLETED | OUTPATIENT
Start: 2024-12-06 | End: 2024-12-06

## 2024-12-06 RX ORDER — SODIUM CHLORIDE 9 MG/ML
40 INJECTION, SOLUTION INTRAVENOUS AS NEEDED
Status: DISCONTINUED | OUTPATIENT
Start: 2024-12-06 | End: 2024-12-12 | Stop reason: HOSPADM

## 2024-12-06 RX ORDER — NICOTINE POLACRILEX 4 MG
15 LOZENGE BUCCAL
Status: DISCONTINUED | OUTPATIENT
Start: 2024-12-06 | End: 2024-12-12 | Stop reason: HOSPADM

## 2024-12-06 RX ORDER — POLYETHYLENE GLYCOL 3350 17 G/17G
17 POWDER, FOR SOLUTION ORAL DAILY PRN
Status: DISCONTINUED | OUTPATIENT
Start: 2024-12-06 | End: 2024-12-12 | Stop reason: HOSPADM

## 2024-12-06 RX ORDER — GABAPENTIN 100 MG/1
200 CAPSULE ORAL EVERY 12 HOURS SCHEDULED
Status: DISCONTINUED | OUTPATIENT
Start: 2024-12-06 | End: 2024-12-12 | Stop reason: HOSPADM

## 2024-12-06 RX ORDER — POTASSIUM CHLORIDE 1500 MG/1
20 TABLET, EXTENDED RELEASE ORAL ONCE
Status: COMPLETED | OUTPATIENT
Start: 2024-12-07 | End: 2024-12-07

## 2024-12-06 RX ORDER — LISINOPRIL 5 MG/1
5 TABLET ORAL DAILY
Status: DISCONTINUED | OUTPATIENT
Start: 2024-12-07 | End: 2024-12-11

## 2024-12-06 RX ADMIN — HYDROMORPHONE HYDROCHLORIDE 0.5 MG: 1 INJECTION, SOLUTION INTRAMUSCULAR; INTRAVENOUS; SUBCUTANEOUS at 17:48

## 2024-12-06 RX ADMIN — INSULIN LISPRO 6 UNITS: 100 INJECTION, SOLUTION INTRAVENOUS; SUBCUTANEOUS at 23:29

## 2024-12-06 RX ADMIN — ATORVASTATIN CALCIUM 80 MG: 40 TABLET, FILM COATED ORAL at 22:11

## 2024-12-06 RX ADMIN — SODIUM CHLORIDE 125 ML/HR: 9 INJECTION, SOLUTION INTRAVENOUS at 17:42

## 2024-12-06 RX ADMIN — Medication 10 ML: at 22:12

## 2024-12-06 RX ADMIN — SODIUM CHLORIDE 1000 ML: 9 INJECTION, SOLUTION INTRAVENOUS at 14:45

## 2024-12-06 RX ADMIN — METOCLOPRAMIDE 10 MG: 5 INJECTION, SOLUTION INTRAMUSCULAR; INTRAVENOUS at 17:42

## 2024-12-06 RX ADMIN — HYDROMORPHONE HYDROCHLORIDE 0.25 MG: 1 INJECTION, SOLUTION INTRAMUSCULAR; INTRAVENOUS; SUBCUTANEOUS at 22:11

## 2024-12-06 RX ADMIN — CEFTRIAXONE SODIUM 1000 MG: 1 INJECTION, POWDER, FOR SOLUTION INTRAMUSCULAR; INTRAVENOUS at 23:29

## 2024-12-06 RX ADMIN — DIPHENHYDRAMINE HYDROCHLORIDE 12.5 MG: 50 INJECTION, SOLUTION INTRAMUSCULAR; INTRAVENOUS at 17:47

## 2024-12-06 NOTE — ED PROVIDER NOTES
Subjective   History of Present Illness  Chief Complaint: Headache      HPI: Patient is a 69-year-old female who presents with complaints of frontal headache that is radiating down her left temple and into her neck she states that it was a gradual onset beginning yesterday it is gotten progressively worse it is now worse headache of her life she is having some photophobia.  She has associated nausea.  She also complains of left-sided chest pain nonradiating described as a pressure.  She has had no urinary complaints denies diarrhea or constipation.  Of note spouse was reportedly at triage and stated that she was having abnormal gait and altered mental status today.  At my initial evaluation there is no one at bedside and patient is alert oriented answering questions appropriately.    PCP: Kristian    History provided by:  Patient      Review of Systems  See above as noted     Past Medical History:   Diagnosis Date    Adrenal nodule 11/16/2016    FINDINGS: Mild hepatic steatosis may be present. Cholecystectomy. No biliary ductal dilatation. Negative pancreas, spleen, left adrenal gland, and kidneys. The right adrenal presumed adenoma has increased slightly, measuring 3 x 4 cm in diameter,  compared to 2 x 3.5 cm on the previous exam. The attenuation values are consistent with an adenoma. Done at [facility] in August 2018    Age-related osteoporosis without current pathological fracture 11/16/2016    Arthritis     Delayed surgical wound healing     Essential hypertension 11/16/2016    Gastroesophageal reflux disease with esophagitis 11/16/2016    Hepatic steatosis 11/16/2016    History of anemia     History of sepsis     FROM UTI    Hyperlipidemia 11/16/2016    Lisfranc's dislocation     LEFT WITH FRACTURES NONHEALING FOOT    Osteomyelitis of left foot 11/2020    RLS (restless legs syndrome)     Squamous cell skin cancer 2014    Excised by Dr. James    Thyroid nodule 10/18/2019    BENIGN    Type 2 diabetes  mellitus with peripheral neuropathy 11/16/2016    Vitamin D deficiency 1/25/2019       Allergies   Allergen Reactions    Zofran [Ondansetron Hcl] Nausea And Vomiting     Does the opposite of its purpose    Prochlorperazine Other (See Comments)     CAUSED SEIZURE    Prochlorperazine Edisylate Hives    Hydralazine Itching and Rash    Hydralazine Hcl Itching and Rash    Meperidine Itching and Swelling    Prochlorperazine Maleate Other (See Comments)     CAUSES SEIZURE    Prochlorperazine Maleate Irritability       Past Surgical History:   Procedure Laterality Date    BREAST BIOPSY Left     7/2019. BENIGN    CARDIAC CATHETERIZATION Left 5/2/2021    Procedure: Cardiac Catheterization/Vascular Study;  Surgeon: Chacho Esteban MD;  Location: [facility] CATH INVASIVE LOCATION;  Service: Cardiovascular;  Laterality: Left;    CARDIAC CATHETERIZATION N/A 5/2/2021    Procedure: Intra-Aortic Baloon Pump Insertion;  Surgeon: Chacho Esteban MD;  Location: [facility] CATH INVASIVE LOCATION;  Service: Cardiovascular;  Laterality: N/A;    CATARACT EXTRACTION WITH INTRAOCULAR LENS IMPLANT Bilateral     CHOLECYSTECTOMY      COLONOSCOPY      CORONARY ARTERY BYPASS GRAFT N/A 5/2/2021    Procedure: CORONARY ARTERY BYPASS WITH INTERNAL MAMMARY ARTERY GRAFT;  Surgeon: Jr Rocael Alexander MD;  Location: [facility] CVOR;  Service: Cardiothoracic;  Laterality: N/A;    FOOT FUSION Right 11/13/2020    Procedure: RIGHT OPEN TREATMENT LISFRANC INJURY, OPEN REDUCTION INTERNAL FIXATION MEDIAL/MIDDLE CUNEIFORM FRACTURE AND 2ND/3RD METATARSAL FRACTURE 1ST 2ND POSSIBLE 3RD TARSOMETATARSAL ARTHRODESIS INTERCUNEIFORM ARTHRODESIS CALCANEAL BONE GRAFT;  Surgeon: Mikhail Banks Jr., MD;  Location: [facility];  Service: Orthopedics;  Laterality: Right;    INCISION AND DRAINAGE LEG Right 1/8/2021    Procedure: RIGHT IRRIGATION AND DEBRIDEMENT OF FOOT WITH SECONDARY CLOSURE AND HARDWARE REMOVAL;  Surgeon: Mikhail Banks Jr., MD;  Location: [facility] OR;  Service:  "Orthopedics;  Laterality: Right;    KNEE ARTHROSCOPY Bilateral     TOTAL ABDOMINAL HYSTERECTOMY      TRANSESOPHAGEAL ECHOCARDIOGRAM (EMILIA) N/A 5/2/2021    Procedure: TRANSESOPHAGEAL ECHOCARDIOGRAM;  Surgeon: Jr Rocael Alexander MD;  Location: [facility];  Service: Cardiothoracic;  Laterality: N/A;    UPPER GASTROINTESTINAL ENDOSCOPY  08/2016    US GUIDED FINE NEEDLE ASPIRATION  5/8/2020       Family History   Problem Relation Age of Onset    Diabetes Mother     COPD Mother     Hypertension Mother     Kidney disease Mother     Obesity Mother     Thyroid disease Mother     Diabetes Sister     Diabetes Sister     Diabetes Sister     Diabetes Sister     Malig Hyperthermia Neg Hx        Social History     Socioeconomic History    Marital status:    Tobacco Use    Smoking status: Never    Smokeless tobacco: Never   Vaping Use    Vaping status: Never Used   Substance and Sexual Activity    Alcohol use: Yes     Comment: socially     Drug use: Never    Sexual activity: Defer           Objective   Physical Exam  Due to significant overcrowding in the emergency department patient was evaluated by myself in a hallway bed. This exam may be limited by privacy, noise level and the patient not wearing a hospital gown.  Explained to the patient our limitations and our overcrowding.  They were in agreement to continue the exam and treatment at this time.       Procedures           ED Course  ED Course as of 12/06/24 1634   Fri Dec 06, 2024   1401 Sed Rate(!): 49 []   1401 WBC(!): 13.78 []   1422 Awaiting chemistries []   1430 WBC, UA(!): Too Numerous to Count [BH]   1430 Ketones, UA(!): 15 mg/dL (1+) []   1430 Leukocytes, UA(!): Small (1+) []   1453 Chemistries hemolyzed awaiting recollect results. []   1553 Creatinine(!): 2.77 []      ED Course User Index  [] Pattie Wynne, CANDIDO        /66   Pulse 82   Temp 97.4 °F (36.3 °C) (Oral)   Resp 17   Ht 157.5 cm (62\")   Wt 73.5 kg (162 lb)   SpO2 " 93%   BMI 29.63 kg/m²   Labs Reviewed   COMPREHENSIVE METABOLIC PANEL - Abnormal; Notable for the following components:       Result Value    Glucose 246 (*)     BUN 40 (*)     Creatinine 2.77 (*)     Chloride 93 (*)     Calcium 10.9 (*)     eGFR 17.8 (*)     All other components within normal limits    Narrative:     GFR Normal >60  Chronic Kidney Disease <60  Kidney Failure <15    The GFR formula is only valid for adults with stable renal function between ages 18 and 70.   URINALYSIS W/ MICROSCOPIC IF INDICATED (NO CULTURE) - Abnormal; Notable for the following components:    Color, UA Dark Yellow (*)     Appearance, UA Cloudy (*)     Glucose,  mg/dL (Trace) (*)     Ketones, UA 15 mg/dL (1+) (*)     Bilirubin, UA Small (1+) (*)     Protein,  mg/dL (2+) (*)     Leuk Esterase, UA Small (1+) (*)     All other components within normal limits   TROPONIN - Abnormal; Notable for the following components:    HS Troponin T 27 (*)     All other components within normal limits    Narrative:     High Sensitive Troponin T Reference Range:  <14.0 ng/L- Negative Female for AMI  <22.0 ng/L- Negative Male for AMI  >=14 - Abnormal Female indicating possible myocardial injury.  >=22 - Abnormal Male indicating possible myocardial injury.   Clinicians would have to utilize clinical acumen, EKG, Troponin, and serial changes to determine if it is an Acute Myocardial Infarction or myocardial injury due to an underlying chronic condition.        SEDIMENTATION RATE - Abnormal; Notable for the following components:    Sed Rate 49 (*)     All other components within normal limits   CBC WITH AUTO DIFFERENTIAL - Abnormal; Notable for the following components:    WBC 13.78 (*)     Neutrophils, Absolute 8.87 (*)     Lymphocytes, Absolute 3.43 (*)     Monocytes, Absolute 1.21 (*)     All other components within normal limits   URINALYSIS, MICROSCOPIC ONLY - Abnormal; Notable for the following components:    RBC, UA 6-10 (*)     WBC,  UA Too Numerous to Count (*)     Squamous Epithelial Cells, UA 13-20 (*)     Renal Epithelial Cells, UA 3-6 (*)     All other components within normal limits   POCT GLUCOSE FINGERSTICK - Abnormal; Notable for the following components:    Glucose 229 (*)     All other components within normal limits   PROTIME-INR - Normal   APTT - Normal   URINE CULTURE   HIGH SENSITIVITIY TROPONIN T 2HR   CBC AND DIFFERENTIAL    Narrative:     The following orders were created for panel order CBC & Differential.  Procedure                               Abnormality         Status                     ---------                               -----------         ------                     CBC Auto Differential[[id number]]        Abnormal            Final result                 Please view results for these tests on the individual orders.     Medications   sodium chloride 0.9 % flush 10 mL (has no administration in time range)   sodium chloride 0.9 % bolus 1,000 mL (0 mL Intravenous Stopped 12/6/24 1623)      CT Head Without Contrast    Result Date: 12/6/2024  Impression: No acute intracranial findings by CT. Chronic findings stable from prior. Electronically Signed: Chung Holland MD  12/6/2024 1:50 PM EST  Workstation ID: [id number]                                                  Medical Decision Making  Patient presented with above complaints, noted physical exam was completed and IV was established and labs were obtained as she did complain of a frontal headache that radiates into her temple, sed rate was completed slightly elevated at 49, CT of her head was obtained and was negative for intracranial hemorrhage or mass effect there is no evidence of sinusitis.  White blood cell count was slightly elevated at 13.78, creatinine notably elevated at 277 patient has no history of renal insufficiency, considering this REINA.  GFR decreased at 17 she was given fluids in the emergency room.  Urinalysis did no epithelials, though there were too  numerous white blood cells will be treated as a urinary tract infection with culture pending.  With patient's REINA and presentation she will be admitted for continued monitoring spoke with Isabel BUCK with the hospitalist group who agreed to patient admission.  At bedside have updated the patient as well as  who is given verbal understanding, denies further questions or complaints at time of admission.    Chart review: 11/28/2024 admission related to UTI, syncope, type 2 diabetes hyperglycemia    Labs: Creatinine 2.77, sed rate elevated 49, white blood cell count 13.78 urinalysis consistent with urinary tract infection urine culture pending    Radiology: Imaging reviewed by me and interpreted by radiologist.    Medications Medications  sodium chloride 0.9 % flush 10 mL (has no administration in time range)  sodium chloride 0.9 % bolus 1,000 mL (0 mL Intravenous Stopped 12/6/24 1623)    Part of this note may be an electronic transcription/translation of spoken language to printed text using the Dragon Dictation System.    Appropriate PPE worn during exam.      Note Disclaimer: At [facility], we believe that sharing information builds trust and better  relationships. You are receiving this note because you recently visited [facility]. It is possible you will see health information before a provider has talked with you about it. This kind of information can be easy to misunderstand. To help you fully understand what it means for your health, we urge you to discuss this note with your provider.      Problems Addressed:  REINA (acute kidney injury): complicated acute illness or injury  Urinary tract infection without hematuria, site unspecified: complicated acute illness or injury    Amount and/or Complexity of Data Reviewed  External Data Reviewed: notes.  Labs: ordered. Decision-making details documented in ED Course.  Radiology: ordered and independent interpretation performed. Decision-making details  documented in ED Course.    Risk  Prescription drug management.  Decision regarding hospitalization.        Final diagnoses:   REINA (acute kidney injury)   Urinary tract infection without hematuria, site unspecified       ED Disposition  ED Disposition       ED Disposition   Decision to Admit    Condition   --    Comment   Level of Care: Telemetry [5]   Diagnosis: REINA (acute kidney injury) [141664]   Admitting Physician: ALF MEZA [[id number]]   Attending Physician: ALF MEZA [[id number]]                 No follow-up provider specified.       Medication List      No changes were made to your prescriptions during this visit.            Pattie Wynne, APRN  12/06/24 1637

## 2024-12-06 NOTE — Clinical Note
Level of Care: Telemetry [5]   Admitting Physician: ALF MEZA [[id number]]   Attending Physician: ALF MEZA [[id number]]

## 2024-12-06 NOTE — H&P
[facility]  History & Physical    Patient Name: Marge Mcghee  : 1955  MRN: [id number]  Primary Care Physician:  Traci Townsend APRN  Date of admission: 2024  Date and Time of Service: 2024 at 1645    Subjective      Chief Complaint: generalized weakness    History of Present Illness: Marge Mcghee is a 69 y.o. female with a CMH of HTN, adrenal nodule, HLD, GERD, T2DM, RLS, OA, CAD s/p CABG who presented to [facility] on 2024 with generalized weakness.    Patient presented with vague symptomatology.  She states she started to feel unwell yesterday. Today she decided to come to the ED because she felt like her legs were too weak to ambulate.  She reports having a persistent left sided headache that goes into her left neck. She has associated photophobia, nausea and vomiting. This morning she had a brief episode of left sided chest pressure that began when she was sitting down. The pain did not radiate. Since presenting to the hospital, she now has a slight sore throat.  She denies any fevers, chills, cough, SOA, palpitations, abd pain, diarrhea, constipation, dysuria, hematuria, decreased urination, urinary urgency/frequency, flank pain, peripheral edema, focal weakness, paresthesias, vision changes, dizziness, dysphagia, dysarthria, syncope, near-syncope or other acute symptoms at this time. She was recently started on lisinopril in October.  She has not had IV contrast exposure.  She denies usage of ibuprofen or other NSAIDs. Of note, she was recently admitted here from - with HHS, syncope and UTI.    ED course: Vitals 97.4-96-17-94/50-96% on room air. Labs remarkable for creatinine 2.77, BUN 40, glucose 246, troponin 27-20, WBC 13.78. UA with 1+ ketones, 1+ leukocyte esterase, 6-10 RBC, TNTC WBC, presence of squamous epithelial cells and renal epithelial cells. CTH was negative. She was given IVFs.  She was admitted for further  management.       Review of Systems   Constitutional:  Positive for activity change, appetite change and fatigue. Negative for chills and fever.   HENT:  Positive for sore throat. Negative for trouble swallowing.    Eyes:  Positive for photophobia. Negative for visual disturbance.   Respiratory:  Negative for cough and shortness of breath.    Cardiovascular:  Positive for chest pain. Negative for palpitations and leg swelling.   Gastrointestinal:  Positive for nausea and vomiting. Negative for abdominal distention, abdominal pain, anal bleeding, blood in stool, constipation and diarrhea.   Genitourinary:  Negative for decreased urine volume, difficulty urinating, dysuria, flank pain, frequency, hematuria, pelvic pain and urgency.   Musculoskeletal:  Positive for neck pain. Negative for back pain.   Skin: Negative.    Neurological:  Positive for weakness and headaches. Negative for dizziness, tremors, seizures, syncope, facial asymmetry, speech difficulty, light-headedness and numbness.   Psychiatric/Behavioral:  Negative for confusion.        Personal History     Past Medical History:   Diagnosis Date    Adrenal nodule 11/16/2016    FINDINGS: Mild hepatic steatosis may be present. Cholecystectomy. No biliary ductal dilatation. Negative pancreas, spleen, left adrenal gland, and kidneys. The right adrenal presumed adenoma has increased slightly, measuring 3 x 4 cm in diameter,  compared to 2 x 3.5 cm on the previous exam. The attenuation values are consistent with an adenoma. Done at [facility] in August 2018    Age-related osteoporosis without current pathological fracture 11/16/2016    Arthritis     Delayed surgical wound healing     Essential hypertension 11/16/2016    Gastroesophageal reflux disease with esophagitis 11/16/2016    Hepatic steatosis 11/16/2016    History of anemia     History of sepsis     FROM UTI    Hyperlipidemia 11/16/2016    Lisfranc's dislocation     LEFT WITH FRACTURES NONHEALING FOOT     Osteomyelitis of left foot 11/2020    RLS (restless legs syndrome)     Squamous cell skin cancer 2014    Excised by Dr. James    Thyroid nodule 10/18/2019    BENIGN    Type 2 diabetes mellitus with peripheral neuropathy 11/16/2016    Vitamin D deficiency 1/25/2019       Past Surgical History:   Procedure Laterality Date    BREAST BIOPSY Left     7/2019. BENIGN    CARDIAC CATHETERIZATION Left 5/2/2021    Procedure: Cardiac Catheterization/Vascular Study;  Surgeon: Chacho Esteban MD;  Location: [facility] CATH INVASIVE LOCATION;  Service: Cardiovascular;  Laterality: Left;    CARDIAC CATHETERIZATION N/A 5/2/2021    Procedure: Intra-Aortic Baloon Pump Insertion;  Surgeon: Chacho Esteban MD;  Location: [facility] CATH INVASIVE LOCATION;  Service: Cardiovascular;  Laterality: N/A;    CATARACT EXTRACTION WITH INTRAOCULAR LENS IMPLANT Bilateral     CHOLECYSTECTOMY      COLONOSCOPY      CORONARY ARTERY BYPASS GRAFT N/A 5/2/2021    Procedure: CORONARY ARTERY BYPASS WITH INTERNAL MAMMARY ARTERY GRAFT;  Surgeon: Jr Rocael Alexander MD;  Location: [facility] CVOR;  Service: Cardiothoracic;  Laterality: N/A;    FOOT FUSION Right 11/13/2020    Procedure: RIGHT OPEN TREATMENT LISFRANC INJURY, OPEN REDUCTION INTERNAL FIXATION MEDIAL/MIDDLE CUNEIFORM FRACTURE AND 2ND/3RD METATARSAL FRACTURE 1ST 2ND POSSIBLE 3RD TARSOMETATARSAL ARTHRODESIS INTERCUNEIFORM ARTHRODESIS CALCANEAL BONE GRAFT;  Surgeon: Mikhail Banks Jr., MD;  Location: [facility];  Service: Orthopedics;  Laterality: Right;    INCISION AND DRAINAGE LEG Right 1/8/2021    Procedure: RIGHT IRRIGATION AND DEBRIDEMENT OF FOOT WITH SECONDARY CLOSURE AND HARDWARE REMOVAL;  Surgeon: Mikhail Banks Jr., MD;  Location: [facility];  Service: Orthopedics;  Laterality: Right;    KNEE ARTHROSCOPY Bilateral     TOTAL ABDOMINAL HYSTERECTOMY      TRANSESOPHAGEAL ECHOCARDIOGRAM (EMILIA) N/A 5/2/2021    Procedure: TRANSESOPHAGEAL ECHOCARDIOGRAM;  Surgeon: Jr Rocael Alexander MD;  Location:   [facility];  Service: Cardiothoracic;  Laterality: N/A;    UPPER GASTROINTESTINAL ENDOSCOPY  08/2016    US GUIDED FINE NEEDLE ASPIRATION  5/8/2020       Family History: family history includes COPD in her mother; Diabetes in her mother, sister, sister, sister, and sister; Hypertension in her mother; Kidney disease in her mother; Obesity in her mother; Thyroid disease in her mother. Otherwise pertinent FHx was reviewed and not pertinent to current issue.    Social History:  reports that she has never smoked. She has never used smokeless tobacco. She reports current alcohol use. She reports that she does not use drugs.    Home Medications:  Prior to Admission Medications       Prescriptions Last Dose Informant Patient Reported? Taking?    Accu-Chek FastClix Lancets misc   No No    Use 1 each 3 (Three) Times a Day Before Meals. Dx code: E11.65    Accu-Chek Softclix Lancets lancets   No No    1 each by Other route 3 (Three) Times a Day Before Meals. Use as instructed  Dx code: E11.65    acetaminophen (TYLENOL) 325 MG tablet   Yes No    Take 650 mg by mouth Every 4 (Four) Hours As Needed for Mild Pain. Indications: Pain    amLODIPine (NORVASC) 10 MG tablet   Yes No    Take 1 tablet by mouth Daily. Indications: High Blood Pressure Disorder    amoxicillin-clavulanate (AUGMENTIN) 875-125 MG per tablet   No No    Take 1 tablet by mouth 2 (Two) Times a Day.    aspirin 81 MG EC tablet   Yes No    Take 1 tablet by mouth Daily. Indications: Disease involving Lipid Deposits in the Arteries    atorvastatin (LIPITOR) 80 MG tablet   No No    Take 1 tablet by mouth Every Night.    Blood Glucose Monitoring Suppl (Accu-Chek Guide Me) w/Device kit   No No    Use 1 each 3 (Three) Times a Day Before Meals. Dx code: E11.65  NDC [address]-10  Bin#: [id number] Group #: [id number]  ID #: [id number]  Issuer #: [id number])    butalbital-acetaminophen-caffeine (FIORICET, ESGIC) -40 MG per tablet   No No    Take 1 tablet by mouth Every 4 (Four) Hours  As Needed for Headache.    denosumab (Prolia) 60 MG/ML solution prefilled syringe syringe   Yes No    Inject 1 mL under the skin into the appropriate area as directed 1 (One) Time.    DULoxetine (CYMBALTA) 60 MG capsule  Pharmacy Yes No    Take 1 capsule by mouth Daily. Indications: Major Depressive Disorder    gabapentin (NEURONTIN) 600 MG tablet   Yes No    Take 1 tablet by mouth 3 (Three) Times a Day.    HYDROcodone-acetaminophen (NORCO) 5-325 MG per tablet   No No    Take 1 tablet by mouth Every 6 (Six) Hours As Needed for Moderate Pain.    Insulin Glargine (LANTUS SOLOSTAR) 100 UNIT/ML injection pen   No No    Inject 15 Units under the skin into the appropriate area as directed Daily. Dx code: E11.65    Insulin Pen Needle (Pen Needles) 32G X 4 MM misc   No No    Use 1 each Every Night. Dx code: E11.65    metFORMIN ER (GLUCOPHAGE-XR) 500 MG 24 hr tablet   No No    Take 1 tablet by mouth Daily With Breakfast.    multivitamin with minerals tablet tablet   Yes No    Take 1 tablet by mouth Daily. Indications: Supplement    ticagrelor (BRILINTA) 90 MG tablet tablet   No No    Take 1 tablet by mouth 2 (Two) Times a Day.    Tirzepatide (MOUNJARO) 12.5 MG/0.5ML solution pen-injector pen   Yes No    Inject 0.5 mL under the skin into the appropriate area as directed 1 (One) Time Per Week. Wednesday.  Indications: Type 2 Diabetes    topiramate (TOPAMAX) 25 MG tablet   No No    Take 1 tablet by mouth Daily.    traZODone (DESYREL) 50 MG tablet   Yes No    Take 1 tablet by mouth Every Night. Indications: Trouble Sleeping              Allergies:  Allergies   Allergen Reactions    Zofran [Ondansetron Hcl] Nausea And Vomiting     Does the opposite of its purpose    Prochlorperazine Other (See Comments)     CAUSED SEIZURE    Prochlorperazine Edisylate Hives    Hydralazine Itching and Rash    Hydralazine Hcl Itching and Rash    Meperidine Itching and Swelling    Prochlorperazine Maleate Other (See Comments)     CAUSES SEIZURE     Prochlorperazine Maleate Irritability       Objective      Vitals:   Temp:  [97.4 °F (36.3 °C)] 97.4 °F (36.3 °C)  Heart Rate:  [82-96] 83  Resp:  [16-18] 16  BP: ()/(45-66) 112/60  Body mass index is 29.63 kg/m².  Physical Exam  Constitutional:       Appearance: Normal appearance. She is not ill-appearing or toxic-appearing.   HENT:      Head: Normocephalic and atraumatic.      Mouth/Throat:      Mouth: Mucous membranes are dry.      Pharynx: Oropharynx is clear. No oropharyngeal exudate or posterior oropharyngeal erythema.   Eyes:      Extraocular Movements: Extraocular movements intact.      Pupils: Pupils are equal, round, and reactive to light.   Neck:      Comments: No meningeal signs  Cardiovascular:      Rate and Rhythm: Normal rate and regular rhythm.      Heart sounds: Murmur heard.   Pulmonary:      Effort: Pulmonary effort is normal.      Breath sounds: Normal breath sounds.   Abdominal:      General: Abdomen is flat. There is no distension.      Palpations: Abdomen is soft.      Tenderness: There is no abdominal tenderness.   Musculoskeletal:         General: Normal range of motion.      Cervical back: Normal range of motion.      Right lower leg: No edema.      Left lower leg: No edema.   Skin:     General: Skin is warm.   Neurological:      General: No focal deficit present.      Mental Status: She is alert and oriented to person, place, and time.         Diagnostic Data:  Lab Results (last 24 hours)       Procedure Component Value Units Date/Time    High Sensitivity Troponin T 2Hr [[id number]]  (Abnormal) Collected: 12/06/24 1639    Specimen: Blood from Arm, Left Updated: 12/06/24 1715     HS Troponin T 20 ng/L      Troponin T Delta -7 ng/L     Narrative:      High Sensitive Troponin T Reference Range:  <14.0 ng/L- Negative Female for AMI  <22.0 ng/L- Negative Male for AMI  >=14 - Abnormal Female indicating possible myocardial injury.  >=22 - Abnormal Male indicating possible myocardial injury.    Clinicians would have to utilize clinical acumen, EKG, Troponin, and serial changes to determine if it is an Acute Myocardial Infarction or myocardial injury due to an underlying chronic condition.         Comprehensive Metabolic Panel [[id number]]  (Abnormal) Collected: 12/06/24 1446    Specimen: Blood from Arm, Left Updated: 12/06/24 1515     Glucose 246 mg/dL      BUN 40 mg/dL      Creatinine 2.77 mg/dL      Sodium 136 mmol/L      Potassium 3.7 mmol/L      Comment: Slight hemolysis detected by analyzer. Result may be falsely elevated.        Chloride 93 mmol/L      CO2 28.7 mmol/L      Calcium 10.9 mg/dL      Comment: Result checked          Total Protein 7.0 g/dL      Albumin 4.0 g/dL      ALT (SGPT) 9 U/L      AST (SGOT) 23 U/L      Alkaline Phosphatase 65 U/L      Total Bilirubin 0.5 mg/dL      Globulin 3.0 gm/dL      A/G Ratio 1.3 g/dL      BUN/Creatinine Ratio 14.4     Anion Gap 14.3 mmol/L      eGFR 17.8 mL/min/1.73     Narrative:      GFR Normal >60  Chronic Kidney Disease <60  Kidney Failure <15    The GFR formula is only valid for adults with stable renal function between ages 18 and 70.    High Sensitivity Troponin T [[id number]]  (Abnormal) Collected: 12/06/24 1446    Specimen: Blood from Arm, Left Updated: 12/06/24 1512     HS Troponin T 27 ng/L     Narrative:      High Sensitive Troponin T Reference Range:  <14.0 ng/L- Negative Female for AMI  <22.0 ng/L- Negative Male for AMI  >=14 - Abnormal Female indicating possible myocardial injury.  >=22 - Abnormal Male indicating possible myocardial injury.   Clinicians would have to utilize clinical acumen, EKG, Troponin, and serial changes to determine if it is an Acute Myocardial Infarction or myocardial injury due to an underlying chronic condition.         Urine Culture - Urine, Urine, Clean Catch [[id number]] Collected: 12/06/24 1357    Specimen: Urine, Clean Catch Updated: 12/06/24 1446    Urinalysis, Microscopic Only - Urine, Clean Catch [[id number]]   (Abnormal) Collected: 12/06/24 1357    Specimen: Urine, Clean Catch Updated: 12/06/24 1415     RBC, UA 6-10 /HPF      WBC, UA Too Numerous to Count /HPF      Bacteria, UA None Seen /HPF      Squamous Epithelial Cells, UA 13-20 /HPF      Renal Epithelial Cells, UA 3-6 /HPF      Hyaline Casts, UA 3-6 /LPF      Methodology Manual Light Microscopy    Urinalysis With Microscopic If Indicated (No Culture) - Urine, Clean Catch [[id number]]  (Abnormal) Collected: 12/06/24 1357    Specimen: Urine, Clean Catch Updated: 12/06/24 1406     Color, UA Dark Yellow     Appearance, UA Cloudy     pH, UA <=5.0     Specific Gravity, UA 1.019     Glucose,  mg/dL (Trace)     Ketones, UA 15 mg/dL (1+)     Bilirubin, UA Small (1+)     Comment: Confirmation testing is unavailable.  A serum bilirubin is recommended for further assessment.        Blood, UA Negative     Protein,  mg/dL (2+)     Leuk Esterase, UA Small (1+)     Nitrite, UA Negative     Urobilinogen, UA 0.2 E.U./dL    Sedimentation Rate [[id number]]  (Abnormal) Collected: 12/06/24 1332    Specimen: Blood from Arm, Right Updated: 12/06/24 1357     Sed Rate 49 mm/hr     Protime-INR [[id number]]  (Normal) Collected: 12/06/24 1332    Specimen: Blood from Arm, Right Updated: 12/06/24 1351     Protime 13.9 Seconds      INR 1.05    aPTT [[id number]]  (Normal) Collected: 12/06/24 1332    Specimen: Blood from Arm, Right Updated: 12/06/24 1351     PTT 25.8 seconds     CBC & Differential [[id number]]  (Abnormal) Collected: 12/06/24 1332    Specimen: Blood from Arm, Right Updated: 12/06/24 1341    Narrative:      The following orders were created for panel order CBC & Differential.  Procedure                               Abnormality         Status                     ---------                               -----------         ------                     CBC Auto Differential[[id number]]        Abnormal            Final result                 Please view results for these tests on the  individual orders.    CBC Auto Differential [[id number]]  (Abnormal) Collected: 12/06/24 1332    Specimen: Blood from Arm, Right Updated: 12/06/24 1341     WBC 13.78 10*3/mm3      RBC 4.57 10*6/mm3      Hemoglobin 13.2 g/dL      Hematocrit 40.9 %      MCV 89.5 fL      MCH 28.9 pg      MCHC 32.3 g/dL      RDW 12.6 %      RDW-SD 41.1 fl      MPV 9.4 fL      Platelets 305 10*3/mm3      Neutrophil % 64.3 %      Lymphocyte % 24.9 %      Monocyte % 8.8 %      Eosinophil % 1.0 %      Basophil % 0.7 %      Immature Grans % 0.3 %      Neutrophils, Absolute 8.87 10*3/mm3      Lymphocytes, Absolute 3.43 10*3/mm3      Monocytes, Absolute 1.21 10*3/mm3      Eosinophils, Absolute 0.14 10*3/mm3      Basophils, Absolute 0.09 10*3/mm3      Immature Grans, Absolute 0.04 10*3/mm3      nRBC 0.0 /100 WBC     POC Glucose Once [[id number]]  (Abnormal) Collected: 12/06/24 1242    Specimen: Blood Updated: 12/06/24 1245     Glucose 229 mg/dL      Comment: Serial Number: [id number]:  [id number]                Imaging Results (Last 24 Hours)       Procedure Component Value Units Date/Time    CT Head Without Contrast [[id number]] Collected: 12/06/24 1348     Updated: 12/06/24 1352    Narrative:      CT HEAD WO CONTRAST    Date of Exam: 12/6/2024 1:43 PM EST    Indication: headache.    Comparison: Head CT 11/28/2024    Technique: Axial CT images were obtained of the head without contrast administration.  Coronal reconstructions were performed.  Automated exposure control and iterative reconstruction methods were used.      Findings:  Negative for large territory infarct, acute intracranial hemorrhage, large mass lesion, hydrocephalus or midline shift. Stable slight prominence of the ventricles and sulci for age, suspect minimal global parenchymal volume loss. Mild periventricular   hypodensity stable from prior suggesting chronic microvascular ischemic change. Distal vertebral artery and carotid atherosclerotic disease. No large extra-axial  fluid collection. No obstructive sinus disease. Left anterior ethmoid sinus osteoma. No   large mastoid effusion. Negative for depressed skull fracture or aggressive lesion. Symmetric globes and extraocular muscles.      Impression:      Impression:  No acute intracranial findings by CT. Chronic findings stable from prior.      Electronically Signed: Chung Holland MD    12/6/2024 1:50 PM EST    Workstation ID: [id number]              Assessment & Plan        This is a 69 y.o. female with:    Active and Resolved Problems  Active Hospital Problems    Diagnosis  POA    **REINA (acute kidney injury) [N17.9]  Yes      Resolved Hospital Problems   No resolved problems to display.       REINA  -Creatinine 2.77, BUN 40. Creatinine 1 week ago was 0.89.   -Patient was on lisinopril prior to admission which she has been on since October. Hold ACE-I/ARBs  -Suspect pre-renal etiology/ATN from vomiting, ACE-I and possibly hypotension (had documented BP of 86/45 while in ED).   -Ordered renal US  -Check uric acid, CK, urine studies  -Start continuous NS overnight  -Avoid hypotension  -Consult nephrology    ? UTI  -UA with TNTC WBC and 1+ leukocytes, no bacteria.  Sample does appear to be contaminated based on number of squamous epithelial cells.  -Ceftriaxone started. Pending UC.  She has an elevated WBC count but otherwise does not meet SIRS/sepsis criteria.    HTN  -Had episode of hypotension earlier and some low normal BP readings therefore will hold antihypertensives for now    T2DM with hyperglycemia  Resume Lantus 15 units daily  -SSI  -Hold metformin  -CCD  -Hypoglycemia protocol in place    CAD  Chest pain  -No further chest pain since initial episode this morning  -Troponin 27 -> 20   -Obtain EKG  -Continue asa/Plavix  -Consider stress test       VTE Prophylaxis:  Mechanical VTE prophylaxis orders are signed & held.          The patient desires to be as follows:    CODE STATUS:    Code Status (Patient has no pulse and is not  breathing): CPR (Attempt to Resuscitate)  Medical Interventions (Patient has pulse or is breathing): Full Support        Kimberly Salinas, who can be contacted at [phone], is the designated person to make medical decisions on the patient's behalf if She is incapable of doing so. This was clarified with patient and/or next of kin on 12/6/2024 during the course of this H&P.    Admission Status:  I believe this patient meets obs status.    Expected Length of Stay: 1 day    PDMP and Medication Dispenses via Sidebar reviewed and consistent with patient reported medications.    I discussed the patient's findings and my recommendations with patient.      Signature:     This document has been electronically signed by Isabel Aragon PA-C on December 6, 2024 17:27 [place] [facility]ist Team

## 2024-12-07 ENCOUNTER — APPOINTMENT (OUTPATIENT)
Dept: ULTRASOUND IMAGING | Facility: HOSPITAL | Age: 69
DRG: 683 | End: 2024-12-07
Payer: COMMERCIAL

## 2024-12-07 LAB
GLUCOSE BLDC GLUCOMTR-MCNC: 103 MG/DL (ref 70–105)
GLUCOSE BLDC GLUCOMTR-MCNC: 162 MG/DL (ref 70–105)
GLUCOSE BLDC GLUCOMTR-MCNC: 166 MG/DL (ref 70–105)
GLUCOSE BLDC GLUCOMTR-MCNC: 198 MG/DL (ref 70–105)
POTASSIUM SERPL-SCNC: 3.5 MMOL/L (ref 3.5–5.2)
POTASSIUM SERPL-SCNC: 3.9 MMOL/L (ref 3.5–5.2)

## 2024-12-07 PROCEDURE — 25010000002 HYDROMORPHONE PER 4 MG

## 2024-12-07 PROCEDURE — 84156 ASSAY OF PROTEIN URINE: CPT | Performed by: INTERNAL MEDICINE

## 2024-12-07 PROCEDURE — 82948 REAGENT STRIP/BLOOD GLUCOSE: CPT

## 2024-12-07 PROCEDURE — 63710000001 INSULIN LISPRO (HUMAN) PER 5 UNITS

## 2024-12-07 PROCEDURE — 25010000002 CEFTRIAXONE PER 250 MG

## 2024-12-07 PROCEDURE — 25010000002 DIPHENHYDRAMINE PER 50 MG: Performed by: STUDENT IN AN ORGANIZED HEALTH CARE EDUCATION/TRAINING PROGRAM

## 2024-12-07 PROCEDURE — 84132 ASSAY OF SERUM POTASSIUM: CPT | Performed by: STUDENT IN AN ORGANIZED HEALTH CARE EDUCATION/TRAINING PROGRAM

## 2024-12-07 PROCEDURE — 82570 ASSAY OF URINE CREATININE: CPT | Performed by: INTERNAL MEDICINE

## 2024-12-07 PROCEDURE — 84300 ASSAY OF URINE SODIUM: CPT | Performed by: INTERNAL MEDICINE

## 2024-12-07 PROCEDURE — 63710000001 PROMETHAZINE PER 25 MG

## 2024-12-07 PROCEDURE — G0378 HOSPITAL OBSERVATION PER HR: HCPCS

## 2024-12-07 PROCEDURE — 76775 US EXAM ABDO BACK WALL LIM: CPT

## 2024-12-07 PROCEDURE — 97162 PT EVAL MOD COMPLEX 30 MIN: CPT

## 2024-12-07 PROCEDURE — 87205 SMEAR GRAM STAIN: CPT | Performed by: INTERNAL MEDICINE

## 2024-12-07 RX ORDER — SODIUM CHLORIDE 9 MG/ML
75 INJECTION, SOLUTION INTRAVENOUS CONTINUOUS
Status: DISPENSED | OUTPATIENT
Start: 2024-12-07 | End: 2024-12-08

## 2024-12-07 RX ORDER — ECHINACEA PURPUREA EXTRACT 125 MG
1 TABLET ORAL AS NEEDED
Status: DISCONTINUED | OUTPATIENT
Start: 2024-12-07 | End: 2024-12-12 | Stop reason: HOSPADM

## 2024-12-07 RX ORDER — DIPHENHYDRAMINE HYDROCHLORIDE 50 MG/ML
50 INJECTION INTRAMUSCULAR; INTRAVENOUS ONCE
Status: COMPLETED | OUTPATIENT
Start: 2024-12-07 | End: 2024-12-07

## 2024-12-07 RX ORDER — HYDROMORPHONE HYDROCHLORIDE 1 MG/ML
0.5 INJECTION, SOLUTION INTRAMUSCULAR; INTRAVENOUS; SUBCUTANEOUS ONCE
Status: COMPLETED | OUTPATIENT
Start: 2024-12-07 | End: 2024-12-07

## 2024-12-07 RX ORDER — FAMOTIDINE 20 MG/1
20 TABLET, FILM COATED ORAL
Status: DISCONTINUED | OUTPATIENT
Start: 2024-12-07 | End: 2024-12-08

## 2024-12-07 RX ORDER — POTASSIUM CHLORIDE 1500 MG/1
20 TABLET, EXTENDED RELEASE ORAL ONCE
Status: DISCONTINUED | OUTPATIENT
Start: 2024-12-07 | End: 2024-12-11

## 2024-12-07 RX ORDER — PROMETHAZINE HYDROCHLORIDE 25 MG/1
25 TABLET ORAL EVERY 6 HOURS PRN
Status: DISCONTINUED | OUTPATIENT
Start: 2024-12-07 | End: 2024-12-12 | Stop reason: HOSPADM

## 2024-12-07 RX ADMIN — ASPIRIN 81 MG: 81 TABLET, COATED ORAL at 09:16

## 2024-12-07 RX ADMIN — DIPHENHYDRAMINE HYDROCHLORIDE 50 MG: 50 INJECTION INTRAMUSCULAR; INTRAVENOUS at 01:30

## 2024-12-07 RX ADMIN — INSULIN LISPRO 2 UNITS: 100 INJECTION, SOLUTION INTRAVENOUS; SUBCUTANEOUS at 18:15

## 2024-12-07 RX ADMIN — TRAZODONE HYDROCHLORIDE 50 MG: 50 TABLET ORAL at 21:05

## 2024-12-07 RX ADMIN — SODIUM CHLORIDE 75 ML/HR: 9 INJECTION, SOLUTION INTRAVENOUS at 21:04

## 2024-12-07 RX ADMIN — ATORVASTATIN CALCIUM 80 MG: 40 TABLET, FILM COATED ORAL at 21:06

## 2024-12-07 RX ADMIN — FAMOTIDINE 20 MG: 20 TABLET, FILM COATED ORAL at 21:06

## 2024-12-07 RX ADMIN — Medication 10 ML: at 09:18

## 2024-12-07 RX ADMIN — GABAPENTIN 200 MG: 100 CAPSULE ORAL at 21:05

## 2024-12-07 RX ADMIN — POTASSIUM CHLORIDE 20 MEQ: 1500 TABLET, EXTENDED RELEASE ORAL at 01:30

## 2024-12-07 RX ADMIN — INSULIN GLARGINE-YFGN 15 UNITS: 100 INJECTION, SOLUTION SUBCUTANEOUS at 09:16

## 2024-12-07 RX ADMIN — CLOPIDOGREL BISULFATE 75 MG: 75 TABLET ORAL at 09:16

## 2024-12-07 RX ADMIN — Medication 10 ML: at 21:06

## 2024-12-07 RX ADMIN — HYDROMORPHONE HYDROCHLORIDE 0.5 MG: 1 INJECTION, SOLUTION INTRAMUSCULAR; INTRAVENOUS; SUBCUTANEOUS at 22:22

## 2024-12-07 RX ADMIN — ACETAMINOPHEN 650 MG: 325 TABLET, FILM COATED ORAL at 21:06

## 2024-12-07 RX ADMIN — PROMETHAZINE HYDROCHLORIDE 25 MG: 25 TABLET ORAL at 22:22

## 2024-12-07 RX ADMIN — INSULIN LISPRO 2 UNITS: 100 INJECTION, SOLUTION INTRAVENOUS; SUBCUTANEOUS at 22:22

## 2024-12-07 RX ADMIN — CEFTRIAXONE SODIUM 1000 MG: 1 INJECTION, POWDER, FOR SOLUTION INTRAMUSCULAR; INTRAVENOUS at 21:05

## 2024-12-07 RX ADMIN — INSULIN LISPRO 2 UNITS: 100 INJECTION, SOLUTION INTRAVENOUS; SUBCUTANEOUS at 11:54

## 2024-12-07 RX ADMIN — GABAPENTIN 200 MG: 100 CAPSULE ORAL at 09:16

## 2024-12-07 RX ADMIN — ACETAMINOPHEN 650 MG: 325 TABLET, FILM COATED ORAL at 01:39

## 2024-12-07 NOTE — OUTREACH NOTE
Medical Week 1 Survey      Flowsheet Row Responses   [facility] facility patient discharged from? [facility]   Does the patient have one of the following disease processes/diagnoses(primary or secondary)? Other   Week 1 attempt successful? No   Unsuccessful attempts Attempt 2   Revoke Readmitted            PARTHA WERNER - Registered Nurse

## 2024-12-07 NOTE — PROGRESS NOTES
[facility] MEDICINE SERVICE  DAILY PROGRESS NOTE    NAME: Marge Mcghee  : 1955  MRN: [id number]      LOS: 0 days     PROVIDER OF SERVICE: Brenda Sutton MD    Chief Complaint: REINA (acute kidney injury)    Subjective:     Interval History:  History taken from: Patient and patient's chart     New onset watery diarrhea x 6 times. Complaining of generalized weakness.         Review of Systems:   Review of Systems  All negative except above   Objective:     Vital Signs  Temp:  [97.3 °F (36.3 °C)-97.6 °F (36.4 °C)] 97.5 °F (36.4 °C)  Heart Rate:  [78-94] 80  Resp:  [14-18] 14  BP: ()/(45-85) 123/72   Body mass index is 29.07 kg/m².    Physical Exam  Physical Exam  General: Alert and oriented, no acute distress.  HENT: Normocephalic, moist oral mucosa, no scleral icterus.  Neck: Supple, nontender, no carotid bruits, no JVD, no LAD.  Lungs: Clear to auscultation, nonlabored respiration.  Heart: RRR, no murmur, gallop or edema.  Abdomen: Soft, nontender, nondistended, + bowel sounds.  Neuro: alert and awake, moving all 4 extremities   Skin: Skin is warm, dry and pink, no rashes or lesions.  Psychiatric: Cooperative, appropriate mood and affect.          Diagnostic Data    Results from last 7 days   Lab Units 24  0606 24  2312 24  1446   WBC 10*3/mm3  --  10.37  --    HEMOGLOBIN g/dL  --  11.4*  --    HEMATOCRIT %  --  35.3  --    PLATELETS 10*3/mm3  --  266  --    GLUCOSE mg/dL  --  289* 246*   CREATININE mg/dL  --  2.26* 2.77*   BUN mg/dL  --  39* 40*   SODIUM mmol/L  --  135* 136   POTASSIUM mmol/L 3.5 3.6 3.7   AST (SGOT) U/L  --   --  23   ALT (SGPT) U/L  --   --  9   ALK PHOS U/L  --   --  65   BILIRUBIN mg/dL  --   --  0.5   ANION GAP mmol/L  --  10.7 14.3       US Renal Bilateral    Result Date: 2024  Impression: No hydronephrosis. Small left renal cyst. Electronically Signed: Netta Cartwright MD  2024 8:47 AM EST  Workstation ID: [id number]    CT Head Without  Contrast    Result Date: 12/6/2024  Impression: No acute intracranial findings by CT. Chronic findings stable from prior. Electronically Signed: Chung Holland MD  12/6/2024 1:50 PM EST  Workstation ID: [id number]       I have reviewed patient labs and imaging     Assessment/Plan:   Marge Mcghee is a 69 y.o. female with a CMH of HTN, adrenal nodule, HLD, GERD, T2DM, RLS, OA, CAD s/p CABG who presented to [facility] on 12/6/2024 with generalized weakness.     Active and Resolved Problems  # REINA  -Creatinine 2.77, BUN 40. Creatinine 1 week ago was 0.89.   -Patient was on lisinopril prior to admission which she has been on since October. Hold ACE-I/ARBs  -Suspect pre-renal etiology/ATN from vomiting, ACE-I and possibly hypotension (had documented BP of 86/45 while in ED).   -renal US- No hydronephrosis, small left renal cyst  -uric acid- 7.1  -continuous NS    -Avoid hypotension  -Consult nephrology      # UTI  -UA with TNTC WBC and 1+ leukocytes, no bacteria.  Sample does appear to be contaminated based on number of squamous epithelial cells.  -Ucx negative      # HTN  -Had episode of hypotension earlier and some low normal BP readings therefore will hold antihypertensives for now     # T2DM with hyperglycemia  -continue Lantus 15 units daily  -SSI  -Hold metformin  -CCD  -Hypoglycemia protocol in place     # CAD  - denies any chest pain   -No further chest pain since initial episode this morning  -Troponin 27 -> 20   -Continue asa/Plavix  -Consider stress test     #Diarrhea  - send C. Diff  - GI panel       VTE Prophylaxis:  Mechanical VTE prophylaxis orders are present.             Disposition Planning:     Barriers to Discharge: medical clearance   Anticipated Date of Discharge: 12/9  Place of Discharge: home vs rehab       Time: 40 minutes     Code Status and Medical Interventions: CPR (Attempt to Resuscitate); Full Support   Ordered at: 12/06/24 7729     Code Status (Patient has no pulse and is not  breathing):    CPR (Attempt to Resuscitate)     Medical Interventions (Patient has pulse or is breathing):    Full Support       Signature: Electronically signed by Brenda Sutton MD, 12/07/24, 12:55 EST.  [facility]

## 2024-12-07 NOTE — PLAN OF CARE
Goal Outcome Evaluation:            Patient admitted from ED for Acute kidney injury. C/o headache, medication given per MAR. Per patient headache is slightly better this morning. Plan of care is ongoing. Call light within reach.

## 2024-12-07 NOTE — THERAPY EVALUATION
Patient Name: Marge Mcghee  : 1955    MRN: [id number]                              Today's Date: 2024       Admit Date: 2024    Visit Dx:     ICD-10-CM ICD-9-CM   1. REINA (acute kidney injury)  N17.9 584.9   2. Urinary tract infection without hematuria, site unspecified  N39.0 599.0     Patient Active Problem List   Diagnosis    Type 2 diabetes mellitus with retinopathy, with long-term current use of insulin    Age-related osteoporosis without current pathological fracture    Hyperlipidemia    Hepatic steatosis    Gastroesophageal reflux disease with esophagitis    Adrenal nodule    Vitamin D deficiency    Thyroid nodule    UTI (urinary tract infection)    REINA (acute kidney injury)    Hyperglycemia    Acute right flank pain    Vomiting    Hypomagnesemia    Dehydration    Obesity (BMI 30-39.9)    Complicated migraine    Occipital neuralgia of left side    Polypharmacy    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    Lisfranc dislocation    Delayed surgical wound healing    Right foot infection    Chest pain    GERD (gastroesophageal reflux disease)    Stable angina pectoris    S/P CABG x 3    Encephalopathy, metabolic    Hypertensive emergency    Dysarthria    CVA (cerebral vascular accident)    Coronary artery disease involving coronary bypass graft of native heart without angina pectoris    Essential hypertension    HTN, goal below 130/80    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    Type 2 diabetes mellitus without complication, with long-term current use of insulin    Change in vision    Other headache syndrome    Migraine, intractable    Hyperosmolar hyperglycemic state (HHS)     Past Medical History:   Diagnosis Date    Adrenal nodule 2016    FINDINGS: Mild hepatic steatosis may be present. Cholecystectomy. No biliary ductal dilatation. Negative pancreas,  spleen, left adrenal gland, and kidneys. The right adrenal presumed adenoma has increased slightly, measuring 3 x 4 cm in diameter,  compared to 2 x 3.5 cm on the previous exam. The attenuation values are consistent with an adenoma. Done at [facility] in August 2018    Age-related osteoporosis without current pathological fracture 11/16/2016    Arthritis     CVA (cerebral vascular accident)     year 2000- can't remember exact year    Delayed surgical wound healing     Essential hypertension 11/16/2016    Gastroesophageal reflux disease with esophagitis 11/16/2016    Hepatic steatosis 11/16/2016    History of anemia     History of sepsis     FROM UTI    Hyperlipidemia 11/16/2016    Lisfranc's dislocation     LEFT WITH FRACTURES NONHEALING FOOT    Osteomyelitis of left foot 11/2020    RLS (restless legs syndrome)     Squamous cell skin cancer 2014    Excised by Dr. James    Thyroid nodule 10/18/2019    BENIGN    Type 2 diabetes mellitus with peripheral neuropathy 11/16/2016    Vitamin D deficiency 01/25/2019     Past Surgical History:   Procedure Laterality Date    BREAST BIOPSY Left     7/2019. BENIGN    CARDIAC CATHETERIZATION Left 5/2/2021    Procedure: Cardiac Catheterization/Vascular Study;  Surgeon: Chacho Esteban MD;  Location: [facility] CATH INVASIVE LOCATION;  Service: Cardiovascular;  Laterality: Left;    CARDIAC CATHETERIZATION N/A 5/2/2021    Procedure: Intra-Aortic Baloon Pump Insertion;  Surgeon: Chacho Esteban MD;  Location: [facility] CATH INVASIVE LOCATION;  Service: Cardiovascular;  Laterality: N/A;    CATARACT EXTRACTION WITH INTRAOCULAR LENS IMPLANT Bilateral     CHOLECYSTECTOMY      COLONOSCOPY      CORONARY ARTERY BYPASS GRAFT N/A 5/2/2021    Procedure: CORONARY ARTERY BYPASS WITH INTERNAL MAMMARY ARTERY GRAFT;  Surgeon: Jr Rocael Alexander MD;  Location: [facility] CVOR;  Service: Cardiothoracic;  Laterality: N/A;    FOOT FUSION Right 11/13/2020    Procedure: RIGHT OPEN TREATMENT LISFRANC INJURY,  OPEN REDUCTION INTERNAL FIXATION MEDIAL/MIDDLE CUNEIFORM FRACTURE AND 2ND/3RD METATARSAL FRACTURE 1ST 2ND POSSIBLE 3RD TARSOMETATARSAL ARTHRODESIS INTERCUNEIFORM ARTHRODESIS CALCANEAL BONE GRAFT;  Surgeon: Mikhail Banks Jr., MD;  Location: [facility];  Service: Orthopedics;  Laterality: Right;    INCISION AND DRAINAGE LEG Right 1/8/2021    Procedure: RIGHT IRRIGATION AND DEBRIDEMENT OF FOOT WITH SECONDARY CLOSURE AND HARDWARE REMOVAL;  Surgeon: Mikhail Banks Jr., MD;  Location: [facility] OR;  Service: Orthopedics;  Laterality: Right;    KNEE ARTHROSCOPY Bilateral     TOTAL ABDOMINAL HYSTERECTOMY      TRANSESOPHAGEAL ECHOCARDIOGRAM (EMILIA) N/A 5/2/2021    Procedure: TRANSESOPHAGEAL ECHOCARDIOGRAM;  Surgeon: Jr Rocael Alexander MD;  Location: [facility];  Service: Cardiothoracic;  Laterality: N/A;    UPPER GASTROINTESTINAL ENDOSCOPY  08/2016    US GUIDED FINE NEEDLE ASPIRATION  5/8/2020      General Information       Row Name 12/07/24 1846          Physical Therapy Time and Intention    Document Type evaluation  -EL     Mode of Treatment individual therapy;physical therapy  -       Row Name 12/07/24 1846          General Information    Prior Level of Function independent:;all household mobility;ADL's;driving  -       Row Name 12/07/24 1846          Living Environment    People in Home spouse  -       Row Name 12/07/24 1846          Cognition    Orientation Status (Cognition) oriented x 4  -EL       Row Name 12/07/24 1846          Safety Issues/Impairments Affecting Functional Mobility    Impairments Affecting Function (Mobility) endurance/activity tolerance;strength;balance  -EL               User Key  (r) = Recorded By, (t) = Taken By, (c) = Cosigned By      Initials Name Provider Type    Walter Ruiz PT Physical Therapist                   Mobility       Row Name 12/07/24 1847          Bed Mobility    Bed Mobility supine-sit  -EL     Supine-Sit [place] (Bed Mobility) minimum assist (75% patient  effort)  -EL       Row Name 12/07/24 1847          Bed-Chair Transfer    Bed-Chair [place] (Transfers) contact guard  -EL     Assistive Device (Bed-Chair Transfers) walker, front-wheeled  -EL       Row Name 12/07/24 1847          Sit-Stand Transfer    Sit-Stand [place] (Transfers) minimum assist (75% patient effort)  -EL     Assistive Device (Sit-Stand Transfers) walker, front-wheeled  -EL       Row Name 12/07/24 1847          Gait/Stairs (Locomotion)    [place] Level (Gait) contact guard  -EL     Assistive Device (Gait) walker, front-wheeled  -EL     Patient was able to Ambulate yes  -EL     Distance in Feet (Gait) 30  -EL     Deviations/Abnormal Patterns (Gait) gait speed decreased  -EL     Comment, (Gait/Stairs) Reliant on RWx this date. Poor endurance  -EL               User Key  (r) = Recorded By, (t) = Taken By, (c) = Cosigned By      Initials Name Provider Type    Walter Ruiz, PT Physical Therapist                   Obj/Interventions       Row Name 12/07/24 1848          Range of Motion Comprehensive    General Range of Motion bilateral lower extremity ROM WFL  -EL       Row Name 12/07/24 1848          Strength Comprehensive (MMT)    General Manual Muscle Testing (MMT) Assessment lower extremity strength deficits identified  -EL     Comment, General Manual Muscle Testing (MMT) Assessment Grossly 4-/5 gross  -EL       Row Name 12/07/24 1848          Sensory Assessment (Somatosensory)    Sensory Assessment (Somatosensory) sensation intact  -EL               User Key  (r) = Recorded By, (t) = Taken By, (c) = Cosigned By      Initials Name Provider Type    Walter Ruiz, PT Physical Therapist                   Goals/Plan       Row Name 12/07/24 1853          Bed Mobility Goal 1 (PT)    Activity/Assistive Device (Bed Mobility Goal 1, PT) bed mobility activities, all  -EL     [place] Level/Cues Needed (Bed Mobility Goal 1, PT) modified independence  -EL     Time Frame (Bed Mobility Goal 1, PT)  long term goal (LTG);2 weeks  -EL       Row Name 12/07/24 1853          Transfer Goal 1 (PT)    Activity/Assistive Device (Transfer Goal 1, PT) transfers, all;walker, rolling  -EL     [place] Level/Cues Needed (Transfer Goal 1, PT) modified independence  -EL     Time Frame (Transfer Goal 1, PT) long term goal (LTG);2 weeks  -EL       Row Name 12/07/24 1853          Gait Training Goal 1 (PT)    Activity/Assistive Device (Gait Training Goal 1, PT) gait (walking locomotion);walker, rolling  -EL     [place] Level (Gait Training Goal 1, PT) modified independence  -EL     Distance (Gait Training Goal 1, PT) 150  -EL     Time Frame (Gait Training Goal 1, PT) long term goal (LTG);2 weeks  -EL       Row Name 12/07/24 1853          Therapy Assessment/Plan (PT)    Planned Therapy Interventions (PT) neuromuscular re-education;balance training;bed mobility training;transfer training;gait training;patient/family education;strengthening  -EL               User Key  (r) = Recorded By, (t) = Taken By, (c) = Cosigned By      Initials Name Provider Type    Walter Ruiz, PT Physical Therapist                   Clinical Impression       Row Name 12/07/24 1850          Pain    Pretreatment Pain Rating 3/10  -EL     Posttreatment Pain Rating 3/10  -EL     Pain Location head;neck  -EL       Row Name 12/07/24 1850          Plan of Care Review    Plan of Care Reviewed With patient  -EL     Outcome Evaluation Pt is a 68 YO F admitted with REINA. Pt reports living at home with pouse, is independent with all ADLs, ambulation without AD and no recent falls. Pt reports she is generally active and has no mobility concerns. This date pt demonstrate global weakness, and limited mobitliy. Pt requiring MIN A to come to standing and to ambluate in room with RWx. Pt without significant LOB, but impaired to need to rely on RWx. Pt is below functional baseline, but will likely improve physically as she medically improves. PT to continue to follow  and recommendation is HHPT at d/c.  -EL       Row Name 12/07/24 1850          Therapy Assessment/Plan (PT)    Rehab Potential (PT) good  -EL     Criteria for Skilled Interventions Met (PT) yes  -EL     Therapy Frequency (PT) 5 times/wk  -EL       Row Name 12/07/24 1850          Vital Signs    O2 Delivery Pre Treatment room air  -EL     O2 Delivery Intra Treatment room air  -EL     O2 Delivery Post Treatment room air  -EL     Pre Patient Position Supine  -EL     Intra Patient Position Standing  -EL     Post Patient Position Sitting  -EL       Row Name 12/07/24 1850          Positioning and Restraints    Pre-Treatment Position in bed  -EL     Post Treatment Position chair  -EL     In Chair notified nsg;reclined;call light within reach;encouraged to call for assist;exit alarm on  -EL               User Key  (r) = Recorded By, (t) = Taken By, (c) = Cosigned By      Initials Name Provider Type    Walter Ruiz, PT Physical Therapist                   Outcome Measures       Row Name 12/07/24 1855 12/07/24 0752       How much help from another person do you currently need...    Turning from your back to your side while in flat bed without using bedrails? 4  -EL 4  -BC    Moving from lying on back to sitting on the side of a flat bed without bedrails? 3  -EL 4  -BC    Moving to and from a bed to a chair (including a wheelchair)? 3  -EL 4  -BC    Standing up from a chair using your arms (e.g., wheelchair, bedside chair)? 3  -EL 4  -BC    Climbing 3-5 steps with a railing? 2  -EL 4  -BC    To walk in hospital room? 3  -EL 4  -BC    AM-PAC 6 Clicks Score (PT) 18  -EL 24  -BC    Highest Level of Mobility Goal 6 --> Walk 10 steps or more  -EL 8 --> Walked 250 feet or more  -BC      Row Name 12/07/24 1855          Functional Assessment    Outcome Measure Options AM-PAC 6 Clicks Basic Mobility (PT)  -EL               User Key  (r) = Recorded By, (t) = Taken By, (c) = Cosigned By      Initials Name Provider Type    Walter Ruiz,  PT Physical Therapist    Viki Mcmahon, RN Registered Nurse                                 Physical Therapy Education       Title: PT OT SLP Therapies (In Progress)       Topic: Physical Therapy (In Progress)       Point: Mobility training (Done)       Learning Progress Summary            Patient Acceptance, E,TB, VU by  at 12/7/2024 1856                      Point: Home exercise program (Not Started)       Learner Progress:  Not documented in this visit.              Point: Body mechanics (Not Started)       Learner Progress:  Not documented in this visit.              Point: Precautions (Done)       Learning Progress Summary            Patient Acceptance, E,TB, VU by  at 12/7/2024 1856                                      User Key       Initials Effective Dates Name Provider Type Discipline    EL 06/23/20 -  Walter Ocampo, PT Physical Therapist PT                  PT Recommendation and Plan  Planned Therapy Interventions (PT): neuromuscular re-education, balance training, bed mobility training, transfer training, gait training, patient/family education, strengthening  Outcome Evaluation: Pt is a 68 YO F admitted with REINA. Pt reports living at home with pouse, is independent with all ADLs, ambulation without AD and no recent falls. Pt reports she is generally active and has no mobility concerns. This date pt demonstrate global weakness, and limited mobitliy. Pt requiring MIN A to come to standing and to ambluate in room with RWx. Pt without significant LOB, but impaired to need to rely on RWx. Pt is below functional baseline, but will likely improve physically as she medically improves. PT to continue to follow and recommendation is HHPT at d/c.     Time Calculation:   PT Evaluation Complexity  History, PT Evaluation Complexity: 1-2 personal factors and/or comorbidities  Examination of Body Systems (PT Eval Complexity): total of 3 or more elements  Clinical Presentation (PT Evaluation Complexity):  evolving  Clinical Decision Making (PT Evaluation Complexity): moderate complexity  Overall Complexity (PT Evaluation Complexity): moderate complexity     PT Charges       Row Name 12/07/24 1857             Time Calculation    Start Time 0933  -EL      Stop Time 0947  -EL      Time Calculation (min) 14 min  -EL      PT Received On 12/07/24  -EL      PT - Next Appointment 12/09/24  -EL      PT Goal Re-Cert Due Date 12/21/24  -EL                User Key  (r) = Recorded By, (t) = Taken By, (c) = Cosigned By      Initials Name Provider Type    Walter Ruiz PT Physical Therapist                  Therapy Charges for Today       Code Description Service Date Service Provider Modifiers Qty    72807347739 HC PT EVAL MOD COMPLEXITY 3 12/7/2024 Walter Ocampo, PT GP 1            PT G-Codes  Outcome Measure Options: AM-PAC 6 Clicks Basic Mobility (PT)  AM-PAC 6 Clicks Score (PT): 18  PT Discharge Summary  Anticipated Discharge Disposition (PT): home with home health    Walter Ocampo PT  12/7/2024

## 2024-12-07 NOTE — PLAN OF CARE
Goal Outcome Evaluation:  Plan of Care Reviewed With: patient           Outcome Evaluation: Pt is a 68 YO F admitted with REINA. Pt reports living at home with pouse, is independent with all ADLs, ambulation without AD and no recent falls. Pt reports she is generally active and has no mobility concerns. This date pt demonstrate global weakness, and limited mobitliy. Pt requiring MIN A to come to standing and to ambluate in room with RWx. Pt without significant LOB, but impaired to need to rely on RWx. Pt is below functional baseline, but will likely improve physically as she medically improves. PT to continue to follow and recommendation is HHPT at d/c.    Anticipated Discharge Disposition (PT): home with home health

## 2024-12-07 NOTE — PLAN OF CARE
Goal Outcome Evaluation:         Patient arrived in [facility] from ED, plan of care has been initiated. Call light within reach. Heart/oxygen monitor in place.

## 2024-12-07 NOTE — ED NOTES
"Nursing report ED to floor  Marge Mcghee  69 y.o.  female    HPI:   Chief Complaint   Patient presents with    Altered Mental Status     Per spouse reports pt has AMS today-\"not coherent\" with gait issues today. Pt c/o head and neck pain       Admitting doctor:   Miguel Mendez MD    Admitting diagnosis:   The primary encounter diagnosis was REINA (acute kidney injury). A diagnosis of Urinary tract infection without hematuria, site unspecified was also pertinent to this visit.    Code status:   Current Code Status       Date Active Code Status Order ID Comments User Context       12/6/2024 1726 CPR (Attempt to Resuscitate) [id number]  Isabel Aragon PA-C ED        Question Answer    Code Status (Patient has no pulse and is not breathing) CPR (Attempt to Resuscitate)    Medical Interventions (Patient has pulse or is breathing) Full Support                    Allergies:   Zofran [ondansetron hcl], Prochlorperazine, Prochlorperazine edisylate, Hydralazine, Hydralazine hcl, Meperidine, Prochlorperazine maleate, and Prochlorperazine maleate    Isolation:  No active isolations     Fall Risk:  Fall Risk Assessment was completed, and patient is at moderate risk for falls.   Predictive Model Details         11 (Low) Factor Value    Calculated 12/6/2024 19:08 Age 69    Risk of Fall Model Active Peripheral IV Present     Imaging order in this encounter Present     Magnesium not on file     Diastolic BP 60     Number of Distinct Medication Classes administered 4     Creatinine 2.77 mg/dL     Chloride 93 mmol/L     Alexis Scale not on file     Number of administrations of Analgesic Narcotics 1     Albumin 4 g/dL     Respiratory Rate 16     Total Bilirubin 0.5 mg/dL     Days after Admission 0.266     Calcium 10.9 mg/dL     ALT 9 U/L     Potassium 3.7 mmol/L         Weight:       12/06/24  1241   Weight: 73.5 kg (162 lb)       Intake and Output    Intake/Output Summary (Last 24 hours) at 12/6/2024 1923  Last data filed at " 12/6/2024 1623  Gross per 24 hour   Intake 1000 ml   Output --   Net 1000 ml       Diet:   Dietary Orders (From admission, onward)       Start     Ordered    12/06/24 1752  Diet: Diabetic, Cardiac; Healthy Heart (2-3 Na+); Consistent Carbohydrate; Fluid Consistency: Thin (IDDSI 0)  Diet Effective Now        References:    Diet Order [facility]   Question Answer Comment   Diets: Diabetic    Diets: Cardiac    Cardiac Diet: Healthy Heart (2-3 Na+)    Diabetic Diet: Consistent Carbohydrate    Fluid Consistency: Thin (IDDSI 0)        12/06/24 1751                     Most recent vitals:   Vitals:    12/06/24 1431 12/06/24 1447 12/06/24 1556 12/06/24 1720   BP: (!) 86/45 110/56 137/66 112/60   BP Location:  Right arm     Patient Position:  Lying     Pulse: 83 84 82 83   Resp: 16  17 16   Temp:       TempSrc:       SpO2: 95% 96% 93% 98%   Weight:       Height:           Active LDAs/IV Access:   Lines, Drains & Airways       Active LDAs       Name Placement date Placement time Site Days    Peripheral IV 12/06/24 1440 Anterior;Left;Proximal Forearm 12/06/24  1440  Forearm  less than 1                    Skin Condition:   Skin Assessments (last day)       None             Labs (abnormal labs have a star):   Labs Reviewed   COMPREHENSIVE METABOLIC PANEL - Abnormal; Notable for the following components:       Result Value    Glucose 246 (*)     BUN 40 (*)     Creatinine 2.77 (*)     Chloride 93 (*)     Calcium 10.9 (*)     eGFR 17.8 (*)     All other components within normal limits    Narrative:     GFR Normal >60  Chronic Kidney Disease <60  Kidney Failure <15    The GFR formula is only valid for adults with stable renal function between ages 18 and 70.   URINALYSIS W/ MICROSCOPIC IF INDICATED (NO CULTURE) - Abnormal; Notable for the following components:    Color, UA Dark Yellow (*)     Appearance, UA Cloudy (*)     Glucose,  mg/dL (Trace) (*)     Ketones, UA 15 mg/dL (1+) (*)     Bilirubin, UA Small (1+) (*)      Protein,  mg/dL (2+) (*)     Leuk Esterase, UA Small (1+) (*)     All other components within normal limits   TROPONIN - Abnormal; Notable for the following components:    HS Troponin T 27 (*)     All other components within normal limits    Narrative:     High Sensitive Troponin T Reference Range:  <14.0 ng/L- Negative Female for AMI  <22.0 ng/L- Negative Male for AMI  >=14 - Abnormal Female indicating possible myocardial injury.  >=22 - Abnormal Male indicating possible myocardial injury.   Clinicians would have to utilize clinical acumen, EKG, Troponin, and serial changes to determine if it is an Acute Myocardial Infarction or myocardial injury due to an underlying chronic condition.        SEDIMENTATION RATE - Abnormal; Notable for the following components:    Sed Rate 49 (*)     All other components within normal limits   CBC WITH AUTO DIFFERENTIAL - Abnormal; Notable for the following components:    WBC 13.78 (*)     Neutrophils, Absolute 8.87 (*)     Lymphocytes, Absolute 3.43 (*)     Monocytes, Absolute 1.21 (*)     All other components within normal limits   URINALYSIS, MICROSCOPIC ONLY - Abnormal; Notable for the following components:    RBC, UA 6-10 (*)     WBC, UA Too Numerous to Count (*)     Squamous Epithelial Cells, UA 13-20 (*)     Renal Epithelial Cells, UA 3-6 (*)     All other components within normal limits   HIGH SENSITIVITIY TROPONIN T 2HR - Abnormal; Notable for the following components:    HS Troponin T 20 (*)     Troponin T Delta -7 (*)     All other components within normal limits    Narrative:     High Sensitive Troponin T Reference Range:  <14.0 ng/L- Negative Female for AMI  <22.0 ng/L- Negative Male for AMI  >=14 - Abnormal Female indicating possible myocardial injury.  >=22 - Abnormal Male indicating possible myocardial injury.   Clinicians would have to utilize clinical acumen, EKG, Troponin, and serial changes to determine if it is an Acute Myocardial Infarction or myocardial  injury due to an underlying chronic condition.        URIC ACID - Abnormal; Notable for the following components:    Uric Acid 7.1 (*)     All other components within normal limits   POCT GLUCOSE FINGERSTICK - Abnormal; Notable for the following components:    Glucose 229 (*)     All other components within normal limits   PROTIME-INR - Normal   APTT - Normal   CK - Normal   OSMOLALITY, URINE - Normal   URINE CULTURE   SODIUM, URINE, RANDOM    Narrative:     Reference intervals for random urine have not been established.  Clinical usage is dependent upon physician's interpretation in combination with other laboratory tests.      CBC AND DIFFERENTIAL    Narrative:     The following orders were created for panel order CBC & Differential.  Procedure                               Abnormality         Status                     ---------                               -----------         ------                     CBC Auto Differential[[id number]]        Abnormal            Final result                 Please view results for these tests on the individual orders.       LOC: Person, Place, Time, and Situation    Telemetry:  Telemetry    Cardiac Monitoring Ordered: yes    EKG:   No orders to display       Medications Given in the ED:   Medications   sodium chloride 0.9 % flush 10 mL (has no administration in time range)   sodium chloride 0.9 % infusion (125 mL/hr Intravenous New Bag 12/6/24 1552)   sodium chloride 0.9 % flush 10 mL (has no administration in time range)   sodium chloride 0.9 % flush 10 mL (has no administration in time range)   sodium chloride 0.9 % infusion 40 mL (has no administration in time range)   nitroglycerin (NITROSTAT) SL tablet 0.4 mg (has no administration in time range)   Potassium Replacement - Follow Nurse / BPA Driven Protocol (has no administration in time range)   Magnesium Low Dose Replacement - Follow Nurse / BPA Driven Protocol (has no administration in time range)   Phosphorus Replacement  - Follow Nurse / BPA Driven Protocol (has no administration in time range)   Calcium Replacement - Follow Nurse / BPA Driven Protocol (has no administration in time range)   acetaminophen (TYLENOL) tablet 650 mg (has no administration in time range)     Or   acetaminophen (TYLENOL) 160 MG/5ML oral solution 650 mg (has no administration in time range)     Or   acetaminophen (TYLENOL) suppository 650 mg (has no administration in time range)   sennosides-docusate (PERICOLACE) 8.6-50 MG per tablet 2 tablet (has no administration in time range)     And   polyethylene glycol (MIRALAX) packet 17 g (has no administration in time range)     And   bisacodyl (DULCOLAX) EC tablet 5 mg (has no administration in time range)     And   bisacodyl (DULCOLAX) suppository 10 mg (has no administration in time range)   melatonin tablet 5 mg (has no administration in time range)   ondansetron (ZOFRAN) injection 4 mg (has no administration in time range)   sodium chloride 0.9 % bolus 1,000 mL (0 mL Intravenous Stopped 12/6/24 1623)   HYDROmorphone (DILAUDID) injection 0.5 mg (0.5 mg Intravenous Given 12/6/24 1748)   metoclopramide (REGLAN) injection 10 mg (10 mg Intravenous Given 12/6/24 1742)   diphenhydrAMINE (BENADRYL) injection 12.5 mg (12.5 mg Intravenous Given 12/6/24 1747)       Imaging results:  CT Head Without Contrast    Result Date: 12/6/2024  Impression: No acute intracranial findings by CT. Chronic findings stable from prior. Electronically Signed: Chugn Holland MD  12/6/2024 1:50 PM [facility]  Workstation ID: [id number]     Social issues:   Social History     Socioeconomic History    Marital status:    Tobacco Use    Smoking status: Never    Smokeless tobacco: Never   Vaping Use    Vaping status: Never Used   Substance and Sexual Activity    Alcohol use: Yes     Comment: socially     Drug use: Never    Sexual activity: Defer       NIH Stroke Scale:  Interval: (not recorded)  1a. Level of Consciousness: (not recorded)  1b.  LOC Questions: (not recorded)  1c. LOC Commands: (not recorded)  2. Best Gaze: (not recorded)  3. Visual: (not recorded)  4. Facial Palsy: (not recorded)  5a. Motor Arm, Left: (not recorded)  5b. Motor Arm, Right: (not recorded)  6a. Motor Leg, Left: (not recorded)  6b. Motor Leg, Right: (not recorded)  7. Limb Ataxia: (not recorded)  8. Sensory: (not recorded)  9. Best Language: (not recorded)  10. Dysarthria: (not recorded)  11. Extinction and Inattention (formerly Neglect): (not recorded)    Total (NIH Stroke Scale): (not recorded)     Additional notable assessment information:     Nursing report ED to floor:  Wilman Lazcano RN   12/06/24 19:23 EST

## 2024-12-08 LAB
ANION GAP SERPL CALCULATED.3IONS-SCNC: 8 MMOL/L (ref 5–15)
BACTERIA SPEC AEROBE CULT: NORMAL
BASOPHILS # BLD AUTO: 0.08 10*3/MM3 (ref 0–0.2)
BASOPHILS NFR BLD AUTO: 0.9 % (ref 0–1.5)
BUN SERPL-MCNC: 31 MG/DL (ref 8–23)
BUN/CREAT SERPL: 22.1 (ref 7–25)
CALCIUM SPEC-SCNC: 8.8 MG/DL (ref 8.6–10.5)
CHLORIDE SERPL-SCNC: 104 MMOL/L (ref 98–107)
CO2 SERPL-SCNC: 26 MMOL/L (ref 22–29)
CREAT SERPL-MCNC: 1.4 MG/DL (ref 0.57–1)
CREAT UR-MCNC: 120.3 MG/DL
DEPRECATED RDW RBC AUTO: 41 FL (ref 37–54)
EGFRCR SERPLBLD CKD-EPI 2021: 40.3 ML/MIN/1.73
EOSINOPHIL # BLD AUTO: 0.31 10*3/MM3 (ref 0–0.4)
EOSINOPHIL NFR BLD AUTO: 3.5 % (ref 0.3–6.2)
EOSINOPHIL SPEC QL MICRO: 0 % EOS/100 CELLS (ref 0–0)
ERYTHROCYTE [DISTWIDTH] IN BLOOD BY AUTOMATED COUNT: 12.4 % (ref 12.3–15.4)
GLUCOSE BLDC GLUCOMTR-MCNC: 118 MG/DL (ref 70–105)
GLUCOSE BLDC GLUCOMTR-MCNC: 120 MG/DL (ref 70–105)
GLUCOSE BLDC GLUCOMTR-MCNC: 181 MG/DL (ref 70–105)
GLUCOSE BLDC GLUCOMTR-MCNC: 80 MG/DL (ref 70–105)
GLUCOSE SERPL-MCNC: 88 MG/DL (ref 65–99)
HCT VFR BLD AUTO: 32.2 % (ref 34–46.6)
HGB BLD-MCNC: 9.9 G/DL (ref 12–15.9)
IMM GRANULOCYTES # BLD AUTO: 0.02 10*3/MM3 (ref 0–0.05)
IMM GRANULOCYTES NFR BLD AUTO: 0.2 % (ref 0–0.5)
LYMPHOCYTES # BLD AUTO: 3.7 10*3/MM3 (ref 0.7–3.1)
LYMPHOCYTES NFR BLD AUTO: 41.4 % (ref 19.6–45.3)
MAGNESIUM SERPL-MCNC: 1.6 MG/DL (ref 1.6–2.4)
MCH RBC QN AUTO: 28 PG (ref 26.6–33)
MCHC RBC AUTO-ENTMCNC: 30.7 G/DL (ref 31.5–35.7)
MCV RBC AUTO: 91.2 FL (ref 79–97)
MONOCYTES # BLD AUTO: 0.84 10*3/MM3 (ref 0.1–0.9)
MONOCYTES NFR BLD AUTO: 9.4 % (ref 5–12)
NEUTROPHILS NFR BLD AUTO: 3.99 10*3/MM3 (ref 1.7–7)
NEUTROPHILS NFR BLD AUTO: 44.6 % (ref 42.7–76)
NRBC BLD AUTO-RTO: 0 /100 WBC (ref 0–0.2)
PHOSPHATE SERPL-MCNC: 2.7 MG/DL (ref 2.5–4.5)
PLATELET # BLD AUTO: 239 10*3/MM3 (ref 140–450)
PMV BLD AUTO: 9.3 FL (ref 6–12)
POTASSIUM SERPL-SCNC: 3.7 MMOL/L (ref 3.5–5.2)
PROT ?TM UR-MCNC: 9 MG/DL
RBC # BLD AUTO: 3.53 10*6/MM3 (ref 3.77–5.28)
SODIUM SERPL-SCNC: 138 MMOL/L (ref 136–145)
SODIUM UR-SCNC: 83 MMOL/L
WBC NRBC COR # BLD AUTO: 8.94 10*3/MM3 (ref 3.4–10.8)

## 2024-12-08 PROCEDURE — 84100 ASSAY OF PHOSPHORUS: CPT

## 2024-12-08 PROCEDURE — 25810000003 SODIUM CHLORIDE 0.9 % SOLUTION: Performed by: INTERNAL MEDICINE

## 2024-12-08 PROCEDURE — 63710000001 INSULIN LISPRO (HUMAN) PER 5 UNITS

## 2024-12-08 PROCEDURE — 63710000001 PROMETHAZINE PER 25 MG

## 2024-12-08 PROCEDURE — 82948 REAGENT STRIP/BLOOD GLUCOSE: CPT

## 2024-12-08 PROCEDURE — 83735 ASSAY OF MAGNESIUM: CPT

## 2024-12-08 PROCEDURE — 80048 BASIC METABOLIC PNL TOTAL CA: CPT

## 2024-12-08 PROCEDURE — 85025 COMPLETE CBC W/AUTO DIFF WBC: CPT

## 2024-12-08 RX ORDER — LIDOCAINE 4 G/G
2 PATCH TOPICAL
Status: DISCONTINUED | OUTPATIENT
Start: 2024-12-09 | End: 2024-12-12 | Stop reason: HOSPADM

## 2024-12-08 RX ORDER — PANTOPRAZOLE SODIUM 40 MG/10ML
40 INJECTION, POWDER, LYOPHILIZED, FOR SOLUTION INTRAVENOUS
Status: DISCONTINUED | OUTPATIENT
Start: 2024-12-09 | End: 2024-12-10

## 2024-12-08 RX ADMIN — ACETAMINOPHEN 650 MG: 325 TABLET, FILM COATED ORAL at 20:01

## 2024-12-08 RX ADMIN — ASPIRIN 81 MG: 81 TABLET, COATED ORAL at 10:00

## 2024-12-08 RX ADMIN — TRAZODONE HYDROCHLORIDE 50 MG: 50 TABLET ORAL at 20:01

## 2024-12-08 RX ADMIN — Medication 10 ML: at 20:01

## 2024-12-08 RX ADMIN — INSULIN GLARGINE-YFGN 15 UNITS: 100 INJECTION, SOLUTION SUBCUTANEOUS at 10:00

## 2024-12-08 RX ADMIN — GABAPENTIN 200 MG: 100 CAPSULE ORAL at 10:00

## 2024-12-08 RX ADMIN — INSULIN LISPRO 2 UNITS: 100 INJECTION, SOLUTION INTRAVENOUS; SUBCUTANEOUS at 17:57

## 2024-12-08 RX ADMIN — ATORVASTATIN CALCIUM 80 MG: 40 TABLET, FILM COATED ORAL at 20:01

## 2024-12-08 RX ADMIN — PROMETHAZINE HYDROCHLORIDE 25 MG: 25 TABLET ORAL at 22:48

## 2024-12-08 RX ADMIN — SODIUM CHLORIDE 75 ML/HR: 9 INJECTION, SOLUTION INTRAVENOUS at 05:21

## 2024-12-08 RX ADMIN — Medication 10 ML: at 10:00

## 2024-12-08 RX ADMIN — GABAPENTIN 200 MG: 100 CAPSULE ORAL at 20:01

## 2024-12-08 RX ADMIN — CLOPIDOGREL BISULFATE 75 MG: 75 TABLET ORAL at 10:00

## 2024-12-08 NOTE — PROGRESS NOTES
" LOS: 0 days   Patient Care Team:  Traci Townsend APRN as PCP - General (Nurse Practitioner)  Drake Hinton MD PhD as Consulting Physician (Ophthalmology)  Fausto Gilliam MD as Consulting Physician (Endocrinology)  Saloni Ch MD (Ophthalmology)  Mikhail Kaplan Jr., MD as Consulting Physician (Urology)    Chief Complaint: REINA        History of Present Illness  This is a 69 y.o. year old female  admitted for confusion and weakness.  She was found to have hypotension, noAKI and UTI  upon arrival.  She has no known hx of CKD.   Cr upon arrival was 2.7.   She has been hydrated.  She is making urine and her BP is now near goal.       Subjective  Patient reports she is feeling poorly today.   Denies sob or chest pain    History taken from: patient chart    Objective     Vital Sign Min/Max for last 24 hours  Temp  Min: 97.3 °F (36.3 °C)  Max: 98.4 °F (36.9 °C)   BP  Min: 112/59  Max: 137/60   Pulse  Min: 74  Max: 91   Resp  Min: 15  Max: 18   SpO2  Min: 93 %  Max: 98 %   No data recorded   No data recorded     Flowsheet Rows      Flowsheet Row First Filed Value   Admission Height 157.5 cm (62\") Documented at 12/06/2024 1241   Admission Weight 73.5 kg (162 lb) Documented at 12/06/2024 1241            I/O this shift:  In: 240 [P.O.:240]  Out: 400 [Urine:400]  I/O last 3 completed shifts:  In: 2306.3 [P.O.:600; I.V.:1606.3; IV Piggyback:100]  Out: 250 [Urine:250]    Objective:  Vital signs: (most recent): Blood pressure 127/72, pulse 74, temperature 97.9 °F (36.6 °C), temperature source Oral, resp. rate 18, height 157.5 cm (62\"), weight 72.1 kg (158 lb 15.2 oz), SpO2 95%, not currently breastfeeding.              Physical Examination: General appearance - alert, well appearing, and in no distress  Mental status - alert, oriented to person, place, and time  Chest - clear to auscultation, no wheezes, rales or rhonchi, symmetric air entry  Heart - normal rate, regular rhythm, normal S1, S2, no murmurs, " "rubs, clicks or gallops  Neurological - alert, oriented, normal speech, no focal findings or movement disorder noted  Extremities - peripheral pulses normal, no pedal edema, no clubbing or cyanosis   Results Review:     I reviewed the patient's new clinical results.    WBC WBC   Date Value Ref Range Status   12/08/2024 8.94 3.40 - 10.80 10*3/mm3 Final   12/06/2024 10.37 3.40 - 10.80 10*3/mm3 Final   12/06/2024 13.78 (H) 3.40 - 10.80 10*3/mm3 Final      HGB Hemoglobin   Date Value Ref Range Status   12/08/2024 9.9 (L) 12.0 - 15.9 g/dL Final   12/06/2024 11.4 (L) 12.0 - 15.9 g/dL Final   12/06/2024 13.2 12.0 - 15.9 g/dL Final      HCT Hematocrit   Date Value Ref Range Status   12/08/2024 32.2 (L) 34.0 - 46.6 % Final   12/06/2024 35.3 34.0 - 46.6 % Final   12/06/2024 40.9 34.0 - 46.6 % Final      Platlets No results found for: \"LABPLAT\"   MCV MCV   Date Value Ref Range Status   12/08/2024 91.2 79.0 - 97.0 fL Final   12/06/2024 91.9 79.0 - 97.0 fL Final   12/06/2024 89.5 79.0 - 97.0 fL Final          Sodium Sodium   Date Value Ref Range Status   12/08/2024 138 136 - 145 mmol/L Final   12/06/2024 135 (L) 136 - 145 mmol/L Final   12/06/2024 136 136 - 145 mmol/L Final      Potassium Potassium   Date Value Ref Range Status   12/08/2024 3.7 3.5 - 5.2 mmol/L Final     Comment:     Specimen hemolyzed.  Result may be falsely elevated.   12/07/2024 3.9 3.5 - 5.2 mmol/L Final   12/07/2024 3.5 3.5 - 5.2 mmol/L Final   12/06/2024 3.6 3.5 - 5.2 mmol/L Final     Comment:     Specimen hemolyzed.  Result may be falsely elevated.   12/06/2024 3.7 3.5 - 5.2 mmol/L Final     Comment:     Slight hemolysis detected by analyzer. Result may be falsely elevated.      Chloride Chloride   Date Value Ref Range Status   12/08/2024 104 98 - 107 mmol/L Final   12/06/2024 96 (L) 98 - 107 mmol/L Final   12/06/2024 93 (L) 98 - 107 mmol/L Final      CO2 CO2   Date Value Ref Range Status   12/08/2024 26.0 22.0 - 29.0 mmol/L Final   12/06/2024 28.3 22.0 - " "29.0 mmol/L Final   12/06/2024 28.7 22.0 - 29.0 mmol/L Final      BUN BUN   Date Value Ref Range Status   12/08/2024 31 (H) 8 - 23 mg/dL Final   12/06/2024 39 (H) 8 - 23 mg/dL Final   12/06/2024 40 (H) 8 - 23 mg/dL Final      Creatinine Creatinine   Date Value Ref Range Status   12/08/2024 1.40 (H) 0.57 - 1.00 mg/dL Final   12/06/2024 2.26 (H) 0.57 - 1.00 mg/dL Final   12/06/2024 2.77 (H) 0.57 - 1.00 mg/dL Final      Calcium Calcium   Date Value Ref Range Status   12/08/2024 8.8 8.6 - 10.5 mg/dL Final   12/06/2024 10.1 8.6 - 10.5 mg/dL Final   12/06/2024 10.9 (H) 8.6 - 10.5 mg/dL Final     Comment:     Result checked        PO4 No results found for: \"CAPO4\"   Albumin Albumin   Date Value Ref Range Status   12/06/2024 4.0 3.5 - 5.2 g/dL Final      Magnesium Magnesium   Date Value Ref Range Status   12/08/2024 1.6 1.6 - 2.4 mg/dL Final   12/06/2024 1.9 1.6 - 2.4 mg/dL Final      Uric Acid Uric Acid   Date Value Ref Range Status   12/06/2024 7.1 (H) 2.4 - 5.7 mg/dL Final        Medication Review:     Current Facility-Administered Medications:     acetaminophen (TYLENOL) tablet 650 mg, 650 mg, Oral, Q4H PRN, 650 mg at 12/07/24 2106 **OR** acetaminophen (TYLENOL) 160 MG/5ML oral solution 650 mg, 650 mg, Oral, Q4H PRN **OR** acetaminophen (TYLENOL) suppository 650 mg, 650 mg, Rectal, Q4H PRN, Isabel Aragon PA-C    [Held by provider] amLODIPine (NORVASC) tablet 10 mg, 10 mg, Oral, Daily, Isabel Aragon PA-C    aspirin EC tablet 81 mg, 81 mg, Oral, Daily, Isabel Aragon PA-C, 81 mg at 12/08/24 1000    atorvastatin (LIPITOR) tablet 80 mg, 80 mg, Oral, Nightly, Isabel Aragon PA-C, 80 mg at 12/07/24 2106    benzocaine-menthol (CHLORASEPTIC) lozenge 1 each, 1 lozenge, Mouth/Throat, Q4H PRN, Llanes Alvarez, Carlos, MD    sennosides-docusate (PERICOLACE) 8.6-50 MG per tablet 2 tablet, 2 tablet, Oral, BID PRN **AND** polyethylene glycol (MIRALAX) packet 17 g, 17 g, Oral, Daily PRN **AND** bisacodyl (DULCOLAX) EC tablet 5 mg, 5 mg, " Oral, Daily PRN **AND** bisacodyl (DULCOLAX) suppository 10 mg, 10 mg, Rectal, Daily PRN, Isabel Aragon PA-C    Calcium Replacement - Follow Nurse / BPA Driven Protocol, , Not Applicable, PRN, Isabel Aragon PA-C    cefTRIAXone (ROCEPHIN) 1,000 mg in sodium chloride 0.9 % 100 mL MBP, 1,000 mg, Intravenous, Q24H, Isabel Aragon PA-C, Last Rate: 200 mL/hr at 12/07/24 2105, 1,000 mg at 12/07/24 2105    clopidogrel (PLAVIX) tablet 75 mg, 75 mg, Oral, Daily, Isabel Aragon PA-C, 75 mg at 12/08/24 1000    dextrose (D50W) (25 g/50 mL) IV injection 25 g, 25 g, Intravenous, Q15 Min PRN, Isabel Aragon PA-C    dextrose (GLUTOSE) oral gel 15 g, 15 g, Oral, Q15 Min PRN, Isabel Aragon PA-C    famotidine (PEPCID) tablet 20 mg, 20 mg, Oral, Q48H, NoBrenda manriquez MD, 20 mg at 12/07/24 2106    gabapentin (NEURONTIN) capsule 200 mg, 200 mg, Oral, Q12H, Isabel Aragon PA-C, 200 mg at 12/08/24 1000    glucagon (GLUCAGEN) injection 1 mg, 1 mg, Intramuscular, Q15 Min PRN, Isabel Aragon PA-C    insulin glargine (LANTUS, SEMGLEE) injection 15 Units, 15 Units, Subcutaneous, Daily, Isabel Aragon PA-C, 15 Units at 12/08/24 1000    insulin lispro (HUMALOG/ADMELOG) injection 2-9 Units, 2-9 Units, Subcutaneous, 4x Daily AC & at Bedtime, Isabel Aragon PA-C, 2 Units at 12/07/24 2222    [Held by provider] lisinopril (PRINIVIL,ZESTRIL) tablet 5 mg, 5 mg, Oral, Daily, Isabel Aragon PA-C    Magnesium Low Dose Replacement - Follow Nurse / BPA Driven Protocol, , Not Applicable, PRN, Isabel Aragon PA-C    melatonin tablet 5 mg, 5 mg, Oral, Nightly PRN, Isabel Aragon PA-C    nitroglycerin (NITROSTAT) SL tablet 0.4 mg, 0.4 mg, Sublingual, Q5 Min PRN, Isabel Aragon PA-C    ondansetron (ZOFRAN) injection 4 mg, 4 mg, Intravenous, Q6H PRN, Isabel Aragon PA-C    Phosphorus Replacement - Follow Nurse / BPA Driven Protocol, , Not Applicable, PRN, Isabel Aragon PA-C    [Held by provider] potassium chloride (KLOR-CON M20) CR tablet 20 mEq, 20 mEq, Oral, Once,  Brenda Sutton MD    Potassium Replacement - Follow Nurse / BPA Driven Protocol, , Not Applicable, PRN, Isabel Aragon PA-C    promethazine (PHENERGAN) tablet 25 mg, 25 mg, Oral, Q6H PRN, Payal Catalan PA-C, 25 mg at 12/07/24 2222    [COMPLETED] Insert Peripheral IV, , , Once **AND** sodium chloride 0.9 % flush 10 mL, 10 mL, Intravenous, PRN, Pattie Wynne, CANDIDO    sodium chloride 0.9 % flush 10 mL, 10 mL, Intravenous, Q12H, Isabel Aragon PA-TAL, 10 mL at 12/08/24 1000    sodium chloride 0.9 % flush 10 mL, 10 mL, Intravenous, PRN, Isabel Aragon PA-TAL    sodium chloride 0.9 % infusion 40 mL, 40 mL, Intravenous, PRN, Isabel Aragon PA-TAL    sodium chloride 0.9 % infusion, 75 mL/hr, Intravenous, Continuous, Indu Young MD, Last Rate: 75 mL/hr at 12/08/24 0600, 75 mL/hr at 12/08/24 0600    sodium chloride nasal spray 1 spray, 1 spray, Each Nare, PRN, Llanes Alvarez, Carlos, MD    traZODone (DESYREL) tablet 50 mg, 50 mg, Oral, Nightly, Isabel Aragon PA-C, 50 mg at 12/07/24 2105     Assessment & Plan       REINA (acute kidney injury)      Assessment & Plan  REINA  Hyponatremia   UTI  Diarrhea       Renal function improved today.   Continue off IVF for now   Abx per primary   Na at goal today  Check labs in am  Will follow     Tracy Leigh MD  12/08/24  13:39 EST

## 2024-12-08 NOTE — PLAN OF CARE
Goal Outcome Evaluation:  Plan of Care Reviewed With: patient        Progress: improving     Up in chair. Up x SB assist. Vss. Ivf/IV ABX. PLAN of care ongoing.

## 2024-12-08 NOTE — PROGRESS NOTES
[facility] MEDICINE SERVICE  DAILY PROGRESS NOTE    NAME: Marge Mcghee  : 1955  MRN: [id number]      LOS: 0 days     PROVIDER OF SERVICE: Brenda Sutton MD    Chief Complaint: REINA (acute kidney injury)    Subjective:     Interval History:  History taken from: Patient and patient's chart     Diarrhea resolved. Complaining of stomach discomfort.         Review of Systems:   Review of Systems  All negative except above   Objective:     Vital Signs  Temp:  [97.3 °F (36.3 °C)-98.4 °F (36.9 °C)] 97.9 °F (36.6 °C)  Heart Rate:  [74-91] 74  Resp:  [15-18] 18  BP: (112-137)/(59-72) 127/72   Body mass index is 29.07 kg/m².    Physical Exam  Physical Exam  General: Alert and oriented, no acute distress.  HENT: Normocephalic, moist oral mucosa, no scleral icterus.  Neck: Supple, nontender, no carotid bruits, no JVD, no LAD.  Lungs: Clear to auscultation, nonlabored respiration.  Heart: RRR, no murmur, gallop or edema.  Abdomen: Soft, nontender, nondistended, + bowel sounds.  Neuro: alert and awake, moving all 4 extremities   Skin: Skin is warm, dry and pink, no rashes or lesions.  Psychiatric: Cooperative, appropriate mood and affect.          Diagnostic Data    Results from last 7 days   Lab Units 24  0213 24  2312 24  1446   WBC 10*3/mm3 8.94   < >  --    HEMOGLOBIN g/dL 9.9*   < >  --    HEMATOCRIT % 32.2*   < >  --    PLATELETS 10*3/mm3 239   < >  --    GLUCOSE mg/dL 88   < > 246*   CREATININE mg/dL 1.40*   < > 2.77*   BUN mg/dL 31*   < > 40*   SODIUM mmol/L 138   < > 136   POTASSIUM mmol/L 3.7   < > 3.7   AST (SGOT) U/L  --   --  23   ALT (SGPT) U/L  --   --  9   ALK PHOS U/L  --   --  65   BILIRUBIN mg/dL  --   --  0.5   ANION GAP mmol/L 8.0   < > 14.3    < > = values in this interval not displayed.       US Renal Bilateral    Result Date: 2024  Impression: No hydronephrosis. Small left renal cyst. Electronically Signed: Netta Cartwright MD  2024 8:47 AM EST  Workstation  ID: [id number]    CT Head Without Contrast    Result Date: 12/6/2024  Impression: No acute intracranial findings by CT. Chronic findings stable from prior. Electronically Signed: Chung Holland MD  12/6/2024 1:50 PM EST  Workstation ID: [id number]       I have reviewed patient labs and imaging     Assessment/Plan:   Marge Mcghee is a 69 y.o. female with a CMH of HTN, adrenal nodule, HLD, GERD, T2DM, RLS, OA, CAD s/p CABG who presented to [facility] on 12/6/2024 with generalized weakness.     Active and Resolved Problems  # REINA  -Creatinine 2.77, BUN 40. Creatinine 1 week ago was 0.89.   -Patient was on lisinopril prior to admission which she has been on since October. Hold ACE-I/ARBs  -Suspect pre-renal etiology/ATN from vomiting, ACE-I and possibly hypotension (had documented BP of 86/45 while in ED).   -renal US- No hydronephrosis, small left renal cyst  -uric acid- 7.1  -continuous NS    -Avoid hypotension  -IMPROVING, today's Cr is 1.4  -Renal following      # UTI  -UA with TNTC WBC and 1+ leukocytes, no bacteria.  Sample does appear to be contaminated based on number of squamous epithelial cells.  -Ucx negative      # HTN  -Had episode of hypotension earlier and some low normal BP readings therefore will hold antihypertensives for now  -monitor BP and adjust medications accordingly      # T2DM with hyperglycemia  -continue Lantus 15 units daily  -SSI  -Hold metformin  -CCD  -Hypoglycemia protocol in place     # CAD  -Continue asa/Plavix      #Diarrhea  - resolved        VTE Prophylaxis:  Mechanical VTE prophylaxis orders are present.             Disposition Planning:     Barriers to Discharge: medical clearance   Anticipated Date of Discharge: 12/9  Place of Discharge: home vs rehab       Time: 40 minutes     Code Status and Medical Interventions: CPR (Attempt to Resuscitate); Full Support   Ordered at: 12/06/24 8482     Code Status (Patient has no pulse and is not breathing):    CPR (Attempt to  Resuscitate)     Medical Interventions (Patient has pulse or is breathing):    Full Support       Signature: Electronically signed by Brenda Sutton MD, 12/08/24, 13:44 EST.  [facility] Hospitalist Team

## 2024-12-08 NOTE — CONSULTS
[facility]                                                                                             Nephrology Initial Consult Note    Patient Identification:  Name: Marge Mcghee MRN: [id number]  Age: 69 y.o. : 1955  Sex: female  Date:2024    Requesting Physician: As per consult order.  Reason for Consultation: arf  Information from:patient/ family/ chart      History of Present Illness: This is a 69 y.o. year old female  admitted for confusion and weakness.  She was found to have hypotension, noAKI and UTI  upon arrival.  She has no known hx of CKD.   Cr upon arrival was 2.7.   She has been hydrated.  She is making urine and her BP is now near goal.        The following medical history and medications personally reviewed by me:    Problem List:   Patient Active Problem List    Diagnosis     *REINA (acute kidney injury) [N17.9]     Hyperosmolar hyperglycemic state (HHS) [E11.00]     Migraine, intractable [G43.919]     Other headache syndrome [G44.89]     Change in vision [H53.9]     Type 2 diabetes mellitus without complication, with long-term current use of insulin [E11.9, Z79.4]     CVA (cerebral vascular accident) [I63.9]     Coronary artery disease involving coronary bypass graft of native heart without angina pectoris [I25.810]     Essential hypertension [I10]     Encephalopathy, metabolic [G93.41]     Hypertensive emergency [I16.1]     Dysarthria [R47.1]     HTN, goal below 130/80 [I10]     S/P CABG x 3 [Z95.1]     Chest pain [R07.9]     GERD (gastroesophageal reflux disease) [K21.9]     Stable angina pectoris [I20.89]     Right foot infection [L08.9]     Delayed surgical wound healing [T81.89XA]     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]     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 [E11.3513]     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 [E11.3513]      Occipital neuralgia of left side [M54.81]     Polypharmacy [Z79.899]     Complicated migraine [G43.109]     Hyperglycemia [R73.9]     Acute right flank pain [R10.9]     Vomiting [R11.10]     Hypomagnesemia [E83.42]     Dehydration [E86.0]     Obesity (BMI 30-39.9) [E66.9]     Thyroid nodule [E04.1]     Vitamin D deficiency [E55.9]     UTI (urinary tract infection) [N39.0]     Type 2 diabetes mellitus with retinopathy, with long-term current use of insulin [E11.319, Z79.4]     Age-related osteoporosis without current pathological fracture [M81.0]     Hyperlipidemia [E78.5]     Hepatic steatosis [K76.0]     Gastroesophageal reflux disease with esophagitis [K21.00]     Adrenal nodule [E27.9]        Past Medical History:  Past Medical History:   Diagnosis Date    Adrenal nodule 11/16/2016    FINDINGS: Mild hepatic steatosis may be present. Cholecystectomy. No biliary ductal dilatation. Negative pancreas, spleen, left adrenal gland, and kidneys. The right adrenal presumed adenoma has increased slightly, measuring 3 x 4 cm in diameter,  compared to 2 x 3.5 cm on the previous exam. The attenuation values are consistent with an adenoma. Done at [facility] in August 2018    Age-related osteoporosis without current pathological fracture 11/16/2016    Arthritis     CVA (cerebral vascular accident)     year 2000- can't remember exact year    Delayed surgical wound healing     Essential hypertension 11/16/2016    Gastroesophageal reflux disease with esophagitis 11/16/2016    Hepatic steatosis 11/16/2016    History of anemia     History of sepsis     FROM UTI    Hyperlipidemia 11/16/2016    Lisfranc's dislocation     LEFT WITH FRACTURES NONHEALING FOOT    Osteomyelitis of left foot 11/2020    RLS (restless legs syndrome)     Squamous cell skin cancer 2014    Excised by Dr. James    Thyroid nodule 10/18/2019    BENIGN    Type 2 diabetes mellitus with peripheral neuropathy 11/16/2016    Vitamin D deficiency 01/25/2019        Past Surgical History:  Past Surgical History:   Procedure Laterality Date    BREAST BIOPSY Left     7/2019. BENIGN    CARDIAC CATHETERIZATION Left 5/2/2021    Procedure: Cardiac Catheterization/Vascular Study;  Surgeon: Chacho Esteban MD;  Location: [facility] CATH INVASIVE LOCATION;  Service: Cardiovascular;  Laterality: Left;    CARDIAC CATHETERIZATION N/A 5/2/2021    Procedure: Intra-Aortic Baloon Pump Insertion;  Surgeon: Chacho Esteban MD;  Location: [facility] CATH INVASIVE LOCATION;  Service: Cardiovascular;  Laterality: N/A;    CATARACT EXTRACTION WITH INTRAOCULAR LENS IMPLANT Bilateral     CHOLECYSTECTOMY      COLONOSCOPY      CORONARY ARTERY BYPASS GRAFT N/A 5/2/2021    Procedure: CORONARY ARTERY BYPASS WITH INTERNAL MAMMARY ARTERY GRAFT;  Surgeon: Jr Rocael Alexander MD;  Location: [facility];  Service: Cardiothoracic;  Laterality: N/A;    FOOT FUSION Right 11/13/2020    Procedure: RIGHT OPEN TREATMENT LISFRANC INJURY, OPEN REDUCTION INTERNAL FIXATION MEDIAL/MIDDLE CUNEIFORM FRACTURE AND 2ND/3RD METATARSAL FRACTURE 1ST 2ND POSSIBLE 3RD TARSOMETATARSAL ARTHRODESIS INTERCUNEIFORM ARTHRODESIS CALCANEAL BONE GRAFT;  Surgeon: Mikhail Banks Jr., MD;  Location: [facility];  Service: Orthopedics;  Laterality: Right;    INCISION AND DRAINAGE LEG Right 1/8/2021    Procedure: RIGHT IRRIGATION AND DEBRIDEMENT OF FOOT WITH SECONDARY CLOSURE AND HARDWARE REMOVAL;  Surgeon: Mikhail Banks Jr., MD;  Location: [facility] OR;  Service: Orthopedics;  Laterality: Right;    KNEE ARTHROSCOPY Bilateral     TOTAL ABDOMINAL HYSTERECTOMY      TRANSESOPHAGEAL ECHOCARDIOGRAM (EMILIA) N/A 5/2/2021    Procedure: TRANSESOPHAGEAL ECHOCARDIOGRAM;  Surgeon: Jr Rocael Alexadner MD;  Location: [facility];  Service: Cardiothoracic;  Laterality: N/A;    UPPER GASTROINTESTINAL ENDOSCOPY  08/2016    US GUIDED FINE NEEDLE ASPIRATION  5/8/2020        Home Meds:   Medications Prior to Admission   Medication Sig Dispense Refill Last Dose/Taking     acetaminophen (TYLENOL) 325 MG tablet Take 2 tablets by mouth Every 4 (Four) Hours As Needed for Mild Pain. Indications: Pain   Taking As Needed    amLODIPine (NORVASC) 10 MG tablet Take 1 tablet by mouth Daily. Indications: High Blood Pressure   Taking    aspirin 81 MG EC tablet Take 1 tablet by mouth Daily. Indications: Disease involving Lipid Deposits in the Arteries   Taking    atorvastatin (LIPITOR) 80 MG tablet Take 1 tablet by mouth Every Night. 90 tablet 3 Taking    clopidogrel (PLAVIX) 75 MG tablet Take 1 tablet by mouth Daily.   Taking    DULoxetine (CYMBALTA) 60 MG capsule Take 1 capsule by mouth Daily. Indications: Major Depressive Disorder   Taking    gabapentin (NEURONTIN) 600 MG tablet Take 1 tablet by mouth See Admin Instructions. One to two times daily   Taking    Insulin Glargine (LANTUS SOLOSTAR) 100 UNIT/ML injection pen Inject 15 Units under the skin into the appropriate area as directed Daily. Dx code: E11.65 15 mL 2 Taking    lisinopril (PRINIVIL,ZESTRIL) 5 MG tablet Take 1 tablet by mouth Daily.   Taking    multivitamin with minerals tablet tablet Take 1 tablet by mouth Daily. Indications: Supplement   Taking    traZODone (DESYREL) 50 MG tablet Take 1 tablet by mouth Every Night. Indications: Trouble Sleeping   Taking    Accu-Chek FastClix Lancets misc Use 1 each 3 (Three) Times a Day Before Meals. Dx code: E11.65 100 each 2     Accu-Chek Softclix Lancets lancets 1 each by Other route 3 (Three) Times a Day Before Meals. Use as instructed  Dx code: E11.65 100 each 2     Blood Glucose Monitoring Suppl (Accu-Chek Guide Me) w/Device kit Use 1 each 3 (Three) Times a Day Before Meals. Dx code: E11.65  NDC [address]-45  Bin#: [id number] Group #: [id number]  ID #: [id number]  Issuer #: [id number] kit 0     denosumab (Prolia) 60 MG/ML solution prefilled syringe syringe Inject 1 mL under the skin into the appropriate area as directed 1 (One) Time.       Insulin Pen Needle (Pen Needles) 32G X 4 MM misc Use  1 each Every Night. Dx code: E11.65 100 each 2        Current Meds:   Current Facility-Administered Medications   Medication Dose Route Frequency Provider Last Rate Last Admin    acetaminophen (TYLENOL) tablet 650 mg  650 mg Oral Q4H PRN Isabel Aragon PA-C   650 mg at 12/07/24 0139    Or    acetaminophen (TYLENOL) 160 MG/5ML oral solution 650 mg  650 mg Oral Q4H PRN Isabel Aragon PA-C        Or    acetaminophen (TYLENOL) suppository 650 mg  650 mg Rectal Q4H PRN Isabel Aragon PA-C        [Held by provider] amLODIPine (NORVASC) tablet 10 mg  10 mg Oral Daily Isabel Aragon PA-C        aspirin EC tablet 81 mg  81 mg Oral Daily Isabel Aragon PA-C   81 mg at 12/07/24 0916    atorvastatin (LIPITOR) tablet 80 mg  80 mg Oral Nightly Isabel Aragon PA-C   80 mg at 12/06/24 2211    benzocaine-menthol (CHLORASEPTIC) lozenge 1 each  1 lozenge Mouth/Throat Q4H PRN Llanes Alvarez, Carlos, MD        sennosides-docusate (PERICOLACE) 8.6-50 MG per tablet 2 tablet  2 tablet Oral BID PRN Isabel Aragon PA-C        And    polyethylene glycol (MIRALAX) packet 17 g  17 g Oral Daily PRN Isabel Aragon PA-C        And    bisacodyl (DULCOLAX) EC tablet 5 mg  5 mg Oral Daily PRN Isabel Aragon PA-C        And    bisacodyl (DULCOLAX) suppository 10 mg  10 mg Rectal Daily PRN Isabel Aragon PA-C        Calcium Replacement - Follow Nurse / BPA Driven Protocol   Not Applicable PRN Isabel Aragon PA-C        cefTRIAXone (ROCEPHIN) 1,000 mg in sodium chloride 0.9 % 100 mL MBP  1,000 mg Intravenous Q24H Isabel Aragon PA-C   Stopped at 12/07/24 0010    clopidogrel (PLAVIX) tablet 75 mg  75 mg Oral Daily Isabel Aragon PA-C   75 mg at 12/07/24 0916    dextrose (D50W) (25 g/50 mL) IV injection 25 g  25 g Intravenous Q15 Min PRN Isabel Aragon PA-C        dextrose (GLUTOSE) oral gel 15 g  15 g Oral Q15 Min PRN Isabel Aragon PA-C        famotidine (PEPCID) tablet 20 mg  20 mg Oral Q48H Brenda Sutton MD        gabapentin (NEURONTIN) capsule 200 mg   200 mg Oral Q12H Isabel Aragon PA-C   200 mg at 12/07/24 0916    glucagon (GLUCAGEN) injection 1 mg  1 mg Intramuscular Q15 Min PRN Isabel Aragon PA-C        insulin glargine (LANTUS, SEMGLEE) injection 15 Units  15 Units Subcutaneous Daily Isabel Aragon PA-C   15 Units at 12/07/24 0916    insulin lispro (HUMALOG/ADMELOG) injection 2-9 Units  2-9 Units Subcutaneous 4x Daily AC & at Bedtime Isabel Aragon PA-C   2 Units at 12/07/24 1815    [Held by provider] lisinopril (PRINIVIL,ZESTRIL) tablet 5 mg  5 mg Oral Daily Isabel Aragon PA-C        Magnesium Low Dose Replacement - Follow Nurse / BPA Driven Protocol   Not Applicable PRN Isabel Aragon PA-C        melatonin tablet 5 mg  5 mg Oral Nightly PRN Isabel Aragon PA-C        nitroglycerin (NITROSTAT) SL tablet 0.4 mg  0.4 mg Sublingual Q5 Min PRN Isabel Aragon PA-C        ondansetron (ZOFRAN) injection 4 mg  4 mg Intravenous Q6H PRN Isabel Aragon PA-C        Phosphorus Replacement - Follow Nurse / BPA Driven Protocol   Not Applicable PRN Isabel Aragon PA-C        [Held by provider] potassium chloride (KLOR-CON M20) CR tablet 20 mEq  20 mEq Oral Once Brenda Sutton MD        Potassium Replacement - Follow Nurse / BPA Driven Protocol   Not Applicable PRN Isabel Aragon PA-C        sodium chloride 0.9 % flush 10 mL  10 mL Intravenous PRN Pattie Wynne APRN        sodium chloride 0.9 % flush 10 mL  10 mL Intravenous Q12H Isabel Aragon PA-C   10 mL at 12/07/24 0918    sodium chloride 0.9 % flush 10 mL  10 mL Intravenous PRN Isabel Aragon PA-C        sodium chloride 0.9 % infusion 40 mL  40 mL Intravenous PRN Isabel Aragon PA-C        sodium chloride nasal spray 1 spray  1 spray Each Nare PRN Llanes Alvarez, Carlos, MD        traZODone (DESYREL) tablet 50 mg  50 mg Oral Nightly Isabel Aragon PA-C           Allergies:  Allergies   Allergen Reactions    Zofran [Ondansetron Hcl] Nausea And Vomiting     Does the opposite of its purpose    Prochlorperazine Other  (See Comments)     CAUSED SEIZURE    Prochlorperazine Edisylate Hives    Hydralazine Itching and Rash    Hydralazine Hcl Itching and Rash    Meperidine Itching and Swelling    Prochlorperazine Maleate Other (See Comments)     CAUSES SEIZURE    Prochlorperazine Maleate Irritability       Social History:   Social History     Socioeconomic History    Marital status:    Tobacco Use    Smoking status: Never    Smokeless tobacco: Never   Vaping Use    Vaping status: Never Used   Substance and Sexual Activity    Alcohol use: Yes     Comment: socially     Drug use: Never    Sexual activity: Defer        Family History:  Family History   Problem Relation Age of Onset    Diabetes Mother     COPD Mother     Hypertension Mother     Kidney disease Mother     Obesity Mother     Thyroid disease Mother     Diabetes Sister     Diabetes Sister     Diabetes Sister     Diabetes Sister     Malig Hyperthermia Neg Hx         Review of Systems: as per HPI, in addition:    General:      + weakness / fatigue,                       No fevers / chills                       no weight loss  HEENT:       no dysphagia / odynophagia  Neck:           normal range of motion, no swelling  Respiratory: no cough / congestion                      No shortness of air                       No wheezing  CV:              No chest pain                       No palpitations  Abdomen/GI: no nausea / vomiting                      No diarrhea / constipation                      No abdominal pain  :             no dysuria / urinary frequency                       No urgency, normal output  Endocrine:   no polyuria / polydipsia,                      No heat or cold intolerance  Skin:           no rashes or skin breakdown   Vascular:   no  edema                     No claudication  Psych:        no depression/ anxiety  Neuro:        no focal weakness, no seizures  Musculoskeletal: no joint pain or deformities      Physical Exam:  Vitals:   Temp (24hrs),  "Av.6 °F (36.4 °C), Min:97.3 °F (36.3 °C), Max:98 °F (36.7 °C)    /59 (BP Location: Right arm, Patient Position: Lying)   Pulse 82   Temp 98 °F (36.7 °C) (Oral)   Resp 16   Ht 157.5 cm (62\")   Wt 72.1 kg (158 lb 15.2 oz)   SpO2 96%   BMI 29.07 kg/m²   Intake/Output:     Intake/Output Summary (Last 24 hours) at 2024 192  Last data filed at 2024 1500  Gross per 24 hour   Intake 1440 ml   Output --   Net 1440 ml        Wt Readings from Last 1 Encounters:   24 2019 72.1 kg (158 lb 15.2 oz)   24 1241 73.5 kg (162 lb)       Exam:    General Appearance:  Awake, alert, oriented x3, no acute distress  Well -appearing   Head and Face:  Normocephalic, atraumatic, mucus membranes moist, oropharynx clear   Eyes:  No icterus, pupils equal round and reactive to light, extraocular movements intact    ENMT: Moist mucosa, tongue symmetric    Neck: Supple  no jugular venous distention  no thyromegaly   Pulmonary:  Respiratory effort: Normal  Auscultation of lungs: Clear bilaterally  No wheezes  No rhonchi  Good air movement, good expansion   Chest wall:  No tenderness or deformity   Cardiovascular:  Auscultation of the heart: Normal rhythm, no murmurs  No edema of extremities    Abdomen:  Abdomen: soft, non-tender, normal bowel sounds all four quadrants, no masses   Liver and spleen: no hepatosplenomegaly   Musculoskeletal: Digits and nails: normal  Normal range of motion  No joint swelling or gross deformities    Skin: Skin inspection: color normal, no visible rashes or lesions  Skin palpation: texture, turgor normal, no palpable lesions   Lymphatic:  no cervical lymphadenopathy    Psychiatric: Judgement and insight: normal  Orientation to person place and time: normal  Mood and affect: normal       DATA:  Radiology and Labs:  The following labs and radiology results independently reviewed by me              Labs:   Recent Results (from the past 24 hours)   ECG 12 Lead Chest Pain    Collection " Time: 12/06/24 10:25 PM   Result Value Ref Range    QT Interval 380 ms    QTC Interval 432 ms   POC Glucose 4x Daily Before Meals & at Bedtime    Collection Time: 12/06/24 11:07 PM    Specimen: Blood   Result Value Ref Range    Glucose 253 (H) 70 - 105 mg/dL   Lactic Acid, Plasma    Collection Time: 12/06/24 11:12 PM    Specimen: Arm, Right; Blood   Result Value Ref Range    Lactate 2.0 0.5 - 2.0 mmol/L   Basic Metabolic Panel    Collection Time: 12/06/24 11:12 PM    Specimen: Arm, Right; Blood   Result Value Ref Range    Glucose 289 (H) 65 - 99 mg/dL    BUN 39 (H) 8 - 23 mg/dL    Creatinine 2.26 (H) 0.57 - 1.00 mg/dL    Sodium 135 (L) 136 - 145 mmol/L    Potassium 3.6 3.5 - 5.2 mmol/L    Chloride 96 (L) 98 - 107 mmol/L    CO2 28.3 22.0 - 29.0 mmol/L    Calcium 10.1 8.6 - 10.5 mg/dL    BUN/Creatinine Ratio 17.3 7.0 - 25.0    Anion Gap 10.7 5.0 - 15.0 mmol/L    eGFR 22.7 (L) >60.0 mL/min/1.73   Magnesium    Collection Time: 12/06/24 11:12 PM    Specimen: Arm, Right; Blood   Result Value Ref Range    Magnesium 1.9 1.6 - 2.4 mg/dL   Phosphorus    Collection Time: 12/06/24 11:12 PM    Specimen: Arm, Right; Blood   Result Value Ref Range    Phosphorus 4.5 2.5 - 4.5 mg/dL   CBC Auto Differential    Collection Time: 12/06/24 11:12 PM    Specimen: Arm, Right; Blood   Result Value Ref Range    WBC 10.37 3.40 - 10.80 10*3/mm3    RBC 3.84 3.77 - 5.28 10*6/mm3    Hemoglobin 11.4 (L) 12.0 - 15.9 g/dL    Hematocrit 35.3 34.0 - 46.6 %    MCV 91.9 79.0 - 97.0 fL    MCH 29.7 26.6 - 33.0 pg    MCHC 32.3 31.5 - 35.7 g/dL    RDW 12.5 12.3 - 15.4 %    RDW-SD 42.1 37.0 - 54.0 fl    MPV 9.4 6.0 - 12.0 fL    Platelets 266 140 - 450 10*3/mm3    Neutrophil % 53.8 42.7 - 76.0 %    Lymphocyte % 34.8 19.6 - 45.3 %    Monocyte % 8.5 5.0 - 12.0 %    Eosinophil % 1.8 0.3 - 6.2 %    Basophil % 0.8 0.0 - 1.5 %    Immature Grans % 0.3 0.0 - 0.5 %    Neutrophils, Absolute 5.58 1.70 - 7.00 10*3/mm3    Lymphocytes, Absolute 3.61 (H) 0.70 - 3.10 10*3/mm3     Monocytes, Absolute 0.88 0.10 - 0.90 10*3/mm3    Eosinophils, Absolute 0.19 0.00 - 0.40 10*3/mm3    Basophils, Absolute 0.08 0.00 - 0.20 10*3/mm3    Immature Grans, Absolute 0.03 0.00 - 0.05 10*3/mm3    nRBC 0.0 0.0 - 0.2 /100 WBC   Potassium    Collection Time: 12/07/24  6:06 AM    Specimen: Blood   Result Value Ref Range    Potassium 3.5 3.5 - 5.2 mmol/L   POC Glucose 4x Daily Before Meals & at Bedtime    Collection Time: 12/07/24  7:40 AM    Specimen: Blood   Result Value Ref Range    Glucose 103 70 - 105 mg/dL   POC Glucose 4x Daily Before Meals & at Bedtime    Collection Time: 12/07/24 11:17 AM    Specimen: Blood   Result Value Ref Range    Glucose 198 (H) 70 - 105 mg/dL   Potassium    Collection Time: 12/07/24 12:52 PM    Specimen: Arm, Right; Blood   Result Value Ref Range    Potassium 3.9 3.5 - 5.2 mmol/L   POC Glucose Once    Collection Time: 12/07/24  4:30 PM    Specimen: Blood   Result Value Ref Range    Glucose 162 (H) 70 - 105 mg/dL       Radiology:  Imaging Results (Last 24 Hours)       Procedure Component Value Units Date/Time    US Renal Bilateral [[id number]] Collected: 12/07/24 0846     Updated: 12/07/24 0849    Narrative:      US RENAL BILATERAL    Date of Exam: 12/7/2024 1:04 AM EST    Indication: REINA.    Comparison: CT abdomen pelvis 7/17/2024    Technique: Grayscale and color Doppler ultrasound evaluation of the kidneys and urinary bladder was performed.      Findings:  No hydronephrosis. Right kidney measures 9.5 x 5.7 x 6 cm. Left kidney measures 9.1 x 5.6 x 5 cm. Renal cortex has normal thickness and normal echogenicity. Urinary bladder is sonographically normal. There is a small anechoic cyst at the mid left kidney   posteriorly measuring up to 1.2 cm.      Impression:      Impression:  No hydronephrosis. Small left renal cyst.        Electronically Signed: Netta Cartwright MD    12/7/2024 8:47 AM EST    Workstation ID: [id number]                 ASSESSMENT:   Problem List:   ARF - Cr at 2.77,  baseline Cr 1.0   US with no hydro   Likely due to hypotension and volume depletion     Hyponatremia   UTI   Diarrhea   Hypotension     REINA (acute kidney injury)        PLAN:   Agree with dc of ace/arb   Hold norvasc until bp trends up   Continue on NS today   Repeat urine studies   Await stools studies   Antibiotics per primary     Monitor electrolytes and volume closely   Dose all medications for GFR <50   Limit IV dye, NSAIDS and nephrotoxic medications   I appreciate the opportunity to participate in this patient's care.  Please call with any questions or concerns.     Indu Young M.D  [facility]  Office phone number: [phone]  Answering service phone number: [phone]        12/7/2024

## 2024-12-08 NOTE — PLAN OF CARE
Goal Outcome Evaluation:      Had c/o headache and nausea last night, patient had requested better pain/nausea control, medication was given per MD. No bowel movement  this shift. Plan  of care is ongoing.

## 2024-12-08 NOTE — PLAN OF CARE
Goal Outcome Evaluation:  Plan of Care Reviewed With: patient        Progress: improving     Able to communicate needs. Bed alarm on. Up w/ SB assist. Vss. Min c/o pain. Pnd nephro consult. K+ replaced. Awaiting stool sample. Plan of care ongoing.

## 2024-12-09 ENCOUNTER — APPOINTMENT (OUTPATIENT)
Dept: CT IMAGING | Facility: HOSPITAL | Age: 69
DRG: 683 | End: 2024-12-09
Payer: COMMERCIAL

## 2024-12-09 LAB
ANION GAP SERPL CALCULATED.3IONS-SCNC: 8.7 MMOL/L (ref 5–15)
BASOPHILS # BLD AUTO: 0.06 10*3/MM3 (ref 0–0.2)
BASOPHILS NFR BLD AUTO: 0.8 % (ref 0–1.5)
BUN SERPL-MCNC: 24 MG/DL (ref 8–23)
BUN/CREAT SERPL: 18.8 (ref 7–25)
CALCIUM SPEC-SCNC: 8.8 MG/DL (ref 8.6–10.5)
CHLORIDE SERPL-SCNC: 105 MMOL/L (ref 98–107)
CO2 SERPL-SCNC: 27.3 MMOL/L (ref 22–29)
CREAT SERPL-MCNC: 1.28 MG/DL (ref 0.57–1)
DEPRECATED RDW RBC AUTO: 40.8 FL (ref 37–54)
EGFRCR SERPLBLD CKD-EPI 2021: 44.9 ML/MIN/1.73
EOSINOPHIL # BLD AUTO: 0.28 10*3/MM3 (ref 0–0.4)
EOSINOPHIL NFR BLD AUTO: 3.9 % (ref 0.3–6.2)
ERYTHROCYTE [DISTWIDTH] IN BLOOD BY AUTOMATED COUNT: 12.3 % (ref 12.3–15.4)
GLUCOSE BLDC GLUCOMTR-MCNC: 148 MG/DL (ref 70–105)
GLUCOSE BLDC GLUCOMTR-MCNC: 176 MG/DL (ref 70–105)
GLUCOSE BLDC GLUCOMTR-MCNC: 190 MG/DL (ref 70–105)
GLUCOSE BLDC GLUCOMTR-MCNC: 207 MG/DL (ref 70–105)
GLUCOSE SERPL-MCNC: 236 MG/DL (ref 65–99)
HCT VFR BLD AUTO: 32.5 % (ref 34–46.6)
HGB BLD-MCNC: 10.3 G/DL (ref 12–15.9)
IMM GRANULOCYTES # BLD AUTO: 0.01 10*3/MM3 (ref 0–0.05)
IMM GRANULOCYTES NFR BLD AUTO: 0.1 % (ref 0–0.5)
IRON 24H UR-MRATE: 34 MCG/DL (ref 37–145)
IRON SATN MFR SERPL: 12 % (ref 20–50)
LYMPHOCYTES # BLD AUTO: 2.73 10*3/MM3 (ref 0.7–3.1)
LYMPHOCYTES NFR BLD AUTO: 37.8 % (ref 19.6–45.3)
MAGNESIUM SERPL-MCNC: 1.6 MG/DL (ref 1.6–2.4)
MCH RBC QN AUTO: 28.9 PG (ref 26.6–33)
MCHC RBC AUTO-ENTMCNC: 31.7 G/DL (ref 31.5–35.7)
MCV RBC AUTO: 91.3 FL (ref 79–97)
MONOCYTES # BLD AUTO: 0.56 10*3/MM3 (ref 0.1–0.9)
MONOCYTES NFR BLD AUTO: 7.8 % (ref 5–12)
NEUTROPHILS NFR BLD AUTO: 3.58 10*3/MM3 (ref 1.7–7)
NEUTROPHILS NFR BLD AUTO: 49.6 % (ref 42.7–76)
NRBC BLD AUTO-RTO: 0 /100 WBC (ref 0–0.2)
PHOSPHATE SERPL-MCNC: 2.4 MG/DL (ref 2.5–4.5)
PLATELET # BLD AUTO: 253 10*3/MM3 (ref 140–450)
PMV BLD AUTO: 9.3 FL (ref 6–12)
POTASSIUM SERPL-SCNC: 3.9 MMOL/L (ref 3.5–5.2)
QT INTERVAL: 380 MS
QTC INTERVAL: 432 MS
RBC # BLD AUTO: 3.56 10*6/MM3 (ref 3.77–5.28)
SODIUM SERPL-SCNC: 141 MMOL/L (ref 136–145)
TIBC SERPL-MCNC: 274 MCG/DL (ref 298–536)
TRANSFERRIN SERPL-MCNC: 184 MG/DL (ref 200–360)
WBC NRBC COR # BLD AUTO: 7.22 10*3/MM3 (ref 3.4–10.8)

## 2024-12-09 PROCEDURE — 84466 ASSAY OF TRANSFERRIN: CPT | Performed by: STUDENT IN AN ORGANIZED HEALTH CARE EDUCATION/TRAINING PROGRAM

## 2024-12-09 PROCEDURE — 63710000001 INSULIN LISPRO (HUMAN) PER 5 UNITS

## 2024-12-09 PROCEDURE — 74176 CT ABD & PELVIS W/O CONTRAST: CPT

## 2024-12-09 PROCEDURE — 85025 COMPLETE CBC W/AUTO DIFF WBC: CPT | Performed by: STUDENT IN AN ORGANIZED HEALTH CARE EDUCATION/TRAINING PROGRAM

## 2024-12-09 PROCEDURE — 97116 GAIT TRAINING THERAPY: CPT

## 2024-12-09 PROCEDURE — 83735 ASSAY OF MAGNESIUM: CPT

## 2024-12-09 PROCEDURE — 97110 THERAPEUTIC EXERCISES: CPT

## 2024-12-09 PROCEDURE — 83540 ASSAY OF IRON: CPT | Performed by: STUDENT IN AN ORGANIZED HEALTH CARE EDUCATION/TRAINING PROGRAM

## 2024-12-09 PROCEDURE — 80048 BASIC METABOLIC PNL TOTAL CA: CPT | Performed by: STUDENT IN AN ORGANIZED HEALTH CARE EDUCATION/TRAINING PROGRAM

## 2024-12-09 PROCEDURE — 82948 REAGENT STRIP/BLOOD GLUCOSE: CPT

## 2024-12-09 PROCEDURE — 84100 ASSAY OF PHOSPHORUS: CPT

## 2024-12-09 PROCEDURE — 97112 NEUROMUSCULAR REEDUCATION: CPT

## 2024-12-09 RX ORDER — AMLODIPINE BESYLATE 5 MG/1
5 TABLET ORAL DAILY
Status: DISCONTINUED | OUTPATIENT
Start: 2024-12-10 | End: 2024-12-12

## 2024-12-09 RX ORDER — ACETAMINOPHEN 325 MG/1
650 TABLET ORAL EVERY 4 HOURS PRN
Qty: 90 TABLET | Refills: 0 | OUTPATIENT
Start: 2024-12-09 | End: 2025-01-08

## 2024-12-09 RX ORDER — PANTOPRAZOLE SODIUM 40 MG/1
40 TABLET, DELAYED RELEASE ORAL DAILY
Qty: 30 TABLET | Refills: 0 | OUTPATIENT
Start: 2024-12-09 | End: 2025-01-08

## 2024-12-09 RX ORDER — AMLODIPINE BESYLATE 5 MG/1
5 TABLET ORAL DAILY
Qty: 30 TABLET | Refills: 0 | OUTPATIENT
Start: 2024-12-09 | End: 2025-01-08

## 2024-12-09 RX ADMIN — PANTOPRAZOLE SODIUM 40 MG: 40 INJECTION, POWDER, FOR SOLUTION INTRAVENOUS at 09:08

## 2024-12-09 RX ADMIN — Medication 2 PACKET: at 09:07

## 2024-12-09 RX ADMIN — TRAZODONE HYDROCHLORIDE 50 MG: 50 TABLET ORAL at 21:03

## 2024-12-09 RX ADMIN — ATORVASTATIN CALCIUM 80 MG: 40 TABLET, FILM COATED ORAL at 21:02

## 2024-12-09 RX ADMIN — INSULIN LISPRO 2 UNITS: 100 INJECTION, SOLUTION INTRAVENOUS; SUBCUTANEOUS at 17:02

## 2024-12-09 RX ADMIN — INSULIN GLARGINE-YFGN 15 UNITS: 100 INJECTION, SOLUTION SUBCUTANEOUS at 09:08

## 2024-12-09 RX ADMIN — GABAPENTIN 200 MG: 100 CAPSULE ORAL at 09:08

## 2024-12-09 RX ADMIN — ASPIRIN 81 MG: 81 TABLET, COATED ORAL at 09:08

## 2024-12-09 RX ADMIN — Medication 5 MG: at 01:26

## 2024-12-09 RX ADMIN — CLOPIDOGREL BISULFATE 75 MG: 75 TABLET ORAL at 09:08

## 2024-12-09 RX ADMIN — Medication 10 ML: at 20:25

## 2024-12-09 RX ADMIN — SENNOSIDES AND DOCUSATE SODIUM 2 TABLET: 50; 8.6 TABLET ORAL at 09:16

## 2024-12-09 RX ADMIN — Medication 10 ML: at 09:09

## 2024-12-09 RX ADMIN — INSULIN LISPRO 4 UNITS: 100 INJECTION, SOLUTION INTRAVENOUS; SUBCUTANEOUS at 12:46

## 2024-12-09 RX ADMIN — GABAPENTIN 200 MG: 100 CAPSULE ORAL at 21:02

## 2024-12-09 RX ADMIN — ACETAMINOPHEN 650 MG: 325 TABLET, FILM COATED ORAL at 18:36

## 2024-12-09 RX ADMIN — INSULIN LISPRO 2 UNITS: 100 INJECTION, SOLUTION INTRAVENOUS; SUBCUTANEOUS at 23:56

## 2024-12-09 NOTE — SIGNIFICANT NOTE
Case Management Readmission Assessment Note    Case Management Readmission Assessment (all recorded)       Readmission Interview       Row Name 12/09/24 1631             Readmission Indications    Is the patient and/or family able to complete the readmission assessment questions? Yes (P)       Is this hospitalization related to the prior hospital diagnosis? No (P)         Row Name 12/09/24 1631             Recommendation for rehospitalization    Did you speak with your physician prior to coming to the hospital No (P)         [facility] Name 12/09/24 1631             Follow-up Appointments    Do you have a PCP? Yes (P)       Did you have an appointment with PCP after your hospitalization? Yes (P)       When was your appointment scheduled? -- (P)   Pt unsure of appt date but stated she was admitted before she could attend appointment      Did you go to appointment? No (P)       Did you have an appointment with a Specialist? No (P)   Pt has an Endocrinologist but hasn't seen MD in several years.      Are you current with the Pulmonary Clinic? No (P)       Are you current with the CHF Clinic? No (P)         [facility] Name 12/09/24 1631             Medications    Did you have newly prescribed medications at discharge? Yes (P)   Lancets Insulin Glargine (LANTUS SOLOSTAR)  Pen [place]      Did you understand the reasons for your medications at discharge and how to take them? Yes (P)       Did you understand the side effects of your medications? Yes (P)       Are you taking all of you prescribed medications? Yes (P)       What pharmacy was used to fill prescription(s)? [facility] (P)       Were medications picked up? Yes (P)         [facility] Name 12/09/24 1631             Discharge Instructions    Did you understand your discharge instructions? Yes (P)       Did your family/caregiver hear your instructions? Yes (P)       Were you told to eat a special diet? Yes (P)       Did you adhere to the diet? Yes (P)       Were you given a number of  someone to call if you had questions or concerns? Yes (P)         Row Name 12/09/24 1631             Index discharge location/services    Where did you go upon discharge? Home (P)       Do you have supportive family or friends in the home? Yes (P)         Row Name 12/09/24 1631             Discharge Readiness    On a scale of 1-5 (5 being well prepared), how ready were you for discharge 4 (P)   Pt states she was not feeling herself and was still weak. CM discussed SNF or HHC, pt declined stating her daughter will take care of her at home. Daughter works, CM encouraged home with services or SNF pending PT eval.      Recommendation based on interview Education on diagnosis/self management;Other (comment) (P)   continue to educate pt on importance of utilizing services that will allow her to safely rtn home. Pt relucant to go home with HHC and feels she can care for herself with daughters assistance.        Row Name 12/09/24 1631             Palliative Care/Hospice    Are you current with Palliative Care? No (P)       Are you current with Hospice Care? No (P)         Row Name 12/09/24 1631 12/06/24 2036          Advance Directives (For Healthcare)    Pre-existing AND/MOST/POLST Order No (P)  No     Advance Directive Status Patient does not have advance directive (P)  Patient does not have advance directive     Have you reviewed your Advance Directive and is it valid for this stay? Not applicable (P)  No     Literature Provided on Advance Directives No (P)  No     Patient Requests Assistance on Advance Directives Patient Declined (P)  Patient Declined       Row Name 12/09/24 1631             Readmission Assessment Final Comments    Final Comments CURRENT: 12/6/2024 REINA and generalized weakness.  Creatinine 2.77 baseline 1.0 BUN 40, IVF NS, Cr improving 1.28. Renal US No hydronephrosis, small left renal cyst Glucose 246 improving.  Hyponatremia monitor BP hold antihypertensives. UTI Ceftriaxone started. Pending UC, CT  HEAD neg PREVIOUS 11./28-11/30 with Hyperosmolar hyperglycemic state (HHS), syncope and UTI. hypertensive 177/101 treated and improved.   glucose >500. patient not taked Mounjaro due to medication shortage. Placed on Insulin gtt converted to subQ insulin. BS controlled, pt d/c with glargine insulin daily. IV antibiotics converted to p.o. at d/c. CT head neg  Chest x-ray neg. Diabetic educator met with pt prior to d/c. (P)

## 2024-12-09 NOTE — PAYOR COMM NOTE
"Observation order 12/6/24  Inpatient order 12/8/24    ER admit   MD notes and clinical attached.  =======  AUTHORIZATION PENDING:   PLEASE FAX OR CALL DETERMINATION TO CONTACT BELOW:       THANK YOU,    VALARIE Reyes, RN  Utilization Review  [facility]  Phone: [phone]  Fax: [phone]      NPI: [id number]  Tax ID: [id number]        Marge Moreno (69 y.o. Female)       Date of Birth   1955    Social Security Number       Address   [address] [place] [address] [place] [address]    Home Phone   [phone]    MRN   [id number]       Anabaptism   None    Marital Status                               Admission Date   12/6/24    Admission Type   Emergency    Admitting Provider   Brenda Sutton MD    Attending Provider   Brenda Sutton MD    Department, Room/Bed   [facility] SURGICAL INPATIENT, 4125/1       Discharge Date       Discharge Disposition       Discharge Destination                                 Attending Provider: Brenda Sutton MD    Allergies: Zofran [Ondansetron Hcl], Prochlorperazine, Prochlorperazine Edisylate, Hydralazine, Hydralazine Hcl, Meperidine, Prochlorperazine Maleate, Prochlorperazine Maleate    Isolation: None   Infection: None   Code Status: CPR    Ht: 157.5 cm (62\")   Wt: 72.1 kg (158 lb 15.2 oz)    Admission Cmt: None   Principal Problem: REINA (acute kidney injury) [N17.9]                   Active Insurance as of 12/6/2024       Primary Coverage       Payor Plan Insurance Group Employer/Plan Group    AETNA COMMERCIAL AETNA [id number]       Payor Plan Address Payor Plan Phone Number Payor Plan Fax Number Effective Dates    [address] [phone]  1/1/2024 - None Entered    [place] [id number]         Subscriber Name Subscriber Birth Date Member ID       GRADY MORENO 3/15/1952 [id number]               Secondary Coverage       Payor Plan Insurance Group Employer/Plan Group    HUMANA MEDICARE REPLACEMENT " HUMANA MED ADV SNP HMO [id number]       Payor Plan Address Payor Plan Phone Number Payor Plan Fax Number Effective Dates    [address] [address] [phone]  10/1/2024 - None Entered    [facility] [address]         Subscriber Name Subscriber Birth Date Member ID       MARGE MORENO 1955 [id number]               Tertiary Coverage       Payor Plan Insurance Group Employer/Plan Group    GPM LIFE GPM LIFE MC SUP        Payor Plan Address Payor Plan Phone Number Payor Plan Fax Number Effective Dates    [address]   2019 - None Entered    [place] [address]         Subscriber Name Subscriber Birth Date Member ID       MARGE MORENO 1955 [id number]                     Emergency Contacts        (Rel.) Home Phone Work Phone Mobile Phone    Kimberly Salinas (POA) (Daughter) -- -- [phone]    SILVIO MORENO (Spouse) -- -- [phone]    Tonia Rockwell (Friend) [phone] -- --                 History & Physical        Isabel Aragon PA-C at [address] [place] [address] Medicine Services  History & Physical    Patient Name: Marge Moreno  : 1955  MRN: [id number]  Primary Care Physician:  Traci Townsend APRN  Date of admission: 2024  Date and Time of Service: 2024 at 1645    Subjective      Chief Complaint: generalized weakness    History of Present Illness: Marge Moreno is a 69 y.o. female with a CMH of HTN, adrenal nodule, HLD, GERD, T2DM, RLS, OA, CAD s/p CABG who presented to [facility] on 2024 with generalized weakness.    Patient presented with vague symptomatology.  She states she started to feel unwell yesterday. Today she decided to come to the ED because she felt like her legs were too weak to ambulate.  She reports having a persistent left sided headache that goes into her left neck. She has associated photophobia, nausea and vomiting. This morning she had a brief episode of left sided chest pressure that began when she was sitting down. The  pain did not radiate. Since presenting to the hospital, she now has a slight sore throat.  She denies any fevers, chills, cough, SOA, palpitations, abd pain, diarrhea, constipation, dysuria, hematuria, decreased urination, urinary urgency/frequency, flank pain, peripheral edema, focal weakness, paresthesias, vision changes, dizziness, dysphagia, dysarthria, syncope, near-syncope or other acute symptoms at this time. She was recently started on lisinopril in October.  She has not had IV contrast exposure.  She denies usage of ibuprofen or other NSAIDs. Of note, she was recently admitted here from 11./28-11/30 with HHS, syncope and UTI.    ED course: Vitals 97.4-96-17-94/50-96% on room air. Labs remarkable for creatinine 2.77, BUN 40, glucose 246, troponin 27-20, WBC 13.78. UA with 1+ ketones, 1+ leukocyte esterase, 6-10 RBC, TNTC WBC, presence of squamous epithelial cells and renal epithelial cells. CTH was negative. She was given IVFs.  She was admitted for further management.       Review of Systems   Constitutional:  Positive for activity change, appetite change and fatigue. Negative for chills and fever.   HENT:  Positive for sore throat. Negative for trouble swallowing.    Eyes:  Positive for photophobia. Negative for visual disturbance.   Respiratory:  Negative for cough and shortness of breath.    Cardiovascular:  Positive for chest pain. Negative for palpitations and leg swelling.   Gastrointestinal:  Positive for nausea and vomiting. Negative for abdominal distention, abdominal pain, anal bleeding, blood in stool, constipation and diarrhea.   Genitourinary:  Negative for decreased urine volume, difficulty urinating, dysuria, flank pain, frequency, hematuria, pelvic pain and urgency.   Musculoskeletal:  Positive for neck pain. Negative for back pain.   Skin: Negative.    Neurological:  Positive for weakness and headaches. Negative for dizziness, tremors, seizures, syncope, facial asymmetry, speech difficulty,  light-headedness and numbness.   Psychiatric/Behavioral:  Negative for confusion.        Personal History     Past Medical History:   Diagnosis Date    Adrenal nodule 11/16/2016    FINDINGS: Mild hepatic steatosis may be present. Cholecystectomy. No biliary ductal dilatation. Negative pancreas, spleen, left adrenal gland, and kidneys. The right adrenal presumed adenoma has increased slightly, measuring 3 x 4 cm in diameter,  compared to 2 x 3.5 cm on the previous exam. The attenuation values are consistent with an adenoma. Done at [facility] in August 2018    Age-related osteoporosis without current pathological fracture 11/16/2016    Arthritis     Delayed surgical wound healing     Essential hypertension 11/16/2016    Gastroesophageal reflux disease with esophagitis 11/16/2016    Hepatic steatosis 11/16/2016    History of anemia     History of sepsis     FROM UTI    Hyperlipidemia 11/16/2016    Lisfranc's dislocation     LEFT WITH FRACTURES NONHEALING FOOT    Osteomyelitis of left foot 11/2020    RLS (restless legs syndrome)     Squamous cell skin cancer 2014    Excised by Dr. James    Thyroid nodule 10/18/2019    BENIGN    Type 2 diabetes mellitus with peripheral neuropathy 11/16/2016    Vitamin D deficiency 1/25/2019       Past Surgical History:   Procedure Laterality Date    BREAST BIOPSY Left     7/2019. BENIGN    CARDIAC CATHETERIZATION Left 5/2/2021    Procedure: Cardiac Catheterization/Vascular Study;  Surgeon: Chacho Esteban MD;  Location: [facility] CATH INVASIVE LOCATION;  Service: Cardiovascular;  Laterality: Left;    CARDIAC CATHETERIZATION N/A 5/2/2021    Procedure: Intra-Aortic Baloon Pump Insertion;  Surgeon: Chacho Esteban MD;  Location: [facility] CATH INVASIVE LOCATION;  Service: Cardiovascular;  Laterality: N/A;    CATARACT EXTRACTION WITH INTRAOCULAR LENS IMPLANT Bilateral     CHOLECYSTECTOMY      COLONOSCOPY      CORONARY ARTERY BYPASS GRAFT N/A 5/2/2021    Procedure: CORONARY ARTERY BYPASS WITH  INTERNAL MAMMARY ARTERY GRAFT;  Surgeon: Jr Rocael Alexander MD;  Location: [facility];  Service: Cardiothoracic;  Laterality: N/A;    FOOT FUSION Right 11/13/2020    Procedure: RIGHT OPEN TREATMENT LISFRANC INJURY, OPEN REDUCTION INTERNAL FIXATION MEDIAL/MIDDLE CUNEIFORM FRACTURE AND 2ND/3RD METATARSAL FRACTURE 1ST 2ND POSSIBLE 3RD TARSOMETATARSAL ARTHRODESIS INTERCUNEIFORM ARTHRODESIS CALCANEAL BONE GRAFT;  Surgeon: Mikhail Banks Jr., MD;  Location: [facility];  Service: Orthopedics;  Laterality: Right;    INCISION AND DRAINAGE LEG Right 1/8/2021    Procedure: RIGHT IRRIGATION AND DEBRIDEMENT OF FOOT WITH SECONDARY CLOSURE AND HARDWARE REMOVAL;  Surgeon: Mikhail Banks Jr., MD;  Location: [facility] OR;  Service: Orthopedics;  Laterality: Right;    KNEE ARTHROSCOPY Bilateral     TOTAL ABDOMINAL HYSTERECTOMY      TRANSESOPHAGEAL ECHOCARDIOGRAM (EMILIA) N/A 5/2/2021    Procedure: TRANSESOPHAGEAL ECHOCARDIOGRAM;  Surgeon: Jr Rocael Alexander MD;  Location: [facility];  Service: Cardiothoracic;  Laterality: N/A;    UPPER GASTROINTESTINAL ENDOSCOPY  08/2016    US GUIDED FINE NEEDLE ASPIRATION  5/8/2020       Family History: family history includes COPD in her mother; Diabetes in her mother, sister, sister, sister, and sister; Hypertension in her mother; Kidney disease in her mother; Obesity in her mother; Thyroid disease in her mother. Otherwise pertinent FHx was reviewed and not pertinent to current issue.    Social History:  reports that she has never smoked. She has never used smokeless tobacco. She reports current alcohol use. She reports that she does not use drugs.    Home Medications:  Prior to Admission Medications       Prescriptions Last Dose Informant Patient Reported? Taking?    Accu-Chek FastClix Lancets misc   No No    Use 1 each 3 (Three) Times a Day Before Meals. Dx code: E11.65    Accu-Chek Softclix Lancets lancets   No No    1 each by Other route 3 (Three) Times a Day Before Meals. Use as  instructed  Dx code: E11.65    acetaminophen (TYLENOL) 325 MG tablet   Yes No    Take 650 mg by mouth Every 4 (Four) Hours As Needed for Mild Pain. Indications: Pain    amLODIPine (NORVASC) 10 MG tablet   Yes No    Take 1 tablet by mouth Daily. Indications: High Blood Pressure Disorder    amoxicillin-clavulanate (AUGMENTIN) 875-125 MG per tablet   No No    Take 1 tablet by mouth 2 (Two) Times a Day.    aspirin 81 MG EC tablet   Yes No    Take 1 tablet by mouth Daily. Indications: Disease involving Lipid Deposits in the Arteries    atorvastatin (LIPITOR) 80 MG tablet   No No    Take 1 tablet by mouth Every Night.    Blood Glucose Monitoring Suppl (Accu-Chek Guide Me) w/Device kit   No No    Use 1 each 3 (Three) Times a Day Before Meals. Dx code: E11.65  NDC [address]-65  Bin#: [id number] Group #: [id number]  ID #: [id number]  Issuer #: ([id number])    butalbital-acetaminophen-caffeine (FIORICET, ESGIC) -40 MG per tablet   No No    Take 1 tablet by mouth Every 4 (Four) Hours As Needed for Headache.    denosumab (Prolia) 60 MG/ML solution prefilled syringe syringe   Yes No    Inject 1 mL under the skin into the appropriate area as directed 1 (One) Time.    DULoxetine (CYMBALTA) 60 MG capsule  Pharmacy Yes No    Take 1 capsule by mouth Daily. Indications: Major Depressive Disorder    gabapentin (NEURONTIN) 600 MG tablet   Yes No    Take 1 tablet by mouth 3 (Three) Times a Day.    HYDROcodone-acetaminophen (NORCO) 5-325 MG per tablet   No No    Take 1 tablet by mouth Every 6 (Six) Hours As Needed for Moderate Pain.    Insulin Glargine (LANTUS SOLOSTAR) 100 UNIT/ML injection pen   No No    Inject 15 Units under the skin into the appropriate area as directed Daily. Dx code: E11.65    Insulin Pen Needle (Pen Needles) 32G X 4 MM misc   No No    Use 1 each Every Night. Dx code: E11.65    metFORMIN ER (GLUCOPHAGE-XR) 500 MG 24 hr tablet   No No    Take 1 tablet by mouth Daily With Breakfast.    multivitamin with minerals tablet  tablet   Yes No    Take 1 tablet by mouth Daily. Indications: Supplement    ticagrelor (BRILINTA) 90 MG tablet tablet   No No    Take 1 tablet by mouth 2 (Two) Times a Day.    Tirzepatide (MOUNJARO) 12.5 MG/0.5ML solution pen-injector pen   Yes No    Inject 0.5 mL under the skin into the appropriate area as directed 1 (One) Time Per Week. Wednesday.  Indications: Type 2 Diabetes    topiramate (TOPAMAX) 25 MG tablet   No No    Take 1 tablet by mouth Daily.    traZODone (DESYREL) 50 MG tablet   Yes No    Take 1 tablet by mouth Every Night. Indications: Trouble Sleeping              Allergies:  Allergies   Allergen Reactions    Zofran [Ondansetron Hcl] Nausea And Vomiting     Does the opposite of its purpose    Prochlorperazine Other (See Comments)     CAUSED SEIZURE    Prochlorperazine Edisylate Hives    Hydralazine Itching and Rash    Hydralazine Hcl Itching and Rash    Meperidine Itching and Swelling    Prochlorperazine Maleate Other (See Comments)     CAUSES SEIZURE    Prochlorperazine Maleate Irritability       Objective      Vitals:   Temp:  [97.4 °F (36.3 °C)] 97.4 °F (36.3 °C)  Heart Rate:  [82-96] 83  Resp:  [16-18] 16  BP: ()/(45-66) 112/60  Body mass index is 29.63 kg/m².  Physical Exam  Constitutional:       Appearance: Normal appearance. She is not ill-appearing or toxic-appearing.   HENT:      Head: Normocephalic and atraumatic.      Mouth/Throat:      Mouth: Mucous membranes are dry.      Pharynx: Oropharynx is clear. No oropharyngeal exudate or posterior oropharyngeal erythema.   Eyes:      Extraocular Movements: Extraocular movements intact.      Pupils: Pupils are equal, round, and reactive to light.   Neck:      Comments: No meningeal signs  Cardiovascular:      Rate and Rhythm: Normal rate and regular rhythm.      Heart sounds: Murmur heard.   Pulmonary:      Effort: Pulmonary effort is normal.      Breath sounds: Normal breath sounds.   Abdominal:      General: Abdomen is flat. There is no  distension.      Palpations: Abdomen is soft.      Tenderness: There is no abdominal tenderness.   Musculoskeletal:         General: Normal range of motion.      Cervical back: Normal range of motion.      Right lower leg: No edema.      Left lower leg: No edema.   Skin:     General: Skin is warm.   Neurological:      General: No focal deficit present.      Mental Status: She is alert and oriented to person, place, and time.         Diagnostic Data:  Lab Results (last 24 hours)       Procedure Component Value Units Date/Time    High Sensitivity Troponin T 2Hr [[id number]]  (Abnormal) Collected: 12/06/24 1639    Specimen: Blood from Arm, Left Updated: 12/06/24 1715     HS Troponin T 20 ng/L      Troponin T Delta -7 ng/L     Narrative:      High Sensitive Troponin T Reference Range:  <14.0 ng/L- Negative Female for AMI  <22.0 ng/L- Negative Male for AMI  >=14 - Abnormal Female indicating possible myocardial injury.  >=22 - Abnormal Male indicating possible myocardial injury.   Clinicians would have to utilize clinical acumen, EKG, Troponin, and serial changes to determine if it is an Acute Myocardial Infarction or myocardial injury due to an underlying chronic condition.         Comprehensive Metabolic Panel [[id number]]  (Abnormal) Collected: 12/06/24 1446    Specimen: Blood from Arm, Left Updated: 12/06/24 1515     Glucose 246 mg/dL      BUN 40 mg/dL      Creatinine 2.77 mg/dL      Sodium 136 mmol/L      Potassium 3.7 mmol/L      Comment: Slight hemolysis detected by analyzer. Result may be falsely elevated.        Chloride 93 mmol/L      CO2 28.7 mmol/L      Calcium 10.9 mg/dL      Comment: Result checked          Total Protein 7.0 g/dL      Albumin 4.0 g/dL      ALT (SGPT) 9 U/L      AST (SGOT) 23 U/L      Alkaline Phosphatase 65 U/L      Total Bilirubin 0.5 mg/dL      Globulin 3.0 gm/dL      A/G Ratio 1.3 g/dL      BUN/Creatinine Ratio 14.4     Anion Gap 14.3 mmol/L      eGFR 17.8 mL/min/1.73     Narrative:       GFR Normal >60  Chronic Kidney Disease <60  Kidney Failure <15    The GFR formula is only valid for adults with stable renal function between ages 18 and 70.    High Sensitivity Troponin T [[id number]]  (Abnormal) Collected: 12/06/24 1446    Specimen: Blood from Arm, Left Updated: 12/06/24 1512     HS Troponin T 27 ng/L     Narrative:      High Sensitive Troponin T Reference Range:  <14.0 ng/L- Negative Female for AMI  <22.0 ng/L- Negative Male for AMI  >=14 - Abnormal Female indicating possible myocardial injury.  >=22 - Abnormal Male indicating possible myocardial injury.   Clinicians would have to utilize clinical acumen, EKG, Troponin, and serial changes to determine if it is an Acute Myocardial Infarction or myocardial injury due to an underlying chronic condition.         Urine Culture - Urine, Urine, Clean Catch [[id number]] Collected: 12/06/24 1357    Specimen: Urine, Clean Catch Updated: 12/06/24 1446    Urinalysis, Microscopic Only - Urine, Clean Catch [[id number]]  (Abnormal) Collected: 12/06/24 1357    Specimen: Urine, Clean Catch Updated: 12/06/24 1415     RBC, UA 6-10 /HPF      WBC, UA Too Numerous to Count /HPF      Bacteria, UA None Seen /HPF      Squamous Epithelial Cells, UA 13-20 /HPF      Renal Epithelial Cells, UA 3-6 /HPF      Hyaline Casts, UA 3-6 /LPF      Methodology Manual Light Microscopy    Urinalysis With Microscopic If Indicated (No Culture) - Urine, Clean Catch [[id number]]  (Abnormal) Collected: 12/06/24 1357    Specimen: Urine, Clean Catch Updated: 12/06/24 1406     Color, UA Dark Yellow     Appearance, UA Cloudy     pH, UA <=5.0     Specific Gravity, UA 1.019     Glucose,  mg/dL (Trace)     Ketones, UA 15 mg/dL (1+)     Bilirubin, UA Small (1+)     Comment: Confirmation testing is unavailable.  A serum bilirubin is recommended for further assessment.        Blood, UA Negative     Protein,  mg/dL (2+)     Leuk Esterase, UA Small (1+)     Nitrite, UA Negative     Urobilinogen,  UA 0.2 E.U./dL    Sedimentation Rate [[id number]]  (Abnormal) Collected: 12/06/24 1332    Specimen: Blood from Arm, Right Updated: 12/06/24 1357     Sed Rate 49 mm/hr     Protime-INR [[id number]]  (Normal) Collected: 12/06/24 1332    Specimen: Blood from Arm, Right Updated: 12/06/24 1351     Protime 13.9 Seconds      INR 1.05    aPTT [[id number]]  (Normal) Collected: 12/06/24 1332    Specimen: Blood from Arm, Right Updated: 12/06/24 1351     PTT 25.8 seconds     CBC & Differential [[id number]]  (Abnormal) Collected: 12/06/24 1332    Specimen: Blood from Arm, Right Updated: 12/06/24 1341    Narrative:      The following orders were created for panel order CBC & Differential.  Procedure                               Abnormality         Status                     ---------                               -----------         ------                     CBC Auto Differential[[id number]]        Abnormal            Final result                 Please view results for these tests on the individual orders.    CBC Auto Differential [[id number]]  (Abnormal) Collected: 12/06/24 1332    Specimen: Blood from Arm, Right Updated: 12/06/24 1341     WBC 13.78 10*3/mm3      RBC 4.57 10*6/mm3      Hemoglobin 13.2 g/dL      Hematocrit 40.9 %      MCV 89.5 fL      MCH 28.9 pg      MCHC 32.3 g/dL      RDW 12.6 %      RDW-SD 41.1 fl      MPV 9.4 fL      Platelets 305 10*3/mm3      Neutrophil % 64.3 %      Lymphocyte % 24.9 %      Monocyte % 8.8 %      Eosinophil % 1.0 %      Basophil % 0.7 %      Immature Grans % 0.3 %      Neutrophils, Absolute 8.87 10*3/mm3      Lymphocytes, Absolute 3.43 10*3/mm3      Monocytes, Absolute 1.21 10*3/mm3      Eosinophils, Absolute 0.14 10*3/mm3      Basophils, Absolute 0.09 10*3/mm3      Immature Grans, Absolute 0.04 10*3/mm3      nRBC 0.0 /100 WBC     POC Glucose Once [[id number]]  (Abnormal) Collected: 12/06/24 1242    Specimen: Blood Updated: 12/06/24 1245     Glucose 229 mg/dL      Comment: Serial Number:  [id number]:  [id number]                Imaging Results (Last 24 Hours)       Procedure Component Value Units Date/Time    CT Head Without Contrast [[id number]] Collected: 12/06/24 1348     Updated: 12/06/24 1352    Narrative:      CT HEAD WO CONTRAST    Date of Exam: 12/6/2024 1:43 PM EST    Indication: headache.    Comparison: Head CT 11/28/2024    Technique: Axial CT images were obtained of the head without contrast administration.  Coronal reconstructions were performed.  Automated exposure control and iterative reconstruction methods were used.      Findings:  Negative for large territory infarct, acute intracranial hemorrhage, large mass lesion, hydrocephalus or midline shift. Stable slight prominence of the ventricles and sulci for age, suspect minimal global parenchymal volume loss. Mild periventricular   hypodensity stable from prior suggesting chronic microvascular ischemic change. Distal vertebral artery and carotid atherosclerotic disease. No large extra-axial fluid collection. No obstructive sinus disease. Left anterior ethmoid sinus osteoma. No   large mastoid effusion. Negative for depressed skull fracture or aggressive lesion. Symmetric globes and extraocular muscles.      Impression:      Impression:  No acute intracranial findings by CT. Chronic findings stable from prior.      Electronically Signed: Chung Holland MD    12/6/2024 1:50 PM EST    Workstation ID: [id number]              Assessment & Plan        This is a 69 y.o. female with:    Active and Resolved Problems  Active Hospital Problems    Diagnosis  POA    **REINA (acute kidney injury) [N17.9]  Yes      Resolved Hospital Problems   No resolved problems to display.       REINA  -Creatinine 2.77, BUN 40. Creatinine 1 week ago was 0.89.   -Patient was on lisinopril prior to admission which she has been on since October. Hold ACE-I/ARBs  -Suspect pre-renal etiology/ATN from vomiting, ACE-I and possibly hypotension (had documented BP of 86/45  while in ED).   -Ordered renal US  -Check uric acid, CK, urine studies  -Start continuous NS overnight  -Avoid hypotension  -Consult nephrology    ? UTI  -UA with TNTC WBC and 1+ leukocytes, no bacteria.  Sample does appear to be contaminated based on number of squamous epithelial cells.  -Ceftriaxone started. Pending UC.  She has an elevated WBC count but otherwise does not meet SIRS/sepsis criteria.    HTN  -Had episode of hypotension earlier and some low normal BP readings therefore will hold antihypertensives for now    T2DM with hyperglycemia  Resume Lantus 15 units daily  -SSI  -Hold metformin  -CCD  -Hypoglycemia protocol in place    CAD  Chest pain  -No further chest pain since initial episode this morning  -Troponin 27 -> 20   -Obtain EKG  -Continue asa/Plavix  -Consider stress test       VTE Prophylaxis:  Mechanical VTE prophylaxis orders are signed & held.          The patient desires to be as follows:    CODE STATUS:    Code Status (Patient has no pulse and is not breathing): CPR (Attempt to Resuscitate)  Medical Interventions (Patient has pulse or is breathing): Full Support        Kimberly Salinas, who can be contacted at [phone], is the designated person to make medical decisions on the patient's behalf if She is incapable of doing so. This was clarified with patient and/or next of kin on 12/6/2024 during the course of this H&P.    Admission Status:  I believe this patient meets obs status.    Expected Length of Stay: 1 day    PDMP and Medication Dispenses via Sidebar reviewed and consistent with patient reported medications.    I discussed the patient's findings and my recommendations with patient.      Signature:     This document has been electronically signed by Isabel Aragon PA-C on December 6, 2024 17:27 [facility] Hospitalist Team    Electronically signed by Isabel Aragon PA-C at 12/06/24 2037          Emergency Department Notes        Wilman Lazcano, RN at 12/06/24 1923         "  Nursing report ED to floor  Marge Mcghee  69 y.o.  female    HPI:   Chief Complaint   Patient presents with    Altered Mental Status     Per spouse reports pt has AMS today-\"not coherent\" with gait issues today. Pt c/o head and neck pain       Admitting doctor:   Miguel Mendez MD    Admitting diagnosis:   The primary encounter diagnosis was REINA (acute kidney injury). A diagnosis of Urinary tract infection without hematuria, site unspecified was also pertinent to this visit.    Code status:   Current Code Status       Date Active Code Status Order ID Comments User Context       12/6/2024 1726 CPR (Attempt to Resuscitate) [id number]  Isabel Aragon PA-C ED        Question Answer    Code Status (Patient has no pulse and is not breathing) CPR (Attempt to Resuscitate)    Medical Interventions (Patient has pulse or is breathing) Full Support                    Allergies:   Zofran [ondansetron hcl], Prochlorperazine, Prochlorperazine edisylate, Hydralazine, Hydralazine hcl, Meperidine, Prochlorperazine maleate, and Prochlorperazine maleate    Isolation:  No active isolations     Fall Risk:  Fall Risk Assessment was completed, and patient is at moderate risk for falls.   Predictive Model Details         11 (Low) Factor Value    Calculated 12/6/2024 19:08 Age 69    Risk of Fall Model Active Peripheral IV Present     Imaging order in this encounter Present     Magnesium not on file     Diastolic BP 60     Number of Distinct Medication Classes administered 4     Creatinine 2.77 mg/dL     Chloride 93 mmol/L     Alexis Scale not on file     Number of administrations of Analgesic Narcotics 1     Albumin 4 g/dL     Respiratory Rate 16     Total Bilirubin 0.5 mg/dL     Days after Admission 0.266     Calcium 10.9 mg/dL     ALT 9 U/L     Potassium 3.7 mmol/L         Weight:       12/06/24  1241   Weight: 73.5 kg (162 lb)       Intake and Output    Intake/Output Summary (Last 24 hours) at 12/6/2024 1923  Last data filed at " 12/6/2024 1623  Gross per 24 hour   Intake 1000 ml   Output --   Net 1000 ml       Diet:   Dietary Orders (From admission, onward)       Start     Ordered    12/06/24 1752  Diet: Diabetic, Cardiac; Healthy Heart (2-3 Na+); Consistent Carbohydrate; Fluid Consistency: Thin (IDDSI 0)  Diet Effective Now        References:    Diet Order [facility]   Question Answer Comment   Diets: Diabetic    Diets: Cardiac    Cardiac Diet: Healthy Heart (2-3 Na+)    Diabetic Diet: Consistent Carbohydrate    Fluid Consistency: Thin (IDDSI 0)        12/06/24 1751                     Most recent vitals:   Vitals:    12/06/24 1431 12/06/24 1447 12/06/24 1556 12/06/24 1720   BP: (!) 86/45 110/56 137/66 112/60   BP Location:  Right arm     Patient Position:  Lying     Pulse: 83 84 82 83   Resp: 16  17 16   Temp:       TempSrc:       SpO2: 95% 96% 93% 98%   Weight:       Height:           Active LDAs/IV Access:   Lines, Drains & Airways       Active LDAs       Name Placement date Placement time Site Days    Peripheral IV 12/06/24 1440 Anterior;Left;Proximal Forearm 12/06/24  1440  Forearm  less than 1                    Skin Condition:   Skin Assessments (last day)       None             Labs (abnormal labs have a star):   Labs Reviewed   COMPREHENSIVE METABOLIC PANEL - Abnormal; Notable for the following components:       Result Value    Glucose 246 (*)     BUN 40 (*)     Creatinine 2.77 (*)     Chloride 93 (*)     Calcium 10.9 (*)     eGFR 17.8 (*)     All other components within normal limits    Narrative:     GFR Normal >60  Chronic Kidney Disease <60  Kidney Failure <15    The GFR formula is only valid for adults with stable renal function between ages 18 and 70.   URINALYSIS W/ MICROSCOPIC IF INDICATED (NO CULTURE) - Abnormal; Notable for the following components:    Color, UA Dark Yellow (*)     Appearance, UA Cloudy (*)     Glucose,  mg/dL (Trace) (*)     Ketones, UA 15 mg/dL (1+) (*)     Bilirubin, UA Small (1+) (*)      Protein,  mg/dL (2+) (*)     Leuk Esterase, UA Small (1+) (*)     All other components within normal limits   TROPONIN - Abnormal; Notable for the following components:    HS Troponin T 27 (*)     All other components within normal limits    Narrative:     High Sensitive Troponin T Reference Range:  <14.0 ng/L- Negative Female for AMI  <22.0 ng/L- Negative Male for AMI  >=14 - Abnormal Female indicating possible myocardial injury.  >=22 - Abnormal Male indicating possible myocardial injury.   Clinicians would have to utilize clinical acumen, EKG, Troponin, and serial changes to determine if it is an Acute Myocardial Infarction or myocardial injury due to an underlying chronic condition.        SEDIMENTATION RATE - Abnormal; Notable for the following components:    Sed Rate 49 (*)     All other components within normal limits   CBC WITH AUTO DIFFERENTIAL - Abnormal; Notable for the following components:    WBC 13.78 (*)     Neutrophils, Absolute 8.87 (*)     Lymphocytes, Absolute 3.43 (*)     Monocytes, Absolute 1.21 (*)     All other components within normal limits   URINALYSIS, MICROSCOPIC ONLY - Abnormal; Notable for the following components:    RBC, UA 6-10 (*)     WBC, UA Too Numerous to Count (*)     Squamous Epithelial Cells, UA 13-20 (*)     Renal Epithelial Cells, UA 3-6 (*)     All other components within normal limits   HIGH SENSITIVITIY TROPONIN T 2HR - Abnormal; Notable for the following components:    HS Troponin T 20 (*)     Troponin T Delta -7 (*)     All other components within normal limits    Narrative:     High Sensitive Troponin T Reference Range:  <14.0 ng/L- Negative Female for AMI  <22.0 ng/L- Negative Male for AMI  >=14 - Abnormal Female indicating possible myocardial injury.  >=22 - Abnormal Male indicating possible myocardial injury.   Clinicians would have to utilize clinical acumen, EKG, Troponin, and serial changes to determine if it is an Acute Myocardial Infarction or myocardial  injury due to an underlying chronic condition.        URIC ACID - Abnormal; Notable for the following components:    Uric Acid 7.1 (*)     All other components within normal limits   POCT GLUCOSE FINGERSTICK - Abnormal; Notable for the following components:    Glucose 229 (*)     All other components within normal limits   PROTIME-INR - Normal   APTT - Normal   CK - Normal   OSMOLALITY, URINE - Normal   URINE CULTURE   SODIUM, URINE, RANDOM    Narrative:     Reference intervals for random urine have not been established.  Clinical usage is dependent upon physician's interpretation in combination with other laboratory tests.      CBC AND DIFFERENTIAL    Narrative:     The following orders were created for panel order CBC & Differential.  Procedure                               Abnormality         Status                     ---------                               -----------         ------                     CBC Auto Differential[[id number]]        Abnormal            Final result                 Please view results for these tests on the individual orders.       LOC: Person, Place, Time, and Situation    Telemetry:  Telemetry    Cardiac Monitoring Ordered: yes    EKG:   No orders to display       Medications Given in the ED:   Medications   sodium chloride 0.9 % flush 10 mL (has no administration in time range)   sodium chloride 0.9 % infusion (125 mL/hr Intravenous New Bag 12/6/24 4702)   sodium chloride 0.9 % flush 10 mL (has no administration in time range)   sodium chloride 0.9 % flush 10 mL (has no administration in time range)   sodium chloride 0.9 % infusion 40 mL (has no administration in time range)   nitroglycerin (NITROSTAT) SL tablet 0.4 mg (has no administration in time range)   Potassium Replacement - Follow Nurse / BPA Driven Protocol (has no administration in time range)   Magnesium Low Dose Replacement - Follow Nurse / BPA Driven Protocol (has no administration in time range)   Phosphorus Replacement  - Follow Nurse / BPA Driven Protocol (has no administration in time range)   Calcium Replacement - Follow Nurse / BPA Driven Protocol (has no administration in time range)   acetaminophen (TYLENOL) tablet 650 mg (has no administration in time range)     Or   acetaminophen (TYLENOL) 160 MG/5ML oral solution 650 mg (has no administration in time range)     Or   acetaminophen (TYLENOL) suppository 650 mg (has no administration in time range)   sennosides-docusate (PERICOLACE) 8.6-50 MG per tablet 2 tablet (has no administration in time range)     And   polyethylene glycol (MIRALAX) packet 17 g (has no administration in time range)     And   bisacodyl (DULCOLAX) EC tablet 5 mg (has no administration in time range)     And   bisacodyl (DULCOLAX) suppository 10 mg (has no administration in time range)   melatonin tablet 5 mg (has no administration in time range)   ondansetron (ZOFRAN) injection 4 mg (has no administration in time range)   sodium chloride 0.9 % bolus 1,000 mL (0 mL Intravenous Stopped 12/6/24 1623)   HYDROmorphone (DILAUDID) injection 0.5 mg (0.5 mg Intravenous Given 12/6/24 1748)   metoclopramide (REGLAN) injection 10 mg (10 mg Intravenous Given 12/6/24 1742)   diphenhydrAMINE (BENADRYL) injection 12.5 mg (12.5 mg Intravenous Given 12/6/24 1747)       Imaging results:  CT Head Without Contrast    Result Date: 12/6/2024  Impression: No acute intracranial findings by CT. Chronic findings stable from prior. Electronically Signed: Chung Holland MD  12/6/2024 1:50 PM [facility]  Workstation ID: [id number]     Social issues:   Social History     Socioeconomic History    Marital status:    Tobacco Use    Smoking status: Never    Smokeless tobacco: Never   Vaping Use    Vaping status: Never Used   Substance and Sexual Activity    Alcohol use: Yes     Comment: socially     Drug use: Never    Sexual activity: Defer       NIH Stroke Scale:  Interval: (not recorded)  1a. Level of Consciousness: (not recorded)  1b.  LOC Questions: (not recorded)  1c. LOC Commands: (not recorded)  2. Best Gaze: (not recorded)  3. Visual: (not recorded)  4. Facial Palsy: (not recorded)  5a. Motor Arm, Left: (not recorded)  5b. Motor Arm, Right: (not recorded)  6a. Motor Leg, Left: (not recorded)  6b. Motor Leg, Right: (not recorded)  7. Limb Ataxia: (not recorded)  8. Sensory: (not recorded)  9. Best Language: (not recorded)  10. Dysarthria: (not recorded)  11. Extinction and Inattention (formerly Neglect): (not recorded)    Total (NIH Stroke Scale): (not recorded)     Additional notable assessment information:     Nursing report ED to floor:  Wilman Lazcano RN   12/06/24 19:23 EST     Electronically signed by Wilman Lazcano RN at 12/06/24 1923       Concha Greer at 12/06/24 1358          Recollect Green @ 1358    Electronically signed by Concha Greer at 12/06/24 1358       Pattie Wynne APRN at 12/06/24 1313          Subjective   History of Present Illness  Chief Complaint: Headache      HPI: Patient is a 69-year-old female who presents with complaints of frontal headache that is radiating down her left temple and into her neck she states that it was a gradual onset beginning yesterday it is gotten progressively worse it is now worse headache of her life she is having some photophobia.  She has associated nausea.  She also complains of left-sided chest pain nonradiating described as a pressure.  She has had no urinary complaints denies diarrhea or constipation.  Of note spouse was reportedly at triage and stated that she was having abnormal gait and altered mental status today.  At my initial evaluation there is no one at bedside and patient is alert oriented answering questions appropriately.    PCP: Kristian    History provided by:  Patient      Review of Systems  See above as noted     Past Medical History:   Diagnosis Date    Adrenal nodule 11/16/2016    FINDINGS: Mild hepatic steatosis may be present. Cholecystectomy. No  biliary ductal dilatation. Negative pancreas, spleen, left adrenal gland, and kidneys. The right adrenal presumed adenoma has increased slightly, measuring 3 x 4 cm in diameter,  compared to 2 x 3.5 cm on the previous exam. The attenuation values are consistent with an adenoma. Done at [facility] in August 2018    Age-related osteoporosis without current pathological fracture 11/16/2016    Arthritis     Delayed surgical wound healing     Essential hypertension 11/16/2016    Gastroesophageal reflux disease with esophagitis 11/16/2016    Hepatic steatosis 11/16/2016    History of anemia     History of sepsis     FROM UTI    Hyperlipidemia 11/16/2016    Lisfranc's dislocation     LEFT WITH FRACTURES NONHEALING FOOT    Osteomyelitis of left foot 11/2020    RLS (restless legs syndrome)     Squamous cell skin cancer 2014    Excised by Dr. James    Thyroid nodule 10/18/2019    BENIGN    Type 2 diabetes mellitus with peripheral neuropathy 11/16/2016    Vitamin D deficiency 1/25/2019       Allergies   Allergen Reactions    Zofran [Ondansetron Hcl] Nausea And Vomiting     Does the opposite of its purpose    Prochlorperazine Other (See Comments)     CAUSED SEIZURE    Prochlorperazine Edisylate Hives    Hydralazine Itching and Rash    Hydralazine Hcl Itching and Rash    Meperidine Itching and Swelling    Prochlorperazine Maleate Other (See Comments)     CAUSES SEIZURE    Prochlorperazine Maleate Irritability       Past Surgical History:   Procedure Laterality Date    BREAST BIOPSY Left     7/2019. BENIGN    CARDIAC CATHETERIZATION Left 5/2/2021    Procedure: Cardiac Catheterization/Vascular Study;  Surgeon: Chacho Esteban MD;  Location: [facility] CATH INVASIVE LOCATION;  Service: Cardiovascular;  Laterality: Left;    CARDIAC CATHETERIZATION N/A 5/2/2021    Procedure: Intra-Aortic Baloon Pump Insertion;  Surgeon: Chacho Esteban MD;  Location: [facility] CATH INVASIVE LOCATION;  Service: Cardiovascular;  Laterality: N/A;     CATARACT EXTRACTION WITH INTRAOCULAR LENS IMPLANT Bilateral     CHOLECYSTECTOMY      COLONOSCOPY      CORONARY ARTERY BYPASS GRAFT N/A 5/2/2021    Procedure: CORONARY ARTERY BYPASS WITH INTERNAL MAMMARY ARTERY GRAFT;  Surgeon: Jr Rocael Alexander MD;  Location: [facility];  Service: Cardiothoracic;  Laterality: N/A;    FOOT FUSION Right 11/13/2020    Procedure: RIGHT OPEN TREATMENT LISFRANC INJURY, OPEN REDUCTION INTERNAL FIXATION MEDIAL/MIDDLE CUNEIFORM FRACTURE AND 2ND/3RD METATARSAL FRACTURE 1ST 2ND POSSIBLE 3RD TARSOMETATARSAL ARTHRODESIS INTERCUNEIFORM ARTHRODESIS CALCANEAL BONE GRAFT;  Surgeon: Mikhail Banks Jr., MD;  Location: [facility];  Service: Orthopedics;  Laterality: Right;    INCISION AND DRAINAGE LEG Right 1/8/2021    Procedure: RIGHT IRRIGATION AND DEBRIDEMENT OF FOOT WITH SECONDARY CLOSURE AND HARDWARE REMOVAL;  Surgeon: Mikhail Banks Jr., MD;  Location: [facility] OR;  Service: Orthopedics;  Laterality: Right;    KNEE ARTHROSCOPY Bilateral     TOTAL ABDOMINAL HYSTERECTOMY      TRANSESOPHAGEAL ECHOCARDIOGRAM (EMILIA) N/A 5/2/2021    Procedure: TRANSESOPHAGEAL ECHOCARDIOGRAM;  Surgeon: Jr Rocael Alexander MD;  Location: [facility];  Service: Cardiothoracic;  Laterality: N/A;    UPPER GASTROINTESTINAL ENDOSCOPY  08/2016    US GUIDED FINE NEEDLE ASPIRATION  5/8/2020       Family History   Problem Relation Age of Onset    Diabetes Mother     COPD Mother     Hypertension Mother     Kidney disease Mother     Obesity Mother     Thyroid disease Mother     Diabetes Sister     Diabetes Sister     Diabetes Sister     Diabetes Sister     Malig Hyperthermia Neg Hx        Social History     Socioeconomic History    Marital status:    Tobacco Use    Smoking status: Never    Smokeless tobacco: Never   Vaping Use    Vaping status: Never Used   Substance and Sexual Activity    Alcohol use: Yes     Comment: socially     Drug use: Never    Sexual activity: Defer           Objective   Physical Exam  Due  "to significant overcrowding in the emergency department patient was evaluated by myself in a hallway bed. This exam may be limited by privacy, noise level and the patient not wearing a hospital gown.  Explained to the patient our limitations and our overcrowding.  They were in agreement to continue the exam and treatment at this time.       Procedures          ED Course  ED Course as of 12/06/24 1634   Fri Dec 06, 2024   1401 Sed Rate(!): 49 [BH]   1401 WBC(!): 13.78 [BH]   1422 Awaiting chemistries [BH]   1430 WBC, UA(!): Too Numerous to Count [BH]   1430 Ketones, UA(!): 15 mg/dL (1+) [BH]   1430 Leukocytes, UA(!): Small (1+) []   1453 Chemistries hemolyzed awaiting recollect results. []   1553 Creatinine(!): 2.77 []      ED Course User Index  [] Pattie Wynne, CANDIDO        /66   Pulse 82   Temp 97.4 °F (36.3 °C) (Oral)   Resp 17   Ht 157.5 cm (62\")   Wt 73.5 kg (162 lb)   SpO2 93%   BMI 29.63 kg/m²   Labs Reviewed   COMPREHENSIVE METABOLIC PANEL - Abnormal; Notable for the following components:       Result Value    Glucose 246 (*)     BUN 40 (*)     Creatinine 2.77 (*)     Chloride 93 (*)     Calcium 10.9 (*)     eGFR 17.8 (*)     All other components within normal limits    Narrative:     GFR Normal >60  Chronic Kidney Disease <60  Kidney Failure <15    The GFR formula is only valid for adults with stable renal function between ages 18 and 70.   URINALYSIS W/ MICROSCOPIC IF INDICATED (NO CULTURE) - Abnormal; Notable for the following components:    Color, UA Dark Yellow (*)     Appearance, UA Cloudy (*)     Glucose,  mg/dL (Trace) (*)     Ketones, UA 15 mg/dL (1+) (*)     Bilirubin, UA Small (1+) (*)     Protein,  mg/dL (2+) (*)     Leuk Esterase, UA Small (1+) (*)     All other components within normal limits   TROPONIN - Abnormal; Notable for the following components:    HS Troponin T 27 (*)     All other components within normal limits    Narrative:     High Sensitive " Troponin T Reference Range:  <14.0 ng/L- Negative Female for AMI  <22.0 ng/L- Negative Male for AMI  >=14 - Abnormal Female indicating possible myocardial injury.  >=22 - Abnormal Male indicating possible myocardial injury.   Clinicians would have to utilize clinical acumen, EKG, Troponin, and serial changes to determine if it is an Acute Myocardial Infarction or myocardial injury due to an underlying chronic condition.        SEDIMENTATION RATE - Abnormal; Notable for the following components:    Sed Rate 49 (*)     All other components within normal limits   CBC WITH AUTO DIFFERENTIAL - Abnormal; Notable for the following components:    WBC 13.78 (*)     Neutrophils, Absolute 8.87 (*)     Lymphocytes, Absolute 3.43 (*)     Monocytes, Absolute 1.21 (*)     All other components within normal limits   URINALYSIS, MICROSCOPIC ONLY - Abnormal; Notable for the following components:    RBC, UA 6-10 (*)     WBC, UA Too Numerous to Count (*)     Squamous Epithelial Cells, UA 13-20 (*)     Renal Epithelial Cells, UA 3-6 (*)     All other components within normal limits   POCT GLUCOSE FINGERSTICK - Abnormal; Notable for the following components:    Glucose 229 (*)     All other components within normal limits   PROTIME-INR - Normal   APTT - Normal   URINE CULTURE   HIGH SENSITIVITIY TROPONIN T 2HR   CBC AND DIFFERENTIAL    Narrative:     The following orders were created for panel order CBC & Differential.  Procedure                               Abnormality         Status                     ---------                               -----------         ------                     CBC Auto Differential[[id number]]        Abnormal            Final result                 Please view results for these tests on the individual orders.     Medications   sodium chloride 0.9 % flush 10 mL (has no administration in time range)   sodium chloride 0.9 % bolus 1,000 mL (0 mL Intravenous Stopped 12/6/24 1623)      CT Head Without  Contrast    Result Date: 12/6/2024  Impression: No acute intracranial findings by CT. Chronic findings stable from prior. Electronically Signed: Chung Holland MD  12/6/2024 1:50 PM EST  Workstation ID: [id number]                                                  Medical Decision Making  Patient presented with above complaints, noted physical exam was completed and IV was established and labs were obtained as she did complain of a frontal headache that radiates into her temple, sed rate was completed slightly elevated at 49, CT of her head was obtained and was negative for intracranial hemorrhage or mass effect there is no evidence of sinusitis.  White blood cell count was slightly elevated at 13.78, creatinine notably elevated at 277 patient has no history of renal insufficiency, considering this REINA.  GFR decreased at 17 she was given fluids in the emergency room.  Urinalysis did no epithelials, though there were too numerous white blood cells will be treated as a urinary tract infection with culture pending.  With patient's REINA and presentation she will be admitted for continued monitoring spoke with Isabel BUCK with the hospitalist group who agreed to patient admission.  At bedside have updated the patient as well as  who is given verbal understanding, denies further questions or complaints at time of admission.    Chart review: 11/28/2024 admission related to UTI, syncope, type 2 diabetes hyperglycemia    Labs: Creatinine 2.77, sed rate elevated 49, white blood cell count 13.78 urinalysis consistent with urinary tract infection urine culture pending    Radiology: Imaging reviewed by me and interpreted by radiologist.    Medications Medications  sodium chloride 0.9 % flush 10 mL (has no administration in time range)  sodium chloride 0.9 % bolus 1,000 mL (0 mL Intravenous Stopped 12/6/24 5974)    Part of this note may be an electronic transcription/translation of spoken language to printed text using the Dragon  Dictation System.    Appropriate PPE worn during exam.      Note Disclaimer: At [facility], we believe that sharing information builds trust and better  relationships. You are receiving this note because you recently visited [facility]. It is possible you will see health information before a provider has talked with you about it. This kind of information can be easy to misunderstand. To help you fully understand what it means for your health, we urge you to discuss this note with your provider.      Problems Addressed:  REINA (acute kidney injury): complicated acute illness or injury  Urinary tract infection without hematuria, site unspecified: complicated acute illness or injury    Amount and/or Complexity of Data Reviewed  External Data Reviewed: notes.  Labs: ordered. Decision-making details documented in ED Course.  Radiology: ordered and independent interpretation performed. Decision-making details documented in ED Course.    Risk  Prescription drug management.  Decision regarding hospitalization.        Final diagnoses:   REINA (acute kidney injury)   Urinary tract infection without hematuria, site unspecified       ED Disposition  ED Disposition       ED Disposition   Decision to Admit    Condition   --    Comment   Level of Care: Telemetry [5]   Diagnosis: REINA (acute kidney injury) [144244]   Admitting Physician: ALF MEZA [[id number]]   Attending Physician: ALF MEZA [[id number]]                 No follow-up provider specified.       Medication List      No changes were made to your prescriptions during this visit.            Pattie Wynne APRN  12/06/24 1634      Electronically signed by Pattie Wynne APRN at 12/06/24 1634       Vital Signs (last day)       Date/Time Temp Temp src Pulse Resp BP Patient Position SpO2    12/09/24 1152 -- -- 80 16 108/68 Sitting 98    12/09/24 0522 97.4 (36.3) Oral 81 14 138/80 Lying 95    12/08/24 2157 99.1 (37.3) Oral 82 17 162/81 Lying 97    12/08/24  1957 -- -- -- -- -- -- 95    12/08/24 1217 97.9 (36.6) Oral -- 18 127/72 Lying 95    12/08/24 0543 97.3 (36.3) Oral 74 17 121/64 Lying 93    12/08/24 0028 97.8 (36.6) Oral 84 18 126/69 Lying 95          Facility-Administered Medications as of 12/9/2024   Medication Dose Route Frequency Provider Last Rate Last Admin    acetaminophen (TYLENOL) tablet 650 mg  650 mg Oral Q4H PRN Isabel Aragon PA-C   650 mg at 12/08/24 2001    Or    acetaminophen (TYLENOL) 160 MG/5ML oral solution 650 mg  650 mg Oral Q4H PRN Isabel Aragon PA-C        Or    acetaminophen (TYLENOL) suppository 650 mg  650 mg Rectal Q4H PRN Isabel Aragon PA-C        [START ON 12/10/2024] amLODIPine (NORVASC) tablet 5 mg  5 mg Oral Daily Brenda Sutton MD        aspirin EC tablet 81 mg  81 mg Oral Daily Isabel Aragon PA-C   81 mg at 12/09/24 0908    atorvastatin (LIPITOR) tablet 80 mg  80 mg Oral Nightly Isabel Aragon PA-C   80 mg at 12/08/24 2001    benzocaine-menthol (CHLORASEPTIC) lozenge 1 each  1 lozenge Mouth/Throat Q4H PRN Llanes Alvarez, Carlos, MD        sennosides-docusate (PERICOLACE) 8.6-50 MG per tablet 2 tablet  2 tablet Oral BID PRN Isabel Aragon PA-C   2 tablet at 12/09/24 0916    And    polyethylene glycol (MIRALAX) packet 17 g  17 g Oral Daily PRN Isabel Aragon PA-C        And    bisacodyl (DULCOLAX) EC tablet 5 mg  5 mg Oral Daily PRN Isabel Aragon PA-C        And    bisacodyl (DULCOLAX) suppository 10 mg  10 mg Rectal Daily PRN Isabel Aragon PA-C        Calcium Replacement - Follow Nurse / BPA Driven Protocol   Not Applicable PRN Isabel Aragon PA-C        clopidogrel (PLAVIX) tablet 75 mg  75 mg Oral Daily Isabel Aragon PA-C   75 mg at 12/09/24 0908    dextrose (D50W) (25 g/50 mL) IV injection 25 g  25 g Intravenous Q15 Min PRN Isabel Aragon PA-C        dextrose (GLUTOSE) oral gel 15 g  15 g Oral Q15 Min PRN Isabel Aragon PA-C        [COMPLETED] diphenhydrAMINE (BENADRYL) injection 12.5 mg  12.5 mg Intravenous Once Isabel Aragon  [place] DASH   12.5 mg at 12/06/24 1747    [COMPLETED] diphenhydrAMINE (BENADRYL) injection 50 mg  50 mg Intravenous Once Llanes Alvarez, Carlos, MD   50 mg at 12/07/24 0130    gabapentin (NEURONTIN) capsule 200 mg  200 mg Oral Q12H Isabel Aragon PA-C   200 mg at 12/09/24 0908    glucagon (GLUCAGEN) injection 1 mg  1 mg Intramuscular Q15 Min PRN Isabel Aragon PA-C        [COMPLETED] HYDROmorphone (DILAUDID) injection 0.25 mg  0.25 mg Intravenous Once Llanes Alvarez, Carlos, MD   0.25 mg at 12/06/24 2211    [COMPLETED] HYDROmorphone (DILAUDID) injection 0.5 mg  0.5 mg Intravenous Once Isabel Aragon PA-C   0.5 mg at 12/06/24 1748    [COMPLETED] HYDROmorphone (DILAUDID) injection 0.5 mg  0.5 mg Intravenous Once Payal Catalan PA-C   0.5 mg at 12/07/24 2222    insulin glargine (LANTUS, SEMGLEE) injection 15 Units  15 Units Subcutaneous Daily Isabel Aragon PA-C   15 Units at 12/09/24 0908    insulin lispro (HUMALOG/ADMELOG) injection 2-9 Units  2-9 Units Subcutaneous 4x Daily AC & at Bedtime Isabel Aragon PA-C   4 Units at 12/09/24 1246    Lidocaine 4 % 2 patch  2 patch Transdermal Q24H Salma Powers APRN        [Held by provider] lisinopril (PRINIVIL,ZESTRIL) tablet 5 mg  5 mg Oral Daily Isabel Aragon PA-C        Magnesium Low Dose Replacement - Follow Nurse / BPA Driven Protocol   Not Applicable PRN Isabel Aragon PA-C        melatonin tablet 5 mg  5 mg Oral Nightly PRN Isabel Aragon PA-C   5 mg at 12/09/24 0126    [COMPLETED] metoclopramide (REGLAN) injection 10 mg  10 mg Intravenous Once Isabel Aragon PA-C   10 mg at 12/06/24 1742    nitroglycerin (NITROSTAT) SL tablet 0.4 mg  0.4 mg Sublingual Q5 Min PRN Isabel Aragon PA-C        ondansetron (ZOFRAN) injection 4 mg  4 mg Intravenous Q6H PRN Isabel Aragon PA-C        pantoprazole (PROTONIX) injection 40 mg  40 mg Intravenous Brenda Dunn MD   40 mg at 12/09/24 0908    Phosphorus Replacement - Follow Nurse / BPA Driven Protocol   Not Applicable PRN  Isabel Aragon PA-C        [COMPLETED] potassium & sodium phosphates (PHOS-NAK) 280-160-250 MG packet 2 packet  2 packet Oral Once Brenda Sutton MD   2 packet at 24 0907    [COMPLETED] potassium chloride (KLOR-CON M20) CR tablet 20 mEq  20 mEq Oral Once Miguel Mendez MD   20 mEq at 24 0130    [Held by provider] potassium chloride (KLOR-CON M20) CR tablet 20 mEq  20 mEq Oral Once Brenda Sutton MD        Potassium Replacement - Follow Nurse / BPA Driven Protocol   Not Applicable PRN Isabel Aragon PA-C        promethazine (PHENERGAN) tablet 25 mg  25 mg Oral Q6H PRN Payal Catalan PA-C   25 mg at 24 2248    [COMPLETED] sodium chloride 0.9 % bolus 1,000 mL  1,000 mL Intravenous Once Pattie Wynne APRN   Stopped at 24 1623    sodium chloride 0.9 % flush 10 mL  10 mL Intravenous PRN Pattie Wynne APRN        sodium chloride 0.9 % flush 10 mL  10 mL Intravenous Q12H Isabel Aragon PA-C   10 mL at 24 0909    sodium chloride 0.9 % flush 10 mL  10 mL Intravenous PRN Isabel Aragon PA-C        sodium chloride 0.9 % infusion 40 mL  40 mL Intravenous PRN Isabel Aragon PA-C        [] sodium chloride 0.9 % infusion  125 mL/hr Intravenous Continuous Isabel Aragon PA-C   Stopped at 24 0558    [] sodium chloride 0.9 % infusion  75 mL/hr Intravenous Continuous Indu Young MD 75 mL/hr at 24 0600 75 mL/hr at 24 0600    sodium chloride nasal spray 1 spray  1 spray Each Nare PRN Llanes Alvarez, Carlos, MD        traZODone (DESYREL) tablet 50 mg  50 mg Oral Nightly Isabel Aragon PA-C   50 mg at 24     Lab Results (last 48 hours)       Procedure Component Value Units Date/Time    POC Glucose 4x Daily Before Meals & at Bedtime [[id number]]  (Abnormal) Collected: 24 1150    Specimen: Blood Updated: 24 1153     Glucose 207 mg/dL      Comment: Serial Number: [id number]:  [id number]       Iron Profile [[id number]]   (Abnormal) Collected: 12/09/24 0122    Specimen: Blood Updated: 12/09/24 0950     Iron 34 mcg/dL      Iron Saturation (TSAT) 12 %      Transferrin 184 mg/dL      TIBC 274 mcg/dL     POC Glucose 4x Daily Before Meals & at Bedtime [[id number]]  (Abnormal) Collected: 12/09/24 0726    Specimen: Blood Updated: 12/09/24 0728     Glucose 148 mg/dL      Comment: Serial Number: [id number]:  [id number]       Magnesium [[id number]]  (Normal) Collected: 12/09/24 0122    Specimen: Blood Updated: 12/09/24 0159     Magnesium 1.6 mg/dL     Basic Metabolic Panel [[id number]]  (Abnormal) Collected: 12/09/24 0122    Specimen: Blood Updated: 12/09/24 0159     Glucose 236 mg/dL      BUN 24 mg/dL      Creatinine 1.28 mg/dL      Sodium 141 mmol/L      Potassium 3.9 mmol/L      Comment: Specimen hemolyzed.  Result may be falsely elevated.        Chloride 105 mmol/L      CO2 27.3 mmol/L      Calcium 8.8 mg/dL      BUN/Creatinine Ratio 18.8     Anion Gap 8.7 mmol/L      eGFR 44.9 mL/min/1.73     Narrative:      GFR Normal >60  Chronic Kidney Disease <60  Kidney Failure <15    The GFR formula is only valid for adults with stable renal function between ages 18 and 70.    Phosphorus [[id number]]  (Abnormal) Collected: 12/09/24 0122    Specimen: Blood Updated: 12/09/24 0154     Phosphorus 2.4 mg/dL     CBC & Differential [[id number]]  (Abnormal) Collected: 12/09/24 0122    Specimen: Blood Updated: 12/09/24 0131    Narrative:      The following orders were created for panel order CBC & Differential.  Procedure                               Abnormality         Status                     ---------                               -----------         ------                     CBC Auto Differential[[id number]]        Abnormal            Final result                 Please view results for these tests on the individual orders.    CBC Auto Differential [[id number]]  (Abnormal) Collected: 12/09/24 0122    Specimen: Blood Updated: 12/09/24 0131     WBC 7.22  10*3/mm3      RBC 3.56 10*6/mm3      Hemoglobin 10.3 g/dL      Hematocrit 32.5 %      MCV 91.3 fL      MCH 28.9 pg      MCHC 31.7 g/dL      RDW 12.3 %      RDW-SD 40.8 fl      MPV 9.3 fL      Platelets 253 10*3/mm3      Neutrophil % 49.6 %      Lymphocyte % 37.8 %      Monocyte % 7.8 %      Eosinophil % 3.9 %      Basophil % 0.8 %      Immature Grans % 0.1 %      Neutrophils, Absolute 3.58 10*3/mm3      Lymphocytes, Absolute 2.73 10*3/mm3      Monocytes, Absolute 0.56 10*3/mm3      Eosinophils, Absolute 0.28 10*3/mm3      Basophils, Absolute 0.06 10*3/mm3      Immature Grans, Absolute 0.01 10*3/mm3      nRBC 0.0 /100 WBC     Blood Culture - Blood, Arm, Right [[id number]]  (Normal) Collected: 12/06/24 2312    Specimen: Blood from Arm, Right Updated: 12/08/24 2330     Blood Culture No growth at 2 days    Narrative:      Less than seven (7) mL's of blood was collected.  Insufficient quantity may yield false negative results.    Blood Culture - Blood, Arm, Left [[id number]]  (Normal) Collected: 12/06/24 2243    Specimen: Blood from Arm, Left Updated: 12/08/24 2300     Blood Culture No growth at 2 days    Narrative:      Less than seven (7) mL's of blood was collected.  Insufficient quantity may yield false negative results.    POC Glucose 4x Daily Before Meals & at Bedtime [[id number]]  (Abnormal) Collected: 12/08/24 2202    Specimen: Blood Updated: 12/08/24 2205     Glucose 120 mg/dL      Comment: Serial Number: [id number]:  [id number]       POC Glucose 4x Daily Before Meals & at Bedtime [[id number]]  (Abnormal) Collected: 12/08/24 1711    Specimen: Blood Updated: 12/08/24 1712     Glucose 181 mg/dL      Comment: Serial Number: [id number]:  [id number]       Creatinine Urine Random (kidney function) GFR component - Urine, Clean Catch [[id number]] Collected: 12/07/24 2341    Specimen: Urine, Clean Catch Updated: 12/08/24 1240     Creatinine, Urine 120.3 mg/dL     Narrative:      Reference intervals for random  urine have not been established.  Clinical usage is dependent upon physician's interpretation in combination with other laboratory tests.       POC Glucose 4x Daily Before Meals & at Bedtime [[id number]]  (Abnormal) Collected: 12/08/24 1216    Specimen: Blood Updated: 12/08/24 1218     Glucose 118 mg/dL      Comment: Serial Number: [id number]:  [id number]       Urine Culture - Urine, Urine, Clean Catch [[id number]] Collected: 12/06/24 1357    Specimen: Urine, Clean Catch Updated: 12/08/24 1031     Urine Culture 50,000 CFU/mL Normal Urogenital Tammy    Narrative:      Colonization of the urinary tract without infection is common. Treatment is discouraged unless the patient is symptomatic, pregnant, or undergoing an invasive urologic procedure.    POC Glucose 4x Daily Before Meals & at Bedtime [[id number]]  (Normal) Collected: 12/08/24 0728    Specimen: Blood Updated: 12/08/24 0730     Glucose 80 mg/dL      Comment: Serial Number: [id number]:  [id number]       Phosphorus [[id number]]  (Normal) Collected: 12/08/24 0213    Specimen: Blood Updated: 12/08/24 0253     Phosphorus 2.7 mg/dL     Basic Metabolic Panel [[id number]]  (Abnormal) Collected: 12/08/24 0213    Specimen: Blood Updated: 12/08/24 0253     Glucose 88 mg/dL      BUN 31 mg/dL      Creatinine 1.40 mg/dL      Sodium 138 mmol/L      Potassium 3.7 mmol/L      Comment: Specimen hemolyzed.  Result may be falsely elevated.        Chloride 104 mmol/L      CO2 26.0 mmol/L      Calcium 8.8 mg/dL      BUN/Creatinine Ratio 22.1     Anion Gap 8.0 mmol/L      eGFR 40.3 mL/min/1.73     Narrative:      GFR Normal >60  Chronic Kidney Disease <60  Kidney Failure <15    The GFR formula is only valid for adults with stable renal function between ages 18 and 70.    Magnesium [[id number]]  (Normal) Collected: 12/08/24 0213    Specimen: Blood Updated: 12/08/24 0253     Magnesium 1.6 mg/dL     CBC & Differential [[id number]]  (Abnormal) Collected: 12/08/24 0213    Specimen:  Blood Updated: 12/08/24 0223    Narrative:      The following orders were created for panel order CBC & Differential.  Procedure                               Abnormality         Status                     ---------                               -----------         ------                     CBC Auto Differential[[id number]]        Abnormal            Final result                 Please view results for these tests on the individual orders.    CBC Auto Differential [[id number]]  (Abnormal) Collected: 12/08/24 0213    Specimen: Blood Updated: 12/08/24 0223     WBC 8.94 10*3/mm3      RBC 3.53 10*6/mm3      Hemoglobin 9.9 g/dL      Hematocrit 32.2 %      MCV 91.2 fL      MCH 28.0 pg      MCHC 30.7 g/dL      RDW 12.4 %      RDW-SD 41.0 fl      MPV 9.3 fL      Platelets 239 10*3/mm3      Neutrophil % 44.6 %      Lymphocyte % 41.4 %      Monocyte % 9.4 %      Eosinophil % 3.5 %      Basophil % 0.9 %      Immature Grans % 0.2 %      Neutrophils, Absolute 3.99 10*3/mm3      Lymphocytes, Absolute 3.70 10*3/mm3      Monocytes, Absolute 0.84 10*3/mm3      Eosinophils, Absolute 0.31 10*3/mm3      Basophils, Absolute 0.08 10*3/mm3      Immature Grans, Absolute 0.02 10*3/mm3      nRBC 0.0 /100 WBC     Eosinophil Smear - Urine, Urine, Clean Catch [[id number]]  (Normal) Collected: 12/07/24 2341    Specimen: Urine, Clean Catch Updated: 12/08/24 0045     Eosinophil Smear 0 % EOS/100 Cells     Protein, Urine, Random - Urine, Clean Catch [[id number]] Collected: 12/07/24 2341    Specimen: Urine, Clean Catch Updated: 12/08/24 0016     Total Protein, Urine 9.0 mg/dL     Narrative:      Reference intervals for random urine have not been established.  Clinical usage is dependent upon physician's interpretation in combination with other laboratory tests.       Sodium, Urine, Random - Urine, Clean Catch [[id number]] Collected: 12/07/24 2341    Specimen: Urine, Clean Catch Updated: 12/08/24 0016     Sodium, Urine 83 mmol/L     Narrative:       Reference intervals for random urine have not been established.  Clinical usage is dependent upon physician's interpretation in combination with other laboratory tests.       POC Glucose Once [[id number]]  (Abnormal) Collected: 12/07/24 2115    Specimen: Blood Updated: 12/07/24 2116     Glucose 166 mg/dL      Comment: Serial Number: [id number]:  [id number]             Imaging Results (All)       Procedure Component Value Units Date/Time    CT Abdomen Pelvis Without Contrast [[id number]] Collected: 12/09/24 1029     Updated: 12/09/24 1042    Narrative:      CT ABDOMEN PELVIS WO CONTRAST    Date of Exam: 12/9/2024 10:00 AM EST    Indication: Abdominal pain.    Comparison: 7/17/2024.    Technique: Axial CT images were obtained of the abdomen and pelvis without the administration of contrast. Sagittal and coronal reconstructions were performed.  Automated exposure control and iterative reconstruction methods were used.      Findings:  The exam is limited by noncontrasted technique. The gallbladder is surgically absent. The uterus is also surgically absent. There is a ovoid shaped hypodense mass in the right adrenal gland consistent with a benign adenoma. It measures 4.1 x 3.0 cm in   diameter. There is a 3 mm nonobstructing stone in the inner-polar region of the left kidney. There is an exophytic round fluid density mass projecting off the posterior cortex of the left kidney which appears consistent with a renal cyst on the previous   contrast-enhanced CT. The left adrenal gland, liver, pancreas, and spleen are normal. There are atherosclerotic vascular calcifications in the abdomen and pelvis. The urinary bladder is normal. There is mild diverticulosis in the sigmoid colon. There is   no abnormal bowel wall thickening. Stool burden appears normal. The appendix is normal. There are no dilated loops of bowel to indicate an obstructive process. There is a tiny fat density umbilical hernia. There is a small hiatal  hernia. The lung bases   are clear. There are no suspicious osteolytic or sclerotic lesions within the bony structures. There are underlying degenerative changes.      Impression:      Impression:  1.No acute findings within the abdomen or pelvis.  2.4.1 x 3.0 cm right adrenal adenoma.  3.3 mm nonobstructing stone in the left kidney.  4.Left renal cyst.  5.Mild diverticulosis of the sigmoid colon.  6.Tiny fat density umbilical hernia. There is a small hiatal hernia.  7.The exam is limited by noncontrasted technique.        Electronically Signed: Darci Okeefe MD    12/9/2024 10:39 AM EST    Workstation ID: [id number]    US Renal Bilateral [[id number]] Collected: 12/07/24 0846     Updated: 12/07/24 0849    Narrative:      US RENAL BILATERAL    Date of Exam: 12/7/2024 1:04 AM EST    Indication: REINA.    Comparison: CT abdomen pelvis 7/17/2024    Technique: Grayscale and color Doppler ultrasound evaluation of the kidneys and urinary bladder was performed.      Findings:  No hydronephrosis. Right kidney measures 9.5 x 5.7 x 6 cm. Left kidney measures 9.1 x 5.6 x 5 cm. Renal cortex has normal thickness and normal echogenicity. Urinary bladder is sonographically normal. There is a small anechoic cyst at the mid left kidney   posteriorly measuring up to 1.2 cm.      Impression:      Impression:  No hydronephrosis. Small left renal cyst.        Electronically Signed: Netta Cartwright MD    12/7/2024 8:47 AM EST    Workstation ID: [id number]    CT Head Without Contrast [[id number]] Collected: 12/06/24 1348     Updated: 12/06/24 1352    Narrative:      CT HEAD WO CONTRAST    Date of Exam: 12/6/2024 1:43 PM EST    Indication: headache.    Comparison: Head CT 11/28/2024    Technique: Axial CT images were obtained of the head without contrast administration.  Coronal reconstructions were performed.  Automated exposure control and iterative reconstruction methods were used.      Findings:  Negative for large territory infarct, acute  intracranial hemorrhage, large mass lesion, hydrocephalus or midline shift. Stable slight prominence of the ventricles and sulci for age, suspect minimal global parenchymal volume loss. Mild periventricular   hypodensity stable from prior suggesting chronic microvascular ischemic change. Distal vertebral artery and carotid atherosclerotic disease. No large extra-axial fluid collection. No obstructive sinus disease. Left anterior ethmoid sinus osteoma. No   large mastoid effusion. Negative for depressed skull fracture or aggressive lesion. Symmetric globes and extraocular muscles.      Impression:      Impression:  No acute intracranial findings by CT. Chronic findings stable from prior.      Electronically Signed: Chung Holland MD    2024 1:50 PM EST    Workstation ID: [id number]             Physician Progress Notes (last 72 hours)        Brenda Sutton MD at 24 [address] [place] [address] MEDICINE SERVICE  DAILY PROGRESS NOTE    NAME: Marge Mcghee  : 1955  MRN: [id number]      LOS: 1 day     PROVIDER OF SERVICE: Brenda Sutton MD    Chief Complaint: REINA (acute kidney injury)    Subjective:     Interval History:  History taken from: Patient and patient's chart     Diarrhea resolved. Complaining of nausea, vomiting and constipation. Has history of RLS.        Review of Systems:   Review of Systems  All negative except above   Objective:     Vital Signs  Temp:  [97.4 °F (36.3 °C)-99.1 °F (37.3 °C)] 97.4 °F (36.3 °C)  Heart Rate:  [80-82] 80  Resp:  [14-17] 16  BP: (108-162)/(68-81) 108/68   Body mass index is 29.07 kg/m².    Physical Exam  Physical Exam  General: Alert and oriented, no acute distress.  HENT: Normocephalic, moist oral mucosa, no scleral icterus.  Neck: Supple, nontender, no carotid bruits, no JVD, no LAD.  Lungs: Clear to auscultation, nonlabored respiration.  Heart: RRR, no murmur, gallop or edema.  Abdomen: Soft, nontender, nondistended, + bowel  sounds.  Neuro: alert and awake, moving all 4 extremities   Skin: Skin is warm, dry and pink, no rashes or lesions.  Psychiatric: Cooperative, appropriate mood and affect.          Diagnostic Data    Results from last 7 days   Lab Units 12/09/24  0122 12/06/24  2312 12/06/24  1446   WBC 10*3/mm3 7.22   < >  --    HEMOGLOBIN g/dL 10.3*   < >  --    HEMATOCRIT % 32.5*   < >  --    PLATELETS 10*3/mm3 253   < >  --    GLUCOSE mg/dL 236*   < > 246*   CREATININE mg/dL 1.28*   < > 2.77*   BUN mg/dL 24*   < > 40*   SODIUM mmol/L 141   < > 136   POTASSIUM mmol/L 3.9   < > 3.7   AST (SGOT) U/L  --   --  23   ALT (SGPT) U/L  --   --  9   ALK PHOS U/L  --   --  65   BILIRUBIN mg/dL  --   --  0.5   ANION GAP mmol/L 8.7   < > 14.3    < > = values in this interval not displayed.       CT Abdomen Pelvis Without Contrast    Result Date: 12/9/2024  Impression: 1.No acute findings within the abdomen or pelvis. 2.4.1 x 3.0 cm right adrenal adenoma. 3.3 mm nonobstructing stone in the left kidney. 4.Left renal cyst. 5.Mild diverticulosis of the sigmoid colon. 6.Tiny fat density umbilical hernia. There is a small hiatal hernia. 7.The exam is limited by noncontrasted technique. Electronically Signed: Darci Okeefe MD  12/9/2024 10:39 AM EST  Workstation ID: [id number]       I have reviewed patient labs and imaging     Assessment/Plan:   Marge Mcghee is a 69 y.o. female with a CMH of HTN, adrenal nodule, HLD, GERD, T2DM, RLS, OA, CAD s/p CABG who presented to [facility] on 12/6/2024 with generalized weakness.     Active and Resolved Problems  # REINA  -Creatinine 2.77, BUN 40. Creatinine 1 week ago was 0.89.   -Patient was on lisinopril prior to admission which she has been on since October. Hold ACE-I/ARBs  -Suspect pre-renal etiology/ATN from vomiting, ACE-I and possibly hypotension (had documented BP of 86/45 while in ED).   -renal US- No hydronephrosis, small left renal cyst  -uric acid- 7.1  -continuous NS    -Avoid  hypotension  -IMPROVING, today's Cr is 1.28  -Renal following      # UTI  -UA with TNTC WBC and 1+ leukocytes, no bacteria.  Sample does appear to be contaminated based on number of squamous epithelial cells.  -Ucx negative      # HTN  -Had episode of hypotension earlier and some low normal BP readings therefore will hold antihypertensives for now  -monitor BP and adjust medications accordingly      # T2DM with hyperglycemia  -continue Lantus 15 units daily  -SSI  -Hold metformin  -CCD  -Hypoglycemia protocol in place     # CAD  -Continue asa/Plavix      #constipation  - bowel regimen PRN    # Adrenal adenoma  # renal cyst  - incidental finding on CT abdomen  - follow up PCP/Endo as outpatient        VTE Prophylaxis:  Mechanical VTE prophylaxis orders are present.             Disposition Planning:     Barriers to Discharge: medical clearance   Anticipated Date of Discharge: 12/10  Place of Discharge: home vs rehab       Time: 40 minutes     Code Status and Medical Interventions: CPR (Attempt to Resuscitate); Full Support   Ordered at: 12/06/24 1726     Code Status (Patient has no pulse and is not breathing):    CPR (Attempt to Resuscitate)     Medical Interventions (Patient has pulse or is breathing):    Full Support       Signature: Electronically signed by Brenda Sutton MD, 12/09/24, 16:17 EST.  [facility], [place], operated by [facility] Hospitalist Team    Electronically signed by Brenda Sutton MD at 12/09/24 1620       Maikol Jones MD at 12/09/24 1120          RENAL/KCC:     LOS: 1 day   Patient Care Team:  Traci Townsend APRN as PCP - General (Nurse Practitioner)  Drake Hinton MD PhD as Consulting Physician (Ophthalmology)  Fausto Gilliam MD as Consulting Physician (Endocrinology)  Saloni Ch MD (Ophthalmology)  Mikhail Kaplan Jr., MD as Consulting Physician (Urology)    Chief Complaint:  REINA    Subjective     Interval History:   Chart reviewed  3L UOP  No new  complaints    Objective     Vital Signs  Temp:  [97.4 °F (36.3 °C)-99.1 °F (37.3 °C)] 97.4 °F (36.3 °C)  Heart Rate:  [81-82] 81  Resp:  [14-18] 14  BP: (127-162)/(72-81) 138/80    Physical Exam:  GEN: Awake, NAD  ENT: PERRL, EOMI, MMM  NECK: Supple, no JVD  CHEST: CTAB, no W/R/C  CV: RRR, no M/G/R  ABD: Soft, NT, +BS  SKIN: Warm and Dry  NEURO: CN's intact       Results Review:     I reviewed the patient's new clinical results.    Medication Review: Reviewed    Assessment & Plan     REINA  Hyponatremia  UTI  Diarrhea    Plan: Cr improved to 1.28.  Lytes and volume OK.  Encourage PO fluid and avoid nephrotoxins.  Will follow.      Maikol Jones MD  [facility]  24  11:20 EST            Electronically signed by Maikol Jones MD at 24 1123       Brenda Sutton MD at 24 [facility]4              [facility] MEDICINE SERVICE  DAILY PROGRESS NOTE    NAME: Marge Mcghee  : 1955  MRN: [id number]      LOS: 0 days     PROVIDER OF SERVICE: Brenda Sutton MD    Chief Complaint: REINA (acute kidney injury)    Subjective:     Interval History:  History taken from: Patient and patient's chart     Diarrhea resolved. Complaining of stomach discomfort.         Review of Systems:   Review of Systems  All negative except above   Objective:     Vital Signs  Temp:  [97.3 °F (36.3 °C)-98.4 °F (36.9 °C)] 97.9 °F (36.6 °C)  Heart Rate:  [74-91] 74  Resp:  [15-18] 18  BP: (112-137)/(59-72) 127/72   Body mass index is 29.07 kg/m².    Physical Exam  Physical Exam  General: Alert and oriented, no acute distress.  HENT: Normocephalic, moist oral mucosa, no scleral icterus.  Neck: Supple, nontender, no carotid bruits, no JVD, no LAD.  Lungs: Clear to auscultation, nonlabored respiration.  Heart: RRR, no murmur, gallop or edema.  Abdomen: Soft, nontender, nondistended, + bowel sounds.  Neuro: alert and awake, moving all 4 extremities   Skin: Skin is warm, dry and pink, no  rashes or lesions.  Psychiatric: Cooperative, appropriate mood and affect.          Diagnostic Data    Results from last 7 days   Lab Units 12/08/24  0213 12/06/24  2312 12/06/24  1446   WBC 10*3/mm3 8.94   < >  --    HEMOGLOBIN g/dL 9.9*   < >  --    HEMATOCRIT % 32.2*   < >  --    PLATELETS 10*3/mm3 239   < >  --    GLUCOSE mg/dL 88   < > 246*   CREATININE mg/dL 1.40*   < > 2.77*   BUN mg/dL 31*   < > 40*   SODIUM mmol/L 138   < > 136   POTASSIUM mmol/L 3.7   < > 3.7   AST (SGOT) U/L  --   --  23   ALT (SGPT) U/L  --   --  9   ALK PHOS U/L  --   --  65   BILIRUBIN mg/dL  --   --  0.5   ANION GAP mmol/L 8.0   < > 14.3    < > = values in this interval not displayed.       US Renal Bilateral    Result Date: 12/7/2024  Impression: No hydronephrosis. Small left renal cyst. Electronically Signed: Netta Cartwright MD  12/7/2024 8:47 AM EST  Workstation ID: [id number]    CT Head Without Contrast    Result Date: 12/6/2024  Impression: No acute intracranial findings by CT. Chronic findings stable from prior. Electronically Signed: Chung Holland MD  12/6/2024 1:50 PM EST  Workstation ID: [id number]       I have reviewed patient labs and imaging     Assessment/Plan:   Marge Mcghee is a 69 y.o. female with a CMH of HTN, adrenal nodule, HLD, GERD, T2DM, RLS, OA, CAD s/p CABG who presented to [facility] on 12/6/2024 with generalized weakness.     Active and Resolved Problems  # REINA  -Creatinine 2.77, BUN 40. Creatinine 1 week ago was 0.89.   -Patient was on lisinopril prior to admission which she has been on since October. Hold ACE-I/ARBs  -Suspect pre-renal etiology/ATN from vomiting, ACE-I and possibly hypotension (had documented BP of 86/45 while in ED).   -renal US- No hydronephrosis, small left renal cyst  -uric acid- 7.1  -continuous NS    -Avoid hypotension  -IMPROVING, today's Cr is 1.4  -Renal following      # UTI  -UA with TNTC WBC and 1+ leukocytes, no bacteria.  Sample does appear to be contaminated based on  number of squamous epithelial cells.  -Ucx negative      # HTN  -Had episode of hypotension earlier and some low normal BP readings therefore will hold antihypertensives for now  -monitor BP and adjust medications accordingly      # T2DM with hyperglycemia  -continue Lantus 15 units daily  -SSI  -Hold metformin  -CCD  -Hypoglycemia protocol in place     # CAD  -Continue asa/Plavix      #Diarrhea  - resolved        VTE Prophylaxis:  Mechanical VTE prophylaxis orders are present.             Disposition Planning:     Barriers to Discharge: medical clearance   Anticipated Date of Discharge: 12/9  Place of Discharge: home vs rehab       Time: 40 minutes     Code Status and Medical Interventions: CPR (Attempt to Resuscitate); Full Support   Ordered at: 12/06/24 1726     Code Status (Patient has no pulse and is not breathing):    CPR (Attempt to Resuscitate)     Medical Interventions (Patient has pulse or is breathing):    Full Support       Signature: Electronically signed by Brenda Sutton MD, 12/08/24, 13:44 EST.  [facility] Hospitalist Team    Electronically signed by Brenda Sutton MD at 12/08/24 1345       Tracy Leigh MD at 12/08/24 1339           LOS: 0 days   Patient Care Team:  Traci Townsend APRN as PCP - General (Nurse Practitioner)  Drake Hinton MD PhD as Consulting Physician (Ophthalmology)  Fausto Gilliam MD as Consulting Physician (Endocrinology)  Saloni Ch MD (Ophthalmology)  Mikhail Kaplan Jr., MD as Consulting Physician (Urology)    Chief Complaint: REINA        History of Present Illness  This is a 69 y.o. year old female  admitted for confusion and weakness.  She was found to have hypotension, noAKI and UTI  upon arrival.  She has no known hx of CKD.   Cr upon arrival was 2.7.   She has been hydrated.  She is making urine and her BP is now near goal.       Subjective  Patient reports she is feeling poorly today.   Denies sob or chest pain    History  "taken from: patient chart    Objective     Vital Sign Min/Max for last 24 hours  Temp  Min: 97.3 °F (36.3 °C)  Max: 98.4 °F (36.9 °C)   BP  Min: 112/59  Max: 137/60   Pulse  Min: 74  Max: 91   Resp  Min: 15  Max: 18   SpO2  Min: 93 %  Max: 98 %   No data recorded   No data recorded     Flowsheet Rows      Flowsheet Row First Filed Value   Admission Height 157.5 cm (62\") Documented at 12/06/2024 1241   Admission Weight 73.5 kg (162 lb) Documented at 12/06/2024 1241            I/O this shift:  In: 240 [P.O.:240]  Out: 400 [Urine:400]  I/O last 3 completed shifts:  In: 2306.3 [P.O.:600; I.V.:1606.3; IV Piggyback:100]  Out: 250 [Urine:250]    Objective:  Vital signs: (most recent): Blood pressure 127/72, pulse 74, temperature 97.9 °F (36.6 °C), temperature source Oral, resp. rate 18, height 157.5 cm (62\"), weight 72.1 kg (158 lb 15.2 oz), SpO2 95%, not currently breastfeeding.              Physical Examination: General appearance - alert, well appearing, and in no distress  Mental status - alert, oriented to person, place, and time  Chest - clear to auscultation, no wheezes, rales or rhonchi, symmetric air entry  Heart - normal rate, regular rhythm, normal S1, S2, no murmurs, rubs, clicks or gallops  Neurological - alert, oriented, normal speech, no focal findings or movement disorder noted  Extremities - peripheral pulses normal, no pedal edema, no clubbing or cyanosis   Results Review:     I reviewed the patient's new clinical results.    WBC WBC   Date Value Ref Range Status   12/08/2024 8.94 3.40 - 10.80 10*3/mm3 Final   12/06/2024 10.37 3.40 - 10.80 10*3/mm3 Final   12/06/2024 13.78 (H) 3.40 - 10.80 10*3/mm3 Final      HGB Hemoglobin   Date Value Ref Range Status   12/08/2024 9.9 (L) 12.0 - 15.9 g/dL Final   12/06/2024 11.4 (L) 12.0 - 15.9 g/dL Final   12/06/2024 13.2 12.0 - 15.9 g/dL Final      HCT Hematocrit   Date Value Ref Range Status   12/08/2024 32.2 (L) 34.0 - 46.6 % Final   12/06/2024 35.3 34.0 - 46.6 % " "Final   12/06/2024 40.9 34.0 - 46.6 % Final      Platlets No results found for: \"LABPLAT\"   MCV MCV   Date Value Ref Range Status   12/08/2024 91.2 79.0 - 97.0 fL Final   12/06/2024 91.9 79.0 - 97.0 fL Final   12/06/2024 89.5 79.0 - 97.0 fL Final          Sodium Sodium   Date Value Ref Range Status   12/08/2024 138 136 - 145 mmol/L Final   12/06/2024 135 (L) 136 - 145 mmol/L Final   12/06/2024 136 136 - 145 mmol/L Final      Potassium Potassium   Date Value Ref Range Status   12/08/2024 3.7 3.5 - 5.2 mmol/L Final     Comment:     Specimen hemolyzed.  Result may be falsely elevated.   12/07/2024 3.9 3.5 - 5.2 mmol/L Final   12/07/2024 3.5 3.5 - 5.2 mmol/L Final   12/06/2024 3.6 3.5 - 5.2 mmol/L Final     Comment:     Specimen hemolyzed.  Result may be falsely elevated.   12/06/2024 3.7 3.5 - 5.2 mmol/L Final     Comment:     Slight hemolysis detected by analyzer. Result may be falsely elevated.      Chloride Chloride   Date Value Ref Range Status   12/08/2024 104 98 - 107 mmol/L Final   12/06/2024 96 (L) 98 - 107 mmol/L Final   12/06/2024 93 (L) 98 - 107 mmol/L Final      CO2 CO2   Date Value Ref Range Status   12/08/2024 26.0 22.0 - 29.0 mmol/L Final   12/06/2024 28.3 22.0 - 29.0 mmol/L Final   12/06/2024 28.7 22.0 - 29.0 mmol/L Final      BUN BUN   Date Value Ref Range Status   12/08/2024 31 (H) 8 - 23 mg/dL Final   12/06/2024 39 (H) 8 - 23 mg/dL Final   12/06/2024 40 (H) 8 - 23 mg/dL Final      Creatinine Creatinine   Date Value Ref Range Status   12/08/2024 1.40 (H) 0.57 - 1.00 mg/dL Final   12/06/2024 2.26 (H) 0.57 - 1.00 mg/dL Final   12/06/2024 2.77 (H) 0.57 - 1.00 mg/dL Final      Calcium Calcium   Date Value Ref Range Status   12/08/2024 8.8 8.6 - 10.5 mg/dL Final   12/06/2024 10.1 8.6 - 10.5 mg/dL Final   12/06/2024 10.9 (H) 8.6 - 10.5 mg/dL Final     Comment:     Result checked        PO4 No results found for: \"CAPO4\"   Albumin Albumin   Date Value Ref Range Status   12/06/2024 4.0 3.5 - 5.2 g/dL Final    "   Magnesium Magnesium   Date Value Ref Range Status   12/08/2024 1.6 1.6 - 2.4 mg/dL Final   12/06/2024 1.9 1.6 - 2.4 mg/dL Final      Uric Acid Uric Acid   Date Value Ref Range Status   12/06/2024 7.1 (H) 2.4 - 5.7 mg/dL Final        Medication Review:     Current Facility-Administered Medications:     acetaminophen (TYLENOL) tablet 650 mg, 650 mg, Oral, Q4H PRN, 650 mg at 12/07/24 2106 **OR** acetaminophen (TYLENOL) 160 MG/5ML oral solution 650 mg, 650 mg, Oral, Q4H PRN **OR** acetaminophen (TYLENOL) suppository 650 mg, 650 mg, Rectal, Q4H PRN, Isabel Aragon PA-C    [Held by provider] amLODIPine (NORVASC) tablet 10 mg, 10 mg, Oral, Daily, Isabel Aragon PA-C    aspirin EC tablet 81 mg, 81 mg, Oral, Daily, Isabel Aragon PA-C, 81 mg at 12/08/24 1000    atorvastatin (LIPITOR) tablet 80 mg, 80 mg, Oral, Nightly, Isabel Aragon PA-C, 80 mg at 12/07/24 2106    benzocaine-menthol (CHLORASEPTIC) lozenge 1 each, 1 lozenge, Mouth/Throat, Q4H PRN, Llanes Alvarez, Carlos, MD    sennosides-docusate (PERICOLACE) 8.6-50 MG per tablet 2 tablet, 2 tablet, Oral, BID PRN **AND** polyethylene glycol (MIRALAX) packet 17 g, 17 g, Oral, Daily PRN **AND** bisacodyl (DULCOLAX) EC tablet 5 mg, 5 mg, Oral, Daily PRN **AND** bisacodyl (DULCOLAX) suppository 10 mg, 10 mg, Rectal, Daily PRN, Isabel Aragon PA-C    Calcium Replacement - Follow Nurse / BPA Driven Protocol, , Not Applicable, PRN, Isabel Aragon PA-C    cefTRIAXone (ROCEPHIN) 1,000 mg in sodium chloride 0.9 % 100 mL MBP, 1,000 mg, Intravenous, Q24H, Isabel Aragon PA-C, Last Rate: 200 mL/hr at 12/07/24 2105, 1,000 mg at 12/07/24 2105    clopidogrel (PLAVIX) tablet 75 mg, 75 mg, Oral, Daily, Isabel Aragon PA-C, 75 mg at 12/08/24 1000    dextrose (D50W) (25 g/50 mL) IV injection 25 g, 25 g, Intravenous, Q15 Min PRN, Isabel Aragon PA-C    dextrose (GLUTOSE) oral gel 15 g, 15 g, Oral, Q15 Min PRN, Isabel Aragon PA-C    famotidine (PEPCID) tablet 20 mg, 20 mg, Oral, Q48H, Brenda Sutton  MD Vijay, 20 mg at 12/07/24 2106    gabapentin (NEURONTIN) capsule 200 mg, 200 mg, Oral, Q12H, Isabel Aragon PA-C, 200 mg at 12/08/24 1000    glucagon (GLUCAGEN) injection 1 mg, 1 mg, Intramuscular, Q15 Min PRN, Isabel Aragon PA-C    insulin glargine (LANTUS, SEMGLEE) injection 15 Units, 15 Units, Subcutaneous, Daily, Isabel Aragon PA-C, 15 Units at 12/08/24 1000    insulin lispro (HUMALOG/ADMELOG) injection 2-9 Units, 2-9 Units, Subcutaneous, 4x Daily AC & at Bedtime, Isabel Aragon PA-C, 2 Units at 12/07/24 2222    [Held by provider] lisinopril (PRINIVIL,ZESTRIL) tablet 5 mg, 5 mg, Oral, Daily, Isabel Aragon PA-C    Magnesium Low Dose Replacement - Follow Nurse / BPA Driven Protocol, , Not Applicable, PRN, Isabel Aragon PA-C    melatonin tablet 5 mg, 5 mg, Oral, Nightly PRN, Isabel Aragon PA-C    nitroglycerin (NITROSTAT) SL tablet 0.4 mg, 0.4 mg, Sublingual, Q5 Min PRN, Isabel Aragon PA-C    ondansetron (ZOFRAN) injection 4 mg, 4 mg, Intravenous, Q6H PRN, Isabel Aragon PA-C    Phosphorus Replacement - Follow Nurse / BPA Driven Protocol, , Not Applicable, PRN, Isabel Aragon PA-C    [Held by provider] potassium chloride (KLOR-CON M20) CR tablet 20 mEq, 20 mEq, Oral, Once, Brenda Sutton MD    Potassium Replacement - Follow Nurse / BPA Driven Protocol, , Not Applicable, PRN, Isabel Aragon PA-C    promethazine (PHENERGAN) tablet 25 mg, 25 mg, Oral, Q6H PRN, Payal Catalan PA-C, 25 mg at 12/07/24 2222    [COMPLETED] Insert Peripheral IV, , , Once **AND** sodium chloride 0.9 % flush 10 mL, 10 mL, Intravenous, PRN, Pattie Wynne, APRN    sodium chloride 0.9 % flush 10 mL, 10 mL, Intravenous, Q12H, Isabel Aragon PA-C, 10 mL at 12/08/24 1000    sodium chloride 0.9 % flush 10 mL, 10 mL, Intravenous, PRN, Isabel Aragon PA-C    sodium chloride 0.9 % infusion 40 mL, 40 mL, Intravenous, PRN, Isabel Aragon PA-C    sodium chloride 0.9 % infusion, 75 mL/hr, Intravenous, Continuous, Indu Young MD, Last  Rate: 75 mL/hr at 24 0600, 75 mL/hr at 24 0600    sodium chloride nasal spray 1 spray, 1 spray, Each Nare, PRN, Llanes Alvarez, Carlos, MD    traZODone (DESYREL) tablet 50 mg, 50 mg, Oral, Nightly, Isabel Aragon PA-C, 50 mg at 24 2105     Assessment & Plan       REINA (acute kidney injury)      Assessment & Plan  REINA  Hyponatremia   UTI  Diarrhea       Renal function improved today.   Continue off IVF for now   Abx per primary   Na at goal today  Check labs in am  Will follow     Tracy Leigh MD  24  13:39 EST              Electronically signed by Tracy Leigh MD at 24 1341       Brenda Sutton MD at 24 1255              [facility] MEDICINE SERVICE  DAILY PROGRESS NOTE    NAME: Marge Mcghee  : 1955  MRN: [id number]      LOS: 0 days     PROVIDER OF SERVICE: Brenda Sutton MD    Chief Complaint: REINA (acute kidney injury)    Subjective:     Interval History:  History taken from: Patient and patient's chart     New onset watery diarrhea x 6 times. Complaining of generalized weakness.         Review of Systems:   Review of Systems  All negative except above   Objective:     Vital Signs  Temp:  [97.3 °F (36.3 °C)-97.6 °F (36.4 °C)] 97.5 °F (36.4 °C)  Heart Rate:  [78-94] 80  Resp:  [14-18] 14  BP: ()/(45-85) 123/72   Body mass index is 29.07 kg/m².    Physical Exam  Physical Exam  General: Alert and oriented, no acute distress.  HENT: Normocephalic, moist oral mucosa, no scleral icterus.  Neck: Supple, nontender, no carotid bruits, no JVD, no LAD.  Lungs: Clear to auscultation, nonlabored respiration.  Heart: RRR, no murmur, gallop or edema.  Abdomen: Soft, nontender, nondistended, + bowel sounds.  Neuro: alert and awake, moving all 4 extremities   Skin: Skin is warm, dry and pink, no rashes or lesions.  Psychiatric: Cooperative, appropriate mood and affect.          Diagnostic Data    Results from last 7 days   Lab Units 24  0660  12/06/24  2312 12/06/24  1446   WBC 10*3/mm3  --  10.37  --    HEMOGLOBIN g/dL  --  11.4*  --    HEMATOCRIT %  --  35.3  --    PLATELETS 10*3/mm3  --  266  --    GLUCOSE mg/dL  --  289* 246*   CREATININE mg/dL  --  2.26* 2.77*   BUN mg/dL  --  39* 40*   SODIUM mmol/L  --  135* 136   POTASSIUM mmol/L 3.5 3.6 3.7   AST (SGOT) U/L  --   --  23   ALT (SGPT) U/L  --   --  9   ALK PHOS U/L  --   --  65   BILIRUBIN mg/dL  --   --  0.5   ANION GAP mmol/L  --  10.7 14.3       US Renal Bilateral    Result Date: 12/7/2024  Impression: No hydronephrosis. Small left renal cyst. Electronically Signed: Netta Cartwright MD  12/7/2024 8:47 AM EST  Workstation ID: [id number]    CT Head Without Contrast    Result Date: 12/6/2024  Impression: No acute intracranial findings by CT. Chronic findings stable from prior. Electronically Signed: Chung Holland MD  12/6/2024 1:50 PM EST  Workstation ID: [id number]       I have reviewed patient labs and imaging     Assessment/Plan:   Marge Mcghee is a 69 y.o. female with a CMH of HTN, adrenal nodule, HLD, GERD, T2DM, RLS, OA, CAD s/p CABG who presented to [facility] on 12/6/2024 with generalized weakness.     Active and Resolved Problems  # REINA  -Creatinine 2.77, BUN 40. Creatinine 1 week ago was 0.89.   -Patient was on lisinopril prior to admission which she has been on since October. Hold ACE-I/ARBs  -Suspect pre-renal etiology/ATN from vomiting, ACE-I and possibly hypotension (had documented BP of 86/45 while in ED).   -renal US- No hydronephrosis, small left renal cyst  -uric acid- 7.1  -continuous NS    -Avoid hypotension  -Consult nephrology      # UTI  -UA with TNTC WBC and 1+ leukocytes, no bacteria.  Sample does appear to be contaminated based on number of squamous epithelial cells.  -Ucx negative      # HTN  -Had episode of hypotension earlier and some low normal BP readings therefore will hold antihypertensives for now     # T2DM with hyperglycemia  -continue Lantus 15 units  daily  -SSI  -Hold metformin  -CCD  -Hypoglycemia protocol in place     # CAD  - denies any chest pain   -No further chest pain since initial episode this morning  -Troponin 27 -> 20   -Continue asa/Plavix  -Consider stress test     #Diarrhea  - send C. Diff  - GI panel       VTE Prophylaxis:  Mechanical VTE prophylaxis orders are present.             Disposition Planning:     Barriers to Discharge: medical clearance   Anticipated Date of Discharge:   Place of Discharge: home vs rehab       Time: 40 minutes     Code Status and Medical Interventions: CPR (Attempt to Resuscitate); Full Support   Ordered at: 24 1726     Code Status (Patient has no pulse and is not breathing):    CPR (Attempt to Resuscitate)     Medical Interventions (Patient has pulse or is breathing):    Full Support       Signature: Electronically signed by Brenda Sutton MD, 24, 12:55 EST.  [facility] Hospitalist Team    Electronically signed by Brenda Sutton MD at 24 1316          Consult Notes (last 72 hours)        Indu Young MD at 24 1927        Consult Orders    1. Inpatient Nephrology Consult [[id number]] ordered by Isabel Aragon PA-C at 24 1725                         [facility]                                                                                             Nephrology Initial Consult Note    Patient Identification:  Name: Marge Mcghee MRN: [id number]  Age: 69 y.o. : 1955  Sex: female  Date:2024    Requesting Physician: As per consult order.  Reason for Consultation: arf  Information from:patient/ family/ chart      History of Present Illness: This is a 69 y.o. year old female  admitted for confusion and weakness.  She was found to have hypotension, noAKI and UTI  upon arrival.  She has no known hx of CKD.   Cr upon arrival was 2.7.   She has been hydrated.  She is making urine and her BP is now near goal.        The following medical  history and medications personally reviewed by me:    Problem List:   Patient Active Problem List    Diagnosis     *REINA (acute kidney injury) [N17.9]     Hyperosmolar hyperglycemic state (HHS) [E11.00]     Migraine, intractable [G43.919]     Other headache syndrome [G44.89]     Change in vision [H53.9]     Type 2 diabetes mellitus without complication, with long-term current use of insulin [E11.9, Z79.4]     CVA (cerebral vascular accident) [I63.9]     Coronary artery disease involving coronary bypass graft of native heart without angina pectoris [I25.810]     Essential hypertension [I10]     Encephalopathy, metabolic [G93.41]     Hypertensive emergency [I16.1]     Dysarthria [R47.1]     HTN, goal below 130/80 [I10]     S/P CABG x 3 [Z95.1]     Chest pain [R07.9]     GERD (gastroesophageal reflux disease) [K21.9]     Stable angina pectoris [I20.89]     Right foot infection [L08.9]     Delayed surgical wound healing [T81.89XA]     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]     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 [E11.3513]     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 [E11.3513]     Occipital neuralgia of left side [M54.81]     Polypharmacy [Z79.899]     Complicated migraine [G43.109]     Hyperglycemia [R73.9]     Acute right flank pain [R10.9]     Vomiting [R11.10]     Hypomagnesemia [E83.42]     Dehydration [E86.0]     Obesity (BMI 30-39.9) [E66.9]     Thyroid nodule [E04.1]     Vitamin D deficiency [E55.9]     UTI (urinary tract infection) [N39.0]     Type 2 diabetes mellitus with retinopathy, with long-term current use of insulin [E11.319, Z79.4]     Age-related osteoporosis without current pathological fracture [M81.0]     Hyperlipidemia [E78.5]     Hepatic steatosis [K76.0]     Gastroesophageal reflux disease with esophagitis [K21.00]     Adrenal nodule [E27.9]        Past Medical  History:  Past Medical History:   Diagnosis Date    Adrenal nodule 11/16/2016    FINDINGS: Mild hepatic steatosis may be present. Cholecystectomy. No biliary ductal dilatation. Negative pancreas, spleen, left adrenal gland, and kidneys. The right adrenal presumed adenoma has increased slightly, measuring 3 x 4 cm in diameter,  compared to 2 x 3.5 cm on the previous exam. The attenuation values are consistent with an adenoma. Done at [facility] in August 2018    Age-related osteoporosis without current pathological fracture 11/16/2016    Arthritis     CVA (cerebral vascular accident)     year 2000- can't remember exact year    Delayed surgical wound healing     Essential hypertension 11/16/2016    Gastroesophageal reflux disease with esophagitis 11/16/2016    Hepatic steatosis 11/16/2016    History of anemia     History of sepsis     FROM UTI    Hyperlipidemia 11/16/2016    Lisfranc's dislocation     LEFT WITH FRACTURES NONHEALING FOOT    Osteomyelitis of left foot 11/2020    RLS (restless legs syndrome)     Squamous cell skin cancer 2014    Excised by Dr. James    Thyroid nodule 10/18/2019    BENIGN    Type 2 diabetes mellitus with peripheral neuropathy 11/16/2016    Vitamin D deficiency 01/25/2019       Past Surgical History:  Past Surgical History:   Procedure Laterality Date    BREAST BIOPSY Left     7/2019. BENIGN    CARDIAC CATHETERIZATION Left 5/2/2021    Procedure: Cardiac Catheterization/Vascular Study;  Surgeon: Chacho Esteban MD;  Location: [facility] CATH INVASIVE LOCATION;  Service: Cardiovascular;  Laterality: Left;    CARDIAC CATHETERIZATION N/A 5/2/2021    Procedure: Intra-Aortic Baloon Pump Insertion;  Surgeon: Chacho Esteban MD;  Location: [facility] CATH INVASIVE LOCATION;  Service: Cardiovascular;  Laterality: N/A;    CATARACT EXTRACTION WITH INTRAOCULAR LENS IMPLANT Bilateral     CHOLECYSTECTOMY      COLONOSCOPY      CORONARY ARTERY BYPASS GRAFT N/A 5/2/2021    Procedure: CORONARY ARTERY BYPASS  WITH INTERNAL MAMMARY ARTERY GRAFT;  Surgeon: Jr Rocael Alexander MD;  Location: [facility];  Service: Cardiothoracic;  Laterality: N/A;    FOOT FUSION Right 11/13/2020    Procedure: RIGHT OPEN TREATMENT LISFRANC INJURY, OPEN REDUCTION INTERNAL FIXATION MEDIAL/MIDDLE CUNEIFORM FRACTURE AND 2ND/3RD METATARSAL FRACTURE 1ST 2ND POSSIBLE 3RD TARSOMETATARSAL ARTHRODESIS INTERCUNEIFORM ARTHRODESIS CALCANEAL BONE GRAFT;  Surgeon: Mikhail Banks Jr., MD;  Location: [facility];  Service: Orthopedics;  Laterality: Right;    INCISION AND DRAINAGE LEG Right 1/8/2021    Procedure: RIGHT IRRIGATION AND DEBRIDEMENT OF FOOT WITH SECONDARY CLOSURE AND HARDWARE REMOVAL;  Surgeon: Mikhail Banks Jr., MD;  Location: [facility] OR;  Service: Orthopedics;  Laterality: Right;    KNEE ARTHROSCOPY Bilateral     TOTAL ABDOMINAL HYSTERECTOMY      TRANSESOPHAGEAL ECHOCARDIOGRAM (EMILIA) N/A 5/2/2021    Procedure: TRANSESOPHAGEAL ECHOCARDIOGRAM;  Surgeon: Jr Rocael Alexander MD;  Location: [facility];  Service: Cardiothoracic;  Laterality: N/A;    UPPER GASTROINTESTINAL ENDOSCOPY  08/2016    US GUIDED FINE NEEDLE ASPIRATION  5/8/2020        Home Meds:   Medications Prior to Admission   Medication Sig Dispense Refill Last Dose/Taking    acetaminophen (TYLENOL) 325 MG tablet Take 2 tablets by mouth Every 4 (Four) Hours As Needed for Mild Pain. Indications: Pain   Taking As Needed    amLODIPine (NORVASC) 10 MG tablet Take 1 tablet by mouth Daily. Indications: High Blood Pressure   Taking    aspirin 81 MG EC tablet Take 1 tablet by mouth Daily. Indications: Disease involving Lipid Deposits in the Arteries   Taking    atorvastatin (LIPITOR) 80 MG tablet Take 1 tablet by mouth Every Night. 90 tablet 3 Taking    clopidogrel (PLAVIX) 75 MG tablet Take 1 tablet by mouth Daily.   Taking    DULoxetine (CYMBALTA) 60 MG capsule Take 1 capsule by mouth Daily. Indications: Major Depressive Disorder   Taking    gabapentin (NEURONTIN) 600 MG tablet Take 1  tablet by mouth See Admin Instructions. One to two times daily   Taking    Insulin Glargine (LANTUS SOLOSTAR) 100 UNIT/ML injection pen Inject 15 Units under the skin into the appropriate area as directed Daily. Dx code: E11.65 15 mL 2 Taking    lisinopril (PRINIVIL,ZESTRIL) 5 MG tablet Take 1 tablet by mouth Daily.   Taking    multivitamin with minerals tablet tablet Take 1 tablet by mouth Daily. Indications: Supplement   Taking    traZODone (DESYREL) 50 MG tablet Take 1 tablet by mouth Every Night. Indications: Trouble Sleeping   Taking    Accu-Chek FastClix Lancets misc Use 1 each 3 (Three) Times a Day Before Meals. Dx code: E11.65 100 each 2     Accu-Chek Softclix Lancets lancets 1 each by Other route 3 (Three) Times a Day Before Meals. Use as instructed  Dx code: E11.65 100 each 2     Blood Glucose Monitoring Suppl (Accu-Chek Guide Me) w/Device kit Use 1 each 3 (Three) Times a Day Before Meals. Dx code: E11.65  NDC [address]-65  Bin#: [id number] Group #: [id number]  ID #: [id number]  Issuer #: [id number] kit 0     denosumab (Prolia) 60 MG/ML solution prefilled syringe syringe Inject 1 mL under the skin into the appropriate area as directed 1 (One) Time.       Insulin Pen Needle (Pen Needles) 32G X 4 MM misc Use 1 each Every Night. Dx code: E11.65 100 each 2        Current Meds:   Current Facility-Administered Medications   Medication Dose Route Frequency Provider Last Rate Last Admin    acetaminophen (TYLENOL) tablet 650 mg  650 mg Oral Q4H PRN Isabel Aragon PA-C   650 mg at 12/07/24 0139    Or    acetaminophen (TYLENOL) 160 MG/5ML oral solution 650 mg  650 mg Oral Q4H PRN Isabel Aragon PA-C        Or    acetaminophen (TYLENOL) suppository 650 mg  650 mg Rectal Q4H PRN Isabel Aragon PA-C        [Held by provider] amLODIPine (NORVASC) tablet 10 mg  10 mg Oral Daily Isabel Aragon PA-C        aspirin EC tablet 81 mg  81 mg Oral Daily Isabel Aragon PA-C   81 mg at 12/07/24 0916    atorvastatin (LIPITOR) tablet 80 mg  80  mg Oral Nightly Isabel Aragon PA-C   80 mg at 12/06/24 2211    benzocaine-menthol (CHLORASEPTIC) lozenge 1 each  1 lozenge Mouth/Throat Q4H PRN Llanes Alvarez, Carlos, MD        sennosides-docusate (PERICOLACE) 8.6-50 MG per tablet 2 tablet  2 tablet Oral BID PRN Isabel Aragon PA-C        And    polyethylene glycol (MIRALAX) packet 17 g  17 g Oral Daily PRN Isabel Aragon PA-C        And    bisacodyl (DULCOLAX) EC tablet 5 mg  5 mg Oral Daily PRN Isabel Aragon PA-C        And    bisacodyl (DULCOLAX) suppository 10 mg  10 mg Rectal Daily PRN Isabel Aragon PA-C        Calcium Replacement - Follow Nurse / BPA Driven Protocol   Not Applicable PRN Isabel Aragon PA-C        cefTRIAXone (ROCEPHIN) 1,000 mg in sodium chloride 0.9 % 100 mL MBP  1,000 mg Intravenous Q24H Isabel Aragon PA-C   Stopped at 12/07/24 0010    clopidogrel (PLAVIX) tablet 75 mg  75 mg Oral Daily Isabel Aragon PA-C   75 mg at 12/07/24 0916    dextrose (D50W) (25 g/50 mL) IV injection 25 g  25 g Intravenous Q15 Min PRN Isabel Aragon PA-C        dextrose (GLUTOSE) oral gel 15 g  15 g Oral Q15 Min PRN Isabel Aragon PA-C        famotidine (PEPCID) tablet 20 mg  20 mg Oral Q48H Brenda Sutton MD        gabapentin (NEURONTIN) capsule 200 mg  200 mg Oral Q12H Isabel Aragon PA-C   200 mg at 12/07/24 0916    glucagon (GLUCAGEN) injection 1 mg  1 mg Intramuscular Q15 Min PRN Isabel Aragon PA-C        insulin glargine (LANTUS, SEMGLEE) injection 15 Units  15 Units Subcutaneous Daily Isabel Aragon PA-C   15 Units at 12/07/24 0916    insulin lispro (HUMALOG/ADMELOG) injection 2-9 Units  2-9 Units Subcutaneous 4x Daily AC & at Bedtime Isabel Aragon PA-C   2 Units at 12/07/24 1815    [Held by provider] lisinopril (PRINIVIL,ZESTRIL) tablet 5 mg  5 mg Oral Daily Isabel Aragon PA-C        Magnesium Low Dose Replacement - Follow Nurse / BPA Driven Protocol   Not Applicable PRN Isabel Aragon PA-C        melatonin tablet 5 mg  5 mg Oral Nightly PRN Isabel Aragon,  [place]        nitroglycerin (NITROSTAT) SL tablet 0.4 mg  0.4 mg Sublingual Q5 Min PRN Isabel Aragon PA-C        ondansetron (ZOFRAN) injection 4 mg  4 mg Intravenous Q6H PRN Isabel Aragon PA-C        Phosphorus Replacement - Follow Nurse / BPA Driven Protocol   Not Applicable PRN Isabel Aragon PA-C        [Held by provider] potassium chloride (KLOR-CON M20) CR tablet 20 mEq  20 mEq Oral Once Brenda Sutton MD        Potassium Replacement - Follow Nurse / BPA Driven Protocol   Not Applicable PRN Isabel Aragon PA-C        sodium chloride 0.9 % flush 10 mL  10 mL Intravenous PRN Pattie Wynne APRN        sodium chloride 0.9 % flush 10 mL  10 mL Intravenous Q12H Isabel Aragon PA-C   10 mL at 12/07/24 0918    sodium chloride 0.9 % flush 10 mL  10 mL Intravenous PRN Isabel Aragon PA-C        sodium chloride 0.9 % infusion 40 mL  40 mL Intravenous PRN Isabel Aragon PA-C        sodium chloride nasal spray 1 spray  1 spray Each Nare PRN Llanes Alvarez, Carlos, MD        traZODone (DESYREL) tablet 50 mg  50 mg Oral Nightly Isabel Aragon PA-C           Allergies:  Allergies   Allergen Reactions    Zofran [Ondansetron Hcl] Nausea And Vomiting     Does the opposite of its purpose    Prochlorperazine Other (See Comments)     CAUSED SEIZURE    Prochlorperazine Edisylate Hives    Hydralazine Itching and Rash    Hydralazine Hcl Itching and Rash    Meperidine Itching and Swelling    Prochlorperazine Maleate Other (See Comments)     CAUSES SEIZURE    Prochlorperazine Maleate Irritability       Social History:   Social History     Socioeconomic History    Marital status:    Tobacco Use    Smoking status: Never    Smokeless tobacco: Never   Vaping Use    Vaping status: Never Used   Substance and Sexual Activity    Alcohol use: Yes     Comment: socially     Drug use: Never    Sexual activity: Defer        Family History:  Family History   Problem Relation Age of Onset    Diabetes Mother     COPD Mother      "Hypertension Mother     Kidney disease Mother     Obesity Mother     Thyroid disease Mother     Diabetes Sister     Diabetes Sister     Diabetes Sister     Diabetes Sister     Malig Hyperthermia Neg Hx         Review of Systems: as per HPI, in addition:    General:      + weakness / fatigue,                       No fevers / chills                       no weight loss  HEENT:       no dysphagia / odynophagia  Neck:           normal range of motion, no swelling  Respiratory: no cough / congestion                      No shortness of air                       No wheezing  CV:              No chest pain                       No palpitations  Abdomen/GI: no nausea / vomiting                      No diarrhea / constipation                      No abdominal pain  :             no dysuria / urinary frequency                       No urgency, normal output  Endocrine:   no polyuria / polydipsia,                      No heat or cold intolerance  Skin:           no rashes or skin breakdown   Vascular:   no  edema                     No claudication  Psych:        no depression/ anxiety  Neuro:        no focal weakness, no seizures  Musculoskeletal: no joint pain or deformities      Physical Exam:  Vitals:   Temp (24hrs), Av.6 °F (36.4 °C), Min:97.3 °F (36.3 °C), Max:98 °F (36.7 °C)    /59 (BP Location: Right arm, Patient Position: Lying)   Pulse 82   Temp 98 °F (36.7 °C) (Oral)   Resp 16   Ht 157.5 cm (62\")   Wt 72.1 kg (158 lb 15.2 oz)   SpO2 96%   BMI 29.07 kg/m²   Intake/Output:     Intake/Output Summary (Last 24 hours) at 2024  Last data filed at 2024 1500  Gross per 24 hour   Intake 1440 ml   Output --   Net 1440 ml        Wt Readings from Last 1 Encounters:   24 2019 72.1 kg (158 lb 15.2 oz)   24 1241 73.5 kg (162 lb)       Exam:    General Appearance:  Awake, alert, oriented x3, no acute distress  Well -appearing   Head and Face:  Normocephalic, atraumatic, mucus membranes " moist, oropharynx clear   Eyes:  No icterus, pupils equal round and reactive to light, extraocular movements intact    ENMT: Moist mucosa, tongue symmetric    Neck: Supple  no jugular venous distention  no thyromegaly   Pulmonary:  Respiratory effort: Normal  Auscultation of lungs: Clear bilaterally  No wheezes  No rhonchi  Good air movement, good expansion   Chest wall:  No tenderness or deformity   Cardiovascular:  Auscultation of the heart: Normal rhythm, no murmurs  No edema of extremities    Abdomen:  Abdomen: soft, non-tender, normal bowel sounds all four quadrants, no masses   Liver and spleen: no hepatosplenomegaly   Musculoskeletal: Digits and nails: normal  Normal range of motion  No joint swelling or gross deformities    Skin: Skin inspection: color normal, no visible rashes or lesions  Skin palpation: texture, turgor normal, no palpable lesions   Lymphatic:  no cervical lymphadenopathy    Psychiatric: Judgement and insight: normal  Orientation to person place and time: normal  Mood and affect: normal       DATA:  Radiology and Labs:  The following labs and radiology results independently reviewed by me              Labs:   Recent Results (from the past 24 hours)   ECG 12 Lead Chest Pain    Collection Time: 12/06/24 10:25 PM   Result Value Ref Range    QT Interval 380 ms    QTC Interval 432 ms   POC Glucose 4x Daily Before Meals & at Bedtime    Collection Time: 12/06/24 11:07 PM    Specimen: Blood   Result Value Ref Range    Glucose 253 (H) 70 - 105 mg/dL   Lactic Acid, Plasma    Collection Time: 12/06/24 11:12 PM    Specimen: Arm, Right; Blood   Result Value Ref Range    Lactate 2.0 0.5 - 2.0 mmol/L   Basic Metabolic Panel    Collection Time: 12/06/24 11:12 PM    Specimen: Arm, Right; Blood   Result Value Ref Range    Glucose 289 (H) 65 - 99 mg/dL    BUN 39 (H) 8 - 23 mg/dL    Creatinine 2.26 (H) 0.57 - 1.00 mg/dL    Sodium 135 (L) 136 - 145 mmol/L    Potassium 3.6 3.5 - 5.2 mmol/L    Chloride 96 (L) 98  - 107 mmol/L    CO2 28.3 22.0 - 29.0 mmol/L    Calcium 10.1 8.6 - 10.5 mg/dL    BUN/Creatinine Ratio 17.3 7.0 - 25.0    Anion Gap 10.7 5.0 - 15.0 mmol/L    eGFR 22.7 (L) >60.0 mL/min/1.73   Magnesium    Collection Time: 12/06/24 11:12 PM    Specimen: Arm, Right; Blood   Result Value Ref Range    Magnesium 1.9 1.6 - 2.4 mg/dL   Phosphorus    Collection Time: 12/06/24 11:12 PM    Specimen: Arm, Right; Blood   Result Value Ref Range    Phosphorus 4.5 2.5 - 4.5 mg/dL   CBC Auto Differential    Collection Time: 12/06/24 11:12 PM    Specimen: Arm, Right; Blood   Result Value Ref Range    WBC 10.37 3.40 - 10.80 10*3/mm3    RBC 3.84 3.77 - 5.28 10*6/mm3    Hemoglobin 11.4 (L) 12.0 - 15.9 g/dL    Hematocrit 35.3 34.0 - 46.6 %    MCV 91.9 79.0 - 97.0 fL    MCH 29.7 26.6 - 33.0 pg    MCHC 32.3 31.5 - 35.7 g/dL    RDW 12.5 12.3 - 15.4 %    RDW-SD 42.1 37.0 - 54.0 fl    MPV 9.4 6.0 - 12.0 fL    Platelets 266 140 - 450 10*3/mm3    Neutrophil % 53.8 42.7 - 76.0 %    Lymphocyte % 34.8 19.6 - 45.3 %    Monocyte % 8.5 5.0 - 12.0 %    Eosinophil % 1.8 0.3 - 6.2 %    Basophil % 0.8 0.0 - 1.5 %    Immature Grans % 0.3 0.0 - 0.5 %    Neutrophils, Absolute 5.58 1.70 - 7.00 10*3/mm3    Lymphocytes, Absolute 3.61 (H) 0.70 - 3.10 10*3/mm3    Monocytes, Absolute 0.88 0.10 - 0.90 10*3/mm3    Eosinophils, Absolute 0.19 0.00 - 0.40 10*3/mm3    Basophils, Absolute 0.08 0.00 - 0.20 10*3/mm3    Immature Grans, Absolute 0.03 0.00 - 0.05 10*3/mm3    nRBC 0.0 0.0 - 0.2 /100 WBC   Potassium    Collection Time: 12/07/24  6:06 AM    Specimen: Blood   Result Value Ref Range    Potassium 3.5 3.5 - 5.2 mmol/L   POC Glucose 4x Daily Before Meals & at Bedtime    Collection Time: 12/07/24  7:40 AM    Specimen: Blood   Result Value Ref Range    Glucose 103 70 - 105 mg/dL   POC Glucose 4x Daily Before Meals & at Bedtime    Collection Time: 12/07/24 11:17 AM    Specimen: Blood   Result Value Ref Range    Glucose 198 (H) 70 - 105 mg/dL   Potassium    Collection  Time: 12/07/24 12:52 PM    Specimen: Arm, Right; Blood   Result Value Ref Range    Potassium 3.9 3.5 - 5.2 mmol/L   POC Glucose Once    Collection Time: 12/07/24  4:30 PM    Specimen: Blood   Result Value Ref Range    Glucose 162 (H) 70 - 105 mg/dL       Radiology:  Imaging Results (Last 24 Hours)       Procedure Component Value Units Date/Time    US Renal Bilateral [[id number]] Collected: 12/07/24 0846     Updated: 12/07/24 0849    Narrative:      US RENAL BILATERAL    Date of Exam: 12/7/2024 1:04 AM EST    Indication: REINA.    Comparison: CT abdomen pelvis 7/17/2024    Technique: Grayscale and color Doppler ultrasound evaluation of the kidneys and urinary bladder was performed.      Findings:  No hydronephrosis. Right kidney measures 9.5 x 5.7 x 6 cm. Left kidney measures 9.1 x 5.6 x 5 cm. Renal cortex has normal thickness and normal echogenicity. Urinary bladder is sonographically normal. There is a small anechoic cyst at the mid left kidney   posteriorly measuring up to 1.2 cm.      Impression:      Impression:  No hydronephrosis. Small left renal cyst.        Electronically Signed: Netta Cartwright MD    12/7/2024 8:47 AM EST    Workstation ID: [id number]                 ASSESSMENT:   Problem List:   ARF - Cr at 2.77, baseline Cr 1.0   US with no hydro   Likely due to hypotension and volume depletion     Hyponatremia   UTI   Diarrhea   Hypotension     REINA (acute kidney injury)        PLAN:   Agree with dc of ace/arb   Hold norvasc until bp trends up   Continue on NS today   Repeat urine studies   Await stools studies   Antibiotics per primary     Monitor electrolytes and volume closely   Dose all medications for GFR <50   Limit IV dye, NSAIDS and nephrotoxic medications   I appreciate the opportunity to participate in this patient's care.  Please call with any questions or concerns.     Indu Young M.D  [facility]  Office phone number: [phone]  Answering service phone number:  [phone]        12/7/2024            Electronically signed by Indu Young MD at 12/07/24 1938

## 2024-12-09 NOTE — PLAN OF CARE
Goal Outcome Evaluation:  Plan of Care Reviewed With: patient        Progress: improving        Pt VSS, able to make needs known, not complaining of nausea or abdominal pain currently, hopeful to discharge tomorrow. Plan of care ongoing.

## 2024-12-09 NOTE — THERAPY TREATMENT NOTE
"Subjective: Pt agreeable to therapeutic plan of care. 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   reports pt was admitted recently. Went home, then had to return right away.     Objective:     Precautions - falls    Bed mobility - SBA and CGA  Transfers - SBA, CGA, and with rolling walker  Ambulation - 40feet CGA and with rolling walker; amb this distance multiple times; mild lateral and A/P sway; pt stops multiple times to rest in standing. Reporting that her LE's feel weak. Had multiple posterior losses of balance.     Therapeutic Exercise - 15 Reps B LE AROM supported sitting / chair    Vitals: WNL    Pain: 8 VAS   Location: BLE's; reports she feels she is having \"cramping, like liza horses\" in BLEs. Denies that pain is changed w/ ambulation.   Intervention for pain: Repositioned, RN notified, Increased Activity, and Therapeutic Presence    Education: Provided education on the importance of mobility in the acute care setting, Verbal/Tactile Cues, Transfer Training, Gait Training, Energy conservation strategies, and HEP    Assessment: Marge Mcghee is unsafe for home at this time. Pt having multiple losses of balance w/ ambulation, despite use of rw. She is not progressing as expected. Pt presents with functional mobility impairments which indicate the need for skilled intervention. Tolerating session today without incident. Will continue to follow and progress as tolerated.     Plan/Recommendations:   If medically appropriate, Moderate Intensity Therapy recommended post-acute care. This is recommended as therapy feels the patient would require 3-4 days per week and wouldn't tolerate \"3 hour daily\" rehab intensity. SNF would be the preferred choice. If the patient does not agree to SNF, arrange HH or OP depending on home bound status. If patient is medically complex, consider LTACH. Pt requires no DME at discharge.     Pt " desires Skilled Rehab placement at discharge. Pt cooperative; agreeable to therapeutic recommendations and plan of care.         Basic Mobility 6-click:  Rollin = Total, A lot = 2, A little = 3; 4 = None  Supine>Sit:   1 = Total, A lot = 2, A little = 3; 4 = None   Sit>Stand with arms:  1 = Total, A lot = 2, A little = 3; 4 = None  Bed>Chair:   1 = Total, A lot = 2, A little = 3; 4 = None  Ambulate in room:  1 = Total, A lot = 2, A little = 3; 4 = None  3-5 Steps with railin = Total, A lot = 2, A little = 3; 4 = None  Score: 20    Modified Castro: 4 = Moderately severe disability (Unable to attend to own bodily needs without assistance, and unable to walk unassisted)     Post-Tx Position: Up in Chair, Alarms activated, and Call light and personal items within reach  PPE: gloves    Therapy Charges for Today       Code Description Service Date Service Provider Modifiers Qty    79400305205 HC GAIT TRAINING EA 15 MIN 2024 Ebony Gomez, PT GP 1    [id number]  PT NEUROMUSC RE EDUCATION EA 15 MIN 2024 Ebony Gomez, PT GP 1    [id number]  PT THER PROC EA 15 MIN 2024 Ebony Gomez, PT GP 1           PT Charges       Row Name 24 1450             Time Calculation    Start Time 1335  -CM      Stop Time 1403  -CM      Time Calculation (min) 28 min  -CM      PT Received On 24  -CM      PT - Next Appointment 12/10/24  -CM         Time Calculation- PT    Total Timed Code Minutes- PT 28 minute(s)  -CM                User Key  (r) = Recorded By, (t) = Taken By, (c) = Cosigned By      Initials Name Provider Type    Ebony Fernandez, PT Physical Therapist

## 2024-12-09 NOTE — CASE MANAGEMENT/SOCIAL WORK
Discharge Planning Assessment   [place]     Patient Name: Marge Mcghee  MRN: [id number]  Today's Date: 12/9/2024    Admit Date: 12/6/2024    Plan: Home with daughter and    Discharge Needs Assessment       Row Name 12/09/24 1155       Living Environment    People in Home spouse    Name(s) of People in Home  Ariella    Current Living Arrangements home    Potentially Unsafe Housing Conditions none    In the past 12 months has the electric, gas, oil, or water company threatened to shut off services in your home? No    Primary Care Provided by self    Provides Primary Care For no one    Family Caregiver if Needed spouse    Quality of Family Relationships helpful;involved;supportive    Able to Return to Prior Arrangements yes       Resource/Environmental Concerns    Resource/Environmental Concerns none    Transportation Concerns none       Transportation Needs    In the past 12 months, has lack of transportation kept you from medical appointments or from getting medications? no    In the past 12 months, has lack of transportation kept you from meetings, work, or from getting things needed for daily living? No       Food Insecurity    Within the past 12 months, you worried that your food would run out before you got the money to buy more. Never true    Within the past 12 months, the food you bought just didn't last and you didn't have money to get more. Never true       Transition Planning    Patient/Family Anticipates Transition to home with family    Patient/Family Anticipated Services at Transition none    Transportation Anticipated car, drives self;family or friend will provide       Discharge Needs Assessment    Readmission Within the Last 30 Days no previous admission in last 30 days    Equipment Currently Used at Home glucometer    Concerns to be Addressed no discharge needs identified    Do you want help finding or keeping work or a job? I do not need or want help    Do you want help with school or  training? For example, starting or completing job training or getting a high school diploma, GED or equivalent No    Anticipated Changes Related to Illness none    Equipment Needed After Discharge none              Discharge Plan       Row Name 12/09/24 1156       Plan    Plan Home with daughter and     Patient/Family in Agreement with Plan yes    Plan Comments CM met with pt at bedside to discuss discharge needs. 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 PCP and pharmacy verified- pt enrolled in [facility] as requested. No issues stated affording food/medications or transport, and home environment is safe. No current DME/HHC, PT recommended HHC at d/c, pt refused HHC and will d/c home with daughter and . Family will provide transportation home. IMM 12/9/24 per JORGE LUIS DC Barriers: Monitor renal function, CT Abd pending             Expected Discharge Date and Time       Expected Discharge Date Expected Discharge Time    Dec 9, 2024            Demographic Summary       Row Name 12/09/24 1154       General Information    Admission Type inpatient    Arrived From emergency department    Required Notices Provided Important Message from Medicare    Referral Source admission list    Reason for Consult discharge planning    Preferred Language English       Contact Information    Permission Granted to Share Info With               Functional Status       Row Name 12/09/24 1155       Functional Status    Usual Activity Tolerance good    Current Activity Tolerance good       Functional Status, IADL    Medications independent    Meal Preparation independent    Housekeeping independent    Laundry independent    Shopping independent       Mental Status    General Appearance WDL WDL       Mental Status Summary    Recent Changes in Mental Status/Cognitive Functioning no changes              Patient Forms       Row Name 12/09/24 1211       Patient Forms    Important Message from Medicare (IMM) Delivered  IMM  12/9/24 per CM    Delivered to Patient    Method of delivery In person      Row Name 12/09/24 0950       Patient Forms    Important Message from Medicare ([facility]) Delivered  moon 12/6/24 per registration           Sunshine Horton RN    phone [phone]  fax [phone]

## 2024-12-09 NOTE — PLAN OF CARE
Goal Outcome Evaluation: Pt c/o  leg pain bilateral. Tylenol was given with no relief. Reached out to on call and new order for lidocaine patch to both legs. Pt refused and requested something stronger . On call aware with NNO at this time. Pt vomited liquid emesis around 0400.  Plan of care ongoing      .

## 2024-12-09 NOTE — PROGRESS NOTES
RENAL/KCC:     LOS: 1 day   Patient Care Team:  Traci Townsend APRN as PCP - General (Nurse Practitioner)  Drake Hinton MD PhD as Consulting Physician (Ophthalmology)  Fausto Gilliam MD as Consulting Physician (Endocrinology)  Saloni Ch MD (Ophthalmology)  Mikhail Kaplan Jr., MD as Consulting Physician (Urology)    Chief Complaint:  REINA    Subjective     Interval History:   Chart reviewed  3L UOP  No new complaints    Objective     Vital Signs  Temp:  [97.4 °F (36.3 °C)-99.1 °F (37.3 °C)] 97.4 °F (36.3 °C)  Heart Rate:  [81-82] 81  Resp:  [14-18] 14  BP: (127-162)/(72-81) 138/80    Physical Exam:  GEN: Awake, NAD  ENT: PERRL, EOMI, MMM  NECK: Supple, no JVD  CHEST: CTAB, no W/R/C  CV: RRR, no M/G/R  ABD: Soft, NT, +BS  SKIN: Warm and Dry  NEURO: CN's intact       Results Review:     I reviewed the patient's new clinical results.    Medication Review: Reviewed    Assessment & Plan     REINA  Hyponatremia  UTI  Diarrhea    Plan: Cr improved to 1.28.  Lytes and volume OK.  Encourage PO fluid and avoid nephrotoxins.  Will follow.      Maikol Jones MD  [facility]  12/09/24  11:20 EST

## 2024-12-09 NOTE — PLAN OF CARE
"Goal Outcome Evaluation:      Assessment: Marge Mcghee is unsafe for home at this time. Pt having multiple losses of balance w/ ambulation, despite use of rw. She is not progressing as expected. Pt presents with functional mobility impairments which indicate the need for skilled intervention. Tolerating session today without incident. Will continue to follow and progress as tolerated.     Plan/Recommendations:   If medically appropriate, Moderate Intensity Therapy recommended post-acute care. This is recommended as therapy feels the patient would require 3-4 days per week and wouldn't tolerate \"3 hour daily\" rehab intensity. SNF would be the preferred choice. If the patient does not agree to SNF, arrange HH or OP depending on home bound status. If patient is medically complex, consider LTACH. Pt requires no DME at discharge.     Pt desires Skilled Rehab placement at discharge. Pt cooperative; agreeable to therapeutic recommendations and plan of care.                Anticipated Discharge Disposition (PT): skilled nursing facility                        "

## 2024-12-09 NOTE — PROGRESS NOTES
[facility] MEDICINE SERVICE  DAILY PROGRESS NOTE    NAME: Marge Mcghee  : 1955  MRN: [id number]      LOS: 1 day     PROVIDER OF SERVICE: Brenda Sutton MD    Chief Complaint: REINA (acute kidney injury)    Subjective:     Interval History:  History taken from: Patient and patient's chart     Diarrhea resolved. Complaining of nausea, vomiting and constipation. Has history of RLS.        Review of Systems:   Review of Systems  All negative except above   Objective:     Vital Signs  Temp:  [97.4 °F (36.3 °C)-99.1 °F (37.3 °C)] 97.4 °F (36.3 °C)  Heart Rate:  [80-82] 80  Resp:  [14-17] 16  BP: (108-162)/(68-81) 108/68   Body mass index is 29.07 kg/m².    Physical Exam  Physical Exam  General: Alert and oriented, no acute distress.  HENT: Normocephalic, moist oral mucosa, no scleral icterus.  Neck: Supple, nontender, no carotid bruits, no JVD, no LAD.  Lungs: Clear to auscultation, nonlabored respiration.  Heart: RRR, no murmur, gallop or edema.  Abdomen: Soft, nontender, nondistended, + bowel sounds.  Neuro: alert and awake, moving all 4 extremities   Skin: Skin is warm, dry and pink, no rashes or lesions.  Psychiatric: Cooperative, appropriate mood and affect.          Diagnostic Data    Results from last 7 days   Lab Units 24  0122 24  2312 24  1446   WBC 10*3/mm3 7.22   < >  --    HEMOGLOBIN g/dL 10.3*   < >  --    HEMATOCRIT % 32.5*   < >  --    PLATELETS 10*3/mm3 253   < >  --    GLUCOSE mg/dL 236*   < > 246*   CREATININE mg/dL 1.28*   < > 2.77*   BUN mg/dL 24*   < > 40*   SODIUM mmol/L 141   < > 136   POTASSIUM mmol/L 3.9   < > 3.7   AST (SGOT) U/L  --   --  23   ALT (SGPT) U/L  --   --  9   ALK PHOS U/L  --   --  65   BILIRUBIN mg/dL  --   --  0.5   ANION GAP mmol/L 8.7   < > 14.3    < > = values in this interval not displayed.       CT Abdomen Pelvis Without Contrast    Result Date: 2024  Impression: 1.No acute findings within the abdomen or pelvis. 2.4.1 x 3.0  cm right adrenal adenoma. 3.3 mm nonobstructing stone in the left kidney. 4.Left renal cyst. 5.Mild diverticulosis of the sigmoid colon. 6.Tiny fat density umbilical hernia. There is a small hiatal hernia. 7.The exam is limited by noncontrasted technique. Electronically Signed: Darci Okeefe MD  12/9/2024 10:39 AM EST  Workstation ID: [id number]       I have reviewed patient labs and imaging     Assessment/Plan:   Marge Mcghee is a 69 y.o. female with a CMH of HTN, adrenal nodule, HLD, GERD, T2DM, RLS, OA, CAD s/p CABG who presented to [facility] on 12/6/2024 with generalized weakness.     Active and Resolved Problems  # REINA  -Creatinine 2.77, BUN 40. Creatinine 1 week ago was 0.89.   -Patient was on lisinopril prior to admission which she has been on since October. Hold ACE-I/ARBs  -Suspect pre-renal etiology/ATN from vomiting, ACE-I and possibly hypotension (had documented BP of 86/45 while in ED).   -renal US- No hydronephrosis, small left renal cyst  -uric acid- 7.1  -continuous NS    -Avoid hypotension  -IMPROVING, today's Cr is 1.28  -Renal following      # UTI  -UA with TNTC WBC and 1+ leukocytes, no bacteria.  Sample does appear to be contaminated based on number of squamous epithelial cells.  -Ucx negative      # HTN  -Had episode of hypotension earlier and some low normal BP readings therefore will hold antihypertensives for now  -monitor BP and adjust medications accordingly      # T2DM with hyperglycemia  -continue Lantus 15 units daily  -SSI  -Hold metformin  -CCD  -Hypoglycemia protocol in place     # CAD  -Continue asa/Plavix      #constipation  - bowel regimen PRN    # Adrenal adenoma  # renal cyst  - incidental finding on CT abdomen  - follow up PCP/Endo as outpatient        VTE Prophylaxis:  Mechanical VTE prophylaxis orders are present.             Disposition Planning:     Barriers to Discharge: medical clearance   Anticipated Date of Discharge: 12/10  Place of Discharge: home vs rehab        Time: 40 minutes     Code Status and Medical Interventions: CPR (Attempt to Resuscitate); Full Support   Ordered at: 12/06/24 1726     Code Status (Patient has no pulse and is not breathing):    CPR (Attempt to Resuscitate)     Medical Interventions (Patient has pulse or is breathing):    Full Support       Signature: Electronically signed by Brenda Sutton MD, 12/09/24, 16:17 EST.  [facility]

## 2024-12-09 NOTE — CONSULTS
"Diabetes Education  Assessment/Teaching    Patient Name:  Marge Mcghee  YOB: 1955  MRN: [id number]  Admit Date:  12/6/2024      Assessment Date:  12/9/2024  Flowsheet Row Most Recent Value   General Information     Referral From: Blood glucose, MD order  [MD consult to teach blood glucose monitoring and admission blood sugar 229.]   Height 157.5 cm (62\")   Height Method Stated   Weight 72.1 kg (158 lb 15.2 oz)   Weight Method Bed scale   Pregnancy Assessment    Diabetes History    What type of diabetes do you have? Type 2   Current DM knowledge fair   Have you had diabetes education/teaching in the past? yes   When and where was your diabetes education? Inpatient hospitalizations at [facility] with last one being 11/29/2024   Do you test your blood sugar at home? yes   Frequency of checks once a week   Meter type Accu-chek new   Who performs the test? patient   Typical readings doesn't remember   Have you had high blood sugar? (>140mg/dl) yes   How often do you have high blood sugar? unknown   When was your last high blood sugar? Admission blood sugar 229   Education Preferences    What areas of diabetes would you like to learn about? avoiding high blood sugar, testing my blood sugar at home   Nutrition Information    Assessment Topics    Problem Solving - Assessment Needs education   Monitoring - Assessment Needs education   DM Goals    Problem Solving - Goal Today   Monitoring - Goal Today            Flowsheet Row Most Recent Value   DM Education Needs    Meter Has own   Meter Type Accuchek   Frequency of Testing AC meals  [Discussed with patient that it is recommended to check blood sugar 3X a day before meals when on insulin therapy.]   Medication Insulin, Pen   Problem Solving Hyperglycemia, Signs, Symptoms, Treatment   Discharge Plan Home   Motivation Moderate   Teaching Method Discussion   Patient Response Verbalized understanding              Other Comments:  Patient stated that she did received " her prescriptions when she was discharged a week ago and was taking her insulin in the morning but doesn't remember the dose. Patient stated she sees Dr. Figueroa and he d'trixie her Mounjaro. Patient having difficulty remembering things. Recommended to patient to set alarms on phone to remind her to check her blood sugar and take her insulin. Discussed with bedside nurse to reinforce with patient about setting alarms on her phone. Patient has no further questions or concerns related to diabetes at this time.        Electronically signed by:  Ivelisse Owens RN  12/09/24 12:22 EST

## 2024-12-10 LAB
ANION GAP SERPL CALCULATED.3IONS-SCNC: 8.4 MMOL/L (ref 5–15)
BASOPHILS # BLD AUTO: 0.08 10*3/MM3 (ref 0–0.2)
BASOPHILS NFR BLD AUTO: 1 % (ref 0–1.5)
BUN SERPL-MCNC: 19 MG/DL (ref 8–23)
BUN/CREAT SERPL: 16.4 (ref 7–25)
CALCIUM SPEC-SCNC: 8.7 MG/DL (ref 8.6–10.5)
CHLORIDE SERPL-SCNC: 106 MMOL/L (ref 98–107)
CO2 SERPL-SCNC: 25.6 MMOL/L (ref 22–29)
CREAT SERPL-MCNC: 1.16 MG/DL (ref 0.57–1)
DEPRECATED RDW RBC AUTO: 41.7 FL (ref 37–54)
EGFRCR SERPLBLD CKD-EPI 2021: 50.5 ML/MIN/1.73
EOSINOPHIL # BLD AUTO: 0.29 10*3/MM3 (ref 0–0.4)
EOSINOPHIL NFR BLD AUTO: 3.7 % (ref 0.3–6.2)
ERYTHROCYTE [DISTWIDTH] IN BLOOD BY AUTOMATED COUNT: 12.5 % (ref 12.3–15.4)
GLUCOSE BLDC GLUCOMTR-MCNC: 126 MG/DL (ref 70–105)
GLUCOSE BLDC GLUCOMTR-MCNC: 131 MG/DL (ref 70–105)
GLUCOSE BLDC GLUCOMTR-MCNC: 175 MG/DL (ref 70–105)
GLUCOSE BLDC GLUCOMTR-MCNC: 192 MG/DL (ref 70–105)
GLUCOSE SERPL-MCNC: 227 MG/DL (ref 65–99)
HCT VFR BLD AUTO: 31.4 % (ref 34–46.6)
HGB BLD-MCNC: 10.3 G/DL (ref 12–15.9)
IMM GRANULOCYTES # BLD AUTO: 0.01 10*3/MM3 (ref 0–0.05)
IMM GRANULOCYTES NFR BLD AUTO: 0.1 % (ref 0–0.5)
LYMPHOCYTES # BLD AUTO: 3.29 10*3/MM3 (ref 0.7–3.1)
LYMPHOCYTES NFR BLD AUTO: 42.2 % (ref 19.6–45.3)
MCH RBC QN AUTO: 30.1 PG (ref 26.6–33)
MCHC RBC AUTO-ENTMCNC: 32.8 G/DL (ref 31.5–35.7)
MCV RBC AUTO: 91.8 FL (ref 79–97)
MONOCYTES # BLD AUTO: 0.63 10*3/MM3 (ref 0.1–0.9)
MONOCYTES NFR BLD AUTO: 8.1 % (ref 5–12)
NEUTROPHILS NFR BLD AUTO: 3.49 10*3/MM3 (ref 1.7–7)
NEUTROPHILS NFR BLD AUTO: 44.9 % (ref 42.7–76)
NRBC BLD AUTO-RTO: 0 /100 WBC (ref 0–0.2)
PLATELET # BLD AUTO: 244 10*3/MM3 (ref 140–450)
PMV BLD AUTO: 9.4 FL (ref 6–12)
POTASSIUM SERPL-SCNC: 4.1 MMOL/L (ref 3.5–5.2)
RBC # BLD AUTO: 3.42 10*6/MM3 (ref 3.77–5.28)
SODIUM SERPL-SCNC: 140 MMOL/L (ref 136–145)
WBC NRBC COR # BLD AUTO: 7.79 10*3/MM3 (ref 3.4–10.8)

## 2024-12-10 PROCEDURE — 97116 GAIT TRAINING THERAPY: CPT

## 2024-12-10 PROCEDURE — 97530 THERAPEUTIC ACTIVITIES: CPT

## 2024-12-10 PROCEDURE — 97110 THERAPEUTIC EXERCISES: CPT

## 2024-12-10 PROCEDURE — 85025 COMPLETE CBC W/AUTO DIFF WBC: CPT | Performed by: STUDENT IN AN ORGANIZED HEALTH CARE EDUCATION/TRAINING PROGRAM

## 2024-12-10 PROCEDURE — 82948 REAGENT STRIP/BLOOD GLUCOSE: CPT

## 2024-12-10 PROCEDURE — 80048 BASIC METABOLIC PNL TOTAL CA: CPT | Performed by: STUDENT IN AN ORGANIZED HEALTH CARE EDUCATION/TRAINING PROGRAM

## 2024-12-10 PROCEDURE — 63710000001 INSULIN LISPRO (HUMAN) PER 5 UNITS

## 2024-12-10 PROCEDURE — 97165 OT EVAL LOW COMPLEX 30 MIN: CPT

## 2024-12-10 RX ORDER — PANTOPRAZOLE SODIUM 40 MG/1
40 TABLET, DELAYED RELEASE ORAL
Status: DISCONTINUED | OUTPATIENT
Start: 2024-12-11 | End: 2024-12-12

## 2024-12-10 RX ADMIN — CLOPIDOGREL BISULFATE 75 MG: 75 TABLET ORAL at 08:17

## 2024-12-10 RX ADMIN — INSULIN GLARGINE-YFGN 15 UNITS: 100 INJECTION, SOLUTION SUBCUTANEOUS at 08:17

## 2024-12-10 RX ADMIN — ATORVASTATIN CALCIUM 80 MG: 40 TABLET, FILM COATED ORAL at 21:23

## 2024-12-10 RX ADMIN — INSULIN LISPRO 2 UNITS: 100 INJECTION, SOLUTION INTRAVENOUS; SUBCUTANEOUS at 21:24

## 2024-12-10 RX ADMIN — LIDOCAINE 2 PATCH: 4 PATCH TOPICAL at 08:17

## 2024-12-10 RX ADMIN — PANTOPRAZOLE SODIUM 40 MG: 40 INJECTION, POWDER, FOR SOLUTION INTRAVENOUS at 08:17

## 2024-12-10 RX ADMIN — TRAZODONE HYDROCHLORIDE 50 MG: 50 TABLET ORAL at 21:23

## 2024-12-10 RX ADMIN — Medication 10 ML: at 21:24

## 2024-12-10 RX ADMIN — ACETAMINOPHEN 650 MG: 325 TABLET, FILM COATED ORAL at 21:24

## 2024-12-10 RX ADMIN — INSULIN LISPRO 2 UNITS: 100 INJECTION, SOLUTION INTRAVENOUS; SUBCUTANEOUS at 12:15

## 2024-12-10 RX ADMIN — ASPIRIN 81 MG: 81 TABLET, COATED ORAL at 08:17

## 2024-12-10 RX ADMIN — GABAPENTIN 200 MG: 100 CAPSULE ORAL at 08:17

## 2024-12-10 RX ADMIN — Medication 10 ML: at 08:19

## 2024-12-10 RX ADMIN — Medication 5 MG: at 21:23

## 2024-12-10 RX ADMIN — AMLODIPINE BESYLATE 5 MG: 5 TABLET ORAL at 08:17

## 2024-12-10 RX ADMIN — GABAPENTIN 200 MG: 100 CAPSULE ORAL at 21:23

## 2024-12-10 NOTE — PLAN OF CARE
Goal Outcome Evaluation:      Pt denies pain this shift. Ambulated to the bathroom using walker w/standby assist.Tolerated dinner w/o nausea/vomiting. Pt able to make needs known. Call light within reach. Plan of care ongoing.        Problem: Adult Inpatient Plan of Care  Goal: Absence of Hospital-Acquired Illness or Injury  Intervention: Prevent Skin Injury  Recent Flowsheet Documentation  Taken 12/10/2024 0232 by Char Tejada LPN  Body Position:   right   side-lying   position changed independently  Taken 12/10/2024 0000 by Char Tejada LPN  Body Position:   right   side-lying   position changed independently  Skin Protection:   incontinence pads utilized   transparent dressing maintained  Taken 12/9/2024 2211 by Char Tejada LPN  Body Position:   right   position changed independently   side-lying  Taken 12/9/2024 1937 by Char Tejada LPN  Body Position:   right   side-lying  Skin Protection:   transparent dressing maintained   incontinence pads utilized

## 2024-12-10 NOTE — PLAN OF CARE
"Assessment: Marge Mcghee presents with functional mobility impairments which indicate the need for skilled intervention. Tolerating session today without incident. Will continue to follow and progress as tolerated.     Plan/Recommendations:   If medically appropriate, Moderate Intensity Therapy recommended post-acute care. This is recommended as therapy feels the patient would require 3-4 days per week and wouldn't tolerate \"3 hour daily\" rehab intensity. SNF would be the preferred choice. If the patient does not agree to SNF, arrange HH or OP depending on home bound status. If patient is medically complex, consider LTACH. Pt requires no DME at discharge.     Pt desires Skilled Rehab placement at discharge. Pt cooperative; agreeable to therapeutic recommendations and plan of care.                 "

## 2024-12-10 NOTE — PROGRESS NOTES
[facility] MEDICINE SERVICE  DAILY PROGRESS NOTE    NAME: Marge Mcghee  : 1955  MRN: [id number]      LOS: 2 days     PROVIDER OF SERVICE: Brenda Sutton MD    Chief Complaint: REINA (acute kidney injury)    Subjective:     Interval History:  History taken from: Patient and patient's chart     Denies any new complaints, wants to go to rehab.         Review of Systems:   Review of Systems  All negative except above   Objective:     Vital Signs  Temp:  [97.6 °F (36.4 °C)] 97.6 °F (36.4 °C)  Heart Rate:  [73-80] 73  Resp:  [14-20] 20  BP: (108-145)/(67-70) 120/70   Body mass index is 29.07 kg/m².    Physical Exam  Physical Exam  General: Alert and oriented, no acute distress.  HENT: Normocephalic, moist oral mucosa, no scleral icterus.  Neck: Supple, nontender, no carotid bruits, no JVD, no LAD.  Lungs: Clear to auscultation, nonlabored respiration.  Heart: RRR, no murmur, gallop or edema.  Abdomen: Soft, nontender, nondistended, + bowel sounds.  Neuro: alert and awake, moving all 4 extremities   Skin: Skin is warm, dry and pink, no rashes or lesions.  Psychiatric: Cooperative, appropriate mood and affect.          Diagnostic Data    Results from last 7 days   Lab Units 12/10/24  0124 24  2312 24  1446   WBC 10*3/mm3 7.79   < >  --    HEMOGLOBIN g/dL 10.3*   < >  --    HEMATOCRIT % 31.4*   < >  --    PLATELETS 10*3/mm3 244   < >  --    GLUCOSE mg/dL 227*   < > 246*   CREATININE mg/dL 1.16*   < > 2.77*   BUN mg/dL 19   < > 40*   SODIUM mmol/L 140   < > 136   POTASSIUM mmol/L 4.1   < > 3.7   AST (SGOT) U/L  --   --  23   ALT (SGPT) U/L  --   --  9   ALK PHOS U/L  --   --  65   BILIRUBIN mg/dL  --   --  0.5   ANION GAP mmol/L 8.4   < > 14.3    < > = values in this interval not displayed.       CT Abdomen Pelvis Without Contrast    Result Date: 2024  Impression: 1.No acute findings within the abdomen or pelvis. 2.4.1 x 3.0 cm right adrenal adenoma. 3.3 mm nonobstructing stone in  "the left kidney. 4.Left renal cyst. 5.Mild diverticulosis of the sigmoid colon. 6.Tiny fat density umbilical hernia. There is a small hiatal hernia. 7.The exam is limited by noncontrasted technique. Electronically Signed: Darci Okeefe MD  12/9/2024 10:39 AM EST  Workstation ID: [id number]       I have reviewed patient labs and imaging     Assessment/Plan:   Marge Mcghee is a 69 y.o. female with a CMH of HTN, adrenal nodule, HLD, GERD, T2DM, RLS, OA, CAD s/p CABG who presented to [facility] on 12/6/2024 with generalized weakness.     Active and Resolved Problems  # REINA  -Creatinine 2.77, BUN 40. Creatinine 1 week ago was 0.89.   -Patient was on lisinopril prior to admission which she has been on since October. Hold ACE-I/ARBs  -Suspect pre-renal etiology/ATN from vomiting, ACE-I and possibly hypotension (had documented BP of 86/45 while in ED).   -renal US- No hydronephrosis, small left renal cyst  -uric acid- 7.1  -continuous NS    -Avoid hypotension  -IMPROVING, today's Cr is 1.16  -Renal following      # UTI  -UA with TNTC WBC and 1+ leukocytes, no bacteria.  Sample does appear to be contaminated based on number of squamous epithelial cells.  -Ucx negative      # HTN  -Had episode of hypotension earlier and some low normal BP readings therefore will hold antihypertensives for now  -monitor BP and adjust medications accordingly      # T2DM with hyperglycemia  -continue Lantus 15 units daily  -SSI  -Hold metformin  -CCD  -Hypoglycemia protocol in place     # CAD  -Continue asa/Plavix      #constipation  - bowel regimen PRN    # Adrenal adenoma  # renal cyst  - incidental finding on CT abdomen  - follow up PCP/Endo as outpatient     \"Patient will benefit from 30 days or less of SNF/Rehab placement\"       VTE Prophylaxis:  Mechanical VTE prophylaxis orders are present.             Disposition Planning:     Barriers to Discharge: pending safe disposition   Anticipated Date of Discharge: 12/11  Place of " Discharge: rehab       Time: 40 minutes     Code Status and Medical Interventions: CPR (Attempt to Resuscitate); Full Support   Ordered at: 12/06/24 1726     Code Status (Patient has no pulse and is not breathing):    CPR (Attempt to Resuscitate)     Medical Interventions (Patient has pulse or is breathing):    Full Support       Signature: Electronically signed by Brenda Sutton MD, 12/10/24, 10:57 EST.  [facility] Hospitalist Team

## 2024-12-10 NOTE — THERAPY EVALUATION
Patient Name: Marge Mcghee  : 1955    MRN: [id number]                              Today's Date: 12/10/2024       Admit Date: 2024    Visit Dx:     ICD-10-CM ICD-9-CM   1. REINA (acute kidney injury)  N17.9 584.9   2. Urinary tract infection without hematuria, site unspecified  N39.0 599.0     Patient Active Problem List   Diagnosis    Type 2 diabetes mellitus with retinopathy, with long-term current use of insulin    Age-related osteoporosis without current pathological fracture    Hyperlipidemia    Hepatic steatosis    Gastroesophageal reflux disease with esophagitis    Adrenal nodule    Vitamin D deficiency    Thyroid nodule    UTI (urinary tract infection)    REINA (acute kidney injury)    Hyperglycemia    Acute right flank pain    Vomiting    Hypomagnesemia    Dehydration    Obesity (BMI 30-39.9)    Complicated migraine    Occipital neuralgia of left side    Polypharmacy    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    Lisfranc dislocation    Delayed surgical wound healing    Right foot infection    Chest pain    GERD (gastroesophageal reflux disease)    Stable angina pectoris    S/P CABG x 3    Encephalopathy, metabolic    Hypertensive emergency    Dysarthria    CVA (cerebral vascular accident)    Coronary artery disease involving coronary bypass graft of native heart without angina pectoris    Essential hypertension    HTN, goal below 130/80    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    Type 2 diabetes mellitus without complication, with long-term current use of insulin    Change in vision    Other headache syndrome    Migraine, intractable    Hyperosmolar hyperglycemic state (HHS)     Past Medical History:   Diagnosis Date    Adrenal nodule 2016    FINDINGS: Mild hepatic steatosis may be present. Cholecystectomy. No biliary ductal dilatation. Negative  pancreas, spleen, left adrenal gland, and kidneys. The right adrenal presumed adenoma has increased slightly, measuring 3 x 4 cm in diameter,  compared to 2 x 3.5 cm on the previous exam. The attenuation values are consistent with an adenoma. Done at [facility] in August 2018    Age-related osteoporosis without current pathological fracture 11/16/2016    Arthritis     CVA (cerebral vascular accident)     year 2000- can't remember exact year    Delayed surgical wound healing     Essential hypertension 11/16/2016    Gastroesophageal reflux disease with esophagitis 11/16/2016    Hepatic steatosis 11/16/2016    History of anemia     History of sepsis     FROM UTI    Hyperlipidemia 11/16/2016    Lisfranc's dislocation     LEFT WITH FRACTURES NONHEALING FOOT    Osteomyelitis of left foot 11/2020    RLS (restless legs syndrome)     Squamous cell skin cancer 2014    Excised by Dr. James    Thyroid nodule 10/18/2019    BENIGN    Type 2 diabetes mellitus with peripheral neuropathy 11/16/2016    Vitamin D deficiency 01/25/2019     Past Surgical History:   Procedure Laterality Date    BREAST BIOPSY Left     7/2019. BENIGN    CARDIAC CATHETERIZATION Left 5/2/2021    Procedure: Cardiac Catheterization/Vascular Study;  Surgeon: Chacho Esteban MD;  Location: [facility] CATH INVASIVE LOCATION;  Service: Cardiovascular;  Laterality: Left;    CARDIAC CATHETERIZATION N/A 5/2/2021    Procedure: Intra-Aortic Baloon Pump Insertion;  Surgeon: Chacho Esteban MD;  Location: [facility] CATH INVASIVE LOCATION;  Service: Cardiovascular;  Laterality: N/A;    CATARACT EXTRACTION WITH INTRAOCULAR LENS IMPLANT Bilateral     CHOLECYSTECTOMY      COLONOSCOPY      CORONARY ARTERY BYPASS GRAFT N/A 5/2/2021    Procedure: CORONARY ARTERY BYPASS WITH INTERNAL MAMMARY ARTERY GRAFT;  Surgeon: Jr Rocael Alexander MD;  Location: [facility] CVOR;  Service: Cardiothoracic;  Laterality: N/A;    FOOT FUSION Right 11/13/2020    Procedure: RIGHT OPEN TREATMENT LISFRANC  INJURY, OPEN REDUCTION INTERNAL FIXATION MEDIAL/MIDDLE CUNEIFORM FRACTURE AND 2ND/3RD METATARSAL FRACTURE 1ST 2ND POSSIBLE 3RD TARSOMETATARSAL ARTHRODESIS INTERCUNEIFORM ARTHRODESIS CALCANEAL BONE GRAFT;  Surgeon: Mikhail Banks Jr., MD;  Location: [facility];  Service: Orthopedics;  Laterality: Right;    INCISION AND DRAINAGE LEG Right 1/8/2021    Procedure: RIGHT IRRIGATION AND DEBRIDEMENT OF FOOT WITH SECONDARY CLOSURE AND HARDWARE REMOVAL;  Surgeon: Mikhail Banks Jr., MD;  Location: [facility] OR;  Service: Orthopedics;  Laterality: Right;    KNEE ARTHROSCOPY Bilateral     TOTAL ABDOMINAL HYSTERECTOMY      TRANSESOPHAGEAL ECHOCARDIOGRAM (EMILIA) N/A 5/2/2021    Procedure: TRANSESOPHAGEAL ECHOCARDIOGRAM;  Surgeon: Jr Rocael Alexander MD;  Location: [facility];  Service: Cardiothoracic;  Laterality: N/A;    UPPER GASTROINTESTINAL ENDOSCOPY  08/2016    US GUIDED FINE NEEDLE ASPIRATION  5/8/2020      General Information       Row Name 12/10/24 1428 12/10/24 1230       OT Time and Intention    Subjective Information -- no complaints  -MP    Document Type evaluation  -MP evaluation  -MP    Mode of Treatment individual therapy;occupational therapy  -MP individual therapy;physical therapy  -MP    Patient Effort good  -MP good  -MP      Row Name 12/10/24 1428 12/10/24 1230       General Information    Patient Profile Reviewed yes  -MP yes  -MP    Prior Level of Function independent:;ADL's  -MP independent:;ADL's  -MP      Row Name 12/10/24 1428 12/10/24 1230       Living Environment    People in Home spouse  -MP spouse  -MP      Row Name 12/10/24 1428 12/10/24 1230       Cognition    Orientation Status (Cognition) oriented x 4  -MP oriented x 4  -MP      Row Name 12/10/24 1428 12/10/24 1230       Safety Issues/Impairments Affecting Functional Mobility    Impairments Affecting Function (Mobility) endurance/activity tolerance;strength;balance  -MP endurance/activity tolerance;strength;balance  -MP              User Key   (r) = Recorded By, (t) = Taken By, (c) = Cosigned By      Initials Name Provider Type    Harry Connor OT Occupational Therapist                     Mobility/ADL's       Row Name 12/10/24 1438          Bed Mobility    Bed Mobility supine-sit  -MP     Supine-Sit [place] (Bed Mobility) minimum assist (75% patient effort)  -MP       Row Name 12/10/24 1438          Bed-Chair Transfer    Bed-Chair [place] (Transfers) contact guard  -MP     Assistive Device (Bed-Chair Transfers) walker, front-wheeled  -MP       Row Name 12/10/24 1438          Sit-Stand Transfer    Sit-Stand [place] (Transfers) contact guard  -MP     Assistive Device (Sit-Stand Transfers) walker, front-wheeled  -MP       Row Name 12/10/24 1438          Functional Mobility    Functional Mobility- Ind. Level contact guard assist;minimum assist (75% patient effort)  -       Row Name 12/10/24 1438          Activities of Daily Living    BADL Assessment/Intervention lower body dressing  -       Row Name 12/10/24 1438          Lower Body Dressing Assessment/Training    [place] Level (Lower Body Dressing) lower body dressing skills;maximum assist (25% patient effort)  -               User Key  (r) = Recorded By, (t) = Taken By, (c) = Cosigned By      Initials Name Provider Type    Harry Connor OT Occupational Therapist                   Obj/Interventions       Row Name 12/10/24 1444          Range of Motion Comprehensive    Comment, General Range of Motion BUE WFL  -       Row Name 12/10/24 1444          Strength Comprehensive (MMT)    Comment, General Manual Muscle Testing (MMT) Assessment LUE 3+/5, RUE 4/5  -[facility] Name 12/10/24 1444          Balance    Balance Interventions sitting;standing;sit to stand;supported;dynamic;static  -               User Key  (r) = Recorded By, (t) = Taken By, (c) = Cosigned By      Initials Name Provider Type    Harry Connor OT Occupational Therapist                    Goals/Plan       Row Name 12/10/24 1456          Bed Mobility Goal 1 (OT)    Activity/Assistive Device (Bed Mobility Goal 1, OT) bed mobility activities, all  -MP     [place] Level/Cues Needed (Bed Mobility Goal 1, OT) contact guard required  -MP     Time Frame (Bed Mobility Goal 1, OT) long term goal (LTG);2 weeks  -MP       Row Name 12/10/24 1456          Transfer Goal 1 (OT)    Activity/Assistive Device (Transfer Goal 1, OT) sit-to-stand/stand-to-sit;toilet  -MP     [place] Level/Cues Needed (Transfer Goal 1, OT) supervision required  -MP     Time Frame (Transfer Goal 1, OT) long term goal (LTG);2 weeks  -MP       Row Name 12/10/24 1456          Dressing Goal 1 (OT)    Activity/Device (Dressing Goal 1, OT) lower body dressing  -MP     [place]/Cues Needed (Dressing Goal 1, OT) minimum assist (75% or more patient effort)  -MP     Time Frame (Dressing Goal 1, OT) long term goal (LTG);2 weeks  -MP               User Key  (r) = Recorded By, (t) = Taken By, (c) = Cosigned By      Initials Name Provider Type    Harry Connor OT Occupational Therapist                   Clinical Impression       Row Name 12/10/24 1447          Pain Assessment    Pretreatment Pain Rating 3/10  -MP     Posttreatment Pain Rating 3/10  -MP     Pain Location head  -MP       Row Name 12/10/24 1447          Plan of Care Review    Plan of Care Reviewed With patient  -MP     Progress no change  -MP     Outcome Evaluation Pt is a 70 YO F admitted with REINA. Pt reports living at home with spouse, is independent with all ADLs, ambulation without AD and no recent falls. Pt. works FT at baseline. Pt. requires CGA For functional transfers this date, sit to stand w/ rolling walker support. Pt. w/ limited standing tolerance secondary to fatigue and weakness BLEs. Pt. provided max A for all LB ADLs this date. Pt. not safe to d/c home alone and will require SNF placement at d/c to address aforementioned deficits.  -MP       Row Name  12/10/24 1447          Therapy Assessment/Plan (OT)    Rehab Potential (OT) good  -MP     Criteria for Skilled Therapeutic Interventions Met (OT) yes;skilled treatment is necessary;meets criteria  -MP     Therapy Frequency (OT) 3 times/wk  -MP       Row Name 12/10/24 1447          Therapy Plan Review/Discharge Plan (OT)    Anticipated Discharge Disposition (OT) skilled nursing facility  -MP       Row Name 12/10/24 1447          Vital Signs    Pre Patient Position Sitting  -MP     Intra Patient Position Standing  -MP     Post Patient Position Sitting  -MP       Row Name 12/10/24 1447          Positioning and Restraints    Pre-Treatment Position sitting in chair/recliner  -MP     Post Treatment Position chair  -MP     In Chair sitting;call light within reach;encouraged to call for assist;exit alarm on  -MP               User Key  (r) = Recorded By, (t) = Taken By, (c) = Cosigned By      Initials Name Provider Type    Harry Connor, OT Occupational Therapist                   Outcome Measures       Row Name 12/10/24 0800          How much help from another person do you currently need...    Turning from your back to your side while in flat bed without using bedrails? 4  -LT     Moving from lying on back to sitting on the side of a flat bed without bedrails? 3  -LT     Moving to and from a bed to a chair (including a wheelchair)? 3  -LT     Standing up from a chair using your arms (e.g., wheelchair, bedside chair)? 3  -LT     Climbing 3-5 steps with a railing? 2  -LT     To walk in hospital room? 3  -LT     AM-PAC 6 Clicks Score (PT) 18  -LT     Highest Level of Mobility Goal 6 --> Walk 10 steps or more  -LT               User Key  (r) = Recorded By, (t) = Taken By, (c) = Cosigned By      Initials Name Provider Type    Naye Shea LPN Licensed Nurse                    Occupational Therapy Education       Title: PT OT SLP Therapies (Done)       Topic: Occupational Therapy (Done)       Point: ADL training  (Done)       Description:   Instruct learner(s) on proper safety adaptation and remediation techniques during self care or transfers.   Instruct in proper use of assistive devices.                  Learning Progress Summary            Patient Acceptance, E, DU by BC at 12/8/2024 1725                      Point: Home exercise program (Done)       Description:   Instruct learner(s) on appropriate technique for monitoring, assisting and/or progressing therapeutic exercises/activities.                  Learning Progress Summary            Patient Acceptance, E, DU by BC at 12/8/2024 1725                      Point: Precautions (Done)       Description:   Instruct learner(s) on prescribed precautions during self-care and functional transfers.                  Learning Progress Summary            Patient Acceptance, E, DU by BC at 12/8/2024 1725                      Point: Body mechanics (Done)       Description:   Instruct learner(s) on proper positioning and spine alignment during self-care, functional mobility activities and/or exercises.                  Learning Progress Summary            Patient Acceptance, E,TB, VU by  at 12/10/2024 1456    Acceptance, E, DU by BC at 12/8/2024 1725                                      User Key       Initials Effective Dates Name Provider Type Discipline     06/16/21 -  Harry Lynch OT Occupational Therapist OT    BC 06/21/24 -  Viki Uribe, RN Registered Nurse Nurse                  OT Recommendation and Plan  Therapy Frequency (OT): 3 times/wk  Plan of Care Review  Plan of Care Reviewed With: patient  Progress: no change  Outcome Evaluation: Pt is a 70 YO F admitted with REINA. Pt reports living at home with spouse, is independent with all ADLs, ambulation without AD and no recent falls. Pt. works FT at baseline. Pt. requires CGA For functional transfers this date, sit to stand w/ rolling walker support. Pt. w/ limited standing tolerance secondary to fatigue and  weakness BLEs. Pt. provided max A for all LB ADLs this date. Pt. not safe to d/c home alone and will require SNF placement at d/c to address aforementioned deficits.     Time Calculation:         Time Calculation- OT       Row Name 12/10/24 1456             Time Calculation- OT    OT Start Time 1200  -MP      OT Stop Time 1225  -MP      OT Time Calculation (min) 25 min  -MP      Total Timed Code Minutes- OT 0 minute(s)  -MP      OT Received On 12/10/24  -      OT - Next Appointment 12/12/24  -      OT Goal Re-Cert Due Date 12/24/24  -                User Key  (r) = Recorded By, (t) = Taken By, (c) = Cosigned By      Initials Name Provider Type    Harry Connor OT Occupational Therapist                  Therapy Charges for Today       Code Description Service Date Service Provider Modifiers Qty    55390568363 HC OT EVAL LOW COMPLEXITY 4 12/10/2024 Harry Lynch OT GO 1                 Harry Lynch OT  12/10/2024

## 2024-12-10 NOTE — CASE MANAGEMENT/SOCIAL WORK
Continued Stay Note   [place]     Patient Name: Marge Mcghee  MRN: [id number]  Today's Date: 12/10/2024    Admit Date: 12/6/2024    Plan: [facility]. PAS[facility] QFR, will need precert. Family will transport at d/c.   Discharge Plan       Row Name 12/10/24 1152       Plan    Plan [facility] pending [facility]. PASRR QFR, will need precert. Family will transport at d/c.    Patient/Family in Agreement with Plan yes    Plan Comments PT recommending SNF. Pt has had a RA and seveal recent falls. Pt declined SNF on prior admission and went home with family. Family works outside of the home so pt is left without assistance. CM met with pt and obtained choices 1. [facility] 2. [facility] 3. [facility]. ASHTYN FONSECA, will need precert.             Expected Discharge Date and Time       Expected Discharge Date Expected Discharge Time    Dec 12, 2024         Sunshine Horton RN    phone [phone]  fax [phone]

## 2024-12-10 NOTE — PLAN OF CARE
Goal Outcome Evaluation:  Plan of Care Reviewed With: patient        Progress: no change  Outcome Evaluation: Pt is a 70 YO F admitted with REINA. Pt reports living at home with spouse, is independent with all ADLs, ambulation without AD and no recent falls. Pt. works FT at baseline. Pt. requires CGA For functional transfers this date, sit to stand w/ rolling walker support. Pt. w/ limited standing tolerance secondary to fatigue and weakness BLEs. Pt. provided max A for all LB ADLs this date. Pt. not safe to d/c home alone and will require SNF placement at d/c to address aforementioned deficits.    Anticipated Discharge Disposition (OT): skilled nursing facility

## 2024-12-10 NOTE — THERAPY TREATMENT NOTE
"Subjective: Pt agreeable to therapeutic plan of care.    Objective:     Bed mobility - Supine to sitting mod I    Transfers - STS SBA using RW. Cued on hand placement.     Ambulation - 2x40 feet CGA-min A using RW. Mild unsteadiness noted secondary to dizziness. BP WNL    Therapeutic Exercise - 15 Reps B LE AROM supported sitting / chair    Vitals: WNL    Pain: 0 VAS   Location:   Intervention for pain: N/A    Education: Provided education on the importance of mobility in the acute care setting, Verbal/Tactile Cues, Transfer Training, and Gait Training    Assessment: Marge Mcghee presents with functional mobility impairments which indicate the need for skilled intervention. Tolerating session today without incident. Will continue to follow and progress as tolerated.     Plan/Recommendations:   If medically appropriate, Moderate Intensity Therapy recommended post-acute care. This is recommended as therapy feels the patient would require 3-4 days per week and wouldn't tolerate \"3 hour daily\" rehab intensity. SNF would be the preferred choice. If the patient does not agree to SNF, arrange HH or OP depending on home bound status. If patient is medically complex, consider LTACH. Pt requires no DME at discharge.     Pt desires Skilled Rehab placement at discharge. Pt cooperative; agreeable to therapeutic recommendations and plan of care.         Basic Mobility 6-click:  Rollin = Total, A lot = 2, A little = 3; 4 = None  Supine>Sit:   1 = Total, A lot = 2, A little = 3; 4 = None   Sit>Stand with arms:  1 = Total, A lot = 2, A little = 3; 4 = None  Bed>Chair:   1 = Total, A lot = 2, A little = 3; 4 = None  Ambulate in room:  1 = Total, A lot = 2, A little = 3; 4 = None  3-5 Steps with railin = Total, A lot = 2, A little = 3; 4 = None  Score: 22    Modified [place]: N/A = No pre-op stroke/TIA    Post-Tx Position: Supine with HOB Elevated, Alarms activated, and Call light and personal items within " reach  PPE: gloves    Therapy Charges for Today       Code Description Service Date Service Provider Modifiers Qty    98400602800 HC GAIT TRAINING EA 15 MIN 12/10/2024 Nikolai Duncan, DEE GP 1    74303850614 HC PT THERAPEUTIC ACT EA 15 MIN 12/10/2024 Nikolai Duncan, DEE GP 1    70878123422 HC PT THER PROC EA 15 MIN 12/10/2024 Nikolai Duncan, DEE GP 1           PT Charges       Row Name 12/10/24 1606             Time Calculation    Start Time 1325  -UN      Stop Time 1350  -UN      Time Calculation (min) 25 min  -UN      PT Received On 12/10/24  -UN      PT - Next Appointment 12/11/24  -UN         Time Calculation- PT    Total Timed Code Minutes- PT 25 minute(s)  -UN                User Key  (r) = Recorded By, (t) = Taken By, (c) = Cosigned By      Initials Name Provider Type    UN Nikolai Duncan PTA Physical Therapist Assistant

## 2024-12-10 NOTE — PROGRESS NOTES
RENAL/KCC:     LOS: 2 days   Patient Care Team:  Traci Townsend APRN as PCP - General (Nurse Practitioner)  Drake Hinton MD PhD as Consulting Physician (Ophthalmology)  Fausto Gilliam MD as Consulting Physician (Endocrinology)  Saloni Ch MD (Ophthalmology)  Mikhail Kaplan Jr., MD as Consulting Physician (Urology)    Chief Complaint:  REINA    Subjective     Interval History:   Chart reviewed  Non-oliguric  No new complaints    Objective     Vital Signs  Temp:  [97.6 °F (36.4 °C)] 97.6 °F (36.4 °C)  Heart Rate:  [76-80] 76  Resp:  [14-20] 20  BP: (108-145)/(67-68) 123/68    Physical Exam:  GEN: Awake, NAD  ENT: PERRL, EOMI, MMM  NECK: Supple, no JVD  CHEST: CTAB, no W/R/C  CV: RRR, no M/G/R  ABD: Soft, NT, +BS  SKIN: Warm and Dry  NEURO: CN's intact       Results Review:     I reviewed the patient's new clinical results.    Medication Review: Reviewed    Assessment & Plan     REINA  Hyponatremia  UTI  Diarrhea    Plan: Cr improved to 1.16.  Lytes and volume OK.  Encourage PO fluid and avoid nephrotoxins.  Will follow.      Maikol Jones MD  [facility]  12/10/24  08:10 EST

## 2024-12-11 LAB
ANION GAP SERPL CALCULATED.3IONS-SCNC: 9.1 MMOL/L (ref 5–15)
BACTERIA SPEC AEROBE CULT: NORMAL
BACTERIA SPEC AEROBE CULT: NORMAL
BASOPHILS # BLD AUTO: 0.08 10*3/MM3 (ref 0–0.2)
BASOPHILS NFR BLD AUTO: 1.1 % (ref 0–1.5)
BUN SERPL-MCNC: 17 MG/DL (ref 8–23)
BUN/CREAT SERPL: 16.2 (ref 7–25)
CALCIUM SPEC-SCNC: 9 MG/DL (ref 8.6–10.5)
CHLORIDE SERPL-SCNC: 107 MMOL/L (ref 98–107)
CO2 SERPL-SCNC: 25.9 MMOL/L (ref 22–29)
CREAT SERPL-MCNC: 1.05 MG/DL (ref 0.57–1)
DEPRECATED RDW RBC AUTO: 41.3 FL (ref 37–54)
EGFRCR SERPLBLD CKD-EPI 2021: 56.9 ML/MIN/1.73
EOSINOPHIL # BLD AUTO: 0.25 10*3/MM3 (ref 0–0.4)
EOSINOPHIL NFR BLD AUTO: 3.4 % (ref 0.3–6.2)
ERYTHROCYTE [DISTWIDTH] IN BLOOD BY AUTOMATED COUNT: 12.4 % (ref 12.3–15.4)
GLUCOSE BLDC GLUCOMTR-MCNC: 117 MG/DL (ref 70–105)
GLUCOSE BLDC GLUCOMTR-MCNC: 162 MG/DL (ref 70–105)
GLUCOSE BLDC GLUCOMTR-MCNC: 183 MG/DL (ref 70–105)
GLUCOSE BLDC GLUCOMTR-MCNC: 211 MG/DL (ref 70–105)
GLUCOSE BLDC GLUCOMTR-MCNC: 231 MG/DL (ref 70–105)
GLUCOSE SERPL-MCNC: 106 MG/DL (ref 65–99)
HCT VFR BLD AUTO: 32.5 % (ref 34–46.6)
HGB BLD-MCNC: 10 G/DL (ref 12–15.9)
IMM GRANULOCYTES # BLD AUTO: 0.02 10*3/MM3 (ref 0–0.05)
IMM GRANULOCYTES NFR BLD AUTO: 0.3 % (ref 0–0.5)
LYMPHOCYTES # BLD AUTO: 3.4 10*3/MM3 (ref 0.7–3.1)
LYMPHOCYTES NFR BLD AUTO: 46.1 % (ref 19.6–45.3)
MCH RBC QN AUTO: 28 PG (ref 26.6–33)
MCHC RBC AUTO-ENTMCNC: 30.8 G/DL (ref 31.5–35.7)
MCV RBC AUTO: 91 FL (ref 79–97)
MONOCYTES # BLD AUTO: 0.62 10*3/MM3 (ref 0.1–0.9)
MONOCYTES NFR BLD AUTO: 8.4 % (ref 5–12)
NEUTROPHILS NFR BLD AUTO: 3.01 10*3/MM3 (ref 1.7–7)
NEUTROPHILS NFR BLD AUTO: 40.7 % (ref 42.7–76)
NRBC BLD AUTO-RTO: 0 /100 WBC (ref 0–0.2)
PLATELET # BLD AUTO: 265 10*3/MM3 (ref 140–450)
PMV BLD AUTO: 9.4 FL (ref 6–12)
POTASSIUM SERPL-SCNC: 3.9 MMOL/L (ref 3.5–5.2)
RBC # BLD AUTO: 3.57 10*6/MM3 (ref 3.77–5.28)
SODIUM SERPL-SCNC: 142 MMOL/L (ref 136–145)
WBC NRBC COR # BLD AUTO: 7.38 10*3/MM3 (ref 3.4–10.8)

## 2024-12-11 PROCEDURE — 97116 GAIT TRAINING THERAPY: CPT

## 2024-12-11 PROCEDURE — 80048 BASIC METABOLIC PNL TOTAL CA: CPT | Performed by: STUDENT IN AN ORGANIZED HEALTH CARE EDUCATION/TRAINING PROGRAM

## 2024-12-11 PROCEDURE — 82948 REAGENT STRIP/BLOOD GLUCOSE: CPT

## 2024-12-11 PROCEDURE — 63710000001 INSULIN GLARGINE PER 5 UNITS

## 2024-12-11 PROCEDURE — 63710000001 INSULIN LISPRO (HUMAN) PER 5 UNITS

## 2024-12-11 PROCEDURE — 85025 COMPLETE CBC W/AUTO DIFF WBC: CPT | Performed by: STUDENT IN AN ORGANIZED HEALTH CARE EDUCATION/TRAINING PROGRAM

## 2024-12-11 PROCEDURE — 63710000001 PROMETHAZINE PER 25 MG

## 2024-12-11 RX ADMIN — INSULIN LISPRO 4 UNITS: 100 INJECTION, SOLUTION INTRAVENOUS; SUBCUTANEOUS at 12:06

## 2024-12-11 RX ADMIN — ATORVASTATIN CALCIUM 80 MG: 40 TABLET, FILM COATED ORAL at 21:16

## 2024-12-11 RX ADMIN — GABAPENTIN 200 MG: 100 CAPSULE ORAL at 09:34

## 2024-12-11 RX ADMIN — PROMETHAZINE HYDROCHLORIDE 25 MG: 25 TABLET ORAL at 21:16

## 2024-12-11 RX ADMIN — PANTOPRAZOLE SODIUM 40 MG: 40 TABLET, DELAYED RELEASE ORAL at 09:34

## 2024-12-11 RX ADMIN — GABAPENTIN 200 MG: 100 CAPSULE ORAL at 21:17

## 2024-12-11 RX ADMIN — INSULIN LISPRO 4 UNITS: 100 INJECTION, SOLUTION INTRAVENOUS; SUBCUTANEOUS at 16:46

## 2024-12-11 RX ADMIN — INSULIN GLARGINE-YFGN 15 UNITS: 100 INJECTION, SOLUTION SUBCUTANEOUS at 09:34

## 2024-12-11 RX ADMIN — TRAZODONE HYDROCHLORIDE 50 MG: 50 TABLET ORAL at 21:17

## 2024-12-11 RX ADMIN — ASPIRIN 81 MG: 81 TABLET, COATED ORAL at 09:34

## 2024-12-11 RX ADMIN — LIDOCAINE 2 PATCH: 4 PATCH TOPICAL at 09:34

## 2024-12-11 RX ADMIN — ACETAMINOPHEN 650 MG: 325 TABLET, FILM COATED ORAL at 21:20

## 2024-12-11 RX ADMIN — ACETAMINOPHEN 650 MG: 325 TABLET, FILM COATED ORAL at 09:49

## 2024-12-11 RX ADMIN — INSULIN LISPRO 2 UNITS: 100 INJECTION, SOLUTION INTRAVENOUS; SUBCUTANEOUS at 21:24

## 2024-12-11 RX ADMIN — AMLODIPINE BESYLATE 5 MG: 5 TABLET ORAL at 09:34

## 2024-12-11 RX ADMIN — Medication 10 ML: at 21:17

## 2024-12-11 RX ADMIN — Medication 10 ML: at 09:38

## 2024-12-11 RX ADMIN — CLOPIDOGREL BISULFATE 75 MG: 75 TABLET ORAL at 09:34

## 2024-12-11 RX ADMIN — Medication 5 MG: at 21:16

## 2024-12-11 NOTE — PLAN OF CARE
Goal Outcome Evaluation:   Pt c/o HA over night. Asking for Dilaudid. Medication not ordered. Pt Ax4, and had no acute events over night.

## 2024-12-11 NOTE — PROGRESS NOTES
[facility] MEDICINE SERVICE  DAILY PROGRESS NOTE    NAME: Marge Mcghee  : 1955  MRN: [id number]      LOS: 3 days     PROVIDER OF SERVICE: Sunny Robert MD    Chief Complaint: REINA (acute kidney injury)    Subjective:     Interval History:  History taken from: patient      Patient otherwise appears comfortable today.  No distress.  She is agreeable to discharge to rehab facility.      Review of Systems:   Review of Systems   All other systems reviewed and are negative.      Objective:     Vital Signs  Temp:  [97.2 °F (36.2 °C)-98.6 °F (37 °C)] 97.4 °F (36.3 °C)  Heart Rate:  [65-84] 65  Resp:  [12-16] 12  BP: (108-149)/(49-74) 113/51   Body mass index is 29.07 kg/m².    Physical Exam  Physical Exam  Constitutional:       General: She is awake.      Appearance: Normal appearance. She is well-developed and well-groomed.   HENT:      Head: Normocephalic and atraumatic.      Nose: Nose normal.      Mouth/Throat:      Mouth: Mucous membranes are moist.      Pharynx: Oropharynx is clear.   Eyes:      Extraocular Movements: Extraocular movements intact.      Conjunctiva/sclera: Conjunctivae normal.      Pupils: Pupils are equal, round, and reactive to light.   Cardiovascular:      Rate and Rhythm: Normal rate and regular rhythm.      Pulses: Normal pulses.      Heart sounds: Normal heart sounds.   Pulmonary:      Effort: Pulmonary effort is normal.      Breath sounds: Normal breath sounds.   Abdominal:      General: Abdomen is flat. Bowel sounds are normal.      Palpations: Abdomen is soft.   Musculoskeletal:         General: Normal range of motion.      Cervical back: Normal range of motion and neck supple.      Right lower leg: No edema.      Left lower leg: No edema.   Skin:     General: Skin is warm and dry.   Neurological:      General: No focal deficit present.      Mental Status: She is alert and oriented to person, place, and time. Mental status is at baseline.      Cranial Nerves: Cranial nerves  2-12 are intact.      Sensory: Sensation is intact.      Motor: Motor function is intact.   Psychiatric:         Mood and Affect: Mood normal.         Behavior: Behavior normal. Behavior is cooperative.         Thought Content: Thought content normal.         Judgment: Judgment normal.            Diagnostic Data    Results from last 7 days   Lab Units 12/11/24  0512 12/06/24  2312 12/06/24  1446   WBC 10*3/mm3 7.38   < >  --    HEMOGLOBIN g/dL 10.0*   < >  --    HEMATOCRIT % 32.5*   < >  --    PLATELETS 10*3/mm3 265   < >  --    GLUCOSE mg/dL 106*   < > 246*   CREATININE mg/dL 1.05*   < > 2.77*   BUN mg/dL 17   < > 40*   SODIUM mmol/L 142   < > 136   POTASSIUM mmol/L 3.9   < > 3.7   AST (SGOT) U/L  --   --  23   ALT (SGPT) U/L  --   --  9   ALK PHOS U/L  --   --  65   BILIRUBIN mg/dL  --   --  0.5   ANION GAP mmol/L 9.1   < > 14.3    < > = values in this interval not displayed.       CT Abdomen Pelvis Without Contrast    Result Date: 12/9/2024  Impression: 1.No acute findings within the abdomen or pelvis. 2.4.1 x 3.0 cm right adrenal adenoma. 3.3 mm nonobstructing stone in the left kidney. 4.Left renal cyst. 5.Mild diverticulosis of the sigmoid colon. 6.Tiny fat density umbilical hernia. There is a small hiatal hernia. 7.The exam is limited by noncontrasted technique. Electronically Signed: Darci Okeefe MD  12/9/2024 10:39 AM EST  Workstation ID: [id number]       I reviewed the patient's new clinical results.    Assessment/Plan:     Active and Resolved Problems  Active Hospital Problems    Diagnosis  POA    **REINA (acute kidney injury) [N17.9]  Yes      Resolved Hospital Problems   No resolved problems to display.     Acute kidney injury  Abnormal UA with negative culture  Hypertension   Type 2 diabetes  Coronary artery disease history  Failure to thrive  Chronic immobility syndrome  Generalized weakness    Reviewed laboratory studies from this morning.  Plan of care discussed with patient at bedside.  Await insurance  approval for disposition to rehab facility.  Patient will likely benefit from 30 days or less at skilled nursing facility/rehab.  She is agreeable to disposition and/or planning.  Noted urine culture negative.  Blood pressure remained stable on low-dose Norvasc.        VTE Prophylaxis:  Mechanical VTE prophylaxis orders are present.             Disposition Planning:     Barriers to Discharge: Insurance approval for rehab placement  Anticipated Date of Discharge: 12/12/2024  Place of Discharge: Rehab      Time: 35 minutes     Code Status and Medical Interventions: CPR (Attempt to Resuscitate); Full Support   Ordered at: 12/06/24 1726     Code Status (Patient has no pulse and is not breathing):    CPR (Attempt to Resuscitate)     Medical Interventions (Patient has pulse or is breathing):    Full Support       Signature: Electronically signed by Sunny Robert MD, 12/11/24, 08:11 EST.  [facility] Hospitalist Team

## 2024-12-11 NOTE — PLAN OF CARE
Goal Outcome Evaluation:              Outcome Evaluation: Pt. resting throughout the shift. Pt. A&Ox4, makes needs known. Pt. c/o pain; treated per MAR. Awaiting precert.

## 2024-12-11 NOTE — DISCHARGE PLACEMENT REQUEST
"Maxwell Moreno (69 y.o. Female)       Date of Birth   1955    Social Security Number       Address   [address] [place] [address] [place] [facility]    Home Phone   [phone]    MRN   [id number]       Mandaeism   None    Marital Status                               Admission Date   12/6/24    Admission Type   Emergency    Admitting Provider   Sunny Robert MD    Attending Provider   Sunny Robert MD    Department, Room/Bed   [facility] INPATIENT, 300/1       Discharge Date       Discharge Disposition       Discharge Destination                                 Attending Provider: Sunny Robert MD    Allergies: Zofran [Ondansetron Hcl], Prochlorperazine, Prochlorperazine Edisylate, Hydralazine, Hydralazine Hcl, Meperidine, Prochlorperazine Maleate, Prochlorperazine Maleate    Isolation: None   Infection: None   Code Status: CPR    Ht: 157.5 cm (62\")   Wt: 72.1 kg (158 lb 15.2 oz)    Admission Cmt: None   Principal Problem: REINA (acute kidney injury) [N17.9]                   Active Insurance as of 12/6/2024       Primary Coverage       Payor Plan Insurance Group Employer/Plan Group    [employer] COMMERCIAL AETNA [id number]       Payor Plan Address Payor Plan Phone Number Payor Plan Fax Number Effective Dates    [address] [phone]  1/1/2024 - None Entered    [place] [address]         Subscriber Name Subscriber Birth Date Member ID       GRADY MORENO 3/15/1952 [id number]               Secondary Coverage       Payor Plan Insurance Group Employer/Plan Group    HUMANA MEDICARE REPLACEMENT HUMANA MED ADV SNP HMO [id number]       Payor Plan Address Payor Plan Phone Number Payor Plan Fax Number Effective Dates    [address] [address] [phone]  10/1/2024 - None Entered    [facility] [address]         Subscriber Name Subscriber Birth Date Member ID       MAXWELL MORENO 1955 [id number]               Tertiary Coverage       Payor Plan Insurance Group Employer/Plan Group    " GPM LIFE G[employer] LIFE MC SUP        Payor Plan Address Payor Plan Phone Number Payor Plan Fax Number Effective Dates    [address]   1/1/2019 - None Entered    [facility] [address]         Subscriber Name Subscriber Birth Date Member ID       MAXWELL MORENO 1955 [id number]                     Emergency Contacts        (Rel.) Home Phone Work Phone Mobile Phone    Kimberly Salinas (POA) (Daughter) -- -- [phone]    SILVIO MORNEO (Spouse) -- -- [phone]    Tonia Rockwell (Friend) [phone] -- --

## 2024-12-11 NOTE — PROGRESS NOTES
RENAL/KCC:     LOS: 3 days   Patient Care Team:  Traci Townsend APRN as PCP - General (Nurse Practitioner)  Drake Hinton MD PhD as Consulting Physician (Ophthalmology)  Fausto Gilliam MD as Consulting Physician (Endocrinology)  Saloni Ch MD (Ophthalmology)  Mikhail Kaplan Jr., MD as Consulting Physician (Urology)    Chief Complaint:  REINA    Subjective     Interval History:   Chart reviewed  Non-oliguric  No new complaints    Objective     Vital Signs  Temp:  [97.2 °F (36.2 °C)-98.6 °F (37 °C)] 97.2 °F (36.2 °C)  Heart Rate:  [69-84] 69  Resp:  [12-16] 16  BP: (108-149)/(49-74) 108/49    Physical Exam:  GEN: Awake, NAD  ENT: PERRL, EOMI, MMM  NECK: Supple, no JVD  CHEST: CTAB, no W/R/C  CV: RRR, no M/G/R  ABD: Soft, NT, +BS  SKIN: Warm and Dry  NEURO: CN's intact       Results Review:     I reviewed the patient's new clinical results.    Medication Review: Reviewed    Assessment & Plan     REINA  Hyponatremia  UTI  Diarrhea    Plan: Cr improved to 1.  Lytes and volume OK.  Encourage PO fluid and avoid nephrotoxins.  OK for D/C.      Maikol Jones MD  [facility]  12/11/24  06:23 EST

## 2024-12-11 NOTE — CASE MANAGEMENT/SOCIAL WORK
Continued Stay Note  ELSY Woo     Patient Name: Marge Mcghee  MRN: [id number]  Today's Date: 12/11/2024    Admit Date: 12/6/2024    Plan: [facility] (accepting-pharmacy updated.) PASRR approved. Precert requested of [facility] 12/11.   Discharge Plan       Row Name 12/11/24 1631       Plan    Plan [facility] (accepting-pharmacy updated.) PASRR approved. Precert requested of [facility] 12/11.                       Breann Uribe RN     Office phone: [phone]  Office fax: [phone]

## 2024-12-11 NOTE — CASE MANAGEMENT/SOCIAL WORK
Continued Stay Note  ELSY Woo     Patient Name: Marge Mcghee  MRN: [id number]  Today's Date: 12/11/2024    Admit Date: 12/6/2024    Plan: [facility] (pending acceptance.) PASRR approved. Will need precert.   Discharge Plan       Row Name 12/11/24 1438       Plan    Plan [facility] (pending acceptance.) PASRR approved. Will need precert.    Plan Comments DC barriers: pending rehab acceptance and precert auth, cardiac monitoring. Nephrology following                          Breann Uribe RN      Office phone: [phone]  Office fax: [phone]

## 2024-12-11 NOTE — PAYOR COMM NOTE
"CLINICAL UPDATE 12/11/2024:      AUTH # [id number]   Marge Moreno (69 y.o. Female) 1955    PATIENT REMAINS IN-HOUSE.         AUTHORIZATION PENDING:   PLEASE CALL OR FAX DETERMINATION TO CONTACT BELOW. THANK YOU.        Sweta Ribera RN MSN  /UR  [facility]  [phone] office  [phone] fax  [email or web address]    [facility]  NPI: [id number]  Tax: [id number]        Marge Moreno (69 y.o. Female)       Date of Birth   1955    Social Security Number       Address   [address] [place] [address] [place] [facility]    Home Phone   [phone]    MRN   [id number]       Anglican   None    Marital Status                               Admission Date   12/6/24    Admission Type   Emergency    Admitting Provider   Sunny Robert MD    Attending Provider   Sunny Robert MD    Department, Room/Bed   [facility] MEDICAL INPATIENT, 300/1       Discharge Date       Discharge Disposition       Discharge Destination                                 Attending Provider: Sunny Robert MD    Allergies: Zofran [Ondansetron Hcl], Prochlorperazine, Prochlorperazine Edisylate, Hydralazine, Hydralazine Hcl, Meperidine, Prochlorperazine Maleate, Prochlorperazine Maleate    Isolation: None   Infection: None   Code Status: CPR    Ht: 157.5 cm (62\")   Wt: 72.1 kg (158 lb 15.2 oz)    Admission Cmt: None   Principal Problem: REINA (acute kidney injury) [N17.9]                   Active Insurance as of 12/6/2024       Primary Coverage       Payor Plan Insurance Group Employer/Plan Group    AETNA COMMERCIAL AETNA [id number]       Payor Plan Address Payor Plan Phone Number Payor Plan Fax Number Effective Dates    [address] [phone]  1/1/2024 - None Entered    [place] [facility] [id number]         Subscriber Name Subscriber Birth Date Member ID       GRADY MORENO 3/15/1952 [id number]               Secondary Coverage       Payor Plan Insurance Group " Employer/Plan Group    HUMANA MEDICARE REPLACEMENT HUMANA MED ADV SNP HMO [id number]       Payor Plan Address Payor Plan Phone Number Payor Plan Fax Number Effective Dates    [address] [address] [phone]  10/1/2024 - None Entered    [facility] [id number]         Subscriber Name Subscriber Birth Date Member ID       MARGE MORENO 1955 [id number]               Tertiary Coverage       Payor Plan Insurance Group Employer/Plan Group    GPM LIFE GPM LIFE MC SUP        Payor Plan Address Payor Plan Phone Number Payor Plan Fax Number Effective Dates    [address]   2019 - None Entered    [place] [id number]         Subscriber Name Subscriber Birth Date Member ID       MARGE MORENO 1955 [id number]                     Emergency Contacts        (Rel.) Home Phone Work Phone Mobile Phone    Kimberly Salinas (POA) (Daughter) -- -- [phone]    SILVIO MORENO (Spouse) -- -- [phone]    Tonia Rockwell (Friend) [phone] -- --                 Physician Progress Notes (last 48 hours)        Sunny Robert MD at 24 0811              [facility] MEDICINE SERVICE  DAILY PROGRESS NOTE    NAME: Marge Moreno  : 1955  MRN: [id number]      LOS: 3 days     PROVIDER OF SERVICE: Sunny Robert MD    Chief Complaint: REINA (acute kidney injury)    Subjective:     Interval History:  History taken from: patient      Patient otherwise appears comfortable today.  No distress.  She is agreeable to discharge to rehab facility.      Review of Systems:   Review of Systems   All other systems reviewed and are negative.      Objective:     Vital Signs  Temp:  [97.2 °F (36.2 °C)-98.6 °F (37 °C)] 97.4 °F (36.3 °C)  Heart Rate:  [65-84] 65  Resp:  [12-16] 12  BP: (108-149)/(49-74) 113/51   Body mass index is 29.07 kg/m².    Physical Exam  Physical Exam  Constitutional:       General: She is awake.      Appearance: Normal appearance. She is well-developed and well-groomed.   HENT:      Head: Normocephalic and  atraumatic.      Nose: Nose normal.      Mouth/Throat:      Mouth: Mucous membranes are moist.      Pharynx: Oropharynx is clear.   Eyes:      Extraocular Movements: Extraocular movements intact.      Conjunctiva/sclera: Conjunctivae normal.      Pupils: Pupils are equal, round, and reactive to light.   Cardiovascular:      Rate and Rhythm: Normal rate and regular rhythm.      Pulses: Normal pulses.      Heart sounds: Normal heart sounds.   Pulmonary:      Effort: Pulmonary effort is normal.      Breath sounds: Normal breath sounds.   Abdominal:      General: Abdomen is flat. Bowel sounds are normal.      Palpations: Abdomen is soft.   Musculoskeletal:         General: Normal range of motion.      Cervical back: Normal range of motion and neck supple.      Right lower leg: No edema.      Left lower leg: No edema.   Skin:     General: Skin is warm and dry.   Neurological:      General: No focal deficit present.      Mental Status: She is alert and oriented to person, place, and time. Mental status is at baseline.      Cranial Nerves: Cranial nerves 2-12 are intact.      Sensory: Sensation is intact.      Motor: Motor function is intact.   Psychiatric:         Mood and Affect: Mood normal.         Behavior: Behavior normal. Behavior is cooperative.         Thought Content: Thought content normal.         Judgment: Judgment normal.            Diagnostic Data    Results from last 7 days   Lab Units 12/11/24  0512 12/06/24  2312 12/06/24  1446   WBC 10*3/mm3 7.38   < >  --    HEMOGLOBIN g/dL 10.0*   < >  --    HEMATOCRIT % 32.5*   < >  --    PLATELETS 10*3/mm3 265   < >  --    GLUCOSE mg/dL 106*   < > 246*   CREATININE mg/dL 1.05*   < > 2.77*   BUN mg/dL 17   < > 40*   SODIUM mmol/L 142   < > 136   POTASSIUM mmol/L 3.9   < > 3.7   AST (SGOT) U/L  --   --  23   ALT (SGPT) U/L  --   --  9   ALK PHOS U/L  --   --  65   BILIRUBIN mg/dL  --   --  0.5   ANION GAP mmol/L 9.1   < > 14.3    < > = values in this interval not  displayed.       CT Abdomen Pelvis Without Contrast    Result Date: 12/9/2024  Impression: 1.No acute findings within the abdomen or pelvis. 2.4.1 x 3.0 cm right adrenal adenoma. 3.3 mm nonobstructing stone in the left kidney. 4.Left renal cyst. 5.Mild diverticulosis of the sigmoid colon. 6.Tiny fat density umbilical hernia. There is a small hiatal hernia. 7.The exam is limited by noncontrasted technique. Electronically Signed: Darci Okeefe MD  12/9/2024 10:39 AM EST  Workstation ID: [id number]       I reviewed the patient's new clinical results.    Assessment/Plan:     Active and Resolved Problems  Active Hospital Problems    Diagnosis  POA    **REINA (acute kidney injury) [N17.9]  Yes      Resolved Hospital Problems   No resolved problems to display.     Acute kidney injury  Abnormal UA with negative culture  Hypertension   Type 2 diabetes  Coronary artery disease history  Failure to thrive  Chronic immobility syndrome  Generalized weakness    Reviewed laboratory studies from this morning.  Plan of care discussed with patient at bedside.  Await insurance approval for disposition to rehab facility.  Patient will likely benefit from 30 days or less at skilled nursing facility/rehab.  She is agreeable to disposition and/or planning.  Noted urine culture negative.  Blood pressure remained stable on low-dose Norvasc.        VTE Prophylaxis:  Mechanical VTE prophylaxis orders are present.             Disposition Planning:     Barriers to Discharge: Insurance approval for rehab placement  Anticipated Date of Discharge: 12/12/2024  Place of Discharge: Rehab      Time: 35 minutes     Code Status and Medical Interventions: CPR (Attempt to Resuscitate); Full Support   Ordered at: 12/06/24 1726     Code Status (Patient has no pulse and is not breathing):    CPR (Attempt to Resuscitate)     Medical Interventions (Patient has pulse or is breathing):    Full Support       Signature: Electronically signed by Sunny Robert MD,  24, 08:11 EST.  [facility] Hospitalist Team      Electronically signed by Sunny Robert MD at 24 0814       Brenda Sutton MD at 12/10/24 1057              [facility] MEDICINE SERVICE  DAILY PROGRESS NOTE    NAME: Marge Mcghee  : 1955  MRN: [id number]      LOS: 2 days     PROVIDER OF SERVICE: Brenda Sutton MD    Chief Complaint: REINA (acute kidney injury)    Subjective:     Interval History:  History taken from: Patient and patient's chart     Denies any new complaints, wants to go to rehab.         Review of Systems:   Review of Systems  All negative except above   Objective:     Vital Signs  Temp:  [97.6 °F (36.4 °C)] 97.6 °F (36.4 °C)  Heart Rate:  [73-80] 73  Resp:  [14-20] 20  BP: (108-145)/(67-70) 120/70   Body mass index is 29.07 kg/m².    Physical Exam  Physical Exam  General: Alert and oriented, no acute distress.  HENT: Normocephalic, moist oral mucosa, no scleral icterus.  Neck: Supple, nontender, no carotid bruits, no JVD, no LAD.  Lungs: Clear to auscultation, nonlabored respiration.  Heart: RRR, no murmur, gallop or edema.  Abdomen: Soft, nontender, nondistended, + bowel sounds.  Neuro: alert and awake, moving all 4 extremities   Skin: Skin is warm, dry and pink, no rashes or lesions.  Psychiatric: Cooperative, appropriate mood and affect.          Diagnostic Data    Results from last 7 days   Lab Units 12/10/24  0124 24  2312 24  1446   WBC 10*3/mm3 7.79   < >  --    HEMOGLOBIN g/dL 10.3*   < >  --    HEMATOCRIT % 31.4*   < >  --    PLATELETS 10*3/mm3 244   < >  --    GLUCOSE mg/dL 227*   < > 246*   CREATININE mg/dL 1.16*   < > 2.77*   BUN mg/dL 19   < > 40*   SODIUM mmol/L 140   < > 136   POTASSIUM mmol/L 4.1   < > 3.7   AST (SGOT) U/L  --   --  23   ALT (SGPT) U/L  --   --  9   ALK PHOS U/L  --   --  65   BILIRUBIN mg/dL  --   --  0.5   ANION GAP mmol/L 8.4   < > 14.3    < > = values in this interval not displayed.       CT Abdomen  "Pelvis Without Contrast    Result Date: 12/9/2024  Impression: 1.No acute findings within the abdomen or pelvis. 2.4.1 x 3.0 cm right adrenal adenoma. 3.3 mm nonobstructing stone in the left kidney. 4.Left renal cyst. 5.Mild diverticulosis of the sigmoid colon. 6.Tiny fat density umbilical hernia. There is a small hiatal hernia. 7.The exam is limited by noncontrasted technique. Electronically Signed: Darci Okeefe MD  12/9/2024 10:39 AM EST  Workstation ID: [id number]       I have reviewed patient labs and imaging     Assessment/Plan:   Marge Mcghee is a 69 y.o. female with a CMH of HTN, adrenal nodule, HLD, GERD, T2DM, RLS, OA, CAD s/p CABG who presented to [facility] on 12/6/2024 with generalized weakness.     Active and Resolved Problems  # REINA  -Creatinine 2.77, BUN 40. Creatinine 1 week ago was 0.89.   -Patient was on lisinopril prior to admission which she has been on since October. Hold ACE-I/ARBs  -Suspect pre-renal etiology/ATN from vomiting, ACE-I and possibly hypotension (had documented BP of 86/45 while in ED).   -renal US- No hydronephrosis, small left renal cyst  -uric acid- 7.1  -continuous NS    -Avoid hypotension  -IMPROVING, today's Cr is 1.16  -Renal following      # UTI  -UA with TNTC WBC and 1+ leukocytes, no bacteria.  Sample does appear to be contaminated based on number of squamous epithelial cells.  -Ucx negative      # HTN  -Had episode of hypotension earlier and some low normal BP readings therefore will hold antihypertensives for now  -monitor BP and adjust medications accordingly      # T2DM with hyperglycemia  -continue Lantus 15 units daily  -SSI  -Hold metformin  -CCD  -Hypoglycemia protocol in place     # CAD  -Continue asa/Plavix      #constipation  - bowel regimen PRN    # Adrenal adenoma  # renal cyst  - incidental finding on CT abdomen  - follow up PCP/Endo as outpatient     \"Patient will benefit from 30 days or less of SNF/Rehab placement\"       VTE " Prophylaxis:  Mechanical VTE prophylaxis orders are present.             Disposition Planning:     Barriers to Discharge: pending safe disposition   Anticipated Date of Discharge: 12/11  Place of Discharge: rehab       Time: 40 minutes     Code Status and Medical Interventions: CPR (Attempt to Resuscitate); Full Support   Ordered at: 12/06/24 1726     Code Status (Patient has no pulse and is not breathing):    CPR (Attempt to Resuscitate)     Medical Interventions (Patient has pulse or is breathing):    Full Support       Signature: Electronically signed by Brenda Sutton MD, 12/10/24, 10:57 EST.  [facility] Hospitalist Team    Electronically signed by Brenda Sutton MD at 12/10/24 1135       Maikol Jones MD at 12/10/24 0810          RENAL/KCC:     LOS: 2 days   Patient Care Team:  Traci Townsend APRN as PCP - General (Nurse Practitioner)  Drake Hinton MD PhD as Consulting Physician (Ophthalmology)  Fausto Gilliam MD as Consulting Physician (Endocrinology)  Saloni Ch MD (Ophthalmology)  Mikhail Kaplan Jr., MD as Consulting Physician (Urology)    Chief Complaint:  REINA    Subjective     Interval History:   Chart reviewed  Non-oliguric  No new complaints    Objective     Vital Signs  Temp:  [97.6 °F (36.4 °C)] 97.6 °F (36.4 °C)  Heart Rate:  [76-80] 76  Resp:  [14-20] 20  BP: (108-145)/(67-68) 123/68    Physical Exam:  GEN: Awake, NAD  ENT: PERRL, EOMI, MMM  NECK: Supple, no JVD  CHEST: CTAB, no W/R/C  CV: RRR, no M/G/R  ABD: Soft, NT, +BS  SKIN: Warm and Dry  NEURO: CN's intact       Results Review:     I reviewed the patient's new clinical results.    Medication Review: Reviewed    Assessment & Plan     REINA  Hyponatremia  UTI  Diarrhea    Plan: Cr improved to 1.16.  Lytes and volume OK.  Encourage PO fluid and avoid nephrotoxins.  Will follow.      Maikol Jones MD  [facility]  12/10/24  08:10 EST            Electronically signed  by Maikol Jones MD at 12/10/24 1014       Brenda Sutton MD at 24 1617              [facility] MEDICINE SERVICE  DAILY PROGRESS NOTE    NAME: Marge Mcghee  : 1955  MRN: [id number]      LOS: 1 day     PROVIDER OF SERVICE: Brenda Sutton MD    Chief Complaint: REINA (acute kidney injury)    Subjective:     Interval History:  History taken from: Patient and patient's chart     Diarrhea resolved. Complaining of nausea, vomiting and constipation. Has history of RLS.        Review of Systems:   Review of Systems  All negative except above   Objective:     Vital Signs  Temp:  [97.4 °F (36.3 °C)-99.1 °F (37.3 °C)] 97.4 °F (36.3 °C)  Heart Rate:  [80-82] 80  Resp:  [14-17] 16  BP: (108-162)/(68-81) 108/68   Body mass index is 29.07 kg/m².    Physical Exam  Physical Exam  General: Alert and oriented, no acute distress.  HENT: Normocephalic, moist oral mucosa, no scleral icterus.  Neck: Supple, nontender, no carotid bruits, no JVD, no LAD.  Lungs: Clear to auscultation, nonlabored respiration.  Heart: RRR, no murmur, gallop or edema.  Abdomen: Soft, nontender, nondistended, + bowel sounds.  Neuro: alert and awake, moving all 4 extremities   Skin: Skin is warm, dry and pink, no rashes or lesions.  Psychiatric: Cooperative, appropriate mood and affect.          Diagnostic Data    Results from last 7 days   Lab Units 24  0122 24  2312 24  1446   WBC 10*3/mm3 7.22   < >  --    HEMOGLOBIN g/dL 10.3*   < >  --    HEMATOCRIT % 32.5*   < >  --    PLATELETS 10*3/mm3 253   < >  --    GLUCOSE mg/dL 236*   < > 246*   CREATININE mg/dL 1.28*   < > 2.77*   BUN mg/dL 24*   < > 40*   SODIUM mmol/L 141   < > 136   POTASSIUM mmol/L 3.9   < > 3.7   AST (SGOT) U/L  --   --  23   ALT (SGPT) U/L  --   --  9   ALK PHOS U/L  --   --  65   BILIRUBIN mg/dL  --   --  0.5   ANION GAP mmol/L 8.7   < > 14.3    < > = values in this interval not displayed.       CT Abdomen Pelvis Without  Contrast    Result Date: 12/9/2024  Impression: 1.No acute findings within the abdomen or pelvis. 2.4.1 x 3.0 cm right adrenal adenoma. 3.3 mm nonobstructing stone in the left kidney. 4.Left renal cyst. 5.Mild diverticulosis of the sigmoid colon. 6.Tiny fat density umbilical hernia. There is a small hiatal hernia. 7.The exam is limited by noncontrasted technique. Electronically Signed: Darci Okeefe MD  12/9/2024 10:39 AM EST  Workstation ID: [id number]       I have reviewed patient labs and imaging     Assessment/Plan:   Marge Mcghee is a 69 y.o. female with a CMH of HTN, adrenal nodule, HLD, GERD, T2DM, RLS, OA, CAD s/p CABG who presented to [facility] on 12/6/2024 with generalized weakness.     Active and Resolved Problems  # REINA  -Creatinine 2.77, BUN 40. Creatinine 1 week ago was 0.89.   -Patient was on lisinopril prior to admission which she has been on since October. Hold ACE-I/ARBs  -Suspect pre-renal etiology/ATN from vomiting, ACE-I and possibly hypotension (had documented BP of 86/45 while in ED).   -renal US- No hydronephrosis, small left renal cyst  -uric acid- 7.1  -continuous NS    -Avoid hypotension  -IMPROVING, today's Cr is 1.28  -Renal following      # UTI  -UA with TNTC WBC and 1+ leukocytes, no bacteria.  Sample does appear to be contaminated based on number of squamous epithelial cells.  -Ucx negative      # HTN  -Had episode of hypotension earlier and some low normal BP readings therefore will hold antihypertensives for now  -monitor BP and adjust medications accordingly      # T2DM with hyperglycemia  -continue Lantus 15 units daily  -SSI  -Hold metformin  -CCD  -Hypoglycemia protocol in place     # CAD  -Continue asa/Plavix      #constipation  - bowel regimen PRN    # Adrenal adenoma  # renal cyst  - incidental finding on CT abdomen  - follow up PCP/Endo as outpatient        VTE Prophylaxis:  Mechanical VTE prophylaxis orders are present.             Disposition Planning:      Barriers to Discharge: medical clearance   Anticipated Date of Discharge: 12/10  Place of Discharge: home vs rehab       Time: 40 minutes     Code Status and Medical Interventions: CPR (Attempt to Resuscitate); Full Support   Ordered at: 12/06/24 1726     Code Status (Patient has no pulse and is not breathing):    CPR (Attempt to Resuscitate)     Medical Interventions (Patient has pulse or is breathing):    Full Support       Signature: Electronically signed by Brenda Sutton MD, 12/09/24, 16:17 EST.  [facility] Hospitalist Team    Electronically signed by Brenda Sutton MD at 12/09/24 1620       Consult Notes (last 48 hours)  Notes from 12/09/24 1300 through 12/11/24 1300   No notes of this type exist for this encounter.

## 2024-12-11 NOTE — THERAPY TREATMENT NOTE
"Subjective: Pt agreeable to therapeutic plan of care.    Objective:     Bed mobility - Modified-Independent  Transfers - SBA and with rolling walker  Ambulation - 30 x2 feet CGA, Min-A, and with rolling walker    Therapeutic Exercise - Pt educated on ther ex throughout day    Vitals: WNL    Pain: Pt expressed pain in feet but did not rate  Intervention for pain: Repositioned, Increased Activity, and Therapeutic Presence    Education: Provided education on the importance of mobility in the acute care setting, Verbal/Tactile Cues, Transfer Training, Gait Training, Energy conservation strategies, and HEP    Assessment: Marge Mcghee presents with functional mobility impairments which indicate the need for skilled intervention. Pt without any LOB or unsteadiness during gait utilizing RW. Pt required standing rest break due to feeling weak and expressing needing to focus in order to make it back to room. Pt safely returned to bed. Tolerating session today without incident. Will continue to follow and progress as tolerated.     Plan/Recommendations:   If medically appropriate, Moderate Intensity Therapy recommended post-acute care. This is recommended as therapy feels the patient would require 3-4 days per week and wouldn't tolerate \"3 hour daily\" rehab intensity. SNF would be the preferred choice. If the patient does not agree to SNF, arrange HH or OP depending on home bound status. If patient is medically complex, consider LTACH. Pt requires no DME at discharge.     Pt desires Skilled Rehab placement at discharge. Pt cooperative; agreeable to therapeutic recommendations and plan of care.         Basic Mobility 6-click:  Rollin = Total, A lot = 2, A little = 3; 4 = None  Supine>Sit:   1 = Total, A lot = 2, A little = 3; 4 = None   Sit>Stand with arms:  1 = Total, A lot = 2, A little = 3; 4 = None  Bed>Chair:   1 = Total, A lot = 2, A little = 3; 4 = None  Ambulate in room:  1 = Total, A lot = 2, A little = " 3; 4 = None  3-5 Steps with railin = Total, A lot = 2, A little = 3; 4 = None  Score: 18    Modified Gamaliel: N/A = No pre-op stroke/TIA    Post-Tx Position: Supine with HOB Elevated, Alarms activated, and Call light and personal items within reach  PPE: gloves and surgical mask    Therapy Charges for Today       Code Description Service Date Service Provider Modifiers Qty    [id number] HC GAIT TRAINING EA 15 MIN 2024 Inocencio Ivan PTA GP 1           PT Charges       Row Name 24 1529             Time Calculation    Start Time 1506  -GE      Stop Time 1518  -GE      Time Calculation (min) 12 min  -GE      PT Received On 24  -GE      PT - Next Appointment 24  -GE         Time Calculation- PT    Total Timed Code Minutes- PT 12 minute(s)  -GE                User Key  (r) = Recorded By, (t) = Taken By, (c) = Cosigned By      Initials Name Provider Type    Inocencio Devine PTA Physical Therapist Assistant

## 2024-12-12 VITALS
HEART RATE: 71 BPM | HEIGHT: 62 IN | SYSTOLIC BLOOD PRESSURE: 129 MMHG | DIASTOLIC BLOOD PRESSURE: 39 MMHG | BODY MASS INDEX: 29.25 KG/M2 | TEMPERATURE: 97.4 F | OXYGEN SATURATION: 96 % | WEIGHT: 158.95 LBS | RESPIRATION RATE: 10 BRPM

## 2024-12-12 LAB
ANION GAP SERPL CALCULATED.3IONS-SCNC: 9.4 MMOL/L (ref 5–15)
BASOPHILS # BLD AUTO: 0.08 10*3/MM3 (ref 0–0.2)
BASOPHILS NFR BLD AUTO: 1.1 % (ref 0–1.5)
BUN SERPL-MCNC: 22 MG/DL (ref 8–23)
BUN/CREAT SERPL: 18.8 (ref 7–25)
CALCIUM SPEC-SCNC: 8.5 MG/DL (ref 8.6–10.5)
CHLORIDE SERPL-SCNC: 107 MMOL/L (ref 98–107)
CO2 SERPL-SCNC: 24.6 MMOL/L (ref 22–29)
CREAT SERPL-MCNC: 1.17 MG/DL (ref 0.57–1)
DEPRECATED RDW RBC AUTO: 41.3 FL (ref 37–54)
EGFRCR SERPLBLD CKD-EPI 2021: 50 ML/MIN/1.73
EOSINOPHIL # BLD AUTO: 0.25 10*3/MM3 (ref 0–0.4)
EOSINOPHIL NFR BLD AUTO: 3.3 % (ref 0.3–6.2)
ERYTHROCYTE [DISTWIDTH] IN BLOOD BY AUTOMATED COUNT: 12.3 % (ref 12.3–15.4)
GLUCOSE BLDC GLUCOMTR-MCNC: 132 MG/DL (ref 70–105)
GLUCOSE BLDC GLUCOMTR-MCNC: 168 MG/DL (ref 70–105)
GLUCOSE BLDC GLUCOMTR-MCNC: 181 MG/DL (ref 70–105)
GLUCOSE SERPL-MCNC: 148 MG/DL (ref 65–99)
HCT VFR BLD AUTO: 31 % (ref 34–46.6)
HGB BLD-MCNC: 9.7 G/DL (ref 12–15.9)
IMM GRANULOCYTES # BLD AUTO: 0.02 10*3/MM3 (ref 0–0.05)
IMM GRANULOCYTES NFR BLD AUTO: 0.3 % (ref 0–0.5)
LYMPHOCYTES # BLD AUTO: 3.48 10*3/MM3 (ref 0.7–3.1)
LYMPHOCYTES NFR BLD AUTO: 45.7 % (ref 19.6–45.3)
MCH RBC QN AUTO: 28.7 PG (ref 26.6–33)
MCHC RBC AUTO-ENTMCNC: 31.3 G/DL (ref 31.5–35.7)
MCV RBC AUTO: 91.7 FL (ref 79–97)
MONOCYTES # BLD AUTO: 0.69 10*3/MM3 (ref 0.1–0.9)
MONOCYTES NFR BLD AUTO: 9.1 % (ref 5–12)
NEUTROPHILS NFR BLD AUTO: 3.09 10*3/MM3 (ref 1.7–7)
NEUTROPHILS NFR BLD AUTO: 40.5 % (ref 42.7–76)
NRBC BLD AUTO-RTO: 0 /100 WBC (ref 0–0.2)
PLATELET # BLD AUTO: 270 10*3/MM3 (ref 140–450)
PMV BLD AUTO: 9.6 FL (ref 6–12)
POTASSIUM SERPL-SCNC: 3.8 MMOL/L (ref 3.5–5.2)
RBC # BLD AUTO: 3.38 10*6/MM3 (ref 3.77–5.28)
SODIUM SERPL-SCNC: 141 MMOL/L (ref 136–145)
WBC NRBC COR # BLD AUTO: 7.61 10*3/MM3 (ref 3.4–10.8)

## 2024-12-12 PROCEDURE — 63710000001 INSULIN GLARGINE PER 5 UNITS

## 2024-12-12 PROCEDURE — 82948 REAGENT STRIP/BLOOD GLUCOSE: CPT

## 2024-12-12 PROCEDURE — 85025 COMPLETE CBC W/AUTO DIFF WBC: CPT | Performed by: INTERNAL MEDICINE

## 2024-12-12 PROCEDURE — 97530 THERAPEUTIC ACTIVITIES: CPT

## 2024-12-12 PROCEDURE — 63710000001 INSULIN LISPRO (HUMAN) PER 5 UNITS

## 2024-12-12 PROCEDURE — 97535 SELF CARE MNGMENT TRAINING: CPT

## 2024-12-12 PROCEDURE — 80048 BASIC METABOLIC PNL TOTAL CA: CPT | Performed by: INTERNAL MEDICINE

## 2024-12-12 RX ORDER — GABAPENTIN 600 MG/1
600 TABLET ORAL DAILY
Qty: 3 TABLET | Refills: 0 | Status: SHIPPED | OUTPATIENT
Start: 2024-12-12 | End: 2024-12-15

## 2024-12-12 RX ORDER — AMLODIPINE BESYLATE 2.5 MG/1
2.5 TABLET ORAL DAILY
Status: DISCONTINUED | OUTPATIENT
Start: 2024-12-12 | End: 2024-12-12 | Stop reason: HOSPADM

## 2024-12-12 RX ORDER — AMLODIPINE BESYLATE 2.5 MG/1
2.5 TABLET ORAL DAILY
Qty: 30 TABLET | Refills: 0 | Status: SHIPPED | OUTPATIENT
Start: 2024-12-13 | End: 2025-01-12

## 2024-12-12 RX ADMIN — AMLODIPINE BESYLATE 2.5 MG: 2.5 TABLET ORAL at 09:01

## 2024-12-12 RX ADMIN — GABAPENTIN 200 MG: 100 CAPSULE ORAL at 09:01

## 2024-12-12 RX ADMIN — PANTOPRAZOLE SODIUM 40 MG: 40 TABLET, DELAYED RELEASE ORAL at 09:02

## 2024-12-12 RX ADMIN — INSULIN GLARGINE-YFGN 15 UNITS: 100 INJECTION, SOLUTION SUBCUTANEOUS at 09:02

## 2024-12-12 RX ADMIN — CLOPIDOGREL BISULFATE 75 MG: 75 TABLET ORAL at 09:02

## 2024-12-12 RX ADMIN — Medication 10 ML: at 09:02

## 2024-12-12 RX ADMIN — LIDOCAINE 2 PATCH: 4 PATCH TOPICAL at 09:02

## 2024-12-12 RX ADMIN — ASPIRIN 81 MG: 81 TABLET, COATED ORAL at 09:02

## 2024-12-12 RX ADMIN — INSULIN LISPRO 2 UNITS: 100 INJECTION, SOLUTION INTRAVENOUS; SUBCUTANEOUS at 11:57

## 2024-12-12 NOTE — SIGNIFICANT NOTE
Case Management/Social Work    Patient Name:  Marge Mcghee  YOB: 1955  MRN: [id number]  Admit Date:  12/6/2024 12/12/24 1442   Post Acute Pre-Cert Documentation   Date Post Acute Pre-Cert Completed 12/12/24   All Clinicals Submitted? Yes   Response Received from Insurance? Approval   Post Acute Pre-Cert Initiated Comment BEN verified SNF precert approval via [employer]. Reference number [id number]. Valid 12/12-12/18. CM made aware.           Electronically signed by:  Tong Falcon CMA  12/12/24 14:43 EST    Tong Falcon  Case Management Associate  [address] [address]  P: [phone]  F: [phone]

## 2024-12-12 NOTE — DISCHARGE SUMMARY
"[facility] Medicine Services  Discharge Summary    Date of Service: 2024  Patient Name: Marge Mcghee  : 1955  MRN: [id number]    Date of Admission: 2024  Discharge Diagnosis: REINA (acute kidney injury)  Date of Discharge: 2024  Primary Care Physician: Traci Townsend APRN      Presenting Problem:   REINA (acute kidney injury) [N17.9]  Urinary tract infection without hematuria, site unspecified [N39.0]    Active and Resolved Hospital Problems:  Active Hospital Problems    Diagnosis POA    **REINA (acute kidney injury) [N17.9] Yes      Resolved Hospital Problems   No resolved problems to display.         Hospital Course     HPI:    \"Marge Mcghee is a 69 y.o. female with a CMH of HTN, adrenal nodule, HLD, GERD, T2DM, RLS, OA, CAD s/p CABG who presented to [facility] on 2024 with generalized weakness.     Patient presented with vague symptomatology.  She states she started to feel unwell yesterday. Today she decided to come to the ED because she felt like her legs were too weak to ambulate.  She reports having a persistent left sided headache that goes into her left neck. She has associated photophobia, nausea and vomiting. This morning she had a brief episode of left sided chest pressure that began when she was sitting down. The pain did not radiate. Since presenting to the hospital, she now has a slight sore throat.  She denies any fevers, chills, cough, SOA, palpitations, abd pain, diarrhea, constipation, dysuria, hematuria, decreased urination, urinary urgency/frequency, flank pain, peripheral edema, focal weakness, paresthesias, vision changes, dizziness, dysphagia, dysarthria, syncope, near-syncope or other acute symptoms at this time. She was recently started on lisinopril in October.  She has not had IV contrast exposure.  She denies usage of ibuprofen or other NSAIDs. Of note, she was recently admitted here from - with HHS, syncope and " "UTI.     ED course: Vitals 97.4-96-17-94/50-96% on room air. Labs remarkable for creatinine 2.77, BUN 40, glucose 246, troponin 27-20, WBC 13.78. UA with 1+ ketones, 1+ leukocyte esterase, 6-10 RBC, TNTC WBC, presence of squamous epithelial cells and renal epithelial cells. CTH was negative. She was given IVFs.  She was admitted for further management.\"    Hospital Course:  Patient was admitted for management of REINA, possible UTI and blood pressure management.  Patient creatinine on arrival was 2.77, BUN 40.  It was found that patient baseline creatinine was less than 1.0.  Renal ultrasound was ordered which showed no hydronephrosis and a small left renal cyst:, nephrology was consulted and patient was started on IV fluids.  Patient's lisinopril was discontinued on arrival, Norvasc was held until blood pressure was improved.  Patient's labs were monitored throughout hospital stay daily, nephrology followed and helped manage.  At this time, patient creatinine is 1.17.    Patient originally thought to have urinary tract infection however urine culture was resulted as negative.    Patient blood pressure was hypotensive on arrival therefore medications were held or discontinued.  Nephrology restarted patient on amlodipine at a lower dose.  Patient's blood pressure remained stable on low-dose Norvasc which is what will be continued outpatient.    PT OT worked with patient and recommend skilled nursing facility upon discharge for safe disposition, patient was agreeable.  Case management worked on placement and patient has a bed available today.  Patient is medically stable for discharge with close outpatient follow-up and monitoring.      DISCHARGE Follow Up Recommendations for labs and diagnostics: PCP in 2 to 3 days for labs, BMP to monitor creatinine        Day of Discharge     Vital Signs:  Temp:  [97.2 °F (36.2 °C)-97.9 °F (36.6 °C)] 97.5 °F (36.4 °C)  Heart Rate:  [69-91] 74  Resp:  [12-19] 12  BP: ()/(37-72) " 104/51    Physical Exam:  Physical Exam   Please see physical exam on progress note from today by Sunny Robert MD      Pertinent  and/or Most Recent Results     LAB RESULTS:      Lab 12/12/24 0210 12/11/24  0512 12/10/24  0124 12/09/24  0122 12/08/24 0213 12/06/24  2312 12/06/24  1332   WBC 7.61 7.38 7.79 7.22 8.94 10.37 13.78*   HEMOGLOBIN 9.7* 10.0* 10.3* 10.3* 9.9* 11.4* 13.2   HEMATOCRIT 31.0* 32.5* 31.4* 32.5* 32.2* 35.3 40.9   PLATELETS 270 265 244 253 239 266 305   NEUTROS ABS 3.09 3.01 3.49 3.58 3.99 5.58 8.87*   IMMATURE GRANS (ABS) 0.02 0.02 0.01 0.01 0.02 0.03 0.04   LYMPHS ABS 3.48* 3.40* 3.29* 2.73 3.70* 3.61* 3.43*   MONOS ABS 0.69 0.62 0.63 0.56 0.84 0.88 1.21*   EOS ABS 0.25 0.25 0.29 0.28 0.31 0.19 0.14   MCV 91.7 91.0 91.8 91.3 91.2 91.9 89.5   SED RATE  --   --   --   --   --   --  49*   LACTATE  --   --   --   --   --  2.0  --    PROTIME  --   --   --   --   --   --  13.9   APTT  --   --   --   --   --   --  25.8         Lab 12/12/24  0210 12/11/24  0512 12/10/24  0124 12/09/24  0122 12/08/24  0213 12/07/24  0606 12/06/24  2312   SODIUM 141 142 140 141 138  --  135*   POTASSIUM 3.8 3.9 4.1 3.9 3.7   < > 3.6   CHLORIDE 107 107 106 105 104  --  96*   CO2 24.6 25.9 25.6 27.3 26.0  --  28.3   ANION GAP 9.4 9.1 8.4 8.7 8.0  --  10.7   BUN 22 17 19 24* 31*  --  39*   CREATININE 1.17* 1.05* 1.16* 1.28* 1.40*  --  2.26*   EGFR 50.0* 56.9* 50.5* 44.9* 40.3*  --  22.7*   GLUCOSE 148* 106* 227* 236* 88  --  289*   CALCIUM 8.5* 9.0 8.7 8.8 8.8  --  10.1   MAGNESIUM  --   --   --  1.6 1.6  --  1.9   PHOSPHORUS  --   --   --  2.4* 2.7  --  4.5    < > = values in this interval not displayed.         Lab 12/06/24  1446   TOTAL PROTEIN 7.0   ALBUMIN 4.0   GLOBULIN 3.0   ALT (SGPT) 9   AST (SGOT) 23   BILIRUBIN 0.5   ALK PHOS 65         Lab 12/06/24  1639 12/06/24  1446 12/06/24  1332   HSTROP T 20* 27*  --    PROTIME  --   --  13.9   INR  --   --  1.05             Lab 12/09/24  0122   IRON 34*   IRON SATURATION  (TSAT) 12*   TIBC 274*   TRANSFERRIN 184*         Brief Urine Lab Results  (Last result in the past 365 days)        Color   Clarity   Blood   Leuk Est   Nitrite   Protein   CREAT   Urine HCG        12/07/24 2341             120.3               Microbiology Results (last 10 days)       Procedure Component Value - Date/Time    Eosinophil Smear - Urine, Urine, Clean Catch [[id number]]  (Normal) Collected: 12/07/24 2341    Lab Status: Final result Specimen: Urine, Clean Catch Updated: 12/08/24 0045     Eosinophil Smear 0 % EOS/100 Cells     Blood Culture - Blood, Arm, Right [[id number]]  (Normal) Collected: 12/06/24 2312    Lab Status: Final result Specimen: Blood from Arm, Right Updated: 12/11/24 2330     Blood Culture No growth at 5 days    Narrative:      Less than seven (7) mL's of blood was collected.  Insufficient quantity may yield false negative results.    Blood Culture - Blood, Arm, Left [[id number]]  (Normal) Collected: 12/06/24 2243    Lab Status: Final result Specimen: Blood from Arm, Left Updated: 12/11/24 2301     Blood Culture No growth at 5 days    Narrative:      Less than seven (7) mL's of blood was collected.  Insufficient quantity may yield false negative results.    Urine Culture - Urine, Urine, Clean Catch [[id number]] Collected: 12/06/24 1357    Lab Status: Final result Specimen: Urine, Clean Catch Updated: 12/08/24 1031     Urine Culture 50,000 CFU/mL Normal Urogenital Tammy    Narrative:      Colonization of the urinary tract without infection is common. Treatment is discouraged unless the patient is symptomatic, pregnant, or undergoing an invasive urologic procedure.            CT Abdomen Pelvis Without Contrast    Result Date: 12/9/2024  Impression: Impression: 1.No acute findings within the abdomen or pelvis. 2.4.1 x 3.0 cm right adrenal adenoma. 3.3 mm nonobstructing stone in the left kidney. 4.Left renal cyst. 5.Mild diverticulosis of the sigmoid colon. 6.Tiny fat density umbilical hernia.  There is a small hiatal hernia. 7.The exam is limited by noncontrasted technique. Electronically Signed: Darci Okeefe MD  12/9/2024 10:39 AM EST  Workstation ID: [id number]    US Renal Bilateral    Result Date: 12/7/2024  Impression: Impression: No hydronephrosis. Small left renal cyst. Electronically Signed: Netta Cartwright MD  12/7/2024 8:47 AM EST  Workstation ID: [id number]    CT Head Without Contrast    Result Date: 12/6/2024  Impression: Impression: No acute intracranial findings by CT. Chronic findings stable from prior. Electronically Signed: Chung Holland MD  12/6/2024 1:50 PM EST  Workstation ID: [id number]    XR Sacrum & Coccyx    Result Date: 11/29/2024  Impression: Impression: No acute abnormality of the sacrum or coccyx is identified. Electronically Signed: Blanca Glass MD  11/29/2024 2:37 PM EST  Workstation ID: [id number]    CT Head Without Contrast Stroke Protocol    Result Date: 11/28/2024  Impression: 1.No evidence for acute intracranial abnormality. 2.Nonspecific white matter changes are noted with associated diffuse volume loss. These findings are likely related to chronic small vessel ischemic changes and/or age-related changes. Electronically Signed: Wes Means MD  11/28/2024 3:42 PM EST  Workstation ID: [id number]    XR Chest 1 View    Result Date: 11/28/2024  Impression: Impression: No acute process. Electronically Signed: Honorio Olguin MD  11/28/2024 3:30 PM EST  Workstation ID: [id number]     Results for orders placed during the hospital encounter of 09/25/22    Bilateral Carotid Duplex    Interpretation Summary  · Proximal right internal carotid artery is normal.  · Proximal left internal carotid artery is normal.      Results for orders placed during the hospital encounter of 09/25/22    Bilateral Carotid Duplex    Interpretation Summary  · Proximal right internal carotid artery is normal.  · Proximal left internal carotid artery is normal.      Results for orders placed during the hospital  encounter of 08/01/23    Adult Transthoracic Echo Complete W/ Cont if Necessary Per Protocol    Interpretation Summary    Left ventricular systolic function is normal. Left ventricular ejection fraction appears to be 61 - 65%.    Left ventricular wall thickness is consistent with mild concentric hypertrophy.    Left ventricular diastolic function was normal.    The left atrial cavity is mildly dilated.    Estimated right ventricular systolic pressure from tricuspid regurgitation is mildly elevated (35-45 mmHg). Calculated right ventricular systolic pressure from tricuspid regurgitation is 35 mmHg.      Labs Pending at Discharge:  Pending Results       None            Procedures Performed           Consults:   Consults       Date and Time Order Name Status Description    12/6/2024  5:25 PM Inpatient Nephrology Consult Completed     12/6/2024  3:57 PM Hospitalist (on-call MD unless specified)                Discharge Details        Discharge Medications        Changes to Medications        Instructions Start Date   amLODIPine 2.5 MG tablet  Commonly known as: NORVASC  What changed:   medication strength  how much to take   2.5 mg, Oral, Daily   Start Date: December 13, 2024            Continue These Medications        Instructions Start Date   Accu-Chek FastClix Lancets misc   1 each, Not Applicable, 3 Times Daily Before Meals, Dx code: E11.65      Accu-Chek Softclix Lancets lancets   1 each, Other, 3 Times Daily Before Meals, Use as instructed Dx code: E11.65      Accu-Chek Guide Me w/Device kit   1 each, Not Applicable, 3 Times Daily Before Meals, Dx code: E11.65  NDC [address]-47  Bin#: [id number] Group #: [id number]  ID #: [id number]  Issuer #: [id number])      acetaminophen 325 MG tablet  Commonly known as: TYLENOL   650 mg, Every 4 Hours PRN      aspirin 81 MG EC tablet   1 tablet, Daily      atorvastatin 80 MG tablet  Commonly known as: LIPITOR   80 mg, Oral, Nightly      clopidogrel 75 MG tablet  Commonly known as:  PLAVIX   75 mg, Daily      DULoxetine 60 MG capsule  Commonly known as: CYMBALTA   1 capsule, Daily      gabapentin 600 MG tablet  Commonly known as: NEURONTIN   600 mg, See Admin Instructions      Insulin Glargine 100 UNIT/ML injection pen  Commonly known as: LANTUS SOLOSTAR   15 Units, Subcutaneous, Daily, Dx code: E11.65      multivitamin with minerals tablet tablet   1 tablet, Daily      Pen Needles 32G X 4 MM misc   1 each, Not Applicable, Nightly, Dx code: E11.65      Prolia 60 MG/ML solution prefilled syringe syringe  Generic drug: denosumab   60 mg, Subcutaneous, Once      traZODone 50 MG tablet  Commonly known as: DESYREL   1 tablet, Nightly             Stop These Medications      lisinopril 5 MG tablet  Commonly known as: PRINIVIL,ZESTRIL              Allergies   Allergen Reactions    Zofran [Ondansetron Hcl] Nausea And Vomiting     Does the opposite of its purpose    Prochlorperazine Other (See Comments)     CAUSED SEIZURE    Prochlorperazine Edisylate Hives    Hydralazine Itching and Rash    Hydralazine Hcl Itching and Rash    Meperidine Itching and Swelling    Prochlorperazine Maleate Other (See Comments)     CAUSES SEIZURE    Prochlorperazine Maleate Irritability         Discharge Disposition:   Skilled Nursing Facility (DC - External)    Diet:  Hospital:  Diet Order   Procedures    Diet: Diabetic, Cardiac; Healthy Heart (2-3 Na+); Consistent Carbohydrate; Fluid Consistency: Thin (IDDSI 0)         Discharge Activity:         CODE STATUS:  Code Status and Medical Interventions: CPR (Attempt to Resuscitate); Full Support   Ordered at: 12/06/24 2057     Code Status (Patient has no pulse and is not breathing):    CPR (Attempt to Resuscitate)     Medical Interventions (Patient has pulse or is breathing):    Full Support         No future appointments.    Additional Instructions for the Follow-ups that You Need to Schedule       Discharge Follow-up with PCP   As directed       Currently Documented PCP:     Traci Townsend APRN    PCP Phone Number:    [phone]     Follow Up Details: 2-3 days                Time spent on Discharge including face to face service:  45 minutes    Signature: Electronically signed by CANDIDO Sanchez, 12/12/24, 15:45 EST.  [facility]ist Team

## 2024-12-12 NOTE — DISCHARGE PLACEMENT REQUEST
"Maxwell Moreno (69 y.o. Female)       Date of Birth   1955    Social Security Number       Address   [address] [place] [address] [place] [facility]    Home Phone   [phone]    MRN   [id number]       Jehovah's witness   None    Marital Status                               Admission Date   12/6/24    Admission Type   Emergency    Admitting Provider   Sunny Robert MD    Attending Provider   Sunny Robert MD    Department, Room/Bed   [facility] INPATIENT, 300/1       Discharge Date       Discharge Disposition       Discharge Destination                                 Attending Provider: Sunny Robert MD    Allergies: Zofran [Ondansetron Hcl], Prochlorperazine, Prochlorperazine Edisylate, Hydralazine, Hydralazine Hcl, Meperidine, Prochlorperazine Maleate, Prochlorperazine Maleate    Isolation: None   Infection: None   Code Status: CPR    Ht: 157.5 cm (62\")   Wt: 72.1 kg (158 lb 15.2 oz)    Admission Cmt: None   Principal Problem: REINA (acute kidney injury) [N17.9]                   Active Insurance as of 12/6/2024       Primary Coverage       Payor Plan Insurance Group Employer/Plan Group    AETNA COMMERCIAL AETNA [id number]       Payor Plan Address Payor Plan Phone Number Payor Plan Fax Number Effective Dates    [address] [phone]  1/1/2024 - None Entered    [place] [address]         Subscriber Name Subscriber Birth Date Member ID       GRADY MORENO 3/15/1952 [id number]               Secondary Coverage       Payor Plan Insurance Group Employer/Plan Group    HUMANA MEDICARE REPLACEMENT HUMANA MED ADV SNP HMO [id number]       Payor Plan Address Payor Plan Phone Number Payor Plan Fax Number Effective Dates    [address] [address] [phone]  10/1/2024 - None Entered    [facility] [id number]         Subscriber Name Subscriber Birth Date Member ID       MAXWELL MORENO 1955 [id number]               Tertiary Coverage       Payor Plan Insurance Group Employer/Plan Group    " GPM LIFE GPM LIFE MC SUP        Payor Plan Address Payor Plan Phone Number Payor Plan Fax Number Effective Dates    [address]   2019 - None Entered    [facility] [id number]         Subscriber Name Subscriber Birth Date Member ID       MARGE MORENO 1955 [id number]                     Emergency Contacts        (Rel.) Home Phone Work Phone Mobile Phone    Kimberly Salinas (POA) (Daughter) -- -- [phone]    SILVIO MORENO (Spouse) -- -- [phone]    Tonia Rockwell (Friend) [phone] -- --                 History & Physical        Isabel Aragon PA-C at 24 1727       Attestation signed by Miguel Mendez MD at 12/10/24 1140    I have reviewed this documentation and agree.                      [facility] Medicine Services  History & Physical    Patient Name: Marge Moreno  : 1955  MRN: [id number]  Primary Care Physician:  Traci Townsend APRN  Date of admission: 2024  Date and Time of Service: 2024 at 1645    Subjective      Chief Complaint: generalized weakness    History of Present Illness: Marge Moreno is a 69 y.o. female with a CMH of HTN, adrenal nodule, HLD, GERD, T2DM, RLS, OA, CAD s/p CABG who presented to [facility] on 2024 with generalized weakness.    Patient presented with vague symptomatology.  She states she started to feel unwell yesterday. Today she decided to come to the ED because she felt like her legs were too weak to ambulate.  She reports having a persistent left sided headache that goes into her left neck. She has associated photophobia, nausea and vomiting. This morning she had a brief episode of left sided chest pressure that began when she was sitting down. The pain did not radiate. Since presenting to the hospital, she now has a slight sore throat.  She denies any fevers, chills, cough, SOA, palpitations, abd pain, diarrhea, constipation, dysuria, hematuria, decreased urination, urinary urgency/frequency, flank pain,  peripheral edema, focal weakness, paresthesias, vision changes, dizziness, dysphagia, dysarthria, syncope, near-syncope or other acute symptoms at this time. She was recently started on lisinopril in October.  She has not had IV contrast exposure.  She denies usage of ibuprofen or other NSAIDs. Of note, she was recently admitted here from 11./28-11/30 with HHS, syncope and UTI.    ED course: Vitals 97.4-96-17-94/50-96% on room air. Labs remarkable for creatinine 2.77, BUN 40, glucose 246, troponin 27-20, WBC 13.78. UA with 1+ ketones, 1+ leukocyte esterase, 6-10 RBC, TNTC WBC, presence of squamous epithelial cells and renal epithelial cells. CTH was negative. She was given IVFs.  She was admitted for further management.       Review of Systems   Constitutional:  Positive for activity change, appetite change and fatigue. Negative for chills and fever.   HENT:  Positive for sore throat. Negative for trouble swallowing.    Eyes:  Positive for photophobia. Negative for visual disturbance.   Respiratory:  Negative for cough and shortness of breath.    Cardiovascular:  Positive for chest pain. Negative for palpitations and leg swelling.   Gastrointestinal:  Positive for nausea and vomiting. Negative for abdominal distention, abdominal pain, anal bleeding, blood in stool, constipation and diarrhea.   Genitourinary:  Negative for decreased urine volume, difficulty urinating, dysuria, flank pain, frequency, hematuria, pelvic pain and urgency.   Musculoskeletal:  Positive for neck pain. Negative for back pain.   Skin: Negative.    Neurological:  Positive for weakness and headaches. Negative for dizziness, tremors, seizures, syncope, facial asymmetry, speech difficulty, light-headedness and numbness.   Psychiatric/Behavioral:  Negative for confusion.        Personal History     Past Medical History:   Diagnosis Date    Adrenal nodule 11/16/2016    FINDINGS: Mild hepatic steatosis may be present. Cholecystectomy. No biliary  ductal dilatation. Negative pancreas, spleen, left adrenal gland, and kidneys. The right adrenal presumed adenoma has increased slightly, measuring 3 x 4 cm in diameter,  compared to 2 x 3.5 cm on the previous exam. The attenuation values are consistent with an adenoma. Done at [facility] in August 2018    Age-related osteoporosis without current pathological fracture 11/16/2016    Arthritis     Delayed surgical wound healing     Essential hypertension 11/16/2016    Gastroesophageal reflux disease with esophagitis 11/16/2016    Hepatic steatosis 11/16/2016    History of anemia     History of sepsis     FROM UTI    Hyperlipidemia 11/16/2016    Lisfranc's dislocation     LEFT WITH FRACTURES NONHEALING FOOT    Osteomyelitis of left foot 11/2020    RLS (restless legs syndrome)     Squamous cell skin cancer 2014    Excised by Dr. James    Thyroid nodule 10/18/2019    BENIGN    Type 2 diabetes mellitus with peripheral neuropathy 11/16/2016    Vitamin D deficiency 1/25/2019       Past Surgical History:   Procedure Laterality Date    BREAST BIOPSY Left     7/2019. BENIGN    CARDIAC CATHETERIZATION Left 5/2/2021    Procedure: Cardiac Catheterization/Vascular Study;  Surgeon: Chacho Esteban MD;  Location: [facility] CATH INVASIVE LOCATION;  Service: Cardiovascular;  Laterality: Left;    CARDIAC CATHETERIZATION N/A 5/2/2021    Procedure: Intra-Aortic Baloon Pump Insertion;  Surgeon: Chacho Esteban MD;  Location: [facility] CATH INVASIVE LOCATION;  Service: Cardiovascular;  Laterality: N/A;    CATARACT EXTRACTION WITH INTRAOCULAR LENS IMPLANT Bilateral     CHOLECYSTECTOMY      COLONOSCOPY      CORONARY ARTERY BYPASS GRAFT N/A 5/2/2021    Procedure: CORONARY ARTERY BYPASS WITH INTERNAL MAMMARY ARTERY GRAFT;  Surgeon: Jr Rocael Alexander MD;  Location: [facility] CVOR;  Service: Cardiothoracic;  Laterality: N/A;    FOOT FUSION Right 11/13/2020    Procedure: RIGHT OPEN TREATMENT LISFRANC INJURY, OPEN REDUCTION INTERNAL FIXATION  MEDIAL/MIDDLE CUNEIFORM FRACTURE AND 2ND/3RD METATARSAL FRACTURE 1ST 2ND POSSIBLE 3RD TARSOMETATARSAL ARTHRODESIS INTERCUNEIFORM ARTHRODESIS CALCANEAL BONE GRAFT;  Surgeon: Mikhail Banks Jr., MD;  Location: [facility];  Service: Orthopedics;  Laterality: Right;    INCISION AND DRAINAGE LEG Right 1/8/2021    Procedure: RIGHT IRRIGATION AND DEBRIDEMENT OF FOOT WITH SECONDARY CLOSURE AND HARDWARE REMOVAL;  Surgeon: Mikhail Banks Jr., MD;  Location: [facility] OR;  Service: Orthopedics;  Laterality: Right;    KNEE ARTHROSCOPY Bilateral     TOTAL ABDOMINAL HYSTERECTOMY      TRANSESOPHAGEAL ECHOCARDIOGRAM (EMILIA) N/A 5/2/2021    Procedure: TRANSESOPHAGEAL ECHOCARDIOGRAM;  Surgeon: Jr Rocael Alexander MD;  Location: [facility];  Service: Cardiothoracic;  Laterality: N/A;    UPPER GASTROINTESTINAL ENDOSCOPY  08/2016    US GUIDED FINE NEEDLE ASPIRATION  5/8/2020       Family History: family history includes COPD in her mother; Diabetes in her mother, sister, sister, sister, and sister; Hypertension in her mother; Kidney disease in her mother; Obesity in her mother; Thyroid disease in her mother. Otherwise pertinent FHx was reviewed and not pertinent to current issue.    Social History:  reports that she has never smoked. She has never used smokeless tobacco. She reports current alcohol use. She reports that she does not use drugs.    Home Medications:  Prior to Admission Medications       Prescriptions Last Dose Informant Patient Reported? Taking?    Accu-Chek FastClix Lancets misc   No No    Use 1 each 3 (Three) Times a Day Before Meals. Dx code: E11.65    Accu-Chek Softclix Lancets lancets   No No    1 each by Other route 3 (Three) Times a Day Before Meals. Use as instructed  Dx code: E11.65    acetaminophen (TYLENOL) 325 MG tablet   Yes No    Take 650 mg by mouth Every 4 (Four) Hours As Needed for Mild Pain. Indications: Pain    amLODIPine (NORVASC) 10 MG tablet   Yes No    Take 1 tablet by mouth Daily. Indications:  High Blood Pressure Disorder    amoxicillin-clavulanate (AUGMENTIN) 875-125 MG per tablet   No No    Take 1 tablet by mouth 2 (Two) Times a Day.    aspirin 81 MG EC tablet   Yes No    Take 1 tablet by mouth Daily. Indications: Disease involving Lipid Deposits in the Arteries    atorvastatin (LIPITOR) 80 MG tablet   No No    Take 1 tablet by mouth Every Night.    Blood Glucose Monitoring Suppl (Accu-Chek Guide Me) w/Device kit   No No    Use 1 each 3 (Three) Times a Day Before Meals. Dx code: E11.65  NDC [address]-42  Bin#: [id number] Group #: [id number]  ID #: [id number]  Issuer #: [id number])    butalbital-acetaminophen-caffeine (FIORICET, ESGIC) -40 MG per tablet   No No    Take 1 tablet by mouth Every 4 (Four) Hours As Needed for Headache.    denosumab (Prolia) 60 MG/ML solution prefilled syringe syringe   Yes No    Inject 1 mL under the skin into the appropriate area as directed 1 (One) Time.    DULoxetine (CYMBALTA) 60 MG capsule  Pharmacy Yes No    Take 1 capsule by mouth Daily. Indications: Major Depressive Disorder    gabapentin (NEURONTIN) 600 MG tablet   Yes No    Take 1 tablet by mouth 3 (Three) Times a Day.    HYDROcodone-acetaminophen (NORCO) 5-325 MG per tablet   No No    Take 1 tablet by mouth Every 6 (Six) Hours As Needed for Moderate Pain.    Insulin Glargine (LANTUS SOLOSTAR) 100 UNIT/ML injection pen   No No    Inject 15 Units under the skin into the appropriate area as directed Daily. Dx code: E11.65    Insulin Pen Needle (Pen Needles) 32G X 4 MM misc   No No    Use 1 each Every Night. Dx code: E11.65    metFORMIN ER (GLUCOPHAGE-XR) 500 MG 24 hr tablet   No No    Take 1 tablet by mouth Daily With Breakfast.    multivitamin with minerals tablet tablet   Yes No    Take 1 tablet by mouth Daily. Indications: Supplement    ticagrelor (BRILINTA) 90 MG tablet tablet   No No    Take 1 tablet by mouth 2 (Two) Times a Day.    Tirzepatide (MOUNJARO) 12.5 MG/0.5ML solution pen-injector pen   Yes No    Inject 0.5  mL under the skin into the appropriate area as directed 1 (One) Time Per Week. Wednesday.  Indications: Type 2 Diabetes    topiramate (TOPAMAX) 25 MG tablet   No No    Take 1 tablet by mouth Daily.    traZODone (DESYREL) 50 MG tablet   Yes No    Take 1 tablet by mouth Every Night. Indications: Trouble Sleeping              Allergies:  Allergies   Allergen Reactions    Zofran [Ondansetron Hcl] Nausea And Vomiting     Does the opposite of its purpose    Prochlorperazine Other (See Comments)     CAUSED SEIZURE    Prochlorperazine Edisylate Hives    Hydralazine Itching and Rash    Hydralazine Hcl Itching and Rash    Meperidine Itching and Swelling    Prochlorperazine Maleate Other (See Comments)     CAUSES SEIZURE    Prochlorperazine Maleate Irritability       Objective      Vitals:   Temp:  [97.4 °F (36.3 °C)] 97.4 °F (36.3 °C)  Heart Rate:  [82-96] 83  Resp:  [16-18] 16  BP: ()/(45-66) 112/60  Body mass index is 29.63 kg/m².  Physical Exam  Constitutional:       Appearance: Normal appearance. She is not ill-appearing or toxic-appearing.   HENT:      Head: Normocephalic and atraumatic.      Mouth/Throat:      Mouth: Mucous membranes are dry.      Pharynx: Oropharynx is clear. No oropharyngeal exudate or posterior oropharyngeal erythema.   Eyes:      Extraocular Movements: Extraocular movements intact.      Pupils: Pupils are equal, round, and reactive to light.   Neck:      Comments: No meningeal signs  Cardiovascular:      Rate and Rhythm: Normal rate and regular rhythm.      Heart sounds: Murmur heard.   Pulmonary:      Effort: Pulmonary effort is normal.      Breath sounds: Normal breath sounds.   Abdominal:      General: Abdomen is flat. There is no distension.      Palpations: Abdomen is soft.      Tenderness: There is no abdominal tenderness.   Musculoskeletal:         General: Normal range of motion.      Cervical back: Normal range of motion.      Right lower leg: No edema.      Left lower leg: No edema.    Skin:     General: Skin is warm.   Neurological:      General: No focal deficit present.      Mental Status: She is alert and oriented to person, place, and time.         Diagnostic Data:  Lab Results (last 24 hours)       Procedure Component Value Units Date/Time    High Sensitivity Troponin T 2Hr [[id number]]  (Abnormal) Collected: 12/06/24 1639    Specimen: Blood from Arm, Left Updated: 12/06/24 1715     HS Troponin T 20 ng/L      Troponin T Delta -7 ng/L     Narrative:      High Sensitive Troponin T Reference Range:  <14.0 ng/L- Negative Female for AMI  <22.0 ng/L- Negative Male for AMI  >=14 - Abnormal Female indicating possible myocardial injury.  >=22 - Abnormal Male indicating possible myocardial injury.   Clinicians would have to utilize clinical acumen, EKG, Troponin, and serial changes to determine if it is an Acute Myocardial Infarction or myocardial injury due to an underlying chronic condition.         Comprehensive Metabolic Panel [[id number]]  (Abnormal) Collected: 12/06/24 1446    Specimen: Blood from Arm, Left Updated: 12/06/24 1515     Glucose 246 mg/dL      BUN 40 mg/dL      Creatinine 2.77 mg/dL      Sodium 136 mmol/L      Potassium 3.7 mmol/L      Comment: Slight hemolysis detected by analyzer. Result may be falsely elevated.        Chloride 93 mmol/L      CO2 28.7 mmol/L      Calcium 10.9 mg/dL      Comment: Result checked          Total Protein 7.0 g/dL      Albumin 4.0 g/dL      ALT (SGPT) 9 U/L      AST (SGOT) 23 U/L      Alkaline Phosphatase 65 U/L      Total Bilirubin 0.5 mg/dL      Globulin 3.0 gm/dL      A/G Ratio 1.3 g/dL      BUN/Creatinine Ratio 14.4     Anion Gap 14.3 mmol/L      eGFR 17.8 mL/min/1.73     Narrative:      GFR Normal >60  Chronic Kidney Disease <60  Kidney Failure <15    The GFR formula is only valid for adults with stable renal function between ages 18 and 70.    High Sensitivity Troponin T [[id number]]  (Abnormal) Collected: 12/06/24 1446    Specimen: Blood from Arm,  Left Updated: 12/06/24 1512     HS Troponin T 27 ng/L     Narrative:      High Sensitive Troponin T Reference Range:  <14.0 ng/L- Negative Female for AMI  <22.0 ng/L- Negative Male for AMI  >=14 - Abnormal Female indicating possible myocardial injury.  >=22 - Abnormal Male indicating possible myocardial injury.   Clinicians would have to utilize clinical acumen, EKG, Troponin, and serial changes to determine if it is an Acute Myocardial Infarction or myocardial injury due to an underlying chronic condition.         Urine Culture - Urine, Urine, Clean Catch [[id number]] Collected: 12/06/24 1357    Specimen: Urine, Clean Catch Updated: 12/06/24 1446    Urinalysis, Microscopic Only - Urine, Clean Catch [[id number]]  (Abnormal) Collected: 12/06/24 1357    Specimen: Urine, Clean Catch Updated: 12/06/24 1415     RBC, UA 6-10 /HPF      WBC, UA Too Numerous to Count /HPF      Bacteria, UA None Seen /HPF      Squamous Epithelial Cells, UA 13-20 /HPF      Renal Epithelial Cells, UA 3-6 /HPF      Hyaline Casts, UA 3-6 /LPF      Methodology Manual Light Microscopy    Urinalysis With Microscopic If Indicated (No Culture) - Urine, Clean Catch [[id number]]  (Abnormal) Collected: 12/06/24 1357    Specimen: Urine, Clean Catch Updated: 12/06/24 1406     Color, UA Dark Yellow     Appearance, UA Cloudy     pH, UA <=5.0     Specific Gravity, UA 1.019     Glucose,  mg/dL (Trace)     Ketones, UA 15 mg/dL (1+)     Bilirubin, UA Small (1+)     Comment: Confirmation testing is unavailable.  A serum bilirubin is recommended for further assessment.        Blood, UA Negative     Protein,  mg/dL (2+)     Leuk Esterase, UA Small (1+)     Nitrite, UA Negative     Urobilinogen, UA 0.2 E.U./dL    Sedimentation Rate [[id number]]  (Abnormal) Collected: 12/06/24 1332    Specimen: Blood from Arm, Right Updated: 12/06/24 1357     Sed Rate 49 mm/hr     Protime-INR [[id number]]  (Normal) Collected: 12/06/24 1332    Specimen: Blood from Arm, Right  Updated: 12/06/24 1351     Protime 13.9 Seconds      INR 1.05    aPTT [[id number]]  (Normal) Collected: 12/06/24 1332    Specimen: Blood from Arm, Right Updated: 12/06/24 1351     PTT 25.8 seconds     CBC & Differential [[id number]]  (Abnormal) Collected: 12/06/24 1332    Specimen: Blood from Arm, Right Updated: 12/06/24 1341    Narrative:      The following orders were created for panel order CBC & Differential.  Procedure                               Abnormality         Status                     ---------                               -----------         ------                     CBC Auto Differential[[id number]]        Abnormal            Final result                 Please view results for these tests on the individual orders.    CBC Auto Differential [[id number]]  (Abnormal) Collected: 12/06/24 1332    Specimen: Blood from Arm, Right Updated: 12/06/24 1341     WBC 13.78 10*3/mm3      RBC 4.57 10*6/mm3      Hemoglobin 13.2 g/dL      Hematocrit 40.9 %      MCV 89.5 fL      MCH 28.9 pg      MCHC 32.3 g/dL      RDW 12.6 %      RDW-SD 41.1 fl      MPV 9.4 fL      Platelets 305 10*3/mm3      Neutrophil % 64.3 %      Lymphocyte % 24.9 %      Monocyte % 8.8 %      Eosinophil % 1.0 %      Basophil % 0.7 %      Immature Grans % 0.3 %      Neutrophils, Absolute 8.87 10*3/mm3      Lymphocytes, Absolute 3.43 10*3/mm3      Monocytes, Absolute 1.21 10*3/mm3      Eosinophils, Absolute 0.14 10*3/mm3      Basophils, Absolute 0.09 10*3/mm3      Immature Grans, Absolute 0.04 10*3/mm3      nRBC 0.0 /100 WBC     POC Glucose Once [[id number]]  (Abnormal) Collected: 12/06/24 1242    Specimen: Blood Updated: 12/06/24 1245     Glucose 229 mg/dL      Comment: Serial Number: [id number]:  [id number]                Imaging Results (Last 24 Hours)       Procedure Component Value Units Date/Time    CT Head Without Contrast [[id number]] Collected: 12/06/24 1348     Updated: 12/06/24 1352    Narrative:      CT HEAD WO CONTRAST    Date of  Exam: 12/6/2024 1:43 PM EST    Indication: headache.    Comparison: Head CT 11/28/2024    Technique: Axial CT images were obtained of the head without contrast administration.  Coronal reconstructions were performed.  Automated exposure control and iterative reconstruction methods were used.      Findings:  Negative for large territory infarct, acute intracranial hemorrhage, large mass lesion, hydrocephalus or midline shift. Stable slight prominence of the ventricles and sulci for age, suspect minimal global parenchymal volume loss. Mild periventricular   hypodensity stable from prior suggesting chronic microvascular ischemic change. Distal vertebral artery and carotid atherosclerotic disease. No large extra-axial fluid collection. No obstructive sinus disease. Left anterior ethmoid sinus osteoma. No   large mastoid effusion. Negative for depressed skull fracture or aggressive lesion. Symmetric globes and extraocular muscles.      Impression:      Impression:  No acute intracranial findings by CT. Chronic findings stable from prior.      Electronically Signed: Chung Holland MD    12/6/2024 1:50 PM EST    Workstation ID: [id number]              Assessment & Plan        This is a 69 y.o. female with:    Active and Resolved Problems  Active Hospital Problems    Diagnosis  POA    **REINA (acute kidney injury) [N17.9]  Yes      Resolved Hospital Problems   No resolved problems to display.       REINA  -Creatinine 2.77, BUN 40. Creatinine 1 week ago was 0.89.   -Patient was on lisinopril prior to admission which she has been on since October. Hold ACE-I/ARBs  -Suspect pre-renal etiology/ATN from vomiting, ACE-I and possibly hypotension (had documented BP of 86/45 while in ED).   -Ordered renal US  -Check uric acid, CK, urine studies  -Start continuous NS overnight  -Avoid hypotension  -Consult nephrology    ? UTI  -UA with TNTC WBC and 1+ leukocytes, no bacteria.  Sample does appear to be contaminated based on number of  squamous epithelial cells.  -Ceftriaxone started. Pending UC.  She has an elevated WBC count but otherwise does not meet SIRS/sepsis criteria.    HTN  -Had episode of hypotension earlier and some low normal BP readings therefore will hold antihypertensives for now    T2DM with hyperglycemia  Resume Lantus 15 units daily  -SSI  -Hold metformin  -CCD  -Hypoglycemia protocol in place    CAD  Chest pain  -No further chest pain since initial episode this morning  -Troponin 27 -> 20   -Obtain EKG  -Continue asa/Plavix  -Consider stress test       VTE Prophylaxis:  Mechanical VTE prophylaxis orders are signed & held.          The patient desires to be as follows:    CODE STATUS:    Code Status (Patient has no pulse and is not breathing): CPR (Attempt to Resuscitate)  Medical Interventions (Patient has pulse or is breathing): Full Support        Kimberly Salinas, who can be contacted at [phone], is the designated person to make medical decisions on the patient's behalf if She is incapable of doing so. This was clarified with patient and/or next of kin on 2024 during the course of this H&P.    Admission Status:  I believe this patient meets obs status.    Expected Length of Stay: 1 day    PDMP and Medication Dispenses via Sidebar reviewed and consistent with patient reported medications.    I discussed the patient's findings and my recommendations with patient.      Signature:     This document has been electronically signed by Isabel Aragon PA-C on 2024 17:27 [facility] Hospitalist Team    Electronically signed by Miguel Mendez MD at 12/10/24 1140       Operative/Procedure Notes (all)    No notes of this type exist for this encounter.          Physician Progress Notes (last 48 hours)        Sunny Robert MD at 24 0811              [facility] MEDICINE SERVICE  DAILY PROGRESS NOTE    NAME: Marge Mcghee  : 1955  MRN: [id number]      LOS: 3 days     PROVIDER OF SERVICE:  Sunny Robert MD    Chief Complaint: REINA (acute kidney injury)    Subjective:     Interval History:  History taken from: patient      Patient otherwise appears comfortable today.  No distress.  She is agreeable to discharge to rehab facility.      Review of Systems:   Review of Systems   All other systems reviewed and are negative.      Objective:     Vital Signs  Temp:  [97.2 °F (36.2 °C)-98.6 °F (37 °C)] 97.4 °F (36.3 °C)  Heart Rate:  [65-84] 65  Resp:  [12-16] 12  BP: (108-149)/(49-74) 113/51   Body mass index is 29.07 kg/m².    Physical Exam  Physical Exam  Constitutional:       General: She is awake.      Appearance: Normal appearance. She is well-developed and well-groomed.   HENT:      Head: Normocephalic and atraumatic.      Nose: Nose normal.      Mouth/Throat:      Mouth: Mucous membranes are moist.      Pharynx: Oropharynx is clear.   Eyes:      Extraocular Movements: Extraocular movements intact.      Conjunctiva/sclera: Conjunctivae normal.      Pupils: Pupils are equal, round, and reactive to light.   Cardiovascular:      Rate and Rhythm: Normal rate and regular rhythm.      Pulses: Normal pulses.      Heart sounds: Normal heart sounds.   Pulmonary:      Effort: Pulmonary effort is normal.      Breath sounds: Normal breath sounds.   Abdominal:      General: Abdomen is flat. Bowel sounds are normal.      Palpations: Abdomen is soft.   Musculoskeletal:         General: Normal range of motion.      Cervical back: Normal range of motion and neck supple.      Right lower leg: No edema.      Left lower leg: No edema.   Skin:     General: Skin is warm and dry.   Neurological:      General: No focal deficit present.      Mental Status: She is alert and oriented to person, place, and time. Mental status is at baseline.      Cranial Nerves: Cranial nerves 2-12 are intact.      Sensory: Sensation is intact.      Motor: Motor function is intact.   Psychiatric:         Mood and Affect: Mood normal.          Behavior: Behavior normal. Behavior is cooperative.         Thought Content: Thought content normal.         Judgment: Judgment normal.            Diagnostic Data    Results from last 7 days   Lab Units 12/11/24  0512 12/06/24  2312 12/06/24  1446   WBC 10*3/mm3 7.38   < >  --    HEMOGLOBIN g/dL 10.0*   < >  --    HEMATOCRIT % 32.5*   < >  --    PLATELETS 10*3/mm3 265   < >  --    GLUCOSE mg/dL 106*   < > 246*   CREATININE mg/dL 1.05*   < > 2.77*   BUN mg/dL 17   < > 40*   SODIUM mmol/L 142   < > 136   POTASSIUM mmol/L 3.9   < > 3.7   AST (SGOT) U/L  --   --  23   ALT (SGPT) U/L  --   --  9   ALK PHOS U/L  --   --  65   BILIRUBIN mg/dL  --   --  0.5   ANION GAP mmol/L 9.1   < > 14.3    < > = values in this interval not displayed.       CT Abdomen Pelvis Without Contrast    Result Date: 12/9/2024  Impression: 1.No acute findings within the abdomen or pelvis. 2.4.1 x 3.0 cm right adrenal adenoma. 3.3 mm nonobstructing stone in the left kidney. 4.Left renal cyst. 5.Mild diverticulosis of the sigmoid colon. 6.Tiny fat density umbilical hernia. There is a small hiatal hernia. 7.The exam is limited by noncontrasted technique. Electronically Signed: Darci Okeefe MD  12/9/2024 10:39 AM EST  Workstation ID: [id number]       I reviewed the patient's new clinical results.    Assessment/Plan:     Active and Resolved Problems  Active Hospital Problems    Diagnosis  POA    **REINA (acute kidney injury) [N17.9]  Yes      Resolved Hospital Problems   No resolved problems to display.     Acute kidney injury  Abnormal UA with negative culture  Hypertension   Type 2 diabetes  Coronary artery disease history  Failure to thrive  Chronic immobility syndrome  Generalized weakness    Reviewed laboratory studies from this morning.  Plan of care discussed with patient at bedside.  Await insurance approval for disposition to rehab facility.  Patient will likely benefit from 30 days or less at skilled nursing facility/rehab.  She is agreeable to  disposition and/or planning.  Noted urine culture negative.  Blood pressure remained stable on low-dose Norvasc.        VTE Prophylaxis:  Mechanical VTE prophylaxis orders are present.             Disposition Planning:     Barriers to Discharge: Insurance approval for rehab placement  Anticipated Date of Discharge: 12/12/2024  Place of Discharge: Rehab      Time: 35 minutes     Code Status and Medical Interventions: CPR (Attempt to Resuscitate); Full Support   Ordered at: 12/06/24 1726     Code Status (Patient has no pulse and is not breathing):    CPR (Attempt to Resuscitate)     Medical Interventions (Patient has pulse or is breathing):    Full Support       Signature: Electronically signed by Sunny Robert MD, 12/11/24, 08:11 EST.  [facility] Hospitalist Team      Electronically signed by Sunny Robert MD at 12/11/24 0814       Maikol Jones MD at 12/11/24 0623          RENAL/KCC:     LOS: 3 days   Patient Care Team:  Traci Townsend APRN as PCP - General (Nurse Practitioner)  Drake Hinton MD PhD as Consulting Physician (Ophthalmology)  Fausto Gilliam MD as Consulting Physician (Endocrinology)  Saloni Ch MD (Ophthalmology)  Mikhail Kaplan Jr., MD as Consulting Physician (Urology)    Chief Complaint:  REINA    Subjective     Interval History:   Chart reviewed  Non-oliguric  No new complaints    Objective     Vital Signs  Temp:  [97.2 °F (36.2 °C)-98.6 °F (37 °C)] 97.2 °F (36.2 °C)  Heart Rate:  [69-84] 69  Resp:  [12-16] 16  BP: (108-149)/(49-74) 108/49    Physical Exam:  GEN: Awake, NAD  ENT: PERRL, EOMI, MMM  NECK: Supple, no JVD  CHEST: CTAB, no W/R/C  CV: RRR, no M/G/R  ABD: Soft, NT, +BS  SKIN: Warm and Dry  NEURO: CN's intact       Results Review:     I reviewed the patient's new clinical results.    Medication Review: Reviewed    Assessment & Plan     REINA  Hyponatremia  UTI  Diarrhea    Plan: Cr improved to 1.  Lytes and volume OK.  Encourage PO fluid and avoid  nephrotoxins.  OK for D/C.      Maikol Jones MD  [facility]  24  06:23 EST            Electronically signed by Maikol Jones MD at 24 0547       Brenda Sutton MD at 12/10/24 1057              [facility] MEDICINE SERVICE  DAILY PROGRESS NOTE    NAME: Marge Mcghee  : 1955  MRN: [id number]      LOS: 2 days     PROVIDER OF SERVICE: Brenda Sutton MD    Chief Complaint: REINA (acute kidney injury)    Subjective:     Interval History:  History taken from: Patient and patient's chart     Denies any new complaints, wants to go to rehab.         Review of Systems:   Review of Systems  All negative except above   Objective:     Vital Signs  Temp:  [97.6 °F (36.4 °C)] 97.6 °F (36.4 °C)  Heart Rate:  [73-80] 73  Resp:  [14-20] 20  BP: (108-145)/(67-70) 120/70   Body mass index is 29.07 kg/m².    Physical Exam  Physical Exam  General: Alert and oriented, no acute distress.  HENT: Normocephalic, moist oral mucosa, no scleral icterus.  Neck: Supple, nontender, no carotid bruits, no JVD, no LAD.  Lungs: Clear to auscultation, nonlabored respiration.  Heart: RRR, no murmur, gallop or edema.  Abdomen: Soft, nontender, nondistended, + bowel sounds.  Neuro: alert and awake, moving all 4 extremities   Skin: Skin is warm, dry and pink, no rashes or lesions.  Psychiatric: Cooperative, appropriate mood and affect.          Diagnostic Data    Results from last 7 days   Lab Units 12/10/24  0124 24  2312 24  1446   WBC 10*3/mm3 7.79   < >  --    HEMOGLOBIN g/dL 10.3*   < >  --    HEMATOCRIT % 31.4*   < >  --    PLATELETS 10*3/mm3 244   < >  --    GLUCOSE mg/dL 227*   < > 246*   CREATININE mg/dL 1.16*   < > 2.77*   BUN mg/dL 19   < > 40*   SODIUM mmol/L 140   < > 136   POTASSIUM mmol/L 4.1   < > 3.7   AST (SGOT) U/L  --   --  23   ALT (SGPT) U/L  --   --  9   ALK PHOS U/L  --   --  65   BILIRUBIN mg/dL  --   --  0.5   ANION GAP mmol/L 8.4   < > 14.3  "   < > = values in this interval not displayed.       CT Abdomen Pelvis Without Contrast    Result Date: 12/9/2024  Impression: 1.No acute findings within the abdomen or pelvis. 2.4.1 x 3.0 cm right adrenal adenoma. 3.3 mm nonobstructing stone in the left kidney. 4.Left renal cyst. 5.Mild diverticulosis of the sigmoid colon. 6.Tiny fat density umbilical hernia. There is a small hiatal hernia. 7.The exam is limited by noncontrasted technique. Electronically Signed: Darci Okeefe MD  12/9/2024 10:39 AM EST  Workstation ID: [id number]       I have reviewed patient labs and imaging     Assessment/Plan:   Marge Mcghee is a 69 y.o. female with a CMH of HTN, adrenal nodule, HLD, GERD, T2DM, RLS, OA, CAD s/p CABG who presented to [facility] on 12/6/2024 with generalized weakness.     Active and Resolved Problems  # REINA  -Creatinine 2.77, BUN 40. Creatinine 1 week ago was 0.89.   -Patient was on lisinopril prior to admission which she has been on since October. Hold ACE-I/ARBs  -Suspect pre-renal etiology/ATN from vomiting, ACE-I and possibly hypotension (had documented BP of 86/45 while in ED).   -renal US- No hydronephrosis, small left renal cyst  -uric acid- 7.1  -continuous NS    -Avoid hypotension  -IMPROVING, today's Cr is 1.16  -Renal following      # UTI  -UA with TNTC WBC and 1+ leukocytes, no bacteria.  Sample does appear to be contaminated based on number of squamous epithelial cells.  -Ucx negative      # HTN  -Had episode of hypotension earlier and some low normal BP readings therefore will hold antihypertensives for now  -monitor BP and adjust medications accordingly      # T2DM with hyperglycemia  -continue Lantus 15 units daily  -SSI  -Hold metformin  -CCD  -Hypoglycemia protocol in place     # CAD  -Continue asa/Plavix      #constipation  - bowel regimen PRN    # Adrenal adenoma  # renal cyst  - incidental finding on CT abdomen  - follow up PCP/Endo as outpatient     \"Patient will benefit from 30 " "days or less of SNF/Rehab placement\"       VTE Prophylaxis:  Mechanical VTE prophylaxis orders are present.             Disposition Planning:     Barriers to Discharge: pending safe disposition   Anticipated Date of Discharge:   Place of Discharge: rehab       Time: 40 minutes     Code Status and Medical Interventions: CPR (Attempt to Resuscitate); Full Support   Ordered at: 24 1726     Code Status (Patient has no pulse and is not breathing):    CPR (Attempt to Resuscitate)     Medical Interventions (Patient has pulse or is breathing):    Full Support       Signature: Electronically signed by Brenda Sutton MD, 12/10/24, 10:57 EST.  [facility]    Electronically signed by Brenda Sutton MD at 12/10/24 1135       Consult Notes (last 48 hours)  Notes from 12/10/24 0916 through 24 09   No notes of this type exist for this encounter.       Nutrition Notes (most recent note)    No notes exist for this encounter.       Speech Language Pathology Notes (most recent note)    No notes exist for this encounter.       Walter Ocampo, PT   Physical Therapist  Specialty:  Physical Therapy  Therapy Evaluation     Signed  Date of Service:  24  Creation Time:  24     Signed        Expand All Collapse All  Patient Name: Marge Mcghee                : 1955                          MRN: [id number]                              Today's Date: 2024                                   Admit Date: 2024                        Visit Dx:   Visit Diagnosis       ICD-10-CM ICD-9-CM   1. REINA (acute kidney injury)  N17.9 584.9   2. Urinary tract infection without hematuria, site unspecified  N39.0 599.0         Problem List       Patient Active Problem List   Diagnosis    Type 2 diabetes mellitus with retinopathy, with long-term current use of insulin    Age-related osteoporosis without current pathological fracture    Hyperlipidemia    Hepatic steatosis    " Gastroesophageal reflux disease with esophagitis    Adrenal nodule    Vitamin D deficiency    Thyroid nodule    UTI (urinary tract infection)    REINA (acute kidney injury)    Hyperglycemia    Acute right flank pain    Vomiting    Hypomagnesemia    Dehydration    Obesity (BMI 30-39.9)    Complicated migraine    Occipital neuralgia of left side    Polypharmacy    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    Lisfranc dislocation    Delayed surgical wound healing    Right foot infection    Chest pain    GERD (gastroesophageal reflux disease)    Stable angina pectoris    S/P CABG x 3    Encephalopathy, metabolic    Hypertensive emergency    Dysarthria    CVA (cerebral vascular accident)    Coronary artery disease involving coronary bypass graft of native heart without angina pectoris    Essential hypertension    HTN, goal below 130/80    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    Type 2 diabetes mellitus without complication, with long-term current use of insulin    Change in vision    Other headache syndrome    Migraine, intractable    Hyperosmolar hyperglycemic state (HHS)         Medical History        Past Medical History:   Diagnosis Date    Adrenal nodule 11/16/2016     FINDINGS: Mild hepatic steatosis may be present. Cholecystectomy. No biliary ductal dilatation. Negative pancreas, spleen, left adrenal gland, and kidneys. The right adrenal presumed adenoma has increased slightly, measuring 3 x 4 cm in diameter,  compared to 2 x 3.5 cm on the previous exam. The attenuation values are consistent with an adenoma. Done [facility] in August 2018    Age-related osteoporosis without current pathological fracture 11/16/2016    Arthritis      CVA (cerebral vascular accident)       year 2000- can't remember exact year    Delayed surgical wound healing      Essential hypertension  11/16/2016    Gastroesophageal reflux disease with esophagitis 11/16/2016    Hepatic steatosis 11/16/2016    History of anemia      History of sepsis       FROM UTI    Hyperlipidemia 11/16/2016    Lisfranc's dislocation       LEFT WITH FRACTURES NONHEALING FOOT    Osteomyelitis of left foot 11/2020    RLS (restless legs syndrome)      Squamous cell skin cancer 2014     Excised by Dr. James    Thyroid nodule 10/18/2019     BENIGN    Type 2 diabetes mellitus with peripheral neuropathy 11/16/2016    Vitamin D deficiency 01/25/2019         Surgical History         Past Surgical History:   Procedure Laterality Date    BREAST BIOPSY Left       7/2019. BENIGN    CARDIAC CATHETERIZATION Left 5/2/2021     Procedure: Cardiac Catheterization/Vascular Study;  Surgeon: Chacho Esteban MD;  Location: [facility] CATH INVASIVE LOCATION;  Service: Cardiovascular;  Laterality: Left;    CARDIAC CATHETERIZATION N/A 5/2/2021     Procedure: Intra-Aortic Baloon Pump Insertion;  Surgeon: Chacho Esteban MD;  Location: [facility] CATH INVASIVE LOCATION;  Service: Cardiovascular;  Laterality: N/A;    CATARACT EXTRACTION WITH INTRAOCULAR LENS IMPLANT Bilateral      CHOLECYSTECTOMY        COLONOSCOPY        CORONARY ARTERY BYPASS GRAFT N/A 5/2/2021     Procedure: CORONARY ARTERY BYPASS WITH INTERNAL MAMMARY ARTERY GRAFT;  Surgeon: Jr Rocael Alexander MD;  Location: [facility];  Service: Cardiothoracic;  Laterality: N/A;    FOOT FUSION Right 11/13/2020     Procedure: RIGHT OPEN TREATMENT LISFRANC INJURY, OPEN REDUCTION INTERNAL FIXATION MEDIAL/MIDDLE CUNEIFORM FRACTURE AND 2ND/3RD METATARSAL FRACTURE 1ST 2ND POSSIBLE 3RD TARSOMETATARSAL ARTHRODESIS INTERCUNEIFORM ARTHRODESIS CALCANEAL BONE GRAFT;  Surgeon: Mikhail Banks Jr., MD;  Location: [facility] OR [facility];  Service: Orthopedics;  Laterality: Right;    INCISION AND DRAINAGE LEG Right 1/8/2021     Procedure: RIGHT IRRIGATION AND DEBRIDEMENT OF FOOT WITH SECONDARY CLOSURE AND HARDWARE REMOVAL;  Surgeon:  Mikhail Banks Jr., MD;  Location: [facility] MAIN OR;  Service: Orthopedics;  Laterality: Right;    KNEE ARTHROSCOPY Bilateral      TOTAL ABDOMINAL HYSTERECTOMY        TRANSESOPHAGEAL ECHOCARDIOGRAM (EMILIA) N/A 5/2/2021     Procedure: TRANSESOPHAGEAL ECHOCARDIOGRAM;  Surgeon: Jr Rocael Alexander MD;  Location: [facility];  Service: Cardiothoracic;  Laterality: N/A;    UPPER GASTROINTESTINAL ENDOSCOPY   08/2016    US GUIDED FINE NEEDLE ASPIRATION   5/8/2020           General Information         Row Name 12/07/24 1846                 Physical Therapy Time and Intention     Document Type evaluation  -EL       Mode of Treatment individual therapy;physical therapy  -          Row Name 12/07/24 1846                 General Information     Prior Level of Function independent:;all household mobility;ADL's;driving  -          Row Name 12/07/24 1846                 Living Environment     People in Home spouse  -          Row Name 12/07/24 1846                 Cognition     Orientation Status (Cognition) oriented x 4  -          Row Name 12/07/24 1846                 Safety Issues/Impairments Affecting Functional Mobility     Impairments Affecting Function (Mobility) endurance/activity tolerance;strength;balance  -                       User Key  (r) = Recorded By, (t) = Taken By, (c) = Cosigned By        Initials Name Provider Type     EL Walter Ocampo, NEHAL Physical Therapist                            Mobility         Row Name 12/07/24 1847                 Bed Mobility     Bed Mobility supine-sit  -EL       Supine-Sit [place] (Bed Mobility) minimum assist (75% patient effort)  -          Row Name 12/07/24 1847                 Bed-Chair Transfer     Bed-Chair [place] (Transfers) contact guard  -       Assistive Device (Bed-Chair Transfers) walker, front-wheeled  -          Row Name 12/07/24 1847                 Sit-Stand Transfer     Sit-Stand [place] (Transfers) minimum assist (75% patient effort)   -EL       Assistive Device (Sit-Stand Transfers) walker, front-wheeled  -EL          Row Name 12/07/24 1847                 Gait/Stairs (Locomotion)     [place] Level (Gait) contact guard  -EL       Assistive Device (Gait) walker, front-wheeled  -EL       Patient was able to Ambulate yes  -EL       Distance in Feet (Gait) 30  -EL       Deviations/Abnormal Patterns (Gait) gait speed decreased  -EL       Comment, (Gait/Stairs) Reliant on RWx this date. Poor endurance  -EL                       User Key  (r) = Recorded By, (t) = Taken By, (c) = Cosigned By        Initials Name Provider Type     Walter Ruiz, PT Physical Therapist                            Obj/Interventions         Row Name 12/07/24 1848                 Range of Motion Comprehensive     General Range of Motion bilateral lower extremity ROM WFL  -EL          Row Name 12/07/24 1848                 Strength Comprehensive (MMT)     General Manual Muscle Testing (MMT) Assessment lower extremity strength deficits identified  -EL       Comment, General Manual Muscle Testing (MMT) Assessment Grossly 4-/5 gross  -EL          Row Name 12/07/24 1848                 Sensory Assessment (Somatosensory)     Sensory Assessment (Somatosensory) sensation intact  -EL                       User Key  (r) = Recorded By, (t) = Taken By, (c) = Cosigned By        Initials Name Provider Type     Walter Ruiz, PT Physical Therapist                            Goals/Plan         Row Name 12/07/24 1853                 Bed Mobility Goal 1 (PT)     Activity/Assistive Device (Bed Mobility Goal 1, PT) bed mobility activities, all  -EL       [place] Level/Cues Needed (Bed Mobility Goal 1, PT) modified independence  -EL       Time Frame (Bed Mobility Goal 1, PT) long term goal (LTG);2 weeks  -EL          Row Name 12/07/24 1853                 Transfer Goal 1 (PT)     Activity/Assistive Device (Transfer Goal 1, PT) transfers, all;walker, rolling  -EL       [place]  Level/Cues Needed (Transfer Goal 1, PT) modified independence  -EL       Time Frame (Transfer Goal 1, PT) long term goal (LTG);2 weeks  -EL          Row Name 12/07/24 1853                 Gait Training Goal 1 (PT)     Activity/Assistive Device (Gait Training Goal 1, PT) gait (walking locomotion);walker, rolling  -EL       [place] Level (Gait Training Goal 1, PT) modified independence  -EL       Distance (Gait Training Goal 1, PT) 150  -EL       Time Frame (Gait Training Goal 1, PT) long term goal (LTG);2 weeks  -EL          Row Name 12/07/24 1853                 Therapy Assessment/Plan (PT)     Planned Therapy Interventions (PT) neuromuscular re-education;balance training;bed mobility training;transfer training;gait training;patient/family education;strengthening  -EL                    User Key  (r) = Recorded By, (t) = Taken By, (c) = Cosigned By        Initials Name Provider Type     Walter Ruiz, PT Physical Therapist                         Clinical Impression         Row Name 12/07/24 1850                 Pain     Pretreatment Pain Rating 3/10  -EL       Posttreatment Pain Rating 3/10  -EL       Pain Location head;neck  -EL          Row Name 12/07/24 1850                 Plan of Care Review     Plan of Care Reviewed With patient  -EL       Outcome Evaluation Pt is a 68 YO F admitted with REINA. Pt reports living at home with pouse, is independent with all ADLs, ambulation without AD and no recent falls. Pt reports she is generally active and has no mobility concerns. This date pt demonstrate global weakness, and limited mobitliy. Pt requiring MIN A to come to standing and to ambluate in room with RWx. Pt without significant LOB, but impaired to need to rely on RWx. Pt is below functional baseline, but will likely improve physically as she medically improves. PT to continue to follow and recommendation is HHPT at d/c.  -EL          Row Name 12/07/24 1850                 Therapy Assessment/Plan (PT)      Rehab Potential (PT) good  -EL       Criteria for Skilled Interventions Met (PT) yes  -EL       Therapy Frequency (PT) 5 times/wk  -EL          Row Name 12/07/24 1850                 Vital Signs     O2 Delivery Pre Treatment room air  -EL       O2 Delivery Intra Treatment room air  -EL       O2 Delivery Post Treatment room air  -EL       Pre Patient Position Supine  -EL       Intra Patient Position Standing  -EL       Post Patient Position Sitting  -EL          Row Name 12/07/24 1850                 Positioning and Restraints     Pre-Treatment Position in bed  -EL       Post Treatment Position chair  -EL       In Chair notified nsg;reclined;call light within reach;encouraged to call for assist;exit alarm on  -EL                       User Key  (r) = Recorded By, (t) = Taken By, (c) = Cosigned By        Initials Name Provider Type     Walter Ruiz PT Physical Therapist                            Outcome Measures         Row Name 12/07/24 1855 12/07/24 0752            How much help from another person do you currently need...     Turning from your back to your side while in flat bed without using bedrails? 4  -EL 4  -BC     Moving from lying on back to sitting on the side of a flat bed without bedrails? 3  -EL 4  -BC     Moving to and from a bed to a chair (including a wheelchair)? 3  -EL 4  -BC     Standing up from a chair using your arms (e.g., wheelchair, bedside chair)? 3  -EL 4  -BC     Climbing 3-5 steps with a railing? 2  -EL 4  -BC     To walk in hospital room? 3  -EL 4  -BC     AM-PAC 6 Clicks Score (PT) 18  -EL 24  -BC     Highest Level of Mobility Goal 6 --> Walk 10 steps or more  -EL 8 --> Walked 250 feet or more  -BC        Row Name 12/07/24 1855                 Functional Assessment     Outcome Measure Options AM-PAC 6 Clicks Basic Mobility (PT)  -EL                       User Key  (r) = Recorded By, (t) = Taken By, (c) = Cosigned By        Initials Name Provider Type     Walter Ruiz PT Physical  Therapist     Viki Mcmahon, RN Registered Nurse                          Physical Therapy Education            Title: PT OT SLP Therapies (In Progress)         Topic: Physical Therapy (In Progress)         Point: Mobility training (Done)         Learning Progress Summary             Patient Acceptance, E,TB, VU by  at 12/7/2024 1856                            Point: Home exercise program (Not Started)         Learner Progress:  Not documented in this visit.                  Point: Body mechanics (Not Started)         Learner Progress:  Not documented in this visit.                  Point: Precautions (Done)         Learning Progress Summary             Patient Acceptance, E,TB, VU by  at 12/7/2024 1856                                                User Key         Initials Effective Dates Name Provider Type Discipline      06/23/20 -  Walter Ocampo, PT Physical Therapist PT                          PT Recommendation and Plan  Planned Therapy Interventions (PT): neuromuscular re-education, balance training, bed mobility training, transfer training, gait training, patient/family education, strengthening  Outcome Evaluation: Pt is a 70 YO F admitted with REINA. Pt reports living at home with pouse, is independent with all ADLs, ambulation without AD and no recent falls. Pt reports she is generally active and has no mobility concerns. This date pt demonstrate global weakness, and limited mobitliy. Pt requiring MIN A to come to standing and to ambluate in room with RWx. Pt without significant LOB, but impaired to need to rely on RWx. Pt is below functional baseline, but will likely improve physically as she medically improves. PT to continue to follow and recommendation is HHPT at d/c.      Time Calculation:   PT Evaluation Complexity  History, PT Evaluation Complexity: 1-2 personal factors and/or comorbidities  Examination of Body Systems (PT Eval Complexity): total of 3 or more elements  Clinical Presentation  (PT Evaluation Complexity): evolving  Clinical Decision Making (PT Evaluation Complexity): moderate complexity  Overall Complexity (PT Evaluation Complexity): moderate complexity       PT Charges         Row Name 24 1857                       Time Calculation     Start Time 933  -EL         Stop Time 947  -EL         Time Calculation (min) 14 min  -EL         PT Received On 24  -EL         PT - Next Appointment 24  -EL         PT Goal Re-Cert Due Date 24  -EL                      User Key  (r) = Recorded By, (t) = Taken By, (c) = Cosigned By        Initials Name Provider Type     Walter Ruiz PT Physical Therapist                       Therapy Charges for Today         Code Description Service Date Service Provider Modifiers Qty     02738233580 HC PT EVAL MOD COMPLEXITY 3 2024 Walter Ocampo, PT GP 1                PT G-Codes  Outcome Measure Options: AM-PAC 6 Clicks Basic Mobility (PT)  AM-PAC 6 Clicks Score (PT): 18  PT Discharge Summary  Anticipated Discharge Disposition (PT): home with home health     Walter Ocampo PT             2024                                    Harry Lynch OT   Occupational Therapist  Specialty:  Occupational Therapy  Therapy Evaluation     Signed  Date of Service:  12/10/24 1457  Creation Time:  12/10/24 1457     Signed        Expand All Collapse All  Patient Name: Marge Mcghee                : 1955                          MRN: [id number]                              Today's Date: 12/10/2024                                 Admit Date: 2024                        Visit Dx:   Visit Diagnosis       ICD-10-CM ICD-9-CM   1. REINA (acute kidney injury)  N17.9 584.9   2. Urinary tract infection without hematuria, site unspecified  N39.0 599.0         Problem List       Patient Active Problem List   Diagnosis    Type 2 diabetes mellitus with retinopathy, with long-term current use of insulin    Age-related osteoporosis without current  pathological fracture    Hyperlipidemia    Hepatic steatosis    Gastroesophageal reflux disease with esophagitis    Adrenal nodule    Vitamin D deficiency    Thyroid nodule    UTI (urinary tract infection)    REINA (acute kidney injury)    Hyperglycemia    Acute right flank pain    Vomiting    Hypomagnesemia    Dehydration    Obesity (BMI 30-39.9)    Complicated migraine    Occipital neuralgia of left side    Polypharmacy    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    Lisfranc dislocation    Delayed surgical wound healing    Right foot infection    Chest pain    GERD (gastroesophageal reflux disease)    Stable angina pectoris    S/P CABG x 3    Encephalopathy, metabolic    Hypertensive emergency    Dysarthria    CVA (cerebral vascular accident)    Coronary artery disease involving coronary bypass graft of native heart without angina pectoris    Essential hypertension    HTN, goal below 130/80    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    Type 2 diabetes mellitus without complication, with long-term current use of insulin    Change in vision    Other headache syndrome    Migraine, intractable    Hyperosmolar hyperglycemic state (HHS)         Medical History        Past Medical History:   Diagnosis Date    Adrenal nodule 11/16/2016     FINDINGS: Mild hepatic steatosis may be present. Cholecystectomy. No biliary ductal dilatation. Negative pancreas, spleen, left adrenal gland, and kidneys. The right adrenal presumed adenoma has increased slightly, measuring 3 x 4 cm in diameter,  compared to 2 x 3.5 cm on the previous exam. The attenuation values are consistent with an adenoma. Done [facility] in August 2018    Age-related osteoporosis without current pathological fracture 11/16/2016    Arthritis      CVA (cerebral vascular accident)       year 2000- can't remember exact year     Delayed surgical wound healing      Essential hypertension 11/16/2016    Gastroesophageal reflux disease with esophagitis 11/16/2016    Hepatic steatosis 11/16/2016    History of anemia      History of sepsis       FROM UTI    Hyperlipidemia 11/16/2016    Lisfranc's dislocation       LEFT WITH FRACTURES NONHEALING FOOT    Osteomyelitis of left foot 11/2020    RLS (restless legs syndrome)      Squamous cell skin cancer 2014     Excised by Dr. James    Thyroid nodule 10/18/2019     BENIGN    Type 2 diabetes mellitus with peripheral neuropathy 11/16/2016    Vitamin D deficiency 01/25/2019         Surgical History         Past Surgical History:   Procedure Laterality Date    BREAST BIOPSY Left       7/2019. BENIGN    CARDIAC CATHETERIZATION Left 5/2/2021     Procedure: Cardiac Catheterization/Vascular Study;  Surgeon: Chacho sEteban MD;  Location: [facility] CATH INVASIVE LOCATION;  Service: Cardiovascular;  Laterality: Left;    CARDIAC CATHETERIZATION N/A 5/2/2021     Procedure: Intra-Aortic Baloon Pump Insertion;  Surgeon: Chacho Esteban MD;  Location: [facility] CATH INVASIVE LOCATION;  Service: Cardiovascular;  Laterality: N/A;    CATARACT EXTRACTION WITH INTRAOCULAR LENS IMPLANT Bilateral      CHOLECYSTECTOMY        COLONOSCOPY        CORONARY ARTERY BYPASS GRAFT N/A 5/2/2021     Procedure: CORONARY ARTERY BYPASS WITH INTERNAL MAMMARY ARTERY GRAFT;  Surgeon: Jr Rocael Alexander MD;  Location: [facility] CVOR;  Service: Cardiothoracic;  Laterality: N/A;    FOOT FUSION Right 11/13/2020     Procedure: RIGHT OPEN TREATMENT LISFRANC INJURY, OPEN REDUCTION INTERNAL FIXATION MEDIAL/MIDDLE CUNEIFORM FRACTURE AND 2ND/3RD METATARSAL FRACTURE 1ST 2ND POSSIBLE 3RD TARSOMETATARSAL ARTHRODESIS INTERCUNEIFORM ARTHRODESIS CALCANEAL BONE GRAFT;  Surgeon: Mikhail Banks Jr., MD;  [place] [facility] [place] [facility]  Service: Orthopedics;  Laterality: Right;    INCISION AND DRAINAGE LEG Right 1/8/2021     Procedure: RIGHT IRRIGATION AND DEBRIDEMENT OF FOOT  WITH SECONDARY CLOSURE AND HARDWARE REMOVAL;  Surgeon: Mikhail Banks Jr., MD;  Location: [facility] MAIN OR;  Service: Orthopedics;  Laterality: Right;    KNEE ARTHROSCOPY Bilateral      TOTAL ABDOMINAL HYSTERECTOMY        TRANSESOPHAGEAL ECHOCARDIOGRAM (EMILIA) N/A 5/2/2021     Procedure: TRANSESOPHAGEAL ECHOCARDIOGRAM;  Surgeon: Jr Rocael Alexander MD;  Location: [facility];  Service: Cardiothoracic;  Laterality: N/A;    UPPER GASTROINTESTINAL ENDOSCOPY   08/2016    US GUIDED FINE NEEDLE ASPIRATION   5/8/2020           General Information         Row Name 12/10/24 1428 12/10/24 1230            OT Time and Intention     Subjective Information -- no complaints  -MP     Document Type evaluation  -MP evaluation  -MP     Mode of Treatment individual therapy;occupational therapy  -MP individual therapy;physical therapy  -MP     Patient Effort good  -MP good  -MP        Row Name 12/10/24 1428 12/10/24 1230            General Information     Patient Profile Reviewed yes  -MP yes  -MP     Prior Level of Function independent:;ADL's  -MP independent:;ADL's  -MP        Row Name 12/10/24 1428 12/10/24 1230            Living Environment     People in Home spouse  -MP spouse  -MP        Row Name 12/10/24 1428 12/10/24 1230            Cognition     Orientation Status (Cognition) oriented x 4  -MP oriented x 4  -MP        Row Name 12/10/24 1428 12/10/24 1230            Safety Issues/Impairments Affecting Functional Mobility     Impairments Affecting Function (Mobility) endurance/activity tolerance;strength;balance  -MP endurance/activity tolerance;strength;balance  -MP                     User Key  (r) = Recorded By, (t) = Taken By, (c) = Cosigned By        Initials Name Provider Type     MP Harry Lynch OT Occupational Therapist                                     Mobility/ADL's         Row Name 12/10/24 1438                 Bed Mobility     Bed Mobility supine-sit  -MP       Supine-Sit Lander (Bed Mobility) minimum  assist (75% patient effort)  -MP          Row Name 12/10/24 1438                 Bed-Chair Transfer     Bed-Chair Tyrrell (Transfers) contact guard  -MP       Assistive Device (Bed-Chair Transfers) walker, front-wheeled  -MP          Row Name 12/10/24 1438                 Sit-Stand Transfer     Sit-Stand Tyrrell (Transfers) contact guard  -MP       Assistive Device (Sit-Stand Transfers) walker, front-wheeled  -MP          Row Name 12/10/24 1438                 Functional Mobility     Functional Mobility- Ind. Level contact guard assist;minimum assist (75% patient effort)  -MP          Row Name 12/10/24 1438                 Activities of Daily Living     BADL Assessment/Intervention lower body dressing  -MP          Row Name 12/10/24 1438                 Lower Body Dressing Assessment/Training     Tyrrell Level (Lower Body Dressing) lower body dressing skills;maximum assist (25% patient effort)  -MP                       User Key  (r) = Recorded By, (t) = Taken By, (c) = Cosigned By        Initials Name Provider Type     Harry Connor OT Occupational Therapist                            Obj/Interventions         Row Name 12/10/24 1444                 Range of Motion Comprehensive     Comment, General Range of Motion BUE WFL  -[facility] Name 12/10/24 1444                 Strength Comprehensive (MMT)     Comment, General Manual Muscle Testing (MMT) Assessment LUE 3+/5, RUE 4/5  -[facility] Name 12/10/24 1444                 Balance     Balance Interventions sitting;standing;sit to stand;supported;dynamic;static  -                       User Key  (r) = Recorded By, (t) = Taken By, (c) = Cosigned By        Initials Name Provider Type     Harry Connor OT Occupational Therapist                            Goals/Plan         Row Name 12/10/24 1456                 Bed Mobility Goal 1 (OT)     Activity/Assistive Device (Bed Mobility Goal 1, OT) bed mobility activities, all  -MP        Hensley Level/Cues Needed (Bed Mobility Goal 1, OT) contact guard required  -MP       Time Frame (Bed Mobility Goal 1, OT) long term goal (LTG);2 weeks  -MP          Row Name 12/10/24 1456                 Transfer Goal 1 (OT)     Activity/Assistive Device (Transfer Goal 1, OT) sit-to-stand/stand-to-sit;toilet  -MP       Hensley Level/Cues Needed (Transfer Goal 1, OT) supervision required  -MP       Time Frame (Transfer Goal 1, OT) long term goal (LTG);2 weeks  -MP          Row Name 12/10/24 1459                 Dressing Goal 1 (OT)     Activity/Device (Dressing Goal 1, OT) lower body dressing  -MP       Hensley/Cues Needed (Dressing Goal 1, OT) minimum assist (75% or more patient effort)  -MP       Time Frame (Dressing Goal 1, OT) long term goal (LTG);2 weeks  -MP                    User Key  (r) = Recorded By, (t) = Taken By, (c) = Cosigned By        Initials Name Provider Type     Harry Connor, JON Occupational Therapist                         Clinical Impression         Row Name 12/10/24 1447                 Pain Assessment     Pretreatment Pain Rating 3/10  -MP       Posttreatment Pain Rating 3/10  -MP       Pain Location head  -MP          Row Name 12/10/24 1447                 Plan of Care Review     Plan of Care Reviewed With patient  -MP       Progress no change  -MP       Outcome Evaluation Pt is a 68 YO F admitted with REINA. Pt reports living at home with spouse, is independent with all ADLs, ambulation without AD and no recent falls. Pt. works FT at baseline. Pt. requires CGA For functional transfers this date, sit to stand w/ rolling walker support. Pt. w/ limited standing tolerance secondary to fatigue and weakness BLEs. Pt. provided max A for all LB ADLs this date. Pt. not safe to d/c home alone and will require SNF placement at d/c to address aforementioned deficits.  -MP          Row Name 12/10/24 5321                 Therapy Assessment/Plan (OT)     Rehab Potential (OT)  good  -MP       Criteria for Skilled Therapeutic Interventions Met (OT) yes;skilled treatment is necessary;meets criteria  -MP       Therapy Frequency (OT) 3 times/wk  -MP          Row Name 12/10/24 1447                 Therapy Plan Review/Discharge Plan (OT)     Anticipated Discharge Disposition (OT) skilled nursing facility  -MP          Row Name 12/10/24 1447                 Vital Signs     Pre Patient Position Sitting  -MP       Intra Patient Position Standing  -MP       Post Patient Position Sitting  -MP          Row Name 12/10/24 1447                 Positioning and Restraints     Pre-Treatment Position sitting in chair/recliner  -MP       Post Treatment Position chair  -MP       In Chair sitting;call light within reach;encouraged to call for assist;exit alarm on  -MP                       User Key  (r) = Recorded By, (t) = Taken By, (c) = Cosigned By        Initials Name Provider Type     Harry Connor OT Occupational Therapist                            Outcome Measures         Row Name 12/10/24 0800                 How much help from another person do you currently need...     Turning from your back to your side while in flat bed without using bedrails? 4  -LT       Moving from lying on back to sitting on the side of a flat bed without bedrails? 3  -LT       Moving to and from a bed to a chair (including a wheelchair)? 3  -LT       Standing up from a chair using your arms (e.g., wheelchair, bedside chair)? 3  -LT       Climbing 3-5 steps with a railing? 2  -LT       To walk in hospital room? 3  -LT       AM-PAC 6 Clicks Score (PT) 18  -LT       Highest Level of Mobility Goal 6 --> Walk 10 steps or more  -LT                       User Key  (r) = Recorded By, (t) = Taken By, (c) = Cosigned By        Initials Name Provider Type     Naye Shea LPN Licensed Nurse                             Occupational Therapy Education            Title: PT OT SLP Therapies (Done)         Topic: Occupational  Therapy (Done)         Point: ADL training (Done)         Description:   Instruct learner(s) on proper safety adaptation and remediation techniques during self care or transfers.   Instruct in proper use of assistive devices.                       Learning Progress Summary             Patient Acceptance, E, DU by BC at 12/8/2024 1725                            Point: Home exercise program (Done)         Description:   Instruct learner(s) on appropriate technique for monitoring, assisting and/or progressing therapeutic exercises/activities.                       Learning Progress Summary             Patient Acceptance, E, DU by BC at 12/8/2024 1725                            Point: Precautions (Done)         Description:   Instruct learner(s) on prescribed precautions during self-care and functional transfers.                       Learning Progress Summary             Patient Acceptance, E, DU by BC at 12/8/2024 1725                            Point: Body mechanics (Done)         Description:   Instruct learner(s) on proper positioning and spine alignment during self-care, functional mobility activities and/or exercises.                       Learning Progress Summary             Patient Acceptance, E,TB, VU by  at 12/10/2024 1456     Acceptance, E, DU by BC at 12/8/2024 1725                                                User Key         Initials Effective Dates Name Provider Type Discipline      06/16/21 -  Harry Lynch OT Occupational Therapist OT     BC 06/21/24 -  Viki Uribe, RN Registered Nurse Nurse                          OT Recommendation and Plan  Therapy Frequency (OT): 3 times/wk  Plan of Care Review  Plan of Care Reviewed With: patient  Progress: no change  Outcome Evaluation: Pt is a 68 YO F admitted with REINA. Pt reports living at home with spouse, is independent with all ADLs, ambulation without AD and no recent falls. Pt. works FT at baseline. Pt. requires CGA For functional  transfers this date, sit to stand w/ rolling walker support. Pt. w/ limited standing tolerance secondary to fatigue and weakness BLEs. Pt. provided max A for all LB ADLs this date. Pt. not safe to d/c home alone and will require SNF placement at d/c to address aforementioned deficits.      Time Calculation:        Time Calculation- OT         Row Name 12/10/24 1456                       Time Calculation- OT     OT Start Time 1200  -MP         OT Stop Time 1225  -MP         OT Time Calculation (min) 25 min  -MP         Total Timed Code Minutes- OT 0 minute(s)  -MP         OT Received On 12/10/24  -MP         OT - Next Appointment 12/12/24  -MP         OT Goal Re-Cert Due Date 12/24/24  -MP                      User Key  (r) = Recorded By, (t) = Taken By, (c) = Cosigned By        Initials Name Provider Type     Harry Connor OT Occupational Therapist                       Therapy Charges for Today         Code Description Service Date Service Provider Modifiers Qty     71496188977 HC OT EVAL LOW COMPLEXITY 4 12/10/2024 Harry Lynch OT GO 1                   Harry Lynch OT                     12/10/2024                Inocencio Ivan PTA   Physical Therapist Assistant  Specialty:  Physical Therapy  Therapy Treatment Note     Signed  Date of Service:  12/11/24 1530  Creation Time:  12/11/24 1530     Signed        Subjective: Pt agreeable to therapeutic plan of care.     Objective:      Bed mobility - Modified-Independent  Transfers - SBA and with rolling walker  Ambulation - 30 x2 feet CGA, Min-A, and with rolling walker     Therapeutic Exercise - Pt educated on ther ex throughout day     Vitals: WNL     Pain: Pt expressed pain in feet but did not rate  Intervention for pain: Repositioned, Increased Activity, and Therapeutic Presence     Education: Provided education on the importance of mobility in the acute care setting, Verbal/Tactile Cues, Transfer Training, Gait Training, Energy conservation  "strategies, and HEP     Assessment: Marge Mcghee presents with functional mobility impairments which indicate the need for skilled intervention. Pt without any LOB or unsteadiness during gait utilizing RW. Pt required standing rest break due to feeling weak and expressing needing to focus in order to make it back to room. Pt safely returned to bed. Tolerating session today without incident. Will continue to follow and progress as tolerated.      Plan/Recommendations:   If medically appropriate, Moderate Intensity Therapy recommended post-acute care. This is recommended as therapy feels the patient would require 3-4 days per week and wouldn't tolerate \"3 hour daily\" rehab intensity. SNF would be the preferred choice. If the patient does not agree to SNF, arrange HH or OP depending on home bound status. If patient is medically complex, consider LTACH. Pt requires no DME at discharge.      Pt desires Skilled Rehab placement at discharge. Pt cooperative; agreeable to therapeutic recommendations and plan of care.            Basic Mobility 6-click:  Rollin = Total, A lot = 2, A little = 3; 4 = None  Supine>Sit:      1 = Total, A lot = 2, A little = 3; 4 = None   Sit>Stand with arms:   1 = Total, A lot = 2, A little = 3; 4 = None  Bed>Chair:      1 = Total, A lot = 2, A little = 3; 4 = None  Ambulate in room:       1 = Total, A lot = 2, A little = 3; 4 = None  3-5 Steps with railin = Total, A lot = 2, A little = 3; 4 = None  Score: 18     Modified Lalo: N/A = No pre-op stroke/TIA     Post-Tx Position: Supine with HOB Elevated, Alarms activated, and Call light and personal items within reach  PPE: gloves and surgical mask     Therapy Charges for Today         Code Description Service Date Service Provider Modifiers Qty     68487866786 HC GAIT TRAINING EA 15 MIN 2024 Inocencio Ivan PTA GP 1               PT Charges         Row Name 24 8249                       Time Calculation "     Start Time 1506  -GE         Stop Time 1518  -GE         Time Calculation (min) 12 min  -GE         PT Received On 12/11/24  -GE         PT - Next Appointment 12/12/24  -GE                 Time Calculation- PT     Total Timed Code Minutes- PT 12 minute(s)  -GE                      User Key  (r) = Recorded By, (t) = Taken By, (c) = Cosigned By        Initials Name Provider Type     Inocencio Devine PTA Physical Therapist Assistant

## 2024-12-12 NOTE — PROGRESS NOTES
[facility] MEDICINE SERVICE  DAILY PROGRESS NOTE    NAME: Marge Mcghee  : 1955  MRN: [id number]      LOS: 4 days     PROVIDER OF SERVICE: Sunny Robert MD    Chief Complaint: REINA (acute kidney injury)    Subjective:     Interval History:  History taken from: patient    Patient denies any chest pain or shortness of breath.  She otherwise appears comfortable this morning.        Review of Systems:   Review of Systems   All other systems reviewed and are negative.      Objective:     Vital Signs  Temp:  [97.2 °F (36.2 °C)-97.9 °F (36.6 °C)] 97.5 °F (36.4 °C)  Heart Rate:  [69-91] 74  Resp:  [12-19] 12  BP: ()/(37-72) 104/51   Body mass index is 29.07 kg/m².    Physical Exam  Physical Exam  Constitutional:       General: She is awake.      Appearance: Normal appearance. She is well-developed and well-groomed.   HENT:      Head: Normocephalic and atraumatic.      Nose: Nose normal.      Mouth/Throat:      Mouth: Mucous membranes are moist.      Pharynx: Oropharynx is clear.   Eyes:      Extraocular Movements: Extraocular movements intact.      Conjunctiva/sclera: Conjunctivae normal.      Pupils: Pupils are equal, round, and reactive to light.   Cardiovascular:      Rate and Rhythm: Normal rate and regular rhythm.      Pulses: Normal pulses.      Heart sounds: Normal heart sounds.   Pulmonary:      Effort: Pulmonary effort is normal.      Breath sounds: Normal breath sounds.   Abdominal:      General: Abdomen is flat. Bowel sounds are normal.      Palpations: Abdomen is soft.   Musculoskeletal:         General: Normal range of motion.      Cervical back: Normal range of motion and neck supple.      Right lower leg: No edema.      Left lower leg: No edema.   Skin:     General: Skin is warm and dry.   Neurological:      General: No focal deficit present.      Mental Status: She is alert and oriented to person, place, and time. Mental status is at baseline.      Cranial Nerves: Cranial nerves 2-12  are intact.      Sensory: Sensation is intact.      Motor: Motor function is intact.   Psychiatric:         Mood and Affect: Mood normal.         Behavior: Behavior normal. Behavior is cooperative.         Thought Content: Thought content normal.         Judgment: Judgment normal.            Diagnostic Data    Results from last 7 days   Lab Units 12/12/24  0210 12/06/24  2312 12/06/24  1446   WBC 10*3/mm3 7.61   < >  --    HEMOGLOBIN g/dL 9.7*   < >  --    HEMATOCRIT % 31.0*   < >  --    PLATELETS 10*3/mm3 270   < >  --    GLUCOSE mg/dL 148*   < > 246*   CREATININE mg/dL 1.17*   < > 2.77*   BUN mg/dL 22   < > 40*   SODIUM mmol/L 141   < > 136   POTASSIUM mmol/L 3.8   < > 3.7   AST (SGOT) U/L  --   --  23   ALT (SGPT) U/L  --   --  9   ALK PHOS U/L  --   --  65   BILIRUBIN mg/dL  --   --  0.5   ANION GAP mmol/L 9.4   < > 14.3    < > = values in this interval not displayed.       No radiology results for the last day      I reviewed the patient's new clinical results.    Assessment/Plan:     Active and Resolved Problems  Active Hospital Problems    Diagnosis  POA    **REINA (acute kidney injury) [N17.9]  Yes      Resolved Hospital Problems   No resolved problems to display.     Acute kidney injury  Abnormal UA with negative culture  Hypertension   Type 2 diabetes  Coronary artery disease history  Failure to thrive  Chronic immobility syndrome  Generalized weakness      Reviewed laboratory studies from this morning. Plan of care discussed with patient at bedside. Await insurance approval for disposition to rehab facility. Patient will likely benefit from 30 days or less at skilled nursing facility/rehab. She is agreeable to disposition and/or planning. Noted urine culture negative. Blood pressure remained stable on low-dose Norvasc.       No change in medical management today.  Patient remains medically stable for discharge to rehab facility.  Await insurance approval for the same.  Patient agreeable to plan of care as  detailed.    VTE Prophylaxis:  Mechanical VTE prophylaxis orders are present.             Disposition Planning:     Barriers to Discharge: Insurance approval for rehab  Anticipated Date of Discharge: 12/13/2024  Place of Discharge: Rehab facility      Time: 35 minutes     Code Status and Medical Interventions: CPR (Attempt to Resuscitate); Full Support   Ordered at: 12/06/24 1726     Code Status (Patient has no pulse and is not breathing):    CPR (Attempt to Resuscitate)     Medical Interventions (Patient has pulse or is breathing):    Full Support       Signature: Electronically signed by Sunny Robert MD, 12/12/24, 12:16 EST.  [facility]

## 2024-12-12 NOTE — PLAN OF CARE
Goal Outcome Evaluation:              Outcome Evaluation: Patient reported dizziness and feeling faint while up to toilet--provider notified, see orders. Orthostatic BP positive. Educated patient on importance of compliance with fall prevention measures.

## 2024-12-12 NOTE — PROGRESS NOTES
RENAL/KCC:     LOS: 4 days   Patient Care Team:  Traci Townsend APRN as PCP - General (Nurse Practitioner)  Drake Hinton MD PhD as Consulting Physician (Ophthalmology)  Fausto Gilliam MD as Consulting Physician (Endocrinology)  Saloni Ch MD (Ophthalmology)  Mikhail Kaplan Jr., MD as Consulting Physician (Urology)    Chief Complaint:  REINA    Subjective     Interval History:   Chart reviewed  Non-oliguric  Orthostasis noted    Objective     Vital Signs  Temp:  [97.2 °F (36.2 °C)-97.9 °F (36.6 °C)] 97.5 °F (36.4 °C)  Heart Rate:  [69-91] 71  Resp:  [12-19] 18  BP: ()/(37-72) 110/50    Physical Exam:  GEN: Awake, NAD  ENT: PERRL, EOMI, MMM  NECK: Supple, no JVD  CHEST: CTAB, no W/R/C  CV: RRR, no M/G/R  ABD: Soft, NT, +BS  SKIN: Warm and Dry  NEURO: CN's intact       Results Review:     I reviewed the patient's new clinical results.    Medication Review: Reviewed    Assessment & Plan     REINA  Hyponatremia  UTI  Diarrhea  Orthostatic hypotension    Plan: Cr stable at 1-1.1.  Lytes and volume OK.  Encourage PO fluid and avoid nephrotoxins.  Decrease Amlodipine.      Maikol Jones MD  [facility]  12/12/24  09:59 EST

## 2024-12-12 NOTE — THERAPY TREATMENT NOTE
"Subjective: Pt agreeable to therapeutic plan of care.  Cognition: oriented to Person, Place, Time, and Situation    Objective:       Bed Mobility: Modified-Independent   Functional Transfers: CGA and with rolling walker     Balance: supported, static, dynamic, and standing CGA and with rolling walker  Functional Ambulation: CGA and with rolling walker, cues required to stay on task, slow ambulation with several short breaks    Lower Body Dressing: SBA  ADL Position: edge of bed sitting  ADL Comments: socks      Vitals: WNL    Pain: 0 VAS  Location: NA  Interventions for pain: N/A  Education: Provided education on the importance of mobility in the acute care setting, Verbal/Tactile Cues, ADL training, and Transfer Training      Assessment: Marge Mcghee presents with ADL impairments affecting function including balance, endurance / activity tolerance, and strength. Pt with low motivation levels for activity, requiring external motivation. All tasks this date performed with CGA-SBA, albeit very slowly and with cues to stay on task. She has limited support at home and OT feels she would not be able to support herself if dc home. Recommend SNF. Demonstrated functioning below baseline abilities indicate the need for continued skilled intervention while inpatient. Tolerating session today without incident. Will continue to follow and progress as tolerated.     Plan/Recommendations:   Moderate Intensity Therapy recommended post-acute care. This is recommended as therapy feels the patient would require 3-4 days per week and wouldn't tolerate \"3 hour daily\" rehab intensity. SNF would be the preferred choice. If the patient does not agree to SNF, arrange HH or OP depending on home bound status. If patient is medically complex, consider LTACH.. Pt requires no DME at discharge.     Pt desires Skilled Rehab placement at discharge. Pt cooperative; agreeable to therapeutic recommendations and plan of care.     Modified Lalo: " N/A = No pre-op stroke/TIA    Post-Tx Position: Supine with HOB Elevated, Alarms activated, and Call light and personal items within reach  PPE: gloves    Therapy Charges for Today       Code Description Service Date Service Provider Modifiers Qty    65757106560 HC OT THERAPEUTIC ACT EA 15 MIN 12/12/2024 Landen Gipson OT GO 1    05700220636 HC OT SELF CARE/MGMT/TRAIN EA 15 MIN 12/12/2024 Landen Gipson OT GO 1           Time Calculation- OT       Row Name 12/12/24 1007             Time Calculation- OT    OT Start Time 0949  -LS      OT Stop Time 1007  -LS      OT Time Calculation (min) 18 min  -LS      Total Timed Code Minutes- OT 18 minute(s)  -LS      OT Received On 12/12/24  -LS      OT - Next Appointment 12/16/24  -         Timed Charges    92719 - OT Therapeutic Activity Minutes 9  -LS      24994 - OT Self Care/Mgmt Minutes 9  -LS         Total Minutes    Timed Charges Total Minutes 18  -LS       Total Minutes 18  -LS                User Key  (r) = Recorded By, (t) = Taken By, (c) = Cosigned By      Initials Name Provider Type    Landen Lenz OT Occupational Therapist

## 2024-12-12 NOTE — PLAN OF CARE
"Assessment: Marge Mcghee presents with ADL impairments affecting function including balance, endurance / activity tolerance, and strength. Pt with low motivation levels for activity, requiring external motivation. All tasks this date performed with CGA-SBA, albeit very slowly and with cues to stay on task. She has limited support at home and OT feels she would not be able to support herself if dc home. Recommend SNF. Demonstrated functioning below baseline abilities indicate the need for continued skilled intervention while inpatient. Tolerating session today without incident. Will continue to follow and progress as tolerated.      Plan/Recommendations:   Moderate Intensity Therapy recommended post-acute care. This is recommended as therapy feels the patient would require 3-4 days per week and wouldn't tolerate \"3 hour daily\" rehab intensity. SNF would be the preferred choice. If the patient does not agree to SNF, arrange HH or OP depending on home bound status. If patient is medically complex, consider LTACH.. Pt requires no DME at discharge.      Pt desires Skilled Rehab placement at discharge. Pt cooperative; agreeable to therapeutic recommendations and plan of care.     "

## 2024-12-12 NOTE — SIGNIFICANT NOTE
12/12/24 1728   Post Acute Pre-Cert Documentation   Post Acute Pre-Cert Initiated Comment Rec'd call from Graciela,  [facility] liaison, who states that they cannot accept patient without approval that [employer] is per primary insurance. JORGE LUIS checked and confirmed that precert was approved from 12/12/24 to 12/18/24 under [employer]  on [employer] and provided this information. She states that when they attempted to verify the insurance they could only verify her [employer]. Call placed to supervisor Chantal hernandez who  confirmed  that patient is able to dc to [facility] under approved precert.Call placed to Graciela and provided her with this information. She states she will have to get approval from [facility] to accept patient tonight. Rec'd call back from Graciela, patient will be admitted tonight and cont to verify coverage tomorrow.       Saskia Angel RN    Phone [phone]  Fax [phone]

## 2024-12-13 NOTE — PAYOR COMM NOTE
"NOTIFICATION OF DISCHARGE:        AUTH # [id number]   Marge Moreno (69 y.o. Female) 1955      DISCHARGED TO SNF ON 12/12/2024.        AUTHORIZATION PENDING:   PLEASE CALL OR FAX DETERMINATION TO CONTACT BELOW. THANK YOU.        Sweta Ribera RN MSN  /UR  [facility]  [phone] office  [phone] fax  [email or web address]    [facility]  NPI: [id number]  Tax: [id number]          Marge Moreno (69 y.o. Female)       Date of Birth   1955    Social Security Number       Address   [address] [place] [address] [place] [facility]    Home Phone   [phone]    MRN   [id number]       Sikhism   None    Marital Status                               Admission Date   12/6/24    Admission Type   Emergency    Admitting Provider   Sunny Robert MD    Attending Provider       Department, Room/Bed   [facility] MEDICAL INPATIENT, 300/1       Discharge Date   12/12/2024    Discharge Disposition   Skilled Nursing Facility (DC - External)    Discharge Destination                                 Attending Provider: (none)   Allergies: Zofran [Ondansetron Hcl], Prochlorperazine, Prochlorperazine Edisylate, Hydralazine, Hydralazine Hcl, Meperidine, Prochlorperazine Maleate, Prochlorperazine Maleate    Isolation: None   Infection: None   Code Status: Prior    Ht: 157.5 cm (62\")   Wt: 72.1 kg (158 lb 15.2 oz)    Admission Cmt: None   Principal Problem: REINA (acute kidney injury) [N17.9]                   Active Insurance as of 12/6/2024       Primary Coverage       Payor Plan Insurance Group Employer/Plan Group    AETNA COMMERCIAL AETNA [id number]       Payor Plan Address Payor Plan Phone Number Payor Plan Fax Number Effective Dates    [address] [phone]  1/1/2024 - None Entered    [facility] [id number]         Subscriber Name Subscriber Birth Date Member ID       GRADY MORENO 3/15/1952 [id number]               Secondary Coverage       " "Payor Plan Insurance Group Employer/Plan Group    HUMANA MEDICARE REPLACEMENT HUMANA MED ADV SNP HMO [id number]       Payor Plan Address Payor Plan Phone Number Payor Plan Fax Number Effective Dates    [address] [address] [phone]  10/1/2024 - None Entered    [facility] [address]         Subscriber Name Subscriber Birth Date Member ID       MARGE MORENO 1955 [id number]               Tertiary Coverage       Payor Plan Insurance Group Employer/Plan Group    GPM LIFE GPM LIFE MC SUP        Payor Plan Address Payor Plan Phone Number Payor Plan Fax Number Effective Dates    [address]   2019 - None Entered    [place] [address]         Subscriber Name Subscriber Birth Date Member ID       MARGE MORENO 1955 [id number]                     Emergency Contacts        (Rel.) Home Phone Work Phone Mobile Phone    Kimberly Salinas (POA) (Daughter) -- -- [phone]    SILVIO MORENO (Spouse) -- -- [phone]    Tonia Rockwell (Friend) [phone] -- --                 Discharge Summary        Lori Arroyo APRN at [address] [place] [address] Medicine Services  Discharge Summary    Date of Service: 2024  Patient Name: Marge Moreno  : 1955  MRN: [id number]    Date of Admission: 2024  Discharge Diagnosis: REINA (acute kidney injury)  Date of Discharge: 2024  Primary Care Physician: Traci Townsend APRN      Presenting Problem:   REINA (acute kidney injury) [N17.9]  Urinary tract infection without hematuria, site unspecified [N39.0]    Active and Resolved Hospital Problems:  Active Hospital Problems    Diagnosis POA    **REINA (acute kidney injury) [N17.9] Yes      Resolved Hospital Problems   No resolved problems to display.         Hospital Course     HPI:    \"Marge Moreno is a 69 y.o. female with a CMH of HTN, adrenal nodule, HLD, GERD, T2DM, RLS, OA, CAD s/p CABG who [facility] with generalized weakness.   " "  Patient presented with vague symptomatology.  She states she started to feel unwell yesterday. Today she decided to come to the ED because she felt like her legs were too weak to ambulate.  She reports having a persistent left sided headache that goes into her left neck. She has associated photophobia, nausea and vomiting. This morning she had a brief episode of left sided chest pressure that began when she was sitting down. The pain did not radiate. Since presenting to the hospital, she now has a slight sore throat.  She denies any fevers, chills, cough, SOA, palpitations, abd pain, diarrhea, constipation, dysuria, hematuria, decreased urination, urinary urgency/frequency, flank pain, peripheral edema, focal weakness, paresthesias, vision changes, dizziness, dysphagia, dysarthria, syncope, near-syncope or other acute symptoms at this time. She was recently started on lisinopril in October.  She has not had IV contrast exposure.  She denies usage of ibuprofen or other NSAIDs. Of note, she was recently admitted here from 11./28-11/30 with HHS, syncope and UTI.     ED course: Vitals 97.4-96-17-94/50-96% on room air. Labs remarkable for creatinine 2.77, BUN 40, glucose 246, troponin 27-20, WBC 13.78. UA with 1+ ketones, 1+ leukocyte esterase, 6-10 RBC, TNTC WBC, presence of squamous epithelial cells and renal epithelial cells. CTH was negative. She was given IVFs.  She was admitted for further management.\"    Hospital Course:  Patient was admitted for management of REINA, possible UTI and blood pressure management.  Patient creatinine on arrival was 2.77, BUN 40.  It was found that patient baseline creatinine was less than 1.0.  Renal ultrasound was ordered which showed no hydronephrosis and a small left renal cyst:, nephrology was consulted and patient was started on IV fluids.  Patient's lisinopril was discontinued on arrival, Norvasc was held until blood pressure was improved.  Patient's labs were monitored " throughout hospital stay daily, nephrology followed and helped manage.  At this time, patient creatinine is 1.17.    Patient originally thought to have urinary tract infection however urine culture was resulted as negative.    Patient blood pressure was hypotensive on arrival therefore medications were held or discontinued.  Nephrology restarted patient on amlodipine at a lower dose.  Patient's blood pressure remained stable on low-dose Norvasc which is what will be continued outpatient.    PT OT worked with patient and recommend skilled nursing facility upon discharge for safe disposition, patient was agreeable.  Case management worked on placement and patient has a bed available today.  Patient is medically stable for discharge with close outpatient follow-up and monitoring.      DISCHARGE Follow Up Recommendations for labs and diagnostics: PCP in 2 to 3 days for labs, BMP to monitor creatinine        Day of Discharge     Vital Signs:  Temp:  [97.2 °F (36.2 °C)-97.9 °F (36.6 °C)] 97.5 °F (36.4 °C)  Heart Rate:  [69-91] 74  Resp:  [12-19] 12  BP: ()/(37-72) 104/51    Physical Exam:  Physical Exam   Please see physical exam on progress note from today by Sunny Robert MD      Pertinent  and/or Most Recent Results     LAB RESULTS:      Lab 12/12/24  0210 12/11/24  0512 12/10/24  0124 12/09/24  0122 12/08/24  0213 12/06/24  2312 12/06/24  1332   WBC 7.61 7.38 7.79 7.22 8.94 10.37 13.78*   HEMOGLOBIN 9.7* 10.0* 10.3* 10.3* 9.9* 11.4* 13.2   HEMATOCRIT 31.0* 32.5* 31.4* 32.5* 32.2* 35.3 40.9   PLATELETS 270 265 244 253 239 266 305   NEUTROS ABS 3.09 3.01 3.49 3.58 3.99 5.58 8.87*   IMMATURE GRANS (ABS) 0.02 0.02 0.01 0.01 0.02 0.03 0.04   LYMPHS ABS 3.48* 3.40* 3.29* 2.73 3.70* 3.61* 3.43*   MONOS ABS 0.69 0.62 0.63 0.56 0.84 0.88 1.21*   EOS ABS 0.25 0.25 0.29 0.28 0.31 0.19 0.14   MCV 91.7 91.0 91.8 91.3 91.2 91.9 89.5   SED RATE  --   --   --   --   --   --  49*   LACTATE  --   --   --   --   --  2.0  --     PROTIME  --   --   --   --   --   --  13.9   APTT  --   --   --   --   --   --  25.8         Lab 12/12/24  0210 12/11/24  0512 12/10/24  0124 12/09/24  0122 12/08/24  0213 12/07/24  0606 12/06/24  2312   SODIUM 141 142 140 141 138  --  135*   POTASSIUM 3.8 3.9 4.1 3.9 3.7   < > 3.6   CHLORIDE 107 107 106 105 104  --  96*   CO2 24.6 25.9 25.6 27.3 26.0  --  28.3   ANION GAP 9.4 9.1 8.4 8.7 8.0  --  10.7   BUN 22 17 19 24* 31*  --  39*   CREATININE 1.17* 1.05* 1.16* 1.28* 1.40*  --  2.26*   EGFR 50.0* 56.9* 50.5* 44.9* 40.3*  --  22.7*   GLUCOSE 148* 106* 227* 236* 88  --  289*   CALCIUM 8.5* 9.0 8.7 8.8 8.8  --  10.1   MAGNESIUM  --   --   --  1.6 1.6  --  1.9   PHOSPHORUS  --   --   --  2.4* 2.7  --  4.5    < > = values in this interval not displayed.         Lab 12/06/24  1446   TOTAL PROTEIN 7.0   ALBUMIN 4.0   GLOBULIN 3.0   ALT (SGPT) 9   AST (SGOT) 23   BILIRUBIN 0.5   ALK PHOS 65         Lab 12/06/24  1639 12/06/24  1446 12/06/24  1332   HSTROP T 20* 27*  --    PROTIME  --   --  13.9   INR  --   --  1.05             Lab 12/09/24  0122   IRON 34*   IRON SATURATION (TSAT) 12*   TIBC 274*   TRANSFERRIN 184*         Brief Urine Lab Results  (Last result in the past 365 days)        Color   Clarity   Blood   Leuk Est   Nitrite   Protein   CREAT   Urine HCG        12/07/24 2341             120.3               Microbiology Results (last 10 days)       Procedure Component Value - Date/Time    Eosinophil Smear - Urine, Urine, Clean Catch [[id number]]  (Normal) Collected: 12/07/24 2341    Lab Status: Final result Specimen: Urine, Clean Catch Updated: 12/08/24 0045     Eosinophil Smear 0 % EOS/100 Cells     Blood Culture - Blood, Arm, Right [[id number]]  (Normal) Collected: 12/06/24 2312    Lab Status: Final result Specimen: Blood from Arm, Right Updated: 12/11/24 2330     Blood Culture No growth at 5 days    Narrative:      Less than seven (7) mL's of blood was collected.  Insufficient quantity may yield false negative  results.    Blood Culture - Blood, Arm, Left [[id number]]  (Normal) Collected: 12/06/24 2243    Lab Status: Final result Specimen: Blood from Arm, Left Updated: 12/11/24 2301     Blood Culture No growth at 5 days    Narrative:      Less than seven (7) mL's of blood was collected.  Insufficient quantity may yield false negative results.    Urine Culture - Urine, Urine, Clean Catch [[id number]] Collected: 12/06/24 1357    Lab Status: Final result Specimen: Urine, Clean Catch Updated: 12/08/24 1031     Urine Culture 50,000 CFU/mL Normal Urogenital Tammy    Narrative:      Colonization of the urinary tract without infection is common. Treatment is discouraged unless the patient is symptomatic, pregnant, or undergoing an invasive urologic procedure.            CT Abdomen Pelvis Without Contrast    Result Date: 12/9/2024  Impression: Impression: 1.No acute findings within the abdomen or pelvis. 2.4.1 x 3.0 cm right adrenal adenoma. 3.3 mm nonobstructing stone in the left kidney. 4.Left renal cyst. 5.Mild diverticulosis of the sigmoid colon. 6.Tiny fat density umbilical hernia. There is a small hiatal hernia. 7.The exam is limited by noncontrasted technique. Electronically Signed: Darci Okeefe MD  12/9/2024 10:39 AM EST  Workstation ID: [id number]    US Renal Bilateral    Result Date: 12/7/2024  Impression: Impression: No hydronephrosis. Small left renal cyst. Electronically Signed: Netta Cartwright MD  12/7/2024 8:47 AM EST  Workstation ID: [id number]    CT Head Without Contrast    Result Date: 12/6/2024  Impression: Impression: No acute intracranial findings by CT. Chronic findings stable from prior. Electronically Signed: Chung Holland MD  12/6/2024 1:50 PM EST  Workstation ID: [id number]    XR Sacrum & Coccyx    Result Date: 11/29/2024  Impression: Impression: No acute abnormality of the sacrum or coccyx is identified. Electronically Signed: Blanca Glass MD  11/29/2024 2:37 PM EST  Workstation ID: [id number]    CT Head Without  Contrast Stroke Protocol    Result Date: 11/28/2024  Impression: 1.No evidence for acute intracranial abnormality. 2.Nonspecific white matter changes are noted with associated diffuse volume loss. These findings are likely related to chronic small vessel ischemic changes and/or age-related changes. Electronically Signed: Wes Means MD  11/28/2024 3:42 PM EST  Workstation ID: [id number]    XR Chest 1 View    Result Date: 11/28/2024  Impression: Impression: No acute process. Electronically Signed: Honorio Olguin MD  11/28/2024 3:30 PM EST  Workstation ID: [id number]     Results for orders placed during the hospital encounter of 09/25/22    Bilateral Carotid Duplex    Interpretation Summary  · Proximal right internal carotid artery is normal.  · Proximal left internal carotid artery is normal.      Results for orders placed during the hospital encounter of 09/25/22    Bilateral Carotid Duplex    Interpretation Summary  · Proximal right internal carotid artery is normal.  · Proximal left internal carotid artery is normal.      Results for orders placed during the hospital encounter of 08/01/23    Adult Transthoracic Echo Complete W/ Cont if Necessary Per Protocol    Interpretation Summary    Left ventricular systolic function is normal. Left ventricular ejection fraction appears to be 61 - 65%.    Left ventricular wall thickness is consistent with mild concentric hypertrophy.    Left ventricular diastolic function was normal.    The left atrial cavity is mildly dilated.    Estimated right ventricular systolic pressure from tricuspid regurgitation is mildly elevated (35-45 mmHg). Calculated right ventricular systolic pressure from tricuspid regurgitation is 35 mmHg.      Labs Pending at Discharge:  Pending Results       None            Procedures Performed           Consults:   Consults       Date and Time Order Name Status Description    12/6/2024  5:25 PM Inpatient Nephrology Consult Completed     12/6/2024  3:57 PM  Hospitalist (on-call MD unless specified)                Discharge Details        Discharge Medications        Changes to Medications        Instructions Start Date   amLODIPine 2.5 MG tablet  Commonly known as: NORVASC  What changed:   medication strength  how much to take   2.5 mg, Oral, Daily   Start Date: December 13, 2024            Continue These Medications        Instructions Start Date   Accu-Chek FastClix Lancets misc   1 each, Not Applicable, 3 Times Daily Before Meals, Dx code: E11.65      Accu-Chek Softclix Lancets lancets   1 each, Other, 3 Times Daily Before Meals, Use as instructed Dx code: E11.65      Accu-Chek Guide Me w/Device kit   1 each, Not Applicable, 3 Times Daily Before Meals, Dx code: E11.65  NDC [address]-52  Bin#: [id number] Group #: [id number]  ID #: [id number]  Issuer #: ([id number])      acetaminophen 325 MG tablet  Commonly known as: TYLENOL   650 mg, Every 4 Hours PRN      aspirin 81 MG EC tablet   1 tablet, Daily      atorvastatin 80 MG tablet  Commonly known as: LIPITOR   80 mg, Oral, Nightly      clopidogrel 75 MG tablet  Commonly known as: PLAVIX   75 mg, Daily      DULoxetine 60 MG capsule  Commonly known as: CYMBALTA   1 capsule, Daily      gabapentin 600 MG tablet  Commonly known as: NEURONTIN   600 mg, See Admin Instructions      Insulin Glargine 100 UNIT/ML injection pen  Commonly known as: LANTUS SOLOSTAR   15 Units, Subcutaneous, Daily, Dx code: E11.65      multivitamin with minerals tablet tablet   1 tablet, Daily      Pen Needles 32G X 4 MM misc   1 each, Not Applicable, Nightly, Dx code: E11.65      Prolia 60 MG/ML solution prefilled syringe syringe  Generic drug: denosumab   60 mg, Subcutaneous, Once      traZODone 50 MG tablet  Commonly known as: DESYREL   1 tablet, Nightly             Stop These Medications      lisinopril 5 MG tablet  Commonly known as: PRINIVIL,ZESTRIL              Allergies   Allergen Reactions    Zofran [Ondansetron Hcl] Nausea And Vomiting     Does the  opposite of its purpose    Prochlorperazine Other (See Comments)     CAUSED SEIZURE    Prochlorperazine Edisylate Hives    Hydralazine Itching and Rash    Hydralazine Hcl Itching and Rash    Meperidine Itching and Swelling    Prochlorperazine Maleate Other (See Comments)     CAUSES SEIZURE    Prochlorperazine Maleate Irritability         Discharge Disposition:   Skilled Nursing Facility (DC - External)    Diet:  Hospital:  Diet Order   Procedures    Diet: Diabetic, Cardiac; Healthy Heart (2-3 Na+); Consistent Carbohydrate; Fluid Consistency: Thin (IDDSI 0)         Discharge Activity:         CODE STATUS:  Code Status and Medical Interventions: CPR (Attempt to Resuscitate); Full Support   Ordered at: 12/06/24 1726     Code Status (Patient has no pulse and is not breathing):    CPR (Attempt to Resuscitate)     Medical Interventions (Patient has pulse or is breathing):    Full Support         No future appointments.    Additional Instructions for the Follow-ups that You Need to Schedule       Discharge Follow-up with PCP   As directed       Currently Documented PCP:    Traci Townsend APRN    PCP Phone Number:    [phone]     Follow Up Details: 2-3 days                Time spent on Discharge including face to face service:  45 minutes    Signature: Electronically signed by CANDIDO Sanchez, 12/12/24, 15:45 EST.  [facility] Hospitalist Team      Electronically signed by Lori Arroyo APRN at 12/12/24 1552       Discharge Order (From admission, onward)       Start     Ordered    12/12/24 1543  Discharge patient  Once        Expected Discharge Date: 12/12/24   Discharge Disposition: Skilled Nursing Facility (DC - External)   Physician of Record for Attribution - Please select from Treatment Team: ANAIS JO [[id number]]   Review needed by CMO to determine Physician of Record: No      Question Answer Comment   Physician of Record for Attribution - Please select from Treatment Team ANAIS JO    Review  needed by CMO to determine Physician of Record No        12/12/24 1544    12/09/24 0858  Discharge patient  Once,   Status:  Canceled        Expected Discharge Date: 12/09/24   Discharge Disposition: Home or Self Care   Physician of Record for Attribution - Please select from Treatment Team: SEBLE CARY [[id number]]   Review needed by CMO to determine Physician of Record: No      Question Answer Comment   Physician of Record for Attribution - Please select from Treatment Team SEBLE CARY    Review needed by CMO to determine Physician of Record No        12/09/24 0857

## 2024-12-13 NOTE — CASE MANAGEMENT/SOCIAL WORK
Case Management Discharge Note      Final Note: [facility].     Destination Coordination complete.      Service Provider Services Address Phone Fax Patient Preferred    [facility] [address] [facility] [place] [address] [phone] --                 Transportation Services  Private: Car    Final Discharge Disposition Code: 03 - skilled nursing facility (SNF)

## 2024-12-26 ENCOUNTER — HOSPITAL ENCOUNTER (EMERGENCY)
Facility: HOSPITAL | Age: 69
Discharge: HOME | End: 2024-12-26
Attending: EMERGENCY MEDICINE
Payer: MEDICARE

## 2024-12-26 ENCOUNTER — APPOINTMENT (OUTPATIENT)
Dept: GENERAL RADIOLOGY | Facility: HOSPITAL | Age: 69
End: 2024-12-26
Payer: MEDICARE

## 2024-12-26 VITALS
OXYGEN SATURATION: 98 % | RESPIRATION RATE: 15 BRPM | BODY MASS INDEX: 28 KG/M2 | TEMPERATURE: 97.9 F | SYSTOLIC BLOOD PRESSURE: 140 MMHG | HEIGHT: 64 IN | HEART RATE: 81 BPM | DIASTOLIC BLOOD PRESSURE: 73 MMHG | WEIGHT: 164 LBS

## 2024-12-26 DIAGNOSIS — S92.412A CLOSED FRACTURE OF PROXIMAL PHALANX OF LEFT GREAT TOE, INITIAL ENCOUNTER: Primary | ICD-10-CM

## 2024-12-26 DIAGNOSIS — L03.116 CELLULITIS OF LEFT FOOT: ICD-10-CM

## 2024-12-26 LAB
ANION GAP SERPL CALCULATED.3IONS-SCNC: 9.3 MMOL/L (ref 5–15)
BASOPHILS # BLD AUTO: 0.07 10*3/MM3 (ref 0–0.2)
BASOPHILS NFR BLD AUTO: 0.8 % (ref 0–1.5)
BUN SERPL-MCNC: 19 MG/DL (ref 8–23)
BUN/CREAT SERPL: 15.4 (ref 7–25)
CALCIUM SPEC-SCNC: 9.4 MG/DL (ref 8.6–10.5)
CHLORIDE SERPL-SCNC: 103 MMOL/L (ref 98–107)
CO2 SERPL-SCNC: 28.7 MMOL/L (ref 22–29)
CREAT SERPL-MCNC: 1.23 MG/DL (ref 0.57–1)
CRP SERPL-MCNC: <0.3 MG/DL (ref 0–0.5)
DEPRECATED RDW RBC AUTO: 43.3 FL (ref 37–54)
EGFRCR SERPLBLD CKD-EPI 2021: 47.1 ML/MIN/1.73
EOSINOPHIL # BLD AUTO: 0.24 10*3/MM3 (ref 0–0.4)
EOSINOPHIL NFR BLD AUTO: 2.9 % (ref 0.3–6.2)
ERYTHROCYTE [DISTWIDTH] IN BLOOD BY AUTOMATED COUNT: 12.8 % (ref 12.3–15.4)
ERYTHROCYTE [SEDIMENTATION RATE] IN BLOOD: 13 MM/HR (ref 0–30)
GLUCOSE SERPL-MCNC: 320 MG/DL (ref 65–99)
HCT VFR BLD AUTO: 37.4 % (ref 34–46.6)
HGB BLD-MCNC: 11.4 G/DL (ref 12–15.9)
IMM GRANULOCYTES # BLD AUTO: 0.03 10*3/MM3 (ref 0–0.05)
IMM GRANULOCYTES NFR BLD AUTO: 0.4 % (ref 0–0.5)
LYMPHOCYTES # BLD AUTO: 2.55 10*3/MM3 (ref 0.7–3.1)
LYMPHOCYTES NFR BLD AUTO: 30.6 % (ref 19.6–45.3)
MCH RBC QN AUTO: 28.1 PG (ref 26.6–33)
MCHC RBC AUTO-ENTMCNC: 30.5 G/DL (ref 31.5–35.7)
MCV RBC AUTO: 92.3 FL (ref 79–97)
MONOCYTES # BLD AUTO: 0.64 10*3/MM3 (ref 0.1–0.9)
MONOCYTES NFR BLD AUTO: 7.7 % (ref 5–12)
NEUTROPHILS NFR BLD AUTO: 4.81 10*3/MM3 (ref 1.7–7)
NEUTROPHILS NFR BLD AUTO: 57.6 % (ref 42.7–76)
NRBC BLD AUTO-RTO: 0 /100 WBC (ref 0–0.2)
PLATELET # BLD AUTO: 290 10*3/MM3 (ref 140–450)
PMV BLD AUTO: 8.8 FL (ref 6–12)
POTASSIUM SERPL-SCNC: 4.5 MMOL/L (ref 3.5–5.2)
RBC # BLD AUTO: 4.05 10*6/MM3 (ref 3.77–5.28)
SODIUM SERPL-SCNC: 141 MMOL/L (ref 136–145)
WBC NRBC COR # BLD AUTO: 8.34 10*3/MM3 (ref 3.4–10.8)

## 2024-12-26 PROCEDURE — 99283 EMERGENCY DEPT VISIT LOW MDM: CPT

## 2024-12-26 PROCEDURE — 86140 C-REACTIVE PROTEIN: CPT | Performed by: NURSE PRACTITIONER

## 2024-12-26 PROCEDURE — 25010000002 TETANUS-DIPHTH-ACELL PERTUSSIS 5-2.5-18.5 LF-MCG/0.5 SUSPENSION PREFILLED SYRINGE: Performed by: EMERGENCY MEDICINE

## 2024-12-26 PROCEDURE — 90471 IMMUNIZATION ADMIN: CPT | Performed by: EMERGENCY MEDICINE

## 2024-12-26 PROCEDURE — 80048 BASIC METABOLIC PNL TOTAL CA: CPT | Performed by: NURSE PRACTITIONER

## 2024-12-26 PROCEDURE — 85652 RBC SED RATE AUTOMATED: CPT | Performed by: NURSE PRACTITIONER

## 2024-12-26 PROCEDURE — 85025 COMPLETE CBC W/AUTO DIFF WBC: CPT | Performed by: NURSE PRACTITIONER

## 2024-12-26 PROCEDURE — 73630 X-RAY EXAM OF FOOT: CPT

## 2024-12-26 PROCEDURE — 90715 TDAP VACCINE 7 YRS/> IM: CPT | Performed by: EMERGENCY MEDICINE

## 2024-12-26 RX ORDER — SODIUM CHLORIDE 0.9 % (FLUSH) 0.9 %
10 SYRINGE (ML) INJECTION AS NEEDED
Status: DISCONTINUED | OUTPATIENT
Start: 2024-12-26 | End: 2024-12-26 | Stop reason: HOSPADM

## 2024-12-26 RX ADMIN — TETANUS TOXOID, REDUCED DIPHTHERIA TOXOID AND ACELLULAR PERTUSSIS VACCINE, ADSORBED 0.5 ML: 5; 2.5; 8; 8; 2.5 SUSPENSION INTRAMUSCULAR at 20:03

## 2024-12-26 RX ADMIN — Medication 10 ML: at 17:19

## 2024-12-26 NOTE — ED PROVIDER NOTES
Subjective     Provider in Triage Note  Due to significant overcrowding in the emergency department patient was initially seen and evaluated in triage.  Provider in triage recommended patient placement in the treatment area to initiate therapy and movement to an ER bed as soon as possible.    Patient presents today with complaints of pain, redness, swelling to left great toe.  No known injury or trauma.  No history of gout.  No fever or chills.  Reported history of diabetes.      History of Present Illness  Chief complaint: Left great toe pain    69-year-old female presents with left great toe pain.  Patient states symptoms have been present for 2 days.  She states her dog bit her left great toe 2 days ago and there was a small superficial bite candace on the top of the toe.  Since then she has had progressively worsening swelling, erythema, pain to the toe and extending onto the foot.  She denies fever.    History provided by:  Patient      Review of Systems   Constitutional:  Negative for fever.   HENT:  Negative for congestion.    Respiratory:  Negative for cough and shortness of breath.    Cardiovascular:  Negative for chest pain.   Gastrointestinal:  Negative for abdominal pain.       Past Medical History:   Diagnosis Date    Adrenal nodule 11/16/2016    FINDINGS: Mild hepatic steatosis may be present. Cholecystectomy. No biliary ductal dilatation. Negative pancreas, spleen, left adrenal gland, and kidneys. The right adrenal presumed adenoma has increased slightly, measuring 3 x 4 cm in diameter,  compared to 2 x 3.5 cm on the previous exam. The attenuation values are consistent with an adenoma. Done at [facility] in August 2018    Age-related osteoporosis without current pathological fracture 11/16/2016    Arthritis     CVA (cerebral vascular accident)     year 2000- can't remember exact year    Delayed surgical wound healing     Essential hypertension 11/16/2016    Gastroesophageal reflux disease with  esophagitis 11/16/2016    Hepatic steatosis 11/16/2016    History of anemia     History of sepsis     FROM UTI    Hyperlipidemia 11/16/2016    Lisfranc's dislocation     LEFT WITH FRACTURES NONHEALING FOOT    Osteomyelitis of left foot 11/2020    RLS (restless legs syndrome)     Squamous cell skin cancer 2014    Excised by Dr. James    Thyroid nodule 10/18/2019    BENIGN    Type 2 diabetes mellitus with peripheral neuropathy 11/16/2016    Vitamin D deficiency 01/25/2019       Allergies   Allergen Reactions    Zofran [Ondansetron Hcl] Nausea And Vomiting     Does the opposite of its purpose    Prochlorperazine Other (See Comments)     CAUSED SEIZURE    Prochlorperazine Edisylate Hives    Hydralazine Itching and Rash    Hydralazine Hcl Itching and Rash    Meperidine Itching and Swelling    Prochlorperazine Maleate Other (See Comments)     CAUSES SEIZURE    Prochlorperazine Maleate Irritability       Past Surgical History:   Procedure Laterality Date    BREAST BIOPSY Left     7/2019. BENIGN    CARDIAC CATHETERIZATION Left 5/2/2021    Procedure: Cardiac Catheterization/Vascular Study;  Surgeon: Chacho Esteban MD;  Location: [facility] CATH INVASIVE LOCATION;  Service: Cardiovascular;  Laterality: Left;    CARDIAC CATHETERIZATION N/A 5/2/2021    Procedure: Intra-Aortic Baloon Pump Insertion;  Surgeon: Chacho Esteban MD;  Location: [facility] CATH INVASIVE LOCATION;  Service: Cardiovascular;  Laterality: N/A;    CATARACT EXTRACTION WITH INTRAOCULAR LENS IMPLANT Bilateral     CHOLECYSTECTOMY      COLONOSCOPY      CORONARY ARTERY BYPASS GRAFT N/A 5/2/2021    Procedure: CORONARY ARTERY BYPASS WITH INTERNAL MAMMARY ARTERY GRAFT;  Surgeon: Jr Rocael Alexander MD;  Location: [facility] CVOR;  Service: Cardiothoracic;  Laterality: N/A;    FOOT FUSION Right 11/13/2020    Procedure: RIGHT OPEN TREATMENT LISFRANC INJURY, OPEN REDUCTION INTERNAL FIXATION MEDIAL/MIDDLE CUNEIFORM FRACTURE AND 2ND/3RD METATARSAL FRACTURE 1ST 2ND POSSIBLE 3RD  "TARSOMETATARSAL ARTHRODESIS INTERCUNEIFORM ARTHRODESIS CALCANEAL BONE GRAFT;  Surgeon: Mikhail Banks Jr., MD;  Location:  [facility];  Service: Orthopedics;  Laterality: Right;    INCISION AND DRAINAGE LEG Right 1/8/2021    Procedure: RIGHT IRRIGATION AND DEBRIDEMENT OF FOOT WITH SECONDARY CLOSURE AND HARDWARE REMOVAL;  Surgeon: Mikhail Banks Jr., MD;  Location: [facility] MAIN OR;  Service: Orthopedics;  Laterality: Right;    KNEE ARTHROSCOPY Bilateral     TOTAL ABDOMINAL HYSTERECTOMY      TRANSESOPHAGEAL ECHOCARDIOGRAM (EMILIA) N/A 5/2/2021    Procedure: TRANSESOPHAGEAL ECHOCARDIOGRAM;  Surgeon: Jr Rocael Alexander MD;  Location: [facility];  Service: Cardiothoracic;  Laterality: N/A;    UPPER GASTROINTESTINAL ENDOSCOPY  08/2016    US GUIDED FINE NEEDLE ASPIRATION  5/8/2020       Family History   Problem Relation Age of Onset    Diabetes Mother     COPD Mother     Hypertension Mother     Kidney disease Mother     Obesity Mother     Thyroid disease Mother     Diabetes Sister     Diabetes Sister     Diabetes Sister     Diabetes Sister     Malig Hyperthermia Neg Hx        Social History     Socioeconomic History    Marital status:    Tobacco Use    Smoking status: Never    Smokeless tobacco: Never   Vaping Use    Vaping status: Never Used   Substance and Sexual Activity    Alcohol use: Yes     Comment: socially     Drug use: Never    Sexual activity: Defer       /73   Pulse 81   Temp 97.9 °F (36.6 °C) (Oral)   Resp 15   Ht 162.6 cm (64\")   Wt 74.4 kg (164 lb)   SpO2 98%   BMI 28.15 kg/m²       Objective   Physical Exam  Vitals and nursing note reviewed.   Constitutional:       Appearance: Normal appearance.   HENT:      Head: Normocephalic and atraumatic.      Mouth/Throat:      Mouth: Mucous membranes are moist.   Cardiovascular:      Rate and Rhythm: Normal rate and regular rhythm.   Pulmonary:      Effort: Pulmonary effort is normal. No respiratory distress.   Musculoskeletal:      Comments: " There is mild swelling, erythema, warmth to the left great toe.  Tenderness on palpation.  Patient has good distal pulses and good cap refill in the left foot.   Skin:     General: Skin is warm and dry.   Neurological:      Mental Status: She is alert and oriented to person, place, and time.         Procedures           ED Course      Results for orders placed or performed during the hospital encounter of 12/26/24   Basic Metabolic Panel    Collection Time: 12/26/24  5:18 PM    Specimen: Arm, Right; Blood   Result Value Ref Range    Glucose 320 (H) 65 - 99 mg/dL    BUN 19 8 - 23 mg/dL    Creatinine 1.23 (H) 0.57 - 1.00 mg/dL    Sodium 141 136 - 145 mmol/L    Potassium 4.5 3.5 - 5.2 mmol/L    Chloride 103 98 - 107 mmol/L    CO2 28.7 22.0 - 29.0 mmol/L    Calcium 9.4 8.6 - 10.5 mg/dL    BUN/Creatinine Ratio 15.4 7.0 - 25.0    Anion Gap 9.3 5.0 - 15.0 mmol/L    eGFR 47.1 (L) >60.0 mL/min/1.73   Sedimentation Rate    Collection Time: 12/26/24  5:18 PM    Specimen: Arm, Right; Blood   Result Value Ref Range    Sed Rate 13 0 - 30 mm/hr   C-reactive Protein    Collection Time: 12/26/24  5:18 PM    Specimen: Arm, Right; Blood   Result Value Ref Range    C-Reactive Protein <0.30 0.00 - 0.50 mg/dL   CBC Auto Differential    Collection Time: 12/26/24  5:18 PM    Specimen: Arm, Right; Blood   Result Value Ref Range    WBC 8.34 3.40 - 10.80 10*3/mm3    RBC 4.05 3.77 - 5.28 10*6/mm3    Hemoglobin 11.4 (L) 12.0 - 15.9 g/dL    Hematocrit 37.4 34.0 - 46.6 %    MCV 92.3 79.0 - 97.0 fL    MCH 28.1 26.6 - 33.0 pg    MCHC 30.5 (L) 31.5 - 35.7 g/dL    RDW 12.8 12.3 - 15.4 %    RDW-SD 43.3 37.0 - 54.0 fl    MPV 8.8 6.0 - 12.0 fL    Platelets 290 140 - 450 10*3/mm3    Neutrophil % 57.6 42.7 - 76.0 %    Lymphocyte % 30.6 19.6 - 45.3 %    Monocyte % 7.7 5.0 - 12.0 %    Eosinophil % 2.9 0.3 - 6.2 %    Basophil % 0.8 0.0 - 1.5 %    Immature Grans % 0.4 0.0 - 0.5 %    Neutrophils, Absolute 4.81 1.70 - 7.00 10*3/mm3    Lymphocytes, Absolute 2.55  0.70 - 3.10 10*3/mm3    Monocytes, Absolute 0.64 0.10 - 0.90 10*3/mm3    Eosinophils, Absolute 0.24 0.00 - 0.40 10*3/mm3    Basophils, Absolute 0.07 0.00 - 0.20 10*3/mm3    Immature Grans, Absolute 0.03 0.00 - 0.05 10*3/mm3    nRBC 0.0 0.0 - 0.2 /100 WBC     XR Foot 3+ View Left    Result Date: 12/26/2024  Impression: Acute fracture of the great toe proximal phalanx. Electronically Signed: Xavi Clarke  12/26/2024 6:03 PM EST  Workstation ID: [id number]                                                    Medical Decision Making    White blood cell count is normal.  BMP is unremarkable.  Sed rate and CRP are normal.  X-ray of the left foot does show an acute fracture of the proximal phalanx of the great toe.  With the swelling and redness I do has some concern for early cellulitis given the dog bite.  She will be placed on Augmentin.  She was also given a tetanus shot.  She will be given podiatry follow-up.      Final diagnoses:   Closed fracture of proximal phalanx of left great toe, initial encounter   Cellulitis of left foot       ED Disposition  ED Disposition       ED Disposition   Discharge    Condition   Stable    Comment   --               DEBBI Camargo DPM  [address] [place] [address] [phone]    Call in 2 days           Medication List        New Prescriptions      amoxicillin-clavulanate 875-125 MG per tablet  Commonly known as: AUGMENTIN  Take 1 tablet by mouth 2 (Two) Times a Day for 7 days.               Where to Get Your Medications        These medications were sent to [id number] [place] - [address] [place] [address] - [phone]  - [phone] [address] [place] [address]      Phone: [phone]   amoxicillin-clavulanate 875-125 MG per tablet            Asif Larson MD  12/26/24 [id number]

## 2024-12-27 NOTE — DISCHARGE INSTRUCTIONS
Follow-up with podiatry as directed.  Return to the emergency room for any new or worsening symptoms or if you have any other questions or concerns.  Take antibiotic as prescribed.

## 2025-04-28 ENCOUNTER — APPOINTMENT (OUTPATIENT)
Dept: GENERAL RADIOLOGY | Facility: HOSPITAL | Age: 70
End: 2025-04-28
Payer: MEDICARE

## 2025-04-28 ENCOUNTER — HOSPITAL ENCOUNTER (OUTPATIENT)
Facility: HOSPITAL | Age: 70
Setting detail: OBSERVATION
Discharge: HOME | End: 2025-04-30
Attending: EMERGENCY MEDICINE | Admitting: EMERGENCY MEDICINE
Payer: MEDICARE

## 2025-04-28 DIAGNOSIS — M54.2 NECK PAIN: ICD-10-CM

## 2025-04-28 DIAGNOSIS — R73.9 HYPERGLYCEMIA: Primary | ICD-10-CM

## 2025-04-28 DIAGNOSIS — I25.9 CHEST PAIN DUE TO MYOCARDIAL ISCHEMIA, UNSPECIFIED ISCHEMIC CHEST PAIN TYPE: ICD-10-CM

## 2025-04-28 LAB
ANION GAP SERPL CALCULATED.3IONS-SCNC: 12.1 MMOL/L (ref 5–15)
BASOPHILS # BLD AUTO: 0.08 10*3/MM3 (ref 0–0.2)
BASOPHILS NFR BLD AUTO: 0.8 % (ref 0–1.5)
BUN SERPL-MCNC: 23 MG/DL (ref 8–23)
BUN/CREAT SERPL: 16.4 (ref 7–25)
CALCIUM SPEC-SCNC: 9.2 MG/DL (ref 8.6–10.5)
CHLORIDE SERPL-SCNC: 96 MMOL/L (ref 98–107)
CK SERPL-CCNC: 88 U/L (ref 20–180)
CO2 SERPL-SCNC: 21.9 MMOL/L (ref 22–29)
CREAT SERPL-MCNC: 1.4 MG/DL (ref 0.57–1)
DEPRECATED RDW RBC AUTO: 42.8 FL (ref 37–54)
EGFRCR SERPLBLD CKD-EPI 2021: 40.1 ML/MIN/1.73
EOSINOPHIL # BLD AUTO: 0.13 10*3/MM3 (ref 0–0.4)
EOSINOPHIL NFR BLD AUTO: 1.4 % (ref 0.3–6.2)
ERYTHROCYTE [DISTWIDTH] IN BLOOD BY AUTOMATED COUNT: 12.9 % (ref 12.3–15.4)
ERYTHROCYTE [SEDIMENTATION RATE] IN BLOOD: 27 MM/HR (ref 0–30)
GLUCOSE BLDC GLUCOMTR-MCNC: 294 MG/DL (ref 70–105)
GLUCOSE SERPL-MCNC: 658 MG/DL (ref 65–99)
HCT VFR BLD AUTO: 39.4 % (ref 34–46.6)
HGB BLD-MCNC: 12.3 G/DL (ref 12–15.9)
IMM GRANULOCYTES # BLD AUTO: 0.03 10*3/MM3 (ref 0–0.05)
IMM GRANULOCYTES NFR BLD AUTO: 0.3 % (ref 0–0.5)
LYMPHOCYTES # BLD AUTO: 2.82 10*3/MM3 (ref 0.7–3.1)
LYMPHOCYTES NFR BLD AUTO: 29.6 % (ref 19.6–45.3)
MAGNESIUM SERPL-MCNC: 2 MG/DL (ref 1.6–2.4)
MCH RBC QN AUTO: 28.1 PG (ref 26.6–33)
MCHC RBC AUTO-ENTMCNC: 31.2 G/DL (ref 31.5–35.7)
MCV RBC AUTO: 90 FL (ref 79–97)
MONOCYTES # BLD AUTO: 0.87 10*3/MM3 (ref 0.1–0.9)
MONOCYTES NFR BLD AUTO: 9.1 % (ref 5–12)
NEUTROPHILS NFR BLD AUTO: 5.6 10*3/MM3 (ref 1.7–7)
NEUTROPHILS NFR BLD AUTO: 58.8 % (ref 42.7–76)
NRBC BLD AUTO-RTO: 0 /100 WBC (ref 0–0.2)
PLATELET # BLD AUTO: 251 10*3/MM3 (ref 140–450)
PMV BLD AUTO: 9.3 FL (ref 6–12)
POTASSIUM SERPL-SCNC: 5.1 MMOL/L (ref 3.5–5.2)
RBC # BLD AUTO: 4.38 10*6/MM3 (ref 3.77–5.28)
SODIUM SERPL-SCNC: 130 MMOL/L (ref 136–145)
WBC NRBC COR # BLD AUTO: 9.53 10*3/MM3 (ref 3.4–10.8)

## 2025-04-28 PROCEDURE — 63710000001 INSULIN REGULAR HUMAN PER 5 UNITS: Performed by: EMERGENCY MEDICINE

## 2025-04-28 PROCEDURE — 85652 RBC SED RATE AUTOMATED: CPT | Performed by: EMERGENCY MEDICINE

## 2025-04-28 PROCEDURE — 63710000001 INSULIN LISPRO (HUMAN) PER 5 UNITS: Performed by: EMERGENCY MEDICINE

## 2025-04-28 PROCEDURE — 25810000003 SODIUM CHLORIDE 0.9 % SOLUTION: Performed by: EMERGENCY MEDICINE

## 2025-04-28 PROCEDURE — 93005 ELECTROCARDIOGRAM TRACING: CPT | Performed by: EMERGENCY MEDICINE

## 2025-04-28 PROCEDURE — G0378 HOSPITAL OBSERVATION PER HR: HCPCS

## 2025-04-28 PROCEDURE — 72050 X-RAY EXAM NECK SPINE 4/5VWS: CPT

## 2025-04-28 PROCEDURE — 82550 ASSAY OF CK (CPK): CPT | Performed by: EMERGENCY MEDICINE

## 2025-04-28 PROCEDURE — 85025 COMPLETE CBC W/AUTO DIFF WBC: CPT | Performed by: EMERGENCY MEDICINE

## 2025-04-28 PROCEDURE — 80048 BASIC METABOLIC PNL TOTAL CA: CPT | Performed by: EMERGENCY MEDICINE

## 2025-04-28 PROCEDURE — 96361 HYDRATE IV INFUSION ADD-ON: CPT

## 2025-04-28 PROCEDURE — 83735 ASSAY OF MAGNESIUM: CPT | Performed by: EMERGENCY MEDICINE

## 2025-04-28 PROCEDURE — 99285 EMERGENCY DEPT VISIT HI MDM: CPT

## 2025-04-28 PROCEDURE — 82948 REAGENT STRIP/BLOOD GLUCOSE: CPT

## 2025-04-28 RX ORDER — INSULIN LISPRO 100 [IU]/ML
2-7 INJECTION, SOLUTION INTRAVENOUS; SUBCUTANEOUS
Status: DISCONTINUED | OUTPATIENT
Start: 2025-04-28 | End: 2025-04-29

## 2025-04-28 RX ORDER — BISACODYL 5 MG/1
5 TABLET, DELAYED RELEASE ORAL DAILY PRN
Status: DISCONTINUED | OUTPATIENT
Start: 2025-04-28 | End: 2025-04-30 | Stop reason: HOSPADM

## 2025-04-28 RX ORDER — TRAZODONE HYDROCHLORIDE 50 MG/1
50 TABLET ORAL NIGHTLY
Status: DISCONTINUED | OUTPATIENT
Start: 2025-04-28 | End: 2025-04-29

## 2025-04-28 RX ORDER — IBUPROFEN 600 MG/1
600 TABLET, FILM COATED ORAL 3 TIMES DAILY PRN
Status: DISCONTINUED | OUTPATIENT
Start: 2025-04-28 | End: 2025-04-29

## 2025-04-28 RX ORDER — METHOCARBAMOL 500 MG/1
500 TABLET, FILM COATED ORAL ONCE
Status: COMPLETED | OUTPATIENT
Start: 2025-04-28 | End: 2025-04-28

## 2025-04-28 RX ORDER — SODIUM CHLORIDE 0.9 % (FLUSH) 0.9 %
10 SYRINGE (ML) INJECTION AS NEEDED
Status: DISCONTINUED | OUTPATIENT
Start: 2025-04-28 | End: 2025-04-30 | Stop reason: HOSPADM

## 2025-04-28 RX ORDER — SODIUM CHLORIDE 9 MG/ML
125 INJECTION, SOLUTION INTRAVENOUS CONTINUOUS
Status: DISCONTINUED | OUTPATIENT
Start: 2025-04-28 | End: 2025-04-30 | Stop reason: HOSPADM

## 2025-04-28 RX ORDER — ATORVASTATIN CALCIUM 40 MG/1
80 TABLET, FILM COATED ORAL NIGHTLY
Status: DISCONTINUED | OUTPATIENT
Start: 2025-04-28 | End: 2025-04-29

## 2025-04-28 RX ORDER — BISACODYL 10 MG
10 SUPPOSITORY, RECTAL RECTAL DAILY PRN
Status: DISCONTINUED | OUTPATIENT
Start: 2025-04-28 | End: 2025-04-30 | Stop reason: HOSPADM

## 2025-04-28 RX ORDER — AMOXICILLIN 250 MG
2 CAPSULE ORAL 2 TIMES DAILY PRN
Status: DISCONTINUED | OUTPATIENT
Start: 2025-04-28 | End: 2025-04-30 | Stop reason: HOSPADM

## 2025-04-28 RX ORDER — ACETAMINOPHEN 325 MG/1
650 TABLET ORAL EVERY 6 HOURS PRN
Status: DISCONTINUED | OUTPATIENT
Start: 2025-04-28 | End: 2025-04-30 | Stop reason: HOSPADM

## 2025-04-28 RX ORDER — NICOTINE POLACRILEX 4 MG
15 LOZENGE BUCCAL
Status: DISCONTINUED | OUTPATIENT
Start: 2025-04-28 | End: 2025-04-30 | Stop reason: HOSPADM

## 2025-04-28 RX ORDER — SODIUM CHLORIDE 0.9 % (FLUSH) 0.9 %
10 SYRINGE (ML) INJECTION EVERY 12 HOURS SCHEDULED
Status: DISCONTINUED | OUTPATIENT
Start: 2025-04-28 | End: 2025-04-30 | Stop reason: HOSPADM

## 2025-04-28 RX ORDER — ONDANSETRON 2 MG/ML
4 INJECTION INTRAMUSCULAR; INTRAVENOUS EVERY 6 HOURS PRN
Status: DISCONTINUED | OUTPATIENT
Start: 2025-04-28 | End: 2025-04-30 | Stop reason: HOSPADM

## 2025-04-28 RX ORDER — POLYETHYLENE GLYCOL 3350 17 G/17G
17 POWDER, FOR SOLUTION ORAL DAILY PRN
Status: DISCONTINUED | OUTPATIENT
Start: 2025-04-28 | End: 2025-04-30 | Stop reason: HOSPADM

## 2025-04-28 RX ORDER — NITROGLYCERIN 0.4 MG/1
0.4 TABLET SUBLINGUAL
Status: DISCONTINUED | OUTPATIENT
Start: 2025-04-28 | End: 2025-04-30 | Stop reason: HOSPADM

## 2025-04-28 RX ORDER — INSULIN GLARGINE 100 [IU]/ML
16 INJECTION, SOLUTION SUBCUTANEOUS DAILY
Status: ON HOLD | COMMUNITY
End: 2025-04-30

## 2025-04-28 RX ORDER — LISINOPRIL 5 MG/1
5 TABLET ORAL DAILY
COMMUNITY
End: 2025-04-30 | Stop reason: HOSPADM

## 2025-04-28 RX ORDER — IBUPROFEN 600 MG/1
1 TABLET ORAL
Status: DISCONTINUED | OUTPATIENT
Start: 2025-04-28 | End: 2025-04-30 | Stop reason: HOSPADM

## 2025-04-28 RX ORDER — IBUPROFEN 600 MG/1
600 TABLET, FILM COATED ORAL ONCE
Status: COMPLETED | OUTPATIENT
Start: 2025-04-28 | End: 2025-04-28

## 2025-04-28 RX ORDER — HYDROCODONE BITARTRATE AND ACETAMINOPHEN 5; 325 MG/1; MG/1
1 TABLET ORAL EVERY 6 HOURS PRN
Refills: 0 | Status: COMPLETED | OUTPATIENT
Start: 2025-04-28 | End: 2025-04-29

## 2025-04-28 RX ORDER — DEXTROSE MONOHYDRATE 25 G/50ML
25 INJECTION, SOLUTION INTRAVENOUS
Status: DISCONTINUED | OUTPATIENT
Start: 2025-04-28 | End: 2025-04-30 | Stop reason: HOSPADM

## 2025-04-28 RX ORDER — SODIUM CHLORIDE 9 MG/ML
40 INJECTION, SOLUTION INTRAVENOUS AS NEEDED
Status: DISCONTINUED | OUTPATIENT
Start: 2025-04-28 | End: 2025-04-30 | Stop reason: HOSPADM

## 2025-04-28 RX ORDER — HYDROCODONE BITARTRATE AND ACETAMINOPHEN 5; 325 MG/1; MG/1
1 TABLET ORAL ONCE AS NEEDED
Refills: 0 | Status: COMPLETED | OUTPATIENT
Start: 2025-04-28 | End: 2025-04-28

## 2025-04-28 RX ADMIN — INSULIN LISPRO 4 UNITS: 100 INJECTION, SOLUTION INTRAVENOUS; SUBCUTANEOUS at 22:12

## 2025-04-28 RX ADMIN — SODIUM CHLORIDE 1000 ML: 9 INJECTION, SOLUTION INTRAVENOUS at 19:00

## 2025-04-28 RX ADMIN — INSULIN HUMAN 12 UNITS: 100 INJECTION, SOLUTION PARENTERAL at 19:00

## 2025-04-28 RX ADMIN — ATORVASTATIN CALCIUM 80 MG: 40 TABLET, FILM COATED ORAL at 22:12

## 2025-04-28 RX ADMIN — IBUPROFEN 600 MG: 600 TABLET, FILM COATED ORAL at 18:10

## 2025-04-28 RX ADMIN — METHOCARBAMOL TABLETS 500 MG: 500 TABLET, COATED ORAL at 18:10

## 2025-04-28 RX ADMIN — HYDROCODONE BITARTRATE AND ACETAMINOPHEN 1 TABLET: 5; 325 TABLET ORAL at 20:49

## 2025-04-28 RX ADMIN — SODIUM CHLORIDE 125 ML/HR: 9 INJECTION, SOLUTION INTRAVENOUS at 22:13

## 2025-04-28 RX ADMIN — Medication 10 ML: at 22:12

## 2025-04-28 RX ADMIN — TRAZODONE HYDROCHLORIDE 50 MG: 50 TABLET ORAL at 22:12

## 2025-04-28 NOTE — ED PROVIDER NOTES
Subjective   History of Present Illness  Chief complaint neck pain    History of present illness a 70-year-old female who states that she developed some pain in her right posterior neck about 3:00 in the morning is worse with movement and better with rest it is at the base of the skull no significant headache vision change speech difficulty or weakness to one-sided the other.  She denies any trauma no chiropractic manipulation.  Nonradiating.  She denies any recent illness flus viruses vaccinations long car ride plane ride immobilization chest pain shortness of breath sweating it is constant worse with movement nonradiating.  No loss of bladder bowel control no fever chills or night sweats.  Has gradually gotten worse throughout the daytime.  She is otherwise been healthy drink talk and walk and function normally.      Review of Systems   Constitutional:  Negative for chills and fever.   HENT:  Negative for congestion, ear pain, hearing loss, sore throat, trouble swallowing and voice change.    Respiratory:  Negative for chest tightness and shortness of breath.    Cardiovascular:  Negative for chest pain.   Gastrointestinal:  Negative for abdominal pain and vomiting.   Musculoskeletal:  Positive for neck pain.   Skin:  Negative for rash.   Neurological:  Negative for dizziness, facial asymmetry, speech difficulty, numbness and headaches.   Psychiatric/Behavioral:  Negative for confusion.        Past Medical History:   Diagnosis Date    Adrenal nodule 11/16/2016    FINDINGS: Mild hepatic steatosis may be present. Cholecystectomy. No biliary ductal dilatation. Negative pancreas, spleen, left adrenal gland, and kidneys. The right adrenal presumed adenoma has increased slightly, measuring 3 x 4 cm in diameter,  compared to 2 x 3.5 cm on the previous exam. The attenuation values are consistent with an adenoma. Done at [facility] in August 2018    Age-related osteoporosis without current pathological fracture  11/16/2016    Arthritis     CVA (cerebral vascular accident)     year 2000- can't remember exact year    Delayed surgical wound healing     Essential hypertension 11/16/2016    Gastroesophageal reflux disease with esophagitis 11/16/2016    Hepatic steatosis 11/16/2016    History of anemia     History of sepsis     FROM UTI    Hyperlipidemia 11/16/2016    Lisfranc's dislocation     LEFT WITH FRACTURES NONHEALING FOOT    Osteomyelitis of left foot 11/2020    RLS (restless legs syndrome)     Squamous cell skin cancer 2014    Excised by Dr. James    Thyroid nodule 10/18/2019    BENIGN    Type 2 diabetes mellitus with peripheral neuropathy 11/16/2016    Vitamin D deficiency 01/25/2019       Allergies   Allergen Reactions    Zofran [Ondansetron Hcl] Nausea And Vomiting     Does the opposite of its purpose    Prochlorperazine Other (See Comments)     CAUSED SEIZURE    Prochlorperazine Edisylate Hives    Hydralazine Itching and Rash    Hydralazine Hcl Itching and Rash    Meperidine Itching and Swelling    Prochlorperazine Maleate Other (See Comments)     CAUSES SEIZURE    Prochlorperazine Maleate Irritability       Past Surgical History:   Procedure Laterality Date    BREAST BIOPSY Left     7/2019. BENIGN    CARDIAC CATHETERIZATION Left 5/2/2021    Procedure: Cardiac Catheterization/Vascular Study;  Surgeon: Chacho Esteban MD;  Location: [facility] CATH INVASIVE LOCATION;  Service: Cardiovascular;  Laterality: Left;    CARDIAC CATHETERIZATION N/A 5/2/2021    Procedure: Intra-Aortic Baloon Pump Insertion;  Surgeon: Chacho Esteban MD;  Location: [facility] CATH INVASIVE LOCATION;  Service: Cardiovascular;  Laterality: N/A;    CATARACT EXTRACTION WITH INTRAOCULAR LENS IMPLANT Bilateral     CHOLECYSTECTOMY      COLONOSCOPY      CORONARY ARTERY BYPASS GRAFT N/A 5/2/2021    Procedure: CORONARY ARTERY BYPASS WITH INTERNAL MAMMARY ARTERY GRAFT;  Surgeon: Jr Rocael Alexander MD;  Location: [facility] CVOR;  Service: Cardiothoracic;   Laterality: N/A;    FOOT FUSION Right 11/13/2020    Procedure: RIGHT OPEN TREATMENT LISFRANC INJURY, OPEN REDUCTION INTERNAL FIXATION MEDIAL/MIDDLE CUNEIFORM FRACTURE AND 2ND/3RD METATARSAL FRACTURE 1ST 2ND POSSIBLE 3RD TARSOMETATARSAL ARTHRODESIS INTERCUNEIFORM ARTHRODESIS CALCANEAL BONE GRAFT;  Surgeon: Mikhail Banks Jr., MD;  Location: [facility];  Service: Orthopedics;  Laterality: Right;    INCISION AND DRAINAGE LEG Right 1/8/2021    Procedure: RIGHT IRRIGATION AND DEBRIDEMENT OF FOOT WITH SECONDARY CLOSURE AND HARDWARE REMOVAL;  Surgeon: Mikhail Banks Jr., MD;  Location: [facility] OR;  Service: Orthopedics;  Laterality: Right;    KNEE ARTHROSCOPY Bilateral     TOTAL ABDOMINAL HYSTERECTOMY      TRANSESOPHAGEAL ECHOCARDIOGRAM (EMILIA) N/A 5/2/2021    Procedure: TRANSESOPHAGEAL ECHOCARDIOGRAM;  Surgeon: Jr Rocael Alexander MD;  Location: [facility];  Service: Cardiothoracic;  Laterality: N/A;    UPPER GASTROINTESTINAL ENDOSCOPY  08/2016    US GUIDED FINE NEEDLE ASPIRATION  5/8/2020       Family History   Problem Relation Age of Onset    Diabetes Mother     COPD Mother     Hypertension Mother     Kidney disease Mother     Obesity Mother     Thyroid disease Mother     Diabetes Sister     Diabetes Sister     Diabetes Sister     Diabetes Sister     Malig Hyperthermia Neg Hx        Social History     Socioeconomic History    Marital status:    Tobacco Use    Smoking status: Never    Smokeless tobacco: Never   Vaping Use    Vaping status: Never Used   Substance and Sexual Activity    Alcohol use: Yes     Comment: socially     Drug use: Never    Sexual activity: Defer     Prior to Admission medications    Medication Sig Start Date End Date Taking? Authorizing Provider   acetaminophen (TYLENOL) 325 MG tablet Take 2 tablets by mouth Every 4 (Four) Hours As Needed for Mild Pain. Indications: Pain 1/1/23  Yes Provider, MD Bi   aspirin 81 MG EC tablet Take 1 tablet by mouth Daily. Indications: Disease  involving Lipid Deposits in the Arteries 1/1/23  Yes Bi Haque MD   atorvastatin (LIPITOR) 80 MG tablet Take 1 tablet by mouth Every Night. 10/25/22  Yes Gladis Lang APRN   clopidogrel (PLAVIX) 75 MG tablet Take 1 tablet by mouth Daily.   Yes Bi Haque MD   denosumab (Prolia) 60 MG/ML solution prefilled syringe syringe Inject 1 mL under the skin into the appropriate area as directed 1 (One) Time.   Yes Bi Haque MD   DULoxetine (CYMBALTA) 60 MG capsule Take 1 capsule by mouth Daily. Indications: Major Depressive Disorder 1/1/23  Yes Bi Haque MD   Insulin Glargine (Lantus SoloStar) 100 UNIT/ML injection pen Inject 16 Units under the skin into the appropriate area as directed Daily.   Yes Bi Haque MD   lisinopril (PRINIVIL,ZESTRIL) 5 MG tablet Take 1 tablet by mouth Daily.   Yes Bi Haque MD   multivitamin with minerals tablet tablet Take 1 tablet by mouth Daily. Indications: Supplement 1/1/23  Yes Bi Haque MD   traZODone (DESYREL) 50 MG tablet Take 1 tablet by mouth At Night As Needed. Indications: Trouble Sleeping 10/12/22  Yes Bi Haque MD   Accu-Chek FastClix Lancets misc Use 1 each 3 (Three) Times a Day Before Meals. Dx code: E11.65 11/30/24 4/28/25  Brandon Ambriz MD   Accu-Chek Softclix Lancets lancets 1 each by Other route 3 (Three) Times a Day Before Meals. Use as instructed  Dx code: E11.65 11/30/24 4/28/25  Brandon Ambriz MD   Blood Glucose Monitoring Suppl (Accu-Chek Guide Me) w/Device kit Use 1 each 3 (Three) Times a Day Before Meals. Dx code: E11.65  NDC [address]-88  Bin#: [id number] Group #: [id number]  ID #: [id number]  Issuer #: [id number]) 11/30/24 4/28/25  Brandon Ambriz MD   gabapentin (NEURONTIN) 600 MG tablet Take 1 tablet by mouth Daily for 3 days. One to two times daily 12/12/24 4/28/25  Nicole George APRN   Insulin Glargine (LANTUS SOLOSTAR) 100 UNIT/ML injection pen Inject 15 Units under  the skin into the appropriate area as directed Daily. Dx code: E11.65 11/30/24 4/28/25  Brandon Ambriz MD   Insulin Pen Needle (Pen Needles) 32G X 4 MM misc Use 1 each Every Night. Dx code: E11.65 11/30/24 4/28/25  Brandon Ambriz MD            Objective   Physical Exam  Constitutional is a 70-year-old awake alert no acute distress triage vital signs have been reviewed.  HEENT extraocular muscles are intact pupils equal round react there is no photophobia there is no papilledema the mouth is clear without exudate abscess stridor drooling posterior pharynx is normal tongue normal floor the mouth is normal neck is supple there is no adenopathy no JV no bruits no meningeal signs no direct cervical spine tenderness.  She has some musculoskeletal point tenderness in the right paracervical occipital area but no step-off from is not red no fluctuance no rash no mastoid tenderness no drainage from ears.  Lungs were clear no retraction no use of accessory heart is regular without murmur rub abdomen soft without tenderness good bowel sounds no peritoneal findings or pulsatile masses extremities pulses are equal throughout upper and lower extremities no edema cords or Homans' sign or evidence of DVT skin is warm and dry without rashes or cellulitic changes neurologic awake alert and orientated x 4 no face asymmetry speech normal no drift the arms or legs normal station and gait shoulder shrug bicep tricep  all normal toes up to including big toe dorsiflex plantarflex at difficulty neck straight leg test reflexes 2+ symmetrical throughout upper and lower extremities.  Procedures           ED Course      Results for orders placed or performed during the hospital encounter of 04/28/25   ECG 12 Lead Other; Neck pain    Collection Time: 04/28/25  5:45 PM   Result Value Ref Range    QT Interval 371 ms    QTC Interval 449 ms   Basic Metabolic Panel    Collection Time: 04/28/25  5:57 PM    Specimen: Blood   Result Value Ref  Range    Glucose 658 (C) 65 - 99 mg/dL    BUN 23 8 - 23 mg/dL    Creatinine 1.40 (H) 0.57 - 1.00 mg/dL    Sodium 130 (L) 136 - 145 mmol/L    Potassium 5.1 3.5 - 5.2 mmol/L    Chloride 96 (L) 98 - 107 mmol/L    CO2 21.9 (L) 22.0 - 29.0 mmol/L    Calcium 9.2 8.6 - 10.5 mg/dL    BUN/Creatinine Ratio 16.4 7.0 - 25.0    Anion Gap 12.1 5.0 - 15.0 mmol/L    eGFR 40.1 (L) >60.0 mL/min/1.73   CK    Collection Time: 04/28/25  5:57 PM    Specimen: Blood   Result Value Ref Range    Creatine Kinase 88 20 - 180 U/L   Magnesium    Collection Time: 04/28/25  5:57 PM    Specimen: Blood   Result Value Ref Range    Magnesium 2.0 1.6 - 2.4 mg/dL   Sedimentation Rate    Collection Time: 04/28/25  6:16 PM    Specimen: Blood   Result Value Ref Range    Sed Rate 27 0 - 30 mm/hr   CBC Auto Differential    Collection Time: 04/28/25  6:16 PM    Specimen: Blood   Result Value Ref Range    WBC 9.53 3.40 - 10.80 10*3/mm3    RBC 4.38 3.77 - 5.28 10*6/mm3    Hemoglobin 12.3 12.0 - 15.9 g/dL    Hematocrit 39.4 34.0 - 46.6 %    MCV 90.0 79.0 - 97.0 fL    MCH 28.1 26.6 - 33.0 pg    MCHC 31.2 (L) 31.5 - 35.7 g/dL    RDW 12.9 12.3 - 15.4 %    RDW-SD 42.8 37.0 - 54.0 fl    MPV 9.3 6.0 - 12.0 fL    Platelets 251 140 - 450 10*3/mm3    Neutrophil % 58.8 42.7 - 76.0 %    Lymphocyte % 29.6 19.6 - 45.3 %    Monocyte % 9.1 5.0 - 12.0 %    Eosinophil % 1.4 0.3 - 6.2 %    Basophil % 0.8 0.0 - 1.5 %    Immature Grans % 0.3 0.0 - 0.5 %    Neutrophils, Absolute 5.60 1.70 - 7.00 10*3/mm3    Lymphocytes, Absolute 2.82 0.70 - 3.10 10*3/mm3    Monocytes, Absolute 0.87 0.10 - 0.90 10*3/mm3    Eosinophils, Absolute 0.13 0.00 - 0.40 10*3/mm3    Basophils, Absolute 0.08 0.00 - 0.20 10*3/mm3    Immature Grans, Absolute 0.03 0.00 - 0.05 10*3/mm3    nRBC 0.0 0.0 - 0.2 /100 WBC     XR Spine Cervical Complete 4 or 5 View  Result Date: 4/28/2025  Impression: Essentially unremarkable cervical spine radiograph. If symptoms persist, please consider follow-up with nonemergent  cervical spine MRI Electronically Signed: Terrance Andrea, DO  4/28/2025 6:20 PM EDT  Workstation ID: [id number]    Medications   sodium chloride 0.9 % flush 10 mL (has no administration in time range)   sodium chloride 0.9 % bolus 1,000 mL (1,000 mL Intravenous New Bag 4/28/25 1900)   ibuprofen (ADVIL,MOTRIN) tablet 600 mg (600 mg Oral Given 4/28/25 1810)   methocarbamol (ROBAXIN) tablet 500 mg (500 mg Oral Given 4/28/25 1810)   insulin regular (humuLIN R,novoLIN R) injection 12 Units (12 Units Intravenous Given 4/28/25 1900)                                            EKG my interpretation normal sinus rhythm rate of 88 normal axis hypertrophy Q waves noted in lead V1 unchanged from 12/6/2024 abnormal EKG            Medical Decision Making  Medical Decision Making.  IV established monitor placed my review of sinus rhythm EKG my interpretation normal sinus rhythm rate of 88 normal axis no Nelsy Q waves noted in V1 unchanged from 12/6/2024.  Patient given 600 mg of ibuprofen p.o. and Robaxin 500 mg p.o. labs obtained by independent review basic metabolic profile was remarkable creatinine 1.4 glucose of 658.  Her CO2 was normal EKG was normal magnesium normal CBC and sed rate both normal x-ray of cervical spine my independent review I do not see any fracture subluxation or acute findings radiology review unremarkable.  The patient repeat exam was resting comfortably I do not see any evidence that suggest a carotid or vertebral artery dissection or stroke or meningitis.  Increased ICP acute myocardial infarction DVT pulmonary embolism epidural abscess transverse myelitis spinal cord compression cauda equina oral process shingles based on my history and physical and clinical findings although not a complete list of all possibilities we talked about the findings.  She is still having some discomfort but exam was unchanged otherwise given hydrocodone 1 p.o.  Her blood sugar was extremely high was given a liter of normal  saline given regular insulin 12 units IV.  We talked about the findings to be placed in observation to get control of his blood sugar and further management and workup.  She voiced understanding stable otherwise unremarkable ER course.  We did talk about the findings we did talk about meningitis and other possible etiologies did not feel need for spinal tap and did not feel that meningitis was what was causing her pain.  This is pretty localized point tenderness we also talked about CT angiograms for potential dissection but did not feel that was necessary after shared medical decision making there is been no trauma or other risk for this.  Will continue to keep an eye on this and monitor closely.    Problems Addressed:  Hyperglycemia: complicated acute illness or injury  Neck pain: complicated acute illness or injury    Amount and/or Complexity of Data Reviewed  Labs: ordered. Decision-making details documented in ED Course.  Radiology: ordered and independent interpretation performed. Decision-making details documented in ED Course.  ECG/medicine tests: ordered and independent interpretation performed. Decision-making details documented in ED Course.    Risk  OTC drugs.  Prescription drug management.  Decision regarding hospitalization.        Final diagnoses:   Hyperglycemia   Neck pain       ED Disposition  ED Disposition       ED Disposition   Intended Admit    Condition   --    Comment   --               No follow-up provider specified.       Medication List      No changes were made to your prescriptions during this visit.            Mikhail Lopez MD  04/28/25 [id number]

## 2025-04-28 NOTE — LETTER
April 30, 2025     Patient: Marge Mcghee   YOB: 1955   Date of Visit: 4/28/2025       To Whom It May Concern:    It is my medical opinion that Marge Mcghee may return to work on Monday May 5th 2025 .           Sincerely,      Joss BUCK

## 2025-04-29 ENCOUNTER — APPOINTMENT (OUTPATIENT)
Dept: CARDIOLOGY | Facility: HOSPITAL | Age: 70
End: 2025-04-29
Payer: MEDICARE

## 2025-04-29 LAB
ACETONE BLD QL: NEGATIVE
ANION GAP SERPL CALCULATED.3IONS-SCNC: 9.5 MMOL/L (ref 5–15)
BACTERIA UR QL AUTO: ABNORMAL /HPF
BASOPHILS # BLD AUTO: 0.07 10*3/MM3 (ref 0–0.2)
BASOPHILS NFR BLD AUTO: 0.8 % (ref 0–1.5)
BILIRUB UR QL STRIP: NEGATIVE
BUN SERPL-MCNC: 24 MG/DL (ref 8–23)
BUN/CREAT SERPL: 19.5 (ref 7–25)
CALCIUM SPEC-SCNC: 9.3 MG/DL (ref 8.6–10.5)
CHLORIDE SERPL-SCNC: 102 MMOL/L (ref 98–107)
CHOLEST SERPL-MCNC: 272 MG/DL (ref 0–200)
CLARITY UR: CLEAR
CO2 SERPL-SCNC: 26.5 MMOL/L (ref 22–29)
COLOR UR: YELLOW
CREAT SERPL-MCNC: 1.23 MG/DL (ref 0.57–1)
DEPRECATED RDW RBC AUTO: 42.6 FL (ref 37–54)
EGFRCR SERPLBLD CKD-EPI 2021: 46.8 ML/MIN/1.73
EOSINOPHIL # BLD AUTO: 0.28 10*3/MM3 (ref 0–0.4)
EOSINOPHIL NFR BLD AUTO: 3.3 % (ref 0.3–6.2)
ERYTHROCYTE [DISTWIDTH] IN BLOOD BY AUTOMATED COUNT: 12.9 % (ref 12.3–15.4)
GEN 5 1HR TROPONIN T REFLEX: 12 NG/L
GLUCOSE BLDC GLUCOMTR-MCNC: 188 MG/DL (ref 70–105)
GLUCOSE BLDC GLUCOMTR-MCNC: 213 MG/DL (ref 70–105)
GLUCOSE BLDC GLUCOMTR-MCNC: 219 MG/DL (ref 70–105)
GLUCOSE BLDC GLUCOMTR-MCNC: 323 MG/DL (ref 70–105)
GLUCOSE SERPL-MCNC: 171 MG/DL (ref 65–99)
GLUCOSE UR STRIP-MCNC: ABNORMAL MG/DL
HBA1C MFR BLD: 14.4 % (ref 4.8–5.6)
HCT VFR BLD AUTO: 39.6 % (ref 34–46.6)
HDLC SERPL-MCNC: 58 MG/DL (ref 40–60)
HGB BLD-MCNC: 12.4 G/DL (ref 12–15.9)
HGB UR QL STRIP.AUTO: NEGATIVE
HYALINE CASTS UR QL AUTO: ABNORMAL /LPF
IMM GRANULOCYTES # BLD AUTO: 0.02 10*3/MM3 (ref 0–0.05)
IMM GRANULOCYTES NFR BLD AUTO: 0.2 % (ref 0–0.5)
KETONES UR QL STRIP: NEGATIVE
LDLC SERPL CALC-MCNC: 193 MG/DL (ref 0–100)
LDLC/HDLC SERPL: 3.28 {RATIO}
LEUKOCYTE ESTERASE UR QL STRIP.AUTO: ABNORMAL
LYMPHOCYTES # BLD AUTO: 3.96 10*3/MM3 (ref 0.7–3.1)
LYMPHOCYTES NFR BLD AUTO: 46.7 % (ref 19.6–45.3)
MCH RBC QN AUTO: 28.3 PG (ref 26.6–33)
MCHC RBC AUTO-ENTMCNC: 31.3 G/DL (ref 31.5–35.7)
MCV RBC AUTO: 90.4 FL (ref 79–97)
MONOCYTES # BLD AUTO: 0.85 10*3/MM3 (ref 0.1–0.9)
MONOCYTES NFR BLD AUTO: 10 % (ref 5–12)
NEUTROPHILS NFR BLD AUTO: 3.3 10*3/MM3 (ref 1.7–7)
NEUTROPHILS NFR BLD AUTO: 39 % (ref 42.7–76)
NITRITE UR QL STRIP: NEGATIVE
NRBC BLD AUTO-RTO: 0 /100 WBC (ref 0–0.2)
NT-PROBNP SERPL-MCNC: 109 PG/ML (ref 0–900)
PH UR STRIP.AUTO: <=5 [PH] (ref 5–8)
PLATELET # BLD AUTO: 241 10*3/MM3 (ref 140–450)
PMV BLD AUTO: 9.8 FL (ref 6–12)
POTASSIUM SERPL-SCNC: 3.7 MMOL/L (ref 3.5–5.2)
PROT UR QL STRIP: NEGATIVE
QT INTERVAL: 371 MS
QTC INTERVAL: 449 MS
RBC # BLD AUTO: 4.38 10*6/MM3 (ref 3.77–5.28)
RBC # UR STRIP: ABNORMAL /HPF
REF LAB TEST METHOD: ABNORMAL
SODIUM SERPL-SCNC: 138 MMOL/L (ref 136–145)
SP GR UR STRIP: 1.01 (ref 1–1.03)
SQUAMOUS #/AREA URNS HPF: ABNORMAL /HPF
T3FREE SERPL-MCNC: 2.05 PG/ML (ref 2–4.4)
T4 FREE SERPL-MCNC: 0.96 NG/DL (ref 0.93–1.7)
TRIGL SERPL-MCNC: 119 MG/DL (ref 0–150)
TROPONIN T NUMERIC DELTA: 0 NG/L
TROPONIN T SERPL HS-MCNC: 12 NG/L
TROPONIN T SERPL HS-MCNC: 15 NG/L
TSH SERPL DL<=0.05 MIU/L-ACNC: 1.36 UIU/ML (ref 0.27–4.2)
UROBILINOGEN UR QL STRIP: ABNORMAL
VLDLC SERPL-MCNC: 21 MG/DL (ref 5–40)
WBC # UR STRIP: ABNORMAL /HPF
WBC NRBC COR # BLD AUTO: 8.48 10*3/MM3 (ref 3.4–10.8)
YEAST URNS QL MICRO: ABNORMAL /HPF

## 2025-04-29 PROCEDURE — 25010000002 ONDANSETRON PER 1 MG: Performed by: EMERGENCY MEDICINE

## 2025-04-29 PROCEDURE — 99204 OFFICE O/P NEW MOD 45 MIN: CPT | Performed by: INTERNAL MEDICINE

## 2025-04-29 PROCEDURE — 80061 LIPID PANEL: CPT | Performed by: NURSE PRACTITIONER

## 2025-04-29 PROCEDURE — 63710000001 INSULIN LISPRO (HUMAN) PER 5 UNITS: Performed by: INTERNAL MEDICINE

## 2025-04-29 PROCEDURE — 93306 TTE W/DOPPLER COMPLETE: CPT | Performed by: INTERNAL MEDICINE

## 2025-04-29 PROCEDURE — 93306 TTE W/DOPPLER COMPLETE: CPT

## 2025-04-29 PROCEDURE — 85025 COMPLETE CBC W/AUTO DIFF WBC: CPT | Performed by: EMERGENCY MEDICINE

## 2025-04-29 PROCEDURE — 83880 ASSAY OF NATRIURETIC PEPTIDE: CPT | Performed by: NURSE PRACTITIONER

## 2025-04-29 PROCEDURE — 87086 URINE CULTURE/COLONY COUNT: CPT | Performed by: NURSE PRACTITIONER

## 2025-04-29 PROCEDURE — 84484 ASSAY OF TROPONIN QUANT: CPT | Performed by: NURSE PRACTITIONER

## 2025-04-29 PROCEDURE — 93356 MYOCRD STRAIN IMG SPCKL TRCK: CPT

## 2025-04-29 PROCEDURE — 96374 THER/PROPH/DIAG INJ IV PUSH: CPT

## 2025-04-29 PROCEDURE — 84443 ASSAY THYROID STIM HORMONE: CPT | Performed by: NURSE PRACTITIONER

## 2025-04-29 PROCEDURE — 82009 KETONE BODYS QUAL: CPT | Performed by: NURSE PRACTITIONER

## 2025-04-29 PROCEDURE — 83036 HEMOGLOBIN GLYCOSYLATED A1C: CPT | Performed by: NURSE PRACTITIONER

## 2025-04-29 PROCEDURE — 82948 REAGENT STRIP/BLOOD GLUCOSE: CPT

## 2025-04-29 PROCEDURE — 93356 MYOCRD STRAIN IMG SPCKL TRCK: CPT | Performed by: INTERNAL MEDICINE

## 2025-04-29 PROCEDURE — 80048 BASIC METABOLIC PNL TOTAL CA: CPT | Performed by: EMERGENCY MEDICINE

## 2025-04-29 PROCEDURE — 82948 REAGENT STRIP/BLOOD GLUCOSE: CPT | Performed by: EMERGENCY MEDICINE

## 2025-04-29 PROCEDURE — G0378 HOSPITAL OBSERVATION PER HR: HCPCS

## 2025-04-29 PROCEDURE — 63710000001 INSULIN LISPRO (HUMAN) PER 5 UNITS: Performed by: EMERGENCY MEDICINE

## 2025-04-29 PROCEDURE — 63710000001 INSULIN GLARGINE PER 5 UNITS: Performed by: NURSE PRACTITIONER

## 2025-04-29 PROCEDURE — 84439 ASSAY OF FREE THYROXINE: CPT | Performed by: NURSE PRACTITIONER

## 2025-04-29 PROCEDURE — 96361 HYDRATE IV INFUSION ADD-ON: CPT

## 2025-04-29 PROCEDURE — 84481 FREE ASSAY (FT-3): CPT | Performed by: NURSE PRACTITIONER

## 2025-04-29 PROCEDURE — 25810000003 SODIUM CHLORIDE 0.9 % SOLUTION: Performed by: EMERGENCY MEDICINE

## 2025-04-29 PROCEDURE — 81001 URINALYSIS AUTO W/SCOPE: CPT | Performed by: NURSE PRACTITIONER

## 2025-04-29 RX ORDER — INSULIN LISPRO 100 [IU]/ML
1-6 INJECTION, SOLUTION INTRAVENOUS; SUBCUTANEOUS
Status: DISCONTINUED | OUTPATIENT
Start: 2025-04-29 | End: 2025-04-30 | Stop reason: HOSPADM

## 2025-04-29 RX ORDER — LISINOPRIL 5 MG/1
5 TABLET ORAL DAILY
Status: DISCONTINUED | OUTPATIENT
Start: 2025-04-29 | End: 2025-04-30 | Stop reason: HOSPADM

## 2025-04-29 RX ORDER — AMLODIPINE BESYLATE 5 MG/1
5 TABLET ORAL
Status: DISCONTINUED | OUTPATIENT
Start: 2025-04-29 | End: 2025-04-30 | Stop reason: HOSPADM

## 2025-04-29 RX ORDER — CLOPIDOGREL BISULFATE 75 MG/1
75 TABLET ORAL DAILY
Status: DISCONTINUED | OUTPATIENT
Start: 2025-04-29 | End: 2025-04-30 | Stop reason: HOSPADM

## 2025-04-29 RX ORDER — TRAZODONE HYDROCHLORIDE 50 MG/1
50 TABLET ORAL NIGHTLY PRN
Status: DISCONTINUED | OUTPATIENT
Start: 2025-04-29 | End: 2025-04-30 | Stop reason: HOSPADM

## 2025-04-29 RX ORDER — INSULIN LISPRO 100 [IU]/ML
3 INJECTION, SOLUTION INTRAVENOUS; SUBCUTANEOUS
Status: DISCONTINUED | OUTPATIENT
Start: 2025-04-29 | End: 2025-04-30

## 2025-04-29 RX ORDER — DULOXETIN HYDROCHLORIDE 30 MG/1
60 CAPSULE, DELAYED RELEASE ORAL DAILY
Status: DISCONTINUED | OUTPATIENT
Start: 2025-04-29 | End: 2025-04-30 | Stop reason: HOSPADM

## 2025-04-29 RX ORDER — ATORVASTATIN CALCIUM 40 MG/1
80 TABLET, FILM COATED ORAL NIGHTLY
Status: DISCONTINUED | OUTPATIENT
Start: 2025-04-29 | End: 2025-04-30 | Stop reason: HOSPADM

## 2025-04-29 RX ORDER — ASPIRIN 81 MG/1
81 TABLET ORAL DAILY
Status: DISCONTINUED | OUTPATIENT
Start: 2025-04-29 | End: 2025-04-30 | Stop reason: HOSPADM

## 2025-04-29 RX ORDER — ACETAMINOPHEN 325 MG/1
650 TABLET ORAL EVERY 4 HOURS PRN
Status: DISCONTINUED | OUTPATIENT
Start: 2025-04-29 | End: 2025-04-30 | Stop reason: HOSPADM

## 2025-04-29 RX ADMIN — ACETAMINOPHEN 650 MG: 325 TABLET, FILM COATED ORAL at 03:03

## 2025-04-29 RX ADMIN — NITROGLYCERIN 0.4 MG: 0.4 TABLET SUBLINGUAL at 15:48

## 2025-04-29 RX ADMIN — ONDANSETRON 4 MG: 2 INJECTION, SOLUTION INTRAMUSCULAR; INTRAVENOUS at 02:59

## 2025-04-29 RX ADMIN — INSULIN LISPRO 4 UNITS: 100 INJECTION, SOLUTION INTRAVENOUS; SUBCUTANEOUS at 22:10

## 2025-04-29 RX ADMIN — INSULIN LISPRO 3 UNITS: 100 INJECTION, SOLUTION INTRAVENOUS; SUBCUTANEOUS at 17:23

## 2025-04-29 RX ADMIN — HYDROCODONE BITARTRATE AND ACETAMINOPHEN 1 TABLET: 5; 325 TABLET ORAL at 15:48

## 2025-04-29 RX ADMIN — INSULIN LISPRO 3 UNITS: 100 INJECTION, SOLUTION INTRAVENOUS; SUBCUTANEOUS at 08:25

## 2025-04-29 RX ADMIN — INSULIN LISPRO 2 UNITS: 100 INJECTION, SOLUTION INTRAVENOUS; SUBCUTANEOUS at 11:48

## 2025-04-29 RX ADMIN — ASPIRIN 81 MG: 81 TABLET, COATED ORAL at 09:25

## 2025-04-29 RX ADMIN — ATORVASTATIN CALCIUM 80 MG: 40 TABLET, FILM COATED ORAL at 22:10

## 2025-04-29 RX ADMIN — CLOPIDOGREL BISULFATE 75 MG: 75 TABLET, FILM COATED ORAL at 09:25

## 2025-04-29 RX ADMIN — SODIUM CHLORIDE 125 ML/HR: 9 INJECTION, SOLUTION INTRAVENOUS at 08:25

## 2025-04-29 RX ADMIN — INSULIN LISPRO 3 UNITS: 100 INJECTION, SOLUTION INTRAVENOUS; SUBCUTANEOUS at 19:52

## 2025-04-29 RX ADMIN — TRAZODONE HYDROCHLORIDE 50 MG: 50 TABLET ORAL at 22:14

## 2025-04-29 RX ADMIN — Medication 10 ML: at 22:14

## 2025-04-29 RX ADMIN — DULOXETINE 60 MG: 30 CAPSULE, DELAYED RELEASE ORAL at 09:25

## 2025-04-29 RX ADMIN — Medication 10 ML: at 08:25

## 2025-04-29 RX ADMIN — INSULIN GLARGINE 16 UNITS: 100 INJECTION, SOLUTION SUBCUTANEOUS at 09:25

## 2025-04-29 RX ADMIN — AMLODIPINE BESYLATE 5 MG: 5 TABLET ORAL at 09:25

## 2025-04-29 NOTE — PLAN OF CARE
Problem: Adult Inpatient Plan of Care  Goal: Absence of Hospital-Acquired Illness or Injury  Intervention: Identify and Manage Fall Risk  Recent Flowsheet Documentation  Taken 4/29/2025 1000 by Barbara Ames RN  Safety Promotion/Fall Prevention:   activity supervised   assistive device/personal items within reach   clutter free environment maintained   fall prevention program maintained   nonskid shoes/slippers when out of bed   room organization consistent   safety round/check completed  Taken 4/29/2025 0808 by Barbara Ames RN  Safety Promotion/Fall Prevention:   activity supervised   assistive device/personal items within reach   clutter free environment maintained   fall prevention program maintained   nonskid shoes/slippers when out of bed   room organization consistent   safety round/check completed  Intervention: Prevent Skin Injury  Recent Flowsheet Documentation  Taken 4/29/2025 1000 by Barbara Amse RN  Body Position:   position changed independently   supine   weight shifting  Taken 4/29/2025 0808 by Barbara Ames RN  Body Position:   position changed independently   supine   weight shifting  Skin Protection: pulse oximeter probe site changed  Intervention: Prevent Infection  Recent Flowsheet Documentation  Taken 4/29/2025 1000 by Barbara Ames RN  Infection Prevention:   environmental surveillance performed   equipment surfaces disinfected   hand hygiene promoted  Taken 4/29/2025 0808 by Barbara Ames RN  Infection Prevention:   environmental surveillance performed   equipment surfaces disinfected   hand hygiene promoted   personal protective equipment utilized   rest/sleep promoted  Goal: Optimal Comfort and Wellbeing  Intervention: Provide Person-Centered Care  Recent Flowsheet Documentation  Taken 4/29/2025 0808 by Barbara Ames RN  Trust Relationship/Rapport:   care explained   choices provided   emotional support provided   empathic listening provided   questions answered   questions  encouraged   reassurance provided   thoughts/feelings acknowledged   Goal Outcome Evaluation:         Patient a/ox4, up ad kia, denies pain or needs, reviewed plan of care with patient and family call light in reach, clean and dry.    Plan is to get blood sugars under control wnl   Reason for admission: hyperglycemia   Significant PMH: type 2 DM, HLD, GERD, CABG 2021, CVA, CAD, HTN  Significant 24 hour events: came in for neck pain originally, xray of spine unremarkable, greg labs and was hyperglycemic   Significant Assessment Findings: xray of cervical spine unremarkable  Social: a/ox4  Additional Info: needing glucometer to take home  Mobility Plan: ad kia

## 2025-04-29 NOTE — H&P
[facility]    Patient Name: Marge Mcghee  : 1955  MRN: [id number]  Primary Care Physician: Traci Townsend APRN  Date of admission: 2025     Patient Care Team:  Traci Townsend APRN as PCP - General (Nurse Practitioner)  Drake Hinton MD PhD as Consulting Physician (Ophthalmology)  Fausto Gilliam MD as Consulting Physician (Endocrinology)  Saloni Ch MD (Ophthalmology)  Mikhail Kaplan Jr., MD as Consulting Physician (Urology)          Subjective   History Present Illness     Chief Complaint:   Chief Complaint   Patient presents with    Neck Pain     Neck pain and stiffness, no known injury pt states started last night.       Neck Pain     Ms. Mcghee is a 70 y.o.  presents to [facility] complaining of neck pain       History of Present Illness    ED 25: 70-year-old female who states that she developed some pain in her right posterior neck about 3:00 in the morning is worse with movement and better with rest it is at the base of the skull no significant headache vision change speech difficulty or weakness to one-sided the other. She denies any trauma no chiropractic manipulation. Nonradiating. She denies any recent illness flus viruses vaccinations long car ride plane ride immobilization chest pain shortness of breath sweating it is constant worse with movement nonradiating. No loss of bladder bowel control no fever chills or night sweats. Has gradually gotten worse throughout the daytime. She is otherwise been healthy drink talk and walk and function normally.     Observation 25: Patient is a 70-year-old female presenting to the hospital with complaints of neck pain is worse with movement.  Patient also reports numbness to the left arm at times.  Patient reports some shortness of breath but denies chest pain.  Patient states she did fall about a week ago due to feeling dizzy.  Patient states that she has not seen cardiology in some time.   Patient states she does not check her blood pressure and only checks her glucoses occasionally at home.  Patient reports some neuropathy and blurry vision but none worse than usual.  Patient denies fever, dysuria hematuria nausea or vomiting.     Review of Systems   Constitutional: Positive for malaise/fatigue.   HENT: Negative.     Eyes:  Positive for blurred vision.   Cardiovascular:  Positive for dyspnea on exertion. Negative for chest pain.   Respiratory:  Positive for shortness of breath. Negative for cough and wheezing.    Endocrine: Negative.    Skin: Negative.    Musculoskeletal:  Positive for neck pain.   Gastrointestinal: Negative.    Genitourinary: Negative.    Neurological:  Positive for dizziness.   Psychiatric/Behavioral: Negative.             Personal History     Past Medical History:   Past Medical History:   Diagnosis Date    Adrenal nodule 11/16/2016    FINDINGS: Mild hepatic steatosis may be present. Cholecystectomy. No biliary ductal dilatation. Negative pancreas, spleen, left adrenal gland, and kidneys. The right adrenal presumed adenoma has increased slightly, measuring 3 x 4 cm in diameter,  compared to 2 x 3.5 cm on the previous exam. The attenuation values are consistent with an adenoma. Done at [facility] in August 2018    Age-related osteoporosis without current pathological fracture 11/16/2016    Arthritis     CVA (cerebral vascular accident)     year 2000- can't remember exact year    Delayed surgical wound healing     Essential hypertension 11/16/2016    Gastroesophageal reflux disease with esophagitis 11/16/2016    Hepatic steatosis 11/16/2016    History of anemia     History of sepsis     FROM UTI    Hyperlipidemia 11/16/2016    Lisfranc's dislocation     LEFT WITH FRACTURES NONHEALING FOOT    Osteomyelitis of left foot 11/2020    RLS (restless legs syndrome)     Squamous cell skin cancer 2014    Excised by Dr. James    Thyroid nodule 10/18/2019    BENIGN    Type 2 diabetes  mellitus with peripheral neuropathy 11/16/2016    Vitamin D deficiency 01/25/2019       Surgical History:      Past Surgical History:   Procedure Laterality Date    BREAST BIOPSY Left     7/2019. BENIGN    CARDIAC CATHETERIZATION Left 5/2/2021    Procedure: Cardiac Catheterization/Vascular Study;  Surgeon: Chacho Esteban MD;  Location: [facility] CATH INVASIVE LOCATION;  Service: Cardiovascular;  Laterality: Left;    CARDIAC CATHETERIZATION N/A 5/2/2021    Procedure: Intra-Aortic Baloon Pump Insertion;  Surgeon: Chacho Esteban MD;  Location: [facility] CATH INVASIVE LOCATION;  Service: Cardiovascular;  Laterality: N/A;    CATARACT EXTRACTION WITH INTRAOCULAR LENS IMPLANT Bilateral     CHOLECYSTECTOMY      COLONOSCOPY      CORONARY ARTERY BYPASS GRAFT N/A 5/2/2021    Procedure: CORONARY ARTERY BYPASS WITH INTERNAL MAMMARY ARTERY GRAFT;  Surgeon: Jr Rocael Alexander MD;  Location: [facility];  Service: Cardiothoracic;  Laterality: N/A;    FOOT FUSION Right 11/13/2020    Procedure: RIGHT OPEN TREATMENT LISFRANC INJURY, OPEN REDUCTION INTERNAL FIXATION MEDIAL/MIDDLE CUNEIFORM FRACTURE AND 2ND/3RD METATARSAL FRACTURE 1ST 2ND POSSIBLE 3RD TARSOMETATARSAL ARTHRODESIS INTERCUNEIFORM ARTHRODESIS CALCANEAL BONE GRAFT;  Surgeon: Mikhail Banks Jr., MD;  Location: [facility];  Service: Orthopedics;  Laterality: Right;    INCISION AND DRAINAGE LEG Right 1/8/2021    Procedure: RIGHT IRRIGATION AND DEBRIDEMENT OF FOOT WITH SECONDARY CLOSURE AND HARDWARE REMOVAL;  Surgeon: Mikhail Banks Jr., MD;  Location: [facility] OR;  Service: Orthopedics;  Laterality: Right;    KNEE ARTHROSCOPY Bilateral     TOTAL ABDOMINAL HYSTERECTOMY      TRANSESOPHAGEAL ECHOCARDIOGRAM (EMILIA) N/A 5/2/2021    Procedure: TRANSESOPHAGEAL ECHOCARDIOGRAM;  Surgeon: Jr Rocael Alexander MD;  Location: [facility];  Service: Cardiothoracic;  Laterality: N/A;    UPPER GASTROINTESTINAL ENDOSCOPY  08/2016    US GUIDED FINE NEEDLE ASPIRATION  5/8/2020           Family  History: family history includes COPD in her mother; Diabetes in her mother, sister, sister, sister, and sister; Hypertension in her mother; Kidney disease in her mother; Obesity in her mother; Thyroid disease in her mother. Otherwise pertinent FHx was reviewed and unremarkable.     Social History:  reports that she has never smoked. She has never used smokeless tobacco. She reports current alcohol use. She reports that she does not use drugs.      Medications:  Prior to Admission medications    Medication Sig Start Date End Date Taking? Authorizing Provider   acetaminophen (TYLENOL) 325 MG tablet Take 2 tablets by mouth Every 4 (Four) Hours As Needed for Mild Pain. Indications: Pain 1/1/23  Yes Bi Haque MD   aspirin 81 MG EC tablet Take 1 tablet by mouth Daily. Indications: Disease involving Lipid Deposits in the Arteries 1/1/23  Yes Bi Haque MD   atorvastatin (LIPITOR) 80 MG tablet Take 1 tablet by mouth Every Night. 10/25/22  Yes Gladis Lang APRN   clopidogrel (PLAVIX) 75 MG tablet Take 1 tablet by mouth Daily.   Yes Bi Haque MD   denosumab (Prolia) 60 MG/ML solution prefilled syringe syringe Inject 1 mL under the skin into the appropriate area as directed 1 (One) Time.   Yes Bi Haque MD   DULoxetine (CYMBALTA) 60 MG capsule Take 1 capsule by mouth Daily. Indications: Major Depressive Disorder 1/1/23  Yes Bi Haque MD   Insulin Glargine (Lantus SoloStar) 100 UNIT/ML injection pen Inject 16 Units under the skin into the appropriate area as directed Daily.   Yes Bi Haque MD   lisinopril (PRINIVIL,ZESTRIL) 5 MG tablet Take 1 tablet by mouth Daily.   Yes Bi Haque MD   multivitamin with minerals tablet tablet Take 1 tablet by mouth Daily. Indications: Supplement 1/1/23  Yes Bi Haque MD   traZODone (DESYREL) 50 MG tablet Take 1 tablet by mouth At Night As Needed. Indications: Trouble Sleeping 10/12/22  Yes  Provider, Bi, MD       Allergies:    Allergies   Allergen Reactions    Zofran [Ondansetron Hcl] Nausea And Vomiting     Does the opposite of its purpose    Prochlorperazine Other (See Comments)     CAUSED SEIZURE    Prochlorperazine Edisylate Hives    Hydralazine Itching and Rash    Hydralazine Hcl Itching and Rash    Meperidine Itching and Swelling    Prochlorperazine Maleate Other (See Comments)     CAUSES SEIZURE    Prochlorperazine Maleate Irritability       Objective   Objective     Vital Signs  Temp:  [97.6 °F (36.4 °C)-98.5 °F (36.9 °C)] 97.9 °F (36.6 °C)  Heart Rate:  [73-97] 76  Resp:  [15-18] 18  BP: (112-174)/(55-99) 112/55  SpO2:  [92 %-97 %] 97 %  on   ;   Device (Oxygen Therapy): room air  Body mass index is 29.73 kg/m².    Physical Exam  Vitals and nursing note reviewed.   Constitutional:       Appearance: Normal appearance.   HENT:      Head: Atraumatic.      Right Ear: External ear normal.      Nose: Nose normal.      Mouth/Throat:      Mouth: Mucous membranes are moist.      Pharynx: Oropharynx is clear.   Eyes:      Extraocular Movements: Extraocular movements intact.      Conjunctiva/sclera: Conjunctivae normal.      Pupils: Pupils are equal, round, and reactive to light.   Cardiovascular:      Rate and Rhythm: Normal rate and regular rhythm.      Pulses: Normal pulses.      Heart sounds: Murmur heard.   Pulmonary:      Effort: Pulmonary effort is normal.   Abdominal:      General: Bowel sounds are normal.   Musculoskeletal:         General: Tenderness present.      Cervical back: Tenderness present.   Skin:     General: Skin is warm.      Capillary Refill: Capillary refill takes less than 2 seconds.   Neurological:      Mental Status: She is alert and oriented to person, place, and time.   Psychiatric:         Mood and Affect: Mood normal.         Speech: Speech normal.         Behavior: Behavior normal.           Results Review:  I have personally reviewed most recent cardiac tracings,  lab results, microbiology results, and radiology images and interpretations and agree with findings, most notably: CBC, CMP, A1C, BNP, EKG.    Results from last 7 days   Lab Units 04/29/25  0308   WBC 10*3/mm3 8.48   HEMOGLOBIN g/dL 12.4   HEMATOCRIT % 39.6   PLATELETS 10*3/mm3 241     Results from last 7 days   Lab Units 04/29/25  0308   SODIUM mmol/L 138   POTASSIUM mmol/L 3.7   CHLORIDE mmol/L 102   CO2 mmol/L 26.5   BUN mg/dL 24*   CREATININE mg/dL 1.23*   GLUCOSE mg/dL 171*   CALCIUM mg/dL 9.3   PROBNP pg/mL 109.0     Estimated Creatinine Clearance: 42 mL/min (A) (by C-G formula based on SCr of 1.23 mg/dL (H)).  Brief Urine Lab Results  (Last result in the past 365 days)        Color   Clarity   Blood   Leuk Est   Nitrite   Protein   CREAT   Urine HCG        04/29/25 0942 Yellow   Clear   Negative   Small (1+)   Negative   Negative                   Microbiology Results (last 10 days)       ** No results found for the last 240 hours. **            ECG/EMG Results (most recent)       Procedure Component Value Units Date/Time    Telemetry Scan [[id number]] Resulted: 04/28/25     Updated: 04/28/25 2320    Telemetry Scan [[id number]] Resulted: 04/28/25     Updated: 04/29/25 0059    Telemetry Scan [[id number]] Resulted: 04/28/25     Updated: 04/29/25 0648    ECG 12 Lead Other; Neck pain [[id number]] Collected: 04/28/25 1745     Updated: 04/29/25 0717     QT Interval 371 ms      QTC Interval 449 ms     Narrative:      HEART RATE=88  bpm  RR Fjbuilrz=469  ms  [place] Ratzmsgl=591  ms  P Horizontal Axis=-35  deg  P Front Axis=-2  deg  QRSD Interval=82  ms  QT Gguygohg=346  ms  QMuR=297  ms  QRS Axis=-8  deg  T Wave Axis=47  deg  - BORDERLINE ECG -  Sinus rhythm  Consider  anterior infarct  When compared with ECG of 06-Dec-2024 22:25:09,  No significant change  Electronically Signed By: Mikhail Lopez (NILA) 2025-04-29 07:16:56  Date and Time of Study:2025-04-28 17:45:19    Telemetry Scan [[id number]] Resulted: 04/28/25     Updated:  04/29/25 0928            Results for orders placed during the hospital encounter of 09/25/22    Bilateral Carotid Duplex    Interpretation Summary  · Proximal right internal carotid artery is normal.  · Proximal left internal carotid artery is normal.      Results for orders placed during the hospital encounter of 08/01/23    Adult Transthoracic Echo Complete W/ Cont if Necessary Per Protocol    Interpretation Summary    Left ventricular systolic function is normal. Left ventricular ejection fraction appears to be 61 - 65%.    Left ventricular wall thickness is consistent with mild concentric hypertrophy.    Left ventricular diastolic function was normal.    The left atrial cavity is mildly dilated.    Estimated right ventricular systolic pressure from tricuspid regurgitation is mildly elevated (35-45 mmHg). Calculated right ventricular systolic pressure from tricuspid regurgitation is 35 mmHg.      XR Spine Cervical Complete 4 or 5 View  Result Date: 4/28/2025  Impression: Essentially unremarkable cervical spine radiograph. If symptoms persist, please consider follow-up with nonemergent cervical spine MRI Electronically Signed: Terrance Andrea DO  4/28/2025 6:20 PM EDT  Workstation ID: [id number]        Estimated Creatinine Clearance: 42 mL/min (A) (by C-G formula based on SCr of 1.23 mg/dL (H)).    Assessment & Plan   Assessment/Plan       Active Hospital Problems    Diagnosis  POA    **Hyperglycemia [R73.9]  Yes      Resolved Hospital Problems   No resolved problems to display.     Hyperglycemia  Lab Results   Component Value Date    GLUCOSE 171 (H) 04/29/2025    GLUCOSE 658 (C) 04/28/2025    GLUCOSE 320 (H) 12/26/2024    GLUCOSE 148 (H) 12/12/2024   -A1c 14.4  - Continue Lantus  -Sliding scale insulin  -Diabetic diet  -Monitor before meals and at bedtime  - Diabetic educator  -UA shows glucose, small leukocytes, 6-10 WBC trace bacteria urine culture pending, protein not seen  - Endocrinology consult    [facility]  Lab  Results   Component Value Date    CREATININE 1.23 (H) 04/29/2025    BUN 24 (H) 04/29/2025    BCR 19.5 04/29/2025    EGFR 46.8 (L) 04/29/2025   -Hold lisinopril, switch to amlodipine  -IV fluids  -Avoid nephrotoxic medication IV dye unless urgently needed  -Monitor BMP and I's and O's while admitted    Hypertension/hyperlipidemia  BP Readings from Last 1 Encounters:   04/29/25 112/55   -Hold lisinopril, add amlodipine  - Monitor while admitted  -Lipid panel: Total cholesterol 272, HDL 58,   -Continue statin  -Echocardiogram (2023): LVEF 61-65% with mild TVR  -Repeat echo r/t SOA and murmur   -   -X-ray of cervical spine unremarkable  -Troponin 15, repeat  -Stress test on 4/30/25  -Telemetry  - Continue aspirin and Plavix    History of CVA  -Continue ASA and statin     Depressive disorder  - Continue Cymbalta      VTE Prophylaxis - Active VTE Prophylaxis    Mechanical:        Start        04/28/25 2100  Maintain Sequential Compression Device  Continuous                              CODE STATUS:    Code Status and Medical Interventions: CPR (Attempt to Resuscitate); Full Support   Ordered at: 04/29/25 0725     Code Status (Patient has no pulse and is not breathing):    CPR (Attempt to Resuscitate)     Medical Interventions (Patient has pulse or is breathing):    Full Support       This patient has been examined wearing personal protective equipment.     I discussed the patient's findings and my recommendations with patient, family, nursing staff, primary care team, and consulting provider.      Signature:Electronically signed by CANDIDO Granda, 04/29/25, 3:04 PM EDT.    I spent 35 minutes caring for Marge on this date of service. This time includes time spent by me in the following activities: reviewing tests, performing a medically appropriate examination and/or evaluation, counseling and educating the patient/family/caregiver, referring and communicating with other health care professionals,  documenting information in the medical record, independently interpreting results and communicating that information with the patient/family/caregiver, care coordination, ordering medications, ordering test(s), ordering procedure(s), obtaining a separately obtained history, and reviewing a separately obtained history.

## 2025-04-29 NOTE — CASE MANAGEMENT/SOCIAL WORK
Discharge Planning Assessment   [place]     Patient Name: Marge Mcghee  MRN: [id number]  Today's Date: 4/29/2025    Admit Date: 4/28/2025    Plan: Home with spouse.   Discharge Needs Assessment       Row Name 04/29/25 1033       Living Environment    People in Home spouse    Current Living Arrangements home    Potentially Unsafe Housing Conditions none    In the past 12 months has the electric, gas, oil, or water company threatened to shut off services in your home? No    Primary Care Provided by self    Provides Primary Care For no one    Family Caregiver if Needed spouse    Quality of Family Relationships helpful;involved;supportive    Able to Return to Prior Arrangements yes       Resource/Environmental Concerns    Resource/Environmental Concerns none    Transportation Concerns none       Transportation Needs    In the past 12 months, has lack of transportation kept you from medical appointments or from getting medications? no    In the past 12 months, has lack of transportation kept you from meetings, work, or from getting things needed for daily living? No       Food Insecurity    Within the past 12 months, you worried that your food would run out before you got the money to buy more. Never true    Within the past 12 months, the food you bought just didn't last and you didn't have money to get more. Never true       Transition Planning    Patient/Family Anticipates Transition to home with family    Patient/Family Anticipated Services at Transition none    Transportation Anticipated family or friend will provide       Discharge Needs Assessment    Readmission Within the Last 30 Days no previous admission in last 30 days    Equipment Currently Used at Home glucometer    Concerns to be Addressed discharge planning    Do you want help finding or keeping work or a job? I do not need or want help    Do you want help with school or training? For example, starting or completing job training or getting a high school  diploma, GED or equivalent No    Anticipated Changes Related to Illness none    Equipment Needed After Discharge none                   Discharge Plan       Row Name 04/29/25 1034       Plan    Plan Home with spouse.    Patient/Family in Agreement with Plan yes    Plan Comments CM met with patient at bedside. Patient is A/O x3. Patient lives at home with spouse. Patient drives, spouse will transport at discharge. Patient performs ADL independently. PCP and pharmacy confirmed. M2B confirmed, updated. DME: glucometer, denies further DME needs. Denies financial assistance needs for medication, food, utilities. Denies current HH and OPPT services. CM DC  set up PCP appt (Traci Townsend) for 5/21/25 and placed on AVS. CM confirmed patient sees an Endocrinologist in [place], [place] and already has an appointment scheduled.                       Demographic Summary       Row Name 04/29/25 1033       General Information    Admission Type observation    Arrived From emergency department    Required Notices Provided Observation Status Notice    Referral Source admission list    Reason for Consult discharge planning    Preferred Language English                   Functional Status       Row Name 04/29/25 1033       Functional Status    Usual Activity Tolerance good    Current Activity Tolerance good       Functional Status, IADL    Medications independent    Meal Preparation independent    Housekeeping independent    Laundry independent    Shopping independent    If for any reason you need help with day-to-day activities such as bathing, preparing meals, shopping, managing finances, etc., do you get the help you need? I get all the help I need             VALARIE Nascimento, RN, [facility]  Care Coordination Supervisor   [address] [address]  Phone: [phone]  Fax: [phone]

## 2025-04-29 NOTE — CONSULTS
ENDOCRINE INITIAL CONSULT NOTE  DATE OF SERVICE: 25  Patient Care Team:  Traci Townsend APRN as PCP - General (Nurse Practitioner)  Drake Hinton MD PhD as Consulting Physician (Ophthalmology)  Fausto Gilliam MD as Consulting Physician (Endocrinology)  Saloni Ch MD (Ophthalmology)  Mikhail Kaplan Jr., MD as Consulting Physician (Urology)        PATIENT NAME: Marge Mcghee  PATIENT : 1955 AGE: 70 y.o.  MRN NUMBER: [id number]  PRIMARY CARE: Traci Townsend APRN    ==========================================================================    REASON FOR CONSULT: Type 2 diabetes management    HPI / SUBJECTIVE  70 y.o. female with multiple medical problems presented to the hospital with neck pain.  Patient is currently admitted for hyperglycemia.  Endocrine team was consulted for type 2 diabetes management.  Patient seen and examined.  Patient reports to have a history of type 2 diabetes for more than 20 years.  Patient was initially diagnosed with GDM at the age of 20.  Patient is currently taking insulin Lantus 16 units every morning along with NovoLog on sliding scale.  Not checking blood sugars at home.  Patient have previously seen Dr. Figueroa in .  Also as per Dr. Figueroa's note patient was also maintained on Mounjaro therapy in the past as well.  Patient care is further complicated with underlying diabetic neuropathy.  Patient started back on insulin Lantus 16 units in the morning which she received this morning.  Also on sliding scale as well.  Tolerating meals.    ==========================================================================                                                PAST MEDICAL HISTORY    Past Medical History:   Diagnosis Date    Adrenal nodule 2016    FINDINGS: Mild hepatic steatosis may be present. Cholecystectomy. No biliary ductal dilatation. Negative pancreas, spleen, left adrenal gland, and kidneys. The right adrenal presumed adenoma  has increased slightly, measuring 3 x 4 cm in diameter,  compared to 2 x 3.5 cm on the previous exam. The attenuation values are consistent with an adenoma. Done at [facility] in August 2018    Age-related osteoporosis without current pathological fracture 11/16/2016    Arthritis     CVA (cerebral vascular accident)     year 2000- can't remember exact year    Delayed surgical wound healing     Essential hypertension 11/16/2016    Gastroesophageal reflux disease with esophagitis 11/16/2016    Hepatic steatosis 11/16/2016    History of anemia     History of sepsis     FROM UTI    Hyperlipidemia 11/16/2016    Lisfranc's dislocation     LEFT WITH FRACTURES NONHEALING FOOT    Osteomyelitis of left foot 11/2020    RLS (restless legs syndrome)     Squamous cell skin cancer 2014    Excised by Dr. James    Thyroid nodule 10/18/2019    BENIGN    Type 2 diabetes mellitus with peripheral neuropathy 11/16/2016    Vitamin D deficiency 01/25/2019       ==========================================================================    PAST SURGICAL HISTORY    Past Surgical History:   Procedure Laterality Date    BREAST BIOPSY Left     7/2019. BENIGN    CARDIAC CATHETERIZATION Left 5/2/2021    Procedure: Cardiac Catheterization/Vascular Study;  Surgeon: Chacho Esteban MD;  Location: [facility] CATH INVASIVE LOCATION;  Service: Cardiovascular;  Laterality: Left;    CARDIAC CATHETERIZATION N/A 5/2/2021    Procedure: Intra-Aortic Baloon Pump Insertion;  Surgeon: Chacho Esteban MD;  Location: [facility] CATH INVASIVE LOCATION;  Service: Cardiovascular;  Laterality: N/A;    CATARACT EXTRACTION WITH INTRAOCULAR LENS IMPLANT Bilateral     CHOLECYSTECTOMY      COLONOSCOPY      CORONARY ARTERY BYPASS GRAFT N/A 5/2/2021    Procedure: CORONARY ARTERY BYPASS WITH INTERNAL MAMMARY ARTERY GRAFT;  Surgeon: Jr Rocael Alexander MD;  Location: [facility] CVOR;  Service: Cardiothoracic;  Laterality: N/A;    FOOT FUSION Right 11/13/2020    Procedure: RIGHT OPEN  TREATMENT LISFRANC INJURY, OPEN REDUCTION INTERNAL FIXATION MEDIAL/MIDDLE CUNEIFORM FRACTURE AND 2ND/3RD METATARSAL FRACTURE 1ST 2ND POSSIBLE 3RD TARSOMETATARSAL ARTHRODESIS INTERCUNEIFORM ARTHRODESIS CALCANEAL BONE GRAFT;  Surgeon: Mikhail Banks Jr., MD;  Location: [facility];  Service: Orthopedics;  Laterality: Right;    INCISION AND DRAINAGE LEG Right 1/8/2021    Procedure: RIGHT IRRIGATION AND DEBRIDEMENT OF FOOT WITH SECONDARY CLOSURE AND HARDWARE REMOVAL;  Surgeon: Mikhail Banks Jr., MD;  Location: [facility] OR;  Service: Orthopedics;  Laterality: Right;    KNEE ARTHROSCOPY Bilateral     TOTAL ABDOMINAL HYSTERECTOMY      TRANSESOPHAGEAL ECHOCARDIOGRAM (EMILIA) N/A 5/2/2021    Procedure: TRANSESOPHAGEAL ECHOCARDIOGRAM;  Surgeon: Jr Rocael Alexander MD;  Location: [facility];  Service: Cardiothoracic;  Laterality: N/A;    UPPER GASTROINTESTINAL ENDOSCOPY  08/2016    US GUIDED FINE NEEDLE ASPIRATION  5/8/2020       ==========================================================================    FAMILY HISTORY    Family History   Problem Relation Age of Onset    Diabetes Mother     COPD Mother     Hypertension Mother     Kidney disease Mother     Obesity Mother     Thyroid disease Mother     Diabetes Sister     Diabetes Sister     Diabetes Sister     Diabetes Sister     Malig Hyperthermia Neg Hx        ==========================================================================    SOCIAL HISTORY    Social History     Socioeconomic History    Marital status:    Tobacco Use    Smoking status: Never    Smokeless tobacco: Never   Vaping Use    Vaping status: Never Used   Substance and Sexual Activity    Alcohol use: Yes     Comment: socially     Drug use: Never    Sexual activity: Defer       ==========================================================================    HOME MEDICATIONS REPORTED ON ADMISSION      Current Facility-Administered Medications:     acetaminophen (TYLENOL) tablet 650 mg, 650 mg, Oral,  Q6H PRN, Mikhail Lopez MD, 650 mg at 04/29/25 0303    acetaminophen (TYLENOL) tablet 650 mg, 650 mg, Oral, Q4H PRN, Riddhi Joel APRN    amLODIPine (NORVASC) tablet 5 mg, 5 mg, Oral, Q24H, Riddhi Joel APRN, 5 mg at 04/29/25 0925    aspirin EC tablet 81 mg, 81 mg, Oral, Daily, Riddhi Joel APRN, 81 mg at 04/29/25 0925    atorvastatin (LIPITOR) tablet 80 mg, 80 mg, Oral, Nightly, Riddhi Joel APRN    sennosides-docusate (PERICOLACE) 8.6-50 MG per tablet 2 tablet, 2 tablet, Oral, BID PRN **AND** polyethylene glycol (MIRALAX) packet 17 g, 17 g, Oral, Daily PRN **AND** bisacodyl (DULCOLAX) EC tablet 5 mg, 5 mg, Oral, Daily PRN **AND** bisacodyl (DULCOLAX) suppository 10 mg, 10 mg, Rectal, Daily PRN, Mikhail Lopez MD    clopidogrel (PLAVIX) tablet 75 mg, 75 mg, Oral, Daily, Riddhi Joel APRN, 75 mg at 04/29/25 0925    dextrose (D50W) (25 g/50 mL) IV injection 25 g, 25 g, Intravenous, Q15 Min PRN, Mikhail Lopez MD    dextrose (GLUTOSE) oral gel 15 g, 15 g, Oral, Q15 Min PRN, Mikhail Lopez MD    DULoxetine (CYMBALTA) DR capsule 60 mg, 60 mg, Oral, Daily, Riddhi Joel APRN, 60 mg at 04/29/25 0925    glucagon (GLUCAGEN) injection 1 mg, 1 mg, Intramuscular, Q15 Min PRN, Mikhail Lopez MD    insulin glargine (LANTUS, SEMGLEE) injection 16 Units, 16 Units, Subcutaneous, Daily, Riddhi Joel APRN, 16 Units at 04/29/25 0925    insulin lispro (HUMALOG/ADMELOG) injection 1-6 Units, 1-6 Units, Subcutaneous, 4x Daily AC & at Bedtime, Johnny Snow MD    insulin lispro (HUMALOG/ADMELOG) injection 3 Units, 3 Units, Subcutaneous, TID With Meals, Johnny Snow MD    [Held by provider] lisinopril (PRINIVIL,ZESTRIL) tablet 5 mg, 5 mg, Oral, Daily, Riddhi Joel, CANDIDO    melatonin tablet 5 mg, 5 mg, Oral, Nightly PRN, Mikhail Lopez MD    nitroglycerin (NITROSTAT) SL tablet 0.4 mg, 0.4 mg, Sublingual, Q5 Min PRN, Mikhail Lopez MD, 0.4 mg at 04/29/25 1548    ondansetron (ZOFRAN) injection 4 mg, 4 mg, Intravenous, Q6H  PRN, Mikhail Lopez MD, 4 mg at 04/29/25 0259    [COMPLETED] Insert Peripheral IV, , , Once **AND** sodium chloride 0.9 % flush 10 mL, 10 mL, Intravenous, PRN, Mikhail Lopez MD    sodium chloride 0.9 % flush 10 mL, 10 mL, Intravenous, Q12H, Mikhail Lopez MD, 10 mL at 04/29/25 0825    sodium chloride 0.9 % flush 10 mL, 10 mL, Intravenous, PRN, Mikhail Lopez MD    sodium chloride 0.9 % infusion 40 mL, 40 mL, Intravenous, PRN, Mikhail Lopez MD    sodium chloride 0.9 % infusion, 125 mL/hr, Intravenous, Continuous, Mikhail Lopez MD, Last Rate: 125 mL/hr at 04/29/25 1721, 125 mL/hr at 04/29/25 1721    traZODone (DESYREL) tablet 50 mg, 50 mg, Oral, Nightly PRN, Riddhi Joel APRN    ==========================================================================    ALLERGIES    Allergies   Allergen Reactions    Zofran [Ondansetron Hcl] Nausea And Vomiting     Does the opposite of its purpose    Prochlorperazine Other (See Comments)     CAUSED SEIZURE    Prochlorperazine Edisylate Hives    Hydralazine Itching and Rash    Hydralazine Hcl Itching and Rash    Meperidine Itching and Swelling    Prochlorperazine Maleate Other (See Comments)     CAUSES SEIZURE    Prochlorperazine Maleate Irritability       ==========================================================================    ACTIVE HOSPITAL MEDICATIONS    Scheduled Medications:  amLODIPine, 5 mg, Oral, Q24H  aspirin, 81 mg, Oral, Daily  atorvastatin, 80 mg, Oral, Nightly  clopidogrel, 75 mg, Oral, Daily  DULoxetine, 60 mg, Oral, Daily  insulin glargine, 16 Units, Subcutaneous, Daily  insulin lispro, 1-6 Units, Subcutaneous, 4x Daily AC & at Bedtime  insulin lispro, 3 Units, Subcutaneous, TID With Meals  [Held by provider] lisinopril, 5 mg, Oral, Daily  sodium chloride, 10 mL, Intravenous, Q12H         PRN Medications:    acetaminophen    acetaminophen    senna-docusate sodium **AND** polyethylene glycol **AND** bisacodyl **AND** bisacodyl    dextrose    dextrose     glucagon (human recombinant)    melatonin    nitroglycerin    ondansetron    [COMPLETED] Insert Peripheral IV **AND** sodium chloride    sodium chloride    sodium chloride    traZODone     ==========================================================================    OBJECTIVE    Vitals:    04/29/25 1545   BP: 122/64   Pulse: 76   Resp: 16   Temp:    SpO2: 91%      Body mass index is 29.73 kg/m².     General: Alert, cooperative, no acute distress    ==========================================================================    LABORATORY DATA    Lab Results   Component Value Date    GLUCOSE 171 (H) 04/29/2025    BUN 24 (H) 04/29/2025    CREATININE 1.23 (H) 04/29/2025    EGFRIFNONA 52 (L) 05/20/2021    EGFRIFAFRI >60 02/28/2023    BCR 19.5 04/29/2025    K 3.7 04/29/2025    CO2 26.5 04/29/2025    CALCIUM 9.3 04/29/2025    ALBUMIN 4.0 12/06/2024    LABIL2 1.5 02/28/2023    AST 23 12/06/2024    ALT 9 12/06/2024       Lab Results   Component Value Date    HGBA1C 14.40 (H) 04/29/2025    HGBA1C 14.40 (H) 11/28/2024    HGBA1C 7.74 (H) 06/27/2024     Lab Results   Component Value Date    MICROALBUR 22.8 03/13/2020    CREATININE 1.23 (H) 04/29/2025     Results from last 7 days   Lab Units 04/29/25  1539 04/29/25  1111 04/29/25  0732 04/28/25  2103   GLUCOSE mg/dL 213* 188* 219* 294*     Lab Results   Component Value Date    TSH 1.360 04/29/2025    FREET4 0.96 04/29/2025       ==========================================================================    ASSESSMENT AND PLAN    # Type 2 diabetes with hyperglycemia  - Blood sugar reviewed since last evening  - Agree with continuing insulin Lantus 16 units in the morning  - Added insulin Humalog 3 units with each meal along with low-dose sliding scale  - Will review blood sugar for next  24 hours and adjust therapy accordingly  - Patient will benefit from CGM therapy as outpatient will also benefit from outpatient endocrinology follow-up  - Patient will also benefit from outpatient  endocrinology follow-up, patient can follow-up with Dr. Figueroa or can be seen in the endocrine clinic in 6 to 8 weeks time    # Diabetic peripheral neuropathy    Thank you for courtesy of consultation.  Will follow with you.  Rest as per primary care.    Part of this note may be an electronic transcription/translation of spoken language to printed text using the Dragon Dictation System.     The time of this note does not reflect the time I saw the patient but the time that this note was written.    =======================================  Johnny Snow MD  Department of Endocrine, Diabetes and Metabolism  [facility] [place]  =======================================

## 2025-04-29 NOTE — NURSING NOTE
Patient called out states she is having sharp chest pain 8/10 that just started while laying in bed, gavino NP notified, EKG done, Normal, 1 Nitro SL given. Vitals WNL

## 2025-04-29 NOTE — CONSULTS
Diabetes Education    Patient Name:  Marge Mcghee  YOB: 1955  MRN: [id number]  Admit Date:  4/28/2025        Patient seen per MD order for BG> 200.  Discussed A1C of 14.4%.  Patient states she had been at 16%.  Asked her what helped bring it down. She stated eating salads and taking insulin.  Patient takes 16 units of Lantus every day.  Discussed the one plate method and portion sizes.  Discussed hyperglycemia. Patient states she has a meter but rarely checks her blood sugars. Encouraged her to check daily and rotate around the time of day she is checking. Discussed that the patient may want to talk with her MD about increasing her Lantus to help with the higher blood sugars. She states she will call and make an appt with Dr. Figueroa.      Electronically signed by:  Tresa Paige RN  04/29/25 14:43 EDT

## 2025-04-29 NOTE — CASE MANAGEMENT/SOCIAL WORK
Continued Stay Note  [facility]     Patient Name: Marge Mcghee  MRN: [id number]  Today's Date: 4/29/2025    Admit Date: 4/28/2025        Discharge Plan         Row Name 04/29/25 0857       Plan    Plan Comments Discharge  called and spoke with Mikhail at Dr. Townsend's office. He said they had no hospital follow up appointments until 5/21, and he would forward the pt information to the scheduling team, and they would reach out to the pt with an appointment. CM made aware.                  Julien Fisher, Discharge   [facility]  Care Coordination  [address] [place] [address] [place] [address]  Office: [phone]  Fax: [phone]

## 2025-04-29 NOTE — PLAN OF CARE
Problem: Adult Inpatient Plan of Care  Goal: Plan of Care Review  Outcome: Progressing  Goal: Patient-Specific Goal (Individualized)  Outcome: Progressing  Goal: Absence of Hospital-Acquired Illness or Injury  Outcome: Progressing  Intervention: Identify and Manage Fall Risk  Recent Flowsheet Documentation  Taken 4/28/2025 2239 by Ju Boateng RN  Safety Promotion/Fall Prevention: safety round/check completed  Taken 4/28/2025 2130 by Ju Boateng RN  Safety Promotion/Fall Prevention: safety round/check completed  Intervention: Prevent and Manage VTE (Venous Thromboembolism) Risk  Recent Flowsheet Documentation  Taken 4/28/2025 2100 by Ju Boateng RN  VTE Prevention/Management: SCDs (sequential compression devices) off  Intervention: Prevent Infection  Recent Flowsheet Documentation  Taken 4/28/2025 2239 by Ju Boateng RN  Infection Prevention:   single patient room provided   rest/sleep promoted   personal protective equipment utilized   equipment surfaces disinfected   hand hygiene promoted  Taken 4/28/2025 2130 by Ju Boateng RN  Infection Prevention:   single patient room provided   rest/sleep promoted   personal protective equipment utilized   hand hygiene promoted   equipment surfaces disinfected  Goal: Optimal Comfort and Wellbeing  Outcome: Progressing  Intervention: Provide Person-Centered Care  Recent Flowsheet Documentation  Taken 4/28/2025 2130 by Ju Boateng RN  Trust Relationship/Rapport:   care explained   choices provided   questions answered   questions encouraged  Goal: Readiness for Transition of Care  Outcome: Progressing  Intervention: Mutually Develop Transition Plan  Recent Flowsheet Documentation  Taken 4/28/2025 2130 by Ju Boateng RN  Equipment Currently Used at Home: glucometer  Taken 4/28/2025 2129 by Ju Boateng RN  Equipment Currently Used at Home: glucometer  Transportation Anticipated: family or friend will provide  Patient/Family  Anticipated Services at Transition: none  Patient/Family Anticipates Transition to: home with family   Goal Outcome Evaluation:

## 2025-04-30 ENCOUNTER — APPOINTMENT (OUTPATIENT)
Dept: NUCLEAR MEDICINE | Facility: HOSPITAL | Age: 70
End: 2025-04-30
Payer: MEDICARE

## 2025-04-30 ENCOUNTER — READMISSION MANAGEMENT (OUTPATIENT)
Dept: CALL CENTER | Facility: HOSPITAL | Age: 70
End: 2025-04-30
Payer: MEDICARE

## 2025-04-30 VITALS
WEIGHT: 169.75 LBS | SYSTOLIC BLOOD PRESSURE: 132 MMHG | RESPIRATION RATE: 16 BRPM | HEART RATE: 77 BPM | OXYGEN SATURATION: 94 % | TEMPERATURE: 98.2 F | DIASTOLIC BLOOD PRESSURE: 68 MMHG | HEIGHT: 64 IN | BODY MASS INDEX: 28.98 KG/M2

## 2025-04-30 LAB
ANION GAP SERPL CALCULATED.3IONS-SCNC: 11.4 MMOL/L (ref 5–15)
AORTIC DIMENSIONLESS INDEX: 0.61 (DI)
AV MEAN PRESS GRAD SYS DOP V1V2: 4 MMHG
AV VMAX SYS DOP: 137 CM/SEC
BH CV ECHO LEFT VENTRICLE GLOBAL LONGITUDINAL STRAIN: -15.8 %
BH CV ECHO MEAS - ACS: 1.8 CM
BH CV ECHO MEAS - AO MAX PG: 7.5 MMHG
BH CV ECHO MEAS - AO V2 VTI: 25.8 CM
BH CV ECHO MEAS - AVA(I,D): 2.33 CM2
BH CV ECHO MEAS - EDV(CUBED): 29.8 ML
BH CV ECHO MEAS - EDV(MOD-SP4): 68.1 ML
BH CV ECHO MEAS - EF(MOD-SP4): 62 %
BH CV ECHO MEAS - ESV(CUBED): 9.3 ML
BH CV ECHO MEAS - ESV(MOD-SP4): 25.9 ML
BH CV ECHO MEAS - FS: 32.3 %
BH CV ECHO MEAS - IVS/LVPW: 1.08 CM
BH CV ECHO MEAS - IVSD: 1.4 CM
BH CV ECHO MEAS - LA DIMENSION: 3.1 CM
BH CV ECHO MEAS - LAT PEAK E' VEL: 7.6 CM/SEC
BH CV ECHO MEAS - LV DIASTOLIC VOL/BSA (35-75): 37 CM2
BH CV ECHO MEAS - LV MASS(C)D: 138.1 GRAMS
BH CV ECHO MEAS - LV MAX PG: 2.38 MMHG
BH CV ECHO MEAS - LV MEAN PG: 1 MMHG
BH CV ECHO MEAS - LV SYSTOLIC VOL/BSA (12-30): 14.1 CM2
BH CV ECHO MEAS - LV V1 MAX: 77.2 CM/SEC
BH CV ECHO MEAS - LV V1 VTI: 15.8 CM
BH CV ECHO MEAS - LVIDD: 3.1 CM
BH CV ECHO MEAS - LVIDS: 2.1 CM
BH CV ECHO MEAS - LVOT AREA: 3.8 CM2
BH CV ECHO MEAS - LVOT DIAM: 2.2 CM
BH CV ECHO MEAS - LVPWD: 1.3 CM
BH CV ECHO MEAS - MED PEAK E' VEL: 7.4 CM/SEC
BH CV ECHO MEAS - MR MAX PG: 44.1 MMHG
BH CV ECHO MEAS - MR MAX VEL: 332 CM/SEC
BH CV ECHO MEAS - MV A MAX VEL: 105 CM/SEC
BH CV ECHO MEAS - MV DEC SLOPE: 572 CM/SEC2
BH CV ECHO MEAS - MV DEC TIME: 0.17 SEC
BH CV ECHO MEAS - MV E MAX VEL: 71.8 CM/SEC
BH CV ECHO MEAS - MV E/A: 0.68
BH CV ECHO MEAS - MV MAX PG: 6.8 MMHG
BH CV ECHO MEAS - MV MEAN PG: 3 MMHG
BH CV ECHO MEAS - MV P1/2T: 41.8 MSEC
BH CV ECHO MEAS - MV V2 VTI: 24 CM
BH CV ECHO MEAS - MVA(P1/2T): 5.3 CM2
BH CV ECHO MEAS - MVA(VTI): 2.5 CM2
BH CV ECHO MEAS - PA ACC TIME: 0.1 SEC
BH CV ECHO MEAS - PA V2 MAX: 85.8 CM/SEC
BH CV ECHO MEAS - PULM A REVS DUR: 0.11 SEC
BH CV ECHO MEAS - PULM A REVS VEL: 25.5 CM/SEC
BH CV ECHO MEAS - PULM DIAS VEL: 33.3 CM/SEC
BH CV ECHO MEAS - PULM S/D: 1.54
BH CV ECHO MEAS - PULM SYS VEL: 51.3 CM/SEC
BH CV ECHO MEAS - RAP SYSTOLE: 3 MMHG
BH CV ECHO MEAS - RV MAX PG: 1.89 MMHG
BH CV ECHO MEAS - RV V1 MAX: 68.7 CM/SEC
BH CV ECHO MEAS - RV V1 VTI: 13.4 CM
BH CV ECHO MEAS - RVDD: 3.1 CM
BH CV ECHO MEAS - RVSP: 19.6 MMHG
BH CV ECHO MEAS - SV(LVOT): 60.1 ML
BH CV ECHO MEAS - SV(MOD-SP4): 42.2 ML
BH CV ECHO MEAS - SVI(LVOT): 32.7 ML/M2
BH CV ECHO MEAS - SVI(MOD-SP4): 22.9 ML/M2
BH CV ECHO MEAS - TAPSE (>1.6): 1.35 CM
BH CV ECHO MEAS - TR MAX PG: 16.6 MMHG
BH CV ECHO MEAS - TR MAX VEL: 204 CM/SEC
BH CV ECHO MEASUREMENTS AVERAGE E/E' RATIO: 9.57
BH CV REST NUCLEAR ISOTOPE DOSE: 11 MCI
BH CV STRESS BP STAGE 1: NORMAL
BH CV STRESS BP STAGE 2: NORMAL
BH CV STRESS COMMENTS STAGE 1: NORMAL
BH CV STRESS COMMENTS STAGE 2: NORMAL
BH CV STRESS DOSE REGADENOSON STAGE 1: 0.4
BH CV STRESS DURATION MIN STAGE 1: 0
BH CV STRESS DURATION MIN STAGE 2: 4
BH CV STRESS DURATION SEC STAGE 1: 10
BH CV STRESS DURATION SEC STAGE 2: 0
BH CV STRESS HR STAGE 1: 74
BH CV STRESS HR STAGE 2: 90
BH CV STRESS NUCLEAR ISOTOPE DOSE: 29.1 MCI
BH CV STRESS PROTOCOL 1: NORMAL
BH CV STRESS RECOVERY BP: NORMAL MMHG
BH CV STRESS RECOVERY HR: 83 BPM
BH CV STRESS STAGE 1: 1
BH CV STRESS STAGE 2: 2
BH CV XLRA - TDI S': 14.6 CM/SEC
BUN SERPL-MCNC: 28 MG/DL (ref 8–23)
BUN/CREAT SERPL: 21.9 (ref 7–25)
CALCIUM SPEC-SCNC: 8.8 MG/DL (ref 8.6–10.5)
CHLORIDE SERPL-SCNC: 104 MMOL/L (ref 98–107)
CO2 SERPL-SCNC: 22.6 MMOL/L (ref 22–29)
CREAT SERPL-MCNC: 1.28 MG/DL (ref 0.57–1)
EGFRCR SERPLBLD CKD-EPI 2021: 44.6 ML/MIN/1.73
GLUCOSE BLDC GLUCOMTR-MCNC: 212 MG/DL (ref 70–105)
GLUCOSE BLDC GLUCOMTR-MCNC: 294 MG/DL (ref 70–105)
GLUCOSE BLDC GLUCOMTR-MCNC: 329 MG/DL (ref 70–105)
GLUCOSE SERPL-MCNC: 250 MG/DL (ref 65–99)
LEFT ATRIUM VOLUME INDEX: 20.3 ML/M2
LV EF BIPLANE MOD: 62 %
MAXIMAL PREDICTED HEART RATE: 148 BPM
PERCENT MAX PREDICTED HR: 60.81 %
POTASSIUM SERPL-SCNC: 4.6 MMOL/L (ref 3.5–5.2)
SINUS: 3.2 CM
SODIUM SERPL-SCNC: 138 MMOL/L (ref 136–145)
SPECT HRT GATED+EF W RNC IV: 73 %
STRESS BASELINE BP: NORMAL MMHG
STRESS BASELINE HR: 74 BPM
STRESS PERCENT HR: 72 %
STRESS POST PEAK BP: NORMAL MMHG
STRESS POST PEAK HR: 90 BPM
STRESS TARGET HR: 126 BPM
TROPONIN T SERPL HS-MCNC: 11 NG/L

## 2025-04-30 PROCEDURE — 25010000002 REGADENOSON 0.4 MG/5ML SOLUTION: Performed by: EMERGENCY MEDICINE

## 2025-04-30 PROCEDURE — 34310000005 TECHNETIUM TETROFOSMIN KIT: Performed by: EMERGENCY MEDICINE

## 2025-04-30 PROCEDURE — A9502 TC99M TETROFOSMIN: HCPCS | Performed by: EMERGENCY MEDICINE

## 2025-04-30 PROCEDURE — 93018 CV STRESS TEST I&R ONLY: CPT | Performed by: INTERNAL MEDICINE

## 2025-04-30 PROCEDURE — 84484 ASSAY OF TROPONIN QUANT: CPT | Performed by: NURSE PRACTITIONER

## 2025-04-30 PROCEDURE — G0378 HOSPITAL OBSERVATION PER HR: HCPCS

## 2025-04-30 PROCEDURE — 78452 HT MUSCLE IMAGE SPECT MULT: CPT | Performed by: INTERNAL MEDICINE

## 2025-04-30 PROCEDURE — 78452 HT MUSCLE IMAGE SPECT MULT: CPT

## 2025-04-30 PROCEDURE — 99214 OFFICE O/P EST MOD 30 MIN: CPT | Performed by: INTERNAL MEDICINE

## 2025-04-30 PROCEDURE — 63710000001 INSULIN GLARGINE PER 5 UNITS: Performed by: NURSE PRACTITIONER

## 2025-04-30 PROCEDURE — 80048 BASIC METABOLIC PNL TOTAL CA: CPT | Performed by: PHYSICIAN ASSISTANT

## 2025-04-30 PROCEDURE — 82948 REAGENT STRIP/BLOOD GLUCOSE: CPT

## 2025-04-30 PROCEDURE — 93017 CV STRESS TEST TRACING ONLY: CPT

## 2025-04-30 PROCEDURE — 63710000001 INSULIN LISPRO (HUMAN) PER 5 UNITS: Performed by: INTERNAL MEDICINE

## 2025-04-30 PROCEDURE — 82948 REAGENT STRIP/BLOOD GLUCOSE: CPT | Performed by: EMERGENCY MEDICINE

## 2025-04-30 RX ORDER — INSULIN GLARGINE 100 [IU]/ML
20 INJECTION, SOLUTION SUBCUTANEOUS DAILY
Start: 2025-04-30

## 2025-04-30 RX ORDER — INSULIN LISPRO 100 [IU]/ML
6 INJECTION, SOLUTION INTRAVENOUS; SUBCUTANEOUS
Qty: 15 ML | Refills: 0 | Status: SHIPPED | OUTPATIENT
Start: 2025-04-30

## 2025-04-30 RX ORDER — AMLODIPINE BESYLATE 5 MG/1
5 TABLET ORAL
Qty: 30 TABLET | Refills: 0 | Status: SHIPPED | OUTPATIENT
Start: 2025-05-01 | End: 2025-05-31

## 2025-04-30 RX ORDER — REGADENOSON 0.08 MG/ML
0.4 INJECTION, SOLUTION INTRAVENOUS
Status: COMPLETED | OUTPATIENT
Start: 2025-04-30 | End: 2025-04-30

## 2025-04-30 RX ORDER — PEN NEEDLE, DIABETIC 30 GX3/16"
1 NEEDLE, DISPOSABLE MISCELLANEOUS
Qty: 200 EACH | Refills: 0 | Status: SHIPPED | OUTPATIENT
Start: 2025-04-30

## 2025-04-30 RX ORDER — INSULIN LISPRO 100 [IU]/ML
6 INJECTION, SOLUTION INTRAVENOUS; SUBCUTANEOUS
Status: DISCONTINUED | OUTPATIENT
Start: 2025-05-01 | End: 2025-04-30 | Stop reason: HOSPADM

## 2025-04-30 RX ORDER — LANCETS
EACH MISCELLANEOUS
Qty: 100 EACH | Refills: 12 | Status: SHIPPED | OUTPATIENT
Start: 2025-04-30

## 2025-04-30 RX ADMIN — INSULIN GLARGINE 16 UNITS: 100 INJECTION, SOLUTION SUBCUTANEOUS at 10:18

## 2025-04-30 RX ADMIN — AMLODIPINE BESYLATE 5 MG: 5 TABLET ORAL at 10:19

## 2025-04-30 RX ADMIN — ASPIRIN 81 MG: 81 TABLET, COATED ORAL at 10:19

## 2025-04-30 RX ADMIN — INSULIN LISPRO 3 UNITS: 100 INJECTION, SOLUTION INTRAVENOUS; SUBCUTANEOUS at 12:55

## 2025-04-30 RX ADMIN — REGADENOSON 0.4 MG: 0.08 INJECTION, SOLUTION INTRAVENOUS at 08:35

## 2025-04-30 RX ADMIN — TETROFOSMIN 1 DOSE: 1.38 INJECTION, POWDER, LYOPHILIZED, FOR SOLUTION INTRAVENOUS at 08:35

## 2025-04-30 RX ADMIN — INSULIN LISPRO 3 UNITS: 100 INJECTION, SOLUTION INTRAVENOUS; SUBCUTANEOUS at 16:10

## 2025-04-30 RX ADMIN — LISINOPRIL 5 MG: 5 TABLET ORAL at 10:19

## 2025-04-30 RX ADMIN — ACETAMINOPHEN 650 MG: 325 TABLET, FILM COATED ORAL at 14:00

## 2025-04-30 RX ADMIN — CLOPIDOGREL BISULFATE 75 MG: 75 TABLET, FILM COATED ORAL at 10:19

## 2025-04-30 RX ADMIN — DULOXETINE 60 MG: 30 CAPSULE, DELAYED RELEASE ORAL at 10:19

## 2025-04-30 RX ADMIN — INSULIN LISPRO 4 UNITS: 100 INJECTION, SOLUTION INTRAVENOUS; SUBCUTANEOUS at 12:55

## 2025-04-30 RX ADMIN — INSULIN LISPRO 3 UNITS: 100 INJECTION, SOLUTION INTRAVENOUS; SUBCUTANEOUS at 16:08

## 2025-04-30 RX ADMIN — TETROFOSMIN 1 DOSE: 1.38 INJECTION, POWDER, LYOPHILIZED, FOR SOLUTION INTRAVENOUS at 07:12

## 2025-04-30 RX ADMIN — Medication 10 ML: at 10:20

## 2025-04-30 NOTE — PROGRESS NOTES
ENDOCRINE CONSULT PROGRESS NOTE  DATE OF SERVICE: 25        PATIENT NAME: Marge Mcghee  PATIENT : 1955 AGE: 70 y.o.  MRN NUMBER: [id number]    ==========================================================================    CHIEF COMPLAINT: Type 2 diabetes management    CARE TEAM:   Patient Care Team:  Traci Townsend APRN as PCP - General (Nurse Practitioner)  Drake Hinton MD PhD as Consulting Physician (Ophthalmology)  Fausto Gilliam MD as Consulting Physician (Endocrinology)  Saloni Ch MD (Ophthalmology)  Mikhail Kaplan Jr., MD as Consulting Physician (Urology)    SUBJECTIVE    Pt seen and examined.  Tolerating meals.  Plan for discharge today per primary team.    ==========================================================================    CURRENT ACTIVE HOSPITAL MEDICATIONS    Scheduled Medications:  amLODIPine, 5 mg, Oral, Q24H  aspirin, 81 mg, Oral, Daily  atorvastatin, 80 mg, Oral, Nightly  clopidogrel, 75 mg, Oral, Daily  DULoxetine, 60 mg, Oral, Daily  [START ON 2025] insulin glargine, 20 Units, Subcutaneous, Daily  insulin lispro, 1-6 Units, Subcutaneous, 4x Daily AC & at Bedtime  [START ON 2025] insulin lispro, 6 Units, Subcutaneous, TID With Meals  lisinopril, 5 mg, Oral, Daily  sodium chloride, 10 mL, Intravenous, Q12H         PRN Medications:    acetaminophen    acetaminophen    senna-docusate sodium **AND** polyethylene glycol **AND** bisacodyl **AND** bisacodyl    dextrose    dextrose    glucagon (human recombinant)    melatonin    nitroglycerin    ondansetron    [COMPLETED] Insert Peripheral IV **AND** sodium chloride    sodium chloride    sodium chloride    traZODone     ==========================================================================    OBJECTIVE    Vitals:    25 1709   BP: 132/68   Pulse:    Resp: 16   Temp: 98.2 °F (36.8 °C)   SpO2:       Body mass index is 29.12 kg/m².     General - A&Ox3, NAD,  Calm    ==========================================================================    LAB EVALUATION    Lab Results   Component Value Date    GLUCOSE 250 (H) 04/30/2025    BUN 28 (H) 04/30/2025    CREATININE 1.28 (H) 04/30/2025    EGFRIFNONA 52 (L) 05/20/2021    EGFRIFAFRI >60 02/28/2023    BCR 21.9 04/30/2025    K 4.6 04/30/2025    CO2 22.6 04/30/2025    CALCIUM 8.8 04/30/2025    ALBUMIN 4.0 12/06/2024    LABIL2 1.5 02/28/2023    AST 23 12/06/2024    ALT 9 12/06/2024       Lab Results   Component Value Date    HGBA1C 14.40 (H) 04/29/2025    HGBA1C 14.40 (H) 11/28/2024    HGBA1C 7.74 (H) 06/27/2024     Lab Results   Component Value Date    MICROALBUR 22.8 03/13/2020    CREATININE 1.28 (H) 04/30/2025     Results from last 7 days   Lab Units 04/30/25  1557 04/30/25  1220 04/30/25  0758 04/29/25  2140 04/29/25  1539 04/29/25  1111   GLUCOSE mg/dL 294* 329* 212* 323* 213* 188*     ==========================================================================    ASSESSMENT AND PLAN    # Type 2 diabetes with hyperglycemia  - Blood sugar reviewed for last 24 hours the patient has significant hyperglycemia  - Adjusted insulin therapy as follows:  Insulin Lantus 20 units nightly  Insulin lispro 6 units with each meal  Low-dose correction scale  - Patient can be discharged on same regimen with outpatient endocrinology or PCP follow-up  - Patient can either follow-up with Dr. Figueroa as outpatient or can be seen in the [facility] diabetes clinic in 6 to 8 weeks time    Will follow with you.  Rest as per primary team.    Part of this note may be an electronic transcription/translation of spoken language to printed text using the Dragon Dictation System.     Note: Portions of this note may have been copied from previous notes but documentation have been reviewed and edited as necessary to support clinical decision making for today's visit.  The time of this note does not reflect the time I saw the patient but the time that this note was  written.    ==========================================================================  Johnny Snow MD  Department of Endocrine, Diabetes and Metabolism  [facility], [place]  ==========================================================================

## 2025-04-30 NOTE — CASE MANAGEMENT/SOCIAL WORK
Continued Stay Note  [facility]     Patient Name: Marge Mcghee  MRN: [id number]  Today's Date: 4/30/2025    Admit Date: 4/28/2025    Plan: Home with spouse.   Discharge Plan       Row Name 04/30/25 1359       Plan    Plan Comments DC Barriers: Stress test and echo results pending, Diabetes Educator/Endocrine following, Elevated blood sugars, elevated BUN/Cr             Expected Discharge Date and Time       Expected Discharge Date Expected Discharge Time    Apr 30, 2025        Madeline Riggs RN     [phone]  [email or web address]

## 2025-04-30 NOTE — PLAN OF CARE
Problem: Adult Inpatient Plan of Care  Goal: Plan of Care Review  Outcome: Progressing  Flowsheets (Taken 4/30/2025 1138)  Plan of Care Reviewed With: patient  Goal: Patient-Specific Goal (Individualized)  Outcome: Progressing  Goal: Absence of Hospital-Acquired Illness or Injury  Outcome: Progressing  Intervention: Identify and Manage Fall Risk  Recent Flowsheet Documentation  Taken 4/30/2025 1000 by Barbara Ames RN  Safety Promotion/Fall Prevention:   activity supervised   assistive device/personal items within reach   clutter free environment maintained   fall prevention program maintained   nonskid shoes/slippers when out of bed   room organization consistent   safety round/check completed  Taken 4/30/2025 0745 by Barbara Ames RN  Safety Promotion/Fall Prevention: (stress test) patient off unit  Taken 4/30/2025 0730 by Barbara Ames RN  Safety Promotion/Fall Prevention:   activity supervised   assistive device/personal items within reach   clutter free environment maintained   fall prevention program maintained   nonskid shoes/slippers when out of bed   room organization consistent   safety round/check completed  Intervention: Prevent Skin Injury  Recent Flowsheet Documentation  Taken 4/30/2025 1000 by Barbara Ames RN  Body Position:   position changed independently   supine   weight shifting  Taken 4/30/2025 0730 by Barbara Ames RN  Body Position:   position changed independently   supine   weight shifting  Skin Protection: pulse oximeter probe site changed  Intervention: Prevent Infection  Recent Flowsheet Documentation  Taken 4/30/2025 1000 by Barbara Ames RN  Infection Prevention:   environmental surveillance performed   equipment surfaces disinfected   hand hygiene promoted  Taken 4/30/2025 0730 by Barbara Ames RN  Infection Prevention:   environmental surveillance performed   equipment surfaces disinfected   hand hygiene promoted  Goal: Optimal Comfort and Wellbeing  Outcome:  Progressing  Intervention: Provide Person-Centered Care  Recent Flowsheet Documentation  Taken 4/30/2025 2392 by Barbara Ames, RN  Trust Relationship/Rapport:   care explained   choices provided   emotional support provided   empathic listening provided   questions answered   questions encouraged   reassurance provided   thoughts/feelings acknowledged  Goal: Readiness for Transition of Care  Outcome: Progressing   Goal Outcome Evaluation:  Plan of Care Reviewed With: patient      Patient a/ox4, denies chest pain this shift, had stress test this am, reviewed plan of care with patient, call light in reach, clean and dry. Continue to monitor and treat blood sugars achs  Reason for admission: hyperglycemia   Significant PMH: type 2 DM, HLD, GERD, CABG 2021, CVA, CAD, HTN  Significant 24 hour events: came in for neck pain originally, xray of spine unremarkable, greg labs and was hyperglycemic   Significant Assessment Findings: xray of cervical spine unremarkable  Social: a/ox4  Additional Info: needs glucometer  Mobility Plan: ad kia

## 2025-04-30 NOTE — DISCHARGE SUMMARY
[facility]    Traci Townsend APRN    CHIEF COMPLAINT:     Neck pain with dyspnea and hyperglycemia    HISTORY OF PRESENT ILLNESS:    Ms. Mcghee is a 70 y.o.  presents to [facility] complaining of neck pain         History of Present Illness     ED 4/28/25: 70-year-old female who states that she developed some pain in her right posterior neck about 3:00 in the morning is worse with movement and better with rest it is at the base of the skull no significant headache vision change speech difficulty or weakness to one-sided the other. She denies any trauma no chiropractic manipulation. Nonradiating. She denies any recent illness flus viruses vaccinations long car ride plane ride immobilization chest pain shortness of breath sweating it is constant worse with movement nonradiating. No loss of bladder bowel control no fever chills or night sweats. Has gradually gotten worse throughout the daytime. She is otherwise been healthy drink talk and walk and function normally.      Observation 4/29/25: Patient is a 70-year-old female presenting to the hospital with complaints of neck pain is worse with movement.  Patient also reports numbness to the left arm at times.  Patient reports some shortness of breath but denies chest pain.  Patient states she did fall about a week ago due to feeling dizzy.  Patient states that she has not seen cardiology in some time.  Patient states she does not check her blood pressure and only checks her glucoses occasionally at home.  Patient reports some neuropathy and blurry vision but none worse than usual.  Patient denies fever, dysuria hematuria nausea or vomiting.      4/30/2025:  Patient reports she did generally well overnight without significant recurrence of symptoms.  No other new or acute symptoms are noted.          Past Medical History:   Diagnosis Date    Adrenal nodule 11/16/2016    FINDINGS: Mild hepatic steatosis may be present.  Cholecystectomy. No biliary ductal dilatation. Negative pancreas, spleen, left adrenal gland, and kidneys. The right adrenal presumed adenoma has increased slightly, measuring 3 x 4 cm in diameter,  compared to 2 x 3.5 cm on the previous exam. The attenuation values are consistent with an adenoma. Done at [facility] in August 2018    Age-related osteoporosis without current pathological fracture 11/16/2016    Arthritis     CVA (cerebral vascular accident)     year 2000- can't remember exact year    Delayed surgical wound healing     Essential hypertension 11/16/2016    Gastroesophageal reflux disease with esophagitis 11/16/2016    Hepatic steatosis 11/16/2016    History of anemia     History of sepsis     FROM UTI    Hyperlipidemia 11/16/2016    Lisfranc's dislocation     LEFT WITH FRACTURES NONHEALING FOOT    Osteomyelitis of left foot 11/2020    RLS (restless legs syndrome)     Squamous cell skin cancer 2014    Excised by Dr. James    Thyroid nodule 10/18/2019    BENIGN    Type 2 diabetes mellitus with peripheral neuropathy 11/16/2016    Vitamin D deficiency 01/25/2019     Past Surgical History:   Procedure Laterality Date    BREAST BIOPSY Left     7/2019. BENIGN    CARDIAC CATHETERIZATION Left 5/2/2021    Procedure: Cardiac Catheterization/Vascular Study;  Surgeon: Chacho Esteban MD;  Location: [facility] CATH INVASIVE LOCATION;  Service: Cardiovascular;  Laterality: Left;    CARDIAC CATHETERIZATION N/A 5/2/2021    Procedure: Intra-Aortic Baloon Pump Insertion;  Surgeon: Chacho Esteban MD;  Location: [facility] CATH INVASIVE LOCATION;  Service: Cardiovascular;  Laterality: N/A;    CATARACT EXTRACTION WITH INTRAOCULAR LENS IMPLANT Bilateral     CHOLECYSTECTOMY      COLONOSCOPY      CORONARY ARTERY BYPASS GRAFT N/A 5/2/2021    Procedure: CORONARY ARTERY BYPASS WITH INTERNAL MAMMARY ARTERY GRAFT;  Surgeon: Jr Rocael Alexander MD;  [facility];  Service: Cardiothoracic;  Laterality: N/A;    FOOT FUSION  Right 11/13/2020    Procedure: RIGHT OPEN TREATMENT LISFRANC INJURY, OPEN REDUCTION INTERNAL FIXATION MEDIAL/MIDDLE CUNEIFORM FRACTURE AND 2ND/3RD METATARSAL FRACTURE 1ST 2ND POSSIBLE 3RD TARSOMETATARSAL ARTHRODESIS INTERCUNEIFORM ARTHRODESIS CALCANEAL BONE GRAFT;  Surgeon: Mikhail Banks Jr., MD;  Location: [facility];  Service: Orthopedics;  Laterality: Right;    INCISION AND DRAINAGE LEG Right 1/8/2021    Procedure: RIGHT IRRIGATION AND DEBRIDEMENT OF FOOT WITH SECONDARY CLOSURE AND HARDWARE REMOVAL;  Surgeon: Mikhail Banks Jr., MD;  Location: [facility] OR;  Service: Orthopedics;  Laterality: Right;    KNEE ARTHROSCOPY Bilateral     TOTAL ABDOMINAL HYSTERECTOMY      TRANSESOPHAGEAL ECHOCARDIOGRAM (EMILIA) N/A 5/2/2021    Procedure: TRANSESOPHAGEAL ECHOCARDIOGRAM;  Surgeon: Jr Rocael Alexander MD;  Location: [facility];  Service: Cardiothoracic;  Laterality: N/A;    UPPER GASTROINTESTINAL ENDOSCOPY  08/2016    US GUIDED FINE NEEDLE ASPIRATION  5/8/2020     Family History   Problem Relation Age of Onset    Diabetes Mother     COPD Mother     Hypertension Mother     Kidney disease Mother     Obesity Mother     Thyroid disease Mother     Diabetes Sister     Diabetes Sister     Diabetes Sister     Diabetes Sister     Malig Hyperthermia Neg Hx      Social History     Tobacco Use    Smoking status: Never    Smokeless tobacco: Never   Vaping Use    Vaping status: Never Used   Substance Use Topics    Alcohol use: Yes     Comment: socially     Drug use: Never     Medications Prior to Admission   Medication Sig Dispense Refill Last Dose/Taking    acetaminophen (TYLENOL) 325 MG tablet Take 2 tablets by mouth Every 4 (Four) Hours As Needed for Mild Pain. Indications: Pain   Taking As Needed    aspirin 81 MG EC tablet Take 1 tablet by mouth Daily. Indications: Disease involving Lipid Deposits in the Arteries   Taking    atorvastatin (LIPITOR) 80 MG tablet Take 1 tablet by mouth Every Night. 90 tablet 3 Taking     clopidogrel (PLAVIX) 75 MG tablet Take 1 tablet by mouth Daily.   Taking    denosumab (Prolia) 60 MG/ML solution prefilled syringe syringe Inject 1 mL under the skin into the appropriate area as directed 1 (One) Time.   Taking    DULoxetine (CYMBALTA) 60 MG capsule Take 1 capsule by mouth Daily. Indications: Major Depressive Disorder   Taking    lisinopril (PRINIVIL,ZESTRIL) 5 MG tablet Take 1 tablet by mouth Daily.   Taking    multivitamin with minerals tablet tablet Take 1 tablet by mouth Daily. Indications: Supplement   Taking    traZODone (DESYREL) 50 MG tablet Take 1 tablet by mouth At Night As Needed. Indications: Trouble Sleeping   Taking As Needed    [DISCONTINUED] Insulin Glargine (Lantus SoloStar) 100 UNIT/ML injection pen Inject 16 Units under the skin into the appropriate area as directed Daily.   Taking     Allergies:  Zofran [ondansetron hcl], Prochlorperazine, Prochlorperazine edisylate, Hydralazine, Hydralazine hcl, Meperidine, Prochlorperazine maleate, and Prochlorperazine maleate    Immunization History   Administered Date(s) Administered    COVID-19 (MODERNA) 1st,2nd,3rd Dose Monovalent 01/05/2021, 07/28/2021    COVID-19 (PFIZER) 12YRS+ (COMIRNATY) 10/09/2023, 10/16/2024    Fluzone High-Dose 65+YRS 09/25/2018, 10/12/2020    Fluzone High-Dose 65+yrs 09/25/2018, 10/12/2020, 11/29/2021, 02/28/2023    Influenza TIV (IM) 09/29/2016    Pneumococcal Conjugate 13-Valent (PCV13) 11/14/2018    Pneumococcal Conjugate 20-Valent (PCV20) 02/28/2023    Pneumococcal Polysaccharide (PPSV23) 10/20/2016    Shingrix 01/02/2023    Tdap 08/26/2016, 12/26/2024           REVIEW OF SYSTEMS:    Review of Systems   Constitutional: Positive for malaise/fatigue.   HENT: Negative.     Eyes:  Positive for blurred vision.   Cardiovascular:  Positive for dyspnea on exertion. Negative for chest pain.   Respiratory:  Positive for shortness of breath. Negative for cough and wheezing.    Endocrine: Negative.    Skin: Negative.     Musculoskeletal:  Positive for neck pain.   Gastrointestinal: Negative.    Genitourinary: Negative.    Neurological:  Positive for dizziness.   Psychiatric/Behavioral: Negative.       Vital Signs  Temp:  [97.5 °F (36.4 °C)-98 °F (36.7 °C)] 97.9 °F (36.6 °C)  Heart Rate:  [77-83] 77  Resp:  [12-18] 16  BP: (110-179)/(58-86) 110/72          Physical Exam:  Physical Exam  Vitals reviewed.   Constitutional:       General: She is not in acute distress.     Appearance: Normal appearance. She is not ill-appearing, toxic-appearing or diaphoretic.   HENT:      Head: Normocephalic.      Right Ear: External ear normal.      Left Ear: External ear normal.      Nose: Nose normal.      Mouth/Throat:      Mouth: Mucous membranes are moist.   Eyes:      Extraocular Movements: Extraocular movements intact.   Cardiovascular:      Rate and Rhythm: Normal rate and regular rhythm.      Pulses: Normal pulses.   Pulmonary:      Effort: Pulmonary effort is normal.      Breath sounds: Normal breath sounds.   Abdominal:      General: Bowel sounds are normal.      Palpations: Abdomen is soft.   Musculoskeletal:      Cervical back: Normal range of motion.      Right lower leg: No edema.      Left lower leg: No edema.   Skin:     General: Skin is warm and dry.      Capillary Refill: Capillary refill takes less than 2 seconds.   Neurological:      General: No focal deficit present.      Mental Status: She is alert.   Psychiatric:         Mood and Affect: Mood normal.         Behavior: Behavior normal.         Thought Content: Thought content normal.         Judgment: Judgment normal.       Emotional Behavior:   Normal   Debilities:  None  Results Review:    I reviewed the patient's new clinical results.  Lab Results (most recent)       Procedure Component Value Units Date/Time    POC Glucose Finger 4x Daily Before Meals & at Bedtime [[id number]]  (Abnormal) Collected: 04/30/25 0758    Specimen: Blood from Finger Updated: 04/30/25 0759      Glucose 212 mg/dL      Comment: Serial Number: [id number]:  [id number]       High Sensitivity Troponin T [[id number]]  (Normal) Collected: 04/30/25 0353    Specimen: Blood Updated: 04/30/25 0532     HS Troponin T 11 ng/L     Narrative:      High Sensitive Troponin T Reference Range:  <14.0 ng/L- Negative Female for AMI  <22.0 ng/L- Negative Male for AMI  >=14 - Abnormal Female indicating possible myocardial injury.  >=22 - Abnormal Male indicating possible myocardial injury.   Clinicians would have to utilize clinical acumen, EKG, Troponin, and serial changes to determine if it is an Acute Myocardial Infarction or myocardial injury due to an underlying chronic condition.         POC Glucose Once [[id number]]  (Abnormal) Collected: 04/29/25 2140    Specimen: Blood Updated: 04/29/25 2142     Glucose 323 mg/dL      Comment: Serial Number: [id number]:  [id number]       High Sensitivity Troponin T 1Hr [[id number]]  (Normal) Collected: 04/29/25 1956    Specimen: Blood Updated: 04/29/25 2038     HS Troponin T 12 ng/L      Troponin T Numeric Delta 0 ng/L     Narrative:      High Sensitive Troponin T Reference Range:  <14.0 ng/L- Negative Female for AMI  <22.0 ng/L- Negative Male for AMI  >=14 - Abnormal Female indicating possible myocardial injury.  >=22 - Abnormal Male indicating possible myocardial injury.   Clinicians would have to utilize clinical acumen, EKG, Troponin, and serial changes to determine if it is an Acute Myocardial Infarction or myocardial injury due to an underlying chronic condition.         High Sensitivity Troponin T [[id number]]  (Normal) Collected: 04/29/25 1647    Specimen: Blood Updated: 04/29/25 1816     HS Troponin T 12 ng/L     Narrative:      High Sensitive Troponin T Reference Range:  <14.0 ng/L- Negative Female for AMI  <22.0 ng/L- Negative Male for AMI  >=14 - Abnormal Female indicating possible myocardial injury.  >=22 - Abnormal Male indicating possible myocardial injury.    Clinicians would have to utilize clinical acumen, EKG, Troponin, and serial changes to determine if it is an Acute Myocardial Infarction or myocardial injury due to an underlying chronic condition.         BNP [[id number]]  (Normal) Collected: 04/29/25 0308    Specimen: Blood Updated: 04/29/25 1159     proBNP 109.0 pg/mL     Narrative:      This assay is used as an aid in the diagnosis of individuals suspected of having heart failure. It can be used as an aid in the diagnosis of acute decompensated heart failure (ADHF) in patients presenting with signs and symptoms of ADHF to the emergency department (ED). In addition, NT-proBNP of <300 pg/mL indicates ADHF is not likely.    Age Range Result Interpretation  NT-proBNP Concentration (pg/mL:      <50             Positive            >450                   Gray                 300-450                    Negative             <300    50-75           Positive            >900                  Gray                300-900                  Negative            <300      >75             Positive            >1800                  Gray                300-1800                  Negative            <300    T3, Free [[id number]]  (Normal) Collected: 04/29/25 0308    Specimen: Blood Updated: 04/29/25 1053     T3, Free 2.05 pg/mL     Urinalysis, Microscopic Only - Urine, Clean Catch [[id number]]  (Abnormal) Collected: 04/29/25 0942    Specimen: Urine, Clean Catch Updated: 04/29/25 1013     RBC, UA None Seen /HPF      WBC, UA 6-10 /HPF      Bacteria, UA Trace /HPF      Squamous Epithelial Cells, UA 0-2 /HPF      Yeast, UA Small/1+ Budding Yeast /HPF      Hyaline Casts, UA None Seen /LPF      Methodology Manual Light Microscopy    Urine Culture - Urine, Urine, Clean Catch [[id number]] Collected: 04/29/25 0942    Specimen: Urine, Clean Catch Updated: 04/29/25 1013    Urinalysis With Culture If Indicated - Urine, Clean Catch [[id number]]  (Abnormal) Collected: 04/29/25 0942    Specimen: Urine,  Clean Catch Updated: 04/29/25 1001     Color, UA Yellow     Appearance, UA Clear     pH, UA <=5.0     Specific Gravity, UA 1.012     Glucose,  mg/dL (1+)     Ketones, UA Negative     Bilirubin, UA Negative     Blood, UA Negative     Protein, UA Negative     Leuk Esterase, UA Small (1+)     Nitrite, UA Negative     Urobilinogen, UA 0.2 E.U./dL    Narrative:      In absence of clinical symptoms, the presence of pyuria, bacteria, and/or nitrites on the urinalysis result does not correlate with infection.    Acetone [[id number]]  (Normal) Collected: 04/29/25 0308    Specimen: Blood Updated: 04/29/25 0835     Acetone Negative    Lipid Panel [[id number]]  (Abnormal) Collected: 04/29/25 0308    Specimen: Blood Updated: 04/29/25 0830     Total Cholesterol 272 mg/dL      Triglycerides 119 mg/dL      HDL Cholesterol 58 mg/dL      LDL Cholesterol  193 mg/dL      VLDL Cholesterol 21 mg/dL      LDL/HDL Ratio 3.28    Narrative:      Cholesterol Reference Ranges  (U.S. Department of Health and Human Services ATP III Classifications)    Desirable          <200 mg/dL  Borderline High    200-239 mg/dL  High Risk          >240 mg/dL      Triglyceride Reference Ranges  (U.S. Department of Health and Human Services ATP III Classifications)    Normal           <150 mg/dL  Borderline High  150-199 mg/dL  High             200-499 mg/dL  Very High        >500 mg/dL    HDL Reference Ranges  (U.S. Department of Health and Human Services ATP III Classifications)    Low     <40 mg/dl (major risk factor for CHD)  High    >60 mg/dl ('negative' risk factor for CHD)        LDL Reference Ranges  (U.S. Department of Health and Human Services ATP III Classifications)    Optimal          <100 mg/dL  Near Optimal     100-129 mg/dL  Borderline High  130-159 mg/dL  High             160-189 mg/dL  Very High        >189 mg/dL    LDL is calculated using the NIH LDL-C calculation.      TSH [[id number]]  (Normal) Collected: 04/29/25 0308    Specimen: Blood  Updated: 04/29/25 0830     TSH 1.360 uIU/mL     T4, Free [[id number]]  (Normal) Collected: 04/29/25 0308    Specimen: Blood Updated: 04/29/25 0830     Free T4 0.96 ng/dL     Hemoglobin A1c [[id number]]  (Abnormal) Collected: 04/29/25 0308    Specimen: Blood Updated: 04/29/25 0751     Hemoglobin A1C 14.40 %     Basic Metabolic Panel [[id number]]  (Abnormal) Collected: 04/29/25 0308    Specimen: Blood Updated: 04/29/25 0537     Glucose 171 mg/dL      BUN 24 mg/dL      Creatinine 1.23 mg/dL      Sodium 138 mmol/L      Potassium 3.7 mmol/L      Chloride 102 mmol/L      CO2 26.5 mmol/L      Calcium 9.3 mg/dL      BUN/Creatinine Ratio 19.5     Anion Gap 9.5 mmol/L      eGFR 46.8 mL/min/1.73     Narrative:      GFR Categories in Chronic Kidney Disease (CKD)      GFR Category          GFR (mL/min/1.73)    Interpretation  G1                     90 or greater         Normal or high (1)  G2                      60-89                Mild decrease (1)  G3a                   45-59                Mild to moderate decrease  G3b                   30-44                Moderate to severe decrease  G4                    15-29                Severe decrease  G5                    14 or less           Kidney failure          (1)In the absence of evidence of kidney disease, neither GFR category G1 or G2 fulfill the criteria for CKD.    eGFR calculation 2021 CKD-EPI creatinine equation, which does not include race as a factor    CBC Auto Differential [[id number]]  (Abnormal) Collected: 04/29/25 0308    Specimen: Blood Updated: 04/29/25 0509     WBC 8.48 10*3/mm3      RBC 4.38 10*6/mm3      Hemoglobin 12.4 g/dL      Hematocrit 39.6 %      MCV 90.4 fL      MCH 28.3 pg      MCHC 31.3 g/dL      RDW 12.9 %      RDW-SD 42.6 fl      MPV 9.8 fL      Platelets 241 10*3/mm3      Neutrophil % 39.0 %      Lymphocyte % 46.7 %      Monocyte % 10.0 %      Eosinophil % 3.3 %      Basophil % 0.8 %      Immature Grans % 0.2 %      Neutrophils, Absolute 3.30  10*3/mm3      Lymphocytes, Absolute 3.96 10*3/mm3      Monocytes, Absolute 0.85 10*3/mm3      Eosinophils, Absolute 0.28 10*3/mm3      Basophils, Absolute 0.07 10*3/mm3      Immature Grans, Absolute 0.02 10*3/mm3      nRBC 0.0 /100 WBC     Sedimentation Rate [[id number]]  (Normal) Collected: 04/28/25 1816    Specimen: Blood Updated: 04/28/25 1824     Sed Rate 27 mm/hr     Basic Metabolic Panel [[id number]]  (Abnormal) Collected: 04/28/25 1757    Specimen: Blood Updated: 04/28/25 1824     Glucose 658 mg/dL      BUN 23 mg/dL      Creatinine 1.40 mg/dL      Sodium 130 mmol/L      Potassium 5.1 mmol/L      Comment: Specimen hemolyzed.  Result may be falsely elevated.        Chloride 96 mmol/L      CO2 21.9 mmol/L      Calcium 9.2 mg/dL      BUN/Creatinine Ratio 16.4     Anion Gap 12.1 mmol/L      eGFR 40.1 mL/min/1.73     Narrative:      GFR Categories in Chronic Kidney Disease (CKD)      GFR Category          GFR (mL/min/1.73)    Interpretation  G1                     90 or greater         Normal or high (1)  G2                      60-89                Mild decrease (1)  G3a                   45-59                Mild to moderate decrease  G3b                   30-44                Moderate to severe decrease  G4                    15-29                Severe decrease  G5                    14 or less           Kidney failure          (1)In the absence of evidence of kidney disease, neither GFR category G1 or G2 fulfill the criteria for CKD.    eGFR calculation 2021 CKD-EPI creatinine equation, which does not include race as a factor    Magnesium [[id number]]  (Normal) Collected: 04/28/25 1757    Specimen: Blood Updated: 04/28/25 1824     Magnesium 2.0 mg/dL     CK [[id number]]  (Normal) Collected: 04/28/25 1757    Specimen: Blood Updated: 04/28/25 1821     Creatine Kinase 88 U/L     CBC & Differential [[id number]]  (Abnormal) Collected: 04/28/25 1816    Specimen: Blood Updated: 04/28/25 1820    Narrative:      The  following orders were created for panel order CBC & Differential.  Procedure                               Abnormality         Status                     ---------                               -----------         ------                     CBC Auto Differential[[id number]]        Abnormal            Final result                 Please view results for these tests on the individual orders.    CBC Auto Differential [[id number]]  (Abnormal) Collected: 04/28/25 1816    Specimen: Blood Updated: 04/28/25 1820     WBC 9.53 10*3/mm3      RBC 4.38 10*6/mm3      Hemoglobin 12.3 g/dL      Hematocrit 39.4 %      MCV 90.0 fL      MCH 28.1 pg      MCHC 31.2 g/dL      RDW 12.9 %      RDW-SD 42.8 fl      MPV 9.3 fL      Platelets 251 10*3/mm3      Neutrophil % 58.8 %      Lymphocyte % 29.6 %      Monocyte % 9.1 %      Eosinophil % 1.4 %      Basophil % 0.8 %      Immature Grans % 0.3 %      Neutrophils, Absolute 5.60 10*3/mm3      Lymphocytes, Absolute 2.82 10*3/mm3      Monocytes, Absolute 0.87 10*3/mm3      Eosinophils, Absolute 0.13 10*3/mm3      Basophils, Absolute 0.08 10*3/mm3      Immature Grans, Absolute 0.03 10*3/mm3      nRBC 0.0 /100 WBC             Imaging Results (Most Recent)       Procedure Component Value Units Date/Time    XR Spine Cervical Complete 4 or 5 View [[id number]] Collected: 04/28/25 1818     Updated: 04/28/25 1822    Narrative:      XR SPINE CERVICAL COMPLETE 4 OR 5 VW    Date of Exam: 4/28/2025 6:11 PM EDT    Indication: Neck pain    Comparison: CTA neck 6/27/2024    Findings:  Vertebral body heights are maintained.  Overall alignment is maintained.  No significant loss of intervertebral disc base is noted.  The neuroforamina are widely patent bilaterally.  The C1-C2 articulation is maintained.  The visualized lung apices are clear.  The visualized soft tissues are unremarkable.  Patient is status post median sternotomy      Impression:      Impression:  Essentially unremarkable cervical spine radiograph.  If symptoms persist, please consider follow-up with nonemergent cervical spine MRI      Electronically Signed: Terrance Andrea DO    4/28/2025 6:20 PM EDT    Workstation ID: [id number]          reviewed    ECG/EMG Results (most recent)       Procedure Component Value Units Date/Time    Telemetry Scan [[id number]] Resulted: 04/28/25     Updated: 04/28/25 2320    Telemetry Scan [[id number]] Resulted: 04/28/25     Updated: 04/29/25 0059    Telemetry Scan [[id number]] Resulted: 04/28/25     Updated: 04/29/25 0648    ECG 12 Lead Other; Neck pain [[id number]] Collected: 04/28/25 1745     Updated: 04/29/25 0717     QT Interval 371 ms      QTC Interval 449 ms     Narrative:      HEART RATE=88  bpm  RR Lmgjnvcv=569  ms  UT Zlrypzwk=024  ms  P Horizontal Axis=-35  deg  P Front Axis=-2  deg  QRSD Interval=82  ms  QT Revkrnyx=093  ms  PSlK=429  ms  QRS Axis=-8  deg  T Wave Axis=47  deg  - BORDERLINE ECG -  Sinus rhythm  Consider  anterior infarct  When compared with ECG of 06-Dec-2024 22:25:09,  No significant change  Electronically Signed By: Mikhail Lopez (NILA) 2025-04-29 07:16:56  Date and Time of Study:2025-04-28 17:45:19    Telemetry Scan [[id number]] Resulted: 04/28/25     Updated: 04/29/25 0928    Telemetry Scan [[id number]] Resulted: 04/28/25     Updated: 04/29/25 1617    Telemetry Scan [[id number]] Resulted: 04/28/25     Updated: 04/29/25 2005    Telemetry Scan [[id number]] Resulted: 04/28/25     Updated: 04/29/25 2245    Telemetry Scan [[id number]] Resulted: 04/28/25     Updated: 04/30/25 0016    Telemetry Scan [[id number]] Resulted: 04/28/25     Updated: 04/30/25 0426    Telemetry Scan [[id number]] Resulted: 04/28/25     Updated: 04/30/25 0809    Adult Transthoracic Echo Complete W/ Cont if Necessary Per Protocol [[id number]] Resulted: 04/30/25 1504     Updated: 04/30/25 1504     LV GLOBAL STRAIN  -15.8 %      LVIDd 3.1 cm      LVIDs 2.10 cm      IVSd 1.40 cm      LVPWd 1.30 cm      FS 32.3 %      IVS/LVPW 1.08 cm       ESV(cubed) 9.3 ml      LV Sys Vol (BSA corrected) 14.1 cm2      EDV(cubed) 29.8 ml      LV Osorio Vol (BSA corrected) 37.0 cm2      LV mass(C)d 138.1 grams      LVOT area 3.8 cm2      LVOT diam 2.20 cm      EDV(MOD-sp4) 68.1 ml      ESV(MOD-sp4) 25.9 ml      SV(MOD-sp4) 42.2 ml      SVi(MOD-SP4) 22.9 ml/m2      SVi (LVOT) 32.7 ml/m2      EF(MOD-sp4) 62.0 %      MV E max lukas 71.8 cm/sec      MV A max lukas 105.0 cm/sec      MV dec time 0.17 sec      MV E/A 0.68     Pulm A Revs Dur 0.11 sec      LA ESV Index (BP) 20.3 ml/m2      Med Peak E' Lukas 7.4 cm/sec      Lat Peak E' Lukas 7.6 cm/sec      TR max lukas 204.0 cm/sec      Avg E/e' ratio 9.57     SV(LVOT) 60.1 ml      RVIDd 3.1 cm      TAPSE (>1.6) 1.35 cm      RV S' 14.6 cm/sec      LA dimension (2D)  3.1 cm      Pulm Sys Lukas 51.3 cm/sec      Pulm Osorio Lukas 33.3 cm/sec      Pulm S/D 1.54     Pulm A Revs Lukas 25.5 cm/sec      LV V1 max 77.2 cm/sec      LV V1 max PG 2.38 mmHg      LV V1 mean PG 1.00 mmHg      LV V1 VTI 15.8 cm      Ao pk lukas 137.0 cm/sec      Ao max PG 7.5 mmHg      Ao mean PG 4.0 mmHg      Ao V2 VTI 25.8 cm      GLORIA(I,D) 2.33 cm2      Dimensionless Index 0.61 (DI)      MV max PG 6.8 mmHg      MV mean PG 3.0 mmHg      MV V2 VTI 24.0 cm      MV P1/2t 41.8 msec      MVA(P1/2t) 5.3 cm2      MVA(VTI) 2.5 cm2      MV dec slope 572.0 cm/sec2      MR max lukas 332.0 cm/sec      MR max PG 44.1 mmHg      TR max PG 16.6 mmHg      RVSP(TR) 19.6 mmHg      RAP systole 3.0 mmHg      RV V1 max PG 1.89 mmHg      RV V1 max 68.7 cm/sec      RV V1 VTI 13.4 cm      PA V2 max 85.8 cm/sec      PA acc time 0.10 sec      ACS 1.80 cm      Sinus 3.2 cm      EF(MOD-bp) 62.0 %     Narrative:        Left ventricular ejection fraction appears to be 61 - 65%.    Left ventricular diastolic function is consistent with (grade I)   impaired relaxation.    Estimated right ventricular systolic pressure from tricuspid   regurgitation is normal (<35 mmHg). Calculated right ventricular systolic    pressure from tricuspid regurgitation is 20 mmHg.            reviewed    Results for orders placed during the hospital encounter of 09/25/22    Bilateral Carotid Duplex    Interpretation Summary  · Proximal right internal carotid artery is normal.  · Proximal left internal carotid artery is normal.      Results for orders placed during the hospital encounter of 04/28/25    Adult Transthoracic Echo Complete W/ Cont if Necessary Per Protocol    Interpretation Summary    Left ventricular ejection fraction appears to be 61 - 65%.    Left ventricular diastolic function is consistent with (grade I) impaired relaxation.    Estimated right ventricular systolic pressure from tricuspid regurgitation is normal (<35 mmHg). Calculated right ventricular systolic pressure from tricuspid regurgitation is 20 mmHg.      Microbiology Results (last 10 days)       Procedure Component Value - Date/Time    Urine Culture - Urine, Urine, Clean Catch [[id number]]  (Normal) Collected: 04/29/25 0942    Lab Status: Preliminary result Specimen: Urine, Clean Catch Updated: 04/30/25 0923     Urine Culture Culture in progress            Assessment & Plan     Hyperglycemia       Hyperglycemia        Lab Results   Component Value Date     GLUCOSE 171 (H) 04/29/2025     GLUCOSE 658 (C) 04/28/2025     GLUCOSE 320 (H) 12/26/2024     GLUCOSE 148 (H) 12/12/2024   -A1c 14.4  - Continue Lantus  -Sliding scale insulin  -Diabetic diet  -Monitor before meals and at bedtime  - Diabetic educator  -UA shows glucose, small leukocytes, 6-10 WBC trace bacteria urine culture pending, protein not seen  - Endocrinology consult     REINA        Lab Results   Component Value Date     CREATININE 1.23 (H) 04/29/2025     BUN 24 (H) 04/29/2025     BCR 19.5 04/29/2025     EGFR 46.8 (L) 04/29/2025   -Hold lisinopril, switch to amlodipine  -IV fluids  -Avoid nephrotoxic medication IV dye unless urgently needed  -Monitor BMP and I's and O's while admitted      Hypertension/hyperlipidemia      BP Readings from Last 1 Encounters:   04/29/25 112/55   - Blood pressures have trended up to 179/81, resume amlodipine and lisinopril  - Monitor while admitted  -Lipid panel: Total cholesterol 272, HDL 58,   -Continue statin  -Echocardiogram (2023): LVEF 61-65% with mild TVR  -Repeat echo r/t SOA and murmur   -   -X-ray of cervical spine unremarkable  -Troponin 15, 12, 12, 11  -Stress test showed normal perfusion negative for ischemia with normal wall motion and EF of 73%  - Echocardiogram showed an EF of 61-65% with grade 1 diastolic dysfunction  -Telemetry  - Continue aspirin and Plavix     History of CVA  -Continue ASA and statin      Depressive disorder  - Continue Cymbalta    I discussed the patients findings and my recommendations with patient and nursing staff.     Discharge Diagnosis:      Hyperglycemia      Hospital Course  Patient is a 70 y.o. female presented with neck pain with some recent OQUENDO and findings of hyperglycemia within HPI noted above.  Glucose was initially found to be 658 with an anion gap of 12.1, serum CO2 of 21.9, A1c of 14.40 and negative acetone level.  Lipid panel showed total cholesterol of 272 with an LDL of 193 and an HDL of 58 and serum creatinine was elevated at 1.40 with a BUN of 23 on presentation.  X-ray of cervical spine was obtained which was noted as unremarkable and EKG showed sinus rhythm at 88 without obvious acute changes with a QTc of 449 ms.  Serial troponins were assessed during her admission and found to be 15, 12, 12, 11.  Patient's lisinopril was initially held and she was given IV fluids along with insulin.  Endocrinology was consulted who recommended continuing 16 units basal and adding prandial as well as low-dose sliding scale insulin to her current regimen.  Stress testing was performed on 4/30/2025 showed normal myocardial perfusion imaging negative for ischemia with normal wall motion and EF of 73%.   Echocardiogram was also obtained and showed normal EF with grade 1 diastolic dysfunction.  Blood glucose has improved to 294 with creatinine found to be 1.28.  Spoke with endocrinologist on the afternoon of 4/30 who did recommend increasing basal insulin to 20 units with 6 units prandial in addition to sliding scale which will be prescribed at discharge.  Patient also reports that she needs additional glucose monitoring supplies which will be sent as well..  At this time patient is felt to be in good condition for discharge with close follow-up with her PCP as well as endocrinology and cardiology on an outpatient basis.  Her full testing/results and plan were discussed with patient along with concerning/alarm symptoms for which to call 911/return to the ED.  All questions were answered and she verbalizes her understanding and agreement.    Past Medical History:     Past Medical History:   Diagnosis Date    Adrenal nodule 11/16/2016    FINDINGS: Mild hepatic steatosis may be present. Cholecystectomy. No biliary ductal dilatation. Negative pancreas, spleen, left adrenal gland, and kidneys. The right adrenal presumed adenoma has increased slightly, measuring 3 x 4 cm in diameter,  compared to 2 x 3.5 cm on the previous exam. The attenuation values are consistent with an adenoma. Done at [facility] in August 2018    Age-related osteoporosis without current pathological fracture 11/16/2016    Arthritis     CVA (cerebral vascular accident)     year 2000- can't remember exact year    Delayed surgical wound healing     Essential hypertension 11/16/2016    Gastroesophageal reflux disease with esophagitis 11/16/2016    Hepatic steatosis 11/16/2016    History of anemia     History of sepsis     FROM UTI    Hyperlipidemia 11/16/2016    Lisfranc's dislocation     LEFT WITH FRACTURES NONHEALING FOOT    Osteomyelitis of left foot 11/2020    RLS (restless legs syndrome)     Squamous cell skin cancer 2014    Excised by   Bumpus    Thyroid nodule 10/18/2019    BENIGN    Type 2 diabetes mellitus with peripheral neuropathy 11/16/2016    Vitamin D deficiency 01/25/2019       Past Surgical History:     Past Surgical History:   Procedure Laterality Date    BREAST BIOPSY Left     7/2019. BENIGN    CARDIAC CATHETERIZATION Left 5/2/2021    Procedure: Cardiac Catheterization/Vascular Study;  Surgeon: Chacho Esteban MD;  Location: [facility] CATH INVASIVE LOCATION;  Service: Cardiovascular;  Laterality: Left;    CARDIAC CATHETERIZATION N/A 5/2/2021    Procedure: Intra-Aortic Baloon Pump Insertion;  Surgeon: Chacho Esteban MD;  Location: [facility] CATH INVASIVE LOCATION;  Service: Cardiovascular;  Laterality: N/A;    CATARACT EXTRACTION WITH INTRAOCULAR LENS IMPLANT Bilateral     CHOLECYSTECTOMY      COLONOSCOPY      CORONARY ARTERY BYPASS GRAFT N/A 5/2/2021    Procedure: CORONARY ARTERY BYPASS WITH INTERNAL MAMMARY ARTERY GRAFT;  Surgeon: Jr Rocael Alexander MD;  Location: [facility];  Service: Cardiothoracic;  Laterality: N/A;    FOOT FUSION Right 11/13/2020    Procedure: RIGHT OPEN TREATMENT LISFRANC INJURY, OPEN REDUCTION INTERNAL FIXATION MEDIAL/MIDDLE CUNEIFORM FRACTURE AND 2ND/3RD METATARSAL FRACTURE 1ST 2ND POSSIBLE 3RD TARSOMETATARSAL ARTHRODESIS INTERCUNEIFORM ARTHRODESIS CALCANEAL BONE GRAFT;  Surgeon: Mikhail Banks Jr., MD;  Location: [facility];  Service: Orthopedics;  Laterality: Right;    INCISION AND DRAINAGE LEG Right 1/8/2021    Procedure: RIGHT IRRIGATION AND DEBRIDEMENT OF FOOT WITH SECONDARY CLOSURE AND HARDWARE REMOVAL;  Surgeon: Mikhail Banks Jr., MD;  Location: [facility] OR;  Service: Orthopedics;  Laterality: Right;    KNEE ARTHROSCOPY Bilateral     TOTAL ABDOMINAL HYSTERECTOMY      TRANSESOPHAGEAL ECHOCARDIOGRAM (EMILIA) N/A 5/2/2021    Procedure: TRANSESOPHAGEAL ECHOCARDIOGRAM;  Surgeon: Jr Rocael Alexander MD;  Location: [facility];  Service: Cardiothoracic;  Laterality: N/A;    UPPER GASTROINTESTINAL ENDOSCOPY   08/2016     GUIDED FINE NEEDLE ASPIRATION  5/8/2020       Social History:   Social History     Socioeconomic History    Marital status:    Tobacco Use    Smoking status: Never    Smokeless tobacco: Never   Vaping Use    Vaping status: Never Used   Substance and Sexual Activity    Alcohol use: Yes     Comment: socially     Drug use: Never    Sexual activity: Defer       Procedures Performed         Consults:   Consults       Date and Time Order Name Status Description    4/29/2025  7:58 AM Inpatient Endocrinology Consult Completed             Condition on Discharge:     Stable    Discharge Disposition  Home or Self Care    Discharge Medications     Discharge Medications        New Medications        Instructions Start Date   amLODIPine 5 MG tablet  Commonly known as: NORVASC   5 mg, Oral, Every 24 Hours Scheduled   Start Date: May 1, 2025     D-Care Glucometer w/Device kit   1 kit, Not Applicable, Daily      glucose blood test strip   1 each, Other, 4 Times Daily After Meals & at Bedtime, Use as instructed      Insulin Lispro (1 Unit Dial) 100 UNIT/ML solution pen-injector  Commonly known as: HumaLOG KwikPen   6 Units, Subcutaneous, 3 Times Daily With Meals, Along with sliding scale of 2 units for glucose 150-199, 3 units for glucose 240-249, 4 units for glucose 250-299, 5 units for glucose 300-349, 6 units for glucose of 350-400, 7 units for glucose of greater than 400 and contact MD      onetouch ultrasoft lancets   Use as instructed      Pen Needles 32G X 4 MM misc   1 each, Not Applicable, 5 Times Daily, Dx code: E11.65             Changes to Medications        Instructions Start Date   Lantus SoloStar 100 UNIT/ML injection pen  Generic drug: Insulin Glargine  What changed: how much to take   20 Units, Subcutaneous, Daily             Continue These Medications        Instructions Start Date   acetaminophen 325 MG tablet  Commonly known as: TYLENOL   650 mg, Every 4 Hours PRN      aspirin 81 MG EC  tablet   1 tablet, Daily      atorvastatin 80 MG tablet  Commonly known as: LIPITOR   80 mg, Oral, Nightly      clopidogrel 75 MG tablet  Commonly known as: PLAVIX   75 mg, Daily      DULoxetine 60 MG capsule  Commonly known as: CYMBALTA   1 capsule, Daily      multivitamin with minerals tablet tablet   1 tablet, Daily      Prolia 60 MG/ML solution prefilled syringe syringe  Generic drug: denosumab   60 mg, Once      traZODone 50 MG tablet  Commonly known as: DESYREL   1 tablet, Nightly PRN             Stop These Medications      lisinopril 5 MG tablet  Commonly known as: PRINIVIL,ZESTRIL              Discharge Diet:     Activity at Discharge:     Follow-up Appointments  No future appointments.    Additional Instructions for the Follow-ups that You Need to Schedule       Discharge Follow-up with PCP   As directed       Currently Documented PCP:    Traci Townsend APRN    PCP Phone Number:    [phone]     Follow Up Details: 5 to 7 days        Discharge Follow-up with Specified Provider: Cardiology; 2 Weeks   As directed      To: Cardiology   Follow Up: 2 Weeks        Discharge Follow-up with Specified Provider: Endocrinology; 2 Months   As directed      To: Endocrinology   Follow Up: 2 Months                Test Results Pending at Discharge  Pending Results       None             Risk for Readmission (LACE) Score: 11 (4/30/2025  6:00 AM)      Greater than 30 minutes spent in discharge activities for this patient    Signature:Electronically signed by Cassius Ann PA-C, 04/30/25, 4:54 PM EDT.

## 2025-04-30 NOTE — PLAN OF CARE
Problem: Adult Inpatient Plan of Care  Goal: Plan of Care Review  4/30/2025 1710 by Barbara Ames RN  Outcome: Met  4/30/2025 1138 by Barbara Ames RN  Outcome: Progressing  Flowsheets (Taken 4/30/2025 1138)  Plan of Care Reviewed With: patient  Goal: Patient-Specific Goal (Individualized)  4/30/2025 1710 by Barbara Ames RN  Outcome: Met  4/30/2025 1138 by Barbara Ames RN  Outcome: Progressing  Goal: Absence of Hospital-Acquired Illness or Injury  4/30/2025 1710 by Barbara Ames RN  Outcome: Met  4/30/2025 1138 by Barbara Ames RN  Outcome: Progressing  Intervention: Identify and Manage Fall Risk  Recent Flowsheet Documentation  Taken 4/30/2025 1615 by Barbara Ames RN  Safety Promotion/Fall Prevention:   activity supervised   assistive device/personal items within reach   clutter free environment maintained   fall prevention program maintained   nonskid shoes/slippers when out of bed   room organization consistent   safety round/check completed  Taken 4/30/2025 1400 by Barbara Ames RN  Safety Promotion/Fall Prevention:   activity supervised   assistive device/personal items within reach   clutter free environment maintained   fall prevention program maintained   nonskid shoes/slippers when out of bed   room organization consistent   safety round/check completed  Taken 4/30/2025 1158 by Barbara Ames RN  Safety Promotion/Fall Prevention:   activity supervised   clutter free environment maintained   assistive device/personal items within reach   fall prevention program maintained   nonskid shoes/slippers when out of bed   room organization consistent   safety round/check completed  Taken 4/30/2025 1000 by Barbara Ames RN  Safety Promotion/Fall Prevention:   activity supervised   assistive device/personal items within reach   clutter free environment maintained   fall prevention program maintained   nonskid shoes/slippers when out of bed   room organization consistent   safety round/check  completed  Taken 4/30/2025 0745 by Barbara Ames RN  Safety Promotion/Fall Prevention: (stress test) patient off unit  Taken 4/30/2025 0730 by Barbara Ames RN  Safety Promotion/Fall Prevention:   activity supervised   assistive device/personal items within reach   clutter free environment maintained   fall prevention program maintained   nonskid shoes/slippers when out of bed   room organization consistent   safety round/check completed  Intervention: Prevent Skin Injury  Recent Flowsheet Documentation  Taken 4/30/2025 1615 by Barbara Ames RN  Body Position:   position changed independently   supine   weight shifting  Skin Protection: pulse oximeter probe site changed  Taken 4/30/2025 1400 by Barbara Ames RN  Body Position:   position changed independently   supine   weight shifting  Taken 4/30/2025 1158 by Barbara Ames RN  Body Position:   position changed independently   supine   weight shifting  Skin Protection: pulse oximeter probe site changed  Taken 4/30/2025 1000 by Barbara Ames RN  Body Position:   position changed independently   supine   weight shifting  Taken 4/30/2025 0730 by Barbara Ames RN  Body Position:   position changed independently   supine   weight shifting  Skin Protection: pulse oximeter probe site changed  Intervention: Prevent Infection  Recent Flowsheet Documentation  Taken 4/30/2025 1615 by Barbara Ames RN  Infection Prevention:   environmental surveillance performed   equipment surfaces disinfected   hand hygiene promoted  Taken 4/30/2025 1400 by Barbara Ames RN  Infection Prevention:   environmental surveillance performed   equipment surfaces disinfected   hand hygiene promoted  Taken 4/30/2025 1158 by Barbara Ames RN  Infection Prevention:   environmental surveillance performed   equipment surfaces disinfected   hand hygiene promoted  Taken 4/30/2025 1000 by Barbara Ames RN  Infection Prevention:   environmental surveillance performed   equipment surfaces  disinfected   hand hygiene promoted  Taken 4/30/2025 0730 by Barbara Ames RN  Infection Prevention:   environmental surveillance performed   equipment surfaces disinfected   hand hygiene promoted  Goal: Optimal Comfort and Wellbeing  4/30/2025 1710 by Barbara Ames RN  Outcome: Met  4/30/2025 1138 by Barbara Ames RN  Outcome: Progressing  Intervention: Provide Person-Centered Care  Recent Flowsheet Documentation  Taken 4/30/2025 1615 by Barbara Ames RN  Trust Relationship/Rapport:   care explained   choices provided   emotional support provided   empathic listening provided   questions answered   questions encouraged   reassurance provided   thoughts/feelings acknowledged  Taken 4/30/2025 1158 by Barbara Ames RN  Trust Relationship/Rapport:   care explained   choices provided   emotional support provided   empathic listening provided   questions answered   questions encouraged   reassurance provided   thoughts/feelings acknowledged  Taken 4/30/2025 0730 by Barbara Ames RN  Trust Relationship/Rapport:   care explained   choices provided   emotional support provided   empathic listening provided   questions answered   questions encouraged   reassurance provided   thoughts/feelings acknowledged  Goal: Readiness for Transition of Care  4/30/2025 1710 by Barbara Ames RN  Outcome: Met  4/30/2025 1138 by Barbara Ames RN  Outcome: Progressing   Goal Outcome Evaluation:  Plan of Care Reviewed With: patient                      Patient discharging home/

## 2025-05-01 LAB — BACTERIA SPEC AEROBE CULT: NORMAL

## 2025-05-01 NOTE — CASE MANAGEMENT/SOCIAL WORK
Case Management Discharge Note      Final Note: Routine home    Provided Post Acute Provider Quality & Resource List?: Refused    Selected Continued Care - Discharged on 4/30/2025 Admission date: 4/28/2025 - Discharge disposition: Home or Self Care         Transportation Services  Private: Car    Final Discharge Disposition Code: 01 - home or self-care

## 2025-05-01 NOTE — OUTREACH NOTE
Prep Survey      Flowsheet Row Responses   [facility] facility patient discharged from? [facility]   Is LACE score < 7 ? No   Eligibility Readm Mgmt   Discharge diagnosis Hyperglycemia   Does the patient have one of the following disease processes/diagnoses(primary or secondary)? Other   Does the patient have Home health ordered? No   Is there a DME ordered? No   Prep survey completed? Yes            Sugey MALDONADO - Registered Nurse

## 2025-05-07 ENCOUNTER — READMISSION MANAGEMENT (OUTPATIENT)
Dept: CALL CENTER | Facility: HOSPITAL | Age: 70
End: 2025-05-07
Payer: MEDICARE

## 2025-05-07 NOTE — OUTREACH NOTE
Medical Week 1 Survey      Flowsheet Row Responses   [facility] facility patient discharged from? [facility]   Does the patient have one of the following disease processes/diagnoses(primary or secondary)? Other   Week 1 attempt successful? No   Unsuccessful attempts Attempt 1  [All numbers listed were attempted,  no answer]            Terese MELCHOR - Registered Nurse

## 2025-05-13 ENCOUNTER — READMISSION MANAGEMENT (OUTPATIENT)
Dept: CALL CENTER | Facility: HOSPITAL | Age: 70
End: 2025-05-13
Payer: MEDICARE

## 2025-05-13 NOTE — OUTREACH NOTE
Medical Week 2 Survey      Flowsheet Row Responses   [facility] patient discharged from? [facility]   Does the patient have one of the following disease processes/diagnoses(primary or secondary)? Other   Week 2 attempt successful? Yes   Call start time 1145   Discharge diagnosis Hyperglycemia   Call end time 1147   Person spoke with today (if not patient) and relationship    Does the patient have a primary care provider?  Yes   Does the patient have an appointment with their PCP within 7 days of discharge? Yes   Has the patient kept scheduled appointments due by today? N/A   Has home health visited the patient within 72 hours of discharge? N/A   Psychosocial issues? No   What is the patient's perception of their health status since discharge? Improving   Week 2 Call Completed? Yes   Graduated Yes   Wrap up additional comments  states pt is doing well.   Call end time 1147            Sneha MALDONADO - Registered Nurse

## 2025-06-04 ENCOUNTER — OFFICE VISIT (OUTPATIENT)
Dept: CARDIOLOGY | Facility: CLINIC | Age: 70
End: 2025-06-04
Payer: MEDICARE

## 2025-06-04 VITALS
HEIGHT: 64 IN | OXYGEN SATURATION: 97 % | SYSTOLIC BLOOD PRESSURE: 102 MMHG | BODY MASS INDEX: 29.71 KG/M2 | DIASTOLIC BLOOD PRESSURE: 58 MMHG | WEIGHT: 174 LBS | HEART RATE: 93 BPM

## 2025-06-04 DIAGNOSIS — Z79.4 TYPE 2 DIABETES MELLITUS WITH OTHER SPECIFIED COMPLICATION, WITH LONG-TERM CURRENT USE OF INSULIN: Primary | ICD-10-CM

## 2025-06-04 DIAGNOSIS — E11.69 TYPE 2 DIABETES MELLITUS WITH OTHER SPECIFIED COMPLICATION, WITH LONG-TERM CURRENT USE OF INSULIN: Primary | ICD-10-CM

## 2025-06-04 DIAGNOSIS — E78.5 HYPERLIPIDEMIA LDL GOAL <100: ICD-10-CM

## 2025-06-04 NOTE — PROGRESS NOTES
Cardiology Office Consultation      Encounter Date:  2025    PATIENT IDENTIFICATION    Name: Marge Mcghee  Age: 70 y.o. Sex: female : 1955  MRN: [id number]    Reason For Consultation:  CAD    History of Present Illness:  Patient is a 70 y.o.  female  coming to cardiology office to establish care.    Patient has medical history of type 2 diabetes, poorly controlled, hyperlipidemia, CAD status post CABG in , prior CVA    Patient was recently admitted to the hospital with neck pain in 2025.  During that hospitalization she was found to have poorly controlled type 2 diabetes with glucose levels in the 500s, A1c 14.4%, total cholesterol 272, .  Workup performed during hospitalization included TTE showing normal LVEF, grade 1 diastolic dysfunction, and nuclear stress test showing normal myocardial perfusion.    Patient comes today to establish care with cardiology.  Denies recurrent neck pain or any chest pain, shortness of breath, palpitations, dizziness, lightheadedness, presyncope or syncope.  States that she had insulin dose increased during last hospitalization and has been compliant for the most part (admits that sometimes she takes insulin twice a day instead of 3 times a day).  Blood pressure in office today was 102/58.    Current medications include aspirin 81, atorvastatin 80, Plavix 75, insulin therapy    Recent labs in patient's chart:  A1c 14.4%, total cholesterol 272, , creatinine 1.28.      Prior cardiology workup.  Nuclear stress test 2025    Myocardial perfusion imaging indicates a normal myocardial perfusion study with no evidence of ischemia. Impressions are consistent with a low risk study.    Left ventricular ejection fraction is hyperdynamic (Calculated EF > 70%).    Findings consistent with a normal ECG stress test.    TTE 2025    Left ventricular ejection fraction appears to be 61 - 65%.    Left ventricular diastolic function is consistent with  "(grade I) impaired relaxation.    Estimated right ventricular systolic pressure from tricuspid regurgitation is normal (<35 mmHg). Calculated right ventricular systolic pressure from tricuspid regurgitation is 20 mmHg.      Assessment & Plan    Impressions:  CAD status post CABG 2021- LIMA to mid LAD. Vein graft to OM1 (possible main circumflex) vein graft to D1   Ischemic stroke  Chest pain improved  Type 2 diabetes poorly controlled  Hyperlipidemia    Recommendations:  Highly encourage patient to be compliant with all her medications including insulin therapy, aspirin and high intensity statins.  Her LDL was severely elevated in April 2025 , despite being on statin.  She admits she was having statin therapy compliance issues.  Reassess lipid panel in the next visit and consider PCSK9 inhibitor if needed.  Continue dual antiplatelet therapy with aspirin Plavix given history of ischemic stroke and CAD    Objective:    Vitals:  Vitals:    06/04/25 1112   BP: 102/58   Pulse: 93   SpO2: 97%   Weight: 78.9 kg (174 lb)   Height: 162.6 cm (64\")     Body mass index is 29.87 kg/m².    Physical Exam:  General: Alert, cooperative, no distress, appears stated age  Lungs:  Clear to auscultation bilaterally, no wheezes, rhonchi or rales are noted  Chest wall: No tenderness  Heart::  Regular rate and rhythm, S1 and S2 normal, no murmur.  No rub or gallop  Abdomen: Soft, nontender, nondistended, bowel sounds active  Extremities: No cyanosis, clubbing, or edema  Pulses: 2+ and symmetric all extremities  Neuro/psych: No gross focal deficits      History of Present Illness      Allergies:  Allergies   Allergen Reactions    Prochlorperazine Hives, Other (See Comments) and Irritability     CAUSED SEIZURE    Hydralazine Itching and Rash    Meperidine Itching and Swelling    Zofran [Ondansetron Hcl] Nausea And Vomiting     Does the opposite of its purpose       Medication Review:     Current Outpatient Medications:     " acetaminophen (TYLENOL) 325 MG tablet, Take 2 tablets by mouth Every 4 (Four) Hours As Needed for Mild Pain. Indications: Pain, Disp: , Rfl:     aspirin 81 MG EC tablet, Take 1 tablet by mouth Daily. Indications: Disease involving Lipid Deposits in the Arteries, Disp: , Rfl:     atorvastatin (LIPITOR) 80 MG tablet, Take 1 tablet by mouth Every Night., Disp: 90 tablet, Rfl: 3    clopidogrel (PLAVIX) 75 MG tablet, Take 1 tablet by mouth Daily., Disp: , Rfl:     denosumab (Prolia) 60 MG/ML solution prefilled syringe syringe, Inject 1 mL under the skin into the appropriate area as directed 1 (One) Time., Disp: , Rfl:     DULoxetine (CYMBALTA) 60 MG capsule, Take 1 capsule by mouth Daily. Indications: Major Depressive Disorder, Disp: , Rfl:     Insulin Glargine (Lantus SoloStar) 100 UNIT/ML injection pen, Inject 20 Units under the skin into the appropriate area as directed Daily., Disp: , Rfl:     Insulin Lispro, 1 Unit Dial, (HumaLOG KwikPen) 100 UNIT/ML solution pen-injector, Inject 6 Units under the skin into the appropriate area as directed 3 (Three) Times a Day With Meals. Along with sliding scale of 2 units for glucose 150-199, 3 units for glucose 240-249, 4 units for glucose 250-299, 5 units for glucose 300-349, 6 units for glucose of 350-400, 7 units for glucose of greater than 400 and contact MD, Disp: 15 mL, Rfl: 0    multivitamin with minerals tablet tablet, Take 1 tablet by mouth Daily. Indications: Supplement, Disp: , Rfl:     traZODone (DESYREL) 50 MG tablet, Take 1 tablet by mouth At Night As Needed. Indications: Trouble Sleeping, Disp: , Rfl:     Blood Glucose Monitoring Suppl (D-Care Glucometer) w/Device kit, Use 1 kit Daily., Disp: 1 each, Rfl: 0    glucose blood test strip, 1 each by Other route 4 (Four) Times a Day After Meals & at Bedtime. Use as instructed, Disp: 100 each, Rfl: 12    Insulin Pen Needle (Pen Needles) 32G X 4 MM misc, Use 1 each 5 (Five) Times a Day. Dx code: E11.65, Disp: 200 each,  Rfl: 0    Lancets (onetouch ultrasoft) lancets, Use as instructed, Disp: 100 each, Rfl: 12    Family History:  Family History   Problem Relation Age of Onset    Diabetes Mother     COPD Mother     Hypertension Mother     Kidney disease Mother     Obesity Mother     Thyroid disease Mother     Diabetes Sister     Diabetes Sister     Diabetes Sister     Diabetes Sister     Malig Hyperthermia Neg Hx        Past Medical History:  Past Medical History:   Diagnosis Date    Adrenal nodule 11/16/2016    FINDINGS: Mild hepatic steatosis may be present. Cholecystectomy. No biliary ductal dilatation. Negative pancreas, spleen, left adrenal gland, and kidneys. The right adrenal presumed adenoma has increased slightly, measuring 3 x 4 cm in diameter,  compared to 2 x 3.5 cm on the previous exam. The attenuation values are consistent with an adenoma. Done at [facility] in August 2018    Age-related osteoporosis without current pathological fracture 11/16/2016    Arthritis     CVA (cerebral vascular accident)     year 2000- can't remember exact year    Delayed surgical wound healing     Essential hypertension 11/16/2016    Gastroesophageal reflux disease with esophagitis 11/16/2016    Hepatic steatosis 11/16/2016    History of anemia     History of sepsis     FROM UTI    Hyperlipidemia 11/16/2016    Lisfranc's dislocation     LEFT WITH FRACTURES NONHEALING FOOT    Osteomyelitis of left foot 11/2020    RLS (restless legs syndrome)     Squamous cell skin cancer 2014    Excised by Dr. James    Thyroid nodule 10/18/2019    BENIGN    Type 2 diabetes mellitus with peripheral neuropathy 11/16/2016    Vitamin D deficiency 01/25/2019       Past surgical History:  Past Surgical History:   Procedure Laterality Date    BREAST BIOPSY Left     7/2019. BENIGN    CARDIAC CATHETERIZATION Left 5/2/2021    Procedure: Cardiac Catheterization/Vascular Study;  Surgeon: Chacho Esteban MD;  Location: [facility] INVASIVE LOCATION;  Service:  Cardiovascular;  Laterality: Left;    CARDIAC CATHETERIZATION N/A 5/2/2021    Procedure: Intra-Aortic Baloon Pump Insertion;  Surgeon: Chacho Esteban MD;  Location: [facility] CATH INVASIVE LOCATION;  Service: Cardiovascular;  Laterality: N/A;    CATARACT EXTRACTION WITH INTRAOCULAR LENS IMPLANT Bilateral     CHOLECYSTECTOMY      COLONOSCOPY      CORONARY ARTERY BYPASS GRAFT N/A 5/2/2021    Procedure: CORONARY ARTERY BYPASS WITH INTERNAL MAMMARY ARTERY GRAFT;  Surgeon: Jr Rocael Alexander MD;  Location: [facility];  Service: Cardiothoracic;  Laterality: N/A;    FOOT FUSION Right 11/13/2020    Procedure: RIGHT OPEN TREATMENT LISFRANC INJURY, OPEN REDUCTION INTERNAL FIXATION MEDIAL/MIDDLE CUNEIFORM FRACTURE AND 2ND/3RD METATARSAL FRACTURE 1ST 2ND POSSIBLE 3RD TARSOMETATARSAL ARTHRODESIS INTERCUNEIFORM ARTHRODESIS CALCANEAL BONE GRAFT;  Surgeon: Mikhail Banks Jr., MD;  Location: [facility];  Service: Orthopedics;  Laterality: Right;    INCISION AND DRAINAGE LEG Right 1/8/2021    Procedure: RIGHT IRRIGATION AND DEBRIDEMENT OF FOOT WITH SECONDARY CLOSURE AND HARDWARE REMOVAL;  Surgeon: Mikhail Banks Jr., MD;  Location: [facility] OR;  Service: Orthopedics;  Laterality: Right;    KNEE ARTHROSCOPY Bilateral     TOTAL ABDOMINAL HYSTERECTOMY      TRANSESOPHAGEAL ECHOCARDIOGRAM (EMILIA) N/A 5/2/2021    Procedure: TRANSESOPHAGEAL ECHOCARDIOGRAM;  Surgeon: Jr Rocael Alexander MD;  Location: [facility];  Service: Cardiothoracic;  Laterality: N/A;    UPPER GASTROINTESTINAL ENDOSCOPY  08/2016    US GUIDED FINE NEEDLE ASPIRATION  5/8/2020       Social History:  Social History     Socioeconomic History    Marital status:    Tobacco Use    Smoking status: Never     Passive exposure: Never    Smokeless tobacco: Never   Vaping Use    Vaping status: Never Used   Substance and Sexual Activity    Alcohol use: Yes     Comment: socially     Drug use: Never    Sexual activity: Defer       Review of Systems:  The following systems  were reviewed as they relate to the cardiovascular system: Constitutional, Eyes, ENT, Cardiovascular, Respiratory, Gastrointestinal, Integumentary, Neurological, Psychiatric, Hematologic, Endocrine, Musculoskeletal, and Genitourinary. The pertinent cardiovascular findings are reported above with all other cardiovascular points within those systems being negative.    Diagnostic Study Review:     Current Electrocardiogram:    ECG 12 Lead    Date/Time: 6/4/2025 3:01 PM  Performed by: Baltazar Hendricks MD    Authorized by: Baltazar Hendricks MD  Comparison: not compared with previous ECG   Previous ECG: no previous ECG available  Rhythm: sinus rhythm  Rate: normal  Conduction: conduction normal  ST Segments: ST segments normal  T Waves: T waves normal  QRS axis: normal  Other: no other findings    Clinical impression: normal ECG          Laboratory Data:  Lab Results   Component Value Date    GLUCOSE 250 (H) 04/30/2025    BUN 28 (H) 04/30/2025    CREATININE 1.28 (H) 04/30/2025    EGFRIFNONA 52 (L) 05/20/2021    EGFRIFAFRI >60 02/28/2023    BCR 21.9 04/30/2025    K 4.6 04/30/2025    CO2 22.6 04/30/2025    CALCIUM 8.8 04/30/2025    ALBUMIN 4.0 12/06/2024    LABIL2 1.5 02/28/2023    AST 23 12/06/2024    ALT 9 12/06/2024     Lab Results   Component Value Date    GLUCOSE 250 (H) 04/30/2025    CALCIUM 8.8 04/30/2025     04/30/2025    K 4.6 04/30/2025    CO2 22.6 04/30/2025     04/30/2025    BUN 28 (H) 04/30/2025    CREATININE 1.28 (H) 04/30/2025    EGFRIFAFRI >60 02/28/2023    EGFRIFNONA 52 (L) 05/20/2021    BCR 21.9 04/30/2025    ANIONGAP 11.4 04/30/2025     Lab Results   Component Value Date    WBC 8.48 04/29/2025    HGB 12.4 04/29/2025    HCT 39.6 04/29/2025    MCV 90.4 04/29/2025     04/29/2025     Lab Results   Component Value Date    CHOL 272 (H) 04/29/2025    CHLPL 266 (H) 07/22/2020    TRIG 119 04/29/2025    HDL 58 04/29/2025     (H) 04/29/2025      Lab Results   Component Value Date    HGBA1C 14.40 (H) 2025     Lab Results   Component Value Date    INR 1.05 2024    INR 0.95 2024    INR 1.01 2024    PROTIME 13.9 2024    PROTIME 12.7 2024    PROTIME 11.0 2024       Most Recent Echo:  Results for orders placed during the hospital encounter of 25    Adult Transthoracic Echo Complete W/ Cont if Necessary Per Protocol    Interpretation Summary    Left ventricular ejection fraction appears to be 61 - 65%.    Left ventricular diastolic function is consistent with (grade I) impaired relaxation.    Estimated right ventricular systolic pressure from tricuspid regurgitation is normal (<35 mmHg). Calculated right ventricular systolic pressure from tricuspid regurgitation is 20 mmHg.       Most Recent Stress Test:  Results for orders placed during the hospital encounter of 25    Stress Test With Myocardial Perfusion One Day    Interpretation Summary    Myocardial perfusion imaging indicates a normal myocardial perfusion study with no evidence of ischemia. Impressions are consistent with a low risk study.    Left ventricular ejection fraction is hyperdynamic (Calculated EF > 70%).    Findings consistent with a normal ECG stress test.    LEXISCAN CARDIOLITE REPORT    DATE OF PROCEDURE:  25    INDICATION FOR PROCEDURE:  Chest pain    PROCEDURE PERFORMED: Lexiscan Cardiolite    PROCEDURE COMMENTS:    After informed consent was obtained.  Patient's resting heart rate was 74 bpm, resting blood pressure was 181/90, resting EKG revealed sinus rhythm with rate of 74 bpm with Q waves in V1 V2.  Patient was given 0.4 mg of regadenosine for stress testing.  There was no significant change in heart rate, blood pressure, symptoms with regadenoson injection.  Patient tolerated procedure well.  Complications were none.    NUCLEAR IMAGIN.  There was  uniform uptake of Cardiolite both in resting and post stress images, no  ischemia seen.  2.  Gated images reveal  normal LV size and contractility, LVEF of 73%.    CONCLUSION:  1.  Lexiscan Cardiolite with  normal perfusion, negative for  ischemia.  2.  Normal wall motion .  LVEF of 73%.    RECOMMENDATIONS:    Clinical correlation recommended.      Grant Morgan MD  04/30/25  16:17 EDT       Most Recent Cardiac Catheterization:   Results for orders placed during the hospital encounter of 05/01/21    Cardiac Catheterization/Vascular Study    Narrative  CARDIAC CATHETERIZATION REPORT      DATE OF PROCEDURE:  5/2/2021    INDICATION FOR PROCEDURE:    Unstable angina  Abnormal stress test    PROCEDURE PERFORMED:    Left heart catheterization  coronary angiography  left ventriculography  Insertion of intra-aortic balloon pump    PROCEDURE COMMENTS:    After informed consent was obtained, the patient was prepped and draped in the usual sterile manner.  Mild to moderate sedation was administered.  Right femoral artery was accessed without difficulty and 6 Icelandic arterial sheath was inserted.  Sheath was flushed with heparinized saline.    Using 6 Icelandic So catheters, first left coronary artery and the right coronary was electively engaged and appropriate views were taken.  A 6 Icelandic pigtail catheter was used to cross aortic valve and left heart catheterization was performed.  Left ventriculography was done in a VELAZQUEZ view    After that we placed intra-aortic balloon pump by usual technique without any complication and difficulty.    Patient tolerated the procedure well.    FINDINGS:    1. HEMODYNAMICS:    Aortic pressure: 130/80 mmHg    LVEDP: 5 to 10 mmHg    Gradient across aortic valve on pullback: No gradient across aortic valve      2. LEFT VENTRICULOGRAPHY: 55 to 60%      3. CORONARY ANGIOGRAPHY:    A: Left main coronary artery: 95 to 99% stenosis of mid to distal left main    B: Left anterior descending artery: 25% proximal and mid LAD stenosis    C: Left circumflex coronary  artery: Free of significant disease    D: Right coronary artery: 40% mid RCA stenosis.  It is large and dominant    SUMMARY:    Critical left main stenosis    RECOMMENDATIONS:    Recommend CABG.  I have discussed and told Dr. Merino       NOTE: The following portions of the patient's note were reviewed, confirmed and/or updated this visit as appropriate: History of present illness/Interval history, physical examination, assessment & plan, allergies, current medications, past family history, past medical history, past social history, past surgical history and problem list.

## (undated) DEVICE — SUT ETHLN 3/0 PS1 18IN 1663H

## (undated) DEVICE — BNDG ESMARK 4IN 12FT LF STRL BLU

## (undated) DEVICE — DRN WND CH RND FUL/FLUT NO/TROC 3/8IN 28F

## (undated) DEVICE — BG BLD SYS

## (undated) DEVICE — STCKNT IMPERV 12IN STRL

## (undated) DEVICE — BLOOD TRANSFUSION FILTER: Brand: HAEMONETICS

## (undated) DEVICE — DRAPE SHEET ULTRAGARD: Brand: MEDLINE

## (undated) DEVICE — PINNACLE INTRODUCER SHEATH: Brand: PINNACLE

## (undated) DEVICE — ADAPT CARDIO VNT Y/TYP

## (undated) DEVICE — BLOWER MISTER CLEARVIEW W/TBG

## (undated) DEVICE — SUT SILK 2 SUTUPAK TIE 60IN SA8H 2STRAND

## (undated) DEVICE — CABL BIPOLAR W/BOXEND 6FT DISP

## (undated) DEVICE — DRAPE,U/ SHT,SPLIT,PLAS,STERIL: Brand: MEDLINE

## (undated) DEVICE — JOINT PREP INSTRUMENT KIT: Brand: ORTHOLOC™ 2

## (undated) DEVICE — SUT VIC 3/0 FS1 27IN J442H

## (undated) DEVICE — ELECTRD DEFIB M/FUNC PROPADZ STRL 2PK

## (undated) DEVICE — PAD,ABDOMINAL,8"X10",ST,LF: Brand: MEDLINE

## (undated) DEVICE — SUT PROLENE PP 7/0 BV175-6 24IN

## (undated) DEVICE — SUT PROLN 6/0 C1 D/A 30IN 8706H

## (undated) DEVICE — ST BLD COL SAFETYLOK LS 23GA 3/4IN 7IN

## (undated) DEVICE — SUT SILK 4/0 TIES 18IN A183H

## (undated) DEVICE — INTENDED FOR TISSUE SEPARATION, AND OTHER PROCEDURES THAT REQUIRE A SHARP SURGICAL BLADE TO PUNCTURE OR CUT.: Brand: BARD-PARKER ® CARBON RIB-BACK BLADES

## (undated) DEVICE — ELECTRD BLD EZ CLN STD 6.5IN

## (undated) DEVICE — SUT SILK 2/0 TIES 18IN A185H

## (undated) DEVICE — GLV SURG SIGNATURE ESSENTIAL PF LTX SZ8

## (undated) DEVICE — SUT VIC 2/0 CT2 27IN J269H

## (undated) DEVICE — PENCL E/S ULTRAVAC TELESCP NOSE HOLSTR 10FT

## (undated) DEVICE — SUT SILK 2/0 SH CR8 18IN CR8 C012D

## (undated) DEVICE — SUT PROLN 4/0 BB D/A 36IN 8581H

## (undated) DEVICE — Device

## (undated) DEVICE — SOL IRR H2O BTL 1000ML STRL

## (undated) DEVICE — UNDERCAST PADDING: Brand: DEROYAL

## (undated) DEVICE — SUCTION CANISTER, 1500CC,SAFELINER: Brand: DEROYAL

## (undated) DEVICE — TEMP PACING WIRE: Brand: MYO/WIRE

## (undated) DEVICE — SUT VIC 0 CT1 CR8 DYED JJ31G

## (undated) DEVICE — SUT PDS 2 0 CT1 27IN CLR Z259H

## (undated) DEVICE — CATH DIAG IMPULSE PIG .056 6F 110CM

## (undated) DEVICE — CORONARY ARTERY BYPASS GRAFT MARKERS, STAINLESS STEEL, DISTAL, WITHOUT HOLDER: Brand: ANASTOMARK CORONARY ARTERY BYPASS GRAFT MARKERS, STAINLESS STEEL, DISTAL

## (undated) DEVICE — 8MM BONE GRAFT DRILL: Brand: ACUMED

## (undated) DEVICE — GLV SURG PREMIERPRO ORTHO LTX PF SZ8 BRN

## (undated) DEVICE — TOWEL,OR,DSP,ST,WHITE,DLX,4/PK,20PK/CS: Brand: MEDLINE

## (undated) DEVICE — GLV SURG BIOGEL LTX PF 7 1/2

## (undated) DEVICE — CONNECT Y INTERSEPT W/LL 3/8 X 3/8 X 3/8IN

## (undated) DEVICE — SPNG GZ WOVN 4X4IN 12PLY 10/BX STRL

## (undated) DEVICE — KT INST PROC DYNACLIP UNIV DISP STRL

## (undated) DEVICE — HARMONIC SYNERGY DISSECTING HOOK WITH TORQUE WRENCH. FOR USE WITH BLUE HAND PIECE ONLY: Brand: HARMONIC SYNERGY

## (undated) DEVICE — PK ORTHO MINOR 40

## (undated) DEVICE — BALN IAB SENSATION PLS F/O 50CC 8F 15CM SYS

## (undated) DEVICE — SUT SILK 0 CT1 CR8 18IN C021D

## (undated) DEVICE — DRILL PIN, JOINT FENESTRATION, 2.0MM: Brand: MEDLINE UNITE

## (undated) DEVICE — BNDG ELAS CO-FLEX SLF ADHR 4IN5YD LF STRL

## (undated) DEVICE — DRILL BIT, CANNULATED, 3.0MM: Brand: MEDLINE

## (undated) DEVICE — PK ORTHO MINOR TOWER 40

## (undated) DEVICE — SUT PROLN 7/0 BV1 D/A 30IN 8703H

## (undated) DEVICE — PREMIUM WET SKIN PREP TRAY: Brand: MEDLINE INDUSTRIES, INC.

## (undated) DEVICE — PRESSURE TUBING: Brand: TRUWAVE

## (undated) DEVICE — SOL NACL 0.9PCT 1000ML

## (undated) DEVICE — GLV SURG BIOGEL LTX PF 8

## (undated) DEVICE — TRAP FLD MINIVAC MEGADYNE 100ML

## (undated) DEVICE — DRP C/ARM 41X74IN

## (undated) DEVICE — SOL ISO/ALC RUB 70PCT 4OZ

## (undated) DEVICE — CANN ART EOPA 3D NV W/CONN 20F

## (undated) DEVICE — PK PERFUS CUST W/CARDIOPLEGIA

## (undated) DEVICE — CATH DIAG IMPULSE FR4 6F 100CM

## (undated) DEVICE — PK OPN HEART WHT WRP 50

## (undated) DEVICE — APPL CHLORAPREP HI/LITE 26ML ORNG

## (undated) DEVICE — SYS PERFUS SEP PLATLT W TIPS CUST

## (undated) DEVICE — ROTATING SURGICAL PUNCHES, 1 PER POUCH: Brand: A&E MEDICAL / ROTATING SURGICAL PUNCHES

## (undated) DEVICE — ANTIBACTERIAL UNDYED BRAIDED (POLYGLACTIN 910), SYNTHETIC ABSORBABLE SUTURE: Brand: COATED VICRYL

## (undated) DEVICE — GW PTFE EMERALD HEPCOAT FC J TIP STD .035 3MM 150CM

## (undated) DEVICE — SUT MNCRYL 3/0 SH 27 IN Y416H

## (undated) DEVICE — BLD SCLPL BEAVR MINI STR 2BVL 180D LF

## (undated) DEVICE — SUT VIC 3/0 CT2 27IN J232H

## (undated) DEVICE — PK ATS CUST W CARDIOTOMY RESEVOIR

## (undated) DEVICE — CANN AORT ROOT DLP 12G 9F

## (undated) DEVICE — ADAPT ANTEGRADE RETRGR

## (undated) DEVICE — DISPOSABLE TOURNIQUET CUFF SINGLE BLADDER, SINGLE PORT AND QUICK CONNECT CONNECTOR: Brand: COLOR CUFF

## (undated) DEVICE — SUT PROLN 5/0 V5 36IN 8934H

## (undated) DEVICE — CATH DIAG IMPULSE FL4 6F 100CM

## (undated) DEVICE — TUBING, SUCTION, 1/4" X 12', STRAIGHT: Brand: MEDLINE

## (undated) DEVICE — PK TRY HEART CATH 50

## (undated) DEVICE — PK PROC DYNACLIP/FORTE NO/IMP DISP STRL

## (undated) DEVICE — SENSR CERBRL O2 PK/2

## (undated) DEVICE — HEMOCONCENTRATOR PERFUS LPS06

## (undated) DEVICE — SOL IRR NACL 0.9PCT BT 1000ML

## (undated) DEVICE — SPNG LAP 18X18IN LF STRL PK/5

## (undated) DEVICE — SUT PDS 3/0 SH 27IN DYED Z316H

## (undated) DEVICE — SUT VIC COAT PLS ANTIBAC TP1 27IN

## (undated) DEVICE — SUT ETHLN 2/0 PS 18IN 585H

## (undated) DEVICE — TBG INSUFF MALE L/L W 12MM CON: Brand: MEDLINE INDUSTRIES, INC.

## (undated) DEVICE — SYS VASOVIEW HEMOPRO ENDOSCOPIC HARVST VESL

## (undated) DEVICE — ST IRR CYSTO W/SPK 77IN LF

## (undated) DEVICE — BLAKE SILICONE DRAIN, 19 FR ROUND, HUBLESS WITH 1/4" TROCAR: Brand: BLAKE

## (undated) DEVICE — GUIDEWIRE, NON-THREADED, 1.6 X 150MM
Type: IMPLANTABLE DEVICE | Site: FOOT | Status: NON-FUNCTIONAL
Brand: MEDLINE
Removed: 2020-11-13

## (undated) DEVICE — GUIDEWIRE, NON-THREADED, 1.4 X 150MM: Brand: PENDING